# Patient Record
Sex: FEMALE | Race: BLACK OR AFRICAN AMERICAN | NOT HISPANIC OR LATINO | Employment: OTHER | URBAN - METROPOLITAN AREA
[De-identification: names, ages, dates, MRNs, and addresses within clinical notes are randomized per-mention and may not be internally consistent; named-entity substitution may affect disease eponyms.]

---

## 2017-01-03 ENCOUNTER — TELEPHONE (OUTPATIENT)
Dept: GASTROENTEROLOGY | Facility: CLINIC | Age: 47
End: 2017-01-03

## 2017-01-05 ENCOUNTER — PATIENT MESSAGE (OUTPATIENT)
Dept: TRANSPLANT | Facility: CLINIC | Age: 47
End: 2017-01-05

## 2017-01-06 ENCOUNTER — TELEPHONE (OUTPATIENT)
Dept: GASTROENTEROLOGY | Facility: CLINIC | Age: 47
End: 2017-01-06

## 2017-01-18 ENCOUNTER — PATIENT MESSAGE (OUTPATIENT)
Dept: GASTROENTEROLOGY | Facility: CLINIC | Age: 47
End: 2017-01-18

## 2017-01-18 ENCOUNTER — PATIENT MESSAGE (OUTPATIENT)
Dept: TRANSPLANT | Facility: CLINIC | Age: 47
End: 2017-01-18

## 2017-01-18 ENCOUNTER — OFFICE VISIT (OUTPATIENT)
Dept: GASTROENTEROLOGY | Facility: CLINIC | Age: 47
End: 2017-01-18
Payer: MEDICARE

## 2017-01-18 ENCOUNTER — HOSPITAL ENCOUNTER (OUTPATIENT)
Dept: PREADMISSION TESTING | Facility: HOSPITAL | Age: 47
Discharge: HOME | End: 2017-01-18
Attending: OTOLARYNGOLOGY
Payer: MEDICARE

## 2017-01-18 VITALS
DIASTOLIC BLOOD PRESSURE: 85 MMHG | HEIGHT: 69 IN | HEART RATE: 76 BPM | WEIGHT: 146.38 LBS | SYSTOLIC BLOOD PRESSURE: 151 MMHG | BODY MASS INDEX: 21.68 KG/M2

## 2017-01-18 DIAGNOSIS — K62.5 RECTAL BLEEDING: ICD-10-CM

## 2017-01-18 DIAGNOSIS — K60.2 ANAL FISSURE: ICD-10-CM

## 2017-01-18 DIAGNOSIS — R93.3 ABNORMAL CT SCAN, COLON: ICD-10-CM

## 2017-01-18 DIAGNOSIS — R11.0 NAUSEA: Primary | ICD-10-CM

## 2017-01-18 PROCEDURE — 99214 OFFICE O/P EST MOD 30 MIN: CPT | Mod: S$GLB,,, | Performed by: PHYSICIAN ASSISTANT

## 2017-01-18 PROCEDURE — 3079F DIAST BP 80-89 MM HG: CPT | Mod: S$GLB,,, | Performed by: PHYSICIAN ASSISTANT

## 2017-01-18 PROCEDURE — 1159F MED LIST DOCD IN RCRD: CPT | Mod: S$GLB,,, | Performed by: PHYSICIAN ASSISTANT

## 2017-01-18 PROCEDURE — 3077F SYST BP >= 140 MM HG: CPT | Mod: S$GLB,,, | Performed by: PHYSICIAN ASSISTANT

## 2017-01-18 PROCEDURE — 99999 PR PBB SHADOW E&M-EST. PATIENT-LVL V: CPT | Mod: PBBFAC,,, | Performed by: PHYSICIAN ASSISTANT

## 2017-01-18 NOTE — MR AVS SNAPSHOT
[facility]  [address]  [place] [address]  Phone: [phone]  Fax: [phone]                  Valencia Dumont   2017 9:30 AM   Office Visit    Description:  Female : 1970   Provider:  Amrit Power PA-C   Department:  [facility] - Gastroenterology           Reason for Visit     Abdominal Pain     Constipation     Diarrhea     Nausea           Diagnoses this Visit        Comments    Nausea    -  Primary     Abnormal CT scan, colon         Rectal bleeding         Anal fissure                To Do List           Future Appointments        Provider Department Dept Phone    2017  1:30 PM PRE-ADMIT 1, [facility] [phone]    2/3/2017 8:00 AM NOM NM2 INFINIA 400LB LIMIT [facility] [phone]    2017 8:30 AM Amrit Power PA-C [facility] [phone]    3/22/2017 9:00 AM WB DEXA1 [facility] [phone]      Your Future Surgeries/Procedures     2017   Surgery with Dino Candelaria MD   [facility] ([facility])    [address] [place] [address] [phone]              Goals (5 Years of Data)     None       These Medications        Disp Refills Start End    DILTIAZEM HCL (DILTIAZEM 2% CREAM) 30 g 0 2017     Apply topically 3 (three) times daily. Apply topically to anal area. - Topical (Top)    Pharmacy: [facility] [address] Ph #: [phone]         [facility]     [facility] Care Line -  Assistance  Registered nurses in the [facility] provide clinical advisement, health education, appointment booking, and other advisory services.  Call for this free service at [phone].             Medications           Message regarding Medications     Verify the changes and/or additions to your medication regime  "listed below are the same as discussed with your clinician today.  If any of these changes or additions are incorrect, please notify your healthcare provider.        START taking these NEW medications        Refills    DILTIAZEM HCL (DILTIAZEM 2% CREAM) 0    Sig: Apply topically 3 (three) times daily. Apply topically to anal area.    Class: Print    Route: Topical (Top)           Verify that the below list of medications is an accurate representation of the medications you are currently taking.  If none reported, the list may be blank. If incorrect, please contact your healthcare provider. Carry this list with you in case of emergency.           Current Medications     ACCU-CHEK SMARTVIEW Strp 1 strip by Misc.(Non-Drug; Combo Route) route 5 (five) times daily. Trueresult    BD ULTRA-FINE JANESSA PEN NEEDLES 32 gauge x 5/32" Ndle TEST BLOOD SUGAR FOUR TIMES DAILY    blood sugar diagnostic (BLOOD GLUCOSE TEST) Strp Pt uses freestyle lite meter to check BG 4 times daily.    cyproheptadine (PERIACTIN) 4 mg tablet Take 4 mg by mouth every evening.    diazepam (VALIUM) 5 MG tablet 5 mg 3 (three) times daily as needed.     estradiol (ESTRACE) 0.01 % (0.1 mg/gram) vaginal cream Place 0.5 g vaginally twice a week. Insert 0.5grams intravaginally twice weekly    gabapentin (NEURONTIN) 300 MG capsule Take 300 mg by mouth 3 (three) times daily.    hydrochlorothiazide (HYDRODIURIL) 25 MG tablet Take 25 mg by mouth once daily.    lancets 28 gauge Misc 4 lancets by Misc.(Non-Drug; Combo Route) route once daily. Pt uses Freestyle lite meter to check BG 4 times daily.    LEVEMIR FLEXTOUCH 100 unit/mL (3 mL) InPn pen INJECT TEN UNITS UNDER THE SKIN ONCE DAILY    losartan (COZAAR) 100 MG tablet Take 1 tablet by mouth once daily.    magnesium oxide (MAG-OX) 400 mg tablet Take 1 tablet (400 mg total) by mouth 2 (two) times daily.    MULTIVIT,THER IRON,CA,FA & MIN (MULTIVITAMIN) Tab Take 1 tablet by mouth once daily.    mycophenolate " "(MYFORTIC) 360 MG TbEC Take 1 tablet (360 mg total) by mouth 2 (two) times daily.    NOVOLOG FLEXPEN 100 unit/mL InPn pen INJECT FIVE UNITS BEFORE BREAKFAST AND LUNCH, AND INJECT SIX UNITS BEFORE DINNER. USE WITH SLIDING SCALE.    ondansetron (ZOFRAN) 8 MG tablet Take 1 tablet by mouth every 8 (eight) hours as needed.    oxycodone-acetaminophen (PERCOCET) 7.5-325 mg per tablet Take 1 tablet by mouth every 4 (four) hours as needed for Pain.    pantoprazole (PROTONIX) 40 MG tablet Take 40 mg by mouth once daily.    PROAIR HFA 90 mcg/actuation inhaler Inhale 2 puffs into the lungs 3 (three) times daily as needed.     promethazine-dextromethorphan (PROMETHAZINE-DM) 6.25-15 mg/5 mL Syrp     tacrolimus (PROGRAF) 1 MG Cap Take 8 capsules (8 mg total) by mouth every 12 (twelve) hours.    valacyclovir (VALTREX) 500 MG tablet TAKE TWO TABLETS BY MOUTH EVERY 8 HOURS FOR 7 DAYS    verapamil (VERELAN) 120 MG C24P Take 1 capsule by mouth once daily.    VITAMIN D2 50,000 unit capsule TAKE 1 CAPSULE BY MOUTH ONCE EVERY WEEK    zolpidem (AMBIEN) 10 mg Tab Take 10 mg by mouth nightly as needed.    DILTIAZEM HCL (DILTIAZEM 2% CREAM) Apply topically 3 (three) times daily. Apply topically to anal area.           Clinical Reference Information           Vital Signs - Last Recorded  Most recent update: 1/18/2017 10:02 AM by Mary Alice Flores MA    BP Pulse Ht Wt BMI    (!) 151/85 76 5' 9" (1.753 m) 66.4 kg (146 lb 6.2 oz) 21.62 kg/m2      Blood Pressure          Most Recent Value    BP  (!)  151/85      Allergies as of 1/18/2017     Doxycycline    Pcn [Penicillins]      Immunizations Administered on Date of Encounter - 1/18/2017     None      Orders Placed During Today's Visit      Normal Orders This Visit    Case request GI: COLONOSCOPY     Future Labs/Procedures Expected by Expires    NM Gastric Emptying  1/18/2017 1/18/2018      Maintenance Dialysis History     Patient has no recorded history of maintenance dialysis.      Transplant " Information        Txp Date Organ Coordinator Care Team    12/31/2013 Liver Linda Barakat, SEGUNDO Surgeon:  Constantino Peña MD   Referring Physician:  Nolan Badillo MD   Assisting Surgeon:  Robin Alex Jr., MD   Current Hepatologist:  Miriam Abernathy MD   Corresponding Physician:  Timur Lion MD   Current PCP:  Timur Lion MD         Instructions    Restart the miralax

## 2017-01-18 NOTE — PROGRESS NOTES
[facility] Consultation Note    Reason for Consult:  The primary encounter diagnosis was Nausea. Diagnoses of Abnormal CT scan, colon, Rectal bleeding, and Anal fissure were also pertinent to this visit.    PCP:   Timur Lion       Referring MD:  No referring provider defined for this encounter.    HPI:  This is a 46 y.o. female  Here to follow up on her stomach pain. Her 12/2016 EGD revealed erosive gastritis; Bx negative for H. Pylori or CMV.     Seen in the ED 12/30/16 for nausea diarrhea, and abdominal pain. A CT revealed questionable thickening of the distal descending colon with slight pericolonic fat stranding. She was discharged on avelox x 10 days.  Today she admits to seeing blood in the stool from Jan 11th-16th, none in the past 2 days. She showed me a picture on her phone in office today. No further diarrhea, stools are now hard.   Drinking plenty of water.    she continues to have nausea and borborygmus. No further vomiting. Taking  zofran 1-2 times a day.    GERD may be better. takin protonix 40mg twice daily     -180, BS >200 once a week - still  S/p liver transplant    11/2016 abdominal ultrasound - she does not have a gallbladder, no ductal dilation    Hx of taking multiple antibiotics and steroids for sinus infections    She admits to a recent abnormal stress test, and thus her upcoming nasal surgery has been cancelled    ROS:  Constitutional: No fevers, chills, No weight loss  ENT: No allergies  CV: No chest pain  Pulm: No cough, No shortness of breath  Ophtho: No vision changes  GI: see HPI  Derm: No rash  Heme: No lymphadenopathy, No bruising  MSK: No arthritis  : No dysuria, No hematuria  Endo: No hot or cold intolerance  Neuro: No syncope, No seizure  Psych: No anxiety, No depression    Medical History:  has a past medical history of Anemia; Arthritis; Ascites; 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    Surgical History:  has a past surgical history that includes Esophagogastroduodenoscopy; Liver biopsy; Liver transplant (12/31/2013); and Tubal ligation (2003).    Family History: family history includes Alzheimer's disease in her father; Breast cancer (age of onset: 55) in her maternal aunt; Diabetes in her brother, father, paternal grandfather, and paternal grandmother; Hypertension in her brother and mother. There is no history of Stroke, Cancer, Colon cancer, Esophageal cancer, Stomach cancer, or Rectal cancer..     Social History:  reports that she has never smoked. She has never used smokeless tobacco. She reports that she does not drink alcohol or use illicit drugs.    Review of patient's allergies indicates:   Allergen Reactions    Doxycycline Rash    Pcn [penicillins] Rash       Current Outpatient Prescriptions on File Prior to Visit   Medication Sig Dispense Refill    ACCU-CHEK SMARTVIEW Strp 1 strip by Misc.(Non-Drug; Combo Route) route 5 (five) times daily. Trueresult      BD ULTRA-FINE JANESSA PEN NEEDLES 32 gauge x 5/32" Ndle TEST BLOOD SUGAR FOUR TIMES DAILY 400 each 3    blood sugar diagnostic (BLOOD GLUCOSE TEST) Strp Pt uses freestyle lite meter to check BG 4 times daily. 200 strip 11    cyproheptadine (PERIACTIN) 4 mg tablet Take 4 mg by mouth every evening.  3    diazepam (VALIUM) 5 MG tablet 5 mg 3 (three) times daily as needed.   0    estradiol (ESTRACE) 0.01 % (0.1 mg/gram) vaginal cream Place 0.5 g vaginally twice a week. Insert 0.5grams intravaginally twice weekly 42 g 4    gabapentin (NEURONTIN) 300 MG capsule Take 300 mg by mouth 3 (three) times daily.      hydrochlorothiazide (HYDRODIURIL) 25 MG tablet Take 25 mg by mouth once daily.  2    lancets 28 gauge Misc 4 lancets by Misc.(Non-Drug; Combo Route) route once daily. Pt uses Freestyle lite meter to " "check BG 4 times daily. 200 each 11    LEVEMIR FLEXTOUCH 100 unit/mL (3 mL) InPn pen INJECT TEN UNITS UNDER THE SKIN ONCE DAILY 15 mL 3    losartan (COZAAR) 100 MG tablet Take 1 tablet by mouth once daily.  2    magnesium oxide (MAG-OX) 400 mg tablet Take 1 tablet (400 mg total) by mouth 2 (two) times daily. (Patient taking differently: Take 800 mg by mouth 2 (two) times daily. ) 60 tablet 5    MULTIVIT,THER IRON,CA,FA & MIN (MULTIVITAMIN) Tab Take 1 tablet by mouth once daily. 30 tablet 0    mycophenolate (MYFORTIC) 360 MG TbEC Take 1 tablet (360 mg total) by mouth 2 (two) times daily. 180 tablet 3    NOVOLOG FLEXPEN 100 unit/mL InPn pen INJECT FIVE UNITS BEFORE BREAKFAST AND LUNCH, AND INJECT SIX UNITS BEFORE DINNER. USE WITH SLIDING SCALE. 15 mL 3    ondansetron (ZOFRAN) 8 MG tablet Take 1 tablet by mouth every 8 (eight) hours as needed.  0    oxycodone-acetaminophen (PERCOCET) 7.5-325 mg per tablet Take 1 tablet by mouth every 4 (four) hours as needed for Pain.      pantoprazole (PROTONIX) 40 MG tablet Take 40 mg by mouth once daily.      PROAIR HFA 90 mcg/actuation inhaler Inhale 2 puffs into the lungs 3 (three) times daily as needed.   3    promethazine-dextromethorphan (PROMETHAZINE-DM) 6.25-15 mg/5 mL Syrp   0    tacrolimus (PROGRAF) 1 MG Cap Take 8 capsules (8 mg total) by mouth every 12 (twelve) hours. 480 capsule 11    valacyclovir (VALTREX) 500 MG tablet TAKE TWO TABLETS BY MOUTH EVERY 8 HOURS FOR 7 DAYS  0    verapamil (VERELAN) 120 MG C24P Take 1 capsule by mouth once daily.  3    VITAMIN D2 50,000 unit capsule TAKE 1 CAPSULE BY MOUTH ONCE EVERY WEEK 12 capsule 1    zolpidem (AMBIEN) 10 mg Tab Take 10 mg by mouth nightly as needed.  3     No current facility-administered medications on file prior to visit.          Objective Findings:    Vital Signs:  Visit Vitals    BP (!) 151/85    Pulse 76    Ht 5' 9" (1.753 m)    Wt 66.4 kg (146 lb 6.2 oz)    BMI 21.62 kg/m2     Body mass index " is 21.62 kg/(m^2).    Physical Exam:  General Appearance: Well appearing in no acute distress  Head:   Normocephalic, without obvious abnormality  Eyes:    No scleral icterus  ENT: Neck supple, Lips, mucosa, and tongue normal  Lungs: CTA bilaterally in anterior and posterior fields, no wheezes, no crackles.  Heart:  Regular rate and rhythm, S1, S2 normal, no murmurs heard  Abdomen: large abdominal incisional scar. abdomen soft, mild diffuse tenderness, no guarding or rebound tenderness, non distended with hyperactive bowel sounds in all four quadrants.   Extremities: 2+ pulses, no  edema  Skin: No rash  Neurologic: AAO x 3    Rectal exam: no external hemorrhoids visualized. Anal fissure at 6 'oclock. Upon DANY, tender at 6 o'clock         Labs:  Lab Results   Component Value Date    WBC 5.40 12/29/2016    HGB 13.2 12/29/2016    HCT 37.5 12/29/2016     12/29/2016    CHOL 202 (H) 07/18/2016    TRIG 64 07/18/2016    HDL 78 (H) 07/18/2016    ALT 14 12/29/2016    AST 12 12/29/2016     12/29/2016    K 3.5 12/29/2016     12/29/2016    CREATININE 1.3 12/29/2016    BUN 17 12/29/2016    CO2 27 12/29/2016    TSH 3.247 10/15/2013    INR 1.0 01/03/2014    HGBA1C 8.2 (H) 07/28/2016       Imaging:    Endoscopy:    12/2016 EGD revealed erosive gastritis; Bx negative for H. Pylori or CMV.     Assessment:  1. Nausea    2. Abnormal CT scan, colon    3. Rectal bleeding    4. Anal fissure    s/p liver transplant on immunosuppressants    Chronic Nausea may be relation to erosive gastritis or she may have gastroparesis.    Recently diagnosed with left sided colitis via CT. S/p antibiotics. Rectal bleeding have had been related to the colitis or hemorrhoids.     Recommendations:  1. Schedule a colonoscopy to rule out ulcers and malignancies, Pending a normal cardiac stress test, (will need to get records faxed)    2. GES to rule out gastroparesis    3. diltiazem cream for anal fissure    No Follow-up on file.      Order  summary:  Orders Placed This Encounter    NM Gastric Emptying    DILTIAZEM HCL (DILTIAZEM 2% CREAM)    Case request GI: COLONOSCOPY         Thank you so much for allowing me to participate in the care of Valencia Power PA-C

## 2017-01-19 ENCOUNTER — PATIENT MESSAGE (OUTPATIENT)
Dept: TRANSPLANT | Facility: CLINIC | Age: 47
End: 2017-01-19

## 2017-01-22 ENCOUNTER — PATIENT MESSAGE (OUTPATIENT)
Dept: GASTROENTEROLOGY | Facility: CLINIC | Age: 47
End: 2017-01-22

## 2017-01-22 DIAGNOSIS — K60.2 ANAL FISSURE: Primary | ICD-10-CM

## 2017-01-23 ENCOUNTER — LAB VISIT (OUTPATIENT)
Dept: LAB | Facility: HOSPITAL | Age: 47
End: 2017-01-23
Attending: INTERNAL MEDICINE
Payer: MEDICARE

## 2017-01-23 DIAGNOSIS — Z94.4 LIVER REPLACED BY TRANSPLANT: ICD-10-CM

## 2017-01-23 LAB
ALBUMIN SERPL BCP-MCNC: 3.9 G/DL
ALP SERPL-CCNC: 73 U/L
ALT SERPL W/O P-5'-P-CCNC: 14 U/L
ANION GAP SERPL CALC-SCNC: 5 MMOL/L
AST SERPL-CCNC: 14 U/L
BASOPHILS # BLD AUTO: 0.01 K/UL
BASOPHILS NFR BLD: 0.2 %
BILIRUB SERPL-MCNC: 0.5 MG/DL
BUN SERPL-MCNC: 24 MG/DL
CALCIUM SERPL-MCNC: 9.1 MG/DL
CHLORIDE SERPL-SCNC: 103 MMOL/L
CO2 SERPL-SCNC: 29 MMOL/L
CREAT SERPL-MCNC: 1.3 MG/DL
DIFFERENTIAL METHOD: ABNORMAL
EOSINOPHIL # BLD AUTO: 0.4 K/UL
EOSINOPHIL NFR BLD: 8.8 %
ERYTHROCYTE [DISTWIDTH] IN BLOOD BY AUTOMATED COUNT: 13.7 %
EST. GFR  (AFRICAN AMERICAN): 57 ML/MIN/1.73 M^2
EST. GFR  (NON AFRICAN AMERICAN): 49 ML/MIN/1.73 M^2
GGT SERPL-CCNC: 23 U/L
GLUCOSE SERPL-MCNC: 249 MG/DL
HCT VFR BLD AUTO: 33 %
HGB BLD-MCNC: 11.4 G/DL
LYMPHOCYTES # BLD AUTO: 1.9 K/UL
LYMPHOCYTES NFR BLD: 39.9 %
MAGNESIUM SERPL-MCNC: 1.5 MG/DL
MCH RBC QN AUTO: 27.5 PG
MCHC RBC AUTO-ENTMCNC: 34.5 %
MCV RBC AUTO: 80 FL
MONOCYTES # BLD AUTO: 0.2 K/UL
MONOCYTES NFR BLD: 4.2 %
NEUTROPHILS # BLD AUTO: 2.3 K/UL
NEUTROPHILS NFR BLD: 46.9 %
PLATELET # BLD AUTO: 177 K/UL
PMV BLD AUTO: 10.6 FL
POTASSIUM SERPL-SCNC: 3.7 MMOL/L
PROT SERPL-MCNC: 7.5 G/DL
RBC # BLD AUTO: 4.14 M/UL
SODIUM SERPL-SCNC: 137 MMOL/L
WBC # BLD AUTO: 4.79 K/UL

## 2017-01-23 PROCEDURE — 36415 COLL VENOUS BLD VENIPUNCTURE: CPT

## 2017-01-23 PROCEDURE — 83735 ASSAY OF MAGNESIUM: CPT

## 2017-01-23 PROCEDURE — 82977 ASSAY OF GGT: CPT

## 2017-01-23 PROCEDURE — 80053 COMPREHEN METABOLIC PANEL: CPT

## 2017-01-23 PROCEDURE — 80197 ASSAY OF TACROLIMUS: CPT

## 2017-01-23 PROCEDURE — 85025 COMPLETE CBC W/AUTO DIFF WBC: CPT

## 2017-01-23 NOTE — TELEPHONE ENCOUNTER
Ma called patient to scheduled crs appt , pt states she will call the office when shes ready to scheduled it

## 2017-01-24 LAB — TACROLIMUS BLD-MCNC: 3.5 NG/ML

## 2017-01-26 ENCOUNTER — PATIENT MESSAGE (OUTPATIENT)
Dept: GASTROENTEROLOGY | Facility: CLINIC | Age: 47
End: 2017-01-26

## 2017-01-26 ENCOUNTER — TELEPHONE (OUTPATIENT)
Dept: TRANSPLANT | Facility: CLINIC | Age: 47
End: 2017-01-26

## 2017-01-26 NOTE — TELEPHONE ENCOUNTER
----- Message from Miriam Abernathy MD sent at 1/26/2017 12:27 AM CST -----  Reviewed, nothing to do

## 2017-02-03 ENCOUNTER — HOSPITAL ENCOUNTER (OUTPATIENT)
Dept: RADIOLOGY | Facility: HOSPITAL | Age: 47
Discharge: HOME | End: 2017-02-03
Attending: INTERNAL MEDICINE
Payer: MEDICARE

## 2017-02-03 DIAGNOSIS — R11.0 NAUSEA: ICD-10-CM

## 2017-02-03 PROCEDURE — 78264 GASTRIC EMPTYING IMG STUDY: CPT | Mod: 26,,, | Performed by: RADIOLOGY

## 2017-02-03 PROCEDURE — 78264 GASTRIC EMPTYING IMG STUDY: CPT | Mod: TC

## 2017-02-06 ENCOUNTER — PATIENT MESSAGE (OUTPATIENT)
Dept: GASTROENTEROLOGY | Facility: CLINIC | Age: 47
End: 2017-02-06

## 2017-02-07 ENCOUNTER — LAB VISIT (OUTPATIENT)
Dept: LAB | Facility: HOSPITAL | Age: 47
End: 2017-02-07
Attending: FAMILY MEDICINE
Payer: MEDICARE

## 2017-02-07 DIAGNOSIS — E11.9 TYPE II OR UNSPECIFIED TYPE DIABETES MELLITUS WITHOUT MENTION OF COMPLICATION, NOT STATED AS UNCONTROLLED: Primary | ICD-10-CM

## 2017-02-07 PROCEDURE — 83036 HEMOGLOBIN GLYCOSYLATED A1C: CPT

## 2017-02-07 PROCEDURE — 36415 COLL VENOUS BLD VENIPUNCTURE: CPT

## 2017-02-08 ENCOUNTER — TELEPHONE (OUTPATIENT)
Dept: OBSTETRICS AND GYNECOLOGY | Facility: CLINIC | Age: 47
End: 2017-02-08

## 2017-02-08 LAB
ESTIMATED AVG GLUCOSE: 197 MG/DL
HBA1C MFR BLD HPLC: 8.5 %

## 2017-02-08 NOTE — TELEPHONE ENCOUNTER
Pt wanted to know difference between her annual exam and her pap visit. Explained difference. Also noted that pt's annual exam was schedule a bit early and may not be covered by her insurance. Rescheduled pt's annual exam for 3/17/2017 at 2:30 PM. Pt communicated understanding.

## 2017-02-08 NOTE — TELEPHONE ENCOUNTER
----- Message from Latoya Vaughan sent at 2/8/2017 10:25 AM CST -----  Contact: Patient  x_ 1st Request  _ 2nd Request  _ 3rd Request    Who: SUSY STOVER [[id number]]    Why: Patient is calling in regards to having questions concerning her appointment. Patient is needing a call back from staff.    What Number to Call Back: Patient can be reached at [phone].    When to Expect a call back: (Before the end of the day)  -- if call after 3:00 call back will be tomorrow.

## 2017-02-13 ENCOUNTER — PATIENT MESSAGE (OUTPATIENT)
Dept: GASTROENTEROLOGY | Facility: CLINIC | Age: 47
End: 2017-02-13

## 2017-02-13 ENCOUNTER — TELEPHONE (OUTPATIENT)
Dept: GASTROENTEROLOGY | Facility: CLINIC | Age: 47
End: 2017-02-13

## 2017-02-27 ENCOUNTER — TELEPHONE (OUTPATIENT)
Dept: TRANSPLANT | Facility: CLINIC | Age: 47
End: 2017-02-27

## 2017-02-27 NOTE — TELEPHONE ENCOUNTER
----- Message from Trupti Abad MA sent at 2/24/2017  1:49 PM CST -----  Contact: [facility]      ----- Message -----     From: [place]     Sent: 2/24/2017   1:32 PM       To: Jose[facility] Staff    Caller request for a  Prograff and needs additional information, please contact caller at [phone]

## 2017-03-02 ENCOUNTER — PATIENT MESSAGE (OUTPATIENT)
Dept: TRANSPLANT | Facility: CLINIC | Age: 47
End: 2017-03-02

## 2017-03-09 ENCOUNTER — OFFICE VISIT (OUTPATIENT)
Dept: TRANSPLANT | Facility: CLINIC | Age: 47
End: 2017-03-09
Payer: MEDICARE

## 2017-03-09 VITALS
WEIGHT: 149.94 LBS | DIASTOLIC BLOOD PRESSURE: 92 MMHG | BODY MASS INDEX: 22.21 KG/M2 | RESPIRATION RATE: 16 BRPM | SYSTOLIC BLOOD PRESSURE: 155 MMHG | TEMPERATURE: 99 F | HEART RATE: 72 BPM | HEIGHT: 69 IN

## 2017-03-09 DIAGNOSIS — D50.9 MICROCYTIC ANEMIA: ICD-10-CM

## 2017-03-09 DIAGNOSIS — Z29.89 PROPHYLACTIC IMMUNOTHERAPY: Chronic | ICD-10-CM

## 2017-03-09 DIAGNOSIS — R53.82 CHRONIC FATIGUE: Primary | ICD-10-CM

## 2017-03-09 DIAGNOSIS — Z94.4 LIVER REPLACED BY TRANSPLANT: Chronic | ICD-10-CM

## 2017-03-09 PROCEDURE — 99999 PR PBB SHADOW E&M-EST. PATIENT-LVL III: CPT | Mod: PBBFAC,,, | Performed by: INTERNAL MEDICINE

## 2017-03-09 PROCEDURE — 1160F RVW MEDS BY RX/DR IN RCRD: CPT | Mod: S$GLB,,, | Performed by: INTERNAL MEDICINE

## 2017-03-09 PROCEDURE — 99213 OFFICE O/P EST LOW 20 MIN: CPT | Mod: S$GLB,,, | Performed by: INTERNAL MEDICINE

## 2017-03-09 PROCEDURE — 3080F DIAST BP >= 90 MM HG: CPT | Mod: S$GLB,,, | Performed by: INTERNAL MEDICINE

## 2017-03-09 PROCEDURE — 3077F SYST BP >= 140 MM HG: CPT | Mod: S$GLB,,, | Performed by: INTERNAL MEDICINE

## 2017-03-09 RX ORDER — ISOSORBIDE MONONITRATE 30 MG/1
30 TABLET, EXTENDED RELEASE ORAL DAILY
COMMUNITY
End: 2023-10-05 | Stop reason: SDUPTHER

## 2017-03-09 NOTE — PROGRESS NOTES
Transplant Hepatology  Liver Transplant Recipient Follow-up    Transplant Date: 2013  [facility]OS Native Liver Dx: Cirrhosis: Autoimmune    Valencia is here for follow up of her liver transplant.    ORGAN: LIVER  Whole or Partial: whole liver  Donor Type:  - brain death  [facility] [place] 2[place] High Risk: no  Donor CMV Status: positive  Donor HCV Status: negative  Donor HBcAb: negative  Biliary Anastomosis: end to end  Arterial Anatomy: standard  IVC reconstruction: end to end ivc  Portal vein status: patent  .    Subjective:     Interval History:  Currently, she is doing with difficulty. Current complaints include chronic issues with fatigue.  She reports she can have moderate to severe fatigue on a regular basis.  She denies any precipitating or alleviating factors.  She is wondering if this is related to her lupus or there are certain supplements she can take.  Of note she reports having seen a rheumatologist since her last visit who thought that her lupus was otherwise in remission.  She also has chronic issues with back pain and more recently knee pain for which she is being followed by orthopedics and pain management.    She also had evaluation for GI complaints by gastroenterologist for which she had an upper endoscopy and is awaiting colonoscopy.    Regarding her liver she had no issues with this in the past year.    Review of Systems   Constitutional: Positive for fatigue.   HENT: Negative.    Eyes: Negative.    Respiratory: Negative.    Cardiovascular: Negative.    Gastrointestinal: Negative.    Genitourinary: Negative.    Musculoskeletal: Positive for arthralgias and myalgias.   Skin: Negative.    Neurological: Negative.    Psychiatric/Behavioral: Negative.        Objective:     Physical Exam   Constitutional: She is oriented to person, place, and time. She appears well-developed and well-nourished. No distress.   HENT:   Head: Normocephalic and atraumatic.   Mouth/Throat: Oropharynx is clear and moist. No oropharyngeal  exudate.   Eyes: Conjunctivae are normal. Pupils are equal, round, and reactive to light. Right eye exhibits no discharge. Left eye exhibits no discharge. No scleral icterus.   Pulmonary/Chest: Effort normal and breath sounds normal. No respiratory distress. She has no wheezes.   Abdominal: Soft. She exhibits no distension. There is no tenderness.   Musculoskeletal: She exhibits no edema.   Neurological: She is alert and oriented to person, place, and time.   Psychiatric: She has a normal mood and affect. Her behavior is normal.   Vitals reviewed.      WBC   Date Value Ref Range Status   01/23/2017 4.79 3.90 - 12.70 K/uL Final     Hemoglobin   Date Value Ref Range Status   01/23/2017 11.4 (L) 12.0 - 16.0 g/dL Final     POC Hematocrit   Date Value Ref Range Status   12/31/2013 28 (L) 36 - 54 %PCV Final     Hematocrit   Date Value Ref Range Status   01/23/2017 33.0 (L) 37.0 - 48.5 % Final     Platelets   Date Value Ref Range Status   01/23/2017 177 150 - 350 K/uL Final     BUN, Bld   Date Value Ref Range Status   01/23/2017 24 (H) 6 - 20 mg/dL Final     Creatinine   Date Value Ref Range Status   01/23/2017 1.3 0.5 - 1.4 mg/dL Final     Glucose   Date Value Ref Range Status   01/23/2017 249 (H) 70 - 110 mg/dL Final     Calcium   Date Value Ref Range Status   01/23/2017 9.1 8.7 - 10.5 mg/dL Final     Sodium   Date Value Ref Range Status   01/23/2017 137 136 - 145 mmol/L Final     Potassium   Date Value Ref Range Status   01/23/2017 3.7 3.5 - 5.1 mmol/L Final     Chloride   Date Value Ref Range Status   01/23/2017 103 95 - 110 mmol/L Final     Magnesium   Date Value Ref Range Status   01/23/2017 1.5 (L) 1.6 - 2.6 mg/dL Final     AST   Date Value Ref Range Status   01/23/2017 14 10 - 40 U/L Final     ALT   Date Value Ref Range Status   01/23/2017 14 10 - 44 U/L Final     Alkaline Phosphatase   Date Value Ref Range Status   01/23/2017 73 55 - 135 U/L Final     Total Bilirubin   Date Value Ref Range Status   01/23/2017 0.5  0.1 - 1.0 mg/dL Final     Comment:     For infants and newborns, interpretation of results should be based  on gestational age, weight and in agreement with clinical  observations.  Premature Infant recommended reference ranges:  Up to 24 hours.............<8.0 mg/dL  Up to 48 hours............<12.0 mg/dL  3-5 days..................<15.0 mg/dL  6-29 days.................<15.0 mg/dL       Albumin   Date Value Ref Range Status   01/23/2017 3.9 3.5 - 5.2 g/dL Final     INR   Date Value Ref Range Status   01/03/2014 1.0 0.8 - 1.2 Final     Comment:     Coumadin Therapy:  2.0 - 3.0 for INR for all indicators except mechanical heart valves  and antiphospholipid syndromes which should use 2.5 - 3.5.     Lab Results   Component Value Date    TACROLIMUS 3.5 (L) 01/23/2017           Assessment/Plan:     1. Chronic fatigue    2. Microcytic anemia    3. Prophylactic immunotherapy (transplant immunosuppression)    4. 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        Chronic fatigue-unclear etiology do not suspect this is liver related  -Advise she discuss with primary care doctor and consider having B12, iron and thyroid studies checked    Microcytic anemia-she is being followed by gastroenterology and had an EGD which was overall unremarkable with plans for colonoscopy  -Advised that she follow with GI dementia the colonoscopy is completed for evaluation of microcytic anemia    Prophylactic immunotherapy (transplant immunosuppression)  -Continue current immunosuppression    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-good allograft function  -Continue with routine labs    Return to clinic in 1 year    Miriam Abernathy MD           [facility] [email or web address]] Patient Status  Functional Status: 70% - Cares for self: unable to carry on normal activity or active work  Physical Capacity: Limited Mobility

## 2017-03-09 NOTE — LETTER
March 9, 2017        Timur Lion  [address] [address]  Phone: [phone]  Fax: [phone]             [facility]  [address]  [facility] [address]  Phone: [phone]   Patient: Valencia Dumont   MR Number: [id number]   YOB: 1970   Date of Visit: 3/9/2017       Dear Dr. Timur Lion    Thank you for referring Valencia Dumont to me for evaluation. Attached you will find relevant portions of my assessment and plan of care.    If you have questions, please do not hesitate to call me. I look forward to following Valencia Dumont along with you.    Sincerely,    Miriam Abernathy MD    Enclosure    If you would like to receive this communication electronically, please contact [email or web address] or [phone] to request [employer] access.    [employer] is a tool which provides read-only access to select patient information with whom you have a relationship. Its easy to use and provides real time access to review your patients record including encounter summaries, notes, results, and demographic information.    If you feel you have received this communication in error or would no longer like to receive these types of communications, please e-mail [email or web address]

## 2017-03-09 NOTE — MR AVS SNAPSHOT
"    [facility]  [address]  [place] [address]  Phone: [phone]                  Valencia Dumont   3/9/2017 2:00 PM   Office Visit    Description:  Female : 1970   Provider:  Miriam Abernathy MD   Department:  [facility]           Reason for Visit     Liver Transplant Follow-up                To Do List           Future Appointments        Provider Department Dept Phone    3/20/2017 8:45 AM Lu Schreiber MD [facility] [phone]    3/22/2017 9:00 AM [facility] DEXA1 [facility] [phone]    2017 8:00 AM [facility] [phone]      Goals (5 Years of Data)     None      Follow-Up and Disposition     Return in about 1 year (around 3/9/2018).      [facility]     [facility] -  Assistance  Registered nurses in the [facility] provide clinical advisement, health education, appointment booking, and other advisory services.  Call for this free service at [phone].             Medications           Message regarding Medications     Verify the changes and/or additions to your medication regime listed below are the same as discussed with your clinician today.  If any of these changes or additions are incorrect, please notify your healthcare provider.             Verify that the below list of medications is an accurate representation of the medications you are currently taking.  If none reported, the list may be blank. If incorrect, please contact your healthcare provider. Carry this list with you in case of emergency.           Current Medications     ACCU-CHEK SMARTVIEW Strp 1 strip by Misc.(Non-Drug; Combo Route) route 5 (five) times daily. Trueresult    BD ULTRA-FINE JANESSA PEN NEEDLES 32 gauge x 5/32" Ndle TEST BLOOD SUGAR FOUR TIMES DAILY    blood sugar diagnostic (BLOOD GLUCOSE TEST) Strp Pt uses freestyle lite meter to check BG 4 times " daily.    diazepam (VALIUM) 5 MG tablet 5 mg 3 (three) times daily as needed.     estradiol (ESTRACE) 0.01 % (0.1 mg/gram) vaginal cream Place 0.5 g vaginally twice a week. Insert 0.5grams intravaginally twice weekly    gabapentin (NEURONTIN) 300 MG capsule Take 300 mg by mouth 3 (three) times daily.    hydrochlorothiazide (HYDRODIURIL) 25 MG tablet Take 25 mg by mouth once daily.    isosorbide mononitrate (IMDUR) 30 MG 24 hr tablet Take 30 mg by mouth once daily.    lancets 28 gauge Misc 4 lancets by Misc.(Non-Drug; Combo Route) route once daily. Pt uses Freestyle lite meter to check BG 4 times daily.    LEVEMIR FLEXTOUCH 100 unit/mL (3 mL) InPn pen INJECT TEN UNITS UNDER THE SKIN ONCE DAILY    losartan (COZAAR) 100 MG tablet Take 1 tablet by mouth once daily.    magnesium oxide (MAG-OX) 400 mg tablet Take 1 tablet (400 mg total) by mouth 2 (two) times daily.    MULTIVIT,THER IRON,CA,FA & MIN (MULTIVITAMIN) Tab Take 1 tablet by mouth once daily.    mycophenolate (MYFORTIC) 360 MG TbEC Take 1 tablet (360 mg total) by mouth 2 (two) times daily.    NOVOLOG FLEXPEN 100 unit/mL InPn pen INJECT FIVE UNITS BEFORE BREAKFAST AND LUNCH, AND INJECT SIX UNITS BEFORE DINNER. USE WITH SLIDING SCALE.    ondansetron (ZOFRAN) 8 MG tablet Take 1 tablet by mouth every 8 (eight) hours as needed.    oxycodone-acetaminophen (PERCOCET) 7.5-325 mg per tablet Take 1 tablet by mouth every 4 (four) hours as needed for Pain.    pantoprazole (PROTONIX) 40 MG tablet Take 40 mg by mouth once daily.    PROAIR HFA 90 mcg/actuation inhaler Inhale 2 puffs into the lungs 3 (three) times daily as needed.     promethazine-dextromethorphan (PROMETHAZINE-DM) 6.25-15 mg/5 mL Syrp     tacrolimus (PROGRAF) 1 MG Cap Take 8 capsules (8 mg total) by mouth every 12 (twelve) hours.    verapamil (VERELAN) 120 MG C24P Take 1 capsule by mouth once daily.    VITAMIN D2 50,000 unit capsule TAKE 1 CAPSULE BY MOUTH ONCE EVERY WEEK    zolpidem (AMBIEN) 10 mg Tab Take 10  "mg by mouth nightly as needed.    cyproheptadine (PERIACTIN) 4 mg tablet Take 4 mg by mouth every evening.    DILTIAZEM HCL (DILTIAZEM 2% CREAM) Apply topically 3 (three) times daily. Apply topically to anal area.    valacyclovir (VALTREX) 500 MG tablet TAKE TWO TABLETS BY MOUTH EVERY 8 HOURS FOR 7 DAYS           Clinical Reference Information           Your Vitals Were     BP Pulse Temp Resp Height Weight    155/92 (BP Location: Right arm, Patient Position: Sitting, BP Method: Automatic) 72 98.7 °F (37.1 °C) (Oral) 16 5' 9" (1.753 m) 68 kg (149 lb 14.6 oz)    BMI                22.14 kg/m2          Blood Pressure          Most Recent Value    BP  (!)  155/92      Allergies as of 3/9/2017     Norvasc [Amlodipine]    Doxycycline    Pcn [Penicillins]      Immunizations Administered on Date of Encounter - 3/9/2017     None      Maintenance Dialysis History     Patient has no recorded history of maintenance dialysis.      Transplant Information        Txp Date Organ Coordinator Care Team    12/31/2013 Liver Linda Barakat RN Surgeon:  Constantino Peña MD   Referring Physician:  Nolan Badillo MD   Assisting Surgeon:  Robin Alex Jr., MD   Current Hepatologist:  Miriam Abernathy MD   Corresponding Physician:  Timur Lion MD   Current PCP:  Timur Lion MD         Instructions    Talk with primary doctor about your fatigue and testing iron studies, b12 and TSH       Language Assistance Services     ATTENTION: Language assistance services are available, free of charge. Please call [phone].      ATENCIÓN: Si habla español, tiene a bhatia disposición servicios gratuitos de asistencia lingüística. Llame al [phone].     CHÚ Ý: N?u b?n nói Ti?ng Vi?t, có các d?ch v? h? tr? ngôn ng? mi?n phí dành cho b?n. G?i s? [phone].         [facility] complies with applicable Federal civil rights laws and does not discriminate on the basis of race, color, national origin, " age, disability, or sex.

## 2017-03-10 ENCOUNTER — PATIENT MESSAGE (OUTPATIENT)
Dept: GASTROENTEROLOGY | Facility: CLINIC | Age: 47
End: 2017-03-10

## 2017-03-14 ENCOUNTER — LAB VISIT (OUTPATIENT)
Dept: LAB | Facility: HOSPITAL | Age: 47
End: 2017-03-14
Attending: FAMILY MEDICINE
Payer: MEDICARE

## 2017-03-14 DIAGNOSIS — M32.9 SYSTEMIC LUPUS ERYTHEMATOSUS: ICD-10-CM

## 2017-03-14 DIAGNOSIS — D89.9 UNSPECIFIED DISORDER OF IMMUNE MECHANISM: Primary | ICD-10-CM

## 2017-03-14 DIAGNOSIS — G95.9 UNSPECIFIED DISEASE OF SPINAL CORD: ICD-10-CM

## 2017-03-14 LAB
ALBUMIN SERPL BCP-MCNC: 3.9 G/DL
ALP SERPL-CCNC: 88 U/L
ALT SERPL W/O P-5'-P-CCNC: 13 U/L
ANION GAP SERPL CALC-SCNC: 7 MMOL/L
AST SERPL-CCNC: 10 U/L
BASOPHILS # BLD AUTO: 0.01 K/UL
BASOPHILS NFR BLD: 0.2 %
BILIRUB SERPL-MCNC: 0.4 MG/DL
BUN SERPL-MCNC: 33 MG/DL
CALCIUM SERPL-MCNC: 8.9 MG/DL
CHLORIDE SERPL-SCNC: 101 MMOL/L
CO2 SERPL-SCNC: 29 MMOL/L
CREAT SERPL-MCNC: 1.4 MG/DL
DIFFERENTIAL METHOD: ABNORMAL
EOSINOPHIL # BLD AUTO: 0.5 K/UL
EOSINOPHIL NFR BLD: 8.2 %
ERYTHROCYTE [DISTWIDTH] IN BLOOD BY AUTOMATED COUNT: 14.3 %
EST. GFR  (AFRICAN AMERICAN): 52 ML/MIN/1.73 M^2
EST. GFR  (NON AFRICAN AMERICAN): 45 ML/MIN/1.73 M^2
GLUCOSE SERPL-MCNC: 283 MG/DL
HCT VFR BLD AUTO: 32.9 %
HGB BLD-MCNC: 11.3 G/DL
IRON SERPL-MCNC: 79 UG/DL
LYMPHOCYTES # BLD AUTO: 1.6 K/UL
LYMPHOCYTES NFR BLD: 26.4 %
MCH RBC QN AUTO: 27.2 PG
MCHC RBC AUTO-ENTMCNC: 34.3 %
MCV RBC AUTO: 79 FL
MONOCYTES # BLD AUTO: 0.3 K/UL
MONOCYTES NFR BLD: 4.8 %
NEUTROPHILS # BLD AUTO: 3.7 K/UL
NEUTROPHILS NFR BLD: 60.4 %
PLATELET # BLD AUTO: 222 K/UL
PMV BLD AUTO: 9.4 FL
POTASSIUM SERPL-SCNC: 3.5 MMOL/L
PROT SERPL-MCNC: 7.7 G/DL
RBC # BLD AUTO: 4.15 M/UL
SODIUM SERPL-SCNC: 137 MMOL/L
TSH SERPL DL<=0.005 MIU/L-ACNC: 1.53 UIU/ML
WBC # BLD AUTO: 6.1 K/UL

## 2017-03-14 PROCEDURE — 80053 COMPREHEN METABOLIC PANEL: CPT

## 2017-03-14 PROCEDURE — 82607 VITAMIN B-12: CPT

## 2017-03-14 PROCEDURE — 85025 COMPLETE CBC W/AUTO DIFF WBC: CPT

## 2017-03-14 PROCEDURE — 83540 ASSAY OF IRON: CPT

## 2017-03-14 PROCEDURE — 82570 ASSAY OF URINE CREATININE: CPT

## 2017-03-14 PROCEDURE — 84443 ASSAY THYROID STIM HORMONE: CPT

## 2017-03-14 PROCEDURE — 36415 COLL VENOUS BLD VENIPUNCTURE: CPT

## 2017-03-15 LAB
CREAT UR-MCNC: 128 MG/DL
MICROALBUMIN UR DL<=1MG/L-MCNC: 8 UG/ML
MICROALBUMIN/CREATININE RATIO: 6.3 UG/MG
VIT B12 SERPL-MCNC: 505 PG/ML

## 2017-03-17 ENCOUNTER — PATIENT MESSAGE (OUTPATIENT)
Dept: TRANSPLANT | Facility: CLINIC | Age: 47
End: 2017-03-17

## 2017-03-20 ENCOUNTER — LAB VISIT (OUTPATIENT)
Dept: LAB | Facility: OTHER | Age: 47
End: 2017-03-20
Attending: OBSTETRICS & GYNECOLOGY
Payer: MEDICARE

## 2017-03-20 ENCOUNTER — OFFICE VISIT (OUTPATIENT)
Dept: OBSTETRICS AND GYNECOLOGY | Facility: CLINIC | Age: 47
End: 2017-03-20
Attending: OBSTETRICS & GYNECOLOGY
Payer: MEDICARE

## 2017-03-20 VITALS
SYSTOLIC BLOOD PRESSURE: 140 MMHG | WEIGHT: 146.63 LBS | BODY MASS INDEX: 21.72 KG/M2 | DIASTOLIC BLOOD PRESSURE: 86 MMHG | HEIGHT: 69 IN

## 2017-03-20 DIAGNOSIS — N95.1 MENOPAUSAL VAGINAL DRYNESS: ICD-10-CM

## 2017-03-20 DIAGNOSIS — Z01.419 ENCOUNTER FOR GYNECOLOGICAL EXAMINATION WITHOUT ABNORMAL FINDING: Primary | ICD-10-CM

## 2017-03-20 DIAGNOSIS — Z12.4 PAP SMEAR FOR CERVICAL CANCER SCREENING: ICD-10-CM

## 2017-03-20 DIAGNOSIS — N95.1 MENOPAUSAL SYMPTOMS: ICD-10-CM

## 2017-03-20 DIAGNOSIS — N92.4 ABNORMAL PERIMENOPAUSAL BLEEDING: ICD-10-CM

## 2017-03-20 DIAGNOSIS — Z12.31 SCREENING MAMMOGRAM FOR HIGH-RISK PATIENT: ICD-10-CM

## 2017-03-20 LAB
ESTRADIOL SERPL-MCNC: <10 PG/ML
FSH SERPL-ACNC: 82 MIU/ML

## 2017-03-20 PROCEDURE — 36415 COLL VENOUS BLD VENIPUNCTURE: CPT

## 2017-03-20 PROCEDURE — 82670 ASSAY OF TOTAL ESTRADIOL: CPT

## 2017-03-20 PROCEDURE — 3079F DIAST BP 80-89 MM HG: CPT | Mod: S$GLB,,, | Performed by: OBSTETRICS & GYNECOLOGY

## 2017-03-20 PROCEDURE — 99396 PREV VISIT EST AGE 40-64: CPT | Mod: S$GLB,,, | Performed by: OBSTETRICS & GYNECOLOGY

## 2017-03-20 PROCEDURE — 83001 ASSAY OF GONADOTROPIN (FSH): CPT

## 2017-03-20 PROCEDURE — 88175 CYTOPATH C/V AUTO FLUID REDO: CPT

## 2017-03-20 PROCEDURE — 99999 PR PBB SHADOW E&M-EST. PATIENT-LVL III: CPT | Mod: PBBFAC,,, | Performed by: OBSTETRICS & GYNECOLOGY

## 2017-03-20 PROCEDURE — 3077F SYST BP >= 140 MM HG: CPT | Mod: S$GLB,,, | Performed by: OBSTETRICS & GYNECOLOGY

## 2017-03-20 RX ORDER — ESTRADIOL 0.1 MG/G
0.5 CREAM VAGINAL
Qty: 42 G | Refills: 4 | Status: SHIPPED | OUTPATIENT
Start: 2017-03-20 | End: 2018-03-20 | Stop reason: SDUPTHER

## 2017-03-20 RX ORDER — FLUCONAZOLE 150 MG/1
TABLET ORAL
Refills: 1 | COMMUNITY
Start: 2017-03-17 | End: 2017-06-16 | Stop reason: DRUGHIGH

## 2017-03-20 RX ORDER — METHYLPREDNISOLONE 4 MG/1
TABLET ORAL
Refills: 0 | COMMUNITY
Start: 2017-03-17 | End: 2017-05-26 | Stop reason: ALTCHOICE

## 2017-03-20 RX ORDER — HYDROXYZINE HYDROCHLORIDE 10 MG/1
TABLET, FILM COATED ORAL
Refills: 0 | COMMUNITY
Start: 2017-03-17

## 2017-03-20 RX ORDER — TRIAMCINOLONE ACETONIDE 5 MG/G
1 OINTMENT TOPICAL 2 TIMES DAILY
Refills: 3 | COMMUNITY
Start: 2017-03-06 | End: 2019-05-30

## 2017-03-20 NOTE — PROGRESS NOTES
Subjective:       Patient ID: Valencia Dumont is a 46 y.o. female.    Chief Complaint:  Annual Exam      History of Present Illness  HPI  Valencia Dumont is a 46 y.o. female  here for her annual GYN exam.    She describes her periods as irregular, has been occurring once yearly for the past 2 years, previously every few months;light flow, lasting 5 days.   Denies break through bleeding.   Denies vaginal itching or irritation.  Denies vaginal discharge.  She is sexually active. She has had 1 partner since  .  She uses bilateral tubal ligation for contraception.   History of abnormal pap: Yes - 2014(ASCUS, HR HPV) (Mild dysplasia on Colpo bx 2015  Last Pap: approximate date  and was normal  Last MMG: normal--routine follow-up in 12 months  Last Colonoscopy:  Being scheduled due to GI symptoms (diverticulitis)  Denies domestic violence. She does feel safe at home.     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     Social History Main Topics    Smoking status: Never Smoker    Smokeless tobacco: Never Used    Alcohol use No    Drug use: No    Sexual activity: Yes     Partners: Male     Birth control/ protection: None      Comment: s/p tubal ligation,  since      Other Topics Concern    Not on file     Social History Narrative     Family History   Problem Relation  "Age of Onset    Hypertension Mother     Diabetes Father     Alzheimer's disease Father     Diabetes Paternal Grandfather     Diabetes Paternal Grandmother     Breast cancer Maternal Aunt 55    Hypertension Brother     Diabetes Brother     Stroke Neg Hx     Cancer Neg Hx     Colon cancer Neg Hx     Esophageal cancer Neg Hx     Stomach cancer Neg Hx     Rectal cancer Neg Hx      OB History      Para Term  AB TAB SAB Ectopic Multiple Living    3 3 2 1      3          BP (!) 140/86  Ht 5' 9" (1.753 m)  Wt 66.5 kg (146 lb 9.7 oz)  LMP 2016  BMI 21.65 kg/m2        GYN & OB History  Patient's last menstrual period was 2016.   Date of Last Pap: 3/22/2016    OB History    Para Term  AB SAB TAB Ectopic Multiple Living   3 3 2 1      3      # Outcome Date GA Lbr Sachin/2nd Weight Sex Delivery Anes PTL Lv   3   36w0d  2.126 kg (4 lb 11 oz) F Vag-Spont   Y   2 Term     F Vag-Spont   Y   1 Term     M Vag-Spont   Y          Review of Systems  Review of Systems   Constitutional: Negative for activity change, appetite change, fatigue and unexpected weight change.   HENT: Negative.    Eyes: Negative for visual disturbance.   Respiratory: Positive for shortness of breath. Negative for wheezing.    Cardiovascular: Negative for chest pain, palpitations and leg swelling.   Gastrointestinal: Positive for abdominal pain, bloating and diarrhea. Negative for blood in stool.   Endocrine: Positive for diabetes and hot flashes. Negative for hair loss.   Genitourinary: Positive for menstrual problem. Negative for decreased libido, dyspareunia and postmenopausal bleeding.   Musculoskeletal: Negative for back pain and joint swelling.   Skin:  Negative for no acne and hair changes.   Neurological: Negative for headaches.   Hematological: Does not bruise/bleed easily.   Psychiatric/Behavioral: Positive for sleep disturbance. Negative for depression. The patient is not nervous/anxious.  "   Breast: Negative for breast pain and nipple discharge          Objective:    Physical Exam:   Constitutional: She is oriented to person, place, and time. She appears well-developed and well-nourished.    HENT:   Head: Normocephalic and atraumatic.    Eyes: EOM are normal. Pupils are equal, round, and reactive to light.    Neck: Normal range of motion. Neck supple.    Cardiovascular: Normal rate and regular rhythm.     Pulmonary/Chest: Effort normal and breath sounds normal.   BREASTS: Symmetrical, no skin changes or visible lesions.  No palpable masses, nipple discharge bilaterally.          Abdominal: Soft. Bowel sounds are normal.     Genitourinary: Vagina normal. Pelvic exam was performed with patient supine.   Genitourinary Comments: PELVIC: Normal external genitalia without lesions.  Normal hair distribution.  Adequate perineal body, normal urethral meatus.  Vagina  Dry and poorly rugated, atrophic, without lesions or discharge.  Cervix pink, without lesions, discharge or tenderness.  No significant cystocele or rectocele.  Bimanual exam shows uterus to be 12 week size,NODULAR, SLIGHTLY IRREGULAR, mobile and nontender.  Adnexa without masses or tenderness.    RECTAL:Deferred             Musculoskeletal: Normal range of motion and moves all extremeties.       Neurological: She is alert and oriented to person, place, and time.    Skin: Skin is warm and dry.    Psychiatric: She has a normal mood and affect.          Assessment:        1. Encounter for gynecological examination without abnormal finding    2. Pap smear for cervical cancer screening    3. Screening mammogram for high-risk patient    4. Abnormal perimenopausal bleeding    5. Menopausal symptoms    6. Menopausal vaginal dryness                Plan:      1. Encounter for gynecological examination without abnormal finding  COUNSELING:  The patient was counseled today on osteoporosis prevention, calcium supplementation, and regular weight bearing  exercise. The patient was also counseled today on ACS PAP guidelines, with recommendations for yearly pelvic exams unless their uterus, cervix, and ovaries were removed for benign reasons; in that case, examinations every 3-5 years are recommended. The patient was also counseled regarding monthly breast self-examination, routine STD screening for at-risk populations, prophylactic immunizations for transmitted infections such as HPV, Pertussis, or Influenza as appropriate, and yearly mammograms when indicated by ACS guidelines. She was advised to see her primary care physician for all other health maintenance.   FOLLOW-UP with me for next routine visit.         2. Pap smear for cervical cancer screening    - Liquid-based pap smear, screening    3. Screening mammogram for high-risk patient    - Mammo Digital Screening Bilat with Tomosynthesis CAD; Future    4. Abnormal perimenopausal bleeding    - US Pelvis Comp with Transvag NON-OB (xpd; Future  - Follicle stimulating hormone; Future  - Estradiol; Future  (May need endometrial sampling if endometrial stripe > 4 mm    5. Menopausal symptoms      6. Menopausal vaginal dryness    - estradiol (ESTRACE) 0.01 % (0.1 mg/gram) vaginal cream; Place 0.5 g vaginally twice a week. Insert 0.5grams intravaginally twice weekly  Dispense: 42 g; Refill: 4       Return in about 1 year (around 3/20/2018).

## 2017-03-21 ENCOUNTER — PATIENT MESSAGE (OUTPATIENT)
Dept: GASTROENTEROLOGY | Facility: CLINIC | Age: 47
End: 2017-03-21

## 2017-03-22 ENCOUNTER — PATIENT MESSAGE (OUTPATIENT)
Dept: GASTROENTEROLOGY | Facility: CLINIC | Age: 47
End: 2017-03-22

## 2017-03-27 ENCOUNTER — HOSPITAL ENCOUNTER (OUTPATIENT)
Dept: RADIOLOGY | Facility: HOSPITAL | Age: 47
Discharge: HOME | End: 2017-03-27
Attending: OBSTETRICS & GYNECOLOGY
Payer: MEDICARE

## 2017-03-27 ENCOUNTER — HOSPITAL ENCOUNTER (OUTPATIENT)
Dept: RADIOLOGY | Facility: HOSPITAL | Age: 47
Discharge: HOME | End: 2017-03-27
Attending: INTERNAL MEDICINE
Payer: MEDICARE

## 2017-03-27 ENCOUNTER — TELEPHONE (OUTPATIENT)
Dept: GASTROENTEROLOGY | Facility: CLINIC | Age: 47
End: 2017-03-27

## 2017-03-27 ENCOUNTER — PATIENT MESSAGE (OUTPATIENT)
Dept: GASTROENTEROLOGY | Facility: CLINIC | Age: 47
End: 2017-03-27

## 2017-03-27 DIAGNOSIS — T38.0X5A ADVERSE EFFECT OF GLUCOCORTICOID OR SYNTHETIC ANALOGUE, INITIAL ENCOUNTER: ICD-10-CM

## 2017-03-27 DIAGNOSIS — M81.0 OSTEOPOROSIS: ICD-10-CM

## 2017-03-27 DIAGNOSIS — Z12.31 SCREENING MAMMOGRAM FOR HIGH-RISK PATIENT: ICD-10-CM

## 2017-03-27 DIAGNOSIS — M85.80 LOW BONE MASS: ICD-10-CM

## 2017-03-27 DIAGNOSIS — N92.4 ABNORMAL PERIMENOPAUSAL BLEEDING: ICD-10-CM

## 2017-03-27 PROCEDURE — 77080 DXA BONE DENSITY AXIAL: CPT | Mod: TC

## 2017-03-27 PROCEDURE — 77080 DXA BONE DENSITY AXIAL: CPT | Mod: 26,,, | Performed by: RADIOLOGY

## 2017-03-27 PROCEDURE — 77067 SCR MAMMO BI INCL CAD: CPT | Mod: TC

## 2017-03-27 PROCEDURE — 76856 US EXAM PELVIC COMPLETE: CPT | Mod: TC

## 2017-03-27 PROCEDURE — 76856 US EXAM PELVIC COMPLETE: CPT | Mod: 26,,, | Performed by: RADIOLOGY

## 2017-03-27 PROCEDURE — 77063 BREAST TOMOSYNTHESIS BI: CPT | Mod: 26,,, | Performed by: RADIOLOGY

## 2017-03-27 PROCEDURE — 76830 TRANSVAGINAL US NON-OB: CPT | Mod: 26,,, | Performed by: RADIOLOGY

## 2017-03-27 PROCEDURE — 77067 SCR MAMMO BI INCL CAD: CPT | Mod: 26,,, | Performed by: RADIOLOGY

## 2017-03-28 ENCOUNTER — PATIENT MESSAGE (OUTPATIENT)
Dept: OBSTETRICS AND GYNECOLOGY | Facility: CLINIC | Age: 47
End: 2017-03-28

## 2017-03-28 ENCOUNTER — TELEPHONE (OUTPATIENT)
Dept: OBSTETRICS AND GYNECOLOGY | Facility: CLINIC | Age: 47
End: 2017-03-28

## 2017-03-28 NOTE — TELEPHONE ENCOUNTER
----- Message from Debi Guadarrama sent at 3/28/2017  8:03 AM CDT -----  Contact: SUSY STOVER [[id number]]  _  1st Request  _  2nd Request  _  3rd Request        Who: SUSY STOVER [[id number]]    Why: pt is returning a call    What Number to Call Back: [phone]     When to Expect a call back: (Before the end of the day)   -- if call after 3:00 call back will be tomorrow.

## 2017-03-28 NOTE — TELEPHONE ENCOUNTER
Returned pt's call. Call was disconnected. Called pt back. Pt says that she is returning the doctor's call possibly regarding her imaging results. Pt says she also wants to discuss her hot flashes. Told pt that I am sending a message to Dr. Schreiber regarding the conversation the pt and I had. Pt communicated understanding. Ms sent to Dr. Schreiber.

## 2017-03-29 ENCOUNTER — TELEPHONE (OUTPATIENT)
Dept: OBSTETRICS AND GYNECOLOGY | Facility: CLINIC | Age: 47
End: 2017-03-29

## 2017-03-29 NOTE — TELEPHONE ENCOUNTER
----- Message from Garrick Badillo LPN sent at 3/28/2017  9:38 AM CDT -----  Contact: patient  Pt returned your call back. Pt wants to discuss the results from her recent pelvic ultrasound. Pt also says that she has been struggling with hot flashes.

## 2017-03-30 ENCOUNTER — PATIENT MESSAGE (OUTPATIENT)
Dept: OBSTETRICS AND GYNECOLOGY | Facility: CLINIC | Age: 47
End: 2017-03-30

## 2017-03-30 ENCOUNTER — PATIENT MESSAGE (OUTPATIENT)
Dept: ENDOCRINOLOGY | Facility: CLINIC | Age: 47
End: 2017-03-30

## 2017-03-30 DIAGNOSIS — M81.0 OSTEOPOROSIS: Primary | ICD-10-CM

## 2017-03-30 DIAGNOSIS — E55.9 VITAMIN D DEFICIENCY: ICD-10-CM

## 2017-03-31 ENCOUNTER — TELEPHONE (OUTPATIENT)
Dept: OBSTETRICS AND GYNECOLOGY | Facility: CLINIC | Age: 47
End: 2017-03-31

## 2017-03-31 NOTE — TELEPHONE ENCOUNTER
----- Message from Garrick Badillo LPN sent at 3/29/2017  1:48 PM CDT -----  Contact: pt  Pt returned call placed by you. Pt wants to discuss hot flashes and results from recent pelvic ultrasound.  ----- Message -----     From: Josue Gallagher     Sent: 3/29/2017   1:14 PM       To: Candie HARRIS Staff    x_ 1st Request   _ 2nd Request   _ 3rd Request     Who: SUSY STOVER [[id number]]    Why: Pt missed your call is requesting a call back.  She stated she is having phone problems but is waiting by the phone for your call.    What Number to Call Back: [phone]    When to Expect a call back: (Before the end of the day)   -- if call after 3:00 call back will be tomorrow.

## 2017-04-05 ENCOUNTER — PATIENT MESSAGE (OUTPATIENT)
Dept: ENDOCRINOLOGY | Facility: CLINIC | Age: 47
End: 2017-04-05

## 2017-04-06 ENCOUNTER — LAB VISIT (OUTPATIENT)
Dept: LAB | Facility: HOSPITAL | Age: 47
End: 2017-04-06
Attending: INTERNAL MEDICINE
Payer: MEDICARE

## 2017-04-06 DIAGNOSIS — M81.0 OSTEOPOROSIS: ICD-10-CM

## 2017-04-06 DIAGNOSIS — E55.9 VITAMIN D DEFICIENCY: ICD-10-CM

## 2017-04-06 LAB
25(OH)D3+25(OH)D2 SERPL-MCNC: 21 NG/ML
ALBUMIN SERPL BCP-MCNC: 3.8 G/DL
ALP SERPL-CCNC: 88 U/L
ALT SERPL W/O P-5'-P-CCNC: 21 U/L
ANION GAP SERPL CALC-SCNC: 6 MMOL/L
AST SERPL-CCNC: 16 U/L
BILIRUB SERPL-MCNC: 0.6 MG/DL
BUN SERPL-MCNC: 27 MG/DL
CALCIUM SERPL-MCNC: 9.3 MG/DL
CHLORIDE SERPL-SCNC: 103 MMOL/L
CO2 SERPL-SCNC: 29 MMOL/L
CREAT SERPL-MCNC: 1.3 MG/DL
EST. GFR  (AFRICAN AMERICAN): 57 ML/MIN/1.73 M^2
EST. GFR  (NON AFRICAN AMERICAN): 49 ML/MIN/1.73 M^2
FSH SERPL-ACNC: 75.9 MIU/ML
GLUCOSE SERPL-MCNC: 268 MG/DL
POTASSIUM SERPL-SCNC: 3.8 MMOL/L
PROT SERPL-MCNC: 7.3 G/DL
SODIUM SERPL-SCNC: 138 MMOL/L

## 2017-04-06 PROCEDURE — 80053 COMPREHEN METABOLIC PANEL: CPT

## 2017-04-06 PROCEDURE — 36415 COLL VENOUS BLD VENIPUNCTURE: CPT

## 2017-04-06 PROCEDURE — 82306 VITAMIN D 25 HYDROXY: CPT

## 2017-04-06 PROCEDURE — 83036 HEMOGLOBIN GLYCOSYLATED A1C: CPT

## 2017-04-06 PROCEDURE — 83001 ASSAY OF GONADOTROPIN (FSH): CPT

## 2017-04-07 LAB
ESTIMATED AVG GLUCOSE: 189 MG/DL
HBA1C MFR BLD HPLC: 8.2 %

## 2017-04-10 ENCOUNTER — PATIENT MESSAGE (OUTPATIENT)
Dept: GASTROENTEROLOGY | Facility: CLINIC | Age: 47
End: 2017-04-10

## 2017-04-13 ENCOUNTER — TELEPHONE (OUTPATIENT)
Dept: PHARMACY | Facility: CLINIC | Age: 47
End: 2017-04-13

## 2017-04-21 ENCOUNTER — TELEPHONE (OUTPATIENT)
Dept: GASTROENTEROLOGY | Facility: CLINIC | Age: 47
End: 2017-04-21

## 2017-04-21 NOTE — TELEPHONE ENCOUNTER
"Pt is cleared from a cardiac standpoint for her colonoscopy. I received the fax from [facility] today.    "pt is cleared for local/MAC anesthesia with a mild increased risk."    2/2017 echo - PA Pressure 29.  2/2017 lexiscan cardiolite study - no ischemia, EF 68%     Will scan clearance to media tab  "

## 2017-04-24 ENCOUNTER — LAB VISIT (OUTPATIENT)
Dept: LAB | Facility: HOSPITAL | Age: 47
End: 2017-04-24
Attending: INTERNAL MEDICINE
Payer: MEDICARE

## 2017-04-24 ENCOUNTER — PATIENT MESSAGE (OUTPATIENT)
Dept: GASTROENTEROLOGY | Facility: CLINIC | Age: 47
End: 2017-04-24

## 2017-04-24 DIAGNOSIS — Z94.4 LIVER REPLACED BY TRANSPLANT: ICD-10-CM

## 2017-04-24 LAB
ALBUMIN SERPL BCP-MCNC: 3.8 G/DL
ALP SERPL-CCNC: 94 U/L
ALT SERPL W/O P-5'-P-CCNC: 33 U/L
ANION GAP SERPL CALC-SCNC: 7 MMOL/L
AST SERPL-CCNC: 29 U/L
BASOPHILS # BLD AUTO: 0.01 K/UL
BASOPHILS NFR BLD: 0.2 %
BILIRUB SERPL-MCNC: 0.5 MG/DL
BUN SERPL-MCNC: 29 MG/DL
CALCIUM SERPL-MCNC: 9.5 MG/DL
CHLORIDE SERPL-SCNC: 101 MMOL/L
CO2 SERPL-SCNC: 30 MMOL/L
CREAT SERPL-MCNC: 1.2 MG/DL
DIFFERENTIAL METHOD: ABNORMAL
EOSINOPHIL # BLD AUTO: 0.3 K/UL
EOSINOPHIL NFR BLD: 7.2 %
ERYTHROCYTE [DISTWIDTH] IN BLOOD BY AUTOMATED COUNT: 14.1 %
EST. GFR  (AFRICAN AMERICAN): >60 ML/MIN/1.73 M^2
EST. GFR  (NON AFRICAN AMERICAN): 54 ML/MIN/1.73 M^2
GGT SERPL-CCNC: 61 U/L
GLUCOSE SERPL-MCNC: 241 MG/DL
HCT VFR BLD AUTO: 33.3 %
HGB BLD-MCNC: 11.9 G/DL
LYMPHOCYTES # BLD AUTO: 1.6 K/UL
LYMPHOCYTES NFR BLD: 34.8 %
MAGNESIUM SERPL-MCNC: 1.6 MG/DL
MCH RBC QN AUTO: 27.2 PG
MCHC RBC AUTO-ENTMCNC: 35.7 %
MCV RBC AUTO: 76 FL
MONOCYTES # BLD AUTO: 0.4 K/UL
MONOCYTES NFR BLD: 8.1 %
NEUTROPHILS # BLD AUTO: 2.3 K/UL
NEUTROPHILS NFR BLD: 49.5 %
PLATELET # BLD AUTO: 219 K/UL
PMV BLD AUTO: 10.7 FL
POTASSIUM SERPL-SCNC: 3.7 MMOL/L
PROT SERPL-MCNC: 7.6 G/DL
RBC # BLD AUTO: 4.37 M/UL
SODIUM SERPL-SCNC: 138 MMOL/L
WBC # BLD AUTO: 4.71 K/UL

## 2017-04-24 PROCEDURE — 80053 COMPREHEN METABOLIC PANEL: CPT

## 2017-04-24 PROCEDURE — 85025 COMPLETE CBC W/AUTO DIFF WBC: CPT

## 2017-04-24 PROCEDURE — 80197 ASSAY OF TACROLIMUS: CPT

## 2017-04-24 PROCEDURE — 36415 COLL VENOUS BLD VENIPUNCTURE: CPT

## 2017-04-24 PROCEDURE — 82977 ASSAY OF GGT: CPT

## 2017-04-24 PROCEDURE — 83735 ASSAY OF MAGNESIUM: CPT

## 2017-04-25 LAB — TACROLIMUS BLD-MCNC: <1.5 NG/ML

## 2017-04-26 ENCOUNTER — PATIENT MESSAGE (OUTPATIENT)
Dept: TRANSPLANT | Facility: CLINIC | Age: 47
End: 2017-04-26

## 2017-04-27 ENCOUNTER — TELEPHONE (OUTPATIENT)
Dept: TRANSPLANT | Facility: CLINIC | Age: 47
End: 2017-04-27

## 2017-04-27 NOTE — TELEPHONE ENCOUNTER
----- Message from Miriam Abernathy MD sent at 4/27/2017  3:00 PM CDT -----  Patient missed dose repeat labs next week

## 2017-05-01 ENCOUNTER — LAB VISIT (OUTPATIENT)
Dept: LAB | Facility: HOSPITAL | Age: 47
End: 2017-05-01
Attending: INTERNAL MEDICINE
Payer: MEDICARE

## 2017-05-01 DIAGNOSIS — Z94.4 LIVER REPLACED BY TRANSPLANT: ICD-10-CM

## 2017-05-01 LAB
ALBUMIN SERPL BCP-MCNC: 3.7 G/DL
ALP SERPL-CCNC: 120 U/L
ALT SERPL W/O P-5'-P-CCNC: 72 U/L
ANION GAP SERPL CALC-SCNC: 7 MMOL/L
AST SERPL-CCNC: 58 U/L
BASOPHILS # BLD AUTO: 0.01 K/UL
BASOPHILS NFR BLD: 0.2 %
BILIRUB SERPL-MCNC: 0.6 MG/DL
BUN SERPL-MCNC: 31 MG/DL
CALCIUM SERPL-MCNC: 9.5 MG/DL
CHLORIDE SERPL-SCNC: 106 MMOL/L
CO2 SERPL-SCNC: 27 MMOL/L
CREAT SERPL-MCNC: 1.2 MG/DL
DIFFERENTIAL METHOD: ABNORMAL
EOSINOPHIL # BLD AUTO: 0.4 K/UL
EOSINOPHIL NFR BLD: 9.5 %
ERYTHROCYTE [DISTWIDTH] IN BLOOD BY AUTOMATED COUNT: 14.1 %
EST. GFR  (AFRICAN AMERICAN): >60 ML/MIN/1.73 M^2
EST. GFR  (NON AFRICAN AMERICAN): 54 ML/MIN/1.73 M^2
GGT SERPL-CCNC: 98 U/L
GLUCOSE SERPL-MCNC: 246 MG/DL
HCT VFR BLD AUTO: 33.8 %
HGB BLD-MCNC: 12 G/DL
LYMPHOCYTES # BLD AUTO: 1.8 K/UL
LYMPHOCYTES NFR BLD: 37.9 %
MAGNESIUM SERPL-MCNC: 1.7 MG/DL
MCH RBC QN AUTO: 27.3 PG
MCHC RBC AUTO-ENTMCNC: 35.5 %
MCV RBC AUTO: 77 FL
MONOCYTES # BLD AUTO: 0.3 K/UL
MONOCYTES NFR BLD: 7.4 %
NEUTROPHILS # BLD AUTO: 2.1 K/UL
NEUTROPHILS NFR BLD: 45 %
PLATELET # BLD AUTO: 212 K/UL
PMV BLD AUTO: 10.5 FL
POTASSIUM SERPL-SCNC: 4.2 MMOL/L
PROT SERPL-MCNC: 7.3 G/DL
RBC # BLD AUTO: 4.39 M/UL
SODIUM SERPL-SCNC: 140 MMOL/L
WBC # BLD AUTO: 4.62 K/UL

## 2017-05-01 PROCEDURE — 82977 ASSAY OF GGT: CPT

## 2017-05-01 PROCEDURE — 36415 COLL VENOUS BLD VENIPUNCTURE: CPT

## 2017-05-01 PROCEDURE — 83735 ASSAY OF MAGNESIUM: CPT

## 2017-05-01 PROCEDURE — 80197 ASSAY OF TACROLIMUS: CPT

## 2017-05-01 PROCEDURE — 80053 COMPREHEN METABOLIC PANEL: CPT

## 2017-05-01 PROCEDURE — 85025 COMPLETE CBC W/AUTO DIFF WBC: CPT

## 2017-05-02 ENCOUNTER — TELEPHONE (OUTPATIENT)
Dept: ENDOSCOPY | Facility: HOSPITAL | Age: 47
End: 2017-05-02

## 2017-05-02 DIAGNOSIS — Z12.11 SPECIAL SCREENING FOR MALIGNANT NEOPLASMS, COLON: Primary | ICD-10-CM

## 2017-05-02 LAB — TACROLIMUS BLD-MCNC: 4.6 NG/ML

## 2017-05-02 RX ORDER — POLYETHYLENE GLYCOL 3350, SODIUM SULFATE ANHYDROUS, SODIUM BICARBONATE, SODIUM CHLORIDE, POTASSIUM CHLORIDE 236; 22.74; 6.74; 5.86; 2.97 G/4L; G/4L; G/4L; G/4L; G/4L
4 POWDER, FOR SOLUTION ORAL ONCE
Qty: 4000 ML | Refills: 0 | Status: SHIPPED | OUTPATIENT
Start: 2017-05-02 | End: 2017-05-02

## 2017-05-02 NOTE — TELEPHONE ENCOUNTER
Patient is scheduled for Colonoscopy 5/31/2017 with Dr. Sun.  Instructions sent via e-mail.  Prep used: PEG. PM prep.

## 2017-05-03 ENCOUNTER — TELEPHONE (OUTPATIENT)
Dept: TRANSPLANT | Facility: CLINIC | Age: 47
End: 2017-05-03

## 2017-05-03 ENCOUNTER — PATIENT MESSAGE (OUTPATIENT)
Dept: TRANSPLANT | Facility: CLINIC | Age: 47
End: 2017-05-03

## 2017-05-03 NOTE — TELEPHONE ENCOUNTER
----- Message from Miriam Abernathy MD sent at 5/2/2017 10:35 PM CDT -----  Liver tests elevated, repeat next week

## 2017-05-08 ENCOUNTER — LAB VISIT (OUTPATIENT)
Dept: LAB | Facility: HOSPITAL | Age: 47
End: 2017-05-08
Attending: INTERNAL MEDICINE
Payer: MEDICARE

## 2017-05-08 DIAGNOSIS — Z94.4 LIVER REPLACED BY TRANSPLANT: ICD-10-CM

## 2017-05-08 LAB
ALBUMIN SERPL BCP-MCNC: 3.7 G/DL
ALP SERPL-CCNC: 168 U/L
ALT SERPL W/O P-5'-P-CCNC: 141 U/L
ANION GAP SERPL CALC-SCNC: 8 MMOL/L
AST SERPL-CCNC: 96 U/L
BASOPHILS # BLD AUTO: 0.01 K/UL
BASOPHILS NFR BLD: 0.2 %
BILIRUB SERPL-MCNC: 0.3 MG/DL
BUN SERPL-MCNC: 33 MG/DL
CALCIUM SERPL-MCNC: 9.4 MG/DL
CHLORIDE SERPL-SCNC: 103 MMOL/L
CO2 SERPL-SCNC: 28 MMOL/L
CREAT SERPL-MCNC: 1.4 MG/DL
DIFFERENTIAL METHOD: ABNORMAL
EOSINOPHIL # BLD AUTO: 0.4 K/UL
EOSINOPHIL NFR BLD: 6.7 %
ERYTHROCYTE [DISTWIDTH] IN BLOOD BY AUTOMATED COUNT: 14.7 %
EST. GFR  (AFRICAN AMERICAN): 52 ML/MIN/1.73 M^2
EST. GFR  (NON AFRICAN AMERICAN): 45 ML/MIN/1.73 M^2
GGT SERPL-CCNC: 174 U/L
GLUCOSE SERPL-MCNC: 244 MG/DL
HCT VFR BLD AUTO: 34.6 %
HGB BLD-MCNC: 12 G/DL
LYMPHOCYTES # BLD AUTO: 2.3 K/UL
LYMPHOCYTES NFR BLD: 34.4 %
MAGNESIUM SERPL-MCNC: 1.6 MG/DL
MCH RBC QN AUTO: 27.5 PG
MCHC RBC AUTO-ENTMCNC: 34.7 %
MCV RBC AUTO: 79 FL
MONOCYTES # BLD AUTO: 0.4 K/UL
MONOCYTES NFR BLD: 5.8 %
NEUTROPHILS # BLD AUTO: 3.5 K/UL
NEUTROPHILS NFR BLD: 52.9 %
PLATELET # BLD AUTO: 164 K/UL
PMV BLD AUTO: 10.2 FL
POTASSIUM SERPL-SCNC: 4.2 MMOL/L
PROT SERPL-MCNC: 7.4 G/DL
RBC # BLD AUTO: 4.37 M/UL
SODIUM SERPL-SCNC: 139 MMOL/L
WBC # BLD AUTO: 6.54 K/UL

## 2017-05-08 PROCEDURE — 36415 COLL VENOUS BLD VENIPUNCTURE: CPT

## 2017-05-08 PROCEDURE — 85025 COMPLETE CBC W/AUTO DIFF WBC: CPT

## 2017-05-08 PROCEDURE — 82977 ASSAY OF GGT: CPT

## 2017-05-08 PROCEDURE — 80053 COMPREHEN METABOLIC PANEL: CPT

## 2017-05-08 PROCEDURE — 80197 ASSAY OF TACROLIMUS: CPT

## 2017-05-08 PROCEDURE — 83735 ASSAY OF MAGNESIUM: CPT

## 2017-05-09 LAB — TACROLIMUS BLD-MCNC: 4.8 NG/ML

## 2017-05-10 ENCOUNTER — PATIENT MESSAGE (OUTPATIENT)
Dept: TRANSPLANT | Facility: CLINIC | Age: 47
End: 2017-05-10

## 2017-05-12 DIAGNOSIS — Z94.4 LIVER REPLACED BY TRANSPLANT: Primary | ICD-10-CM

## 2017-05-12 RX ORDER — TACROLIMUS 1 MG/1
9 CAPSULE ORAL EVERY 12 HOURS
Qty: 540 CAPSULE | Refills: 11 | Status: SHIPPED | OUTPATIENT
Start: 2017-05-12 | End: 2017-06-01 | Stop reason: SDUPTHER

## 2017-05-12 NOTE — TELEPHONE ENCOUNTER
----- Message from Miriam Abernathy MD sent at 5/11/2017  5:36 PM CDT -----  Tacro level is low increase to 9mg twice a day, need to see if she missed any doses

## 2017-05-12 NOTE — TELEPHONE ENCOUNTER
Instructed pt labs reviewed and LFTs are elevated, instructed to increase prograf to 9 mg BID. Will repeat labs Wednesday 5/17 and will schedule pt for ultrasound next week. She verbalizes understanding.

## 2017-05-16 ENCOUNTER — HOSPITAL ENCOUNTER (OUTPATIENT)
Dept: RADIOLOGY | Facility: HOSPITAL | Age: 47
Discharge: HOME | End: 2017-05-16
Attending: INTERNAL MEDICINE
Payer: MEDICARE

## 2017-05-16 DIAGNOSIS — Z94.4 LIVER REPLACED BY TRANSPLANT: ICD-10-CM

## 2017-05-16 PROCEDURE — 93975 VASCULAR STUDY: CPT | Mod: 26,,, | Performed by: RADIOLOGY

## 2017-05-16 PROCEDURE — 93975 VASCULAR STUDY: CPT | Mod: TC

## 2017-05-16 PROCEDURE — 76705 ECHO EXAM OF ABDOMEN: CPT | Mod: 26,59,, | Performed by: RADIOLOGY

## 2017-05-18 ENCOUNTER — PATIENT MESSAGE (OUTPATIENT)
Dept: TRANSPLANT | Facility: CLINIC | Age: 47
End: 2017-05-18

## 2017-05-18 ENCOUNTER — LAB VISIT (OUTPATIENT)
Dept: LAB | Facility: HOSPITAL | Age: 47
End: 2017-05-18
Attending: INTERNAL MEDICINE
Payer: MEDICARE

## 2017-05-18 ENCOUNTER — TELEPHONE (OUTPATIENT)
Dept: TRANSPLANT | Facility: CLINIC | Age: 47
End: 2017-05-18

## 2017-05-18 DIAGNOSIS — Z94.4 LIVER REPLACED BY TRANSPLANT: ICD-10-CM

## 2017-05-18 LAB
ALBUMIN SERPL BCP-MCNC: 4 G/DL
ALP SERPL-CCNC: 232 U/L
ALT SERPL W/O P-5'-P-CCNC: 160 U/L
ANION GAP SERPL CALC-SCNC: 10 MMOL/L
AST SERPL-CCNC: 65 U/L
BASOPHILS # BLD AUTO: 0.01 K/UL
BASOPHILS NFR BLD: 0.2 %
BILIRUB SERPL-MCNC: 0.5 MG/DL
BUN SERPL-MCNC: 44 MG/DL
CALCIUM SERPL-MCNC: 9.8 MG/DL
CHLORIDE SERPL-SCNC: 97 MMOL/L
CO2 SERPL-SCNC: 28 MMOL/L
CREAT SERPL-MCNC: 1.9 MG/DL
DIFFERENTIAL METHOD: ABNORMAL
EOSINOPHIL # BLD AUTO: 0.5 K/UL
EOSINOPHIL NFR BLD: 10.6 %
ERYTHROCYTE [DISTWIDTH] IN BLOOD BY AUTOMATED COUNT: 14.5 %
EST. GFR  (AFRICAN AMERICAN): 36 ML/MIN/1.73 M^2
EST. GFR  (NON AFRICAN AMERICAN): 31 ML/MIN/1.73 M^2
GGT SERPL-CCNC: 305 U/L
GLUCOSE SERPL-MCNC: 416 MG/DL
HCT VFR BLD AUTO: 36.2 %
HGB BLD-MCNC: 13 G/DL
INR PPP: 1
LYMPHOCYTES # BLD AUTO: 1.5 K/UL
LYMPHOCYTES NFR BLD: 31.5 %
MAGNESIUM SERPL-MCNC: 1.8 MG/DL
MCH RBC QN AUTO: 28.1 PG
MCHC RBC AUTO-ENTMCNC: 35.9 %
MCV RBC AUTO: 78 FL
MONOCYTES # BLD AUTO: 0.4 K/UL
MONOCYTES NFR BLD: 9 %
NEUTROPHILS # BLD AUTO: 2.4 K/UL
NEUTROPHILS NFR BLD: 48.7 %
PLATELET # BLD AUTO: 190 K/UL
PMV BLD AUTO: 10.4 FL
POTASSIUM SERPL-SCNC: 4.3 MMOL/L
PROT SERPL-MCNC: 8.1 G/DL
PROTHROMBIN TIME: 10.2 SEC
RBC # BLD AUTO: 4.63 M/UL
SODIUM SERPL-SCNC: 135 MMOL/L
WBC # BLD AUTO: 4.89 K/UL

## 2017-05-18 PROCEDURE — 83735 ASSAY OF MAGNESIUM: CPT

## 2017-05-18 PROCEDURE — 80053 COMPREHEN METABOLIC PANEL: CPT

## 2017-05-18 PROCEDURE — 80197 ASSAY OF TACROLIMUS: CPT

## 2017-05-18 PROCEDURE — 82977 ASSAY OF GGT: CPT

## 2017-05-18 PROCEDURE — 85610 PROTHROMBIN TIME: CPT

## 2017-05-18 PROCEDURE — 85025 COMPLETE CBC W/AUTO DIFF WBC: CPT

## 2017-05-18 NOTE — TELEPHONE ENCOUNTER
----- Message from Miriam Abernathy MD sent at 5/17/2017  6:55 PM CDT -----  Reviewed, nothing to do

## 2017-05-19 ENCOUNTER — TELEPHONE (OUTPATIENT)
Dept: TRANSPLANT | Facility: CLINIC | Age: 47
End: 2017-05-19

## 2017-05-19 DIAGNOSIS — R74.8 ELEVATED LIVER ENZYMES: ICD-10-CM

## 2017-05-19 DIAGNOSIS — Z94.4 LIVER REPLACED BY TRANSPLANT: Primary | ICD-10-CM

## 2017-05-19 LAB — TACROLIMUS BLD-MCNC: 8.4 NG/ML

## 2017-05-19 NOTE — TELEPHONE ENCOUNTER
Informed pt labs reviewed remain elevated. Request sent to radiology for liver biopsy. Pt not currently on any blood thinners. Will await day and time to schedule.

## 2017-05-19 NOTE — TELEPHONE ENCOUNTER
----- Message from Miriam Abernathy MD sent at 5/19/2017  3:41 PM CDT -----  Tacrolimus level is better but liver test continued to trend up proceed with liver biopsy, repeat labs next week including CMV, DSA, STAN, antismooth muscle antibody

## 2017-05-22 ENCOUNTER — TELEPHONE (OUTPATIENT)
Dept: TRANSPLANT | Facility: CLINIC | Age: 47
End: 2017-05-22

## 2017-05-22 ENCOUNTER — PATIENT MESSAGE (OUTPATIENT)
Dept: ENDOCRINOLOGY | Facility: CLINIC | Age: 47
End: 2017-05-22

## 2017-05-22 DIAGNOSIS — Z94.4 LIVER REPLACED BY TRANSPLANT: Primary | ICD-10-CM

## 2017-05-22 DIAGNOSIS — R74.8 ELEVATED LIVER ENZYMES: Primary | ICD-10-CM

## 2017-05-22 DIAGNOSIS — Z94.4 LIVER REPLACED BY TRANSPLANT: ICD-10-CM

## 2017-05-22 NOTE — TELEPHONE ENCOUNTER
Instructed patient liver biopsy is scheduled for 5/24 @ 6:30 am. Patient not currently on any blood thinners. Instructed patient to be NPO past midnight the night before procedure and can take medications with a small sip of water after blood has been drawn the morning of procedure. Patient verbalizes understanding.

## 2017-05-24 ENCOUNTER — HOSPITAL ENCOUNTER (OUTPATIENT)
Facility: HOSPITAL | Age: 47
Discharge: HOME | End: 2017-05-24
Attending: INTERNAL MEDICINE | Admitting: INTERNAL MEDICINE
Payer: MEDICARE

## 2017-05-24 VITALS
TEMPERATURE: 98 F | DIASTOLIC BLOOD PRESSURE: 78 MMHG | OXYGEN SATURATION: 100 % | SYSTOLIC BLOOD PRESSURE: 164 MMHG | HEIGHT: 69 IN | HEART RATE: 74 BPM | BODY MASS INDEX: 22.07 KG/M2 | WEIGHT: 149 LBS | RESPIRATION RATE: 18 BRPM

## 2017-05-24 DIAGNOSIS — R79.89 ELEVATED LFTS: ICD-10-CM

## 2017-05-24 DIAGNOSIS — R74.8 ELEVATED LIVER ENZYMES: ICD-10-CM

## 2017-05-24 DIAGNOSIS — Z94.4 LIVER REPLACED BY TRANSPLANT: ICD-10-CM

## 2017-05-24 LAB
ALBUMIN SERPL BCP-MCNC: 3.8 G/DL
ALP SERPL-CCNC: 221 U/L
ALT SERPL W/O P-5'-P-CCNC: 130 U/L
ANION GAP SERPL CALC-SCNC: 10 MMOL/L
AST SERPL-CCNC: 62 U/L
BASOPHILS # BLD AUTO: 0.02 K/UL
BASOPHILS NFR BLD: 0.3 %
BILIRUB SERPL-MCNC: 0.6 MG/DL
BUN SERPL-MCNC: 34 MG/DL
CALCIUM SERPL-MCNC: 9.5 MG/DL
CHLORIDE SERPL-SCNC: 102 MMOL/L
CO2 SERPL-SCNC: 25 MMOL/L
CREAT SERPL-MCNC: 1.5 MG/DL
DIFFERENTIAL METHOD: ABNORMAL
EOSINOPHIL # BLD AUTO: 0.3 K/UL
EOSINOPHIL NFR BLD: 5.2 %
ERYTHROCYTE [DISTWIDTH] IN BLOOD BY AUTOMATED COUNT: 14.6 %
EST. GFR  (AFRICAN AMERICAN): 47.5 ML/MIN/1.73 M^2
EST. GFR  (NON AFRICAN AMERICAN): 41.2 ML/MIN/1.73 M^2
GLUCOSE SERPL-MCNC: 228 MG/DL
HCT VFR BLD AUTO: 36.1 %
HGB BLD-MCNC: 12.8 G/DL
INR PPP: 1
LYMPHOCYTES # BLD AUTO: 2.5 K/UL
LYMPHOCYTES NFR BLD: 39.1 %
MCH RBC QN AUTO: 27.5 PG
MCHC RBC AUTO-ENTMCNC: 35.5 %
MCV RBC AUTO: 78 FL
MONOCYTES # BLD AUTO: 0.5 K/UL
MONOCYTES NFR BLD: 7.3 %
NEUTROPHILS # BLD AUTO: 3 K/UL
NEUTROPHILS NFR BLD: 48.1 %
PLATELET # BLD AUTO: 194 K/UL
PMV BLD AUTO: 10.2 FL
POCT GLUCOSE: 237 MG/DL (ref 70–110)
POTASSIUM SERPL-SCNC: 3.7 MMOL/L
PROT SERPL-MCNC: 7.8 G/DL
PROTHROMBIN TIME: 10.4 SEC
RBC # BLD AUTO: 4.65 M/UL
SODIUM SERPL-SCNC: 137 MMOL/L
TACROLIMUS BLD-MCNC: 28.7 NG/ML
WBC # BLD AUTO: 6.31 K/UL

## 2017-05-24 PROCEDURE — 25000003 PHARM REV CODE 250: Performed by: STUDENT IN AN ORGANIZED HEALTH CARE EDUCATION/TRAINING PROGRAM

## 2017-05-24 PROCEDURE — 80197 ASSAY OF TACROLIMUS: CPT

## 2017-05-24 PROCEDURE — 86256 FLUORESCENT ANTIBODY TITER: CPT

## 2017-05-24 PROCEDURE — 85025 COMPLETE CBC W/AUTO DIFF WBC: CPT

## 2017-05-24 PROCEDURE — 85610 PROTHROMBIN TIME: CPT

## 2017-05-24 PROCEDURE — 86833 HLA CLASS II HIGH DEFIN QUAL: CPT | Mod: PO,TXP

## 2017-05-24 PROCEDURE — 86038 ANTINUCLEAR ANTIBODIES: CPT

## 2017-05-24 PROCEDURE — 86832 HLA CLASS I HIGH DEFIN QUAL: CPT | Mod: 91,PO,TXP

## 2017-05-24 PROCEDURE — 80053 COMPREHEN METABOLIC PANEL: CPT

## 2017-05-24 PROCEDURE — 63600175 PHARM REV CODE 636 W HCPCS: Performed by: RADIOLOGY

## 2017-05-24 PROCEDURE — 86977 RBC SERUM PRETX INCUBJ/INHIB: CPT | Mod: 91,PO,TXP

## 2017-05-24 RX ORDER — ACETAMINOPHEN 325 MG/1
650 TABLET ORAL ONCE
Status: COMPLETED | OUTPATIENT
Start: 2017-05-24 | End: 2017-05-24

## 2017-05-24 RX ORDER — FENTANYL CITRATE 50 UG/ML
INJECTION, SOLUTION INTRAMUSCULAR; INTRAVENOUS CODE/TRAUMA/SEDATION MEDICATION
Status: COMPLETED | OUTPATIENT
Start: 2017-05-24 | End: 2017-05-24

## 2017-05-24 RX ORDER — MIDAZOLAM HYDROCHLORIDE 1 MG/ML
INJECTION, SOLUTION INTRAMUSCULAR; INTRAVENOUS CODE/TRAUMA/SEDATION MEDICATION
Status: COMPLETED | OUTPATIENT
Start: 2017-05-24 | End: 2017-05-24

## 2017-05-24 RX ORDER — SODIUM CHLORIDE 9 MG/ML
INJECTION, SOLUTION INTRAVENOUS CONTINUOUS
Status: DISCONTINUED | OUTPATIENT
Start: 2017-05-24 | End: 2017-05-24 | Stop reason: HOSPADM

## 2017-05-24 RX ADMIN — SODIUM CHLORIDE 500 ML: 0.9 INJECTION, SOLUTION INTRAVENOUS at 08:05

## 2017-05-24 RX ADMIN — FENTANYL CITRATE 25 MCG: 50 INJECTION, SOLUTION INTRAMUSCULAR; INTRAVENOUS at 09:05

## 2017-05-24 RX ADMIN — MIDAZOLAM HYDROCHLORIDE 0.5 MG: 1 INJECTION, SOLUTION INTRAMUSCULAR; INTRAVENOUS at 09:05

## 2017-05-24 RX ADMIN — ACETAMINOPHEN 650 MG: 325 TABLET ORAL at 12:05

## 2017-05-24 NOTE — DISCHARGE SUMMARY
Radiology Discharge Summary      Hospital Course: No complications    Admit Date: 5/24/2017  Discharge Date: 05/24/2017     Instructions Given to Patient: Yes  Diet: Resume prior diet  Activity: activity as tolerated    Description of Condition on Discharge: Stable  Vital Signs (Most Recent): Temp: 97.8 °F (36.6 °C) (05/24/17 1020)  Pulse: 74 (05/24/17 1400)  Resp: 18 (05/24/17 1400)  BP: (!) 164/78 (05/24/17 1400)  SpO2: 100 % (05/24/17 1400)    Discharge Disposition: Home    Discharge Diagnosis: Elevated LFTs    Claudy Lovelace MD  PGY-II  Dept of Radiology   Pager: [phone]

## 2017-05-24 NOTE — H&P
Radiology History & Physical      SUBJECTIVE:     Chief Complaint: Elevated LFTs    History of Present Illness:  Valencia Dumont is a 47 y.o. female who presents for ultrasound guided biopsy of liver transplant.     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 Authorizing Provider   diazepam (VALIUM) 5 MG tablet 5 mg 3 (three) times daily as needed.  1/29/16  Yes Historical Provider, MD   hydrochlorothiazide (HYDRODIURIL) 25 MG tablet Take 25 mg by mouth once daily. 11/13/14  Yes Historical Provider, MD   isosorbide mononitrate (IMDUR) 30 MG 24 hr tablet Take 30 mg by mouth once daily.   Yes Historical Provider, MD   LEVEMIR FLEXTOUCH 100 unit/mL (3 mL) InPn pen INJECT TEN UNITS UNDER THE SKIN ONCE DAILY 9/1/16  Yes Krystyna Gamble MD   losartan (COZAAR) 100 MG tablet Take 1 tablet by mouth once daily. 12/2/14  Yes Historical Provider, MD   MULTIVIT,THER IRON,CA,FA & MIN (MULTIVITAMIN) Tab Take 1 tablet by mouth once daily. 1/6/14  Yes Jose Daniel Laird, ANA   mycophenolate (MYFORTIC) 360 MG TbEC Take 1 tablet (360 mg total) by mouth 2 (two) times daily. 6/29/16  Yes Miriam Abernathy MD   NOVOLOG FLEXPEN 100 unit/mL InPn pen INJECT FIVE UNITS BEFORE BREAKFAST AND LUNCH, AND INJECT SIX UNITS BEFORE DINNER. USE WITH SLIDING SCALE. 9/1/16  Yes Krystyna Gamble MD   ondansetron (ZOFRAN) 8 MG tablet Take 1 tablet by mouth every 8 (eight) hours as needed. 3/29/16  Yes Historical Provider, MD  "  pantoprazole (PROTONIX) 40 MG tablet Take 40 mg by mouth once daily.   Yes Historical Provider, MD   tacrolimus (PROGRAF) 1 MG Cap Take 9 capsules (9 mg total) by mouth every 12 (twelve) hours. 5/12/17  Yes Miriam Abernathy MD   verapamil (VERELAN) 120 MG C24P Take 1 capsule by mouth once daily. 3/21/16  Yes Historical Provider, MD   zolpidem (AMBIEN) 10 mg Tab Take 10 mg by mouth nightly as needed. 3/5/16  Yes Historical Provider, MD   ACCU-CHEK SMARTVIEW Strp 1 strip by Misc.(Non-Drug; Combo Route) route 5 (five) times daily. Trueresult 3/28/14   Historical Provider, MD   BD ULTRA-FINE JANESSA PEN NEEDLES 32 gauge x 5/32" Ndle TEST BLOOD SUGAR FOUR TIMES DAILY 9/1/16   Krystyna Gamble MD   blood sugar diagnostic (BLOOD GLUCOSE TEST) Strp Pt uses freestyle lite meter to check BG 4 times daily. 2/3/15   Krystyna Gamble MD   cyproheptadine (PERIACTIN) 4 mg tablet Take 4 mg by mouth every evening. 9/26/14   Historical Provider, MD   DILTIAZEM HCL (DILTIAZEM 2% CREAM) Apply topically 3 (three) times daily. Apply topically to anal area. 1/18/17   Amrit Power PA-C   estradiol (ESTRACE) 0.01 % (0.1 mg/gram) vaginal cream Place 0.5 g vaginally twice a week. Insert 0.5grams intravaginally twice weekly 3/20/17 3/20/18  Lu Schreiber MD   fluconazole (DIFLUCAN) 150 MG Tab TAKE ONE TABLET BY MOUTH AS A SINGLE DOSE FOR INFECTION. 3/17/17   Historical Provider, MD   gabapentin (NEURONTIN) 300 MG capsule Take 300 mg by mouth 3 (three) times daily.    Historical Provider, MD   hydrOXYzine HCl (ATARAX) 10 MG Tab TAKE 1 OR 2 TABLETS BY MOUTH THREE TIMES DAILY AS NEEDED FOR ITCHING. 3/17/17   Historical Provider, MD   lancets 28 gauge Misc 4 lancets by Misc.(Non-Drug; Combo Route) route once daily. Pt uses Freestyle lite meter to check BG 4 times daily. 12/11/14   Krystyna Gamble MD   magnesium oxide (MAG-OX) 400 mg tablet Take 1 tablet (400 mg total) by mouth 2 (two) times daily.  Patient taking differently: Take 800 mg " by mouth 2 (two) times daily.  11/6/14   Miriam Abernathy MD   methylPREDNISolone (MEDROL DOSEPACK) 4 mg tablet TAKE AS DIRECTED ON PACKAGE. 3/17/17   Historical Provider, MD   oxycodone-acetaminophen (PERCOCET) 7.5-325 mg per tablet Take 1 tablet by mouth every 4 (four) hours as needed for Pain.    Historical Provider, MD   PROAIR HFA 90 mcg/actuation inhaler Inhale 2 puffs into the lungs 3 (three) times daily as needed.  3/5/16   Historical Provider, MD   promethazine-dextromethorphan (PROMETHAZINE-DM) 6.25-15 mg/5 mL Syrp  1/8/15   Historical Provider, MD   triamcinolone (KENALOG) 0.5 % ointment 1 application 2 (two) times daily. Apply to affected area 3/6/17   Historical Provider, MD   valacyclovir (VALTREX) 500 MG tablet TAKE TWO TABLETS BY MOUTH EVERY 8 HOURS FOR 7 DAYS 4/22/16   Historical Provider, MD   VITAMIN D2 50,000 unit capsule TAKE 1 CAPSULE BY MOUTH ONCE EVERY WEEK 12/14/16   Krystyna Gamble MD     Anticoagulants/Antiplatelets: no anticoagulation    Allergies:   Review of patient's allergies indicates:   Allergen Reactions    Norvasc [amlodipine]      Severe headache    Doxycycline Rash    Pcn [penicillins] Rash     Sedation History:  no adverse reactions    Review of Systems:   Hematological: no known coagulopathies  Respiratory: no shortness of breath  Cardiovascular: no chest pain  Gastrointestinal: no abdominal pain  Genito-Urinary: no dysuria  Musculoskeletal: negative  Neurological: no TIA or stroke symptoms         OBJECTIVE:     Vital Signs (Most Recent)  Temp: 97.7 °F (36.5 °C) (05/24/17 0806)  Pulse: 76 (05/24/17 0937)  Resp: 14 (05/24/17 0937)  BP: (!) 145/86 (05/24/17 0937)  SpO2: 100 % (05/24/17 0937)    Physical Exam:  ASA: III  Mallampati: II    General: no acute distress  Mental Status: alert and oriented to person, place and time  HEENT: normocephalic, atraumatic  Chest: unlabored breathing  Abdomen: nondistended  Extremity: moves all extremities    Laboratory  Lab Results   Component  Value Date    INR 1.0 05/24/2017       Lab Results   Component Value Date    WBC 6.31 05/24/2017    HGB 12.8 05/24/2017    HCT 36.1 (L) 05/24/2017    MCV 78 (L) 05/24/2017     05/24/2017      Lab Results   Component Value Date     (H) 05/24/2017     05/24/2017    K 3.7 05/24/2017     05/24/2017    CO2 25 05/24/2017    BUN 34 (H) 05/24/2017    CREATININE 1.5 (H) 05/24/2017    CALCIUM 9.5 05/24/2017    MG 1.8 05/18/2017     (H) 05/24/2017    AST 62 (H) 05/24/2017    ALBUMIN 3.8 05/24/2017    BILITOT 0.6 05/24/2017    BILIDIR 0.4 (H) 02/24/2014       ASSESSMENT/PLAN:     Sedation Plan: Moderate sedation   Patient will undergo ultrasound guided biopsy of liver tranpslant.    Claudy Lovelace MD  PGY-II  Dept of Radiology   Pager: [phone]

## 2017-05-24 NOTE — DISCHARGE INSTRUCTIONS
For scheduling: Call Lesvia at [phone]    For questions or concerns call: SHAYNA MON-FRI 8 AM- 5PM [phone]. Radiology resident on call [phone].    For immediate concerns that are not emergent, you may call our radiology clinic at: [phone]

## 2017-05-24 NOTE — PROCEDURES
Radiology Post-Procedure Note    Pre Op Diagnosis: Abnormal LFTs    Post Op Diagnosis: Same    Procedure: Ultrasound guided liver biopsy    Procedure performed by: Butch Mojica MD    Written Informed Consent Obtained: Yes    Specimen Removed: Yes: Single 16 gauge core biopsy of transplant liver    Estimated Blood Loss: Minimal    Findings: Moderate sedation and local anesthesia were used.    A 16-gauge Monopty biopsy device was used to remove 1 random right hepatic lobe specimen.  This specimen was sent to pathology for further evaluation.      The patient tolerated the procedure well and there were no complications.  Please see Imaging report for further details.      Claudy Lovelace MD  PGY-II  Dept of Radiology   Pager: [phone]

## 2017-05-24 NOTE — PROGRESS NOTES
Patient stable, tolerated procedure well. Discharge instruction, and follow up care reviewed. Patient verbalized understanding, and denies further questions. Provided with ROCU and after hours number. Patient transported via wheelchair.

## 2017-05-24 NOTE — PROGRESS NOTES
Ultrasound Liver is now complete. Pt tolerated well. Dressing to the right flank is clean dry and intact. Pt right down for 1 one hour until 11:10. Sandbag in place for one. Vss. NAD.

## 2017-05-25 ENCOUNTER — PATIENT MESSAGE (OUTPATIENT)
Dept: ENDOCRINOLOGY | Facility: CLINIC | Age: 47
End: 2017-05-25

## 2017-05-25 ENCOUNTER — CONFERENCE (OUTPATIENT)
Dept: TRANSPLANT | Facility: CLINIC | Age: 47
End: 2017-05-25

## 2017-05-25 DIAGNOSIS — Z94.4 LIVER REPLACED BY TRANSPLANT: Primary | ICD-10-CM

## 2017-05-25 LAB
ANA SER QL IF: NORMAL
CLASS I ANTIBODY COMMENTS - LUMINEX: NORMAL
CLASS II ANTIBODY COMMENTS - LUMINEX: NORMAL
CMV DNA SERPL NAA+PROBE-ACNC: NORMAL IU/ML
DSA1 TESTING DATE: NORMAL
DSA2 TESTING DATE: NORMAL
SERUM COLLECTION DT - LUMINEX CLASS I: NORMAL
SERUM COLLECTION DT - LUMINEX CLASS II: NORMAL

## 2017-05-25 RX ORDER — PREDNISONE 10 MG/1
40 TABLET ORAL DAILY
Qty: 120 TABLET | Refills: 1 | Status: CANCELLED | OUTPATIENT
Start: 2017-05-25

## 2017-05-26 ENCOUNTER — PATIENT MESSAGE (OUTPATIENT)
Dept: TRANSPLANT | Facility: CLINIC | Age: 47
End: 2017-05-26

## 2017-05-26 ENCOUNTER — TELEPHONE (OUTPATIENT)
Dept: ENDOCRINOLOGY | Facility: CLINIC | Age: 47
End: 2017-05-26

## 2017-05-26 LAB — SMOOTH MUSCLE AB TITR SER IF: ABNORMAL {TITER}

## 2017-05-26 RX ORDER — PREDNISONE 10 MG/1
20 TABLET ORAL DAILY
Qty: 120 TABLET | Refills: 1 | Status: SHIPPED | OUTPATIENT
Start: 2017-05-26 | End: 2017-06-14 | Stop reason: SDUPTHER

## 2017-05-26 NOTE — TELEPHONE ENCOUNTER
Transplant Dr. up patient's Prednisone to 20 mg and pt's blood sugars are rising please change Novolog and Levemir doses.

## 2017-05-26 NOTE — TELEPHONE ENCOUNTER
Spoke with patient. Started on Pred 20. Took 10 (5 + SSI) units of Novolog this AM and . WIll increase novolog base dose to 10 units + correction scale. Told to call if BG not improving

## 2017-05-26 NOTE — TELEPHONE ENCOUNTER
----- Message from Miriam Abernathy MD sent at 5/25/2017  4:46 PM CDT -----  Bx dicussed in path conference suggestive of possible recurrence of autoimmune hepatitis plan was to start prednisone 40mg daily, but I called patient to discuss plan and I am concerned about her glucose issues; therefore advised she only take pred 20mg daily and we will continue with weekly labs

## 2017-05-26 NOTE — TELEPHONE ENCOUNTER
----- Message from Samira Kanwal sent at 5/26/2017  2:05 PM CDT -----  Contact: Valencia   tel:   [phone]  Pt.says her transplant dr.and coordinator are trying to reach you urgently.   Dr. Melida Abernathy   At [facility].   Coordinator : Linda Barakat   Tel:  [phone] .   Needs to change the regimen w/ the prednisone, it is causing her sugar to be high.    Asking for you to please contact those and call her asap as per caller.

## 2017-05-29 ENCOUNTER — PATIENT MESSAGE (OUTPATIENT)
Dept: ENDOCRINOLOGY | Facility: CLINIC | Age: 47
End: 2017-05-29

## 2017-05-29 ENCOUNTER — PATIENT MESSAGE (OUTPATIENT)
Dept: TRANSPLANT | Facility: CLINIC | Age: 47
End: 2017-05-29

## 2017-05-29 ENCOUNTER — LAB VISIT (OUTPATIENT)
Dept: LAB | Facility: HOSPITAL | Age: 47
End: 2017-05-29
Attending: INTERNAL MEDICINE
Payer: MEDICARE

## 2017-05-29 DIAGNOSIS — Z94.4 LIVER REPLACED BY TRANSPLANT: ICD-10-CM

## 2017-05-29 LAB
ALBUMIN SERPL BCP-MCNC: 4 G/DL
ALP SERPL-CCNC: 139 U/L
ALT SERPL W/O P-5'-P-CCNC: 55 U/L
ANION GAP SERPL CALC-SCNC: 11 MMOL/L
AST SERPL-CCNC: 20 U/L
BASOPHILS # BLD AUTO: 0.01 K/UL
BASOPHILS NFR BLD: 0.1 %
BILIRUB SERPL-MCNC: 0.5 MG/DL
BUN SERPL-MCNC: 73 MG/DL
CALCIUM SERPL-MCNC: 9.7 MG/DL
CHLORIDE SERPL-SCNC: 104 MMOL/L
CO2 SERPL-SCNC: 23 MMOL/L
CREAT SERPL-MCNC: 2.2 MG/DL
DIFFERENTIAL METHOD: ABNORMAL
EOSINOPHIL # BLD AUTO: 0 K/UL
EOSINOPHIL NFR BLD: 0.3 %
ERYTHROCYTE [DISTWIDTH] IN BLOOD BY AUTOMATED COUNT: 14.8 %
EST. GFR  (AFRICAN AMERICAN): 30 ML/MIN/1.73 M^2
EST. GFR  (NON AFRICAN AMERICAN): 26 ML/MIN/1.73 M^2
GLUCOSE SERPL-MCNC: 243 MG/DL
HCT VFR BLD AUTO: 32.6 %
HGB BLD-MCNC: 11.8 G/DL
LYMPHOCYTES # BLD AUTO: 2.8 K/UL
LYMPHOCYTES NFR BLD: 31.7 %
MAGNESIUM SERPL-MCNC: 1.8 MG/DL
MCH RBC QN AUTO: 28 PG
MCHC RBC AUTO-ENTMCNC: 36.2 %
MCV RBC AUTO: 77 FL
MONOCYTES # BLD AUTO: 0.5 K/UL
MONOCYTES NFR BLD: 5.8 %
NEUTROPHILS # BLD AUTO: 5.4 K/UL
NEUTROPHILS NFR BLD: 62.1 %
PHOSPHATE SERPL-MCNC: 4.7 MG/DL
PLATELET # BLD AUTO: 174 K/UL
PMV BLD AUTO: 9.9 FL
POTASSIUM SERPL-SCNC: 4 MMOL/L
PROT SERPL-MCNC: 7.4 G/DL
RBC # BLD AUTO: 4.21 M/UL
SODIUM SERPL-SCNC: 138 MMOL/L
WBC # BLD AUTO: 8.68 K/UL

## 2017-05-29 PROCEDURE — 80197 ASSAY OF TACROLIMUS: CPT

## 2017-05-29 PROCEDURE — 36415 COLL VENOUS BLD VENIPUNCTURE: CPT

## 2017-05-29 PROCEDURE — 83735 ASSAY OF MAGNESIUM: CPT

## 2017-05-29 PROCEDURE — 84100 ASSAY OF PHOSPHORUS: CPT

## 2017-05-29 PROCEDURE — 80053 COMPREHEN METABOLIC PANEL: CPT

## 2017-05-29 PROCEDURE — 85025 COMPLETE CBC W/AUTO DIFF WBC: CPT

## 2017-05-30 LAB — TACROLIMUS BLD-MCNC: 10.5 NG/ML

## 2017-05-31 ENCOUNTER — ANESTHESIA EVENT (OUTPATIENT)
Dept: ENDOSCOPY | Facility: HOSPITAL | Age: 47
End: 2017-05-31
Payer: MEDICARE

## 2017-05-31 ENCOUNTER — PATIENT MESSAGE (OUTPATIENT)
Dept: TRANSPLANT | Facility: CLINIC | Age: 47
End: 2017-05-31

## 2017-05-31 ENCOUNTER — HOSPITAL ENCOUNTER (OUTPATIENT)
Facility: HOSPITAL | Age: 47
Discharge: HOME | End: 2017-05-31
Attending: INTERNAL MEDICINE | Admitting: INTERNAL MEDICINE
Payer: MEDICARE

## 2017-05-31 ENCOUNTER — ANESTHESIA (OUTPATIENT)
Dept: ENDOSCOPY | Facility: HOSPITAL | Age: 47
End: 2017-05-31
Payer: MEDICARE

## 2017-05-31 ENCOUNTER — PATIENT MESSAGE (OUTPATIENT)
Dept: ENDOCRINOLOGY | Facility: CLINIC | Age: 47
End: 2017-05-31

## 2017-05-31 ENCOUNTER — PATIENT MESSAGE (OUTPATIENT)
Dept: GASTROENTEROLOGY | Facility: CLINIC | Age: 47
End: 2017-05-31

## 2017-05-31 ENCOUNTER — SURGERY (OUTPATIENT)
Age: 47
End: 2017-05-31

## 2017-05-31 VITALS
DIASTOLIC BLOOD PRESSURE: 78 MMHG | OXYGEN SATURATION: 100 % | SYSTOLIC BLOOD PRESSURE: 156 MMHG | RESPIRATION RATE: 20 BRPM | TEMPERATURE: 98 F | WEIGHT: 145 LBS | HEART RATE: 72 BPM | BODY MASS INDEX: 20.76 KG/M2 | HEIGHT: 70 IN

## 2017-05-31 DIAGNOSIS — R93.3 ABNORMAL CT SCAN, COLON: Primary | ICD-10-CM

## 2017-05-31 LAB
B-HCG UR QL: NEGATIVE
CTP QC/QA: YES
POCT GLUCOSE: 191 MG/DL (ref 70–110)

## 2017-05-31 PROCEDURE — D9220A PRA ANESTHESIA: Mod: ANES,,, | Performed by: ANESTHESIOLOGY

## 2017-05-31 PROCEDURE — 27201089 HC SNARE, DISP (ANY): Performed by: INTERNAL MEDICINE

## 2017-05-31 PROCEDURE — 37000009 HC ANESTHESIA EA ADD 15 MINS: Performed by: INTERNAL MEDICINE

## 2017-05-31 PROCEDURE — 81025 URINE PREGNANCY TEST: CPT | Performed by: INTERNAL MEDICINE

## 2017-05-31 PROCEDURE — D9220A PRA ANESTHESIA: Mod: CRNA,,, | Performed by: NURSE ANESTHETIST, CERTIFIED REGISTERED

## 2017-05-31 PROCEDURE — 25000003 PHARM REV CODE 250: Performed by: NURSE ANESTHETIST, CERTIFIED REGISTERED

## 2017-05-31 PROCEDURE — 63600175 PHARM REV CODE 636 W HCPCS: Performed by: NURSE ANESTHETIST, CERTIFIED REGISTERED

## 2017-05-31 PROCEDURE — 45385 COLONOSCOPY W/LESION REMOVAL: CPT | Performed by: INTERNAL MEDICINE

## 2017-05-31 PROCEDURE — 25000003 PHARM REV CODE 250: Performed by: INTERNAL MEDICINE

## 2017-05-31 PROCEDURE — 45385 COLONOSCOPY W/LESION REMOVAL: CPT | Mod: ,,, | Performed by: INTERNAL MEDICINE

## 2017-05-31 PROCEDURE — 82962 GLUCOSE BLOOD TEST: CPT | Performed by: INTERNAL MEDICINE

## 2017-05-31 PROCEDURE — 88305 TISSUE EXAM BY PATHOLOGIST: CPT | Mod: 26,,, | Performed by: PATHOLOGY

## 2017-05-31 PROCEDURE — 37000008 HC ANESTHESIA 1ST 15 MINUTES: Performed by: INTERNAL MEDICINE

## 2017-05-31 PROCEDURE — 88305 TISSUE EXAM BY PATHOLOGIST: CPT | Performed by: PATHOLOGY

## 2017-05-31 RX ORDER — SODIUM CHLORIDE 9 MG/ML
INJECTION, SOLUTION INTRAVENOUS CONTINUOUS
Status: DISCONTINUED | OUTPATIENT
Start: 2017-05-31 | End: 2017-05-31 | Stop reason: HOSPADM

## 2017-05-31 RX ORDER — PROPOFOL 10 MG/ML
VIAL (ML) INTRAVENOUS CONTINUOUS PRN
Status: DISCONTINUED | OUTPATIENT
Start: 2017-05-31 | End: 2017-05-31

## 2017-05-31 RX ORDER — LIDOCAINE HCL/PF 100 MG/5ML
SYRINGE (ML) INTRAVENOUS
Status: DISCONTINUED | OUTPATIENT
Start: 2017-05-31 | End: 2017-05-31

## 2017-05-31 RX ORDER — PROPOFOL 10 MG/ML
VIAL (ML) INTRAVENOUS
Status: DISCONTINUED | OUTPATIENT
Start: 2017-05-31 | End: 2017-05-31

## 2017-05-31 RX ADMIN — PROPOFOL 200 MCG/KG/MIN: 10 INJECTION, EMULSION INTRAVENOUS at 03:05

## 2017-05-31 RX ADMIN — SODIUM CHLORIDE: 0.9 INJECTION, SOLUTION INTRAVENOUS at 02:05

## 2017-05-31 RX ADMIN — PROPOFOL 150 MG: 10 INJECTION, EMULSION INTRAVENOUS at 03:05

## 2017-05-31 RX ADMIN — LIDOCAINE HYDROCHLORIDE 30 MG: 20 INJECTION, SOLUTION INTRAVENOUS at 03:05

## 2017-05-31 NOTE — PATIENT INSTRUCTIONS
Discharge Summary/Instructions after an Endoscopic Procedure  Patient Name: Valencia Dumont  Patient MRN: [id number]  Patient YOB: 1970  Wednesday, May 31, 2017  Marky Sun MD  RESTRICTIONS ON ACTIVITY:  - DO NOT drive a car, operate machinery, make legal/financial decisions, or   drink alcohol until the day after the procedure.    - The following day: return to full activity including work, except no heavy   lifting, straining or running for 3 days if polyps were removed.  - Diet: Eat and drink normally unless instructed otherwise.  TREATMENT FOR COMMON SIDE EFFECTS:  - Mild abdominal pain, bloating or excessive gas: rest, eat lightly and use   a heating pad.  - Sore Throat - treat with throat lozenges. Gargle with warm salt water.  SYMPTOMS TO WATCH FOR AND REPORT TO YOUR PHYSICIAN:  1. Severe abdominal pain or bloating.  2. Pain in chest.  3. Chills or fever occurring within 24 hours after a procedure.  4. A large amount of rectal bleeding, which would show as bright red,   maroon, or black stools. (A small amount of blood from the rectum is not   serious, especially if hemorrhoids are present.)  5. Because air was used during the procedure, expelling large amounts of air   from your rectum or belching is normal.  6. If a bowel prep was taken, you may not have a bowel movement for 1-3   days.  This is normal.  7. Go directly to the emergency room if you notice any of the following:   Chills and/or fever over 101Â°F   Persistent vomiting   Severe abdominal pain, other than gas cramps   Severe chest pain   Black, tarry stools   Any bleeding - exceeding one tablespoon  Your doctor recommends these additional instructions:  If any biopsies were performed, my office will call you in 5 to 6 business   days with any results.  You are being discharged to home.   You have a contact number available for emergencies.  The signs and symptoms   of potential delayed complications were discussed with you.  You  may return   to normal activities tomorrow.  Written discharge instructions were   provided to you.   Resume your previous diet.   Continue your present medications.   We are waiting for your pathology results.   Telephone your GI clinic for pathology results in two weeks.   Your physician has recommended a repeat colonoscopy for surveillance.  The   date of your future colonoscopy will be determined after pathology results   from today's exam become available for review by your physician.  For questions, problems or results please call your physician - Marky Sun MD at Work:  [phone].  [facility], EMERGENCY ROOM PHONE NUMBER: [phone]  IF A COMPLICATION OR EMERGENCY SITUATION ARISES AND YOU ARE UNABLE TO REACH   YOUR PHYSICIAN - GO TO THE EMERGENCY ROOM.  Marky Sun MD  5/31/2017 4:17:31 PM  This report has been verified and signed electronically.

## 2017-05-31 NOTE — ANESTHESIA PREPROCEDURE EVALUATION
05/31/2017  Valencia Dumont is a 47 y.o., female.    Anesthesia Evaluation    I have reviewed the Patient Summary Reports.     I have reviewed the Medications.     Review of Systems  Anesthesia Hx:  No problems with previous Anesthesia  History of prior surgery of interest to airway management or planning: Denies Family Hx of Anesthesia complications.   Denies Personal Hx of Anesthesia complications.   Cardiovascular:   Hypertension Denies MI.   EKG and echo reviewed   Pulmonary:   Asthma    Renal/:   Chronic Renal Disease    Hepatic/GI:   Liver Disease, Autoimmune hepatitis s/p liver transplant   Neurological:  Neurology Normal    Endocrine:   Diabetes, poorly controlled        Physical Exam  General:  Well nourished    Airway/Jaw/Neck:  Airway Findings: Mouth Opening: Normal Tongue: Normal  General Airway Assessment: Adult  Mallampati: II  TM Distance: Normal, at least 6 cm      Dental:  Dental Findings: In tact   Chest/Lungs:  Chest/Lungs Findings: Normal Respiratory Rate     Heart/Vascular:  Heart Findings: Rate: Normal        Mental Status:  Mental Status Findings:  Alert and Oriented         Anesthesia Plan  Type of Anesthesia, risks & benefits discussed:  Anesthesia Type:  general  Patient's Preference:   Intra-op Monitoring Plan:   Intra-op Monitoring Plan Comments:   Post Op Pain Control Plan:   Post Op Pain Control Plan Comments:   Induction:   IV  Beta Blocker:         Informed Consent: Patient understands risks and agrees with Anesthesia plan.  Questions answered. Anesthesia consent signed with patient.  ASA Score: 3     Day of Surgery Review of History & Physical:    H&P update referred to the surgeon.         Ready For Surgery From Anesthesia Perspective.

## 2017-05-31 NOTE — H&P
Short Stay Endoscopy History and Physical    PCP - Timur Lion MD    Procedure - Colonoscopy  ASA - per anesthesia  Mallampati - per anesthesia  History of Anesthesia problems - no  Family history Anesthesia problems - no   Plan of anesthesia - MAC    HPI:  This is a 47 y.o. female here for abnormal CT colon and rectal bleeding.      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    Surgical History:  has a past surgical history that includes Esophagogastroduodenoscopy; Liver biopsy; Liver transplant (12/31/2013); and Tubal ligation (2003).    Family History: family history includes Alzheimer's disease in her father; Breast cancer (age of onset: 55) in her maternal aunt; Diabetes in her brother, father, paternal grandfather, and paternal grandmother; Hypertension in her brother and mother.. Otherwise no colon cancer, inflammatory bowel disease, or GI malignancies.    Social History:  reports that she has never smoked. She has never used smokeless tobacco. She reports that she does not drink alcohol or use drugs.    Review of patient's allergies indicates:   Allergen Reactions    Norvasc [amlodipine]      Severe headache    Doxycycline Rash    Pcn [penicillins] Rash       Medications:   Prescriptions Prior to Admission   Medication Sig Dispense Refill Last Dose    diazepam (VALIUM) 5 MG tablet 5 mg 3 (three) times daily as needed.   0 Past Week at Unknown time    DILTIAZEM HCL (DILTIAZEM 2% CREAM) Apply topically 3 (three) times daily. Apply topically to anal area. 30 g 0 Past Week at Unknown time    gabapentin (NEURONTIN) 300 MG  capsule Take 300 mg by mouth 3 (three) times daily.   Past Week at Unknown time    hydrochlorothiazide (HYDRODIURIL) 25 MG tablet Take 25 mg by mouth once daily.  2 5/31/2017 at Unknown time    hydrOXYzine HCl (ATARAX) 10 MG Tab TAKE 1 OR 2 TABLETS BY MOUTH THREE TIMES DAILY AS NEEDED FOR ITCHING.  0 Past Week at Unknown time    isosorbide mononitrate (IMDUR) 30 MG 24 hr tablet Take 30 mg by mouth once daily.   5/30/2017 at Unknown time    LEVEMIR FLEXTOUCH 100 unit/mL (3 mL) InPn pen INJECT TEN UNITS UNDER THE SKIN ONCE DAILY 15 mL 3 5/31/2017 at Unknown time    losartan (COZAAR) 100 MG tablet Take 1 tablet by mouth once daily.  2 5/31/2017 at Unknown time    magnesium oxide (MAG-OX) 400 mg tablet Take 1 tablet (400 mg total) by mouth 2 (two) times daily. (Patient taking differently: Take 800 mg by mouth 2 (two) times daily. ) 60 tablet 5 5/30/2017 at Unknown time    MULTIVIT,THER IRON,CA,FA & MIN (MULTIVITAMIN) Tab Take 1 tablet by mouth once daily. 30 tablet 0 5/30/2017 at Unknown time    mycophenolate (MYFORTIC) 360 MG TbEC Take 1 tablet (360 mg total) by mouth 2 (two) times daily. 180 tablet 3 5/31/2017 at Unknown time    NOVOLOG FLEXPEN 100 unit/mL InPn pen INJECT FIVE UNITS BEFORE BREAKFAST AND LUNCH, AND INJECT SIX UNITS BEFORE DINNER. USE WITH SLIDING SCALE. 15 mL 3 5/31/2017 at Unknown time    ondansetron (ZOFRAN) 8 MG tablet Take 1 tablet by mouth every 8 (eight) hours as needed.  0 5/30/2017 at Unknown time    oxycodone-acetaminophen (PERCOCET) 7.5-325 mg per tablet Take 1 tablet by mouth every 4 (four) hours as needed for Pain.   Past Week at Unknown time    pantoprazole (PROTONIX) 40 MG tablet Take 40 mg by mouth once daily.   5/30/2017 at Unknown time    predniSONE (DELTASONE) 10 MG tablet Take 2 tablets (20 mg total) by mouth once daily. 120 tablet 1 5/31/2017 at Unknown time    PROAIR HFA 90 mcg/actuation inhaler Inhale 2 puffs into the lungs 3 (three) times daily as needed.   3 Past  "Week at Unknown time    tacrolimus (PROGRAF) 1 MG Cap Take 9 capsules (9 mg total) by mouth every 12 (twelve) hours. 540 capsule 11 5/31/2017 at Unknown time    valacyclovir (VALTREX) 500 MG tablet TAKE TWO TABLETS BY MOUTH EVERY 8 HOURS FOR 7 DAYS  0 Past Week at Unknown time    verapamil (VERELAN) 120 MG C24P Take 1 capsule by mouth once daily.  3 5/31/2017 at Unknown time    VITAMIN D2 50,000 unit capsule TAKE 1 CAPSULE BY MOUTH ONCE EVERY WEEK 12 capsule 1 Past Week at Unknown time    zolpidem (AMBIEN) 10 mg Tab Take 10 mg by mouth nightly as needed.  3 5/30/2017 at Unknown time    ACCU-CHEK SMARTVIEW Strp 1 strip by Misc.(Non-Drug; Combo Route) route 5 (five) times daily. Trueresult   Taking    BD ULTRA-FINE JANESSA PEN NEEDLES 32 gauge x 5/32" Ndle TEST BLOOD SUGAR FOUR TIMES DAILY 400 each 3 Taking    blood sugar diagnostic (BLOOD GLUCOSE TEST) Strp Pt uses freestyle lite meter to check BG 4 times daily. 200 strip 11 Taking    cyproheptadine (PERIACTIN) 4 mg tablet Take 4 mg by mouth every evening.  3 Unknown at Unknown time    estradiol (ESTRACE) 0.01 % (0.1 mg/gram) vaginal cream Place 0.5 g vaginally twice a week. Insert 0.5grams intravaginally twice weekly 42 g 4 More than a month at Unknown time    fluconazole (DIFLUCAN) 150 MG Tab TAKE ONE TABLET BY MOUTH AS A SINGLE DOSE FOR INFECTION.  1 More than a month at Unknown time    lancets 28 gauge Misc 4 lancets by Misc.(Non-Drug; Combo Route) route once daily. Pt uses Freestyle lite meter to check BG 4 times daily. 200 each 11 Taking    promethazine-dextromethorphan (PROMETHAZINE-DM) 6.25-15 mg/5 mL Syrp   0 More than a month at Unknown time    triamcinolone (KENALOG) 0.5 % ointment 1 application 2 (two) times daily. Apply to affected area  3 More than a month at Unknown time         Physical Exam:    Vital Signs:   Vitals:    05/31/17 1428   BP: (!) 158/74   Pulse: 97   Resp: 16   Temp: 98.3 °F (36.8 °C)       General Appearance: Well appearing " in no acute distress  Eyes:    No scleral icterus  ENT: Neck supple, Lips, mucosa, and tongue normal; teeth and gums normal  Lungs: CTA bilaterally  Heart:  S1, S2 normal, no murmurs heard  Abdomen: Soft, non tender, non distended with positive bowel sounds. No hepatosplenomegaly, ascites, or mass.      Labs:  Lab Results   Component Value Date    WBC 8.68 05/29/2017    HGB 11.8 (L) 05/29/2017    HCT 32.6 (L) 05/29/2017     05/29/2017    CHOL 202 (H) 07/18/2016    TRIG 64 07/18/2016    HDL 78 (H) 07/18/2016    ALT 55 (H) 05/29/2017    AST 20 05/29/2017     05/29/2017    K 4.0 05/29/2017     05/29/2017    CREATININE 2.2 (H) 05/29/2017    BUN 73 (H) 05/29/2017    CO2 23 05/29/2017    TSH 1.526 03/14/2017    INR 1.0 05/24/2017    HGBA1C 9.4 (H) 05/18/2017         Assessment:  47 y.o. female with abnormal CT and rectal bleeding.    Plan:  Proceed with colonoscopy today.  I have explained the risks and benefits of endoscopy procedures to the patient including but not limited to bleeding, perforation, infection, and death.  All questions and answered.        Marky Sun MD

## 2017-05-31 NOTE — TRANSFER OF CARE
"Anesthesia Transfer of Care Note    Patient: Valencia Dumont    Procedure(s) Performed: Procedure(s) (LRB):  COLONOSCOPY (N/A)    Patient location: GI    Anesthesia Type: general    Transport from OR: Transported from OR on 2-3 L/min O2 by NC with adequate spontaneous ventilation    Post pain: adequate analgesia    Post assessment: no apparent anesthetic complications and tolerated procedure well    Post vital signs: stable    Level of consciousness: awake, alert and oriented    Nausea/Vomiting: no nausea/vomiting    Complications: none    Transfer of care protocol was followed      Last vitals:   Visit Vitals  BP (!) 155/77 (BP Location: Left arm, Patient Position: Lying, BP Method: Automatic)   Pulse 71   Temp 36.8 °C (98.3 °F) (Oral)   Resp 20   Ht 5' 9.5" (1.765 m)   Wt 65.8 kg (145 lb)   LMP 12/20/2016   SpO2 99%   Breastfeeding? No   BMI 21.11 kg/m²     "

## 2017-06-01 ENCOUNTER — PATIENT MESSAGE (OUTPATIENT)
Dept: RHEUMATOLOGY | Facility: CLINIC | Age: 47
End: 2017-06-01

## 2017-06-01 ENCOUNTER — PATIENT MESSAGE (OUTPATIENT)
Dept: TRANSPLANT | Facility: CLINIC | Age: 47
End: 2017-06-01

## 2017-06-01 RX ORDER — TACROLIMUS 1 MG/1
8 CAPSULE ORAL EVERY 12 HOURS
Qty: 480 CAPSULE | Refills: 11 | Status: SHIPPED | OUTPATIENT
Start: 2017-06-01 | End: 2017-06-28 | Stop reason: SDUPTHER

## 2017-06-01 NOTE — ANESTHESIA POSTPROCEDURE EVALUATION
"Anesthesia Post Evaluation    Patient: Valencia Dumont    Procedure(s) Performed: Procedure(s) (LRB):  COLONOSCOPY (N/A)    Final Anesthesia Type: general  Patient location during evaluation: PACU (per chart review)  Patient participation: Yes- Able to Participate  Level of consciousness: awake and alert  Post-procedure vital signs: reviewed and stable  Pain management: adequate  Airway patency: patent  PONV status at discharge: No PONV  Anesthetic complications: no      Cardiovascular status: blood pressure returned to baseline  Respiratory status: unassisted, room air and spontaneous ventilation  Hydration status: euvolemic  Follow-up not needed.        Visit Vitals  BP (!) 156/78 (BP Location: Left arm, Patient Position: Lying, BP Method: Automatic)   Pulse 72   Temp 36.8 °C (98.3 °F)   Resp 20   Ht 5' 9.5" (1.765 m)   Wt 65.8 kg (145 lb)   LMP 12/20/2016   SpO2 100%   Breastfeeding? No   BMI 21.11 kg/m²       Pain/Parker Score: Pain Assessment Performed: Yes (5/31/2017  4:34 PM)  Presence of Pain: non-verbal indicators absent (5/31/2017  4:34 PM)  Parker Score: 10 (5/31/2017  4:47 PM)      "

## 2017-06-01 NOTE — TELEPHONE ENCOUNTER
Confirmed with pt Dr. Abernathy informed her to decrease prograf to 8 mg BID. Will repeat labs Monday 6/5. She verbalizes understanding.

## 2017-06-01 NOTE — TELEPHONE ENCOUNTER
----- Message from Miriam Abernathy MD sent at 5/31/2017 11:08 AM CDT -----  Tacro is likely too high for patient spoke with her and advised she decrease dose to 8mg twice a day and repeat labs next week.

## 2017-06-05 ENCOUNTER — LAB VISIT (OUTPATIENT)
Dept: LAB | Facility: HOSPITAL | Age: 47
End: 2017-06-05
Attending: INTERNAL MEDICINE
Payer: MEDICARE

## 2017-06-05 DIAGNOSIS — Z94.4 LIVER REPLACED BY TRANSPLANT: ICD-10-CM

## 2017-06-05 LAB
ALBUMIN SERPL BCP-MCNC: 3.6 G/DL
ALP SERPL-CCNC: 85 U/L
ALT SERPL W/O P-5'-P-CCNC: 35 U/L
ANION GAP SERPL CALC-SCNC: 8 MMOL/L
AST SERPL-CCNC: 20 U/L
BASOPHILS # BLD AUTO: 0.01 K/UL
BASOPHILS NFR BLD: 0.1 %
BILIRUB SERPL-MCNC: 0.4 MG/DL
BUN SERPL-MCNC: 51 MG/DL
CALCIUM SERPL-MCNC: 9.5 MG/DL
CHLORIDE SERPL-SCNC: 107 MMOL/L
CO2 SERPL-SCNC: 26 MMOL/L
CREAT SERPL-MCNC: 1.6 MG/DL
DIFFERENTIAL METHOD: ABNORMAL
EOSINOPHIL # BLD AUTO: 0.1 K/UL
EOSINOPHIL NFR BLD: 0.8 %
ERYTHROCYTE [DISTWIDTH] IN BLOOD BY AUTOMATED COUNT: 15 %
EST. GFR  (AFRICAN AMERICAN): 44 ML/MIN/1.73 M^2
EST. GFR  (NON AFRICAN AMERICAN): 38 ML/MIN/1.73 M^2
GLUCOSE SERPL-MCNC: 51 MG/DL
HCT VFR BLD AUTO: 31.2 %
HGB BLD-MCNC: 11.2 G/DL
LYMPHOCYTES # BLD AUTO: 4.2 K/UL
LYMPHOCYTES NFR BLD: 39.4 %
MAGNESIUM SERPL-MCNC: 1.7 MG/DL
MCH RBC QN AUTO: 28.1 PG
MCHC RBC AUTO-ENTMCNC: 35.9 %
MCV RBC AUTO: 78 FL
MONOCYTES # BLD AUTO: 0.7 K/UL
MONOCYTES NFR BLD: 6.7 %
NEUTROPHILS # BLD AUTO: 5.6 K/UL
NEUTROPHILS NFR BLD: 53 %
PHOSPHATE SERPL-MCNC: 3.2 MG/DL
PLATELET # BLD AUTO: 173 K/UL
PMV BLD AUTO: 9.5 FL
POTASSIUM SERPL-SCNC: 3.9 MMOL/L
PROT SERPL-MCNC: 6.4 G/DL
RBC # BLD AUTO: 3.99 M/UL
SODIUM SERPL-SCNC: 141 MMOL/L
WBC # BLD AUTO: 10.63 K/UL

## 2017-06-05 PROCEDURE — 80197 ASSAY OF TACROLIMUS: CPT

## 2017-06-05 PROCEDURE — 36415 COLL VENOUS BLD VENIPUNCTURE: CPT

## 2017-06-05 PROCEDURE — 80053 COMPREHEN METABOLIC PANEL: CPT

## 2017-06-05 PROCEDURE — 84100 ASSAY OF PHOSPHORUS: CPT

## 2017-06-05 PROCEDURE — 83735 ASSAY OF MAGNESIUM: CPT

## 2017-06-05 PROCEDURE — 85025 COMPLETE CBC W/AUTO DIFF WBC: CPT

## 2017-06-06 ENCOUNTER — PATIENT MESSAGE (OUTPATIENT)
Dept: RHEUMATOLOGY | Facility: CLINIC | Age: 47
End: 2017-06-06

## 2017-06-06 LAB — TACROLIMUS BLD-MCNC: 3.5 NG/ML

## 2017-06-07 ENCOUNTER — PATIENT MESSAGE (OUTPATIENT)
Dept: TRANSPLANT | Facility: CLINIC | Age: 47
End: 2017-06-07

## 2017-06-07 ENCOUNTER — TELEPHONE (OUTPATIENT)
Dept: ENDOSCOPY | Facility: HOSPITAL | Age: 47
End: 2017-06-07

## 2017-06-07 ENCOUNTER — TELEPHONE (OUTPATIENT)
Dept: TRANSPLANT | Facility: CLINIC | Age: 47
End: 2017-06-07

## 2017-06-07 NOTE — TELEPHONE ENCOUNTER
Informed pt labs reviewed with Dr. Abernathy, liver function improved. No med changes needed. Informed pt to make sure she is taking her prograf accurately to have a 12 hr trough. Will repeat labs Monday 6/12.

## 2017-06-08 ENCOUNTER — PATIENT MESSAGE (OUTPATIENT)
Dept: TRANSPLANT | Facility: CLINIC | Age: 47
End: 2017-06-08

## 2017-06-11 ENCOUNTER — TELEPHONE (OUTPATIENT)
Dept: GASTROENTEROLOGY | Facility: CLINIC | Age: 47
End: 2017-06-11

## 2017-06-12 ENCOUNTER — LAB VISIT (OUTPATIENT)
Dept: LAB | Facility: HOSPITAL | Age: 47
End: 2017-06-12
Attending: INTERNAL MEDICINE
Payer: MEDICARE

## 2017-06-12 ENCOUNTER — PATIENT MESSAGE (OUTPATIENT)
Dept: TRANSPLANT | Facility: CLINIC | Age: 47
End: 2017-06-12

## 2017-06-12 DIAGNOSIS — Z94.4 LIVER REPLACED BY TRANSPLANT: ICD-10-CM

## 2017-06-12 LAB
ALBUMIN SERPL BCP-MCNC: 3.7 G/DL
ALP SERPL-CCNC: 81 U/L
ALT SERPL W/O P-5'-P-CCNC: 30 U/L
ANION GAP SERPL CALC-SCNC: 8 MMOL/L
AST SERPL-CCNC: 16 U/L
BASOPHILS # BLD AUTO: 0 K/UL
BASOPHILS NFR BLD: 0 %
BILIRUB SERPL-MCNC: 0.4 MG/DL
BUN SERPL-MCNC: 35 MG/DL
CALCIUM SERPL-MCNC: 9.8 MG/DL
CHLORIDE SERPL-SCNC: 106 MMOL/L
CO2 SERPL-SCNC: 26 MMOL/L
CREAT SERPL-MCNC: 1.3 MG/DL
DIFFERENTIAL METHOD: ABNORMAL
EOSINOPHIL # BLD AUTO: 0 K/UL
EOSINOPHIL NFR BLD: 0.4 %
ERYTHROCYTE [DISTWIDTH] IN BLOOD BY AUTOMATED COUNT: 15.1 %
EST. GFR  (AFRICAN AMERICAN): 56 ML/MIN/1.73 M^2
EST. GFR  (NON AFRICAN AMERICAN): 49 ML/MIN/1.73 M^2
GLUCOSE SERPL-MCNC: 124 MG/DL
HCT VFR BLD AUTO: 31.3 %
HGB BLD-MCNC: 11 G/DL
LYMPHOCYTES # BLD AUTO: 3.5 K/UL
LYMPHOCYTES NFR BLD: 34.7 %
MAGNESIUM SERPL-MCNC: 1.7 MG/DL
MCH RBC QN AUTO: 27.8 PG
MCHC RBC AUTO-ENTMCNC: 35.1 %
MCV RBC AUTO: 79 FL
MONOCYTES # BLD AUTO: 0.6 K/UL
MONOCYTES NFR BLD: 6.3 %
NEUTROPHILS # BLD AUTO: 5.9 K/UL
NEUTROPHILS NFR BLD: 58.6 %
PHOSPHATE SERPL-MCNC: 3 MG/DL
PLATELET # BLD AUTO: 152 K/UL
PMV BLD AUTO: 9.1 FL
POTASSIUM SERPL-SCNC: 3.8 MMOL/L
PROT SERPL-MCNC: 6.8 G/DL
RBC # BLD AUTO: 3.96 M/UL
SODIUM SERPL-SCNC: 140 MMOL/L
WBC # BLD AUTO: 10.03 K/UL

## 2017-06-12 PROCEDURE — 85025 COMPLETE CBC W/AUTO DIFF WBC: CPT

## 2017-06-12 PROCEDURE — 80053 COMPREHEN METABOLIC PANEL: CPT

## 2017-06-12 PROCEDURE — 84100 ASSAY OF PHOSPHORUS: CPT

## 2017-06-12 PROCEDURE — 36415 COLL VENOUS BLD VENIPUNCTURE: CPT

## 2017-06-12 PROCEDURE — 80197 ASSAY OF TACROLIMUS: CPT

## 2017-06-12 PROCEDURE — 83735 ASSAY OF MAGNESIUM: CPT

## 2017-06-12 NOTE — TELEPHONE ENCOUNTER
Ma called pt to scheduled appt ,,pt states she will call to schedule her appt later shes in the ed

## 2017-06-13 ENCOUNTER — PATIENT MESSAGE (OUTPATIENT)
Dept: GASTROENTEROLOGY | Facility: CLINIC | Age: 47
End: 2017-06-13

## 2017-06-13 LAB — TACROLIMUS BLD-MCNC: 4.2 NG/ML

## 2017-06-14 RX ORDER — PREDNISONE 10 MG/1
10 TABLET ORAL DAILY
Qty: 30 TABLET | Refills: 3 | Status: SHIPPED | OUTPATIENT
Start: 2017-06-14 | End: 2017-06-28 | Stop reason: SDUPTHER

## 2017-06-14 NOTE — TELEPHONE ENCOUNTER
Reviewed labs with Dr. Abernathy who states for pt to decrease prednisone to 10 mg daily. Will repeat labs Monday 6/19.

## 2017-06-15 ENCOUNTER — OFFICE VISIT (OUTPATIENT)
Dept: GASTROENTEROLOGY | Facility: CLINIC | Age: 47
End: 2017-06-15
Payer: MEDICARE

## 2017-06-15 ENCOUNTER — OFFICE VISIT (OUTPATIENT)
Dept: RHEUMATOLOGY | Facility: CLINIC | Age: 47
End: 2017-06-15
Payer: MEDICARE

## 2017-06-15 ENCOUNTER — OFFICE VISIT (OUTPATIENT)
Dept: ENDOCRINOLOGY | Facility: CLINIC | Age: 47
End: 2017-06-15
Payer: MEDICARE

## 2017-06-15 ENCOUNTER — PATIENT MESSAGE (OUTPATIENT)
Dept: RHEUMATOLOGY | Facility: CLINIC | Age: 47
End: 2017-06-15

## 2017-06-15 ENCOUNTER — PATIENT MESSAGE (OUTPATIENT)
Dept: ENDOCRINOLOGY | Facility: CLINIC | Age: 47
End: 2017-06-15

## 2017-06-15 ENCOUNTER — HOSPITAL ENCOUNTER (OUTPATIENT)
Dept: RADIOLOGY | Facility: HOSPITAL | Age: 47
Discharge: HOME | End: 2017-06-15
Attending: INTERNAL MEDICINE
Payer: MEDICARE

## 2017-06-15 VITALS
BODY MASS INDEX: 23.73 KG/M2 | HEIGHT: 69 IN | HEART RATE: 80 BPM | DIASTOLIC BLOOD PRESSURE: 78 MMHG | WEIGHT: 160.19 LBS | SYSTOLIC BLOOD PRESSURE: 158 MMHG | TEMPERATURE: 99 F

## 2017-06-15 VITALS
SYSTOLIC BLOOD PRESSURE: 147 MMHG | HEIGHT: 69 IN | HEART RATE: 72 BPM | WEIGHT: 159.38 LBS | DIASTOLIC BLOOD PRESSURE: 78 MMHG | BODY MASS INDEX: 23.6 KG/M2

## 2017-06-15 VITALS
HEART RATE: 80 BPM | RESPIRATION RATE: 16 BRPM | BODY MASS INDEX: 23.66 KG/M2 | DIASTOLIC BLOOD PRESSURE: 76 MMHG | SYSTOLIC BLOOD PRESSURE: 134 MMHG | HEIGHT: 69 IN

## 2017-06-15 DIAGNOSIS — M81.0 OSTEOPOROSIS, UNSPECIFIED OSTEOPOROSIS TYPE, UNSPECIFIED PATHOLOGICAL FRACTURE PRESENCE: ICD-10-CM

## 2017-06-15 DIAGNOSIS — E11.42 DIABETIC POLYNEUROPATHY ASSOCIATED WITH TYPE 2 DIABETES MELLITUS: ICD-10-CM

## 2017-06-15 DIAGNOSIS — M32.9 SYSTEMIC LUPUS ERYTHEMATOSUS, UNSPECIFIED SLE TYPE, UNSPECIFIED ORGAN INVOLVEMENT STATUS: Primary | Chronic | ICD-10-CM

## 2017-06-15 DIAGNOSIS — R53.83 FATIGUE, UNSPECIFIED TYPE: ICD-10-CM

## 2017-06-15 DIAGNOSIS — M32.9 SYSTEMIC LUPUS ERYTHEMATOSUS, UNSPECIFIED SLE TYPE, UNSPECIFIED ORGAN INVOLVEMENT STATUS: Chronic | ICD-10-CM

## 2017-06-15 DIAGNOSIS — R07.89 ATYPICAL CHEST PAIN: Primary | ICD-10-CM

## 2017-06-15 DIAGNOSIS — R13.14 PHARYNGOESOPHAGEAL DYSPHAGIA: ICD-10-CM

## 2017-06-15 DIAGNOSIS — Z94.4 LIVER REPLACED BY TRANSPLANT: Chronic | ICD-10-CM

## 2017-06-15 PROCEDURE — 99999 PR PBB SHADOW E&M-EST. PATIENT-LVL V: CPT | Mod: PBBFAC,,, | Performed by: PHYSICIAN ASSISTANT

## 2017-06-15 PROCEDURE — 99214 OFFICE O/P EST MOD 30 MIN: CPT | Mod: S$GLB,,, | Performed by: INTERNAL MEDICINE

## 2017-06-15 PROCEDURE — 4010F ACE/ARB THERAPY RXD/TAKEN: CPT | Mod: S$GLB,,, | Performed by: INTERNAL MEDICINE

## 2017-06-15 PROCEDURE — 99999 PR PBB SHADOW E&M-EST. PATIENT-LVL III: CPT | Mod: PBBFAC,,, | Performed by: INTERNAL MEDICINE

## 2017-06-15 PROCEDURE — 99215 OFFICE O/P EST HI 40 MIN: CPT | Mod: S$GLB,,, | Performed by: INTERNAL MEDICINE

## 2017-06-15 PROCEDURE — 99213 OFFICE O/P EST LOW 20 MIN: CPT | Mod: S$GLB,,, | Performed by: PHYSICIAN ASSISTANT

## 2017-06-15 PROCEDURE — 71020 XR CHEST PA AND LATERAL: CPT | Mod: TC

## 2017-06-15 PROCEDURE — 3046F HEMOGLOBIN A1C LEVEL >9.0%: CPT | Mod: S$GLB,,, | Performed by: INTERNAL MEDICINE

## 2017-06-15 PROCEDURE — 71020 XR CHEST PA AND LATERAL: CPT | Mod: 26,,, | Performed by: RADIOLOGY

## 2017-06-15 RX ORDER — INSULIN ASPART 100 [IU]/ML
14 INJECTION, SOLUTION INTRAVENOUS; SUBCUTANEOUS
Qty: 15 ML | Refills: 3 | Status: SHIPPED | OUTPATIENT
Start: 2017-06-15 | End: 2017-10-05 | Stop reason: SDUPTHER

## 2017-06-15 NOTE — PATIENT INSTRUCTIONS
Correction scale (for use with Novolog)  150-200 - 2 units  201-250 - 4 units  251 - 300 - 6 units  301-350 - 8 units  >350 - 10 units

## 2017-06-15 NOTE — PROGRESS NOTES
Subjective:       Patient ID: Valencia Dumont is a 47 y.o. female.    Chief Complaint: Knee Pain      HPI:  Valencia Dumont is a 47 y.o. female with diabetes and history lupus diagnosed in 2006 due to rash, weight loss, eyes yellow and fatigue.  She different rheumatologists Dr. Vega and Dr. Solomon.  She was diagnosed autoimmune hepatitis s/p liver transplant in 2013.   She has been on immunosuppressive medications after her liver transplant with prograf 8 mg daily and myfortic 360 mg bid which she is taking currently.  When she was diagnosed with SLE she was on plaquenil but stopped since it did not do anything.     She had low prograf level.  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  She was given 20 mg prednisone daily on 5/25 or 5/26.  She decreased to 10 mg prednisone daily today.      Hands and feet cramping similar to before transplant and it improved in past with Tums.   Currently having fatigue, bruising    Gel one injected into right knee by orthopedic Dr. Aguirre.   She see pain management Dr. Alamo who treats her back pain with epidural injections, pain medicine and gabapentin.  Dr. Alamo sent her to a surgeon but surgeon said no.    Lupus Review of Systems  Alopecia: yes  Photosensitivity: sometimes itch   Raynaud's: pale color with cold temperature  Oral or nasal ulcers: no  Rashes: no  No pleurisy or pericarditis.  No seizures, psychosis, or stroke.  No venous or arterial clots.  Pregnancy hx (if applicable): no miscarriages; delivered 1 child at 34 week; 2 full term deliveries    Review of Systems   Constitutional: Positive for unexpected weight change. Negative for fever.   HENT: Negative for trouble swallowing.    Eyes: Negative for redness.   Respiratory: Positive for cough and shortness of breath.    Cardiovascular: Positive for chest pain.        EKG in urgent care normal   Gastrointestinal: Negative for constipation and diarrhea.   Genitourinary: Negative for dysuria and genital sores.  "  Skin: Negative for rash.   Neurological: Negative for headaches.   Hematological: Bruises/bleeds easily.         Objective:   BP (!) 158/78 (BP Location: Right arm, Patient Position: Sitting, BP Method: Automatic)   Pulse 80   Temp 98.5 °F (36.9 °C) (Oral)   Ht 5' 9" (1.753 m)   Wt 72.7 kg (160 lb 3.2 oz)   LMP 12/20/2016   BMI 23.66 kg/m²      Physical Exam   Constitutional: She is oriented to person, place, and time and well-developed, well-nourished, and in no distress.   HENT:   Head: Normocephalic and atraumatic.   Eyes: Conjunctivae and EOM are normal.   Neck: Neck supple.   Cardiovascular: Normal rate, regular rhythm and normal heart sounds.    Pulmonary/Chest: Effort normal and breath sounds normal.   Abdominal: Soft. Bowel sounds are normal.   Neurological: She is alert and oriented to person, place, and time. Gait normal.   Skin: Skin is warm and dry.     Psychiatric: Mood and affect normal.   Musculoskeletal:   Nonpitting edema of bilateral lower extremities.  Crepitus of knees              LABS    Component      Latest Ref Rng & Units 6/12/2017 6/5/2017 5/31/2017 5/29/2017   WBC      3.90 - 12.70 K/uL 10.03 10.63  8.68   RBC      4.00 - 5.40 M/uL 3.96 (L) 3.99 (L)  4.21   Hemoglobin      12.0 - 16.0 g/dL 11.0 (L) 11.2 (L)  11.8 (L)   Hematocrit      37.0 - 48.5 % 31.3 (L) 31.2 (L)  32.6 (L)   MCV      82 - 98 fL 79 (L) 78 (L)  77 (L)   MCH      27.0 - 31.0 pg 27.8 28.1  28.0   MCHC      32.0 - 36.0 % 35.1 35.9  36.2 (H)   RDW      11.5 - 14.5 % 15.1 (H) 15.0 (H)  14.8 (H)   Platelets      150 - 350 K/uL 152 173  174   MPV      9.2 - 12.9 fL 9.1 (L) 9.5  9.9   Gran #      1.8 - 7.7 K/uL 5.9 5.6  5.4   Lymph #      1.0 - 4.8 K/uL 3.5 4.2  2.8   Mono #      0.3 - 1.0 K/uL 0.6 0.7  0.5   Eos #      0.0 - 0.5 K/uL 0.0 0.1  0.0   Baso #      0.00 - 0.20 K/uL 0.00 0.01  0.01   Gran%      38.0 - 73.0 % 58.6 53.0  62.1   Lymph%      18.0 - 48.0 % 34.7 39.4  31.7   Mono%      4.0 - 15.0 % 6.3 6.7  5.8 "   Eosinophil%      0.0 - 8.0 % 0.4 0.8  0.3   Basophil%      0.0 - 1.9 % 0.0 0.1  0.1   Differential Method       Automated Automated  Automated   Sodium      136 - 145 mmol/L 140 141  138   Potassium      3.5 - 5.1 mmol/L 3.8 3.9  4.0   Chloride      95 - 110 mmol/L 106 107  104   CO2      23 - 29 mmol/L 26 26  23   Glucose      70 - 110 mg/dL 124 (H) 51 (L)  243 (H)   BUN, Bld      6 - 20 mg/dL 35 (H) 51 (H)  73 (H)   Creatinine      0.5 - 1.4 mg/dL 1.3 1.6 (H)  2.2 (H)   Calcium      8.7 - 10.5 mg/dL 9.8 9.5  9.7   Total Protein      6.0 - 8.4 g/dL 6.8 6.4  7.4   Albumin      3.5 - 5.2 g/dL 3.7 3.6  4.0   Total Bilirubin      0.1 - 1.0 mg/dL 0.4 0.4  0.5   Alkaline Phosphatase      55 - 135 U/L 81 85  139 (H)   AST      10 - 40 U/L 16 20  20   ALT      10 - 44 U/L 30 35  55 (H)   Anion Gap      8 - 16 mmol/L 8 8  11   eGFR if African American      >60 mL/min/1.73 m:2 56 (A) 44 (A)  30 (A)   eGFR if non African American      >60 mL/min/1.73 m:2 49 (A) 38 (A)  26 (A)   Preg Test, Ur      Negative   Negative     Acceptable         Yes    Tacrolimus Lvl      5.0 - 15.0 ng/mL 4.2 (L) 3.5 (L)  10.5   Phosphorus      2.7 - 4.5 mg/dL 3.0 3.2  4.7 (H)   Magnesium      1.6 - 2.6 mg/dL 1.7 1.7  1.8   POCT Glucose      70 - 110 mg/dL   191 (H)      Component      Latest Ref Rng & Units 5/24/2017 4/27/2016 4/14/2016   Specimen UA        Urine, Unspecified    Color, UA      Yellow, Straw, Meredith  Yellow    Appearance, UA      Clear  Clear    pH, UA      5.0 - 8.0  5.0    Specific Gravity, UA      1.005 - 1.030  1.020    Protein, UA      Negative  Negative    Glucose, UA      Negative  Negative    Ketones, UA      Negative  Negative    Bilirubin (UA)      Negative  Negative    Occult Blood UA      Negative  Negative    Nitrite, UA      Negative  Negative    Urobilinogen, UA      <2.0 EU/dL  Negative    Leukocytes, UA      Negative  Negative    Protein, Urine Random      0 - 15 mg/dL  15    Creatinine, Random Ur       15.0 - 325.0 mg/dL  148.0    Prot/Creat Ratio, Ur      0.00 - 0.20  0.10    Beta-2 Glyco 1 IgG      <=20 SGU  <9    Beta-2 Glyco 1 IgM      <=20 SMU  9    Beta-2 Glyco 1 IgA      <=20 YUMIKO  <9    Anti-SSA Antibody      0.00 - 19.99 EU  0.71    Anti-SSA Interpretation      Negative  Negative    APA Isotype IgG      0.00 - 14.99 GPL  <9.40    APA Isotype IgM      0.00 - 12.49 MPL  <9.40    Vit D, 25-Hydroxy      30 - 96 ng/mL   25 (L)   FSH      See Text mIU/mL   54.40   STAN Screen      Negative <1:160 Negative <1:160 Negative <1:160    Complement (C-3)      50 - 180 mg/dL  85    Complement (C-4)      11 - 44 mg/dL  21    Complement,Total, Serum      54 - 144   124    CPK      20 - 180 U/L  70    CRP      0.0 - 8.2 mg/L  0.3    Sed Rate      0 - 20 mm/Hr  21 (H)    Smooth Muscle Ab      Negative Positive 1:80 (A)       Assessment:       1.  SLE.  Now joint pains, fatigue and alopecia  2.  Autoimmune hepatitis  3. Immunosuppression  4. Bilateral knee pain.  S/p gel one in right knee  5. Fatigue  6. Chest pain.  Evaluated by cardiology found to have murmur and heart muscle strain    Plan:       1. Labs  2. Consider restarting Plaquenil but caused hair loss  3. Chest x-ray  RTO 3-4 months/prn  Answers for HPI/ROS submitted by the patient on 2017   swollen glands: No  heartburn: No  tingling: Yes

## 2017-06-15 NOTE — TELEPHONE ENCOUNTER
----- Message from Miriam Abernathy MD sent at 6/15/2017  1:23 PM CDT -----  Plan per previous notes to decrease prednisone and repeat labs next week liver tests improved

## 2017-06-15 NOTE — PROGRESS NOTES
[facility] Consultation Note    Reason for Consult:  The primary encounter diagnosis was Atypical chest pain. A diagnosis of Pharyngoesophageal dysphagia was also pertinent to this visit.    PCP:   Timur Lion       Referring MD:  No referring provider defined for this encounter.    HPI:  This is a 47 y.o. female  Here to follow up after her colonoscopy  Her 5/2016 colonoscopy revealed a tortuous colon, a involved removal of a hyperplastic poly, f/u in 5yrs  Colonoscopy was peformed for questionable thickening of the distal descending colon with slight pericolonic fat stranding on a CT scan    Today he main complain is constant mild chest pain, radiates to the back  Currently taking predisone for transplant rejection x 3 weeks  Worse with lying down  Feels like the food sticks in her chest  Seen in urgent care an an ECG was normal    Having a daily Bm but fells like incomplete empyting  Having intemittent nausea  Taking zofran daily  Protonix, and zantac 150mg BID   Her 12/2016 EGD revealed erosive gastritis; Bx negative for H. Pylori or CMV.     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    Surgical History:  has a past surgical history that  "includes Esophagogastroduodenoscopy; Liver biopsy; Liver transplant (12/31/2013); Tubal ligation (2003); and Colonoscopy (N/A, 5/31/2017).    Family History: family history includes Alzheimer's disease in her father; Breast cancer (age of onset: 55) in her maternal aunt; Diabetes in her brother, father, paternal grandfather, and paternal grandmother; Hypertension in her brother and mother..     Social History:  reports that she has never smoked. She has never used smokeless tobacco. She reports that she does not drink alcohol or use drugs.    Review of patient's allergies indicates:   Allergen Reactions    Doxycycline Rash    Pcn [penicillins] Rash       Current Outpatient Prescriptions on File Prior to Visit   Medication Sig Dispense Refill    ACCU-CHEK SMARTVIEW Strp 1 strip by Misc.(Non-Drug; Combo Route) route 5 (five) times daily. Trueresult      BD ULTRA-FINE JANESSA PEN NEEDLES 32 gauge x 5/32" Ndle TEST BLOOD SUGAR FOUR TIMES DAILY 400 each 3    blood sugar diagnostic (BLOOD GLUCOSE TEST) Strp Pt uses freestyle lite meter to check BG 4 times daily. 200 strip 11    cyproheptadine (PERIACTIN) 4 mg tablet Take 4 mg by mouth every evening.  3    diazepam (VALIUM) 5 MG tablet 5 mg 3 (three) times daily as needed.   0    DILTIAZEM HCL (DILTIAZEM 2% CREAM) Apply topically 3 (three) times daily. Apply topically to anal area. 30 g 0    estradiol (ESTRACE) 0.01 % (0.1 mg/gram) vaginal cream Place 0.5 g vaginally twice a week. Insert 0.5grams intravaginally twice weekly 42 g 4    fluconazole (DIFLUCAN) 150 MG Tab TAKE ONE TABLET BY MOUTH AS A SINGLE DOSE FOR INFECTION.  1    gabapentin (NEURONTIN) 300 MG capsule Take 300 mg by mouth 3 (three) times daily.      hydrochlorothiazide (HYDRODIURIL) 25 MG tablet Take 25 mg by mouth once daily.  2    hydrOXYzine HCl (ATARAX) 10 MG Tab TAKE 1 OR 2 TABLETS BY MOUTH THREE TIMES DAILY AS NEEDED FOR ITCHING.  0    insulin aspart (NOVOLOG FLEXPEN) 100 unit/mL InPn pen " Inject 14 Units into the skin 3 (three) times daily with meals. Plus correction scale, max TDD 72 units 15 mL 3    insulin detemir (LEVEMIR FLEXTOUCH) 100 unit/mL (3 mL) SubQ InPn pen Inject 18 Units into the skin once daily. 15 mL 3    isosorbide mononitrate (IMDUR) 30 MG 24 hr tablet Take 30 mg by mouth once daily.      lancets 28 gauge Misc 4 lancets by Misc.(Non-Drug; Combo Route) route once daily. Pt uses Freestyle lite meter to check BG 4 times daily. 200 each 11    losartan (COZAAR) 100 MG tablet Take 1 tablet by mouth once daily.  2    magnesium oxide (MAG-OX) 400 mg tablet Take 1 tablet (400 mg total) by mouth 2 (two) times daily. (Patient taking differently: Take 800 mg by mouth 2 (two) times daily. ) 60 tablet 5    MULTIVIT,THER IRON,CA,FA & MIN (MULTIVITAMIN) Tab Take 1 tablet by mouth once daily. 30 tablet 0    mycophenolate (MYFORTIC) 360 MG TbEC Take 1 tablet (360 mg total) by mouth 2 (two) times daily. 180 tablet 3    ondansetron (ZOFRAN) 8 MG tablet Take 1 tablet by mouth every 8 (eight) hours as needed.  0    oxycodone-acetaminophen (PERCOCET) 7.5-325 mg per tablet Take 1 tablet by mouth every 4 (four) hours as needed for Pain.      pantoprazole (PROTONIX) 40 MG tablet Take 40 mg by mouth once daily.      predniSONE (DELTASONE) 10 MG tablet Take 1 tablet (10 mg total) by mouth once daily. 30 tablet 3    PROAIR HFA 90 mcg/actuation inhaler Inhale 2 puffs into the lungs 3 (three) times daily as needed.   3    promethazine-dextromethorphan (PROMETHAZINE-DM) 6.25-15 mg/5 mL Syrp   0    tacrolimus (PROGRAF) 1 MG Cap Take 8 capsules (8 mg total) by mouth every 12 (twelve) hours. 480 capsule 11    triamcinolone (KENALOG) 0.5 % ointment 1 application 2 (two) times daily. Apply to affected area  3    valacyclovir (VALTREX) 500 MG tablet TAKE TWO TABLETS BY MOUTH EVERY 8 HOURS FOR 7 DAYS  0    verapamil (VERELAN) 120 MG C24P Take 1 capsule by mouth once daily.  3    VITAMIN D2 50,000 unit  "capsule TAKE 1 CAPSULE BY MOUTH ONCE EVERY WEEK 12 capsule 1    zolpidem (AMBIEN) 10 mg Tab Take 10 mg by mouth nightly as needed.  3    [DISCONTINUED] LEVEMIR FLEXTOUCH 100 unit/mL (3 mL) InPn pen INJECT TEN UNITS UNDER THE SKIN ONCE DAILY 15 mL 3    [DISCONTINUED] NOVOLOG FLEXPEN 100 unit/mL InPn pen INJECT FIVE UNITS BEFORE BREAKFAST AND LUNCH, AND INJECT SIX UNITS BEFORE DINNER. USE WITH SLIDING SCALE. 15 mL 3     No current facility-administered medications on file prior to visit.          Objective Findings:    Vital Signs:  BP (!) 147/78   Pulse 72   Ht 5' 9" (1.753 m)   Wt 72.3 kg (159 lb 6.3 oz)   LMP 12/20/2016   BMI 23.54 kg/m²   Body mass index is 23.54 kg/m².    Physical Exam:  General Appearance: Well appearing in no acute distress  Head:   Normocephalic, without obvious abnormality  Eyes:    No scleral icterus  ENT: Neck supple, Lips, mucosa, and tongue normal  Lungs: CTA bilaterally in anterior and posterior fields, no wheezes, no crackles.  Heart:  Regular rate and rhythm, S1, S2 normal, no murmurs heard  Abdomen: large abdominal incisional scar. abdomen soft, mild diffuse tenderness, no guarding or rebound tenderness, non distended with hyperactive bowel sounds in all four quadrants.   Extremities: 2+ pulses, no  edema  Skin: No rash  Neurologic: AAO x 3                Labs:  Lab Results   Component Value Date    WBC 10.03 06/12/2017    HGB 11.0 (L) 06/12/2017    HCT 31.3 (L) 06/12/2017     06/12/2017    CHOL 202 (H) 07/18/2016    TRIG 64 07/18/2016    HDL 78 (H) 07/18/2016    ALT 30 06/12/2017    AST 16 06/12/2017     06/12/2017    K 3.8 06/12/2017     06/12/2017    CREATININE 1.3 06/12/2017    BUN 35 (H) 06/12/2017    CO2 26 06/12/2017    TSH 1.526 03/14/2017    INR 1.0 05/24/2017    HGBA1C 9.4 (H) 05/18/2017       Imaging:    Endoscopy:    12/2016 EGD revealed erosive gastritis; Bx negative for H. Pylori or CMV.     Assessment:  1. Atypical chest pain    2. " Pharyngoesophageal dysphagia    s/p liver transplant on immunosuppressants    46yo F with Chest pain and dysphagia since starting prednisone. She is already taking protonix 40mg and zantac 150mg BID. She may have candiasis and or esophagitis from prednisone    Recommendations:  1.Will treat empirically for esophageal candidiasis with diflucan    No Follow-up on file.      Order summary:         Thank you so much for allowing me to participate in the care of Valencia Power PA-C

## 2017-06-15 NOTE — PROGRESS NOTES
Subjective:      Patient ID: Susy Stover is a 47 y.o. female.    Chief Complaint:  Diabetes Mellitus      History of Present Illness  Ms. Stover is a 46 year old woman who is here for steroid induced diabetes and low bone mass. Liver transplant in .    Previously seen by Dr. Gamble with last visit on 2016.      She was started back on prednisone for rejection in late 2017 and since that time her glucoses have been markedly elevated. She spoke with Dr. Perez in late May when he was on call and Novolog increased from 5 to 10 units and moderate dose correction scale started. Despite this her glucoses have still been running high.    Prednisone was tapered from 20 to 10 mg starting today.      Regimen:   Levemir 14 units daily  novolog 10 units AC  Mod dose correction scale, ISF 1:25 starting at 150     Fastin  Before dinner: 307, 457, 509  Bedtime: 278    Has neuropathy - no foot ulcers  Denies retinopaty - last eye exam 2017  No nephropathy - last urine micro 3/2017     (very strong family history of diabetes, both grandparents, father and one brother)    Review of Systems   Constitutional: Negative for chills and fever.   Gastrointestinal: Negative for nausea.   No CP  No SOB    Objective:   Physical Exam   Nursing note and vitals reviewed.  injection sites are ok. No lipo hypertropthy or atrophy  Feet no cuts or  scratches  Shoes appropriate  Decreased vibration sense in bilateral feet    Lab Review:    Results for SUSY STOVER (MRN [id number]) as of 6/15/2017 17:13   Ref. Range 2017 11:16   Sodium Latest Ref Range: 136 - 145 mmol/L 140   Potassium Latest Ref Range: 3.5 - 5.1 mmol/L 3.8   Chloride Latest Ref Range: 95 - 110 mmol/L 106   CO2 Latest Ref Range: 23 - 29 mmol/L 26   Anion Gap Latest Ref Range: 8 - 16 mmol/L 8   BUN, Bld Latest Ref Range: 6 - 20 mg/dL 35 (H)   Creatinine Latest Ref Range: 0.5 - 1.4 mg/dL 1.3   eGFR if non African American Latest Ref Range: >60 mL/min/1.73 m^2 49 (A)    eGFR if African American Latest Ref Range: >60 mL/min/1.73 m^2 56 (A)   Glucose Latest Ref Range: 70 - 110 mg/dL 124 (H)   Calcium Latest Ref Range: 8.7 - 10.5 mg/dL 9.8   Phosphorus Latest Ref Range: 2.7 - 4.5 mg/dL 3.0   Magnesium Latest Ref Range: 1.6 - 2.6 mg/dL 1.7   Alkaline Phosphatase Latest Ref Range: 55 - 135 U/L 81   Total Protein Latest Ref Range: 6.0 - 8.4 g/dL 6.8   Albumin Latest Ref Range: 3.5 - 5.2 g/dL 3.7   Total Bilirubin Latest Ref Range: 0.1 - 1.0 mg/dL 0.4   AST Latest Ref Range: 10 - 40 U/L 16   ALT Latest Ref Range: 10 - 44 U/L 30     Results for SUSY STOVER (MRN [id number]) as of 6/15/2017 17:13   Ref. Range 7/28/2016 12:56 2/7/2017 11:41 3/14/2017 16:25 4/6/2017 12:00 5/18/2017 10:06   Hemoglobin A1C Latest Ref Range: 4.5 - 6.2 % 8.2 (H) 8.5 (H)  8.2 (H) 9.4 (H)   Estimated Avg Glucose Latest Ref Range: 68 - 131 mg/dL 189 (H) 197 (H)  189 (H) 223 (H)   C-Peptide Latest Ref Range: 0.9 - 5.5 ng/mL 1.7       TSH Latest Ref Range: 0.400 - 4.000 uIU/mL   1.526         Assessment:     1. Uncontrolled type 2 diabetes mellitus with complication, with long-term current use of insulin    2. Diabetic polyneuropathy associated with type 2 diabetes mellitus    3. Osteoporosis, unspecified osteoporosis type, unspecified pathological fracture presence      Plan:     1.) Patient with uncontrolled type 2 diabetes that is now worse due to steroids  --Will increase Novolog to 14 units AC  --Continue moderate dose correction scale with meals  --Increase Levemir to 18 units daily  --patient to email logs for review in 1-2 weeks  --Needs diabetes education ASAP to review diet and other diabetes issues    2.) Better long term blood glucose control to prevent progression  --Routine foot care    3.) Patient with low bone mass in the setting of early menopause  --No fractures  --Bone density slightly worsened at hip and improved at spine    RTC in 6 months with myself or Dr. Tye Persaud M.D. Staff  Endocrinology

## 2017-06-16 ENCOUNTER — PATIENT MESSAGE (OUTPATIENT)
Dept: RHEUMATOLOGY | Facility: CLINIC | Age: 47
End: 2017-06-16

## 2017-06-16 ENCOUNTER — TELEPHONE (OUTPATIENT)
Dept: TRANSPLANT | Facility: CLINIC | Age: 47
End: 2017-06-16

## 2017-06-16 ENCOUNTER — TELEPHONE (OUTPATIENT)
Dept: GASTROENTEROLOGY | Facility: CLINIC | Age: 47
End: 2017-06-16

## 2017-06-16 DIAGNOSIS — B37.81 ESOPHAGEAL CANDIDIASIS: Primary | ICD-10-CM

## 2017-06-16 RX ORDER — FLUCONAZOLE 100 MG/1
100 TABLET ORAL DAILY
Qty: 15 TABLET | Refills: 0 | Status: SHIPPED | OUTPATIENT
Start: 2017-06-16 | End: 2017-08-23 | Stop reason: ALTCHOICE

## 2017-06-16 NOTE — TELEPHONE ENCOUNTER
Received a call from PERRY Cerna who saw the patient in the GI clinic.   She would like to give her 2 weeks of Diflucan for Dysphagia from possible esophageal candidiasis.  Dr. Abernathy approved the treatment.

## 2017-06-16 NOTE — TELEPHONE ENCOUNTER
Spoke with transplant Nurse Carleen Louise who spoke with Dr. Miriam Abernathy. Dr. Abernathy has approved the diflucan for 2 weeks.     Spoke with pt about this plan

## 2017-06-19 ENCOUNTER — LAB VISIT (OUTPATIENT)
Dept: LAB | Facility: HOSPITAL | Age: 47
End: 2017-06-19
Attending: INTERNAL MEDICINE
Payer: MEDICARE

## 2017-06-19 DIAGNOSIS — Z94.4 LIVER REPLACED BY TRANSPLANT: ICD-10-CM

## 2017-06-19 LAB
ALBUMIN SERPL BCP-MCNC: 3.3 G/DL
ALP SERPL-CCNC: 63 U/L
ALT SERPL W/O P-5'-P-CCNC: 29 U/L
ANION GAP SERPL CALC-SCNC: 7 MMOL/L
AST SERPL-CCNC: 16 U/L
BASOPHILS # BLD AUTO: 0.01 K/UL
BASOPHILS NFR BLD: 0.1 %
BILIRUB SERPL-MCNC: 0.5 MG/DL
BUN SERPL-MCNC: 39 MG/DL
CALCIUM SERPL-MCNC: 8.7 MG/DL
CHLORIDE SERPL-SCNC: 106 MMOL/L
CO2 SERPL-SCNC: 25 MMOL/L
CREAT SERPL-MCNC: 1.4 MG/DL
DIFFERENTIAL METHOD: ABNORMAL
EOSINOPHIL # BLD AUTO: 0 K/UL
EOSINOPHIL NFR BLD: 0.5 %
ERYTHROCYTE [DISTWIDTH] IN BLOOD BY AUTOMATED COUNT: 15.6 %
EST. GFR  (AFRICAN AMERICAN): 52 ML/MIN/1.73 M^2
EST. GFR  (NON AFRICAN AMERICAN): 45 ML/MIN/1.73 M^2
GLUCOSE SERPL-MCNC: 164 MG/DL
HCT VFR BLD AUTO: 29.9 %
HGB BLD-MCNC: 10.3 G/DL
LYMPHOCYTES # BLD AUTO: 2.5 K/UL
LYMPHOCYTES NFR BLD: 33.2 %
MAGNESIUM SERPL-MCNC: 1.7 MG/DL
MCH RBC QN AUTO: 27.8 PG
MCHC RBC AUTO-ENTMCNC: 34.4 %
MCV RBC AUTO: 81 FL
MONOCYTES # BLD AUTO: 0.6 K/UL
MONOCYTES NFR BLD: 7.5 %
NEUTROPHILS # BLD AUTO: 4.4 K/UL
NEUTROPHILS NFR BLD: 58.7 %
PHOSPHATE SERPL-MCNC: 3.6 MG/DL
PLATELET # BLD AUTO: 141 K/UL
PMV BLD AUTO: 9.6 FL
POTASSIUM SERPL-SCNC: 4.1 MMOL/L
PROT SERPL-MCNC: 6 G/DL
RBC # BLD AUTO: 3.71 M/UL
SODIUM SERPL-SCNC: 138 MMOL/L
WBC # BLD AUTO: 7.44 K/UL

## 2017-06-19 PROCEDURE — 80053 COMPREHEN METABOLIC PANEL: CPT

## 2017-06-19 PROCEDURE — 85025 COMPLETE CBC W/AUTO DIFF WBC: CPT

## 2017-06-19 PROCEDURE — 83735 ASSAY OF MAGNESIUM: CPT

## 2017-06-19 PROCEDURE — 84100 ASSAY OF PHOSPHORUS: CPT

## 2017-06-19 PROCEDURE — 80197 ASSAY OF TACROLIMUS: CPT

## 2017-06-19 PROCEDURE — 36415 COLL VENOUS BLD VENIPUNCTURE: CPT

## 2017-06-20 LAB — TACROLIMUS BLD-MCNC: 8.6 NG/ML

## 2017-06-21 ENCOUNTER — PATIENT MESSAGE (OUTPATIENT)
Dept: TRANSPLANT | Facility: CLINIC | Age: 47
End: 2017-06-21

## 2017-06-21 ENCOUNTER — TELEPHONE (OUTPATIENT)
Dept: TRANSPLANT | Facility: CLINIC | Age: 47
End: 2017-06-21

## 2017-06-21 NOTE — TELEPHONE ENCOUNTER
----- Message from Miriam Abernathy MD sent at 6/21/2017  1:00 PM CDT -----  Liver tests improved and tacro higher now (likely with fluconazole) repeat labs next week and if stable will consider decreasing pred.

## 2017-06-22 ENCOUNTER — PATIENT MESSAGE (OUTPATIENT)
Dept: TRANSPLANT | Facility: CLINIC | Age: 47
End: 2017-06-22

## 2017-06-23 ENCOUNTER — PATIENT MESSAGE (OUTPATIENT)
Dept: TRANSPLANT | Facility: CLINIC | Age: 47
End: 2017-06-23

## 2017-06-26 ENCOUNTER — CLINICAL SUPPORT (OUTPATIENT)
Dept: DIABETES | Facility: CLINIC | Age: 47
End: 2017-06-26
Payer: MEDICARE

## 2017-06-26 ENCOUNTER — LAB VISIT (OUTPATIENT)
Dept: LAB | Facility: HOSPITAL | Age: 47
End: 2017-06-26
Attending: INTERNAL MEDICINE
Payer: MEDICARE

## 2017-06-26 DIAGNOSIS — Z94.4 LIVER REPLACED BY TRANSPLANT: ICD-10-CM

## 2017-06-26 LAB
ALBUMIN SERPL BCP-MCNC: 3.7 G/DL
ALP SERPL-CCNC: 69 U/L
ALT SERPL W/O P-5'-P-CCNC: 22 U/L
ANION GAP SERPL CALC-SCNC: 7 MMOL/L
AST SERPL-CCNC: 16 U/L
BASOPHILS # BLD AUTO: 0.01 K/UL
BASOPHILS NFR BLD: 0.1 %
BILIRUB SERPL-MCNC: 0.4 MG/DL
BUN SERPL-MCNC: 48 MG/DL
CALCIUM SERPL-MCNC: 10.5 MG/DL
CHLORIDE SERPL-SCNC: 102 MMOL/L
CO2 SERPL-SCNC: 30 MMOL/L
CREAT SERPL-MCNC: 1.6 MG/DL
DIFFERENTIAL METHOD: ABNORMAL
EOSINOPHIL # BLD AUTO: 0 K/UL
EOSINOPHIL NFR BLD: 0.4 %
ERYTHROCYTE [DISTWIDTH] IN BLOOD BY AUTOMATED COUNT: 15.5 %
EST. GFR  (AFRICAN AMERICAN): 44 ML/MIN/1.73 M^2
EST. GFR  (NON AFRICAN AMERICAN): 38 ML/MIN/1.73 M^2
GLUCOSE SERPL-MCNC: 57 MG/DL
HCT VFR BLD AUTO: 34.4 %
HGB BLD-MCNC: 12 G/DL
LYMPHOCYTES # BLD AUTO: 3 K/UL
LYMPHOCYTES NFR BLD: 29.7 %
MAGNESIUM SERPL-MCNC: 1.7 MG/DL
MCH RBC QN AUTO: 28.1 PG
MCHC RBC AUTO-ENTMCNC: 34.9 %
MCV RBC AUTO: 81 FL
MONOCYTES # BLD AUTO: 0.6 K/UL
MONOCYTES NFR BLD: 6.2 %
NEUTROPHILS # BLD AUTO: 6.5 K/UL
NEUTROPHILS NFR BLD: 63.6 %
PHOSPHATE SERPL-MCNC: 3.3 MG/DL
PLATELET # BLD AUTO: 188 K/UL
PMV BLD AUTO: 10 FL
POTASSIUM SERPL-SCNC: 4.2 MMOL/L
PROT SERPL-MCNC: 7.2 G/DL
RBC # BLD AUTO: 4.27 M/UL
SODIUM SERPL-SCNC: 139 MMOL/L
WBC # BLD AUTO: 10.15 K/UL

## 2017-06-26 PROCEDURE — G0109 DIAB MANAGE TRN IND/GROUP: HCPCS | Mod: S$GLB,,, | Performed by: INTERNAL MEDICINE

## 2017-06-26 NOTE — PROGRESS NOTES
Diabetes Education  Author: Jammie Hopkins RD, CDE  Date: 6/26/2017    Diabetes Education Visit  Diabetes Education Record Assessment/Progress: Initial (class)    Diabetes Type  Diabetes Type : Type II    Diabetes History  Diabetes Diagnosis: >10 years    Nutrition  Meal Planning:  (regularly eats lunch and dinner; snacks all day; drinks mostly water; eats out seldom)    Monitoring   Self Monitoring :  (SMBG 5-6 times a day while on prednisone s/p OLTx)    Exercise   Exercise Type:  (none; knee and back problems)    Current Diabetes Treatment   Current Treatment: Insulin (novolog 14 units AC + correction; levemir 18 units daily)    Social History  Primary Support: Self, Family  Educational Level: High School  Occupation:  (disabled)  Smoking Status: Never a Smoker  Alcohol Use: Rarely            DDS Score  ( > 3 = SIGNIFICANT DISTRESS): 2.24  Emotional Thayer Score: 3.4  Physician-Related Distress: 1.25  Regimen-Related Distress: 2.6  Interpersonal Distress: 1    Barriers to Change  Barriers to Change: None  Learning Challenges : None    Readiness to Learn   Readiness to Learn : Acceptance (Verbal pre and post test completed)    Cultural Influences  Cultural Influences: No    Diabetes Education Assessment/Progress  Acute Complications (preventing, detecting, and treating acute complications): Discussion, Class, Instructed, Written Materials Provided  Chronic Complications (preventing, detecting, and treating chronic complications): Discussion, Class, Instructed, Written Materials Provided  Diabetes Disease Process (diabetes disease process and treatment options): Discussion, Class, Instructed, Written Materials Provided  Nutrition (Incorporating nutritional management into one's lifestyle): Discussion, Class, Instructed, Written Materials Provided  Physical Activity (incorporating physical activity into one's lifestyle): Discussion, Class, Instructed, Written Materials Provided  Medications (states correct name, dose,  onset, peak, duration, side effects & timing of meds): Discussion, Class, Instructed, Written Materials Provided  Monitoring (monitoring blood glucose/other parameters & using results): Discussion, Class, Instructed, Written Materials Provided  Goal Setting and Problem Solving (verbalizes behavior change strategies & sets realistic goals): Discussion, Class, Instructed, Written Materials Provided  Behavior Change (developing personal strategies to health & behavior change): Discussion, Class, Instructed, Written Materials Provided  Psychosocial Issues (developing personal srategies to address psychosocial concerns): Class, Discussion    Goals  Healthy Eating: Set (See attached goal sheet)  Start Date: 06/26/17  Target Date: 09/26/17  Physical Activity: Set (See attached goal sheet)  Start Date: 06/26/17  Target Date: 09/26/17  Monitoring: Set (See attached goal sheet)  Start Date: 06/26/17  Target Date: 09/26/17  Medications: Set (See attached goal sheet)  Start Date: 06/26/17  Target Date: 09/26/17  Problem Solving: Set (See attached goal sheet)  Start Date: 06/26/17  Target Date: 09/26/17  Healthy Coping: Set (See attached goal sheet)  Start Date: 06/26/17  Target Date: 09/26/17  Reducing Risks: Set (See attached goal sheet)  Start Date: 06/26/17  Target Date: 09/26/17         Diabetes Care Plan/Intervention  Education Plan/Intervention: Individual Follow-Up DSMT    Diabetes Meal Plan  Restrictions: Restricted Carbohydrate    Education Units of Time   Time Spent: 90 min      Health Maintenance Topics with due status: Not Due       Topic Last Completion Date    TETANUS VACCINE 10/17/2013    Influenza Vaccine 10/17/2013    Lipid Panel 07/18/2016    Pap Smear with HPV Cotest 03/20/2017    Mammogram 03/27/2017    Foot Exam 04/03/2017    Eye Exam 04/03/2017    Hemoglobin A1c 05/18/2017     Health Maintenance Due   Topic Date Due    Pneumococcal PPSV23 (High Risk) (1) 12/12/2013

## 2017-06-27 LAB — TACROLIMUS BLD-MCNC: 11.8 NG/ML

## 2017-06-28 RX ORDER — PREDNISONE 10 MG/1
5 TABLET ORAL DAILY
Qty: 15 TABLET | Refills: 3 | Status: SHIPPED | OUTPATIENT
Start: 2017-06-28 | End: 2017-07-13 | Stop reason: SDUPTHER

## 2017-06-28 RX ORDER — TACROLIMUS 1 MG/1
7 CAPSULE ORAL EVERY 12 HOURS
Qty: 420 CAPSULE | Refills: 11 | Status: SHIPPED | OUTPATIENT
Start: 2017-06-28 | End: 2017-07-21 | Stop reason: SDUPTHER

## 2017-06-28 NOTE — TELEPHONE ENCOUNTER
Informed pt labs reviewed are stable, instructed pt to decrease prograf to 7 mg BID and decrease prednisone to 5 mg daily. Will repeat labs Monday 7/3- she verbalizes understanding.

## 2017-06-28 NOTE — TELEPHONE ENCOUNTER
----- Message from Miriam Abernathy MD sent at 6/28/2017 10:30 AM CDT -----  Liver tests have normalized.  Tacrolimus level elevated likely secondary to patient being on flu, as all.  Decrease tacrolimus to 7 mg twice a day and repeat labs next week.  Decrease prednisone to 5 mg daily.

## 2017-07-03 ENCOUNTER — LAB VISIT (OUTPATIENT)
Dept: LAB | Facility: HOSPITAL | Age: 47
End: 2017-07-03
Attending: INTERNAL MEDICINE
Payer: MEDICARE

## 2017-07-03 DIAGNOSIS — Z94.4 LIVER REPLACED BY TRANSPLANT: ICD-10-CM

## 2017-07-03 LAB
ALBUMIN SERPL BCP-MCNC: 3.6 G/DL
ALP SERPL-CCNC: 60 U/L
ALT SERPL W/O P-5'-P-CCNC: 21 U/L
ANION GAP SERPL CALC-SCNC: 7 MMOL/L
AST SERPL-CCNC: 12 U/L
BASOPHILS # BLD AUTO: 0.01 K/UL
BASOPHILS NFR BLD: 0.1 %
BILIRUB SERPL-MCNC: 0.4 MG/DL
BUN SERPL-MCNC: 50 MG/DL
CALCIUM SERPL-MCNC: 9.6 MG/DL
CHLORIDE SERPL-SCNC: 108 MMOL/L
CO2 SERPL-SCNC: 26 MMOL/L
CREAT SERPL-MCNC: 1.7 MG/DL
DIFFERENTIAL METHOD: ABNORMAL
EOSINOPHIL # BLD AUTO: 0 K/UL
EOSINOPHIL NFR BLD: 0.6 %
ERYTHROCYTE [DISTWIDTH] IN BLOOD BY AUTOMATED COUNT: 15.4 %
EST. GFR  (AFRICAN AMERICAN): 41 ML/MIN/1.73 M^2
EST. GFR  (NON AFRICAN AMERICAN): 35 ML/MIN/1.73 M^2
GLUCOSE SERPL-MCNC: 89 MG/DL
HCT VFR BLD AUTO: 33.2 %
HGB BLD-MCNC: 11.4 G/DL
LYMPHOCYTES # BLD AUTO: 3 K/UL
LYMPHOCYTES NFR BLD: 44.2 %
MAGNESIUM SERPL-MCNC: 2.1 MG/DL
MCH RBC QN AUTO: 27.8 PG
MCHC RBC AUTO-ENTMCNC: 34.3 %
MCV RBC AUTO: 81 FL
MONOCYTES # BLD AUTO: 0.3 K/UL
MONOCYTES NFR BLD: 4.8 %
NEUTROPHILS # BLD AUTO: 3.4 K/UL
NEUTROPHILS NFR BLD: 50.3 %
PHOSPHATE SERPL-MCNC: 2.9 MG/DL
PLATELET # BLD AUTO: 183 K/UL
PMV BLD AUTO: 9.7 FL
POTASSIUM SERPL-SCNC: 4.8 MMOL/L
PROT SERPL-MCNC: 6.9 G/DL
RBC # BLD AUTO: 4.1 M/UL
SODIUM SERPL-SCNC: 141 MMOL/L
WBC # BLD AUTO: 6.85 K/UL

## 2017-07-03 PROCEDURE — 36415 COLL VENOUS BLD VENIPUNCTURE: CPT

## 2017-07-03 PROCEDURE — 84100 ASSAY OF PHOSPHORUS: CPT

## 2017-07-03 PROCEDURE — 80053 COMPREHEN METABOLIC PANEL: CPT

## 2017-07-03 PROCEDURE — 80197 ASSAY OF TACROLIMUS: CPT

## 2017-07-03 PROCEDURE — 85025 COMPLETE CBC W/AUTO DIFF WBC: CPT

## 2017-07-03 PROCEDURE — 83735 ASSAY OF MAGNESIUM: CPT

## 2017-07-04 LAB — TACROLIMUS BLD-MCNC: 12.3 NG/ML

## 2017-07-05 ENCOUNTER — PATIENT MESSAGE (OUTPATIENT)
Dept: RHEUMATOLOGY | Facility: CLINIC | Age: 47
End: 2017-07-05

## 2017-07-06 ENCOUNTER — PATIENT MESSAGE (OUTPATIENT)
Dept: DIABETES | Facility: CLINIC | Age: 47
End: 2017-07-06

## 2017-07-06 ENCOUNTER — PATIENT MESSAGE (OUTPATIENT)
Dept: RHEUMATOLOGY | Facility: CLINIC | Age: 47
End: 2017-07-06

## 2017-07-06 ENCOUNTER — PATIENT MESSAGE (OUTPATIENT)
Dept: TRANSPLANT | Facility: CLINIC | Age: 47
End: 2017-07-06

## 2017-07-06 ENCOUNTER — TELEPHONE (OUTPATIENT)
Dept: TRANSPLANT | Facility: CLINIC | Age: 47
End: 2017-07-06

## 2017-07-06 NOTE — TELEPHONE ENCOUNTER
Labs stable and reviewed. Next labs due 7/10/17. Sent a msg to pt to review labs and medication order via [facility].

## 2017-07-06 NOTE — TELEPHONE ENCOUNTER
----- Message from Miriam Abernathy MD sent at 7/5/2017  6:02 PM CDT -----  Tacrolimus level still trending high which is likely affecting renal function.  Suspect this is still related to patient being on fluconazole.  Decrease tacrolimus to 6 mg twice a day and repeat labs next week.

## 2017-07-10 ENCOUNTER — LAB VISIT (OUTPATIENT)
Dept: LAB | Facility: HOSPITAL | Age: 47
End: 2017-07-10
Attending: INTERNAL MEDICINE
Payer: MEDICARE

## 2017-07-10 DIAGNOSIS — Z94.4 LIVER REPLACED BY TRANSPLANT: ICD-10-CM

## 2017-07-10 LAB
ALBUMIN SERPL BCP-MCNC: 3.9 G/DL
ALP SERPL-CCNC: 69 U/L
ALT SERPL W/O P-5'-P-CCNC: 24 U/L
ANION GAP SERPL CALC-SCNC: 8 MMOL/L
AST SERPL-CCNC: 17 U/L
BILIRUB SERPL-MCNC: 0.6 MG/DL
BUN SERPL-MCNC: 46 MG/DL
CALCIUM SERPL-MCNC: 10 MG/DL
CHLORIDE SERPL-SCNC: 105 MMOL/L
CO2 SERPL-SCNC: 26 MMOL/L
CREAT SERPL-MCNC: 1.7 MG/DL
EST. GFR  (AFRICAN AMERICAN): 41 ML/MIN/1.73 M^2
EST. GFR  (NON AFRICAN AMERICAN): 35 ML/MIN/1.73 M^2
GLUCOSE SERPL-MCNC: 256 MG/DL
MAGNESIUM SERPL-MCNC: 1.5 MG/DL
PHOSPHATE SERPL-MCNC: 3.9 MG/DL
POTASSIUM SERPL-SCNC: 4.9 MMOL/L
PROT SERPL-MCNC: 7.4 G/DL
SODIUM SERPL-SCNC: 139 MMOL/L

## 2017-07-10 PROCEDURE — 36415 COLL VENOUS BLD VENIPUNCTURE: CPT

## 2017-07-10 PROCEDURE — 80053 COMPREHEN METABOLIC PANEL: CPT

## 2017-07-10 PROCEDURE — 80197 ASSAY OF TACROLIMUS: CPT

## 2017-07-10 PROCEDURE — 83735 ASSAY OF MAGNESIUM: CPT

## 2017-07-10 PROCEDURE — 84100 ASSAY OF PHOSPHORUS: CPT

## 2017-07-11 ENCOUNTER — PATIENT MESSAGE (OUTPATIENT)
Dept: TRANSPLANT | Facility: CLINIC | Age: 47
End: 2017-07-11

## 2017-07-11 LAB — TACROLIMUS BLD-MCNC: 9.4 NG/ML

## 2017-07-13 RX ORDER — PREDNISONE 2.5 MG/1
2.5 TABLET ORAL DAILY
Start: 2017-07-13 | End: 2017-07-21

## 2017-07-13 NOTE — TELEPHONE ENCOUNTER
----- Message from Miriam Abernathy MD sent at 7/10/2017  4:23 PM CDT -----  Awaiting tacro level, decrease prednisone to 2.5mg daily and repeat labs next week

## 2017-07-13 NOTE — TELEPHONE ENCOUNTER
Informed pt labs reviewed are stable, no med changes needed. Instructed to decrease prednisone to 2.5 mg daily. Will repeat labs Monday 7/17. She verbalizes understanding.

## 2017-07-17 ENCOUNTER — LAB VISIT (OUTPATIENT)
Dept: LAB | Facility: HOSPITAL | Age: 47
End: 2017-07-17
Attending: INTERNAL MEDICINE
Payer: MEDICARE

## 2017-07-17 DIAGNOSIS — Z94.4 LIVER REPLACED BY TRANSPLANT: ICD-10-CM

## 2017-07-17 LAB
ALBUMIN SERPL BCP-MCNC: 4 G/DL
ALP SERPL-CCNC: 73 U/L
ALT SERPL W/O P-5'-P-CCNC: 22 U/L
ANION GAP SERPL CALC-SCNC: 9 MMOL/L
AST SERPL-CCNC: 18 U/L
BASOPHILS # BLD AUTO: 0.02 K/UL
BASOPHILS NFR BLD: 0.3 %
BILIRUB SERPL-MCNC: 0.4 MG/DL
BUN SERPL-MCNC: 46 MG/DL
CALCIUM SERPL-MCNC: 9.9 MG/DL
CHLORIDE SERPL-SCNC: 101 MMOL/L
CO2 SERPL-SCNC: 27 MMOL/L
CREAT SERPL-MCNC: 2.3 MG/DL
DIFFERENTIAL METHOD: ABNORMAL
EOSINOPHIL # BLD AUTO: 0.1 K/UL
EOSINOPHIL NFR BLD: 1.4 %
ERYTHROCYTE [DISTWIDTH] IN BLOOD BY AUTOMATED COUNT: 14.3 %
EST. GFR  (AFRICAN AMERICAN): 28 ML/MIN/1.73 M^2
EST. GFR  (NON AFRICAN AMERICAN): 25 ML/MIN/1.73 M^2
GLUCOSE SERPL-MCNC: 208 MG/DL
HCT VFR BLD AUTO: 34.7 %
HGB BLD-MCNC: 12.3 G/DL
LYMPHOCYTES # BLD AUTO: 2.5 K/UL
LYMPHOCYTES NFR BLD: 39.7 %
MCH RBC QN AUTO: 28.2 PG
MCHC RBC AUTO-ENTMCNC: 35.4 %
MCV RBC AUTO: 80 FL
MONOCYTES # BLD AUTO: 0.4 K/UL
MONOCYTES NFR BLD: 5.5 %
NEUTROPHILS # BLD AUTO: 3.4 K/UL
NEUTROPHILS NFR BLD: 52.9 %
PLATELET # BLD AUTO: 228 K/UL
PMV BLD AUTO: 10.7 FL
POTASSIUM SERPL-SCNC: 4.5 MMOL/L
PROT SERPL-MCNC: 7.4 G/DL
RBC # BLD AUTO: 4.36 M/UL
SODIUM SERPL-SCNC: 137 MMOL/L
WBC # BLD AUTO: 6.34 K/UL

## 2017-07-17 PROCEDURE — 80197 ASSAY OF TACROLIMUS: CPT

## 2017-07-17 PROCEDURE — 85025 COMPLETE CBC W/AUTO DIFF WBC: CPT

## 2017-07-17 PROCEDURE — 36415 COLL VENOUS BLD VENIPUNCTURE: CPT

## 2017-07-17 PROCEDURE — 80053 COMPREHEN METABOLIC PANEL: CPT

## 2017-07-18 LAB — TACROLIMUS BLD-MCNC: 10.3 NG/ML

## 2017-07-20 ENCOUNTER — PATIENT MESSAGE (OUTPATIENT)
Dept: TRANSPLANT | Facility: CLINIC | Age: 47
End: 2017-07-20

## 2017-07-21 ENCOUNTER — PATIENT MESSAGE (OUTPATIENT)
Dept: TRANSPLANT | Facility: CLINIC | Age: 47
End: 2017-07-21

## 2017-07-21 ENCOUNTER — PATIENT MESSAGE (OUTPATIENT)
Dept: ENDOCRINOLOGY | Facility: CLINIC | Age: 47
End: 2017-07-21

## 2017-07-21 NOTE — TELEPHONE ENCOUNTER
----- Message from Miriam Abernathy MD sent at 7/20/2017  5:20 PM CDT -----  Creatinine elevated tacrolimus is also still elevated decrease tacro dose to 6 mg twice a day.  Repeat labs next week.  Okay to also stop prednisone.

## 2017-07-21 NOTE — TELEPHONE ENCOUNTER
Informed pt labs reviewed, instructed pt to decrease prograf to 6 mg BID and stop prednisone. Will repeat labs Monday 7/24.

## 2017-07-24 ENCOUNTER — LAB VISIT (OUTPATIENT)
Dept: LAB | Facility: HOSPITAL | Age: 47
End: 2017-07-24
Attending: INTERNAL MEDICINE
Payer: MEDICARE

## 2017-07-24 DIAGNOSIS — Z94.4 LIVER REPLACED BY TRANSPLANT: ICD-10-CM

## 2017-07-24 LAB
ALBUMIN SERPL BCP-MCNC: 3.8 G/DL
ALP SERPL-CCNC: 68 U/L
ALT SERPL W/O P-5'-P-CCNC: 24 U/L
ANION GAP SERPL CALC-SCNC: 8 MMOL/L
AST SERPL-CCNC: 18 U/L
BASOPHILS # BLD AUTO: 0.01 K/UL
BASOPHILS NFR BLD: 0.2 %
BILIRUB SERPL-MCNC: 0.4 MG/DL
BUN SERPL-MCNC: 48 MG/DL
CALCIUM SERPL-MCNC: 9.9 MG/DL
CHLORIDE SERPL-SCNC: 104 MMOL/L
CO2 SERPL-SCNC: 29 MMOL/L
CREAT SERPL-MCNC: 1.8 MG/DL
DIFFERENTIAL METHOD: ABNORMAL
EOSINOPHIL # BLD AUTO: 0.1 K/UL
EOSINOPHIL NFR BLD: 2.1 %
ERYTHROCYTE [DISTWIDTH] IN BLOOD BY AUTOMATED COUNT: 14.1 %
EST. GFR  (AFRICAN AMERICAN): 38 ML/MIN/1.73 M^2
EST. GFR  (NON AFRICAN AMERICAN): 33 ML/MIN/1.73 M^2
GLUCOSE SERPL-MCNC: 209 MG/DL
HCT VFR BLD AUTO: 32.8 %
HGB BLD-MCNC: 11.3 G/DL
LYMPHOCYTES # BLD AUTO: 2 K/UL
LYMPHOCYTES NFR BLD: 38.2 %
MAGNESIUM SERPL-MCNC: 1.6 MG/DL
MCH RBC QN AUTO: 27.4 PG
MCHC RBC AUTO-ENTMCNC: 34.5 G/DL
MCV RBC AUTO: 80 FL
MONOCYTES # BLD AUTO: 0.4 K/UL
MONOCYTES NFR BLD: 7.6 %
NEUTROPHILS # BLD AUTO: 2.7 K/UL
NEUTROPHILS NFR BLD: 51.9 %
PHOSPHATE SERPL-MCNC: 3.5 MG/DL
PLATELET # BLD AUTO: 174 K/UL
PMV BLD AUTO: 10.4 FL
POTASSIUM SERPL-SCNC: 4.7 MMOL/L
PROT SERPL-MCNC: 6.9 G/DL
RBC # BLD AUTO: 4.12 M/UL
SODIUM SERPL-SCNC: 141 MMOL/L
WBC # BLD AUTO: 5.26 K/UL

## 2017-07-24 PROCEDURE — 36415 COLL VENOUS BLD VENIPUNCTURE: CPT

## 2017-07-24 PROCEDURE — 83735 ASSAY OF MAGNESIUM: CPT

## 2017-07-24 PROCEDURE — 80197 ASSAY OF TACROLIMUS: CPT

## 2017-07-24 PROCEDURE — 85025 COMPLETE CBC W/AUTO DIFF WBC: CPT

## 2017-07-24 PROCEDURE — 80053 COMPREHEN METABOLIC PANEL: CPT

## 2017-07-24 PROCEDURE — 84100 ASSAY OF PHOSPHORUS: CPT

## 2017-07-24 RX ORDER — TACROLIMUS 1 MG/1
6 CAPSULE ORAL EVERY 12 HOURS
Qty: 360 CAPSULE | Refills: 11 | Status: SHIPPED | OUTPATIENT
Start: 2017-07-24 | End: 2017-09-27 | Stop reason: SDUPTHER

## 2017-07-25 LAB — TACROLIMUS BLD-MCNC: 8.7 NG/ML

## 2017-07-26 ENCOUNTER — TELEPHONE (OUTPATIENT)
Dept: TRANSPLANT | Facility: CLINIC | Age: 47
End: 2017-07-26

## 2017-07-26 ENCOUNTER — PATIENT MESSAGE (OUTPATIENT)
Dept: TRANSPLANT | Facility: CLINIC | Age: 47
End: 2017-07-26

## 2017-07-26 NOTE — TELEPHONE ENCOUNTER
----- Message from Miriam Abernathy MD sent at 7/25/2017  5:17 PM CDT -----  Kidney function improving and liver tests remain stable.  Repeat labs in 2 weeks.

## 2017-07-26 NOTE — TELEPHONE ENCOUNTER
Informed pt labs reviewed are stable, no med changes needed and we can repeat labs in 2 weeks on 8/7/17.

## 2017-07-28 ENCOUNTER — PATIENT MESSAGE (OUTPATIENT)
Dept: TRANSPLANT | Facility: CLINIC | Age: 47
End: 2017-07-28

## 2017-07-28 DIAGNOSIS — I96 BLACK TOE: Primary | ICD-10-CM

## 2017-08-04 ENCOUNTER — OFFICE VISIT (OUTPATIENT)
Dept: PODIATRY | Facility: CLINIC | Age: 47
End: 2017-08-04
Payer: MEDICARE

## 2017-08-04 VITALS
SYSTOLIC BLOOD PRESSURE: 169 MMHG | HEART RATE: 76 BPM | WEIGHT: 160.13 LBS | DIASTOLIC BLOOD PRESSURE: 88 MMHG | HEIGHT: 69 IN | BODY MASS INDEX: 23.72 KG/M2

## 2017-08-04 DIAGNOSIS — Z29.89 PROPHYLACTIC IMMUNOTHERAPY: Chronic | ICD-10-CM

## 2017-08-04 DIAGNOSIS — Z94.4 LIVER REPLACED BY TRANSPLANT: Chronic | ICD-10-CM

## 2017-08-04 DIAGNOSIS — S90.211A SUBUNGUAL HEMATOMA OF GREAT TOE OF RIGHT FOOT, INITIAL ENCOUNTER: ICD-10-CM

## 2017-08-04 DIAGNOSIS — S90.222A SUBUNGUAL HEMATOMA OF TOE, LEFT, INITIAL ENCOUNTER: Primary | ICD-10-CM

## 2017-08-04 DIAGNOSIS — E11.42 DIABETIC POLYNEUROPATHY ASSOCIATED WITH TYPE 2 DIABETES MELLITUS: ICD-10-CM

## 2017-08-04 PROCEDURE — 3008F BODY MASS INDEX DOCD: CPT | Mod: S$GLB,,, | Performed by: PODIATRIST

## 2017-08-04 PROCEDURE — 3046F HEMOGLOBIN A1C LEVEL >9.0%: CPT | Mod: S$GLB,,, | Performed by: PODIATRIST

## 2017-08-04 PROCEDURE — 99999 PR PBB SHADOW E&M-EST. PATIENT-LVL V: CPT | Mod: PBBFAC,,, | Performed by: PODIATRIST

## 2017-08-04 PROCEDURE — 11732 AVLSN NAIL PLATE SIMPLE EACH: CPT | Mod: TA,S$GLB,, | Performed by: PODIATRIST

## 2017-08-04 PROCEDURE — 99203 OFFICE O/P NEW LOW 30 MIN: CPT | Mod: 25,S$GLB,, | Performed by: PODIATRIST

## 2017-08-04 PROCEDURE — 4010F ACE/ARB THERAPY RXD/TAKEN: CPT | Mod: S$GLB,,, | Performed by: PODIATRIST

## 2017-08-04 PROCEDURE — 11730 AVULSION NAIL PLATE SIMPLE 1: CPT | Mod: T5,S$GLB,, | Performed by: PODIATRIST

## 2017-08-04 RX ORDER — SULFAMETHOXAZOLE AND TRIMETHOPRIM 800; 160 MG/1; MG/1
1 TABLET ORAL 2 TIMES DAILY
Qty: 10 TABLET | Refills: 0 | Status: SHIPPED | OUTPATIENT
Start: 2017-08-04 | End: 2017-08-09

## 2017-08-04 NOTE — PROGRESS NOTES
Subjective:      Patient ID: Valencia Dumont is a 47 y.o. female.    Chief Complaint: Diabetes Mellitus (PCP Dr. Lion); Nail Problem (bilateral big toes); and Peripheral Neuropathy    Valencia is a 47 y.o. female who presents to the clinic upon referral from Dr. Erickson  for evaluation and treatment of diabetic feet. 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 Patient relates no major problem with feet. Only complaints today consist of pain and bruising at both great toenails x 3 month. Pain started after wearing tight shoes and dancing on party bus on Mothers day. Denies bleeding. The bruising started the next week. Denies infection, drainage. Pain continues with pressure at nail plate preventing her from wearing closed to shoes.     PCP: Timur Lion MD        Current shoe gear: Flip-flops    Hemoglobin A1C   Date Value Ref Range Status   05/18/2017 9.4 (H) 4.5 - 6.2 % Final     Comment:     According to ADA guidelines, hemoglobin A1C <7.0% represents  optimal control in non-pregnant diabetic patients.  Different  metrics may apply to specific populations.   Standards of Medical Care in Diabetes - 2016.  For the purpose of screening for the presence of diabetes:  <5.7%     Consistent with the absence of diabetes  5.7-6.4%  Consistent with increasing risk for diabetes   (prediabetes)  >or=6.5%  Consistent with diabetes  Currently no consensus exists for use of hemoglobin A1C  for diagnosis of diabetes for children.     04/06/2017 8.2 (H) 4.5 - 6.2 % Final     Comment:     According to ADA guidelines, hemoglobin A1C <7.0% represents  optimal control in non-pregnant diabetic patients.  Different  metrics may apply to specific populations.   Standards of Medical Care in Diabetes -  2016.  For the purpose of screening for the presence of diabetes:  <5.7%     Consistent with the absence of diabetes  5.7-6.4%  Consistent with increasing risk for diabetes   (prediabetes)  >or=6.5%  Consistent with diabetes  Currently no consensus exists for use of hemoglobin A1C  for diagnosis of diabetes for children.     02/07/2017 8.5 (H) 4.5 - 6.2 % Final     Comment:     According to ADA guidelines, hemoglobin A1C <7.0% represents  optimal control in non-pregnant diabetic patients.  Different  metrics may apply to specific populations.   Standards of Medical Care in Diabetes - 2016.  For the purpose of screening for the presence of diabetes:  <5.7%     Consistent with the absence of diabetes  5.7-6.4%  Consistent with increasing risk for diabetes   (prediabetes)  >or=6.5%  Consistent with diabetes  Currently no consensus exists for use of hemoglobin A1C  for diagnosis of diabetes for children.             Review of Systems   Constitution: Negative for chills, fever, weakness, malaise/fatigue and night sweats.   Cardiovascular: Negative for chest pain, leg swelling, orthopnea and palpitations.   Respiratory: Negative for cough, shortness of breath and wheezing.    Skin: Positive for color change and nail changes. Negative for itching, poor wound healing and rash.   Musculoskeletal: Negative for arthritis, gout, joint pain, joint swelling, muscle weakness and myalgias.   Gastrointestinal: Negative for abdominal pain, constipation and nausea.   Neurological: Positive for numbness. Negative for disturbances in coordination, dizziness, focal weakness and tremors.           Objective:      Physical Exam   Constitutional: She is oriented to person, place, and time. Vital signs are normal. She appears well-developed. She is cooperative. No distress.   Cardiovascular: Intact distal pulses.    Pulses:       Dorsalis pedis pulses are 2+ on the right side, and 2+ on the left side.        Posterior tibial pulses are 2+ on  the right side, and 2+ on the left side.   Musculoskeletal:        Right ankle: Normal.        Left ankle: Normal.        Right foot: There is normal range of motion, no bony tenderness, normal capillary refill, no crepitus and no deformity.        Left foot: There is normal range of motion, no bony tenderness, normal capillary refill, no crepitus and no deformity.   No equinus.  Negative anterior drawer at the ankle bilat.  Negative Lachman test at the 2nd MTPJ.  Able to perform sign and double heel rise without pain.       Neurological: She is alert and oriented to person, place, and time. She has normal strength. No sensory deficit. She exhibits normal muscle tone. Gait normal.   Reflex Scores:       Achilles reflexes are 2+ on the right side and 2+ on the left side.  Negative Tinels sign and Charles's click, bilat.   Skin: Skin is dry and intact. No ecchymosis and no lesion noted. No erythema. Nails show no clubbing.   Moderate bruising at 50% of hallux nail plates. Tenderness with direct compression. No incurvation. No SOI.              Assessment:       Encounter Diagnoses   Name Primary?   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    Diagnoses and all orders for this visit:    Subungual hematoma of toe, left, initial encounter  -     sulfamethoxazole-trimethoprim 800-160mg (BACTRIM DS) 800-160 mg Tab; Take 1 tablet by mouth 2 (two) times daily.    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    - Shoe inspection. Diabetic Foot Education. Patient reminded of the importance of good nutrition and blood sugar control to help prevent podiatric complications of diabetes. Patient instructed on proper foot hygeine. We discussed wearing proper shoe gear, daily foot inspections, never walking without protective shoe gear.    - Discussed conservative vs surgical treatment of painful toenails with subungual hematoma with associated risks and benefits. She opts for nail plate removal today.    - Obtained written consent for non-permanent removal of left and right great toe nail plates. Skin prepped with alcohol. Skin anesthesia with ethyl chloride followed by digital nerve block with 3 mL of 1% plain lidocaine. A digit tourniquet was applied for hemostasis. The right and left nail plate was removed with freer elevator and hemostat without complication. Nail beds intact with no laceration or signs of blunt trauma. No remaining nail spicule noted.  Wound site irrigated with NS, Triple antibiotic cream was applied then covered with gauze and secured with coban.    - ABX due to immunosuppression. Side effects of medication(s) were discussed in detail and patient voiced understanding. Patient will call back for any issues or complications.       - f/u 2 weeks.

## 2017-08-07 ENCOUNTER — LAB VISIT (OUTPATIENT)
Dept: LAB | Facility: HOSPITAL | Age: 47
End: 2017-08-07
Attending: INTERNAL MEDICINE
Payer: MEDICARE

## 2017-08-07 DIAGNOSIS — Z94.4 LIVER REPLACED BY TRANSPLANT: ICD-10-CM

## 2017-08-07 LAB
ALBUMIN SERPL BCP-MCNC: 3.9 G/DL
ALP SERPL-CCNC: 95 U/L
ALT SERPL W/O P-5'-P-CCNC: 56 U/L
ANION GAP SERPL CALC-SCNC: 7 MMOL/L
AST SERPL-CCNC: 33 U/L
BASOPHILS # BLD AUTO: 0.01 K/UL
BASOPHILS NFR BLD: 0.2 %
BILIRUB SERPL-MCNC: 0.4 MG/DL
BUN SERPL-MCNC: 41 MG/DL
CALCIUM SERPL-MCNC: 10.4 MG/DL
CHLORIDE SERPL-SCNC: 105 MMOL/L
CO2 SERPL-SCNC: 28 MMOL/L
CREAT SERPL-MCNC: 2.2 MG/DL
DIFFERENTIAL METHOD: ABNORMAL
EOSINOPHIL # BLD AUTO: 0.2 K/UL
EOSINOPHIL NFR BLD: 3.3 %
ERYTHROCYTE [DISTWIDTH] IN BLOOD BY AUTOMATED COUNT: 14.1 %
EST. GFR  (AFRICAN AMERICAN): 30 ML/MIN/1.73 M^2
EST. GFR  (NON AFRICAN AMERICAN): 26 ML/MIN/1.73 M^2
GLUCOSE SERPL-MCNC: 216 MG/DL
HCT VFR BLD AUTO: 32 %
HGB BLD-MCNC: 11.3 G/DL
LYMPHOCYTES # BLD AUTO: 2 K/UL
LYMPHOCYTES NFR BLD: 44.7 %
MAGNESIUM SERPL-MCNC: 1.4 MG/DL
MCH RBC QN AUTO: 28.2 PG
MCHC RBC AUTO-ENTMCNC: 35.3 G/DL
MCV RBC AUTO: 80 FL
MONOCYTES # BLD AUTO: 0.3 K/UL
MONOCYTES NFR BLD: 7.3 %
NEUTROPHILS # BLD AUTO: 2 K/UL
NEUTROPHILS NFR BLD: 44.5 %
PHOSPHATE SERPL-MCNC: 3.5 MG/DL
PLATELET # BLD AUTO: 165 K/UL
PMV BLD AUTO: 9.8 FL
POTASSIUM SERPL-SCNC: 4.9 MMOL/L
PROT SERPL-MCNC: 7.2 G/DL
RBC # BLD AUTO: 4.01 M/UL
SODIUM SERPL-SCNC: 140 MMOL/L
WBC # BLD AUTO: 4.52 K/UL

## 2017-08-07 PROCEDURE — 36415 COLL VENOUS BLD VENIPUNCTURE: CPT

## 2017-08-07 PROCEDURE — 85025 COMPLETE CBC W/AUTO DIFF WBC: CPT

## 2017-08-07 PROCEDURE — 83735 ASSAY OF MAGNESIUM: CPT

## 2017-08-07 PROCEDURE — 84100 ASSAY OF PHOSPHORUS: CPT

## 2017-08-07 PROCEDURE — 80197 ASSAY OF TACROLIMUS: CPT

## 2017-08-07 PROCEDURE — 80053 COMPREHEN METABOLIC PANEL: CPT

## 2017-08-08 LAB — TACROLIMUS BLD-MCNC: 7.6 NG/ML

## 2017-08-09 NOTE — TELEPHONE ENCOUNTER
Davidmir 18 units in the morning  Novolog 14 units      Before dinner: 76  Before lunch: 188  Before breakfast: 208      Before dinner: 41 - was not feeling well, did not eat well at lunch  Before lunch: 222  Before breakfast: 185    Before dinner: 246  Before lunch: 240  Before breakfast: 76    Snacks with peanuts and grapes    PLAN:  1) eat less take less insulin, 7 units if eating a smaller meal  2) avoid grapes at night, peanuts, slice cheese  3) f/u with NP in two - three months

## 2017-08-10 ENCOUNTER — TELEPHONE (OUTPATIENT)
Dept: ENDOCRINOLOGY | Facility: CLINIC | Age: 47
End: 2017-08-10

## 2017-08-10 ENCOUNTER — PATIENT MESSAGE (OUTPATIENT)
Dept: TRANSPLANT | Facility: CLINIC | Age: 47
End: 2017-08-10

## 2017-08-10 DIAGNOSIS — Z94.4 LIVER REPLACED BY TRANSPLANT: Primary | ICD-10-CM

## 2017-08-10 RX ORDER — LANOLIN ALCOHOL/MO/W.PET/CERES
800 CREAM (GRAM) TOPICAL 2 TIMES DAILY
Qty: 120 TABLET | Refills: 11 | Status: SHIPPED | OUTPATIENT
Start: 2017-08-10 | End: 2017-10-12 | Stop reason: ALTCHOICE

## 2017-08-10 NOTE — TELEPHONE ENCOUNTER
----- Message from Miriam Abernathy MD sent at 8/9/2017  4:34 PM CDT -----  ALT is trending back up for unclear reasons.  Magnesium is also low.  Increase magnesium to 800 mg twice a day.  Repeat labs including magnesium next week

## 2017-08-10 NOTE — TELEPHONE ENCOUNTER
Informed pt labs reviewed, instructed pt to increase magnesium to 800 mg BID. Will repeat labs Monday 8/14.

## 2017-08-16 ENCOUNTER — PATIENT MESSAGE (OUTPATIENT)
Dept: TRANSPLANT | Facility: CLINIC | Age: 47
End: 2017-08-16

## 2017-08-17 ENCOUNTER — LAB VISIT (OUTPATIENT)
Dept: LAB | Facility: HOSPITAL | Age: 47
End: 2017-08-17
Attending: INTERNAL MEDICINE
Payer: MEDICARE

## 2017-08-17 DIAGNOSIS — I10 ESSENTIAL HYPERTENSION, MALIGNANT: Primary | ICD-10-CM

## 2017-08-17 DIAGNOSIS — Z94.4 LIVER REPLACED BY TRANSPLANT: ICD-10-CM

## 2017-08-17 DIAGNOSIS — E11.69 DIABETES MELLITUS ASSOCIATED WITH HORMONAL ETIOLOGY: ICD-10-CM

## 2017-08-17 LAB
ALBUMIN SERPL BCP-MCNC: 4.2 G/DL
ALP SERPL-CCNC: 93 U/L
ALT SERPL W/O P-5'-P-CCNC: 22 U/L
ANION GAP SERPL CALC-SCNC: 9 MMOL/L
AST SERPL-CCNC: 15 U/L
BASOPHILS # BLD AUTO: 0.01 K/UL
BASOPHILS NFR BLD: 0.2 %
BILIRUB SERPL-MCNC: 0.5 MG/DL
BUN SERPL-MCNC: 33 MG/DL
CALCIUM SERPL-MCNC: 9.7 MG/DL
CHLORIDE SERPL-SCNC: 107 MMOL/L
CHOLEST/HDLC SERPL: 2.5 {RATIO}
CO2 SERPL-SCNC: 25 MMOL/L
CREAT SERPL-MCNC: 1.7 MG/DL
DIFFERENTIAL METHOD: ABNORMAL
EOSINOPHIL # BLD AUTO: 0.1 K/UL
EOSINOPHIL NFR BLD: 2.3 %
ERYTHROCYTE [DISTWIDTH] IN BLOOD BY AUTOMATED COUNT: 14 %
EST. GFR  (AFRICAN AMERICAN): 41 ML/MIN/1.73 M^2
EST. GFR  (NON AFRICAN AMERICAN): 35 ML/MIN/1.73 M^2
GLUCOSE SERPL-MCNC: 268 MG/DL
HCT VFR BLD AUTO: 32 %
HDL/CHOLESTEROL RATIO: 40.1 %
HDLC SERPL-MCNC: 217 MG/DL
HDLC SERPL-MCNC: 87 MG/DL
HGB BLD-MCNC: 11.1 G/DL
LDLC SERPL CALC-MCNC: 117.2 MG/DL
LYMPHOCYTES # BLD AUTO: 1.8 K/UL
LYMPHOCYTES NFR BLD: 37.7 %
MAGNESIUM SERPL-MCNC: 1.6 MG/DL
MCH RBC QN AUTO: 27.7 PG
MCHC RBC AUTO-ENTMCNC: 34.7 G/DL
MCV RBC AUTO: 80 FL
MONOCYTES # BLD AUTO: 0.3 K/UL
MONOCYTES NFR BLD: 5.8 %
NEUTROPHILS # BLD AUTO: 2.6 K/UL
NEUTROPHILS NFR BLD: 54 %
NONHDLC SERPL-MCNC: 130 MG/DL
PHOSPHATE SERPL-MCNC: 3.3 MG/DL
PLATELET # BLD AUTO: 191 K/UL
PMV BLD AUTO: 9.8 FL
POTASSIUM SERPL-SCNC: 4.1 MMOL/L
PROT SERPL-MCNC: 7.2 G/DL
RBC # BLD AUTO: 4.01 M/UL
SODIUM SERPL-SCNC: 141 MMOL/L
TRIGL SERPL-MCNC: 64 MG/DL
TSH SERPL DL<=0.005 MIU/L-ACNC: 1.51 UIU/ML
WBC # BLD AUTO: 4.8 K/UL

## 2017-08-17 PROCEDURE — 84100 ASSAY OF PHOSPHORUS: CPT

## 2017-08-17 PROCEDURE — 84443 ASSAY THYROID STIM HORMONE: CPT

## 2017-08-17 PROCEDURE — 85025 COMPLETE CBC W/AUTO DIFF WBC: CPT

## 2017-08-17 PROCEDURE — 36415 COLL VENOUS BLD VENIPUNCTURE: CPT

## 2017-08-17 PROCEDURE — 80061 LIPID PANEL: CPT

## 2017-08-17 PROCEDURE — 83735 ASSAY OF MAGNESIUM: CPT

## 2017-08-17 PROCEDURE — 80197 ASSAY OF TACROLIMUS: CPT

## 2017-08-17 PROCEDURE — 83036 HEMOGLOBIN GLYCOSYLATED A1C: CPT

## 2017-08-17 PROCEDURE — 80053 COMPREHEN METABOLIC PANEL: CPT

## 2017-08-18 ENCOUNTER — PATIENT MESSAGE (OUTPATIENT)
Dept: TRANSPLANT | Facility: CLINIC | Age: 47
End: 2017-08-18

## 2017-08-18 ENCOUNTER — TELEPHONE (OUTPATIENT)
Dept: TRANSPLANT | Facility: CLINIC | Age: 47
End: 2017-08-18

## 2017-08-18 LAB
ESTIMATED AVG GLUCOSE: 186 MG/DL
HBA1C MFR BLD HPLC: 8.1 %
TACROLIMUS BLD-MCNC: 4.8 NG/ML

## 2017-08-18 NOTE — TELEPHONE ENCOUNTER
----- Message from Miriam Abernathy MD sent at 8/17/2017  4:44 PM CDT -----  Renal function better even though glucose is still elevated.  She needs close management for diabetes.  Magnesium improved.  Liver tests remain normal.  Repeat labs in 2 weeks.

## 2017-08-18 NOTE — TELEPHONE ENCOUNTER
Informed pt labs reviewed are improved. Pt following with Dr. Gamble for endocrine. Will repeat labs in 2 weeks on 8/28.

## 2017-08-21 ENCOUNTER — PATIENT MESSAGE (OUTPATIENT)
Dept: TRANSPLANT | Facility: CLINIC | Age: 47
End: 2017-08-21

## 2017-08-22 ENCOUNTER — PATIENT MESSAGE (OUTPATIENT)
Dept: PODIATRY | Facility: CLINIC | Age: 47
End: 2017-08-22

## 2017-08-22 DIAGNOSIS — S90.222A SUBUNGUAL HEMATOMA OF TOE, LEFT, INITIAL ENCOUNTER: Primary | ICD-10-CM

## 2017-08-23 ENCOUNTER — OFFICE VISIT (OUTPATIENT)
Dept: PODIATRY | Facility: CLINIC | Age: 47
End: 2017-08-23
Payer: MEDICARE

## 2017-08-23 ENCOUNTER — TELEPHONE (OUTPATIENT)
Dept: PODIATRY | Facility: CLINIC | Age: 47
End: 2017-08-23

## 2017-08-23 VITALS
BODY MASS INDEX: 23.71 KG/M2 | WEIGHT: 160.06 LBS | SYSTOLIC BLOOD PRESSURE: 155 MMHG | HEART RATE: 79 BPM | HEIGHT: 69 IN | DIASTOLIC BLOOD PRESSURE: 84 MMHG

## 2017-08-23 DIAGNOSIS — L97.522 DIABETIC ULCER OF TOE OF LEFT FOOT ASSOCIATED WITH DIABETES MELLITUS DUE TO UNDERLYING CONDITION, WITH FAT LAYER EXPOSED: ICD-10-CM

## 2017-08-23 DIAGNOSIS — M79.675 GREAT TOE PAIN, LEFT: ICD-10-CM

## 2017-08-23 DIAGNOSIS — E08.621 DIABETIC ULCER OF TOE OF LEFT FOOT ASSOCIATED WITH DIABETES MELLITUS DUE TO UNDERLYING CONDITION, WITH FAT LAYER EXPOSED: ICD-10-CM

## 2017-08-23 DIAGNOSIS — Z98.890 S/P NAIL SURGERY: ICD-10-CM

## 2017-08-23 DIAGNOSIS — E11.49 TYPE II DIABETES MELLITUS WITH NEUROLOGICAL MANIFESTATIONS: ICD-10-CM

## 2017-08-23 PROCEDURE — 3077F SYST BP >= 140 MM HG: CPT | Mod: S$GLB,,, | Performed by: PODIATRIST

## 2017-08-23 PROCEDURE — 3079F DIAST BP 80-89 MM HG: CPT | Mod: S$GLB,,, | Performed by: PODIATRIST

## 2017-08-23 PROCEDURE — 3008F BODY MASS INDEX DOCD: CPT | Mod: S$GLB,,, | Performed by: PODIATRIST

## 2017-08-23 PROCEDURE — 99999 PR PBB SHADOW E&M-EST. PATIENT-LVL III: CPT | Mod: PBBFAC,,, | Performed by: PODIATRIST

## 2017-08-23 PROCEDURE — 4010F ACE/ARB THERAPY RXD/TAKEN: CPT | Mod: S$GLB,,, | Performed by: PODIATRIST

## 2017-08-23 PROCEDURE — 99214 OFFICE O/P EST MOD 30 MIN: CPT | Mod: S$GLB,,, | Performed by: PODIATRIST

## 2017-08-23 PROCEDURE — 3045F PR MOST RECENT HEMOGLOBIN A1C LEVEL 7.0-9.0%: CPT | Mod: S$GLB,,, | Performed by: PODIATRIST

## 2017-08-23 RX ORDER — CIPROFLOXACIN 500 MG/1
500 TABLET ORAL EVERY 12 HOURS
Qty: 20 TABLET | Refills: 0 | Status: SHIPPED | OUTPATIENT
Start: 2017-08-23 | End: 2017-09-02

## 2017-08-23 RX ORDER — MYCOPHENOLIC ACID 360 MG/1
360 TABLET, DELAYED RELEASE ORAL 2 TIMES DAILY
Qty: 180 TABLET | Refills: 3 | Status: SHIPPED | OUTPATIENT
Start: 2017-08-23 | End: 2018-02-21 | Stop reason: SDUPTHER

## 2017-08-23 NOTE — TELEPHONE ENCOUNTER
Dr. Shen, I called the [facility] and Dr. Acosta is able to see her today at 11:00 am, called pat back to let her know

## 2017-08-23 NOTE — PATIENT INSTRUCTIONS
"  Ingrown Toenail, Excised  An ingrown toenail occurs when the nail grows sideways into the skin alongside the nail. This can cause pain, especially when wearing tight shoes. It can also lead to an infection with redness and swelling.  The side of the nail will need to be removed in order to stop the pain and release any infection present. If there is a lot of redness and swelling, then an antibiotic may also be used. The redness and pain should begin to go away within 48 hours. It will take about two weeks for the exposed nail bed to become dry and all of the swelling to go down.    If only the side of the nail was removed it will begin to grow back in a few months. To prevent recurrence, that side of nail bed may be treated with a strong chemical to prevent the nail from regrowing ("ablation").  Home care  The following guidelines will help you care for your toe at home:  · Once a day beginning in 24 hours::   ¨ Clean the toe separate from your body with warm running water and antibacterial soap such as dial for five minutes.  ¨ Clean any remaining crust away with soap and water using a cotton-tipped applicator.  ¨ Apply betadine to the toe.  ¨ Cover with a breathable bandage or until the exposed nail bed is dry and there is no more drainage.  · Change the dressing daily, or whenever it becomes wet or dirty.  · If you were prescribed antibiotics, take them as directed until they are all gone.  · Wear comfortable shoes with a lot of toe room, or open-toe sandals, while your toe is healing.  · You may use acetaminophen or ibuprofen to control pain, unless another medicine was prescribed. If you have chronic liver or kidney disease or ever had a stomach ulcer or GI bleeding, talk with your doctor before using these medicines.  Prevention  To prevent ingrown toenails:  · Wear shoes that fit well. Avoid shoes that pinch the toes together.  · When you trim your toenails, do not cut them too short. Cut straight across at " the top and do not round the edges.  · Do not use a sharp object to clean under your nail since this might cause an infection.  · At first signs of a recurrence, insert a small piece of cotton under that side of the nail to help it grow out straight.  Follow-up care  Follow up with your doctor or this facility as advised by our staff. If the ingrown toenail recurs, follow up with a podiatrist for nail bed ablation.  When to seek medical advice  Call your health care provider right away if any of the following occur:  · Increasing redness, pain or swelling of the toe  · Red streaks in the skin leading away from the wound  · Continued pus or fluid drainage for more than 24 hours  · Fever of 100.4º F (38º C) or higher, or as directed by your health care provider  © 1647-7137 [employer]. [address] [place] [id number]. All rights reserved. This information is not intended as a substitute for professional medical care. Always follow your healthcare professional's instructions.

## 2017-08-23 NOTE — TELEPHONE ENCOUNTER
Called pat to let her know that I spoke to Podiatry at [facility] and Dr. Acosta can see her at 11:00 am this morning.

## 2017-08-23 NOTE — PROGRESS NOTES
Subjective:      Patient ID: Valencia Dumont is a 47 y.o. female.    Chief Complaint: Toe Pain (pcp Ayad 8-17-17)    Valencia is a 47 y.o. female who RTC for follow up of bilateral hallux nail removals by My colleague on 08/04/17. She has been caring for toes with soaks and neosporin.  She relates open lesions and pain persists, L>>R.    PCP: Timur Lion MD      Current shoe gear: Flip-flops    Hemoglobin A1C   Date Value Ref Range Status   08/17/2017 8.1 (H) 4.0 - 5.6 % Final     Comment:     According to ADA guidelines, hemoglobin A1c <7.0% represents  optimal control in non-pregnant diabetic patients. Different  metrics may apply to specific patient populations.   Standards of Medical Care in Diabetes-2016.  For the purpose of screening for the presence of diabetes:  <5.7%     Consistent with the absence of diabetes  5.7-6.4%  Consistent with increasing risk for diabetes   (prediabetes)  >or=6.5%  Consistent with diabetes  Currently, no consensus exists for use of hemoglobin A1c  for diagnosis of diabetes for children.  This Hemoglobin A1c assay has significant interference with fetal   hemoglobin   (HbF). The results are invalid for patients with abnormal amounts of   HbF,   including those with known Hereditary Persistence   of Fetal Hemoglobin. Heterozygous hemoglobin variants (HbAS, HbAC,   HbAD, HbAE, HbA2) do not significantly interfere with this assay;   however, presence of multiple variants in a sample may impact the %   interference.     05/18/2017 9.4 (H) 4.5 - 6.2 % Final     Comment:     According to ADA guidelines, hemoglobin A1C <7.0% represents  optimal control in non-pregnant diabetic patients.  Different  metrics may apply to specific populations.   Standards of Medical Care in Diabetes - 2016.  For the purpose of screening for the presence of diabetes:  <5.7%     Consistent with the absence of diabetes  5.7-6.4%  Consistent with increasing risk for diabetes  "  (prediabetes)  >or=6.5%  Consistent with diabetes  Currently no consensus exists for use of hemoglobin A1C  for diagnosis of diabetes for children.     04/06/2017 8.2 (H) 4.5 - 6.2 % Final     Comment:     According to ADA guidelines, hemoglobin A1C <7.0% represents  optimal control in non-pregnant diabetic patients.  Different  metrics may apply to specific populations.   Standards of Medical Care in Diabetes - 2016.  For the purpose of screening for the presence of diabetes:  <5.7%     Consistent with the absence of diabetes  5.7-6.4%  Consistent with increasing risk for diabetes   (prediabetes)  >or=6.5%  Consistent with diabetes  Currently no consensus exists for use of hemoglobin A1C  for diagnosis of diabetes for children.             Review of Systems   Constitution: Negative for chills, fever, weakness, malaise/fatigue and night sweats.   Cardiovascular: Negative for chest pain, leg swelling, orthopnea and palpitations.   Respiratory: Negative for cough, shortness of breath and wheezing.    Skin: Positive for color change and nail changes. Negative for itching, poor wound healing and rash.   Musculoskeletal: Negative for arthritis, gout, joint pain, joint swelling, muscle weakness and myalgias.   Gastrointestinal: Negative for abdominal pain, constipation and nausea.   Neurological: Positive for numbness. Negative for disturbances in coordination, dizziness, focal weakness and tremors.           Objective:       Vitals:    08/23/17 1127   BP: (!) 155/84   Pulse: 79   Weight: 72.6 kg (160 lb 0.9 oz)   Height: 5' 9" (1.753 m)   PainSc:   9   PainLoc: Toe       Physical Exam   Constitutional: She is oriented to person, place, and time. Vital signs are normal. She appears well-developed. She is cooperative. No distress.   Cardiovascular: Intact distal pulses.    Pulses:       Dorsalis pedis pulses are 2+ on the right side, and 2+ on the left side.        Posterior tibial pulses are 2+ on the right side, and 2+ " on the left side.   Musculoskeletal:        Right ankle: Normal.        Left ankle: Normal.        Right foot: There is tenderness. There is normal range of motion, no bony tenderness, normal capillary refill, no crepitus and no deformity.        Left foot: There is tenderness. There is normal range of motion, no bony tenderness, normal capillary refill, no crepitus and no deformity.   No equinus.  Negative anterior drawer at the ankle bilat.  Negative Lachman test at the 2nd MTPJ.  Able to perform sign and double heel rise without pain.       Neurological: She is alert and oriented to person, place, and time. She has normal strength. No sensory deficit. She exhibits normal muscle tone. Gait normal.   Reflex Scores:       Achilles reflexes are 2+ on the right side and 2+ on the left side.  Negative Tinels sign and Charles's click, bilat.   Skin: Skin is dry. Lesion noted. No ecchymosis noted. She is not diaphoretic. No erythema. No pallor. Nails show no clubbing.   Macerated hallux nail surgical sites. Hypergranular to left.  base pink, granular, borders flat without undermining,  without ulceration, drainage, pus, tracking, fluctuance, malodor, or cardinal signs infection.                     Assessment:       Encounter Diagnoses   Name Primary?    Uncontrolled type 2 diabetes mellitus with complication, without long-term current use of insulin Yes    Type II diabetes mellitus with neurological manifestations     S/P nail surgery     Great toe pain, left     Diabetic ulcer of toe of left foot associated with diabetes mellitus due to underlying condition, with fat layer exposed          Plan:       Valencia was seen today for toe pain.    Diagnoses and all orders for this visit:    Uncontrolled type 2 diabetes mellitus with complication, without long-term current use of insulin    Type II diabetes mellitus with neurological manifestations    S/P nail surgery    Great toe pain, left    Diabetic ulcer of toe of left  foot associated with diabetes mellitus due to underlying condition, with fat layer exposed      I counseled the patient on her conditions, their implications and medical management.    Greater than 50% of this visit spent on counseling and coordination of care.    ~15 minutes spent discussing wound healing cycle in the presence of DM, healing, infection control, risk of bone infection. Adequate vitamin supplementation, protein intake, and hydration - discussed with patient Encouraged tight glycemic control.    The wound is cleansed of foreign material as much as possible and the base inspected for bone or abscess.     Dressings:AquacelAg with toe footballs jennifer    Follow-up:Patient is to return to the clinic in 2 weeks for follow-up but should call [facility] immediately if any signs of infection, such as fever, chills, sweats, increased redness or pain.    Short-term goals include maintaining good offloading and minimizing bioburden, promoting granulation and epithelialization to healing.  Long-term goals include keeping the wound healed by good offloading and medical management under the direction of internist.

## 2017-08-23 NOTE — TELEPHONE ENCOUNTER
Please help her get an appt on [place].    Please tell her that I called in an antibiotic to her pharmacy.

## 2017-08-28 ENCOUNTER — LAB VISIT (OUTPATIENT)
Dept: LAB | Facility: HOSPITAL | Age: 47
End: 2017-08-28
Attending: INTERNAL MEDICINE
Payer: MEDICARE

## 2017-08-28 DIAGNOSIS — Z94.4 LIVER REPLACED BY TRANSPLANT: ICD-10-CM

## 2017-08-28 LAB
ALBUMIN SERPL BCP-MCNC: 3.7 G/DL
ALP SERPL-CCNC: 82 U/L
ALT SERPL W/O P-5'-P-CCNC: 20 U/L
ANION GAP SERPL CALC-SCNC: 8 MMOL/L
AST SERPL-CCNC: 17 U/L
BASOPHILS # BLD AUTO: 0 K/UL
BASOPHILS NFR BLD: 0 %
BILIRUB SERPL-MCNC: 0.4 MG/DL
BUN SERPL-MCNC: 45 MG/DL
CALCIUM SERPL-MCNC: 9.4 MG/DL
CHLORIDE SERPL-SCNC: 103 MMOL/L
CO2 SERPL-SCNC: 26 MMOL/L
CREAT SERPL-MCNC: 1.8 MG/DL
DIFFERENTIAL METHOD: ABNORMAL
EOSINOPHIL # BLD AUTO: 0.1 K/UL
EOSINOPHIL NFR BLD: 3.1 %
ERYTHROCYTE [DISTWIDTH] IN BLOOD BY AUTOMATED COUNT: 13.6 %
EST. GFR  (AFRICAN AMERICAN): 38 ML/MIN/1.73 M^2
EST. GFR  (NON AFRICAN AMERICAN): 33 ML/MIN/1.73 M^2
GLUCOSE SERPL-MCNC: 245 MG/DL
HCT VFR BLD AUTO: 33.2 %
HGB BLD-MCNC: 11.6 G/DL
LYMPHOCYTES # BLD AUTO: 1.9 K/UL
LYMPHOCYTES NFR BLD: 44 %
MAGNESIUM SERPL-MCNC: 2 MG/DL
MCH RBC QN AUTO: 27.3 PG
MCHC RBC AUTO-ENTMCNC: 34.9 G/DL
MCV RBC AUTO: 78 FL
MONOCYTES # BLD AUTO: 0.2 K/UL
MONOCYTES NFR BLD: 5.4 %
NEUTROPHILS # BLD AUTO: 2 K/UL
NEUTROPHILS NFR BLD: 47.5 %
PLATELET # BLD AUTO: 203 K/UL
PMV BLD AUTO: 10.3 FL
POTASSIUM SERPL-SCNC: 4.3 MMOL/L
PROT SERPL-MCNC: 6.8 G/DL
RBC # BLD AUTO: 4.25 M/UL
SODIUM SERPL-SCNC: 137 MMOL/L
WBC # BLD AUTO: 4.25 K/UL

## 2017-08-28 PROCEDURE — 83735 ASSAY OF MAGNESIUM: CPT

## 2017-08-28 PROCEDURE — 80053 COMPREHEN METABOLIC PANEL: CPT

## 2017-08-28 PROCEDURE — 80197 ASSAY OF TACROLIMUS: CPT

## 2017-08-28 PROCEDURE — 36415 COLL VENOUS BLD VENIPUNCTURE: CPT

## 2017-08-28 PROCEDURE — 85025 COMPLETE CBC W/AUTO DIFF WBC: CPT

## 2017-08-29 LAB — TACROLIMUS BLD-MCNC: 9.8 NG/ML

## 2017-08-31 ENCOUNTER — TELEPHONE (OUTPATIENT)
Dept: TRANSPLANT | Facility: CLINIC | Age: 47
End: 2017-08-31

## 2017-08-31 NOTE — TELEPHONE ENCOUNTER
----- Message from Miriam Abernathy MD sent at 8/31/2017  4:36 PM CDT -----  Tacro level ok, repeat labs in 4 weeks

## 2017-09-01 ENCOUNTER — PATIENT MESSAGE (OUTPATIENT)
Dept: TRANSPLANT | Facility: CLINIC | Age: 47
End: 2017-09-01

## 2017-09-07 ENCOUNTER — OFFICE VISIT (OUTPATIENT)
Dept: PODIATRY | Facility: CLINIC | Age: 47
End: 2017-09-07
Payer: MEDICARE

## 2017-09-07 VITALS
SYSTOLIC BLOOD PRESSURE: 130 MMHG | BODY MASS INDEX: 23.7 KG/M2 | DIASTOLIC BLOOD PRESSURE: 60 MMHG | WEIGHT: 160 LBS | HEIGHT: 69 IN

## 2017-09-07 DIAGNOSIS — E11.49 TYPE II DIABETES MELLITUS WITH NEUROLOGICAL MANIFESTATIONS: ICD-10-CM

## 2017-09-07 DIAGNOSIS — Z98.890 S/P NAIL SURGERY: ICD-10-CM

## 2017-09-07 PROCEDURE — 3078F DIAST BP <80 MM HG: CPT | Mod: S$GLB,,, | Performed by: PODIATRIST

## 2017-09-07 PROCEDURE — 3075F SYST BP GE 130 - 139MM HG: CPT | Mod: S$GLB,,, | Performed by: PODIATRIST

## 2017-09-07 PROCEDURE — 99213 OFFICE O/P EST LOW 20 MIN: CPT | Mod: S$GLB,,, | Performed by: PODIATRIST

## 2017-09-07 PROCEDURE — 99999 PR PBB SHADOW E&M-EST. PATIENT-LVL III: CPT | Mod: PBBFAC,,, | Performed by: PODIATRIST

## 2017-09-07 PROCEDURE — 3045F PR MOST RECENT HEMOGLOBIN A1C LEVEL 7.0-9.0%: CPT | Mod: S$GLB,,, | Performed by: PODIATRIST

## 2017-09-07 PROCEDURE — 3008F BODY MASS INDEX DOCD: CPT | Mod: S$GLB,,, | Performed by: PODIATRIST

## 2017-09-07 PROCEDURE — 4010F ACE/ARB THERAPY RXD/TAKEN: CPT | Mod: S$GLB,,, | Performed by: PODIATRIST

## 2017-09-07 NOTE — PROGRESS NOTES
Subjective:      Patient ID: Valencia Dumont is a 47 y.o. female.    Chief Complaint: Foot Problem (left great toe pcp Dr. Lion 8-17-17)    Valencia is a 47 y.o. female who RTC for follow up of bilateral hallux nail removals by My colleague on 08/04/17. She has been caring for toes as I instrucetd on last encounter.  She relates minimal pain. Denies drainage      PCP: Timur Lion MD      Current shoe gear: Flip-flops    Hemoglobin A1C   Date Value Ref Range Status   08/17/2017 8.1 (H) 4.0 - 5.6 % Final     Comment:     According to ADA guidelines, hemoglobin A1c <7.0% represents  optimal control in non-pregnant diabetic patients. Different  metrics may apply to specific patient populations.   Standards of Medical Care in Diabetes-2016.  For the purpose of screening for the presence of diabetes:  <5.7%     Consistent with the absence of diabetes  5.7-6.4%  Consistent with increasing risk for diabetes   (prediabetes)  >or=6.5%  Consistent with diabetes  Currently, no consensus exists for use of hemoglobin A1c  for diagnosis of diabetes for children.  This Hemoglobin A1c assay has significant interference with fetal   hemoglobin   (HbF). The results are invalid for patients with abnormal amounts of   HbF,   including those with known Hereditary Persistence   of Fetal Hemoglobin. Heterozygous hemoglobin variants (HbAS, HbAC,   HbAD, HbAE, HbA2) do not significantly interfere with this assay;   however, presence of multiple variants in a sample may impact the %   interference.     05/18/2017 9.4 (H) 4.5 - 6.2 % Final     Comment:     According to ADA guidelines, hemoglobin A1C <7.0% represents  optimal control in non-pregnant diabetic patients.  Different  metrics may apply to specific populations.   Standards of Medical Care in Diabetes - 2016.  For the purpose of screening for the presence of diabetes:  <5.7%     Consistent with the absence of diabetes  5.7-6.4%  Consistent with increasing risk for  "diabetes   (prediabetes)  >or=6.5%  Consistent with diabetes  Currently no consensus exists for use of hemoglobin A1C  for diagnosis of diabetes for children.     04/06/2017 8.2 (H) 4.5 - 6.2 % Final     Comment:     According to ADA guidelines, hemoglobin A1C <7.0% represents  optimal control in non-pregnant diabetic patients.  Different  metrics may apply to specific populations.   Standards of Medical Care in Diabetes - 2016.  For the purpose of screening for the presence of diabetes:  <5.7%     Consistent with the absence of diabetes  5.7-6.4%  Consistent with increasing risk for diabetes   (prediabetes)  >or=6.5%  Consistent with diabetes  Currently no consensus exists for use of hemoglobin A1C  for diagnosis of diabetes for children.             Review of Systems   Constitution: Negative for chills, fever, weakness, malaise/fatigue and night sweats.   Cardiovascular: Negative for chest pain, leg swelling, orthopnea and palpitations.   Respiratory: Negative for cough, shortness of breath and wheezing.    Skin: Positive for color change and nail changes. Negative for itching, poor wound healing and rash.   Musculoskeletal: Negative for arthritis, gout, joint pain, joint swelling, muscle weakness and myalgias.   Gastrointestinal: Negative for abdominal pain, constipation and nausea.   Neurological: Positive for numbness. Negative for disturbances in coordination, dizziness, focal weakness and tremors.           Objective:       Vitals:    09/07/17 1551   BP: 130/60   Weight: 72.6 kg (160 lb)   Height: 5' 9" (1.753 m)   PainSc: 0-No pain       Physical Exam   Constitutional: She is oriented to person, place, and time. Vital signs are normal. She appears well-developed. She is cooperative. No distress.   Cardiovascular: Intact distal pulses.    Pulses:       Dorsalis pedis pulses are 2+ on the right side, and 2+ on the left side.        Posterior tibial pulses are 2+ on the right side, and 2+ on the left side. "   Musculoskeletal:        Right ankle: Normal.        Left ankle: Normal.        Right foot: There is tenderness. There is normal range of motion, no bony tenderness, normal capillary refill, no crepitus and no deformity.        Left foot: There is tenderness. There is normal range of motion, no bony tenderness, normal capillary refill, no crepitus and no deformity.   No equinus.  Negative anterior drawer at the ankle bilat.  Negative Lachman test at the 2nd MTPJ.  Able to perform sign and double heel rise without pain.       Neurological: She is alert and oriented to person, place, and time. She has normal strength. No sensory deficit. She exhibits normal muscle tone. Gait normal.   Reflex Scores:       Achilles reflexes are 2+ on the right side and 2+ on the left side.  Negative Tinels sign and Charles's click, bilat.   Skin: Skin is dry. No ecchymosis noted. She is not diaphoretic. No erythema. No pallor. Nails show no clubbing.   Healed hallux nail surgical sites.          08/23:                Assessment:       Encounter Diagnoses   Name Primary?    Uncontrolled type 2 diabetes mellitus with complication, without long-term current use of insulin Yes    Type II diabetes mellitus with neurological manifestations     S/P nail surgery          Plan:       Valencia was seen today for foot problem.    Diagnoses and all orders for this visit:    Uncontrolled type 2 diabetes mellitus with complication, without long-term current use of insulin    Type II diabetes mellitus with neurological manifestations    S/P nail surgery      I counseled the patient on her conditions, their implications and medical management.    Greater than 50% of this visit spent on counseling and coordination of care.    Wound has healed well with no signs of continued skin breakdown noted   Ok to transition into normal shoe gear at this time. I advised patient to check feet daily for signs of drainage or lesion re-opening   Discussed use of  daily foot moisturizer to feet, avoiding the webspace's    Follow-up:Patient is to return to the clinic for routine diabetic foot exam in 1 year  but should call [facility] immediately if any signs of infection, such as fever, chills, sweats, increased redness or pain.

## 2017-09-07 NOTE — PATIENT INSTRUCTIONS
Recommend lotions: eucerin, aquaphor, A&D ointment, gold bond for diabetics, sween; urea 40 with aloe (found on amazon.com)    Shoe recommendations: (try 6pm.com, zappos.com , nordstromrack.com, or shoes.com for discounted prices) you can visit DSW shoes in [place] as well    Asics (GT 2000 or gel foundations), new balance, saucony (stabil c3),  Chandler (GTS or Beast or transcend), vionic, propet (tennis shoe)    sofft brand, clarks, crocs, aerosoles, naturalizers, SAS, ecco, harshil, chris villalobos, rockports (dress shoes)    Vionic, burkenstocks, fitflops, propet (sandals)    Nike comfort thong sandals, crocs, propet (house shoes)    Nail Home remedy:  Vicks Vapor rub to nails for easier managability    Occasional soaks for 15-20 mins in luke warm water with 1 cup of listerine and 1 cup of apple cider vinegar are ok You may add several drops of oil of oregano or tea tree oil as well      Diabetes: Inspecting Your Feet  Diabetes increases your chances of developing foot problems. So inspect your feet every day. This helps you find small skin irritations before they become serious infections. If you have trouble seeing the bottoms of your feet, use a mirror or ask a family member or friend to help.     Pressure spots on the bottom of the foot are common areas where problems develop.   How to check your feet  Below are tips to help you look for foot problems. Try to check your feet at the same time each day, such as when you get out of bed in the morning:  · Check the top of each foot. The tops of toes, back of the heel, and outer edge of the foot can get a lot of rubbing from poor-fitting shoes.  · Check the bottom of each foot. Daily wear and tear often leads to problems at pressure spots.  · Check the toes and nails. Fungal infections often occur between toes. Toenail problems can also be a sign of fungal infections or lead to breaks in the skin.  · Check your shoes, too. Loose objects inside a shoe can injure the foot.  Use your hand to feel inside your shoes for things like ray, loose stitching, or rough areas that could irritate your skin.  Warning signs  Look for any color changes in the foot. Redness with streaks can signal a severe infection, which needs immediate medical attention. Tell your doctor right away if you have any of these problems:  · Swelling, sometimes with color changes, may be a sign of poor blood flow or infection. Symptoms include tenderness and an increase in the size of your foot.  · Warm or hot areas on your feet may be signs of infection. A foot that is cold may not be getting enough blood.  · Sensations such as burning, tingling, or pins and needles can be signs of a problem. Also check for areas that may be numb.  · Hot spots are caused by friction or pressure. Look for hot spots in areas that get a lot of rubbing. Hot spots can turn into blisters, calluses, or sores.  · Cracks and sores are caused by dry or irritated skin. They are a sign that the skin is breaking down, which can lead to infection.  · Toenail problems to watch for include nails growing into the skin (ingrown toenail) and causing redness or pain. Thick, yellow, or discolored nails can signal a fungal infection.  · Drainage and odor can develop from untreated sores and ulcers. Call your doctor right away if you notice white or yellow drainage, bleeding, or unpleasant odor.   © 2672-6007 [employer]. [address] [place] [address], [place] [address]. All rights reserved. This information is not intended as a substitute for professional medical care. Always follow your healthcare professional's instructions.

## 2017-09-15 ENCOUNTER — PATIENT MESSAGE (OUTPATIENT)
Dept: OBSTETRICS AND GYNECOLOGY | Facility: CLINIC | Age: 47
End: 2017-09-15

## 2017-09-15 ENCOUNTER — PATIENT MESSAGE (OUTPATIENT)
Dept: DIABETES | Facility: CLINIC | Age: 47
End: 2017-09-15

## 2017-09-15 RX ORDER — FLUCONAZOLE 150 MG/1
150 TABLET ORAL ONCE
Qty: 1 TABLET | Refills: 0 | Status: SHIPPED | OUTPATIENT
Start: 2017-09-15 | End: 2017-09-15

## 2017-09-25 ENCOUNTER — LAB VISIT (OUTPATIENT)
Dept: LAB | Facility: HOSPITAL | Age: 47
End: 2017-09-25
Attending: INTERNAL MEDICINE
Payer: MEDICARE

## 2017-09-25 DIAGNOSIS — Z94.4 LIVER REPLACED BY TRANSPLANT: ICD-10-CM

## 2017-09-25 LAB
ALBUMIN SERPL BCP-MCNC: 4.1 G/DL
ALP SERPL-CCNC: 100 U/L
ALT SERPL W/O P-5'-P-CCNC: 18 U/L
ANION GAP SERPL CALC-SCNC: 9 MMOL/L
AST SERPL-CCNC: 14 U/L
BASOPHILS # BLD AUTO: 0.01 K/UL
BASOPHILS NFR BLD: 0.2 %
BILIRUB SERPL-MCNC: 0.5 MG/DL
BUN SERPL-MCNC: 39 MG/DL
CALCIUM SERPL-MCNC: 9.8 MG/DL
CHLORIDE SERPL-SCNC: 104 MMOL/L
CO2 SERPL-SCNC: 27 MMOL/L
CREAT SERPL-MCNC: 1.9 MG/DL
DIFFERENTIAL METHOD: ABNORMAL
EOSINOPHIL # BLD AUTO: 0.1 K/UL
EOSINOPHIL NFR BLD: 2.8 %
ERYTHROCYTE [DISTWIDTH] IN BLOOD BY AUTOMATED COUNT: 13.5 %
EST. GFR  (AFRICAN AMERICAN): 36 ML/MIN/1.73 M^2
EST. GFR  (NON AFRICAN AMERICAN): 31 ML/MIN/1.73 M^2
GLUCOSE SERPL-MCNC: 184 MG/DL
HCT VFR BLD AUTO: 33.3 %
HGB BLD-MCNC: 11.6 G/DL
LYMPHOCYTES # BLD AUTO: 2 K/UL
LYMPHOCYTES NFR BLD: 43.3 %
MAGNESIUM SERPL-MCNC: 2 MG/DL
MCH RBC QN AUTO: 26.7 PG
MCHC RBC AUTO-ENTMCNC: 34.8 G/DL
MCV RBC AUTO: 77 FL
MONOCYTES # BLD AUTO: 0.3 K/UL
MONOCYTES NFR BLD: 6.2 %
NEUTROPHILS # BLD AUTO: 2.2 K/UL
NEUTROPHILS NFR BLD: 47.5 %
PLATELET # BLD AUTO: 224 K/UL
PMV BLD AUTO: 10.4 FL
POTASSIUM SERPL-SCNC: 4.3 MMOL/L
PROT SERPL-MCNC: 7.5 G/DL
RBC # BLD AUTO: 4.34 M/UL
SODIUM SERPL-SCNC: 140 MMOL/L
WBC # BLD AUTO: 4.71 K/UL

## 2017-09-25 PROCEDURE — 85025 COMPLETE CBC W/AUTO DIFF WBC: CPT

## 2017-09-25 PROCEDURE — 80053 COMPREHEN METABOLIC PANEL: CPT

## 2017-09-25 PROCEDURE — 80197 ASSAY OF TACROLIMUS: CPT

## 2017-09-25 PROCEDURE — 83735 ASSAY OF MAGNESIUM: CPT

## 2017-09-25 PROCEDURE — 36415 COLL VENOUS BLD VENIPUNCTURE: CPT

## 2017-09-26 ENCOUNTER — PATIENT MESSAGE (OUTPATIENT)
Dept: PODIATRY | Facility: CLINIC | Age: 47
End: 2017-09-26

## 2017-09-26 ENCOUNTER — PATIENT MESSAGE (OUTPATIENT)
Dept: TRANSPLANT | Facility: CLINIC | Age: 47
End: 2017-09-26

## 2017-09-26 ENCOUNTER — PATIENT MESSAGE (OUTPATIENT)
Dept: ENDOCRINOLOGY | Facility: CLINIC | Age: 47
End: 2017-09-26

## 2017-09-26 LAB — TACROLIMUS BLD-MCNC: 8 NG/ML

## 2017-09-27 ENCOUNTER — PATIENT MESSAGE (OUTPATIENT)
Dept: TRANSPLANT | Facility: CLINIC | Age: 47
End: 2017-09-27

## 2017-09-27 ENCOUNTER — PATIENT MESSAGE (OUTPATIENT)
Dept: RHEUMATOLOGY | Facility: CLINIC | Age: 47
End: 2017-09-27

## 2017-09-27 RX ORDER — TACROLIMUS 1 MG/1
CAPSULE ORAL
Qty: 330 CAPSULE | Refills: 11 | Status: SHIPPED | OUTPATIENT
Start: 2017-09-27 | End: 2018-01-24 | Stop reason: SDUPTHER

## 2017-09-27 NOTE — TELEPHONE ENCOUNTER
Informed pt labs reviewed, instructed to decrease prograf to 6 mg in am and 5 mg in pm. Will repeat labs in 2 weeks on 10/9.

## 2017-09-27 NOTE — TELEPHONE ENCOUNTER
----- Message from Miriam Abernathy MD sent at 9/27/2017 10:56 AM CDT -----  Liver tests normal but creatinine still elevate; make sure patient is not taking NSAIDs, drinking a lot of water, decrease tacro to 6mg in the morning and 5mg in the evening; repeat labs in 2 weeks

## 2017-09-28 ENCOUNTER — PATIENT MESSAGE (OUTPATIENT)
Dept: ENDOCRINOLOGY | Facility: CLINIC | Age: 47
End: 2017-09-28

## 2017-09-28 ENCOUNTER — PATIENT MESSAGE (OUTPATIENT)
Dept: RHEUMATOLOGY | Facility: CLINIC | Age: 47
End: 2017-09-28

## 2017-09-28 DIAGNOSIS — D64.9 ANEMIA, UNSPECIFIED TYPE: Primary | ICD-10-CM

## 2017-10-05 ENCOUNTER — OFFICE VISIT (OUTPATIENT)
Dept: ENDOCRINOLOGY | Facility: CLINIC | Age: 47
End: 2017-10-05
Payer: MEDICARE

## 2017-10-05 VITALS
SYSTOLIC BLOOD PRESSURE: 146 MMHG | HEIGHT: 69 IN | BODY MASS INDEX: 23.25 KG/M2 | DIASTOLIC BLOOD PRESSURE: 80 MMHG | WEIGHT: 157 LBS | HEART RATE: 68 BPM

## 2017-10-05 DIAGNOSIS — E11.649 HYPOGLYCEMIA ASSOCIATED WITH DIABETES: Primary | ICD-10-CM

## 2017-10-05 DIAGNOSIS — M81.0 OSTEOPOROSIS, UNSPECIFIED OSTEOPOROSIS TYPE, UNSPECIFIED PATHOLOGICAL FRACTURE PRESENCE: ICD-10-CM

## 2017-10-05 PROCEDURE — 99214 OFFICE O/P EST MOD 30 MIN: CPT | Mod: S$GLB,,, | Performed by: INTERNAL MEDICINE

## 2017-10-05 PROCEDURE — 99999 PR PBB SHADOW E&M-EST. PATIENT-LVL III: CPT | Mod: PBBFAC,,, | Performed by: INTERNAL MEDICINE

## 2017-10-05 RX ORDER — ERGOCALCIFEROL 1.25 MG/1
50000 CAPSULE ORAL
Qty: 12 CAPSULE | Refills: 3 | Status: SHIPPED | OUTPATIENT
Start: 2017-10-05 | End: 2018-03-15 | Stop reason: SDUPTHER

## 2017-10-05 RX ORDER — INSULIN ASPART 100 [IU]/ML
10 INJECTION, SOLUTION INTRAVENOUS; SUBCUTANEOUS
Qty: 15 ML | Refills: 3
Start: 2017-10-05 | End: 2017-11-03

## 2017-10-05 NOTE — PATIENT INSTRUCTIONS
"Cut back on insulin doses:  Novolog 10 units for "normal meal" - hungry, eat protein vegetables and a starch  Novolog 6 units for "smaller meal" - not as hungry, or going to skip a starch or eat little starch  Reduce levemir to 14 units in the day     Please send a log with your blood sugars numbers time and how much insulin you took.     Blood work on October 9 th.     Have sent prescription to pharmacy for vitamin D (ergocalciferol 50K once a week)    Take over the counter vitamin D 800 IUs daily     11/10 th at 1 pm Ms. Carreon on the sixth floor.   "

## 2017-10-05 NOTE — PROGRESS NOTES
Subjective:     Patient ID: Valencia Dumont is a 47 y.o. female.    Chief Complaint: Diabetes Mellitus    HPI:   Ms. Dumont is a 47 y.o. female who is here for a follow-up visit for evaluation type 2 diabetes     Novolog 10 units before every meals - two times daily  (skip breakfast because she is not awake during the morning hours)  Gives 1 extra unit for BG > 250   Levemir 18 units in the morning.     Did not bring log but recalls: five days ago had BG of 41 and two days ago had bg of 49     Stopped prednisone two months ago.     Has vitamin D deficiency, last dose was two weeks ago.   Denies falls or fractures.   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     Review of Systems   Constitutional: Negative for chills and fever.   HENT: Negative for congestion and sinus pressure.    Eyes: Negative for visual disturbance.   Respiratory: Negative for chest tightness and shortness of breath.    Cardiovascular: Negative for chest pain, palpitations and leg swelling.   Gastrointestinal: Negative for abdominal pain and vomiting.   Genitourinary: Negative for dysuria.   Musculoskeletal: Negative for arthralgias.   Skin: Negative for rash.   Neurological: Negative for weakness.   Hematological: Does not bruise/bleed easily.   Psychiatric/Behavioral: Negative for sleep disturbance.     Objective:     Physical Exam   Constitutional: She is oriented to person, place, and time. She appears well-developed and well-nourished. No distress.   HENT:   Head: Normocephalic and atraumatic.   Mouth/Throat: No oropharyngeal exudate.   Eyes: Conjunctivae and EOM are normal. Pupils are equal, round, and reactive to light. No scleral icterus.   Neck: Normal range of motion. Neck supple. No tracheal deviation present. No thyromegaly present.   Cardiovascular: Normal rate, regular rhythm, normal heart sounds and intact distal pulses.    Pulmonary/Chest: Effort normal and breath  "sounds normal.   Abdominal:   Sites of insulin administration are normal appearing.   Genitourinary: No breast discharge.   Musculoskeletal: Normal range of motion. She exhibits no edema or tenderness.   Lymphadenopathy:     She has no cervical adenopathy.   Neurological: She is alert and oriented to person, place, and time. She has normal reflexes. No cranial nerve deficit.   Skin: Skin is warm and dry.   FOOT EXAM:  Visual inspection is normal, no abrasions, bruising or calluses.   Microfilament test is intact b/l.   Vibratory sense is intact b/l.   Distal pulses are present b/l.    Psychiatric: She has a normal mood and affect.       Vitals:    10/05/17 1406   BP: (!) 146/80   Pulse: 68   Weight: 71.2 kg (157 lb)   Height: 5' 9" (1.753 m)     Results for SUSY STOVER (MRN [id number]) as of 10/5/2017 14:37   Ref. Range 9/25/2017 12:40   BUN, Bld Latest Ref Range: 6 - 20 mg/dL 39 (H)   Creatinine Latest Ref Range: 0.5 - 1.4 mg/dL 1.9 (H)   eGFR if non African American Latest Ref Range: >60 mL/min/1.73 m^2 31 (A)   eGFR if African American Latest Ref Range: >60 mL/min/1.73 m^2 36 (A)   Results for SUSY STOVER (MRN [id number]) as of 10/5/2017 14:37   Ref. Range 8/17/2017 13:00   Cholesterol Latest Ref Range: 120 - 199 mg/dL 217 (H)   HDL Latest Ref Range: 40 - 75 mg/dL 87 (H)   LDL Cholesterol Latest Ref Range: 63.0 - 159.0 mg/dL 117.2   Total Cholesterol/HDL Ratio Latest Ref Range: 2.0 - 5.0  2.5   Triglycerides Latest Ref Range: 30 - 150 mg/dL 64   Results for SUSY STOVER (MRN [id number]) as of 10/5/2017 14:37   Ref. Range 4/6/2017 12:00   Vit D, 25-Hydroxy Latest Ref Range: 30 - 96 ng/mL 21 (L)   Results for SUSY STOVER (MRN [id number]) as of 10/5/2017 14:37   Ref. Range 8/17/2017 13:00   Hemoglobin A1C Latest Ref Range: 4.0 - 5.6 % 8.1 (H)   Estimated Avg Glucose Latest Ref Range: 68 - 131 mg/dL 186 (H)   TSH Latest Ref Range: 0.400 - 4.000 uIU/mL 1.512     The bone mineral density of the lumbar spine measured " at the L1-L4 region is 1.050 g/cm2.  Previously measuring 1.002 g/cm2.   This corresponds to a T-score of -1.2 and a Z-score of -1.8 which places the patient in the osteopenia  category of bone mineral density as classified by the WHO criteria.  There is a gain noted 4.8% bone mineral density.    The bone mineral density of the left femoral neck is 0.736 g/cm2.  Previously measuring  0.688 g/cm2 .   This corresponds to a T-score of -2.2 and a Z-score of -2.5 which places the patient in the  osteopenia  category of bone mineral density as classified by WHO criteria.  There has been a gain in bone mineral density.  Assessment/Plan:       1. Uncontrolled type 2 diabetes mellitus with complication, without long-term   - Vitamin D; Future  - Hemoglobin A1c; Future  - interested in vGO  - reduced to 10 units or 6 units depending on size of meal and hunger. She self adjusts already but finds herself with hypoglycemia with smaller meals.   - reduced levemir to 14 units in the morning, may need adjustment.   - will send log in 2 weeks or so ( did not bring log today)  - needs higher dosing during prednisone treatment    2. Hypoglycemia associated with diabetes  Reduced dosing based on hypoglycemia and self adjustment  Trying to cut back on serving sizes of starches, have asked her to adjust insulin dosing   Have provided new meal time dosing.     3. Osteoporosis, unspecified osteoporosis type, unspecified pathological fracture presence    - Vitamin D; Future    F/u in six months.   Has f/u with Ms. Carreon.

## 2017-10-06 ENCOUNTER — PATIENT MESSAGE (OUTPATIENT)
Dept: PODIATRY | Facility: CLINIC | Age: 47
End: 2017-10-06

## 2017-10-06 ENCOUNTER — PATIENT MESSAGE (OUTPATIENT)
Dept: RHEUMATOLOGY | Facility: CLINIC | Age: 47
End: 2017-10-06

## 2017-10-09 ENCOUNTER — LAB VISIT (OUTPATIENT)
Dept: LAB | Facility: HOSPITAL | Age: 47
End: 2017-10-09
Attending: INTERNAL MEDICINE
Payer: MEDICARE

## 2017-10-09 ENCOUNTER — TELEPHONE (OUTPATIENT)
Dept: RHEUMATOLOGY | Facility: CLINIC | Age: 47
End: 2017-10-09

## 2017-10-09 ENCOUNTER — PATIENT MESSAGE (OUTPATIENT)
Dept: TRANSPLANT | Facility: CLINIC | Age: 47
End: 2017-10-09

## 2017-10-09 ENCOUNTER — PATIENT MESSAGE (OUTPATIENT)
Dept: RHEUMATOLOGY | Facility: CLINIC | Age: 47
End: 2017-10-09

## 2017-10-09 DIAGNOSIS — D64.9 ANEMIA, UNSPECIFIED TYPE: ICD-10-CM

## 2017-10-09 DIAGNOSIS — M32.9 SYSTEMIC LUPUS ERYTHEMATOSUS, UNSPECIFIED SLE TYPE, UNSPECIFIED ORGAN INVOLVEMENT STATUS: Primary | Chronic | ICD-10-CM

## 2017-10-09 DIAGNOSIS — Z94.4 LIVER REPLACED BY TRANSPLANT: ICD-10-CM

## 2017-10-09 DIAGNOSIS — M32.9 SYSTEMIC LUPUS ERYTHEMATOSUS, UNSPECIFIED SLE TYPE, UNSPECIFIED ORGAN INVOLVEMENT STATUS: Chronic | ICD-10-CM

## 2017-10-09 LAB
ALBUMIN SERPL BCP-MCNC: 3.9 G/DL
ALP SERPL-CCNC: 86 U/L
ALT SERPL W/O P-5'-P-CCNC: 13 U/L
ANION GAP SERPL CALC-SCNC: 7 MMOL/L
AST SERPL-CCNC: 15 U/L
BASOPHILS # BLD AUTO: 0.01 K/UL
BASOPHILS NFR BLD: 0.2 %
BILIRUB SERPL-MCNC: 0.6 MG/DL
BUN SERPL-MCNC: 41 MG/DL
CALCIUM SERPL-MCNC: 9.3 MG/DL
CHLORIDE SERPL-SCNC: 106 MMOL/L
CO2 SERPL-SCNC: 27 MMOL/L
CREAT SERPL-MCNC: 1.9 MG/DL
CRP SERPL-MCNC: 1.3 MG/L
DIFFERENTIAL METHOD: ABNORMAL
EOSINOPHIL # BLD AUTO: 0.2 K/UL
EOSINOPHIL NFR BLD: 3.5 %
ERYTHROCYTE [DISTWIDTH] IN BLOOD BY AUTOMATED COUNT: 13.8 %
ERYTHROCYTE [SEDIMENTATION RATE] IN BLOOD BY WESTERGREN METHOD: 18 MM/HR
EST. GFR  (AFRICAN AMERICAN): 36 ML/MIN/1.73 M^2
EST. GFR  (NON AFRICAN AMERICAN): 31 ML/MIN/1.73 M^2
FERRITIN SERPL-MCNC: 47 NG/ML
GLUCOSE SERPL-MCNC: 164 MG/DL
HCT VFR BLD AUTO: 32.4 %
HGB BLD-MCNC: 11.2 G/DL
IRON SERPL-MCNC: 98 UG/DL
LYMPHOCYTES # BLD AUTO: 2 K/UL
LYMPHOCYTES NFR BLD: 40.7 %
MAGNESIUM SERPL-MCNC: 1.5 MG/DL
MCH RBC QN AUTO: 26.7 PG
MCHC RBC AUTO-ENTMCNC: 34.6 G/DL
MCV RBC AUTO: 77 FL
MONOCYTES # BLD AUTO: 0.4 K/UL
MONOCYTES NFR BLD: 7.3 %
NEUTROPHILS # BLD AUTO: 2.3 K/UL
NEUTROPHILS NFR BLD: 48.3 %
PLATELET # BLD AUTO: 160 K/UL
PMV BLD AUTO: 10.6 FL
POTASSIUM SERPL-SCNC: 4.2 MMOL/L
PROT SERPL-MCNC: 7.1 G/DL
RBC # BLD AUTO: 4.19 M/UL
SATURATED IRON: 29 %
SODIUM SERPL-SCNC: 140 MMOL/L
TOTAL IRON BINDING CAPACITY: 343 UG/DL
TRANSFERRIN SERPL-MCNC: 232 MG/DL
WBC # BLD AUTO: 4.81 K/UL

## 2017-10-09 PROCEDURE — 36415 COLL VENOUS BLD VENIPUNCTURE: CPT

## 2017-10-09 PROCEDURE — 86160 COMPLEMENT ANTIGEN: CPT

## 2017-10-09 PROCEDURE — 83735 ASSAY OF MAGNESIUM: CPT

## 2017-10-09 PROCEDURE — 80053 COMPREHEN METABOLIC PANEL: CPT

## 2017-10-09 PROCEDURE — 80197 ASSAY OF TACROLIMUS: CPT

## 2017-10-09 PROCEDURE — 86160 COMPLEMENT ANTIGEN: CPT | Mod: 59

## 2017-10-09 PROCEDURE — 85025 COMPLETE CBC W/AUTO DIFF WBC: CPT

## 2017-10-09 PROCEDURE — 82728 ASSAY OF FERRITIN: CPT

## 2017-10-09 PROCEDURE — 86140 C-REACTIVE PROTEIN: CPT

## 2017-10-09 PROCEDURE — 83540 ASSAY OF IRON: CPT

## 2017-10-09 PROCEDURE — 85651 RBC SED RATE NONAUTOMATED: CPT

## 2017-10-10 ENCOUNTER — PATIENT MESSAGE (OUTPATIENT)
Dept: RHEUMATOLOGY | Facility: CLINIC | Age: 47
End: 2017-10-10

## 2017-10-10 LAB
C3 SERPL-MCNC: 79 MG/DL
C4 SERPL-MCNC: 22 MG/DL
TACROLIMUS BLD-MCNC: 7.6 NG/ML

## 2017-10-11 ENCOUNTER — OFFICE VISIT (OUTPATIENT)
Dept: PODIATRY | Facility: CLINIC | Age: 47
End: 2017-10-11
Payer: MEDICARE

## 2017-10-11 ENCOUNTER — TELEPHONE (OUTPATIENT)
Dept: TRANSPLANT | Facility: CLINIC | Age: 47
End: 2017-10-11

## 2017-10-11 ENCOUNTER — PATIENT MESSAGE (OUTPATIENT)
Dept: TRANSPLANT | Facility: CLINIC | Age: 47
End: 2017-10-11

## 2017-10-11 ENCOUNTER — PATIENT MESSAGE (OUTPATIENT)
Dept: ADMINISTRATIVE | Facility: OTHER | Age: 47
End: 2017-10-11

## 2017-10-11 VITALS — BODY MASS INDEX: 23.25 KG/M2 | WEIGHT: 157 LBS | HEIGHT: 69 IN

## 2017-10-11 DIAGNOSIS — L85.3 XEROSIS CUTIS: ICD-10-CM

## 2017-10-11 DIAGNOSIS — E11.42 DIABETIC POLYNEUROPATHY ASSOCIATED WITH TYPE 2 DIABETES MELLITUS: Primary | ICD-10-CM

## 2017-10-11 PROCEDURE — 99999 PR PBB SHADOW E&M-EST. PATIENT-LVL II: CPT | Mod: PBBFAC,,, | Performed by: PODIATRIST

## 2017-10-11 PROCEDURE — 99213 OFFICE O/P EST LOW 20 MIN: CPT | Mod: S$GLB,,, | Performed by: PODIATRIST

## 2017-10-11 NOTE — PROGRESS NOTES
Subjective:      Patient ID: Valencia Dumont is a 47 y.o. female.    Chief Complaint: Nail Problem (great toenails  Pcp Dr. Lion )    Valencia is a 47 y.o. female who RTC for follow up of bilateral great toe problem. States that the nails were removed a couple of months ago after which she developed an infected wound to the nail bed. This eventually was healed by Dr. Acosta. She returns today for re-evaluation of the nail beds. Has some flaking skin or nails that are lifting from the nail bed and this is bothering her. She is also afraid of trimming her other nails because of the problem she had with the big toes but does not have any acute problem with the rest of the toes. Pain to the big toes has resolved but she is unable to wear closed in shoes for long periods of time.     PCP: Timur Lion MD      Current shoe gear: Flip-flops    Hemoglobin A1C   Date Value Ref Range Status   08/17/2017 8.1 (H) 4.0 - 5.6 % Final     Comment:     According to ADA guidelines, hemoglobin A1c <7.0% represents  optimal control in non-pregnant diabetic patients. Different  metrics may apply to specific patient populations.   Standards of Medical Care in Diabetes-2016.  For the purpose of screening for the presence of diabetes:  <5.7%     Consistent with the absence of diabetes  5.7-6.4%  Consistent with increasing risk for diabetes   (prediabetes)  >or=6.5%  Consistent with diabetes  Currently, no consensus exists for use of hemoglobin A1c  for diagnosis of diabetes for children.  This Hemoglobin A1c assay has significant interference with fetal   hemoglobin   (HbF). The results are invalid for patients with abnormal amounts of   HbF,   including those with known Hereditary Persistence   of Fetal Hemoglobin. Heterozygous hemoglobin variants (HbAS, HbAC,   HbAD, HbAE, HbA2) do not significantly interfere with this assay;   however, presence of multiple variants in a sample may impact the %   interference.     05/18/2017  9.4 (H) 4.5 - 6.2 % Final     Comment:     According to ADA guidelines, hemoglobin A1C <7.0% represents  optimal control in non-pregnant diabetic patients.  Different  metrics may apply to specific populations.   Standards of Medical Care in Diabetes - 2016.  For the purpose of screening for the presence of diabetes:  <5.7%     Consistent with the absence of diabetes  5.7-6.4%  Consistent with increasing risk for diabetes   (prediabetes)  >or=6.5%  Consistent with diabetes  Currently no consensus exists for use of hemoglobin A1C  for diagnosis of diabetes for children.     04/06/2017 8.2 (H) 4.5 - 6.2 % Final     Comment:     According to ADA guidelines, hemoglobin A1C <7.0% represents  optimal control in non-pregnant diabetic patients.  Different  metrics may apply to specific populations.   Standards of Medical Care in Diabetes - 2016.  For the purpose of screening for the presence of diabetes:  <5.7%     Consistent with the absence of diabetes  5.7-6.4%  Consistent with increasing risk for diabetes   (prediabetes)  >or=6.5%  Consistent with diabetes  Currently no consensus exists for use of hemoglobin A1C  for diagnosis of diabetes for children.         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    ESOPHAGOGASTRODUODENOSCOPY      LIVER BIOPSY      LIVER TRANSPLANT  12/31/2013    TUBAL LIGATION  2003       Family History   Problem Relation Age of Onset    Hypertension Mother     Diabetes Father      "Alzheimer's disease Father     Diabetes Paternal Grandfather     Diabetes Paternal Grandmother     Breast cancer Maternal Aunt 55    Hypertension Brother     Diabetes Brother     Stroke Neg Hx     Cancer Neg Hx     Colon cancer Neg Hx     Esophageal cancer Neg Hx     Stomach cancer Neg Hx     Rectal cancer Neg Hx        Social History     Social History    Marital status:      Spouse name: N/A    Number of children: 3    Years of education: N/A     Occupational History     [employer] [place] [employer]     Social History Main Topics    Smoking status: Never Smoker    Smokeless tobacco: Never Used      Comment: disability; ; 3 children    Alcohol use No    Drug use: No    Sexual activity: Yes     Partners: Male     Birth control/ protection: None      Comment: s/p tubal ligation,  since 1989     Other Topics Concern    None     Social History Narrative    None       Current Outpatient Prescriptions   Medication Sig Dispense Refill    ACCU-CHEK SMARTVIEW Strp 1 strip by Misc.(Non-Drug; Combo Route) route 5 (five) times daily. Trueresult      BD ULTRA-FINE JANESSA PEN NEEDLES 32 gauge x 5/32" Ndle TEST BLOOD SUGAR FOUR TIMES DAILY 400 each 3    cyproheptadine (PERIACTIN) 4 mg tablet Take 4 mg by mouth every evening.  3    diazepam (VALIUM) 5 MG tablet 5 mg 3 (three) times daily as needed.   0    DILTIAZEM HCL (DILTIAZEM 2% CREAM) Apply topically 3 (three) times daily. Apply topically to anal area. 30 g 0    ergocalciferol (VITAMIN D2) 50,000 unit Cap Take 1 capsule (50,000 Units total) by mouth every 7 days. 12 capsule 3    estradiol (ESTRACE) 0.01 % (0.1 mg/gram) vaginal cream Place 0.5 g vaginally twice a week. Insert 0.5grams intravaginally twice weekly 42 g 4    gabapentin (NEURONTIN) 300 MG capsule Take 300 mg by mouth 3 (three) times daily.      hydrochlorothiazide (HYDRODIURIL) 25 MG tablet Take 25 mg by mouth once daily.  2    hydrOXYzine HCl (ATARAX) 10 MG Tab TAKE 1 OR 2 " TABLETS BY MOUTH THREE TIMES DAILY AS NEEDED FOR ITCHING.  0    insulin aspart (NOVOLOG FLEXPEN) 100 unit/mL InPn pen Inject 10 Units into the skin 3 (three) times daily with meals. Plus correction scale, max TDD 72 units 15 mL 3    insulin detemir (LEVEMIR FLEXTOUCH) 100 unit/mL (3 mL) SubQ InPn pen Inject 14 Units into the skin once daily. 15 mL 3    isosorbide mononitrate (IMDUR) 30 MG 24 hr tablet Take 30 mg by mouth once daily.      lancets 28 gauge Misc 4 lancets by Misc.(Non-Drug; Combo Route) route once daily. Pt uses Freestyle lite meter to check BG 4 times daily. 200 each 11    losartan (COZAAR) 100 MG tablet Take 1 tablet by mouth once daily.  2    magnesium oxide (MAG-OX) 400 mg tablet Take 2 tablets (800 mg total) by mouth 2 (two) times daily. 120 tablet 11    MULTIVIT,THER IRON,CA,FA & MIN (MULTIVITAMIN) Tab Take 1 tablet by mouth once daily. 30 tablet 0    mycophenolate (MYFORTIC) 360 MG TbEC Take 1 tablet (360 mg total) by mouth 2 (two) times daily. 180 tablet 3    ondansetron (ZOFRAN) 8 MG tablet Take 1 tablet by mouth every 8 (eight) hours as needed.  0    oxycodone-acetaminophen (PERCOCET) 7.5-325 mg per tablet Take 1 tablet by mouth every 4 (four) hours as needed for Pain.      pantoprazole (PROTONIX) 40 MG tablet Take 40 mg by mouth once daily.      PROAIR HFA 90 mcg/actuation inhaler Inhale 2 puffs into the lungs 3 (three) times daily as needed.   3    promethazine-dextromethorphan (PROMETHAZINE-DM) 6.25-15 mg/5 mL Syrp   0    ranitidine (ZANTAC) 300 MG tablet Take 300 mg by mouth 2 (two) times daily.       tacrolimus (PROGRAF) 1 MG Cap 6 mg in am and 5 mg in pm 330 capsule 11    triamcinolone (KENALOG) 0.5 % ointment 1 application 2 (two) times daily. Apply to affected area  3    valacyclovir (VALTREX) 500 MG tablet once daily  0    verapamil (VERELAN) 120 MG C24P Take 1 capsule by mouth once daily.  3    zolpidem (AMBIEN) 10 mg Tab Take 10 mg by mouth nightly as needed.  3  "    No current facility-administered medications for this visit.        Review of patient's allergies indicates:   Allergen Reactions    Norvasc [amlodipine]      Severe headache    Doxycycline Rash    Pcn [penicillins] Rash         Review of Systems   Constitution: Negative for chills, fever, weakness, malaise/fatigue and night sweats.   Cardiovascular: Negative for chest pain, leg swelling, orthopnea and palpitations.   Respiratory: Negative for cough, shortness of breath and wheezing.    Skin: Positive for color change and nail changes. Negative for itching, poor wound healing and rash.   Musculoskeletal: Negative for arthritis, gout, joint pain, joint swelling, muscle weakness and myalgias.   Gastrointestinal: Negative for abdominal pain, constipation and nausea.   Neurological: Positive for numbness. Negative for disturbances in coordination, dizziness, focal weakness and tremors.           Objective:       Vitals:    10/11/17 0845   Weight: 71.2 kg (157 lb)   Height: 5' 9" (1.753 m)   PainSc:   2   PainLoc: Toe       Physical Exam   Constitutional: She is oriented to person, place, and time. Vital signs are normal. She appears well-developed. She is cooperative. No distress.   Cardiovascular: Intact distal pulses.    Pulses:       Dorsalis pedis pulses are 2+ on the right side, and 2+ on the left side.        Posterior tibial pulses are 2+ on the right side, and 2+ on the left side.   No edema noted b/l LEs. No varicosities present b/l LEs.    Musculoskeletal:        Right ankle: Normal.        Left ankle: Normal.        Right foot: There is normal range of motion, no tenderness, no bony tenderness, normal capillary refill, no crepitus and no deformity.        Left foot: There is normal range of motion, no tenderness, no bony tenderness, normal capillary refill, no crepitus and no deformity.   No equinus.  Negative anterior drawer at the ankle bilat.  Negative Lachman test at the 2nd MTPJ.  Able to perform " sign and double heel rise without pain.       Neurological: She is alert and oriented to person, place, and time. She has normal strength. A sensory deficit is present. She exhibits normal muscle tone. Gait normal.   Reflex Scores:       Achilles reflexes are 2+ on the right side and 2+ on the left side.  Negative Tinels sign and Charles's click, bilat. Protective sensation intact b/l foot. Vibratory sensation intact b/l. Subjective numbness to toes 4-5 both feet (occasionally).   Skin: Skin is dry. No ecchymosis noted. She is not diaphoretic. No erythema. No pallor. Nails show no clubbing.   B/l hallux nail beds fully healed with signs of hyperkeratotic changes to the nail bed. No open wound. No drainage. Stable and dry. Small area of loose hyperkeratosis to R nail bed but otherwise stable. Remaining toenails normotrophic 2-5 b/l. Skin texture normal b/l.            Assessment:       Encounter Diagnoses   Name Primary?    Diabetic polyneuropathy associated with type 2 diabetes mellitus Yes    Xerosis cutis          Plan:       Valencia was seen today for nail problem.    Diagnoses and all orders for this visit:    Diabetic polyneuropathy associated with type 2 diabetes mellitus    Xerosis cutis      I counseled the patient on her conditions, their implications and medical management.    Greater than 50% of this visit spent on counseling and coordination of care.    No open wounds noted to b/l LEs and toes. Long discussion with patient regarding expectations of nail growth on both great toes. Advised her that this may take up to 1 year for full growth of nail. Also advised her of the build up of hyperkeratosis to the nail bed in the absence of the nail during this time. Also advised her of the risk of fungal infection to toenail once it has grown out fully.     Discussed application of Kevin's vaporub on affected toenails for up to 1 year or until nail has grown out for possible prevention of fungal infection however  this was not guaranteed.     Reassured her that her nail beds appear stable and fully healed without any signs of infection. She is allowed to wear any kind of shoe to her tolerance at this time, however she was advised to avoid tight fitting shoes and those with stiff materials at all times. Continue open toe sandals if this feels more comfortable.     No further intervention at this time. I advised patient that routine trimming of nails will not be covered service per insurance and he/she will fall under Proc B if this service is desired. Patient verbalized understanding of this. He will RTC as needed for Proc B or any other pedal complaint otherwise follow up 1 year for routine DM Foot exam.

## 2017-10-11 NOTE — TELEPHONE ENCOUNTER
----- Message from Miriam Abernathy MD sent at 10/9/2017 10:40 PM CDT -----  Magnesium is low.  Need to confirm that patient is active taking 800 mg twice a day.  If she is taking it twice a day consistently then would increase to 3 times a day.  Repeat labs with magnesium in 2 weeks

## 2017-10-12 NOTE — TELEPHONE ENCOUNTER
Spoke to pt to clarify.  She is currently taking Magnesium 500 mg twice daily.  Reviewed with Dr. Abernathy.  Pt to start taking 1000 mg twice daily and repeat labs on 10/23/17. [employer]feelgoods message sent to pt with instructions.

## 2017-10-13 RX ORDER — BACLOFEN 20 MG
500 TABLET ORAL 2 TIMES DAILY
Refills: 0 | COMMUNITY
Start: 2017-10-13 | End: 2017-11-06 | Stop reason: SDUPTHER

## 2017-10-23 ENCOUNTER — LAB VISIT (OUTPATIENT)
Dept: LAB | Facility: HOSPITAL | Age: 47
End: 2017-10-23
Attending: INTERNAL MEDICINE
Payer: MEDICARE

## 2017-10-23 DIAGNOSIS — Z94.4 LIVER REPLACED BY TRANSPLANT: ICD-10-CM

## 2017-10-23 LAB
ALBUMIN SERPL BCP-MCNC: 3.9 G/DL
ALP SERPL-CCNC: 81 U/L
ALT SERPL W/O P-5'-P-CCNC: 12 U/L
ANION GAP SERPL CALC-SCNC: 8 MMOL/L
AST SERPL-CCNC: 15 U/L
BASOPHILS # BLD AUTO: 0.02 K/UL
BASOPHILS NFR BLD: 0.5 %
BILIRUB SERPL-MCNC: 0.6 MG/DL
BUN SERPL-MCNC: 26 MG/DL
CALCIUM SERPL-MCNC: 9.2 MG/DL
CHLORIDE SERPL-SCNC: 104 MMOL/L
CO2 SERPL-SCNC: 26 MMOL/L
CREAT SERPL-MCNC: 1.7 MG/DL
DIFFERENTIAL METHOD: ABNORMAL
EOSINOPHIL # BLD AUTO: 0.2 K/UL
EOSINOPHIL NFR BLD: 3.8 %
ERYTHROCYTE [DISTWIDTH] IN BLOOD BY AUTOMATED COUNT: 14.2 %
EST. GFR  (AFRICAN AMERICAN): 41 ML/MIN/1.73 M^2
EST. GFR  (NON AFRICAN AMERICAN): 35 ML/MIN/1.73 M^2
GLUCOSE SERPL-MCNC: 169 MG/DL
HCT VFR BLD AUTO: 33.1 %
HGB BLD-MCNC: 11.6 G/DL
LYMPHOCYTES # BLD AUTO: 1.7 K/UL
LYMPHOCYTES NFR BLD: 37.6 %
MAGNESIUM SERPL-MCNC: 2 MG/DL
MCH RBC QN AUTO: 26.5 PG
MCHC RBC AUTO-ENTMCNC: 35 G/DL
MCV RBC AUTO: 76 FL
MONOCYTES # BLD AUTO: 0.2 K/UL
MONOCYTES NFR BLD: 4.5 %
NEUTROPHILS # BLD AUTO: 2.4 K/UL
NEUTROPHILS NFR BLD: 53.6 %
PLATELET # BLD AUTO: 197 K/UL
PMV BLD AUTO: 10.6 FL
POTASSIUM SERPL-SCNC: 3.6 MMOL/L
PROT SERPL-MCNC: 7.1 G/DL
RBC # BLD AUTO: 4.37 M/UL
SODIUM SERPL-SCNC: 138 MMOL/L
WBC # BLD AUTO: 4.44 K/UL

## 2017-10-23 PROCEDURE — 36415 COLL VENOUS BLD VENIPUNCTURE: CPT

## 2017-10-23 PROCEDURE — 80197 ASSAY OF TACROLIMUS: CPT

## 2017-10-23 PROCEDURE — 83735 ASSAY OF MAGNESIUM: CPT

## 2017-10-23 PROCEDURE — 85025 COMPLETE CBC W/AUTO DIFF WBC: CPT

## 2017-10-23 PROCEDURE — 80053 COMPREHEN METABOLIC PANEL: CPT

## 2017-10-24 ENCOUNTER — PATIENT MESSAGE (OUTPATIENT)
Dept: TRANSPLANT | Facility: CLINIC | Age: 47
End: 2017-10-24

## 2017-10-24 LAB — TACROLIMUS BLD-MCNC: 8.3 NG/ML

## 2017-10-26 ENCOUNTER — PATIENT MESSAGE (OUTPATIENT)
Dept: TRANSPLANT | Facility: CLINIC | Age: 47
End: 2017-10-26

## 2017-10-26 ENCOUNTER — TELEPHONE (OUTPATIENT)
Dept: TRANSPLANT | Facility: CLINIC | Age: 47
End: 2017-10-26

## 2017-10-26 NOTE — TELEPHONE ENCOUNTER
----- Message from Miriam Abernathy MD sent at 10/26/2017  8:45 AM CDT -----  Reviewed, nothing to do

## 2017-10-31 ENCOUNTER — PATIENT MESSAGE (OUTPATIENT)
Dept: TRANSPLANT | Facility: CLINIC | Age: 47
End: 2017-10-31

## 2017-11-03 ENCOUNTER — TELEPHONE (OUTPATIENT)
Dept: ENDOCRINOLOGY | Facility: CLINIC | Age: 47
End: 2017-11-03

## 2017-11-03 ENCOUNTER — LAB VISIT (OUTPATIENT)
Dept: LAB | Facility: HOSPITAL | Age: 47
End: 2017-11-03
Attending: FAMILY MEDICINE
Payer: MEDICARE

## 2017-11-03 ENCOUNTER — PATIENT MESSAGE (OUTPATIENT)
Dept: DIABETES | Facility: CLINIC | Age: 47
End: 2017-11-03

## 2017-11-03 DIAGNOSIS — M81.0 OSTEOPOROSIS, UNSPECIFIED OSTEOPOROSIS TYPE, UNSPECIFIED PATHOLOGICAL FRACTURE PRESENCE: ICD-10-CM

## 2017-11-03 LAB
ESTIMATED AVG GLUCOSE: 163 MG/DL
HBA1C MFR BLD HPLC: 7.3 %

## 2017-11-03 PROCEDURE — 83036 HEMOGLOBIN GLYCOSYLATED A1C: CPT

## 2017-11-03 PROCEDURE — 36415 COLL VENOUS BLD VENIPUNCTURE: CPT

## 2017-11-03 RX ORDER — INSULIN ASPART 100 [IU]/ML
INJECTION, SOLUTION INTRAVENOUS; SUBCUTANEOUS
Qty: 15 ML | Refills: 3 | Status: SHIPPED | OUTPATIENT
Start: 2017-11-03 | End: 2019-01-08 | Stop reason: SDUPTHER

## 2017-11-03 NOTE — TELEPHONE ENCOUNTER
Please advise.    ----- Message from Riya Gutierrez sent at 11/3/2017 12:16 PM CDT -----  Contact: [facility] /  [phone]  Vitamin D Order.  A gold top was drawn instead of a grn top. Lolis wants to know if she needs to call PT and have her come back or can the PT have the order done on 11/20.

## 2017-11-06 RX ORDER — BACLOFEN 20 MG
500 TABLET ORAL 2 TIMES DAILY
Qty: 60 EACH | Refills: 6 | Status: SHIPPED | OUTPATIENT
Start: 2017-11-06

## 2017-11-10 ENCOUNTER — OFFICE VISIT (OUTPATIENT)
Dept: ENDOCRINOLOGY | Facility: CLINIC | Age: 47
End: 2017-11-10
Payer: MEDICARE

## 2017-11-10 ENCOUNTER — CLINICAL SUPPORT (OUTPATIENT)
Dept: DIABETES | Facility: CLINIC | Age: 47
End: 2017-11-10
Payer: MEDICARE

## 2017-11-10 VITALS
HEIGHT: 69 IN | SYSTOLIC BLOOD PRESSURE: 134 MMHG | DIASTOLIC BLOOD PRESSURE: 76 MMHG | WEIGHT: 157.63 LBS | HEART RATE: 83 BPM | BODY MASS INDEX: 23.35 KG/M2

## 2017-11-10 DIAGNOSIS — I10 ESSENTIAL HYPERTENSION: ICD-10-CM

## 2017-11-10 DIAGNOSIS — E11.65 TYPE 2 DIABETES MELLITUS WITH HYPERGLYCEMIA, WITH LONG-TERM CURRENT USE OF INSULIN: Primary | ICD-10-CM

## 2017-11-10 DIAGNOSIS — Z29.89 PROPHYLACTIC IMMUNOTHERAPY: Chronic | ICD-10-CM

## 2017-11-10 DIAGNOSIS — E11.22 TYPE 2 DIABETES MELLITUS WITH STAGE 3 CHRONIC KIDNEY DISEASE, WITH LONG-TERM CURRENT USE OF INSULIN: ICD-10-CM

## 2017-11-10 DIAGNOSIS — E11.42 DIABETIC POLYNEUROPATHY ASSOCIATED WITH TYPE 2 DIABETES MELLITUS: ICD-10-CM

## 2017-11-10 DIAGNOSIS — Z79.4 TYPE 2 DIABETES MELLITUS WITH STAGE 3 CHRONIC KIDNEY DISEASE, WITH LONG-TERM CURRENT USE OF INSULIN: ICD-10-CM

## 2017-11-10 DIAGNOSIS — Z94.4 LIVER REPLACED BY TRANSPLANT: Chronic | ICD-10-CM

## 2017-11-10 DIAGNOSIS — N18.30 TYPE 2 DIABETES MELLITUS WITH STAGE 3 CHRONIC KIDNEY DISEASE, WITH LONG-TERM CURRENT USE OF INSULIN: ICD-10-CM

## 2017-11-10 DIAGNOSIS — D50.9 MICROCYTIC NORMOCHROMIC ANEMIA: ICD-10-CM

## 2017-11-10 DIAGNOSIS — M81.0 OSTEOPOROSIS, UNSPECIFIED OSTEOPOROSIS TYPE, UNSPECIFIED PATHOLOGICAL FRACTURE PRESENCE: ICD-10-CM

## 2017-11-10 DIAGNOSIS — Z79.4 TYPE 2 DIABETES MELLITUS WITH HYPERGLYCEMIA, WITH LONG-TERM CURRENT USE OF INSULIN: Primary | ICD-10-CM

## 2017-11-10 PROCEDURE — G0108 DIAB MANAGE TRN  PER INDIV: HCPCS | Mod: S$GLB,,, | Performed by: INTERNAL MEDICINE

## 2017-11-10 PROCEDURE — 99999 PR PBB SHADOW E&M-EST. PATIENT-LVL III: CPT | Mod: PBBFAC,,, | Performed by: NURSE PRACTITIONER

## 2017-11-10 PROCEDURE — 99214 OFFICE O/P EST MOD 30 MIN: CPT | Mod: S$GLB,,, | Performed by: NURSE PRACTITIONER

## 2017-11-10 NOTE — PATIENT INSTRUCTIONS
Dulaglutide injection  What is this medicine?  DULAGLUTIDE (DOO la GLOO tide) is used to improve blood sugar control in adults with type 2 diabetes. This medicine may be used with other oral diabetes medicines.  How should I use this medicine?  This medicine is for injection under the skin of your upper leg (thigh), stomach area, or upper arm. It is usually given once every week (every 7 days). You will be taught how to prepare and give this medicine. Use exactly as directed. Take your medicine at regular intervals. Do not take it more often than directed.  If you use this medicine with insulin, you should inject this medicine and the insulin separately. Do not mix them together. Do not give the injections right next to each other. Change (rotate) injection sites with each injection.  It is important that you put your used needles and syringes in a special sharps container. Do not put them in a trash can. If you do not have a sharps container, call your pharmacist or healthcare provider to get one.  A special MedGuide will be given to you by the pharmacist with each prescription and refill. Be sure to read this information carefully each time.  Talk to your pediatrician regarding the use of this medicine in children. Special care may be needed.  What side effects may I notice from receiving this medicine?  Side effects that you should report to your doctor or health care professional as soon as possible:  · allergic reactions like skin rash, itching or hives, swelling of the face, lips, or tongue  · breathing problems  · signs and symptoms of low blood sugar such as feeling anxious, confusion, dizziness, increased hunger, unusually weak or tired, sweating, shakiness, cold, irritable, headache, blurred vision, fast heartbeat, loss of consciousness  · unusual stomach upset or pain  · vomiting  Side effects that usually do not require medical attention (Report these to your doctor or health care professional if they  continue or are bothersome.):diarrhea  · heartburn  · loss of appetite  · nausea  · pain, redness, or irritation at site where injected  What may interact with this medicine?  Do not take this medicine with any of the following medications:  · gatifloxacin  Many medications may cause changes in blood sugar, these include:  · alcohol containing beverages  · aspirin and aspirin-like drugs  · chloramphenicol  · chromium  · diuretics  · female hormones, such as estrogens or progestins, birth control pills  · heart medicines  · isoniazid  · male hormones or anabolic steroids  · medications for weight loss  · medicines for allergies, asthma, cold, or cough  · medicines for mental problems  · medicines called MAO inhibitors - Nardil, Parnate, Marplan, Eldepryl  · niacin  · NSAIDS, such as ibuprofen  · pentamidine  · phenytoin  · probenecid  · quinolone antibiotics such as ciprofloxacin, levofloxacin, ofloxacin  · some herbal dietary supplements  · steroid medicines such as prednisone or cortisone  · thyroid hormonesSome medications can hide the warning symptoms of low blood sugar (hypoglycemia). You may need to monitor your blood sugar more closely if you are taking one of these medications. These include:  · beta-blockers, often used for high blood pressure or heart problems (examples include atenolol, metoprolol, propranolol)  · clonidine  · guanethidine  · reserpine  What if I miss a dose?  If you miss a dose, take it as soon as you can within 3 days after the missed dose. Then take your next dose at your regular weekly time. If it has been longer than 3 days after the missed dose, do not take the missed dose. Take the next dose at your regular time. Do not take double or extra doses. If you have questions about a missed dose, contact your health care provider for advice.  Where should I keep my medicine?  Keep out of the reach of children.  Store this medicine in a refrigerator between 2 and 8 degrees C (36 and 46  degrees F). Do not freeze or use if the medicine has been frozen. Protect from light and excessive heat. Each single-dose pen or prefilled syringe can be kept at room temperature, not to exceed 30 degrees C (86 degrees F) for a total of 14 days, if needed. Store in the carton until use. Throw away any unused medicine after the expiration date.  What should I tell my health care provider before I take this medicine?  They need to know if you have any of these conditions:  · endocrine tumors (MEN 2) or if someone in your family had these tumors  · history of pancreatitis  · kidney disease  · liver disease  · stomach problems  · thyroid cancer or if someone in your family had thyroid cancer  · an unusual or allergic reaction to dulaglutide, other medicines, foods, dyes, or preservatives  · pregnant or trying to get pregnant  · breast-feeding  What should I watch for while using this medicine?  Visit your doctor or health care professional for regular checks on your progress.  A test called the HbA1C (A1C) will be monitored. This is a simple blood test. It measures your blood sugar control over the last 2 to 3 months. You will receive this test every 3 to 6 months.  Learn how to check your blood sugar. Learn the symptoms of low and high blood sugar and how to manage them.  Always carry a quick-source of sugar with you in case you have symptoms of low blood sugar. Examples include hard sugar candy or glucose tablets. Make sure others know that you can choke if you eat or drink when you develop serious symptoms of low blood sugar, such as seizures or unconsciousness. They must get medical help at once.  Tell your doctor or health care professional if you have high blood sugar. You might need to change the dose of your medicine. If you are sick or exercising more than usual, you might need to change the dose of your medicine.  Do not skip meals. Ask your doctor or health care professional if you should avoid alcohol. Many  nonprescription cough and cold products contain sugar or alcohol. These can affect blood sugar.  Wear a medical ID bracelet or chain, and carry a card that describes your disease and details of your medicine and dosage times.  NOTE:This sheet is a summary. It may not cover all possible information. If you have questions about this medicine, talk to your doctor, pharmacist, or health care provider. Copyright© 2017 [employer]

## 2017-11-10 NOTE — PROGRESS NOTES
Diabetes Education  Author: Flower Cuenca RD, CDE  Date: 11/10/2017    Diabetes Education Visit  Diabetes Education Record Assessment/Progress: Post Program/Follow-up    Here today for follow up appt.    Lab Results   Component Value Date    HGBA1C 7.3 (H) 11/03/2017          Novolog 6 units ac meals, levemir 14 units once daily. Interested in V Go. Discussed a little. Has appt later today with Kait Kimball NP and will discuss if this is option.     Diabetes Type  Diabetes Type : Type II    Diabetes History  Diabetes Diagnosis: >10 years    Nutrition  Meal Planning: 3 meals per day    Monitoring   Self Monitoring : says left BG log/meter in car    Exercise   Exercise Type: none    Current Diabetes Treatment   Current Treatment: Insulin (novolog 6 units ac meals and levemir 14 units)    Social History  Primary Support: Self, Family  Educational Level: High School  Occupation: disabled  Smoking Status: Never a Smoker  Alcohol Use: Rarely    PHQ-2 Total Score: 0       DDS-2 Score  ( > 3 = SIGNIFICANT DISTRESS): 1.5                   Barriers to Change  Barriers to Change: None  Learning Challenges : None    Readiness to Learn   Readiness to Learn : Eager    Cultural Influences  Cultural Influences: No    Diabetes Education Assessment/Progress  Diabetes Disease Process (diabetes disease process and treatment options): Discussion  Nutrition (Incorporating nutritional management into one's lifestyle): Discussion, Individual Session  Monitoring (monitoring blood glucose/other parameters & using results): Discussion  Acute Complications (preventing, detecting, and treating acute complications): Discussion  Chronic Complications (preventing, detecting, and treating chronic complications): Discussion    Goals  Monitoring: In Progress  Reducing Risks: In Progress    Diabetes Meal Plan  Carbohydrate Per Meal: 45-60g    Education Units of Time   Time Spent: 60 min      Health Maintenance Topics with due status: Not Due        Topic Last Completion Date    TETANUS VACCINE 10/17/2013    Pap Smear with HPV Cotest 03/20/2017    Mammogram 03/27/2017    Foot Exam 04/03/2017    Eye Exam 04/03/2017    Lipid Panel 08/17/2017    Hemoglobin A1c 11/03/2017     Health Maintenance Due   Topic Date Due    Pneumococcal PPSV23 (High Risk) (1) 12/12/2013    Influenza Vaccine  08/01/2017

## 2017-11-10 NOTE — PROGRESS NOTES
CC: Valencia Dumont arrives today for management of Type 2 DM and review of chronic medical conditions.      HPI: Mrs. Valencia Dumont is a 47 y.o. Black or  female who was diagnosed with Type 2 DM in ~ 2007 shortly after being diagnosed with Lupus and on steroids in 2006. + s/s of fatigue and BG readings in the 300's.   Started on oral agent-Januvia initially then switched insulin therapy in 2007.     S/p liver transplant in 2013. On Prograft and Myfortic. No oral steroids at this time. Last dose of steroids were over this summer 2017 for tx of rejection.     She follows with Dr. Gamble for her diabetes management and osteoporosis. This is her first visit with me today. Her A1c has decreased from 8.1% to 7.3%.   She attended DM education this AM.     C-peptide 1.7 with BG of 194 in July 2016.        DIABETES COMPLICATIONS: nephropathy and peripheral neuropathy     HOSPITALIZATIONS FOR DIABETES OR RELATED COMPLICATIONS:  No    CURRENT A1C:    Hemoglobin A1C   Date Value Ref Range Status   11/03/2017 7.3 (H) 4.0 - 5.6 % Final     Comment:     According to ADA guidelines, hemoglobin A1c <7.0% represents  optimal control in non-pregnant diabetic patients. Different  metrics may apply to specific patient populations.   Standards of Medical Care in Diabetes-2016.  For the purpose of screening for the presence of diabetes:  <5.7%     Consistent with the absence of diabetes  5.7-6.4%  Consistent with increasing risk for diabetes   (prediabetes)  >or=6.5%  Consistent with diabetes  Currently, no consensus exists for use of hemoglobin A1c  for diagnosis of diabetes for children.  This Hemoglobin A1c assay has significant interference with fetal   hemoglobin   (HbF). The results are invalid for patients with abnormal amounts of   HbF,   including those with known Hereditary Persistence   of Fetal Hemoglobin. Heterozygous hemoglobin variants (HbAS, HbAC,   HbAD, HbAE, HbA2) do not significantly interfere with this  assay;   however, presence of multiple variants in a sample may impact the %   interference.     08/17/2017 8.1 (H) 4.0 - 5.6 % Final     Comment:     According to ADA guidelines, hemoglobin A1c <7.0% represents  optimal control in non-pregnant diabetic patients. Different  metrics may apply to specific patient populations.   Standards of Medical Care in Diabetes-2016.  For the purpose of screening for the presence of diabetes:  <5.7%     Consistent with the absence of diabetes  5.7-6.4%  Consistent with increasing risk for diabetes   (prediabetes)  >or=6.5%  Consistent with diabetes  Currently, no consensus exists for use of hemoglobin A1c  for diagnosis of diabetes for children.  This Hemoglobin A1c assay has significant interference with fetal   hemoglobin   (HbF). The results are invalid for patients with abnormal amounts of   HbF,   including those with known Hereditary Persistence   of Fetal Hemoglobin. Heterozygous hemoglobin variants (HbAS, HbAC,   HbAD, HbAE, HbA2) do not significantly interfere with this assay;   however, presence of multiple variants in a sample may impact the %   interference.     05/18/2017 9.4 (H) 4.5 - 6.2 % Final     Comment:     According to ADA guidelines, hemoglobin A1C <7.0% represents  optimal control in non-pregnant diabetic patients.  Different  metrics may apply to specific populations.   Standards of Medical Care in Diabetes - 2016.  For the purpose of screening for the presence of diabetes:  <5.7%     Consistent with the absence of diabetes  5.7-6.4%  Consistent with increasing risk for diabetes   (prediabetes)  >or=6.5%  Consistent with diabetes  Currently no consensus exists for use of hemoglobin A1C  for diagnosis of diabetes for children.         GOAL A1C: 7-7.5% (CKD- GFR in the 30-40's)     DM MEDICATIONS USED IN THE PAST: Levemir and Novolog, Humalog, Lantus, Januvia- switched to insulin therapy.     CURRENT DIABETIC MEDS: Levemir 14 units daily and Novolog 6 units  with meals. No correction scale at this time.   Uses Vials or Pens: Pens   + rotating sites- mostly to ABD.       STANDARDS of CARE:  Statin:  No  ACEI or ARB:  Yes - Losartan   ASA:  No  Last eye exam : April 2017- out side facility.   Last Podiatry Exam: 10/2017  Microalbumin/Creatinine ratio:  Lab Results   Component Value Date    JAYLENLBCKHUSHIAT 6.3 03/14/2017        BG readings are checked  2-3x/day. She left her BG logs at home today.   On average she reports BG readings are 110-230's. Denies BG readings > 250.        Hypoglycemia: Yes- occ a night when she misses a meal. + s/s with BG readings < 100- + diaphoresis, nervious, shakiness.    Treats with juice and food.      Skipped/missed glycemic medications: Yes- sometimes.     Prandial injections taken with meals: Yes     Physical Activity: No- limitations due to pain.     Skipping meals and/or snacking: Eating 2-3 meals per day. Sometimes skipping breakfast. Lunch is largest meal of the day. + snacking between meals on fruit- apples, sometimes chip or cookies. But mostly fruit.   Drinks water with crystal light.   Sometimes diet cokes.       MEDICATIONS, ALLERGIES, LABS, VS's, MEDICAL, SURGICAL, SOCIAL, AND FAMILY HISTORY reviewed and updated in the appropriate sections during this encounter    ROS  Constitutional: Negative for weight change  Endocrine:  Denies polyuria, polydipsia, nocturia.  HENT: Denies neck swelling, lumps, hoarseness or trouble swallowing. Denies any personal or family history of thyroid cancer.    Eyes: Negative for visual disturbance. Wears glasses.   Respiratory: Negative for cough. + SPENCE- seen pulmonary in the past.   Cardiovascular: Negative for chest pain or claudication.   Gastrointestinal: Negative for nausea, diarrhea, vomiting, bloating.  No history of pancreatitis, pancreatic cancer, or gastroparesis. + GERD.   Genitourinary: Negative for urgency, frequency, frequent urinary tract infections.  Skin: Denies prolonged wound  "healing. + sometimes bruising to insulin injection sites. None today.   Neurological: Negative for syncope, + intermittent numbness/burning of extremities- worse to left foot.  Psychiatric/Behavioral: Negative for depression or anxiety  MS: lower back pain- follows with pain management . Previously received steroid injections- last injection was at the beginning of 2017. + braces to bilateral lower leg. + bilateral knee pain.       Vital Signs  /76 (BP Location: Left arm, Patient Position: Sitting)   Pulse 83   Ht 5' 9" (1.753 m)   Wt 71.5 kg (157 lb 9.6 oz)   BMI 23.27 kg/m²         Chemistry        Component Value Date/Time     10/23/2017 1207    K 3.6 10/23/2017 1207     10/23/2017 1207    CO2 26 10/23/2017 1207    BUN 26 (H) 10/23/2017 1207    CREATININE 1.7 (H) 10/23/2017 1207     (H) 10/23/2017 1207        Component Value Date/Time    CALCIUM 9.2 10/23/2017 1207    ALKPHOS 81 10/23/2017 1207    AST 15 10/23/2017 1207    ALT 12 10/23/2017 1207    BILITOT 0.6 10/23/2017 1207    ESTGFRAFRICA 41 (A) 10/23/2017 1207    EGFRNONAA 35 (A) 10/23/2017 1207          Lab Results   Component Value Date    CHOL 217 (H) 08/17/2017    CHOL 202 (H) 07/18/2016    CHOL 120 10/03/2013     Lab Results   Component Value Date    HDL 87 (H) 08/17/2017    HDL 78 (H) 07/18/2016    HDL 13 (L) 10/03/2013     Lab Results   Component Value Date    LDLCALC 117.2 08/17/2017    LDLCALC 111.2 07/18/2016    LDLCALC 92.0 10/03/2013     Lab Results   Component Value Date    TRIG 64 08/17/2017    TRIG 64 07/18/2016    TRIG 75 10/03/2013     Lab Results   Component Value Date    CHOLHDL 40.1 08/17/2017    CHOLHDL 38.6 07/18/2016    CHOLHDL 10.8 (L) 10/03/2013       Lab Results   Component Value Date    TSH 1.512 08/17/2017       Lab Results   Component Value Date    MICALBCREAT 6.3 03/14/2017       Vit D, 25-Hydroxy   Date Value Ref Range Status   04/06/2017 21 (L) 30 - 96 ng/mL Final     Comment:     Vitamin D " deficiency.........<10 ng/mL                              Vitamin D insufficiency......10-29 ng/mL       Vitamin D sufficiency........> or equal to 30 ng/mL  Vitamin D toxicity............>100 ng/mL           PHYSICAL EXAMINATION  Constitutional: Well developed, well nourished, NAD.   Psych: AAOx3, appropriate mood and affect, pleasant expression, conversant, appears relaxed, well groomed.   Eyes: Conjunctiva and corneas clear.  Neck: Supple, trachea midline, FROM, no thyromegaly or nodules, carotid pulses strong, no bruits.  Respiratory: Resp even and unlabored, CTA bilateral.  Cardiac: RRR, S1, S2 heard, no murmurs; radial pulses +2 bilaterally.   Abdomen: Soft, non-tender, non-distended; +BSs x  4.  Vascular: Same temperature peripherally to centrally, DP pulses +2 bilaterally, no edema.   Neuro: Gait steady.   Skin: Normal skin turgor. Skin warm and dry. No acanthosis nigracans observed. Injection sites with no complications.   Feet:  Footwear appropriate.  MS: braces to bilateral legs.       Assessment/Plan    1. Type 2 diabetes mellitus with hyperglycemia, with long-term current use of insulin   A1c has decreased to 7.2% at goal of 7-7.5% due to CKD with GFR in the 30-40's.   Reports some variability r/t diet. Has been working hard to improve diet.   Discussed diagnosis of DM, progression of disease, long term complications and tx options including GLP1  Reviewed A1c and BG goals.       For now, continue insulin doses as documented above.   C-peptide and random glucose added to labs on 11/20.     In December, will start Trulicity 0.75 mg weekly x4 weeks. Then increase the dose to Trulicity 1.5 mg weekly indefinitely. Discussed MOA, side effects, including n/v/pancreatitis/medullary thyroid cancer. Instructed patient to notify me of any n/v/abdominal pain. Discussed proper dosing and administration.   CLEAR will cover starting with the new year. Will use voucher for December.  Printed rx for higher  dose.     With the start of Trulicity. Stop scheduled insulin ( Levemir and Novolog) and use correction scale only. Goal 150, ISF 50.     BG monitoring AC/HS for now. Change to BID at alternating times when she starts Trulicity. She will may logs after starting Trulicity.     Notify clinic if she is started on ANY po steroids or receives ANY steroid injections.         On ARB. No ASA or statin. LDL above goal of < 100. Will check with LTS for statin use as she is post transplant.          2. Diabetic polyneuropathy associated with type 2 diabetes mellitus - optimize BG readings. Continue to follow with podiatry. Reviewed DM foot care.           3. Essential hypertension- BP goal < 140/90. BP at goal, continue medications.           4. Microcytic normochromic anemia - may skew A1c results. Check Fructosamine with RTC.          5. 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- continue to follow with LTS.           6. Prophylactic immunotherapy (transplant immunosuppression) - increases insulin resistance.          7. Osteoporosis, unspecified osteoporosis type, unspecified pathological fracture presence - Vit d level added to labs on 11/20. Continue to follow with Dr. Gamble q6 month. Next in April 2017.        8. Type 2 diabetes mellitus with stage 3 chronic kidney disease, with long-term current use of insulin- avoid insulin stacking.   Urine MAC WNL.   Consider nephrology referral if renal function worsens.              ADDENDUM 11/20: C-peptide WNL with .       FOLLOW UP  Return in about 2 months (around 1/10/2018).     Orders Placed This Encounter   Procedures    C-peptide     Standing Status:   Future     Number of Occurrences:   1     Standing Expiration Date:   1/9/2019    Glucose, random     Standing Status:   Future     Number of Occurrences:   1     Standing Expiration Date:   1/9/2019    Lipid panel     Standing Status:   Future     Number of Occurrences:   1     Standing Expiration Date:   1/9/2019     Hepatic function panel     Standing Status:   Future     Number of Occurrences:   1     Standing Expiration Date:   1/9/2019    Hemoglobin A1c     Standing Status:   Future     Number of Occurrences:   1     Standing Expiration Date:   1/9/2019    Fructosamine     Standing Status:   Future     Number of Occurrences:   1     Standing Expiration Date:   1/9/2019

## 2017-11-10 NOTE — Clinical Note
Tyrell Mckeon,   I saw Ms. Dumont in the diabetes clinic today. I wanted to run by the transplant team about starting her on a statin for her cholesterol. Please let me know what their thoughts are on it. LFTs are WNL. Thank you in advance.   Sincerely,  YOLY Mccoy

## 2017-11-13 ENCOUNTER — PATIENT MESSAGE (OUTPATIENT)
Dept: ENDOCRINOLOGY | Facility: CLINIC | Age: 47
End: 2017-11-13

## 2017-11-15 ENCOUNTER — PATIENT MESSAGE (OUTPATIENT)
Dept: TRANSPLANT | Facility: CLINIC | Age: 47
End: 2017-11-15

## 2017-11-17 ENCOUNTER — TELEPHONE (OUTPATIENT)
Dept: ENDOCRINOLOGY | Facility: CLINIC | Age: 47
End: 2017-11-17

## 2017-11-17 ENCOUNTER — LAB VISIT (OUTPATIENT)
Dept: LAB | Facility: HOSPITAL | Age: 47
End: 2017-11-17
Attending: INTERNAL MEDICINE
Payer: MEDICARE

## 2017-11-17 DIAGNOSIS — Z94.4 LIVER REPLACED BY TRANSPLANT: ICD-10-CM

## 2017-11-17 DIAGNOSIS — Z79.4 TYPE 2 DIABETES MELLITUS WITH HYPERGLYCEMIA, WITH LONG-TERM CURRENT USE OF INSULIN: Primary | ICD-10-CM

## 2017-11-17 DIAGNOSIS — E11.65 TYPE 2 DIABETES MELLITUS WITH HYPERGLYCEMIA, WITH LONG-TERM CURRENT USE OF INSULIN: ICD-10-CM

## 2017-11-17 DIAGNOSIS — M81.0 OSTEOPOROSIS, UNSPECIFIED OSTEOPOROSIS TYPE, UNSPECIFIED PATHOLOGICAL FRACTURE PRESENCE: ICD-10-CM

## 2017-11-17 DIAGNOSIS — E11.65 TYPE 2 DIABETES MELLITUS WITH HYPERGLYCEMIA, WITH LONG-TERM CURRENT USE OF INSULIN: Primary | ICD-10-CM

## 2017-11-17 DIAGNOSIS — Z79.4 TYPE 2 DIABETES MELLITUS WITH HYPERGLYCEMIA, WITH LONG-TERM CURRENT USE OF INSULIN: ICD-10-CM

## 2017-11-17 LAB
ALBUMIN SERPL BCP-MCNC: 4 G/DL
ALBUMIN SERPL BCP-MCNC: 4.1 G/DL
ALP SERPL-CCNC: 92 U/L
ALP SERPL-CCNC: 93 U/L
ALT SERPL W/O P-5'-P-CCNC: 15 U/L
ALT SERPL W/O P-5'-P-CCNC: 15 U/L
ANION GAP SERPL CALC-SCNC: 8 MMOL/L
AST SERPL-CCNC: 19 U/L
AST SERPL-CCNC: 20 U/L
BASOPHILS # BLD AUTO: 0 K/UL
BASOPHILS NFR BLD: 0 %
BILIRUB DIRECT SERPL-MCNC: 0.2 MG/DL
BILIRUB SERPL-MCNC: 0.5 MG/DL
BILIRUB SERPL-MCNC: 0.5 MG/DL
BUN SERPL-MCNC: 27 MG/DL
CALCIUM SERPL-MCNC: 9.5 MG/DL
CHLORIDE SERPL-SCNC: 104 MMOL/L
CHOLEST SERPL-MCNC: 200 MG/DL
CHOLEST/HDLC SERPL: 2.2 {RATIO}
CO2 SERPL-SCNC: 28 MMOL/L
CREAT SERPL-MCNC: 1.6 MG/DL
DIFFERENTIAL METHOD: ABNORMAL
EOSINOPHIL # BLD AUTO: 0.1 K/UL
EOSINOPHIL NFR BLD: 3.3 %
ERYTHROCYTE [DISTWIDTH] IN BLOOD BY AUTOMATED COUNT: 14.9 %
EST. GFR  (AFRICAN AMERICAN): 44 ML/MIN/1.73 M^2
EST. GFR  (NON AFRICAN AMERICAN): 38 ML/MIN/1.73 M^2
GLUCOSE SERPL-MCNC: 166 MG/DL
GLUCOSE SERPL-MCNC: 166 MG/DL
HCT VFR BLD AUTO: 31.7 %
HDLC SERPL-MCNC: 90 MG/DL
HDLC SERPL: 45 %
HGB BLD-MCNC: 11.4 G/DL
LDLC SERPL CALC-MCNC: 100 MG/DL
LYMPHOCYTES # BLD AUTO: 2 K/UL
LYMPHOCYTES NFR BLD: 46.9 %
MAGNESIUM SERPL-MCNC: 1.8 MG/DL
MCH RBC QN AUTO: 27.5 PG
MCHC RBC AUTO-ENTMCNC: 36 G/DL
MCV RBC AUTO: 76 FL
MONOCYTES # BLD AUTO: 0.2 K/UL
MONOCYTES NFR BLD: 3.8 %
NEUTROPHILS # BLD AUTO: 1.9 K/UL
NEUTROPHILS NFR BLD: 46 %
NONHDLC SERPL-MCNC: 110 MG/DL
PLATELET # BLD AUTO: 173 K/UL
PMV BLD AUTO: 10.3 FL
POTASSIUM SERPL-SCNC: 3.9 MMOL/L
PROT SERPL-MCNC: 7.4 G/DL
PROT SERPL-MCNC: 7.5 G/DL
RBC # BLD AUTO: 4.15 M/UL
SODIUM SERPL-SCNC: 140 MMOL/L
TRIGL SERPL-MCNC: 50 MG/DL
WBC # BLD AUTO: 4.18 K/UL

## 2017-11-17 PROCEDURE — 83036 HEMOGLOBIN GLYCOSYLATED A1C: CPT

## 2017-11-17 PROCEDURE — 82306 VITAMIN D 25 HYDROXY: CPT

## 2017-11-17 PROCEDURE — 80061 LIPID PANEL: CPT

## 2017-11-17 PROCEDURE — 80076 HEPATIC FUNCTION PANEL: CPT

## 2017-11-17 PROCEDURE — 85025 COMPLETE CBC W/AUTO DIFF WBC: CPT

## 2017-11-17 PROCEDURE — 84681 ASSAY OF C-PEPTIDE: CPT

## 2017-11-17 PROCEDURE — 36415 COLL VENOUS BLD VENIPUNCTURE: CPT

## 2017-11-17 PROCEDURE — 80053 COMPREHEN METABOLIC PANEL: CPT

## 2017-11-17 PROCEDURE — 82985 ASSAY OF GLYCATED PROTEIN: CPT

## 2017-11-17 PROCEDURE — 83735 ASSAY OF MAGNESIUM: CPT

## 2017-11-17 PROCEDURE — 80197 ASSAY OF TACROLIMUS: CPT

## 2017-11-17 RX ORDER — ATORVASTATIN CALCIUM 10 MG/1
10 TABLET, FILM COATED ORAL DAILY
Qty: 30 TABLET | Refills: 3 | Status: SHIPPED | OUTPATIENT
Start: 2017-11-17 | End: 2018-03-15

## 2017-11-17 NOTE — TELEPHONE ENCOUNTER
Spoke with patient and liver transplant team. Ok to start Statin. Will start low dose Lipitor 10 mg daily to keep LDL < 100.   Hepatic function panel and lipids in 6 weeks.       SEGUNDO Gallardo NP FYI - she is ok to start a statin, sorry for the delay!

## 2017-11-18 LAB
25(OH)D3+25(OH)D2 SERPL-MCNC: 21 NG/ML
C PEPTIDE SERPL-MCNC: 1.47 NG/ML
ESTIMATED AVG GLUCOSE: 160 MG/DL
HBA1C MFR BLD HPLC: 7.2 %

## 2017-11-19 LAB — TACROLIMUS BLD-MCNC: 8 NG/ML

## 2017-11-21 ENCOUNTER — PATIENT MESSAGE (OUTPATIENT)
Dept: ENDOCRINOLOGY | Facility: CLINIC | Age: 47
End: 2017-11-21

## 2017-11-21 ENCOUNTER — TELEPHONE (OUTPATIENT)
Dept: ENDOCRINOLOGY | Facility: CLINIC | Age: 47
End: 2017-11-21

## 2017-11-21 DIAGNOSIS — E11.65 TYPE 2 DIABETES MELLITUS WITH HYPERGLYCEMIA, WITH LONG-TERM CURRENT USE OF INSULIN: Primary | ICD-10-CM

## 2017-11-21 DIAGNOSIS — Z79.4 TYPE 2 DIABETES MELLITUS WITH HYPERGLYCEMIA, WITH LONG-TERM CURRENT USE OF INSULIN: Primary | ICD-10-CM

## 2017-11-21 LAB — FRUCTOSAMINE SERPL-SCNC: 400 UMOL /L (ref 0–285)

## 2017-11-22 ENCOUNTER — TELEPHONE (OUTPATIENT)
Dept: TRANSPLANT | Facility: CLINIC | Age: 47
End: 2017-11-22

## 2017-11-22 NOTE — TELEPHONE ENCOUNTER
----- Message from Miriam Abernathy MD sent at 11/22/2017 12:22 PM CST -----  Reviewed, nothing to do

## 2017-11-27 ENCOUNTER — PATIENT MESSAGE (OUTPATIENT)
Dept: ENDOCRINOLOGY | Facility: CLINIC | Age: 47
End: 2017-11-27

## 2017-12-13 ENCOUNTER — CLINICAL SUPPORT (OUTPATIENT)
Dept: ENDOCRINOLOGY | Facility: CLINIC | Age: 47
End: 2017-12-13
Payer: MEDICARE

## 2017-12-13 DIAGNOSIS — Z79.4 TYPE 2 DIABETES MELLITUS WITH HYPERGLYCEMIA, WITH LONG-TERM CURRENT USE OF INSULIN: ICD-10-CM

## 2017-12-13 DIAGNOSIS — E11.65 TYPE 2 DIABETES MELLITUS WITH HYPERGLYCEMIA, WITH LONG-TERM CURRENT USE OF INSULIN: ICD-10-CM

## 2017-12-13 PROCEDURE — 95251 CONT GLUC MNTR ANALYSIS I&R: CPT | Mod: S$GLB,,, | Performed by: NURSE PRACTITIONER

## 2017-12-13 NOTE — PROGRESS NOTES
"DIABETES EDUCATOR NOTE   PLACEMENT OF FREESTYLE CHUN PRO SENSOR  CONTINOUS GLUCOSE MONITORING SYSTEM (CGMS)    Patient is here in clinic today for placement of continuous glucose monitoring sensor.      Each patient verified that they were here for CGMS procedure ordered by their provider and that they have a working glucose meter and supplies at home.   Patient will be provided with a Freestyle Chun Sensor and a copy of the Continuous Glucose Monitoring Patient Log to fill out during the study.   A detailed  explanation of Continuous Glucose Monitoring was  provided. Patient informed that this is a blind procedure and that they will not actually see the blood sugar tracing in real time.  Reviewed with patient the [employer] patient education handout called "Your Freestyle Chun Pro sensor: What you need to know" to review self-care during the study to avoid sensor loosening or removal ie... Bathing, Swimming, dressing, and exercising.   Instructed patient to check blood sugar using home glucometer and to record the following on provided patient log sheets:Blood sugar taken at home, Meals and snacks, Activity, and Diabetes medications taken and dosage    Patient was brought to a private location.  Arm for insertion was selected and prepared and allowed to dry. Glucose Sensor Serial Number [id number]  was inserted to back of patient's left upper arm.    The following forms  were given and reviewed in detail with patient and all questions answered.   · Continuous Glucose Monitoring Patient Log #[id number]  · Freestyle [employer] Patient Handout "Your Freestyle Chun Pro Sensor: What you need to know"     Instructions held in a group: Time: 15 min   Insertion of sensor done individually in private:  Time: 5 minutes       "

## 2017-12-19 ENCOUNTER — PATIENT MESSAGE (OUTPATIENT)
Dept: ENDOCRINOLOGY | Facility: CLINIC | Age: 47
End: 2017-12-19

## 2017-12-19 ENCOUNTER — CLINICAL SUPPORT (OUTPATIENT)
Dept: ENDOCRINOLOGY | Facility: CLINIC | Age: 47
End: 2017-12-19
Payer: MEDICARE

## 2017-12-19 DIAGNOSIS — E11.65 TYPE 2 DIABETES MELLITUS WITH HYPERGLYCEMIA, WITH LONG-TERM CURRENT USE OF INSULIN: ICD-10-CM

## 2017-12-19 DIAGNOSIS — Z79.4 TYPE 2 DIABETES MELLITUS WITH HYPERGLYCEMIA, WITH LONG-TERM CURRENT USE OF INSULIN: ICD-10-CM

## 2017-12-19 PROCEDURE — 95250 CONT GLUC MNTR PHYS/QHP EQP: CPT | Mod: S$GLB,,, | Performed by: DIETITIAN, REGISTERED

## 2017-12-20 ENCOUNTER — PATIENT MESSAGE (OUTPATIENT)
Dept: ENDOCRINOLOGY | Facility: CLINIC | Age: 47
End: 2017-12-20

## 2017-12-20 ENCOUNTER — PATIENT MESSAGE (OUTPATIENT)
Dept: ENDOCRINOLOGY | Facility: HOSPITAL | Age: 47
End: 2017-12-20

## 2017-12-21 NOTE — PROGRESS NOTES
DIABETES EDUCATOR NOTE   Return of the Freestyle Maninder Pro Sensor and Patient Log.    Patient returned to clinic today to return Glucose Sensor and signed patient log form used in [facility] procedure.    The CGMS Sensor will be scanned and downloaded. All reports will be imported into the patient's electronic medical record.    Endocrine Nurse Practitioner will complete data interpretation and make recommendations; will forward recommendations to the ordering provider for follow up with patient.

## 2017-12-22 ENCOUNTER — TELEPHONE (OUTPATIENT)
Dept: ENDOCRINOLOGY | Facility: CLINIC | Age: 47
End: 2017-12-22

## 2017-12-22 ENCOUNTER — PATIENT MESSAGE (OUTPATIENT)
Dept: ENDOCRINOLOGY | Facility: CLINIC | Age: 47
End: 2017-12-22

## 2017-12-22 RX ORDER — PEN NEEDLE, DIABETIC 30 GX3/16"
NEEDLE, DISPOSABLE MISCELLANEOUS
Qty: 400 EACH | Refills: 3 | Status: SHIPPED | OUTPATIENT
Start: 2017-12-22 | End: 2019-06-10

## 2017-12-22 NOTE — TELEPHONE ENCOUNTER
Called patient to review CGMS results.   Will proceed with plans from clinic visit.   Stop insulin therapy and start GLP1.     rx was sent for Trulicity at last visit.   Start trulicity 0.75 mg weekly for one month. Then increase the dose to Trulicity 1.5 mg weekly indefinitely. Discussed MOA, side effects, including n/v/pancreatitis/medullary thyroid cancer. Instructed patient to notify me of any n/v/abdominal pain. Discussed proper dosing and administration.   With the start of Trulicity. Stop scheduled insulin ( Levemir and Novolog) and use correction scale only. Goal 150, ISF 50.     Notify clinic if BG readings are consistently > 200 when on Trulicity 1.5 mg weekly.     F/u in 2 months

## 2017-12-27 ENCOUNTER — PATIENT MESSAGE (OUTPATIENT)
Dept: PODIATRY | Facility: CLINIC | Age: 47
End: 2017-12-27

## 2017-12-27 ENCOUNTER — PATIENT MESSAGE (OUTPATIENT)
Dept: ENDOCRINOLOGY | Facility: CLINIC | Age: 47
End: 2017-12-27

## 2017-12-28 ENCOUNTER — DOCUMENTATION ONLY (OUTPATIENT)
Dept: ENDOCRINOLOGY | Facility: CLINIC | Age: 47
End: 2017-12-28

## 2017-12-28 NOTE — PROGRESS NOTES
Reports constipation and loss of appetite on lipitor  Will stop for one week  Restart at half a pill daily

## 2017-12-29 ENCOUNTER — LAB VISIT (OUTPATIENT)
Dept: LAB | Facility: HOSPITAL | Age: 47
End: 2017-12-29
Attending: NURSE PRACTITIONER
Payer: MEDICARE

## 2017-12-29 DIAGNOSIS — Z79.4 TYPE 2 DIABETES MELLITUS WITH HYPERGLYCEMIA, WITH LONG-TERM CURRENT USE OF INSULIN: ICD-10-CM

## 2017-12-29 DIAGNOSIS — E11.65 TYPE 2 DIABETES MELLITUS WITH HYPERGLYCEMIA, WITH LONG-TERM CURRENT USE OF INSULIN: ICD-10-CM

## 2017-12-29 LAB
ALBUMIN SERPL BCP-MCNC: 4 G/DL
ALP SERPL-CCNC: 92 U/L
ALT SERPL W/O P-5'-P-CCNC: 12 U/L
AST SERPL-CCNC: 13 U/L
BILIRUB DIRECT SERPL-MCNC: 0.2 MG/DL
BILIRUB SERPL-MCNC: 0.5 MG/DL
CHOLEST SERPL-MCNC: 178 MG/DL
CHOLEST/HDLC SERPL: 2 {RATIO}
HDLC SERPL-MCNC: 91 MG/DL
HDLC SERPL: 51.1 %
LDLC SERPL CALC-MCNC: 77.6 MG/DL
NONHDLC SERPL-MCNC: 87 MG/DL
PROT SERPL-MCNC: 7.6 G/DL
TRIGL SERPL-MCNC: 47 MG/DL

## 2017-12-29 PROCEDURE — 36415 COLL VENOUS BLD VENIPUNCTURE: CPT

## 2017-12-29 PROCEDURE — 80061 LIPID PANEL: CPT

## 2017-12-29 PROCEDURE — 80076 HEPATIC FUNCTION PANEL: CPT

## 2018-01-02 ENCOUNTER — PATIENT MESSAGE (OUTPATIENT)
Dept: ENDOCRINOLOGY | Facility: CLINIC | Age: 48
End: 2018-01-02

## 2018-01-02 RX ORDER — ERGOCALCIFEROL 1.25 MG/1
50000 CAPSULE ORAL
Qty: 4 CAPSULE | Refills: 2 | Status: SHIPPED | OUTPATIENT
Start: 2018-01-02 | End: 2020-08-26

## 2018-01-02 RX ORDER — LANCETS
EACH MISCELLANEOUS
Qty: 200 EACH | Refills: 11 | Status: SHIPPED | OUTPATIENT
Start: 2018-01-02 | End: 2019-06-10

## 2018-01-02 RX ORDER — INSULIN PUMP SYRINGE, 3 ML
EACH MISCELLANEOUS
Qty: 1 EACH | Refills: 0 | Status: SHIPPED | OUTPATIENT
Start: 2018-01-02 | End: 2018-07-27 | Stop reason: SDUPTHER

## 2018-01-02 NOTE — TELEPHONE ENCOUNTER
Vitamin D 21  Hg 11.4    PLAN  Ok to take MVI  Refilled ergo 50K for 6 weeks  Important to take OTC vitamin D3 2000 daily

## 2018-01-04 ENCOUNTER — PATIENT MESSAGE (OUTPATIENT)
Dept: ENDOCRINOLOGY | Facility: CLINIC | Age: 48
End: 2018-01-04

## 2018-01-11 ENCOUNTER — PATIENT MESSAGE (OUTPATIENT)
Dept: PODIATRY | Facility: CLINIC | Age: 48
End: 2018-01-11

## 2018-01-13 ENCOUNTER — PATIENT MESSAGE (OUTPATIENT)
Dept: TRANSPLANT | Facility: CLINIC | Age: 48
End: 2018-01-13

## 2018-01-15 DIAGNOSIS — E11.65 TYPE 2 DIABETES MELLITUS WITH HYPERGLYCEMIA, WITH LONG-TERM CURRENT USE OF INSULIN: ICD-10-CM

## 2018-01-15 DIAGNOSIS — Z79.4 TYPE 2 DIABETES MELLITUS WITH HYPERGLYCEMIA, WITH LONG-TERM CURRENT USE OF INSULIN: ICD-10-CM

## 2018-01-22 ENCOUNTER — PATIENT MESSAGE (OUTPATIENT)
Dept: TRANSPLANT | Facility: CLINIC | Age: 48
End: 2018-01-22

## 2018-01-22 ENCOUNTER — LAB VISIT (OUTPATIENT)
Dept: LAB | Facility: HOSPITAL | Age: 48
End: 2018-01-22
Attending: FAMILY MEDICINE
Payer: MEDICARE

## 2018-01-22 DIAGNOSIS — Z94.4 LIVER REPLACED BY TRANSPLANT: ICD-10-CM

## 2018-01-22 LAB
ALBUMIN SERPL BCP-MCNC: 4.4 G/DL
ALP SERPL-CCNC: 129 U/L
ALT SERPL W/O P-5'-P-CCNC: 27 U/L
ANION GAP SERPL CALC-SCNC: 12 MMOL/L
AST SERPL-CCNC: 16 U/L
BASOPHILS # BLD AUTO: 0.01 K/UL
BASOPHILS NFR BLD: 0.2 %
BILIRUB SERPL-MCNC: 0.7 MG/DL
BUN SERPL-MCNC: 34 MG/DL
CALCIUM SERPL-MCNC: 9.4 MG/DL
CHLORIDE SERPL-SCNC: 103 MMOL/L
CO2 SERPL-SCNC: 27 MMOL/L
CREAT SERPL-MCNC: 2 MG/DL
DIFFERENTIAL METHOD: ABNORMAL
EOSINOPHIL # BLD AUTO: 0.3 K/UL
EOSINOPHIL NFR BLD: 5.9 %
ERYTHROCYTE [DISTWIDTH] IN BLOOD BY AUTOMATED COUNT: 13.9 %
EST. GFR  (AFRICAN AMERICAN): 34 ML/MIN/1.73 M^2
EST. GFR  (NON AFRICAN AMERICAN): 29 ML/MIN/1.73 M^2
GLUCOSE SERPL-MCNC: 143 MG/DL
HCT VFR BLD AUTO: 34.3 %
HGB BLD-MCNC: 12.4 G/DL
LYMPHOCYTES # BLD AUTO: 2.1 K/UL
LYMPHOCYTES NFR BLD: 46.4 %
MAGNESIUM SERPL-MCNC: 2.1 MG/DL
MCH RBC QN AUTO: 26.7 PG
MCHC RBC AUTO-ENTMCNC: 36.2 G/DL
MCV RBC AUTO: 74 FL
MONOCYTES # BLD AUTO: 0.3 K/UL
MONOCYTES NFR BLD: 6.3 %
NEUTROPHILS # BLD AUTO: 1.8 K/UL
NEUTROPHILS NFR BLD: 41.2 %
PLATELET # BLD AUTO: 215 K/UL
PMV BLD AUTO: 9.8 FL
POTASSIUM SERPL-SCNC: 3.7 MMOL/L
PROT SERPL-MCNC: 7.7 G/DL
RBC # BLD AUTO: 4.64 M/UL
SODIUM SERPL-SCNC: 142 MMOL/L
WBC # BLD AUTO: 4.42 K/UL

## 2018-01-22 PROCEDURE — 36415 COLL VENOUS BLD VENIPUNCTURE: CPT

## 2018-01-22 PROCEDURE — 80197 ASSAY OF TACROLIMUS: CPT

## 2018-01-22 PROCEDURE — 85025 COMPLETE CBC W/AUTO DIFF WBC: CPT

## 2018-01-22 PROCEDURE — 83735 ASSAY OF MAGNESIUM: CPT

## 2018-01-22 PROCEDURE — 80053 COMPREHEN METABOLIC PANEL: CPT

## 2018-01-23 LAB — TACROLIMUS BLD-MCNC: 15.5 NG/ML

## 2018-01-25 RX ORDER — TACROLIMUS 1 MG/1
4 CAPSULE ORAL EVERY 12 HOURS
Qty: 240 CAPSULE | Refills: 11 | Status: SHIPPED | OUTPATIENT
Start: 2018-01-25 | End: 2018-02-02 | Stop reason: SDUPTHER

## 2018-01-26 ENCOUNTER — LAB VISIT (OUTPATIENT)
Dept: LAB | Facility: HOSPITAL | Age: 48
End: 2018-01-26
Attending: INTERNAL MEDICINE
Payer: MEDICARE

## 2018-01-26 DIAGNOSIS — Z94.4 LIVER REPLACED BY TRANSPLANT: ICD-10-CM

## 2018-01-26 LAB
ALBUMIN SERPL BCP-MCNC: 4.3 G/DL
ALP SERPL-CCNC: 121 U/L
ALT SERPL W/O P-5'-P-CCNC: 14 U/L
ANION GAP SERPL CALC-SCNC: 7 MMOL/L
AST SERPL-CCNC: 11 U/L
BASOPHILS # BLD AUTO: 0.01 K/UL
BASOPHILS NFR BLD: 0.2 %
BILIRUB SERPL-MCNC: 0.5 MG/DL
BUN SERPL-MCNC: 37 MG/DL
CALCIUM SERPL-MCNC: 9.5 MG/DL
CHLORIDE SERPL-SCNC: 105 MMOL/L
CO2 SERPL-SCNC: 29 MMOL/L
CREAT SERPL-MCNC: 1.8 MG/DL
DIFFERENTIAL METHOD: ABNORMAL
EOSINOPHIL # BLD AUTO: 0.3 K/UL
EOSINOPHIL NFR BLD: 6.1 %
ERYTHROCYTE [DISTWIDTH] IN BLOOD BY AUTOMATED COUNT: 13.9 %
EST. GFR  (AFRICAN AMERICAN): 38 ML/MIN/1.73 M^2
EST. GFR  (NON AFRICAN AMERICAN): 33 ML/MIN/1.73 M^2
GLUCOSE SERPL-MCNC: 137 MG/DL
HCT VFR BLD AUTO: 33.9 %
HGB BLD-MCNC: 11.7 G/DL
LYMPHOCYTES # BLD AUTO: 1.9 K/UL
LYMPHOCYTES NFR BLD: 44.3 %
MAGNESIUM SERPL-MCNC: 1.9 MG/DL
MCH RBC QN AUTO: 26.2 PG
MCHC RBC AUTO-ENTMCNC: 34.5 G/DL
MCV RBC AUTO: 76 FL
MONOCYTES # BLD AUTO: 0.3 K/UL
MONOCYTES NFR BLD: 7.3 %
NEUTROPHILS # BLD AUTO: 1.8 K/UL
NEUTROPHILS NFR BLD: 42.1 %
PLATELET # BLD AUTO: 220 K/UL
PMV BLD AUTO: 9.8 FL
POTASSIUM SERPL-SCNC: 4 MMOL/L
PROT SERPL-MCNC: 7.3 G/DL
RBC # BLD AUTO: 4.46 M/UL
SODIUM SERPL-SCNC: 141 MMOL/L
WBC # BLD AUTO: 4.27 K/UL

## 2018-01-26 PROCEDURE — 80053 COMPREHEN METABOLIC PANEL: CPT

## 2018-01-26 PROCEDURE — 36415 COLL VENOUS BLD VENIPUNCTURE: CPT

## 2018-01-26 PROCEDURE — 85025 COMPLETE CBC W/AUTO DIFF WBC: CPT

## 2018-01-26 PROCEDURE — 80197 ASSAY OF TACROLIMUS: CPT

## 2018-01-26 PROCEDURE — 83735 ASSAY OF MAGNESIUM: CPT

## 2018-01-27 LAB — TACROLIMUS BLD-MCNC: 8.8 NG/ML

## 2018-01-29 ENCOUNTER — TELEPHONE (OUTPATIENT)
Dept: TRANSPLANT | Facility: CLINIC | Age: 48
End: 2018-01-29

## 2018-01-29 ENCOUNTER — PATIENT MESSAGE (OUTPATIENT)
Dept: TRANSPLANT | Facility: CLINIC | Age: 48
End: 2018-01-29

## 2018-01-29 NOTE — TELEPHONE ENCOUNTER
----- Message from Miriam Abernathy MD sent at 1/28/2018  4:21 PM CST -----  Tacrolimus level is better and kidney function is improving.  Repeat labs this week.

## 2018-01-29 NOTE — TELEPHONE ENCOUNTER
Reviewed labs with pt, that are improving. No med changes needed and will recheck labs Thurs 2/1. Pt verbalizes understanding.

## 2018-02-01 ENCOUNTER — PATIENT MESSAGE (OUTPATIENT)
Dept: TRANSPLANT | Facility: CLINIC | Age: 48
End: 2018-02-01

## 2018-02-01 ENCOUNTER — LAB VISIT (OUTPATIENT)
Dept: LAB | Facility: HOSPITAL | Age: 48
End: 2018-02-01
Attending: INTERNAL MEDICINE
Payer: MEDICARE

## 2018-02-01 DIAGNOSIS — Z94.4 LIVER REPLACED BY TRANSPLANT: ICD-10-CM

## 2018-02-01 LAB
ALBUMIN SERPL BCP-MCNC: 4.6 G/DL
ALP SERPL-CCNC: 110 U/L
ALT SERPL W/O P-5'-P-CCNC: 13 U/L
ANION GAP SERPL CALC-SCNC: 8 MMOL/L
ANISOCYTOSIS BLD QL SMEAR: SLIGHT
AST SERPL-CCNC: 16 U/L
BASOPHILS # BLD AUTO: 0.01 K/UL
BASOPHILS NFR BLD: 0.3 %
BILIRUB SERPL-MCNC: 0.6 MG/DL
BUN SERPL-MCNC: 36 MG/DL
CALCIUM SERPL-MCNC: 9.7 MG/DL
CHLORIDE SERPL-SCNC: 102 MMOL/L
CO2 SERPL-SCNC: 30 MMOL/L
CREAT SERPL-MCNC: 2.1 MG/DL
DIFFERENTIAL METHOD: ABNORMAL
EOSINOPHIL # BLD AUTO: 0.2 K/UL
EOSINOPHIL NFR BLD: 5.2 %
ERYTHROCYTE [DISTWIDTH] IN BLOOD BY AUTOMATED COUNT: 14.2 %
EST. GFR  (AFRICAN AMERICAN): 32 ML/MIN/1.73 M^2
EST. GFR  (NON AFRICAN AMERICAN): 27 ML/MIN/1.73 M^2
GLUCOSE SERPL-MCNC: 169 MG/DL
HCT VFR BLD AUTO: 35 %
HGB BLD-MCNC: 12.3 G/DL
LYMPHOCYTES # BLD AUTO: 1.6 K/UL
LYMPHOCYTES NFR BLD: 40.9 %
MAGNESIUM SERPL-MCNC: 2.4 MG/DL
MCH RBC QN AUTO: 26.3 PG
MCHC RBC AUTO-ENTMCNC: 35.1 G/DL
MCV RBC AUTO: 75 FL
MONOCYTES # BLD AUTO: 0.2 K/UL
MONOCYTES NFR BLD: 5.8 %
NEUTROPHILS # BLD AUTO: 1.8 K/UL
NEUTROPHILS NFR BLD: 47.8 %
PLATELET # BLD AUTO: 240 K/UL
PMV BLD AUTO: 10.7 FL
POLYCHROMASIA BLD QL SMEAR: ABNORMAL
POTASSIUM SERPL-SCNC: 3.4 MMOL/L
PROT SERPL-MCNC: 7.6 G/DL
RBC # BLD AUTO: 4.68 M/UL
SODIUM SERPL-SCNC: 140 MMOL/L
WBC # BLD AUTO: 3.81 K/UL

## 2018-02-01 PROCEDURE — 80197 ASSAY OF TACROLIMUS: CPT

## 2018-02-01 PROCEDURE — 85025 COMPLETE CBC W/AUTO DIFF WBC: CPT

## 2018-02-01 PROCEDURE — 83735 ASSAY OF MAGNESIUM: CPT

## 2018-02-01 PROCEDURE — 36415 COLL VENOUS BLD VENIPUNCTURE: CPT

## 2018-02-01 PROCEDURE — 80053 COMPREHEN METABOLIC PANEL: CPT

## 2018-02-02 ENCOUNTER — PATIENT MESSAGE (OUTPATIENT)
Dept: RHEUMATOLOGY | Facility: CLINIC | Age: 48
End: 2018-02-02

## 2018-02-02 ENCOUNTER — PATIENT MESSAGE (OUTPATIENT)
Dept: GASTROENTEROLOGY | Facility: CLINIC | Age: 48
End: 2018-02-02

## 2018-02-02 ENCOUNTER — PATIENT MESSAGE (OUTPATIENT)
Dept: ENDOCRINOLOGY | Facility: CLINIC | Age: 48
End: 2018-02-02

## 2018-02-02 ENCOUNTER — PATIENT MESSAGE (OUTPATIENT)
Dept: TRANSPLANT | Facility: CLINIC | Age: 48
End: 2018-02-02

## 2018-02-02 DIAGNOSIS — D84.9 IMMUNOSUPPRESSION: ICD-10-CM

## 2018-02-02 DIAGNOSIS — Z94.4 LIVER TRANSPLANTED: Primary | ICD-10-CM

## 2018-02-02 LAB — TACROLIMUS BLD-MCNC: 11.4 NG/ML

## 2018-02-02 NOTE — TELEPHONE ENCOUNTER
----- Message from Miriam Abernathy MD sent at 2/2/2018  3:03 PM CST -----  Tacrolimus level is high despite significant decrease in dose. Decreased to 3 mg twice a day repeat labs next week

## 2018-02-05 ENCOUNTER — PATIENT MESSAGE (OUTPATIENT)
Dept: RHEUMATOLOGY | Facility: CLINIC | Age: 48
End: 2018-02-05

## 2018-02-06 ENCOUNTER — LAB VISIT (OUTPATIENT)
Dept: LAB | Facility: HOSPITAL | Age: 48
End: 2018-02-06
Attending: INTERNAL MEDICINE
Payer: MEDICARE

## 2018-02-06 ENCOUNTER — PATIENT MESSAGE (OUTPATIENT)
Dept: RHEUMATOLOGY | Facility: CLINIC | Age: 48
End: 2018-02-06

## 2018-02-06 DIAGNOSIS — Z94.4 LIVER REPLACED BY TRANSPLANT: ICD-10-CM

## 2018-02-06 LAB
ALBUMIN SERPL BCP-MCNC: 4.4 G/DL
ALP SERPL-CCNC: 103 U/L
ALT SERPL W/O P-5'-P-CCNC: 17 U/L
ANION GAP SERPL CALC-SCNC: 9 MMOL/L
AST SERPL-CCNC: 14 U/L
BASOPHILS # BLD AUTO: 0.01 K/UL
BASOPHILS NFR BLD: 0.2 %
BILIRUB SERPL-MCNC: 0.5 MG/DL
BUN SERPL-MCNC: 33 MG/DL
CALCIUM SERPL-MCNC: 9.2 MG/DL
CHLORIDE SERPL-SCNC: 103 MMOL/L
CO2 SERPL-SCNC: 27 MMOL/L
CREAT SERPL-MCNC: 1.8 MG/DL
DIFFERENTIAL METHOD: ABNORMAL
EOSINOPHIL # BLD AUTO: 0.3 K/UL
EOSINOPHIL NFR BLD: 7 %
ERYTHROCYTE [DISTWIDTH] IN BLOOD BY AUTOMATED COUNT: 14 %
EST. GFR  (AFRICAN AMERICAN): 38 ML/MIN/1.73 M^2
EST. GFR  (NON AFRICAN AMERICAN): 33 ML/MIN/1.73 M^2
GLUCOSE SERPL-MCNC: 152 MG/DL
HCT VFR BLD AUTO: 34.8 %
HGB BLD-MCNC: 11.9 G/DL
LYMPHOCYTES # BLD AUTO: 2 K/UL
LYMPHOCYTES NFR BLD: 47.2 %
MAGNESIUM SERPL-MCNC: 1.7 MG/DL
MCH RBC QN AUTO: 26 PG
MCHC RBC AUTO-ENTMCNC: 34.2 G/DL
MCV RBC AUTO: 76 FL
MONOCYTES # BLD AUTO: 0.3 K/UL
MONOCYTES NFR BLD: 6.2 %
NEUTROPHILS # BLD AUTO: 1.6 K/UL
NEUTROPHILS NFR BLD: 39.4 %
PLATELET # BLD AUTO: 205 K/UL
PMV BLD AUTO: 10.6 FL
POTASSIUM SERPL-SCNC: 3.4 MMOL/L
PROT SERPL-MCNC: 7.6 G/DL
RBC # BLD AUTO: 4.57 M/UL
SODIUM SERPL-SCNC: 139 MMOL/L
WBC # BLD AUTO: 4.17 K/UL

## 2018-02-06 PROCEDURE — 83735 ASSAY OF MAGNESIUM: CPT

## 2018-02-06 PROCEDURE — 80197 ASSAY OF TACROLIMUS: CPT

## 2018-02-06 PROCEDURE — 85025 COMPLETE CBC W/AUTO DIFF WBC: CPT

## 2018-02-06 PROCEDURE — 36415 COLL VENOUS BLD VENIPUNCTURE: CPT

## 2018-02-06 PROCEDURE — 80053 COMPREHEN METABOLIC PANEL: CPT

## 2018-02-06 RX ORDER — TACROLIMUS 1 MG/1
3 CAPSULE ORAL EVERY 12 HOURS
Qty: 180 CAPSULE | Refills: 11 | Status: SHIPPED | OUTPATIENT
Start: 2018-02-06 | End: 2019-01-28 | Stop reason: SDUPTHER

## 2018-02-07 ENCOUNTER — PATIENT MESSAGE (OUTPATIENT)
Dept: TRANSPLANT | Facility: CLINIC | Age: 48
End: 2018-02-07

## 2018-02-07 ENCOUNTER — TELEPHONE (OUTPATIENT)
Dept: TRANSPLANT | Facility: CLINIC | Age: 48
End: 2018-02-07

## 2018-02-07 LAB — TACROLIMUS BLD-MCNC: 8.5 NG/ML

## 2018-02-07 NOTE — TELEPHONE ENCOUNTER
Informed pt labs reviewed and are improving, no med changes needed. Will repeat labs Monday 2/12.

## 2018-02-07 NOTE — TELEPHONE ENCOUNTER
----- Message from Miriam Abernathy MD sent at 2/7/2018 12:14 PM CST -----  Tacrolimus level is better.  Kidney function is improving.  Repeat labs next week.

## 2018-02-08 ENCOUNTER — PATIENT MESSAGE (OUTPATIENT)
Dept: GASTROENTEROLOGY | Facility: CLINIC | Age: 48
End: 2018-02-08

## 2018-02-14 ENCOUNTER — LAB VISIT (OUTPATIENT)
Dept: LAB | Facility: HOSPITAL | Age: 48
End: 2018-02-14
Attending: INTERNAL MEDICINE
Payer: MEDICARE

## 2018-02-14 DIAGNOSIS — Z94.4 LIVER REPLACED BY TRANSPLANT: ICD-10-CM

## 2018-02-14 LAB
ALBUMIN SERPL BCP-MCNC: 4.3 G/DL
ALP SERPL-CCNC: 88 U/L
ALT SERPL W/O P-5'-P-CCNC: 15 U/L
ANION GAP SERPL CALC-SCNC: 8 MMOL/L
ANISOCYTOSIS BLD QL SMEAR: SLIGHT
AST SERPL-CCNC: 13 U/L
BASOPHILS # BLD AUTO: 0.01 K/UL
BASOPHILS NFR BLD: 0.2 %
BILIRUB SERPL-MCNC: 0.6 MG/DL
BUN SERPL-MCNC: 27 MG/DL
CALCIUM SERPL-MCNC: 9.3 MG/DL
CHLORIDE SERPL-SCNC: 105 MMOL/L
CO2 SERPL-SCNC: 25 MMOL/L
CREAT SERPL-MCNC: 1.5 MG/DL
DIFFERENTIAL METHOD: ABNORMAL
EOSINOPHIL # BLD AUTO: 0.3 K/UL
EOSINOPHIL NFR BLD: 5.3 %
ERYTHROCYTE [DISTWIDTH] IN BLOOD BY AUTOMATED COUNT: 14 %
EST. GFR  (AFRICAN AMERICAN): 47 ML/MIN/1.73 M^2
EST. GFR  (NON AFRICAN AMERICAN): 41 ML/MIN/1.73 M^2
GLUCOSE SERPL-MCNC: 206 MG/DL
HCT VFR BLD AUTO: 33.5 %
HGB BLD-MCNC: 11.7 G/DL
LYMPHOCYTES # BLD AUTO: 1.6 K/UL
LYMPHOCYTES NFR BLD: 32 %
MCH RBC QN AUTO: 26.1 PG
MCHC RBC AUTO-ENTMCNC: 34.9 G/DL
MCV RBC AUTO: 75 FL
MONOCYTES # BLD AUTO: 0.3 K/UL
MONOCYTES NFR BLD: 5.1 %
NEUTROPHILS # BLD AUTO: 2.8 K/UL
NEUTROPHILS NFR BLD: 57.4 %
PLATELET # BLD AUTO: 182 K/UL
PMV BLD AUTO: 10.3 FL
POLYCHROMASIA BLD QL SMEAR: ABNORMAL
POTASSIUM SERPL-SCNC: 3.5 MMOL/L
PROT SERPL-MCNC: 7.3 G/DL
RBC # BLD AUTO: 4.48 M/UL
SODIUM SERPL-SCNC: 138 MMOL/L
WBC # BLD AUTO: 4.87 K/UL

## 2018-02-14 PROCEDURE — 80197 ASSAY OF TACROLIMUS: CPT

## 2018-02-14 PROCEDURE — 85025 COMPLETE CBC W/AUTO DIFF WBC: CPT

## 2018-02-14 PROCEDURE — 36415 COLL VENOUS BLD VENIPUNCTURE: CPT

## 2018-02-14 PROCEDURE — 80053 COMPREHEN METABOLIC PANEL: CPT

## 2018-02-15 ENCOUNTER — TELEPHONE (OUTPATIENT)
Dept: TRANSPLANT | Facility: CLINIC | Age: 48
End: 2018-02-15

## 2018-02-15 ENCOUNTER — PATIENT MESSAGE (OUTPATIENT)
Dept: ENDOCRINOLOGY | Facility: CLINIC | Age: 48
End: 2018-02-15

## 2018-02-15 LAB — TACROLIMUS BLD-MCNC: 1.8 NG/ML

## 2018-02-15 NOTE — TELEPHONE ENCOUNTER
----- Message from Miriam Abernathy MD sent at 2/15/2018 11:31 AM CST -----  Tacrolimus level is now way too low.  Need to make sure patient is still been taking medication and taking it correctly.  She should still be on 3 mg twice a day.  Repeat labs Monday.

## 2018-02-15 NOTE — TELEPHONE ENCOUNTER
Called pt to verify.  Pt reports taking correct dosage, but level is not a true trough.  Instructions given for fasting labs twelve hours after Prograf is taken. Instructed to repeat labs on 2/19.  Pt repeated instructions and verbalized understanding.

## 2018-02-19 ENCOUNTER — PATIENT MESSAGE (OUTPATIENT)
Dept: TRANSPLANT | Facility: CLINIC | Age: 48
End: 2018-02-19

## 2018-02-19 ENCOUNTER — LAB VISIT (OUTPATIENT)
Dept: LAB | Facility: HOSPITAL | Age: 48
End: 2018-02-19
Attending: INTERNAL MEDICINE
Payer: MEDICARE

## 2018-02-19 DIAGNOSIS — Z94.4 LIVER REPLACED BY TRANSPLANT: ICD-10-CM

## 2018-02-19 LAB
ALBUMIN SERPL BCP-MCNC: 4.3 G/DL
ALP SERPL-CCNC: 106 U/L
ALT SERPL W/O P-5'-P-CCNC: 42 U/L
ANION GAP SERPL CALC-SCNC: 7 MMOL/L
AST SERPL-CCNC: 50 U/L
BASOPHILS # BLD AUTO: 0.01 K/UL
BASOPHILS NFR BLD: 0.2 %
BILIRUB SERPL-MCNC: 0.6 MG/DL
BUN SERPL-MCNC: 36 MG/DL
CALCIUM SERPL-MCNC: 9.5 MG/DL
CHLORIDE SERPL-SCNC: 105 MMOL/L
CO2 SERPL-SCNC: 27 MMOL/L
CREAT SERPL-MCNC: 1.5 MG/DL
DIFFERENTIAL METHOD: ABNORMAL
EOSINOPHIL # BLD AUTO: 0.3 K/UL
EOSINOPHIL NFR BLD: 6.5 %
ERYTHROCYTE [DISTWIDTH] IN BLOOD BY AUTOMATED COUNT: 14 %
EST. GFR  (AFRICAN AMERICAN): 47 ML/MIN/1.73 M^2
EST. GFR  (NON AFRICAN AMERICAN): 41 ML/MIN/1.73 M^2
GLUCOSE SERPL-MCNC: 121 MG/DL
HCT VFR BLD AUTO: 33.9 %
HGB BLD-MCNC: 11.9 G/DL
LYMPHOCYTES # BLD AUTO: 1.6 K/UL
LYMPHOCYTES NFR BLD: 34.7 %
MAGNESIUM SERPL-MCNC: 1 MG/DL
MCH RBC QN AUTO: 26.2 PG
MCHC RBC AUTO-ENTMCNC: 35.1 G/DL
MCV RBC AUTO: 75 FL
MONOCYTES # BLD AUTO: 0.3 K/UL
MONOCYTES NFR BLD: 6.2 %
NEUTROPHILS # BLD AUTO: 2.4 K/UL
NEUTROPHILS NFR BLD: 52.4 %
PLATELET # BLD AUTO: 164 K/UL
PMV BLD AUTO: 10.1 FL
POLYCHROMASIA BLD QL SMEAR: ABNORMAL
POTASSIUM SERPL-SCNC: 3.8 MMOL/L
PROT SERPL-MCNC: 7.3 G/DL
RBC # BLD AUTO: 4.55 M/UL
SODIUM SERPL-SCNC: 139 MMOL/L
WBC # BLD AUTO: 4.49 K/UL

## 2018-02-19 PROCEDURE — 36415 COLL VENOUS BLD VENIPUNCTURE: CPT

## 2018-02-19 PROCEDURE — 80197 ASSAY OF TACROLIMUS: CPT

## 2018-02-19 PROCEDURE — 83735 ASSAY OF MAGNESIUM: CPT

## 2018-02-19 PROCEDURE — 85025 COMPLETE CBC W/AUTO DIFF WBC: CPT

## 2018-02-19 PROCEDURE — 80053 COMPREHEN METABOLIC PANEL: CPT

## 2018-02-20 ENCOUNTER — PATIENT MESSAGE (OUTPATIENT)
Dept: TRANSPLANT | Facility: CLINIC | Age: 48
End: 2018-02-20

## 2018-02-20 LAB — TACROLIMUS BLD-MCNC: 7.4 NG/ML

## 2018-02-21 ENCOUNTER — PATIENT MESSAGE (OUTPATIENT)
Dept: TRANSPLANT | Facility: CLINIC | Age: 48
End: 2018-02-21

## 2018-02-21 NOTE — TELEPHONE ENCOUNTER
----- Message from Miriam Abernathy MD sent at 2/21/2018 11:38 AM CST -----  Liver tests trending up although tacro should be ok, increase myfortic to 540mg twice a day and repeat labs next week

## 2018-02-21 NOTE — TELEPHONE ENCOUNTER
Informed pt LFTs trending up, instructed pt to increase Myfortic to 540 mg BID, will repeat labs on Monday 2/26.

## 2018-02-23 ENCOUNTER — PATIENT MESSAGE (OUTPATIENT)
Dept: TRANSPLANT | Facility: CLINIC | Age: 48
End: 2018-02-23

## 2018-02-23 RX ORDER — MYCOPHENOLIC ACID 180 MG/1
540 TABLET, DELAYED RELEASE ORAL 2 TIMES DAILY
Qty: 240 TABLET | Refills: 5 | Status: SHIPPED | OUTPATIENT
Start: 2018-02-23 | End: 2018-12-06 | Stop reason: SDUPTHER

## 2018-02-26 ENCOUNTER — PATIENT MESSAGE (OUTPATIENT)
Dept: TRANSPLANT | Facility: CLINIC | Age: 48
End: 2018-02-26

## 2018-03-01 ENCOUNTER — LAB VISIT (OUTPATIENT)
Dept: LAB | Facility: HOSPITAL | Age: 48
End: 2018-03-01
Attending: INTERNAL MEDICINE
Payer: MEDICARE

## 2018-03-01 DIAGNOSIS — M51.26 HNP (HERNIATED NUCLEUS PULPOSUS), LUMBAR: Primary | ICD-10-CM

## 2018-03-01 DIAGNOSIS — Z94.4 LIVER REPLACED BY TRANSPLANT: ICD-10-CM

## 2018-03-01 DIAGNOSIS — M48.061 LUMBAR STENOSIS: ICD-10-CM

## 2018-03-01 LAB
ALBUMIN SERPL BCP-MCNC: 4.4 G/DL
ALP SERPL-CCNC: 90 U/L
ALT SERPL W/O P-5'-P-CCNC: 13 U/L
ANION GAP SERPL CALC-SCNC: 5 MMOL/L
AST SERPL-CCNC: 13 U/L
BASOPHILS # BLD AUTO: 0 K/UL
BASOPHILS NFR BLD: 0 %
BILIRUB SERPL-MCNC: 0.6 MG/DL
BUN SERPL-MCNC: 23 MG/DL
CALCIUM SERPL-MCNC: 9.7 MG/DL
CHLORIDE SERPL-SCNC: 103 MMOL/L
CO2 SERPL-SCNC: 30 MMOL/L
CREAT SERPL-MCNC: 1.4 MG/DL
DIFFERENTIAL METHOD: ABNORMAL
EOSINOPHIL # BLD AUTO: 0.3 K/UL
EOSINOPHIL NFR BLD: 5.9 %
ERYTHROCYTE [DISTWIDTH] IN BLOOD BY AUTOMATED COUNT: 14.5 %
EST. GFR  (AFRICAN AMERICAN): 52 ML/MIN/1.73 M^2
EST. GFR  (NON AFRICAN AMERICAN): 45 ML/MIN/1.73 M^2
GLUCOSE SERPL-MCNC: 165 MG/DL
HCT VFR BLD AUTO: 33.3 %
HGB BLD-MCNC: 11.7 G/DL
LYMPHOCYTES # BLD AUTO: 1.5 K/UL
LYMPHOCYTES NFR BLD: 34.9 %
MCH RBC QN AUTO: 26.9 PG
MCHC RBC AUTO-ENTMCNC: 35.1 G/DL
MCV RBC AUTO: 77 FL
MONOCYTES # BLD AUTO: 0.2 K/UL
MONOCYTES NFR BLD: 5 %
NEUTROPHILS # BLD AUTO: 2.4 K/UL
NEUTROPHILS NFR BLD: 54.2 %
PLATELET # BLD AUTO: 194 K/UL
PMV BLD AUTO: 9.5 FL
POTASSIUM SERPL-SCNC: 3.8 MMOL/L
PROT SERPL-MCNC: 7.5 G/DL
RBC # BLD AUTO: 4.35 M/UL
SODIUM SERPL-SCNC: 138 MMOL/L
WBC # BLD AUTO: 4.39 K/UL

## 2018-03-01 PROCEDURE — 80197 ASSAY OF TACROLIMUS: CPT

## 2018-03-01 PROCEDURE — 85025 COMPLETE CBC W/AUTO DIFF WBC: CPT

## 2018-03-01 PROCEDURE — 80053 COMPREHEN METABOLIC PANEL: CPT

## 2018-03-01 PROCEDURE — 36415 COLL VENOUS BLD VENIPUNCTURE: CPT

## 2018-03-02 ENCOUNTER — PATIENT MESSAGE (OUTPATIENT)
Dept: TRANSPLANT | Facility: CLINIC | Age: 48
End: 2018-03-02

## 2018-03-02 ENCOUNTER — TELEPHONE (OUTPATIENT)
Dept: TRANSPLANT | Facility: CLINIC | Age: 48
End: 2018-03-02

## 2018-03-02 LAB — TACROLIMUS BLD-MCNC: 5.1 NG/ML

## 2018-03-02 NOTE — TELEPHONE ENCOUNTER
----- Message from Miriam Abernathy MD sent at 3/2/2018  4:11 PM CST -----  Tacrolimus level is okay.  Repeat labs in 2 weeks

## 2018-03-12 ENCOUNTER — LAB VISIT (OUTPATIENT)
Dept: LAB | Facility: HOSPITAL | Age: 48
End: 2018-03-12
Attending: INTERNAL MEDICINE
Payer: MEDICARE

## 2018-03-12 DIAGNOSIS — Z94.4 LIVER REPLACED BY TRANSPLANT: ICD-10-CM

## 2018-03-12 LAB
ALBUMIN SERPL BCP-MCNC: 4.2 G/DL
ALP SERPL-CCNC: 82 U/L
ALT SERPL W/O P-5'-P-CCNC: 11 U/L
ANION GAP SERPL CALC-SCNC: 8 MMOL/L
AST SERPL-CCNC: 12 U/L
BASOPHILS # BLD AUTO: 0.01 K/UL
BASOPHILS NFR BLD: 0.2 %
BILIRUB SERPL-MCNC: 0.5 MG/DL
BUN SERPL-MCNC: 33 MG/DL
CALCIUM SERPL-MCNC: 9.6 MG/DL
CHLORIDE SERPL-SCNC: 105 MMOL/L
CO2 SERPL-SCNC: 27 MMOL/L
CREAT SERPL-MCNC: 1.4 MG/DL
DIFFERENTIAL METHOD: ABNORMAL
EOSINOPHIL # BLD AUTO: 0.4 K/UL
EOSINOPHIL NFR BLD: 7.8 %
ERYTHROCYTE [DISTWIDTH] IN BLOOD BY AUTOMATED COUNT: 14.6 %
EST. GFR  (AFRICAN AMERICAN): 52 ML/MIN/1.73 M^2
EST. GFR  (NON AFRICAN AMERICAN): 45 ML/MIN/1.73 M^2
GLUCOSE SERPL-MCNC: 151 MG/DL
HCT VFR BLD AUTO: 32.9 %
HGB BLD-MCNC: 11.2 G/DL
LYMPHOCYTES # BLD AUTO: 1.6 K/UL
LYMPHOCYTES NFR BLD: 34.7 %
MAGNESIUM SERPL-MCNC: 2.2 MG/DL
MCH RBC QN AUTO: 26 PG
MCHC RBC AUTO-ENTMCNC: 34 G/DL
MCV RBC AUTO: 77 FL
MONOCYTES # BLD AUTO: 0.3 K/UL
MONOCYTES NFR BLD: 6.3 %
NEUTROPHILS # BLD AUTO: 2.4 K/UL
NEUTROPHILS NFR BLD: 51 %
PLATELET # BLD AUTO: 212 K/UL
PMV BLD AUTO: 10.5 FL
POTASSIUM SERPL-SCNC: 4 MMOL/L
PROT SERPL-MCNC: 7.3 G/DL
RBC # BLD AUTO: 4.3 M/UL
SODIUM SERPL-SCNC: 140 MMOL/L
WBC # BLD AUTO: 4.61 K/UL

## 2018-03-12 PROCEDURE — 36415 COLL VENOUS BLD VENIPUNCTURE: CPT

## 2018-03-12 PROCEDURE — 83735 ASSAY OF MAGNESIUM: CPT

## 2018-03-12 PROCEDURE — 80053 COMPREHEN METABOLIC PANEL: CPT

## 2018-03-12 PROCEDURE — 85025 COMPLETE CBC W/AUTO DIFF WBC: CPT

## 2018-03-12 PROCEDURE — 80197 ASSAY OF TACROLIMUS: CPT

## 2018-03-13 LAB — TACROLIMUS BLD-MCNC: 6 NG/ML

## 2018-03-14 ENCOUNTER — TELEPHONE (OUTPATIENT)
Dept: TRANSPLANT | Facility: CLINIC | Age: 48
End: 2018-03-14

## 2018-03-14 NOTE — TELEPHONE ENCOUNTER
----- Message from Miriam Abernathy MD sent at 3/14/2018 12:10 PM CDT -----  Labs okay repeat in 4 weeks

## 2018-03-15 ENCOUNTER — OFFICE VISIT (OUTPATIENT)
Dept: GASTROENTEROLOGY | Facility: CLINIC | Age: 48
End: 2018-03-15
Payer: MEDICARE

## 2018-03-15 ENCOUNTER — LAB VISIT (OUTPATIENT)
Dept: LAB | Facility: HOSPITAL | Age: 48
End: 2018-03-15
Attending: INTERNAL MEDICINE
Payer: MEDICARE

## 2018-03-15 ENCOUNTER — OFFICE VISIT (OUTPATIENT)
Dept: TRANSPLANT | Facility: CLINIC | Age: 48
End: 2018-03-15
Payer: MEDICARE

## 2018-03-15 ENCOUNTER — CLINICAL SUPPORT (OUTPATIENT)
Dept: REHABILITATION | Facility: OTHER | Age: 48
End: 2018-03-15
Attending: PHYSICAL MEDICINE & REHABILITATION
Payer: MEDICARE

## 2018-03-15 ENCOUNTER — OFFICE VISIT (OUTPATIENT)
Dept: RHEUMATOLOGY | Facility: CLINIC | Age: 48
End: 2018-03-15
Payer: MEDICARE

## 2018-03-15 ENCOUNTER — TELEPHONE (OUTPATIENT)
Dept: GASTROENTEROLOGY | Facility: CLINIC | Age: 48
End: 2018-03-15

## 2018-03-15 VITALS
SYSTOLIC BLOOD PRESSURE: 150 MMHG | BODY MASS INDEX: 21.74 KG/M2 | HEIGHT: 69 IN | HEART RATE: 66 BPM | WEIGHT: 146.81 LBS | DIASTOLIC BLOOD PRESSURE: 81 MMHG

## 2018-03-15 VITALS
DIASTOLIC BLOOD PRESSURE: 89 MMHG | HEART RATE: 82 BPM | TEMPERATURE: 98 F | OXYGEN SATURATION: 98 % | WEIGHT: 145.31 LBS | RESPIRATION RATE: 16 BRPM | SYSTOLIC BLOOD PRESSURE: 129 MMHG | BODY MASS INDEX: 21.52 KG/M2 | HEIGHT: 69 IN

## 2018-03-15 VITALS
SYSTOLIC BLOOD PRESSURE: 154 MMHG | HEART RATE: 70 BPM | BODY MASS INDEX: 21.55 KG/M2 | HEIGHT: 69 IN | WEIGHT: 145.5 LBS | DIASTOLIC BLOOD PRESSURE: 78 MMHG

## 2018-03-15 VITALS
TEMPERATURE: 98 F | SYSTOLIC BLOOD PRESSURE: 151 MMHG | HEIGHT: 69 IN | BODY MASS INDEX: 21.5 KG/M2 | WEIGHT: 145.13 LBS | HEART RATE: 93 BPM | DIASTOLIC BLOOD PRESSURE: 88 MMHG

## 2018-03-15 DIAGNOSIS — D84.9 IMMUNOSUPPRESSION: Primary | ICD-10-CM

## 2018-03-15 DIAGNOSIS — Z29.89 PROPHYLACTIC IMMUNOTHERAPY: Chronic | ICD-10-CM

## 2018-03-15 DIAGNOSIS — G89.29 CHRONIC MIDLINE LOW BACK PAIN WITHOUT SCIATICA: ICD-10-CM

## 2018-03-15 DIAGNOSIS — M54.50 CHRONIC MIDLINE LOW BACK PAIN WITHOUT SCIATICA: ICD-10-CM

## 2018-03-15 DIAGNOSIS — R53.83 FATIGUE, UNSPECIFIED TYPE: ICD-10-CM

## 2018-03-15 DIAGNOSIS — E55.9 VITAMIN D DEFICIENCY: ICD-10-CM

## 2018-03-15 DIAGNOSIS — R10.13 DYSPEPSIA: Primary | ICD-10-CM

## 2018-03-15 DIAGNOSIS — R19.7 ACUTE DIARRHEA: ICD-10-CM

## 2018-03-15 DIAGNOSIS — M32.9 SYSTEMIC LUPUS ERYTHEMATOSUS, UNSPECIFIED SLE TYPE, UNSPECIFIED ORGAN INVOLVEMENT STATUS: Primary | Chronic | ICD-10-CM

## 2018-03-15 DIAGNOSIS — M47.816 SPONDYLOSIS OF LUMBAR REGION WITHOUT MYELOPATHY OR RADICULOPATHY: Primary | ICD-10-CM

## 2018-03-15 DIAGNOSIS — Z94.4 LIVER TRANSPLANTED: ICD-10-CM

## 2018-03-15 DIAGNOSIS — M32.9 SYSTEMIC LUPUS ERYTHEMATOSUS, UNSPECIFIED SLE TYPE, UNSPECIFIED ORGAN INVOLVEMENT STATUS: Chronic | ICD-10-CM

## 2018-03-15 PROBLEM — R79.89 ELEVATED LFTS: Status: RESOLVED | Noted: 2017-05-24 | Resolved: 2018-03-15

## 2018-03-15 LAB
25(OH)D3+25(OH)D2 SERPL-MCNC: 23 NG/ML
C3 SERPL-MCNC: 90 MG/DL
C4 SERPL-MCNC: 26 MG/DL
CK SERPL-CCNC: 54 U/L
CRP SERPL-MCNC: 0.5 MG/L
ERYTHROCYTE [SEDIMENTATION RATE] IN BLOOD BY WESTERGREN METHOD: 12 MM/HR

## 2018-03-15 PROCEDURE — 86140 C-REACTIVE PROTEIN: CPT

## 2018-03-15 PROCEDURE — 82550 ASSAY OF CK (CPK): CPT

## 2018-03-15 PROCEDURE — 99999 PR PBB SHADOW E&M-EST. PATIENT-LVL V: CPT | Mod: PBBFAC,,, | Performed by: INTERNAL MEDICINE

## 2018-03-15 PROCEDURE — 82306 VITAMIN D 25 HYDROXY: CPT

## 2018-03-15 PROCEDURE — 3074F SYST BP LT 130 MM HG: CPT | Mod: CPTII,S$GLB,, | Performed by: INTERNAL MEDICINE

## 2018-03-15 PROCEDURE — 99999 PR PBB SHADOW E&M-EST. PATIENT-LVL III: CPT | Mod: PBBFAC,,, | Performed by: INTERNAL MEDICINE

## 2018-03-15 PROCEDURE — 99999 PR PBB SHADOW E&M-EST. PATIENT-LVL V: CPT | Mod: PBBFAC,,, | Performed by: PHYSICIAN ASSISTANT

## 2018-03-15 PROCEDURE — 3079F DIAST BP 80-89 MM HG: CPT | Mod: CPTII,S$GLB,, | Performed by: INTERNAL MEDICINE

## 2018-03-15 PROCEDURE — 3074F SYST BP LT 130 MM HG: CPT | Mod: CPTII,S$GLB,, | Performed by: PHYSICIAN ASSISTANT

## 2018-03-15 PROCEDURE — 3077F SYST BP >= 140 MM HG: CPT | Mod: CPTII,S$GLB,, | Performed by: INTERNAL MEDICINE

## 2018-03-15 PROCEDURE — 3078F DIAST BP <80 MM HG: CPT | Mod: CPTII,S$GLB,, | Performed by: PHYSICIAN ASSISTANT

## 2018-03-15 PROCEDURE — 85651 RBC SED RATE NONAUTOMATED: CPT

## 2018-03-15 PROCEDURE — 36415 COLL VENOUS BLD VENIPUNCTURE: CPT

## 2018-03-15 PROCEDURE — 99213 OFFICE O/P EST LOW 20 MIN: CPT | Mod: S$GLB,,, | Performed by: INTERNAL MEDICINE

## 2018-03-15 PROCEDURE — 86160 COMPLEMENT ANTIGEN: CPT | Mod: 59

## 2018-03-15 PROCEDURE — 99214 OFFICE O/P EST MOD 30 MIN: CPT | Mod: S$GLB,,, | Performed by: INTERNAL MEDICINE

## 2018-03-15 PROCEDURE — 86160 COMPLEMENT ANTIGEN: CPT

## 2018-03-15 PROCEDURE — 99213 OFFICE O/P EST LOW 20 MIN: CPT | Mod: S$GLB,,, | Performed by: PHYSICIAN ASSISTANT

## 2018-03-15 PROCEDURE — 86225 DNA ANTIBODY NATIVE: CPT

## 2018-03-15 PROCEDURE — 82085 ASSAY OF ALDOLASE: CPT

## 2018-03-15 RX ORDER — ROSUVASTATIN CALCIUM 5 MG/1
5 TABLET, COATED ORAL DAILY
COMMUNITY

## 2018-03-15 ASSESSMENT — ROUTINE ASSESSMENT OF PATIENT INDEX DATA (RAPID3)
AM STIFFNESS SCORE: 1, YES
MDHAQ FUNCTION SCORE: 1.2
PAIN SCORE: 7.5
TOTAL RAPID3 SCORE: 6.33
WHEN YOU AWAKENED IN THE MORNING OVER THE LAST WEEK, PLEASE INDICATE THE AMOUNT OF TIME IT TAKES UNTIL YOU ARE AS LIMBER AS YOU WILL BE FOR THE DAY: 24 HOURS
PSYCHOLOGICAL DISTRESS SCORE: 5.5
PATIENT GLOBAL ASSESSMENT SCORE: 7.5
FATIGUE SCORE: 10

## 2018-03-15 NOTE — PROGRESS NOTES
[facility] Consultation Note    Reason for Consult:  The primary encounter diagnosis was Dyspepsia. A diagnosis of Acute diarrhea was also pertinent to this visit.    PCP:   Timur Lion       Referring MD:  No referring provider defined for this encounter.    HPI:  This is a 47 y.o. female here with digestive issues    Having stomach growling with lying down  Duration - Several months  Some epigastric pain  Some diarrhea in the past 3 weeks after increase dose of myfortic  Occasional nausea  protonix 40 in the morning  zofran daily    Started trulicty 12/2017- which is around the time when symptoms started    11/2016 abdominal ultrasound - she does not have a gallbladder, no ductal dilation  GES - normal    ROS:  Constitutional: No fevers, chills, No weight loss  ENT: No allergies  CV: No chest pain  Pulm: No cough, No shortness of breath  Ophtho: No vision changes  GI: see HPI  Derm: No rash  Heme: No lymphadenopathy, No bruising  MSK: No arthritis  : No dysuria, No hematuria  Endo: No hot or cold intolerance  Neuro: No syncope, No seizure  Psych: No anxiety, No depression    Medical History:  has a past medical history of Anemia; Arthritis; Ascites; Asthma; 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    Surgical History:  has a past surgical history that includes Esophagogastroduodenoscopy; Liver biopsy; Liver transplant (12/31/2013); Tubal ligation (2003); and Colonoscopy (N/A, 5/31/2017).    Family History: family history includes Alzheimer's disease in her father; Breast cancer (age of onset: 55) in her maternal aunt; Diabetes in her brother, father, paternal grandfather, and paternal grandmother; Hypertension in her brother and mother..     Social History:  reports that she has never smoked. She has never used  smokeless tobacco. She reports that she does not drink alcohol or use drugs.    Review of patient's allergies indicates:   Allergen Reactions    Doxycycline Rash    Pcn [penicillins] Rash       Current Outpatient Prescriptions on File Prior to Visit   Medication Sig Dispense Refill    ACCU-CHEK SMARTVIEW Strp 1 strip by Misc.(Non-Drug; Combo Route) route 5 (five) times daily. Trueresult      blood sugar diagnostic Strp To check BG 5 times daily, to use with insurance preferred meter 200 strip 11    blood-glucose meter kit To check BG 5 times daily, to use with insurance preferred meter 1 each 0    cyproheptadine (PERIACTIN) 4 mg tablet Take 4 mg by mouth every evening.  3    diazepam (VALIUM) 5 MG tablet Take 5 mg by mouth 3 (three) times daily as needed.   0    DILTIAZEM HCL (DILTIAZEM 2% CREAM) Apply topically 3 (three) times daily. Apply topically to anal area. 30 g 0    dulaglutide (TRULICITY) 0.75 mg/0.5 mL PnIj Inject 0.5 mLs (0.75 mg total) into the skin once a week. 4 Syringe 6    ergocalciferol (ERGOCALCIFEROL) 50,000 unit Cap Take 1 capsule (50,000 Units total) by mouth every 7 days. 4 capsule 2    estradiol (ESTRACE) 0.01 % (0.1 mg/gram) vaginal cream Place 0.5 g vaginally twice a week. Insert 0.5grams intravaginally twice weekly 42 g 4    hydrochlorothiazide (HYDRODIURIL) 25 MG tablet Take 12.5 mg by mouth once daily.   2    hydrOXYzine HCl (ATARAX) 10 MG Tab TAKE 1 OR 2 TABLETS BY MOUTH THREE TIMES DAILY AS NEEDED FOR ITCHING.  0    insulin detemir (LEVEMIR FLEXTOUCH) 100 unit/mL (3 mL) SubQ InPn pen Inject 14 Units into the skin once daily. 15 mL 3    isosorbide mononitrate (IMDUR) 30 MG 24 hr tablet Take 30 mg by mouth once daily.      lancets 28 gauge Misc 4 lancets by Misc.(Non-Drug; Combo Route) route once daily. Pt uses Freestyle lite meter to check BG 4 times daily. 200 each 11    lancets Misc To check BG 5 times daily, to use with insurance preferred meter 200 each 11     "losartan (COZAAR) 100 MG tablet Take 1 tablet by mouth once daily.  2    magnesium oxide 500 mg Tab Take 500 mg by mouth 2 (two) times daily. 60 each 6    MULTIVIT,THER IRON,CA,FA & MIN (MULTIVITAMIN) Tab Take 1 tablet by mouth once daily. 30 tablet 0    mycophenolate (MYFORTIC) 180 MG TbEC Take 3 tablets (540 mg total) by mouth 2 (two) times daily. 240 tablet 5    NOVOLOG FLEXPEN 100 unit/mL InPn pen Inject 14 Units into the skin 3 times daily with meals. Plus correction scale, max DOSAGE= 72 UNITS 15 mL 3    ondansetron (ZOFRAN) 8 MG tablet Take 1 tablet by mouth every 8 (eight) hours as needed.  0    oxycodone-acetaminophen (PERCOCET) 7.5-325 mg per tablet Take 1 tablet by mouth every 4 (four) hours as needed for Pain.      pantoprazole (PROTONIX) 40 MG tablet Take 40 mg by mouth once daily.      pen needle, diabetic (BD ULTRA-FINE JANESSA PEN NEEDLES) 32 gauge x 5/32" Ndle To use 4x/day with insulin injections. 400 each 3    PROAIR HFA 90 mcg/actuation inhaler Inhale 2 puffs into the lungs 3 (three) times daily as needed.   3    promethazine-dextromethorphan (PROMETHAZINE-DM) 6.25-15 mg/5 mL Syrp Take by mouth 2 (two) times daily as needed.   0    ranitidine (ZANTAC) 300 MG tablet Take 300 mg by mouth 2 (two) times daily.       rosuvastatin (CRESTOR) 5 MG tablet Take 5 mg by mouth once daily.      tacrolimus (PROGRAF) 1 MG Cap Take 3 capsules (3 mg total) by mouth every 12 (twelve) hours. 180 capsule 11    triamcinolone (KENALOG) 0.5 % ointment 1 application 2 (two) times daily. Apply to affected area  3    valacyclovir (VALTREX) 500 MG tablet once daily as needed  0    verapamil (VERELAN) 120 MG C24P Take 1 capsule by mouth once daily.  3    zolpidem (AMBIEN) 10 mg Tab Take 10 mg by mouth nightly as needed.  3     No current facility-administered medications on file prior to visit.          Objective Findings:    Vital Signs:  BP (!) 154/78   Pulse 70   Ht 5' 9" (1.753 m)   Wt 66 kg (145 lb 8.1 " oz)   BMI 21.49 kg/m²   Body mass index is 21.49 kg/m².    Physical Exam:  General Appearance: Well appearing in no acute distress  Head:   Normocephalic, without obvious abnormality  Eyes:    No scleral icterus  ENT: Neck supple, Lips, mucosa, and tongue normal  Lungs: CTA bilaterally in anterior and posterior fields, no wheezes, no crackles.  Heart:  Regular rate and rhythm, S1, S2 normal, no murmurs heard  Abdomen: large abdominal incisional scar. abdomen soft, LUQ and epigastric tenderness. no guarding or rebound tenderness, non distended with hyperactive bowel sounds in all four quadrants.   Extremities: 2+ pulses, no  edema  Skin: No rash  Neurologic: AAO x 3                Labs:  Lab Results   Component Value Date    WBC 4.61 03/12/2018    HGB 11.2 (L) 03/12/2018    HCT 32.9 (L) 03/12/2018     03/12/2018    CHOL 178 12/29/2017    TRIG 47 12/29/2017    HDL 91 (H) 12/29/2017    ALT 11 03/12/2018    AST 12 03/12/2018     03/12/2018    K 4.0 03/12/2018     03/12/2018    CREATININE 1.4 03/12/2018    BUN 33 (H) 03/12/2018    CO2 27 03/12/2018    TSH 1.512 08/17/2017    INR 1.0 05/24/2017    HGBA1C 7.2 (H) 11/17/2017       Imaging:    Endoscopy:    12/2016 EGD revealed erosive gastritis; Bx negative for H. Pylori or CMV.     Assessment:  1. Dyspepsia    2. Acute diarrhea    s/p liver transplant on immunosuppressants    46yo P presents with loud stomach/ intestinal growling x a few months. Some abdominal pain and nausea. Some recent diarrhea. Sx may correlate with starting trulicity which does have GI ADRs including pancreatitis    Frequent diarrhea which pt attributes to increased dose of myfortic. Will rule out C. Diff.     Recommendations:  1. Labs to rule out elevated lipase and H. pylori  2.Start taking a probiotic daily- I will ask transplant about this  3.Take the zantac 150mg twice daily.Try to stop the protonix   4. Stool study to rule out C. diff      No Follow-up on file.      Order  summary:  Orders Placed This Encounter    H. PYLORI ANTIBODY, IGG    Lipase         Thank you so much for allowing me to participate in the care of Valencia Power PA-C

## 2018-03-15 NOTE — PATIENT INSTRUCTIONS
Start taking a probiotic daily- I will ask transplant about this    Take the zantac 150mg twice daily.Try to stop the protonix

## 2018-03-15 NOTE — TELEPHONE ENCOUNTER
Patient saw PERRY Cerna in clinic today.   requested a 6 week follow up.  Patient stated she will call back to set up after she looks at her calendar.

## 2018-03-15 NOTE — LETTER
March 15, 2018        Timur Lion  [address] [address]  Phone: [phone]  Fax: [phone]             [facility]  [address]  [facility] [address]  Phone: [phone]   Patient: Valencia Dumont   MR Number: [id number]   YOB: 1970   Date of Visit: 3/15/2018       Dear Dr. Timur Lion    Thank you for referring Valencia Dumont to me for evaluation. Attached you will find relevant portions of my assessment and plan of care.    If you have questions, please do not hesitate to call me. I look forward to following Valencia Dumont along with you.    Sincerely,    Miriam Abernathy MD    Enclosure    If you would like to receive this communication electronically, please contact [email or web address] or [phone] to request [employer] access.    [employer] is a tool which provides read-only access to select patient information with whom you have a relationship. Its easy to use and provides real time access to review your patients record including encounter summaries, notes, results, and demographic information.    If you feel you have received this communication in error or would no longer like to receive these types of communications, please e-mail [email or web address]

## 2018-03-15 NOTE — PROGRESS NOTES
Transplant Hepatology  Liver Transplant Recipient Follow-up    Transplant Date: 2013  [facility] Native Liver Dx: Cirrhosis: Autoimmune    Valencia is here for follow up of her liver transplant.    ORGAN: LIVER  Whole or Partial: whole liver  Donor Type:  - brain death  CDC High Risk: no  Donor CMV Status: Positive  Donor HCV Status: Negative  Donor HBcAb: Negative  Biliary Anastomosis: end to end  Arterial Anatomy: standard  IVC reconstruction: end to end ivc  Portal vein status: patent  .    Subjective:     Interval History:  Currently, she is doing adequately. Current complaints include chronic issues with fatigue and arthralgias.  She did see the rheumatologist today requesting about starting her back on Plaquenil.    Since last visit she had some issues with low levels of immunosuppression and then abnormal liver tests and then high levels of immunosuppression.  In the setting of the elevated immunosuppression she also developed some acute kidney injury.  She's required a number of med adjustments as well as lab monitoring but now her renal function is back into the acceptable range and her liver test are normal.    Review of Systems    Objective:     Physical Exam   Constitutional: She is oriented to person, place, and time. She appears well-developed and well-nourished. No distress.   HENT:   Head: Normocephalic and atraumatic.   Mouth/Throat: Oropharynx is clear and moist. No oropharyngeal exudate.   Eyes: Conjunctivae are normal. Pupils are equal, round, and reactive to light. Right eye exhibits no discharge. Left eye exhibits no discharge. No scleral icterus.   Pulmonary/Chest: Effort normal and breath sounds normal. No respiratory distress. She has no wheezes.   Abdominal: Soft. She exhibits no distension. There is no tenderness.   Musculoskeletal: She exhibits no edema.   Neurological: She is alert and oriented to person, place, and time.   Psychiatric: She has a normal mood and affect. Her  behavior is normal.   Vitals reviewed.      WBC   Date Value Ref Range Status   03/12/2018 4.61 3.90 - 12.70 K/uL Final     Hemoglobin   Date Value Ref Range Status   03/12/2018 11.2 (L) 12.0 - 16.0 g/dL Final     POC Hematocrit   Date Value Ref Range Status   12/31/2013 28 (L) 36 - 54 %PCV Final     Hematocrit   Date Value Ref Range Status   03/12/2018 32.9 (L) 37.0 - 48.5 % Final     Platelets   Date Value Ref Range Status   03/12/2018 212 150 - 350 K/uL Final     BUN, Bld   Date Value Ref Range Status   03/12/2018 33 (H) 6 - 20 mg/dL Final     Creatinine   Date Value Ref Range Status   03/12/2018 1.4 0.5 - 1.4 mg/dL Final     Glucose   Date Value Ref Range Status   03/12/2018 151 (H) 70 - 110 mg/dL Final     Calcium   Date Value Ref Range Status   03/12/2018 9.6 8.7 - 10.5 mg/dL Final     Sodium   Date Value Ref Range Status   03/12/2018 140 136 - 145 mmol/L Final     Potassium   Date Value Ref Range Status   03/12/2018 4.0 3.5 - 5.1 mmol/L Final     Chloride   Date Value Ref Range Status   03/12/2018 105 95 - 110 mmol/L Final     Magnesium   Date Value Ref Range Status   03/12/2018 2.2 1.6 - 2.6 mg/dL Final     AST   Date Value Ref Range Status   03/12/2018 12 10 - 40 U/L Final     ALT   Date Value Ref Range Status   03/12/2018 11 10 - 44 U/L Final     Alkaline Phosphatase   Date Value Ref Range Status   03/12/2018 82 55 - 135 U/L Final     Total Bilirubin   Date Value Ref Range Status   03/12/2018 0.5 0.1 - 1.0 mg/dL Final     Comment:     For infants and newborns, interpretation of results should be based  on gestational age, weight and in agreement with clinical  observations.  Premature Infant recommended reference ranges:  Up to 24 hours.............<8.0 mg/dL  Up to 48 hours............<12.0 mg/dL  3-5 days..................<15.0 mg/dL  6-29 days.................<15.0 mg/dL       Albumin   Date Value Ref Range Status   03/12/2018 4.2 3.5 - 5.2 g/dL Final     INR   Date Value Ref Range Status   05/24/2017  1.0 0.8 - 1.2 Final     Comment:     Coumadin Therapy:  2.0 - 3.0 for INR for all indicators except mechanical heart valves  and antiphospholipid syndromes which should use 2.5 - 3.5.       Lab Results   Component Value Date    TACROLIMUS 6.0 03/12/2018           Assessment/Plan:     1. Immunosuppression    2. Liver transplanted        Immunosuppression  -Continue current immunosuppression  -Repeat labs are been scheduled for April    Liver transplanted-good allograft function  -Advise she continue to follow up with her other physicians    RTC in 1 year    Miriam Abernathy MD       [facility] Patient Status  Functional Status: 70% - Cares for self: unable to carry on normal activity or active work  Physical Capacity: Limited Mobility

## 2018-03-15 NOTE — PROGRESS NOTES
Subjective:       Patient ID: Valencia Dumont is a 47 y.o. female.    Chief Complaint: Lupus and Disease Management      HPI:  Valencia Dumont is a 47 y.o. female  with diabetes and history lupus diagnosed in 2006 due to rash, weight loss, eyes yellow and fatigue.  She different rheumatologists Dr. Vega and Dr. Solomon.  She was diagnosed autoimmune hepatitis s/p liver transplant in 2013.   She has been on immunosuppressive medications after her liver transplant with prograf 8 mg daily and myfortic 360 mg bid which she is taking currently.  When she was diagnosed with SLE she was on plaquenil but stopped since it did not do anything.      She had low prograf level.  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  She was given 20 mg prednisone daily on 5/25/17 or 5/26/17.  She tapered off after 2 months.           Interval History:    Ortho gave shoes with braces in them to help with her balance but threw her back out of alignment.  Injections of Gel-1 three step done in knees has not helped.  Last year the one injection Gel-1 did not help.   She is bruising a great deal.  Carpal tunnel on right seen on EMG  X-ray by chiropractor showed spur on right elbow.  Ortho gave shot for tendonitis in right elbow.  She still has swelling in the area.  She is bruising.          Review of Systems   Constitutional: Positive for fatigue.   Eyes:        Dry eyes   Respiratory: Positive for shortness of breath.    Cardiovascular: Positive for chest pain.   Gastrointestinal: Positive for constipation and diarrhea.        Difficult swallowing   Endocrine: Negative.    Genitourinary: Negative.    Musculoskeletal: Positive for arthralgias and myalgias.   Skin: Positive for rash.   Allergic/Immunologic: Negative.    Neurological: Positive for headaches.   Hematological: Bruises/bleeds easily.   Psychiatric/Behavioral: Negative.          Objective:   BP (!) 151/88 (BP Location: Left arm, Patient Position: Sitting, BP Method: Small  "(Automatic))   Pulse 93   Temp 98.3 °F (36.8 °C) (Oral)   Ht 5' 9" (1.753 m)   Wt 65.8 kg (145 lb 1.6 oz)   BMI 21.43 kg/m²      Physical Exam   Constitutional: She is oriented to person, place, and time and well-developed, well-nourished, and in no distress.   HENT:   Head: Normocephalic and atraumatic.   Eyes: Conjunctivae and EOM are normal.   Neck: Neck supple.   Cardiovascular: Normal rate, regular rhythm and normal heart sounds.    Pulmonary/Chest: Effort normal and breath sounds normal.   Abdominal: Soft. Bowel sounds are normal.   Neurological: She is alert and oriented to person, place, and time. Gait normal.   Skin: Skin is warm and dry.     Psychiatric: Mood and affect normal.   Musculoskeletal:   28 joint count: 0 swollen and 0 tender              LABS    Component      Latest Ref Rng & Units 3/12/2018   WBC      3.90 - 12.70 K/uL 4.61   RBC      4.00 - 5.40 M/uL 4.30   Hemoglobin      12.0 - 16.0 g/dL 11.2 (L)   Hematocrit      37.0 - 48.5 % 32.9 (L)   MCV      82 - 98 fL 77 (L)   MCH      27.0 - 31.0 pg 26.0 (L)   MCHC      32.0 - 36.0 g/dL 34.0   RDW      11.5 - 14.5 % 14.6 (H)   Platelets      150 - 350 K/uL 212   MPV      9.2 - 12.9 fL 10.5   Gran # (ANC)      1.8 - 7.7 K/uL 2.4   Lymph #      1.0 - 4.8 K/uL 1.6   Mono #      0.3 - 1.0 K/uL 0.3   Eos #      0.0 - 0.5 K/uL 0.4   Baso #      0.00 - 0.20 K/uL 0.01   Gran%      38.0 - 73.0 % 51.0   Lymph%      18.0 - 48.0 % 34.7   Mono%      4.0 - 15.0 % 6.3   Eosinophil%      0.0 - 8.0 % 7.8   Basophil%      0.0 - 1.9 % 0.2   Differential Method       Automated   Sodium      136 - 145 mmol/L 140   Potassium      3.5 - 5.1 mmol/L 4.0   Chloride      95 - 110 mmol/L 105   CO2      23 - 29 mmol/L 27   Glucose      70 - 110 mg/dL 151 (H)   BUN, Bld      6 - 20 mg/dL 33 (H)   Creatinine      0.5 - 1.4 mg/dL 1.4   Calcium      8.7 - 10.5 mg/dL 9.6   Total Protein      6.0 - 8.4 g/dL 7.3   Albumin      3.5 - 5.2 g/dL 4.2   Total Bilirubin      0.1 - 1.0 " mg/dL 0.5   Alkaline Phosphatase      55 - 135 U/L 82   AST      10 - 40 U/L 12   ALT      10 - 44 U/L 11   Anion Gap      8 - 16 mmol/L 8   eGFR if African American      >60 mL/min/1.73 m:2 52 (A)   eGFR if non African American      >60 mL/min/1.73 m:2 45 (A)   Tacrolimus Lvl      5.0 - 15.0 ng/mL 6.0   Magnesium      1.6 - 2.6 mg/dL 2.2     Assessment:       1.  SLE.  Still joint pains and fatigue  2.  Autoimmune hepatitis.  S/p transplant 2013 after failing Cytoxan  3. Immunosuppression  4. Bilateral knee pain.  S/p gel one three step in both knees by ortho  5. Fatigue  6. Chest pain.  Evaluated by cardiology found to have murmur and heart muscle strain.  Heart doctor felt pain was from back pain.  7.  Back pain.  Evaluated by back surgeon but told not a candidate even though she needs it due to <5 years ago.  Sees pain management who sent her to therapy and to get shoes with braces.  Will now start Healthy Back Program  14.  Osteopenia.  FRAX does not suggest treatment.  Sees endocrine  Plan:       1. Labs  2. Consider restarting Plaquenil but caused hair loss; Will monitor  3. Patient will discuss with eye doctor if she can take Plaquenil  RTO 4 months/prn

## 2018-03-15 NOTE — PROGRESS NOTES
Subjective:      Patient ID: Valencia Dumont is a 47 y.o. female.    Chief Complaint: No chief complaint on file.    Referred by: Michael Alamo MD     Ms Dumont is a 46 yo female sent by Dr. Alamo  for evaluation for the healthy back lumbar program.  she has had low back pain for 12 years, worse the past 5 years.  She feels like the pain started when diagnosed with lupus.  The pain is midline low back dominant, and goes to both hips. She does have right knee pain, but not radiating from back.  She feels weakness at knees.  There is no burning, or numbness.  The pain is a sharp, throbbing and grabbing pain.  The pain is constant, ranging from 5-8/10.  It is worse with bending, sitting, standing, lying on her side, walking, stairs and lying on her back.  It is better lying on stomach, massage and pain meds.  She has done PT with no relief.  The chiropractor made it worse.  She had 2 ESIs 1 year ago with no relief. No surgery. Her goals are walking, sitting and standing. Pattern 1    X-ray thoracolumbar 4/27/2016  Findings: Alignment is normal.  There is mild DJD.  No fracture dislocation bone destruction seen.    In pression: Mild DJD.    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  No date: ESOPHAGOGASTRODUODENOSCOPY  No date: LIVER BIOPSY  12/31/2013: LIVER TRANSPLANT  2003: TUBAL LIGATION    Review of patient's family history indicates:    Hypertension                    Mother                    Diabetes                       Father                    Alzheimer's disease            Father                    Diabetes                       Paternal Grandfather      Diabetes                       Paternal Grandmother      Breast cancer                  Maternal Aunt             Hypertension                   Brother                   Diabetes                       Brother                   Stroke                         Neg Hx                    Cancer                         Neg Hx                    Colon cancer                   Neg Hx                    Esophageal cancer              Neg Hx                    Stomach cancer                 Neg Hx                    Rectal cancer                  Neg Hx                      Social History    Marital status:              Spouse name:                       Years of education:                 Number of children: 3             Occupational History  Occupation          Employer            Comment                                   [employer]          Social History Main Topics    Smoking status: Never Smoker                                                                Smokeless tobacco: Never Used                        Comment: disability; ; 3 children    Alcohol use: No              Drug use: No              Sexual activity: Yes               Partners with: Male       Birth control/protection: None       Comment: s/p tubal ligation,  since 1989        Current Outpatient Prescriptions:  ACCU-CHEK SMARTVIEW Strp, 1 strip by Misc.(Non-Drug; Combo Route) route 5 (five) times daily. Trueresult, Disp: , Rfl:   blood sugar diagnostic Strp, To check BG 5 times daily, to use with insurance preferred meter, Disp: 200 strip, Rfl: 11  blood-glucose meter kit, To check BG 5 times daily, to use with insurance preferred meter, Disp: 1 each, Rfl: 0  cyproheptadine (PERIACTIN) 4 mg tablet, Take 4 mg by mouth every  evening., Disp: , Rfl: 3  diazepam (VALIUM) 5 MG tablet, Take 5 mg by mouth 3 (three) times daily as needed. , Disp: , Rfl: 0  DILTIAZEM HCL (DILTIAZEM 2% CREAM), Apply topically 3 (three) times daily. Apply topically to anal area., Disp: 30 g, Rfl: 0  dulaglutide (TRULICITY) 0.75 mg/0.5 mL PnIj, Inject 0.5 mLs (0.75 mg total) into the skin once a week., Disp: 4 Syringe, Rfl: 6  ergocalciferol (ERGOCALCIFEROL) 50,000 unit Cap, Take 1 capsule (50,000 Units total) by mouth every 7 days., Disp: 4 capsule, Rfl: 2  estradiol (ESTRACE) 0.01 % (0.1 mg/gram) vaginal cream, Place 0.5 g vaginally twice a week. Insert 0.5grams intravaginally twice weekly, Disp: 42 g, Rfl: 4  hydrochlorothiazide (HYDRODIURIL) 25 MG tablet, Take 12.5 mg by mouth once daily. , Disp: , Rfl: 2  hydrOXYzine HCl (ATARAX) 10 MG Tab, TAKE 1 OR 2 TABLETS BY MOUTH THREE TIMES DAILY AS NEEDED FOR ITCHING., Disp: , Rfl: 0  insulin detemir (LEVEMIR FLEXTOUCH) 100 unit/mL (3 mL) SubQ InPn pen, Inject 14 Units into the skin once daily., Disp: 15 mL, Rfl: 3  isosorbide mononitrate (IMDUR) 30 MG 24 hr tablet, Take 30 mg by mouth once daily., Disp: , Rfl:   lancets 28 gauge Misc, 4 lancets by Misc.(Non-Drug; Combo Route) route once daily. Pt uses Freestyle lite meter to check BG 4 times daily., Disp: 200 each, Rfl: 11  lancets Misc, To check BG 5 times daily, to use with insurance preferred meter, Disp: 200 each, Rfl: 11  losartan (COZAAR) 100 MG tablet, Take 1 tablet by mouth once daily., Disp: , Rfl: 2  magnesium oxide 500 mg Tab, Take 500 mg by mouth 2 (two) times daily., Disp: 60 each, Rfl: 6  MULTIVIT,THER IRON,CA,FA & MIN (MULTIVITAMIN) Tab, Take 1 tablet by mouth once daily., Disp: 30 tablet, Rfl: 0  mycophenolate (MYFORTIC) 180 MG TbEC, Take 3 tablets (540 mg total) by mouth 2 (two) times daily., Disp: 240 tablet, Rfl: 5  NOVOLOG FLEXPEN 100 unit/mL InPn pen, Inject 14 Units into the skin 3 times daily with meals. Plus correction scale, max DOSAGE= 72  "UNITS, Disp: 15 mL, Rfl: 3  ondansetron (ZOFRAN) 8 MG tablet, Take 1 tablet by mouth every 8 (eight) hours as needed., Disp: , Rfl: 0  oxycodone-acetaminophen (PERCOCET) 7.5-325 mg per tablet, Take 1 tablet by mouth every 4 (four) hours as needed for Pain., Disp: , Rfl:   pantoprazole (PROTONIX) 40 MG tablet, Take 40 mg by mouth once daily., Disp: , Rfl:   pen needle, diabetic (BD ULTRA-FINE JANESSA PEN NEEDLES) 32 gauge x 5/32" Ndle, To use 4x/day with insulin injections., Disp: 400 each, Rfl: 3  PROAIR HFA 90 mcg/actuation inhaler, Inhale 2 puffs into the lungs 3 (three) times daily as needed. , Disp: , Rfl: 3  promethazine-dextromethorphan (PROMETHAZINE-DM) 6.25-15 mg/5 mL Syrp, Take by mouth 2 (two) times daily as needed. , Disp: , Rfl: 0  ranitidine (ZANTAC) 300 MG tablet, Take 300 mg by mouth 2 (two) times daily. , Disp: , Rfl:   rosuvastatin (CRESTOR) 5 MG tablet, Take 5 mg by mouth once daily., Disp: , Rfl:   tacrolimus (PROGRAF) 1 MG Cap, Take 3 capsules (3 mg total) by mouth every 12 (twelve) hours., Disp: 180 capsule, Rfl: 11  triamcinolone (KENALOG) 0.5 % ointment, 1 application 2 (two) times daily. Apply to affected area, Disp: , Rfl: 3  valacyclovir (VALTREX) 500 MG tablet, once daily as needed, Disp: , Rfl: 0  verapamil (VERELAN) 120 MG C24P, Take 1 capsule by mouth once daily., Disp: , Rfl: 3  zolpidem (AMBIEN) 10 mg Tab, Take 10 mg by mouth nightly as needed., Disp: , Rfl: 3    No current facility-administered medications for this visit.       Review of patient's allergies indicates:   -- Norvasc (amlodipine)     --  Severe headache   -- Doxycycline -- Rash   -- Pcn (penicillins) -- Rash                Review of Systems   Constitution: Negative for weight gain and weight loss.   Cardiovascular: Negative for chest pain.   Respiratory: Negative for shortness of breath.    Musculoskeletal: Positive for back pain and joint pain (right knee). Negative for joint swelling.   Gastrointestinal: Negative for " abdominal pain and bowel incontinence.   Genitourinary: Negative for bladder incontinence.   Neurological: Negative for numbness.           Objective:          General    Vitals reviewed.  Constitutional: She is oriented to person, place, and time. She appears well-developed and well-nourished.   HENT:   Head: Normocephalic and atraumatic.   Pulmonary/Chest: Effort normal.   Neurological: She is alert and oriented to person, place, and time.   Psychiatric: She has a normal mood and affect. Her behavior is normal. Judgment and thought content normal.     General Musculoskeletal Exam   Gait: normal and abnormal (slow with short step length)     Right Ankle/Foot Exam     Tests   Heel Walk: able to perform  Tiptoe Walk: able to perform    Left Ankle/Foot Exam     Tests   Heel Walk: able to perform  Tiptoe Walk: able to perform      Right Hip Exam     Tenderness  Also right side trochanteric tenderness.  Left Hip Exam     Tenderness  Also left side trochanteric tenderness.      Back (L-Spine & T-Spine) / Neck (C-Spine) Exam     Tenderness   The patient is tender to palpation of the right side trochanteric and left side trochanteric.     Back (L-Spine & T-Spine) Range of Motion   Extension: 20   Flexion: 80   Lateral Bend Right: 10   Lateral Bend Left: 10   Rotation Right: 30   Rotation Left: 30     Spinal Sensation   Right Side Sensation  C-Spine Level: normal   L-Spine Level: normal  S-Spine Level: normal  Left Side Sensation  C-Spine Level: normal  L-Spine Level: normal  S-Spine Level: normal    Back (L-Spine & T-Spine) Tests   Right Side Tests  Straight leg raise:      Sitting SLR: > 70 degrees      Left Side Tests  Straight leg raise:     Sitting SLR: > 70 degrees          Other She has no scoliosis .  Spinal Kyphosis:  Absent    Comments:  Points midline from lower thoracic to tailbone but no tenderness to touch      Muscle Strength   Right Upper Extremity   Biceps: 5/5/5   Deltoid:  5/5  Triceps:  5/5  Wrist  Extension: 5/5/5   Finger Flexors:  5/5  Left Upper Extremity  Biceps: 5/5/5   Deltoid:  5/5  Triceps:  5/5  Wrist Extension: 5/5/5   Finger Flexors:  5/5  Right Lower Extremity   Hip Flexion: 5/5   Quadriceps:  5/5   Anterior tibial:  5/5/5  EHL:  5/5  Left Lower Extremity   Hip Flexion: 5/5   Quadriceps:  5/5   Anterior tibial:  5/5/5   EHL:  5/5    Reflexes     Left Side  Biceps:  2+  Triceps:  2+  Brachioradialis:  2+  Quadriceps:  2+  Achilles:  2+  Left Barry's Sign:  Absent  Babinski Sign:  absent    Right Side   Biceps:  2+  Triceps:  2+  Brachioradialis:  2+  Quadriceps:  2+  Achilles:  2+  Right Barry's Sign:  absent  Babinski Sign:  absent    Vascular Exam     Right Pulses        Carotid:                  2+    Left Pulses        Carotid:                  2+        Assessment:       Encounter Diagnoses   Name Primary?    Spondylosis of lumbar region without myelopathy or radiculopathy Yes    Chronic midline low back pain without sciatica          Plan:       Diagnoses and all orders for this visit:    Spondylosis of lumbar region without myelopathy or radiculopathy  -     Ambulatory consult to [facility]    Chronic midline low back pain without sciatica  -     Ambulatory consult to [facility]         The patient has had a history of low back pain with limitations in there activities of Daily living    Previous treatment has not provided relief.    The situation was discussed at length with the patient.  More than 50% of the total time of 45 minutes was spent in counseling.  We discussed the importance of moving, she feels like the pain is constant.  She does have some bursitis in hips.  We discussed different causes of back pain and different treatment options.  We discussed the importance of stretching and strengthening.  We discussed posture.  We discussed the pros and cons of further diagnostic testing, alternative treatment and Medications    Based on the history, physical  exam, and functional index, an active physical therapy program is recommended.  The goal is to restore the patients function and reduce pain.  A program of progressive resistance exercises, biomechanical, and mobility maneuvers, instructions in proper body mechanics, aerobic conditioning and HEP will be utilized. The program will continue as long as making improvements.    An assessment of patients progress will be made at each visit to document change in status.    The patient will be actively involved in there own treatment, and responsible for appointments and home program    The patient's lumbar isometric strength will be tested and they will be placed in a program of isolated strength training based on 50% of their total functional torque and advanced as clinically appropriate.      Directional preference of pain will further influence the patients active rehabilitation program    The patient was instructed there might be an initial increase in discomfort    They are enrolled with good prognosis  Pattern 1

## 2018-03-16 ENCOUNTER — PATIENT MESSAGE (OUTPATIENT)
Dept: RHEUMATOLOGY | Facility: CLINIC | Age: 48
End: 2018-03-16

## 2018-03-16 ENCOUNTER — TELEPHONE (OUTPATIENT)
Dept: GASTROENTEROLOGY | Facility: CLINIC | Age: 48
End: 2018-03-16

## 2018-03-16 ENCOUNTER — LAB VISIT (OUTPATIENT)
Dept: LAB | Facility: HOSPITAL | Age: 48
End: 2018-03-16
Attending: PHYSICIAN ASSISTANT
Payer: MEDICARE

## 2018-03-16 DIAGNOSIS — R10.13 DYSPEPSIA: ICD-10-CM

## 2018-03-16 LAB
ALDOLASE SERPL-CCNC: 2.4 U/L
DSDNA AB SER-ACNC: NORMAL [IU]/ML
DSDNA AB SER-ACNC: NORMAL [IU]/ML
LIPASE SERPL-CCNC: 21 U/L

## 2018-03-16 PROCEDURE — 36415 COLL VENOUS BLD VENIPUNCTURE: CPT

## 2018-03-16 PROCEDURE — 83690 ASSAY OF LIPASE: CPT

## 2018-03-16 PROCEDURE — 86677 HELICOBACTER PYLORI ANTIBODY: CPT

## 2018-03-19 LAB — H PYLORI IGG SERPL QL IA: NEGATIVE

## 2018-03-19 NOTE — PROGRESS NOTES
Health  met with patient to complete initial outcomes for the Healthy Back lumbar program.  Questions were reviewed with patient and discussed with Dr. Joyce. The patient will meet with Dr. Joyce to determine program enrollment.     Any changes to patient responses are below in red font.      HealthyBack Questionnaire  3/15/2018   Please select the location of your worst pain:  Low back   Please select the location of any additional pain: (hold down the control key, and click to select multiple responses) Low back, Leg- Left, Leg- Right   Did any event trigger your pain?  No   How long has this pain been going on?  12 years but worse in the last 5 years   Please indicate how the pain is changing.  Worsening   What is the WORST level of pain that you have experienced in the last week?  8   What is the LEAST level of pain that you have experienced in the past week?  5   What can you NOT do not that you used to be able to do?  Dance   Are your limitations mainly due to your pain?  Yes   What are your additional complaints, if any? Weakness   Is there ever a time during the day when your pain stops, even for a brief moment, before returning? Yes   If yes, when your pain stops, does it disappear completely? Is it totally gone? Yes   Does bending forward make your typical pain worse? Yes   Morning: Worse during   Afternoon: Same   Evening:  Same   Nighttime: Same   Standing:  Worse   Lying on stomach: Better   Lying on back: Worse   Sitting:  Worse   Walking: Worse   Climbing stairs: Worse   In the last seven days, have you had any changes in your bowel and/or bladder habits? Yes   Have all of your attempts to treat your back/leg pain resulted in failure?  Yes   Do you believe your doctor(s) do NOT understand how much pain you have?  No   Do you believe that you have one or more conditions that have not been diagnosed by your doctor(s)?  Yes   Do you believe that you deserve more understanding from others  "including your family than you are getting?  Yes   Do you feel relatively calm about how your back/leg pain has impacted your life?  Yes   Are you OK with treatment taking a very long time (even years) before you feel relief from your back/leg pain?  Yes   Do you believe that your pain has caused you to be more forgetful?  No   Do you feel that you have not received enough treatment for your pain?  No   Do you believe that your doctor(s) do not have the right training to treat your back/leg pain effectively?  No   Do you believe you should not be allowed to work or attend school because of your back/leg pain?  Yes   When did this pain begin?  12 years but worse in the last 5 years   Did the pain begin after an injury or an accident? No   Is the pain work related?  No   Please mariela which of the following over-the-counter medicines you take. (hold down the control key, and click to select multiple responses) Other   If you chose "other," please specify:  tylenol   Please mariela which of the following prescription medicines you take. (hold down the control key, and click to select multiple responses) None   Emergency department  No   Health care providers (hold down the control key, and click to select multiple responses) Family doctor, Chiropractor, Physical therapist   Investigations done (hold down the control key, and click to select multiple responses) None   Procedures (hold down the control key, and click to select multiple responses) Epidural steroid injections (JORGE)   Emergency department  No   Health care providers (hold down the control key, and click to select multiple responses) Family doctor   Investigations done (hold down the control key, and click to select multiple responses) X-ray, MRI   Procedures (hold down the control key, and click to select multiple responses) None   Have you had any surgery on your back?  No   Please mariela what you are experiencing. (hold down the control key, and click to select " multiple responses) Problems with bowel functions, Fever or chills, Involuntary weight loss, Shortness of breath, Excessive sweating, Palpitations, Arthritic joint pain, Muscle pain in arms or legs, Joint swelling   First activity you would like to perform better:  Walking   Current ability score to perform first activity: 5   Second activity you would like to perform better: Sitting    Current ability score to perform second activity: 5   Third activity you would like to perform better: Standing   Current ability score to perform third activity: 3   Pain intensity:  The pain is moderate and does not vary much.   Personal care (washing, dressing, etc.):  Washing and dressing increase the pain, and I find it necessary to change my way of doing it.   Lifting: I can only life very light weights, at the most.   Walking: I cannot walk more than a quarter mile without increasing pain.   Sitting: Pain prevents me from sitting more than 1/2 hour.   Standing: Pain prevents me from standing more than 10 minutes.   Sleeping: Because of my pain, my normal night sleep is reduced by less than half.   Social life: Pain has restricted my social life, and I do not go out as often.   Traveling: I get extra pain while traveling, but it does not compel me to seek alternative forms of travel.   Changing degree of pain: My pain is gradually worsening.   Do you need any help looking after yourself? Occasionally I need some help with personal care tasks.   When doing household tasks, e.g., preparing food, gardening, using the video recorder, radio, telephone, or washing the car: I need help with the more difficult household tasks.   Thinking about how easily you can get around your home and community: I get around my home and community by myself without any difficulty.   Because of your health, your relationships, e.g., your friends, partner or parents, generally: Are very close and warm   Thinking about your relationships with other  people: I have plenty of friends and am never lonely.   Thinking about your health and your relationship with your  family:  My role in the family is unaffected by my health.   Thinking about your vision, including when using your glasses or contact lenses if needed: I have some difficulty focusing on things, or I do not see them sharply, e.g., small print, a newspaper or objects in the distance.   Thinking about your hearing, including using your hearing aid if needed: I hear normally.   When you communicate with others, e.g., talking, listening, writing, or signing: I have no trouble speaking to them or understanding what they are saying.   Thinking about how you sleep: I sleep in short bursts only. I am awake most of the night.   Thinking about how you generally feel: I do not feel anxious, worried or depressed.   How much pain or discomfort do you experience? I have moderate pain.   Little interest or pleasure in doing things Not at all   Feeling down, depressed or hopeless Not at all   What is your occupation?  Disabled   How do you spend your leisure time? What are your hobbies? Crocheing and reading   How do you spend your leisure time? What are your hobbies? Crocheing and reading   What is your highest level of education? College   What is your work status? Disabled   How did you hear about the Healthyback program?  Physician   When did this pain begin?  12 years but worse in the last 5 years   Do you need any help looking after yourself? Occasionally I need some help with personal care tasks.   When doing household tasks, e.g., preparing food, gardening, using the video recorder, radio, telephone, or washing the car: I need help with the more difficult household tasks.   Thinking about how easily you can get around your home and community: I get around my home and community by myself without any difficulty.   Because of your health, your relationships, e.g., your friends, partner or parents, generally: Are  very close and warm   Thinking about your relationships with other people: I have plenty of friends and am never lonely.   Thinking about your health and your relationship with your  family:  My role in the family is unaffected by my health.   Thinking about your vision, including when using your glasses or contact lenses if needed: I have some difficulty focusing on things, or I do not see them sharply, e.g., small print, a newspaper or objects in the distance.   Thinking about your hearing, including using your hearing aid if needed: I hear normally.   When you communicate with others, e.g., talking, listening, writing, or signing: I have no trouble speaking to them or understanding what they are saying.   Thinking about how you sleep: I sleep in short bursts only. I am awake most of the night.   Thinking about how you generally feel: I do not feel anxious, worried or depressed.   How much pain or discomfort do you experience? I have moderate pain.   Little interest or pleasure in doing things Not at all   Feeling down, depressed or hopeless Not at all

## 2018-03-20 ENCOUNTER — PATIENT MESSAGE (OUTPATIENT)
Dept: GASTROENTEROLOGY | Facility: CLINIC | Age: 48
End: 2018-03-20

## 2018-03-20 ENCOUNTER — OFFICE VISIT (OUTPATIENT)
Dept: OBSTETRICS AND GYNECOLOGY | Facility: CLINIC | Age: 48
End: 2018-03-20
Attending: OBSTETRICS & GYNECOLOGY
Payer: MEDICARE

## 2018-03-20 VITALS
BODY MASS INDEX: 23.13 KG/M2 | WEIGHT: 143.94 LBS | HEIGHT: 66 IN | SYSTOLIC BLOOD PRESSURE: 127 MMHG | DIASTOLIC BLOOD PRESSURE: 74 MMHG

## 2018-03-20 DIAGNOSIS — N95.1 MENOPAUSAL VAGINAL DRYNESS: ICD-10-CM

## 2018-03-20 DIAGNOSIS — Z12.4 PAP SMEAR FOR CERVICAL CANCER SCREENING: ICD-10-CM

## 2018-03-20 DIAGNOSIS — Z78.0 MENOPAUSE PRESENT: ICD-10-CM

## 2018-03-20 DIAGNOSIS — Z01.419 ENCOUNTER FOR GYNECOLOGICAL EXAMINATION WITHOUT ABNORMAL FINDING: Primary | ICD-10-CM

## 2018-03-20 DIAGNOSIS — Z12.31 SCREENING MAMMOGRAM, ENCOUNTER FOR: ICD-10-CM

## 2018-03-20 DIAGNOSIS — Z13.820 SCREENING FOR OSTEOPOROSIS: ICD-10-CM

## 2018-03-20 PROCEDURE — 87624 HPV HI-RISK TYP POOLED RSLT: CPT

## 2018-03-20 PROCEDURE — 99999 PR PBB SHADOW E&M-EST. PATIENT-LVL V: CPT | Mod: PBBFAC,,, | Performed by: OBSTETRICS & GYNECOLOGY

## 2018-03-20 PROCEDURE — G0101 CA SCREEN;PELVIC/BREAST EXAM: HCPCS | Mod: S$GLB,,, | Performed by: OBSTETRICS & GYNECOLOGY

## 2018-03-20 PROCEDURE — 88175 CYTOPATH C/V AUTO FLUID REDO: CPT

## 2018-03-20 RX ORDER — ESTRADIOL 0.1 MG/G
0.5 CREAM VAGINAL
Qty: 42 G | Refills: 4 | Status: SHIPPED | OUTPATIENT
Start: 2018-03-22 | End: 2019-03-22

## 2018-03-20 RX ORDER — NEOMYCIN SULFATE, POLYMYXIN B SULFATE, HYDROCORTISONE 3.5; 10000; 1 MG/ML; [USP'U]/ML; MG/ML
SOLUTION/ DROPS AURICULAR (OTIC)
Refills: 0 | COMMUNITY
Start: 2018-03-12

## 2018-03-20 NOTE — PROGRESS NOTES
Subjective:       Patient ID: Valencia Dumont is a 47 y.o. female.    Chief Complaint:  Well Woman      History of Present Illness  HPI    Valencia Dumont is a 47 y.o. female  here for her annual GYN exam.    She describes her periods as stopped in .  denies break through/postmenopausal bleeding.   denies vaginal itching or irritation.  Denies vaginal discharge.  She is sexually active. She has had 1 partner for the past 29 years .  She uses bilateral tubal ligation for contraception.   History of abnormal pap: Yes - LGSIL   Last Pap: approximate date  and was normal  Last MMG: normal--routine follow-up in 12 months  Last Colonoscopy:  colonoscopy 1 years ago with abnormalities.  denies domestic violence. She does feel safe at home.     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    ESOPHAGOGASTRODUODENOSCOPY      LIVER BIOPSY      LIVER TRANSPLANT  2013    TUBAL LIGATION       Social History     Social History    Marital status:      Spouse name: N/A    Number of children: 3    Years of education: N/A     Occupational History     [employer]     Social History Main Topics    Smoking status: Never Smoker    Smokeless tobacco: Never Used      Comment: disability; ; 3 children    Alcohol use 0.6 oz/week     1 Glasses of wine per week    Drug use: No    Sexual activity: Yes     Partners: Male     Birth control/ protection: None      Comment: s/p tubal  "ligation,  since      Other Topics Concern    Not on file     Social History Narrative    No narrative on file     Family History   Problem Relation Age of Onset    Hypertension Mother     Diabetes Father     Alzheimer's disease Father     Diabetes Paternal Grandfather     Diabetes Paternal Grandmother     Breast cancer Maternal Aunt 55    Hypertension Brother     Diabetes Brother     Stroke Neg Hx     Cancer Neg Hx     Colon cancer Neg Hx     Esophageal cancer Neg Hx     Stomach cancer Neg Hx     Rectal cancer Neg Hx      OB History      Para Term  AB Living    3 3 2 1   3    SAB TAB Ectopic Multiple Live Births            3          /74   Ht 5' 6" (1.676 m)   Wt 65.3 kg (143 lb 15.4 oz)   LMP 2016 (Approximate)   BMI 23.24 kg/m²         GYN & OB History  Patient's last menstrual period was 2016 (approximate).   Date of Last Pap: 3/24/2017    OB History    Para Term  AB Living   3 3 2 1   3   SAB TAB Ectopic Multiple Live Births           3      # Outcome Date GA Lbr [place]/2nd Weight Sex Delivery Anes PTL Lv   3   36w0d  2.126 kg (4 lb 11 oz) F Vag-Spont   MONIQUE   2 Term     F Vag-Spont   MONIQUE   1 Term     M Vag-Spont   MONIQUE          Review of Systems  Review of Systems   Constitutional: Negative for activity change, appetite change, fatigue and unexpected weight change.   HENT: Negative.    Eyes: Negative for visual disturbance.   Respiratory: Negative for shortness of breath and wheezing.    Cardiovascular: Negative for chest pain, palpitations and leg swelling.   Gastrointestinal: Negative for abdominal pain, bloating and blood in stool.   Endocrine: Negative for diabetes and hair loss.   Genitourinary: Positive for dyspareunia. Negative for decreased libido and postmenopausal bleeding.   Musculoskeletal: Negative for back pain and joint swelling.   Skin:  Negative for no acne and hair changes.   Neurological: Negative for headaches. "   Hematological: Does not bruise/bleed easily.   Psychiatric/Behavioral: Negative for depression and sleep disturbance. The patient is not nervous/anxious.    Breast: Negative for breast pain and nipple discharge          Objective:    Physical Exam:   Constitutional: She is oriented to person, place, and time. She appears well-developed and well-nourished.    HENT:   Head: Normocephalic and atraumatic.    Eyes: EOM are normal. Pupils are equal, round, and reactive to light.    Neck: Normal range of motion. Neck supple.    Cardiovascular: Normal rate and regular rhythm.     Pulmonary/Chest: Effort normal and breath sounds normal.   BREASTS: Symmetrical, no skin changes or visible lesions.  No palpable masses, nipple discharge bilaterally.          Abdominal: Soft. Bowel sounds are normal.     Genitourinary: Pelvic exam was performed with patient supine.   Genitourinary Comments: PELVIC: Normal external genitalia without lesions.  Normal hair distribution.  Adequate perineal body, normal urethral meatus.  Vagina  Dry and poorly rugated, atrophic, without lesions or discharge.  Cervix pink, without lesions, discharge or tenderness.  No significant cystocele or rectocele.  Bimanual exam shows uterus to be UPPER LIMITS normal size, regular, mobile and nontender.  Adnexa without masses or tenderness.    RECTAL:Deferred             Musculoskeletal: Normal range of motion and moves all extremeties.       Neurological: She is alert and oriented to person, place, and time.    Skin: Skin is warm and dry.    Psychiatric: She has a normal mood and affect.          Assessment:        1. Encounter for gynecological examination without abnormal finding    2. Pap smear for cervical cancer screening    3. Screening mammogram, encounter for    4. Screening for osteoporosis    5. Menopause present    6. Menopausal vaginal dryness               Plan:        1. Encounter for gynecological examination without abnormal  finding  COUNSELING:  The patient was counseled today on osteoporosis prevention, calcium supplementation, and regular weight bearing exercise. The patient was also counseled today on ACS PAP guidelines, with recommendations for yearly pelvic exams unless their uterus, cervix, and ovaries were removed for benign reasons; in that case, examinations every 3-5 years are recommended. The patient was also counseled regarding monthly breast self-examination, routine STD screening for at-risk populations, prophylactic immunizations for transmitted infections such as HPV, Pertussis, or Influenza as appropriate, and yearly mammograms when indicated by ACS guidelines. She was advised to see her primary care physician for all other health maintenance.   FOLLOW-UP with me for next routine visit.       2. Pap smear for cervical cancer screening    - Liquid-based pap smear, screening  - HPV High Risk Genotypes, PCR    3. Screening mammogram, encounter for    - Mammo Digital Screening Bilat with Tomosynthesis CAD; Future    4. Screening for osteoporosis    - DXA Bone Density Spine And Hip; Future    5. Menopause present    - DXA Bone Density Spine And Hip; Future    6. Menopausal vaginal dryness    - estradiol (ESTRACE) 0.01 % (0.1 mg/gram) vaginal cream; Place 0.5 g vaginally twice a week. Insert 0.5grams intravaginally twice weekly  Dispense: 42 g; Refill: 4       Follow-up in about 1 year (around 3/20/2019).

## 2018-03-22 ENCOUNTER — PATIENT MESSAGE (OUTPATIENT)
Dept: TRANSPLANT | Facility: CLINIC | Age: 48
End: 2018-03-22

## 2018-03-22 ENCOUNTER — PATIENT MESSAGE (OUTPATIENT)
Dept: GASTROENTEROLOGY | Facility: CLINIC | Age: 48
End: 2018-03-22

## 2018-03-23 LAB
HPV16 AG SPEC QL: NEGATIVE
HPV16+18+H RISK 12 DNA CVX-IMP: NEGATIVE
HPV18 DNA SPEC QL NAA+PROBE: NEGATIVE

## 2018-03-29 ENCOUNTER — PATIENT MESSAGE (OUTPATIENT)
Dept: GASTROENTEROLOGY | Facility: CLINIC | Age: 48
End: 2018-03-29

## 2018-04-02 ENCOUNTER — CLINICAL SUPPORT (OUTPATIENT)
Dept: REHABILITATION | Facility: HOSPITAL | Age: 48
End: 2018-04-02
Attending: PHYSICAL MEDICINE & REHABILITATION
Payer: MEDICARE

## 2018-04-02 DIAGNOSIS — M54.50 CHRONIC BILATERAL LOW BACK PAIN WITHOUT SCIATICA: ICD-10-CM

## 2018-04-02 DIAGNOSIS — G89.29 CHRONIC BILATERAL LOW BACK PAIN WITHOUT SCIATICA: ICD-10-CM

## 2018-04-02 PROCEDURE — G8979 MOBILITY GOAL STATUS: HCPCS | Mod: CK,PN

## 2018-04-02 PROCEDURE — G8978 MOBILITY CURRENT STATUS: HCPCS | Mod: CL,PN

## 2018-04-02 PROCEDURE — 97110 THERAPEUTIC EXERCISES: CPT | Mod: PN

## 2018-04-02 PROCEDURE — 97161 PT EVAL LOW COMPLEX 20 MIN: CPT | Mod: PN

## 2018-04-02 NOTE — PLAN OF CARE
[facility] - PHYSICAL THERAPY EVALUATION     Name: Valencia Dumont  Clinic Number: [id number]    Valencia is a 47 y.o. female evaluated on 04/02/2018.   Time In: 1:10 pm  Time out: 2:00    Diagnosis:   Encounter Diagnosis   Name Primary?   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 Trueresult    blood sugar diagnostic Strp To check BG 5 times daily, to use with insurance preferred meter    blood-glucose meter kit To check BG 5 times daily, to use with insurance preferred meter    cyproheptadine (PERIACTIN) 4 mg tablet Take 4 mg by mouth every evening.    diazepam (VALIUM) 5 MG tablet Take 5 mg by mouth 3 (three) times daily as needed.     DILTIAZEM HCL (DILTIAZEM 2% CREAM) Apply topically 3 (three) times daily. Apply topically to anal area.    dulaglutide (TRULICITY) 0.75 mg/0.5 mL PnIj Inject 0.5 mLs (0.75 mg total) into the skin once a week.    ergocalciferol (ERGOCALCIFEROL) 50,000 unit Cap Take 1 capsule (50,000 Units total) by mouth every 7 days.    estradiol (ESTRACE) 0.01 % (0.1 mg/gram) vaginal cream Place 0.5 g vaginally twice a week. Insert 0.5grams intravaginally twice weekly    hydrochlorothiazide (HYDRODIURIL) 25 MG tablet Take 12.5 mg by mouth once daily.     hydrOXYzine HCl (ATARAX) 10 MG Tab TAKE 1 OR 2 TABLETS BY MOUTH THREE TIMES DAILY AS NEEDED FOR ITCHING.    insulin detemir (LEVEMIR FLEXTOUCH) 100 unit/mL (3 mL) SubQ  "InPn pen Inject 14 Units into the skin once daily.    isosorbide mononitrate (IMDUR) 30 MG 24 hr tablet Take 30 mg by mouth once daily.    lancets 28 gauge Misc 4 lancets by Misc.(Non-Drug; Combo Route) route once daily. Pt uses Freestyle lite meter to check BG 4 times daily.    lancets Misc To check BG 5 times daily, to use with insurance preferred meter    losartan (COZAAR) 100 MG tablet Take 1 tablet by mouth once daily.    magnesium oxide 500 mg Tab Take 500 mg by mouth 2 (two) times daily.    MULTIVIT,THER IRON,CA,FA & MIN (MULTIVITAMIN) Tab Take 1 tablet by mouth once daily.    mycophenolate (MYFORTIC) 180 MG TbEC Take 3 tablets (540 mg total) by mouth 2 (two) times daily.    neomycin-polymyxin-hydrocortisone (CORTISPORIN) otic solution INSTILL TWO DROPS INTO BOTH EARS FOUR TIMES DAILY FOR 10 DAYS    NOVOLOG FLEXPEN 100 unit/mL InPn pen Inject 14 Units into the skin 3 times daily with meals. Plus correction scale, max DOSAGE= 72 UNITS    ondansetron (ZOFRAN) 8 MG tablet Take 1 tablet by mouth every 8 (eight) hours as needed.    oxycodone-acetaminophen (PERCOCET) 7.5-325 mg per tablet Take 1 tablet by mouth every 4 (four) hours as needed for Pain.    pantoprazole (PROTONIX) 40 MG tablet Take 40 mg by mouth once daily.    pen needle, diabetic (BD ULTRA-FINE JANESSA PEN NEEDLES) 32 gauge x 5/32" Ndle To use 4x/day with insulin injections.    PROAIR HFA 90 mcg/actuation inhaler Inhale 2 puffs into the lungs 3 (three) times daily as needed.     promethazine-dextromethorphan (PROMETHAZINE-DM) 6.25-15 mg/5 mL Syrp Take by mouth 2 (two) times daily as needed.     ranitidine (ZANTAC) 300 MG tablet Take 300 mg by mouth 2 (two) times daily.     rosuvastatin (CRESTOR) 5 MG tablet Take 5 mg by mouth once daily.    tacrolimus (PROGRAF) 1 MG Cap Take 3 capsules (3 mg total) by mouth every 12 (twelve) hours.    triamcinolone (KENALOG) 0.5 % ointment 1 application 2 (two) times daily. Apply to affected area    " valacyclovir (VALTREX) 500 MG tablet once daily as needed    verapamil (VERELAN) 120 MG C24P Take 1 capsule by mouth once daily.    zolpidem (AMBIEN) 10 mg Tab Take 10 mg by mouth nightly as needed.     No current facility-administered medications for this visit.      Review of patient's allergies indicates:   Allergen Reactions    Norvasc [amlodipine]      Severe headache    Doxycycline Rash    Pcn [penicillins] Rash     Precautions: Diabetes and fall risk and hx of liver transplant      Pattern of pain determined: Pattern 1 PEN   Visit # authorized:   Authorization period:   Plan of care Expiration: 7/2/18  To Vendor Referred By By Location/POS By Department   none Antoinette Joyce MD [facility] [place] LOCATION ([facility]) [facility]   Priority Start Date Expiration Date Referral Entered By   Routine 03/15/2018 05/15/2018 Antoinette Joyce MD   Visits Requested Visits Authorized Visits Completed Visits Scheduled   1 12 0 1           HISTORY   History of Present Illness: Chronic lower back pain. Pt states she has been having lower back pain for over 12 years. Pt states she lower back pain got worse since she had liver transplant. Pt reports last 5 years pain is worse. Pt states lower back pain is dominant. But pt also have pain in hips and knee. Pt states she WB more on left knee. Pt feels right knee is weaker than right.  Pt reports she had epidural in her lower back and she also had steroid injection on her knees. Pt  Described lower back pain as sharp, throbbing  and aching pain.  Pt states pain aggravates  with bending, sitting, standing, lying on her side, walking, stairs and lying on her back. Pt reports pain get better lying on stomach and sides, massages, and pain meds. Pt states she does not like to lie flat on back to asthma. Pt reports he has PT session but no relief. The chiropractor made it worse. She had 2 ESIs 1 year ago with no relief. No surgery. Pt states last fall was about  6 or 1 year ago.       Diagnostic Tests: From EPIC Radiographs  X-ray thoracolumbar 4/27/2016  Findings: Alignment is normal.  There is mild DJD.  No fracture dislocation bone destruction seen.     In pression: Mild DJD.    Pain Scale: Valencia rates pain on a scale of 0-10 to be 10 at worst; 7 currently; 7 at best using VAS.   Pain location: lower back pain     Aggravating factors: see above  Easing Factors: see above    Disturbed Sleep: yes    Pattern of pain questions:  1.  Where is your pain the worst? back  2.  Is your pain constant or intermittent?   3.  Does bending forward make your typical pain worse? No   4.  Since the start of your back pain, has there been a change in your bowel or bladder? no  5.  What can't you do now that you use to be able to do? House shores, walking in mall, climb stairs, dance.     Prior Treatment: Yes  Prior functional status: Independent  DME owned/used: No  Occupation:  Disable   Leisure:    Dance   Pts goals:  Her goals are walking, sitting and standing    Red Flag Screening:   Cough  Sneeze  Strain: (+)  Bladder/ bowel: (--)  Falls: (--)  Night pain: (--)  Unexplained weight loss: (--)  General health: liver transplant, eye surgery.     OBJECTIVE     Postural examination/scapula alignment: Rounded shoulder and Head forward  Sitting: Rounded shoulder and Head forward  Standing: Rounded shoulder and Head forward  Correction of posture: better with lumbar roll    MOVEMENT LOSS    ROM Loss   Flexion major loss   Extension moderate loss   Side bending Right moderate loss   Side bending Left moderate loss   Rotation Right moderate loss   Rotation Left minimal loss     Lower Extremity Strength  Right LE  Left LE    Hip flexion: 3+/5 Hip flexion: 3+/5   Hip extension:  3+/5 Hip extension: 3+/5   Hip abduction: 3+/5 Hip abduction: 3+/5   Hip adduction:  3+/5 Hip adduction:  3+/5   Hip Internal rotation   3+/5 Hip Internal rotation 3+/5   Knee Flexion 3+/5 Knee Flexion 3+/5   Knee  Extension 4+/5 Knee Extension 4+/5   Ankle dorsiflexion: 4+/5 Ankle dorsiflexion: 4+/5   Ankle plantarflexion: 4+/5 Ankle plantarflexion: 4+/5       GAIT:  Assistive Device used: none  Level of Assistance: independent  Patient displays the following gait deviations:  decreased step length, decreased base of support and decreased weight shift.     Special Tests:   Test Name  Test Result   Prone Instability Test (+)   SI Joint Provocation Test (--)   Straight Leg Raise (--)   Neural Tension Test (+)   Crossed Straight Leg Raise (--)   Walking on toes (--)   Walking on heels  (--)       NEUROLOGICAL SCREENING     Sensory deficit: Intact lower extemity    Reflexes:    Left Right   Patella Tendon 2+ 1+   Achilles Tendon 2+ 1+   Babinski  (--) (--)   Clonus (--) (--)     REPEATED TEST MOVEMENTS:  Repeated Flexion 10x in Standing end range pain   Repeated Extension 10x in Standing end range pain  pain during motion   Repeated Flexion in lying no worse   Repeated Extension in lying  end range pain  pain during motion       STATIC TESTS   Sitting slouched  end range pain   Sitting erect no worse   Standing slouched end range pain  pain during motion   Standing erect  no worse   Lying prone in extension  no worse   Long sitting   no effect       Baseline Isometric Testing on Med X equipment: Testing administered by PT  Date of testing: TO be performed next visit due to time  ROM TO be performed next visit due to time   Max Peak Torque    Min Peak Torque    Flex/Ext Ratio    % below normative data    Counter weight    femur    Seat pad          CMS Impairment/Limitation/Restriction for FOTO Lumbar Spine Survey  Status Limitation G-Code CMS Severity Modifier  Intake 37% 63% Current Status CL - At least 60 percent but less than 80 percent  Predicted 52% 48% Goal Status+ CK - At least 40 percent but less than 60 percent  D/C Status CL **only report if this is a one time visit  +Based on FOTO predicted change score    Score  interpretation is as follows:     TEST SCORE  Modifier  Impairment Limitation Restriction    0/50  CH  0 % impaired, limited or restricted   1-9/50  CI  @ least 1% but less than 20% impaired, limited or restricted   10-19/50  CJ  @ least 20%<40% impaired, limited or restricted   20-29/50  CK  @ least 40%<60% impaired, limited or restricted   30-39/50  CL  @ least 60% <80% impaired, limited or restricted   40-49/50  CM  @ least 80%<100% impaired limited or restricted   50/50  CN  100% impaired, limited or restricted       Treatment       PT Evaluation Completed? Yes  Discussed Plan of Care with patient: Yes      Home Exercise Program as follows:   Handouts were given to the patient. Pt demo good understanding of the education provided. Valencia demonstrated good return demonstration of activities.     - Patient received education regarding proper posture and body mechanics.    - Raghu roll tried, recommended, and purchase information was provided.    - Patient received a handout regarding anticipated muscular soreness following the isometric test and strategies for management were reviewed with patient including stretching, using ice and scheduled rest.   Seated flexion  LTR   Single knee to chest    Pt was instructed in and performed the following:   Valencia received therapeutic exercises to develop/improve posture, lumbar/cervical ROM, strength and muscular endurance for  minutes including the following exercises:     Assessment   This is a 47 y.o. female referred to [facility] and presents with a medical diagnosis of  Chronic lower back pain without sciatica and demonstrates limitations as described below in the problem list. Pt rehab potential is Good. Pt presents with Chronic lower back. Pt presented with hx of medical complication. Pt presented with lower back pain, which increases trunk extension. Pt demonstrated decrease in lumbar range of motion in all planes. Pt presented with core and lower  extremity muscle weakness contributing with lower back pain. Pt demonstrated positive test for prone stability and neuro tension tests. Pt presented with difficult in seated and standing postured due to muscle imbalance. Pt will benefit from skilled PT services to decrease lower back pain, improve lumbar range of motion, improve lower extremity muscle strength, and improve quality of life.     Pain Pattern: Pattern 1 PEN    Patient received education on the [facility] program, purpose of the isometric test, progression of back strengthening as well as wellness approach and systemic strengthening.  Details of the program were discussed.  Reviewed that patient should feel support/pressure from med ex restraints but no pain or discomfort and patient expressed understanding.    Based on the above history and physical examination an active physical therapy program is recommended.  Pt will continue to benefit from skilled outpatient physical therapy to address the deficits listed below in the chart, provide pt/family education and to maximize pt's level of independence in the home and community environment. .     No environmental, cultural, spiritual, developmental or education needs expressed or noted    Medical necessity is demonstrated by the following problem list.    Pt presents with the following impairments:   History  Co-morbidities and personal factors that may impact the plan of care Examination  Body Structures and Functions, activity limitations and participation restrictions that may impact the plan of care Clinical Presentation   Decision Making/ Complexity Score   Co-morbidities:   hx of liver transplant, fall risk, diabets.         Personal Factors:   no deficits Body Regions:   back    Body Systems:   ROM  strength  transfers  motor control    Activity limitations:   Learning and applying knowledge  no deficits    General Tasks and Commands  no deficits    Communication  no deficits    Mobility  lifting  and carrying objects  walking    Self care  no deficits    Domestic Life  doing house work (cleaning house, washing dishes, laundry)    Interactions/Relationships  no deficits    Life Areas  no deficits    Community and Social Life  community life  recreation and leisure    Participation Restrictions:   Community activities      stable and uncomplicated   Complexity low  FOTO outcome assessment          GOALS: Pt is in agreement with the following goals.    Short term goals:  6 weeks or 10 visits   1.  Pt will demonstrate increased lumbar ROM by at least 3 degrees from the initial ROM value with improvements noted in functional ROM and ability to perform ADLs  2.  Pt will demonstrate increased maximum isometric torque value by 5% when compared to the initial value resulting in improved ability to perform bending, lifting, and carrying activities safely, confidently.  3.  Patient report a reduction in worst pain score by 1-2 points for improved tolerance during work and recreational activities  4.  Pt able to perform HEP correctly with minimal cueing or supervision for therapist      Long term goals: 13 weeks or 20 visits   1. Pt will demonstrate increased lumbar ROM by at least 10 degrees from initial ROM value, resulting in improved ability to perform functional fwd bending while standing and sitting.   2. Pt will demonstrate increased maximum isometric torque value by 10% when compared to the initial value resulting in improved ability to perform bending, lifting, and carrying activities safely, confidently.  3. Pt to demonstrate ability to independently control and reduce their pain through posture positioning and mechanical movements throughout a typical day.  4.  Patient will demonstrate improved overall function per FOTO Survey to CK = at least 40% but < 60% impaired, limited or restricted score or less.      Plan   Outpatient physical therapy 2x week for 13 weeks or 20 visits to include the following:   -  "Patient education  - Therapeutic exercise  - Manual therapy  - Performance testing   - Neuromuscular Re-education  - Therapeutic activity   - Modalities  -Functional dry needling     Pt may be seen by PTA as part of the rehabilitation team.     Therapist: Edinson Rasmussen, PT  4/2/2018    "I certify the need for these services furnished under this plan of treatment and while under my care."    ____________________________________  Physician/Referring Practitioner    _______________  Date of Signature              "

## 2018-04-04 ENCOUNTER — TELEPHONE (OUTPATIENT)
Dept: GASTROENTEROLOGY | Facility: CLINIC | Age: 48
End: 2018-04-04

## 2018-04-04 NOTE — TELEPHONE ENCOUNTER
Spoke with pt. Still having Post-prandial diarrhea with urgency and associated abdominal cramping    Blood sugar around 160    Will contact transplant about giving dicyclomine and contact Dr Gamble about temporarily stopping the trulicity

## 2018-04-09 ENCOUNTER — PATIENT MESSAGE (OUTPATIENT)
Dept: RHEUMATOLOGY | Facility: CLINIC | Age: 48
End: 2018-04-09

## 2018-04-09 ENCOUNTER — CLINICAL SUPPORT (OUTPATIENT)
Dept: REHABILITATION | Facility: HOSPITAL | Age: 48
End: 2018-04-09
Attending: PHYSICAL MEDICINE & REHABILITATION
Payer: MEDICARE

## 2018-04-09 DIAGNOSIS — M54.50 CHRONIC BILATERAL LOW BACK PAIN WITHOUT SCIATICA: ICD-10-CM

## 2018-04-09 DIAGNOSIS — G89.29 CHRONIC BILATERAL LOW BACK PAIN WITHOUT SCIATICA: ICD-10-CM

## 2018-04-09 PROCEDURE — 97750 PHYSICAL PERFORMANCE TEST: CPT | Mod: PN

## 2018-04-09 PROCEDURE — 97110 THERAPEUTIC EXERCISES: CPT | Mod: PN

## 2018-04-09 NOTE — PROGRESS NOTES
[facility]      Name: Valencia Dumont  Clinic Number: [id number]  Diagnosis:   Encounter Diagnosis   Name Primary?    Chronic bilateral low back pain without sciatica      Physician: Antoinette Joyce, *  Precautions: Diabetes and fall risk and hx of liver transplant   Visit #: 2  12 SPENCE: 5/15/2018  PTA Visit #: 0  Time In: 9:30 am  Time Out: 10:30 am  Total Treatment Time: 60 mins (1:1 with PT for 30 mins)    Pain Pattern: Pattern 1 PEN   Date POC Signed: 2018     Subjective     Valencia reports increased lower back  pain  Since initial evaluation. Pt states he both knee hurts because she went to dance over the weekend.   Patient reports their pain to be 7 out of 10 on a 0-10 scale with 0 being no pain and 10 being the worst pain imaginable.    Pain Location: Chronic lower back pain w/out sciatica        Objective     Baseline IM Testing Results:             Date of testin18  ROM 0 to 54deg   Max Peak Torque 78   Min Peak Torque 18   Flex/Ext Ratio 4.33   % below normative data - 2 std dev       Treatment      Valencia received individual therapeutic exercises to develop strength, endurance, ROM, flexibility, posture and core stabilization for 55 minutes including:    [facility] 2018   Visit Number 2   Treadmill Time (in min.) 10   Speed 1.2   Incline 0   Lumbar Extension Seat Pad 0   Femur Restraint 4   Top Dead Center 24   Counterweight 185   Lumbar Flexion 54   Lumbar Extension 0   Lumbar Peak Torque 78   Min Torque 18   Percent From Norm -2   Ice - Z Lie (in min.) 10     LTR   Seated trunk flexion   Single knee to chest stretch     Peripheral muscle strengthening which included 1 set of 15-20 repetitions at a slow, controlled 7 second per rep pace focused on strengthening supporting musculature for improved body mechanics and functional mobility. Pt and therapist focused on proper form during treatment to ensure optimal strengthening of each targeted  muscle group. Machines were utilized including torso rotation, leg extension, leg curl, chest press, upright row, tricep extension, bicep curl, leg press, and hip abduction.  Not performed: Lower extremity exercises.     Valencia received the following manual therapy techniques: Soft tissue Mobilization were applied to the: N/a for 00 minutes including:      Written Home Exercises Provided:   Pt demo good understanding of the education provided. Valencia demonstrated good return demonstration of activities.     Education provided re:   Valencia verbalized good understanding of education provided.   No spiritual or educational barriers to learning provided    Lumbar roll use compliance: better with lumbar roll    Assessment     Patient tolerated PT session good today. Medx lumbar extension demonstrated trunk extensor weakness. Pt was -2 std dev below norms. Pt tolerated lumbar range of motion from 0 to 54 degrees. Peripheral muscle strengthening were challenging. Pt demonstrated difficult in all upper extremity exercises today. V/c's required to perform exercises with proper form and slow movements. Pt demonstrated proper upper extermity and trunk extensors musculature fatigue. Cont. Skilled PT services to achieve Pt and patient's goals.   This is a 47 y.o. female referred to outpatient physical therapy and presents with a medical diagnosis of Chronic lower back pain without sciatica  and demonstrates limitations as described in the problem list. Pt prognosis is Fair +. Pt will continue to benefit from skilled outpatient physical therapy to address the deficits listed in the problem list, provide pt/family education and to maximize pt's level of independence in the home and community environment.     Goals as follows:     Short term goals:  6 weeks or 10 visits   1.  Pt will demonstrate increased lumbar ROM by at least 3 degrees from the initial ROM value with improvements noted in functional ROM and ability to perform  ADLs  2.  Pt will demonstrate increased maximum isometric torque value by 5% when compared to the initial value resulting in improved ability to perform bending, lifting, and carrying activities safely, confidently.  3.  Patient report a reduction in worst pain score by 1-2 points for improved tolerance during work and recreational activities  4.  Pt able to perform HEP correctly with minimal cueing or supervision for therapist        Long term goals: 13 weeks or 20 visits   1. Pt will demonstrate increased lumbar ROM by at least 10 degrees from initial ROM value, resulting in improved ability to perform functional fwd bending while standing and sitting.   2. Pt will demonstrate increased maximum isometric torque value by 10% when compared to the initial value resulting in improved ability to perform bending, lifting, and carrying activities safely, confidently.  3. Pt to demonstrate ability to independently control and reduce their pain through posture positioning and mechanical movements throughout a typical day.  4.  Patient will demonstrate improved overall function per FOTO Survey to CK = at least 40% but < 60% impaired, limited or restricted score or less.       Plan     Continue with established Plan of Care towards established PT goals.     Certification Period: 4/2/18 to 7/2/18    Therapist: Edinson Rasmussen, PT  4/9/18

## 2018-04-10 ENCOUNTER — PATIENT MESSAGE (OUTPATIENT)
Dept: OBSTETRICS AND GYNECOLOGY | Facility: CLINIC | Age: 48
End: 2018-04-10

## 2018-04-10 DIAGNOSIS — N76.0 VULVOVAGINITIS: Primary | ICD-10-CM

## 2018-04-10 RX ORDER — FLUCONAZOLE 150 MG/1
150 TABLET ORAL ONCE
Qty: 1 TABLET | Refills: 0 | Status: SHIPPED | OUTPATIENT
Start: 2018-04-10 | End: 2018-06-14 | Stop reason: SDUPTHER

## 2018-04-10 RX ORDER — NYSTATIN 100000 U/G
CREAM TOPICAL 2 TIMES DAILY
Qty: 30 G | Refills: 3 | Status: SHIPPED | OUTPATIENT
Start: 2018-04-10 | End: 2019-07-02 | Stop reason: SDUPTHER

## 2018-04-11 ENCOUNTER — PATIENT MESSAGE (OUTPATIENT)
Dept: RHEUMATOLOGY | Facility: CLINIC | Age: 48
End: 2018-04-11

## 2018-04-11 ENCOUNTER — CLINICAL SUPPORT (OUTPATIENT)
Dept: REHABILITATION | Facility: HOSPITAL | Age: 48
End: 2018-04-11
Attending: PHYSICAL MEDICINE & REHABILITATION
Payer: MEDICARE

## 2018-04-11 ENCOUNTER — TELEPHONE (OUTPATIENT)
Dept: TRANSPLANT | Facility: CLINIC | Age: 48
End: 2018-04-11

## 2018-04-11 ENCOUNTER — PATIENT MESSAGE (OUTPATIENT)
Dept: TRANSPLANT | Facility: CLINIC | Age: 48
End: 2018-04-11

## 2018-04-11 DIAGNOSIS — Z94.4 LIVER REPLACED BY TRANSPLANT: Primary | ICD-10-CM

## 2018-04-11 DIAGNOSIS — G89.29 CHRONIC BILATERAL LOW BACK PAIN WITHOUT SCIATICA: ICD-10-CM

## 2018-04-11 DIAGNOSIS — R74.8 INCREASED CREATINE KINASE LEVEL: ICD-10-CM

## 2018-04-11 DIAGNOSIS — M54.50 CHRONIC BILATERAL LOW BACK PAIN WITHOUT SCIATICA: ICD-10-CM

## 2018-04-11 PROCEDURE — 97110 THERAPEUTIC EXERCISES: CPT | Mod: PN

## 2018-04-11 NOTE — PROGRESS NOTES
[facility]      Name: Valencia Dumont  Clinic Number: [id number]  Diagnosis:   Encounter Diagnosis   Name Primary?    Chronic bilateral low back pain without sciatica      Physician: Antoinette Joyce, *  Precautions: Diabetes and fall risk and hx of liver transplant   Visit #: 3 of 12 SPENCE: 5/15/2018  PTA Visit #: 0  Time In: 10:42 am (pt was 12 min late)  Time Out: 11:42 am  Total Treatment Time: 60  mins (1:1 with PT for 40 mins)    Pain Pattern: Pattern 1 PEN   Date POC Signed: 2018     Subjective     Valencia reports that she was tired and soreness in her lower back after last PT session.   Patient reports their pain to be 5 out of 10 on a 0-10 scale with 0 being no pain and 10 being the worst pain imaginable.    Pain Location: Chronic lower back pain w/out sciatica        Objective     Baseline IM Testing Results:             Date of testin18  ROM 0 to 54deg   Max Peak Torque 78   Min Peak Torque 18   Flex/Ext Ratio 4.33   % below normative data - 2 std dev       Treatment      Valencia received individual therapeutic exercises to develop strength, endurance, ROM, flexibility, posture and core stabilization for 55 minutes including:    [facility] 2018   Visit Number 3   VAS Pain Rating 5   Treadmill Time (in min.) 10   Speed 1.2   Incline 0   Lumbar Extension Seat Pad -   Femur Restraint -   Top Dead Center -   Counterweight -   Lumbar Flexion 54   Lumbar Extension 0   Lumbar Peak Torque -   Min Torque -   Percent From Norm -   Lumbar Weight 45   Repetitions 20   Rating of Perceived Exertion 4   Ice - Z Lie (in min.) 10       LTR   Seated trunk flexion   Single knee to chest stretch     Peripheral muscle strengthening which included 1 set of 15-20 repetitions at a slow, controlled 7 second per rep pace focused on strengthening supporting musculature for improved body mechanics and functional mobility. Pt and therapist focused on proper form during  treatment to ensure optimal strengthening of each targeted muscle group. Machines were utilized including torso rotation, leg extension, leg curl, chest press, upright row, tricep extension, bicep curl, leg press, and hip abduction.    Valencia received the following manual therapy techniques: Soft tissue Mobilization were applied to the: N/a for 00 minutes including:      Written Home Exercises Provided:   Pt demo good understanding of the education provided. Valencia demonstrated good return demonstration of activities.     Education provided re:   Valencia verbalized good understanding of education provided.   No spiritual or educational barriers to learning provided    Lumbar roll use compliance: better with lumbar roll    Assessment     Patient tolerated PT session good today. Pt showed up to PT session 12 min late. Pt was able to complete full therapy session today. Pt demonstrated Good tolerance to initial Medx lumbar extension in dynamic exercises. Pt presented with trunk extensors fatigue and tiredness. Pt cont to tolerated good peripheral muscle strengthening of upper extremity, and pt was able to tolerated initial lower extremity exercises well with proper muscle fatigue. Pt required v/c's to perform exercises with proper form. Cont. Skilled PT services to achieve Pt and patient's goals.   This is a 47 y.o. female referred to outpatient physical therapy and presents with a medical diagnosis of Chronic lower back pain without sciatica  and demonstrates limitations as described in the problem list. Pt prognosis is Fair +. Pt will continue to benefit from skilled outpatient physical therapy to address the deficits listed in the problem list, provide pt/family education and to maximize pt's level of independence in the home and community environment.     Goals as follows:     Short term goals:  6 weeks or 10 visits   1.  Pt will demonstrate increased lumbar ROM by at least 3 degrees from the initial ROM value with  improvements noted in functional ROM and ability to perform ADLs  2.  Pt will demonstrate increased maximum isometric torque value by 5% when compared to the initial value resulting in improved ability to perform bending, lifting, and carrying activities safely, confidently.  3.  Patient report a reduction in worst pain score by 1-2 points for improved tolerance during work and recreational activities  4.  Pt able to perform HEP correctly with minimal cueing or supervision for therapist        Long term goals: 13 weeks or 20 visits   1. Pt will demonstrate increased lumbar ROM by at least 10 degrees from initial ROM value, resulting in improved ability to perform functional fwd bending while standing and sitting.   2. Pt will demonstrate increased maximum isometric torque value by 10% when compared to the initial value resulting in improved ability to perform bending, lifting, and carrying activities safely, confidently.  3. Pt to demonstrate ability to independently control and reduce their pain through posture positioning and mechanical movements throughout a typical day.  4.  Patient will demonstrate improved overall function per FOTO Survey to CK = at least 40% but < 60% impaired, limited or restricted score or less.       Plan     Continue with established Plan of Care towards established PT goals.     Certification Period: 4/2/18 to 7/2/18    Therapist: Edinson Rasmussen, PT  4/9/18

## 2018-04-11 NOTE — TELEPHONE ENCOUNTER
Informed pt labs reviewed and LFTs are stable. No med changes needed. Will repeat in 4 weeks and schedule nephrology consult.

## 2018-04-11 NOTE — TELEPHONE ENCOUNTER
----- Message from Miriam Abernathy MD sent at 4/11/2018 11:23 AM CDT -----  Kidney function slightly up although tacro level is ok, recommend patient start following with a nephrologist. Repeat labs in 4 weeks

## 2018-04-12 ENCOUNTER — PATIENT MESSAGE (OUTPATIENT)
Dept: RHEUMATOLOGY | Facility: CLINIC | Age: 48
End: 2018-04-12

## 2018-04-12 ENCOUNTER — PATIENT MESSAGE (OUTPATIENT)
Dept: PODIATRY | Facility: CLINIC | Age: 48
End: 2018-04-12

## 2018-04-12 ENCOUNTER — PATIENT MESSAGE (OUTPATIENT)
Dept: OBSTETRICS AND GYNECOLOGY | Facility: CLINIC | Age: 48
End: 2018-04-12

## 2018-04-12 ENCOUNTER — PATIENT MESSAGE (OUTPATIENT)
Dept: ENDOCRINOLOGY | Facility: CLINIC | Age: 48
End: 2018-04-12

## 2018-04-17 ENCOUNTER — PATIENT MESSAGE (OUTPATIENT)
Dept: TRANSPLANT | Facility: CLINIC | Age: 48
End: 2018-04-17

## 2018-04-19 ENCOUNTER — HOSPITAL ENCOUNTER (OUTPATIENT)
Dept: RADIOLOGY | Facility: HOSPITAL | Age: 48
Discharge: HOME | End: 2018-04-19
Attending: OBSTETRICS & GYNECOLOGY
Payer: MEDICARE

## 2018-04-19 ENCOUNTER — PATIENT MESSAGE (OUTPATIENT)
Dept: GASTROENTEROLOGY | Facility: CLINIC | Age: 48
End: 2018-04-19

## 2018-04-19 DIAGNOSIS — Z12.31 SCREENING MAMMOGRAM, ENCOUNTER FOR: ICD-10-CM

## 2018-04-19 PROCEDURE — 77067 SCR MAMMO BI INCL CAD: CPT | Mod: 26,,, | Performed by: RADIOLOGY

## 2018-04-19 PROCEDURE — 77067 SCR MAMMO BI INCL CAD: CPT | Mod: TC

## 2018-04-19 PROCEDURE — 77063 BREAST TOMOSYNTHESIS BI: CPT | Mod: 26,,, | Performed by: RADIOLOGY

## 2018-04-20 ENCOUNTER — TELEPHONE (OUTPATIENT)
Dept: GASTROENTEROLOGY | Facility: CLINIC | Age: 48
End: 2018-04-20

## 2018-04-20 ENCOUNTER — PATIENT MESSAGE (OUTPATIENT)
Dept: GASTROENTEROLOGY | Facility: CLINIC | Age: 48
End: 2018-04-20

## 2018-04-20 ENCOUNTER — CLINICAL SUPPORT (OUTPATIENT)
Dept: REHABILITATION | Facility: HOSPITAL | Age: 48
End: 2018-04-20
Attending: PHYSICAL MEDICINE & REHABILITATION
Payer: MEDICARE

## 2018-04-20 DIAGNOSIS — G89.29 CHRONIC BILATERAL LOW BACK PAIN WITHOUT SCIATICA: ICD-10-CM

## 2018-04-20 DIAGNOSIS — M54.50 CHRONIC BILATERAL LOW BACK PAIN WITHOUT SCIATICA: ICD-10-CM

## 2018-04-20 DIAGNOSIS — R19.7 DIARRHEA, UNSPECIFIED TYPE: Primary | ICD-10-CM

## 2018-04-20 PROCEDURE — 97110 THERAPEUTIC EXERCISES: CPT | Mod: PN

## 2018-04-20 RX ORDER — DICYCLOMINE HYDROCHLORIDE 10 MG/1
10 CAPSULE ORAL
Qty: 90 CAPSULE | Refills: 1 | Status: SHIPPED | OUTPATIENT
Start: 2018-04-20 | End: 2018-05-17 | Stop reason: SDUPTHER

## 2018-04-20 NOTE — PROGRESS NOTES
" [facility]      Name: Valencia Dumont  Clinic Number: [id number]  Diagnosis:   Encounter Diagnosis   Name Primary?    Chronic bilateral low back pain without sciatica      Physician: Antoinette Joyce, *  Precautions: Diabetes and fall risk and hx of liver transplant   Visit #:   SPENCE: 5/15/2018  PTA Visit #: 1  Time In: 1015 - pt arrived to session 15 minutes late  Time Out: 1115  Total Treatment Time: 50 minutes (1:1 with PTA for 15 mins)    Pain Pattern: Pattern 1 PEN   Date POC Signed: 2018     Subjective     Valencia reports increased bilateral knee pain following previous treatment session. Patient describes pain as "soreness" stating "I was unable to walk or get out of bed even though I needed to." Patient states that both of her knees are swollen and she is unable to put on her knee brace. Patient states that her pain is currently in her low back and bilateral knees.  Patient reports their pain to be 8-9 out of 10 on a 0-10 scale with 0 being no pain and 10 being the worst pain imaginable.    Pain Location: Chronic lower back pain w/out sciatica      Objective     Baseline IM Testing Results:             Date of testin18  ROM 0 to 54deg   Max Peak Torque 78   Min Peak Torque 18   Flex/Ext Ratio 4.33   % below normative data - 2 std dev       Treatment      Valencia received individual therapeutic exercises to develop strength, endurance, ROM, flexibility, posture and core stabilization for 50 minutes including:    [facility] 2018   Visit Number 4   VAS Pain Rating 9   Treadmill Time (in min.) 5   Speed 1.5   Incline 0   Lumbar Weight 50   Repetitions 20   Rating of Perceived Exertion 3   Ice - Z Lie (in min.) 10     LTR - not today  Seated trunk flexion - not today   Single knee to chest stretch - not today    Peripheral muscle strengthening which included 1 set of 15-20 repetitions at a slow, controlled 7 second per rep pace focused on " strengthening supporting musculature for improved body mechanics and functional mobility. Pt and therapist focused on proper form during treatment to ensure optimal strengthening of each targeted muscle group. Machines were utilized including torso rotation, leg extension, leg curl, chest press, upright row, tricep extension, bicep curl, leg press, and hip abduction.    Valencia received the following manual therapy techniques:   Kinesio tape applied to Bilateral knees for pain reduction. Patient received education regarding appropriate care and removal of Kinesiotape. Patient instructed in proper removal techniques if skin irritation occurs.     Written Home Exercises Provided:   Pt demo good understanding of the education provided. Valencia demonstrated good return demonstration of activities.     Education provided re:   Valencia verbalized good understanding of education provided.   No spiritual or educational barriers to learning provided    Lumbar roll use compliance: better with lumbar roll    Assessment     Patient tolerated treatment session fairly well today despite subjective complaints. Good tolerance to 5% increase in MedX Lumbar Extension weight with no exacerbation of low back pain. Good tolerance to peripheral machines with appropriate muscle fatigue reported at end of session. Kinesiotape applied to bilateral knees this session for pain reduction with education on removal of tape should irritation occur. Continue skilled PT services to achieve PT and patient's goals.   This is a 48 y.o. female referred to outpatient physical therapy and presents with a medical diagnosis of Chronic lower back pain without sciatica  and demonstrates limitations as described in the problem list. Pt prognosis is Fair +. Pt will continue to benefit from skilled outpatient physical therapy to address the deficits listed in the problem list, provide pt/family education and to maximize pt's level of independence in the home and  community environment.     Goals as follows:     Short term goals:  6 weeks or 10 visits   1.  Pt will demonstrate increased lumbar ROM by at least 3 degrees from the initial ROM value with improvements noted in functional ROM and ability to perform ADLs  2.  Pt will demonstrate increased maximum isometric torque value by 5% when compared to the initial value resulting in improved ability to perform bending, lifting, and carrying activities safely, confidently.  3.  Patient report a reduction in worst pain score by 1-2 points for improved tolerance during work and recreational activities  4.  Pt able to perform HEP correctly with minimal cueing or supervision for therapist     Long term goals: 13 weeks or 20 visits   1. Pt will demonstrate increased lumbar ROM by at least 10 degrees from initial ROM value, resulting in improved ability to perform functional fwd bending while standing and sitting.   2. Pt will demonstrate increased maximum isometric torque value by 10% when compared to the initial value resulting in improved ability to perform bending, lifting, and carrying activities safely, confidently.  3. Pt to demonstrate ability to independently control and reduce their pain through posture positioning and mechanical movements throughout a typical day.  4.  Patient will demonstrate improved overall function per FOTO Survey to CK = at least 40% but < 60% impaired, limited or restricted score or less.       Plan     Continue with established Plan of Care towards established PT goals.     Certification Period: 4/2/18 to 7/2/18    Therapist: Farrah Melendez PTA  04/20/2018

## 2018-04-20 NOTE — TELEPHONE ENCOUNTER
spoke with pt still having post-prandial diarrhea. Will Rx bentyl. I contacted Dr Gamble via staff message and she said was ok for the patient to hold the trulicity to see if this was causing the diarrhea. Pt was told to check her BS more often and call in the BS rises. She also report rectal irritation. Poor relif with Boutreau butt paste Advised sitz bath  And aquaphor or hydrocortisone cream

## 2018-04-23 ENCOUNTER — CLINICAL SUPPORT (OUTPATIENT)
Dept: REHABILITATION | Facility: HOSPITAL | Age: 48
End: 2018-04-23
Attending: PHYSICAL MEDICINE & REHABILITATION
Payer: MEDICARE

## 2018-04-23 DIAGNOSIS — G89.29 CHRONIC BILATERAL LOW BACK PAIN WITHOUT SCIATICA: ICD-10-CM

## 2018-04-23 DIAGNOSIS — M54.50 CHRONIC BILATERAL LOW BACK PAIN WITHOUT SCIATICA: ICD-10-CM

## 2018-04-23 PROCEDURE — 97110 THERAPEUTIC EXERCISES: CPT | Mod: PN

## 2018-04-23 NOTE — PROGRESS NOTES
[facility]      Name: Valencia Dumont  Clinic Number: [id number]  Diagnosis:   Encounter Diagnosis   Name Primary?    Chronic bilateral low back pain without sciatica      Physician: Antoinette Joyce, *  Precautions: Diabetes and fall risk and hx of liver transplant   Visit #:   SPENCE: 5/15/2018  PTA Visit #: 1  Time In: 1:00 pm  Time Out: 2:00 pm  Total Treatment Time: 65 minutes (1:1 with PT for 55 mins)    Pain Pattern: Pattern 1 PEN   Date POC Signed: 2018     Subjective     Valencia reports that she still have both knee pain. Pt states taping technique helped decrease knee pain. Pt reports she was not sore after last visit. Pt states she is compliant with HEP. Pt wants to learn taping technique. Pt brought kinesio tape to therapy. Pt states she will return to physician 2018    Patient reports their pain to be 5 out of 10 on a 0-10 scale with 0 being no pain and 10 being the worst pain imaginable.    Pain Location: Chronic lower back pain w/out sciatica      Objective   CMS Impairment/Limitation/Restriction for FOTO Lumbar Spine Survey  Status Limitation G-Code CMS Severity Modifier  Intake 37% 63%  Predicted 52% 48% Goal Status+ CK - At least 40 percent but less than 60 percent  2018 51% 49% Current Status CK - At least 40 percent but less than 60 percent  D/C Status CK **only report if this is discharge survey  +Based on FOTO predicted change score    Baseline IM Testing Results:             Date of testin18  ROM 0 to 54deg   Max Peak Torque 78   Min Peak Torque 18   Flex/Ext Ratio 4.33   % below normative data - 2 std dev       Treatment      Valencia received individual therapeutic exercises to develop strength, endurance, ROM, flexibility, posture and core stabilization for 55 minutes including:    [facility] 2018   Visit Number 5   VAS Pain Rating 5   Treadmill Time (in min.) 10   Speed 1.5   Incline 0   Lumbar Extension Seat Pad -    Femur Restraint -   Top Dead Center -   Counterweight -   Lumbar Flexion 54   Lumbar Extension 3   Lumbar Peak Torque -   Min Torque -   Percent From Norm -   Lumbar Weight 50   Repetitions 20   Rating of Perceived Exertion 3   Ice - Z Lie (in min.) 10     LTR  1x2'  Seated trunk flexion - not today   Single knee to chest stretch  3x30''  Open book 1x10 reps    Peripheral muscle strengthening which included 1 set of 15-20 repetitions at a slow, controlled 7 second per rep pace focused on strengthening supporting musculature for improved body mechanics and functional mobility. Pt and therapist focused on proper form during treatment to ensure optimal strengthening of each targeted muscle group. Machines were utilized including torso rotation, leg extension, leg curl, chest press, upright row, tricep extension, bicep curl, leg press, and hip abduction.    Valencia received the following manual therapy techniques:   Kinesio tape applied to Bilateral knees and paraspinals for pain reduction. Patient received education regarding appropriate care and removal of Kinesiotape. Patient instructed in proper removal techniques if skin irritation occurs.     Written Home Exercises Provided:   Pt demo good understanding of the education provided. Valencia demonstrated good return demonstration of activities.     Education provided re:   Valencia verbalized good understanding of education provided.   No spiritual or educational barriers to learning provided    Lumbar roll use compliance: better with lumbar roll    Assessment     Patient tolerated treatment session fairly well today. Medx lumbar range of motion was increased from 3 to 54 degrees. Peripheral strengthening exercises were maintained the same amount of weight today help decrease upper and lower extremity muscle soreness. Knee and back taping technique was applied today and pt was educated how to applied on both knees. Pt demonstrated proper upper and lower extremity muscle  fatigue today. Kinesiotape applied to bilateral knees this session for pain reduction with education on removal of tape should irritation occur. FOTO lumbar spine survey demonstrated increase in score since initial evaluation. Continue skilled PT services to achieve PT and patient's goals.    This is a 48 y.o. female referred to outpatient physical therapy and presents with a medical diagnosis of Chronic lower back pain without sciatica  and demonstrates limitations as described in the problem list. Pt prognosis is Fair +. Pt will continue to benefit from skilled outpatient physical therapy to address the deficits listed in the problem list, provide pt/family education and to maximize pt's level of independence in the home and community environment.     Goals as follows:     Short term goals:  6 weeks or 10 visits   1.  Pt will demonstrate increased lumbar ROM by at least 3 degrees from the initial ROM value with improvements noted in functional ROM and ability to perform ADLs  2.  Pt will demonstrate increased maximum isometric torque value by 5% when compared to the initial value resulting in improved ability to perform bending, lifting, and carrying activities safely, confidently.  3.  Patient report a reduction in worst pain score by 1-2 points for improved tolerance during work and recreational activities  4.  Pt able to perform HEP correctly with minimal cueing or supervision for therapist     Long term goals: 13 weeks or 20 visits   1. Pt will demonstrate increased lumbar ROM by at least 10 degrees from initial ROM value, resulting in improved ability to perform functional fwd bending while standing and sitting.   2. Pt will demonstrate increased maximum isometric torque value by 10% when compared to the initial value resulting in improved ability to perform bending, lifting, and carrying activities safely, confidently.  3. Pt to demonstrate ability to independently control and reduce their pain through  posture positioning and mechanical movements throughout a typical day.  4.  Patient will demonstrate improved overall function per FOTO Survey to CK = at least 40% but < 60% impaired, limited or restricted score or less.       Plan     Continue with established Plan of Care towards established PT goals.     Certification Period: 4/2/18 to 7/2/18    Therapist: Edinson Rasmussen, PT  04/23/2018

## 2018-04-25 ENCOUNTER — CLINICAL SUPPORT (OUTPATIENT)
Dept: REHABILITATION | Facility: HOSPITAL | Age: 48
End: 2018-04-25
Attending: PHYSICAL MEDICINE & REHABILITATION
Payer: MEDICARE

## 2018-04-25 DIAGNOSIS — G89.29 CHRONIC BILATERAL LOW BACK PAIN WITHOUT SCIATICA: ICD-10-CM

## 2018-04-25 DIAGNOSIS — M54.50 CHRONIC BILATERAL LOW BACK PAIN WITHOUT SCIATICA: ICD-10-CM

## 2018-04-25 PROCEDURE — 97110 THERAPEUTIC EXERCISES: CPT | Mod: PN

## 2018-04-25 NOTE — PROGRESS NOTES
[facility]      Name: Valencia Dumont  Clinic Number: [id number]  Diagnosis:   Encounter Diagnosis   Name Primary?    Chronic bilateral low back pain without sciatica      Physician: Antoinette Joyce, *  Precautions: Diabetes and fall risk and hx of liver transplant   Visit #: 6  12  SPENCE: 5/15/2018  PTA Visit #: 1  Time In: 1105  Time Out: 1200  Total Treatment Time: 55 minutes (1:1 with PTA for 42 mins)    Pain Pattern: Pattern 1 PEN   Date POC Signed: 2018     Subjective     Valencia reports increased in her low back and Right knee this morning    Patient reports their pain to be 7.5 out of 10 on a 0-10 scale with 0 being no pain and 10 being the worst pain imaginable.    Pain Location: Chronic lower back pain w/out sciatica      Objective     CMS Impairment/Limitation/Restriction for FOTO Lumbar Spine Survey  Status Limitation G-Code CMS Severity Modifier  Intake 37% 63%  Predicted 52% 48% Goal Status+ CK - At least 40 percent but less than 60 percent  2018 51% 49% Current Status CK - At least 40 percent but less than 60 percent  D/C Status CK **only report if this is discharge survey  +Based on FOTO predicted change score    Baseline IM Testing Results:             Date of testin18  ROM 0 to 54deg   Max Peak Torque 78   Min Peak Torque 18   Flex/Ext Ratio 4.33   % below normative data - 2 std dev       Treatment      Valencia received individual therapeutic exercises to develop strength, endurance, ROM, flexibility, posture and core stabilization for 55 minutes including:    [facility] 2018   Visit Number 6   VAS Pain Rating 7.5   Treadmill Time (in min.) 10   Speed 1.5   Incline 0   Lumbar Weight 53   Repetitions 20   Rating of Perceived Exertion 3   Ice - Z Lie (in min.) 0     LTR: 2 minutes  Seated trunk flexion - not today   Single knee to chest stretch: 3 x 30''  Open book: 10x     Peripheral muscle strengthening which included 1 set of  15-20 repetitions at a slow, controlled 7 second per rep pace focused on strengthening supporting musculature for improved body mechanics and functional mobility. Pt and therapist focused on proper form during treatment to ensure optimal strengthening of each targeted muscle group. Machines were utilized including torso rotation, leg extension, leg curl, chest press, upright row, tricep extension, bicep curl, leg press, and hip abduction/adduction.  Not performed today: hip abduction/adduction, leg press 2* time constraints    Valencia received the following manual therapy techniques:   Kinesiotape applied to Bilateral knees and paraspinals for pain reduction. Patient received education regarding appropriate care and removal of Kinesiotape. Patient instructed in proper removal techniques if skin irritation occurs.     Written Home Exercises Provided:   Pt demo good understanding of the education provided. Valencia demonstrated good return demonstration of activities.     Education provided re:   Valencia verbalized good understanding of education provided.   No spiritual or educational barriers to learning provided    Lumbar roll use compliance: better with lumbar roll    Assessment     Patient tolerated treatment session well today. Good tolerance to 5% increase in weight on MedX Lumbar Extension machine with appropriate muscle fatigue easily achieved. Some exercises deferred today secondary to time constraints. Verbal cueing required to maintain on task throughout session. Continue skilled PT services to achieve PT and patient's goals.    This is a 48 y.o. female referred to outpatient physical therapy and presents with a medical diagnosis of Chronic lower back pain without sciatica  and demonstrates limitations as described in the problem list. Pt prognosis is Fair +. Pt will continue to benefit from skilled outpatient physical therapy to address the deficits listed in the problem list, provide pt/family education and  to maximize pt's level of independence in the home and community environment.     Goals as follows:     Short term goals:  6 weeks or 10 visits   1.  Pt will demonstrate increased lumbar ROM by at least 3 degrees from the initial ROM value with improvements noted in functional ROM and ability to perform ADLs  2.  Pt will demonstrate increased maximum isometric torque value by 5% when compared to the initial value resulting in improved ability to perform bending, lifting, and carrying activities safely, confidently.  3.  Patient report a reduction in worst pain score by 1-2 points for improved tolerance during work and recreational activities  4.  Pt able to perform HEP correctly with minimal cueing or supervision for therapist     Long term goals: 13 weeks or 20 visits   1. Pt will demonstrate increased lumbar ROM by at least 10 degrees from initial ROM value, resulting in improved ability to perform functional fwd bending while standing and sitting.   2. Pt will demonstrate increased maximum isometric torque value by 10% when compared to the initial value resulting in improved ability to perform bending, lifting, and carrying activities safely, confidently.  3. Pt to demonstrate ability to independently control and reduce their pain through posture positioning and mechanical movements throughout a typical day.  4.  Patient will demonstrate improved overall function per FOTO Survey to CK = at least 40% but < 60% impaired, limited or restricted score or less.    Plan     Continue with established Plan of Care towards established PT goals.     Certification Period: 4/2/18 to 7/2/18    Therapist: Farrah Melendez, PTA  04/25/2018

## 2018-04-26 ENCOUNTER — PATIENT MESSAGE (OUTPATIENT)
Dept: TRANSPLANT | Facility: CLINIC | Age: 48
End: 2018-04-26

## 2018-04-27 ENCOUNTER — PATIENT MESSAGE (OUTPATIENT)
Dept: TRANSPLANT | Facility: CLINIC | Age: 48
End: 2018-04-27

## 2018-04-30 ENCOUNTER — CLINICAL SUPPORT (OUTPATIENT)
Dept: REHABILITATION | Facility: HOSPITAL | Age: 48
End: 2018-04-30
Attending: PHYSICAL MEDICINE & REHABILITATION
Payer: MEDICARE

## 2018-04-30 DIAGNOSIS — M54.50 CHRONIC BILATERAL LOW BACK PAIN WITHOUT SCIATICA: ICD-10-CM

## 2018-04-30 DIAGNOSIS — G89.29 CHRONIC BILATERAL LOW BACK PAIN WITHOUT SCIATICA: ICD-10-CM

## 2018-04-30 PROCEDURE — 97110 THERAPEUTIC EXERCISES: CPT | Mod: PN

## 2018-04-30 NOTE — PROGRESS NOTES
[facility]      Name: Valencia Dumont  Clinic Number: [id number]  Diagnosis:   Encounter Diagnosis   Name Primary?    Chronic bilateral low back pain without sciatica      Physician: Antoinette Joyce, *  Precautions: Diabetes and fall risk and hx of liver transplant   Visit #:   SPENCE: 5/15/2018  PTA Visit #: 1  Time In: 10:30 am  Time Out: 11:30 am  Total Treatment Time: 55 minutes (1:1 with PT for 30 mins)    Pain Pattern: Pattern 1 PEN   Date POC Signed: 2018     Subjective     Valencia reports that she had legs and arms muscle soreness. Pt reports she is not feeling well today. Pt states she could not sleep past 2 nights due to lower back pain. Pt reports she has been having problem with right shoulder and wrist including right writs carpal tunnel and elbow cyst.     Patient reports their pain to be 8 out of 10 on a 0-10 scale with 0 being no pain and 10 being the worst pain imaginable.    Pain Location: Chronic lower back pain w/out sciatica      Objective     CMS Impairment/Limitation/Restriction for FOTO Lumbar Spine Survey  Status Limitation G-Code CMS Severity Modifier  Intake 37% 63%  Predicted 52% 48% Goal Status+ CK - At least 40 percent but less than 60 percent  2018 51% 49% Current Status CK - At least 40 percent but less than 60 percent  D/C Status CK **only report if this is discharge survey  +Based on FOTO predicted change score    Baseline IM Testing Results:             Date of testin18  ROM 0 to 54deg   Max Peak Torque 78   Min Peak Torque 18   Flex/Ext Ratio 4.33   % below normative data - 2 std dev       Treatment      Valencia received individual therapeutic exercises to develop strength, endurance, ROM, flexibility, posture and core stabilization for 55 minutes including:    [facility] 2018   Visit Number 7   VAS Pain Rating 8   Treadmill Time (in min.) 10   Speed 1.5   Incline 0   Manual Therapy 10   Lumbar Extension Seat  Pad -   Femur Restraint -   Top Dead Center -   Counterweight -   Lumbar Flexion 52   Lumbar Extension 0   Lumbar Peak Torque -   Min Torque -   Percent From Norm -   Lumbar Weight 53   Repetitions 20   Rating of Perceived Exertion 3   Ice - Z Lie (in min.) 0     LTR: 2 minutes NP  Seated trunk flexion - not today   Single knee to chest stretch: 3 x 30'' NP  Open book: 10x  NP    Peripheral muscle strengthening which included 1 set of 15-20 repetitions at a slow, controlled 7 second per rep pace focused on strengthening supporting musculature for improved body mechanics and functional mobility. Pt and therapist focused on proper form during treatment to ensure optimal strengthening of each targeted muscle group. Machines were utilized including torso rotation, leg extension, leg curl, chest press, upright row, tricep extension, bicep curl, leg press, and hip abduction/adduction.    Valencia received the following manual therapy techniques:   IASTM green roller lower back  Kinesiotape applied to Bilateral knees and paraspinals for pain reduction. Patient received education regarding appropriate care and removal of Kinesiotape. Patient instructed in proper removal techniques if skin irritation occurs. NP    Written Home Exercises Provided:   Pt demo good understanding of the education provided. Valencia demonstrated good return demonstration of activities.     Education provided re:   Valencia verbalized good understanding of education provided.   No spiritual or educational barriers to learning provided    Lumbar roll use compliance: better with lumbar roll    Assessment     Patient tolerated treatment session well today. Medx lumbar extension was increased by 2 ft. Lbs . Pt presented with good tolerance, but v/c's were required to avoid using lower extremity during lumbar extension. Peripheral muscle strengthening exercises were kept same weight  As last visit due to increase muscle soreness upper and lower extremity. Pt  presented with difficult triceps and biceps curls due to wrist or elbow problem. Continue skilled PT services to achieve PT and patient's goals.    This is a 48 y.o. female referred to outpatient physical therapy and presents with a medical diagnosis of Chronic lower back pain without sciatica  and demonstrates limitations as described in the problem list. Pt prognosis is Fair +. Pt will continue to benefit from skilled outpatient physical therapy to address the deficits listed in the problem list, provide pt/family education and to maximize pt's level of independence in the home and community environment.     Goals as follows:     Short term goals:  6 weeks or 10 visits   1.  Pt will demonstrate increased lumbar ROM by at least 3 degrees from the initial ROM value with improvements noted in functional ROM and ability to perform ADLs  2.  Pt will demonstrate increased maximum isometric torque value by 5% when compared to the initial value resulting in improved ability to perform bending, lifting, and carrying activities safely, confidently.  3.  Patient report a reduction in worst pain score by 1-2 points for improved tolerance during work and recreational activities  4.  Pt able to perform HEP correctly with minimal cueing or supervision for therapist     Long term goals: 13 weeks or 20 visits   1. Pt will demonstrate increased lumbar ROM by at least 10 degrees from initial ROM value, resulting in improved ability to perform functional fwd bending while standing and sitting.   2. Pt will demonstrate increased maximum isometric torque value by 10% when compared to the initial value resulting in improved ability to perform bending, lifting, and carrying activities safely, confidently.  3. Pt to demonstrate ability to independently control and reduce their pain through posture positioning and mechanical movements throughout a typical day.  4.  Patient will demonstrate improved overall function per FOTO Survey to CK =  at least 40% but < 60% impaired, limited or restricted score or less.    Plan     Continue with established Plan of Care towards established PT goals.     Certification Period: 4/2/18 to 7/2/18    Therapist: Edinson Rasmussen, PT  04/30/2018

## 2018-05-03 ENCOUNTER — OFFICE VISIT (OUTPATIENT)
Dept: ENDOCRINOLOGY | Facility: CLINIC | Age: 48
End: 2018-05-03
Payer: MEDICARE

## 2018-05-03 VITALS
HEART RATE: 80 BPM | WEIGHT: 141 LBS | HEIGHT: 69 IN | BODY MASS INDEX: 20.88 KG/M2 | DIASTOLIC BLOOD PRESSURE: 70 MMHG | SYSTOLIC BLOOD PRESSURE: 130 MMHG | RESPIRATION RATE: 18 BRPM

## 2018-05-03 DIAGNOSIS — E55.9 VITAMIN D DEFICIENCY: ICD-10-CM

## 2018-05-03 DIAGNOSIS — T38.0X5S ADVERSE EFFECT OF GLUCOCORTICOID OR SYNTHETIC ANALOGUE, SEQUELA: ICD-10-CM

## 2018-05-03 DIAGNOSIS — E11.22 TYPE 2 DIABETES MELLITUS WITH STAGE 3 CHRONIC KIDNEY DISEASE, WITH LONG-TERM CURRENT USE OF INSULIN: ICD-10-CM

## 2018-05-03 DIAGNOSIS — M81.8 OTHER OSTEOPOROSIS WITHOUT CURRENT PATHOLOGICAL FRACTURE: ICD-10-CM

## 2018-05-03 DIAGNOSIS — Z94.4 LIVER REPLACED BY TRANSPLANT: Chronic | ICD-10-CM

## 2018-05-03 DIAGNOSIS — N18.30 TYPE 2 DIABETES MELLITUS WITH STAGE 3 CHRONIC KIDNEY DISEASE, WITH LONG-TERM CURRENT USE OF INSULIN: ICD-10-CM

## 2018-05-03 DIAGNOSIS — M81.0 OSTEOPOROSIS, UNSPECIFIED OSTEOPOROSIS TYPE, UNSPECIFIED PATHOLOGICAL FRACTURE PRESENCE: ICD-10-CM

## 2018-05-03 DIAGNOSIS — Z79.4 TYPE 2 DIABETES MELLITUS WITH STAGE 3 CHRONIC KIDNEY DISEASE, WITH LONG-TERM CURRENT USE OF INSULIN: ICD-10-CM

## 2018-05-03 DIAGNOSIS — E11.65 TYPE 2 DIABETES MELLITUS WITH HYPERGLYCEMIA, WITHOUT LONG-TERM CURRENT USE OF INSULIN: Primary | ICD-10-CM

## 2018-05-03 PROCEDURE — 99999 PR PBB SHADOW E&M-EST. PATIENT-LVL V: CPT | Mod: PBBFAC,,, | Performed by: INTERNAL MEDICINE

## 2018-05-03 PROCEDURE — 3045F PR MOST RECENT HEMOGLOBIN A1C LEVEL 7.0-9.0%: CPT | Mod: CPTII,S$GLB,, | Performed by: INTERNAL MEDICINE

## 2018-05-03 PROCEDURE — 3078F DIAST BP <80 MM HG: CPT | Mod: CPTII,S$GLB,, | Performed by: INTERNAL MEDICINE

## 2018-05-03 PROCEDURE — 99214 OFFICE O/P EST MOD 30 MIN: CPT | Mod: S$GLB,,, | Performed by: INTERNAL MEDICINE

## 2018-05-03 PROCEDURE — 3075F SYST BP GE 130 - 139MM HG: CPT | Mod: CPTII,S$GLB,, | Performed by: INTERNAL MEDICINE

## 2018-05-03 PROCEDURE — 3008F BODY MASS INDEX DOCD: CPT | Mod: CPTII,S$GLB,, | Performed by: INTERNAL MEDICINE

## 2018-05-03 NOTE — PROGRESS NOTES
"Subjective:     Patient ID: Susy Stover is a 48 y.o. female.    Chief Complaint: Follow-up    HPI:   Ms. Stover is a 48 y.o. female who is here for a follow-up visit for evaluation osteoporosis, type 2 diabetes mellitus, previous use of high dose steroids.   She is postmenopausal.     Denies fractures. Fell the other day on her carpet but no fractures. Trying to strengthen her legs at healthy back weekly.   She is walking more. She is working very hard on her diet. Doing healthy back and is helping her a bit with pain.   Has lost a few pounds.    Currently regimen:  Trulicity 0.75 mg weekly   Stopped basal and bolus insulin.     Range between 160 - 190  Checking blood sugars 3 - 4 times weekly.   Yesterday blood sugar was 159.    Supplements:  Taking vitamin D 50,000 units once a week  Vitamin C     Review of Systems   Constitutional: Negative for chills and fever.   HENT: Negative for congestion and sinus pressure.    Eyes: Negative for visual disturbance.   Respiratory: Negative for chest tightness and shortness of breath.    Cardiovascular: Negative for chest pain, palpitations and leg swelling.   Gastrointestinal: Negative for abdominal pain and vomiting.   Genitourinary: Negative for dysuria.   Musculoskeletal: Negative for arthralgias.   Skin: Negative for rash.   Neurological: Negative for weakness.   Hematological: Does not bruise/bleed easily.   Psychiatric/Behavioral: Negative for sleep disturbance.        Objective:     Physical Exam   Skin:   Sites of trulicity admnistration are normal.        Vitals:    05/03/18 1132   BP: 130/70   Pulse: 80   Resp: 18   Weight: 64 kg (141 lb)   Height: 5' 9" (1.753 m)   weight was 157 lbs  Results for SUSY STOVER (MRN [id number]) as of 5/3/2018 11:46   Ref. Range 11/3/2017 11:45 11/17/2017 13:01   Hemoglobin A1C Latest Ref Range: 4.0 - 5.6 % 7.3 (H) 7.2 (H)   Estimated Avg Glucose Latest Ref Range: 68 - 131 mg/dL 163 (H) 160 (H)   Results for SUSY STOVER (MRN " [id number]) as of 5/3/2018 11:46   Ref. Range 3/15/2018 12:10   Vit D, 25-Hydroxy Latest Ref Range: 30 - 96 ng/mL 23 (L)   Results for SUSY STOVER (MRN [id number]) as of 5/3/2018 11:46   Ref. Range 4/9/2018 11:22   BUN, Bld Latest Ref Range: 6 - 20 mg/dL 35 (H)   Creatinine Latest Ref Range: 0.5 - 1.4 mg/dL 1.8 (H)   eGFR if non African American Latest Ref Range: >60 mL/min/1.73 m^2 33 (A)   eGFR if African American Latest Ref Range: >60 mL/min/1.73 m^2 38 (A)     Assessment/Plan:     Ms. Stover is a 48 year old woman who is here for evaluation of osteoporosis, type 2 DM that is controlled on trulicity only.     Osteoporotic risk factors include post menopausal status (2015), previous use of steroids, no HRT, and low Z scores. No fractures and CKD is worsening a bit (creat 1.4 to 1.8 mg/dl).    Will repeat DXA this year given risk factors - previously done at [facility]  Needs A1C and CTX - next Monday at [facility]    Needs over the counter vitamin D 2000 IUs daily,  Need to keep a close eye on calcium levels with supplementation of vitamin D.   Plan to repeat pth, calcium and vitamin D in three months.     1. Type 2 diabetes mellitus with hyperglycemia and stage 3 chronic kidney disease, without long-term current use of insulin    - DXA Bone Density Spine And Hip; Future  - Hemoglobin A1c; Future  - C Telopeptide (CTX), Serum; Future  2. Adverse effect of glucocorticoid or synthetic analogue, sequela    - DXA Bone Density Spine And Hip; Future    3. Osteoporosis, unspecified osteoporosis type, unspecified pathological fracture presence    - DXA Bone Density Spine And Hip; Future    4. 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    - DXA Bone Density Spine And Hip; Future    5. Other osteoporosis without current pathological fracture     - C Telopeptide (CTX), Serum; Future

## 2018-05-04 ENCOUNTER — DOCUMENTATION ONLY (OUTPATIENT)
Dept: REHABILITATION | Facility: HOSPITAL | Age: 48
End: 2018-05-04

## 2018-05-04 ENCOUNTER — PATIENT MESSAGE (OUTPATIENT)
Dept: TRANSPLANT | Facility: CLINIC | Age: 48
End: 2018-05-04

## 2018-05-04 NOTE — PROGRESS NOTES
"Patient arrived to therapy session complaining of increased pain and swelling in her Right knee that patient describes as a "dull ache." Patient reports no pain relief with Tylenol, Icy Hot, knee brace, or Kinesiotape. Patient stated that she would like to cancel today's appointment secondary to feeling sick and going to the doctor at 11. Patient was advised to contact her physician about taking an anti-inflammatory for pain reduction and was encouraged to ice at home to decrease swelling. Patient verbal understanding and agreement.     Discussed with patient continuing the [facility] Program with modification to LE strengthening exercises until Right knee pain decreases and she follows up with her MD at the end of the month. Plan next visit to modify LE strengthening exercises to HL hip adduction ball squeezes, HL hip abduction, seated LAQ's, seated HS curls, and shuttle.     Appointment was cancelled today and no charges were dropped.    Therapist: Farrah Melendez, PTA.  05/04/2018    "

## 2018-05-05 ENCOUNTER — PATIENT MESSAGE (OUTPATIENT)
Dept: TRANSPLANT | Facility: CLINIC | Age: 48
End: 2018-05-05

## 2018-05-07 ENCOUNTER — CLINICAL SUPPORT (OUTPATIENT)
Dept: REHABILITATION | Facility: HOSPITAL | Age: 48
End: 2018-05-07
Attending: PHYSICAL MEDICINE & REHABILITATION
Payer: MEDICARE

## 2018-05-07 ENCOUNTER — LAB VISIT (OUTPATIENT)
Dept: LAB | Facility: HOSPITAL | Age: 48
End: 2018-05-07
Attending: INTERNAL MEDICINE
Payer: MEDICARE

## 2018-05-07 DIAGNOSIS — M81.8 OTHER OSTEOPOROSIS WITHOUT CURRENT PATHOLOGICAL FRACTURE: ICD-10-CM

## 2018-05-07 DIAGNOSIS — G89.29 CHRONIC BILATERAL LOW BACK PAIN WITHOUT SCIATICA: ICD-10-CM

## 2018-05-07 DIAGNOSIS — E11.65 TYPE 2 DIABETES MELLITUS WITH HYPERGLYCEMIA, WITHOUT LONG-TERM CURRENT USE OF INSULIN: ICD-10-CM

## 2018-05-07 DIAGNOSIS — M54.50 CHRONIC BILATERAL LOW BACK PAIN WITHOUT SCIATICA: ICD-10-CM

## 2018-05-07 DIAGNOSIS — Z94.4 LIVER REPLACED BY TRANSPLANT: ICD-10-CM

## 2018-05-07 LAB
ALBUMIN SERPL BCP-MCNC: 4.3 G/DL
ALP SERPL-CCNC: 85 U/L
ALT SERPL W/O P-5'-P-CCNC: 12 U/L
ANION GAP SERPL CALC-SCNC: 9 MMOL/L
AST SERPL-CCNC: 16 U/L
BASOPHILS # BLD AUTO: 0.01 K/UL
BASOPHILS NFR BLD: 0.2 %
BILIRUB SERPL-MCNC: 0.5 MG/DL
BUN SERPL-MCNC: 35 MG/DL
CALCIUM SERPL-MCNC: 9.6 MG/DL
CHLORIDE SERPL-SCNC: 103 MMOL/L
CO2 SERPL-SCNC: 27 MMOL/L
CREAT SERPL-MCNC: 1.6 MG/DL
DIFFERENTIAL METHOD: ABNORMAL
EOSINOPHIL # BLD AUTO: 0.5 K/UL
EOSINOPHIL NFR BLD: 9 %
ERYTHROCYTE [DISTWIDTH] IN BLOOD BY AUTOMATED COUNT: 14.1 %
EST. GFR  (AFRICAN AMERICAN): 44 ML/MIN/1.73 M^2
EST. GFR  (NON AFRICAN AMERICAN): 38 ML/MIN/1.73 M^2
ESTIMATED AVG GLUCOSE: 171 MG/DL
GLUCOSE SERPL-MCNC: 129 MG/DL
HBA1C MFR BLD HPLC: 7.6 %
HCT VFR BLD AUTO: 31.2 %
HGB BLD-MCNC: 10.8 G/DL
LYMPHOCYTES # BLD AUTO: 2.1 K/UL
LYMPHOCYTES NFR BLD: 40.7 %
MAGNESIUM SERPL-MCNC: 2 MG/DL
MCH RBC QN AUTO: 26.3 PG
MCHC RBC AUTO-ENTMCNC: 34.6 G/DL
MCV RBC AUTO: 76 FL
MONOCYTES # BLD AUTO: 0.3 K/UL
MONOCYTES NFR BLD: 6.3 %
NEUTROPHILS # BLD AUTO: 2.2 K/UL
NEUTROPHILS NFR BLD: 43.6 %
PLATELET # BLD AUTO: 209 K/UL
PMV BLD AUTO: 10.3 FL
POTASSIUM SERPL-SCNC: 3.7 MMOL/L
PROT SERPL-MCNC: 7.3 G/DL
RBC # BLD AUTO: 4.11 M/UL
SODIUM SERPL-SCNC: 139 MMOL/L
WBC # BLD AUTO: 5.09 K/UL

## 2018-05-07 PROCEDURE — 80053 COMPREHEN METABOLIC PANEL: CPT

## 2018-05-07 PROCEDURE — 36415 COLL VENOUS BLD VENIPUNCTURE: CPT

## 2018-05-07 PROCEDURE — 83036 HEMOGLOBIN GLYCOSYLATED A1C: CPT

## 2018-05-07 PROCEDURE — 97110 THERAPEUTIC EXERCISES: CPT | Mod: PN

## 2018-05-07 PROCEDURE — 82523 COLLAGEN CROSSLINKS: CPT

## 2018-05-07 PROCEDURE — 80197 ASSAY OF TACROLIMUS: CPT

## 2018-05-07 PROCEDURE — 83735 ASSAY OF MAGNESIUM: CPT

## 2018-05-07 PROCEDURE — 85025 COMPLETE CBC W/AUTO DIFF WBC: CPT

## 2018-05-07 NOTE — PROGRESS NOTES
[facility]      Name: Valencia Dumont  Clinic Number: [id number]  Diagnosis:   Encounter Diagnosis   Name Primary?    Chronic bilateral low back pain without sciatica      Physician: Antoinette Joyce, *  Precautions: Diabetes and fall risk and hx of liver transplant   Visit #:   SPENCE: 5/15/2018  PTA Visit #: 1  Time In: 10:30 am  Time Out: 11:30 am  Total Treatment Time: 55 minutes (1:1 with PT for 55 mins)    Pain Pattern: Pattern 1 PEN   Date POC Signed: 2018     Subjective     Valencia reports that she is hurting today. Pt states she has most pain right lower back. Pt reports she still have knee swollen and pain. Pt reports that he left knee buckled and she almost fall down. Pt states last week her knee also give out on her. Pt reports knee giving out start to become more frequently. . Pt reports she has been having problem with right shoulder and wrist including right writs carpal tunnel and elbow cyst.     Patient reports their pain to be 9out of 10 on a 0-10 scale with 0 being no pain and 10 being the worst pain imaginable.    Pain Location: Chronic lower back pain w/out sciatica      Objective     CMS Impairment/Limitation/Restriction for FOTO Lumbar Spine Survey  Status Limitation G-Code CMS Severity Modifier  Intake 37% 63%  Predicted 52% 48% Goal Status+ CK - At least 40 percent but less than 60 percent  2018 51% 49% Current Status CK - At least 40 percent but less than 60 percent  D/C Status CK **only report if this is discharge survey  +Based on FOTO predicted change score    Baseline IM Testing Results:             Date of testin18  ROM 0 to 54deg   Max Peak Torque 78   Min Peak Torque 18   Flex/Ext Ratio 4.33   % below normative data - 2 std dev       Treatment      Valencia received individual therapeutic exercises to develop strength, endurance, ROM, flexibility, posture and core stabilization for 55 minutes including:  [facility]  5/7/2018   Visit Number 8   VAS Pain Rating 8   Treadmill Time (in min.) -   Speed -   Incline -   Recumbent Bike Seat Pos. 3   Time 10   Manual Therapy -   Lumbar Extension Seat Pad -   Femur Restraint -   Top Dead Center -   Counterweight -   Lumbar Flexion 54   Lumbar Extension 0   Lumbar Peak Torque -   Min Torque -   Percent From Norm -   Lumbar Weight 58   Repetitions 20   Rating of Perceived Exertion 4   Ice - Z Lie (in min.) 0       LTR: 2 minutes NP  Seated trunk flexion - not today   Single knee to chest stretch: 3 x 30'' NP  Open book: 10x  NP    On Mat:   HL hip adduction ball squeezes  20 reps/ RPE 1  HL hip abduction YTB 20 reps/ RPE 1  seated LAQ's 2# 20 reps/ RPE 1  seated HS curls YTB 20 reps/ RPE 1   shuttle 1 black cord  NP    Peripheral muscle strengthening which included 1 set of 15-20 repetitions at a slow, controlled 7 second per rep pace focused on strengthening supporting musculature for improved body mechanics and functional mobility. Pt and therapist focused on proper form during treatment to ensure optimal strengthening of each targeted muscle group. Machines were utilized including torso rotation, leg extension, leg curl, chest press, upright row, tricep extension, bicep curl, leg press, and hip abduction/adduction.  Lower extremity machine exercises NP    Edema: (circumference)   Left knee 39 cm  Right knee 38 cm     Valencia received the following manual therapy techniques:   IASTM green roller lower back  Kinesiotape applied to Bilateral knees and paraspinals for pain reduction. Patient received education regarding appropriate care and removal of Kinesiotape. Patient instructed in proper removal techniques if skin irritation occurs. NP    Written Home Exercises Provided:   Pt demo good understanding of the education provided. Valencia demonstrated good return demonstration of activities.     Education provided re:   Valencia verbalized good understanding of education provided.   No  spiritual or educational barriers to learning provided    Lumbar roll use compliance: better with lumbar roll    Assessment     Patient tolerated treatment session well today. Medx lumbar extension weight was increase by 5%. Pt tolerated good with some muscle pulling. Pt tolerated peripheral upper extremity muscle well with increase Peripheral lower extremity muscle strength was all performed modified position due to both knee pain and swollen. Modified lower extremity exercises were easy. Pt did not demonstrated to be challenged, but pt presented with no increase in knee pain. Plan to return to lower extremity exercises, but decrease weights to reduce knee pain. Continue skilled PT services to achieve PT and patient's goals.    This is a 48 y.o. female referred to outpatient physical therapy and presents with a medical diagnosis of Chronic lower back pain without sciatica  and demonstrates limitations as described in the problem list. Pt prognosis is Fair +. Pt will continue to benefit from skilled outpatient physical therapy to address the deficits listed in the problem list, provide pt/family education and to maximize pt's level of independence in the home and community environment.     Goals as follows:     Short term goals:  6 weeks or 10 visits   1.  Pt will demonstrate increased lumbar ROM by at least 3 degrees from the initial ROM value with improvements noted in functional ROM and ability to perform ADLs  2.  Pt will demonstrate increased maximum isometric torque value by 5% when compared to the initial value resulting in improved ability to perform bending, lifting, and carrying activities safely, confidently.  3.  Patient report a reduction in worst pain score by 1-2 points for improved tolerance during work and recreational activities  4.  Pt able to perform HEP correctly with minimal cueing or supervision for therapist     Long term goals: 13 weeks or 20 visits   1. Pt will demonstrate increased lumbar  ROM by at least 10 degrees from initial ROM value, resulting in improved ability to perform functional fwd bending while standing and sitting.   2. Pt will demonstrate increased maximum isometric torque value by 10% when compared to the initial value resulting in improved ability to perform bending, lifting, and carrying activities safely, confidently.  3. Pt to demonstrate ability to independently control and reduce their pain through posture positioning and mechanical movements throughout a typical day.  4.  Patient will demonstrate improved overall function per FOTO Survey to CK = at least 40% but < 60% impaired, limited or restricted score or less.    Plan     Continue with established Plan of Care towards established PT goals.     Certification Period: 4/2/18 to 7/2/18    Therapist: Edinson Rasmussen, PT  05/07/2018

## 2018-05-08 DIAGNOSIS — E11.65 UNCONTROLLED TYPE 2 DIABETES MELLITUS WITH HYPERGLYCEMIA, WITHOUT LONG-TERM CURRENT USE OF INSULIN: Primary | ICD-10-CM

## 2018-05-08 LAB — TACROLIMUS BLD-MCNC: 6.7 NG/ML

## 2018-05-09 ENCOUNTER — TELEPHONE (OUTPATIENT)
Dept: REHABILITATION | Facility: HOSPITAL | Age: 48
End: 2018-05-09

## 2018-05-09 ENCOUNTER — PATIENT MESSAGE (OUTPATIENT)
Dept: TRANSPLANT | Facility: CLINIC | Age: 48
End: 2018-05-09

## 2018-05-09 ENCOUNTER — TELEPHONE (OUTPATIENT)
Dept: TRANSPLANT | Facility: CLINIC | Age: 48
End: 2018-05-09

## 2018-05-09 NOTE — TELEPHONE ENCOUNTER
Informed pt labs reviewed are stable, no med changes needed and we can repeat in 4 weeks on 6/4/18.

## 2018-05-09 NOTE — TELEPHONE ENCOUNTER
----- Message from Miriam Abernathy MD sent at 5/9/2018 10:10 AM CDT -----  Reviewed, nothing to do

## 2018-05-10 LAB — COLLAGEN CTX SERPL-MCNC: 977 PG/ML

## 2018-05-11 ENCOUNTER — PATIENT MESSAGE (OUTPATIENT)
Dept: ENDOCRINOLOGY | Facility: CLINIC | Age: 48
End: 2018-05-11

## 2018-05-11 NOTE — PROGRESS NOTES
[facility]      Name: Valencia Dumont  Clinic Number: [id number]  Diagnosis:   Encounter Diagnosis   Name Primary?    Chronic bilateral low back pain without sciatica      Physician: Antoinetet Joyce, *  Precautions: Diabetes and fall risk and hx of liver transplant   Visit #:   SPENCE: 5/15/2018  PTA Visit #: 1  Time In: 10:45 am (pt was 15 min late)  Time Out: 11:45 am  Total Treatment Time: 60 minutes (1:1 with PT for 60 mins)    Pain Pattern: Pattern 1 PEN   Date POC Signed: 2018     Subjective     Valencia reports that she had inject right elbow. Pt states she will have an MRI in the right knee.  Pt states her lower back is feeling better. Pt states she went to lupus walker on Saturday morning. Pt reports had a little increase in lower back pain after walk.       Patient reports their pain to be 6 out of 10 on a 0-10 scale with 0 being no pain and 10 being the worst pain imaginable.    Pain Location: Chronic lower back pain w/out sciatica      Objective     CMS Impairment/Limitation/Restriction for FOTO Lumbar Spine Survey  Status Limitation G-Code CMS Severity Modifier  Intake 37% 63%  Predicted 52% 48% Goal Status+ CK - At least 40 percent but less than 60 percent  2018 51% 49% Current Status CK - At least 40 percent but less than 60 percent  D/C Status CK **only report if this is discharge survey  +Based on FOTO predicted change score    Baseline IM Testing Results:             Date of testin18  ROM 0 to 54deg   Max Peak Torque 78   Min Peak Torque 18   Flex/Ext Ratio 4.33   % below normative data - 2 std dev       Treatment      Valencia received individual therapeutic exercises to develop strength, endurance, ROM, flexibility, posture and core stabilization for 55 minutes including:  [facility] 2018   Visit Number 9   VAS Pain Rating 6   Treadmill Time (in min.) 10   Speed 1.5   Incline 0   Recumbent Bike Seat Pos. -   Time -   Manual  Therapy -   Lumbar Extension Seat Pad -   Femur Restraint -   Top Dead Center -   Counterweight -   Lumbar Flexion 57   Lumbar Extension 0   Lumbar Peak Torque -   Min Torque -   Percent From Norm -   Lumbar Weight 58   Repetitions 20   Rating of Perceived Exertion 3   Ice - Z Lie (in min.) 0     LTR: 2 minutes NP due to time constrain   Seated trunk flexion - not today  Due to time constrain   Single knee to chest stretch: 3 x 30'' NP due to time constrain   Open book: 10x  NP due to time constrain     On Mat: (defer to machines today)   HL hip adduction ball squeezes  20 reps/ RPE 1  HL hip abduction YTB 20 reps/ RPE 1  seated LAQ's 2# 20 reps/ RPE 1  seated HS curls YTB 20 reps/ RPE 1   shuttle 1 black cord  NP    Peripheral muscle strengthening which included 1 set of 15-20 repetitions at a slow, controlled 7 second per rep pace focused on strengthening supporting musculature for improved body mechanics and functional mobility. Pt and therapist focused on proper form during treatment to ensure optimal strengthening of each targeted muscle group. Machines were utilized including torso rotation, leg extension, leg curl, chest press, upright row, tricep extension, bicep curl, leg press, and hip abduction/adduction.    Valencia received the following manual therapy techniques:   IASTM green roller lower back  Kinesiotape applied to Bilateral knees and paraspinals for pain reduction. Patient received education regarding appropriate care and removal of Kinesiotape. Patient instructed in proper removal techniques if skin irritation occurs. NP    Written Home Exercises Provided:   Pt demo good understanding of the education provided. Valencia demonstrated good return demonstration of activities.     Education provided re:   Valencia verbalized good understanding of education provided.   No spiritual or educational barriers to learning provided    Lumbar roll use compliance: better with lumbar roll    Assessment     Patient  tolerated treatment session well today. Medx lumbar extension range of motion increased by 3 degrees with RPE of 3-4 and 20 reps. Pt did not demonstrated pain expression during exercise. Pt tolerated peripheral upper extremity muscle strength good. Pt kept same weights as last visit due to only 15 reps performed. Pt tolerated good lower extremity peripheral muscle strengthening. PT decreased weights due to increase knee pain from 4/30/2108. Pt tolerated good 10 to 20 lbs weights for lower extremity exercises with no exacerbation of knee pain. Pt noted muscle fasciculation and tiredness. Pt will be re-assessed next visit  Continue skilled PT services to achieve PT and patient's goals.    This is a 48 y.o. female referred to outpatient physical therapy and presents with a medical diagnosis of Chronic lower back pain without sciatica  and demonstrates limitations as described in the problem list. Pt prognosis is Fair +. Pt will continue to benefit from skilled outpatient physical therapy to address the deficits listed in the problem list, provide pt/family education and to maximize pt's level of independence in the home and community environment.     Goals as follows:     Short term goals:  6 weeks or 10 visits   1.  Pt will demonstrate increased lumbar ROM by at least 3 degrees from the initial ROM value with improvements noted in functional ROM and ability to perform ADLs  2.  Pt will demonstrate increased maximum isometric torque value by 5% when compared to the initial value resulting in improved ability to perform bending, lifting, and carrying activities safely, confidently.  3.  Patient report a reduction in worst pain score by 1-2 points for improved tolerance during work and recreational activities  4.  Pt able to perform HEP correctly with minimal cueing or supervision for therapist     Long term goals: 13 weeks or 20 visits   1. Pt will demonstrate increased lumbar ROM by at least 10 degrees from initial ROM  value, resulting in improved ability to perform functional fwd bending while standing and sitting.   2. Pt will demonstrate increased maximum isometric torque value by 10% when compared to the initial value resulting in improved ability to perform bending, lifting, and carrying activities safely, confidently.  3. Pt to demonstrate ability to independently control and reduce their pain through posture positioning and mechanical movements throughout a typical day.  4.  Patient will demonstrate improved overall function per FOTO Survey to CK = at least 40% but < 60% impaired, limited or restricted score or less.    Plan   Plan to be re-assessed next visit.     Continue with established Plan of Care towards established PT goals.     Certification Period: 4/2/18 to 7/2/18    Therapist: Edinson Rasmussen, PT  05/14/2018

## 2018-05-11 NOTE — TELEPHONE ENCOUNTER
Spoke to  Patient regarding her concern on CTX lab explain to patient best to my knowledge what the test was normal used for osteoporosis-measure bone absorption. patient wanted to know why the number was so high explain to the patient the lab was not abnormal and it was in rang for patient whom are going though menopausal patient verbalized under standing.patent did ask to speak with  to get more detail on what CTX lab is and what did she have it done

## 2018-05-14 ENCOUNTER — CLINICAL SUPPORT (OUTPATIENT)
Dept: REHABILITATION | Facility: HOSPITAL | Age: 48
End: 2018-05-14
Attending: PHYSICAL MEDICINE & REHABILITATION
Payer: MEDICARE

## 2018-05-14 DIAGNOSIS — G89.29 CHRONIC BILATERAL LOW BACK PAIN WITHOUT SCIATICA: ICD-10-CM

## 2018-05-14 DIAGNOSIS — M54.50 CHRONIC BILATERAL LOW BACK PAIN WITHOUT SCIATICA: ICD-10-CM

## 2018-05-14 PROCEDURE — 97110 THERAPEUTIC EXERCISES: CPT | Mod: PN

## 2018-05-14 NOTE — TELEPHONE ENCOUNTER
Reviewed labs.   No fractures  Osteopenia  Not on steroids  S/p liver transplant  CTX in postmenopausal range -- may be consideration for treatment.   Have discussed.   Has DXA due next year.   SD

## 2018-05-16 ENCOUNTER — TELEPHONE (OUTPATIENT)
Dept: REHABILITATION | Facility: HOSPITAL | Age: 48
End: 2018-05-16

## 2018-05-16 NOTE — TELEPHONE ENCOUNTER
Patient's therapy appointment was cancelled today secondary to pending insurance approval from [facility]. As of today, 5/16/2018, patient has had 4 cancellations and has attended 9 visits since her initial evaluation on 4/2/2018. Patient's next scheduled appointment is Monday, 5/21/2018.    Therapist: Farrah Melendez, PTA  05/16/2018

## 2018-05-17 ENCOUNTER — PATIENT MESSAGE (OUTPATIENT)
Dept: RHEUMATOLOGY | Facility: CLINIC | Age: 48
End: 2018-05-17

## 2018-05-17 DIAGNOSIS — E55.9 VITAMIN D DEFICIENCY: ICD-10-CM

## 2018-05-17 DIAGNOSIS — R19.7 DIARRHEA, UNSPECIFIED TYPE: ICD-10-CM

## 2018-05-17 DIAGNOSIS — R53.83 FATIGUE, UNSPECIFIED TYPE: ICD-10-CM

## 2018-05-17 DIAGNOSIS — R52 BODY ACHES: ICD-10-CM

## 2018-05-17 DIAGNOSIS — M32.9 SYSTEMIC LUPUS ERYTHEMATOSUS, UNSPECIFIED SLE TYPE, UNSPECIFIED ORGAN INVOLVEMENT STATUS: Primary | Chronic | ICD-10-CM

## 2018-05-17 RX ORDER — DICYCLOMINE HYDROCHLORIDE 10 MG/1
10 CAPSULE ORAL
Qty: 90 CAPSULE | Refills: 2 | Status: SHIPPED | OUTPATIENT
Start: 2018-05-17 | End: 2018-08-29 | Stop reason: SDUPTHER

## 2018-05-18 ENCOUNTER — PATIENT MESSAGE (OUTPATIENT)
Dept: RHEUMATOLOGY | Facility: CLINIC | Age: 48
End: 2018-05-18

## 2018-05-18 ENCOUNTER — TELEPHONE (OUTPATIENT)
Dept: RHEUMATOLOGY | Facility: CLINIC | Age: 48
End: 2018-05-18

## 2018-05-18 PROBLEM — R52 BODY ACHES: Status: ACTIVE | Noted: 2018-05-18

## 2018-05-18 NOTE — TELEPHONE ENCOUNTER
Patient concerned about bone density and would like for me to touch base with her endocrinologist.  Will send a message

## 2018-05-23 ENCOUNTER — TELEPHONE (OUTPATIENT)
Dept: REHABILITATION | Facility: HOSPITAL | Age: 48
End: 2018-05-23

## 2018-05-23 NOTE — TELEPHONE ENCOUNTER
Patient cancelled today's appointment stating that she is sick. Patient's next scheduled appointment is 5/28/2018.    6 total CX (2 visits CX 2* pending insurance), 0 NS    Therapist: Farrah Melendez, RADHA  05/23/2018

## 2018-05-28 ENCOUNTER — CLINICAL SUPPORT (OUTPATIENT)
Dept: REHABILITATION | Facility: HOSPITAL | Age: 48
End: 2018-05-28
Attending: PHYSICAL MEDICINE & REHABILITATION
Payer: MEDICARE

## 2018-05-28 DIAGNOSIS — M54.50 CHRONIC BILATERAL LOW BACK PAIN WITHOUT SCIATICA: ICD-10-CM

## 2018-05-28 DIAGNOSIS — G89.29 CHRONIC BILATERAL LOW BACK PAIN WITHOUT SCIATICA: ICD-10-CM

## 2018-05-28 PROCEDURE — G8979 MOBILITY GOAL STATUS: HCPCS | Mod: CK,PN

## 2018-05-28 PROCEDURE — 97750 PHYSICAL PERFORMANCE TEST: CPT | Mod: PN

## 2018-05-28 PROCEDURE — 97110 THERAPEUTIC EXERCISES: CPT | Mod: PN

## 2018-05-28 PROCEDURE — G8978 MOBILITY CURRENT STATUS: HCPCS | Mod: CK,PN

## 2018-05-28 NOTE — PROGRESS NOTES
[facility]      Name: Valencia Dumont  Clinic Number: [id number]  Diagnosis:   Encounter Diagnosis   Name Primary?    Chronic bilateral low back pain without sciatica      Physician: Antoinette Joyce, *  Precautions: Diabetes and fall risk and hx of liver transplant   Visit #: 10 of 12  SPENCE: 5/15/2018  Priority Start Date Expiration Date Referral Entered By   Routine 2018 06/10/2018 Vanessa Diaz   Visits Requested Visits Authorized Visits Completed Visits Scheduled   12 3 1 2     PTA Visit #: 1  Time In: 10: 17 am   Time Out: 11:00 am  Total Treatment Time: 43 minutes (1:1 with PT for 30 mins)    Pain Pattern: Pattern 1 PEN   Date POC Signed: 2018     Subjective     Valencia reports that  She has been having problem with her right knee. Pt states her lower back is feeling better but she feels throbbing pain at times.    Patient reports their pain to be 6 out of 10 on a 0-10 scale with 0 being no pain and 10 being the worst pain imaginable.    Pain Location: Chronic lower back pain w/out sciatica      Objective     CMS Impairment/Limitation/Restriction for FOTO Lumbar Spine Survey  Status Limitation G-Code CMS Severity Modifier  Intake 37% 63%  Predicted 52% 48% Goal Status+ CK - At least 40 percent but less than 60 percent  2018 51% 49%  2018 50% 50% Current Status CK - At least 40 percent but less than 60 percent  D/C Status CK **only report if this is discharge survey  +Based on FOTO predicted change score    Baseline IM Testing Results:      From initial evaluation       Date of testin18  ROM 0 to 54deg   Max Peak Torque 78   Min Peak Torque 18   Flex/Ext Ratio 4.33   % below normative data - 2 std dev     Baseline IM Testing Results:    From:10th visit   Date of testin2018     ROM 0 to 57 deg   Max Peak Torque 90   Min Peak Torque 65   Flex/Ext Ratio 1.38   % below normative data - 1.3 std dev        Treatment      Valencia received  individual therapeutic exercises to develop strength, endurance, ROM, flexibility, posture and core stabilization for 55 minutes including:  [facility] 5/28/2018   Visit Number 10   VAS Pain Rating 6   Treadmill Time (in min.) -   Speed -   Incline -   Recumbent Bike Seat Pos. -   Time -   Manual Therapy -   Lumbar Extension Seat Pad 0   Femur Restraint 4   Top Dead Center 24   Counterweight 185   Lumbar Flexion 57   Lumbar Extension 0   Lumbar Peak Torque 90   Min Torque 65   Percent From Norm -1.3   Lumbar Weight -   Repetitions -   Rating of Perceived Exertion -   Ice - Z Lie (in min.) -       LTR: 2 minutes NP due to time constrain   Seated trunk flexion - not today  Due to time constrain   Single knee to chest stretch: 3 x 30'' NP due to time constrain   Open book: 10x  NP due to time constrain     On Mat: (defer to machines today)   HL hip adduction ball squeezes  20 reps/ RPE 1  HL hip abduction YTB 20 reps/ RPE 1  seated LAQ's 2# 20 reps/ RPE 1  seated HS curls YTB 20 reps/ RPE 1   shuttle 1 black cord  NP    Peripheral muscle strengthening which included 1 set of 15-20 repetitions at a slow, controlled 7 second per rep pace focused on strengthening supporting musculature for improved body mechanics and functional mobility. Pt and therapist focused on proper form during treatment to ensure optimal strengthening of each targeted muscle group. Machines were utilized including torso rotation, leg extension, leg curl, chest press, upright row, tricep extension, bicep curl, leg press, and hip abduction/adduction.    Valencia received the following manual therapy techniques: 00 min  IASTM green roller lower back  Kinesiotape applied to Bilateral knees and paraspinals for pain reduction. Patient received education regarding appropriate care and removal of Kinesiotape. Patient instructed in proper removal techniques if skin irritation occurs. NP    Written Home Exercises Provided:   Pt demo good understanding  of the education provided. Valencia demonstrated good return demonstration of activities.     Education provided re:   Valencia verbalized good understanding of education provided.   No spiritual or educational barriers to learning provided    Lumbar roll use compliance: better with lumbar roll    Assessment     Patient tolerated treatment session well today. Pt has missed some therapy session since 5/14 due to insurance approval. Pt has been having some right knee pain and issues, but pt demonstrated in good spirit and dedicated to finish Healthy back program. PT was re-assessed today. Pt demonstrated increase in lumbar extensor lumbar strength. Pt scored Max peak of 90 torque and Min peak torque  65 (see box above). Pt demonstrated improvement in since initial evaluation. Pt presented about -1.3 std dev below norms with her age and gender. Pt has demonstrated improvement in Lumbar range of motion since initial evaluation. Pt has increase lumbar range of motion by from 0-54 degrees to 0-57 degrees. FOTO lumbar spine survey demonstrate ed improvement since initial evaluation. Pt has 50% limitation as per today. Pt has met 3 out 4 STGs today.  Overall, pt has demonstrated improvement in upper and lower extremity muscle strengthening, but pt cont with lower back pain. Lower back pain has been decreasing. PT will cont to benefit from skilled PT services to decrease functional limitations.. Continue skilled PT services to achieve PT and patient's goals.    PT/PTA face to face conference performed during today's treatment session. Patient's goals and POC updated today.   This is a 48 y.o. female referred to outpatient physical therapy and presents with a medical diagnosis of Chronic lower back pain without sciatica  and demonstrates limitations as described in the problem list. Pt prognosis is Fair +. Pt will continue to benefit from skilled outpatient physical therapy to address the deficits listed in the problem list,  provide pt/family education and to maximize pt's level of independence in the home and community environment.     Goals as follows:     Short term goals:  6 weeks or 10 visits  Updated 5/28/2018  1.  Pt will demonstrate increased lumbar ROM by at least 3 degrees from the initial ROM value with improvements noted in functional ROM and ability to perform ADLs (goal met)  2.  Pt will demonstrate increased maximum isometric torque value by 5% when compared to the initial value resulting in improved ability to perform bending, lifting, and carrying activities safely, confidently. (goal met)  3.  Patient report a reduction in worst pain score by 1-2 points for improved tolerance during work and recreational activities (goal in progress)   4.  Pt able to perform HEP correctly with minimal cueing or supervision for therapist (goal met)     Long term goals: 13 weeks or 20 visits   1. Pt will demonstrate increased lumbar ROM by at least 10 degrees from initial ROM value, resulting in improved ability to perform functional fwd bending while standing and sitting.   2. Pt will demonstrate increased maximum isometric torque value by 10% when compared to the initial value resulting in improved ability to perform bending, lifting, and carrying activities safely, confidently.  3. Pt to demonstrate ability to independently control and reduce their pain through posture positioning and mechanical movements throughout a typical day.  4.  Patient will demonstrate improved overall function per FOTO Survey to CK = at least 40% but < 60% impaired, limited or restricted score or less.    Plan     Continue with established Plan of Care towards established PT goals.     Certification Period: 4/2/18 to 7/2/18    Therapist: Edinson Rasmussen, PT  05/28/2018

## 2018-05-28 NOTE — PLAN OF CARE
[facility]      Name: Valencia Dumont  Clinic Number: [id number]  Diagnosis:   Encounter Diagnosis   Name Primary?    Chronic bilateral low back pain without sciatica      Physician: Antoinette Joyce, *  Precautions: Diabetes and fall risk and hx of liver transplant   Visit #: 10 of 12  SPENCE: 5/15/2018  Priority Start Date Expiration Date Referral Entered By   Routine 2018 06/10/2018 Vanessa Diaz   Visits Requested Visits Authorized Visits Completed Visits Scheduled   12 3 1 2     PTA Visit #: 1  Time In: 10: 17 am   Time Out: 11:00 am  Total Treatment Time: 43 minutes (1:1 with PT for 30 mins)    Pain Pattern: Pattern 1 PEN   Date POC Signed: 2018     Subjective     Valencia reports that  She has been having problem with her right knee. Pt states her lower back is feeling better but she feels throbbing pain at times.    Patient reports their pain to be 6 out of 10 on a 0-10 scale with 0 being no pain and 10 being the worst pain imaginable.    Pain Location: Chronic lower back pain w/out sciatica      Objective     CMS Impairment/Limitation/Restriction for FOTO Lumbar Spine Survey  Status Limitation G-Code CMS Severity Modifier  Intake 37% 63%  Predicted 52% 48% Goal Status+ CK - At least 40 percent but less than 60 percent  2018 51% 49%  2018 50% 50% Current Status CK - At least 40 percent but less than 60 percent  D/C Status CK **only report if this is discharge survey  +Based on FOTO predicted change score    Baseline IM Testing Results:      From initial evaluation       Date of testin18  ROM 0 to 54deg   Max Peak Torque 78   Min Peak Torque 18   Flex/Ext Ratio 4.33   % below normative data - 2 std dev     Baseline IM Testing Results:    From:10th visit   Date of testin2018     ROM 0 to 57 deg   Max Peak Torque 90   Min Peak Torque 65   Flex/Ext Ratio 1.38   % below normative data - 1.3 std dev        Treatment      Valencia received  individual therapeutic exercises to develop strength, endurance, ROM, flexibility, posture and core stabilization for 55 minutes including:  [facility] 5/28/2018   Visit Number 10   VAS Pain Rating 6   Treadmill Time (in min.) -   Speed -   Incline -   Recumbent Bike Seat Pos. -   Time -   Manual Therapy -   Lumbar Extension Seat Pad 0   Femur Restraint 4   Top Dead Center 24   Counterweight 185   Lumbar Flexion 57   Lumbar Extension 0   Lumbar Peak Torque 90   Min Torque 65   Percent From Norm -1.3   Lumbar Weight -   Repetitions -   Rating of Perceived Exertion -   Ice - Z Lie (in min.) -       LTR: 2 minutes NP due to time constrain   Seated trunk flexion - not today  Due to time constrain   Single knee to chest stretch: 3 x 30'' NP due to time constrain   Open book: 10x  NP due to time constrain     On Mat: (defer to machines today)   HL hip adduction ball squeezes  20 reps/ RPE 1  HL hip abduction YTB 20 reps/ RPE 1  seated LAQ's 2# 20 reps/ RPE 1  seated HS curls YTB 20 reps/ RPE 1   shuttle 1 black cord  NP    Peripheral muscle strengthening which included 1 set of 15-20 repetitions at a slow, controlled 7 second per rep pace focused on strengthening supporting musculature for improved body mechanics and functional mobility. Pt and therapist focused on proper form during treatment to ensure optimal strengthening of each targeted muscle group. Machines were utilized including torso rotation, leg extension, leg curl, chest press, upright row, tricep extension, bicep curl, leg press, and hip abduction/adduction.    Valencia received the following manual therapy techniques: 00 min  IASTM green roller lower back  Kinesiotape applied to Bilateral knees and paraspinals for pain reduction. Patient received education regarding appropriate care and removal of Kinesiotape. Patient instructed in proper removal techniques if skin irritation occurs. NP    Written Home Exercises Provided:   Pt demo good understanding  of the education provided. Valencia demonstrated good return demonstration of activities.     Education provided re:   Valencia verbalized good understanding of education provided.   No spiritual or educational barriers to learning provided    Lumbar roll use compliance: better with lumbar roll    Assessment     Patient tolerated treatment session well today. Pt has missed some therapy session since 5/14 due to insurance approval. Pt has been having some right knee pain and issues, but pt demonstrated in good spirit and dedicated to finish Healthy back program. PT was re-assessed today. Pt demonstrated increase in lumbar extensor lumbar strength. Pt scored Max peak of 90 torque and Min peak torque  65 (see box above). Pt demonstrated improvement in since initial evaluation. Pt presented about -1.3 std dev below norms with her age and gender. Pt has demonstrated improvement in Lumbar range of motion since initial evaluation. Pt has increase lumbar range of motion by from 0-54 degrees to 0-57 degrees. FOTO lumbar spine survey demonstrate ed improvement since initial evaluation. Pt has 50% limitation as per today. Pt has met 3 out 4 STGs today.  Overall, pt has demonstrated improvement in upper and lower extremity muscle strengthening, but pt cont with lower back pain. Lower back pain has been decreasing. PT will cont to benefit from skilled PT services to decrease functional limitations.. Continue skilled PT services to achieve PT and patient's goals.    PT/PTA face to face conference performed during today's treatment session. Patient's goals and POC updated today.   This is a 48 y.o. female referred to outpatient physical therapy and presents with a medical diagnosis of Chronic lower back pain without sciatica  and demonstrates limitations as described in the problem list. Pt prognosis is Fair +. Pt will continue to benefit from skilled outpatient physical therapy to address the deficits listed in the problem list,  provide pt/family education and to maximize pt's level of independence in the home and community environment.     Goals as follows:     Short term goals:  6 weeks or 10 visits  Updated 5/28/2018  1.  Pt will demonstrate increased lumbar ROM by at least 3 degrees from the initial ROM value with improvements noted in functional ROM and ability to perform ADLs (goal met)  2.  Pt will demonstrate increased maximum isometric torque value by 5% when compared to the initial value resulting in improved ability to perform bending, lifting, and carrying activities safely, confidently. (goal met)  3.  Patient report a reduction in worst pain score by 1-2 points for improved tolerance during work and recreational activities (goal in progress)   4.  Pt able to perform HEP correctly with minimal cueing or supervision for therapist (goal met)     Long term goals: 13 weeks or 20 visits   1. Pt will demonstrate increased lumbar ROM by at least 10 degrees from initial ROM value, resulting in improved ability to perform functional fwd bending while standing and sitting.   2. Pt will demonstrate increased maximum isometric torque value by 10% when compared to the initial value resulting in improved ability to perform bending, lifting, and carrying activities safely, confidently.  3. Pt to demonstrate ability to independently control and reduce their pain through posture positioning and mechanical movements throughout a typical day.  4.  Patient will demonstrate improved overall function per FOTO Survey to CK = at least 40% but < 60% impaired, limited or restricted score or less.    Plan     Continue with established Plan of Care towards established PT goals.     Certification Period: 4/2/18 to 7/2/18    Therapist: Edinson Rasmussen, PT  05/28/2018

## 2018-05-30 ENCOUNTER — PATIENT MESSAGE (OUTPATIENT)
Dept: ENDOCRINOLOGY | Facility: CLINIC | Age: 48
End: 2018-05-30

## 2018-05-30 NOTE — TELEPHONE ENCOUNTER
Called spoke to patient regarding her blood sugar being high patient stated that this morning BG-[id number] and before she left the hours around 9am BG-[id number] patient stated she didn't have anything different in her diet other that a snowball Sunday and a coke zero yesterday patient stated that she still have insulin left just didn't want to take ,,any with out  approval patient is out of the house as of now will be back home with in a 30min to an hour

## 2018-05-31 ENCOUNTER — TELEPHONE (OUTPATIENT)
Dept: ENDOCRINOLOGY | Facility: CLINIC | Age: 48
End: 2018-05-31

## 2018-05-31 ENCOUNTER — PATIENT MESSAGE (OUTPATIENT)
Dept: ENDOCRINOLOGY | Facility: CLINIC | Age: 48
End: 2018-05-31

## 2018-05-31 ENCOUNTER — PATIENT MESSAGE (OUTPATIENT)
Dept: RHEUMATOLOGY | Facility: CLINIC | Age: 48
End: 2018-05-31

## 2018-05-31 DIAGNOSIS — M32.9 SYSTEMIC LUPUS ERYTHEMATOSUS, UNSPECIFIED SLE TYPE, UNSPECIFIED ORGAN INVOLVEMENT STATUS: Primary | Chronic | ICD-10-CM

## 2018-05-31 NOTE — TELEPHONE ENCOUNTER
Denies steroids  (had injection over two weeks ago)  No new medications.   No URI/infection/UTI    BG elevated up to 300 - 400s.     Still taking trulicity - no change in administration   Stopped basal and bolus several months ago due to good control    Last night took levemir 14 units and novolog 10 units  at bedtime  This morning bg was 119 , now 141      Start:   Novolog 5 units before meals if BG > 180  levemir 8 units at bedtime   Check before meals and at bedtime   Log review of one week

## 2018-06-01 ENCOUNTER — PATIENT MESSAGE (OUTPATIENT)
Dept: ENDOCRINOLOGY | Facility: CLINIC | Age: 48
End: 2018-06-01

## 2018-06-01 ENCOUNTER — PATIENT MESSAGE (OUTPATIENT)
Dept: RHEUMATOLOGY | Facility: CLINIC | Age: 48
End: 2018-06-01

## 2018-06-01 ENCOUNTER — OFFICE VISIT (OUTPATIENT)
Dept: OPTOMETRY | Facility: CLINIC | Age: 48
End: 2018-06-01
Payer: MEDICARE

## 2018-06-01 ENCOUNTER — CLINICAL SUPPORT (OUTPATIENT)
Dept: OPHTHALMOLOGY | Facility: CLINIC | Age: 48
End: 2018-06-01
Payer: MEDICARE

## 2018-06-01 DIAGNOSIS — Z79.899 LONG-TERM USE OF PLAQUENIL: ICD-10-CM

## 2018-06-01 DIAGNOSIS — M32.9 SYSTEMIC LUPUS ERYTHEMATOSUS, UNSPECIFIED SLE TYPE, UNSPECIFIED ORGAN INVOLVEMENT STATUS: Chronic | ICD-10-CM

## 2018-06-01 DIAGNOSIS — E11.9 TYPE 2 DIABETES MELLITUS WITHOUT RETINOPATHY: ICD-10-CM

## 2018-06-01 DIAGNOSIS — H52.7 REFRACTIVE ERROR: ICD-10-CM

## 2018-06-01 DIAGNOSIS — M32.9 SYSTEMIC LUPUS ERYTHEMATOSUS, UNSPECIFIED SLE TYPE, UNSPECIFIED ORGAN INVOLVEMENT STATUS: Primary | Chronic | ICD-10-CM

## 2018-06-01 PROCEDURE — 92004 COMPRE OPH EXAM NEW PT 1/>: CPT | Mod: S$GLB,,, | Performed by: OPTOMETRIST

## 2018-06-01 PROCEDURE — 92083 EXTENDED VISUAL FIELD XM: CPT | Mod: S$GLB,,, | Performed by: OPTOMETRIST

## 2018-06-01 PROCEDURE — 99999 PR PBB SHADOW E&M-EST. PATIENT-LVL I: CPT | Mod: PBBFAC,,, | Performed by: OPTOMETRIST

## 2018-06-01 PROCEDURE — 92134 CPTRZ OPH DX IMG PST SGM RTA: CPT | Mod: S$GLB,,, | Performed by: OPTOMETRIST

## 2018-06-01 NOTE — LETTER
June 1, 2018        Kristin Marcial MD  [address]  [place] [address]             [facility]  [address]  [place] [address]  Phone: [phone]  Fax: [phone]   Patient: Valencia Dumont   MR Number: [id number]   YOB: 1970   Date of Visit: 6/1/2018       Dear Dr. Marcial:    I saw your patient, Valencia Dumont, today for evaluation. Attached you will find relevant portions of my assessment and plan of care.       If you have questions, please do not hesitate to call me. I look forward to following Valencia Dumont along with you.    Sincerely,      Tiarra Murillo, OD            CC  No Recipients    Enclosure

## 2018-06-01 NOTE — PROGRESS NOTES
Subjective:       Patient ID: Valencia Dumont is a 48 y.o. female      Chief Complaint   Patient presents with    Concerns About Ocular Health     Pt was on HCQ in 2006 x 1 year. Rheum wants to start again and wants pt to get checked.     Diabetic Eye Exam     LBS: 246 this am but been running 300-500s this week, pt unsure why     History of Present Illness  [facility]     Pt here for diabetic eye exam and plaquenil ck.   Pt states has not started plaquenil yet but has taken it in the past.   Pt states no changes in vision. Pt wears single vision glasses for   distance. Pt states no tearing itching ou off/on no burning no pain  ha's x 2 days no floaters.     Eye meds:  None    Hemoglobin A1C       Date                     Value               Ref Range           Status                05/07/2018               7.6 (H)             4.0 - 5.6 %         Final                  11/17/2017               7.2 (H)             4.0 - 5.6 %         Final                 11/03/2017               7.3 (H)             4.0 - 5.6 %         Final            ----------        Assessment/Plan:     1. Systemic lupus erythematosus, unspecified SLE type, unspecified organ involvement status  2. Long-term use of Plaquenil  No evidence of plaquenil maculopathy on exam, can continue with plaquenil therapy. Color vision normal. OCT and HVF WNL OU today.  Return in 1 years for HVF 10-2 and OCT macula.     3. Type 2 diabetes mellitus without retinopathy  No diabetic retinopathy. Discussed with pt the effects of diabetes on vision, importance of good blood sugar control, compliance with meds, and follow up care with PCP. Return in 1 year for dilated eye exam, sooner PRN.    4. Refractive error  Pt c/o of blurry VA with current glasses (2 months old) but BS has been out of control, running 500s. Pt states vision was good initially with glasses. Recommend pt get BS under control, once stable if still having blurry VA, can  return for a refraction.    Follow-up in about 1 year (around 6/1/2019) for Diabetic Eye Exam, Plaquenil check, HVF 10-2, OCT macula.

## 2018-06-04 ENCOUNTER — LAB VISIT (OUTPATIENT)
Dept: LAB | Facility: HOSPITAL | Age: 48
End: 2018-06-04
Attending: INTERNAL MEDICINE
Payer: MEDICARE

## 2018-06-04 DIAGNOSIS — E55.9 VITAMIN D DEFICIENCY: ICD-10-CM

## 2018-06-04 DIAGNOSIS — M32.9 SYSTEMIC LUPUS ERYTHEMATOSUS, UNSPECIFIED SLE TYPE, UNSPECIFIED ORGAN INVOLVEMENT STATUS: Chronic | ICD-10-CM

## 2018-06-04 DIAGNOSIS — Z94.4 LIVER REPLACED BY TRANSPLANT: ICD-10-CM

## 2018-06-04 DIAGNOSIS — R52 BODY ACHES: ICD-10-CM

## 2018-06-04 DIAGNOSIS — R53.83 FATIGUE, UNSPECIFIED TYPE: ICD-10-CM

## 2018-06-04 LAB
ALBUMIN SERPL BCP-MCNC: 4 G/DL
ALP SERPL-CCNC: 81 U/L
ALT SERPL W/O P-5'-P-CCNC: 13 U/L
ANION GAP SERPL CALC-SCNC: 9 MMOL/L
AST SERPL-CCNC: 13 U/L
BASOPHILS # BLD AUTO: 0.01 K/UL
BASOPHILS NFR BLD: 0.2 %
BILIRUB SERPL-MCNC: 0.5 MG/DL
BUN SERPL-MCNC: 37 MG/DL
CALCIUM SERPL-MCNC: 9.4 MG/DL
CHLORIDE SERPL-SCNC: 104 MMOL/L
CK SERPL-CCNC: 78 U/L
CO2 SERPL-SCNC: 26 MMOL/L
CREAT SERPL-MCNC: 1.7 MG/DL
CRP SERPL-MCNC: 0.2 MG/L
DIFFERENTIAL METHOD: ABNORMAL
EOSINOPHIL # BLD AUTO: 0 K/UL
EOSINOPHIL NFR BLD: 0.7 %
ERYTHROCYTE [DISTWIDTH] IN BLOOD BY AUTOMATED COUNT: 14.6 %
ERYTHROCYTE [SEDIMENTATION RATE] IN BLOOD BY WESTERGREN METHOD: 12 MM/HR
EST. GFR  (AFRICAN AMERICAN): 41 ML/MIN/1.73 M^2
EST. GFR  (NON AFRICAN AMERICAN): 35 ML/MIN/1.73 M^2
GLUCOSE SERPL-MCNC: 170 MG/DL
HCT VFR BLD AUTO: 35.3 %
HGB BLD-MCNC: 12.6 G/DL
LYMPHOCYTES # BLD AUTO: 2.2 K/UL
LYMPHOCYTES NFR BLD: 38.7 %
MAGNESIUM SERPL-MCNC: 2.5 MG/DL
MCH RBC QN AUTO: 26.8 PG
MCHC RBC AUTO-ENTMCNC: 35.7 G/DL
MCV RBC AUTO: 75 FL
MONOCYTES # BLD AUTO: 0.4 K/UL
MONOCYTES NFR BLD: 6.4 %
NEUTROPHILS # BLD AUTO: 3.1 K/UL
NEUTROPHILS NFR BLD: 54 %
PLATELET # BLD AUTO: 165 K/UL
PMV BLD AUTO: 10 FL
POTASSIUM SERPL-SCNC: 3.8 MMOL/L
PROT SERPL-MCNC: 7 G/DL
RBC # BLD AUTO: 4.7 M/UL
SODIUM SERPL-SCNC: 139 MMOL/L
URATE SERPL-MCNC: 6.6 MG/DL
WBC # BLD AUTO: 5.66 K/UL

## 2018-06-04 PROCEDURE — 86160 COMPLEMENT ANTIGEN: CPT

## 2018-06-04 PROCEDURE — 86160 COMPLEMENT ANTIGEN: CPT | Mod: 59

## 2018-06-04 PROCEDURE — 86140 C-REACTIVE PROTEIN: CPT

## 2018-06-04 PROCEDURE — 84550 ASSAY OF BLOOD/URIC ACID: CPT

## 2018-06-04 PROCEDURE — 80053 COMPREHEN METABOLIC PANEL: CPT

## 2018-06-04 PROCEDURE — 85652 RBC SED RATE AUTOMATED: CPT

## 2018-06-04 PROCEDURE — 82550 ASSAY OF CK (CPK): CPT

## 2018-06-04 PROCEDURE — 85025 COMPLETE CBC W/AUTO DIFF WBC: CPT

## 2018-06-04 PROCEDURE — 80197 ASSAY OF TACROLIMUS: CPT

## 2018-06-04 PROCEDURE — 82306 VITAMIN D 25 HYDROXY: CPT

## 2018-06-04 PROCEDURE — 86225 DNA ANTIBODY NATIVE: CPT

## 2018-06-04 PROCEDURE — 36415 COLL VENOUS BLD VENIPUNCTURE: CPT

## 2018-06-04 PROCEDURE — 82085 ASSAY OF ALDOLASE: CPT

## 2018-06-04 PROCEDURE — 83735 ASSAY OF MAGNESIUM: CPT

## 2018-06-05 ENCOUNTER — PATIENT MESSAGE (OUTPATIENT)
Dept: RHEUMATOLOGY | Facility: CLINIC | Age: 48
End: 2018-06-05

## 2018-06-05 LAB
25(OH)D3+25(OH)D2 SERPL-MCNC: 17 NG/ML
C3 SERPL-MCNC: 74 MG/DL
C4 SERPL-MCNC: 20 MG/DL
DSDNA AB SER-ACNC: NORMAL [IU]/ML
TACROLIMUS BLD-MCNC: 4.7 NG/ML

## 2018-06-06 ENCOUNTER — PATIENT MESSAGE (OUTPATIENT)
Dept: RHEUMATOLOGY | Facility: CLINIC | Age: 48
End: 2018-06-06

## 2018-06-06 ENCOUNTER — LAB VISIT (OUTPATIENT)
Dept: LAB | Facility: HOSPITAL | Age: 48
End: 2018-06-06
Attending: FAMILY MEDICINE
Payer: MEDICARE

## 2018-06-06 DIAGNOSIS — E78.00 PURE HYPERCHOLESTEROLEMIA: ICD-10-CM

## 2018-06-06 DIAGNOSIS — M32.9 SYSTEMIC LUPUS ERYTHEMATOSUS, UNSPECIFIED SLE TYPE, UNSPECIFIED ORGAN INVOLVEMENT STATUS: Primary | Chronic | ICD-10-CM

## 2018-06-06 DIAGNOSIS — I10 ESSENTIAL HYPERTENSION, MALIGNANT: Primary | ICD-10-CM

## 2018-06-06 LAB
ALBUMIN SERPL BCP-MCNC: 3.9 G/DL
ALDOLASE SERPL-CCNC: 3.1 U/L
ALP SERPL-CCNC: 78 U/L
ALT SERPL W/O P-5'-P-CCNC: 14 U/L
ANION GAP SERPL CALC-SCNC: 7 MMOL/L
ANISOCYTOSIS BLD QL SMEAR: SLIGHT
AST SERPL-CCNC: 11 U/L
BASOPHILS # BLD AUTO: 0 K/UL
BASOPHILS NFR BLD: 0 %
BILIRUB SERPL-MCNC: 0.5 MG/DL
BUN SERPL-MCNC: 34 MG/DL
CALCIUM SERPL-MCNC: 9.5 MG/DL
CHLORIDE SERPL-SCNC: 105 MMOL/L
CHOLEST SERPL-MCNC: 184 MG/DL
CHOLEST/HDLC SERPL: 1.9 {RATIO}
CO2 SERPL-SCNC: 28 MMOL/L
CREAT SERPL-MCNC: 1.6 MG/DL
DIFFERENTIAL METHOD: ABNORMAL
EOSINOPHIL # BLD AUTO: 0 K/UL
EOSINOPHIL NFR BLD: 0.7 %
ERYTHROCYTE [DISTWIDTH] IN BLOOD BY AUTOMATED COUNT: 14.3 %
EST. GFR  (AFRICAN AMERICAN): 44 ML/MIN/1.73 M^2
EST. GFR  (NON AFRICAN AMERICAN): 38 ML/MIN/1.73 M^2
GLUCOSE SERPL-MCNC: 135 MG/DL
HCT VFR BLD AUTO: 32.8 %
HDLC SERPL-MCNC: 96 MG/DL
HDLC SERPL: 52.2 %
HGB BLD-MCNC: 11.8 G/DL
LDLC SERPL CALC-MCNC: 75.8 MG/DL
LYMPHOCYTES # BLD AUTO: 1.7 K/UL
LYMPHOCYTES NFR BLD: 30.5 %
MCH RBC QN AUTO: 26.9 PG
MCHC RBC AUTO-ENTMCNC: 36 G/DL
MCV RBC AUTO: 75 FL
MONOCYTES # BLD AUTO: 0.4 K/UL
MONOCYTES NFR BLD: 7.1 %
NEUTROPHILS # BLD AUTO: 3.5 K/UL
NEUTROPHILS NFR BLD: 61.7 %
NONHDLC SERPL-MCNC: 88 MG/DL
PLATELET # BLD AUTO: 169 K/UL
PMV BLD AUTO: 9.8 FL
POLYCHROMASIA BLD QL SMEAR: ABNORMAL
POTASSIUM SERPL-SCNC: 3.9 MMOL/L
PROT SERPL-MCNC: 6.8 G/DL
RBC # BLD AUTO: 4.39 M/UL
SODIUM SERPL-SCNC: 140 MMOL/L
TRIGL SERPL-MCNC: 61 MG/DL
TSH SERPL DL<=0.005 MIU/L-ACNC: 1.18 UIU/ML
WBC # BLD AUTO: 5.6 K/UL

## 2018-06-06 PROCEDURE — 84443 ASSAY THYROID STIM HORMONE: CPT

## 2018-06-06 PROCEDURE — 36415 COLL VENOUS BLD VENIPUNCTURE: CPT

## 2018-06-06 PROCEDURE — 85025 COMPLETE CBC W/AUTO DIFF WBC: CPT

## 2018-06-06 PROCEDURE — 80061 LIPID PANEL: CPT

## 2018-06-06 PROCEDURE — 80053 COMPREHEN METABOLIC PANEL: CPT

## 2018-06-06 RX ORDER — HYDROXYCHLOROQUINE SULFATE 200 MG/1
200 TABLET, FILM COATED ORAL 2 TIMES DAILY
Qty: 180 TABLET | Refills: 0 | Status: SHIPPED | OUTPATIENT
Start: 2018-06-06 | End: 2018-07-06

## 2018-06-07 ENCOUNTER — DOCUMENTATION ONLY (OUTPATIENT)
Dept: REHABILITATION | Facility: HOSPITAL | Age: 48
End: 2018-06-07

## 2018-06-08 ENCOUNTER — TELEPHONE (OUTPATIENT)
Dept: TRANSPLANT | Facility: CLINIC | Age: 48
End: 2018-06-08

## 2018-06-08 ENCOUNTER — PATIENT MESSAGE (OUTPATIENT)
Dept: TRANSPLANT | Facility: CLINIC | Age: 48
End: 2018-06-08

## 2018-06-08 ENCOUNTER — PATIENT MESSAGE (OUTPATIENT)
Dept: RHEUMATOLOGY | Facility: CLINIC | Age: 48
End: 2018-06-08

## 2018-06-08 NOTE — TELEPHONE ENCOUNTER
----- Message from Miriam Abernathy MD sent at 6/7/2018  4:55 PM CDT -----  Tacrolimus level is lower than usual for patient.  Given liver tests are okay.  Repeat labs in 4 weeks

## 2018-06-13 ENCOUNTER — PATIENT MESSAGE (OUTPATIENT)
Dept: TRANSPLANT | Facility: CLINIC | Age: 48
End: 2018-06-13

## 2018-06-13 ENCOUNTER — OFFICE VISIT (OUTPATIENT)
Dept: NEPHROLOGY | Facility: CLINIC | Age: 48
End: 2018-06-13
Payer: MEDICARE

## 2018-06-13 ENCOUNTER — PATIENT MESSAGE (OUTPATIENT)
Dept: NEPHROLOGY | Facility: CLINIC | Age: 48
End: 2018-06-13

## 2018-06-13 VITALS
SYSTOLIC BLOOD PRESSURE: 140 MMHG | BODY MASS INDEX: 20.11 KG/M2 | OXYGEN SATURATION: 98 % | HEIGHT: 69 IN | WEIGHT: 135.81 LBS | DIASTOLIC BLOOD PRESSURE: 80 MMHG | HEART RATE: 75 BPM

## 2018-06-13 DIAGNOSIS — E11.22 TYPE 2 DIABETES MELLITUS WITH STAGE 3 CHRONIC KIDNEY DISEASE, WITH LONG-TERM CURRENT USE OF INSULIN: ICD-10-CM

## 2018-06-13 DIAGNOSIS — N18.30 TYPE 2 DIABETES MELLITUS WITH STAGE 3 CHRONIC KIDNEY DISEASE, WITH LONG-TERM CURRENT USE OF INSULIN: ICD-10-CM

## 2018-06-13 DIAGNOSIS — D50.9 MICROCYTIC NORMOCHROMIC ANEMIA: ICD-10-CM

## 2018-06-13 DIAGNOSIS — I10 ESSENTIAL HYPERTENSION: ICD-10-CM

## 2018-06-13 DIAGNOSIS — M89.9 CHRONIC KIDNEY DISEASE-MINERAL AND BONE DISORDER: ICD-10-CM

## 2018-06-13 DIAGNOSIS — Z79.4 TYPE 2 DIABETES MELLITUS WITH STAGE 3 CHRONIC KIDNEY DISEASE, WITH LONG-TERM CURRENT USE OF INSULIN: ICD-10-CM

## 2018-06-13 DIAGNOSIS — M32.9 SYSTEMIC LUPUS ERYTHEMATOSUS, UNSPECIFIED SLE TYPE, UNSPECIFIED ORGAN INVOLVEMENT STATUS: Chronic | ICD-10-CM

## 2018-06-13 DIAGNOSIS — E83.9 CHRONIC KIDNEY DISEASE-MINERAL AND BONE DISORDER: ICD-10-CM

## 2018-06-13 DIAGNOSIS — N18.9 CHRONIC KIDNEY DISEASE-MINERAL AND BONE DISORDER: ICD-10-CM

## 2018-06-13 DIAGNOSIS — N18.31 CKD STAGE G3A/A1, GFR 45-59 AND ALBUMIN CREATININE RATIO <30 MG/G: Primary | ICD-10-CM

## 2018-06-13 PROCEDURE — 3008F BODY MASS INDEX DOCD: CPT | Mod: CPTII,GC,S$GLB, | Performed by: HOSPITALIST

## 2018-06-13 PROCEDURE — 3079F DIAST BP 80-89 MM HG: CPT | Mod: CPTII,GC,S$GLB, | Performed by: HOSPITALIST

## 2018-06-13 PROCEDURE — 3077F SYST BP >= 140 MM HG: CPT | Mod: CPTII,GC,S$GLB, | Performed by: HOSPITALIST

## 2018-06-13 PROCEDURE — 99205 OFFICE O/P NEW HI 60 MIN: CPT | Mod: GC,S$GLB,, | Performed by: HOSPITALIST

## 2018-06-13 PROCEDURE — 99999 PR PBB SHADOW E&M-EST. PATIENT-LVL III: CPT | Mod: PBBFAC,GC,, | Performed by: HOSPITALIST

## 2018-06-13 PROCEDURE — 3045F PR MOST RECENT HEMOGLOBIN A1C LEVEL 7.0-9.0%: CPT | Mod: CPTII,GC,S$GLB, | Performed by: HOSPITALIST

## 2018-06-13 RX ORDER — POLYETHYLENE GLYCOL 3350 17 G/17G
POWDER, FOR SOLUTION ORAL
Refills: 3 | COMMUNITY
Start: 2018-06-09 | End: 2019-01-08 | Stop reason: SDUPTHER

## 2018-06-13 NOTE — PATIENT INSTRUCTIONS

## 2018-06-13 NOTE — PROGRESS NOTES
Subjective:       Patient ID: Valencia Dumont 48 y.o. Black or  female who presents for new evaluation of Chronic Kidney Disease and Hypertension      HPI  Valencia Dumont is a 48 y.o. 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 This is the first time she see's a Nephrologist. She has done well except for the episode of rejection. He Prograf level was found low which correlated with improvement in sCr. In addition, sCr has fluctuated over the last year with ups and down sometimes correlating with FK levels but her estimated sCr baseline is 1.5-2.0 mg/dL. She has no proteinuria and no evidence of hematuria. SLE is closely followed by Rheumatology and she is in Plaquenil and her immunosuppressive therapy consists of Prograf Myfortic and Prednisone. Her BP is elevated today 140/80 mmHg. She denies any swelling but c/o easy bruising. C3 and C4 are WNL and dsDNA is negative.  The patient denies taking NSAIDs or new antibiotics, recreational drugs, recent episode of dehydration, diarrhea, nausea or vomiting, acute illness, hospitalization or exposure to IV radiocontrast.    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    Diabetes mellitus     Esophageal varices     Hypertension     Kidney stone     Lupus     Osteoporosis 12/2013    SBP (spontaneous bacterial peritonitis)     history of        Family History   Problem Relation Age of Onset    Hypertension Mother     Cataracts Mother     Diabetes Father     Alzheimer's disease Father     Diabetes Paternal Grandfather     Diabetes Paternal Grandmother     No Known Problems Maternal Grandmother     No Known Problems Maternal Grandfather     Breast cancer Maternal Aunt 55    Hypertension Brother      Diabetes Brother     No Known Problems Sister     No Known Problems Maternal Uncle     No Known Problems Paternal Aunt     No Known Problems Paternal Uncle     Stroke Neg Hx     Cancer Neg Hx     Colon cancer Neg Hx     Esophageal cancer Neg Hx     Stomach cancer Neg Hx     Rectal cancer Neg Hx     Amblyopia Neg Hx     Blindness Neg Hx     Glaucoma Neg Hx     Macular degeneration Neg Hx     Retinal detachment Neg Hx     Strabismus Neg Hx     Thyroid disease Neg Hx         Past Surgical History:   Procedure Laterality Date    COLONOSCOPY N/A 5/31/2017    Procedure: COLONOSCOPY;  Surgeon: Marky Sun MD;  Location: [address];  Service: Endoscopy;  Laterality: N/A;  PM prep.    ESOPHAGOGASTRODUODENOSCOPY      LIVER BIOPSY      LIVER TRANSPLANT  12/31/2013    REFRACTIVE SURGERY Bilateral 2010    TUBAL LIGATION  2003     Current Outpatient Prescriptions on File Prior to Visit   Medication Sig Dispense Refill    ACCU-CHEK SMARTVIEW Strp 1 strip by Misc.(Non-Drug; Combo Route) route 5 (five) times daily. Trueresult      blood sugar diagnostic Strp To check BG 5 times daily, to use with insurance preferred meter 200 strip 11    blood-glucose meter kit To check BG 5 times daily, to use with insurance preferred meter 1 each 0    diazepam (VALIUM) 5 MG tablet Take 5 mg by mouth 3 (three) times daily as needed.   0    dicyclomine (BENTYL) 10 MG capsule Take 1 capsule (10 mg total) by mouth before meals as needed (TID PRN). 90 capsule 2    DILTIAZEM HCL (DILTIAZEM 2% CREAM) Apply topically 3 (three) times daily. Apply topically to anal area. 30 g 0    dulaglutide (TRULICITY) 0.75 mg/0.5 mL PnIj Inject 0.5 mLs (0.75 mg total) into the skin once a week. 4 Syringe 6    ergocalciferol (ERGOCALCIFEROL) 50,000 unit Cap Take 1 capsule (50,000 Units total) by mouth every 7 days. 4 capsule 2    estradiol (ESTRACE) 0.01 % (0.1 mg/gram) vaginal cream Place 0.5 g vaginally twice a week. Insert  "0.5grams intravaginally twice weekly 42 g 4    hydrochlorothiazide (HYDRODIURIL) 25 MG tablet Take 12.5 mg by mouth once daily.   2    hydroxychloroquine (PLAQUENIL) 200 mg tablet Take 1 tablet (200 mg total) by mouth 2 (two) times daily. 180 tablet 0    hydrOXYzine HCl (ATARAX) 10 MG Tab TAKE 1 OR 2 TABLETS BY MOUTH THREE TIMES DAILY AS NEEDED FOR ITCHING.  0    insulin detemir (LEVEMIR FLEXTOUCH) 100 unit/mL (3 mL) SubQ InPn pen Inject 14 Units into the skin once daily. 15 mL 3    isosorbide mononitrate (IMDUR) 30 MG 24 hr tablet Take 30 mg by mouth once daily.      lancets 28 gauge Misc 4 lancets by Misc.(Non-Drug; Combo Route) route once daily. Pt uses Freestyle lite meter to check BG 4 times daily. 200 each 11    lancets Misc To check BG 5 times daily, to use with insurance preferred meter 200 each 11    losartan (COZAAR) 100 MG tablet Take 1 tablet by mouth once daily.  2    magnesium oxide 500 mg Tab Take 500 mg by mouth 2 (two) times daily. 60 each 6    MULTIVIT,THER IRON,CA,FA & MIN (MULTIVITAMIN) Tab Take 1 tablet by mouth once daily. 30 tablet 0    mycophenolate (MYFORTIC) 180 MG TbEC Take 3 tablets (540 mg total) by mouth 2 (two) times daily. 240 tablet 5    neomycin-polymyxin-hydrocortisone (CORTISPORIN) otic solution INSTILL TWO DROPS INTO BOTH EARS FOUR TIMES DAILY FOR 10 DAYS  0    NOVOLOG FLEXPEN 100 unit/mL InPn pen Inject 14 Units into the skin 3 times daily with meals. Plus correction scale, max DOSAGE= 72 UNITS 15 mL 3    nystatin (MYCOSTATIN) cream Apply topically 2 (two) times daily. 30 g 3    ondansetron (ZOFRAN) 8 MG tablet Take 1 tablet by mouth every 8 (eight) hours as needed.  0    oxycodone-acetaminophen (PERCOCET) 7.5-325 mg per tablet Take 1 tablet by mouth every 4 (four) hours as needed for Pain.      pen needle, diabetic (BD ULTRA-FINE JANESSA PEN NEEDLES) 32 gauge x 5/32" Ndle To use 4x/day with insulin injections. 400 each 3    PROAIR HFA 90 mcg/actuation inhaler " Inhale 2 puffs into the lungs 3 (three) times daily as needed.   3    ranitidine (ZANTAC) 300 MG tablet Take 300 mg by mouth 2 (two) times daily.       rosuvastatin (CRESTOR) 5 MG tablet Take 5 mg by mouth once daily.      tacrolimus (PROGRAF) 1 MG Cap Take 3 capsules (3 mg total) by mouth every 12 (twelve) hours. 180 capsule 11    triamcinolone (KENALOG) 0.5 % ointment 1 application 2 (two) times daily. Apply to affected area  3    valacyclovir (VALTREX) 500 MG tablet once daily as needed  0    verapamil (VERELAN) 120 MG C24P Take 1 capsule by mouth once daily.  3    zolpidem (AMBIEN) 10 mg Tab Take 10 mg by mouth nightly as needed.  3    cyproheptadine (PERIACTIN) 4 mg tablet Take 4 mg by mouth every evening.  3    pantoprazole (PROTONIX) 40 MG tablet Take 40 mg by mouth once daily.      promethazine-dextromethorphan (PROMETHAZINE-DM) 6.25-15 mg/5 mL Syrp Take by mouth 2 (two) times daily as needed.   0     No current facility-administered medications on file prior to visit.          Review of Systems    Review of Systems   Constitutional: Negative for appetite change, fatigue, fever and unexpected weight change.   HENT: Negative for facial swelling, hearing loss and tinnitus.    Eyes: Negative for visual disturbance.   Respiratory: Negative for chest tightness and shortness of breath.    Cardiovascular: Negative for chest pain and leg swelling.   Gastrointestinal: Negative for abdominal pain and nausea.   Genitourinary: Negative for difficulty urinating, dysuria and flank pain.   Musculoskeletal: Negative for arthralgias and myalgias.   Skin: Negative for color change.   Neurological: Negative for dizziness, syncope, weakness, light-headedness and headaches.   Hematological: Bruises/bleeds easily.   Psychiatric/Behavioral: Negative for behavioral problems and confusion.       Objective:      Physical Exam    Physical Exam   Constitutional: She is oriented to person, place, and time. She appears  well-developed and well-nourished. No distress.   HENT:   Head: Normocephalic and atraumatic.   Eyes: Conjunctivae are normal. Pupils are equal, round, and reactive to light.   Neck: Neck supple. No JVD present.   Cardiovascular: Normal rate, regular rhythm and intact distal pulses.  Exam reveals no gallop and no friction rub.    No murmur heard.  Pulmonary/Chest: Effort normal and breath sounds normal. She has no wheezes. She has no rales.   Abdominal: Soft. Bowel sounds are normal. She exhibits no distension. There is no tenderness.   Musculoskeletal: Normal range of motion. She exhibits no edema or deformity.   Neurological: She is alert and oriented to person, place, and time. She has normal reflexes.   Skin: Skin is warm and dry. Bruising noted. She is not diaphoretic.   Psychiatric: She has a normal mood and affect. Her behavior is normal.       Assessment:        ICD-10-CM ICD-9-CM   1. CKD stage G3a/A1, GFR 45-59 and albumin creatinine ratio <30 mg/g N18.3 585.3   2. Systemic lupus erythematosus, unspecified SLE type, unspecified organ involvement status M32.9 710.0   3. Type 2 diabetes mellitus with stage 3 chronic kidney disease, with long-term current use of insulin E11.22 250.40    N18.3 585.3    Z79.4 V58.67   4. Microcytic normochromic anemia D50.9 280.9   5. Essential hypertension I10 401.9   6. Chronic kidney disease-mineral and bone disorder N18.9 585.9    E83.9 275.9    M89.9 733.90        Plan:     1. CKD stage 3bA1: most likely multifactorial secondary to CNI, DMT2 and HTN. No proteinuria and no hematuria is good predictor for less likely SLE affecting Kidney. Last sCr was 1.6 mg/dL but her FK level was on low side as well which correlates with FK levels and renal fx.     Lab Results   Component Value Date    CREATININE 1.6 (H) 06/06/2018   · Optimize BP control  · Low Salt diet  · Avoid nephrotoxic drugs as much as possible, she states she doesnt take NSAID's as she was told not to luis manuel her  Liver.  · Optimize DM control with goal A1C < 6.2%.  · Please maintain FK level at goal per Hepatology    Protein Creatinine Ratios:  Prot/Creat Ratio, Ur   Date Value Ref Range Status   06/04/2018 0.08 0.00 - 0.20 Final   03/15/2018 0.10 0.00 - 0.20 Final   06/15/2017 0.13 0.00 - 0.20 Final   No evidence of Proteinuria.    Acid-Base: at goal  Lab Results   Component Value Date     06/06/2018    K 3.9 06/06/2018    CO2 28 06/06/2018     2. HTN: Blood pressures   · Not well control BP, several functions on flowsheet, will continue to monitor. Goal for BP is <130 mmHg SBP and BDP <80 mmHg.   · Cont Verapamil 120 mg   · Cont Losartan 100 mg  · Cont Imdur 30 mg  · Cont HCTZ 25 mg  · Low Salt diet   · US retroperitoneal    3. Renal osteodystrophy: last PTH   Lab Results   Component Value Date    .5 (H) 03/14/2016    CALCIUM 9.5 06/06/2018    CAION 1.11 12/31/2013    PHOS 3.3 08/17/2017   · Will check PTH and Vit D  · Cont ergocalciferol    4. Anemia: Microcitic  Lab Results   Component Value Date    HGB 11.8 (L) 06/06/2018   1. Will check FE panel     5. DM:  Last HbA1C   Lab Results   Component Value Date    HGBA1C 7.6 (H) 05/07/2018   · as per Endocrinology    6. Lipid management: Cont Statin  Lab Results   Component Value Date    LDLCALC 75.8 06/06/2018     7. SLE: Cont Plaquenil as per Rheumatology     Follow up in 6 mo with labs and Urine.  Valentín Red MD  Nephrology Fellow   [phone]

## 2018-06-14 ENCOUNTER — CLINICAL SUPPORT (OUTPATIENT)
Dept: REHABILITATION | Facility: HOSPITAL | Age: 48
End: 2018-06-14
Attending: PHYSICAL MEDICINE & REHABILITATION
Payer: MEDICARE

## 2018-06-14 ENCOUNTER — TELEPHONE (OUTPATIENT)
Dept: TRANSPLANT | Facility: CLINIC | Age: 48
End: 2018-06-14

## 2018-06-14 DIAGNOSIS — M54.50 CHRONIC BILATERAL LOW BACK PAIN WITHOUT SCIATICA: ICD-10-CM

## 2018-06-14 DIAGNOSIS — N76.0 VULVOVAGINITIS: ICD-10-CM

## 2018-06-14 DIAGNOSIS — G89.29 CHRONIC BILATERAL LOW BACK PAIN WITHOUT SCIATICA: ICD-10-CM

## 2018-06-14 PROCEDURE — 97110 THERAPEUTIC EXERCISES: CPT | Mod: PN | Performed by: PHYSICAL MEDICINE & REHABILITATION

## 2018-06-14 RX ORDER — FLUCONAZOLE 150 MG/1
TABLET ORAL
Qty: 1 TABLET | Refills: 0 | Status: SHIPPED | OUTPATIENT
Start: 2018-06-14 | End: 2018-08-30 | Stop reason: SDUPTHER

## 2018-06-14 NOTE — TELEPHONE ENCOUNTER
"Pt sent a message with c/o "white stuff " on her tongue  Pt will f/u with her PCP. Will discuss if transplant appt needed  "

## 2018-06-14 NOTE — PROGRESS NOTES
[facility]      Name: Valencia Dumont  Clinic Number: [id number]  Diagnosis:   Low back pain    Physician: Antoinette Joyce, *  Precautions: Diabetes and fall risk and hx of liver transplant lupus  Visit #: 10 of 12  SPENCE: 5/15/2018  Priority Start Date Expiration Date Referral Entered By   Routine 05/23/2018 06/10/2018 Vanessa Diaz   Visits Requested Visits Authorized Visits Completed Visits Scheduled   12 3 1 2     PTA Visit #: 1  Time In: 11:25 am  ( patient 25 min late)  Time Out: 12:15 pm   Total Treatment Time: 43 minutes (1:1 with PT for 30 mins)    Pain Pattern: Pattern 1 PEN   Date POC Signed: 4/2/2018  Due 7/2/18    Subjective     Valencia reports that  She is late due to traffic. She has not attended in 2 weeks due to issues with bruising, she has seen rheumatology and her liver doctors, and is not sure why she is bruising.  She has lupus, and it is managed fairly well.  She has been having problem with her right knee still.  She notices that her back and her knee are not as good when she doesn't come here so she thinks the program is helping her.  She admits she needs help remembering her exercises, and has not been performing.  Reprinted there ex for patient.       Patient reports their pain to be 6 out of 10 on a 0-10 scale with 0 being no pain and 10 being the worst pain imaginable.  4/10 post visit.    Pain Location: Chronic lower back pain w/out sciatica      Objective     CMS Impairment/Limitation/Restriction for FOTO Lumbar Spine Survey  Status Limitation G-Code CMS Severity Modifier  Intake 37% 63%  Predicted 52% 48% Goal Status+ CK - At least 40 percent but less than 60 percent  4/23/2018 51% 49%  5/28/2018 50% 50% Current Status CK - At least 40 percent but less than 60 percent  D/C Status CK **only report if this is discharge survey  +Based on FOTO predicted change score    Baseline IM Testing Results:      From initial evaluation       Date of testing:  18  ROM 0 to 54deg   Max Peak Torque 78   Min Peak Torque 18   Flex/Ext Ratio 4.33   % below normative data - 2 std dev     Baseline IM Testing Results:    From:10th visit   Date of testin2018     ROM 0 to 57 deg   Max Peak Torque 90   Min Peak Torque 65   Flex/Ext Ratio 1.38   % below normative data - 1.3 std dev        Treatment      Valencia received individual therapeutic exercises to develop strength, endurance, ROM, flexibility, posture and core stabilization for 55 minutes including:    Single knee to chest 10 reps  Double knee to chest 10 reps- she reports this gives her back pain relief, stressed doing it at home  Lower trunk rotation 10 reps  Open books 10 reps      [facility] 2018   Visit Number 11   VAS Pain Rating 6   Treadmill Time (in min.) 5   Speed 1.8   Incline -   Recumbent Bike Seat Pos. -   Time -   Flexion in Lying 10   Manual Therapy -   Lumbar Extension Seat Pad -   Femur Restraint -   Top Dead Center -   Counterweight -   Lumbar Flexion -   Lumbar Extension -   Lumbar Peak Torque -   Min Torque -   Percent From Norm -   Lumbar Weight 58   Repetitions 15   Rating of Perceived Exertion 4   Ice - Z Lie (in min.) 0             Peripheral muscle strengthening which included 1 set of 15-20 repetitions at a slow, controlled 7 second per rep pace focused on strengthening supporting musculature for improved body mechanics and functional mobility. Pt and therapist focused on proper form during treatment to ensure optimal strengthening of each targeted muscle group. Machines were utilized including torso rotation, leg extension, leg curl, chest press, upright row, tricep extension, bicep curl, leg press, and hip abduction/adduction.    Valencia received the following manual therapy techniques: 00 min      Written Home Exercises Provided:     Single knee to chest 10 reps  Double knee to chest 10 reps  Lower trunk rotation 10 reps  Open books 10 reps    Pt demo good understanding of  the education provided. Valencia demonstrated good return demonstration of activities.     Education provided re:   Valencia verbalized good understanding of education provided.   No spiritual or educational barriers to learning provided    Lumbar roll use compliance: better with lumbar roll, not using, stressed roll and there ex    Assessment     Patient has not attended in 2 weeks, and note she feels better when attending.  Her back pain reduced with there ex, and flexion, and reminded her to watch posture and do her stretching more regularly.   Pt has been having some right knee pain and issues, but pt demonstrated in good spirit and dedicated to finish Healthy [facility] program. Overall, pt has demonstrated improvement in upper and lower extremity muscle strengthening, but pt cont with lower back pain. Lower back pain has been decreasing. PT will cont to benefit from skilled PT services to decrease functional limitations.. Continue skilled PT services to achieve PT and patient's goals.       This is a 48 y.o. female referred to outpatient physical therapy and presents with a medical diagnosis of Chronic lower back pain without sciatica  and demonstrates limitations as described in the problem list. Pt prognosis is Fair +. Pt will continue to benefit from skilled outpatient physical therapy to address the deficits listed in the problem list, provide pt/family education and to maximize pt's level of independence in the home and community environment.     Goals as follows:     Short term goals:  6 weeks or 10 visits  Updated 5/28/2018  1.  Pt will demonstrate increased lumbar ROM by at least 3 degrees from the initial ROM value with improvements noted in functional ROM and ability to perform ADLs (goal met)  2.  Pt will demonstrate increased maximum isometric torque value by 5% when compared to the initial value resulting in improved ability to perform bending, lifting, and carrying activities safely, confidently. (goal  "met)  3.  Patient report a reduction in worst pain score by 1-2 points for improved tolerance during work and recreational activities (goal in progress)   4.  Pt able to perform HEP correctly with minimal cueing or supervision for therapist (goal met)     Long term goals: 13 weeks or 20 visits   1. Pt will demonstrate increased lumbar ROM by at least 10 degrees from initial ROM value, resulting in improved ability to perform functional fwd bending while standing and sitting.   2. Pt will demonstrate increased maximum isometric torque value by 10% when compared to the initial value resulting in improved ability to perform bending, lifting, and carrying activities safely, confidently.  3. Pt to demonstrate ability to independently control and reduce their pain through posture positioning and mechanical movements throughout a typical day.  4.  Patient will demonstrate improved overall function per FOTO Survey to CK = at least 40% but < 60% impaired, limited or restricted score or less.    Plan     Continue with established Plan of Care towards established PT goals.   Outpatient physical therapy 2x week for 13 weeks or 20 visits to include the following:   - Patient education  - Therapeutic exercise  - Manual therapy  - Performance testing   - Neuromuscular Re-education  - Therapeutic activity   - Modalities  -Functional dry needling      Pt may be seen by PTA as part of the rehabilitation team.      "I certify the need for these services furnished under this plan of treatment and while under my care."     ____________________________________  Physician/Referring Practitioner     _______________  Date of Signature       Certification Period: 4/2/18 to 7/2/18    Therapist: Sophie Henry, PT  06/14/2018  "

## 2018-06-17 PROBLEM — N18.9 CHRONIC KIDNEY DISEASE-MINERAL AND BONE DISORDER: Status: ACTIVE | Noted: 2018-06-17

## 2018-06-17 PROBLEM — M89.9 CHRONIC KIDNEY DISEASE-MINERAL AND BONE DISORDER: Status: ACTIVE | Noted: 2018-06-17

## 2018-06-17 PROBLEM — E83.9 CHRONIC KIDNEY DISEASE-MINERAL AND BONE DISORDER: Status: ACTIVE | Noted: 2018-06-17

## 2018-06-18 ENCOUNTER — PATIENT MESSAGE (OUTPATIENT)
Dept: PODIATRY | Facility: CLINIC | Age: 48
End: 2018-06-18

## 2018-06-19 NOTE — PROGRESS NOTES
[facility]    The note from the fellow/resident was reviewed. I have personally interviewed and examined the patient. There were no additional findings with regards to the history or physical exam.    I agree with the assessment and plan of  Dr. Valentín Red    Patient with steadily worsening renal function since her liver transplant. No evidence of proteinuria or hematuria. Most likely related to Tacrolimus toxicity. Would strongly suggest to change to an alternative immunosupresive regimen if possible.

## 2018-06-22 ENCOUNTER — TELEPHONE (OUTPATIENT)
Dept: ENDOCRINOLOGY | Facility: CLINIC | Age: 48
End: 2018-06-22

## 2018-06-22 ENCOUNTER — PATIENT MESSAGE (OUTPATIENT)
Dept: TRANSPLANT | Facility: CLINIC | Age: 48
End: 2018-06-22

## 2018-06-22 NOTE — TELEPHONE ENCOUNTER
Re reviewed bone density   10 year probability of fracture (FRAX)    Major osteoporotic fracture: 2.0 %  Hip fracture:   0.4 %    PLAN:  Repeat DXA next year

## 2018-06-27 ENCOUNTER — CLINICAL SUPPORT (OUTPATIENT)
Dept: REHABILITATION | Facility: HOSPITAL | Age: 48
End: 2018-06-27
Attending: PHYSICAL MEDICINE & REHABILITATION
Payer: MEDICARE

## 2018-06-27 DIAGNOSIS — M54.50 CHRONIC BILATERAL LOW BACK PAIN WITHOUT SCIATICA: ICD-10-CM

## 2018-06-27 DIAGNOSIS — G89.29 CHRONIC BILATERAL LOW BACK PAIN WITHOUT SCIATICA: ICD-10-CM

## 2018-06-27 PROCEDURE — 97110 THERAPEUTIC EXERCISES: CPT | Mod: PN

## 2018-06-27 NOTE — PROGRESS NOTES
" [facility]      Name: Valencia Dumont  Clinic Number: [id number]  Diagnosis:   1. Chronic bilateral low back pain without sciatica       Physician: Antoinette Joyce, *  Precautions: Diabetes and fall risk and hx of liver transplant lupus  Visit #: 2 of 12 (new referral; total visits 12) SPENCE: 2018    PTA Visit #: 1  Time In: 1055 - pt arrived to appointment 25 minutes late  Time Out: 1155  Total Treatment Time: 60 minutes (1:1 with PTA for duration of treatment session)    Pain Pattern: Pattern 1 PEN   Date POC Signed: 2018  Due 18    Subjective     Valencia reports that she fell on  and landed on her tailbone and her back. Patient reports pain throughout her Left shoulder and thoracic spine, however states that her low back feels good "because my little grandchild gave me a massage yesterday." Patient states that she has a new order for therapy for her shoulder.    Patient reports their pain to be 5 out of 10 on a 0-10 scale with 0 being no pain and 10 being the worst pain imaginable.    Pain Location: Chronic lower back pain w/out sciatica      Objective     CMS Impairment/Limitation/Restriction for FOTO Lumbar Spine Survey  Status Limitation G-Code CMS Severity Modifier  Intake 37% 63%  Predicted 52% 48% Goal Status+ CK - At least 40 percent but less than 60 percent  2018 51% 49%  2018 50% 50% Current Status CK - At least 40 percent but less than 60 percent  D/C Status CK **only report if this is discharge survey  +Based on FOTO predicted change score    Baseline IM Testing Results:      From initial evaluation       Date of [email or web address]  ROM 0 to 54deg   Max Peak Torque 78   Min Peak Torque 18   Flex/Ext Ratio 4.33   % below normative data - 2 std dev     Baseline IM Testing Results:    From:10th visit   Date of [email or web address]     ROM 0 to 57 deg   Max Peak Torque 90   Min Peak Torque 65   Flex/Ext Ratio 1.38   % below normative data - 1.3 " std dev        Treatment      Valencia received individual therapeutic exercises to develop strength, endurance, ROM, flexibility, posture and core stabilization for 60 minutes including:    Single knee to chest: 10x  Double knee to chest: 10x   Lower trunk rotation: 10x  Open books: 10x    [facility] 6/27/2018   Visit Number 12   VAS Pain Rating 5   Treadmill Time (in min.) 10   Speed 1.5   Incline 0   Flexion in Lying 10   Lumbar Weight 58   Repetitions 15   Rating of Perceived Exertion 4   Ice - Z Lie (in min.) 0     Peripheral muscle strengthening which included 1 set of 15-20 repetitions at a slow, controlled 7 second per rep pace focused on strengthening supporting musculature for improved body mechanics and functional mobility. Pt and therapist focused on proper form during treatment to ensure optimal strengthening of each targeted muscle group. Machines were utilized including torso rotation, leg extension, leg curl, chest press, upright row, tricep extension, bicep curl, leg press, and hip abduction/adduction.    Valencia received the following manual therapy techniques: 00 min    Written Home Exercises Provided:   Single knee to chest 10 reps  Double knee to chest 10 reps  Lower trunk rotation 10 reps  Open books 10 reps    Pt demo good understanding of the education provided. Valencia demonstrated good return demonstration of activities.     Education provided re:   Valencia verbalized good understanding of education provided.   No spiritual or educational barriers to learning provided    Lumbar roll use compliance: better with lumbar roll, not using, stressed roll and there ex    Assessment     Patient tolerated treatment session well today. Patient challenged with resisted lumbar extension reporting increased thoracic spine pain. Patient unable to perform chest press without increased difficulty, exercise deferred today. Continue skilled PT services to achieve PT and patient's goals.       This is a 48  y.o. female referred to outpatient physical therapy and presents with a medical diagnosis of Chronic lower back pain without sciatica  and demonstrates limitations as described in the problem list. Pt prognosis is Fair +. Pt will continue to benefit from skilled outpatient physical therapy to address the deficits listed in the problem list, provide pt/family education and to maximize pt's level of independence in the home and community environment.     Goals as follows:     Short term goals:  6 weeks or 10 visits  Updated 5/28/2018  1.  Pt will demonstrate increased lumbar ROM by at least 3 degrees from the initial ROM value with improvements noted in functional ROM and ability to perform ADLs (goal met)  2.  Pt will demonstrate increased maximum isometric torque value by 5% when compared to the initial value resulting in improved ability to perform bending, lifting, and carrying activities safely, confidently. (goal met)  3.  Patient report a reduction in worst pain score by 1-2 points for improved tolerance during work and recreational activities (goal in progress)   4.  Pt able to perform HEP correctly with minimal cueing or supervision for therapist (goal met)     Long term goals: 13 weeks or 20 visits   1. Pt will demonstrate increased lumbar ROM by at least 10 degrees from initial ROM value, resulting in improved ability to perform functional fwd bending while standing and sitting.   2. Pt will demonstrate increased maximum isometric torque value by 10% when compared to the initial value resulting in improved ability to perform bending, lifting, and carrying activities safely, confidently.  3. Pt to demonstrate ability to independently control and reduce their pain through posture positioning and mechanical movements throughout a typical day.  4.  Patient will demonstrate improved overall function per FOTO Survey to CK = at least 40% but < 60% impaired, limited or restricted score or less.    Plan     Continue  with established Plan of Care towards established PT goals.     Certification Period: 4/2/18 to 7/2/18    Therapist: Farrah Melendez, PTA  06/27/2018

## 2018-06-29 ENCOUNTER — CLINICAL SUPPORT (OUTPATIENT)
Dept: REHABILITATION | Facility: HOSPITAL | Age: 48
End: 2018-06-29
Attending: PHYSICAL MEDICINE & REHABILITATION
Payer: MEDICARE

## 2018-06-29 DIAGNOSIS — G89.29 CHRONIC BILATERAL LOW BACK PAIN WITHOUT SCIATICA: ICD-10-CM

## 2018-06-29 DIAGNOSIS — M54.50 CHRONIC BILATERAL LOW BACK PAIN WITHOUT SCIATICA: ICD-10-CM

## 2018-06-29 PROCEDURE — 97110 THERAPEUTIC EXERCISES: CPT | Mod: PN

## 2018-06-29 NOTE — PROGRESS NOTES
[facility]      Name: Valencia Dumont  Clinic Number: [id number]  Diagnosis:   1. Chronic bilateral low back pain without sciatica       Physician: Antoinette Joyce, *  Precautions: Diabetes and fall risk and hx of liver transplant lupus  Visit #: 3 of 12 (new referral; total visits 13) SPENCE: 2018    PTA Visit #: 2  Time In: 1040  Time Out: 1130  Total Treatment Time: 50 minutes (1:1 with PTA for 20 mins of treatment session)    Pain Pattern: Pattern 1 PEN   Date POC Signed: 2018  Due 18    Subjective     Valencia reports that her back is feeling better today.    Patient reports their pain to be 4 out of 10 on a 0-10 scale with 0 being no pain and 10 being the worst pain imaginable.    Pain Location: Chronic lower back pain w/out sciatica      Objective     CMS Impairment/Limitation/Restriction for FOTO Lumbar Spine Survey  Status Limitation G-Code CMS Severity Modifier  Intake 37% 63%  Predicted 52% 48% Goal Status+ CK - At least 40 percent but less than 60 percent  2018 51% 49%  2018 50% 50% Current Status CK - At least 40 percent but less than 60 percent  D/C Status CK **only report if this is discharge survey  +Based on FOTO predicted change score    Baseline IM Testing Results:      From initial evaluation       Date of [email or web address]  ROM 0 to 54deg   Max Peak Torque 78   Min Peak Torque 18   Flex/Ext Ratio 4.33   % below normative data - 2 std dev     Baseline IM Testing Results:    From:10th visit   Date of [email or web address]     ROM 0 to 57 deg   Max Peak Torque 90   Min Peak Torque 65   Flex/Ext Ratio 1.38   % below normative data - 1.3 std dev        Treatment      Valencia received individual therapeutic exercises to develop strength, endurance, ROM, flexibility, posture and core stabilization for 50 minutes including:    Single knee to chest: 10x  Double knee to chest: 10x   Lower trunk rotation: 10x  Open books: 10x    [facility]  6/29/2018   Visit Number 13   VAS Pain Rating 4   Treadmill Time (in min.) 10   Speed 1.5   Incline 0   Flexion in Lying 10   Lumbar Weight 58   Repetitions 20   Rating of Perceived Exertion 3   Ice - Z Lie (in min.) 0     Peripheral muscle strengthening which included 1 set of 15-20 repetitions at a slow, controlled 7 second per rep pace focused on strengthening supporting musculature for improved body mechanics and functional mobility. Pt and therapist focused on proper form during treatment to ensure optimal strengthening of each targeted muscle group. Machines were utilized including torso rotation, leg extension, leg curl, chest press, upright row, tricep extension, bicep curl, leg press, and hip abduction/adduction.    Valencia received the following manual therapy techniques: none     Written Home Exercises Provided:   Single knee to chest 10 reps  Double knee to chest 10 reps  Lower trunk rotation 10 reps  Open books 10 reps    Pt demo good understanding of the education provided. Valencia demonstrated good return demonstration of activities.     Education provided re:   Valencia verbalized good understanding of education provided.   No spiritual or educational barriers to learning provided    Lumbar roll use compliance: better with lumbar roll, not using, stressed roll and there ex    Assessment     Patient tolerated treatment session well today. Good tolerance to resisted lumbar extension reporting no exacerbation of low back pain. Patient progression well towards discharge.    This is a 48 y.o. female referred to outpatient physical therapy and presents with a medical diagnosis of Chronic lower back pain without sciatica  and demonstrates limitations as described in the problem list. Pt prognosis is Fair +. Pt will continue to benefit from skilled outpatient physical therapy to address the deficits listed in the problem list, provide pt/family education and to maximize pt's level of independence in the home and  community environment.     Goals as follows:     Short term goals:  6 weeks or 10 visits  Updated 5/28/2018  1.  Pt will demonstrate increased lumbar ROM by at least 3 degrees from the initial ROM value with improvements noted in functional ROM and ability to perform ADLs (goal met)  2.  Pt will demonstrate increased maximum isometric torque value by 5% when compared to the initial value resulting in improved ability to perform bending, lifting, and carrying activities safely, confidently. (goal met)  3.  Patient report a reduction in worst pain score by 1-2 points for improved tolerance during work and recreational activities (goal in progress)   4.  Pt able to perform HEP correctly with minimal cueing or supervision for therapist (goal met)     Long term goals: 13 weeks or 20 visits   1. Pt will demonstrate increased lumbar ROM by at least 10 degrees from initial ROM value, resulting in improved ability to perform functional fwd bending while standing and sitting.   2. Pt will demonstrate increased maximum isometric torque value by 10% when compared to the initial value resulting in improved ability to perform bending, lifting, and carrying activities safely, confidently.  3. Pt to demonstrate ability to independently control and reduce their pain through posture positioning and mechanical movements throughout a typical day.  4.  Patient will demonstrate improved overall function per FOTO Survey to CK = at least 40% but < 60% impaired, limited or restricted score or less.    Plan     Continue with established Plan of Care towards established PT goals.     Certification Period: 4/2/18 to 7/2/18    Therapist: Farrah Melendez, PTA  06/29/2018

## 2018-07-02 ENCOUNTER — LAB VISIT (OUTPATIENT)
Dept: LAB | Facility: HOSPITAL | Age: 48
End: 2018-07-02
Attending: INTERNAL MEDICINE
Payer: MEDICARE

## 2018-07-02 DIAGNOSIS — Z94.4 LIVER REPLACED BY TRANSPLANT: ICD-10-CM

## 2018-07-02 LAB
ALBUMIN SERPL BCP-MCNC: 4 G/DL
ALP SERPL-CCNC: 68 U/L
ALT SERPL W/O P-5'-P-CCNC: 11 U/L
ANION GAP SERPL CALC-SCNC: 7 MMOL/L
AST SERPL-CCNC: 11 U/L
BASOPHILS # BLD AUTO: 0 K/UL
BASOPHILS NFR BLD: 0 %
BILIRUB SERPL-MCNC: 0.5 MG/DL
BUN SERPL-MCNC: 29 MG/DL
CALCIUM SERPL-MCNC: 9.7 MG/DL
CHLORIDE SERPL-SCNC: 105 MMOL/L
CO2 SERPL-SCNC: 28 MMOL/L
CREAT SERPL-MCNC: 1.6 MG/DL
DIFFERENTIAL METHOD: ABNORMAL
EOSINOPHIL # BLD AUTO: 0.1 K/UL
EOSINOPHIL NFR BLD: 1.5 %
ERYTHROCYTE [DISTWIDTH] IN BLOOD BY AUTOMATED COUNT: 14.7 %
EST. GFR  (AFRICAN AMERICAN): 44 ML/MIN/1.73 M^2
EST. GFR  (NON AFRICAN AMERICAN): 38 ML/MIN/1.73 M^2
GLUCOSE SERPL-MCNC: 192 MG/DL
HCT VFR BLD AUTO: 32.4 %
HGB BLD-MCNC: 11.5 G/DL
LYMPHOCYTES # BLD AUTO: 1.6 K/UL
LYMPHOCYTES NFR BLD: 35.1 %
MAGNESIUM SERPL-MCNC: 1.7 MG/DL
MCH RBC QN AUTO: 26.9 PG
MCHC RBC AUTO-ENTMCNC: 35.5 G/DL
MCV RBC AUTO: 76 FL
MONOCYTES # BLD AUTO: 0.2 K/UL
MONOCYTES NFR BLD: 5 %
NEUTROPHILS # BLD AUTO: 2.7 K/UL
NEUTROPHILS NFR BLD: 58.4 %
PLATELET # BLD AUTO: 169 K/UL
PMV BLD AUTO: 9.4 FL
POTASSIUM SERPL-SCNC: 3.9 MMOL/L
PROT SERPL-MCNC: 7 G/DL
RBC # BLD AUTO: 4.28 M/UL
SODIUM SERPL-SCNC: 140 MMOL/L
WBC # BLD AUTO: 4.61 K/UL

## 2018-07-02 PROCEDURE — 36415 COLL VENOUS BLD VENIPUNCTURE: CPT

## 2018-07-02 PROCEDURE — 85025 COMPLETE CBC W/AUTO DIFF WBC: CPT

## 2018-07-02 PROCEDURE — 80197 ASSAY OF TACROLIMUS: CPT

## 2018-07-02 PROCEDURE — 83735 ASSAY OF MAGNESIUM: CPT

## 2018-07-02 PROCEDURE — 80053 COMPREHEN METABOLIC PANEL: CPT

## 2018-07-03 ENCOUNTER — OFFICE VISIT (OUTPATIENT)
Dept: PODIATRY | Facility: CLINIC | Age: 48
End: 2018-07-03
Payer: MEDICARE

## 2018-07-03 VITALS
SYSTOLIC BLOOD PRESSURE: 158 MMHG | WEIGHT: 135 LBS | HEIGHT: 69 IN | DIASTOLIC BLOOD PRESSURE: 90 MMHG | BODY MASS INDEX: 19.99 KG/M2

## 2018-07-03 DIAGNOSIS — M79.675 GREAT TOE PAIN, LEFT: ICD-10-CM

## 2018-07-03 DIAGNOSIS — L60.0 INGROWN NAIL: ICD-10-CM

## 2018-07-03 DIAGNOSIS — E11.49 TYPE II DIABETES MELLITUS WITH NEUROLOGICAL MANIFESTATIONS: Primary | ICD-10-CM

## 2018-07-03 LAB — TACROLIMUS BLD-MCNC: 6 NG/ML

## 2018-07-03 PROCEDURE — 3008F BODY MASS INDEX DOCD: CPT | Mod: CPTII,S$GLB,, | Performed by: PODIATRIST

## 2018-07-03 PROCEDURE — 3077F SYST BP >= 140 MM HG: CPT | Mod: CPTII,S$GLB,, | Performed by: PODIATRIST

## 2018-07-03 PROCEDURE — 3045F PR MOST RECENT HEMOGLOBIN A1C LEVEL 7.0-9.0%: CPT | Mod: CPTII,S$GLB,, | Performed by: PODIATRIST

## 2018-07-03 PROCEDURE — 99999 PR PBB SHADOW E&M-EST. PATIENT-LVL III: CPT | Mod: PBBFAC,,, | Performed by: PODIATRIST

## 2018-07-03 PROCEDURE — 3080F DIAST BP >= 90 MM HG: CPT | Mod: CPTII,S$GLB,, | Performed by: PODIATRIST

## 2018-07-03 PROCEDURE — 99214 OFFICE O/P EST MOD 30 MIN: CPT | Mod: S$GLB,,, | Performed by: PODIATRIST

## 2018-07-03 NOTE — PROGRESS NOTES
"Subjective:      Patient ID: Valencia Dumont is a 48 y.o. female.    Chief Complaint: Ingrown Toenail (left foot great toe Pcp Ayad)    Valencia is a 48 y.o. female who RTC for follow up of bilateral great toe problem. States that the nails were removed a couple of months ago after which she developed an infected wound to the nail bed. This eventually healed and nails have returned. She has some lifting from the left nail bed and this is bothering her. She is also afraid of trimming her other nails because of the problem she had with the big toes but does not have any acute problem with the rest of the toes. She relates occasional "ingrowns" especially to the medial aspect of the left hallux nail    PCP: Timur Lion MD      Current shoe gear: Flip-flops    Hemoglobin A1C   Date Value Ref Range Status   05/07/2018 7.6 (H) 4.0 - 5.6 % Final     Comment:     According to ADA guidelines, hemoglobin A1c <7.0% represents  optimal control in non-pregnant diabetic patients. Different  metrics may apply to specific patient populations.   Standards of Medical Care in Diabetes-2016.  For the purpose of screening for the presence of diabetes:  <5.7%     Consistent with the absence of diabetes  5.7-6.4%  Consistent with increasing risk for diabetes   (prediabetes)  >or=6.5%  Consistent with diabetes  Currently, no consensus exists for use of hemoglobin A1c  for diagnosis of diabetes for children.  This Hemoglobin A1c assay has significant interference with fetal   hemoglobin   (HbF). The results are invalid for patients with abnormal amounts of   HbF,   including those with known Hereditary Persistence   of Fetal Hemoglobin. Heterozygous hemoglobin variants (HbAS, HbAC,   HbAD, HbAE, HbA2) do not significantly interfere with this assay;   however, presence of multiple variants in a sample may impact the %   interference.     11/17/2017 7.2 (H) 4.0 - 5.6 % Final     Comment:     According to ADA guidelines, " hemoglobin A1c <7.0% represents  optimal control in non-pregnant diabetic patients. Different  metrics may apply to specific patient populations.   Standards of Medical Care in Diabetes-2016.  For the purpose of screening for the presence of diabetes:  <5.7%     Consistent with the absence of diabetes  5.7-6.4%  Consistent with increasing risk for diabetes   (prediabetes)  >or=6.5%  Consistent with diabetes  Currently, no consensus exists for use of hemoglobin A1c  for diagnosis of diabetes for children.  This Hemoglobin A1c assay has significant interference with fetal   hemoglobin   (HbF). The results are invalid for patients with abnormal amounts of   HbF,   including those with known Hereditary Persistence   of Fetal Hemoglobin. Heterozygous hemoglobin variants (HbAS, HbAC,   HbAD, HbAE, HbA2) do not significantly interfere with this assay;   however, presence of multiple variants in a sample may impact the %   interference.     11/03/2017 7.3 (H) 4.0 - 5.6 % Final     Comment:     According to ADA guidelines, hemoglobin A1c <7.0% represents  optimal control in non-pregnant diabetic patients. Different  metrics may apply to specific patient populations.   Standards of Medical Care in Diabetes-2016.  For the purpose of screening for the presence of diabetes:  <5.7%     Consistent with the absence of diabetes  5.7-6.4%  Consistent with increasing risk for diabetes   (prediabetes)  >or=6.5%  Consistent with diabetes  Currently, no consensus exists for use of hemoglobin A1c  for diagnosis of diabetes for children.  This Hemoglobin A1c assay has significant interference with fetal   hemoglobin   (HbF). The results are invalid for patients with abnormal amounts of   HbF,   including those with known Hereditary Persistence   of Fetal Hemoglobin. Heterozygous hemoglobin variants (HbAS, HbAC,   HbAD, HbAE, HbA2) do not significantly interfere with this assay;   however, presence of multiple variants in a sample may  impact the %   interference.         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    ESOPHAGOGASTRODUODENOSCOPY      LIVER BIOPSY      LIVER TRANSPLANT  12/31/2013    REFRACTIVE SURGERY Bilateral 2010    TUBAL LIGATION  2003       Family History   Problem Relation Age of Onset    Hypertension Mother     Cataracts Mother     Diabetes Father     Alzheimer's disease Father     Diabetes Paternal Grandfather     Diabetes Paternal Grandmother     No Known Problems Maternal Grandmother     No Known Problems Maternal Grandfather     Breast cancer Maternal Aunt 55    Hypertension Brother     Diabetes Brother     No Known Problems Sister     No Known Problems Maternal Uncle     No Known Problems Paternal Aunt     No Known Problems Paternal Uncle     Stroke Neg Hx     Cancer Neg Hx     Colon cancer Neg Hx     Esophageal cancer Neg Hx     Stomach cancer Neg Hx     Rectal cancer Neg Hx     Amblyopia Neg Hx     Blindness Neg Hx     Glaucoma Neg Hx     Macular degeneration Neg Hx     Retinal detachment Neg Hx     Strabismus Neg Hx     Thyroid disease Neg Hx        Social History     Social History    Marital status:      Spouse name: N/A    Number of children: 3    Years of education: N/A     Occupational History     [employer]     Social History Main Topics    Smoking status: Never Smoker    Smokeless tobacco: Never Used      Comment: disability; ; 3 children    Alcohol  use 0.6 oz/week     1 Glasses of wine per week    Drug use: No    Sexual activity: Yes     Partners: Male     Birth control/ protection: None      Comment: s/p tubal ligation,  since 1989     Other Topics Concern    None     Social History Narrative    None       Current Outpatient Prescriptions   Medication Sig Dispense Refill    ACCU-CHEK SMARTVIEW Strp 1 strip by Misc.(Non-Drug; Combo Route) route 5 (five) times daily. Trueresult      blood sugar diagnostic Strp To check BG 5 times daily, to use with insurance preferred meter 200 strip 11    blood-glucose meter kit To check BG 5 times daily, to use with insurance preferred meter 1 each 0    cyproheptadine (PERIACTIN) 4 mg tablet Take 4 mg by mouth every evening.  3    diazepam (VALIUM) 5 MG tablet Take 5 mg by mouth 3 (three) times daily as needed.   0    dicyclomine (BENTYL) 10 MG capsule Take 1 capsule (10 mg total) by mouth before meals as needed (TID PRN). 90 capsule 2    DILTIAZEM HCL (DILTIAZEM 2% CREAM) Apply topically 3 (three) times daily. Apply topically to anal area. 30 g 0    dulaglutide (TRULICITY) 0.75 mg/0.5 mL PnIj Inject 0.5 mLs (0.75 mg total) into the skin once a week. 4 Syringe 6    ergocalciferol (ERGOCALCIFEROL) 50,000 unit Cap Take 1 capsule (50,000 Units total) by mouth every 7 days. 4 capsule 2    estradiol (ESTRACE) 0.01 % (0.1 mg/gram) vaginal cream Place 0.5 g vaginally twice a week. Insert 0.5grams intravaginally twice weekly 42 g 4    fluconazole (DIFLUCAN) 150 MG Tab TAKE ONE TABLET BY MOUTH ONCE DAILY AS A SINGLE DOSE FOR INFECTION. 1 tablet 0    hydrochlorothiazide (HYDRODIURIL) 25 MG tablet Take 12.5 mg by mouth once daily.   2    hydroxychloroquine (PLAQUENIL) 200 mg tablet Take 1 tablet (200 mg total) by mouth 2 (two) times daily. 180 tablet 0    hydrOXYzine HCl (ATARAX) 10 MG Tab TAKE 1 OR 2 TABLETS BY MOUTH THREE TIMES DAILY AS NEEDED FOR ITCHING.  0    insulin detemir (LEVEMIR FLEXTOUCH) 100 unit/mL  "(3 mL) SubQ InPn pen Inject 14 Units into the skin once daily. 15 mL 3    isosorbide mononitrate (IMDUR) 30 MG 24 hr tablet Take 30 mg by mouth once daily.      lancets 28 gauge Misc 4 lancets by Misc.(Non-Drug; Combo Route) route once daily. Pt uses Freestyle lite meter to check BG 4 times daily. 200 each 11    lancets Misc To check BG 5 times daily, to use with insurance preferred meter 200 each 11    losartan (COZAAR) 100 MG tablet Take 1 tablet by mouth once daily.  2    magnesium oxide 500 mg Tab Take 500 mg by mouth 2 (two) times daily. 60 each 6    MULTIVIT,THER IRON,CA,FA & MIN (MULTIVITAMIN) Tab Take 1 tablet by mouth once daily. 30 tablet 0    mycophenolate (MYFORTIC) 180 MG TbEC Take 3 tablets (540 mg total) by mouth 2 (two) times daily. 240 tablet 5    neomycin-polymyxin-hydrocortisone (CORTISPORIN) otic solution INSTILL TWO DROPS INTO BOTH EARS FOUR TIMES DAILY FOR 10 DAYS  0    NOVOLOG FLEXPEN 100 unit/mL InPn pen Inject 14 Units into the skin 3 times daily with meals. Plus correction scale, max DOSAGE= 72 UNITS 15 mL 3    nystatin (MYCOSTATIN) cream Apply topically 2 (two) times daily. 30 g 3    ondansetron (ZOFRAN) 8 MG tablet Take 1 tablet by mouth every 8 (eight) hours as needed.  0    oxycodone-acetaminophen (PERCOCET) 7.5-325 mg per tablet Take 1 tablet by mouth every 4 (four) hours as needed for Pain.      pantoprazole (PROTONIX) 40 MG tablet Take 40 mg by mouth once daily.      pen needle, diabetic (BD ULTRA-FINE JANESSA PEN NEEDLES) 32 gauge x 5/32" Ndle To use 4x/day with insulin injections. 400 each 3    polyethylene glycol (GLYCOLAX) 17 gram/dose powder MIX 17 GMS IN WATER and drink ONCE DAILY  3    PROAIR HFA 90 mcg/actuation inhaler Inhale 2 puffs into the lungs 3 (three) times daily as needed.   3    promethazine-dextromethorphan (PROMETHAZINE-DM) 6.25-15 mg/5 mL Syrp Take by mouth 2 (two) times daily as needed.   0    ranitidine (ZANTAC) 300 MG tablet Take 300 mg by mouth " "2 (two) times daily.       rosuvastatin (CRESTOR) 5 MG tablet Take 5 mg by mouth once daily.      tacrolimus (PROGRAF) 1 MG Cap Take 3 capsules (3 mg total) by mouth every 12 (twelve) hours. 180 capsule 11    triamcinolone (KENALOG) 0.5 % ointment 1 application 2 (two) times daily. Apply to affected area  3    valacyclovir (VALTREX) 500 MG tablet once daily as needed  0    verapamil (VERELAN) 120 MG C24P Take 1 capsule by mouth once daily.  3    zolpidem (AMBIEN) 10 mg Tab Take 10 mg by mouth nightly as needed.  3     No current facility-administered medications for this visit.        Review of patient's allergies indicates:   Allergen Reactions    Norvasc [amlodipine]      Severe headache    Doxycycline Rash    Pcn [penicillins] Rash         Review of Systems   Constitution: Negative for chills, fever, weakness, malaise/fatigue and night sweats.   Cardiovascular: Negative for chest pain, leg swelling, orthopnea and palpitations.   Respiratory: Negative for cough, shortness of breath and wheezing.    Skin: Positive for color change and nail changes. Negative for itching, poor wound healing and rash.   Musculoskeletal: Negative for arthritis, gout, joint pain, joint swelling, muscle weakness and myalgias.   Gastrointestinal: Negative for abdominal pain, constipation and nausea.   Neurological: Positive for numbness. Negative for disturbances in coordination, dizziness, focal weakness and tremors.           Objective:       Vitals:    07/03/18 1128   BP: (!) 158/90   Weight: 61.2 kg (135 lb)   Height: 5' 9" (1.753 m)   PainSc:   4   PainLoc: Toe       Physical Exam   Constitutional: She is oriented to person, place, and time. Vital signs are normal. She appears well-developed. She is cooperative. No distress.   Cardiovascular: Intact distal pulses.    Pulses:       Dorsalis pedis pulses are 2+ on the right side, and 2+ on the left side.        Posterior tibial pulses are 2+ on the right side, and 2+ on the " left side.   No edema noted b/l LEs. No varicosities present b/l LEs.    Musculoskeletal:        Right ankle: Normal.        Left ankle: Normal.        Right foot: There is normal range of motion, no tenderness, no bony tenderness, normal capillary refill, no crepitus and no deformity.        Left foot: There is tenderness (medial nail border of hallux). There is normal range of motion, no bony tenderness, normal capillary refill, no crepitus and no deformity.   No equinus.  Negative anterior drawer at the ankle bilat.  Negative Lachman test at the 2nd MTPJ.  Able to perform sign and double heel rise without pain.       Neurological: She is alert and oriented to person, place, and time. She has normal strength. A sensory deficit is present. She exhibits normal muscle tone. Gait normal.   Reflex Scores:       Achilles reflexes are 2+ on the right side and 2+ on the left side.  Negative Tinels sign and Charles's click, bilat. Protective sensation intact b/l foot. Vibratory sensation intact b/l. Subjective numbness to toes 4-5 both feet (occasionally).   Skin: Skin is dry. No ecchymosis noted. She is not diaphoretic. No erythema. No pallor. Nails show no clubbing.   B/l hallux nail beds fully healed with signs of hyperkeratotic changes to the nail bed. No open wound. No drainage. Stable and dry. medial hallux nail margin of left foot with ingrown nail plate. No erythema or edema is noted. No granuloma formation noted. No malodor    Remaining toenails normotrophic 2-5 b/l. Skin texture normal b/l.            Assessment:       Encounter Diagnoses   Name Primary?    Type II diabetes mellitus with neurological manifestations Yes    Great toe pain, left     Ingrown nail          Plan:       Valencia was seen today for ingrown toenail.    Diagnoses and all orders for this visit:    Type II diabetes mellitus with neurological manifestations    Great toe pain, left    Ingrown nail      I counseled the patient on her  conditions, their implications and medical management.    Greater than 50% of this visit spent on counseling and coordination of care.    No open wounds noted to b/l LEs and toes. Long discussion with patient regarding expectations of nail growth on both great toes. Advised her that this may take up to 1 year for full growth of nail. Also advised her of the build up of hyperkeratosis to the nail bed in the absence of the nail during this time. Also advised her of the risk of fungal infection to toenail once it has grown out fully.     Discussed application of Kevin's vaporub on affected toenails for up to 1 year or until nail has grown out for possible prevention of fungal infection however this was not guaranteed.     Reassured her that her nail beds appear stable and fully healed without any signs of infection. She is allowed to wear any kind of shoe to her tolerance at this time, however she was advised to avoid tight fitting shoes and those with stiff materials at all times. Continue open toe sandals if this feels more comfortable.     In depth conversation on the treatment of ingrown nail; partial nail avulsion vs chemical matrixectomy vs conservative treatment of soaking and nail trimming    Informed patient that many nail problems can be prevented by wearing the right shoes and trimming your nails properly.   The right shoes: Feet were measured.  Patient is to wear shoes that are supportive and roomy enough for toes to wiggle. Look for shoes made of natural materials such as leather, which allow  feet to breathe.   Proper trimming: To avoid problems, she was instructed to trim toenails straight across without cutting down into the corners.     No further intervention at this time. I advised patient that routine trimming of nails will not be covered service per insurance and he/she will fall under Proc B if this service is desired. Patient verbalized understanding of this. He will RTC as needed for Proc B or  phenol and alcohol procedure or any other pedal complaint otherwise follow up 1 year for routine DM Foot exam.

## 2018-07-03 NOTE — PATIENT INSTRUCTIONS
Recommend lotions: eucerin, eucerin for diabetics, aquaphor, A&D ointment, gold bond for diabetics, sween, Chaplin's Bees all purpose baby ointment,  urea 40 with aloe (found on amazon.com)    Shoe recommendations: (try 6pm.com, zappos.WeAreHolidays , nordstromrack.WeAreHolidays, or shoes.WeAreHolidays for discounted prices) you can visit DSW shoes in [place]  or [employer] [facility] in the [facility] (there are also several shoe brand outlets in the [facility])    Asics (GT 2000 or gel foundations), new balance stability type shoes (900 series), saucony (stabil c3),  Chandler (GTS or Beast or transcend), vionic, propet (tennis shoe)    sofft brand, clarks, crocs, aerosoles, naturalizers, SAS, ecco, born, chris villalobos, rockports (dress shoes)    Vionic, burkenstocks, fitflops, propet (sandals)  Nike comfort thong sandals, crocs, propet (house shoes)    Nail Home remedy:  Vicks Vapor rub to nails for easier managability    Occasional soaks for 15-20 mins in luke warm water with 1 cup of listerine and 1 cup of apple cider vinegar are ok You may add several drops of oil of oregano or tea tree oil as well        Diabetes: Inspecting Your Feet  Diabetes increases your chances of developing foot problems. So inspect your feet every day. This helps you find small skin irritations before they become serious infections. If you have trouble seeing the bottoms of your feet, use a mirror or ask a family member or friend to help.     Pressure spots on the bottom of the foot are common areas where problems develop.   How to check your feet  Below are tips to help you look for foot problems. Try to check your feet at the same time each day, such as when you get out of bed in the morning:  · Check the top of each foot. The tops of toes, back of the heel, and outer edge of the foot can get a lot of rubbing from poor-fitting shoes.  · Check the bottom of each foot. Daily wear and tear often leads to problems at pressure spots.  · Check the toes and nails. Fungal infections often  occur between toes. Toenail problems can also be a sign of fungal infections or lead to breaks in the skin.  · Check your shoes, too. Loose objects inside a shoe can injure the foot. Use your hand to feel inside your shoes for things like ray, loose stitching, or rough areas that could irritate your skin.  Warning signs  Look for any color changes in the foot. Redness with streaks can signal a severe infection, which needs immediate medical attention. Tell your doctor right away if you have any of these problems:  · Swelling, sometimes with color changes, may be a sign of poor blood flow or infection. Symptoms include tenderness and an increase in the size of your foot.  · Warm or hot areas on your feet may be signs of infection. A foot that is cold may not be getting enough blood.  · Sensations such as burning, tingling, or pins and needles can be signs of a problem. Also check for areas that may be numb.  · Hot spots are caused by friction or pressure. Look for hot spots in areas that get a lot of rubbing. Hot spots can turn into blisters, calluses, or sores.  · Cracks and sores are caused by dry or irritated skin. They are a sign that the skin is breaking down, which can lead to infection.  · Toenail problems to watch for include nails growing into the skin (ingrown toenail) and causing redness or pain. Thick, yellow, or discolored nails can signal a fungal infection.  · Drainage and odor can develop from untreated sores and ulcers. Call your doctor right away if you notice white or yellow drainage, bleeding, or unpleasant odor.   © 1229-0891 [employer]. [address] [place] [address]. All rights reserved. This information is not intended as a substitute for professional medical care. Always follow your healthcare professional's instructions.        Step-by-Step:  Inspecting Your Feet (Diabetes)    Date Last Reviewed: 10/1/2016  © 5567-9526 [employer]. [address] [place] [address]  [place] [address]. All rights reserved. This information is not intended as a substitute for professional medical care. Always follow your healthcare professional's instructions.

## 2018-07-05 ENCOUNTER — TELEPHONE (OUTPATIENT)
Dept: TRANSPLANT | Facility: CLINIC | Age: 48
End: 2018-07-05

## 2018-07-05 NOTE — TELEPHONE ENCOUNTER
----- Message from Miriam Abernathy MD sent at 7/4/2018  2:44 PM CDT -----  Reviewed, nothing to do

## 2018-07-06 ENCOUNTER — PATIENT MESSAGE (OUTPATIENT)
Dept: PODIATRY | Facility: CLINIC | Age: 48
End: 2018-07-06

## 2018-07-09 ENCOUNTER — DOCUMENTATION ONLY (OUTPATIENT)
Dept: REHABILITATION | Facility: HOSPITAL | Age: 48
End: 2018-07-09

## 2018-07-12 ENCOUNTER — CLINICAL SUPPORT (OUTPATIENT)
Dept: REHABILITATION | Facility: HOSPITAL | Age: 48
End: 2018-07-12
Attending: PHYSICAL MEDICINE & REHABILITATION
Payer: MEDICARE

## 2018-07-12 DIAGNOSIS — M54.50 CHRONIC BILATERAL LOW BACK PAIN WITHOUT SCIATICA: ICD-10-CM

## 2018-07-12 DIAGNOSIS — M25.512 LEFT SHOULDER PAIN: ICD-10-CM

## 2018-07-12 DIAGNOSIS — G89.29 CHRONIC BILATERAL LOW BACK PAIN WITHOUT SCIATICA: ICD-10-CM

## 2018-07-12 DIAGNOSIS — M54.50 LOW BACK PAIN: Primary | ICD-10-CM

## 2018-07-12 PROCEDURE — G8979 MOBILITY GOAL STATUS: HCPCS | Mod: CK,PN | Performed by: PHYSICAL MEDICINE & REHABILITATION

## 2018-07-12 PROCEDURE — 97110 THERAPEUTIC EXERCISES: CPT | Mod: PN | Performed by: PHYSICAL MEDICINE & REHABILITATION

## 2018-07-12 PROCEDURE — G8980 MOBILITY D/C STATUS: HCPCS | Mod: CK,PN | Performed by: PHYSICAL MEDICINE & REHABILITATION

## 2018-07-12 NOTE — PROGRESS NOTES
[facility] -discharge from healthy back to wellness, with PT for shoulder starting in 3 weeks     Name: Valencia Dumont  Clinic Number: [id number]  Diagnosis: chronic back pain    Physician: Antoinette Joyce, *  Precautions: Diabetes and fall risk and hx of liver transplant lupus  Visit #: 14        Time In: 1040  Time Out: 1130  Total Treatment Time: 50 minutes (1:1 with PTA for 20 mins of treatment session)    Pain Pattern: Pattern 1 PEN   Date POC Signed: 2018  And 18 for 18    Subjective     Valencia reports that her back is feeling better today. She has seen rheumatology and her liver doctors for her  lupus, and it is managed fairly well.  She has been having problem with her right knee still.   She reports her back is much better from coming here.  Her big complaint now is her shoulder.  She has new Physician orders for her shoulder pain.  She is interested in wellness and will try 2 visits, and may continue.  She wants to start therapy for her shoulder.  She plans to continue her HEP and be compliant with her stretches, we reprinted last visit.    She is trying to be consistent.  We gave her the lumbar roll handout again.  No back pain today. Back pain better with flexion strategies.  She is trying to walk.   Her right knee and shoulder are her biggest complaint.  She has no back pain today.    She is managing fairly well back pain with stretches.        Patient reports their pain to be 0 out of 10 on a 0-10 scale with 0 being no pain and 10 being the worst pain imaginable.    Pain Location: Chronic lower back pain w/out sciatica      Objective     ROM back:  Flexion WFL  Extension min loss but tolerating ext in standing  Side glides full  Rotation min loss    LE strength WFL      Baseline IM Testing Results:      From initial evaluation       Date of testin18  ROM 0 to 54deg   Max Peak Torque 78   Min Peak Torque 18   Flex/Ext Ratio 4.33   % below  normative data - 2 std dev     Midpoint  IM Testing Results:    From:10th visit   Date of testin2018     ROM 0 to 57 deg   Max Peak Torque 90   Min Peak Torque 65   Flex/Ext Ratio 1.38   % below normative data - 1.3 std dev      Average 136% gain in strength    FOTO:  Initial score 63% impaired, CL  Score at discharge 51% impaired, CK  Goal CK, met        Treatment      Valencia received individual therapeutic exercises to develop strength, endurance, ROM, flexibility, posture and core stabilization for 50 minutes including:    Single knee to chest: 10x  Double knee to chest: 10x   Lower trunk rotation: 10x  Open books: 10x    [facility] 2018   Visit Number 14   VAS Pain Rating 0   Treadmill Time (in min.) 10   Speed 2   Incline -   Recumbent Bike Seat Pos. -   Time -   Extension in Standing 10   Flexion in Lying 10   Manual Therapy -   Lumbar Extension Seat Pad -   Femur Restraint -   Top Dead Center -   Counterweight -   Lumbar Flexion -   Lumbar Extension -   Lumbar Peak Torque -   Min Torque -   Percent From Norm -   Lumbar Weight 60   Repetitions 20   Rating of Perceived Exertion 4   Ice - Z Lie (in min.) 10         Peripheral muscle strengthening which included 1 set of 15-20 repetitions at a slow, controlled 7 second per rep pace focused on strengthening supporting musculature for improved body mechanics and functional mobility. Pt and therapist focused on proper form during treatment to ensure optimal strengthening of each targeted muscle group. Machines were utilized including torso rotation, leg extension, leg curl, chest press, upright row, tricep extension, bicep curl, leg press, and hip abduction/adduction.    Valencia received the following manual therapy techniques: none     Written Home Exercises Provided:   Single knee to chest 10 reps  Double knee to chest 10 reps  Lower trunk rotation 10 reps  Open books 10 reps    Pt demo good understanding of the education provided. Valencia  demonstrated good return demonstration of activities.     Education provided re:   Valencia verbalized good understanding of education provided.   No spiritual or educational barriers to learning provided    Lumbar roll use compliance: better with lumbar roll, not using, stressed roll and there ex    Assessment     Patient tolerated treatment session well today. Good tolerance to resisted lumbar extension reporting no exacerbation of low back pain.   Patient wishes to continue wellness and feels confident with back pain management and there ex, posture, ergonomics, walking program.   Handouts for discharge printed.      Patient has attended 14 visits of the [employer] program for aerobic work, med ex isometric testing with dynamic strengthening on med ex equipment for spine, whole body strengthening on med ex equipment, HEP, education.  Patient has chronic health conditions and feels she wants to continue treatment for her shoulder as her back is better and she feels she understands HEP.  She is ready to be transitioned into wellness program.  Importance of wellness program, and attending   to maintain strength stressed.  Importance of continuing there ex and body mech and ergonomics stressed.   Patient demonstrates improvement in ability to reduce symptoms, improved posture, improved ROM and improved strength.   Reviewed HEP, and importance of maintaining a healthy spine through continued stretching and performance of HEP, good posture, good ergonomics, good body mech with ADL, leisure, and work.  Discharge handout with HEP given, and discussed aspects of exercise program, cardio, strengthening, and stretching.    Patient expressed understanding information given.  Patient plans to attend wellness and is ready to transition to wellness.          Patient retested at visit midpoint and reassessed today  and shows improvement in:  Improved posture, not using lumbar roll which was stressed and handouts given  Improved  lumbar/cervical  ROM,  initially on med ex test 0-54  and   Currently 0-57  Improved strength at each test point on lumbar med ex IM test with  136%  average improvement noted with Reduced pain  Noted by patient  Initial outcome tool score 63% and current outcome tool score  51% indicating reduced pain and improved function               Short term goals:  6 weeks or 10 visits  Updated 5/28/2018  1.  Pt will demonstrate increased lumbar ROM by at least 3 degrees from the initial ROM value with improvements noted in functional ROM and ability to perform ADLs (goal met)  2.  Pt will demonstrate increased maximum isometric torque value by 5% when compared to the initial value resulting in improved ability to perform bending, lifting, and carrying activities safely, confidently. (goal met)  3.  Patient report a reduction in worst pain score by 1-2 points for improved tolerance during work and recreational activities (goal met)  4.  Pt able to perform HEP correctly with minimal cueing or supervision for therapist (goal met)     Long term goals: 13 weeks or 20 visits   1. Pt will demonstrate increased lumbar ROM by at least 10 degrees from initial ROM value, resulting in improved ability to perform functional fwd bending while standing and sitting. (goal met)  2. Pt will demonstrate increased maximum isometric torque value by 10% when compared to the initial value resulting in improved ability to perform bending, lifting, and carrying activities safely, confidently.  (goal met)  3. Pt to demonstrate ability to independently control and reduce their pain through posture positioning and mechanical movements throughout a typical day.  (goal met)  4.  Patient will demonstrate improved overall function per FOTO Survey to CK = at least 40% but < 60% impaired, limited or restricted score or less.  (goal met)    Plan   Discharge from healthy back to wellness to maintain back strength, and plan for shoulder treatment.

## 2018-07-12 NOTE — PLAN OF CARE
[facility]      Name: Valencia Dumont  Clinic Number: [id number]  Diagnosis:   Low back pain    Physician: Antoinette Joyce, *  Precautions: Diabetes and fall risk and hx of liver transplant lupus  Visit #: 10 of 12  SPENCE: 5/15/2018  Priority Start Date Expiration Date Referral Entered By   Routine 05/23/2018 06/10/2018 Vanessa Diaz   Visits Requested Visits Authorized Visits Completed Visits Scheduled   12 3 1 2     PTA Visit #: 1  Time In: 11:25 am  ( patient 25 min late)  Time Out: 12:15 pm   Total Treatment Time: 43 minutes (1:1 with PT for 30 mins)    Pain Pattern: Pattern 1 PEN   Date POC Signed: 4/2/2018  Due 7/2/18    Subjective     Valencia reports that  She is late due to traffic. She has not attended in 2 weeks due to issues with bruising, she has seen rheumatology and her liver doctors, and is not sure why she is bruising.  She has lupus, and it is managed fairly well.  She has been having problem with her right knee still.  She notices that her back and her knee are not as good when she doesn't come here so she thinks the program is helping her.  She admits she needs help remembering her exercises, and has not been performing.  Reprinted there ex for patient.       Patient reports their pain to be 6 out of 10 on a 0-10 scale with 0 being no pain and 10 being the worst pain imaginable.  4/10 post visit.    Pain Location: Chronic lower back pain w/out sciatica      Objective     CMS Impairment/Limitation/Restriction for FOTO Lumbar Spine Survey  Status Limitation G-Code CMS Severity Modifier  Intake 37% 63%  Predicted 52% 48% Goal Status+ CK - At least 40 percent but less than 60 percent  4/23/2018 51% 49%  5/28/2018 50% 50% Current Status CK - At least 40 percent but less than 60 percent  D/C Status CK **only report if this is discharge survey  +Based on FOTO predicted change score    Baseline IM Testing Results:      From initial evaluation       Date of testing:  18  ROM 0 to 54deg   Max Peak Torque 78   Min Peak Torque 18   Flex/Ext Ratio 4.33   % below normative data - 2 std dev     Baseline IM Testing Results:    From:10th visit   Date of testin2018     ROM 0 to 57 deg   Max Peak Torque 90   Min Peak Torque 65   Flex/Ext Ratio 1.38   % below normative data - 1.3 std dev        Treatment      Valencia received individual therapeutic exercises to develop strength, endurance, ROM, flexibility, posture and core stabilization for 55 minutes including:    Single knee to chest 10 reps  Double knee to chest 10 reps- she reports this gives her back pain relief, stressed doing it at home  Lower trunk rotation 10 reps  Open books 10 reps      [facility] 2018   Visit Number 11   VAS Pain Rating 6   Treadmill Time (in min.) 5   Speed 1.8   Incline -   Recumbent Bike Seat Pos. -   Time -   Flexion in Lying 10   Manual Therapy -   Lumbar Extension Seat Pad -   Femur Restraint -   Top Dead Center -   Counterweight -   Lumbar Flexion -   Lumbar Extension -   Lumbar Peak Torque -   Min Torque -   Percent From Norm -   Lumbar Weight 58   Repetitions 15   Rating of Perceived Exertion 4   Ice - Z Lie (in min.) 0             Peripheral muscle strengthening which included 1 set of 15-20 repetitions at a slow, controlled 7 second per rep pace focused on strengthening supporting musculature for improved body mechanics and functional mobility. Pt and therapist focused on proper form during treatment to ensure optimal strengthening of each targeted muscle group. Machines were utilized including torso rotation, leg extension, leg curl, chest press, upright row, tricep extension, bicep curl, leg press, and hip abduction/adduction.    Valencia received the following manual therapy techniques: 00 min      Written Home Exercises Provided:     Single knee to chest 10 reps  Double knee to chest 10 reps  Lower trunk rotation 10 reps  Open books 10 reps    Pt demo good understanding of  the education provided. Valencia demonstrated good return demonstration of activities.     Education provided re:   Valencia verbalized good understanding of education provided.   No spiritual or educational barriers to learning provided    Lumbar roll use compliance: better with lumbar roll, not using, stressed roll and there ex    Assessment     Patient has not attended in 2 weeks, and note she feels better when attending.  Her back pain reduced with there ex, and flexion, and reminded her to watch posture and do her stretching more regularly.   Pt has been having some right knee pain and issues, but pt demonstrated in good spirit and dedicated to finish Healthy back program. Overall, pt has demonstrated improvement in upper and lower extremity muscle strengthening, but pt cont with lower back pain. Lower back pain has been decreasing. PT will cont to benefit from skilled PT services to decrease functional limitations.. Continue skilled PT services to achieve PT and patient's goals.       This is a 48 y.o. female referred to outpatient physical therapy and presents with a medical diagnosis of Chronic lower back pain without sciatica  and demonstrates limitations as described in the problem list. Pt prognosis is Fair +. Pt will continue to benefit from skilled outpatient physical therapy to address the deficits listed in the problem list, provide pt/family education and to maximize pt's level of independence in the home and community environment.     Goals as follows:     Short term goals:  6 weeks or 10 visits  Updated 5/28/2018  1.  Pt will demonstrate increased lumbar ROM by at least 3 degrees from the initial ROM value with improvements noted in functional ROM and ability to perform ADLs (goal met)  2.  Pt will demonstrate increased maximum isometric torque value by 5% when compared to the initial value resulting in improved ability to perform bending, lifting, and carrying activities safely, confidently. (goal  "met)  3.  Patient report a reduction in worst pain score by 1-2 points for improved tolerance during work and recreational activities (goal in progress)   4.  Pt able to perform HEP correctly with minimal cueing or supervision for therapist (goal met)     Long term goals: 13 weeks or 20 visits   1. Pt will demonstrate increased lumbar ROM by at least 10 degrees from initial ROM value, resulting in improved ability to perform functional fwd bending while standing and sitting.   2. Pt will demonstrate increased maximum isometric torque value by 10% when compared to the initial value resulting in improved ability to perform bending, lifting, and carrying activities safely, confidently.  3. Pt to demonstrate ability to independently control and reduce their pain through posture positioning and mechanical movements throughout a typical day.  4.  Patient will demonstrate improved overall function per FOTO Survey to CK = at least 40% but < 60% impaired, limited or restricted score or less.    Plan     Continue with established Plan of Care towards established PT goals.   Outpatient physical therapy 2x week for 13 weeks or 20 visits to include the following:   - Patient education  - Therapeutic exercise  - Manual therapy  - Performance testing   - Neuromuscular Re-education  - Therapeutic activity   - Modalities  -Functional dry needling      Pt may be seen by PTA as part of the rehabilitation team.      "I certify the need for these services furnished under this plan of treatment and while under my care."     ____________________________________  Physician/Referring Practitioner     _______________  Date of Signature       Certification Period: 4/2/18 to 7/2/18    Therapist: Sophie Henry, PT  06/14/2018    "

## 2018-07-25 ENCOUNTER — OFFICE VISIT (OUTPATIENT)
Dept: PODIATRY | Facility: CLINIC | Age: 48
End: 2018-07-25
Payer: MEDICARE

## 2018-07-25 ENCOUNTER — TELEPHONE (OUTPATIENT)
Dept: PODIATRY | Facility: CLINIC | Age: 48
End: 2018-07-25

## 2018-07-25 ENCOUNTER — PATIENT MESSAGE (OUTPATIENT)
Dept: ENDOCRINOLOGY | Facility: CLINIC | Age: 48
End: 2018-07-25

## 2018-07-25 VITALS — BODY MASS INDEX: 19.99 KG/M2 | HEIGHT: 69 IN | WEIGHT: 135 LBS

## 2018-07-25 DIAGNOSIS — L60.1 ONYCHOLYSIS: Primary | ICD-10-CM

## 2018-07-25 DIAGNOSIS — L60.0 INGROWN TOENAIL WITHOUT INFECTION: ICD-10-CM

## 2018-07-25 DIAGNOSIS — M79.675 PAIN AROUND TOENAIL, LEFT FOOT: ICD-10-CM

## 2018-07-25 PROCEDURE — 99213 OFFICE O/P EST LOW 20 MIN: CPT | Mod: S$GLB,,, | Performed by: PODIATRIST

## 2018-07-25 PROCEDURE — 99999 PR PBB SHADOW E&M-EST. PATIENT-LVL III: CPT | Mod: PBBFAC,,, | Performed by: PODIATRIST

## 2018-07-25 PROCEDURE — 3008F BODY MASS INDEX DOCD: CPT | Mod: CPTII,S$GLB,, | Performed by: PODIATRIST

## 2018-07-25 RX ORDER — MECLIZINE HYDROCHLORIDE 25 MG/1
TABLET ORAL
Refills: 0 | COMMUNITY
Start: 2018-07-13

## 2018-07-25 RX ORDER — NYSTATIN 100000 [USP'U]/ML
SUSPENSION ORAL
Refills: 1 | COMMUNITY
Start: 2018-07-19

## 2018-07-25 NOTE — PROGRESS NOTES
Subjective:      Patient ID: Valencia Dumont is a 48 y.o. female.    Chief Complaint: Nail Problem (great toes )    Valencia is a 48 y.o. female who RTC for follow up of bilateral great toenail problem. States that the nails were removed in the past and the left great toenail has never grown back the same. She also had a stubbing injury to the nail which caused some bleeding under the nail. This has grown out but she still has some white discoloration and loosening of a small portion of the nail which bothers her. Mild pain due to thickness and curvature of the nail. No other pedal concerns today.     PCP: Timur Lion MD      Current shoe gear: Flip-flops    Hemoglobin A1C   Date Value Ref Range Status   05/07/2018 7.6 (H) 4.0 - 5.6 % Final     Comment:     According to ADA guidelines, hemoglobin A1c <7.0% represents  optimal control in non-pregnant diabetic patients. Different  metrics may apply to specific patient populations.   Standards of Medical Care in Diabetes-2016.  For the purpose of screening for the presence of diabetes:  <5.7%     Consistent with the absence of diabetes  5.7-6.4%  Consistent with increasing risk for diabetes   (prediabetes)  >or=6.5%  Consistent with diabetes  Currently, no consensus exists for use of hemoglobin A1c  for diagnosis of diabetes for children.  This Hemoglobin A1c assay has significant interference with fetal   hemoglobin   (HbF). The results are invalid for patients with abnormal amounts of   HbF,   including those with known Hereditary Persistence   of Fetal Hemoglobin. Heterozygous hemoglobin variants (HbAS, HbAC,   HbAD, HbAE, HbA2) do not significantly interfere with this assay;   however, presence of multiple variants in a sample may impact the %   interference.     11/17/2017 7.2 (H) 4.0 - 5.6 % Final     Comment:     According to ADA guidelines, hemoglobin A1c <7.0% represents  optimal control in non-pregnant diabetic patients. Different  metrics may apply  to specific patient populations.   Standards of Medical Care in Diabetes-2016.  For the purpose of screening for the presence of diabetes:  <5.7%     Consistent with the absence of diabetes  5.7-6.4%  Consistent with increasing risk for diabetes   (prediabetes)  >or=6.5%  Consistent with diabetes  Currently, no consensus exists for use of hemoglobin A1c  for diagnosis of diabetes for children.  This Hemoglobin A1c assay has significant interference with fetal   hemoglobin   (HbF). The results are invalid for patients with abnormal amounts of   HbF,   including those with known Hereditary Persistence   of Fetal Hemoglobin. Heterozygous hemoglobin variants (HbAS, HbAC,   HbAD, HbAE, HbA2) do not significantly interfere with this assay;   however, presence of multiple variants in a sample may impact the %   interference.     11/03/2017 7.3 (H) 4.0 - 5.6 % Final     Comment:     According to ADA guidelines, hemoglobin A1c <7.0% represents  optimal control in non-pregnant diabetic patients. Different  metrics may apply to specific patient populations.   Standards of Medical Care in Diabetes-2016.  For the purpose of screening for the presence of diabetes:  <5.7%     Consistent with the absence of diabetes  5.7-6.4%  Consistent with increasing risk for diabetes   (prediabetes)  >or=6.5%  Consistent with diabetes  Currently, no consensus exists for use of hemoglobin A1c  for diagnosis of diabetes for children.  This Hemoglobin A1c assay has significant interference with fetal   hemoglobin   (HbF). The results are invalid for patients with abnormal amounts of   HbF,   including those with known Hereditary Persistence   of Fetal Hemoglobin. Heterozygous hemoglobin variants (HbAS, HbAC,   HbAD, HbAE, HbA2) do not significantly interfere with this assay;   however, presence of multiple variants in a sample may impact the %   interference.         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    ESOPHAGOGASTRODUODENOSCOPY      LIVER BIOPSY      LIVER TRANSPLANT  12/31/2013    REFRACTIVE SURGERY Bilateral 2010    TUBAL LIGATION  2003       Family History   Problem Relation Age of Onset    Hypertension Mother     Cataracts Mother     Diabetes Father     Alzheimer's disease Father     Diabetes Paternal Grandfather     Diabetes Paternal Grandmother     No Known Problems Maternal Grandmother     No Known Problems Maternal Grandfather     Breast cancer Maternal Aunt 55    Hypertension Brother     Diabetes Brother     No Known Problems Sister     No Known Problems Maternal Uncle     No Known Problems Paternal Aunt     No Known Problems Paternal Uncle     Stroke Neg Hx     Cancer Neg Hx     Colon cancer Neg Hx     Esophageal cancer Neg Hx     Stomach cancer Neg Hx     Rectal cancer Neg Hx     Amblyopia Neg Hx     Blindness Neg Hx     Glaucoma Neg Hx     Macular degeneration Neg Hx     Retinal detachment Neg Hx     Strabismus Neg Hx     Thyroid disease Neg Hx        Social History     Social History    Marital status:      Spouse name: N/A    Number of children: 3    Years of education: N/A     Occupational History     [employer]     Social History Main Topics    Smoking status: Never Smoker    Smokeless tobacco: Never Used      Comment: disability; ; 3 children    Alcohol use 0.6 oz/week     1 Glasses of wine per week    Drug use: No    Sexual activity: Yes     Partners: Male      Birth control/ protection: None      Comment: s/p tubal ligation,  since 1989     Other Topics Concern    None     Social History Narrative    None       Current Outpatient Prescriptions   Medication Sig Dispense Refill    ACCU-CHEK SMARTVIEW Strp 1 strip by Misc.(Non-Drug; Combo Route) route 5 (five) times daily. Trueresult      blood sugar diagnostic Strp To check BG 5 times daily, to use with insurance preferred meter 200 strip 11    blood-glucose meter kit To check BG 5 times daily, to use with insurance preferred meter 1 each 0    cyproheptadine (PERIACTIN) 4 mg tablet Take 4 mg by mouth every evening.  3    diazepam (VALIUM) 5 MG tablet Take 5 mg by mouth 3 (three) times daily as needed.   0    dicyclomine (BENTYL) 10 MG capsule Take 1 capsule (10 mg total) by mouth before meals as needed (TID PRN). 90 capsule 2    DILTIAZEM HCL (DILTIAZEM 2% CREAM) Apply topically 3 (three) times daily. Apply topically to anal area. 30 g 0    dulaglutide (TRULICITY) 0.75 mg/0.5 mL PnIj Inject 0.5 mLs (0.75 mg total) into the skin once a week. 4 Syringe 6    ergocalciferol (ERGOCALCIFEROL) 50,000 unit Cap Take 1 capsule (50,000 Units total) by mouth every 7 days. 4 capsule 2    estradiol (ESTRACE) 0.01 % (0.1 mg/gram) vaginal cream Place 0.5 g vaginally twice a week. Insert 0.5grams intravaginally twice weekly 42 g 4    fluconazole (DIFLUCAN) 150 MG Tab TAKE ONE TABLET BY MOUTH ONCE DAILY AS A SINGLE DOSE FOR INFECTION. 1 tablet 0    hydrochlorothiazide (HYDRODIURIL) 25 MG tablet Take 12.5 mg by mouth once daily.   2    hydrOXYzine HCl (ATARAX) 10 MG Tab TAKE 1 OR 2 TABLETS BY MOUTH THREE TIMES DAILY AS NEEDED FOR ITCHING.  0    insulin detemir (LEVEMIR FLEXTOUCH) 100 unit/mL (3 mL) SubQ InPn pen Inject 14 Units into the skin once daily. 15 mL 3    isosorbide mononitrate (IMDUR) 30 MG 24 hr tablet Take 30 mg by mouth once daily.      lancets 28 gauge Misc 4 lancets by Misc.(Non-Drug; Combo Route) route  "once daily. Pt uses Freestyle lite meter to check BG 4 times daily. 200 each 11    lancets Misc To check BG 5 times daily, to use with insurance preferred meter 200 each 11    losartan (COZAAR) 100 MG tablet Take 1 tablet by mouth once daily.  2    magnesium oxide 500 mg Tab Take 500 mg by mouth 2 (two) times daily. 60 each 6    meclizine (ANTIVERT) 25 mg tablet TAKE ONE TABLET BY MOUTH AT BEDTIME AS NEEDED for dizziness.  0    MULTIVIT,THER IRON,CA,FA & MIN (MULTIVITAMIN) Tab Take 1 tablet by mouth once daily. 30 tablet 0    mycophenolate (MYFORTIC) 180 MG TbEC Take 3 tablets (540 mg total) by mouth 2 (two) times daily. 240 tablet 5    neomycin-polymyxin-hydrocortisone (CORTISPORIN) otic solution INSTILL TWO DROPS INTO BOTH EARS FOUR TIMES DAILY FOR 10 DAYS  0    NOVOLOG FLEXPEN 100 unit/mL InPn pen Inject 14 Units into the skin 3 times daily with meals. Plus correction scale, max DOSAGE= 72 UNITS 15 mL 3    nystatin (MYCOSTATIN) 100,000 unit/mL suspension SWISH AND SPIT FIVE MILLILITERS BY MOUTH FOUR TIMES DAILY FOR 7 DAYS.  1    nystatin (MYCOSTATIN) cream Apply topically 2 (two) times daily. 30 g 3    ondansetron (ZOFRAN) 8 MG tablet Take 1 tablet by mouth every 8 (eight) hours as needed.  0    oxycodone-acetaminophen (PERCOCET) 7.5-325 mg per tablet Take 1 tablet by mouth every 4 (four) hours as needed for Pain.      pantoprazole (PROTONIX) 40 MG tablet Take 40 mg by mouth once daily.      pen needle, diabetic (BD ULTRA-FINE JANESSA PEN NEEDLES) 32 gauge x 5/32" Ndle To use 4x/day with insulin injections. 400 each 3    polyethylene glycol (GLYCOLAX) 17 gram/dose powder MIX 17 GMS IN WATER and drink ONCE DAILY  3    PROAIR HFA 90 mcg/actuation inhaler Inhale 2 puffs into the lungs 3 (three) times daily as needed.   3    promethazine-dextromethorphan (PROMETHAZINE-DM) 6.25-15 mg/5 mL Syrp Take by mouth 2 (two) times daily as needed.   0    ranitidine (ZANTAC) 300 MG tablet Take 300 mg by mouth 2 " "(two) times daily.       rosuvastatin (CRESTOR) 5 MG tablet Take 5 mg by mouth once daily.      tacrolimus (PROGRAF) 1 MG Cap Take 3 capsules (3 mg total) by mouth every 12 (twelve) hours. 180 capsule 11    triamcinolone (KENALOG) 0.5 % ointment 1 application 2 (two) times daily. Apply to affected area  3    valacyclovir (VALTREX) 500 MG tablet once daily as needed  0    verapamil (VERELAN) 120 MG C24P Take 1 capsule by mouth once daily.  3    zolpidem (AMBIEN) 10 mg Tab Take 10 mg by mouth nightly as needed.  3     No current facility-administered medications for this visit.        Review of patient's allergies indicates:   Allergen Reactions    Norvasc [amlodipine]      Severe headache    Doxycycline Rash    Pcn [penicillins] Rash         Review of Systems   Constitution: Negative for chills, fever, weakness, malaise/fatigue and night sweats.   Cardiovascular: Negative for chest pain, leg swelling, orthopnea and palpitations.   Respiratory: Negative for cough, shortness of breath and wheezing.    Skin: Positive for color change and nail changes (incurvated, discolored). Negative for itching, poor wound healing and rash.   Musculoskeletal: Negative for arthritis, gout, joint pain, joint swelling, muscle weakness and myalgias.        Toenail pain left great toe   Gastrointestinal: Negative for abdominal pain, constipation and nausea.   Neurological: Positive for numbness. Negative for disturbances in coordination, dizziness, focal weakness and tremors.           Objective:       Vitals:    07/25/18 1149   Weight: 61.2 kg (135 lb)   Height: 5' 9" (1.753 m)   PainSc: 0-No pain       Physical Exam   Constitutional: She is oriented to person, place, and time. Vital signs are normal. She appears well-developed. She is cooperative. No distress.   Cardiovascular: Intact distal pulses.    Pulses:       Dorsalis pedis pulses are 2+ on the right side, and 2+ on the left side.        Posterior tibial pulses are 2+ on " the right side, and 2+ on the left side.   No edema noted b/l LEs. No varicosities present b/l LEs.    Musculoskeletal:        Right ankle: Normal.        Left ankle: Normal.        Right foot: There is normal range of motion, no tenderness, no bony tenderness, normal capillary refill, no crepitus and no deformity.        Left foot: There is normal range of motion, no tenderness, no bony tenderness, normal capillary refill, no crepitus and no deformity.   Equinus noted b/l ankles with < 10 deg DF noted. MMT 5/5 in DF/PF/Inv/Ev resistance with no reproduction of pain in any direction. Passive range of motion of ankle and pedal joints is painless b/l.    Mil pain with palpation of left great toenail.      Neurological: She is alert and oriented to person, place, and time. She has normal strength. A sensory deficit is present. She exhibits normal muscle tone. Gait normal.   Reflex Scores:       Achilles reflexes are 2+ on the right side and 2+ on the left side.  Negative Tinels sign and Charles's click, bilat. Protective sensation intact b/l foot. Vibratory sensation intact b/l. Subjective numbness to toes 4-5 both feet (occasionally).   Skin: Skin is dry. No bruising, no ecchymosis and no lesion noted. She is not diaphoretic. No erythema. No pallor. Nails show no clubbing.   Left hallux nail bed with no open wounds, there are trophic changes to the nail bed including thickening, discoloration with subungual debris. 20% distal medial lysis with healed underlying nail bed. No open wound. No drainage. Stable and dry. No SOI. Medial and lateral border slightly incurvated. Remaining toenails normotrophic 2-5 b/l. Skin texture normal b/l.            Assessment:       Encounter Diagnoses   Name Primary?    Onycholysis Yes    Ingrown toenail without infection - Left Foot     Pain around toenail, left foot          Plan:       Valencia was seen today for nail problem.    Diagnoses and all orders for this  visit:    Onycholysis    Ingrown toenail without infection - Left Foot    Pain around toenail, left foot      I counseled the patient on her conditions, their implications and medical management.    Greater than 50% of this visit spent on counseling and coordination of care.    No open wounds noted. Discussed treatment options for fungal toenails including topical home remedies, topical OTC and Rx medications as well as oral Rx medications. All pros and cons discussed of each treatment. Patient leaning towards Vicks vaporub daily. She will try this for several months.     Advised patient that the distal loosening of the toenail will likely never reattach given years of loosening and healing of underlying skin. I advised her on keeping nails neatly trimmed, removing all sharp and loose edges. Filing the nail as necessary to prevent damage to nail plate and prevent unnecessary pulling of toenail. Also advised to avoid tight fitting shoes to prevent pressure related issues.     Utilizing sterile toenail clippers I aggressively trimmed (AS COURTESY) the offending nail border/s approximately 3 mm from its/their edge and carried the nail plate down at an angle in order to wedge out the offending cryptotic portion of the nail plate. This was done on the left great toenail only. The offending border was then removed in toto. The area was cleansed with alcohol. Patient tolerated the procedure well and related significant relief. No wound or signs of noted to the area at this time.     Previously discussed phenol matrixectomy of affected borders of affected toenail if routine trimming does not help improve the pain. Discussed alternatives, risks and complications of the procedure along with post procedure protocol and expectations. Patient will consider this if symptoms fail to improve with today's trimming.     Discussed application of Kevin's vaporub on affected toenails for up to 1 year for improvement in appearance and  fungal infection.     Patient is aware that routine trimming of nails or calluses will not be covered service per insurance and  she will fall under Proc B if this service is desired. Patient verbalized understanding of this.  She will RTC as needed for Proc B or any other pedal complaint.     RTC PRN

## 2018-07-25 NOTE — PATIENT INSTRUCTIONS
Recommend lotions: eucerin, eucerin for diabetics, aquaphor, A&D ointment, gold bond for diabetics, sween, [place]'s Bees all purpose baby ointment,  urea 40 with aloe (found on amazon.com)    Shoe recommendations: (try 6pm.com, zappos.com , nordstromrack.[employer]yle [employer], or shoes.[employer] for discounted prices) you can visit [employer]SW shoes in [place]  or [employer] [facility] in the [facility] (there are also several shoe brand outlets in the [facility])    Asics (GT 2000 or gel foundations), new balance stability type shoes, saucony (stabil c3),  Chandler (GTS or Beast or transcend), vionic, propet (tennis shoe)    sofft brand, clarks, crocs, aerosoles, naturalizers, SAS, ecco, born, chris villalobos, rockports (dress shoes)    Vionic, burkenstocks, fitflops, propet (sandals)  Nike comfort thong sandals, crocs, propet (house shoes)    Nail Home remedy:  Vicks Vapor rub to nails for easier managability    Occasional soaks for 15-20 mins in luke warm water with 1 cup of listerine and 1 cup of apple cider vinegar are ok You may add several drops of oil of oregano or tea tree oil as well        Diabetes: Inspecting Your Feet  Diabetes increases your chances of developing foot problems. So inspect your feet every day. This helps you find small skin irritations before they become serious infections. If you have trouble seeing the bottoms of your feet, use a mirror or ask a family member or friend to help.     Pressure spots on the bottom of the foot are common areas where problems develop.   How to check your feet  Below are tips to help you look for foot problems. Try to check your feet at the same time each day, such as when you get out of bed in the morning:  · Check the top of each foot. The tops of toes, back of the heel, and outer edge of the foot can get a lot of rubbing from poor-fitting shoes.  · Check the bottom of each foot. Daily wear and tear often leads to problems at pressure spots.  · Check the toes and nails. Fungal infections often occur  between toes. Toenail problems can also be a sign of fungal infections or lead to breaks in the skin.  · Check your shoes, too. Loose objects inside a shoe can injure the foot. Use your hand to feel inside your shoes for things like ray, loose stitching, or rough areas that could irritate your skin.  Warning signs  Look for any color changes in the foot. Redness with streaks can signal a severe infection, which needs immediate medical attention. Tell your doctor right away if you have any of these problems:  · Swelling, sometimes with color changes, may be a sign of poor blood flow or infection. Symptoms include tenderness and an increase in the size of your foot.  · Warm or hot areas on your feet may be signs of infection. A foot that is cold may not be getting enough blood.  · Sensations such as burning, tingling, or pins and needles can be signs of a problem. Also check for areas that may be numb.  · Hot spots are caused by friction or pressure. Look for hot spots in areas that get a lot of rubbing. Hot spots can turn into blisters, calluses, or sores.  · Cracks and sores are caused by dry or irritated skin. They are a sign that the skin is breaking down, which can lead to infection.  · Toenail problems to watch for include nails growing into the skin (ingrown toenail) and causing redness or pain. Thick, yellow, or discolored nails can signal a fungal infection.  · Drainage and odor can develop from untreated sores and ulcers. Call your doctor right away if you notice white or yellow drainage, bleeding, or unpleasant odor.   © 1379-2343 [employer]. [address] [place] [id number]. All rights reserved. This information is not intended as a substitute for professional medical care. Always follow your healthcare professional's instructions.        Step-by-Step:  Inspecting Your Feet (Diabetes)    Date Last Reviewed: 10/1/2016  © 2890-9770 [employer]. [address] [place] [address]  Road, [place] [address]. All rights reserved. This information is not intended as a substitute for professional medical care. Always follow your healthcare professional's instructions.

## 2018-07-25 NOTE — TELEPHONE ENCOUNTER
Kidney transplant  T2DM on trulicity  Previously insulin  Received steroid injections Friday 7/13 and 7/19    Check bg before meals and at bedtime  continue trulicity  Stop levemir (fasting hypos's 40s)  Continue novolog 8 units before meals  Add sliding scale 180 - 230 + 1 unit  231 - 280 + 2 units   281 - 330 + 3 units  > 331 add 4 units     If symptoms worsen to go to ER

## 2018-07-26 ENCOUNTER — TELEPHONE (OUTPATIENT)
Dept: RHEUMATOLOGY | Facility: CLINIC | Age: 48
End: 2018-07-26

## 2018-07-26 ENCOUNTER — HOSPITAL ENCOUNTER (EMERGENCY)
Facility: HOSPITAL | Age: 48
Discharge: HOME | End: 2018-07-26
Attending: EMERGENCY MEDICINE
Payer: MEDICARE

## 2018-07-26 ENCOUNTER — PATIENT MESSAGE (OUTPATIENT)
Dept: ENDOCRINOLOGY | Facility: CLINIC | Age: 48
End: 2018-07-26

## 2018-07-26 ENCOUNTER — PATIENT MESSAGE (OUTPATIENT)
Dept: NEPHROLOGY | Facility: CLINIC | Age: 48
End: 2018-07-26

## 2018-07-26 VITALS
WEIGHT: 132 LBS | BODY MASS INDEX: 19.55 KG/M2 | HEIGHT: 69 IN | HEART RATE: 85 BPM | TEMPERATURE: 98 F | DIASTOLIC BLOOD PRESSURE: 76 MMHG | OXYGEN SATURATION: 100 % | RESPIRATION RATE: 18 BRPM | SYSTOLIC BLOOD PRESSURE: 151 MMHG

## 2018-07-26 DIAGNOSIS — E86.0 DEHYDRATION: ICD-10-CM

## 2018-07-26 DIAGNOSIS — N17.9 AKI (ACUTE KIDNEY INJURY): ICD-10-CM

## 2018-07-26 DIAGNOSIS — R73.9 HYPERGLYCEMIA: ICD-10-CM

## 2018-07-26 LAB
ALBUMIN SERPL BCP-MCNC: 4.4 G/DL
ALLENS TEST: ABNORMAL
ALP SERPL-CCNC: 94 U/L
ALT SERPL W/O P-5'-P-CCNC: 17 U/L
ANION GAP SERPL CALC-SCNC: 12 MMOL/L
AST SERPL-CCNC: 13 U/L
B-HCG UR QL: NEGATIVE
B-OH-BUTYR BLD STRIP-SCNC: 0.1 MMOL/L
BACTERIA #/AREA URNS HPF: NORMAL /HPF
BASOPHILS # BLD AUTO: 0 K/UL
BASOPHILS NFR BLD: 0 %
BILIRUB SERPL-MCNC: 0.6 MG/DL
BILIRUB UR QL STRIP: NEGATIVE
BUN SERPL-MCNC: 68 MG/DL
CALCIUM SERPL-MCNC: 10.3 MG/DL
CHLORIDE SERPL-SCNC: 99 MMOL/L
CLARITY UR: CLEAR
CO2 SERPL-SCNC: 22 MMOL/L
COLOR UR: YELLOW
CREAT SERPL-MCNC: 2.3 MG/DL
CTP QC/QA: YES
DELSYS: ABNORMAL
DIFFERENTIAL METHOD: ABNORMAL
EOSINOPHIL # BLD AUTO: 0 K/UL
EOSINOPHIL NFR BLD: 0 %
ERYTHROCYTE [DISTWIDTH] IN BLOOD BY AUTOMATED COUNT: 14.7 %
EST. GFR  (AFRICAN AMERICAN): 28 ML/MIN/1.73 M^2
EST. GFR  (NON AFRICAN AMERICAN): 24 ML/MIN/1.73 M^2
GLUCOSE SERPL-MCNC: 437 MG/DL (ref 70–110)
GLUCOSE SERPL-MCNC: 551 MG/DL
GLUCOSE UR QL STRIP: ABNORMAL
HCO3 UR-SCNC: 26.7 MMOL/L (ref 24–28)
HCT VFR BLD AUTO: 35.9 %
HGB BLD-MCNC: 12.9 G/DL
HGB UR QL STRIP: NEGATIVE
KETONES UR QL STRIP: NEGATIVE
LEUKOCYTE ESTERASE UR QL STRIP: NEGATIVE
LYMPHOCYTES # BLD AUTO: 1.3 K/UL
LYMPHOCYTES NFR BLD: 15.2 %
MCH RBC QN AUTO: 26.3 PG
MCHC RBC AUTO-ENTMCNC: 35.9 G/DL
MCV RBC AUTO: 73 FL
MICROSCOPIC COMMENT: NORMAL
MODE: ABNORMAL
MONOCYTES # BLD AUTO: 0.4 K/UL
MONOCYTES NFR BLD: 4.3 %
NEUTROPHILS # BLD AUTO: 7.1 K/UL
NEUTROPHILS NFR BLD: 80.4 %
NITRITE UR QL STRIP: NEGATIVE
PCO2 BLDA: 43.1 MMHG (ref 35–45)
PH SMN: 7.4 [PH] (ref 7.35–7.45)
PH UR STRIP: 5 [PH] (ref 5–8)
PLATELET # BLD AUTO: 237 K/UL
PMV BLD AUTO: 10.7 FL
PO2 BLDA: 50 MMHG (ref 40–60)
POC BE: 2 MMOL/L
POC SATURATED O2: 85 % (ref 95–100)
POC TCO2: 28 MMOL/L (ref 24–29)
POCT GLUCOSE: 313 MG/DL (ref 70–110)
POTASSIUM SERPL-SCNC: 4.5 MMOL/L
PROT SERPL-MCNC: 7.6 G/DL
PROT UR QL STRIP: NEGATIVE
RBC # BLD AUTO: 4.91 M/UL
RBC #/AREA URNS HPF: 1 /HPF (ref 0–4)
SAMPLE: ABNORMAL
SITE: ABNORMAL
SODIUM SERPL-SCNC: 133 MMOL/L
SP GR UR STRIP: 1.01 (ref 1–1.03)
SP02: 96
SQUAMOUS #/AREA URNS HPF: 3 /HPF
URN SPEC COLLECT METH UR: ABNORMAL
UROBILINOGEN UR STRIP-ACNC: NEGATIVE EU/DL
WBC # BLD AUTO: 8.82 K/UL
WBC #/AREA URNS HPF: 1 /HPF (ref 0–5)
YEAST URNS QL MICRO: NORMAL

## 2018-07-26 PROCEDURE — 82803 BLOOD GASES ANY COMBINATION: CPT

## 2018-07-26 PROCEDURE — 85025 COMPLETE CBC W/AUTO DIFF WBC: CPT

## 2018-07-26 PROCEDURE — 96361 HYDRATE IV INFUSION ADD-ON: CPT

## 2018-07-26 PROCEDURE — 80053 COMPREHEN METABOLIC PANEL: CPT

## 2018-07-26 PROCEDURE — 25000003 PHARM REV CODE 250: Performed by: NURSE PRACTITIONER

## 2018-07-26 PROCEDURE — 82962 GLUCOSE BLOOD TEST: CPT | Mod: 91

## 2018-07-26 PROCEDURE — 82010 KETONE BODYS QUAN: CPT

## 2018-07-26 PROCEDURE — 81000 URINALYSIS NONAUTO W/SCOPE: CPT

## 2018-07-26 PROCEDURE — 63600175 PHARM REV CODE 636 W HCPCS: Performed by: EMERGENCY MEDICINE

## 2018-07-26 PROCEDURE — 81025 URINE PREGNANCY TEST: CPT | Performed by: NURSE PRACTITIONER

## 2018-07-26 PROCEDURE — 96374 THER/PROPH/DIAG INJ IV PUSH: CPT

## 2018-07-26 PROCEDURE — 93010 ELECTROCARDIOGRAM REPORT: CPT | Mod: ,,, | Performed by: INTERNAL MEDICINE

## 2018-07-26 PROCEDURE — 99900035 HC TECH TIME PER 15 MIN (STAT)

## 2018-07-26 PROCEDURE — 96376 TX/PRO/DX INJ SAME DRUG ADON: CPT

## 2018-07-26 PROCEDURE — 99284 EMERGENCY DEPT VISIT MOD MDM: CPT | Mod: 25

## 2018-07-26 RX ADMIN — SODIUM CHLORIDE 1000 ML: 0.9 INJECTION, SOLUTION INTRAVENOUS at 02:07

## 2018-07-26 RX ADMIN — INSULIN HUMAN 6 UNITS: 100 INJECTION, SOLUTION PARENTERAL at 03:07

## 2018-07-26 NOTE — TELEPHONE ENCOUNTER
----- Message from Lesvia Schultz MA sent at 7/25/2018  4:01 PM CDT -----  Contact: Self      ----- Message -----  From: Jammie Calixto  Sent: 7/25/2018   3:54 PM  To: Carlita HARRIS Staff    Patient Requesting Sooner Appointment.     Reason for sooner appt.:  Patient needs to be seen for her July f/u appt  When is the first available appointment?  none showing on   Communication Preference: [phone]  Additional Information:

## 2018-07-26 NOTE — TELEPHONE ENCOUNTER
Spoke with patient. Patients states she spoke Dr Gamble on 07/25/18. If symptoms got worse go to Er. Patient is currently at [facility] now

## 2018-07-26 NOTE — ED PROVIDER NOTES
Encounter Date: 7/26/2018    This is a SORT/MSE of a 48 y.o. female presenting to the ED with c/o elevated blood sugars over the last several days. Recently increased her sliding scale insulin.  mg/dL. Patient took 13 units of insulin at 10a. Care will be transferred to an alternate provider when patient is roomed for a full evaluation and final disposition. BEATRIZ Stafford, FNP-C 7/26/2018 12:49 PM       History     Chief Complaint   Patient presents with    Hyperglycemia     pt here for high blood sugar. pt reports BS has been running high (in the 300-400's) since Mon; spoke with her dr and was instructed to come to ED if BS was still elevated today. Had episode of vomiting on Mon. reports no other symptoms.     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 ([address] [place] [address]);  Service: Endoscopy;  Laterality: N/A;  PM prep.    ESOPHAGOGASTRODUODENOSCOPY      LIVER BIOPSY      LIVER TRANSPLANT  12/31/2013    REFRACTIVE SURGERY Bilateral 2010    TUBAL LIGATION  2003     Family History   Problem Relation Age of Onset    Hypertension Mother     Cataracts Mother     Diabetes Father     Alzheimer's disease Father     Diabetes Paternal Grandfather     Diabetes Paternal Grandmother     No Known Problems Maternal Grandmother     No Known Problems Maternal Grandfather     Breast cancer Maternal Aunt 55     Hypertension Brother     Diabetes Brother     No Known Problems Sister     No Known Problems Maternal Uncle     No Known Problems Paternal Aunt     No Known Problems Paternal Uncle     Stroke Neg Hx     Cancer Neg Hx     Colon cancer Neg Hx     Esophageal cancer Neg Hx     Stomach cancer Neg Hx     Rectal cancer Neg Hx     Amblyopia Neg Hx     Blindness Neg Hx     Glaucoma Neg Hx     Macular degeneration Neg Hx     Retinal detachment Neg Hx     Strabismus Neg Hx     Thyroid disease Neg Hx      Social History   Substance Use Topics    Smoking status: Never Smoker    Smokeless tobacco: Never Used      Comment: disability; ; 3 children    Alcohol use 0.6 oz/week     1 Glasses of wine per week     Review of Systems    Physical Exam     Initial Vitals [07/26/18 1249]   BP Pulse Resp Temp SpO2   (!) 144/84 86 18 98.3 °F (36.8 °C) 96 %      MAP       --         Physical Exam    ED Course   Procedures  Labs Reviewed   CBC W/ AUTO DIFFERENTIAL - Abnormal; Notable for the following:        Result Value    Hematocrit 35.9 (*)     MCV 73 (*)     MCH 26.3 (*)     RDW 14.7 (*)     Gran% 80.4 (*)     Lymph% 15.2 (*)     All other components within normal limits   COMPREHENSIVE METABOLIC PANEL - Abnormal; Notable for the following:     Sodium 133 (*)     CO2 22 (*)     Glucose 551 (*)     BUN, Bld 68 (*)     Creatinine 2.3 (*)     eGFR if  28 (*)     eGFR if non  24 (*)     All other components within normal limits    Narrative:      GLUCOSE  critical result(s) called and verbal readback obtained from   PETTY FARLEY RN, 07/26/2018 14:03   URINALYSIS, REFLEX TO URINE CULTURE - Abnormal; Notable for the following:     Glucose, UA 3+ (*)     All other components within normal limits    Narrative:     Preferred Collection Type->Urine, Clean Catch   ISTAT PROCEDURE - Abnormal; Notable for the following:     POC SATURATED O2 85 (*)     All other components within normal limits    BETA - HYDROXYBUTYRATE, SERUM   URINALYSIS MICROSCOPIC    Narrative:     Preferred Collection Type->Urine, Clean Catch   POCT URINE PREGNANCY   POCT GLUCOSE MONITORING CONTINUOUS   POCT GLUCOSE MONITORING CONTINUOUS          Imaging Results    None                            ED Course as of Jul 26 1625   Thu Jul 26, 2018   1413 hyperglycemia Glucose: (!!) 551 [NO]   1414 unremarkable POC PH: 7.400 [NO]   1414 unremarkable Preg Test, Ur: Negative [NO]   1414 unremarkable WBC: 8.82 [NO]   1414 Lower end of normal CO2: (!) 22 [NO]   1414 unremakrakble Beta-Hydroxybutyrate: 0.1 [NO]   1414 UA pending  [NO]   1544 Leukocytes, UA: Negative [NO]   1544 Glucosuria otherwise unremarkable UA Glucose, UA: (!) 3+ [NO]   1609 Elevated.    Old records reviewed 3 weeks ago 1.6. Creatinine: (!) 2.3 [NO]   1624 Error.  RR 18 Resp: (!) 53 [NO]      ED Course User Index  [NO] Manjit Gomez MD     Clinical Impression:   The primary encounter diagnosis was Uncontrolled type 2 diabetes mellitus with complication, with long-term current use of insulin. Diagnoses of Hyperglycemia, Dehydration, and EVERT (acute kidney injury) were also pertinent to this visit.    ___________________________________  ED Provider Note    Chief complaint: elevated bs    HPI: 48F PMHx IDDM, s/p liver transplant on prograf presents with elevated BG in 200-400s.  She states that despite taking her insulin she feels that her insulin has gone up.  Of note she has had 3 steroids injections for arthritis.  She discussed her care with her Endocrinologist at [facility] who told her to be seen by a health care provider.  Yesterday she went to her Pain management doctor to give a urine sample and was too dehydrated to give a sample.  She had one episode of n/v, sob, blurry vision when her blood sugar is very high.  All of these symptoms have since resolved.    Past Medical History:   Diagnosis Date    Anemia     Arthritis     Ascites     Asthma     Cirrhosis  of liver without mention of alcohol 10/18/2013    Diabetes mellitus     Esophageal varices     Hypertension     Kidney stone     Lupus     Osteoporosis 12/2013    SBP (spontaneous bacterial peritonitis)     history of      Past Surgical History:   Procedure Laterality Date    COLONOSCOPY N/A 5/31/2017    Procedure: COLONOSCOPY;  Surgeon: Marky Sun MD;  Location: [address];  Service: Endoscopy;  Laterality: N/A;  PM prep.    ESOPHAGOGASTRODUODENOSCOPY      LIVER BIOPSY      LIVER TRANSPLANT  12/31/2013    REFRACTIVE SURGERY Bilateral 2010    TUBAL LIGATION  2003     Social History     Social History    Marital status:      Spouse name: N/A    Number of children: 3    Years of education: N/A     Occupational History     [employer]     Social History Main Topics    Smoking status: Never Smoker    Smokeless tobacco: Never Used      Comment: disability; ; 3 children    Alcohol use 0.6 oz/week     1 Glasses of wine per week    Drug use: No    Sexual activity: Yes     Partners: Male     Birth control/ protection: None      Comment: s/p tubal ligation,  since 1989     Other Topics Concern    Not on file     Social History Narrative    No narrative on file     Review of patient's allergies indicates:   Allergen Reactions    Norvasc [amlodipine]      Severe headache    Doxycycline Rash    Pcn [penicillins] Rash       ROS  ROS per history of present illness  Constitutional: Negative for activity change, appetite change, diaphoresis and unexpected weight change.   HENT: Negative for dental problem, drooling, ear pain and hearing loss.    Eyes: Negative for pain, discharge, redness and itching.   Musculoskeletal: Negative for arthralgias, gait problem, joint swelling and neck stiffness.   Skin: Negative for color change, pallor, rash and wound.   Allergic/Immunologic: Negative for environmental allergies, food allergies and immunocompromised state.  "  Neurological: Negative for tremors, seizures, facial asymmetry and speech difficulty.   Hematological: Negative for adenopathy. Does not bruise/bleed easily.   Psychiatric/Behavioral: Negative for agitation and dysphoric mood.   All other systems reviewed and are negative.      PHYSICAL EXAM:  Vitals:    07/26/18 1249 07/26/18 1401 07/26/18 1402   BP: (!) 144/84 (!) 166/80 (!) 143/74   Pulse: 86 90 85   Resp: 18  (!) 53   Temp: 98.3 °F (36.8 °C)     TempSrc: Oral     SpO2: 96% 97% 97%   Weight: 59.9 kg (132 lb)     Height: 5' 9" (1.753 m)         Vital signs and nursing assessment noted: elevated blood pressure    GEN:   NAD, A & Ox3, atraumatic, well appearing, nontoxic appearing  HEENT:  PERRLA, EOMI, moist membranes, nl conjunctiva, no scleral icterus, no nystagmus, no nodes/nodules, soft, supple, FROM, no tracheal deviation  CV:   RRR no m/r/g, 2+ radial pulses, <2sec cap refill, no obvious JVD  RESP:  CTA B, no w/r/r, equal and bilateral chest rise, no respiratory distress  ABD:   soft, Nontender, Nondistended, +BS, no guarding/rebound  BACK:  FROM, no midline tenderness, no paraspinal tenderness  :   Deferred  EXT:   FROM, LORENZO x 4, no edema, no calf tenderness, no swelling  LYMPH:  no gross adenopathy  NEURO:  CN II-XII grossly intact, no obvious motor/sensory deficit, no tremor, nl coordination  PSYCH:   no SI/HI, no anxiety, nl mood/affect, no SI/HI, no AH/VH  SKIN:  Warm, dry, intact, no rashes/lesions or masses, nl color, no pallor    Labs Reviewed   CBC W/ AUTO DIFFERENTIAL - Abnormal; Notable for the following:        Result Value    Hematocrit 35.9 (*)     MCV 73 (*)     MCH 26.3 (*)     RDW 14.7 (*)     Gran% 80.4 (*)     Lymph% 15.2 (*)     All other components within normal limits   COMPREHENSIVE METABOLIC PANEL - Abnormal; Notable for the following:     Sodium 133 (*)     CO2 22 (*)     Glucose 551 (*)     BUN, Bld 68 (*)     Creatinine 2.3 (*)     eGFR if  28 (*)     eGFR if non "  24 (*)     All other components within normal limits    Narrative:      GLUCOSE  critical result(s) called and verbal readback obtained from   PETTY FARLEY RN, 2018 14:03   URINALYSIS, REFLEX TO URINE CULTURE - Abnormal; Notable for the following:     Glucose, UA 3+ (*)     All other components within normal limits    Narrative:     Preferred Collection Type->Urine, Clean Catch   ISTAT PROCEDURE - Abnormal; Notable for the following:     POC SATURATED O2 85 (*)     All other components within normal limits   BETA - HYDROXYBUTYRATE, SERUM   URINALYSIS MICROSCOPIC    Narrative:     Preferred Collection Type->Urine, Clean Catch   POCT URINE PREGNANCY   POCT GLUCOSE MONITORING CONTINUOUS   POCT GLUCOSE MONITORING CONTINUOUS     No orders to display       MEDICAL DECISION MAKINF PMHx IDDM presents with elevated blood sugar and n/v.  Exam is benign.  Old records reviewed: Stable labs for liver transplant.  Elevated BG in 400s  Differential diagnosis includes but not exclusive to: dehydration, metabolic disturbances, DKA, UTI    Treatment plan includes physical exam, cardiac monitoring, labs, and supportive care.    EKG reviewed independently by me:  No STEMI  NSR with HR 62  Nl conduction, nl intervals  Evidence of LVH  No ectopy  Nl axis    Labs  reviewed and independently interpreted:   ED Course as of  1625   Thu 2018   1413 hyperglycemia Glucose: (!!) 551 [NO]   1414 unremarkable POC PH: 7.400 [NO]   1414 unremarkable Preg Test, Ur: Negative [NO]   1414 unremarkable WBC: 8.82 [NO]   1414 Lower end of normal CO2: (!) 22 [NO]   1414 unremakrakble Beta-Hydroxybutyrate: 0.1 [NO]   1414 UA pending  [NO]   1544 Leukocytes, UA: Negative [NO]   1544 Glucosuria otherwise unremarkable UA Glucose, UA: (!) 3+ [NO]   1609 Elevated.    Old records reviewed 3 weeks ago 1.6. Creatinine: (!) 2.3 [NO]   1624 Error.  RR 18 Resp: (!) 53 [NO]      ED Course User Index  [NO] Manjit Gomez MD        Patient improved after treatment.  Medications   sodium chloride 0.9% bolus 1,000 mL (0 mLs Intravenous Stopped 7/26/18 1619)   insulin regular injection 6 Units (6 Units Intravenous Given 7/26/18 1513)   insulin regular injection 3 Units (3 Units Intravenous Given 7/26/18 1613)     Patient is tolerating PO and ambulating with steady gait.    Family and patient updated on care.    Patient has made the decision for the patient to leave the emergency department and have repeat blood work to assess for blood sugar and creatinine as an outpatient.  She states that she has a kidney doctor and an Endocrinologist that she can see shortly.  She has been informed and understands the inherent risks, including death, stroke, paralysis.   She has decided to accept the responsibility for this decision. All necessary parties have been advised that he may return for further evaluation or treatment. His condition at time of discharge was stable    Pt agrees with assessment and treatment plan and has no further questions or complaints at this time. Given return precautions and demonstrates understanding.    Patient will  follow up with primary care physician, endo, and nephrology as an outpatient.  Prescription given:    BMP as outpatient    CLINICAL IMPRESSION:  1. Uncontrolled type 2 diabetes mellitus with complication, with long-term current use of insulin    2. Hyperglycemia    3. Dehydration    4. EVERT (acute kidney injury)        DISPOSITION: Discharged to home under stable conditions          Manjit Gomez MD  07/26/18 4279

## 2018-07-26 NOTE — ED NOTES
Recheck of glucose was 331  Dr. Gomez in room talking with the patient about her plan of care and need to go get something to eat when she leaves from the ED.

## 2018-07-27 ENCOUNTER — PATIENT MESSAGE (OUTPATIENT)
Dept: ENDOCRINOLOGY | Facility: CLINIC | Age: 48
End: 2018-07-27

## 2018-07-27 ENCOUNTER — NURSE TRIAGE (OUTPATIENT)
Dept: ADMINISTRATIVE | Facility: CLINIC | Age: 48
End: 2018-07-27

## 2018-07-27 ENCOUNTER — TELEPHONE (OUTPATIENT)
Dept: NEPHROLOGY | Facility: CLINIC | Age: 48
End: 2018-07-27

## 2018-07-27 ENCOUNTER — HOSPITAL ENCOUNTER (OUTPATIENT)
Facility: HOSPITAL | Age: 48
Discharge: HOME | End: 2018-07-28
Attending: EMERGENCY MEDICINE | Admitting: INTERNAL MEDICINE
Payer: MEDICARE

## 2018-07-27 ENCOUNTER — TELEPHONE (OUTPATIENT)
Dept: RHEUMATOLOGY | Facility: CLINIC | Age: 48
End: 2018-07-27

## 2018-07-27 ENCOUNTER — PATIENT MESSAGE (OUTPATIENT)
Dept: RHEUMATOLOGY | Facility: CLINIC | Age: 48
End: 2018-07-27

## 2018-07-27 DIAGNOSIS — D50.9 MICROCYTIC NORMOCHROMIC ANEMIA: ICD-10-CM

## 2018-07-27 DIAGNOSIS — T38.0X5S ADVERSE EFFECT OF GLUCOCORTICOID OR SYNTHETIC ANALOGUE, SEQUELA: ICD-10-CM

## 2018-07-27 DIAGNOSIS — E11.22 TYPE 2 DIABETES MELLITUS WITH STAGE 3 CHRONIC KIDNEY DISEASE, WITH LONG-TERM CURRENT USE OF INSULIN: Primary | ICD-10-CM

## 2018-07-27 DIAGNOSIS — Z79.4 TYPE 2 DIABETES MELLITUS WITH STAGE 3 CHRONIC KIDNEY DISEASE, WITH LONG-TERM CURRENT USE OF INSULIN: Primary | ICD-10-CM

## 2018-07-27 DIAGNOSIS — R07.9 ACUTE CHEST PAIN: ICD-10-CM

## 2018-07-27 DIAGNOSIS — M32.9 SYSTEMIC LUPUS ERYTHEMATOSUS, UNSPECIFIED SLE TYPE, UNSPECIFIED ORGAN INVOLVEMENT STATUS: Chronic | ICD-10-CM

## 2018-07-27 DIAGNOSIS — E11.65 TYPE 2 DIABETES MELLITUS WITH HYPERGLYCEMIA, WITHOUT LONG-TERM CURRENT USE OF INSULIN: Primary | ICD-10-CM

## 2018-07-27 DIAGNOSIS — N18.30 TYPE 2 DIABETES MELLITUS WITH STAGE 3 CHRONIC KIDNEY DISEASE, WITH LONG-TERM CURRENT USE OF INSULIN: Primary | ICD-10-CM

## 2018-07-27 DIAGNOSIS — R79.89 ELEVATED TROPONIN I LEVEL: ICD-10-CM

## 2018-07-27 DIAGNOSIS — N18.31 CKD STAGE G3A/A1, GFR 45-59 AND ALBUMIN CREATININE RATIO <30 MG/G: ICD-10-CM

## 2018-07-27 DIAGNOSIS — N18.30 CKD (CHRONIC KIDNEY DISEASE), STAGE III: ICD-10-CM

## 2018-07-27 LAB
ALBUMIN SERPL BCP-MCNC: 3.8 G/DL
ALLENS TEST: NORMAL
ALP SERPL-CCNC: 77 U/L
ALT SERPL W/O P-5'-P-CCNC: 15 U/L
ANION GAP SERPL CALC-SCNC: 11 MMOL/L
AST SERPL-CCNC: 10 U/L
BACTERIA #/AREA URNS HPF: NORMAL /HPF
BASOPHILS # BLD AUTO: 0 K/UL
BASOPHILS NFR BLD: 0 %
BILIRUB SERPL-MCNC: 0.4 MG/DL
BILIRUB UR QL STRIP: NEGATIVE
BUN SERPL-MCNC: 68 MG/DL
CALCIUM SERPL-MCNC: 9.5 MG/DL
CHLORIDE SERPL-SCNC: 101 MMOL/L
CLARITY UR: CLEAR
CO2 SERPL-SCNC: 22 MMOL/L
COLOR UR: COLORLESS
CREAT SERPL-MCNC: 1.9 MG/DL
DELSYS: NORMAL
DIFFERENTIAL METHOD: ABNORMAL
EOSINOPHIL # BLD AUTO: 0 K/UL
EOSINOPHIL NFR BLD: 0.2 %
ERYTHROCYTE [DISTWIDTH] IN BLOOD BY AUTOMATED COUNT: 14.5 %
EST. GFR  (AFRICAN AMERICAN): 35 ML/MIN/1.73 M^2
EST. GFR  (NON AFRICAN AMERICAN): 31 ML/MIN/1.73 M^2
GLUCOSE SERPL-MCNC: 543 MG/DL
GLUCOSE UR QL STRIP: ABNORMAL
HCO3 UR-SCNC: 25.6 MMOL/L (ref 24–28)
HCT VFR BLD AUTO: 30.9 %
HGB BLD-MCNC: 11 G/DL
HGB UR QL STRIP: NEGATIVE
KETONES UR QL STRIP: NEGATIVE
LEUKOCYTE ESTERASE UR QL STRIP: NEGATIVE
LYMPHOCYTES # BLD AUTO: 1.1 K/UL
LYMPHOCYTES NFR BLD: 18.5 %
MAGNESIUM SERPL-MCNC: 2.4 MG/DL
MCH RBC QN AUTO: 26.7 PG
MCHC RBC AUTO-ENTMCNC: 35.6 G/DL
MCV RBC AUTO: 75 FL
MICROSCOPIC COMMENT: NORMAL
MONOCYTES # BLD AUTO: 0.4 K/UL
MONOCYTES NFR BLD: 7 %
NEUTROPHILS # BLD AUTO: 4.3 K/UL
NEUTROPHILS NFR BLD: 74.3 %
NITRITE UR QL STRIP: NEGATIVE
PCO2 BLDA: 38.6 MMHG (ref 35–45)
PH SMN: 7.43 [PH] (ref 7.35–7.45)
PH UR STRIP: 5 [PH] (ref 5–8)
PLATELET # BLD AUTO: 164 K/UL
PMV BLD AUTO: 10.7 FL
PO2 BLDA: 90 MMHG (ref 80–100)
POC BE: 1 MMOL/L
POC SATURATED O2: 97 % (ref 95–100)
POC TCO2: 27 MMOL/L (ref 23–27)
POCT GLUCOSE: 245 MG/DL (ref 70–110)
POTASSIUM SERPL-SCNC: 4.5 MMOL/L
PROT SERPL-MCNC: 6.5 G/DL
PROT UR QL STRIP: NEGATIVE
RBC # BLD AUTO: 4.12 M/UL
RBC #/AREA URNS HPF: 1 /HPF (ref 0–4)
SAMPLE: NORMAL
SITE: NORMAL
SODIUM SERPL-SCNC: 134 MMOL/L
SP GR UR STRIP: 1.02 (ref 1–1.03)
SQUAMOUS #/AREA URNS HPF: 1 /HPF
TROPONIN I SERPL DL<=0.01 NG/ML-MCNC: 0.05 NG/ML
URN SPEC COLLECT METH UR: ABNORMAL
UROBILINOGEN UR STRIP-ACNC: NEGATIVE EU/DL
WBC # BLD AUTO: 5.73 K/UL
YEAST URNS QL MICRO: NORMAL

## 2018-07-27 PROCEDURE — 99900035 HC TECH TIME PER 15 MIN (STAT)

## 2018-07-27 PROCEDURE — 81000 URINALYSIS NONAUTO W/SCOPE: CPT | Mod: 91

## 2018-07-27 PROCEDURE — 96374 THER/PROPH/DIAG INJ IV PUSH: CPT

## 2018-07-27 PROCEDURE — 82962 GLUCOSE BLOOD TEST: CPT

## 2018-07-27 PROCEDURE — 25000003 PHARM REV CODE 250: Performed by: EMERGENCY MEDICINE

## 2018-07-27 PROCEDURE — 93005 ELECTROCARDIOGRAM TRACING: CPT

## 2018-07-27 PROCEDURE — 63600175 PHARM REV CODE 636 W HCPCS: Performed by: EMERGENCY MEDICINE

## 2018-07-27 PROCEDURE — 36600 WITHDRAWAL OF ARTERIAL BLOOD: CPT

## 2018-07-27 PROCEDURE — 83735 ASSAY OF MAGNESIUM: CPT

## 2018-07-27 PROCEDURE — 82803 BLOOD GASES ANY COMBINATION: CPT

## 2018-07-27 PROCEDURE — 96372 THER/PROPH/DIAG INJ SC/IM: CPT

## 2018-07-27 PROCEDURE — 99285 EMERGENCY DEPT VISIT HI MDM: CPT | Mod: 25

## 2018-07-27 PROCEDURE — 85025 COMPLETE CBC W/AUTO DIFF WBC: CPT

## 2018-07-27 PROCEDURE — 84484 ASSAY OF TROPONIN QUANT: CPT

## 2018-07-27 PROCEDURE — 96361 HYDRATE IV INFUSION ADD-ON: CPT

## 2018-07-27 PROCEDURE — 93010 ELECTROCARDIOGRAM REPORT: CPT | Mod: ,,, | Performed by: INTERNAL MEDICINE

## 2018-07-27 PROCEDURE — G0378 HOSPITAL OBSERVATION PER HR: HCPCS

## 2018-07-27 PROCEDURE — 80053 COMPREHEN METABOLIC PANEL: CPT

## 2018-07-27 RX ORDER — INSULIN PUMP SYRINGE, 3 ML
EACH MISCELLANEOUS
Qty: 1 EACH | Refills: 0 | Status: SHIPPED | OUTPATIENT
Start: 2018-07-27 | End: 2018-07-27 | Stop reason: SDUPTHER

## 2018-07-27 RX ORDER — BUTALBITAL, ACETAMINOPHEN AND CAFFEINE 50; 325; 40 MG/1; MG/1; MG/1
2 TABLET ORAL ONCE
Status: COMPLETED | OUTPATIENT
Start: 2018-07-27 | End: 2018-07-27

## 2018-07-27 RX ORDER — INSULIN PUMP SYRINGE, 3 ML
EACH MISCELLANEOUS
Qty: 1 EACH | Refills: 0 | Status: SHIPPED | OUTPATIENT
Start: 2018-07-27 | End: 2021-01-28 | Stop reason: CLARIF

## 2018-07-27 RX ADMIN — BUTALBITAL, ACETAMINOPHEN AND CAFFEINE 2 TABLET: 50; 325; 40 TABLET ORAL at 07:07

## 2018-07-27 RX ADMIN — SODIUM CHLORIDE 2000 ML: 0.9 INJECTION, SOLUTION INTRAVENOUS at 07:07

## 2018-07-27 RX ADMIN — INSULIN HUMAN 13 UNITS: 100 INJECTION, SOLUTION PARENTERAL at 08:07

## 2018-07-27 NOTE — TELEPHONE ENCOUNTER
Called patient. Still having high glucoses after receiving two steroid injections. Will restart Levemir 8 units Daily. Increase Novolog to 12 units AC plus low dose correction scale. Advised her to call if glucoses not coming down. If worsening of symptoms go to ED.

## 2018-07-27 NOTE — ED TRIAGE NOTES
Pt presents to ED c/o high blood sugar today.  States she is taking insulin as prescribed, but noticed it was running higher than usual today.  Denies any SOB, HA, chest pains

## 2018-07-27 NOTE — TELEPHONE ENCOUNTER
Diabetes Blood Sugar Phone Call Screening  Glucose Level and time taken: 383    Blood sugar range at home over past few weeks:been running high since Monday     Having any low blood sugar readings at home: No only high    Time of last meal, snack or non-diet drink (juice, soft drinks, sweet tea): baked chicken yesterday and salad hours later after insulin      Time of last diabetes medication administration: No      Current diabetes medications and doses: Novolog she been taken different ways since she take to dr ariza yesterday     Have you missed any medication doses within past 48 hours? No     Any new symptoms such as abdominal pain, nausea, weakness, frequent urination, increased thirst, or blurry vision?  Shortness of breath when her blood sugar are high     Any new recent medications this week such as: steroids (injections or pills) or antibiotics?  Bone injected two last week on Monday

## 2018-07-27 NOTE — ED PROVIDER NOTES
Encounter Date: 7/27/2018  SORT: This is a 48 y.o. female with DM who presents for emergent consideration of hyperglycemia. Patient was seen in this ED yesterday for similar complaints. Patient spoke with endocrinologist and told to take Levimir 5 units and Novolog 8 units in addition to regular dose with no improvement; patient reports glucose 600 at home. Initial exam: NAD, no abdominal TTP. Patient will be moved to a room when one is available. Orders placed.  LUCA Barrios, PILO     SCRIBE #1 NOTE: I, Emir Ness, am scribing for, and in the presence of,  Peter Armendariz MD. I have scribed the following portions of the note - Other sections scribed: HPI, ROS.       History     Chief Complaint   Patient presents with    Hyperglycemia     >600 at home. Pt states she takes her insulin as prescribed     CC: Hyperglycemia    HPI: This is a 48 y.o. F who has Lupus, DM, HTN, Anemia, Asthma, and Osteoporosis who presents to the ED for emergent evaluation of hyperglycemia that she noticed 1 hour PTA. Pt's blood glucose level was 383 at breakfast, which she took 8 units of Novolog and 5 units of Levemir at that time. Pt's blood glucose level was 517 during lunch, which she took the same medication regimen during lunch. During dinner, the pt's blood glucose level was 600, which prompted her visit to the ED. She has not taken her evening dosage of Novolog and Levemir. She had a previous episode of hyperglycemia yesterday, which she was evaluated and treated at this ED. Pt has an additional complaint of acute frontal headache, mid-sternal CP, and dizziness with bending that began once she noticed the elevated blood glucose level. She describes the dizziness as feeling like she is going to pass out. Pt also states that she has been intermittently short of breath since Monday. Pt denies fever, chills, ear pain, sore throat, eye pain, cough, abdominal pain, nausea, vomiting, diarrhea, arm or leg problem, back pain,  dysuria, or syncope.      The history is provided by the patient. No  was used.     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    ESOPHAGOGASTRODUODENOSCOPY      LIVER BIOPSY      LIVER TRANSPLANT  12/31/2013    REFRACTIVE SURGERY Bilateral 2010    TUBAL LIGATION  2003     Family History   Problem Relation Age of Onset    Hypertension Mother     Cataracts Mother     Diabetes Father     Alzheimer's disease Father     Diabetes Paternal Grandfather     Diabetes Paternal Grandmother     No Known Problems Maternal Grandmother     No Known Problems Maternal Grandfather     Breast cancer Maternal Aunt 55    Hypertension Brother     Diabetes Brother     No Known Problems Sister     No Known Problems Maternal Uncle     No Known Problems Paternal Aunt     No Known Problems Paternal Uncle     Stroke Neg Hx     Cancer Neg Hx     Colon cancer Neg Hx     Esophageal cancer Neg Hx     Stomach cancer Neg Hx     Rectal cancer Neg Hx     Amblyopia Neg Hx     Blindness Neg Hx     Glaucoma Neg Hx     Macular degeneration Neg Hx     Retinal detachment Neg Hx     Strabismus Neg Hx     Thyroid disease Neg Hx      Social History   Substance Use Topics    Smoking status: Never Smoker    Smokeless tobacco: Never Used       Comment: disability; ; 3 children    Alcohol use 0.6 oz/week     1 Glasses of wine per week     Review of Systems   Constitutional: Negative for chills and fever.   HENT: Negative for ear pain and sore throat.    Eyes: Negative for pain.   Respiratory: Positive for shortness of breath. Negative for cough.    Cardiovascular: Positive for chest pain.   Gastrointestinal: Negative for abdominal pain, diarrhea, nausea and vomiting.   Genitourinary: Negative for dysuria.   Musculoskeletal: Negative for back pain.        (-) arm or leg problems   Skin: Negative for rash.   Neurological: Positive for dizziness and headaches. Negative for syncope.       Physical Exam     Initial Vitals [07/27/18 1811]   BP Pulse Resp Temp SpO2   (!) 173/84 81 18 98.5 °F (36.9 °C) 97 %      MAP       --         Physical Exam  The patient was examined specifically for the following:   General:No significant distress, Good color, Warm and dry. Head and neck:Scalp atraumatic, Neck supple. Neurological:Appropriate conversation, Gross motor deficits. Eyes:Conjugate gaze, Clear corneas. ENT: No epistaxis. Cardiac: Regular rate and rhythm, Grossly normal heart tones. Pulmonary: Wheezing, Rales. Gastrointestinal: Abdominal tenderness, Abdominal distention. Musculoskeletal: Extremity deformity, Apparent pain with range of motion of the joints. Skin: Rash.   The findings on examination were normal.  Lungs are clear.  The heart tones are normal. The abdomen is soft.  Patient's blood pressure is 174/80.  ED Course   Procedures  Labs Reviewed   COMPREHENSIVE METABOLIC PANEL - Abnormal; Notable for the following:        Result Value    Sodium 134 (*)     CO2 22 (*)     Glucose 543 (*)     BUN, Bld 68 (*)     Creatinine 1.9 (*)     eGFR if  35 (*)     eGFR if non  31 (*)     All other components within normal limits    Narrative:     Glucose critical result(s) called and verbal readback obtained   from:Ana Lindsay  07/27/2018 20:42   CBC W/ AUTO DIFFERENTIAL - Abnormal; Notable for the following:     Hemoglobin 11.0 (*)     Hematocrit 30.9 (*)     MCV 75 (*)     MCH 26.7 (*)     Gran% 74.3 (*)     All other components within normal limits   URINALYSIS, REFLEX TO URINE CULTURE - Abnormal; Notable for the following:     Color, UA Colorless (*)     Glucose, UA 3+ (*)     All other components within normal limits    Narrative:     Preferred Collection Type->Urine, Clean Catch   TROPONIN I - Abnormal; Notable for the following:     Troponin I 0.048 (*)     All other components within normal limits   POCT GLUCOSE - Abnormal; Notable for the following:     POCT Glucose 245 (*)     All other components within normal limits   MAGNESIUM   URINALYSIS MICROSCOPIC    Narrative:     Preferred Collection Type->Urine, Clean Catch   ISTAT PROCEDURE   POCT GLUCOSE MONITORING CONTINUOUS     EKG Readings: (Independently Interpreted)   This patient is in a normal sinus rhythm with left ventricular hypertrophy pattern.  There are nonspecific ST segment and T-wave changes.  There is no definite evidence of acute myocardial infarction or malignant arrhythmia.       Imaging Results          X-Ray Chest AP Portable (Final result)  Result time 07/27/18 19:40:15    Final result by Cesar Hay MD (07/27/18 19:40:15)                 Impression:      No acute process.  No interval worsening.      Electronically signed by: Cesar Hay MD  Date:    07/27/2018  Time:    19:40             Narrative:    EXAMINATION:  XR CHEST AP PORTABLE    CLINICAL HISTORY:  chest pain;    TECHNIQUE:  Single frontal view of the chest was performed.    COMPARISON:  06/15/2017    FINDINGS:  Trachea is patent.  Cardiac silhouette is normal in size.  Lung volumes are symmetric and appear clear.  No effusion, pneumothorax or free air below the diaphragm.  No acute osseous abnormality.                                  Medical decision making:  Given the above this patient  presents to the emergency room with hyperglycemia.  She was here yesterday for the same thing.  Her blood sugar today was over 500.  She was recently treated with steroids in the knees.  I believe this is the cause of her hyperglycemia.  The patient also has chest pain and shortness of breath. Her chest x-ray and blood gases are normal. I doubt primary pulmonary disease.  The patient complains of joint dull chest tightness.  She has an elevated troponin she does have a history of renal insufficiency.  She has had normal troponins in the past recently.  I will admit her for repeat cardiac markers and further evaluation by a hospital medicine.  Patient's EKG reveals minimal nonspecific ST segment and T-wave changes.  I doubt myocardial infarction.             Scribe Attestation:   Scribe #1: I performed the above scribed service and the documentation accurately describes the services I performed. I attest to the accuracy of the note.    Attending Attestation:           Physician Attestation for Scribe:  Physician Attestation Statement for Scribe #1: I, Peter Armendariz MD, reviewed documentation, as scribed by Emir Ness in my presence, and it is both accurate and complete.                    Clinical Impression:   The primary encounter diagnosis was Acute chest pain. Diagnoses of Uncontrolled type 2 diabetes mellitus without complication, with long-term current use of insulin and Elevated troponin I level were also pertinent to this visit.                             Peter Armendariz MD  07/27/18 8263

## 2018-07-28 VITALS
SYSTOLIC BLOOD PRESSURE: 159 MMHG | HEART RATE: 63 BPM | WEIGHT: 131.63 LBS | RESPIRATION RATE: 18 BRPM | OXYGEN SATURATION: 99 % | DIASTOLIC BLOOD PRESSURE: 76 MMHG | HEIGHT: 69 IN | TEMPERATURE: 98 F | BODY MASS INDEX: 19.5 KG/M2

## 2018-07-28 PROBLEM — N18.30 CKD (CHRONIC KIDNEY DISEASE), STAGE III: Status: ACTIVE | Noted: 2018-07-28

## 2018-07-28 LAB
ANION GAP SERPL CALC-SCNC: 12 MMOL/L
BASOPHILS # BLD AUTO: 0 K/UL
BASOPHILS NFR BLD: 0 %
BUN SERPL-MCNC: 48 MG/DL
CALCIUM SERPL-MCNC: 8.8 MG/DL
CHLORIDE SERPL-SCNC: 105 MMOL/L
CHOLEST SERPL-MCNC: 184 MG/DL
CHOLEST/HDLC SERPL: 2.1 {RATIO}
CO2 SERPL-SCNC: 20 MMOL/L
CREAT SERPL-MCNC: 1.5 MG/DL
DIFFERENTIAL METHOD: ABNORMAL
EOSINOPHIL # BLD AUTO: 0 K/UL
EOSINOPHIL NFR BLD: 0.2 %
ERYTHROCYTE [DISTWIDTH] IN BLOOD BY AUTOMATED COUNT: 14.6 %
EST. GFR  (AFRICAN AMERICAN): 47 ML/MIN/1.73 M^2
EST. GFR  (NON AFRICAN AMERICAN): 41 ML/MIN/1.73 M^2
ESTIMATED AVG GLUCOSE: 263 MG/DL
GLUCOSE SERPL-MCNC: 330 MG/DL
HBA1C MFR BLD HPLC: 10.8 %
HCT VFR BLD AUTO: 29.6 %
HDLC SERPL-MCNC: 88 MG/DL
HDLC SERPL: 47.8 %
HGB BLD-MCNC: 10.4 G/DL
LDLC SERPL CALC-MCNC: 82 MG/DL
LYMPHOCYTES # BLD AUTO: 1.6 K/UL
LYMPHOCYTES NFR BLD: 24.5 %
MCH RBC QN AUTO: 26.7 PG
MCHC RBC AUTO-ENTMCNC: 35.1 G/DL
MCV RBC AUTO: 76 FL
MONOCYTES # BLD AUTO: 0.4 K/UL
MONOCYTES NFR BLD: 5.8 %
NEUTROPHILS # BLD AUTO: 4.6 K/UL
NEUTROPHILS NFR BLD: 69.5 %
NONHDLC SERPL-MCNC: 96 MG/DL
PLATELET # BLD AUTO: 173 K/UL
PMV BLD AUTO: 10.5 FL
POCT GLUCOSE: 301 MG/DL (ref 70–110)
POCT GLUCOSE: 310 MG/DL (ref 70–110)
POCT GLUCOSE: 342 MG/DL (ref 70–110)
POCT GLUCOSE: 369 MG/DL (ref 70–110)
POCT GLUCOSE: 378 MG/DL (ref 70–110)
POTASSIUM SERPL-SCNC: 4.1 MMOL/L
RBC # BLD AUTO: 3.9 M/UL
SODIUM SERPL-SCNC: 137 MMOL/L
TRIGL SERPL-MCNC: 70 MG/DL
TROPONIN I SERPL DL<=0.01 NG/ML-MCNC: 0.04 NG/ML
TROPONIN I SERPL DL<=0.01 NG/ML-MCNC: 0.05 NG/ML
TSH SERPL DL<=0.005 MIU/L-ACNC: 0.79 UIU/ML
WBC # BLD AUTO: 6.57 K/UL

## 2018-07-28 PROCEDURE — 85025 COMPLETE CBC W/AUTO DIFF WBC: CPT

## 2018-07-28 PROCEDURE — 83036 HEMOGLOBIN GLYCOSYLATED A1C: CPT

## 2018-07-28 PROCEDURE — 80061 LIPID PANEL: CPT

## 2018-07-28 PROCEDURE — 25000003 PHARM REV CODE 250: Performed by: INTERNAL MEDICINE

## 2018-07-28 PROCEDURE — 63600175 PHARM REV CODE 636 W HCPCS: Performed by: NURSE PRACTITIONER

## 2018-07-28 PROCEDURE — 25000003 PHARM REV CODE 250: Performed by: NURSE PRACTITIONER

## 2018-07-28 PROCEDURE — 96372 THER/PROPH/DIAG INJ SC/IM: CPT

## 2018-07-28 PROCEDURE — 25000003 PHARM REV CODE 250: Performed by: EMERGENCY MEDICINE

## 2018-07-28 PROCEDURE — G0378 HOSPITAL OBSERVATION PER HR: HCPCS

## 2018-07-28 PROCEDURE — 36415 COLL VENOUS BLD VENIPUNCTURE: CPT

## 2018-07-28 PROCEDURE — 63600175 PHARM REV CODE 636 W HCPCS: Performed by: EMERGENCY MEDICINE

## 2018-07-28 PROCEDURE — 84443 ASSAY THYROID STIM HORMONE: CPT

## 2018-07-28 PROCEDURE — 84484 ASSAY OF TROPONIN QUANT: CPT | Mod: 91

## 2018-07-28 PROCEDURE — 80048 BASIC METABOLIC PNL TOTAL CA: CPT

## 2018-07-28 RX ORDER — CALCIUM CITRATE/VITAMIN D3 200MG-6.25
TABLET ORAL
Qty: 200 EACH | Refills: 3 | OUTPATIENT
Start: 2018-07-28

## 2018-07-28 RX ORDER — MYCOPHENOLATE MOFETIL 250 MG/1
540 CAPSULE ORAL 2 TIMES DAILY
Status: DISCONTINUED | OUTPATIENT
Start: 2018-07-28 | End: 2018-07-28 | Stop reason: HOSPADM

## 2018-07-28 RX ORDER — HYDRALAZINE HYDROCHLORIDE 50 MG/1
50 TABLET, FILM COATED ORAL EVERY 8 HOURS
Qty: 12 TABLET | Refills: 0 | Status: SHIPPED | OUTPATIENT
Start: 2018-07-28 | End: 2020-07-17

## 2018-07-28 RX ORDER — CLONIDINE HYDROCHLORIDE 0.1 MG/1
0.1 TABLET ORAL 3 TIMES DAILY PRN
Status: DISCONTINUED | OUTPATIENT
Start: 2018-07-28 | End: 2018-07-28 | Stop reason: HOSPADM

## 2018-07-28 RX ORDER — GLUCAGON 1 MG
1 KIT INJECTION
Status: DISCONTINUED | OUTPATIENT
Start: 2018-07-28 | End: 2018-07-28 | Stop reason: HOSPADM

## 2018-07-28 RX ORDER — ENOXAPARIN SODIUM 100 MG/ML
40 INJECTION SUBCUTANEOUS EVERY 24 HOURS
Status: DISCONTINUED | OUTPATIENT
Start: 2018-07-28 | End: 2018-07-28 | Stop reason: HOSPADM

## 2018-07-28 RX ORDER — FAMOTIDINE 20 MG/1
20 TABLET, FILM COATED ORAL 2 TIMES DAILY
Status: DISCONTINUED | OUTPATIENT
Start: 2018-07-28 | End: 2018-07-28

## 2018-07-28 RX ORDER — INSULIN ASPART 100 [IU]/ML
20 INJECTION, SOLUTION INTRAVENOUS; SUBCUTANEOUS
Status: DISCONTINUED | OUTPATIENT
Start: 2018-07-28 | End: 2018-07-28

## 2018-07-28 RX ORDER — POLYETHYLENE GLYCOL 3350 17 G/17G
17 POWDER, FOR SOLUTION ORAL DAILY
Status: DISCONTINUED | OUTPATIENT
Start: 2018-07-28 | End: 2018-07-28 | Stop reason: HOSPADM

## 2018-07-28 RX ORDER — ASPIRIN 325 MG
325 TABLET ORAL DAILY
Status: DISCONTINUED | OUTPATIENT
Start: 2018-07-28 | End: 2018-07-28 | Stop reason: HOSPADM

## 2018-07-28 RX ORDER — VERAPAMIL HYDROCHLORIDE 120 MG/1
120 TABLET, FILM COATED, EXTENDED RELEASE ORAL DAILY
Status: DISCONTINUED | OUTPATIENT
Start: 2018-07-28 | End: 2018-07-28 | Stop reason: HOSPADM

## 2018-07-28 RX ORDER — LOSARTAN POTASSIUM 100 MG/1
100 TABLET ORAL DAILY
Qty: 30 TABLET | Refills: 2 | Status: SHIPPED | OUTPATIENT
Start: 2018-07-31

## 2018-07-28 RX ORDER — TALC
500 POWDER (GRAM) TOPICAL 2 TIMES DAILY
Status: DISCONTINUED | OUTPATIENT
Start: 2018-07-28 | End: 2018-07-28 | Stop reason: HOSPADM

## 2018-07-28 RX ORDER — IBUPROFEN 200 MG
16 TABLET ORAL
Status: DISCONTINUED | OUTPATIENT
Start: 2018-07-28 | End: 2018-07-28 | Stop reason: HOSPADM

## 2018-07-28 RX ORDER — LOSARTAN POTASSIUM 25 MG/1
100 TABLET ORAL DAILY
Status: DISCONTINUED | OUTPATIENT
Start: 2018-07-28 | End: 2018-07-28

## 2018-07-28 RX ORDER — LANCETS
1 EACH MISCELLANEOUS 4 TIMES DAILY
Qty: 100 EACH | Refills: 3 | Status: SHIPPED | OUTPATIENT
Start: 2018-07-28 | End: 2019-06-10

## 2018-07-28 RX ORDER — BIOTIN 1 MG
TABLET ORAL
Qty: 1 EACH | Refills: 1 | OUTPATIENT
Start: 2018-07-28

## 2018-07-28 RX ORDER — TACROLIMUS 1 MG/1
3 CAPSULE ORAL 2 TIMES DAILY
Status: DISCONTINUED | OUTPATIENT
Start: 2018-07-28 | End: 2018-07-28 | Stop reason: HOSPADM

## 2018-07-28 RX ORDER — INSULIN ASPART 100 [IU]/ML
1-10 INJECTION, SOLUTION INTRAVENOUS; SUBCUTANEOUS
Status: DISCONTINUED | OUTPATIENT
Start: 2018-07-28 | End: 2018-07-28 | Stop reason: HOSPADM

## 2018-07-28 RX ORDER — INSULIN ASPART 100 [IU]/ML
INJECTION, SOLUTION INTRAVENOUS; SUBCUTANEOUS
Qty: 3 ML | Refills: 6 | Status: SHIPPED | OUTPATIENT
Start: 2018-07-28 | End: 2019-04-08 | Stop reason: SDUPTHER

## 2018-07-28 RX ORDER — HYDRALAZINE HYDROCHLORIDE 25 MG/1
50 TABLET, FILM COATED ORAL EVERY 8 HOURS
Status: DISCONTINUED | OUTPATIENT
Start: 2018-07-28 | End: 2018-07-28 | Stop reason: HOSPADM

## 2018-07-28 RX ORDER — PEN NEEDLE, DIABETIC 30 GX3/16"
NEEDLE, DISPOSABLE MISCELLANEOUS
Qty: 100 EACH | Refills: 12 | Status: SHIPPED | OUTPATIENT
Start: 2018-07-28 | End: 2019-06-10

## 2018-07-28 RX ORDER — DIAZEPAM 5 MG/1
5 TABLET ORAL EVERY 8 HOURS PRN
Status: DISCONTINUED | OUTPATIENT
Start: 2018-07-28 | End: 2018-07-28 | Stop reason: HOSPADM

## 2018-07-28 RX ORDER — INSULIN ASPART 100 [IU]/ML
20 INJECTION, SOLUTION INTRAVENOUS; SUBCUTANEOUS
Status: DISCONTINUED | OUTPATIENT
Start: 2018-07-28 | End: 2018-07-28 | Stop reason: HOSPADM

## 2018-07-28 RX ORDER — ROSUVASTATIN CALCIUM 5 MG/1
5 TABLET, COATED ORAL DAILY
Status: DISCONTINUED | OUTPATIENT
Start: 2018-07-28 | End: 2018-07-28 | Stop reason: HOSPADM

## 2018-07-28 RX ORDER — ZOLPIDEM TARTRATE 5 MG/1
10 TABLET ORAL NIGHTLY PRN
Status: DISCONTINUED | OUTPATIENT
Start: 2018-07-28 | End: 2018-07-28 | Stop reason: HOSPADM

## 2018-07-28 RX ORDER — HYDROXYZINE HYDROCHLORIDE 25 MG/1
25 TABLET, FILM COATED ORAL 4 TIMES DAILY PRN
Status: DISCONTINUED | OUTPATIENT
Start: 2018-07-28 | End: 2018-07-28 | Stop reason: HOSPADM

## 2018-07-28 RX ORDER — ONDANSETRON 2 MG/ML
4 INJECTION INTRAMUSCULAR; INTRAVENOUS EVERY 8 HOURS PRN
Status: DISCONTINUED | OUTPATIENT
Start: 2018-07-28 | End: 2018-07-28 | Stop reason: HOSPADM

## 2018-07-28 RX ORDER — PANTOPRAZOLE SODIUM 40 MG/1
40 TABLET, DELAYED RELEASE ORAL DAILY
Status: DISCONTINUED | OUTPATIENT
Start: 2018-07-28 | End: 2018-07-28 | Stop reason: HOSPADM

## 2018-07-28 RX ORDER — ALBUTEROL SULFATE 90 UG/1
2 AEROSOL, METERED RESPIRATORY (INHALATION) EVERY 4 HOURS PRN
Status: DISCONTINUED | OUTPATIENT
Start: 2018-07-28 | End: 2018-07-28 | Stop reason: HOSPADM

## 2018-07-28 RX ORDER — HYDROCHLOROTHIAZIDE 12.5 MG/1
12.5 TABLET ORAL DAILY
Status: DISCONTINUED | OUTPATIENT
Start: 2018-07-28 | End: 2018-07-28

## 2018-07-28 RX ORDER — IBUPROFEN 200 MG
24 TABLET ORAL
Status: DISCONTINUED | OUTPATIENT
Start: 2018-07-28 | End: 2018-07-28 | Stop reason: HOSPADM

## 2018-07-28 RX ORDER — HYDROCHLOROTHIAZIDE 25 MG/1
12.5 TABLET ORAL DAILY
Qty: 30 TABLET | Refills: 2 | Status: SHIPPED | OUTPATIENT
Start: 2018-07-31 | End: 2019-06-27

## 2018-07-28 RX ORDER — OXYCODONE AND ACETAMINOPHEN 7.5; 325 MG/1; MG/1
1 TABLET ORAL EVERY 6 HOURS PRN
Status: DISCONTINUED | OUTPATIENT
Start: 2018-07-28 | End: 2018-07-28 | Stop reason: HOSPADM

## 2018-07-28 RX ADMIN — NITROGLYCERIN 0.5 INCH: 20 OINTMENT TOPICAL at 12:07

## 2018-07-28 RX ADMIN — MYCOPHENOLATE MOFETIL 500 MG: 250 CAPSULE ORAL at 08:07

## 2018-07-28 RX ADMIN — CLONIDINE HYDROCHLORIDE 0.1 MG: 0.1 TABLET ORAL at 01:07

## 2018-07-28 RX ADMIN — TACROLIMUS 3 MG: 1 CAPSULE ORAL at 08:07

## 2018-07-28 RX ADMIN — Medication 500 MG: at 09:07

## 2018-07-28 RX ADMIN — ASPIRIN 325 MG ORAL TABLET 325 MG: 325 PILL ORAL at 01:07

## 2018-07-28 RX ADMIN — NITROGLYCERIN 0.5 INCH: 20 OINTMENT TOPICAL at 04:07

## 2018-07-28 RX ADMIN — PANTOPRAZOLE SODIUM 40 MG: 40 TABLET, DELAYED RELEASE ORAL at 08:07

## 2018-07-28 RX ADMIN — HYDROCHLOROTHIAZIDE 12.5 MG: 12.5 TABLET ORAL at 08:07

## 2018-07-28 RX ADMIN — ZOLPIDEM TARTRATE 10 MG: 5 TABLET ORAL at 01:07

## 2018-07-28 RX ADMIN — INSULIN ASPART 8 UNITS: 100 INJECTION, SOLUTION INTRAVENOUS; SUBCUTANEOUS at 08:07

## 2018-07-28 RX ADMIN — HYDRALAZINE HYDROCHLORIDE 50 MG: 25 TABLET ORAL at 08:07

## 2018-07-28 RX ADMIN — HYDRALAZINE HYDROCHLORIDE 50 MG: 25 TABLET ORAL at 03:07

## 2018-07-28 RX ADMIN — ROSUVASTATIN CALCIUM 5 MG: 5 TABLET ORAL at 08:07

## 2018-07-28 RX ADMIN — VERAPAMIL HYDROCHLORIDE 120 MG: 120 TABLET, FILM COATED, EXTENDED RELEASE ORAL at 09:07

## 2018-07-28 RX ADMIN — INSULIN ASPART 8 UNITS: 100 INJECTION, SOLUTION INTRAVENOUS; SUBCUTANEOUS at 12:07

## 2018-07-28 RX ADMIN — INSULIN ASPART 20 UNITS: 100 INJECTION, SOLUTION INTRAVENOUS; SUBCUTANEOUS at 03:07

## 2018-07-28 NOTE — HPI
Valencia Dumont is a 48 y.o. with medical history including Lupus, Cirrhosis, DMII, HTN, Osteoporosis, and SBP who presented for evaluation of acute hyperglycemia. Per patient she recently received her routine steroid injection in BL knees and has had elevated glucose levels since that time. Most recently her glucose level was 517 she tells me that she took her 8 units of shalom log and it continued to rise after lunch to peak of 600. Associated dizziness, palpitations, poor endurance, and polyuria. She denies recent trauma, HA, CP, changes in bowel functions, long trips, new leg or calve pain.    Patient initially found to be dehydrated with electrolye imbalances hyponatremia and elevated BUN/creatine/GFR 68/2.3/24 - Initial glucose level 330

## 2018-07-28 NOTE — PLAN OF CARE
Problem: Diabetes, Type 2 (Adult)  Goal: Signs and Symptoms of Listed Potential Problems Will be Absent, Minimized or Managed (Diabetes, Type 2)  Signs and symptoms of listed potential problems will be absent, minimized or managed by discharge/transition of care (reference Diabetes, Type 2 (Adult) C[facility]).    07/28/18 0205   Diabetes, Type 2   Problems Assessed (Type 2 Diabetes) all   Problems Present (Type 2 Diabetes) hyperglycemia

## 2018-07-28 NOTE — PLAN OF CARE
07/28/18 0942   Discharge Assessment   Assessment Type Discharge Planning Assessment   Confirmed/corrected address and phone number on facesheet? Yes   Assessment information obtained from? Patient   Communicated expected length of stay with patient/caregiver yes   Prior to hospitilization cognitive status: Alert/Oriented   Prior to hospitalization functional status: Independent  (Has walker but does not usually use)   Current cognitive status: Alert/Oriented   Current Functional Status: (Has walker but does not usually use)   Facility Arrived From: Home   Lives With spouse   Able to Return to Prior Arrangements yes   Is patient able to care for self after discharge? Yes   Who are your caregiver(s) and their phone number(s)? Araceli-daughter: [phone]   Patient's perception of discharge disposition home or selfcare   Readmission Within The Last 30 Days no previous admission in last 30 days   Patient currently being followed by outpatient case management? No   Patient currently receives any other outside agency services? No   Equipment Currently Used at Home none  (Has walker)   Do you have any problems affording any of your prescribed medications? No   Is the patient taking medications as prescribed? yes   Does the patient have transportation home? Yes   Transportation Available car;family or friend will provide   Does the patient receive services at the [facility]? No   Discharge Plan A Home with family   Discharge Plan B Other  (TBD)   Patient/Family In Agreement With Plan yes   Readmission Questionnaire   Living Arrangements house     Patient is independent at home home with spouse, daughter who can assist if/as needed; no assist usually needed; no current services; has a walker but does not usually use; does go to out-patient PT for back injury; sees Dr. Zaragoza    PCP: Timur Lion MD    Extended Emergency Contact Information  Primary Emergency Contact: Araceli Dumont   [facility] Phone: [phone]  Mobile Phone: [phone]  Relation: Daughter  Secondary Emergency Contact: Elvis Fernandez   [place]  Mobile Phone: [phone]  Relation: [facility] [place] - [address]  [address]  [place] [address]  Phone: [phone] Fax: [phone]    Payor: [employer] MEDICARE / Plan: [employer] / Product Type: Medicare Advantage /      1)  Warning signs/symptoms information reviewed with and provided to patient in blue folder  2)  Discharge planning begins upon admission   3)  Discussed and reinforced patient responsibility for managing health care at home including:        a) Acquiring prescribed medications; b) following-through with scheduled follow-up appointments or scheduling those that could not be set; and c) monitoring health at home.

## 2018-07-28 NOTE — PLAN OF CARE
07/28/18 1450   Final Note   Assessment Type Final Discharge Note   Discharge Disposition Home   What phone number can be called within the next 1-3 days to see how you are doing after discharge? ([phone] )   Hospital Follow Up  Appt(s) scheduled? No  (Unable to schedule)   Discharge plans and expectations educations in teach back method with documentation complete? Yes   Right Care Referral Info   Post Acute Recommendation No Care

## 2018-07-28 NOTE — DISCHARGE SUMMARY
[facility]  Discharge Summary      Patient Name: Valencia Dumont  MRN: [id number]  Admission Date: 7/27/2018  Hospital Length of Stay: 0 days  Discharge Date and Time:  07/28/2018 1:40 PM  Attending Physician: Sridhar Macias MD   Discharging Provider: NILS Carpenter  Primary Care Provider: Timur Lion MD      HPI:   Valencia Dumont is a 48 y.o. with medical history including Lupus, Cirrhosis, DMII, HTN, Osteoporosis, and SBP who presented for evaluation of acute hyperglycemia. Per patient she recently received her routine steroid injection in BL knees and has had elevated glucose levels since that time. Most recently her glucose level was 517 she tells me that she took her 8 units of shalom log and it continued to rise after lunch to peak of 600. Associated dizziness, palpitations, poor endurance, and polyuria. She denies recent trauma, HA, CP, changes in bowel functions, long trips, new leg or calve pain.    Patient initially found to be dehydrated with electrolye imbalances hyponatremia and elevated BUN/creatine/GFR 68/2.3/24 - Initial glucose level 330    * No surgery found *      Hospital Course:   Patient found with dehydration and responded well to tighter glucose control and IV hydration. Her chemistry improved electrolye imbalances hyponatremia and elevated BUN/creatine/GFR 68/2.3/24 - Initial glucose level 330 - Improved with IV hydration to 48/1.5/47. She reported steroid injections recently to BL knees and states this usually causes her glucose levels to rise. She tells me that she has since spoken with her doctors and has elected to stop the injections as they are no longer effective. She reports comfort at this time and is anxious for discharge. Her lupus appears stable and she has not UR complaints vitals and 02 sats stable. Her BGL although abnormal, have improved. She uses once weekly trulicity and is due for a dose today. She can take her home dose  when she returns home as it is not available in house. She will no longer be receiving steroid injection, therefore, her usual dose should be sufficient. Her KdH0z=0.6 which tells me that her glucose has not been this elevated under usual circumstances. I will add novolog sliding scale to her home regimen until she can report to her primary care. Holding losartan & HCTZ X 3 days to allow continued self-correction of renal functions. Hydralazine 50 X 3 days - resume usual regimen on Wed Aug 1, 2018.    Patient instructions for discharge  Take blood glucose 3 times daily and at bedtime  Keep a blood glucose diary and bring it with you to your next primary care doctors appointment  If blood glucose levels fall below 60 eat a meal or drink orange juice and recheck every hour until above 60  If blood glucose is above 400 repeat the check with a different finger; if remains above 400 contact your doctor  If blood glucose is below 60 or above 400 consistently contact your primary care doctor       Consults:     No new Assessment & Plan notes have been filed under this hospital service since the last note was generated.  Service: Hospital Medicine    Final Active Diagnoses:    Diagnosis Date Noted POA    PRINCIPAL PROBLEM:  Type 2 diabetes mellitus with hyperglycemia, without long-term current use of insulin [E11.65] 01/18/2014 Yes    Acute chest pain [R07.9] 07/27/2018 Yes    CKD (chronic kidney disease), stage III [N18.3] 07/28/2018 Yes    Essential hypertension [I10] 11/10/2017 Yes    SLE (systemic lupus erythematosus) [M32.9] 09/16/2013 Yes     Chronic      Problems Resolved During this Admission:    Diagnosis Date Noted Date Resolved POA       Discharged Condition: stable    Disposition: Home or Self Care    Follow Up:    Patient Instructions:     Diet diabetic     Activity as tolerated         Significant Diagnostic Studies: Labs:   CMP   Recent Labs  Lab 07/27/18  1206 07/27/18  1959 07/28/18  0253   *   134* 134* 137   K 4.8  4.8 4.5 4.1     101 101 105   CO2 24  24 22* 20*   *  512* 543* 330*   BUN 69*  69* 68* 48*   CREATININE 1.9*  1.9* 1.9* 1.5*   CALCIUM 9.2  9.2 9.5 8.8   PROT  --  6.5  --    ALBUMIN 3.8 3.8  --    BILITOT  --  0.4  --    ALKPHOS  --  77  --    AST  --  10  --    ALT  --  15  --    ANIONGAP 9  9 11 12   ESTGFRAFRICA 35*  35* 35* 47*   EGFRNONAA 31*  31* 31* 41*   , CBC   Recent Labs  Lab 07/27/18 1959 07/28/18 0253   WBC 5.73 6.57   HGB 11.0* 10.4*   HCT 30.9* 29.6*    173   , INR   Lab Results   Component Value Date    INR 1.0 05/24/2017    INR 1.0 05/18/2017    INR 1.0 01/03/2014   , Lipid Panel   Lab Results   Component Value Date    CHOL 184 07/28/2018    HDL 88 (H) 07/28/2018    LDLCALC 82.0 07/28/2018    TRIG 70 07/28/2018    CHOLHDL 47.8 07/28/2018   , Troponin   Recent Labs  Lab 07/28/18  0751   TROPONINI 0.046*    and A1C:   Recent Labs  Lab 05/07/18  1233   HGBA1C 7.6*       Pending Diagnostic Studies:     Procedure Component Value Units Date/Time    Hemoglobin A1c if not done in past 6 weeks [[id number]] Collected:  07/28/18 0253    Order Status:  Sent Lab Status:  In process Updated:  07/28/18 0254    Specimen:  Blood from Blood          Medications:  Reconciled Home Medications:      Medication List      START taking these medications    hydrALAZINE 50 MG tablet  Commonly known as:  APRESOLINE  Take 1 tablet (50 mg total) by mouth every 8 (eight) hours. for 3 days        CHANGE how you take these medications    * ACCU-CHEK SMARTVIEW TEST STRIP Strp  Generic drug:  blood sugar diagnostic  1 strip by Misc.(Non-Drug; Combo Route) route 5 (five) times daily. Trueresult  What changed:  Another medication with the same name was added. Make sure you understand how and when to take each.     * blood sugar diagnostic Strp  To check BG 5 times daily, to use with insurance preferred meter  What changed:  Another medication with the same name was added. Make  "sure you understand how and when to take each.     * blood sugar diagnostic Strp  1 each by Misc.(Non-Drug; Combo Route) route 4 (four) times daily.  What changed:  You were already taking a medication with the same name, and this prescription was added. Make sure you understand how and when to take each.     * lancets 28 gauge Misc  4 lancets by Misc.(Non-Drug; Combo Route) route once daily. Pt uses Freestyle lite meter to check BG 4 times daily.  What changed:  Another medication with the same name was added. Make sure you understand how and when to take each.     * lancets Misc  To check BG 5 times daily, to use with insurance preferred meter  What changed:  Another medication with the same name was added. Make sure you understand how and when to take each.     * lancets Misc  1 strip by Misc.(Non-Drug; Combo Route) route 4 (four) times daily.  What changed:  You were already taking a medication with the same name, and this prescription was added. Make sure you understand how and when to take each.     * NovoLOG Flexpen U-100 Insulin 100 unit/mL Inpn pen  Generic drug:  insulin aspart U-100  Inject 14 Units into the skin 3 times daily with meals. Plus correction scale, max DOSAGE= 72 UNITS  What changed:  Another medication with the same name was added. Make sure you understand how and when to take each.     * insulin aspart U-100 100 unit/mL Inpn pen  Commonly known as:  NovoLOG  Blood Glucose mg/dL      <60            - Follow interventions for hypoglycemia below     151-200     -    4 units 201-250     -    6 units  251-300     -    9 units 301-350     -    12 units 351-400     -    15 units >400          -    18 units  What changed:  You were already taking a medication with the same name, and this prescription was added. Make sure you understand how and when to take each.     * pen needle, diabetic 32 gauge x 5/32" Ndle  Commonly known as:  BD ULTRA-FINE JANESSA PEN NEEDLE  To use 4x/day with insulin " "injections.  What changed:  Another medication with the same name was added. Make sure you understand how and when to take each.     * pen needle, diabetic 31 gauge x 5/16" Ndle  Use as directed  What changed:  You were already taking a medication with the same name, and this prescription was added. Make sure you understand how and when to take each.        * This list has 10 medication(s) that are the same as other medications prescribed for you. Read the directions carefully, and ask your doctor or other care provider to review them with you.            CONTINUE taking these medications    blood-glucose meter kit  To check BG 5 times daily, to use with insurance preferred meter     cyproheptadine 4 mg tablet  Commonly known as:  PERIACTIN  Take 4 mg by mouth every evening.     diazePAM 5 MG tablet  Commonly known as:  VALIUM  Take 5 mg by mouth 3 (three) times daily as needed.     dicyclomine 10 MG capsule  Commonly known as:  BENTYL  Take 1 capsule (10 mg total) by mouth before meals as needed (TID PRN).     DILTIAZEM 2% CREAM  Apply topically 3 (three) times daily. Apply topically to anal area.     dulaglutide 0.75 mg/0.5 mL Pnij  Commonly known as:  TRULICITY  Inject 0.5 mLs (0.75 mg total) into the skin once a week.     ergocalciferol 50,000 unit Cap  Commonly known as:  ERGOCALCIFEROL  Take 1 capsule (50,000 Units total) by mouth every 7 days.     estradiol 0.01 % (0.1 mg/gram) vaginal cream  Commonly known as:  ESTRACE  Place 0.5 g vaginally twice a week. Insert 0.5grams intravaginally twice weekly     fluconazole 150 MG Tab  Commonly known as:  DIFLUCAN  TAKE ONE TABLET BY MOUTH ONCE DAILY AS A SINGLE DOSE FOR INFECTION.     hydroCHLOROthiazide 25 MG tablet  Commonly known as:  HYDRODIURIL  Take 0.5 tablets (12.5 mg total) by mouth once daily.  Start taking on:  7/31/2018     hydrOXYzine HCl 10 MG Tab  Commonly known as:  ATARAX  TAKE 1 OR 2 TABLETS BY MOUTH THREE TIMES DAILY AS NEEDED FOR ITCHING.   "   insulin detemir U-100 100 unit/mL (3 mL) Inpn pen  Commonly known as:  LEVEMIR FLEXTOUCH U-100 INSULN  Inject 14 Units into the skin once daily.     isosorbide mononitrate 30 MG 24 hr tablet  Commonly known as:  IMDUR  Take 30 mg by mouth once daily.     losartan 100 MG tablet  Commonly known as:  COZAAR  Take 1 tablet (100 mg total) by mouth once daily.  Start taking on:  7/31/2018     magnesium oxide 500 mg Tab  Take 500 mg by mouth 2 (two) times daily.     meclizine 25 mg tablet  Commonly known as:  ANTIVERT  TAKE ONE TABLET BY MOUTH AT BEDTIME AS NEEDED for dizziness.     multivitamin Tab  Take 1 tablet by mouth once daily.     mycophenolate 180 MG Tbec  Commonly known as:  MYFORTIC  Take 3 tablets (540 mg total) by mouth 2 (two) times daily.     neomycin-polymyxin-hydrocortisone otic solution  Commonly known as:  CORTISPORIN  INSTILL TWO DROPS INTO BOTH EARS FOUR TIMES DAILY FOR 10 DAYS     * nystatin cream  Commonly known as:  MYCOSTATIN  Apply topically 2 (two) times daily.     * nystatin 100,000 unit/mL suspension  Commonly known as:  MYCOSTATIN  SWISH AND SPIT FIVE MILLILITERS BY MOUTH FOUR TIMES DAILY FOR 7 DAYS.     ondansetron 8 MG tablet  Commonly known as:  ZOFRAN  Take 1 tablet by mouth every 8 (eight) hours as needed.     oxyCODONE-acetaminophen 7.5-325 mg per tablet  Commonly known as:  PERCOCET  Take 1 tablet by mouth every 4 (four) hours as needed for Pain.     pantoprazole 40 MG tablet  Commonly known as:  PROTONIX  Take 40 mg by mouth once daily.     polyethylene glycol 17 gram/dose powder  Commonly known as:  GLYCOLAX  MIX 17 GMS IN WATER and drink ONCE DAILY     PROAIR HFA 90 mcg/actuation inhaler  Generic drug:  albuterol  Inhale 2 puffs into the lungs 3 (three) times daily as needed.     promethazine-dextromethorphan 6.25-15 mg/5 mL Syrp  Commonly known as:  PROMETHAZINE-DM  Take by mouth 2 (two) times daily as needed.     ranitidine 300 MG tablet  Commonly known as:  ZANTAC  Take 300 mg  by mouth 2 (two) times daily.     rosuvastatin 5 MG tablet  Commonly known as:  CRESTOR  Take 5 mg by mouth once daily.     tacrolimus 1 MG Cap  Commonly known as:  PROGRAF  Take 3 capsules (3 mg total) by mouth every 12 (twelve) hours.     triamcinolone 0.5 % ointment  Commonly known as:  KENALOG  1 application 2 (two) times daily. Apply to affected area     valACYclovir 500 MG tablet  Commonly known as:  VALTREX  once daily as needed     verapamil 120 MG C24p  Commonly known as:  VERELAN  Take 1 capsule by mouth once daily.     zolpidem 10 mg Tab  Commonly known as:  AMBIEN  Take 10 mg by mouth nightly as needed.        * This list has 2 medication(s) that are the same as other medications prescribed for you. Read the directions carefully, and ask your doctor or other care provider to review them with you.                Indwelling Lines/Drains at time of discharge:   Lines/Drains/Airways          No matching active lines, drains, or airways          Time spent on the discharge of patient: 45 minutes  Patient was seen and examined on the date of discharge and determined to be suitable for discharge.       BEATRIZ Wooten, FNP-C  Hospitalist - Department of Hospital Medicine  [address] [place] [address]  Office [phone]; Pager [phone]

## 2018-07-28 NOTE — SUBJECTIVE & OBJECTIVE
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    ESOPHAGOGASTRODUODENOSCOPY      LIVER BIOPSY      LIVER TRANSPLANT  12/31/2013    REFRACTIVE SURGERY Bilateral 2010    TUBAL LIGATION  2003       Review of patient's allergies indicates:   Allergen Reactions    Norvasc [amlodipine]      Severe headache    Doxycycline Rash    Pcn [penicillins] Rash       No current facility-administered medications on file prior to encounter.      Current Outpatient Prescriptions on File Prior to Encounter   Medication Sig    blood sugar diagnostic Strp To check BG 5 times daily, to use with insurance preferred meter    blood-glucose meter kit To check BG 5 times daily, to use with insurance preferred meter    cyproheptadine (PERIACTIN) 4 mg tablet Take 4 mg by mouth every evening.    diazepam (VALIUM) 5 MG tablet Take 5 mg by mouth 3 (three) times daily as needed.     dicyclomine (BENTYL) 10 MG capsule Take 1 capsule (10 mg total) by mouth before meals as needed (TID PRN).    DILTIAZEM HCL (DILTIAZEM 2% CREAM) Apply topically 3 (three) times daily. Apply topically to anal area.    dulaglutide (TRULICITY) 0.75 mg/0.5 mL PnIj Inject 0.5 mLs (0.75 mg total) into the skin once a week.    ergocalciferol (ERGOCALCIFEROL) 50,000 unit Cap Take 1 capsule (50,000 Units total) by mouth every 7 days.    estradiol (ESTRACE) 0.01 % (0.1 mg/gram) vaginal cream Place 0.5 g vaginally twice a week. Insert 0.5grams intravaginally twice  weekly    hydrochlorothiazide (HYDRODIURIL) 25 MG tablet Take 12.5 mg by mouth once daily.     hydrOXYzine HCl (ATARAX) 10 MG Tab TAKE 1 OR 2 TABLETS BY MOUTH THREE TIMES DAILY AS NEEDED FOR ITCHING.    insulin detemir (LEVEMIR FLEXTOUCH) 100 unit/mL (3 mL) SubQ InPn pen Inject 14 Units into the skin once daily.    isosorbide mononitrate (IMDUR) 30 MG 24 hr tablet Take 30 mg by mouth once daily.    lancets 28 gauge Misc 4 lancets by Misc.(Non-Drug; Combo Route) route once daily. Pt uses Freestyle lite meter to check BG 4 times daily.    lancets Misc To check BG 5 times daily, to use with insurance preferred meter    losartan (COZAAR) 100 MG tablet Take 1 tablet by mouth once daily.    MULTIVIT,THER IRON,CA,FA & MIN (MULTIVITAMIN) Tab Take 1 tablet by mouth once daily.    NOVOLOG FLEXPEN 100 unit/mL InPn pen Inject 14 Units into the skin 3 times daily with meals. Plus correction scale, max DOSAGE= 72 UNITS    oxycodone-acetaminophen (PERCOCET) 7.5-325 mg per tablet Take 1 tablet by mouth every 4 (four) hours as needed for Pain.    PROAIR HFA 90 mcg/actuation inhaler Inhale 2 puffs into the lungs 3 (three) times daily as needed.     ranitidine (ZANTAC) 300 MG tablet Take 300 mg by mouth 2 (two) times daily.     rosuvastatin (CRESTOR) 5 MG tablet Take 5 mg by mouth once daily.    verapamil (VERELAN) 120 MG C24P Take 1 capsule by mouth once daily.    zolpidem (AMBIEN) 10 mg Tab Take 10 mg by mouth nightly as needed.    ACCU-CHEK SMARTVIEW Strp 1 strip by Misc.(Non-Drug; Combo Route) route 5 (five) times daily. Trueresult    fluconazole (DIFLUCAN) 150 MG Tab TAKE ONE TABLET BY MOUTH ONCE DAILY AS A SINGLE DOSE FOR INFECTION.    magnesium oxide 500 mg Tab Take 500 mg by mouth 2 (two) times daily.    meclizine (ANTIVERT) 25 mg tablet TAKE ONE TABLET BY MOUTH AT BEDTIME AS NEEDED for dizziness.    mycophenolate (MYFORTIC) 180 MG TbEC Take 3 tablets (540 mg total) by mouth 2 (two) times daily.     "neomycin-polymyxin-hydrocortisone (CORTISPORIN) otic solution INSTILL TWO DROPS INTO BOTH EARS FOUR TIMES DAILY FOR 10 DAYS    nystatin (MYCOSTATIN) 100,000 unit/mL suspension SWISH AND SPIT FIVE MILLILITERS BY MOUTH FOUR TIMES DAILY FOR 7 DAYS.    nystatin (MYCOSTATIN) cream Apply topically 2 (two) times daily.    ondansetron (ZOFRAN) 8 MG tablet Take 1 tablet by mouth every 8 (eight) hours as needed.    pantoprazole (PROTONIX) 40 MG tablet Take 40 mg by mouth once daily.    pen needle, diabetic (BD ULTRA-FINE JANESSA PEN NEEDLES) 32 gauge x 5/32" Ndle To use 4x/day with insulin injections.    polyethylene glycol (GLYCOLAX) 17 gram/dose powder MIX 17 GMS IN WATER and drink ONCE DAILY    promethazine-dextromethorphan (PROMETHAZINE-DM) 6.25-15 mg/5 mL Syrp Take by mouth 2 (two) times daily as needed.     tacrolimus (PROGRAF) 1 MG Cap Take 3 capsules (3 mg total) by mouth every 12 (twelve) hours.    triamcinolone (KENALOG) 0.5 % ointment 1 application 2 (two) times daily. Apply to affected area    valacyclovir (VALTREX) 500 MG tablet once daily as needed     Family History     Problem Relation (Age of Onset)    Alzheimer's disease Father    Breast cancer Maternal Aunt (55)    Cataracts Mother    Diabetes Father, Paternal Grandfather, Paternal Grandmother, Brother    Hypertension Mother, Brother    No Known Problems Maternal Grandmother, Maternal Grandfather, Sister, Maternal Uncle, Paternal Aunt, Paternal Uncle        Social History Main Topics    Smoking status: Never Smoker    Smokeless tobacco: Never Used      Comment: disability; ; 3 children    Alcohol use 0.6 oz/week     1 Glasses of wine per week    Drug use: No    Sexual activity: Yes     Partners: Male     Birth control/ protection: None      Comment: s/p tubal ligation,  since 1989     Review of Systems   Constitutional: Negative for appetite change, chills, diaphoresis and fever.   HENT: Negative for congestion, hearing loss, sore " throat, tinnitus and trouble swallowing.    Eyes: Negative for photophobia, discharge, itching and visual disturbance.   Respiratory: Negative for apnea, cough, wheezing and stridor.    Cardiovascular: Negative for chest pain, palpitations and leg swelling.   Gastrointestinal: Negative for abdominal distention, abdominal pain, blood in stool, constipation, diarrhea and nausea.   Endocrine: Positive for polyuria. Negative for polydipsia and polyphagia.   Genitourinary: Negative for difficulty urinating, dysuria, flank pain and frequency.   Musculoskeletal: Negative for arthralgias, joint swelling and neck stiffness.   Skin: Negative for color change, rash and wound.   Neurological: Positive for dizziness and light-headedness. Negative for tremors, seizures, numbness and headaches.   Hematological: Negative for adenopathy.   Psychiatric/Behavioral: Negative for hallucinations and self-injury.     Objective:     Vital Signs (Most Recent):  Temp: 98.4 °F (36.9 °C) (07/28/18 1118)  Pulse: 75 (07/28/18 1118)  Resp: 18 (07/28/18 1118)  BP: (!) 151/91 (07/28/18 1118)  SpO2: 98 % (07/28/18 1118) Vital Signs (24h Range):  Temp:  [97.7 °F (36.5 °C)-98.5 °F (36.9 °C)] 98.4 °F (36.9 °C)  Pulse:  [53-81] 75  Resp:  [15-18] 18  SpO2:  [97 %-100 %] 98 %  BP: (151-199)/(79-93) 151/91     Weight: 59.7 kg (131 lb 9.8 oz)  Body mass index is 19.44 kg/m².    Physical Exam   Constitutional: She appears well-developed and well-nourished. She is cooperative.   HENT:   Head: Normocephalic and atraumatic.   Eyes: Conjunctivae and lids are normal.   Neck: Full passive range of motion without pain. Neck supple. No JVD present. No edema present. No thyroid mass present.   Cardiovascular: S1 normal, S2 normal and intact distal pulses.    No murmur heard.  Abdominal: Soft. Bowel sounds are normal. She exhibits no distension and no abdominal bruit. There is no splenomegaly or hepatomegaly. There is no tenderness. There is no CVA tenderness.    Lymphadenopathy:     She has no cervical adenopathy.     She has no axillary adenopathy.   Neurological: She is alert. She has normal reflexes. She displays no tremor. She displays no seizure activity.   Skin: Skin is warm, dry and intact.   Psychiatric: She has a normal mood and affect. Her speech is normal. Thought content normal. Cognition and memory are normal.           Significant Labs:   CBC:   Recent Labs  Lab 07/26/18  1305 07/27/18 1959 07/28/18  0253   WBC 8.82 5.73 6.57   HGB 12.9 11.0* 10.4*   HCT 35.9* 30.9* 29.6*    164 173     CMP:   Recent Labs  Lab 07/26/18 1305 07/27/18  1206 07/27/18 1959 07/28/18  0253   * 134*  134* 134* 137   K 4.5 4.8  4.8 4.5 4.1   CL 99 101  101 101 105   CO2 22* 24  24 22* 20*   * 512*  512* 543* 330*   BUN 68* 69*  69* 68* 48*   CREATININE 2.3* 1.9*  1.9* 1.9* 1.5*   CALCIUM 10.3 9.2  9.2 9.5 8.8   PROT 7.6  --  6.5  --    ALBUMIN 4.4 3.8 3.8  --    BILITOT 0.6  --  0.4  --    ALKPHOS 94  --  77  --    AST 13  --  10  --    ALT 17  --  15  --    ANIONGAP 12 9  9 11 12   EGFRNONAA 24* 31*  31* 31* 41*     Cardiac Markers: No results for input(s): CKMB, MYOGLOBIN, BNP, TROPISTAT in the last 48 hours.  Coagulation: No results for input(s): PT, INR, APTT in the last 48 hours.  Lactic Acid: No results for input(s): LACTATE in the last 48 hours.  Lipase: No results for input(s): LIPASE in the last 48 hours.  Lipid Panel:   Recent Labs  Lab 07/28/18  0751   CHOL 184   HDL 88*   LDLCALC 82.0   TRIG 70   CHOLHDL 47.8     Magnesium:   Recent Labs  Lab 07/27/18 1959   MG 2.4     Troponin:   Recent Labs  Lab 07/27/18 1959 07/28/18  0253 07/28/18  0751   TROPONINI 0.048* 0.042* 0.046*     TSH:   Recent Labs  Lab 07/28/18  0751   TSH 0.793     Urine Culture: No results for input(s): LABURIN in the last 48 hours.  Urine Studies:   Recent Labs  Lab 07/26/18  1254 07/27/18  0138 07/27/18  1131   COLORU Yellow Colorless* Straw   APPEARANCEUA Clear Clear  Clear   PHUR 5.0 5.0 5.0   SPECGRAV 1.015 1.020 1.020   PROTEINUA Negative Negative Negative   GLUCUA 3+* 3+* 3+*   KETONESU Negative Negative Negative   BILIRUBINUA Negative Negative Negative   OCCULTUA Negative Negative Negative   NITRITE Negative Negative Negative   UROBILINOGEN Negative Negative Negative   LEUKOCYTESUR Negative Negative Negative   RBCUA 1 1  --    WBCUA 1  --   --    BACTERIA Rare None None   SQUAMEPITHEL 3 1  --        Significant Imaging:   Imaging Results          X-Ray Chest AP Portable (Final result)  Result time 07/27/18 19:40:15    Final result by Cesar Hay MD (07/27/18 19:40:15)                 Impression:      No acute process.  No interval worsening.      Electronically signed by: Cesar Hay MD  Date:    07/27/2018  Time:    19:40             Narrative:    EXAMINATION:  XR CHEST AP PORTABLE    CLINICAL HISTORY:  chest pain;    TECHNIQUE:  Single frontal view of the chest was performed.    COMPARISON:  06/15/2017    FINDINGS:  Trachea is patent.  Cardiac silhouette is normal in size.  Lung volumes are symmetric and appear clear.  No effusion, pneumothorax or free air below the diaphragm.  No acute osseous abnormality.

## 2018-07-28 NOTE — ASSESSMENT & PLAN NOTE
found to be dehydrated with electrolye imbalances hyponatremia and elevated BUN/creatine/GFR 68/2.3/24 - Initial glucose level 330 - Improved with IV hydration to 48/1.5/47 -  will hold/avoid nephrotoxic mediations for now and for the next few days   -while hospitalized hold losartan and HCTZ  And at discharge X 4 days will readjust her usual BP medications then resume losartan and HCTZ at home in 4 days 2/2 to renal functions

## 2018-07-28 NOTE — NURSING
Pt has discharge orders. Blood sugar is 369 after insulin given. Ms Joanne Rea was notified. Order received to discharge the patient, Patient has trulicity at home.

## 2018-07-28 NOTE — ASSESSMENT & PLAN NOTE
Denies chest pain, complains of palpitations - EKG showed controlled rhythm - likely dehydration contributory - symptoms have resolved - troponin 1 level 0.048 trended flat suspect renal related - recommend tight BP control - hold losartan and HCTZ X 4 days will readjust her usual BP medications then resume losartan and HCTZ at home in 4 days 2/2 to renal functions

## 2018-07-28 NOTE — ASSESSMENT & PLAN NOTE
Could use tighter control - Continue home medications diltiazem, imdur, add hydralazine 50 mg Q8H X 4 days to allow renal functions to self correct then return to her usual / hold losartan and HCTZ X 4 days will readjust her usual BP medications then resume losartan and HCTZ at home in 4 days 2/2 to renal functions

## 2018-07-28 NOTE — HOSPITAL COURSE
Patient found with dehydration and responded well to tighter glucose control and IV hydration. Her chemistry improved electrolye imbalances hyponatremia and elevated BUN/creatine/GFR 68/2.3/24 - Initial glucose level 330 - Improved with IV hydration to 48/1.5/47. She reported steroid injections recently to BL knees and states this usually causes her glucose levels to rise. She tells me that she has since spoken with her doctors and has elected to stop the injections as they are no longer effective. She reports comfort at this time and is anxious for discharge. Her lupus appears stable and she has not UR complaints vitals and 02 sats stable. Her BGL although abnormal, have improved. She uses once weekly trulicity and is due for a dose today. She can take her home dose when she returns home as it is not available in house. She will no longer be receiving steroid injection, therefore, her usual dose should be sufficient. Her NiI6a=3.6 which tells me that her glucose has not been this elevated under usual circumstances. I will add novolog sliding scale to her home regimen until she can report to her primary care. Holding losartan & HCTZ X 3 days to allow continued self-correction of renal functions. Hydralazine 50 X 3 days - resume usual regimen on Wed Aug 1, 2018.    Patient instructions for discharge  Take blood glucose 3 times daily and at bedtime  Keep a blood glucose diary and bring it with you to your next primary care doctors appointment  If blood glucose levels fall below 60 eat a meal or drink orange juice and recheck every hour until above 60  If blood glucose is above 400 repeat the check with a different finger; if remains above 400 contact your doctor  If blood glucose is below 60 or above 400 consistently contact your primary care doctor

## 2018-07-28 NOTE — ASSESSMENT & PLAN NOTE
Poorly controlled due to chronic intermittent steroid therapy - was 551 at the time of presentation - takes trophicity at home weekly injections that she reports usual controls her under 200's until she receives steriod knee injections. She tells me that she has spoken to her doctor and will not be getting them anymore as they provide minimal relief. If this is true - her usual regimen should be surfice at home -  while hospitalized will use combined insulin therapy with basal and prandial insulin coverage, POCT glucose checks, hypoglycemic protocol and correction scale - HgA1c

## 2018-07-28 NOTE — PLAN OF CARE
Problem: Fall Risk (Adult)  Goal: Absence of Falls  Patient will demonstrate the desired outcomes by discharge/transition of care.    07/28/18 0205   Fall Risk (Adult)   Absence of Falls making progress toward outcome

## 2018-07-28 NOTE — H&P
[facility] Hospital Medicine  History & Physical    Patient Name: Valencia Dumont  MRN: [id number]  Admission Date: 7/27/2018  Attending Physician: Sridhar Macias MD   Primary Care Provider: Timur Lion MD         Patient information was obtained from patient and ER records.     Subjective:     Principal Problem:Type 2 diabetes mellitus with hyperglycemia, without long-term current use of insulin    Chief Complaint:   Chief Complaint   Patient presents with    Hyperglycemia     >600 at home. Pt states she takes her insulin as prescribed        HPI: Valencia Dumont is a 48 y.o. with medical history including Lupus, Cirrhosis, DMII, HTN, Osteoporosis, and SBP who presented for evaluation of acute hyperglycemia. Per patient she recently received her routine steroid injection in BL knees and has had elevated glucose levels since that time. Most recently her glucose level was 517 she tells me that she took her 8 units of shalom log and it continued to rise after lunch to peak of 600. Associated dizziness, palpitations, poor endurance, and polyuria. She denies recent trauma, HA, CP, changes in bowel functions, long trips, new leg or calve pain.    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    ESOPHAGOGASTRODUODENOSCOPY      LIVER BIOPSY       LIVER TRANSPLANT  12/31/2013    REFRACTIVE SURGERY Bilateral 2010    TUBAL LIGATION  2003       Review of patient's allergies indicates:   Allergen Reactions    Norvasc [amlodipine]      Severe headache    Doxycycline Rash    Pcn [penicillins] Rash       No current facility-administered medications on file prior to encounter.      Current Outpatient Prescriptions on File Prior to Encounter   Medication Sig    blood sugar diagnostic Strp To check BG 5 times daily, to use with insurance preferred meter    blood-glucose meter kit To check BG 5 times daily, to use with insurance preferred meter    cyproheptadine (PERIACTIN) 4 mg tablet Take 4 mg by mouth every evening.    diazepam (VALIUM) 5 MG tablet Take 5 mg by mouth 3 (three) times daily as needed.     dicyclomine (BENTYL) 10 MG capsule Take 1 capsule (10 mg total) by mouth before meals as needed (TID PRN).    DILTIAZEM HCL (DILTIAZEM 2% CREAM) Apply topically 3 (three) times daily. Apply topically to anal area.    dulaglutide (TRULICITY) 0.75 mg/0.5 mL PnIj Inject 0.5 mLs (0.75 mg total) into the skin once a week.    ergocalciferol (ERGOCALCIFEROL) 50,000 unit Cap Take 1 capsule (50,000 Units total) by mouth every 7 days.    estradiol (ESTRACE) 0.01 % (0.1 mg/gram) vaginal cream Place 0.5 g vaginally twice a week. Insert 0.5grams intravaginally twice weekly    hydrochlorothiazide (HYDRODIURIL) 25 MG tablet Take 12.5 mg by mouth once daily.     hydrOXYzine HCl (ATARAX) 10 MG Tab TAKE 1 OR 2 TABLETS BY MOUTH THREE TIMES DAILY AS NEEDED FOR ITCHING.    insulin detemir (LEVEMIR FLEXTOUCH) 100 unit/mL (3 mL) SubQ InPn pen Inject 14 Units into the skin once daily.    isosorbide mononitrate (IMDUR) 30 MG 24 hr tablet Take 30 mg by mouth once daily.    lancets 28 gauge Misc 4 lancets by Misc.(Non-Drug; Combo Route) route once daily. Pt uses Freestyle lite meter to check BG 4 times daily.    lancets Misc To check BG 5 times daily, to use with  "insurance preferred meter    losartan (COZAAR) 100 MG tablet Take 1 tablet by mouth once daily.    MULTIVIT,THER IRON,CA,FA & MIN (MULTIVITAMIN) Tab Take 1 tablet by mouth once daily.    NOVOLOG FLEXPEN 100 unit/mL InPn pen Inject 14 Units into the skin 3 times daily with meals. Plus correction scale, max DOSAGE= 72 UNITS    oxycodone-acetaminophen (PERCOCET) 7.5-325 mg per tablet Take 1 tablet by mouth every 4 (four) hours as needed for Pain.    PROAIR HFA 90 mcg/actuation inhaler Inhale 2 puffs into the lungs 3 (three) times daily as needed.     ranitidine (ZANTAC) 300 MG tablet Take 300 mg by mouth 2 (two) times daily.     rosuvastatin (CRESTOR) 5 MG tablet Take 5 mg by mouth once daily.    verapamil (VERELAN) 120 MG C24P Take 1 capsule by mouth once daily.    zolpidem (AMBIEN) 10 mg Tab Take 10 mg by mouth nightly as needed.    ACCU-CHEK SMARTVIEW Strp 1 strip by Misc.(Non-Drug; Combo Route) route 5 (five) times daily. Trueresult    fluconazole (DIFLUCAN) 150 MG Tab TAKE ONE TABLET BY MOUTH ONCE DAILY AS A SINGLE DOSE FOR INFECTION.    magnesium oxide 500 mg Tab Take 500 mg by mouth 2 (two) times daily.    meclizine (ANTIVERT) 25 mg tablet TAKE ONE TABLET BY MOUTH AT BEDTIME AS NEEDED for dizziness.    mycophenolate (MYFORTIC) 180 MG TbEC Take 3 tablets (540 mg total) by mouth 2 (two) times daily.    neomycin-polymyxin-hydrocortisone (CORTISPORIN) otic solution INSTILL TWO DROPS INTO BOTH EARS FOUR TIMES DAILY FOR 10 DAYS    nystatin (MYCOSTATIN) 100,000 unit/mL suspension SWISH AND SPIT FIVE MILLILITERS BY MOUTH FOUR TIMES DAILY FOR 7 DAYS.    nystatin (MYCOSTATIN) cream Apply topically 2 (two) times daily.    ondansetron (ZOFRAN) 8 MG tablet Take 1 tablet by mouth every 8 (eight) hours as needed.    pantoprazole (PROTONIX) 40 MG tablet Take 40 mg by mouth once daily.    pen needle, diabetic (BD ULTRA-FINE JANESSA PEN NEEDLES) 32 gauge x 5/32" Ndle To use 4x/day with insulin injections.    " polyethylene glycol (GLYCOLAX) 17 gram/dose powder MIX 17 GMS IN WATER and drink ONCE DAILY    promethazine-dextromethorphan (PROMETHAZINE-DM) 6.25-15 mg/5 mL Syrp Take by mouth 2 (two) times daily as needed.     tacrolimus (PROGRAF) 1 MG Cap Take 3 capsules (3 mg total) by mouth every 12 (twelve) hours.    triamcinolone (KENALOG) 0.5 % ointment 1 application 2 (two) times daily. Apply to affected area    valacyclovir (VALTREX) 500 MG tablet once daily as needed     Family History     Problem Relation (Age of Onset)    Alzheimer's disease Father    Breast cancer Maternal Aunt (55)    Cataracts Mother    Diabetes Father, Paternal Grandfather, Paternal Grandmother, Brother    Hypertension Mother, Brother    No Known Problems Maternal Grandmother, Maternal Grandfather, Sister, Maternal Uncle, Paternal Aunt, Paternal Uncle        Social History Main Topics    Smoking status: Never Smoker    Smokeless tobacco: Never Used      Comment: disability; ; 3 children    Alcohol use 0.6 oz/week     1 Glasses of wine per week    Drug use: No    Sexual activity: Yes     Partners: Male     Birth control/ protection: None      Comment: s/p tubal ligation,  since 1989     Review of Systems   Constitutional: Negative for appetite change, chills, diaphoresis and fever.   HENT: Negative for congestion, hearing loss, sore throat, tinnitus and trouble swallowing.    Eyes: Negative for photophobia, discharge, itching and visual disturbance.   Respiratory: Negative for apnea, cough, wheezing and stridor.    Cardiovascular: Negative for chest pain, palpitations and leg swelling.   Gastrointestinal: Negative for abdominal distention, abdominal pain, blood in stool, constipation, diarrhea and nausea.   Endocrine: Positive for polyuria. Negative for polydipsia and polyphagia.   Genitourinary: Negative for difficulty urinating, dysuria, flank pain and frequency.   Musculoskeletal: Negative for arthralgias, joint swelling  and neck stiffness.   Skin: Negative for color change, rash and wound.   Neurological: Positive for dizziness and light-headedness. Negative for tremors, seizures, numbness and headaches.   Hematological: Negative for adenopathy.   Psychiatric/Behavioral: Negative for hallucinations and self-injury.     Objective:     Vital Signs (Most Recent):  Temp: 98.4 °F (36.9 °C) (07/28/18 1118)  Pulse: 75 (07/28/18 1118)  Resp: 18 (07/28/18 1118)  BP: (!) 151/91 (07/28/18 1118)  SpO2: 98 % (07/28/18 1118) Vital Signs (24h Range):  Temp:  [97.7 °F (36.5 °C)-98.5 °F (36.9 °C)] 98.4 °F (36.9 °C)  Pulse:  [53-81] 75  Resp:  [15-18] 18  SpO2:  [97 %-100 %] 98 %  BP: (151-199)/(79-93) 151/91     Weight: 59.7 kg (131 lb 9.8 oz)  Body mass index is 19.44 kg/m².    Physical Exam   Constitutional: She appears well-developed and well-nourished. She is cooperative.   HENT:   Head: Normocephalic and atraumatic.   Eyes: Conjunctivae and lids are normal.   Neck: Full passive range of motion without pain. Neck supple. No JVD present. No edema present. No thyroid mass present.   Cardiovascular: S1 normal, S2 normal and intact distal pulses.    No murmur heard.  Abdominal: Soft. Bowel sounds are normal. She exhibits no distension and no abdominal bruit. There is no splenomegaly or hepatomegaly. There is no tenderness. There is no CVA tenderness.   Lymphadenopathy:     She has no cervical adenopathy.     She has no axillary adenopathy.   Neurological: She is alert. She has normal reflexes. She displays no tremor. She displays no seizure activity.   Skin: Skin is warm, dry and intact.   Psychiatric: She has a normal mood and affect. Her speech is normal. Thought content normal. Cognition and memory are normal.           Significant Labs:   CBC:   Recent Labs  Lab 07/26/18  1305 07/27/18  1959 07/28/18  0253   WBC 8.82 5.73 6.57   HGB 12.9 11.0* 10.4*   HCT 35.9* 30.9* 29.6*    164 173     CMP:   Recent Labs  Lab 07/26/18  3483  07/27/18  1206 07/27/18 1959 07/28/18  0253   * 134*  134* 134* 137   K 4.5 4.8  4.8 4.5 4.1   CL 99 101  101 101 105   CO2 22* 24  24 22* 20*   * 512*  512* 543* 330*   BUN 68* 69*  69* 68* 48*   CREATININE 2.3* 1.9*  1.9* 1.9* 1.5*   CALCIUM 10.3 9.2  9.2 9.5 8.8   PROT 7.6  --  6.5  --    ALBUMIN 4.4 3.8 3.8  --    BILITOT 0.6  --  0.4  --    ALKPHOS 94  --  77  --    AST 13  --  10  --    ALT 17  --  15  --    ANIONGAP 12 9  9 11 12   EGFRNONAA 24* 31*  31* 31* 41*     Cardiac Markers: No results for input(s): CKMB, MYOGLOBIN, BNP, TROPISTAT in the last 48 hours.  Coagulation: No results for input(s): PT, INR, APTT in the last 48 hours.  Lactic Acid: No results for input(s): LACTATE in the last 48 hours.  Lipase: No results for input(s): LIPASE in the last 48 hours.  Lipid Panel:   Recent Labs  Lab 07/28/18  0751   CHOL 184   HDL 88*   LDLCALC 82.0   TRIG 70   CHOLHDL 47.8     Magnesium:   Recent Labs  Lab 07/27/18 1959   MG 2.4     Troponin:   Recent Labs  Lab 07/27/18 1959 07/28/18  0253 07/28/18  0751   TROPONINI 0.048* 0.042* 0.046*     TSH:   Recent Labs  Lab 07/28/18  0751   TSH 0.793     Urine Culture: No results for input(s): LABURIN in the last 48 hours.  Urine Studies:   Recent Labs  Lab 07/26/18  1254 07/27/18  0138 07/27/18  1131   COLORU Yellow Colorless* Straw   APPEARANCEUA Clear Clear Clear   PHUR 5.0 5.0 5.0   SPECGRAV 1.015 1.020 1.020   PROTEINUA Negative Negative Negative   GLUCUA 3+* 3+* 3+*   KETONESU Negative Negative Negative   BILIRUBINUA Negative Negative Negative   OCCULTUA Negative Negative Negative   NITRITE Negative Negative Negative   UROBILINOGEN Negative Negative Negative   LEUKOCYTESUR Negative Negative Negative   RBCUA 1 1  --    WBCUA 1  --   --    BACTERIA Rare None None   SQUAMEPITHEL 3 1  --        Significant Imaging:   Imaging Results          X-Ray Chest AP Portable (Final result)  Result time 07/27/18 19:40:15    Final result by Cesar CHENEY  MD [place] (07/27/18 19:40:15)                 Impression:      No acute process.  No interval worsening.      Electronically signed by: Cesar Hay MD  Date:    07/27/2018  Time:    19:40             Narrative:    EXAMINATION:  XR CHEST AP PORTABLE    CLINICAL HISTORY:  chest pain;    TECHNIQUE:  Single frontal view of the chest was performed.    COMPARISON:  06/15/2017    FINDINGS:  Trachea is patent.  Cardiac silhouette is normal in size.  Lung volumes are symmetric and appear clear.  No effusion, pneumothorax or free air below the diaphragm.  No acute osseous abnormality.                                  Assessment/Plan:     * Type 2 diabetes mellitus with hyperglycemia, without long-term current use of insulin    Poorly controlled due to chronic intermittent steroid therapy - was 551 at the time of presentation - takes trophicity at home weekly injections that she reports usual controls her under 200's until she receives steriod knee injections. She tells me that she has spoken to her doctor and will not be getting them anymore as they provide minimal relief. If this is true - her usual regimen should be surfice at home -  while hospitalized will use combined insulin therapy with basal and prandial insulin coverage, POCT glucose checks, hypoglycemic protocol and correction scale - HgA1c            Acute chest pain    Denies chest pain, complains of palpitations - EKG showed controlled rhythm - likely dehydration contributory - symptoms have resolved - troponin 1 level 0.048 trended flat suspect renal related - recommend tight BP control - hold losartan and HCTZ X 4 days will readjust her usual BP medications then resume losartan and HCTZ at home in 4 days 2/2 to renal functions          CKD (chronic kidney disease), stage III     found to be dehydrated with electrolye imbalances hyponatremia and elevated BUN/creatine/GFR 68/2.3/24 - Initial glucose level 330 - Improved with IV hydration to 48/1.5/47 -  will  hold/avoid nephrotoxic mediations for now and for the next few days   -while hospitalized hold losartan and HCTZ  And at discharge X 4 days will readjust her usual BP medications then resume losartan and HCTZ at home in 4 days 2/2 to renal functions          Essential hypertension    Could use tighter control - Continue home medications diltiazem, imdur, add hydralazine 50 mg Q8H X 4 days to allow renal functions to self correct then return to her usual / hold losartan and HCTZ X 4 days will readjust her usual BP medications then resume losartan and HCTZ at home in 4 days 2/2 to renal functions          SLE (systemic lupus erythematosus)    Stable disease, denies pain or dyspnea - oxygen saturations WNL            VTE Risk Mitigation         Ordered     enoxaparin injection 40 mg  Daily      07/28/18 0043               BEATRIZ Wooten, FNP-C  Hospitalist - Department of Hospital Medicine  [address], [place] [address]  Office [phone]; Pager [phone]

## 2018-07-28 NOTE — PLAN OF CARE
Problem: Diabetes, Type 2 (Adult)  Intervention: Support/Optimize Psychosocial Response to Condition   07/28/18 1757   Coping/Psychosocial Interventions   Supportive Measures active listening utilized;decision-making supported;positive reinforcement provided;verbalization of feelings encouraged   Environmental Support calm environment promoted;rest periods encouraged     Intervention: Optimize Glycemic Control   07/28/18 1757   Nutrition Interventions   Glycemic Management blood glucose monitoring;supplemental insulin given       Goal: Signs and Symptoms of Listed Potential Problems Will be Absent, Minimized or Managed (Diabetes, Type 2)  Signs and symptoms of listed potential problems will be absent, minimized or managed by discharge/transition of care (reference Diabetes, Type 2 (Adult) CPG).   Outcome: Ongoing (interventions implemented as appropriate)   07/28/18 1757   Diabetes, Type 2   Problems Assessed (Type 2 Diabetes) all;DKA (diabetic ketoacidosis)/HHS (hyperosmolar hyperglycemic state);hyperglycemia;hypoglycemia;situational response;other (see comments)   Problems Present (Type 2 Diabetes) hyperglycemia

## 2018-07-29 NOTE — NURSING
Discharge instructions given to patient, verbalized understanding. Patient left to the family vehicle. No distress noted.

## 2018-07-29 NOTE — NURSING
Patient discharged with instructions given via handout to include antihypertensives to start on Tuesday. Patient verbalizing understanding

## 2018-07-30 ENCOUNTER — PATIENT MESSAGE (OUTPATIENT)
Dept: TRANSPLANT | Facility: CLINIC | Age: 48
End: 2018-07-30

## 2018-07-30 LAB
POCT GLUCOSE: 171 MG/DL (ref 70–110)
POCT GLUCOSE: 446 MG/DL (ref 70–110)
POCT GLUCOSE: >500 MG/DL (ref 70–110)

## 2018-07-30 NOTE — TELEPHONE ENCOUNTER
Called spoke to patient regarding hospital stay patient stated that during the day on 7/27 her Blood sugar was fine running in the 100s the high  200. Patient BG went up to 600 when she was advise to go to the ED patient stated that the Ed change her insulin and the was she call the department  he change it again and now she is confuse. Patient and I went over orders from  since it was the more resent order for her insulin patient is requesting that  give her a call for verification informed patient I would be routing message to  patient verbalized understanding

## 2018-07-30 NOTE — TELEPHONE ENCOUNTER
Steroid shot about 10 days ago, had persistent hyperglycemia    Last 2 days:  Trulicity   levemir - took 5 units yesterday  novolog 5 + SSI     Yesterday and today fasting mid 100s, later in the day in 90s to 100s.    Prior to this A1c was 7.6 on Trulicity only    Recommend: Stop levemir.  Today and tomorrow take only novolog 3 with meals.  May not need novolog anymore after tomorrow as steroid effects continue to wear off.  Continue Trulicity.     fredy Gamble

## 2018-08-05 ENCOUNTER — PATIENT MESSAGE (OUTPATIENT)
Dept: RHEUMATOLOGY | Facility: CLINIC | Age: 48
End: 2018-08-05

## 2018-08-08 ENCOUNTER — PATIENT MESSAGE (OUTPATIENT)
Dept: ENDOCRINOLOGY | Facility: CLINIC | Age: 48
End: 2018-08-08

## 2018-08-08 ENCOUNTER — TELEPHONE (OUTPATIENT)
Dept: ENDOCRINOLOGY | Facility: CLINIC | Age: 48
End: 2018-08-08

## 2018-08-08 ENCOUNTER — LAB VISIT (OUTPATIENT)
Dept: LAB | Facility: HOSPITAL | Age: 48
End: 2018-08-08
Attending: INTERNAL MEDICINE
Payer: MEDICARE

## 2018-08-08 DIAGNOSIS — E11.65 UNCONTROLLED TYPE 2 DIABETES MELLITUS WITH HYPERGLYCEMIA, WITHOUT LONG-TERM CURRENT USE OF INSULIN: ICD-10-CM

## 2018-08-08 DIAGNOSIS — M81.0 OSTEOPOROSIS, UNSPECIFIED OSTEOPOROSIS TYPE, UNSPECIFIED PATHOLOGICAL FRACTURE PRESENCE: Primary | ICD-10-CM

## 2018-08-08 LAB
ESTIMATED AVG GLUCOSE: 263 MG/DL
HBA1C MFR BLD HPLC: 10.8 %

## 2018-08-08 PROCEDURE — 36415 COLL VENOUS BLD VENIPUNCTURE: CPT

## 2018-08-08 PROCEDURE — 83036 HEMOGLOBIN GLYCOSYLATED A1C: CPT

## 2018-08-08 NOTE — TELEPHONE ENCOUNTER
----- Message from Krystyna Gamble MD sent at 8/8/2018  3:24 PM CDT -----  Can you please put in a recall letter fo ran appointment with me 12/2018   With labs one week before visit. Thanks  SD

## 2018-08-09 ENCOUNTER — PATIENT MESSAGE (OUTPATIENT)
Dept: ENDOCRINOLOGY | Facility: CLINIC | Age: 48
End: 2018-08-09

## 2018-08-14 ENCOUNTER — DOCUMENTATION ONLY (OUTPATIENT)
Dept: REHABILITATION | Facility: HOSPITAL | Age: 48
End: 2018-08-14

## 2018-08-14 ENCOUNTER — CLINICAL SUPPORT (OUTPATIENT)
Dept: REHABILITATION | Facility: HOSPITAL | Age: 48
End: 2018-08-14
Attending: PHYSICAL MEDICINE & REHABILITATION
Payer: MEDICARE

## 2018-08-14 DIAGNOSIS — M54.50 CHRONIC MIDLINE LOW BACK PAIN WITHOUT SCIATICA: ICD-10-CM

## 2018-08-14 DIAGNOSIS — G89.29 CHRONIC MIDLINE LOW BACK PAIN WITHOUT SCIATICA: ICD-10-CM

## 2018-08-14 DIAGNOSIS — G89.29 CHRONIC LEFT SHOULDER PAIN: ICD-10-CM

## 2018-08-14 DIAGNOSIS — M25.512 CHRONIC LEFT SHOULDER PAIN: ICD-10-CM

## 2018-08-14 PROCEDURE — 97110 THERAPEUTIC EXERCISES: CPT | Mod: PN

## 2018-08-14 PROCEDURE — G8978 MOBILITY CURRENT STATUS: HCPCS | Mod: CK,PN

## 2018-08-14 PROCEDURE — G8979 MOBILITY GOAL STATUS: HCPCS | Mod: CJ,PN

## 2018-08-14 PROCEDURE — 97161 PT EVAL LOW COMPLEX 20 MIN: CPT | Mod: PN

## 2018-08-14 NOTE — PLAN OF CARE
Physical Therapy Evaluation    Name: Valencia Dumont  [facility] Number: [id number]      Diagnosis:   Encounter Diagnoses   Name Primary?    Chronic midline low back pain without sciatica     Chronic left shoulder pain      Physician: Jeevan Ingram MD  Treatment Orders: PT Eval and Treat    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    Diabetes mellitus     Esophageal varices     Hypertension     Kidney stone     Lupus     Osteoporosis 12/2013    SBP (spontaneous bacterial peritonitis)     history of      Current Outpatient Medications   Medication Sig    ACCU-CHEK SMARTVIEW Strp 1 strip by Misc.(Non-Drug; Combo Route) route 5 (five) times daily. Trueresult    blood sugar diagnostic Strp To check BG 5 times daily, to use with insurance preferred meter    blood sugar diagnostic Strp 1 each by Misc.(Non-Drug; Combo Route) route 4 (four) times daily.    blood-glucose meter kit To check BG 5 times daily, to use with insurance preferred meter    cyproheptadine (PERIACTIN) 4 mg tablet Take 4 mg by mouth every evening.    diazepam (VALIUM) 5 MG tablet Take 5 mg by mouth 3 (three) times daily as needed.     dicyclomine (BENTYL) 10 MG capsule Take 1 capsule (10 mg total) by mouth before meals as needed (TID PRN).    DILTIAZEM HCL (DILTIAZEM 2% CREAM) Apply topically 3 (three) times daily. Apply topically to anal area.    dulaglutide (TRULICITY) 0.75 mg/0.5 mL PnIj Inject 0.5 mLs (0.75 mg total) into the skin once a week.    ergocalciferol (ERGOCALCIFEROL) 50,000 unit Cap Take 1 capsule (50,000 Units total) by mouth every 7 days.    estradiol (ESTRACE) 0.01 % (0.1 mg/gram) vaginal cream Place 0.5 g vaginally twice a week. Insert 0.5grams intravaginally twice weekly    fluconazole (DIFLUCAN) 150 MG Tab TAKE ONE TABLET BY MOUTH ONCE DAILY AS A SINGLE DOSE FOR INFECTION.    hydrALAZINE (APRESOLINE) 50  MG tablet Take 1 tablet (50 mg total) by mouth every 8 (eight) hours. for 3 days    hydroCHLOROthiazide (HYDRODIURIL) 25 MG tablet Take 0.5 tablets (12.5 mg total) by mouth once daily.    hydrOXYzine HCl (ATARAX) 10 MG Tab TAKE 1 OR 2 TABLETS BY MOUTH THREE TIMES DAILY AS NEEDED FOR ITCHING.    insulin aspart U-100 (NOVOLOG) 100 unit/mL InPn pen Blood Glucose mg/dL       <60            - Follow interventions for hypoglycemia below      151-200     -    4 units  201-250     -    6 units   251-300     -    9 units  301-350     -    12 units  351-400     -    15 units  >400          -    18 units    insulin detemir (LEVEMIR FLEXTOUCH) 100 unit/mL (3 mL) SubQ InPn pen Inject 14 Units into the skin once daily.    isosorbide mononitrate (IMDUR) 30 MG 24 hr tablet Take 30 mg by mouth once daily.    lancets 28 gauge Misc 4 lancets by Misc.(Non-Drug; Combo Route) route once daily. Pt uses Freestyle lite meter to check BG 4 times daily.    lancets Misc To check BG 5 times daily, to use with insurance preferred meter    lancets Misc 1 strip by Misc.(Non-Drug; Combo Route) route 4 (four) times daily.    losartan (COZAAR) 100 MG tablet Take 1 tablet (100 mg total) by mouth once daily.    magnesium oxide 500 mg Tab Take 500 mg by mouth 2 (two) times daily.    meclizine (ANTIVERT) 25 mg tablet TAKE ONE TABLET BY MOUTH AT BEDTIME AS NEEDED for dizziness.    MULTIVIT,THER IRON,CA,FA & MIN (MULTIVITAMIN) Tab Take 1 tablet by mouth once daily.    mycophenolate (MYFORTIC) 180 MG TbEC Take 3 tablets (540 mg total) by mouth 2 (two) times daily.    neomycin-polymyxin-hydrocortisone (CORTISPORIN) otic solution INSTILL TWO DROPS INTO BOTH EARS FOUR TIMES DAILY FOR 10 DAYS    NOVOLOG FLEXPEN 100 unit/mL InPn pen Inject 14 Units into the skin 3 times daily with meals. Plus correction scale, max DOSAGE= 72 UNITS    nystatin (MYCOSTATIN) 100,000 unit/mL suspension SWISH AND SPIT FIVE MILLILITERS BY MOUTH FOUR TIMES DAILY FOR 7  "DAYS.    nystatin (MYCOSTATIN) cream Apply topically 2 (two) times daily.    ondansetron (ZOFRAN) 8 MG tablet Take 1 tablet by mouth every 8 (eight) hours as needed.    oxycodone-acetaminophen (PERCOCET) 7.5-325 mg per tablet Take 1 tablet by mouth every 4 (four) hours as needed for Pain.    pantoprazole (PROTONIX) 40 MG tablet Take 40 mg by mouth once daily.    pen needle, diabetic (BD ULTRA-FINE JANESSA PEN NEEDLES) 32 gauge x 5/32" Ndle To use 4x/day with insulin injections.    pen needle, diabetic 31 gauge x 5/16" Ndle Use as directed    polyethylene glycol (GLYCOLAX) 17 gram/dose powder MIX 17 GMS IN WATER and drink ONCE DAILY    PROAIR HFA 90 mcg/actuation inhaler Inhale 2 puffs into the lungs 3 (three) times daily as needed.     promethazine-dextromethorphan (PROMETHAZINE-DM) 6.25-15 mg/5 mL Syrp Take by mouth 2 (two) times daily as needed.     ranitidine (ZANTAC) 300 MG tablet Take 300 mg by mouth 2 (two) times daily.     rosuvastatin (CRESTOR) 5 MG tablet Take 5 mg by mouth once daily.    tacrolimus (PROGRAF) 1 MG Cap Take 3 capsules (3 mg total) by mouth every 12 (twelve) hours.    triamcinolone (KENALOG) 0.5 % ointment 1 application 2 (two) times daily. Apply to affected area    valacyclovir (VALTREX) 500 MG tablet once daily as needed    verapamil (VERELAN) 120 MG C24P Take 1 capsule by mouth once daily.    zolpidem (AMBIEN) 10 mg Tab Take 10 mg by mouth nightly as needed.     No current facility-administered medications for this visit.      Review of patient's allergies indicates:   Allergen Reactions    Norvasc [amlodipine]      Severe headache    Doxycycline Rash    Pcn [penicillins] Rash         Evaluation Date: 8/14/2018  Visit # authorized:   Authorization period:   To Vendor Referred By By Location/Place of Service By Department   none Jeevan Ingram MD [facility]   Priority Start Date Expiration Date Referral Entered By   Routine " 07/12/2018 09/30/2018 Vanessa Diaz   Visits Requested Visits Authorized Visits Completed Visits Scheduled   1 12 1 0     Time Start: 1:00 pm  Time End:2:00 pm  Total min:60 min      Amaya Birmingham is a 48 y.o. female that presents to [facility] outpatient clinic secondary to Chronic lower back pain and left shoulder pain. Pt states she has been having lower back pain for over 12 years. Pt states she lower back pain got worse since she had liver transplant. Pt reports last 5 years pain is worse. Pt states lower back pain is dominant. But pt also have pain in hips and knee. Pt states she WB more on left knee. Pt feels right knee is weaker than right.  Pt reports she had epidural in her lower back and she also had steroid injection on her knees. Pt  Described lower back pain as sharp, throbbing  and aching pain.  Pt states pain aggravates  with bending, sitting, standing, lying on her side, walking, stairs and lying on her back. Pt reports pain get better lying on stomach and sides, massages, and pain meds. Pt states she does not like to lie flat on back to asthma. Pt was in Healthy back program, but she had to stop due to be hospitalized because her sugar was very high. Pt states her blood sugar was high due to injection on B knee. Pt c/o of left shoulder pain. Pt states she has nod in the top of her Left shoulder. Aggravating factors lifting objects or carrying groceries, and elevate the arm above head. Easing factors rubbing down with CBD and heating pack.       Precautions: DM, liver transplant,   Patient c/o: continuous/intermittent symptoms  Radicular symptoms: lumbar radiates down towards legs, left shoulder radiated towards to scapular  Onset:: insidious/sudden/gradual   Pain Scale: Valencia rates pain on a scale of 0-10 to be 8 at worst; 5 currently; 4 at best . Left shoulder  Pain Scale: Valencia rates pain on a scale of 0-10 to be 9 at worst; 7 currently; 5 at best . Lower back  Aggravating factors: See  above   Pain with coughing/sneezing, B&B, sleep disturbance:Yes   Relieving factors:See above   Previous treatment:yes Healthy back program  Past surgical history: liver transplant, eye surgery.   Functional deficits: ADL's and IADL's   Prior level of function: independent   Occupation: disability , work duties include:   Environment: No AD  No cultural or spiritual barriers identified to treatment or learning.  Patient's goals: Pt's goals are Her goals are walking, sitting and standing      Objective     Observation:     Posture Alignment: slouched posture;forward head;increased kyphosis    Sensation: intact to light touch    DTR:: intact to LE    GAIT DEVIATIONS: Valencia displays decreased step length;decreased base of support;decreased weight shift;decreased arm swing    Lumbar Range of Motion:    %   Flexion 42*     Extension 10*   Left Side Bending 16*   Right Side Bending 16*   Left rotation   Diminished    Right Rotation   Diminished     *= pain    Passive hip ROM in degrees:     Left Right   IR 35 40   ER 30 39     Active Range of Motion in  (degrees):   Shoulder Right Left   Flexion 155 125   Abduction 145 124   ER WFL 65   IR  WFL WFL     Upper Extremity Strength  (R) UE  (L) UE    Elbow flexion: 4+/5 Elbow flexion: 3+/5   Elbow extension: 4+/5 Elbow extension: 3+/5   Shoulder elevation: 5/5 Shoulder elevation: 5/5   Shoulder flexion: 4+/5 Shoulder flexion: 3+/5   Shoulder Abduction: 4+/5 Shoulder abduction: 3+/5   Shoulder ER 4+/5 Shoulder ER 3+/5   Shoulder IR 4+/5 Shoulder IR 3+/5       Special Tests:    Lower Extremity Strength    Right LE  Left LE    Hip flexion: 4-/5 Hip flexion: 4-/5   Knee extension: 4-/5 Knee extension: 4-/5   Knee flexion: 4-/5 Knee flexion: 4-/5   Hip IR: 4-/5 Hip IR: 4-/5   Hip ER: 4-/5 Hip ER: 4-/5   Hip extension:  4-/5 Hip extension: 4-/5   Hip abduction: 4-/5 Hip abduction: 4-/5   Hip adduction: 4-/5 Hip adduction 4-/5   Ankle dorsiflexion: 4-/5 Ankle dorsiflexion: 4-/5    Ankle plantarflexion: 4-/5 Ankle plantarflexion: 4-/5     Special Tests:  Special Tests:    Impingement   Right Left   Neer's  negative  positive   Isaacs-Otto  negative  positive     -Repeated Flexion: negative  -Repeated Ext:negative  -Bridge Test: positive  -Heel walking: negative  -Toe walking: negative    Neuro Dynamic Testing:    Sciatic nerve:      SLR: negative   Neural Tension:     Slump test: negative    Palpation: Increase pain at erector spinae and gluteus regions.     Flexibility:    Popliteal Angle: R = 125 degrees ; L = 132 degrees      CMS Impairment/Limitation/Restriction for FOTO Shoulder Survey  Status Limitation G-Code CMS Severity Modifier  Intake 52% 48% Current Status CK - At least 40 percent but less than 60 percent  Predicted 64% 36% Goal Status+ CJ - At least 20 percent but less than 40 percent  D/C Status CK **only report if this is a one time visit  +Based on FOTO predicted change score    PT Evaluation Completed? Yes  Discussed Plan of Care with patient: Yes    TREATMENT:  Valencia received therapeutic exercises to develop strength and endurance, flexibility for 10 minutes including:    Double knee to chest  Lower trunk rotation  Standing shoulder retraction yellow T-band   Standing shoulder extension yellow T-band    Valencia  received the following manual therapy techniques x 0 min: Joint mobilizations and soft tissue mobilization were applied to the N/A to improve/decrease N/A     Pt received cold pack x 0 min to N/A following treatment.    HEP provided:   Instructed pt. Regarding: Proper technique with all exercises. Pt demo good understanding of the education provided. Valencia demonstrated good return demonstration of activities.      Assessment     This is a 48 y.o. female referred to outpatient physical therapy and presents with a medical diagnosis of Chronic lower back and chronic left shoulder pain  and demonstrates limitations as described in the problem list. Pt will benefit  from physcial therapy services in order to maximize pain free functional independence. Pt presented to therapy with severe lower back and left shoulder pain. Pt demonstrated decrease in lower back and shoulder ROM. Pt presented with decrease B lower extremity muscle strength. Pt demonstrated both hamstring tightness contributing to lower back pain. During palpation, pt has increase in QL, gluteus, and paraspinal muscles pain. During palpation of left shoulder PT noted small nodule anterior left shoulder. Pt will benefit from skilled PT services to decrease functional limitations. The following goals were discussed with the patient and patient is in agreement with them as to be addressed in the treatment plan. Pt was given a HEP consisting of strengthening. Pt verbally understood the instructions as they were given and demonstrated proper form and technique during therapy. Pt was advised to perform these exercises free of pain, and to stop performing them if pain occurs.   Pt presents with the following impairments:          History  Co-morbidities and personal factors that may impact the plan of care Examination  Body Structures and Functions, activity limitations and participation restrictions that may impact the plan of care Clinical Presentation    Decision Making/ Complexity Score   Co-morbidities:   hx of liver transplant, fall risk, diabets.            Personal Factors:   no deficits Body Regions:   Back and left shoulder     Body Systems:   ROM  strength  transfers  motor control     Activity limitations:   Learning and applying knowledge  no deficits     General Tasks and Commands  no deficits     Communication  no deficits     Mobility  lifting and carrying objects  walking     Self care  no deficits     Domestic Life  doing house work (cleaning house, washing dishes, laundry)     Interactions/Relationships  no deficits     Life Areas  no deficits     Community and Social Life  community life  recreation  and leisure     Participation Restrictions:   Community activities        stable and uncomplicated    Complexity low  FOTO outcome assessment          Prognosis:Good    Anticipated barriers to physical therapy: None    Medical necessity is demonstrated by the following IMPAIRMENTS/PROBLEM LIST:   1) Increase in pain level limiting function   2) Decrease muscle strength   3) Decrease ROM   4) Decrease flexibility   5) Lack of HEP                  GOALS: Short Term Goals:  2-3 weeks  1. Report decreased in pain at worse less than  <   / =  5 /10  to increase tolerance for functional activities  2. Pt to increase B popliteal angle by greater than > or = 5 degrees in order to improve flexibility and posture.   3. Increased lower extremity and left shoulder  MMT 1/2  to increase tolerance for ADL and work activities.  4. Pt to tolerate HEP to improve ROM and independence with ADL's  5. Pt to improve left shoulder and lumbar range of motion by 25% to allow for improved functional mobility to allow for improvement in IADLs.     Long Term Goals: 6-8 weeks  1. Report decreased in pain at worse less than  <   / = 0 or 3 /10  to increase tolerance for functional mobility.   2 .Pt to increase B popliteal angle by greater than > or = 10 degrees in order to improve flexibility and posture.   3. Increased left shoulder and lower extremity  MMT 1 grade to increase tolerance for ADL and work activities.  4. Pt will report at 36% or less limitation on FOTO lumbar spine survey  to demonstrate decrease in disability and improvement in back pain.  5. Pt to be Independent with HEP to improve ROM and independence with ADL's  7. Pt to demonstrate negative Bridge Test in order to show improved core strength for lumbar stabilization.   8. Pt to improve left shoulder and  Lumbar range of motion by 75% to allow for improved functional mobility to allow for improvement in IADLs.       Plan     Pt will be treated by physical therapy 1-2 times  a week for 6-8 weeks for Pt Education, HEP, therapeutic exercises, neuromuscular re-education, manual therapy, joint mobilizations, taping techniques, functional dry needling, modalities prn to achieve established goals. Valencia may at times be seen by a PTA as part of the Rehab Team. Cont PT for 6-8  weeks.     Certification date:8/14/2018 to 11/14/2018     I certify the need for these services furnished under this plan of treatment and while under my care.______________________________ Physician/Referring Practitioner  Date of Signature    Edinson Rosenthal PT, DPT  Date:8/14/2018

## 2018-08-20 ENCOUNTER — TELEPHONE (OUTPATIENT)
Dept: ENDOCRINOLOGY | Facility: CLINIC | Age: 48
End: 2018-08-20

## 2018-08-20 NOTE — TELEPHONE ENCOUNTER
----- Message from Ernestine Doss sent at 8/17/2018  4:00 PM CDT -----  Contact: Self  .Rx Refill/Request     Is this a Refill or New Rx:  New  Rx Name and Strength:  Freestyle Maninder  Preferred Pharmacy with phone number: [facility], [phone] Fax: [phone]  Communication Preference: [phone]  Additional Information:  Pt was on the freestyle Maninder trial & would like to get one order for her. Tests 5 x day will help with less sticking.    .   - [place], [place] - [address]  [address] [facility] [address] [address]  [place] [address]  Phone:

## 2018-08-23 NOTE — PROGRESS NOTES
Physical Therapy Daily Note     Name: Valencia Dumont  Clinic Number: [id number]  Diagnosis:   Encounter Diagnoses   Name Primary?    Chronic midline low back pain without sciatica     Chronic left shoulder pain      Physician: Jeevan Ingram MD  Precautions: DM, liver transplant,   Visit #: 2 of 12    SPENCE:9/30/2018  PTA Visit #: 0    Time In: 2:30 pm  Time Out: 3:30 pm  Total time:  60 min  (1:1 with PT for 30 mins)    Subjective     Pt reports: that she her lower back pain is about 6/10   Pain Scale: Valencia rates pain on a scale of 0-10 to be 7 and 5 currently. Lower back & left shoulder     Objective     Valencia received individual therapeutic exercises to develop strength, endurance, ROM, flexibility, posture and core stabilization for 55 minutes including:    Upright bike 6 min    Lower back:    LTR with yoga ball 1x2;   DKTC with yoga ball 1x2'   Open book 3x10   Pelvic tilt 1x2'  Dead bug 2x10  Bridges 3x10    Left shoulder:  Supine shoulder flexion (white dowel) 3x10   SL gator 3x10  SL shoulder abd 3x10    Standing shoulder extension YTB 3x10  Standing scapular retraction YTB  3x10  Standing Shoulder ER 3x10  Standing shoulder IR 3x10     Valencia received the following manual therapy techniques: Soft tissue Mobilization were applied to the: N/a for 00 minutes including:      Written Home Exercises Provided:   Pt demo good understanding of the education provided. Valencia demonstrated good return demonstration of activities.     Education provided re:  Valencia verbalized good understanding of education provided.   No spiritual or educational barriers to learning provided    Assessment     Patient tolerated therapy well today. Pt demonstrated poor scapular thoracic muscle strengthening. Pt demonstrated left periscapular muscle weakness affecting scapulothoracic rhythm. Pt tolerated well lumbar exercises demonstrating poor TrA activation. Heavy V/c's required  to perform pelvic tilt. Cont skilled PT services to decrease lower back pain and left shoulder pain to return to functional mobility.     This is a 48 y.o. female referred to outpatient physical therapy and presents with a medical diagnosis of Chronic lower back and chronic left shoulder pain  and demonstrates limitations as described in the problem list. Pt will benefit  and demonstrates limitations as described in the problem list. Pt prognosis is Good. Pt will continue to benefit from skilled outpatient physical therapy to address the deficits listed in the problem list, provide pt/family education and to maximize pt's level of independence in the home and community environment.     Goals as follows:  GOALS: Short Term Goals:  2-3 weeks  1. Report decreased in pain at worse less than  <   / =  5 /10  to increase tolerance for functional activities  2. Pt to increase B popliteal angle by greater than > or = 5 degrees in order to improve flexibility and posture.   3. Increased lower extremity and left shoulder  MMT 1/2  to increase tolerance for ADL and work activities.  4. Pt to tolerate HEP to improve ROM and independence with ADL's  5. Pt to improve left shoulder and lumbar range of motion by 25% to allow for improved functional mobility to allow for improvement in IADLs.      Long Term Goals: 6-8 weeks  1. Report decreased in pain at worse less than  <   / = 0 or 3 /10  to increase tolerance for functional mobility.   2 .Pt to increase B popliteal angle by greater than > or = 10 degrees in order to improve flexibility and posture.   3. Increased left shoulder and lower extremity  MMT 1 grade to increase tolerance for ADL and work activities.  4. Pt will report at 36% or less limitation on FOTO lumbar spine survey  to demonstrate decrease in disability and improvement in back pain.  5. Pt to be Independent with HEP to improve ROM and independence with ADL's  7. Pt to demonstrate negative Bridge Test in order to  show improved core strength for lumbar stabilization.   8. Pt to improve left shoulder and  Lumbar range of motion by 75% to allow for improved functional mobility to allow for improvement in IADLs.      Plan     Continue with established Plan of Care towards PT goals.  Certification date:8/14/2018 to 11/14/2018   PN due 9/14/2018    Therapist: Edinson Rasmussen, PT  8/24/2018

## 2018-08-24 ENCOUNTER — CLINICAL SUPPORT (OUTPATIENT)
Dept: REHABILITATION | Facility: HOSPITAL | Age: 48
End: 2018-08-24
Attending: PHYSICAL MEDICINE & REHABILITATION
Payer: MEDICARE

## 2018-08-24 DIAGNOSIS — G89.29 CHRONIC MIDLINE LOW BACK PAIN WITHOUT SCIATICA: ICD-10-CM

## 2018-08-24 DIAGNOSIS — M54.50 CHRONIC MIDLINE LOW BACK PAIN WITHOUT SCIATICA: ICD-10-CM

## 2018-08-24 DIAGNOSIS — M25.512 CHRONIC LEFT SHOULDER PAIN: ICD-10-CM

## 2018-08-24 DIAGNOSIS — G89.29 CHRONIC LEFT SHOULDER PAIN: ICD-10-CM

## 2018-08-24 PROCEDURE — 97110 THERAPEUTIC EXERCISES: CPT | Mod: PN

## 2018-08-24 NOTE — TELEPHONE ENCOUNTER
----- Message from Ankita Livingston sent at 8/24/2018  4:17 PM CDT -----  NPharmacy Calling    Reason for call:     Anther prescription is needed   Pharmacy Name:    [facility]    Prescription Name:   Free  Style test strips   Phone Number:   [phone]  Additional Information:   Call back to verify

## 2018-08-27 ENCOUNTER — CLINICAL SUPPORT (OUTPATIENT)
Dept: REHABILITATION | Facility: HOSPITAL | Age: 48
End: 2018-08-27
Attending: PHYSICAL MEDICINE & REHABILITATION
Payer: MEDICARE

## 2018-08-27 DIAGNOSIS — G89.29 CHRONIC MIDLINE LOW BACK PAIN WITHOUT SCIATICA: ICD-10-CM

## 2018-08-27 DIAGNOSIS — G89.29 CHRONIC LEFT SHOULDER PAIN: ICD-10-CM

## 2018-08-27 DIAGNOSIS — M54.50 CHRONIC MIDLINE LOW BACK PAIN WITHOUT SCIATICA: ICD-10-CM

## 2018-08-27 DIAGNOSIS — M25.512 CHRONIC LEFT SHOULDER PAIN: ICD-10-CM

## 2018-08-27 PROCEDURE — 97110 THERAPEUTIC EXERCISES: CPT | Mod: PN

## 2018-08-27 NOTE — PROGRESS NOTES
Physical Therapy Daily Note     Name: Valencia Dumont  Clinic Number: [id number]  Diagnosis:   Encounter Diagnoses   Name Primary?    Chronic midline low back pain without sciatica     Chronic left shoulder pain      Physician: Jeevan Ingram MD  Precautions: DM, liver transplant,   Visit #: 3 of 12     SPENCE: 9/30/2018  PN due: 9/14/2018  PTA Visit #: 1    Time In: 1300  Time Out: 1400  Total Treatment Time: 60 min (1:1 with PTA for 30 mins)    Subjective     Pt reports: no pain right now. Patient reports some stiffness in her Right knee this afternoon.  Pain Scale: Valencia rates pain on a scale of 0-10 to be 0 currently.    Objective     Valencia received individual therapeutic exercises to develop strength, endurance, ROM, flexibility, posture and core stabilization for 60 minutes including:    Warm-up: Upright Bike x 7 minutes    LOWER BACK:  LTR with SB x 2 minutes  DKTC with SB x 2 minutes  Open book: 3 x 10 ea.  Pelvic tilt: 2 minutes  Dead bug: 2 x 10  Bridges: 3 x 10    LEFT SHOULDER:  Supine shoulder flexion (white dowel) 3 x 10   SL gator 3 x 10  SL shoulder abd 3 x 10    Standing shoulder extension Red TB x 3 x 10  Standing scapular retraction Red TB x 3 x 10  Standing Shoulder ER Red TB x 3 x 10  Standing shoulder IR Red TB x 3 x 10     Valencia received the following manual therapy techniques: none    Written Home Exercises Provided: Reviewed from initial evaluation. See EMR for printout provided.  Pt demo good understanding of the education provided. Valencia demonstrated good return demonstration of activities.     Education provided re:   Valencia verbalized good understanding of education provided.   No spiritual or educational barriers to learning provided    Assessment     Patient tolerated treatment session well today. Good tolerance to exercises performed. Patient reported discomfort in Left shoulder with SL gators and abduction likely due to poor  scapular control and strength. Patient with easily achieved fatigue during shoulder ER with theraband requiring verbal cueing to maintain proper technique and muscle activation. Patient challenged with core activation with dual activity requiring tactile cueing to maintain proper muscle activation.     This is a 48 y.o. female referred to outpatient physical therapy and presents with a medical diagnosis of Chronic lower back and chronic left shoulder pain  and demonstrates limitations as described in the problem list. Pt will benefit  and demonstrates limitations as described in the problem list. Pt prognosis is Good. Pt will continue to benefit from skilled outpatient physical therapy to address the deficits listed in the problem list, provide pt/family education and to maximize pt's level of independence in the home and community environment.     Goals as follows:  GOALS: Short Term Goals:  2-3 weeks  1. Report decreased in pain at worse less than  <   / =  5 /10  to increase tolerance for functional activities  2. Pt to increase B popliteal angle by greater than > or = 5 degrees in order to improve flexibility and posture.   3. Increased lower extremity and left shoulder  MMT 1/2  to increase tolerance for ADL and work activities.  4. Pt to tolerate HEP to improve ROM and independence with ADL's  5. Pt to improve left shoulder and lumbar range of motion by 25% to allow for improved functional mobility to allow for improvement in IADLs.      Long Term Goals: 6-8 weeks  1. Report decreased in pain at worse less than  <   / = 0 or 3 /10  to increase tolerance for functional mobility.   2 .Pt to increase B popliteal angle by greater than > or = 10 degrees in order to improve flexibility and posture.   3. Increased left shoulder and lower extremity  MMT 1 grade to increase tolerance for ADL and work activities.  4. Pt will report at 36% or less limitation on FOTO lumbar spine survey  to demonstrate decrease in  disability and improvement in back pain.  5. Pt to be Independent with HEP to improve ROM and independence with ADL's  7. Pt to demonstrate negative Bridge Test in order to show improved core strength for lumbar stabilization.   8. Pt to improve left shoulder and  Lumbar range of motion by 75% to allow for improved functional mobility to allow for improvement in IADLs.      Plan     Continue with established Plan of Care towards PT goals.    Certification date: 8/14/2018 to 11/14/2018 (PN due 9/14/2018)    Therapist: Farrah Melendez, PTA  8/27/2018

## 2018-08-28 ENCOUNTER — PATIENT MESSAGE (OUTPATIENT)
Dept: PODIATRY | Facility: CLINIC | Age: 48
End: 2018-08-28

## 2018-08-29 ENCOUNTER — LAB VISIT (OUTPATIENT)
Dept: LAB | Facility: HOSPITAL | Age: 48
End: 2018-08-29
Attending: HOSPITALIST
Payer: MEDICARE

## 2018-08-29 ENCOUNTER — PATIENT MESSAGE (OUTPATIENT)
Dept: ENDOCRINOLOGY | Facility: CLINIC | Age: 48
End: 2018-08-29

## 2018-08-29 DIAGNOSIS — R19.7 DIARRHEA, UNSPECIFIED TYPE: ICD-10-CM

## 2018-08-29 DIAGNOSIS — N18.31 CKD STAGE G3A/A1, GFR 45-59 AND ALBUMIN CREATININE RATIO <30 MG/G: ICD-10-CM

## 2018-08-29 DIAGNOSIS — D50.9 MICROCYTIC NORMOCHROMIC ANEMIA: ICD-10-CM

## 2018-08-29 LAB
ALBUMIN SERPL BCP-MCNC: 4.1 G/DL
ANION GAP SERPL CALC-SCNC: 7 MMOL/L
BASOPHILS # BLD AUTO: 0 K/UL
BASOPHILS NFR BLD: 0 %
BUN SERPL-MCNC: 21 MG/DL
CALCIUM SERPL-MCNC: 9.3 MG/DL
CHLORIDE SERPL-SCNC: 106 MMOL/L
CO2 SERPL-SCNC: 25 MMOL/L
CREAT SERPL-MCNC: 1.2 MG/DL
DIFFERENTIAL METHOD: ABNORMAL
EOSINOPHIL # BLD AUTO: 0.1 K/UL
EOSINOPHIL NFR BLD: 1.1 %
ERYTHROCYTE [DISTWIDTH] IN BLOOD BY AUTOMATED COUNT: 15.7 %
EST. GFR  (AFRICAN AMERICAN): >60 ML/MIN/1.73 M^2
EST. GFR  (NON AFRICAN AMERICAN): 54 ML/MIN/1.73 M^2
FERRITIN SERPL-MCNC: 67 NG/ML
GLUCOSE SERPL-MCNC: 188 MG/DL
HCT VFR BLD AUTO: 32.9 %
HGB BLD-MCNC: 11.3 G/DL
IRON SERPL-MCNC: 70 UG/DL
LYMPHOCYTES # BLD AUTO: 1.8 K/UL
LYMPHOCYTES NFR BLD: 41.1 %
MCH RBC QN AUTO: 26.7 PG
MCHC RBC AUTO-ENTMCNC: 34.3 G/DL
MCV RBC AUTO: 78 FL
MONOCYTES # BLD AUTO: 0.3 K/UL
MONOCYTES NFR BLD: 6.4 %
NEUTROPHILS # BLD AUTO: 2.2 K/UL
NEUTROPHILS NFR BLD: 51.4 %
PHOSPHATE SERPL-MCNC: 2.8 MG/DL
PLATELET # BLD AUTO: 199 K/UL
PMV BLD AUTO: 9.4 FL
POTASSIUM SERPL-SCNC: 4.1 MMOL/L
RBC # BLD AUTO: 4.23 M/UL
SATURATED IRON: 20 %
SODIUM SERPL-SCNC: 138 MMOL/L
TOTAL IRON BINDING CAPACITY: 358 UG/DL
TRANSFERRIN SERPL-MCNC: 242 MG/DL
WBC # BLD AUTO: 4.35 K/UL

## 2018-08-29 PROCEDURE — 83540 ASSAY OF IRON: CPT

## 2018-08-29 PROCEDURE — 80069 RENAL FUNCTION PANEL: CPT

## 2018-08-29 PROCEDURE — 82728 ASSAY OF FERRITIN: CPT

## 2018-08-29 PROCEDURE — 36415 COLL VENOUS BLD VENIPUNCTURE: CPT

## 2018-08-29 PROCEDURE — 85025 COMPLETE CBC W/AUTO DIFF WBC: CPT

## 2018-08-29 RX ORDER — DICYCLOMINE HYDROCHLORIDE 10 MG/1
10 CAPSULE ORAL
Qty: 90 CAPSULE | Refills: 2 | Status: SHIPPED | OUTPATIENT
Start: 2018-08-29 | End: 2020-07-17

## 2018-08-30 ENCOUNTER — OFFICE VISIT (OUTPATIENT)
Dept: NEPHROLOGY | Facility: CLINIC | Age: 48
End: 2018-08-30
Payer: MEDICARE

## 2018-08-30 VITALS
OXYGEN SATURATION: 99 % | HEART RATE: 98 BPM | HEIGHT: 69 IN | DIASTOLIC BLOOD PRESSURE: 80 MMHG | SYSTOLIC BLOOD PRESSURE: 120 MMHG | BODY MASS INDEX: 19.55 KG/M2 | WEIGHT: 132 LBS

## 2018-08-30 DIAGNOSIS — N76.0 VULVOVAGINITIS: ICD-10-CM

## 2018-08-30 DIAGNOSIS — B37.9 YEAST INFECTION: ICD-10-CM

## 2018-08-30 PROCEDURE — 3074F SYST BP LT 130 MM HG: CPT | Mod: CPTII,GC,, | Performed by: HOSPITALIST

## 2018-08-30 PROCEDURE — 99999 PR PBB SHADOW E&M-EST. PATIENT-LVL III: CPT | Mod: PBBFAC,GC,, | Performed by: HOSPITALIST

## 2018-08-30 PROCEDURE — 3008F BODY MASS INDEX DOCD: CPT | Mod: CPTII,GC,, | Performed by: HOSPITALIST

## 2018-08-30 PROCEDURE — 3079F DIAST BP 80-89 MM HG: CPT | Mod: CPTII,GC,, | Performed by: HOSPITALIST

## 2018-08-30 PROCEDURE — 99214 OFFICE O/P EST MOD 30 MIN: CPT | Mod: S$PBB,GC,, | Performed by: HOSPITALIST

## 2018-08-30 RX ORDER — VERAPAMIL HYDROCHLORIDE 120 MG/1
TABLET, FILM COATED, EXTENDED RELEASE ORAL
Refills: 3 | COMMUNITY
Start: 2018-08-01

## 2018-08-30 RX ORDER — FLUCONAZOLE 150 MG/1
TABLET ORAL
Qty: 1 TABLET | Refills: 0 | Status: SHIPPED | OUTPATIENT
Start: 2018-08-30 | End: 2019-01-08

## 2018-08-30 NOTE — PROGRESS NOTES
Subjective:       Patient ID: Valencia Dumont 48 y.o. Black or  female who presents for new evaluation of Chronic Kidney Disease and Hypertension      HPI  Valencia Dumont is a 48 y.o. 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 This is the first time she see's a Nephrologist. She has done well except for the episode of rejection. He Prograf level was found low which correlated with improvement in sCr. In addition, sCr has fluctuated over the last year with ups and down sometimes correlating with FK levels but her estimated sCr baseline is 1.5-2.0 mg/dL. She has no proteinuria and no evidence of hematuria. SLE is closely followed by Rheumatology and she is in Plaquenil and her immunosuppressive therapy consists of Prograf Myfortic and Prednisone. Her BP is elevated today 140/80 mmHg. She denies any swelling but c/o easy bruising. C3 and C4 are WNL and dsDNA is negative.  The patient denies taking NSAIDs or new antibiotics, recreational drugs, recent episode of dehydration, diarrhea, nausea or vomiting, acute illness, hospitalization or exposure to IV radiocontrast.    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    Diabetes mellitus     Esophageal varices     Hypertension     Kidney stone     Lupus     Osteoporosis 12/2013    SBP (spontaneous bacterial peritonitis)     history of        Family History   Problem Relation Age of Onset    Hypertension Mother     Cataracts Mother     Diabetes Father     Alzheimer's disease Father     Diabetes Paternal Grandfather     Diabetes Paternal Grandmother     No Known Problems Maternal Grandmother     No Known Problems Maternal Grandfather     Breast cancer Maternal Aunt 55    Hypertension Brother      Diabetes Brother     No Known Problems Sister     No Known Problems Maternal Uncle     No Known Problems Paternal Aunt     No Known Problems Paternal Uncle     Stroke Neg Hx     Cancer Neg Hx     Colon cancer Neg Hx     Esophageal cancer Neg Hx     Stomach cancer Neg Hx     Rectal cancer Neg Hx     Amblyopia Neg Hx     Blindness Neg Hx     Glaucoma Neg Hx     Macular degeneration Neg Hx     Retinal detachment Neg Hx     Strabismus Neg Hx     Thyroid disease Neg Hx         Past Surgical History:   Procedure Laterality Date    ESOPHAGOGASTRODUODENOSCOPY      LIVER BIOPSY      LIVER TRANSPLANT  12/31/2013    REFRACTIVE SURGERY Bilateral 2010    TUBAL LIGATION  2003     Current Outpatient Medications on File Prior to Visit   Medication Sig Dispense Refill    ACCU-CHEK SMARTVIEW Strp 1 strip by Misc.(Non-Drug; Combo Route) route 5 (five) times daily. Trueresult      blood sugar diagnostic Strp 1 each by Misc.(Non-Drug; Combo Route) route 4 (four) times daily. 100 each 6    blood sugar diagnostic Strp To check BG 5 times daily, to use with insurance preferred meter 200 strip 11    blood-glucose meter kit To check BG 5 times daily, to use with insurance preferred meter 1 each 0    cyproheptadine (PERIACTIN) 4 mg tablet Take 4 mg by mouth every evening.  3    diazepam (VALIUM) 5 MG tablet Take 5 mg by mouth 3 (three) times daily as needed.   0    dicyclomine (BENTYL) 10 MG capsule Take 1 capsule (10 mg total) by mouth before meals as needed (TID PRN). 90 capsule 2    DILTIAZEM HCL (DILTIAZEM 2% CREAM) Apply topically 3 (three) times daily. Apply topically to anal area. 30 g 0    dulaglutide (TRULICITY) 0.75 mg/0.5 mL PnIj Inject 0.5 mLs (0.75 mg total) into the skin once a week. 4 Syringe 6    ergocalciferol (ERGOCALCIFEROL) 50,000 unit Cap Take 1 capsule (50,000 Units total) by mouth every 7 days. 4 capsule 2    estradiol (ESTRACE) 0.01 % (0.1 mg/gram) vaginal cream Place 0.5 g vaginally  twice a week. Insert 0.5grams intravaginally twice weekly 42 g 4    flash glucose scanning reader (FREESTYLE CHUN READER) Misc 1 application by Misc.(Non-Drug; Combo Route) route continuous. 1 each 0    hydrOXYzine HCl (ATARAX) 10 MG Tab TAKE 1 OR 2 TABLETS BY MOUTH THREE TIMES DAILY AS NEEDED FOR ITCHING.  0    insulin aspart U-100 (NOVOLOG) 100 unit/mL InPn pen Blood Glucose mg/dL       <60            - Follow interventions for hypoglycemia below      151-200     -    4 units  201-250     -    6 units   251-300     -    9 units  301-350     -    12 units  351-400     -    15 units  >400          -    18 units 3 mL 6    insulin detemir (LEVEMIR FLEXTOUCH) 100 unit/mL (3 mL) SubQ InPn pen Inject 14 Units into the skin once daily. 15 mL 3    isosorbide mononitrate (IMDUR) 30 MG 24 hr tablet Take 30 mg by mouth once daily.      lancets 28 gauge Misc 4 lancets by Misc.(Non-Drug; Combo Route) route once daily. Pt uses Freestyle lite meter to check BG 4 times daily. 200 each 11    lancets Misc To check BG 5 times daily, to use with insurance preferred meter 200 each 11    lancets Misc 1 strip by Misc.(Non-Drug; Combo Route) route 4 (four) times daily. 100 each 3    losartan (COZAAR) 100 MG tablet Take 1 tablet (100 mg total) by mouth once daily. 30 tablet 2    magnesium oxide 500 mg Tab Take 500 mg by mouth 2 (two) times daily. 60 each 6    meclizine (ANTIVERT) 25 mg tablet TAKE ONE TABLET BY MOUTH AT BEDTIME AS NEEDED for dizziness.  0    MULTIVIT,THER IRON,CA,FA & MIN (MULTIVITAMIN) Tab Take 1 tablet by mouth once daily. 30 tablet 0    mycophenolate (MYFORTIC) 180 MG TbEC Take 3 tablets (540 mg total) by mouth 2 (two) times daily. 240 tablet 5    neomycin-polymyxin-hydrocortisone (CORTISPORIN) otic solution INSTILL TWO DROPS INTO BOTH EARS FOUR TIMES DAILY FOR 10 DAYS  0    NOVOLOG FLEXPEN 100 unit/mL InPn pen Inject 14 Units into the skin 3 times daily with meals. Plus correction scale, max DOSAGE= 72  "UNITS 15 mL 3    nystatin (MYCOSTATIN) 100,000 unit/mL suspension SWISH AND SPIT FIVE MILLILITERS BY MOUTH FOUR TIMES DAILY FOR 7 DAYS.  1    nystatin (MYCOSTATIN) cream Apply topically 2 (two) times daily. 30 g 3    ondansetron (ZOFRAN) 8 MG tablet Take 1 tablet by mouth every 8 (eight) hours as needed.  0    oxycodone-acetaminophen (PERCOCET) 7.5-325 mg per tablet Take 1 tablet by mouth every 4 (four) hours as needed for Pain.      pantoprazole (PROTONIX) 40 MG tablet Take 40 mg by mouth once daily.      pen needle, diabetic (BD ULTRA-FINE JANESSA PEN NEEDLES) 32 gauge x 5/32" Ndle To use 4x/day with insulin injections. 400 each 3    pen needle, diabetic 31 gauge x 5/16" Ndle Use as directed 100 each 12    polyethylene glycol (GLYCOLAX) 17 gram/dose powder MIX 17 GMS IN WATER and drink ONCE DAILY  3    PROAIR HFA 90 mcg/actuation inhaler Inhale 2 puffs into the lungs 3 (three) times daily as needed.   3    promethazine-dextromethorphan (PROMETHAZINE-DM) 6.25-15 mg/5 mL Syrp Take by mouth 2 (two) times daily as needed.   0    ranitidine (ZANTAC) 300 MG tablet Take 300 mg by mouth 2 (two) times daily.       rosuvastatin (CRESTOR) 5 MG tablet Take 5 mg by mouth once daily.      tacrolimus (PROGRAF) 1 MG Cap Take 3 capsules (3 mg total) by mouth every 12 (twelve) hours. 180 capsule 11    triamcinolone (KENALOG) 0.5 % ointment 1 application 2 (two) times daily. Apply to affected area  3    valacyclovir (VALTREX) 500 MG tablet once daily as needed  0    verapamil (CALAN-SR) 120 MG CR tablet TAKE ONE TABLET ONCE DAILY FOR BLOOD PRESSURE  3    verapamil (VERELAN) 120 MG C24P Take 1 capsule by mouth once daily.  3    zolpidem (AMBIEN) 10 mg Tab Take 10 mg by mouth nightly as needed.  3    hydrALAZINE (APRESOLINE) 50 MG tablet Take 1 tablet (50 mg total) by mouth every 8 (eight) hours. for 3 days 12 tablet 0    hydroCHLOROthiazide (HYDRODIURIL) 25 MG tablet Take 0.5 tablets (12.5 mg total) by mouth once " daily. 30 tablet 2     No current facility-administered medications on file prior to visit.          Review of Systems    Review of Systems   Constitutional: Negative for appetite change, fatigue, fever and unexpected weight change.   HENT: Negative for facial swelling, hearing loss and tinnitus.    Eyes: Negative for visual disturbance.   Respiratory: Negative for chest tightness and shortness of breath.    Cardiovascular: Negative for chest pain and leg swelling.   Gastrointestinal: Negative for abdominal pain and nausea.   Genitourinary: Negative for difficulty urinating, dysuria and flank pain.   Musculoskeletal: Negative for arthralgias and myalgias.   Skin: Negative for color change.   Neurological: Negative for dizziness, syncope, weakness, light-headedness and headaches.   Hematological: Bruises/bleeds easily.   Psychiatric/Behavioral: Negative for behavioral problems and confusion.       Objective:      Physical Exam    Physical Exam   Constitutional: She is oriented to person, place, and time. She appears well-developed and well-nourished. No distress.   HENT:   Head: Normocephalic and atraumatic.   Eyes: Conjunctivae are normal. Pupils are equal, round, and reactive to light.   Neck: Neck supple. No JVD present.   Cardiovascular: Normal rate, regular rhythm and intact distal pulses. Exam reveals no gallop and no friction rub.   No murmur heard.  Pulmonary/Chest: Effort normal and breath sounds normal. She has no wheezes. She has no rales.   Abdominal: Soft. Bowel sounds are normal. She exhibits no distension. There is no tenderness.   Musculoskeletal: Normal range of motion. She exhibits no edema or deformity.   Neurological: She is alert and oriented to person, place, and time. She has normal reflexes.   Skin: Skin is warm and dry. Bruising noted. She is not diaphoretic.   Psychiatric: She has a normal mood and affect. Her behavior is normal.       Assessment:        ICD-10-CM ICD-9-CM   1. Yeast  infection B37.9 112.9   2. Vulvovaginitis N76.0 616.10        Plan:     1. CKD stage 3bA1: most likely multifactorial secondary to CNI, DMT2 and HTN. No proteinuria and no hematuria is good predictor for less likely SLE affecting Kidney. Last sCr was 1.6 mg/dL but her FK level was on low side as well which correlates with FK levels and renal fx.     Lab Results   Component Value Date    CREATININE 1.2 08/29/2018   · Optimize BP control  · Low Salt diet  · Avoid nephrotoxic drugs as much as possible, she states she doesnt take NSAID's as she was told not to luis manuel her Liver.  · Optimize DM control with goal A1C < 6.2%.  · Please maintain FK level at goal per Hepatology    Protein Creatinine Ratios:  Prot/Creat Ratio, Ur   Date Value Ref Range Status   08/29/2018 Unable to calculate 0.00 - 0.20 Final   07/27/2018 0.16 0.00 - 0.20 Final   06/04/2018 0.08 0.00 - 0.20 Final   No evidence of Proteinuria.    Acid-Base: at goal  Lab Results   Component Value Date     08/29/2018    K 4.1 08/29/2018    CO2 25 08/29/2018     2. HTN: Blood pressures   · Not well control BP, several functions on flowsheet, will continue to monitor. Goal for BP is <130 mmHg SBP and BDP <80 mmHg.   · Cont Verapamil 120 mg   · Cont Losartan 100 mg  · Cont Imdur 30 mg  · Cont HCTZ 25 mg  · Low Salt diet   · US retroperitoneal    3. Renal osteodystrophy: last PTH   Lab Results   Component Value Date    .5 (H) 07/27/2018    CALCIUM 9.3 08/29/2018    CAION 1.11 12/31/2013    PHOS 2.8 08/29/2018   · Will check PTH and Vit D  · Cont ergocalciferol    4. Anemia: Microcitic  Lab Results   Component Value Date    HGB 11.3 (L) 08/29/2018   1. Will check FE panel     5. DM:  Last HbA1C   Lab Results   Component Value Date    HGBA1C 10.8 (H) 08/08/2018   · as per Endocrinology    6. Lipid management: Cont Statin  Lab Results   Component Value Date    LDLCALC 82.0 07/28/2018     7. SLE: Cont Plaquenil as per Rheumatology     Follow up in 6 mo with  labs and Urine.  Valentín Red MD  Nephrology Fellow   [phone]

## 2018-09-04 ENCOUNTER — PATIENT MESSAGE (OUTPATIENT)
Dept: ENDOCRINOLOGY | Facility: CLINIC | Age: 48
End: 2018-09-04

## 2018-09-05 ENCOUNTER — OFFICE VISIT (OUTPATIENT)
Dept: RHEUMATOLOGY | Facility: CLINIC | Age: 48
End: 2018-09-05
Payer: MEDICARE

## 2018-09-05 VITALS
SYSTOLIC BLOOD PRESSURE: 151 MMHG | WEIGHT: 136.13 LBS | HEIGHT: 69 IN | BODY MASS INDEX: 20.16 KG/M2 | DIASTOLIC BLOOD PRESSURE: 82 MMHG | HEART RATE: 90 BPM

## 2018-09-05 DIAGNOSIS — N76.0 VULVOVAGINITIS: ICD-10-CM

## 2018-09-05 DIAGNOSIS — R53.83 FATIGUE, UNSPECIFIED TYPE: ICD-10-CM

## 2018-09-05 DIAGNOSIS — M32.9 SYSTEMIC LUPUS ERYTHEMATOSUS, UNSPECIFIED SLE TYPE, UNSPECIFIED ORGAN INVOLVEMENT STATUS: Primary | Chronic | ICD-10-CM

## 2018-09-05 PROCEDURE — 3008F BODY MASS INDEX DOCD: CPT | Mod: CPTII,,, | Performed by: INTERNAL MEDICINE

## 2018-09-05 PROCEDURE — 3079F DIAST BP 80-89 MM HG: CPT | Mod: CPTII,,, | Performed by: INTERNAL MEDICINE

## 2018-09-05 PROCEDURE — 99214 OFFICE O/P EST MOD 30 MIN: CPT | Mod: S$PBB,,, | Performed by: INTERNAL MEDICINE

## 2018-09-05 PROCEDURE — 99214 OFFICE O/P EST MOD 30 MIN: CPT | Mod: PBBFAC | Performed by: INTERNAL MEDICINE

## 2018-09-05 PROCEDURE — 99999 PR PBB SHADOW E&M-EST. PATIENT-LVL IV: CPT | Mod: PBBFAC,,, | Performed by: INTERNAL MEDICINE

## 2018-09-05 PROCEDURE — 3077F SYST BP >= 140 MM HG: CPT | Mod: CPTII,,, | Performed by: INTERNAL MEDICINE

## 2018-09-05 RX ORDER — FLUCONAZOLE 150 MG/1
TABLET ORAL
Qty: 1 TABLET | Refills: 0 | Status: CANCELLED | OUTPATIENT
Start: 2018-09-05

## 2018-09-05 RX ORDER — HYDROXYCHLOROQUINE SULFATE 200 MG/1
200 TABLET, FILM COATED ORAL 2 TIMES DAILY
COMMUNITY
End: 2019-11-05 | Stop reason: SDUPTHER

## 2018-09-05 NOTE — PATIENT INSTRUCTIONS
Low-Salt Diet  This diet removes foods that are high in salt. It also limits the amount of salt you use when cooking. It is most often used for people with high blood pressure, edema (fluid retention), and kidney, liver, or heart disease.  Table salt contains the mineral sodium. Your body needs sodium to work normally. But too much sodium can make your health problems worse. Your healthcare provider is recommending a low-salt (also called low-sodium) diet for you. Your total daily allowance of salt is 1,500 to 2,300 milligrams (mg). It is less than 1 teaspoon of table salt. This means you can have only about 500 to 700 mg of sodium at each meal. People with certain health problems should limit salt intake to the lower end of the recommended range.    When you cook, dont add much salt. If you can cook without using salt, even better. Dont add salt to your food at the table.  When shopping, read food labels. Salt is often called sodium on the label. Choose foods that are salt-free, low salt, or very low salt. Note that foods with reduced salt may not lower your salt intake enough.    Beans, potatoes, and pasta  Ok: Dry beans, split peas, lentils, potatoes, rice, macaroni, pasta, spaghetti without added salt  Avoid: Potato chips, tortilla chips, and similar products  Breads and cereals  Ok: Low-sodium breads, rolls, cereals, and cakes; low-salt crackers, matzo crackers  Avoid: Salted crackers, pretzels, popcorn, Romanian toast, pancakes, muffins  Dairy  Ok: Milk, chocolate milk, hot chocolate mix, low-salt cheeses, and yogurt  Avoid: Processed cheese and cheese spreads; Roquefort, Camembert, and cottage cheese; buttermilk, instant breakfast drink  Desserts  Ok: Ice cream, frozen yogurt, juice bars, gelatin, cookies and pies, sugar, honey, jelly, hard candy  Avoid: Most pies, cakes and cookies prepared or processed with salt; instant pudding  Drinks  Ok: Tea, coffee, fizzy (carbonated) drinks, juices  Avoid: Flavored  From: Jamia Lawrence  To: Rolo Santana MD  Sent: 9/12/2017 9:11 AM CDT  Subject: Right side    Still always wondering what causes the neuropathy in my right foot since it is accompanied with odd and knumb feelings with much of my right side. Could the slowed tibial motor conduction on the right side be a sign of demyelination of nerves that could cause these symptoms? Thank you Jamia Lawrence   coffees, electrolyte replacement drinks, sports drinks  Meats  Ok: All fresh meat, fish, poultry, low-salt tuna, eggs, egg substitute  Avoid: Smoked, pickled, brine-cured, or salted meats and fish. This includes argueta, chipped beef, corned beef, hot dogs, deli meats, ham, kosher meats, salt pork, sausage, canned tuna, salted codfish, smoked salmon, herring, sardines, or anchovies.  Seasonings and spices  Ok: Most seasonings are okay. Good substitutes for salt include: fresh herb blends, hot sauce, lemon, garlic, bergeron, vinegar, dry mustard, parsley, cilantro, horseradish, tomato paste, regular margarine, mayonnaise, unsalted butter, cream cheese, vegetable oil, cream, low-salt salad dressing and gravy.  Avoid: Regular ketchup, relishes, pickles, soy sauce, teriyaki sauce, Worcestershire sauce, BBQ sauce, tartar sauce, meat tenderizer, chili sauce, regular gravy, regular salad dressing, salted butter  Soups  Ok: Low-salt soups and broths made with allowed foods  Avoid: Bouillon cubes, soups with smoked or salted meats, regular soup and broth  Vegetables  Ok: Most vegetables are okay; also low-salt tomato and vegetable juices  Avoid: Sauerkraut and other brine-soaked vegetables; pickles and other pickled vegetables; tomato juice, olives  Date Last Reviewed: 8/1/2016 © 2000-2017 [employer]. [address] [place] [address]. All rights reserved. This information is not intended as a substitute for professional medical care. Always follow your healthcare professional's instructions.        Chronic Kidney Disease (CKD)     The role of the kidneys is to remove waste products and extra water from the blood.  When the kidneys do not work as they should, waste products begin to build up in the blood. This is called chronic kidney disease (CKD). CKD means that you have kidney damage or a decrease in kidney function lasting at least 3 months. CKD allows extra water, waste, and toxins to build up in the  body. This can eventually become life-threatening. You might need dialysis or a kidney transplant to stay alive. This most severe form is called end stage renal disease.  Diabetes is the leading causes of chronic renal failure. Other causes include high blood pressure, hardening of the arteries (atherosclerosis), lupus, inflammation of the blood vessels (vasculitis), and past viral or bacterial infections. Certain over-the-counter pain medicines can cause renal failure when taken often over a long period of time. These include aspirin, ibuprofen, and related anti-inflammatory medicines called NSAIDs (nonsteroidal anti-inflammatory drugs).  Home care  The following guidelines will help you care for yourself at home:  · If you have diabetes, talk with your healthcare provider about keeping your blood sugar under control. Ask if you need to make and changes to your diet, lifestyle, or medicines.  · If you have high blood pressure:  ¨ Take prescribed medicine to lower your blood pressure to the recommended goal of less than 130/80.  ¨ Start a regular exercise program that you enjoy. Check with your healthcare provider to be sure your planned exercise program is right for you.  ¨ Eat less salt (sodium). Your healthcare provider can tell you how much salt per day is safe for you.  · If you are overweight, talk with your healthcare provider about a weight loss plan.  · If you smoke, you must quit. Smoking makes kidney disease worse. Talk with your healthcare provider about ways to help you quit.  For more information, visit the following links:  ¨ www.smokefree.gov/sites/default/files/pdf/clearing-the-air-accessible.pdf  ¨ www.smokefree.gov  ¨ www.cancer.org/healthy/stayawayfromtobacco/guidetoquittingsmoking/  · Most people with CKD need to follow a special diet.  Be sure you understand yours. In general, you will need to limit protein, salt, potassium, and phosphorus. You also need to limit how much fluid you  drink.   · CKD is a risk factor for heart disease. Talk with your healthcare provider about any other risk factors you might have and what you can do to lessen them.  · Talk with your healthcare provider about any medicines you are taking to find out if they need to be reduced or stopped.  · Don't use the following over-the-counter medicines, or consult your healthcare provider before using:  ¨ Aspirin and NSAIDs such as ibuprofen or naproxen. Using acetaminophen for fever or pain is OK.  ¨ Laxatives and antacids containing magnesium or aluminum  ¨ Fleet or phospho soda enemas containing phosphorus  ¨ Certain stomach acid-blocking medicine such as cimetidine or ranitidine   ¨ Decongestants containing pseudoephedrine   ¨ Herbal supplements  Follow-up care  Follow up with your healthcare provider, or as advised. Contact one of the following for more information:  · American Association of Kidney Patients 931-008-4544 www.aakp.org  · National Kidney Foundation 820-113-5837 www.kidney.org  · American Kidney Fund 192-768-7579 www.kidneyfund.org  · National Kidney Disease Education Program 866-4KIDNEY www.nkdep.nih.gov  If an X-ray, ECG (cardiogram), or other diagnostic test was taken, you will be told of any new findings that may affect your care.  Call 911  Call 911 if you have any of the following:  · Severe weakness, dizziness, fainting, drowsiness, or confusion  · Chest pain or shortness of breath  · Heart beating fast, slow, or irregularly  When to seek medical advice  Call your healthcare provider right away if any of these occur:  · Nausea or vomiting  · Fever of 100.4°F (38°C) or higher, or as directed by your healthcare provider  · Unexpected weight gain or swelling in the legs, ankles, or around the eyes  · Decrease or absent urine output  Date Last Reviewed: 9/1/2016 © 2000-2017 [employer]. [address] [place] [address], [place] [address]. All rights reserved. This information is not intended as a  substitute for professional medical care. Always follow your healthcare professional's instructions.    Avoid Aleve, Motrin, Ibuprofen and other products containing NSAIDs (non-steroidal anti-inflammatories).

## 2018-09-05 NOTE — PROGRESS NOTES
"Subjective:       Patient ID: Valencia Dumont is a 48 y.o. female.    Chief Complaint: Lupus    HPI:  Valencia Dumont is a 48 y.o. female with diabetes and history lupus diagnosed in 2006 due to rash, weight loss, eyes yellow and fatigue.  She different rheumatologists Dr. Vega and Dr. Solomon.  She was diagnosed autoimmune hepatitis s/p liver transplant in 2013.   She has been on immunosuppressive medications after her liver transplant with prograf 8 mg daily and myfortic 360 mg bid which she is taking currently.  When she was diagnosed with SLE she was on plaquenil but stopped since it did not do anything.      She had low prograf level.  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  She was given 20 mg prednisone daily on 5/25/17 or 5/26/17.  She tapered off after 2 months.     Interval History:  After having bilateral knee injections several days later became dehydrated and blood glucose increased to 600.  Eventually went to ER and was given fluids and insulin.   Troponin was up in ER.     Sees pain management who did injections but did not help.  Sent her to a back surgeon who did not want to do surgery due to her health issues.        Review of Systems   Constitutional: Positive for unexpected weight change. Negative for fever.   HENT: Negative for trouble swallowing.    Eyes: Positive for redness.   Respiratory: Positive for cough and shortness of breath.    Cardiovascular: Negative for chest pain.   Gastrointestinal: Positive for diarrhea. Negative for constipation.   Genitourinary: Negative for dysuria and genital sores.   Skin: Negative for rash.   Neurological: Positive for headaches.   Hematological: Bruises/bleeds easily.         Objective:   BP (!) 151/82   Pulse 90   Ht 5' 9" (1.753 m)   Wt 61.7 kg (136 lb 1.6 oz)   BMI 20.10 kg/m²      Physical Exam   Constitutional: She is oriented to person, place, and time and well-developed, well-nourished, and in no distress.   HENT:   Head: Normocephalic " and atraumatic.   Eyes: Conjunctivae and EOM are normal.   Neck: Neck supple.   Cardiovascular: Normal rate and regular rhythm.    Pulmonary/Chest: Effort normal and breath sounds normal.   Abdominal: Soft. Bowel sounds are normal.   Neurological: She is alert and oriented to person, place, and time. Gait normal.   Skin: Skin is warm and dry.     Psychiatric: Mood and affect normal.   Musculoskeletal: Normal range of motion. She exhibits no edema, tenderness or deformity.              LABS    Component      Latest Ref Rng & Units 8/29/2018   WBC      3.90 - 12.70 K/uL 4.35   RBC      4.00 - 5.40 M/uL 4.23   Hemoglobin      12.0 - 16.0 g/dL 11.3 (L)   Hematocrit      37.0 - 48.5 % 32.9 (L)   MCV      82 - 98 fL 78 (L)   MCH      27.0 - 31.0 pg 26.7 (L)   MCHC      32.0 - 36.0 g/dL 34.3   RDW      11.5 - 14.5 % 15.7 (H)   Platelets      150 - 350 K/uL 199   MPV      9.2 - 12.9 fL 9.4   Gran # (ANC)      1.8 - 7.7 K/uL 2.2   Lymph #      1.0 - 4.8 K/uL 1.8   Mono #      0.3 - 1.0 K/uL 0.3   Eos #      0.0 - 0.5 K/uL 0.1   Baso #      0.00 - 0.20 K/uL 0.00   Gran%      38.0 - 73.0 % 51.4   Lymph%      18.0 - 48.0 % 41.1   Mono%      4.0 - 15.0 % 6.4   Eosinophil%      0.0 - 8.0 % 1.1   Basophil%      0.0 - 1.9 % 0.0   Differential Method       Automated   Glucose      70 - 110 mg/dL 188 (H)   Sodium      136 - 145 mmol/L 138   Potassium      3.5 - 5.1 mmol/L 4.1   Chloride      95 - 110 mmol/L 106   CO2      23 - 29 mmol/L 25   BUN, Bld      6 - 20 mg/dL 21 (H)   Calcium      8.7 - 10.5 mg/dL 9.3   Creatinine      0.5 - 1.4 mg/dL 1.2   Albumin      3.5 - 5.2 g/dL 4.1   Phosphorus      2.7 - 4.5 mg/dL 2.8   eGFR if African American      >60 mL/min/1.73 m:2 >60   eGFR if non African American      >60 mL/min/1.73 m:2 54 (A)   Anion Gap      8 - 16 mmol/L 7 (L)   Specimen UA       Urine, Clean Catch   Color, UA      Yellow, Straw, Meredith Meredith   Appearance, UA      Clear Clear   pH, UA      5.0 - 8.0 5.0   Specific Gravity,  UA      1.005 - 1.030 1.030   Protein, UA      Negative 1+ (A)   Glucose, UA      Negative Negative   Ketones, UA      Negative Negative   Bilirubin (UA)      Negative 1+ (A)   Occult Blood UA      Negative Negative   Nitrite, UA      Negative Negative   Urobilinogen, UA      <2.0 EU/dL 2.0-3.0 (A)   Leukocytes, UA      Negative Trace (A)   RBC, UA      0 - 4 /hpf 0   WBC, UA      0 - 5 /hpf 5   Bacteria, UA      None-Occ /hpf None   Yeast, UA      None Few (A)   Squam Epithel, UA      /hpf Many   Hyaline Casts, UA      0-1/lpf /lpf 0   Microscopic Comment       SEE COMMENT   Iron      30 - 160 ug/dL 70   Transferrin      200 - 375 mg/dL 242   TIBC      250 - 450 ug/dL 358   Saturated Iron      20 - 50 % 20   Protein, Urine Random      mg/dL 69   Creatinine, Random Ur      15.0 - 325.0 mg/dL >450.0 (H)   Prot/Creat Ratio, Ur      0.00 - 0.20 Unable to calculate   Ferritin      20.0 - 300.0 ng/mL 67     Assessment:       1.  SLE.  Still joint pains and fatigue.  She is interested in Benlysta.  2.  Autoimmune hepatitis.  S/p transplant 2013 after failing Cytoxan  3. Immunosuppression  4. Bilateral knee pain.  S/p gel one three step in both knees by ortho  5. Fatigue  6. Chest pain.  Evaluated by cardiology found to have murmur and heart muscle strain.  Heart doctor felt pain was from back pain.  7.  Back pain.  Evaluated by back surgeon but told not a candidate even though she needs it due to <5 years ago.  Sees pain management who sent her to therapy and to get shoes with braces.  In [facility]  14.  Osteopenia.  FRAX does not suggest treatment.  Sees endocrine  15.  Diabetes  Plan:       1. Labs  2. Continue Plaquenil.  Interested in Benlysta. Handout given and risks discussed.  3. Plaquenil eye exam 5/18  RTO 4 months/prn

## 2018-09-06 NOTE — TELEPHONE ENCOUNTER
Spoke to patient informed her that her insurance is requesting special  paper work to be done done the sensor and it could take up until next week before she hear anything patient verbalized understanding

## 2018-09-06 NOTE — PROGRESS NOTES
[facility] STA[facility] NOTE    The note from the fellow/resident was reviewed. I have personally interviewed and examined the patient. There were no additional findings with regards to the history or physical exam.    I agree with the assessment and plan of  Dr. Valentín Red

## 2018-09-07 ENCOUNTER — LAB VISIT (OUTPATIENT)
Dept: LAB | Facility: HOSPITAL | Age: 48
End: 2018-09-07
Attending: INTERNAL MEDICINE
Payer: MEDICARE

## 2018-09-07 DIAGNOSIS — Z94.4 LIVER REPLACED BY TRANSPLANT: ICD-10-CM

## 2018-09-07 LAB
ALBUMIN SERPL BCP-MCNC: 4.1 G/DL
ALP SERPL-CCNC: 75 U/L
ALT SERPL W/O P-5'-P-CCNC: 34 U/L
ANION GAP SERPL CALC-SCNC: 8 MMOL/L
AST SERPL-CCNC: 21 U/L
BASOPHILS # BLD AUTO: 0 K/UL
BASOPHILS NFR BLD: 0 %
BILIRUB SERPL-MCNC: 0.4 MG/DL
BUN SERPL-MCNC: 25 MG/DL
CALCIUM SERPL-MCNC: 9.6 MG/DL
CHLORIDE SERPL-SCNC: 104 MMOL/L
CO2 SERPL-SCNC: 25 MMOL/L
CREAT SERPL-MCNC: 1.2 MG/DL
DIFFERENTIAL METHOD: ABNORMAL
EOSINOPHIL # BLD AUTO: 0.1 K/UL
EOSINOPHIL NFR BLD: 1.8 %
ERYTHROCYTE [DISTWIDTH] IN BLOOD BY AUTOMATED COUNT: 15.7 %
EST. GFR  (AFRICAN AMERICAN): >60 ML/MIN/1.73 M^2
EST. GFR  (NON AFRICAN AMERICAN): 54 ML/MIN/1.73 M^2
GLUCOSE SERPL-MCNC: 175 MG/DL
HCT VFR BLD AUTO: 32.5 %
HGB BLD-MCNC: 11.3 G/DL
LYMPHOCYTES # BLD AUTO: 1.6 K/UL
LYMPHOCYTES NFR BLD: 40.6 %
MAGNESIUM SERPL-MCNC: 1.7 MG/DL
MCH RBC QN AUTO: 27.2 PG
MCHC RBC AUTO-ENTMCNC: 34.8 G/DL
MCV RBC AUTO: 78 FL
MONOCYTES # BLD AUTO: 0.3 K/UL
MONOCYTES NFR BLD: 7.3 %
NEUTROPHILS # BLD AUTO: 2 K/UL
NEUTROPHILS NFR BLD: 50.3 %
PLATELET # BLD AUTO: 174 K/UL
PMV BLD AUTO: 9.5 FL
POTASSIUM SERPL-SCNC: 4.3 MMOL/L
PROT SERPL-MCNC: 7.1 G/DL
RBC # BLD AUTO: 4.16 M/UL
SODIUM SERPL-SCNC: 137 MMOL/L
WBC # BLD AUTO: 3.99 K/UL

## 2018-09-07 PROCEDURE — 83735 ASSAY OF MAGNESIUM: CPT

## 2018-09-07 PROCEDURE — 36415 COLL VENOUS BLD VENIPUNCTURE: CPT

## 2018-09-07 PROCEDURE — 80053 COMPREHEN METABOLIC PANEL: CPT

## 2018-09-07 PROCEDURE — 85025 COMPLETE CBC W/AUTO DIFF WBC: CPT

## 2018-09-07 PROCEDURE — 80197 ASSAY OF TACROLIMUS: CPT

## 2018-09-08 LAB — TACROLIMUS BLD-MCNC: 4.4 NG/ML

## 2018-09-10 ENCOUNTER — TELEPHONE (OUTPATIENT)
Dept: RHEUMATOLOGY | Facility: CLINIC | Age: 48
End: 2018-09-10

## 2018-09-10 ENCOUNTER — PATIENT MESSAGE (OUTPATIENT)
Dept: RHEUMATOLOGY | Facility: CLINIC | Age: 48
End: 2018-09-10

## 2018-09-10 DIAGNOSIS — Z29.89 PROPHYLACTIC IMMUNOTHERAPY: Chronic | ICD-10-CM

## 2018-09-10 DIAGNOSIS — Z23 ENCOUNTER FOR VACCINATION: Primary | ICD-10-CM

## 2018-09-10 DIAGNOSIS — Z94.4 LIVER REPLACED BY TRANSPLANT: Chronic | ICD-10-CM

## 2018-09-10 DIAGNOSIS — M32.9 SYSTEMIC LUPUS ERYTHEMATOSUS, UNSPECIFIED SLE TYPE, UNSPECIFIED ORGAN INVOLVEMENT STATUS: Chronic | ICD-10-CM

## 2018-09-10 NOTE — TELEPHONE ENCOUNTER
----- Message from Liudmila Kessler, PharmD sent at 9/6/2018 12:31 PM CDT -----  There was actually a study looking at this drug as an adjunct therapy in kidney transplant (as an anti-B cell therapy).  It was a very small study (12 patients in each group), however they did not see an increase in infections.  They did see some pancytopenia.  Once again, however, small numbers.    Lancet. 2018 Jun 30;391(43803):6316-2528. doi: 10.1016/-3167(63)96991-X. Epub 2018 Jun 14.  Belimumab in kidney transplantation: an experimental medicine, randomised, placebo-controlled phase 2 trial.    However, with adequate monitoring for pancytopenia, I think it is probably ok to use for Lupus in the setting of liver transplant.    Hope that helps  ----- Message -----  From: Miriam Abernathy MD  Sent: 9/6/2018  11:47 AM  To: Kristin Marcial MD, #    Will need to check with our transplant PharmD who is cc'ed.  ----- Message -----  From: Kristin Marcial MD  Sent: 9/5/2018  12:24 PM  To: Miriam Abernathy MD    Ms. Dumont is interested in Benlysta for lupus.  Do you have any objections.

## 2018-09-11 ENCOUNTER — TELEPHONE (OUTPATIENT)
Dept: TRANSPLANT | Facility: CLINIC | Age: 48
End: 2018-09-11

## 2018-09-11 ENCOUNTER — PATIENT MESSAGE (OUTPATIENT)
Dept: ENDOCRINOLOGY | Facility: CLINIC | Age: 48
End: 2018-09-11

## 2018-09-11 NOTE — TELEPHONE ENCOUNTER
Will refer to ID for vaccination update prior to Benlysta for this complex lupus patient with prior transplant.

## 2018-09-12 ENCOUNTER — PATIENT MESSAGE (OUTPATIENT)
Dept: RHEUMATOLOGY | Facility: CLINIC | Age: 48
End: 2018-09-12

## 2018-09-12 ENCOUNTER — TELEPHONE (OUTPATIENT)
Dept: RHEUMATOLOGY | Facility: CLINIC | Age: 48
End: 2018-09-12

## 2018-09-18 ENCOUNTER — PATIENT MESSAGE (OUTPATIENT)
Dept: ENDOCRINOLOGY | Facility: CLINIC | Age: 48
End: 2018-09-18

## 2018-09-18 NOTE — TELEPHONE ENCOUNTER
----- Message from Ernestine Doss sent at 9/18/2018  9:06 AM CDT -----  Contact: [employer]  Calling to get info to get the Pt her Free Style Maninder.    [facility]  [phone]

## 2018-09-18 NOTE — TELEPHONE ENCOUNTER
Called spoke with patient insurance [facility] whom stated that they only cover the dexcom g5 and need new Rx..    Spoke with patient whom stated that she would give it a try informed her that Im going to send it to provider

## 2018-09-19 ENCOUNTER — PATIENT MESSAGE (OUTPATIENT)
Dept: NEPHROLOGY | Facility: CLINIC | Age: 48
End: 2018-09-19

## 2018-09-19 ENCOUNTER — HOSPITAL ENCOUNTER (OUTPATIENT)
Dept: RADIOLOGY | Facility: HOSPITAL | Age: 48
Discharge: HOME | End: 2018-09-19
Attending: HOSPITALIST
Payer: MEDICARE

## 2018-09-19 DIAGNOSIS — N18.31 CKD STAGE G3A/A1, GFR 45-59 AND ALBUMIN CREATININE RATIO <30 MG/G: ICD-10-CM

## 2018-09-19 PROCEDURE — 76770 US EXAM ABDO BACK WALL COMP: CPT | Mod: 26,,, | Performed by: RADIOLOGY

## 2018-09-19 PROCEDURE — 76770 US EXAM ABDO BACK WALL COMP: CPT | Mod: TC

## 2018-09-20 ENCOUNTER — PATIENT MESSAGE (OUTPATIENT)
Dept: NEPHROLOGY | Facility: CLINIC | Age: 48
End: 2018-09-20

## 2018-09-21 ENCOUNTER — OFFICE VISIT (OUTPATIENT)
Dept: INFECTIOUS DISEASES | Facility: CLINIC | Age: 48
End: 2018-09-21
Payer: MEDICARE

## 2018-09-21 ENCOUNTER — CLINICAL SUPPORT (OUTPATIENT)
Dept: INFECTIOUS DISEASES | Facility: CLINIC | Age: 48
End: 2018-09-21
Payer: MEDICARE

## 2018-09-21 VITALS
DIASTOLIC BLOOD PRESSURE: 81 MMHG | TEMPERATURE: 98 F | SYSTOLIC BLOOD PRESSURE: 157 MMHG | HEART RATE: 85 BPM | BODY MASS INDEX: 20.27 KG/M2 | WEIGHT: 136.88 LBS | HEIGHT: 69 IN

## 2018-09-21 DIAGNOSIS — Z79.4 TYPE 2 DIABETES MELLITUS WITH STAGE 3 CHRONIC KIDNEY DISEASE, WITH LONG-TERM CURRENT USE OF INSULIN: ICD-10-CM

## 2018-09-21 DIAGNOSIS — Z29.89 PROPHYLACTIC IMMUNOTHERAPY: Primary | Chronic | ICD-10-CM

## 2018-09-21 DIAGNOSIS — Z94.4 LIVER REPLACED BY TRANSPLANT: Chronic | ICD-10-CM

## 2018-09-21 DIAGNOSIS — E11.22 TYPE 2 DIABETES MELLITUS WITH STAGE 3 CHRONIC KIDNEY DISEASE, WITH LONG-TERM CURRENT USE OF INSULIN: ICD-10-CM

## 2018-09-21 DIAGNOSIS — M32.9 SYSTEMIC LUPUS ERYTHEMATOSUS, UNSPECIFIED SLE TYPE, UNSPECIFIED ORGAN INVOLVEMENT STATUS: Chronic | ICD-10-CM

## 2018-09-21 DIAGNOSIS — N18.30 TYPE 2 DIABETES MELLITUS WITH STAGE 3 CHRONIC KIDNEY DISEASE, WITH LONG-TERM CURRENT USE OF INSULIN: ICD-10-CM

## 2018-09-21 PROCEDURE — 99203 OFFICE O/P NEW LOW 30 MIN: CPT | Mod: S$PBB,,, | Performed by: INTERNAL MEDICINE

## 2018-09-21 PROCEDURE — 90732 PPSV23 VACC 2 YRS+ SUBQ/IM: CPT | Mod: PBBFAC

## 2018-09-21 PROCEDURE — 3008F BODY MASS INDEX DOCD: CPT | Mod: CPTII,,, | Performed by: INTERNAL MEDICINE

## 2018-09-21 PROCEDURE — 99999 PR PBB SHADOW E&M-EST. PATIENT-LVL III: CPT | Mod: PBBFAC,,,

## 2018-09-21 PROCEDURE — 99213 OFFICE O/P EST LOW 20 MIN: CPT | Mod: PBBFAC,25

## 2018-09-21 PROCEDURE — 99215 OFFICE O/P EST HI 40 MIN: CPT | Mod: PBBFAC,27,25 | Performed by: INTERNAL MEDICINE

## 2018-09-21 PROCEDURE — 3046F HEMOGLOBIN A1C LEVEL >9.0%: CPT | Mod: CPTII,,, | Performed by: INTERNAL MEDICINE

## 2018-09-21 PROCEDURE — 99999 PR PBB SHADOW E&M-EST. PATIENT-LVL V: CPT | Mod: PBBFAC,,, | Performed by: INTERNAL MEDICINE

## 2018-09-21 NOTE — LETTER
September 21, 2018      Kristin Marcial MD  [address]  [facility] [address]           [facility]  [address]  [facility] [address]  Phone: [phone]  Fax: [phone]          Patient: Valencia Dumont   MR Number: [id number]   YOB: 1970   Date of Visit: 9/21/2018       Dear Dr. Kristin Marcial:    Thank you for referring Valencia Dumont to me for evaluation. Attached you will find relevant portions of my assessment and plan of care.    If you have questions, please do not hesitate to call me. I look forward to following Valencia Dumont along with you.    Sincerely,    Dennis Marroquin MD    Enclosure  CC:  No Recipients    If you would like to receive this communication electronically, please contact [email or web address] or [phone] to request more information on [facility] access.    For providers and/or their staff who would like to refer a patient to [facility], please contact us through our one-stop-shop provider referral line, [facility], at [phone].    If you feel you have received this communication in error or would no longer like to receive these types of communications, please e-mail [email or web address]

## 2018-09-21 NOTE — PROGRESS NOTES
Subjective:      Patient ID: Valencia Dumont is a 48 y.o. female.    Chief Complaint:Immunizations      History of Present Illness    48-year-old woman with lupus and prior kidney transplant who presents for vaccination update from Rheumatology prior to Benlysta.  Hepatitis-A and hepatitis-B surface antibody were positive in October 2013.  Last HIV test was December 2013.  Last HIV testing was December 2013.  RPR was negative in October 2013.  QuantiFERON gold was indeterminate but negative the following week on October 30th.  Varicella zoster antibody was positive in October 2013.    Documentation shows Prevnar was given on October 17, 2013, as well as her last tetanus vaccination and influenza vaccination.    Review of Systems   Constitution: Positive for chills, malaise/fatigue, night sweats and weight loss. Negative for decreased appetite, fever, weakness and weight gain.   HENT: Positive for congestion, ear pain and sore throat. Negative for hearing loss, hoarse voice and tinnitus.    Eyes: Positive for blurred vision. Negative for redness and visual disturbance.   Cardiovascular: Negative for chest pain, leg swelling and palpitations.   Respiratory: Positive for cough and shortness of breath. Negative for hemoptysis and sputum production.    Hematologic/Lymphatic: Negative for adenopathy. Bruises/bleeds easily.   Skin: Positive for dry skin, itching and rash. Negative for suspicious lesions.   Musculoskeletal: Positive for back pain, joint pain, myalgias and neck pain.   Gastrointestinal: Positive for abdominal pain, constipation, diarrhea and heartburn. Negative for nausea and vomiting.   Genitourinary: Positive for frequency. Negative for dysuria, flank pain, hematuria, hesitancy and urgency.   Neurological: Positive for dizziness, headaches, numbness and paresthesias.   Psychiatric/Behavioral: Positive for memory loss. Negative for depression. The patient has insomnia and is nervous/anxious.      Objective:    Physical Exam   Constitutional: She is oriented to person, place, and time. She appears well-developed and well-nourished.   Eyes: Conjunctivae are normal. Pupils are equal, round, and reactive to light. No scleral icterus.   Cardiovascular: Normal rate, regular rhythm and normal heart sounds.   Pulmonary/Chest: Effort normal and breath sounds normal.   Abdominal: Soft. Bowel sounds are normal.   Musculoskeletal: Normal range of motion.   Neurological: She is alert and oriented to person, place, and time.   Skin: Skin is warm and dry.   Nursing note and vitals reviewed.    Assessment:       1. Prophylactic immunotherapy (transplant immunosuppression)    2. 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    3. Type 2 diabetes mellitus with stage 3 chronic kidney disease, with long-term current use of insulin    4. Systemic lupus erythematosus, unspecified SLE type, unspecified organ involvement status          Plan:     discussed immunosuppression with patient.  Explained that she would require repeat testing for tuberculosis prior to starting the medication. T-spot testing is required as she had a indeterminate QuantiFERON test in the past.  We will vaccinate against pneumonia with Pneumovax.  She will obtain Shingrix vaccination when available.

## 2018-09-24 ENCOUNTER — DOCUMENTATION ONLY (OUTPATIENT)
Dept: REHABILITATION | Facility: HOSPITAL | Age: 48
End: 2018-09-24

## 2018-09-24 DIAGNOSIS — Z20.1 EXPOSURE TO TB: Primary | ICD-10-CM

## 2018-09-24 NOTE — PROGRESS NOTES
Physical Therapy: No show/Cancellation of Visit  Date: 09/24/2018    Patient was a no show to today's PT appointment. Patient's next scheduled appointment is 9/26/2018.     Cancel: 2  No show: 1    Therapist: Farrah Melendez PTA

## 2018-09-25 ENCOUNTER — TELEPHONE (OUTPATIENT)
Dept: OTOLARYNGOLOGY | Facility: CLINIC | Age: 48
End: 2018-09-25

## 2018-09-25 NOTE — TELEPHONE ENCOUNTER
Patient had appt for 10/23 rescheduled to 11/14 at 10am with dr anderson and 10:30 with audiogram, pt notified.

## 2018-09-26 ENCOUNTER — DOCUMENTATION ONLY (OUTPATIENT)
Dept: REHABILITATION | Facility: HOSPITAL | Age: 48
End: 2018-09-26

## 2018-09-26 ENCOUNTER — PATIENT MESSAGE (OUTPATIENT)
Dept: NEPHROLOGY | Facility: CLINIC | Age: 48
End: 2018-09-26

## 2018-09-26 ENCOUNTER — PATIENT MESSAGE (OUTPATIENT)
Dept: INFECTIOUS DISEASES | Facility: CLINIC | Age: 48
End: 2018-09-26

## 2018-09-26 ENCOUNTER — PATIENT MESSAGE (OUTPATIENT)
Dept: ENDOCRINOLOGY | Facility: CLINIC | Age: 48
End: 2018-09-26

## 2018-09-26 NOTE — PROGRESS NOTES
Physical Therapy    Patient did not show up for today's therapy session.   Patient appointments removed going forward.     Cancel: 2  No Show: 2

## 2018-09-26 NOTE — PROGRESS NOTES
Patient was contacted by therapist regarding concern over recent removal from schedule and cancellation of all future appointments. Patient states that she has been going back and forth to doctor's offices secondary to having no energy, joint swelling that does not allow her to get her knee injections, and some personal issues. Patient reports that she is getting an infusion within the next month and hopes that it will improve her energy and she can return to therapy.     Patient was educated on the no show/cancellation policy and was offered to be rescheduled with the PT for a monthly reassessment and PN. Patient declined stating that she would like to get the infusion and return to therapy after seeing her MD in November and December.     Patient attended a total of 3 visits including her initial evaluation on 8/14/2018. As of 9/26/2018, patient with a total of 3 cancels and 1 no show.    Pt will be discharged today due to N/s and Cx in PT session.     Therapist: Farrah Melendez PTA & Edinson Rosenthal, PT, DPT  09/26/2018

## 2018-09-27 ENCOUNTER — PATIENT MESSAGE (OUTPATIENT)
Dept: RHEUMATOLOGY | Facility: CLINIC | Age: 48
End: 2018-09-27

## 2018-09-27 RX ORDER — ZOSTER VACCINE RECOMBINANT, ADJUVANTED 50 MCG/0.5
KIT INTRAMUSCULAR
COMMUNITY
Start: 2018-09-26 | End: 2019-05-30

## 2018-09-27 NOTE — TELEPHONE ENCOUNTER
Patient requesting Benlysta however her STAN in our records has been negative.  Sent a message to the patient to forward to us any results with a positive STAN.

## 2018-10-03 ENCOUNTER — PATIENT MESSAGE (OUTPATIENT)
Dept: RHEUMATOLOGY | Facility: CLINIC | Age: 48
End: 2018-10-03

## 2018-10-03 ENCOUNTER — PATIENT MESSAGE (OUTPATIENT)
Dept: ENDOCRINOLOGY | Facility: CLINIC | Age: 48
End: 2018-10-03

## 2018-10-03 NOTE — TELEPHONE ENCOUNTER
Reviewed blood sugar log:  Checking four times daily    Hyperglycemia   Variable blood sugars 98 - 325  Taking between 4 - 6 units with meals

## 2018-10-04 ENCOUNTER — PATIENT MESSAGE (OUTPATIENT)
Dept: ENDOCRINOLOGY | Facility: CLINIC | Age: 48
End: 2018-10-04

## 2018-10-07 ENCOUNTER — PATIENT MESSAGE (OUTPATIENT)
Dept: RHEUMATOLOGY | Facility: CLINIC | Age: 48
End: 2018-10-07

## 2018-10-08 ENCOUNTER — LAB VISIT (OUTPATIENT)
Dept: LAB | Facility: HOSPITAL | Age: 48
End: 2018-10-08
Attending: INTERNAL MEDICINE
Payer: MEDICARE

## 2018-10-08 ENCOUNTER — PATIENT MESSAGE (OUTPATIENT)
Dept: RHEUMATOLOGY | Facility: CLINIC | Age: 48
End: 2018-10-08

## 2018-10-08 DIAGNOSIS — Z94.4 LIVER REPLACED BY TRANSPLANT: ICD-10-CM

## 2018-10-08 LAB
ALBUMIN SERPL BCP-MCNC: 4.2 G/DL
ALP SERPL-CCNC: 83 U/L
ALT SERPL W/O P-5'-P-CCNC: 19 U/L
ANION GAP SERPL CALC-SCNC: 7 MMOL/L
AST SERPL-CCNC: 17 U/L
BASOPHILS # BLD AUTO: 0.01 K/UL
BASOPHILS NFR BLD: 0.2 %
BILIRUB SERPL-MCNC: 0.6 MG/DL
BUN SERPL-MCNC: 21 MG/DL
CALCIUM SERPL-MCNC: 10.1 MG/DL
CHLORIDE SERPL-SCNC: 104 MMOL/L
CO2 SERPL-SCNC: 28 MMOL/L
CREAT SERPL-MCNC: 1.2 MG/DL
DIFFERENTIAL METHOD: ABNORMAL
EOSINOPHIL # BLD AUTO: 0.1 K/UL
EOSINOPHIL NFR BLD: 2.2 %
ERYTHROCYTE [DISTWIDTH] IN BLOOD BY AUTOMATED COUNT: 14.7 %
EST. GFR  (AFRICAN AMERICAN): >60 ML/MIN/1.73 M^2
EST. GFR  (NON AFRICAN AMERICAN): 54 ML/MIN/1.73 M^2
GLUCOSE SERPL-MCNC: 166 MG/DL
HCT VFR BLD AUTO: 34 %
HGB BLD-MCNC: 11.8 G/DL
LYMPHOCYTES # BLD AUTO: 1.4 K/UL
LYMPHOCYTES NFR BLD: 31.8 %
MAGNESIUM SERPL-MCNC: 1.9 MG/DL
MCH RBC QN AUTO: 27.1 PG
MCHC RBC AUTO-ENTMCNC: 34.7 G/DL
MCV RBC AUTO: 78 FL
MONOCYTES # BLD AUTO: 0.3 K/UL
MONOCYTES NFR BLD: 6.7 %
NEUTROPHILS # BLD AUTO: 2.6 K/UL
NEUTROPHILS NFR BLD: 59.1 %
PLATELET # BLD AUTO: 204 K/UL
PMV BLD AUTO: 9.4 FL
POTASSIUM SERPL-SCNC: 4.2 MMOL/L
PROT SERPL-MCNC: 7.5 G/DL
RBC # BLD AUTO: 4.35 M/UL
SODIUM SERPL-SCNC: 139 MMOL/L
WBC # BLD AUTO: 4.46 K/UL

## 2018-10-08 PROCEDURE — 80053 COMPREHEN METABOLIC PANEL: CPT

## 2018-10-08 PROCEDURE — 80197 ASSAY OF TACROLIMUS: CPT

## 2018-10-08 PROCEDURE — 36415 COLL VENOUS BLD VENIPUNCTURE: CPT

## 2018-10-08 PROCEDURE — 83735 ASSAY OF MAGNESIUM: CPT

## 2018-10-08 PROCEDURE — 85025 COMPLETE CBC W/AUTO DIFF WBC: CPT

## 2018-10-09 LAB — TACROLIMUS BLD-MCNC: 3.5 NG/ML

## 2018-10-10 ENCOUNTER — TELEPHONE (OUTPATIENT)
Dept: TRANSPLANT | Facility: CLINIC | Age: 48
End: 2018-10-10

## 2018-10-10 ENCOUNTER — PATIENT MESSAGE (OUTPATIENT)
Dept: RHEUMATOLOGY | Facility: CLINIC | Age: 48
End: 2018-10-10

## 2018-10-10 NOTE — TELEPHONE ENCOUNTER
----- Message from Miriam Abernathy MD sent at 10/10/2018 10:27 AM CDT -----  Reviewed, nothing to do; repeat per routine

## 2018-10-11 ENCOUNTER — PATIENT MESSAGE (OUTPATIENT)
Dept: RHEUMATOLOGY | Facility: CLINIC | Age: 48
End: 2018-10-11

## 2018-10-19 ENCOUNTER — PATIENT MESSAGE (OUTPATIENT)
Dept: TRANSPLANT | Facility: CLINIC | Age: 48
End: 2018-10-19

## 2018-10-25 ENCOUNTER — PATIENT MESSAGE (OUTPATIENT)
Dept: PODIATRY | Facility: CLINIC | Age: 48
End: 2018-10-25

## 2018-10-25 ENCOUNTER — PATIENT MESSAGE (OUTPATIENT)
Dept: RHEUMATOLOGY | Facility: CLINIC | Age: 48
End: 2018-10-25

## 2018-10-25 ENCOUNTER — PATIENT MESSAGE (OUTPATIENT)
Dept: NEPHROLOGY | Facility: CLINIC | Age: 48
End: 2018-10-25

## 2018-10-25 ENCOUNTER — PATIENT MESSAGE (OUTPATIENT)
Dept: ENDOCRINOLOGY | Facility: CLINIC | Age: 48
End: 2018-10-25

## 2018-10-25 NOTE — TELEPHONE ENCOUNTER
Spoke with patient. States that she was supposed to come in for follow up     Advised that last appointment was for trimming of calluses. Appointment would be Proc B and cost is $42.     She states that she will just come in around annual visit in July 2019    No need for Proc B at this time

## 2018-11-01 ENCOUNTER — TELEPHONE (OUTPATIENT)
Dept: RHEUMATOLOGY | Facility: CLINIC | Age: 48
End: 2018-11-01

## 2018-11-01 NOTE — TELEPHONE ENCOUNTER
Patient instructed to bring STAN results from previous study that was positive.  Patient still has to get second shingle vaccination this month around 11/27/18 then we can start Benlysta.  She will let me know when it is done and then we will move forward with Benlysta approval after that and if ok with ID.

## 2018-11-02 RX ORDER — MYCOPHENOLIC ACID 180 MG/1
540 TABLET, DELAYED RELEASE ORAL 2 TIMES DAILY
Qty: 240 TABLET | Refills: 5 | OUTPATIENT
Start: 2018-11-02

## 2018-11-05 ENCOUNTER — PATIENT MESSAGE (OUTPATIENT)
Dept: TRANSPLANT | Facility: CLINIC | Age: 48
End: 2018-11-05

## 2018-11-09 ENCOUNTER — PATIENT MESSAGE (OUTPATIENT)
Dept: RHEUMATOLOGY | Facility: CLINIC | Age: 48
End: 2018-11-09

## 2018-11-09 ENCOUNTER — HOSPITAL ENCOUNTER (OUTPATIENT)
Dept: RADIOLOGY | Facility: HOSPITAL | Age: 48
Discharge: HOME | End: 2018-11-09
Attending: PHYSICAL MEDICINE & REHABILITATION
Payer: MEDICARE

## 2018-11-09 DIAGNOSIS — M25.521 RIGHT ELBOW PAIN: ICD-10-CM

## 2018-11-09 DIAGNOSIS — M25.521 RIGHT ELBOW PAIN: Primary | ICD-10-CM

## 2018-11-09 PROCEDURE — 73080 X-RAY EXAM OF ELBOW: CPT | Mod: TC,FY,RT

## 2018-11-09 PROCEDURE — 73080 X-RAY EXAM OF ELBOW: CPT | Mod: 26,RT,, | Performed by: RADIOLOGY

## 2018-11-14 ENCOUNTER — OFFICE VISIT (OUTPATIENT)
Dept: OTOLARYNGOLOGY | Facility: CLINIC | Age: 48
End: 2018-11-14
Payer: MEDICARE

## 2018-11-14 ENCOUNTER — CLINICAL SUPPORT (OUTPATIENT)
Dept: AUDIOLOGY | Facility: CLINIC | Age: 48
End: 2018-11-14
Payer: MEDICARE

## 2018-11-14 ENCOUNTER — PATIENT MESSAGE (OUTPATIENT)
Dept: RHEUMATOLOGY | Facility: CLINIC | Age: 48
End: 2018-11-14

## 2018-11-14 ENCOUNTER — LAB VISIT (OUTPATIENT)
Dept: LAB | Facility: HOSPITAL | Age: 48
End: 2018-11-14
Attending: HOSPITALIST
Payer: MEDICARE

## 2018-11-14 VITALS
BODY MASS INDEX: 20.88 KG/M2 | WEIGHT: 141 LBS | HEIGHT: 69 IN | SYSTOLIC BLOOD PRESSURE: 170 MMHG | DIASTOLIC BLOOD PRESSURE: 100 MMHG

## 2018-11-14 DIAGNOSIS — H90.3 SENSORINEURAL HEARING LOSS, BILATERAL: Primary | ICD-10-CM

## 2018-11-14 DIAGNOSIS — H61.23 BILATERAL IMPACTED CERUMEN: Primary | ICD-10-CM

## 2018-11-14 DIAGNOSIS — N18.31 CKD STAGE G3A/A1, GFR 45-59 AND ALBUMIN CREATININE RATIO <30 MG/G: ICD-10-CM

## 2018-11-14 DIAGNOSIS — H91.90 HEARING LOSS, UNSPECIFIED HEARING LOSS TYPE, UNSPECIFIED LATERALITY: ICD-10-CM

## 2018-11-14 LAB
BACTERIA #/AREA URNS HPF: ABNORMAL /HPF
BILIRUB UR QL STRIP: NEGATIVE
CLARITY UR: CLEAR
COLOR UR: YELLOW
CREAT UR-MCNC: 265.4 MG/DL
GLUCOSE UR QL STRIP: ABNORMAL
HGB UR QL STRIP: NEGATIVE
HYALINE CASTS #/AREA URNS LPF: 40 /LPF
KETONES UR QL STRIP: NEGATIVE
LEUKOCYTE ESTERASE UR QL STRIP: NEGATIVE
MICROSCOPIC COMMENT: ABNORMAL
NITRITE UR QL STRIP: NEGATIVE
PH UR STRIP: 5 [PH] (ref 5–8)
PROT UR QL STRIP: ABNORMAL
PROT UR-MCNC: 74 MG/DL
PROT/CREAT UR: 0.28 MG/G{CREAT}
RBC #/AREA URNS HPF: 2 /HPF (ref 0–4)
SP GR UR STRIP: 1.02 (ref 1–1.03)
SQUAMOUS #/AREA URNS HPF: 1 /HPF
URN SPEC COLLECT METH UR: ABNORMAL
UROBILINOGEN UR STRIP-ACNC: NEGATIVE EU/DL
WBC #/AREA URNS HPF: 3 /HPF (ref 0–5)

## 2018-11-14 PROCEDURE — 82570 ASSAY OF URINE CREATININE: CPT

## 2018-11-14 PROCEDURE — 3077F SYST BP >= 140 MM HG: CPT | Mod: CPTII,S$GLB,, | Performed by: OTOLARYNGOLOGY

## 2018-11-14 PROCEDURE — G0268 REMOVAL OF IMPACTED WAX MD: HCPCS | Mod: S$GLB,,, | Performed by: OTOLARYNGOLOGY

## 2018-11-14 PROCEDURE — 81000 URINALYSIS NONAUTO W/SCOPE: CPT

## 2018-11-14 PROCEDURE — 92557 COMPREHENSIVE HEARING TEST: CPT | Mod: S$GLB,,, | Performed by: AUDIOLOGIST

## 2018-11-14 PROCEDURE — 3080F DIAST BP >= 90 MM HG: CPT | Mod: CPTII,S$GLB,, | Performed by: OTOLARYNGOLOGY

## 2018-11-14 PROCEDURE — 99212 OFFICE O/P EST SF 10 MIN: CPT | Mod: 25,S$GLB,, | Performed by: OTOLARYNGOLOGY

## 2018-11-14 PROCEDURE — 3008F BODY MASS INDEX DOCD: CPT | Mod: CPTII,S$GLB,, | Performed by: OTOLARYNGOLOGY

## 2018-11-14 PROCEDURE — 92550 TYMPANOMETRY & REFLEX THRESH: CPT | Mod: S$GLB,,, | Performed by: AUDIOLOGIST

## 2018-11-14 NOTE — PROGRESS NOTES
Click on link to view complete Audiogram  Document on 11/14/2018 11:23 AM by Laura Newsome MA CCC-A: Audiogram    Reason for visit:  Patient seen today with complaint of stopped up ears and hearing loss.  Cerumen was removed by Dr. Candelaria  Tymps:  Type A tymp AS and Type Ad tymp AD  Acoustic Reflexes:  Present reflexes AU  Audio Summary        AD: Mild SNHL at 2kHz and hearing WNL at all other frequencies             AS:  Mild SNHL at 2kHz and hearing WNL at all other frequencies   Recommendations:  An annual hearing test is recommended to monitor hearing loss.

## 2018-11-14 NOTE — PROGRESS NOTES
History of Present Illness:  Valencia Dumont presents to the clinic today for Cerumen Impaction and Hearing Loss  .  She is a 48-year-old female patient who has had a liver transplant.  She has been a patient here since December 2, 2016.  She is here today with a complaint of congestion in both ears and had her ears examined by her child's pediatrician who told her that she had cerumen impactions.  She is felt that she has some hearing loss and is here for ear evaluation and cleaning and audiometric evaluation.  She has not had infections or pain.    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    Diabetes mellitus     Esophageal varices     Hypertension     Kidney stone     Lupus     Osteoporosis 12/2013    Prophylactic immunotherapy (transplant immunosuppression) 1/2/2014    SBP (spontaneous bacterial peritonitis)     history of      Past Surgical History:   Procedure Laterality Date    BIOPSY LIVER AND ULTRASOUND N/A 5/24/2017    Performed by [facility] at [facility] OR 2ND FLR    COLONOSCOPY N/A 5/31/2017    Procedure: COLONOSCOPY;  Surgeon: Marky Sun MD;  Location: [facility] (4TH FLR);  Service: Endoscopy;  Laterality: N/A;  PM prep.    COLONOSCOPY N/A 5/31/2017    Performed by Marky Sun MD at [facility] (4TH FLR)    ESOPHAGOGASTRODUODENOSCOPY      ESOPHAGOGASTRODUODENOSCOPY (EGD) N/A 12/27/2016    Performed by Deniz Guerra MD at [facility] (4TH FLR)    LIVER BIOPSY      LIVER TRANSPLANT  12/31/2013    REFRACTIVE SURGERY Bilateral 2010    TRANSPLANT, LIVER N/A 12/31/2013    Performed by Constantino Peña MD at [facility] OR 2ND FLR    TUBAL LIGATION  2003     Family History   Problem Relation Age of Onset    Hypertension Mother     Cataracts Mother     Diabetes Father     Alzheimer's disease Father     Diabetes Paternal Grandfather     Diabetes Paternal Grandmother     No  Known Problems Maternal Grandmother     No Known Problems Maternal Grandfather     Breast cancer Maternal Aunt 55    Hypertension Brother     Diabetes Brother     No Known Problems Sister     No Known Problems Maternal Uncle     No Known Problems Paternal Aunt     No Known Problems Paternal Uncle     Stroke Neg Hx     Cancer Neg Hx     Colon cancer Neg Hx     Esophageal cancer Neg Hx     Stomach cancer Neg Hx     Rectal cancer Neg Hx     Amblyopia Neg Hx     Blindness Neg Hx     Glaucoma Neg Hx     Macular degeneration Neg Hx     Retinal detachment Neg Hx     Strabismus Neg Hx     Thyroid disease Neg Hx        Social History     Tobacco Use    Smoking status: Never Smoker    Smokeless tobacco: Never Used    Tobacco comment: disability; ; 3 children   Substance Use Topics    Alcohol use: Yes     Alcohol/week: 0.6 oz     Types: 1 Glasses of wine per week    Drug use: No         Review of Systems   Ears: Positive for ear pressure and ear discharge.  Negative for ear pain and ear infections.        Physical Exam:    Constitutional:   Vital signs are normal. She appears well-developed and well-nourished. She is active.     Head:  Normocephalic. Salivary glands normal.  Facial strength is normal.      Ears:    Has bilateral cerumen impactions.  The ear canals are not inflamed.  I removed the cerumen with alligator forceps and irrigation.  Her tympanic membranes were clear without inflammation or retraction or middle ear effusion.    Nose:  Nose normal including turbinates, nasal mucosa, sinuses and nasal septum.     Mouth/Throat  Lips, teeth, and gums normal and oropharynx normal.     Neck:  Neck normal without thyromegaly masses, asymmetry, normal tracheal structure, crepitus, and tenderness, thyroid normal, trachea normal and phonation normal.                      Assessment:   Valencia was seen today for cerumen impaction and hearing loss.    Diagnoses and all orders for this  visit:    Bilateral impacted cerumen    Hearing loss, unspecified hearing loss type, unspecified laterality  -     COMPREHENSIVE AUDIOGRAM; Future          Plan:   I removed the cerumen from her ears and referred her for audiometric evaluation.  She and I discussed the need for cleaning her ears once a year with follow-up audiogram once a year.    No Follow-up on file.

## 2018-11-15 ENCOUNTER — PATIENT MESSAGE (OUTPATIENT)
Dept: GASTROENTEROLOGY | Facility: CLINIC | Age: 48
End: 2018-11-15

## 2018-11-16 ENCOUNTER — PATIENT MESSAGE (OUTPATIENT)
Dept: GASTROENTEROLOGY | Facility: CLINIC | Age: 48
End: 2018-11-16

## 2018-11-26 ENCOUNTER — PATIENT MESSAGE (OUTPATIENT)
Dept: INFECTIOUS DISEASES | Facility: CLINIC | Age: 48
End: 2018-11-26

## 2018-11-27 ENCOUNTER — PATIENT MESSAGE (OUTPATIENT)
Dept: RHEUMATOLOGY | Facility: CLINIC | Age: 48
End: 2018-11-27

## 2018-11-27 DIAGNOSIS — M32.9 SYSTEMIC LUPUS ERYTHEMATOSUS, UNSPECIFIED SLE TYPE, UNSPECIFIED ORGAN INVOLVEMENT STATUS: Primary | Chronic | ICD-10-CM

## 2018-11-27 DIAGNOSIS — R53.83 FATIGUE, UNSPECIFIED TYPE: ICD-10-CM

## 2018-11-27 DIAGNOSIS — E55.9 VITAMIN D DEFICIENCY: ICD-10-CM

## 2018-11-28 ENCOUNTER — PATIENT MESSAGE (OUTPATIENT)
Dept: RHEUMATOLOGY | Facility: CLINIC | Age: 48
End: 2018-11-28

## 2018-11-28 ENCOUNTER — PATIENT MESSAGE (OUTPATIENT)
Dept: ENDOCRINOLOGY | Facility: CLINIC | Age: 48
End: 2018-11-28

## 2018-11-29 ENCOUNTER — LAB VISIT (OUTPATIENT)
Dept: LAB | Facility: HOSPITAL | Age: 48
End: 2018-11-29
Attending: INTERNAL MEDICINE
Payer: MEDICARE

## 2018-11-29 ENCOUNTER — PATIENT MESSAGE (OUTPATIENT)
Dept: GASTROENTEROLOGY | Facility: CLINIC | Age: 48
End: 2018-11-29

## 2018-11-29 ENCOUNTER — PATIENT MESSAGE (OUTPATIENT)
Dept: RHEUMATOLOGY | Facility: CLINIC | Age: 48
End: 2018-11-29

## 2018-11-29 ENCOUNTER — OFFICE VISIT (OUTPATIENT)
Dept: NEPHROLOGY | Facility: CLINIC | Age: 48
End: 2018-11-29
Payer: MEDICARE

## 2018-11-29 VITALS
BODY MASS INDEX: 21.36 KG/M2 | DIASTOLIC BLOOD PRESSURE: 78 MMHG | SYSTOLIC BLOOD PRESSURE: 122 MMHG | HEART RATE: 93 BPM | HEIGHT: 69 IN | WEIGHT: 144.19 LBS | OXYGEN SATURATION: 99 %

## 2018-11-29 DIAGNOSIS — M32.9 SYSTEMIC LUPUS ERYTHEMATOSUS, UNSPECIFIED SLE TYPE, UNSPECIFIED ORGAN INVOLVEMENT STATUS: Chronic | ICD-10-CM

## 2018-11-29 DIAGNOSIS — R53.83 FATIGUE, UNSPECIFIED TYPE: ICD-10-CM

## 2018-11-29 DIAGNOSIS — N18.31 CKD STAGE G3A/A1, GFR 45-59 AND ALBUMIN CREATININE RATIO <30 MG/G: Primary | ICD-10-CM

## 2018-11-29 DIAGNOSIS — Z79.4 TYPE 2 DIABETES MELLITUS WITH STAGE 3 CHRONIC KIDNEY DISEASE, WITH LONG-TERM CURRENT USE OF INSULIN: ICD-10-CM

## 2018-11-29 DIAGNOSIS — E11.22 TYPE 2 DIABETES MELLITUS WITH STAGE 3 CHRONIC KIDNEY DISEASE, WITH LONG-TERM CURRENT USE OF INSULIN: ICD-10-CM

## 2018-11-29 DIAGNOSIS — M89.9 CHRONIC KIDNEY DISEASE-MINERAL AND BONE DISORDER: ICD-10-CM

## 2018-11-29 DIAGNOSIS — N18.9 CHRONIC KIDNEY DISEASE-MINERAL AND BONE DISORDER: ICD-10-CM

## 2018-11-29 DIAGNOSIS — E83.9 CHRONIC KIDNEY DISEASE-MINERAL AND BONE DISORDER: ICD-10-CM

## 2018-11-29 DIAGNOSIS — N18.30 TYPE 2 DIABETES MELLITUS WITH STAGE 3 CHRONIC KIDNEY DISEASE, WITH LONG-TERM CURRENT USE OF INSULIN: ICD-10-CM

## 2018-11-29 DIAGNOSIS — I10 ESSENTIAL HYPERTENSION: ICD-10-CM

## 2018-11-29 LAB
ALBUMIN SERPL BCP-MCNC: 4.1 G/DL
ALP SERPL-CCNC: 94 U/L
ALT SERPL W/O P-5'-P-CCNC: 12 U/L
ANION GAP SERPL CALC-SCNC: 9 MMOL/L
AST SERPL-CCNC: 13 U/L
BASOPHILS # BLD AUTO: 0.01 K/UL
BASOPHILS NFR BLD: 0.3 %
BILIRUB SERPL-MCNC: 0.3 MG/DL
BUN SERPL-MCNC: 29 MG/DL
C3 SERPL-MCNC: 94 MG/DL
C4 SERPL-MCNC: 24 MG/DL
CALCIUM SERPL-MCNC: 9.6 MG/DL
CHLORIDE SERPL-SCNC: 101 MMOL/L
CK SERPL-CCNC: 60 U/L
CO2 SERPL-SCNC: 25 MMOL/L
CREAT SERPL-MCNC: 1.4 MG/DL
CRP SERPL-MCNC: 2.6 MG/L
DIFFERENTIAL METHOD: ABNORMAL
EOSINOPHIL # BLD AUTO: 0.2 K/UL
EOSINOPHIL NFR BLD: 4.5 %
ERYTHROCYTE [DISTWIDTH] IN BLOOD BY AUTOMATED COUNT: 13.9 %
ERYTHROCYTE [SEDIMENTATION RATE] IN BLOOD BY WESTERGREN METHOD: 15 MM/HR
EST. GFR  (AFRICAN AMERICAN): 51 ML/MIN/1.73 M^2
EST. GFR  (NON AFRICAN AMERICAN): 44 ML/MIN/1.73 M^2
GLUCOSE SERPL-MCNC: 245 MG/DL
HCT VFR BLD AUTO: 34.9 %
HGB BLD-MCNC: 12.1 G/DL
LYMPHOCYTES # BLD AUTO: 1.7 K/UL
LYMPHOCYTES NFR BLD: 43 %
MCH RBC QN AUTO: 26.6 PG
MCHC RBC AUTO-ENTMCNC: 34.7 G/DL
MCV RBC AUTO: 77 FL
MONOCYTES # BLD AUTO: 0.2 K/UL
MONOCYTES NFR BLD: 4.5 %
NEUTROPHILS # BLD AUTO: 1.9 K/UL
NEUTROPHILS NFR BLD: 47.7 %
PLATELET # BLD AUTO: 188 K/UL
PMV BLD AUTO: 10.2 FL
POTASSIUM SERPL-SCNC: 4 MMOL/L
PROT SERPL-MCNC: 7.1 G/DL
RBC # BLD AUTO: 4.55 M/UL
SODIUM SERPL-SCNC: 135 MMOL/L
WBC # BLD AUTO: 3.98 K/UL

## 2018-11-29 PROCEDURE — 85652 RBC SED RATE AUTOMATED: CPT

## 2018-11-29 PROCEDURE — 86160 COMPLEMENT ANTIGEN: CPT | Mod: 59

## 2018-11-29 PROCEDURE — 3008F BODY MASS INDEX DOCD: CPT | Mod: CPTII,GC,S$GLB, | Performed by: HOSPITALIST

## 2018-11-29 PROCEDURE — 3078F DIAST BP <80 MM HG: CPT | Mod: CPTII,GC,S$GLB, | Performed by: HOSPITALIST

## 2018-11-29 PROCEDURE — 86160 COMPLEMENT ANTIGEN: CPT

## 2018-11-29 PROCEDURE — 99214 OFFICE O/P EST MOD 30 MIN: CPT | Mod: GC,S$GLB,, | Performed by: HOSPITALIST

## 2018-11-29 PROCEDURE — 36415 COLL VENOUS BLD VENIPUNCTURE: CPT

## 2018-11-29 PROCEDURE — 86140 C-REACTIVE PROTEIN: CPT

## 2018-11-29 PROCEDURE — 80053 COMPREHEN METABOLIC PANEL: CPT

## 2018-11-29 PROCEDURE — 99999 PR PBB SHADOW E&M-EST. PATIENT-LVL V: CPT | Mod: PBBFAC,GC,, | Performed by: HOSPITALIST

## 2018-11-29 PROCEDURE — 3046F HEMOGLOBIN A1C LEVEL >9.0%: CPT | Mod: CPTII,GC,S$GLB, | Performed by: HOSPITALIST

## 2018-11-29 PROCEDURE — 3074F SYST BP LT 130 MM HG: CPT | Mod: CPTII,GC,S$GLB, | Performed by: HOSPITALIST

## 2018-11-29 PROCEDURE — 85025 COMPLETE CBC W/AUTO DIFF WBC: CPT

## 2018-11-29 PROCEDURE — 82550 ASSAY OF CK (CPK): CPT

## 2018-11-29 RX ORDER — METHYLPREDNISOLONE 4 MG/1
TABLET ORAL
Refills: 0 | COMMUNITY
Start: 2018-11-15 | End: 2019-06-27 | Stop reason: ALTCHOICE

## 2018-11-29 RX ORDER — TRAZODONE HYDROCHLORIDE 50 MG/1
50 TABLET ORAL NIGHTLY
Refills: 0 | COMMUNITY
Start: 2018-11-15 | End: 2020-10-21

## 2018-11-29 NOTE — PATIENT INSTRUCTIONS
Low-Salt Diet  This diet removes foods that are high in salt. It also limits the amount of salt you use when cooking. It is most often used for people with high blood pressure, edema (fluid retention), and kidney, liver, or heart disease.  Table salt contains the mineral sodium. Your body needs sodium to work normally. But too much sodium can make your health problems worse. Your healthcare provider is recommending a low-salt (also called low-sodium) diet for you. Your total daily allowance of salt is 1,500 to 2,300 milligrams (mg). It is less than 1 teaspoon of table salt. This means you can have only about 500 to 700 mg of sodium at each meal. People with certain health problems should limit salt intake to the lower end of the recommended range.    When you cook, dont add much salt. If you can cook without using salt, even better. Dont add salt to your food at the table.  When shopping, read food labels. Salt is often called sodium on the label. Choose foods that are salt-free, low salt, or very low salt. Note that foods with reduced salt may not lower your salt intake enough.    Beans, potatoes, and pasta  Ok: Dry beans, split peas, lentils, potatoes, rice, macaroni, pasta, spaghetti without added salt  Avoid: Potato chips, tortilla chips, and similar products  Breads and cereals  Ok: Low-sodium breads, rolls, cereals, and cakes; low-salt crackers, matzo crackers  Avoid: Salted crackers, pretzels, popcorn, Wolof toast, pancakes, muffins  Dairy  Ok: Milk, chocolate milk, hot chocolate mix, low-salt cheeses, and yogurt  Avoid: Processed cheese and cheese spreads; Roquefort, Camembert, and cottage cheese; buttermilk, instant breakfast drink  Desserts  Ok: Ice cream, frozen yogurt, juice bars, gelatin, cookies and pies, sugar, honey, jelly, hard candy  Avoid: Most pies, cakes and cookies prepared or processed with salt; instant pudding  Drinks  Ok: Tea, coffee, fizzy (carbonated) drinks, juices  Avoid: Flavored  coffees, electrolyte replacement drinks, sports drinks  Meats  Ok: All fresh meat, fish, poultry, low-salt tuna, eggs, egg substitute  Avoid: Smoked, pickled, brine-cured, or salted meats and fish. This includes argueta, chipped beef, corned beef, hot dogs, deli meats, ham, kosher meats, salt pork, sausage, canned tuna, salted codfish, smoked salmon, herring, sardines, or anchovies.  Seasonings and spices  Ok: Most seasonings are okay. Good substitutes for salt include: fresh herb blends, hot sauce, lemon, garlic, bergeron, vinegar, dry mustard, parsley, cilantro, horseradish, tomato paste, regular margarine, mayonnaise, unsalted butter, cream cheese, vegetable oil, cream, low-salt salad dressing and gravy.  Avoid: Regular ketchup, relishes, pickles, soy sauce, teriyaki sauce, Worcestershire sauce, BBQ sauce, tartar sauce, meat tenderizer, chili sauce, regular gravy, regular salad dressing, salted butter  Soups  Ok: Low-salt soups and broths made with allowed foods  Avoid: Bouillon cubes, soups with smoked or salted meats, regular soup and broth  Vegetables  Ok: Most vegetables are okay; also low-salt tomato and vegetable juices  Avoid: Sauerkraut and other brine-soaked vegetables; pickles and other pickled vegetables; tomato juice, olives  Date Last Reviewed: 8/1/2016 © 2000-2017 [employer]. [address] [place] [address]. All rights reserved. This information is not intended as a substitute for professional medical care. Always follow your healthcare professional's instructions.        Chronic Kidney Disease (CKD)     The role of the kidneys is to remove waste products and extra water from the blood.  When the kidneys do not work as they should, waste products begin to build up in the blood. This is called chronic kidney disease (CKD). CKD means that you have kidney damage or a decrease in kidney function lasting at least 3 months. CKD allows extra water, waste, and toxins to build up in the  body. This can eventually become life-threatening. You might need dialysis or a kidney transplant to stay alive. This most severe form is called end stage renal disease.  Diabetes is the leading causes of chronic renal failure. Other causes include high blood pressure, hardening of the arteries (atherosclerosis), lupus, inflammation of the blood vessels (vasculitis), and past viral or bacterial infections. Certain over-the-counter pain medicines can cause renal failure when taken often over a long period of time. These include aspirin, ibuprofen, and related anti-inflammatory medicines called NSAIDs (nonsteroidal anti-inflammatory drugs).  Home care  The following guidelines will help you care for yourself at home:  · If you have diabetes, talk with your healthcare provider about keeping your blood sugar under control. Ask if you need to make and changes to your diet, lifestyle, or medicines.  · If you have high blood pressure:  ¨ Take prescribed medicine to lower your blood pressure to the recommended goal of less than 130/80.  ¨ Start a regular exercise program that you enjoy. Check with your healthcare provider to be sure your planned exercise program is right for you.  ¨ Eat less salt (sodium). Your healthcare provider can tell you how much salt per day is safe for you.  · If you are overweight, talk with your healthcare provider about a weight loss plan.  · If you smoke, you must quit. Smoking makes kidney disease worse. Talk with your healthcare provider about ways to help you quit.  For more information, visit the following links:  ¨ www.smokefree.gov/sites/default/files/pdf/clearing-the-air-accessible.pdf  ¨ www.smokefree.gov  ¨ www.cancer.org/healthy/stayawayfromtobacco/guidetoquittingsmoking/  · Most people with CKD need to follow a special diet.  Be sure you understand yours. In general, you will need to limit protein, salt, potassium, and phosphorus. You also need to limit how much fluid you  drink.   · CKD is a risk factor for heart disease. Talk with your healthcare provider about any other risk factors you might have and what you can do to lessen them.  · Talk with your healthcare provider about any medicines you are taking to find out if they need to be reduced or stopped.  · Don't use the following over-the-counter medicines, or consult your healthcare provider before using:  ¨ Aspirin and NSAIDs such as ibuprofen or naproxen. Using acetaminophen for fever or pain is OK.  ¨ Laxatives and antacids containing magnesium or aluminum  ¨ Fleet or phospho soda enemas containing phosphorus  ¨ Certain stomach acid-blocking medicine such as cimetidine or ranitidine   ¨ Decongestants containing pseudoephedrine   ¨ Herbal supplements  Follow-up care  Follow up with your healthcare provider, or as advised. Contact one of the following for more information:  · American Association of Kidney Patients 169-199-7709 www.aakp.org  · National Kidney Foundation 284-843-1433 www.kidney.org  · American Kidney Fund 978-612-3095 www.kidneyfund.org  · National Kidney Disease Education Program 866-4KIDNEY www.nkdep.nih.gov  If an X-ray, ECG (cardiogram), or other diagnostic test was taken, you will be told of any new findings that may affect your care.  Call 911  Call 911 if you have any of the following:  · Severe weakness, dizziness, fainting, drowsiness, or confusion  · Chest pain or shortness of breath  · Heart beating fast, slow, or irregularly  When to seek medical advice  Call your healthcare provider right away if any of these occur:  · Nausea or vomiting  · Fever of 100.4°F (38°C) or higher, or as directed by your healthcare provider  · Unexpected weight gain or swelling in the legs, ankles, or around the eyes  · Decrease or absent urine output  Date Last Reviewed: 9/1/2016 © 2000-2017 [employer]. [address] [place] [address], [place] [address]. All rights reserved. This information is not intended as a  substitute for professional medical care. Always follow your healthcare professional's instructions.    Avoid Aleve, Motrin, Ibuprofen and other products containing NSAIDs (non-steroidal anti-inflammatories).

## 2018-11-29 NOTE — PROGRESS NOTES
Subjective:       Patient ID: Valencia Dumont 48 y.o. Black or  female who presents for follow up evaluation of Chronic Kidney Disease and Hypertension    Interval Hx:   Ms Valencia Dumont is here as follow up today for her CKD 3. She is not feeling well and c/o chills with sore throat and positive thrush she has also been doing switch and swallows with nystatin. She has documented a fever but she feels cold. Her BP is at goal today in clinic. She is also c/o fatigue and being tired all the time. She usually walks 10K steps but has been struggling to make the 10K steps. She is seeing her rheumatology  And had C3C4 done today. UA has 1+ occult blood and 1+ protein with a UPCr of 0.13 g/g. She also c/o dyspepsia and odynophagia. sCr has increased to 1.4 mg/dL from 1.2 mg/dL but over all picture she is still at her baseline sCr 1.2-1.5 mg/dL. She c/o right knee pain today and sometimes she gets warm and red she suspects gout in addition to her right elbow c/o pain as well. Both joints are tender but not warm nor red. C3 and C4 are in process as of now.    HPI  Valencia Dumont is a 48 y.o. 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 This is the first time she see's a Nephrologist. She has done well except for the episode of rejection. He Prograf level was found low which correlated with improvement in sCr. In addition, sCr has fluctuated over the last year with ups and down sometimes correlating with FK levels but her estimated sCr baseline is 1.5-2.0 mg/dL. She has no proteinuria and no evidence of hematuria. SLE is closely followed by Rheumatology and she is in Plaquenil and her immunosuppressive therapy consists of Prograf Myfortic and Prednisone. Her BP is elevated today 140/80 mmHg. She denies any swelling but c/o easy bruising. C3 and C4 are WNL and dsDNA is  negative.  The patient denies taking NSAIDs or new antibiotics, recreational drugs, recent episode of dehydration, diarrhea, nausea or vomiting, acute illness, hospitalization or exposure to IV radiocontrast.    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    Diabetes mellitus     Esophageal varices     Hypertension     Kidney stone     Lupus     Osteoporosis 12/2013    Prophylactic immunotherapy (transplant immunosuppression) 1/2/2014    SBP (spontaneous bacterial peritonitis)     history of        Family History   Problem Relation Age of Onset    Hypertension Mother     Cataracts Mother     Diabetes Father     Alzheimer's disease Father     Diabetes Paternal Grandfather     Diabetes Paternal Grandmother     No Known Problems Maternal Grandmother     No Known Problems Maternal Grandfather     Breast cancer Maternal Aunt 55    Hypertension Brother     Diabetes Brother     No Known Problems Sister     No Known Problems Maternal Uncle     No Known Problems Paternal Aunt     No Known Problems Paternal Uncle     Stroke Neg Hx     Cancer Neg Hx     Colon cancer Neg Hx     Esophageal cancer Neg Hx     Stomach cancer Neg Hx     Rectal cancer Neg Hx     Amblyopia Neg Hx     Blindness Neg Hx     Glaucoma Neg Hx     Macular degeneration Neg Hx     Retinal detachment Neg Hx     Strabismus Neg Hx     Thyroid disease Neg Hx         Past Surgical History:   Procedure Laterality Date    BIOPSY LIVER AND ULTRASOUND N/A 5/24/2017    Performed by [facility] Provider at [facility] OR [facility]    COLONOSCOPY N/A 5/31/2017    Procedure: COLONOSCOPY;  Surgeon: Marky Sun MD;  Location: [facility] ([address] [place] [address]);  Service: Endoscopy;  Laterality: N/A;  PM prep.    COLONOSCOPY N/A 5/31/2017    Performed by Marky Sun MD at [facility] ([address] [place] [address])    ESOPHAGOGASTRODUODENOSCOPY       ESOPHAGOGASTRODUODENOSCOPY (EGD) N/A 12/27/2016    Performed by Deniz Guerra MD at [facility] ENDO (4TH FLR)    LIVER BIOPSY      LIVER TRANSPLANT  12/31/2013    REFRACTIVE SURGERY Bilateral 2010    TRANSPLANT, LIVER N/A 12/31/2013    Performed by Constantino Peña MD at [facility] OR 2ND FLR    TUBAL LIGATION  2003     Current Outpatient Medications on File Prior to Visit   Medication Sig Dispense Refill    ACCU-CHEK SMARTVIEW Strp 1 strip by Misc.(Non-Drug; Combo Route) route 5 (five) times daily. Trueresult      blood sugar diagnostic Strp 1 each by Misc.(Non-Drug; Combo Route) route 4 (four) times daily. 100 each 6    blood sugar diagnostic Strp To check BG 5 times daily, to use with insurance preferred meter 200 strip 11    blood-glucose meter kit To check BG 5 times daily, to use with insurance preferred meter 1 each 0    blood-glucose sensor (DEXCOM G5-G4 SENSOR) Karen 1 each by Misc.(Non-Drug; Combo Route) route once a week. 4 Device 11    blood-glucose transmitter (DEXCOM G5 TRANSMITTER) Karen 1 each by Misc.(Non-Drug; Combo Route) route every 3 (three) months. 1 Device 3    cyproheptadine (PERIACTIN) 4 mg tablet Take 4 mg by mouth every evening.  3    diazepam (VALIUM) 5 MG tablet Take 5 mg by mouth 3 (three) times daily as needed.   0    dicyclomine (BENTYL) 10 MG capsule Take 1 capsule (10 mg total) by mouth before meals as needed (TID PRN). 90 capsule 2    DILTIAZEM HCL (DILTIAZEM 2% CREAM) Apply topically 3 (three) times daily. Apply topically to anal area. 30 g 0    dulaglutide (TRULICITY) 0.75 mg/0.5 mL PnIj Inject 0.5 mLs (0.75 mg total) into the skin once a week. 4 Syringe 6    ergocalciferol (ERGOCALCIFEROL) 50,000 unit Cap Take 1 capsule (50,000 Units total) by mouth every 7 days. 4 capsule 2    estradiol (ESTRACE) 0.01 % (0.1 mg/gram) vaginal cream Place 0.5 g vaginally twice a week. Insert 0.5grams intravaginally twice weekly 42 g 4    flash glucose scanning reader (FREESTYLE CHUN READER)  Misc 1 application by Misc.(Non-Drug; Combo Route) route continuous. 1 each 0    flash glucose sensor (FREESTYLE CHUN SENSOR) Kit 1 application by Misc.(Non-Drug; Combo Route) route every 7 days. 3 kit 11    fluconazole (DIFLUCAN) 150 MG Tab TAKE ONE TABLET BY MOUTH ONCE DAILY AS A SINGLE DOSE FOR INFECTION. 1 tablet 0    hydroCHLOROthiazide (HYDRODIURIL) 25 MG tablet Take 0.5 tablets (12.5 mg total) by mouth once daily. 30 tablet 2    hydroxychloroquine (PLAQUENIL) 200 mg tablet Take 200 mg by mouth 2 (two) times daily.      hydrOXYzine HCl (ATARAX) 10 MG Tab TAKE 1 OR 2 TABLETS BY MOUTH THREE TIMES DAILY AS NEEDED FOR ITCHING.  0    insulin aspart U-100 (NOVOLOG) 100 unit/mL InPn pen Blood Glucose mg/dL       <60            - Follow interventions for hypoglycemia below      151-200     -    4 units  201-250     -    6 units   251-300     -    9 units  301-350     -    12 units  351-400     -    15 units  >400          -    18 units 3 mL 6    insulin detemir (LEVEMIR FLEXTOUCH) 100 unit/mL (3 mL) SubQ InPn pen Inject 14 Units into the skin once daily. 15 mL 3    isosorbide mononitrate (IMDUR) 30 MG 24 hr tablet Take 30 mg by mouth once daily.      lancets 28 gauge Misc 4 lancets by Misc.(Non-Drug; Combo Route) route once daily. Pt uses Freestyle lite meter to check BG 4 times daily. 200 each 11    lancets Misc To check BG 5 times daily, to use with insurance preferred meter 200 each 11    lancets Misc 1 strip by Misc.(Non-Drug; Combo Route) route 4 (four) times daily. 100 each 3    losartan (COZAAR) 100 MG tablet Take 1 tablet (100 mg total) by mouth once daily. 30 tablet 2    magnesium oxide 500 mg Tab Take 500 mg by mouth 2 (two) times daily. 60 each 6    meclizine (ANTIVERT) 25 mg tablet TAKE ONE TABLET BY MOUTH AT BEDTIME AS NEEDED for dizziness.  0    methylPREDNISolone (MEDROL DOSEPACK) 4 mg tablet TAKE AS DIRECTED ON STEROID PACK FOR SIX DAYS  0    MULTIVIT,THER IRON,CA,FA & MIN  "(MULTIVITAMIN) Tab Take 1 tablet by mouth once daily. 30 tablet 0    mycophenolate (MYFORTIC) 180 MG TbEC Take 3 tablets (540 mg total) by mouth 2 (two) times daily. 240 tablet 5    neomycin-polymyxin-hydrocortisone (CORTISPORIN) otic solution INSTILL TWO DROPS INTO BOTH EARS FOUR TIMES DAILY FOR 10 DAYS  0    NOVOLOG FLEXPEN 100 unit/mL InPn pen Inject 14 Units into the skin 3 times daily with meals. Plus correction scale, max DOSAGE= 72 UNITS 15 mL 3    nystatin (MYCOSTATIN) 100,000 unit/mL suspension SWISH AND SPIT FIVE MILLILITERS BY MOUTH FOUR TIMES DAILY FOR 7 DAYS.  1    nystatin (MYCOSTATIN) cream Apply topically 2 (two) times daily. 30 g 3    ondansetron (ZOFRAN) 8 MG tablet Take 1 tablet by mouth every 8 (eight) hours as needed.  0    oxycodone-acetaminophen (PERCOCET) 7.5-325 mg per tablet Take 1 tablet by mouth every 4 (four) hours as needed for Pain.      pantoprazole (PROTONIX) 40 MG tablet Take 40 mg by mouth once daily.      pen needle, diabetic (BD ULTRA-FINE JANESSA PEN NEEDLES) 32 gauge x 5/32" Ndle To use 4x/day with insulin injections. 400 each 3    pen needle, diabetic 31 gauge x 5/16" Ndle Use as directed 100 each 12    polyethylene glycol (GLYCOLAX) 17 gram/dose powder MIX 17 GMS IN WATER and drink ONCE DAILY  3    PROAIR HFA 90 mcg/actuation inhaler Inhale 2 puffs into the lungs 3 (three) times daily as needed.   3    promethazine-dextromethorphan (PROMETHAZINE-DM) 6.25-15 mg/5 mL Syrp Take by mouth 2 (two) times daily as needed.   0    ranitidine (ZANTAC) 300 MG tablet Take 300 mg by mouth 2 (two) times daily.       rosuvastatin (CRESTOR) 5 MG tablet Take 5 mg by mouth once daily.      SHINGRIX, PF, 50 mcg/0.5 mL injection       tacrolimus (PROGRAF) 1 MG Cap Take 3 capsules (3 mg total) by mouth every 12 (twelve) hours. 180 capsule 11    traZODone (DESYREL) 50 MG tablet Take 50 mg by mouth nightly.  0    triamcinolone (KENALOG) 0.5 % ointment 1 application 2 (two) times " daily. Apply to affected area  3    valacyclovir (VALTREX) 500 MG tablet once daily as needed  0    verapamil (CALAN-SR) 120 MG CR tablet TAKE ONE TABLET ONCE DAILY FOR BLOOD PRESSURE  3    zolpidem (AMBIEN) 10 mg Tab Take 10 mg by mouth nightly as needed.  3    blood-glucose meter,continuous (DEXCOM G5 ) Misc 1 each by Misc.(Non-Drug; Combo Route) route once. for 1 dose 1 each 0    hydrALAZINE (APRESOLINE) 50 MG tablet Take 1 tablet (50 mg total) by mouth every 8 (eight) hours. for 3 days 12 tablet 0     No current facility-administered medications on file prior to visit.          Review of Systems    Review of Systems   Constitutional: Negative for appetite change, fatigue, fever and unexpected weight change.   HENT: Negative for facial swelling, hearing loss and tinnitus.    Eyes: Negative for visual disturbance.   Respiratory: Negative for chest tightness and shortness of breath.    Cardiovascular: Negative for chest pain and leg swelling.   Gastrointestinal: Negative for abdominal pain and nausea.   Genitourinary: Negative for difficulty urinating, dysuria and flank pain.   Musculoskeletal: Positive for arthralgias. Negative for myalgias.   Skin: Negative for color change.   Neurological: Negative for dizziness, syncope, weakness, light-headedness and headaches.   Hematological: Bruises/bleeds easily.   Psychiatric/Behavioral: Negative for behavioral problems and confusion.       Objective:      Physical Exam    Physical Exam   Constitutional: She is oriented to person, place, and time. She appears well-developed and well-nourished. No distress.   HENT:   Head: Normocephalic and atraumatic.   Eyes: Conjunctivae are normal. Pupils are equal, round, and reactive to light.   Neck: Neck supple. No JVD present.   Cardiovascular: Normal rate, regular rhythm and intact distal pulses. Exam reveals no gallop and no friction rub.   No murmur heard.  Pulmonary/Chest: Effort normal and breath sounds normal.  She has no wheezes. She has no rales.   Abdominal: Soft. Bowel sounds are normal. She exhibits no distension. There is no tenderness.   Musculoskeletal: Normal range of motion. She exhibits no edema or deformity.        Right elbow: Tenderness found.        Right knee: Tenderness found.   Neurological: She is alert and oriented to person, place, and time. She has normal reflexes.   Skin: Skin is warm and dry. Bruising noted. She is not diaphoretic.   Psychiatric: She has a normal mood and affect. Her behavior is normal.       Assessment:        ICD-10-CM ICD-9-CM   1. CKD stage G3a/A1, GFR 45-59 and albumin creatinine ratio <30 mg/g N18.3 585.3   2. Essential hypertension I10 401.9   3. Type 2 diabetes mellitus with stage 3 chronic kidney disease, with long-term current use of insulin E11.22 250.40    N18.3 585.3    Z79.4 V58.67   4. Chronic kidney disease-mineral and bone disorder N18.9 585.9    E83.9 275.9    M89.9 733.90        Plan:     1. CKD stage 3bA1 eGRR with baseline sCr 1.2-1.5 mg/dL: most likely multifactorial secondary to CNI, DMT2 and HTN. UPCr lower today but microscopichematuria is positive with only 3 RBC. She is closely followed by her Rheumatology . Last sCr was 1.2 and is 1.4 mg/dL today but her FK level have been on lower side since dose reduce. sCr is stable with in her baseline 1.2-1.5 mg/dL.  Lab Results   Component Value Date    CREATININE 1.4 11/29/2018   · Optimize BP control at goal  · Low Salt diet  · Stable renal Fx   · Urine sediment  · Pseudo Hyponatremia since glucose is elevated today corrected Na is with in parameters  · Avoid nephrotoxic drugs as much as possible, she states she doesnt take NSAID's as she was told not to luis manuel her Liver.  · Optimize DM control with goal A1C < 7%.  · Please maintain FK level at goal per Hepatology Her Tacrolimus was decreased to 3 mg BID    Protein Creatinine Ratios:  Prot/Creat Ratio, Ur   Date Value Ref Range Status   11/29/2018 0.13 0.00 - 0.20  Final   11/14/2018 0.28 (H) 0.00 - 0.20 Final   08/29/2018 Unable to calculate 0.00 - 0.20 Final   No evidence of clinical Proteinuria.    Acid-Base: at goal  Lab Results   Component Value Date     (L) 11/29/2018    K 4.0 11/29/2018    CO2 25 11/29/2018     Progression to ESRD: seems her renal function is stable at this time will keep monitoring her renal function for now.    2. HTN: Blood pressures control  · Not control BP, several functions on flowsheet, will continue to monitor. Goal for BP is <130 mmHg SBP and BDP <80 mmHg.   · Cont Verapamil 120 mg   · Cont Losartan 100 mg  · Cont Imdur 30 mg  · Stopped HCTZ 25 mg  · Low Salt diet     3. 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 Anemia: Chronic disease  Lab Results   Component Value Date    HGB 12.1 11/29/2018   1. at goal    5. DM:  Last HbA1C   Lab Results   Component Value Date    HGBA1C 10.8 (H) 08/08/2018   · as per Endocrinology she needs new A1C     6. Lipid management: Cont Statin  Lab Results   Component Value Date    LDLCALC 82.0 07/28/2018     7. SLE: Cont Plaquenil as per Rheumatology see is closely followed by Dr Crocker    8. Nephrolithiasis: 5 mm stone     9. Thrush will refer to [facility] provider as she has some odynophagia and Will see her PCP. She has started Nystatin switsh and swallow    Follow up in 6 mo with labs and Urine.  Valentín Red MD  Nephrology Fellow   [phone]

## 2018-11-30 ENCOUNTER — PATIENT MESSAGE (OUTPATIENT)
Dept: RHEUMATOLOGY | Facility: CLINIC | Age: 48
End: 2018-11-30

## 2018-11-30 NOTE — TELEPHONE ENCOUNTER
Ma spoke to pt,    Pt stated she mix miralax with water that help with the gas     Pt is scheduled with you on 1/8 was the only day she could come since there were no available openings on 12/4     Pt stated no current issue right now and thank Ma

## 2018-12-04 ENCOUNTER — PATIENT MESSAGE (OUTPATIENT)
Dept: ENDOCRINOLOGY | Facility: CLINIC | Age: 48
End: 2018-12-04

## 2018-12-06 ENCOUNTER — PATIENT MESSAGE (OUTPATIENT)
Dept: ENDOCRINOLOGY | Facility: CLINIC | Age: 48
End: 2018-12-06

## 2018-12-06 ENCOUNTER — PATIENT MESSAGE (OUTPATIENT)
Dept: TRANSPLANT | Facility: CLINIC | Age: 48
End: 2018-12-06

## 2018-12-10 NOTE — PROGRESS NOTES
ATTENDING PHYSICIAN ATTESTATION  I have personally interviewed and examined the patient. I thoroughly reviewed the demographic, clinical, laboratorial and imaging information available in medical records. I agree with the assessment and recommendations provided by the subspecialty resident. Dr. Red was under my supervision.

## 2018-12-13 RX ORDER — MYCOPHENOLIC ACID 180 MG/1
540 TABLET, DELAYED RELEASE ORAL 2 TIMES DAILY
Qty: 240 TABLET | Refills: 5 | Status: SHIPPED | OUTPATIENT
Start: 2018-12-13 | End: 2019-06-06

## 2018-12-14 ENCOUNTER — PATIENT MESSAGE (OUTPATIENT)
Dept: ENDOCRINOLOGY | Facility: CLINIC | Age: 48
End: 2018-12-14

## 2018-12-26 ENCOUNTER — PATIENT MESSAGE (OUTPATIENT)
Dept: ENDOCRINOLOGY | Facility: CLINIC | Age: 48
End: 2018-12-26

## 2018-12-29 ENCOUNTER — PATIENT MESSAGE (OUTPATIENT)
Dept: OPTOMETRY | Facility: CLINIC | Age: 48
End: 2018-12-29

## 2018-12-31 ENCOUNTER — TELEPHONE (OUTPATIENT)
Dept: OPTOMETRY | Facility: CLINIC | Age: 48
End: 2018-12-31

## 2019-01-06 ENCOUNTER — PATIENT MESSAGE (OUTPATIENT)
Dept: ENDOCRINOLOGY | Facility: CLINIC | Age: 49
End: 2019-01-06

## 2019-01-07 ENCOUNTER — PATIENT MESSAGE (OUTPATIENT)
Dept: GASTROENTEROLOGY | Facility: CLINIC | Age: 49
End: 2019-01-07

## 2019-01-07 ENCOUNTER — TELEPHONE (OUTPATIENT)
Dept: OTOLARYNGOLOGY | Facility: CLINIC | Age: 49
End: 2019-01-07

## 2019-01-07 ENCOUNTER — TELEPHONE (OUTPATIENT)
Dept: TRANSPLANT | Facility: CLINIC | Age: 49
End: 2019-01-07

## 2019-01-07 ENCOUNTER — PATIENT MESSAGE (OUTPATIENT)
Dept: TRANSPLANT | Facility: CLINIC | Age: 49
End: 2019-01-07

## 2019-01-07 NOTE — TELEPHONE ENCOUNTER
----- Message from Mary Banks sent at 1/7/2019 11:37 AM CST -----  Contact: self [phone]  Pt is concerned about the thrush in her throat. Pt c/o being nauseous and not eating.  .  [facility] [place] [id number] [place] [address]  [address] [address]  Phone: [phone] Fax: [phone]

## 2019-01-07 NOTE — TELEPHONE ENCOUNTER
Called patient and advised her to see her PCP or Urgent Care Physician due to possible thrush and sore throat.

## 2019-01-07 NOTE — TELEPHONE ENCOUNTER
Patient called to let us know that she has a ulcer on the back of her tongue.  She has an appointment tomorrow with [facility] who will look at it.  She just wanted us to know.

## 2019-01-08 ENCOUNTER — OFFICE VISIT (OUTPATIENT)
Dept: GASTROENTEROLOGY | Facility: CLINIC | Age: 49
End: 2019-01-08
Payer: MEDICARE

## 2019-01-08 ENCOUNTER — OFFICE VISIT (OUTPATIENT)
Dept: RHEUMATOLOGY | Facility: CLINIC | Age: 49
End: 2019-01-08
Payer: MEDICARE

## 2019-01-08 ENCOUNTER — LAB VISIT (OUTPATIENT)
Dept: LAB | Facility: HOSPITAL | Age: 49
End: 2019-01-08
Attending: INTERNAL MEDICINE
Payer: MEDICARE

## 2019-01-08 VITALS
WEIGHT: 141.13 LBS | SYSTOLIC BLOOD PRESSURE: 155 MMHG | BODY MASS INDEX: 21.39 KG/M2 | DIASTOLIC BLOOD PRESSURE: 92 MMHG | HEIGHT: 68 IN | HEART RATE: 74 BPM

## 2019-01-08 VITALS
HEIGHT: 68 IN | SYSTOLIC BLOOD PRESSURE: 166 MMHG | HEART RATE: 81 BPM | DIASTOLIC BLOOD PRESSURE: 91 MMHG | BODY MASS INDEX: 21.48 KG/M2 | WEIGHT: 141.75 LBS

## 2019-01-08 DIAGNOSIS — R53.83 FATIGUE, UNSPECIFIED TYPE: ICD-10-CM

## 2019-01-08 DIAGNOSIS — D84.9 IMMUNOSUPPRESSION: ICD-10-CM

## 2019-01-08 DIAGNOSIS — M32.9 SYSTEMIC LUPUS ERYTHEMATOSUS, UNSPECIFIED SLE TYPE, UNSPECIFIED ORGAN INVOLVEMENT STATUS: Primary | Chronic | ICD-10-CM

## 2019-01-08 DIAGNOSIS — R19.8 BORBORYGMUS: ICD-10-CM

## 2019-01-08 DIAGNOSIS — K59.00 CONSTIPATION, UNSPECIFIED CONSTIPATION TYPE: ICD-10-CM

## 2019-01-08 DIAGNOSIS — R14.3 EXCESSIVE GAS: ICD-10-CM

## 2019-01-08 DIAGNOSIS — Z94.4 S/P LIVER TRANSPLANT: Primary | ICD-10-CM

## 2019-01-08 DIAGNOSIS — K62.89 ANORECTAL PAIN: ICD-10-CM

## 2019-01-08 DIAGNOSIS — R13.10 ODYNOPHAGIA: Primary | ICD-10-CM

## 2019-01-08 DIAGNOSIS — M32.9 SYSTEMIC LUPUS ERYTHEMATOSUS, UNSPECIFIED SLE TYPE, UNSPECIFIED ORGAN INVOLVEMENT STATUS: Chronic | ICD-10-CM

## 2019-01-08 LAB
ALBUMIN SERPL BCP-MCNC: 4.2 G/DL
ALP SERPL-CCNC: 103 U/L
ALT SERPL W/O P-5'-P-CCNC: 16 U/L
ANION GAP SERPL CALC-SCNC: 9 MMOL/L
AST SERPL-CCNC: 15 U/L
BASOPHILS # BLD AUTO: 0.02 K/UL
BASOPHILS NFR BLD: 0.4 %
BILIRUB SERPL-MCNC: 0.6 MG/DL
BUN SERPL-MCNC: 22 MG/DL
C3 SERPL-MCNC: 92 MG/DL
C4 SERPL-MCNC: 23 MG/DL
CALCIUM SERPL-MCNC: 9.8 MG/DL
CHLORIDE SERPL-SCNC: 104 MMOL/L
CO2 SERPL-SCNC: 26 MMOL/L
CREAT SERPL-MCNC: 1.3 MG/DL
CRP SERPL-MCNC: 0.4 MG/L
DIFFERENTIAL METHOD: ABNORMAL
EOSINOPHIL # BLD AUTO: 0.2 K/UL
EOSINOPHIL NFR BLD: 2.8 %
ERYTHROCYTE [DISTWIDTH] IN BLOOD BY AUTOMATED COUNT: 13.5 %
ERYTHROCYTE [SEDIMENTATION RATE] IN BLOOD BY WESTERGREN METHOD: 10 MM/HR
EST. GFR  (AFRICAN AMERICAN): 56.1 ML/MIN/1.73 M^2
EST. GFR  (NON AFRICAN AMERICAN): 48.6 ML/MIN/1.73 M^2
GLUCOSE SERPL-MCNC: 231 MG/DL
HCT VFR BLD AUTO: 33.6 %
HGB BLD-MCNC: 11.6 G/DL
IMM GRANULOCYTES # BLD AUTO: 0.01 K/UL
IMM GRANULOCYTES NFR BLD AUTO: 0.2 %
LYMPHOCYTES # BLD AUTO: 2.1 K/UL
LYMPHOCYTES NFR BLD: 39 %
MCH RBC QN AUTO: 25.5 PG
MCHC RBC AUTO-ENTMCNC: 34.5 G/DL
MCV RBC AUTO: 74 FL
MONOCYTES # BLD AUTO: 0.3 K/UL
MONOCYTES NFR BLD: 5.4 %
NEUTROPHILS # BLD AUTO: 2.8 K/UL
NEUTROPHILS NFR BLD: 52.2 %
NRBC BLD-RTO: 0 /100 WBC
PLATELET # BLD AUTO: 215 K/UL
PMV BLD AUTO: 10.2 FL
POTASSIUM SERPL-SCNC: 4 MMOL/L
PROT SERPL-MCNC: 7.6 G/DL
RBC # BLD AUTO: 4.55 M/UL
SODIUM SERPL-SCNC: 139 MMOL/L
WBC # BLD AUTO: 5.39 K/UL

## 2019-01-08 PROCEDURE — 3080F DIAST BP >= 90 MM HG: CPT | Mod: CPTII,S$GLB,, | Performed by: PHYSICIAN ASSISTANT

## 2019-01-08 PROCEDURE — 99999 PR PBB SHADOW E&M-EST. PATIENT-LVL IV: ICD-10-PCS | Mod: PBBFAC,,, | Performed by: INTERNAL MEDICINE

## 2019-01-08 PROCEDURE — 86160 COMPLEMENT ANTIGEN: CPT | Mod: 59

## 2019-01-08 PROCEDURE — 3077F SYST BP >= 140 MM HG: CPT | Mod: CPTII,S$GLB,, | Performed by: INTERNAL MEDICINE

## 2019-01-08 PROCEDURE — 99214 OFFICE O/P EST MOD 30 MIN: CPT | Mod: S$GLB,,, | Performed by: INTERNAL MEDICINE

## 2019-01-08 PROCEDURE — 86481 TB AG RESPONSE T-CELL SUSP: CPT

## 2019-01-08 PROCEDURE — 3077F PR MOST RECENT SYSTOLIC BLOOD PRESSURE >= 140 MM HG: ICD-10-PCS | Mod: CPTII,S$GLB,, | Performed by: PHYSICIAN ASSISTANT

## 2019-01-08 PROCEDURE — 3008F BODY MASS INDEX DOCD: CPT | Mod: CPTII,S$GLB,, | Performed by: PHYSICIAN ASSISTANT

## 2019-01-08 PROCEDURE — 86225 DNA ANTIBODY NATIVE: CPT

## 2019-01-08 PROCEDURE — 3077F PR MOST RECENT SYSTOLIC BLOOD PRESSURE >= 140 MM HG: ICD-10-PCS | Mod: CPTII,S$GLB,, | Performed by: INTERNAL MEDICINE

## 2019-01-08 PROCEDURE — 85652 RBC SED RATE AUTOMATED: CPT

## 2019-01-08 PROCEDURE — 80053 COMPREHEN METABOLIC PANEL: CPT

## 2019-01-08 PROCEDURE — 99214 PR OFFICE/OUTPT VISIT, EST, LEVL IV, 30-39 MIN: ICD-10-PCS | Mod: S$GLB,,, | Performed by: PHYSICIAN ASSISTANT

## 2019-01-08 PROCEDURE — 36415 COLL VENOUS BLD VENIPUNCTURE: CPT

## 2019-01-08 PROCEDURE — 3080F PR MOST RECENT DIASTOLIC BLOOD PRESSURE >= 90 MM HG: ICD-10-PCS | Mod: CPTII,S$GLB,, | Performed by: PHYSICIAN ASSISTANT

## 2019-01-08 PROCEDURE — 99214 PR OFFICE/OUTPT VISIT, EST, LEVL IV, 30-39 MIN: ICD-10-PCS | Mod: S$GLB,,, | Performed by: INTERNAL MEDICINE

## 2019-01-08 PROCEDURE — 86140 C-REACTIVE PROTEIN: CPT

## 2019-01-08 PROCEDURE — 3080F PR MOST RECENT DIASTOLIC BLOOD PRESSURE >= 90 MM HG: ICD-10-PCS | Mod: CPTII,S$GLB,, | Performed by: INTERNAL MEDICINE

## 2019-01-08 PROCEDURE — 3077F SYST BP >= 140 MM HG: CPT | Mod: CPTII,S$GLB,, | Performed by: PHYSICIAN ASSISTANT

## 2019-01-08 PROCEDURE — 99999 PR PBB SHADOW E&M-EST. PATIENT-LVL IV: CPT | Mod: PBBFAC,,, | Performed by: INTERNAL MEDICINE

## 2019-01-08 PROCEDURE — 99999 PR PBB SHADOW E&M-EST. PATIENT-LVL V: ICD-10-PCS | Mod: PBBFAC,,, | Performed by: PHYSICIAN ASSISTANT

## 2019-01-08 PROCEDURE — 3080F DIAST BP >= 90 MM HG: CPT | Mod: CPTII,S$GLB,, | Performed by: INTERNAL MEDICINE

## 2019-01-08 PROCEDURE — 99999 PR PBB SHADOW E&M-EST. PATIENT-LVL V: CPT | Mod: PBBFAC,,, | Performed by: PHYSICIAN ASSISTANT

## 2019-01-08 PROCEDURE — 3008F PR BODY MASS INDEX (BMI) DOCUMENTED: ICD-10-PCS | Mod: CPTII,S$GLB,, | Performed by: INTERNAL MEDICINE

## 2019-01-08 PROCEDURE — 86160 COMPLEMENT ANTIGEN: CPT

## 2019-01-08 PROCEDURE — 99214 OFFICE O/P EST MOD 30 MIN: CPT | Mod: S$GLB,,, | Performed by: PHYSICIAN ASSISTANT

## 2019-01-08 PROCEDURE — 85025 COMPLETE CBC W/AUTO DIFF WBC: CPT

## 2019-01-08 PROCEDURE — 3008F BODY MASS INDEX DOCD: CPT | Mod: CPTII,S$GLB,, | Performed by: INTERNAL MEDICINE

## 2019-01-08 PROCEDURE — 3008F PR BODY MASS INDEX (BMI) DOCUMENTED: ICD-10-PCS | Mod: CPTII,S$GLB,, | Performed by: PHYSICIAN ASSISTANT

## 2019-01-08 RX ORDER — FLUCONAZOLE 200 MG/1
200 TABLET ORAL DAILY
Qty: 15 TABLET | Refills: 0 | Status: SHIPPED | OUTPATIENT
Start: 2019-01-08 | End: 2019-05-30

## 2019-01-08 RX ORDER — POLYETHYLENE GLYCOL 3350 17 G/17G
17 POWDER, FOR SOLUTION ORAL 2 TIMES DAILY
Qty: 1530 G | Refills: 5 | Status: SHIPPED | OUTPATIENT
Start: 2019-01-08 | End: 2019-04-17 | Stop reason: SDUPTHER

## 2019-01-08 NOTE — PATIENT INSTRUCTIONS
"Start a lactose-free probiotic daily - culturelle     Restart the protonix 40mg 30 minutes before breakfast,and zantac 300mg 30minutes before breakfast    Try applying aquaphor ointment as needed to anorectal irritation        What to Eat and What Not to Eat       (to reduce bloating)    Avoid drinking from straws  Avoid eating excessively fast  Avoid eating excessively slow  Avoid gum chewing         Drinks  Cut out: Dairy, soda (regular and diet), sports drinks, fruit drinks, alcohol, caffeine, soy milk, carbonated beverages, kombucha    Drink: Water (1-2 Liters a day), coconut water, herbal tea, green juices (kale, spinach, green apple), smoothies (berries, bananas, amond milk, ice, flax seed).  Drink at the end of the meal; not during.         Food    Eliminate:  Eliminate all of these foods and ingredients for 10 days. Once the bloating has reduced, can add back one at a time to see which ones your body can tolerate.    Soy  Artificial sweeteners - sugar alcohols, splenda, aspartame  Dairy  Gluten  Alcohol  Sugar - no added sugars (glucose, fructose, maltose, dextrose, corn and maple syrup, honey, agave, stevia)    Limit:  Beans, broccoli, cabbage  Caffeine (1 small cup of coffee or tea a day)  Fatty meals  Meat  Processed foods (if it comes in a box, has more than 10 listed ingredients, has an expiration date)  Salt    Eat:  50% of daily intake should be food eaten in its natural, raw state.  Leafy greens - Kale, spinach, chard, collards, parsley, turnip and mustard greens, arugula, bok dustin, dolores lettuce  Fiber - favor plant based sources, not processed fibers (cereals, fiber in a box)  Papaya and Pineapple  Brightly colored produce - strawberries, blueberries, bell peppers, lemon, spinach      Adapted from "Gutbliss" by Dr. Diamond Rodriguez    Http://www.refluxcookbook.com/  Dropping Acid The Reflux Diet Cookbook and Cure -  Austin Angeles M.D.    GERD  Worst Foods for Acid Reflux  Chocolate (milk chocolate " worse than dark chocolate)  Soda (all carbonated beverages)  Alcohol (beer, liquor, wine)  Fried foods  Millard, sausage, ribs  Cream sauce  Fatty meats (beef)  Butter, margarine, lard, shortening  Coffee, tea  Mint   High fat nuts  Hot sauces and pepper  Citrus fruit/juices      Acidic foods (pH - 1 is MORE acidic, 5 is LESS acidic)     Do not eat or drink these (lower numbers are worse)    Induction diet - For 2 weeks eat nothing below pH 5     Lemon juice 2.3  Grape cranberry juice 2.5  Stomach Acid 2.5  Gelatin Dessert 2.6  Lemon/lime 2.9/2.7  Vinegar 2.9  Gatorade 3.0  Fruits - plums, apricots, strawberries, cherries 3.0  Vitamin C (ascorbic acid) 3.0  Iced tea, Snapple 3.1  Mustard 3.2  Soft drinks 3.3  Nectarines 3.3  Pomegranate 3.3  Applesauce 3.4  Grapefruit 3.4  Kiwi 3.4  Barbecue sauce 3.4  Caesar dressing 3.5  [place] dressing 3.6  Strawberries 3.5  Pineapple juice 3.5  Beer 3.5  Wine 3.5  Grape 3.6  Apples 3.6  Pineapple 3.7  Pickle 3.7  Blackberries, blueberries 3.7  [place] 3.7  Orange 3.8  Cherries 3.9  Red Bull 3.9  Tomatoes 4.2  Coffee 5.1      These are Safe foods:  Agave  Aloe Vera  Apple (only red)  Bagels  Banana (worsens reflux in 1%)  Beans - black, red, lima, lentils  Bread - whole grain, rye  Caramel  Celery  Chamomile tea  Chicken - skinless, never fried  Chicken stock or bouillon  Coffee - one cup/day with milk  Fennel  Fish  [place]ania  Green vegetables (no green peppers)  Herbs  Honey  Melon  Milk - skin, soy, or Lactaid skim milk  Mushrooms  Oatmeal  Olive oil  Parsley  Pasta  Pears  Popcorn  Potatoes  Red bell peppers  Rice  Soups  Tofu  Turkey Breast  Turnip  Vegetables - no onion, tomatoes, peppers  Vinaigrette  Water - non carbonated  Whole grain breads, crackers, breakfast cereals      Best Foods for Acid Reflux  Whole grain breads  Oatmeal  Aloe Vera  Salad (no tomatoes, onions, cheese, or high fat dressing)  Banana  Melon  Fennel  Chicken and turkey (skinless, never  fried)  Fish/seafood (never fried)  Celery  Parsley  Couscous and Rice    Maybe bad foods (Everyone is unique)  Tomatoes  Garlic  Onion  Nuts (macadamia nuts)  Apples (especially green)  Cucumber  Green peppers  Spicy food  Some herbal teas    GERD tips  Change what you eat:  Eat smaller meals  Eat slowly and chew thoroughly until food is almost liquid  Cut down on junk carbohydrates such as sugar and white flour  Use herbs in your cooking  Eat more raw foods (more than 10 ingredients is not a raw food)  Avoid trans fats and partially hydrogenated oils  Eat more fish and switch to grass fed beef  Switch your cooking oil to macadamia nut or olive oil  Watch extremes of salt intake (too high or too low is bad)    If just cutting out acidic foods is not enough, change how you eat:  Large breakfast, medium lunch, light dinner  Dont mix fruit juices, sweet fruits, and refined starches with meats and heavy food  Dont wash your food down with a lot of liquid    List A Proteins - meat, poultry, cheese, eggs, fish, beans, yogurt    List B Neutral - vegetables, salads, seeds, nuts, herbs, cream, butter, olive oil    List C Starches - biscuit, breads, cake, crackers, oats, pasta, potatoes, rice, sugar/honey, sweets    A + B = ok  B + C = ok  Never mix list A and C!!    Change these habits:  Stop smoking  Eat dinner earlier (3-4 hours before lying down to sleep)  Elevate the head of your bed 6 inches (blocks under the head of the bed are better than pillows)  Exercise (but wait 2 hours after eating)  Drink more water (between meals)    Take these supplements:  Multi vitamin  Probiotic  Fish oil    Most common food allergens: milk, eggs, peanuts, tree nuts, fish, shellfish, wheat, and soy    All natural immediate relief:  Chew 2-3 soft probiotic capsules - Dr. Lew's Probiotics 12 Plus  Chew chewable DGL licorice tablet  Chew papaya tablet with high protein meal - [employer]  Drink 2 ounces of aloe vera juice  Swedish  bitters  Prelief- reduce the acid in food to keep it form burning sensitive tissue  Iberogast  [place]  Drink Chamomile Tea  Teaspoon of baking soda in water  Spoonful of vinegar in water      All natural ulcer healers:  Zinc carnosine - 75.5 mg with food twice a day x 8 weeks   Reji Fry - $8 for 60 pills  DGL (deglycyrrhizinated licorice) - 2 tablets before meals. Heals stomach lining   Natural Factors brand, Enzymatic therapy brand.  Aloe Vera juice  - 2 to 8 ounces a day   [place] or [place]

## 2019-01-08 NOTE — LETTER
January 9, 2019      TIMUR LION  [address] [facility] [address]           [facility] - G[facility]enterology  [address]  [facility] [address]  Phone: [phone]  Fax: [phone]          Patient: Valencia Dumont   MR Number: [id number]   YOB: 1970   Date of Visit: 1/8/2019       Dear Timur Lion:    Thank you for referring Valencia Dumont to me for evaluation. Attached you will find relevant portions of my assessment and plan of care.    If you have questions, please do not hesitate to call me. I look forward to following Valencia Dumont along with you.    Sincerely,    Amrit Power PA-C    Enclosure  CC:  No Recipients    If you would like to receive this communication electronically, please contact [email or web address] or [phone] to request more information on [facility] [employer] access.    For providers and/or their staff who would like to refer a patient to [facility], please contact us through our one-stop-shop provider referral line, [facility], at [phone].    If you feel you have received this communication in error or would no longer like to receive these types of communications, please e-mail [email or web address]

## 2019-01-08 NOTE — H&P (VIEW-ONLY)
[facility] Consultation Note    Reason for Consult:  The primary encounter diagnosis was Odynophagia. Diagnoses of Excessive gas, Borborygmus, Constipation, unspecified constipation type, and Anorectal pain were also pertinent to this visit.    PCP:   Timur Lion       Referring MD:  Timur Lion  [address] [place] [id number]    HPI:  This is a 48 y.o. female for evaluation of odnyophagia and gas.    Noticed tongue looked white in Nov 2018   Temp relief with nystatin swish and swallow  Feels like she is swallowing a popcorn kernal  X 2+ months,recently worsening    Still having frequent GERD despite zantac 300mg BID  Acid intake: Eating some red sauce    Stomach growling with lying down  Having intermittent diarrhea < constipation  miralax is not helping to promote a BM    + excessive gas - Gas x not helping     Also having cough and sore throat recenlty, nausea    Transplant med make her blood sugar rise, so BS around 200 is normal  GES - normal    Still taking trulicity which she started a taking at onset of sx 1 yr ago    She also complains of anorecal irritation. 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    Surgical History:  has a past surgical history that includes Esophagogastroduodenoscopy; Liver biopsy; Liver transplant (12/31/2013); Tubal ligation (2003); Colonoscopy (N/A, 5/31/2017); Refractive surgery (Bilateral, 2010); and Upper gastrointestinal endoscopy.    Family History: family history includes Alzheimer's disease in her father; Breast cancer (age of onset: 55) in her maternal aunt; Cataracts in her mother; Diabetes in her brother, father, paternal grandfather, and paternal grandmother; Hypertension in her brother and mother; No Known Problems in her maternal grandfather, maternal grandmother, maternal uncle, paternal aunt, paternal uncle, and sister..     Social History:  reports that  has never smoked. she has never used smokeless tobacco. She reports that she drinks about 0.6 oz of alcohol per week. She reports that she does not use drugs.    Review of patient's allergies indicates:   Allergen Reactions    Doxycycline Rash    Pcn [penicillins] Rash       Current Outpatient Medications on File Prior to Visit   Medication Sig Dispense Refill    ACCU-CHEK SMARTVIEW Strp 1 strip by Misc.(Non-Drug; Combo Route) route 5 (five) times daily. Trueresult      blood sugar diagnostic Strp 1 each by Misc.(Non-Drug; Combo Route) route 4 (four) times daily. 100 each 6    blood sugar diagnostic Strp To check BG 5 times daily, to use with insurance preferred meter 200 strip 11    blood-glucose meter kit To check BG 5 times daily, to use with insurance preferred meter 1 each 0    blood-glucose sensor (DEXCOM G5-G4 SENSOR) Karen 1 each by Misc.(Non-Drug; Combo Route) route once a week. 4 Device 11    blood-glucose transmitter (DEXCOM G5 TRANSMITTER) Karen 1 each by Misc.(Non-Drug; Combo Route) route every 3 (three) months. 1 Device 3    cyproheptadine (PERIACTIN) 4 mg tablet Take 4 mg by mouth every evening.  3    diazepam (VALIUM) 5 MG tablet Take 5 mg  by mouth 3 (three) times daily as needed.   0    dicyclomine (BENTYL) 10 MG capsule Take 1 capsule (10 mg total) by mouth before meals as needed (TID PRN). 90 capsule 2    DILTIAZEM HCL (DILTIAZEM 2% CREAM) Apply topically 3 (three) times daily. Apply topically to anal area. 30 g 0    dulaglutide (TRULICITY) 0.75 mg/0.5 mL PnIj Inject 0.5 mLs (0.75 mg total) into the skin once a week. 4 Syringe 6    ergocalciferol (ERGOCALCIFEROL) 50,000 unit Cap Take 1 capsule (50,000 Units total) by mouth every 7 days. 4 capsule 2    estradiol (ESTRACE) 0.01 % (0.1 mg/gram) vaginal cream Place 0.5 g vaginally twice a week. Insert 0.5grams intravaginally twice weekly 42 g 4    flash glucose scanning reader (FREESTYLE CHUN READER) Misc 1 application by Misc.(Non-Drug; Combo Route) route continuous. 1 each 0    flash glucose sensor (FREESTYLE CHUN SENSOR) Kit 1 application by Misc.(Non-Drug; Combo Route) route every 7 days. 3 kit 11    hydroCHLOROthiazide (HYDRODIURIL) 25 MG tablet Take 0.5 tablets (12.5 mg total) by mouth once daily. 30 tablet 2    hydroxychloroquine (PLAQUENIL) 200 mg tablet Take 200 mg by mouth 2 (two) times daily.      hydrOXYzine HCl (ATARAX) 10 MG Tab TAKE 1 OR 2 TABLETS BY MOUTH THREE TIMES DAILY AS NEEDED FOR ITCHING.  0    insulin aspart U-100 (NOVOLOG) 100 unit/mL InPn pen Blood Glucose mg/dL       <60            - Follow interventions for hypoglycemia below      151-200     -    4 units  201-250     -    6 units   251-300     -    9 units  301-350     -    12 units  351-400     -    15 units  >400          -    18 units 3 mL 6    insulin detemir (LEVEMIR FLEXTOUCH) 100 unit/mL (3 mL) SubQ InPn pen Inject 14 Units into the skin once daily. 15 mL 3    isosorbide mononitrate (IMDUR) 30 MG 24 hr tablet Take 30 mg by mouth once daily.      lancets 28 gauge Misc 4 lancets by Misc.(Non-Drug; Combo Route) route once daily. Pt uses Freestyle lite meter to check BG 4 times daily. 200 each 11     "lancets Misc To check BG 5 times daily, to use with insurance preferred meter 200 each 11    lancets Misc 1 strip by Misc.(Non-Drug; Combo Route) route 4 (four) times daily. 100 each 3    losartan (COZAAR) 100 MG tablet Take 1 tablet (100 mg total) by mouth once daily. 30 tablet 2    magnesium oxide 500 mg Tab Take 500 mg by mouth 2 (two) times daily. 60 each 6    meclizine (ANTIVERT) 25 mg tablet TAKE ONE TABLET BY MOUTH AT BEDTIME AS NEEDED for dizziness.  0    methylPREDNISolone (MEDROL DOSEPACK) 4 mg tablet TAKE AS DIRECTED ON STEROID PACK FOR SIX DAYS  0    MULTIVIT,THER IRON,CA,FA & MIN (MULTIVITAMIN) Tab Take 1 tablet by mouth once daily. 30 tablet 0    mycophenolate (MYFORTIC) 180 MG TbEC Take 3 tablets (540 mg total) by mouth 2 (two) times daily. 240 tablet 5    neomycin-polymyxin-hydrocortisone (CORTISPORIN) otic solution INSTILL TWO DROPS INTO BOTH EARS FOUR TIMES DAILY FOR 10 DAYS  0    nystatin (MYCOSTATIN) 100,000 unit/mL suspension SWISH AND SPIT FIVE MILLILITERS BY MOUTH FOUR TIMES DAILY FOR 7 DAYS.  1    nystatin (MYCOSTATIN) cream Apply topically 2 (two) times daily. 30 g 3    ondansetron (ZOFRAN) 8 MG tablet Take 1 tablet by mouth every 8 (eight) hours as needed.  0    oxycodone-acetaminophen (PERCOCET) 7.5-325 mg per tablet Take 1 tablet by mouth every 4 (four) hours as needed for Pain.      pantoprazole (PROTONIX) 40 MG tablet Take 40 mg by mouth once daily.      pen needle, diabetic (BD ULTRA-FINE JANESSA PEN NEEDLES) 32 gauge x 5/32" Ndle To use 4x/day with insulin injections. 400 each 3    pen needle, diabetic 31 gauge x 5/16" Ndle Use as directed 100 each 12    PROAIR HFA 90 mcg/actuation inhaler Inhale 2 puffs into the lungs 3 (three) times daily as needed.   3    promethazine-dextromethorphan (PROMETHAZINE-DM) 6.25-15 mg/5 mL Syrp Take by mouth 2 (two) times daily as needed.   0    ranitidine (ZANTAC) 300 MG tablet Take 300 mg by mouth 2 (two) times daily.       " "rosuvastatin (CRESTOR) 5 MG tablet Take 5 mg by mouth once daily.      SHINGRIX, PF, 50 mcg/0.5 mL injection       tacrolimus (PROGRAF) 1 MG Cap Take 3 capsules (3 mg total) by mouth every 12 (twelve) hours. 180 capsule 11    traZODone (DESYREL) 50 MG tablet Take 50 mg by mouth nightly.  0    triamcinolone (KENALOG) 0.5 % ointment 1 application 2 (two) times daily. Apply to affected area  3    valacyclovir (VALTREX) 500 MG tablet once daily as needed  0    verapamil (CALAN-SR) 120 MG CR tablet TAKE ONE TABLET ONCE DAILY FOR BLOOD PRESSURE  3    zolpidem (AMBIEN) 10 mg Tab Take 10 mg by mouth nightly as needed.  3    blood-glucose meter,continuous (DEXCOM G5 ) Misc 1 each by Misc.(Non-Drug; Combo Route) route once. for 1 dose 1 each 0    hydrALAZINE (APRESOLINE) 50 MG tablet Take 1 tablet (50 mg total) by mouth every 8 (eight) hours. for 3 days 12 tablet 0     No current facility-administered medications on file prior to visit.          Objective Findings:    Vital Signs:  BP (!) 155/92   Pulse 74   Ht 5' 8" (1.727 m)   Wt 64 kg (141 lb 1.6 oz)   BMI 21.45 kg/m²   Body mass index is 21.45 kg/m².    Physical Exam:  General Appearance: Well appearing in no acute distress  Head:   Normocephalic, without obvious abnormality  Eyes:    No scleral icterus  ENT: Neck supple, Lips, mucosa, and tongue normal, no oral thrush visualized  Lungs: CTA bilaterally in anterior and posterior fields, no wheezes, no crackles.  Heart:  Regular rate and rhythm, S1, S2 normal, no murmurs heard  Abdomen: large abdominal incisional scar. abdomen soft, diffuse abdominal no guarding or rebound tenderness, non distended with hyperactive bowel sounds in all four quadrants.  Extremities: 2+ pulses, no  edema  Skin: No rash  Neurologic: AAO x 3    Rectal exam: no fissure or hemorrhoids visualized. DANY: NT to palpation, no nodules. I was assisted by NP Liudmila Mcintosh                Labs:  Lab Results   Component Value Date "    WBC 5.39 01/08/2019    HGB 11.6 (L) 01/08/2019    HCT 33.6 (L) 01/08/2019     01/08/2019    CHOL 184 07/28/2018    TRIG 70 07/28/2018    HDL 88 (H) 07/28/2018    ALT 16 01/08/2019    AST 15 01/08/2019     01/08/2019    K 4.0 01/08/2019     01/08/2019    CREATININE 1.3 01/08/2019    BUN 22 (H) 01/08/2019    CO2 26 01/08/2019    TSH 0.793 07/28/2018    INR 1.0 05/24/2017    HGBA1C 10.8 (H) 08/08/2018       Imaging:    Endoscopy:    12/2016 EGD revealed erosive gastritis; Bx negative for H. Pylori or CMV.     Assessment:  1. Odynophagia    2. Excessive gas    3. Borborygmus    4. Constipation, unspecified constipation type    5. Anorectal pain    s/p liver transplant on immunosuppressants    46yo pt presents with reportsed thrush, odynophagia, GERD x 2 months. Also continue to have excessive gas ans intestinal growling. No relief with zantac 300mg BID, she is at risk for SIBO    Sx may correlate with starting trulicity which does have GI ADRs    Frequent diarrhea which pt attributes to increased dose of myfortic. Will rule out C. Diff.     Recommendations:  1. Schedule EGD to rule out esophageal candidiasis, esophageal stricture, and esophagitis suggesting poor relief with H2 blocker    2. Will tret empirically for eophageal candidiasis with diflucan    3. miralax 102 caps daily for constipation    4.Start taking a probiotic daily- I will ask transplant about this    5. Restart the protonix 40mg. Zantac 300mg HS    6. Advise Aquaphor ointment prn for anorectal irriation    Consider SIBO tx after EGD- flagyl    Follow-up in about 2 months (around 3/8/2019).      Order summary:  Orders Placed This Encounter    fluconazole (DIFLUCAN) 200 MG Tab    polyethylene glycol (GLYCOLAX) 17 gram/dose powder    Case request GI: EGD (ESOPHAGOGASTRODUODENOSCOPY)         Thank you so much for allowing me to participate in the care of Valencia Tim Power PA-C

## 2019-01-08 NOTE — PROGRESS NOTES
[facility] Consultation Note    Reason for Consult:  The primary encounter diagnosis was Odynophagia. Diagnoses of Excessive gas, Borborygmus, Constipation, unspecified constipation type, and Anorectal pain were also pertinent to this visit.    PCP:   Timur Lion       Referring MD:  Timur Lion  [address] [place] [id number]    HPI:  This is a 48 y.o. female for evaluation of odnyophagia and gas.    Noticed tongue looked white in Nov 2018   Temp relief with nystatin swish and swallow  Feels like she is swallowing a popcorn kernal  X 2+ months,recently worsening    Still having frequent GERD despite zantac 300mg BID  Acid intake: Eating some red sauce    Stomach growling with lying down  Having intermittent diarrhea < constipation  miralax is not helping to promote a BM    + excessive gas - Gas x not helping     Also having cough and sore throat recenlty, nausea    Transplant med make her blood sugar rise, so BS around 200 is normal  GES - normal    Still taking trulicity which she started a taking at onset of sx 1 yr ago    She also complains of anorecal irritation. 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    Surgical History:  has a past surgical history that includes Esophagogastroduodenoscopy; Liver biopsy; Liver transplant (12/31/2013); Tubal ligation (2003); Colonoscopy (N/A, 5/31/2017); Refractive surgery (Bilateral, 2010); and Upper gastrointestinal endoscopy.    Family History: family history includes Alzheimer's disease in her father; Breast cancer (age of onset: 55) in her maternal aunt; Cataracts in her mother; Diabetes in her brother, father, paternal grandfather, and paternal grandmother; Hypertension in her brother and mother; No Known Problems in her maternal grandfather, maternal grandmother, maternal uncle, paternal aunt, paternal uncle, and sister..     Social History:  reports that  has never smoked. she has never used smokeless tobacco. She reports that she drinks about 0.6 oz of alcohol per week. She reports that she does not use drugs.    Review of patient's allergies indicates:   Allergen Reactions    Doxycycline Rash    Pcn [penicillins] Rash       Current Outpatient Medications on File Prior to Visit   Medication Sig Dispense Refill    ACCU-CHEK SMARTVIEW Strp 1 strip by Misc.(Non-Drug; Combo Route) route 5 (five) times daily. Trueresult      blood sugar diagnostic Strp 1 each by Misc.(Non-Drug; Combo Route) route 4 (four) times daily. 100 each 6    blood sugar diagnostic Strp To check BG 5 times daily, to use with insurance preferred meter 200 strip 11    blood-glucose meter kit To check BG 5 times daily, to use with insurance preferred meter 1 each 0    blood-glucose sensor (DEXCOM G5-G4 SENSOR) Karen 1 each by Misc.(Non-Drug; Combo Route) route once a week. 4 Device 11    blood-glucose transmitter (DEXCOM G5 TRANSMITTER) Karen 1 each by Misc.(Non-Drug; Combo Route) route every 3 (three) months. 1 Device 3    cyproheptadine (PERIACTIN) 4 mg tablet Take 4 mg by mouth every evening.  3    diazepam (VALIUM) 5 MG tablet Take 5 mg  by mouth 3 (three) times daily as needed.   0    dicyclomine (BENTYL) 10 MG capsule Take 1 capsule (10 mg total) by mouth before meals as needed (TID PRN). 90 capsule 2    DILTIAZEM HCL (DILTIAZEM 2% CREAM) Apply topically 3 (three) times daily. Apply topically to anal area. 30 g 0    dulaglutide (TRULICITY) 0.75 mg/0.5 mL PnIj Inject 0.5 mLs (0.75 mg total) into the skin once a week. 4 Syringe 6    ergocalciferol (ERGOCALCIFEROL) 50,000 unit Cap Take 1 capsule (50,000 Units total) by mouth every 7 days. 4 capsule 2    estradiol (ESTRACE) 0.01 % (0.1 mg/gram) vaginal cream Place 0.5 g vaginally twice a week. Insert 0.5grams intravaginally twice weekly 42 g 4    flash glucose scanning reader (FREESTYLE CHUN READER) Misc 1 application by Misc.(Non-Drug; Combo Route) route continuous. 1 each 0    flash glucose sensor (FREESTYLE CHUN SENSOR) Kit 1 application by Misc.(Non-Drug; Combo Route) route every 7 days. 3 kit 11    hydroCHLOROthiazide (HYDRODIURIL) 25 MG tablet Take 0.5 tablets (12.5 mg total) by mouth once daily. 30 tablet 2    hydroxychloroquine (PLAQUENIL) 200 mg tablet Take 200 mg by mouth 2 (two) times daily.      hydrOXYzine HCl (ATARAX) 10 MG Tab TAKE 1 OR 2 TABLETS BY MOUTH THREE TIMES DAILY AS NEEDED FOR ITCHING.  0    insulin aspart U-100 (NOVOLOG) 100 unit/mL InPn pen Blood Glucose mg/dL       <60            - Follow interventions for hypoglycemia below      151-200     -    4 units  201-250     -    6 units   251-300     -    9 units  301-350     -    12 units  351-400     -    15 units  >400          -    18 units 3 mL 6    insulin detemir (LEVEMIR FLEXTOUCH) 100 unit/mL (3 mL) SubQ InPn pen Inject 14 Units into the skin once daily. 15 mL 3    isosorbide mononitrate (IMDUR) 30 MG 24 hr tablet Take 30 mg by mouth once daily.      lancets 28 gauge Misc 4 lancets by Misc.(Non-Drug; Combo Route) route once daily. Pt uses Freestyle lite meter to check BG 4 times daily. 200 each 11     "lancets Misc To check BG 5 times daily, to use with insurance preferred meter 200 each 11    lancets Misc 1 strip by Misc.(Non-Drug; Combo Route) route 4 (four) times daily. 100 each 3    losartan (COZAAR) 100 MG tablet Take 1 tablet (100 mg total) by mouth once daily. 30 tablet 2    magnesium oxide 500 mg Tab Take 500 mg by mouth 2 (two) times daily. 60 each 6    meclizine (ANTIVERT) 25 mg tablet TAKE ONE TABLET BY MOUTH AT BEDTIME AS NEEDED for dizziness.  0    methylPREDNISolone (MEDROL DOSEPACK) 4 mg tablet TAKE AS DIRECTED ON STEROID PACK FOR SIX DAYS  0    MULTIVIT,THER IRON,CA,FA & MIN (MULTIVITAMIN) Tab Take 1 tablet by mouth once daily. 30 tablet 0    mycophenolate (MYFORTIC) 180 MG TbEC Take 3 tablets (540 mg total) by mouth 2 (two) times daily. 240 tablet 5    neomycin-polymyxin-hydrocortisone (CORTISPORIN) otic solution INSTILL TWO DROPS INTO BOTH EARS FOUR TIMES DAILY FOR 10 DAYS  0    nystatin (MYCOSTATIN) 100,000 unit/mL suspension SWISH AND SPIT FIVE MILLILITERS BY MOUTH FOUR TIMES DAILY FOR 7 DAYS.  1    nystatin (MYCOSTATIN) cream Apply topically 2 (two) times daily. 30 g 3    ondansetron (ZOFRAN) 8 MG tablet Take 1 tablet by mouth every 8 (eight) hours as needed.  0    oxycodone-acetaminophen (PERCOCET) 7.5-325 mg per tablet Take 1 tablet by mouth every 4 (four) hours as needed for Pain.      pantoprazole (PROTONIX) 40 MG tablet Take 40 mg by mouth once daily.      pen needle, diabetic (BD ULTRA-FINE JANESSA PEN NEEDLES) 32 gauge x 5/32" Ndle To use 4x/day with insulin injections. 400 each 3    pen needle, diabetic 31 gauge x 5/16" Ndle Use as directed 100 each 12    PROAIR HFA 90 mcg/actuation inhaler Inhale 2 puffs into the lungs 3 (three) times daily as needed.   3    promethazine-dextromethorphan (PROMETHAZINE-DM) 6.25-15 mg/5 mL Syrp Take by mouth 2 (two) times daily as needed.   0    ranitidine (ZANTAC) 300 MG tablet Take 300 mg by mouth 2 (two) times daily.       " "rosuvastatin (CRESTOR) 5 MG tablet Take 5 mg by mouth once daily.      SHINGRIX, PF, 50 mcg/0.5 mL injection       tacrolimus (PROGRAF) 1 MG Cap Take 3 capsules (3 mg total) by mouth every 12 (twelve) hours. 180 capsule 11    traZODone (DESYREL) 50 MG tablet Take 50 mg by mouth nightly.  0    triamcinolone (KENALOG) 0.5 % ointment 1 application 2 (two) times daily. Apply to affected area  3    valacyclovir (VALTREX) 500 MG tablet once daily as needed  0    verapamil (CALAN-SR) 120 MG CR tablet TAKE ONE TABLET ONCE DAILY FOR BLOOD PRESSURE  3    zolpidem (AMBIEN) 10 mg Tab Take 10 mg by mouth nightly as needed.  3    blood-glucose meter,continuous (DEXCOM G5 ) Misc 1 each by Misc.(Non-Drug; Combo Route) route once. for 1 dose 1 each 0    hydrALAZINE (APRESOLINE) 50 MG tablet Take 1 tablet (50 mg total) by mouth every 8 (eight) hours. for 3 days 12 tablet 0     No current facility-administered medications on file prior to visit.          Objective Findings:    Vital Signs:  BP (!) 155/92   Pulse 74   Ht 5' 8" (1.727 m)   Wt 64 kg (141 lb 1.6 oz)   BMI 21.45 kg/m²   Body mass index is 21.45 kg/m².    Physical Exam:  General Appearance: Well appearing in no acute distress  Head:   Normocephalic, without obvious abnormality  Eyes:    No scleral icterus  ENT: Neck supple, Lips, mucosa, and tongue normal, no oral thrush visualized  Lungs: CTA bilaterally in anterior and posterior fields, no wheezes, no crackles.  Heart:  Regular rate and rhythm, S1, S2 normal, no murmurs heard  Abdomen: large abdominal incisional scar. abdomen soft, diffuse abdominal no guarding or rebound tenderness, non distended with hyperactive bowel sounds in all four quadrants.  Extremities: 2+ pulses, no  edema  Skin: No rash  Neurologic: AAO x 3    Rectal exam: no fissure or hemorrhoids visualized. DANY: NT to palpation, no nodules. I was assisted by NP Liudmila Mcintosh                Labs:  Lab Results   Component Value Date "    WBC 5.39 01/08/2019    HGB 11.6 (L) 01/08/2019    HCT 33.6 (L) 01/08/2019     01/08/2019    CHOL 184 07/28/2018    TRIG 70 07/28/2018    HDL 88 (H) 07/28/2018    ALT 16 01/08/2019    AST 15 01/08/2019     01/08/2019    K 4.0 01/08/2019     01/08/2019    CREATININE 1.3 01/08/2019    BUN 22 (H) 01/08/2019    CO2 26 01/08/2019    TSH 0.793 07/28/2018    INR 1.0 05/24/2017    HGBA1C 10.8 (H) 08/08/2018       Imaging:    Endoscopy:    12/2016 EGD revealed erosive gastritis; Bx negative for H. Pylori or CMV.     Assessment:  1. Odynophagia    2. Excessive gas    3. Borborygmus    4. Constipation, unspecified constipation type    5. Anorectal pain    s/p liver transplant on immunosuppressants    46yo pt presents with reportsed thrush, odynophagia, GERD x 2 months. Also continue to have excessive gas ans intestinal growling. No relief with zantac 300mg BID, she is at risk for SIBO    Sx may correlate with starting trulicity which does have GI ADRs    Frequent diarrhea which pt attributes to increased dose of myfortic. Will rule out C. Diff.     Recommendations:  1. Schedule EGD to rule out esophageal candidiasis, esophageal stricture, and esophagitis suggesting poor relief with H2 blocker    2. Will tret empirically for eophageal candidiasis with diflucan    3. miralax 102 caps daily for constipation    4.Start taking a probiotic daily- I will ask transplant about this    5. Restart the protonix 40mg. Zantac 300mg HS    6. Advise Aquaphor ointment prn for anorectal irriation    Consider SIBO tx after EGD- flagyl    Follow-up in about 2 months (around 3/8/2019).      Order summary:  Orders Placed This Encounter    fluconazole (DIFLUCAN) 200 MG Tab    polyethylene glycol (GLYCOLAX) 17 gram/dose powder    Case request GI: EGD (ESOPHAGOGASTRODUODENOSCOPY)         Thank you so much for allowing me to participate in the care of Valencia Tim Power PA-C

## 2019-01-08 NOTE — PROGRESS NOTES
Subjective:       Patient ID: Valencia Dumont is a 48 y.o. female.    Chief Complaint: Lupus    HPI:  Valencia Dumont is a 48 y.o. female with diabetes and history lupus diagnosed in 2006 due to rash, weight loss, eyes yellow and fatigue.  She different rheumatologists Dr. Vega and Dr. Solomon.  She was diagnosed autoimmune hepatitis s/p liver transplant in 2013.   She has been on immunosuppressive medications after her liver transplant with prograf 8 mg daily and myfortic 360 mg bid which she is taking currently.  When she was diagnosed with SLE she was on plaquenil but stopped since it did not do anything.      She had low prograf level.  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  She was given 20 mg prednisone daily on 5/25/17 or 5/26/17.  She tapered off after 2 months.     Interval History:  Saw ulcer on tongue Sunday that she did not see today.  She has a headache and feels clogged up.  She feels off balance.   MRI of right arm showed tendonitis by orthopedic. She will follow with ortho tomorrow.  Knees ache and she can not dance.  Steroid injection helped but blood glucose went up and she was hospitalized.  Percocet helped the pain.  8/10 Bilateral knee ache that worsens with walking and dancing.  It improves with rest.      She had both Shingrix done.     Sees pain management who sent her to a back surgeon who did not want to do surgery due to her health issues.        Review of Systems   Constitutional: Positive for unexpected weight change. Negative for fever.   HENT: Negative for trouble swallowing.    Eyes: Positive for redness.   Respiratory: Positive for cough and shortness of breath.    Cardiovascular: Negative for chest pain.   Gastrointestinal: Positive for diarrhea. Negative for constipation.   Genitourinary: Negative for dysuria and genital sores.   Skin: Negative for rash.   Neurological: Positive for headaches.   Hematological: Bruises/bleeds easily.         Objective:   BP (!) 166/91    "Pulse 81   Ht 5' 8" (1.727 m)   Wt 64.3 kg (141 lb 12.1 oz)   BMI 21.55 kg/m²      Physical Exam   Constitutional: She is oriented to person, place, and time and well-developed, well-nourished, and in no distress.   HENT:   Head: Normocephalic and atraumatic.   Eyes: Conjunctivae and EOM are normal.   Neck: Neck supple.   Cardiovascular: Normal rate and regular rhythm.    Pulmonary/Chest: Effort normal and breath sounds normal.   Abdominal: Soft. Bowel sounds are normal.   Neurological: She is alert and oriented to person, place, and time. Gait normal.   Skin: Skin is warm and dry.     Psychiatric: Mood and affect normal.   Musculoskeletal: Normal range of motion. She exhibits no edema, tenderness or deformity.              LABS    Component      Latest Ref Rng & Units 11/29/2018   WBC      3.90 - 12.70 K/uL 3.98   RBC      4.00 - 5.40 M/uL 4.55   Hemoglobin      12.0 - 16.0 g/dL 12.1   Hematocrit      37.0 - 48.5 % 34.9 (L)   MCV      82 - 98 fL 77 (L)   MCH      27.0 - 31.0 pg 26.6 (L)   MCHC      32.0 - 36.0 g/dL 34.7   RDW      11.5 - 14.5 % 13.9   Platelets      150 - 350 K/uL 188   MPV      9.2 - 12.9 fL 10.2   Gran # (ANC)      1.8 - 7.7 K/uL 1.9   Lymph #      1.0 - 4.8 K/uL 1.7   Mono #      0.3 - 1.0 K/uL 0.2 (L)   Eos #      0.0 - 0.5 K/uL 0.2   Baso #      0.00 - 0.20 K/uL 0.01   Gran%      38.0 - 73.0 % 47.7   Lymph%      18.0 - 48.0 % 43.0   Mono%      4.0 - 15.0 % 4.5   Eosinophil%      0.0 - 8.0 % 4.5   Basophil%      0.0 - 1.9 % 0.3   Differential Method       Automated   Sodium      136 - 145 mmol/L 135 (L)   Potassium      3.5 - 5.1 mmol/L 4.0   Chloride      95 - 110 mmol/L 101   CO2      23 - 29 mmol/L 25   Glucose      70 - 110 mg/dL 245 (H)   BUN, Bld      6 - 20 mg/dL 29 (H)   Creatinine      0.5 - 1.4 mg/dL 1.4   Calcium      8.7 - 10.5 mg/dL 9.6   Total Protein      6.0 - 8.4 g/dL 7.1   Albumin      3.5 - 5.2 g/dL 4.1   Total Bilirubin      0.1 - 1.0 mg/dL 0.3   Alkaline Phosphatase      " 55 - 135 U/L 94   AST      10 - 40 U/L 13   ALT      10 - 44 U/L 12   Anion Gap      8 - 16 mmol/L 9   eGFR if African American      >60 mL/min/1.73 m:2 51 (A)   eGFR if non African American      >60 mL/min/1.73 m:2 44 (A)   Specimen UA       Urine, Clean Catch   Color, UA      Yellow, Straw, Meredith Yellow   Appearance, UA      Clear Clear   pH, UA      5.0 - 8.0 5.0   Specific Gravity, UA      1.005 - 1.030 1.025   Protein, UA      Negative 1+ (A)   Glucose, UA      Negative Negative   Ketones, UA      Negative Negative   Bilirubin (UA)      Negative Negative   Occult Blood UA      Negative 1+ (A)   Nitrite, UA      Negative Negative   Urobilinogen, UA      <2.0 EU/dL Negative   Leukocytes, UA      Negative Trace (A)   RBC, UA      0 - 4 /hpf 3   WBC, UA      0 - 5 /hpf 3   Bacteria, UA      None-Occ /hpf None   Squam Epithel, UA      /hpf Few   Hyaline Casts, UA      0-1/lpf /lpf 0   Microscopic Comment       SEE COMMENT   Protein, Urine Random      mg/dL 41   Creatinine, Random Ur      15.0 - 325.0 mg/dL 313.4   Prot/Creat Ratio, Ur      0.00 - 0.20 0.13   Complement (C-4)      11 - 44 mg/dL 24   Complement (C-3)      50 - 180 mg/dL 94   CPK      20 - 180 U/L 60   CRP      0.0 - 8.2 mg/L 2.6   Sed Rate      0 - 20 mm/Hr 15       Assessment:       1.  SLE.  Still joint pains and fatigue.  She is interested in Benlysta and needs T spot.   She notes ulcer that resolved recently.  2.  Autoimmune hepatitis.  S/p transplant 2013 after failing Cytoxan  3. Immunosuppression  4. Bilateral knee pain.  S/p gel one three step in both knees by ortho  5. Fatigue  6. Chest pain.  Evaluated by cardiology found to have murmur and heart muscle strain.  Heart doctor felt pain was from back pain.  7.  Back pain.  Evaluated by back surgeon but told not a candidate even though she needs it due to <5 years ago.  Sees pain management who sent her to therapy and to get shoes with braces.  In [facility]  14.  Osteopenia.  FRAX  does not suggest treatment.  Sees endocrine  15.  Diabetes  16.  Bilateral knee pains.     Plan:       1. Labs  2. Continue Plaquenil.  Patient interested in Benlysta.  ID evaluation done and T spot needed.  3. Plaquenil eye exam 5/18  RTO 4 months/prn

## 2019-01-09 ENCOUNTER — PATIENT MESSAGE (OUTPATIENT)
Dept: GASTROENTEROLOGY | Facility: CLINIC | Age: 49
End: 2019-01-09

## 2019-01-09 LAB — DSDNA AB SER-ACNC: NORMAL [IU]/ML

## 2019-01-09 RX ORDER — PANTOPRAZOLE SODIUM 40 MG/1
40 TABLET, DELAYED RELEASE ORAL DAILY
Qty: 30 TABLET | Refills: 5 | Status: SHIPPED | OUTPATIENT
Start: 2019-01-09 | End: 2019-08-26 | Stop reason: SDUPTHER

## 2019-01-09 NOTE — TELEPHONE ENCOUNTER
Pt aware and undertaking that crystal light can worsen bloating and gas, instead she should drink water. Also informed pt Protonix has been sent to her pharmacy.

## 2019-01-10 ENCOUNTER — PATIENT MESSAGE (OUTPATIENT)
Dept: RHEUMATOLOGY | Facility: CLINIC | Age: 49
End: 2019-01-10

## 2019-01-11 LAB — T-SPOT TB SCREENING TEST: NORMAL

## 2019-01-14 ENCOUNTER — LAB VISIT (OUTPATIENT)
Dept: LAB | Facility: HOSPITAL | Age: 49
End: 2019-01-14
Attending: INTERNAL MEDICINE
Payer: MEDICARE

## 2019-01-14 ENCOUNTER — PATIENT MESSAGE (OUTPATIENT)
Dept: ENDOCRINOLOGY | Facility: CLINIC | Age: 49
End: 2019-01-14

## 2019-01-14 DIAGNOSIS — Z94.4 LIVER REPLACED BY TRANSPLANT: ICD-10-CM

## 2019-01-14 LAB
ALBUMIN SERPL BCP-MCNC: 4.5 G/DL
ALP SERPL-CCNC: 103 U/L
ALT SERPL W/O P-5'-P-CCNC: 12 U/L
ANION GAP SERPL CALC-SCNC: 5 MMOL/L
AST SERPL-CCNC: 13 U/L
BASOPHILS # BLD AUTO: 0 K/UL
BASOPHILS NFR BLD: 0 %
BILIRUB SERPL-MCNC: 0.5 MG/DL
BUN SERPL-MCNC: 28 MG/DL
CALCIUM SERPL-MCNC: 9.8 MG/DL
CHLORIDE SERPL-SCNC: 103 MMOL/L
CO2 SERPL-SCNC: 29 MMOL/L
CREAT SERPL-MCNC: 1.4 MG/DL
DIFFERENTIAL METHOD: ABNORMAL
EOSINOPHIL # BLD AUTO: 0.1 K/UL
EOSINOPHIL NFR BLD: 2.7 %
ERYTHROCYTE [DISTWIDTH] IN BLOOD BY AUTOMATED COUNT: 14.2 %
EST. GFR  (AFRICAN AMERICAN): 51 ML/MIN/1.73 M^2
EST. GFR  (NON AFRICAN AMERICAN): 44 ML/MIN/1.73 M^2
GLUCOSE SERPL-MCNC: 188 MG/DL
HCT VFR BLD AUTO: 34.7 %
HGB BLD-MCNC: 12.2 G/DL
LYMPHOCYTES # BLD AUTO: 2.4 K/UL
LYMPHOCYTES NFR BLD: 50.7 %
MAGNESIUM SERPL-MCNC: 1.6 MG/DL
MCH RBC QN AUTO: 26.5 PG
MCHC RBC AUTO-ENTMCNC: 35.2 G/DL
MCV RBC AUTO: 75 FL
MONOCYTES # BLD AUTO: 0.2 K/UL
MONOCYTES NFR BLD: 4.8 %
NEUTROPHILS # BLD AUTO: 2 K/UL
NEUTROPHILS NFR BLD: 41.8 %
PLATELET # BLD AUTO: 207 K/UL
PMV BLD AUTO: 10 FL
POTASSIUM SERPL-SCNC: 4.3 MMOL/L
PROT SERPL-MCNC: 7.5 G/DL
RBC # BLD AUTO: 4.6 M/UL
SODIUM SERPL-SCNC: 137 MMOL/L
WBC # BLD AUTO: 4.77 K/UL

## 2019-01-14 PROCEDURE — 83735 ASSAY OF MAGNESIUM: CPT

## 2019-01-14 PROCEDURE — 36415 COLL VENOUS BLD VENIPUNCTURE: CPT

## 2019-01-14 PROCEDURE — 85025 COMPLETE CBC W/AUTO DIFF WBC: CPT

## 2019-01-14 PROCEDURE — 80053 COMPREHEN METABOLIC PANEL: CPT

## 2019-01-14 PROCEDURE — 80197 ASSAY OF TACROLIMUS: CPT

## 2019-01-15 LAB — TACROLIMUS BLD-MCNC: 9.1 NG/ML

## 2019-01-16 ENCOUNTER — HOSPITAL ENCOUNTER (OUTPATIENT)
Facility: HOSPITAL | Age: 49
Discharge: HOME | End: 2019-01-16
Attending: INTERNAL MEDICINE | Admitting: INTERNAL MEDICINE
Payer: MEDICARE

## 2019-01-16 ENCOUNTER — ANESTHESIA (OUTPATIENT)
Dept: ENDOSCOPY | Facility: HOSPITAL | Age: 49
End: 2019-01-16
Payer: MEDICARE

## 2019-01-16 ENCOUNTER — ANESTHESIA EVENT (OUTPATIENT)
Dept: ENDOSCOPY | Facility: HOSPITAL | Age: 49
End: 2019-01-16
Payer: MEDICARE

## 2019-01-16 VITALS
BODY MASS INDEX: 21.37 KG/M2 | HEART RATE: 75 BPM | RESPIRATION RATE: 16 BRPM | DIASTOLIC BLOOD PRESSURE: 75 MMHG | SYSTOLIC BLOOD PRESSURE: 124 MMHG | HEIGHT: 68 IN | WEIGHT: 141 LBS | TEMPERATURE: 98 F | OXYGEN SATURATION: 99 %

## 2019-01-16 DIAGNOSIS — K74.60 ESOPHAGEAL VARICES IN CIRRHOSIS: Primary | ICD-10-CM

## 2019-01-16 DIAGNOSIS — I85.10 ESOPHAGEAL VARICES IN CIRRHOSIS: Primary | ICD-10-CM

## 2019-01-16 DIAGNOSIS — I85.00 ESOPHAGEAL VARICES: ICD-10-CM

## 2019-01-16 LAB — POCT GLUCOSE: 248 MG/DL (ref 70–110)

## 2019-01-16 PROCEDURE — E9220 PRA ENDO ANESTHESIA: HCPCS | Mod: ,,, | Performed by: NURSE ANESTHETIST, CERTIFIED REGISTERED

## 2019-01-16 PROCEDURE — 27201012 HC FORCEPS, HOT/COLD, DISP: Performed by: INTERNAL MEDICINE

## 2019-01-16 PROCEDURE — 88305 TISSUE EXAM BY PATHOLOGIST: CPT | Performed by: PATHOLOGY

## 2019-01-16 PROCEDURE — 43239 EGD BIOPSY SINGLE/MULTIPLE: CPT | Mod: GC,,, | Performed by: INTERNAL MEDICINE

## 2019-01-16 PROCEDURE — 43239 EGD BIOPSY SINGLE/MULTIPLE: CPT | Performed by: INTERNAL MEDICINE

## 2019-01-16 PROCEDURE — E9220 PRA ENDO ANESTHESIA: ICD-10-PCS | Mod: ,,, | Performed by: NURSE ANESTHETIST, CERTIFIED REGISTERED

## 2019-01-16 PROCEDURE — 37000008 HC ANESTHESIA 1ST 15 MINUTES: Performed by: INTERNAL MEDICINE

## 2019-01-16 PROCEDURE — 63600175 PHARM REV CODE 636 W HCPCS: Performed by: NURSE ANESTHETIST, CERTIFIED REGISTERED

## 2019-01-16 PROCEDURE — 25000003 PHARM REV CODE 250: Performed by: INTERNAL MEDICINE

## 2019-01-16 PROCEDURE — 43239 PR EGD, FLEX, W/BIOPSY, SGL/MULTI: ICD-10-PCS | Mod: GC,,, | Performed by: INTERNAL MEDICINE

## 2019-01-16 PROCEDURE — 88305 TISSUE SPECIMEN TO PATHOLOGY - SURGERY: ICD-10-PCS | Mod: 26,,, | Performed by: PATHOLOGY

## 2019-01-16 PROCEDURE — 25000003 PHARM REV CODE 250: Performed by: NURSE ANESTHETIST, CERTIFIED REGISTERED

## 2019-01-16 PROCEDURE — 37000009 HC ANESTHESIA EA ADD 15 MINS: Performed by: INTERNAL MEDICINE

## 2019-01-16 RX ORDER — GLYCOPYRROLATE 0.2 MG/ML
INJECTION INTRAMUSCULAR; INTRAVENOUS
Status: DISCONTINUED | OUTPATIENT
Start: 2019-01-16 | End: 2019-01-16

## 2019-01-16 RX ORDER — PROPOFOL 10 MG/ML
VIAL (ML) INTRAVENOUS
Status: DISCONTINUED | OUTPATIENT
Start: 2019-01-16 | End: 2019-01-16

## 2019-01-16 RX ORDER — SODIUM CHLORIDE 9 MG/ML
INJECTION, SOLUTION INTRAVENOUS CONTINUOUS
Status: DISCONTINUED | OUTPATIENT
Start: 2019-01-16 | End: 2019-01-16 | Stop reason: HOSPADM

## 2019-01-16 RX ORDER — SODIUM CHLORIDE 0.9 % (FLUSH) 0.9 %
3 SYRINGE (ML) INJECTION
Status: DISCONTINUED | OUTPATIENT
Start: 2019-01-16 | End: 2019-01-16 | Stop reason: HOSPADM

## 2019-01-16 RX ORDER — LIDOCAINE HCL/PF 100 MG/5ML
SYRINGE (ML) INTRAVENOUS
Status: DISCONTINUED | OUTPATIENT
Start: 2019-01-16 | End: 2019-01-16

## 2019-01-16 RX ADMIN — PROPOFOL 30 MG: 10 INJECTION, EMULSION INTRAVENOUS at 11:01

## 2019-01-16 RX ADMIN — SODIUM CHLORIDE: 0.9 INJECTION, SOLUTION INTRAVENOUS at 10:01

## 2019-01-16 RX ADMIN — GLYCOPYRROLATE 0.1 MG: 0.2 INJECTION, SOLUTION INTRAMUSCULAR; INTRAVENOUS at 11:01

## 2019-01-16 RX ADMIN — LIDOCAINE HYDROCHLORIDE 80 MG: 20 INJECTION, SOLUTION INTRAVENOUS at 11:01

## 2019-01-16 RX ADMIN — PROPOFOL 70 MG: 10 INJECTION, EMULSION INTRAVENOUS at 11:01

## 2019-01-16 NOTE — ANESTHESIA PREPROCEDURE EVALUATION
01/16/2019  Valencia Dumont is a 48 y.o., female.  Patient Active Problem List   Diagnosis    Microcytic normochromic anemia    SLE (systemic lupus erythematosus)    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    Prophylactic immunotherapy (transplant immunosuppression)    Adverse effect of glucocorticoid or synthetic analogue    Hypoglycemia associated with diabetes    Swelling of left knee joint    Osteoporosis    Abnormal CT scan, colon    Fatigue    Diabetic polyneuropathy associated with type 2 diabetes mellitus    Essential hypertension    Type 2 diabetes mellitus with stage 3 chronic kidney disease, with long-term current use of insulin    Vitamin D deficiency    Chronic bilateral low back pain without sciatica    Body aches    CKD stage G3a/A1, GFR 45-59 and albumin creatinine ratio <30 mg/g    Chronic kidney disease-mineral and bone disorder    Acute chest pain    CKD (chronic kidney disease), stage III    Chronic midline low back pain without sciatica    Chronic left shoulder pain    Yeast infection    Immunosuppression    Esophageal varices         Anesthesia Evaluation         Review of Systems      Physical Exam  General:  Well nourished    Airway/Jaw/Neck:  Airway Findings: Mouth Opening: Normal Tongue: Normal  General Airway Assessment: Adult  Mallampati: II  Improves to II with phonation.  TM Distance: Normal, at least 6 cm      Dental:  Dental Findings: In tact   Chest/Lungs:  Chest/Lungs Findings: Clear to auscultation     Heart/Vascular:  Heart Findings: Rate: Normal  Rhythm: Regular Rhythm  Sounds: Normal        Mental Status:  Mental Status Findings:  Cooperative, Alert and Oriented         Anesthesia Plan  Type of Anesthesia, risks & benefits discussed:  Anesthesia Type:  general  Patient's Preference: General  Intra-op Monitoring Plan: standard ASA monitors  Intra-op  Monitoring Plan Comments: Standard ASA monitors.   Post Op Pain Control Plan: per primary service following discharge from PACU  Post Op Pain Control Plan Comments: Per primary service.     Induction:   IV  Beta Blocker:  Patient is not currently on a Beta-Blocker (No further documentation required).       Informed Consent: Patient understands risks and agrees with Anesthesia plan.  Questions answered. Anesthesia consent signed with patient.  ASA Score: 2     Day of Surgery Review of History & Physical:    H&P update referred to the surgeon.     Anesthesia Plan Notes: Chart reviewed, patient interviewed and examined.  The plan for general anesthesia was explained.  Questions were answered and the consent was signed.  [place] CABA         Ready For Surgery From Anesthesia Perspective.

## 2019-01-16 NOTE — [employer] PATIENT INSTRUCTIONS
Discharge Summary/Instructions after an Endoscopic Procedure  Patient Name: Valencia Dumont  Patient MRN: [id number]  Patient YOB: 1970 Wednesday, January 16, 2019  Deniz Guerra MD  RESTRICTIONS:  During your procedure today, you received medications for sedation.  These   medications may affect your judgment, balance and coordination.  Therefore,   for 24 hours, you have the following restrictions:   - DO NOT drive a car, operate machinery, make legal/financial decisions,   sign important papers or drink alcohol.    ACTIVITY:  Today: no heavy lifting, straining or running due to procedural   sedation/anesthesia.  The following day: return to full activity including work.  DIET:  Eat and drink normally unless instructed otherwise.     TREATMENT FOR COMMON SIDE EFFECTS:  - Mild abdominal pain, nausea, belching, bloating or excessive gas:  rest,   eat lightly and use a heating pad.  - Sore Throat: treat with throat lozenges and/or gargle with warm salt   water.  - Because air was used during the procedure, expelling large amounts of air   from your rectum or belching is normal.  - If a bowel prep was taken, you may not have a bowel movement for 1-3 days.    This is normal.  SYMPTOMS TO WATCH FOR AND REPORT TO YOUR PHYSICIAN:  1. Abdominal pain or bloating, other than gas cramps.  2. Chest pain.  3. Back pain.  4. Signs of infection such as: chills or fever occurring within 24 hours   after the procedure.  5. Rectal bleeding, which would show as bright red, maroon, or black stools.   (A tablespoon of blood from the rectum is not serious, especially if   hemorrhoids are present.)  6. Vomiting.  7. Weakness or dizziness.  GO DIRECTLY TO THE NEAREST EMERGENCY ROOM IF YOU HAVE ANY OF THE FOLLOWING:      Difficulty breathing              Chills and/or fever over 101 F   Persistent vomiting and/or vomiting blood   Severe abdominal pain   Severe chest pain   Black, tarry stools   Bleeding- more than one  tablespoon   Any other symptom or condition that you feel may need urgent attention  Your doctor recommends these additional instructions:  If any biopsies were taken, your doctors clinic will contact you in 1 to 2   weeks with any results.  - Patient has a contact number available for emergencies.  The signs and   symptoms of potential delayed complications were discussed with the   patient.  Return to normal activities tomorrow.  Written discharge   instructions were provided to the patient.   - Discharge patient to home.   - Await pathology results.   - Return to physician assistant as previously scheduled. Agree with trialing   antibiotic if symptoms persist.  - The findings and recommendations were discussed with the designated   responsible adult.   For questions, problems or results please call your physician - Deniz Guerra MD at Work:  [phone].  [facility], EMERGENCY ROOM PHONE NUMBER: [phone]  IF A COMPLICATION OR EMERGENCY SITUATION ARISES AND YOU ARE UNABLE TO REACH   YOUR PHYSICIAN - GO DIRECTLY TO THE EMERGENCY ROOM.  Deniz Guerra MD  1/16/2019 12:02:11 PM  This report has been verified and signed electronically.  [employer]

## 2019-01-16 NOTE — DISCHARGE INSTRUCTIONS

## 2019-01-16 NOTE — TRANSFER OF CARE
"Anesthesia Transfer of Care Note    Patient: Valencia Dumont    Procedure(s) Performed: Procedure(s) (LRB):  EGD (ESOPHAGOGASTRODUODENOSCOPY) (N/A)    Patient location: GI    Anesthesia Type: general    Transport from OR: Transported from OR on room air with adequate spontaneous ventilation    Post pain: adequate analgesia    Post assessment: no apparent anesthetic complications    Post vital signs: stable    Level of consciousness: responds to stimulation and awake    Nausea/Vomiting: no nausea/vomiting    Complications: none    Transfer of care protocol was followed      Last vitals:   Visit Vitals  /69   Pulse 81   Temp 36.6 °C (97.9 °F)   Resp 15   Ht 5' 8" (1.727 m)   Wt 64 kg (141 lb)   SpO2 96%   BMI 21.44 kg/m²     "

## 2019-01-17 ENCOUNTER — PATIENT MESSAGE (OUTPATIENT)
Dept: TRANSPLANT | Facility: CLINIC | Age: 49
End: 2019-01-17

## 2019-01-17 ENCOUNTER — TELEPHONE (OUTPATIENT)
Dept: TRANSPLANT | Facility: CLINIC | Age: 49
End: 2019-01-17

## 2019-01-17 DIAGNOSIS — E11.65 TYPE 2 DIABETES MELLITUS WITH HYPERGLYCEMIA, WITH LONG-TERM CURRENT USE OF INSULIN: ICD-10-CM

## 2019-01-17 DIAGNOSIS — Z79.4 TYPE 2 DIABETES MELLITUS WITH HYPERGLYCEMIA, WITH LONG-TERM CURRENT USE OF INSULIN: ICD-10-CM

## 2019-01-17 NOTE — TELEPHONE ENCOUNTER
----- Message from Miriam Abernathy MD sent at 1/17/2019 11:37 AM CST -----  Reviewed, nothing to do; repeat per routine

## 2019-01-18 ENCOUNTER — PATIENT MESSAGE (OUTPATIENT)
Dept: TRANSPLANT | Facility: CLINIC | Age: 49
End: 2019-01-18

## 2019-01-20 ENCOUNTER — PATIENT MESSAGE (OUTPATIENT)
Dept: GASTROENTEROLOGY | Facility: CLINIC | Age: 49
End: 2019-01-20

## 2019-01-20 DIAGNOSIS — N89.8 VAGINAL DISCHARGE: Primary | ICD-10-CM

## 2019-01-23 ENCOUNTER — TELEPHONE (OUTPATIENT)
Dept: ENDOSCOPY | Facility: HOSPITAL | Age: 49
End: 2019-01-23

## 2019-01-25 ENCOUNTER — PATIENT MESSAGE (OUTPATIENT)
Dept: GASTROENTEROLOGY | Facility: CLINIC | Age: 49
End: 2019-01-25

## 2019-01-25 NOTE — TELEPHONE ENCOUNTER
Bi[facility] to patient [facility]

## 2019-01-28 DIAGNOSIS — D84.9 IMMUNOSUPPRESSION: ICD-10-CM

## 2019-01-28 DIAGNOSIS — Z94.4 LIVER TRANSPLANTED: ICD-10-CM

## 2019-01-28 NOTE — TELEPHONE ENCOUNTER
----- Message from Temi Streeter sent at 1/28/2019 12:02 PM CST -----  Contact: Pt  Rx Refill/Request     Is this a Refill or New Rx:  Refill    Rx Name and Strength:  Tacrolimus (PROGRAF) 1 MG    Preferred Pharmacy with phone number: [facility] # [phone]    Communication Preference: # [phone]    Additional Information: Pt states she is completely out of medication

## 2019-01-30 PROBLEM — B37.9 YEAST INFECTION: Status: RESOLVED | Noted: 2018-08-30 | Resolved: 2019-01-30

## 2019-01-30 RX ORDER — TACROLIMUS 1 MG/1
3 CAPSULE ORAL EVERY 12 HOURS
Qty: 180 CAPSULE | Refills: 11 | Status: SHIPPED | OUTPATIENT
Start: 2019-01-30 | End: 2020-11-17

## 2019-01-31 ENCOUNTER — PATIENT MESSAGE (OUTPATIENT)
Dept: RHEUMATOLOGY | Facility: CLINIC | Age: 49
End: 2019-01-31

## 2019-01-31 RX ORDER — ACETAMINOPHEN 325 MG/1
650 TABLET ORAL
Status: CANCELLED | OUTPATIENT
Start: 2019-01-31

## 2019-01-31 RX ORDER — HEPARIN 100 UNIT/ML
500 SYRINGE INTRAVENOUS
Status: CANCELLED | OUTPATIENT
Start: 2019-01-31

## 2019-01-31 RX ORDER — SODIUM CHLORIDE 0.9 % (FLUSH) 0.9 %
10 SYRINGE (ML) INJECTION
Status: CANCELLED | OUTPATIENT
Start: 2019-01-31

## 2019-02-04 ENCOUNTER — PATIENT MESSAGE (OUTPATIENT)
Dept: RHEUMATOLOGY | Facility: CLINIC | Age: 49
End: 2019-02-04

## 2019-02-04 ENCOUNTER — PATIENT MESSAGE (OUTPATIENT)
Dept: GASTROENTEROLOGY | Facility: CLINIC | Age: 49
End: 2019-02-04

## 2019-02-04 ENCOUNTER — PATIENT MESSAGE (OUTPATIENT)
Dept: OBSTETRICS AND GYNECOLOGY | Facility: CLINIC | Age: 49
End: 2019-02-04

## 2019-02-14 ENCOUNTER — PATIENT MESSAGE (OUTPATIENT)
Dept: GASTROENTEROLOGY | Facility: CLINIC | Age: 49
End: 2019-02-14

## 2019-02-26 ENCOUNTER — PATIENT MESSAGE (OUTPATIENT)
Dept: RHEUMATOLOGY | Facility: CLINIC | Age: 49
End: 2019-02-26

## 2019-02-28 ENCOUNTER — PATIENT MESSAGE (OUTPATIENT)
Dept: RHEUMATOLOGY | Facility: CLINIC | Age: 49
End: 2019-02-28

## 2019-03-14 ENCOUNTER — PATIENT MESSAGE (OUTPATIENT)
Dept: RHEUMATOLOGY | Facility: CLINIC | Age: 49
End: 2019-03-14

## 2019-03-14 ENCOUNTER — PATIENT MESSAGE (OUTPATIENT)
Dept: TRANSPLANT | Facility: CLINIC | Age: 49
End: 2019-03-14

## 2019-03-15 ENCOUNTER — TELEPHONE (OUTPATIENT)
Dept: RHEUMATOLOGY | Facility: CLINIC | Age: 49
End: 2019-03-15

## 2019-03-26 ENCOUNTER — INFUSION (OUTPATIENT)
Dept: INFUSION THERAPY | Facility: HOSPITAL | Age: 49
End: 2019-03-26
Attending: INTERNAL MEDICINE
Payer: MEDICARE

## 2019-03-26 VITALS
HEART RATE: 100 BPM | OXYGEN SATURATION: 98 % | WEIGHT: 140.88 LBS | RESPIRATION RATE: 18 BRPM | SYSTOLIC BLOOD PRESSURE: 145 MMHG | TEMPERATURE: 99 F | DIASTOLIC BLOOD PRESSURE: 76 MMHG | BODY MASS INDEX: 21.42 KG/M2

## 2019-03-26 DIAGNOSIS — M32.9 SYSTEMIC LUPUS ERYTHEMATOSUS, UNSPECIFIED SLE TYPE, UNSPECIFIED ORGAN INVOLVEMENT STATUS: Primary | ICD-10-CM

## 2019-03-26 PROCEDURE — 96367 TX/PROPH/DG ADDL SEQ IV INF: CPT

## 2019-03-26 PROCEDURE — 25000003 PHARM REV CODE 250: Performed by: INTERNAL MEDICINE

## 2019-03-26 PROCEDURE — 63600175 PHARM REV CODE 636 W HCPCS: Mod: JG | Performed by: INTERNAL MEDICINE

## 2019-03-26 PROCEDURE — 96413 CHEMO IV INFUSION 1 HR: CPT

## 2019-03-26 RX ORDER — ACETAMINOPHEN 325 MG/1
650 TABLET ORAL
Status: CANCELLED | OUTPATIENT
Start: 2019-03-26

## 2019-03-26 RX ORDER — HEPARIN 100 UNIT/ML
500 SYRINGE INTRAVENOUS
Status: CANCELLED | OUTPATIENT
Start: 2019-03-26

## 2019-03-26 RX ORDER — SODIUM CHLORIDE 0.9 % (FLUSH) 0.9 %
10 SYRINGE (ML) INJECTION
Status: CANCELLED | OUTPATIENT
Start: 2019-03-26

## 2019-03-26 RX ORDER — ACETAMINOPHEN 325 MG/1
650 TABLET ORAL
Status: COMPLETED | OUTPATIENT
Start: 2019-03-26 | End: 2019-03-26

## 2019-03-26 RX ADMIN — BELIMUMAB 639 MG: 400 INJECTION, POWDER, LYOPHILIZED, FOR SOLUTION INTRAVENOUS at 11:03

## 2019-03-26 RX ADMIN — ACETAMINOPHEN 650 MG: 325 TABLET ORAL at 10:03

## 2019-03-26 RX ADMIN — DIPHENHYDRAMINE HYDROCHLORIDE 25 MG: 50 INJECTION INTRAMUSCULAR; INTRAVENOUS at 10:03

## 2019-03-26 NOTE — PLAN OF CARE
Problem: Adult Inpatient Plan of Care  Goal: Plan of Care Review  Outcome: Ongoing (interventions implemented as appropriate)  Arrived to unit. No complaints voiced. Has nausea a lot, but manageable. Reinforced possible side effects of Benlysta. Pt verbalized understanding. Tolerated benadryl and Benlysta. Pt monitored post infusion for 1 hour. No reactions noted. VSS. Pt has next appt. Pt ambulatory, discharged from unit.

## 2019-04-01 ENCOUNTER — PATIENT MESSAGE (OUTPATIENT)
Dept: ENDOCRINOLOGY | Facility: CLINIC | Age: 49
End: 2019-04-01

## 2019-04-02 ENCOUNTER — LAB VISIT (OUTPATIENT)
Dept: LAB | Facility: HOSPITAL | Age: 49
End: 2019-04-02
Attending: INTERNAL MEDICINE
Payer: MEDICARE

## 2019-04-02 DIAGNOSIS — M81.0 OSTEOPOROSIS, UNSPECIFIED OSTEOPOROSIS TYPE, UNSPECIFIED PATHOLOGICAL FRACTURE PRESENCE: ICD-10-CM

## 2019-04-02 DIAGNOSIS — E11.65 UNCONTROLLED TYPE 2 DIABETES MELLITUS WITH HYPERGLYCEMIA, WITHOUT LONG-TERM CURRENT USE OF INSULIN: ICD-10-CM

## 2019-04-02 LAB
ALBUMIN SERPL BCP-MCNC: 4 G/DL (ref 3.5–5.2)
ANION GAP SERPL CALC-SCNC: 7 MMOL/L (ref 8–16)
BUN SERPL-MCNC: 20 MG/DL (ref 6–20)
CALCIUM SERPL-MCNC: 9.7 MG/DL (ref 8.7–10.5)
CHLORIDE SERPL-SCNC: 103 MMOL/L (ref 95–110)
CO2 SERPL-SCNC: 24 MMOL/L (ref 23–29)
CREAT SERPL-MCNC: 1.4 MG/DL (ref 0.5–1.4)
EST. GFR  (AFRICAN AMERICAN): 51 ML/MIN/1.73 M^2
EST. GFR  (NON AFRICAN AMERICAN): 44 ML/MIN/1.73 M^2
ESTIMATED AVG GLUCOSE: 232 MG/DL (ref 68–131)
GLUCOSE SERPL-MCNC: 341 MG/DL (ref 70–110)
HBA1C MFR BLD HPLC: 9.7 % (ref 4–5.6)
PHOSPHATE SERPL-MCNC: 3.1 MG/DL (ref 2.7–4.5)
POTASSIUM SERPL-SCNC: 4.5 MMOL/L (ref 3.5–5.1)
SODIUM SERPL-SCNC: 134 MMOL/L (ref 136–145)

## 2019-04-02 PROCEDURE — 83036 HEMOGLOBIN GLYCOSYLATED A1C: CPT

## 2019-04-02 PROCEDURE — 80069 RENAL FUNCTION PANEL: CPT

## 2019-04-02 PROCEDURE — 82523 COLLAGEN CROSSLINKS: CPT

## 2019-04-02 PROCEDURE — 36415 COLL VENOUS BLD VENIPUNCTURE: CPT

## 2019-04-03 ENCOUNTER — PATIENT MESSAGE (OUTPATIENT)
Dept: OPTOMETRY | Facility: CLINIC | Age: 49
End: 2019-04-03

## 2019-04-03 ENCOUNTER — OFFICE VISIT (OUTPATIENT)
Dept: ENDOCRINOLOGY | Facility: CLINIC | Age: 49
End: 2019-04-03
Payer: MEDICARE

## 2019-04-03 VITALS
BODY MASS INDEX: 21.1 KG/M2 | WEIGHT: 139.25 LBS | SYSTOLIC BLOOD PRESSURE: 160 MMHG | HEART RATE: 75 BPM | DIASTOLIC BLOOD PRESSURE: 90 MMHG | HEIGHT: 68 IN

## 2019-04-03 DIAGNOSIS — E11.22 TYPE 2 DIABETES MELLITUS WITH STAGE 3 CHRONIC KIDNEY DISEASE, WITH LONG-TERM CURRENT USE OF INSULIN: ICD-10-CM

## 2019-04-03 DIAGNOSIS — Z79.4 TYPE 2 DIABETES MELLITUS WITH STAGE 3 CHRONIC KIDNEY DISEASE, WITH LONG-TERM CURRENT USE OF INSULIN: ICD-10-CM

## 2019-04-03 DIAGNOSIS — N18.30 TYPE 2 DIABETES MELLITUS WITH STAGE 3 CHRONIC KIDNEY DISEASE, WITH LONG-TERM CURRENT USE OF INSULIN: ICD-10-CM

## 2019-04-03 DIAGNOSIS — T38.0X5S ADVERSE EFFECT OF CORTICOSTEROIDS, SEQUELA: ICD-10-CM

## 2019-04-03 DIAGNOSIS — M85.9 LOW BONE DENSITY: Primary | ICD-10-CM

## 2019-04-03 DIAGNOSIS — Z78.0 ASYMPTOMATIC POSTMENOPAUSAL ESTROGEN DEFICIENCY: ICD-10-CM

## 2019-04-03 LAB — COLLAGEN CTX SERPL-MCNC: 855 PG/ML

## 2019-04-03 PROCEDURE — 3008F BODY MASS INDEX DOCD: CPT | Mod: CPTII,S$GLB,, | Performed by: INTERNAL MEDICINE

## 2019-04-03 PROCEDURE — 3080F DIAST BP >= 90 MM HG: CPT | Mod: CPTII,S$GLB,, | Performed by: INTERNAL MEDICINE

## 2019-04-03 PROCEDURE — 3077F PR MOST RECENT SYSTOLIC BLOOD PRESSURE >= 140 MM HG: ICD-10-PCS | Mod: CPTII,S$GLB,, | Performed by: INTERNAL MEDICINE

## 2019-04-03 PROCEDURE — 3077F SYST BP >= 140 MM HG: CPT | Mod: CPTII,S$GLB,, | Performed by: INTERNAL MEDICINE

## 2019-04-03 PROCEDURE — 99999 PR PBB SHADOW E&M-EST. PATIENT-LVL III: CPT | Mod: PBBFAC,,, | Performed by: INTERNAL MEDICINE

## 2019-04-03 PROCEDURE — 99999 PR PBB SHADOW E&M-EST. PATIENT-LVL III: ICD-10-PCS | Mod: PBBFAC,,, | Performed by: INTERNAL MEDICINE

## 2019-04-03 PROCEDURE — 99214 PR OFFICE/OUTPT VISIT, EST, LEVL IV, 30-39 MIN: ICD-10-PCS | Mod: S$GLB,,, | Performed by: INTERNAL MEDICINE

## 2019-04-03 PROCEDURE — 3046F HEMOGLOBIN A1C LEVEL >9.0%: CPT | Mod: CPTII,S$GLB,, | Performed by: INTERNAL MEDICINE

## 2019-04-03 PROCEDURE — 3008F PR BODY MASS INDEX (BMI) DOCUMENTED: ICD-10-PCS | Mod: CPTII,S$GLB,, | Performed by: INTERNAL MEDICINE

## 2019-04-03 PROCEDURE — 3080F PR MOST RECENT DIASTOLIC BLOOD PRESSURE >= 90 MM HG: ICD-10-PCS | Mod: CPTII,S$GLB,, | Performed by: INTERNAL MEDICINE

## 2019-04-03 PROCEDURE — 99214 OFFICE O/P EST MOD 30 MIN: CPT | Mod: S$GLB,,, | Performed by: INTERNAL MEDICINE

## 2019-04-03 PROCEDURE — 3046F PR MOST RECENT HEMOGLOBIN A1C LEVEL > 9.0%: ICD-10-PCS | Mod: CPTII,S$GLB,, | Performed by: INTERNAL MEDICINE

## 2019-04-03 NOTE — PATIENT INSTRUCTIONS
Continue trulicity     Start levemir 4 units twice a day  novolog 4 units before meals.     Be careful with sliding scale.     If you are going to use sliding scale, before meals only.     NOVOLOG ONLY     150 - 200 + 1 unit  201 - 250 + 2 units   > 251 + 3 units    Send a log in two weeks.   Must check four times daily     Repeat A1C, and bone density in three months.

## 2019-04-03 NOTE — PROGRESS NOTES
Subjective:     Patient ID: Valencia Dumont is a 48 y.o. female.    Chief Complaint: No chief complaint on file.    HPI:   Ms. Dumont is a 48 y.o. female who is here for a follow-up visit for evaluation   osteoporosis, type 2 diabetes mellitus that is uncontrolled with complications including CKD, previous use of high dose steroids (used recently for sinus infections, seven days).      Has lost weight but has changed her diet.     Currently regimen:  Trulicity 0.75 mg weekly   Levemir units -- stopped   Novolog 4 - 10 units before meals.       Range between   7 dy 202  14 dy 221  30 dy 221    255, 202, 182, 263, 243,   3/31 71, 235  3/30: 263, 189,   3/28: 113,   3/27: 263, 1743,   3/26: 195, 123, 90, 359, 183  3/25: 366, 292, 293, 296      Checking blood sugars 3 - 4 times weekly.   Yesterday blood sugar was 159.     Supplements:  Taking vitamin D 50,000 units once a week  Vitamin C     She is postmenopausal.      Denies fractures. Denies falls or fractures. (fell after surgery while still in the hospital, may have been related to anesthesia).    She is walking more. She is working very hard on her diet.     Review of Systems   Constitutional: Negative for chills and fever.   HENT: Positive for postnasal drip and sinus pain. Negative for congestion and sinus pressure.    Eyes: Negative for visual disturbance.   Respiratory: Negative for chest tightness and shortness of breath.    Cardiovascular: Negative for chest pain, palpitations and leg swelling.   Gastrointestinal: Positive for nausea (  appetites is low). Negative for abdominal pain and vomiting.   Genitourinary: Negative for dysuria.   Musculoskeletal: Negative for arthralgias.   Skin: Negative for rash.   Neurological: Positive for headaches (  associated with sinus). Negative for weakness.   Hematological: Does not bruise/bleed easily.   Psychiatric/Behavioral: Negative for sleep disturbance.        Objective:     Physical Exam   Constitutional: She  "appears well-developed and well-nourished.   Eyes: Pupils are equal, round, and reactive to light. Conjunctivae and EOM are normal.   Neck: Normal range of motion. Neck supple. No thyromegaly present.   Cardiovascular: Normal rate.   Pulmonary/Chest: Effort normal and breath sounds normal.   Abdominal: Soft. Bowel sounds are normal.   Sites of insulin injection are normal appearing.    Skin: Skin is warm and dry.   intact     Vitals:    04/03/19 1107   BP: (!) 160/90   Pulse: 75   Weight: 63.2 kg (139 lb 3.5 oz)   Height: 5' 8" (1.727 m)     Results for SUSY STOVER (MRN [id number]) as of 4/3/2019 11:18   Ref. Range 4/2/2019 10:09   Sodium Latest Ref Range: 136 - 145 mmol/L 134 (L)   Potassium Latest Ref Range: 3.5 - 5.1 mmol/L 4.5   Chloride Latest Ref Range: 95 - 110 mmol/L 103   CO2 Latest Ref Range: 23 - 29 mmol/L 24   Anion Gap Latest Ref Range: 8 - 16 mmol/L 7 (L)   BUN, Bld Latest Ref Range: 6 - 20 mg/dL 20   Creatinine Latest Ref Range: 0.5 - 1.4 mg/dL 1.4   eGFR if non African American Latest Ref Range: >60 mL/min/1.73 m^2 44 (A)   eGFR if African American Latest Ref Range: >60 mL/min/1.73 m^2 51 (A)   Glucose Latest Ref Range: 70 - 110 mg/dL 341 (H)   Calcium Latest Ref Range: 8.7 - 10.5 mg/dL 9.7   Phosphorus Latest Ref Range: 2.7 - 4.5 mg/dL 3.1       ResultsResults for SUSY STOVER (MRN [id number]) as of 4/3/2019 11:18   Ref. Range 11/17/2017 13:01 5/7/2018 12:33   Hemoglobin A1C External Latest Ref Range: 4.0 - 5.6 % 7.2 (H) 7.6 (H)   Estimated Avg Glucose Latest Ref Range: 68 - 131 mg/dL 160 (H) 171 (H)    for SUSY STOVER (MRN [id number]) as of 4/3/2019 11:18   Ref. Range 7/28/2018 02:53 8/8/2018 10:57 4/2/2019 10:09   Hemoglobin A1C External Latest Ref Range: 4.0 - 5.6 % 10.8 (H) 10.8 (H) 9.7 (H)   Estimated Avg Glucose Latest Ref Range: 68 - 131 mg/dL 263 (H) 263 (H) 232 (H)       Assessment/Plan:       1. Low bone density    - DXA Bone Density Spine And Hip; Future    2. Asymptomatic " postmenopausal estrogen deficiency    - DXA Bone Density Spine And Hip; Future    3. Type 2 diabetes mellitus with stage 3 chronic kidney disease, with long-term current use of insulin    - insulin detemir U-100 (LEVEMIR FLEXTOUCH U-100 INSULN) 100 unit/mL (3 mL) SubQ InPn pen; Inject 4 Units into the skin 2 (two) times daily.  Dispense: 45 mL; Refill: 3    4. Adverse effect of corticosteroids, sequela    - insulin detemir U-100 (LEVEMIR FLEXTOUCH U-100 INSULN) 100 unit/mL (3 mL) SubQ InPn pen; Inject 4 Units into the skin 2 (two) times daily.  Dispense: 45 mL; Refill: 3      Continue trulicity  Restart levemir 4 units twice a day   novolog 4 units before meals.     Send log in two weeks   Needs to check FOUR times daily, before meals and bedtime.

## 2019-04-04 ENCOUNTER — PATIENT MESSAGE (OUTPATIENT)
Dept: TRANSPLANT | Facility: CLINIC | Age: 49
End: 2019-04-04

## 2019-04-04 ENCOUNTER — PATIENT MESSAGE (OUTPATIENT)
Dept: ENDOCRINOLOGY | Facility: CLINIC | Age: 49
End: 2019-04-04

## 2019-04-04 ENCOUNTER — TELEPHONE (OUTPATIENT)
Dept: ENDOCRINOLOGY | Facility: CLINIC | Age: 49
End: 2019-04-04

## 2019-04-07 ENCOUNTER — PATIENT MESSAGE (OUTPATIENT)
Dept: RHEUMATOLOGY | Facility: CLINIC | Age: 49
End: 2019-04-07

## 2019-04-08 ENCOUNTER — PATIENT MESSAGE (OUTPATIENT)
Dept: ENDOCRINOLOGY | Facility: CLINIC | Age: 49
End: 2019-04-08

## 2019-04-08 ENCOUNTER — TELEPHONE (OUTPATIENT)
Dept: ENDOCRINOLOGY | Facility: CLINIC | Age: 49
End: 2019-04-08

## 2019-04-08 RX ORDER — INSULIN ASPART 100 [IU]/ML
INJECTION, SOLUTION INTRAVENOUS; SUBCUTANEOUS
Qty: 3 ML | Refills: 6 | Status: SHIPPED | OUTPATIENT
Start: 2019-04-08 | End: 2019-09-10

## 2019-04-08 NOTE — TELEPHONE ENCOUNTER
Talk to pharmacy went through patient direction for insulin novolog.                    ----- Message from Riya Gutierrez sent at 4/8/2019  1:56 PM CDT -----  Contact: Self [phone]  .Medication question / problems.  Pharmacy is having trouble with fax, pls call in insulin aspart U-100 (NOVOLOG) 100 unit/mL InPn pen prescription.     ..  [facility] [place] [id number] [place] [address]  [address] [address]  Phone: [phone] Fax: [phone]

## 2019-04-09 ENCOUNTER — LAB VISIT (OUTPATIENT)
Dept: LAB | Facility: HOSPITAL | Age: 49
End: 2019-04-09
Attending: INTERNAL MEDICINE
Payer: MEDICARE

## 2019-04-09 ENCOUNTER — PATIENT MESSAGE (OUTPATIENT)
Dept: RHEUMATOLOGY | Facility: CLINIC | Age: 49
End: 2019-04-09

## 2019-04-09 ENCOUNTER — PATIENT MESSAGE (OUTPATIENT)
Dept: ENDOCRINOLOGY | Facility: CLINIC | Age: 49
End: 2019-04-09

## 2019-04-09 DIAGNOSIS — Z94.4 LIVER REPLACED BY TRANSPLANT: ICD-10-CM

## 2019-04-09 LAB
ALBUMIN SERPL BCP-MCNC: 3.8 G/DL (ref 3.5–5.2)
ALP SERPL-CCNC: 90 U/L (ref 55–135)
ALT SERPL W/O P-5'-P-CCNC: 15 U/L (ref 10–44)
ANION GAP SERPL CALC-SCNC: 6 MMOL/L (ref 8–16)
AST SERPL-CCNC: 15 U/L (ref 10–40)
BASOPHILS # BLD AUTO: 0 K/UL (ref 0–0.2)
BASOPHILS NFR BLD: 0 % (ref 0–1.9)
BILIRUB SERPL-MCNC: 0.4 MG/DL (ref 0.1–1)
BUN SERPL-MCNC: 24 MG/DL (ref 6–20)
CALCIUM SERPL-MCNC: 9 MG/DL (ref 8.7–10.5)
CHLORIDE SERPL-SCNC: 106 MMOL/L (ref 95–110)
CO2 SERPL-SCNC: 26 MMOL/L (ref 23–29)
CREAT SERPL-MCNC: 1.2 MG/DL (ref 0.5–1.4)
DIFFERENTIAL METHOD: ABNORMAL
EOSINOPHIL # BLD AUTO: 0.1 K/UL (ref 0–0.5)
EOSINOPHIL NFR BLD: 3.3 % (ref 0–8)
ERYTHROCYTE [DISTWIDTH] IN BLOOD BY AUTOMATED COUNT: 15.2 % (ref 11.5–14.5)
EST. GFR  (AFRICAN AMERICAN): >60 ML/MIN/1.73 M^2
EST. GFR  (NON AFRICAN AMERICAN): 54 ML/MIN/1.73 M^2
GLUCOSE SERPL-MCNC: 170 MG/DL (ref 70–110)
HCT VFR BLD AUTO: 32.4 % (ref 37–48.5)
HGB BLD-MCNC: 10.9 G/DL (ref 12–16)
LYMPHOCYTES # BLD AUTO: 1.6 K/UL (ref 1–4.8)
LYMPHOCYTES NFR BLD: 57.1 % (ref 18–48)
MAGNESIUM SERPL-MCNC: 1.6 MG/DL (ref 1.6–2.6)
MCH RBC QN AUTO: 26.3 PG (ref 27–31)
MCHC RBC AUTO-ENTMCNC: 33.6 G/DL (ref 32–36)
MCV RBC AUTO: 78 FL (ref 82–98)
MONOCYTES # BLD AUTO: 0.2 K/UL (ref 0.3–1)
MONOCYTES NFR BLD: 7.3 % (ref 4–15)
NEUTROPHILS # BLD AUTO: 0.9 K/UL (ref 1.8–7.7)
NEUTROPHILS NFR BLD: 32.3 % (ref 38–73)
PLATELET # BLD AUTO: 179 K/UL (ref 150–350)
PLATELET BLD QL SMEAR: ABNORMAL
PMV BLD AUTO: 8.7 FL (ref 9.2–12.9)
POTASSIUM SERPL-SCNC: 3.9 MMOL/L (ref 3.5–5.1)
PROT SERPL-MCNC: 6.8 G/DL (ref 6–8.4)
RBC # BLD AUTO: 4.14 M/UL (ref 4–5.4)
SODIUM SERPL-SCNC: 138 MMOL/L (ref 136–145)
WBC # BLD AUTO: 2.73 K/UL (ref 3.9–12.7)

## 2019-04-09 PROCEDURE — 80053 COMPREHEN METABOLIC PANEL: CPT

## 2019-04-09 PROCEDURE — 80197 ASSAY OF TACROLIMUS: CPT

## 2019-04-09 PROCEDURE — 85025 COMPLETE CBC W/AUTO DIFF WBC: CPT

## 2019-04-09 PROCEDURE — 36415 COLL VENOUS BLD VENIPUNCTURE: CPT

## 2019-04-09 PROCEDURE — 83735 ASSAY OF MAGNESIUM: CPT

## 2019-04-10 ENCOUNTER — PATIENT MESSAGE (OUTPATIENT)
Dept: NEPHROLOGY | Facility: CLINIC | Age: 49
End: 2019-04-10

## 2019-04-10 ENCOUNTER — PATIENT MESSAGE (OUTPATIENT)
Dept: OBSTETRICS AND GYNECOLOGY | Facility: CLINIC | Age: 49
End: 2019-04-10

## 2019-04-10 ENCOUNTER — PATIENT MESSAGE (OUTPATIENT)
Dept: TRANSPLANT | Facility: CLINIC | Age: 49
End: 2019-04-10

## 2019-04-10 ENCOUNTER — PATIENT MESSAGE (OUTPATIENT)
Dept: OPTOMETRY | Facility: CLINIC | Age: 49
End: 2019-04-10

## 2019-04-10 ENCOUNTER — TELEPHONE (OUTPATIENT)
Dept: OBSTETRICS AND GYNECOLOGY | Facility: CLINIC | Age: 49
End: 2019-04-10

## 2019-04-10 DIAGNOSIS — Z12.31 SCREENING MAMMOGRAM, ENCOUNTER FOR: Primary | ICD-10-CM

## 2019-04-10 LAB — TACROLIMUS BLD-MCNC: 3.7 NG/ML (ref 5–15)

## 2019-04-11 ENCOUNTER — PATIENT MESSAGE (OUTPATIENT)
Dept: ENDOCRINOLOGY | Facility: CLINIC | Age: 49
End: 2019-04-11

## 2019-04-11 ENCOUNTER — PATIENT MESSAGE (OUTPATIENT)
Dept: NEPHROLOGY | Facility: CLINIC | Age: 49
End: 2019-04-11

## 2019-04-11 ENCOUNTER — PATIENT MESSAGE (OUTPATIENT)
Dept: TRANSPLANT | Facility: CLINIC | Age: 49
End: 2019-04-11

## 2019-04-12 ENCOUNTER — TELEPHONE (OUTPATIENT)
Dept: TRANSPLANT | Facility: CLINIC | Age: 49
End: 2019-04-12

## 2019-04-12 NOTE — TELEPHONE ENCOUNTER
Dr. Abernathy reviewed your labs.  Sent patient message to let her know labs are stable no changes.  Next labs are due on 07/08/19

## 2019-04-12 NOTE — TELEPHONE ENCOUNTER
----- Message from Miriam Abernathy MD sent at 4/12/2019  7:23 AM CDT -----  Reviewed, nothing to do; repeat per routine

## 2019-04-15 ENCOUNTER — TELEPHONE (OUTPATIENT)
Dept: ENDOCRINOLOGY | Facility: CLINIC | Age: 49
End: 2019-04-15

## 2019-04-15 ENCOUNTER — PATIENT MESSAGE (OUTPATIENT)
Dept: ENDOCRINOLOGY | Facility: CLINIC | Age: 49
End: 2019-04-15

## 2019-04-15 ENCOUNTER — TELEPHONE (OUTPATIENT)
Dept: GASTROENTEROLOGY | Facility: CLINIC | Age: 49
End: 2019-04-15

## 2019-04-15 ENCOUNTER — PATIENT MESSAGE (OUTPATIENT)
Dept: TRANSPLANT | Facility: CLINIC | Age: 49
End: 2019-04-15

## 2019-04-15 DIAGNOSIS — Z94.4 LIVER TRANSPLANTED: Primary | ICD-10-CM

## 2019-04-15 NOTE — TELEPHONE ENCOUNTER
Talk to pt I explained to her she must check four times daily insurance purpose. We must prove to her insurance that she is checking her blood sugars. Her logs she have spots she checking two times. Dr ariza need her to check everyday. Pt understand

## 2019-04-15 NOTE — TELEPHONE ENCOUNTER
Reviewed blood sugar log fro 4/3 to 4/9   Checking four times daily  Lowest blood sugars, 53 -- seems post prandial although meal times not noted.       levemir 4 units twice a day  novolog 3 units before meals plus sliding scale  Continue trulicity

## 2019-04-15 NOTE — TELEPHONE ENCOUNTER
Patient called has question about magnesium results, she is taking 500 bid and noticed it was in range but at low level of range, wants to know if she needs to adjust dose? She does not like what happens when it is low.

## 2019-04-15 NOTE — TELEPHONE ENCOUNTER
I know what people's health needs dr will not send charts notes until patient proves that she checking sugars 4 times daily. Dr ariza and I told pt constantly that she needs that proof for dr ariza to send any chart notes.

## 2019-04-15 NOTE — TELEPHONE ENCOUNTER
----- Message from Tasneem Anthony sent at 4/15/2019  4:00 PM CDT -----  Contact: [employer] [phone]  Diabetic or Medical Supplies.  What supplies are needed: blood-glucose meter,continuous (DEXCOM G5 ) Misc   What is the brand name of the supplies:   Is this a refill or new prescription: refill  Who prescribed the supplies:    Pharmacy or company name, phone # and location:    Comments: [facility] is requesting an Medical Necessity form, clinical note and a sign Physician order Fax: [phone]

## 2019-04-17 ENCOUNTER — LAB VISIT (OUTPATIENT)
Dept: LAB | Facility: HOSPITAL | Age: 49
End: 2019-04-17
Attending: HOSPITALIST
Payer: MEDICARE

## 2019-04-17 DIAGNOSIS — N18.31 CKD STAGE G3A/A1, GFR 45-59 AND ALBUMIN CREATININE RATIO <30 MG/G: ICD-10-CM

## 2019-04-17 LAB
ALBUMIN SERPL BCP-MCNC: 3.9 G/DL (ref 3.5–5.2)
ANION GAP SERPL CALC-SCNC: 9 MMOL/L (ref 8–16)
BASOPHILS # BLD AUTO: 0 K/UL (ref 0–0.2)
BASOPHILS NFR BLD: 0 % (ref 0–1.9)
BUN SERPL-MCNC: 27 MG/DL (ref 6–20)
CALCIUM SERPL-MCNC: 9.9 MG/DL (ref 8.7–10.5)
CHLORIDE SERPL-SCNC: 105 MMOL/L (ref 95–110)
CO2 SERPL-SCNC: 26 MMOL/L (ref 23–29)
CREAT SERPL-MCNC: 1.5 MG/DL (ref 0.5–1.4)
DIFFERENTIAL METHOD: ABNORMAL
EOSINOPHIL # BLD AUTO: 0.1 K/UL (ref 0–0.5)
EOSINOPHIL NFR BLD: 1.2 % (ref 0–8)
ERYTHROCYTE [DISTWIDTH] IN BLOOD BY AUTOMATED COUNT: 15.6 % (ref 11.5–14.5)
EST. GFR  (AFRICAN AMERICAN): 47 ML/MIN/1.73 M^2
EST. GFR  (NON AFRICAN AMERICAN): 41 ML/MIN/1.73 M^2
GLUCOSE SERPL-MCNC: 170 MG/DL (ref 70–110)
HCT VFR BLD AUTO: 32.8 % (ref 37–48.5)
HGB BLD-MCNC: 11.1 G/DL (ref 12–16)
LYMPHOCYTES # BLD AUTO: 2 K/UL (ref 1–4.8)
LYMPHOCYTES NFR BLD: 47.7 % (ref 18–48)
MCH RBC QN AUTO: 26.5 PG (ref 27–31)
MCHC RBC AUTO-ENTMCNC: 33.8 G/DL (ref 32–36)
MCV RBC AUTO: 78 FL (ref 82–98)
MONOCYTES # BLD AUTO: 0.2 K/UL (ref 0.3–1)
MONOCYTES NFR BLD: 4.8 % (ref 4–15)
NEUTROPHILS # BLD AUTO: 1.9 K/UL (ref 1.8–7.7)
NEUTROPHILS NFR BLD: 46.3 % (ref 38–73)
PHOSPHATE SERPL-MCNC: 3.4 MG/DL (ref 2.7–4.5)
PLATELET # BLD AUTO: 181 K/UL (ref 150–350)
PMV BLD AUTO: 9.6 FL (ref 9.2–12.9)
POTASSIUM SERPL-SCNC: 4.2 MMOL/L (ref 3.5–5.1)
RBC # BLD AUTO: 4.19 M/UL (ref 4–5.4)
SODIUM SERPL-SCNC: 140 MMOL/L (ref 136–145)
WBC # BLD AUTO: 4.13 K/UL (ref 3.9–12.7)

## 2019-04-17 PROCEDURE — 80069 RENAL FUNCTION PANEL: CPT

## 2019-04-17 PROCEDURE — 36415 COLL VENOUS BLD VENIPUNCTURE: CPT

## 2019-04-17 PROCEDURE — 85025 COMPLETE CBC W/AUTO DIFF WBC: CPT

## 2019-04-17 RX ORDER — POLYETHYLENE GLYCOL 3350 17 G/17G
17 POWDER, FOR SOLUTION ORAL 2 TIMES DAILY
Qty: 1530 G | Refills: 5 | Status: SHIPPED | OUTPATIENT
Start: 2019-04-17 | End: 2019-04-22 | Stop reason: SDUPTHER

## 2019-04-17 NOTE — TELEPHONE ENCOUNTER
Reviewed blood sugars from 4/10 - 4/14  Checking four times daily     Current regimen:  levemir 4 units twice a day  novolog 3 units sliding scale.     Fasting hyperglycemia   Occasional low blood sugars 66, 77 -- pre meal.     PLAN:  Continue current regimen  If eating a higher starch meal take 4 units.

## 2019-04-18 ENCOUNTER — PATIENT MESSAGE (OUTPATIENT)
Dept: RHEUMATOLOGY | Facility: CLINIC | Age: 49
End: 2019-04-18

## 2019-04-18 ENCOUNTER — PATIENT MESSAGE (OUTPATIENT)
Dept: ENDOCRINOLOGY | Facility: CLINIC | Age: 49
End: 2019-04-18

## 2019-04-22 ENCOUNTER — TELEPHONE (OUTPATIENT)
Dept: GASTROENTEROLOGY | Facility: CLINIC | Age: 49
End: 2019-04-22

## 2019-04-22 ENCOUNTER — PATIENT MESSAGE (OUTPATIENT)
Dept: TRANSPLANT | Facility: CLINIC | Age: 49
End: 2019-04-22

## 2019-04-22 DIAGNOSIS — K59.04 CHRONIC IDIOPATHIC CONSTIPATION: Primary | ICD-10-CM

## 2019-04-22 RX ORDER — POLYETHYLENE GLYCOL 3350 17 G/17G
17 POWDER, FOR SOLUTION ORAL 2 TIMES DAILY
Qty: 1530 G | Refills: 11 | Status: SHIPPED | OUTPATIENT
Start: 2019-04-22

## 2019-04-23 ENCOUNTER — INFUSION (OUTPATIENT)
Dept: INFUSION THERAPY | Facility: HOSPITAL | Age: 49
End: 2019-04-23
Attending: INTERNAL MEDICINE
Payer: MEDICARE

## 2019-04-23 VITALS
DIASTOLIC BLOOD PRESSURE: 73 MMHG | SYSTOLIC BLOOD PRESSURE: 124 MMHG | OXYGEN SATURATION: 98 % | BODY MASS INDEX: 21.12 KG/M2 | RESPIRATION RATE: 18 BRPM | WEIGHT: 138.88 LBS | HEART RATE: 102 BPM | TEMPERATURE: 98 F

## 2019-04-23 DIAGNOSIS — M32.9 SYSTEMIC LUPUS ERYTHEMATOSUS, UNSPECIFIED SLE TYPE, UNSPECIFIED ORGAN INVOLVEMENT STATUS: Primary | ICD-10-CM

## 2019-04-23 PROCEDURE — 96365 THER/PROPH/DIAG IV INF INIT: CPT

## 2019-04-23 PROCEDURE — 63600175 PHARM REV CODE 636 W HCPCS: Performed by: INTERNAL MEDICINE

## 2019-04-23 PROCEDURE — 25000003 PHARM REV CODE 250: Performed by: INTERNAL MEDICINE

## 2019-04-23 PROCEDURE — 96367 TX/PROPH/DG ADDL SEQ IV INF: CPT

## 2019-04-23 RX ORDER — ACETAMINOPHEN 325 MG/1
650 TABLET ORAL
Status: CANCELLED | OUTPATIENT
Start: 2019-05-07

## 2019-04-23 RX ORDER — ACETAMINOPHEN 325 MG/1
650 TABLET ORAL
Status: COMPLETED | OUTPATIENT
Start: 2019-04-23 | End: 2019-04-23

## 2019-04-23 RX ORDER — HEPARIN 100 UNIT/ML
500 SYRINGE INTRAVENOUS
Status: CANCELLED | OUTPATIENT
Start: 2019-05-07

## 2019-04-23 RX ORDER — SODIUM CHLORIDE 0.9 % (FLUSH) 0.9 %
10 SYRINGE (ML) INJECTION
Status: CANCELLED | OUTPATIENT
Start: 2019-05-07

## 2019-04-23 RX ADMIN — ACETAMINOPHEN 650 MG: 325 TABLET ORAL at 10:04

## 2019-04-23 RX ADMIN — DIPHENHYDRAMINE HYDROCHLORIDE 25 MG: 50 INJECTION INTRAMUSCULAR; INTRAVENOUS at 10:04

## 2019-04-23 RX ADMIN — BELIMUMAB 630 MG: 400 INJECTION, POWDER, LYOPHILIZED, FOR SOLUTION INTRAVENOUS at 11:04

## 2019-04-23 NOTE — PLAN OF CARE
Problem: Adult Inpatient Plan of Care  Goal: Plan of Care Review  Outcome: Ongoing (interventions implemented as appropriate)  Arrived to unit. No complaints voiced. Tolerated Benlysta. No reactions noted. Pt has next appt. Pt ambulatory, discharged from unit.

## 2019-04-25 ENCOUNTER — PATIENT MESSAGE (OUTPATIENT)
Dept: ENDOCRINOLOGY | Facility: CLINIC | Age: 49
End: 2019-04-25

## 2019-04-25 ENCOUNTER — PATIENT MESSAGE (OUTPATIENT)
Dept: NEPHROLOGY | Facility: CLINIC | Age: 49
End: 2019-04-25

## 2019-04-30 ENCOUNTER — PATIENT MESSAGE (OUTPATIENT)
Dept: ENDOCRINOLOGY | Facility: CLINIC | Age: 49
End: 2019-04-30

## 2019-05-01 ENCOUNTER — PATIENT MESSAGE (OUTPATIENT)
Dept: DIABETES | Facility: CLINIC | Age: 49
End: 2019-05-01

## 2019-05-01 ENCOUNTER — PATIENT MESSAGE (OUTPATIENT)
Dept: ENDOCRINOLOGY | Facility: CLINIC | Age: 49
End: 2019-05-01

## 2019-05-02 ENCOUNTER — OFFICE VISIT (OUTPATIENT)
Dept: OBSTETRICS AND GYNECOLOGY | Facility: CLINIC | Age: 49
End: 2019-05-02
Attending: OBSTETRICS & GYNECOLOGY
Payer: MEDICARE

## 2019-05-02 VITALS
SYSTOLIC BLOOD PRESSURE: 140 MMHG | WEIGHT: 145.75 LBS | HEIGHT: 68 IN | DIASTOLIC BLOOD PRESSURE: 70 MMHG | BODY MASS INDEX: 22.09 KG/M2

## 2019-05-02 DIAGNOSIS — Z12.31 SCREENING MAMMOGRAM, ENCOUNTER FOR: ICD-10-CM

## 2019-05-02 DIAGNOSIS — N94.10 FEMALE DYSPAREUNIA: ICD-10-CM

## 2019-05-02 DIAGNOSIS — Z12.4 PAP SMEAR FOR CERVICAL CANCER SCREENING: ICD-10-CM

## 2019-05-02 DIAGNOSIS — Z01.419 ENCOUNTER FOR GYNECOLOGICAL EXAMINATION WITHOUT ABNORMAL FINDING: Primary | ICD-10-CM

## 2019-05-02 PROCEDURE — 88175 CYTOPATH C/V AUTO FLUID REDO: CPT

## 2019-05-02 PROCEDURE — 3077F PR MOST RECENT SYSTOLIC BLOOD PRESSURE >= 140 MM HG: ICD-10-PCS | Mod: CPTII,S$GLB,, | Performed by: OBSTETRICS & GYNECOLOGY

## 2019-05-02 PROCEDURE — 3078F DIAST BP <80 MM HG: CPT | Mod: CPTII,S$GLB,, | Performed by: OBSTETRICS & GYNECOLOGY

## 2019-05-02 PROCEDURE — 87624 HPV HI-RISK TYP POOLED RSLT: CPT

## 2019-05-02 PROCEDURE — 99999 PR PBB SHADOW E&M-EST. PATIENT-LVL III: ICD-10-PCS | Mod: PBBFAC,,, | Performed by: OBSTETRICS & GYNECOLOGY

## 2019-05-02 PROCEDURE — 3077F SYST BP >= 140 MM HG: CPT | Mod: CPTII,S$GLB,, | Performed by: OBSTETRICS & GYNECOLOGY

## 2019-05-02 PROCEDURE — 99999 PR PBB SHADOW E&M-EST. PATIENT-LVL III: CPT | Mod: PBBFAC,,, | Performed by: OBSTETRICS & GYNECOLOGY

## 2019-05-02 PROCEDURE — G0101 CA SCREEN;PELVIC/BREAST EXAM: HCPCS | Mod: S$GLB,,, | Performed by: OBSTETRICS & GYNECOLOGY

## 2019-05-02 PROCEDURE — G0101 PR CA SCREEN;PELVIC/BREAST EXAM: ICD-10-PCS | Mod: S$GLB,,, | Performed by: OBSTETRICS & GYNECOLOGY

## 2019-05-02 PROCEDURE — 3078F PR MOST RECENT DIASTOLIC BLOOD PRESSURE < 80 MM HG: ICD-10-PCS | Mod: CPTII,S$GLB,, | Performed by: OBSTETRICS & GYNECOLOGY

## 2019-05-02 RX ORDER — ESTRADIOL 10 UG/1
1 INSERT VAGINAL
Qty: 8 TABLET | Refills: 11 | Status: SHIPPED | OUTPATIENT
Start: 2019-05-02 | End: 2022-08-29

## 2019-05-02 NOTE — PROGRESS NOTES
Subjective:       Patient ID: Valencia Dumont is a 49 y.o. female.    Chief Complaint:  Well Woman      History of Present Illness  HPI    Valencia Dumont is a 49 y.o. female  here for her annual GYN exam.    She describes her periods as stopped in  with menopause,    denies break through bleeding.   denies vaginal itching or irritation.  Denies vaginal discharge.  She is sexually active. She has had 1 partner for 30 years .     History of abnormal pap: No  Last Pap: approximate date 2018 and was normal  Last MMG: normal--routine follow-up in 12 months  Last Colonoscopy:  None  denies domestic violence. She does feel safe at home.     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    Diabetes mellitus     Esophageal varices     GERD (gastroesophageal reflux disease)     Hypertension     Kidney stone     Lupus     Osteoporosis 2013    Prophylactic immunotherapy (transplant immunosuppression) 2014    SBP (spontaneous bacterial peritonitis)     history of      Past Surgical History:   Procedure Laterality Date    BIOPSY LIVER AND ULTRASOUND N/A 2017    [facility] OR 2ND FLR    COLONOSCOPY N/A 2017    Performed by [facility] Sun [facility] at [facility] ENDO (4TH FLR)    EGD (ESOPHAGOGASTRODUODENOSCOPY) N/A 2019    Performed by [facility] at [facility] ENDO (4TH FLR)    ESOPHAGOGASTRODUODENOSCOPY      ESOPHAGOGASTRODUODENOSCOPY (EGD) N/A 2016    Performed by [facility] ENDO (4TH FLR)    LIVER BIOPSY      LIVER TRANSPLANT  2013    REFRACTIVE SURGERY Bilateral 2010    TRANSPLANT, LIVER N/A 2013    Performed by [facility] Pe[facility] at [facility] OR 2ND FLR    TUBAL LIGATION      UPPER GASTROINTESTINAL ENDOSCOPY       Social History     Socioeconomic History    Marital status:      Spouse name: Not on file    Number  of children: 3    Years of education: Not on file    Highest education level: Not on file   Occupational History     Employer: [employer]   Social Needs    Financial resource strain: Not on file    Food insecurity:     Worry: Not on file     Inability: Not on file    Transportation needs:     Medical: Not on file     Non-medical: Not on file   Tobacco Use    Smoking status: Never Smoker    Smokeless tobacco: Never Used    Tobacco comment: disability; ; 3 children   Substance and Sexual Activity    Alcohol use: Yes     Alcohol/week: 0.6 oz     Types: 1 Glasses of wine per week     Comment: occasionally     Drug use: No    Sexual activity: Yes     Partners: Male     Birth control/protection: None     Comment: s/p tubal ligation,  since 1989   Lifestyle    Physical activity:     Days per week: Not on file     Minutes per session: Not on file    Stress: Not on file   Relationships    Social connections:     Talks on phone: Not on file     Gets together: Not on file     Attends Quaker service: Not on file     Active member of club or organization: Not on file     Attends meetings of clubs or organizations: Not on file     Relationship status: Not on file   Other Topics Concern    Not on file   Social History Narrative    Not on file     Family History   Problem Relation Age of Onset    Hypertension Mother     Cataracts Mother     Diabetes Father     Alzheimer's disease Father     Diabetes Paternal Grandfather     Diabetes Paternal Grandmother     No Known Problems Maternal Grandmother     No Known Problems Maternal Grandfather     Breast cancer Maternal Aunt 55    Hypertension Brother     Diabetes Brother     No Known Problems Sister     No Known Problems Maternal Uncle     No Known Problems Paternal Aunt     No Known Problems Paternal Uncle     Stroke Neg Hx     Cancer Neg Hx     Colon cancer Neg Hx     Esophageal cancer Neg Hx     Stomach cancer Neg Hx      "Rectal cancer Neg Hx     Amblyopia Neg Hx     Blindness Neg Hx     Glaucoma Neg Hx     Macular degeneration Neg Hx     Retinal detachment Neg Hx     Strabismus Neg Hx     Thyroid disease Neg Hx      OB History        4    Para   4    Term   3       1    AB        Living   3       SAB        TAB        Ectopic        Multiple        Live Births   3                 BP (!) 140/70   Ht 5' 8" (1.727 m)   Wt 66.1 kg (145 lb 11.6 oz)   LMP 2016   BMI 22.16 kg/m²         GYN & OB History  Patient's last menstrual period was 2016.   Date of Last Pap: 5/3/2019    OB History    Para Term  AB Living   4 4 3 1   3   SAB TAB Ectopic Multiple Live Births           3      # Outcome Date GA Lbr Sachin/2nd Weight Sex Delivery Anes PTL Lv   4 Term            3   36w0d  2.126 kg (4 lb 11 oz) F Vag-Spont   MONIQUE   2 Term     F Vag-Spont   MONIQUE   1 Term     M Vag-Spont   MONIQUE       Review of Systems  Review of Systems   Constitutional: Negative for activity change, appetite change, fatigue and unexpected weight change.   HENT: Negative.    Eyes: Negative for visual disturbance.   Respiratory: Negative for shortness of breath and wheezing.    Cardiovascular: Negative for chest pain, palpitations and leg swelling.   Gastrointestinal: Negative for abdominal pain, bloating and blood in stool.   Endocrine: Positive for hot flashes. Negative for diabetes and hair loss.   Genitourinary: Positive for dyspareunia, hot flashes and vaginal dryness. Negative for decreased libido, pelvic pain and postmenopausal bleeding.   Musculoskeletal: Negative for back pain and joint swelling.   Integumentary:  Negative for acne, hair changes and nipple discharge.   Neurological: Negative for headaches.   Hematological: Does not bruise/bleed easily.   Psychiatric/Behavioral: Negative for depression and sleep disturbance. The patient is not nervous/anxious.    Breast: Negative for mastodynia and nipple " discharge          Objective:      Physical Exam:   Constitutional: She is oriented to person, place, and time. She appears well-developed and well-nourished.    HENT:   Head: Normocephalic and atraumatic.    Eyes: Pupils are equal, round, and reactive to light. EOM are normal.    Neck: Normal range of motion. Neck supple.    Cardiovascular: Normal rate and regular rhythm.     Pulmonary/Chest: Effort normal and breath sounds normal.   BREASTS:  no mass, no tenderness, no deformity and no retraction. Right breast exhibits no inverted nipple, no mass, no nipple discharge, no skin change, no tenderness, no bleeding and no swelling. Left breast exhibits no inverted nipple, no mass, no nipple discharge, no skin change, no tenderness, no bleeding and no swelling. Breasts are symmetrical.              Abdominal: Soft. Bowel sounds are normal.     Genitourinary: Pelvic exam was performed with patient supine.   Genitourinary Comments: PELVIC: Normal external genitalia without lesions.  Normal hair distribution.  Adequate perineal body, normal urethral meatus.  Vagina moist and well rugated without lesions or discharge.  Cervix pink, without lesions, discharge or tenderness.  No significant cystocele or rectocele.  Bimanual exam shows uterus to be normal size, regular, mobile and nontender.  Adnexa without masses or tenderness.               Musculoskeletal: Normal range of motion and moves all extremeties.       Neurological: She is alert and oriented to person, place, and time.    Skin: Skin is warm and dry.    Psychiatric: She has a normal mood and affect.              Assessment:        1. Encounter for gynecological examination without abnormal finding    2. Pap smear for cervical cancer screening    3. Screening mammogram, encounter for    4. Female dyspareunia                Plan:      1. Encounter for gynecological examination without abnormal finding    COUNSELING:  The patient was counseled today on regular weight  bearing exercise. Patient was counseled today on the new ACS guidelines for cervical cytology screening as well as the current recommendations for breast cancer screening. Counseling session lasted approximately 10 minutes, and all her questions were answered. She was advised to see her primary care physician for all other health maintenance.   FOLLOW-UP with me for next routine visit.       2. Pap smear for cervical cancer screening  - Liquid-based pap smear, screening  - HPV High Risk Genotypes, PCR    3. Screening mammogram, encounter for    - Mammo Digital Screening Bilat w/ Andres; Future    4. Female dyspareunia    - estradiol (YUVAFEM) 10 mcg Tab; Place 1 tablet (10 mcg total) vaginally twice a week.  Dispense: 8 tablet; Refill: 11       Follow up in about 1 year (around 5/2/2020).

## 2019-05-06 ENCOUNTER — PATIENT MESSAGE (OUTPATIENT)
Dept: OBSTETRICS AND GYNECOLOGY | Facility: CLINIC | Age: 49
End: 2019-05-06

## 2019-05-06 DIAGNOSIS — B37.9 YEAST INFECTION: Primary | ICD-10-CM

## 2019-05-06 RX ORDER — FLUCONAZOLE 150 MG/1
150 TABLET ORAL ONCE
Qty: 2 TABLET | Refills: 0 | Status: SHIPPED | OUTPATIENT
Start: 2019-05-06 | End: 2019-05-06

## 2019-05-06 NOTE — TELEPHONE ENCOUNTER
Patient started itching and feeling funny yesterday and would like a medication for a yeast infection if possible

## 2019-05-08 LAB
HPV HR 12 DNA CVX QL NAA+PROBE: NEGATIVE
HPV16 AG SPEC QL: NEGATIVE
HPV18 DNA SPEC QL NAA+PROBE: NEGATIVE

## 2019-05-15 ENCOUNTER — HOSPITAL ENCOUNTER (OUTPATIENT)
Dept: RADIOLOGY | Facility: HOSPITAL | Age: 49
Discharge: HOME | End: 2019-05-15
Attending: OBSTETRICS & GYNECOLOGY
Payer: MEDICARE

## 2019-05-15 DIAGNOSIS — Z12.31 SCREENING MAMMOGRAM, ENCOUNTER FOR: ICD-10-CM

## 2019-05-15 PROCEDURE — 77067 SCR MAMMO BI INCL CAD: CPT | Mod: 26,,, | Performed by: RADIOLOGY

## 2019-05-15 PROCEDURE — 77063 MAMMO DIGITAL SCREENING BILAT WITH TOMOSYNTHESIS_CAD: ICD-10-PCS | Mod: 26,,, | Performed by: RADIOLOGY

## 2019-05-15 PROCEDURE — 77067 SCR MAMMO BI INCL CAD: CPT | Mod: TC

## 2019-05-15 PROCEDURE — 77067 MAMMO DIGITAL SCREENING BILAT WITH TOMOSYNTHESIS_CAD: ICD-10-PCS | Mod: 26,,, | Performed by: RADIOLOGY

## 2019-05-15 PROCEDURE — 77063 BREAST TOMOSYNTHESIS BI: CPT | Mod: 26,,, | Performed by: RADIOLOGY

## 2019-05-17 ENCOUNTER — PATIENT MESSAGE (OUTPATIENT)
Dept: PODIATRY | Facility: CLINIC | Age: 49
End: 2019-05-17

## 2019-05-21 ENCOUNTER — INFUSION (OUTPATIENT)
Dept: INFUSION THERAPY | Facility: HOSPITAL | Age: 49
End: 2019-05-21
Attending: INTERNAL MEDICINE
Payer: MEDICARE

## 2019-05-21 ENCOUNTER — PATIENT MESSAGE (OUTPATIENT)
Dept: TRANSPLANT | Facility: CLINIC | Age: 49
End: 2019-05-21

## 2019-05-21 VITALS
DIASTOLIC BLOOD PRESSURE: 73 MMHG | RESPIRATION RATE: 16 BRPM | HEART RATE: 85 BPM | BODY MASS INDEX: 22.12 KG/M2 | SYSTOLIC BLOOD PRESSURE: 136 MMHG | OXYGEN SATURATION: 98 % | WEIGHT: 145.5 LBS | TEMPERATURE: 99 F

## 2019-05-21 DIAGNOSIS — M32.9 SYSTEMIC LUPUS ERYTHEMATOSUS, UNSPECIFIED SLE TYPE, UNSPECIFIED ORGAN INVOLVEMENT STATUS: Primary | ICD-10-CM

## 2019-05-21 PROCEDURE — 96367 TX/PROPH/DG ADDL SEQ IV INF: CPT

## 2019-05-21 PROCEDURE — 25000003 PHARM REV CODE 250: Performed by: INTERNAL MEDICINE

## 2019-05-21 PROCEDURE — 63600175 PHARM REV CODE 636 W HCPCS: Mod: JG | Performed by: INTERNAL MEDICINE

## 2019-05-21 PROCEDURE — 96365 THER/PROPH/DIAG IV INF INIT: CPT

## 2019-05-21 RX ORDER — ACETAMINOPHEN 325 MG/1
650 TABLET ORAL
Status: COMPLETED | OUTPATIENT
Start: 2019-05-21 | End: 2019-05-21

## 2019-05-21 RX ORDER — ACETAMINOPHEN 325 MG/1
650 TABLET ORAL
Status: CANCELLED | OUTPATIENT
Start: 2019-06-04

## 2019-05-21 RX ORDER — HEPARIN 100 UNIT/ML
500 SYRINGE INTRAVENOUS
Status: CANCELLED | OUTPATIENT
Start: 2019-06-04

## 2019-05-21 RX ORDER — SODIUM CHLORIDE 0.9 % (FLUSH) 0.9 %
10 SYRINGE (ML) INJECTION
Status: CANCELLED | OUTPATIENT
Start: 2019-06-04

## 2019-05-21 RX ADMIN — DIPHENHYDRAMINE HYDROCHLORIDE 25 MG: 50 INJECTION INTRAMUSCULAR; INTRAVENOUS at 10:05

## 2019-05-21 RX ADMIN — ACETAMINOPHEN 650 MG: 325 TABLET ORAL at 10:05

## 2019-05-21 RX ADMIN — BELIMUMAB 660 MG: 400 INJECTION, POWDER, LYOPHILIZED, FOR SOLUTION INTRAVENOUS at 11:05

## 2019-05-21 NOTE — PLAN OF CARE
Problem: Adult Inpatient Plan of Care  Goal: Plan of Care Review  Outcome: Ongoing (interventions implemented as appropriate)  Arrived to unit. No new symptoms reports. Pt has generalized fatigue sometimes. Tolerated Benlysta. VSS. No reactions noted. Pt has next appts. Verbalized understanding. Pt ambulatory, discharged from unit.

## 2019-05-22 ENCOUNTER — PATIENT MESSAGE (OUTPATIENT)
Dept: ENDOCRINOLOGY | Facility: CLINIC | Age: 49
End: 2019-05-22

## 2019-05-23 ENCOUNTER — OFFICE VISIT (OUTPATIENT)
Dept: TRANSPLANT | Facility: CLINIC | Age: 49
End: 2019-05-23
Payer: MEDICARE

## 2019-05-23 VITALS
WEIGHT: 147.69 LBS | SYSTOLIC BLOOD PRESSURE: 150 MMHG | HEART RATE: 98 BPM | OXYGEN SATURATION: 96 % | RESPIRATION RATE: 16 BRPM | TEMPERATURE: 99 F | BODY MASS INDEX: 21.87 KG/M2 | HEIGHT: 69 IN | DIASTOLIC BLOOD PRESSURE: 74 MMHG

## 2019-05-23 DIAGNOSIS — Z94.4 LIVER TRANSPLANTED: ICD-10-CM

## 2019-05-23 DIAGNOSIS — D84.9 IMMUNOSUPPRESSION: Primary | ICD-10-CM

## 2019-05-23 PROCEDURE — 3008F PR BODY MASS INDEX (BMI) DOCUMENTED: ICD-10-PCS | Mod: CPTII,S$GLB,, | Performed by: INTERNAL MEDICINE

## 2019-05-23 PROCEDURE — 3078F DIAST BP <80 MM HG: CPT | Mod: CPTII,S$GLB,, | Performed by: INTERNAL MEDICINE

## 2019-05-23 PROCEDURE — 99213 PR OFFICE/OUTPT VISIT, EST, LEVL III, 20-29 MIN: ICD-10-PCS | Mod: S$GLB,,, | Performed by: INTERNAL MEDICINE

## 2019-05-23 PROCEDURE — 99999 PR PBB SHADOW E&M-EST. PATIENT-LVL III: ICD-10-PCS | Mod: PBBFAC,,, | Performed by: INTERNAL MEDICINE

## 2019-05-23 PROCEDURE — 99999 PR PBB SHADOW E&M-EST. PATIENT-LVL III: CPT | Mod: PBBFAC,,, | Performed by: INTERNAL MEDICINE

## 2019-05-23 PROCEDURE — 99213 OFFICE O/P EST LOW 20 MIN: CPT | Mod: S$GLB,,, | Performed by: INTERNAL MEDICINE

## 2019-05-23 PROCEDURE — 3078F PR MOST RECENT DIASTOLIC BLOOD PRESSURE < 80 MM HG: ICD-10-PCS | Mod: CPTII,S$GLB,, | Performed by: INTERNAL MEDICINE

## 2019-05-23 PROCEDURE — 3077F SYST BP >= 140 MM HG: CPT | Mod: CPTII,S$GLB,, | Performed by: INTERNAL MEDICINE

## 2019-05-23 PROCEDURE — 3008F BODY MASS INDEX DOCD: CPT | Mod: CPTII,S$GLB,, | Performed by: INTERNAL MEDICINE

## 2019-05-23 PROCEDURE — 3077F PR MOST RECENT SYSTOLIC BLOOD PRESSURE >= 140 MM HG: ICD-10-PCS | Mod: CPTII,S$GLB,, | Performed by: INTERNAL MEDICINE

## 2019-05-23 NOTE — Clinical Note
Any thoughts on the benlysta? Wondering if this protects for liver rejection (although I know if affects B cells) and I could consider decreasing myfortic?

## 2019-05-23 NOTE — LETTER
May 23, 2019        Timur Lion  [address] [address]  Phone: [phone]  Fax: [phone]             [facility]  [address]  [facility] [address]  Phone: [phone]   Patient: Valencia Dumont   MR Number: [id number]   YOB: 1970   Date of Visit: 5/23/2019       Dear Dr. Timur Lion    Thank you for referring Valencia Dumont to me for evaluation. Attached you will find relevant portions of my assessment and plan of care.    If you have questions, please do not hesitate to call me. I look forward to following Valencia Dumont along with you.    Sincerely,    Miriam Abernathy MD    Enclosure    If you would like to receive this communication electronically, please contact [email or web address] or [phone] to request [employer] access.    [employer] is a tool which provides read-only access to select patient information with whom you have a relationship. Its easy to use and provides real time access to review your patients record including encounter summaries, notes, results, and demographic information.    If you feel you have received this communication in error or would no longer like to receive these types of communications, please e-mail [email or web address]

## 2019-05-23 NOTE — PROGRESS NOTES
Transplant Hepatology  Liver Transplant Recipient Follow-up    Transplant Date: 2013  [facility] Native Liver Dx: Cirrhosis: Autoimmune    Valencia is here for follow up of her liver transplant.    ORGAN: LIVER  Whole or Partial: whole liver  Donor Type:  - brain death  CDC High Risk: no  Donor CMV Status: Positive  Donor HCV Status: Negative  Donor HBcAb: Negative  Biliary Anastomosis: end to end  Arterial Anatomy: standard  IVC reconstruction: end to end ivc  Portal vein status: patent  .    Subjective:     Interval History:  Currently, she is doing adequately. Current complaints include chronic issues with myalgias and arthralgias.  Overall she reports that she has been doing much better in this past year.  She did walk for lupus without significant discomfort for the 1st time.  She is on infusion medication for her lupus.    Review of Systems    Objective:     Physical Exam   Constitutional: She is oriented to person, place, and time. She appears well-developed and well-nourished. No distress.   HENT:   Head: Normocephalic and atraumatic.   Mouth/Throat: Oropharynx is clear and moist. No oropharyngeal exudate.   Eyes: Pupils are equal, round, and reactive to light. Conjunctivae are normal. Right eye exhibits no discharge. Left eye exhibits no discharge. No scleral icterus.   Pulmonary/Chest: Effort normal and breath sounds normal. No respiratory distress. She has no wheezes.   Abdominal: Soft. She exhibits no distension. There is no tenderness.   Musculoskeletal: She exhibits no edema.   Neurological: She is alert and oriented to person, place, and time.   Psychiatric: She has a normal mood and affect. Her behavior is normal.   Vitals reviewed.      WBC   Date Value Ref Range Status   2019 4.13 3.90 - 12.70 K/uL Final     Hemoglobin   Date Value Ref Range Status   2019 11.1 (L) 12.0 - 16.0 g/dL Final     POC Hematocrit   Date Value Ref Range Status   2013 28 (L) 36 - 54 %PCV Final      Hematocrit   Date Value Ref Range Status   04/17/2019 32.8 (L) 37.0 - 48.5 % Final     Platelets   Date Value Ref Range Status   04/17/2019 181 150 - 350 K/uL Final     BUN, Bld   Date Value Ref Range Status   04/17/2019 27 (H) 6 - 20 mg/dL Final     Creatinine   Date Value Ref Range Status   04/17/2019 1.5 (H) 0.5 - 1.4 mg/dL Final     Glucose   Date Value Ref Range Status   04/17/2019 170 (H) 70 - 110 mg/dL Final     Calcium   Date Value Ref Range Status   04/17/2019 9.9 8.7 - 10.5 mg/dL Final     Sodium   Date Value Ref Range Status   04/17/2019 140 136 - 145 mmol/L Final     Potassium   Date Value Ref Range Status   04/17/2019 4.2 3.5 - 5.1 mmol/L Final     Chloride   Date Value Ref Range Status   04/17/2019 105 95 - 110 mmol/L Final     Magnesium   Date Value Ref Range Status   04/09/2019 1.6 1.6 - 2.6 mg/dL Final     AST   Date Value Ref Range Status   04/09/2019 15 10 - 40 U/L Final     ALT   Date Value Ref Range Status   04/09/2019 15 10 - 44 U/L Final     Alkaline Phosphatase   Date Value Ref Range Status   04/09/2019 90 55 - 135 U/L Final     Total Bilirubin   Date Value Ref Range Status   04/09/2019 0.4 0.1 - 1.0 mg/dL Final     Comment:     For infants and newborns, interpretation of results should be based  on gestational age, weight and in agreement with clinical  observations.  Premature Infant recommended reference ranges:  Up to 24 hours.............<8.0 mg/dL  Up to 48 hours............<12.0 mg/dL  3-5 days..................<15.0 mg/dL  6-29 days.................<15.0 mg/dL       Albumin   Date Value Ref Range Status   04/17/2019 3.9 3.5 - 5.2 g/dL Final     INR   Date Value Ref Range Status   01/16/2019 1.0 0.8 - 1.2 Final     Comment:     Coumadin Therapy:  2.0 - 3.0 for INR for all indicators except mechanical heart valves  and antiphospholipid syndromes which should use 2.5 - 3.5.       Lab Results   Component Value Date    TACROLIMUS 3.7 (L) 04/09/2019           Assessment/Plan:     1.  Immunosuppression    2. Liver transplanted        Immunosuppression  -Continue current IS- at some point may consider decreasing myfortic to 360mg bid, but not now given tacro running low and would like to continue with renal protection as patient tends to need for immunosuppression; patient is on Benlysta  which affects B cells; therefore, difficult to tell how much is with protect against liver rejection.  Will see if transplant pharmacist has any ideas.    Liver transplant-good allograft function  -Continue with routine lab monitoring  -Continue with PCP f/u; continue with BP monitor given elevation today  -Cancer screening- patient advised about increased risks of cancer and need for skin protection, skin exam and regular age appropriate cancer screening    RTC in 1 year    Miriam Abernathy MD           [facility] Patient Status  Functional Status: 60% - Requires occasional assistance but is able to care for needs  Physical Capacity: No Limitations

## 2019-05-28 ENCOUNTER — PATIENT MESSAGE (OUTPATIENT)
Dept: PODIATRY | Facility: CLINIC | Age: 49
End: 2019-05-28

## 2019-05-29 ENCOUNTER — PATIENT MESSAGE (OUTPATIENT)
Dept: GASTROENTEROLOGY | Facility: CLINIC | Age: 49
End: 2019-05-29

## 2019-05-30 ENCOUNTER — PATIENT MESSAGE (OUTPATIENT)
Dept: OBSTETRICS AND GYNECOLOGY | Facility: CLINIC | Age: 49
End: 2019-05-30

## 2019-05-30 ENCOUNTER — OFFICE VISIT (OUTPATIENT)
Dept: OBSTETRICS AND GYNECOLOGY | Facility: CLINIC | Age: 49
End: 2019-05-30
Payer: MEDICARE

## 2019-05-30 VITALS
SYSTOLIC BLOOD PRESSURE: 120 MMHG | WEIGHT: 144.19 LBS | DIASTOLIC BLOOD PRESSURE: 76 MMHG | HEIGHT: 69 IN | BODY MASS INDEX: 21.36 KG/M2

## 2019-05-30 DIAGNOSIS — N90.89 VULVAR IRRITATION: Primary | ICD-10-CM

## 2019-05-30 PROCEDURE — 3074F SYST BP LT 130 MM HG: CPT | Mod: CPTII,S$GLB,, | Performed by: OBSTETRICS & GYNECOLOGY

## 2019-05-30 PROCEDURE — 87529 HSV DNA AMP PROBE: CPT

## 2019-05-30 PROCEDURE — 3008F BODY MASS INDEX DOCD: CPT | Mod: CPTII,S$GLB,, | Performed by: OBSTETRICS & GYNECOLOGY

## 2019-05-30 PROCEDURE — 99213 PR OFFICE/OUTPT VISIT, EST, LEVL III, 20-29 MIN: ICD-10-PCS | Mod: S$GLB,,, | Performed by: OBSTETRICS & GYNECOLOGY

## 2019-05-30 PROCEDURE — 99999 PR PBB SHADOW E&M-EST. PATIENT-LVL III: ICD-10-PCS | Mod: PBBFAC,,, | Performed by: OBSTETRICS & GYNECOLOGY

## 2019-05-30 PROCEDURE — 99999 PR PBB SHADOW E&M-EST. PATIENT-LVL III: CPT | Mod: PBBFAC,,, | Performed by: OBSTETRICS & GYNECOLOGY

## 2019-05-30 PROCEDURE — 87510 GARDNER VAG DNA DIR PROBE: CPT

## 2019-05-30 PROCEDURE — 3078F DIAST BP <80 MM HG: CPT | Mod: CPTII,S$GLB,, | Performed by: OBSTETRICS & GYNECOLOGY

## 2019-05-30 PROCEDURE — 3008F PR BODY MASS INDEX (BMI) DOCUMENTED: ICD-10-PCS | Mod: CPTII,S$GLB,, | Performed by: OBSTETRICS & GYNECOLOGY

## 2019-05-30 PROCEDURE — 87480 CANDIDA DNA DIR PROBE: CPT

## 2019-05-30 PROCEDURE — 3078F PR MOST RECENT DIASTOLIC BLOOD PRESSURE < 80 MM HG: ICD-10-PCS | Mod: CPTII,S$GLB,, | Performed by: OBSTETRICS & GYNECOLOGY

## 2019-05-30 PROCEDURE — 3074F PR MOST RECENT SYSTOLIC BLOOD PRESSURE < 130 MM HG: ICD-10-PCS | Mod: CPTII,S$GLB,, | Performed by: OBSTETRICS & GYNECOLOGY

## 2019-05-30 PROCEDURE — 99213 OFFICE O/P EST LOW 20 MIN: CPT | Mod: S$GLB,,, | Performed by: OBSTETRICS & GYNECOLOGY

## 2019-05-30 RX ORDER — LEVOFLOXACIN 750 MG/1
750 TABLET ORAL
COMMUNITY
Start: 2019-05-29 | End: 2019-06-05

## 2019-05-30 RX ORDER — GABAPENTIN 300 MG/1
CAPSULE ORAL
Refills: 3 | COMMUNITY
Start: 2019-05-23 | End: 2019-07-02 | Stop reason: SDUPTHER

## 2019-05-30 RX ORDER — BUPROPION HYDROCHLORIDE 150 MG/1
300 TABLET ORAL
COMMUNITY
Start: 2019-01-17 | End: 2023-10-05

## 2019-05-30 RX ORDER — BUPROPION HYDROCHLORIDE 150 MG/1
TABLET ORAL
COMMUNITY
Start: 2019-05-28 | End: 2019-07-02 | Stop reason: SDUPTHER

## 2019-05-30 RX ORDER — TRIAMCINOLONE ACETONIDE 5 MG/G
CREAM TOPICAL
Refills: 1 | COMMUNITY
Start: 2019-05-24 | End: 2019-07-02 | Stop reason: SDUPTHER

## 2019-05-30 RX ORDER — MELOXICAM 15 MG/1
TABLET ORAL
COMMUNITY
Start: 2019-05-28 | End: 2023-01-17 | Stop reason: CLARIF

## 2019-05-30 RX ORDER — HYDROCORTISONE 25 MG/G
CREAM TOPICAL
COMMUNITY
Start: 2019-05-29 | End: 2019-06-08

## 2019-05-30 RX ORDER — NYSTATIN AND TRIAMCINOLONE ACETONIDE 100000; 1 [USP'U]/G; MG/G
CREAM TOPICAL
Qty: 30 G | Refills: 0 | Status: SHIPPED | OUTPATIENT
Start: 2019-05-30 | End: 2019-05-30

## 2019-05-30 RX ORDER — NYSTATIN AND TRIAMCINOLONE ACETONIDE 100000; 1 [USP'U]/G; MG/G
CREAM TOPICAL
Qty: 30 G | Refills: 1 | Status: SHIPPED | OUTPATIENT
Start: 2019-05-30 | End: 2021-07-12

## 2019-05-30 RX ORDER — GABAPENTIN 300 MG/1
CAPSULE ORAL
COMMUNITY
Start: 2019-04-01

## 2019-05-30 NOTE — PROGRESS NOTES
"CC: Vulvar irritation    Valencia Dumont is a 49 y.o. female  presents with complaint of vulvar irritation since her annual exam. She states she has also noticed some vulvar "bumps" that she is concerned about, one of which "burst" with clear fluid and then went away. She states she recently had two episodes of bowel incontinence at night after getting her lupus treatments and she thinks this may have also irritated her vulvar area. She tried diflucan which did not help. She is unsure if she has hsv - she states she has had positive and negative tests in the past for this.     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    Hypertension     Kidney stone     Lupus     Osteoporosis 2013    Prophylactic immunotherapy (transplant immunosuppression) 2014    SBP (spontaneous bacterial peritonitis)     history of      Past Surgical History:   Procedure Laterality Date    BIOPSY LIVER AND ULTRASOUND N/A 2017    [facility] OR 2ND FLR    COLONOSCOPY N/A 2017    Performed by [facility] Sun [facility] at [facility] ENDO (4TH FLR)    EGD (ESOPHAGOGASTRODUODENOSCOPY) N/A 2019    Performed by [facility] at [facility] ENDO (4TH FLR)    ESOPHAGOGASTRODUODENOSCOPY      ESOPHAGOGASTRODUODENOSCOPY (EGD) N/A 2016    Performed by [facility] at [facility] ENDO (4TH FLR)    LIVER BIOPSY      LIVER TRANSPLANT  2013    REFRACTIVE SURGERY Bilateral 2010    TRANSPLANT, LIVER N/A 2013    Performed by [facility] at [facility] OR 2ND FLR    TUBAL LIGATION      UPPER GASTROINTESTINAL ENDOSCOPY       Social History     Tobacco Use    Smoking status: Never Smoker    Smokeless tobacco: " "Never Used    Tobacco comment: disability; ; 3 children   Substance Use Topics    Alcohol use: Yes     Alcohol/week: 0.6 oz     Types: 1 Glasses of wine per week     Comment: occasionally     Drug use: No     Family History   Problem Relation Age of Onset    Hypertension Mother     Cataracts Mother     Diabetes Father     Alzheimer's disease Father     Diabetes Paternal Grandfather     Diabetes Paternal Grandmother     No Known Problems Maternal Grandmother     No Known Problems Maternal Grandfather     Breast cancer Maternal Aunt 55    Hypertension Brother     Diabetes Brother     No Known Problems Sister     No Known Problems Maternal Uncle     No Known Problems Paternal Aunt     No Known Problems Paternal Uncle     Stroke Neg Hx     Cancer Neg Hx     Colon cancer Neg Hx     Esophageal cancer Neg Hx     Stomach cancer Neg Hx     Rectal cancer Neg Hx     Amblyopia Neg Hx     Blindness Neg Hx     Glaucoma Neg Hx     Macular degeneration Neg Hx     Retinal detachment Neg Hx     Strabismus Neg Hx     Thyroid disease Neg Hx      OB History    Para Term  AB Living   4 4 3 1   3   SAB TAB Ectopic Multiple Live Births           3      # Outcome Date GA Lbr Sachin/2nd Weight Sex Delivery Anes PTL Lv   4 Term            3   36w0d  2.126 kg (4 lb 11 oz) F Vag-Spont   MONIQUE   2 Term     F Vag-Spont   MONIQUE   1 Term     M Vag-Spont   MONIQUE       /76 (BP Location: Left arm, Patient Position: Sitting)   Ht 5' 9" (1.753 m)   Wt 65.4 kg (144 lb 2.9 oz)   LMP 2016 (Approximate)   BMI 21.29 kg/m²     ROS:  GENERAL: No fever, chills, fatigability or weight loss.  VULVAR: No pain, no lesions and no itching.  VAGINAL: No relaxation, no itching, no discharge, no abnormal bleeding and no lesions.  ABDOMEN: No abdominal pain. Denies nausea. Denies vomiting. No diarrhea. No constipation  BREAST: Denies pain. No lumps. No discharge.  URINARY: No incontinence, no nocturia, no " frequency and no dysuria.  CARDIOVASCULAR: No chest pain. No shortness of breath. No leg cramps.  NEUROLOGICAL: No headaches. No vision changes.    PHYSICAL EXAM:  GEN: NAD  Resp: nl effort  Abd: soft,NT  VULVA: erythema on perineum with 2 small open vesicles, HSV swab obtained, VAGINA: normal appearing vagina with normal color and discharge, no lesions, CERVIX: normal appearing cervix without discharge or lesions  Psych: nl affect  Neuro: no focal deficits  Skin: warm and dry    ASSESSMENT and PLAN:    ICD-10-CM ICD-9-CM    1. Vaginal irritation N89.8 623.9 Vaginosis Screen by DNA Probe      Herpes simplex virus culture [facility]; Vaginal/Rectal     Mycolog sent to pharmacy. Discussed with patient this may be a contact dermatitis from bowel contamination and to keep area clean. Will call with results. Discussed HSV swab obtained as some areas concerning for this. Discussed that area that burst may have been folliculitis or HSV.     Patient was counseled today on vaginitis prevention including :  a. avoiding feminine products such as deoderant soaps, body wash, bubble bath, douches, scented toilet paper, deoderant tampons or pads, feminine wipes, chronic pad use, etc.  b. avoiding other vulvovaginal irritants such as long hot baths, humidity, tight, synthetic clothing, chlorine and sitting around in wet bathing suits  c. wearing cotton underwear, avoiding thong underwear and no underwear to bed  d. taking showers instead of baths and use a hair dryer on cool setting afterwards to dry  e. wearing cotton to exercise and shower immediately after exercise and change clothes  f. using polyurethane condoms without spermicide if sexually active and symptoms are triggered by intercourse    FOLLOW UP: PRN lack of improvement.

## 2019-05-31 ENCOUNTER — TELEPHONE (OUTPATIENT)
Dept: OBSTETRICS AND GYNECOLOGY | Facility: CLINIC | Age: 49
End: 2019-05-31

## 2019-05-31 ENCOUNTER — PATIENT MESSAGE (OUTPATIENT)
Dept: GASTROENTEROLOGY | Facility: CLINIC | Age: 49
End: 2019-05-31

## 2019-05-31 DIAGNOSIS — N18.30 STAGE 3 CHRONIC KIDNEY DISEASE: Primary | ICD-10-CM

## 2019-05-31 LAB
BACTERIAL VAGINOSIS DNA: POSITIVE
CANDIDA GLABRATA DNA: NEGATIVE
CANDIDA KRUSEI DNA: NEGATIVE
CANDIDA RRNA VAG QL PROBE: NEGATIVE
T VAGINALIS RRNA GENITAL QL PROBE: NEGATIVE

## 2019-05-31 NOTE — TELEPHONE ENCOUNTER
Contacted [facility] pharmacy. Spoke with Jorge. Informed him, per Dr. Jeansonne, that they can dispense the prescriptions separately and give the patient 30g of each medication. Jorge understood.

## 2019-05-31 NOTE — TELEPHONE ENCOUNTER
----- Message from Jayshree Muhammad sent at 5/30/2019  3:44 PM CDT -----  Contact: [facility] ([facility])   Nuris called on behalf of the patient regarding prescription that is not covered by insurance so the pharmacy is requesting a change in prescription to something that maybe covered by the patient's insurance. [facility] can be reached at [phone].

## 2019-05-31 NOTE — TELEPHONE ENCOUNTER
----- Message from Ada Anthony sent at 5/31/2019  8:13 AM CDT -----  Contact: Katja from [facility] pharmacy   Name of Who is Calling: Katja from [facility] pharmacy     What is the request in detail: Katja from [facility] pharmacy is requesting a call back in regards to RX nystatin-triamcinolone (MYCOLOG II) cream [facility] insurance will not cover medication together but will cover it separate Please contact to further discuss and advise      Can the clinic reply by [facility]:     What Number to Call Back if not in [facility]: [phone]

## 2019-06-03 ENCOUNTER — PATIENT MESSAGE (OUTPATIENT)
Dept: RHEUMATOLOGY | Facility: CLINIC | Age: 49
End: 2019-06-03

## 2019-06-03 RX ORDER — METRONIDAZOLE 500 MG/1
500 TABLET ORAL EVERY 12 HOURS
Qty: 14 TABLET | Refills: 0 | Status: SHIPPED | OUTPATIENT
Start: 2019-06-03 | End: 2019-06-10

## 2019-06-04 ENCOUNTER — PATIENT MESSAGE (OUTPATIENT)
Dept: RHEUMATOLOGY | Facility: CLINIC | Age: 49
End: 2019-06-04

## 2019-06-04 LAB
HSV1 DNA SPEC QL NAA+PROBE: NEGATIVE
HSV2 DNA SPEC QL NAA+PROBE: POSITIVE
SPECIMEN SOURCE: ABNORMAL

## 2019-06-05 ENCOUNTER — HOSPITAL ENCOUNTER (EMERGENCY)
Facility: HOSPITAL | Age: 49
Discharge: HOME | End: 2019-06-05
Attending: EMERGENCY MEDICINE
Payer: MEDICARE

## 2019-06-05 ENCOUNTER — PATIENT MESSAGE (OUTPATIENT)
Dept: OBSTETRICS AND GYNECOLOGY | Facility: CLINIC | Age: 49
End: 2019-06-05

## 2019-06-05 VITALS
WEIGHT: 147 LBS | HEART RATE: 101 BPM | DIASTOLIC BLOOD PRESSURE: 93 MMHG | RESPIRATION RATE: 17 BRPM | TEMPERATURE: 98 F | BODY MASS INDEX: 21.77 KG/M2 | SYSTOLIC BLOOD PRESSURE: 141 MMHG | OXYGEN SATURATION: 96 % | HEIGHT: 69 IN
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
## 2019-06-05 DIAGNOSIS — R51.9 NONINTRACTABLE HEADACHE, UNSPECIFIED CHRONICITY PATTERN, UNSPECIFIED HEADACHE TYPE: Primary | ICD-10-CM

## 2019-06-05 PROCEDURE — 25000003 PHARM REV CODE 250: Performed by: PHYSICIAN ASSISTANT

## 2019-06-05 PROCEDURE — 99282 EMERGENCY DEPT VISIT SF MDM: CPT

## 2019-06-05 RX ORDER — VALACYCLOVIR HYDROCHLORIDE 500 MG/1
500 TABLET, FILM COATED ORAL 2 TIMES DAILY
Qty: 6 TABLET | Refills: 2 | Status: SHIPPED | OUTPATIENT
Start: 2019-06-05 | End: 2019-06-08 | Stop reason: SDUPTHER

## 2019-06-05 RX ORDER — ACETAMINOPHEN 500 MG
500 TABLET ORAL
Status: COMPLETED | OUTPATIENT
Start: 2019-06-05 | End: 2019-06-05

## 2019-06-05 RX ADMIN — ACETAMINOPHEN 500 MG: 500 TABLET, FILM COATED ORAL at 06:06
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
## 2019-06-06 ENCOUNTER — PATIENT MESSAGE (OUTPATIENT)
Dept: TRANSPLANT | Facility: CLINIC | Age: 49
End: 2019-06-06

## 2019-06-06 DIAGNOSIS — D84.9 IMMUNOSUPPRESSION: Primary | ICD-10-CM

## 2019-06-06 NOTE — TELEPHONE ENCOUNTER
Informed pt to decrease myfortic to 360 mg BID due to starting infusions for Lupus. Will repeat labs 6/18. She verbalizes understanding.
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
## 2019-06-06 NOTE — TELEPHONE ENCOUNTER
----- Message from Miriam Abernathy MD sent at 5/29/2019  2:23 PM CDT -----  Thanks for responding. Hope you are doing ok. I will have coordinator decrease myfortic to 360mg bid.      ----- Message -----  From: Liudmila Kessler, PharmD  Sent: 5/28/2019   9:15 AM  To: Miriam Abernathy MD    Sorry for the delayed response...  I was out for an unexpected surgery (also typing with my left hand only... Bear with me).    This drug was studied as an adjunct immunosuppressant in sensitized kidney transplant recipients immunosuppressed with FK, MMF, & steroids (plus Benlysta OR placebo).  In that study, they used  mg BID.  Infections and leukopenia were common, but not different between the groups.       In the original studies for SLE, many patients were fairly immunosuppressed, on MMF+steroids, as well.      I dont think it's unreasonable to decrease her MPA to 360 mg BID, similar to that in the kidney transplant study.  I do have concern for reactivation of CMV.  In the renal txp study, it would have been standard of care to have been on ppx, and in the SLE studies, there were no renal txp recipients.  Is it possible to perform some CMV surveillance for a bit ?  ----- Message -----  From: Miriam Abernathy MD  Sent: 5/23/2019   2:03 PM  To: Liudmila Kessler, PharmD    Any thoughts on the benlysta? Wondering if this protects for liver rejection (although I know if affects B cells) and I could consider decreasing myfortic?

## 2019-06-07 ENCOUNTER — PATIENT MESSAGE (OUTPATIENT)
Dept: ENDOCRINOLOGY | Facility: CLINIC | Age: 49
End: 2019-06-07

## 2019-06-07 ENCOUNTER — HOSPITAL ENCOUNTER (EMERGENCY)
Facility: HOSPITAL | Age: 49
Discharge: HOME | End: 2019-06-07
Attending: EMERGENCY MEDICINE
Payer: MEDICARE

## 2019-06-07 VITALS
HEIGHT: 69 IN | RESPIRATION RATE: 20 BRPM | DIASTOLIC BLOOD PRESSURE: 82 MMHG | OXYGEN SATURATION: 99 % | WEIGHT: 147 LBS | SYSTOLIC BLOOD PRESSURE: 146 MMHG | BODY MASS INDEX: 21.77 KG/M2 | TEMPERATURE: 99 F | HEART RATE: 80 BPM

## 2019-06-07 DIAGNOSIS — R19.7 DIARRHEA, UNSPECIFIED TYPE: Primary | ICD-10-CM

## 2019-06-07 LAB
ALBUMIN SERPL BCP-MCNC: 3.7 G/DL (ref 3.5–5.2)
ALP SERPL-CCNC: 97 U/L (ref 55–135)
ALT SERPL W/O P-5'-P-CCNC: 6 U/L (ref 10–44)
ANION GAP SERPL CALC-SCNC: 9 MMOL/L (ref 8–16)
AST SERPL-CCNC: 11 U/L (ref 10–40)
BACTERIA #/AREA URNS HPF: NORMAL /HPF
BASOPHILS # BLD AUTO: 0 K/UL (ref 0–0.2)
BASOPHILS NFR BLD: 0 % (ref 0–1.9)
BILIRUB SERPL-MCNC: 0.4 MG/DL (ref 0.1–1)
BILIRUB UR QL STRIP: NEGATIVE
BUN SERPL-MCNC: 32 MG/DL (ref 6–20)
CALCIUM SERPL-MCNC: 8.6 MG/DL (ref 8.7–10.5)
CHLORIDE SERPL-SCNC: 110 MMOL/L (ref 95–110)
CLARITY UR: CLEAR
CO2 SERPL-SCNC: 18 MMOL/L (ref 23–29)
COLOR UR: ABNORMAL
CREAT SERPL-MCNC: 1.4 MG/DL (ref 0.5–1.4)
DIFFERENTIAL METHOD: ABNORMAL
EOSINOPHIL # BLD AUTO: 0.1 K/UL (ref 0–0.5)
EOSINOPHIL NFR BLD: 2.2 % (ref 0–8)
ERYTHROCYTE [DISTWIDTH] IN BLOOD BY AUTOMATED COUNT: 14.7 % (ref 11.5–14.5)
EST. GFR  (AFRICAN AMERICAN): 51 ML/MIN/1.73 M^2
EST. GFR  (NON AFRICAN AMERICAN): 44 ML/MIN/1.73 M^2
GLUCOSE SERPL-MCNC: 115 MG/DL (ref 70–110)
GLUCOSE UR QL STRIP: NEGATIVE
HCT VFR BLD AUTO: 37.3 % (ref 37–48.5)
HGB BLD-MCNC: 12.7 G/DL (ref 12–16)
HGB UR QL STRIP: NEGATIVE
HYALINE CASTS #/AREA URNS LPF: 0 /LPF
INR PPP: 1 (ref 0.8–1.2)
KETONES UR QL STRIP: ABNORMAL
LEUKOCYTE ESTERASE UR QL STRIP: ABNORMAL
LIPASE SERPL-CCNC: 17 U/L (ref 4–60)
LYMPHOCYTES # BLD AUTO: 1.9 K/UL (ref 1–4.8)
LYMPHOCYTES NFR BLD: 38.3 % (ref 18–48)
MAGNESIUM SERPL-MCNC: 1.4 MG/DL (ref 1.6–2.6)
MCH RBC QN AUTO: 26.4 PG (ref 27–31)
MCHC RBC AUTO-ENTMCNC: 34 G/DL (ref 32–36)
MCV RBC AUTO: 78 FL (ref 82–98)
MICROSCOPIC COMMENT: NORMAL
MONOCYTES # BLD AUTO: 0.3 K/UL (ref 0.3–1)
MONOCYTES NFR BLD: 5.8 % (ref 4–15)
NEUTROPHILS # BLD AUTO: 2.7 K/UL (ref 1.8–7.7)
NEUTROPHILS NFR BLD: 53.7 % (ref 38–73)
NITRITE UR QL STRIP: NEGATIVE
PH UR STRIP: 5 [PH] (ref 5–8)
PLATELET # BLD AUTO: 231 K/UL (ref 150–350)
PMV BLD AUTO: 9.8 FL (ref 9.2–12.9)
POTASSIUM SERPL-SCNC: 4.3 MMOL/L (ref 3.5–5.1)
PROT SERPL-MCNC: 6.7 G/DL (ref 6–8.4)
PROT UR QL STRIP: ABNORMAL
PROTHROMBIN TIME: 10.6 SEC (ref 9–12.5)
RBC # BLD AUTO: 4.81 M/UL (ref 4–5.4)
RBC #/AREA URNS HPF: 0 /HPF (ref 0–4)
SODIUM SERPL-SCNC: 137 MMOL/L (ref 136–145)
SP GR UR STRIP: 1.02 (ref 1–1.03)
URN SPEC COLLECT METH UR: ABNORMAL
UROBILINOGEN UR STRIP-ACNC: NEGATIVE EU/DL
WBC # BLD AUTO: 5.04 K/UL (ref 3.9–12.7)
WBC #/AREA URNS HPF: 4 /HPF (ref 0–5)

## 2019-06-07 PROCEDURE — 83735 ASSAY OF MAGNESIUM: CPT

## 2019-06-07 PROCEDURE — 85025 COMPLETE CBC W/AUTO DIFF WBC: CPT

## 2019-06-07 PROCEDURE — 80053 COMPREHEN METABOLIC PANEL: CPT

## 2019-06-07 PROCEDURE — 83690 ASSAY OF LIPASE: CPT

## 2019-06-07 PROCEDURE — 96361 HYDRATE IV INFUSION ADD-ON: CPT

## 2019-06-07 PROCEDURE — 96360 HYDRATION IV INFUSION INIT: CPT

## 2019-06-07 PROCEDURE — 25000003 PHARM REV CODE 250: Performed by: PHYSICIAN ASSISTANT

## 2019-06-07 PROCEDURE — 99284 EMERGENCY DEPT VISIT MOD MDM: CPT | Mod: 25

## 2019-06-07 PROCEDURE — 85610 PROTHROMBIN TIME: CPT

## 2019-06-07 PROCEDURE — 81000 URINALYSIS NONAUTO W/SCOPE: CPT

## 2019-06-07 RX ORDER — MYCOPHENOLIC ACID 180 MG/1
360 TABLET, DELAYED RELEASE ORAL 2 TIMES DAILY
Qty: 120 TABLET | Refills: 5 | Status: SHIPPED | OUTPATIENT
Start: 2019-06-07 | End: 2019-10-29 | Stop reason: SDUPTHER

## 2019-06-07 RX ADMIN — SODIUM CHLORIDE 1000 ML: 0.9 INJECTION, SOLUTION INTRAVENOUS at 02:06

## 2019-06-07 NOTE — DISCHARGE INSTRUCTIONS
Start and/ or continue your Cipro and Flagyl as previously prescribed. Collect a stool sample and bring to the lab. Store specimen cup in refrigerator until you can bring it in to the lab.

## 2019-06-07 NOTE — ED PROVIDER NOTES
Encounter Date: 6/7/2019    SCRIBE #1 NOTE: I, Warner Coreas, am scribing for, and in the presence of,  Dr. Soriano. I have scribed the following portions of the note - Other sections scribed: HPI, ROS, PE, and ED Management.       History     Chief Complaint   Patient presents with    Abdominal Pain     reports having abdominal pain and diarrhea x2 weeks reports taking antibiotics. Deneis fever     CC: abdominal pain    HPI:  This is a 49 y.o. female with a PMHx of anemia, diabetes mellitus, hypoglycemia, osteoporosis, hypertension, chronic kidney disease, lupus, and arthritis who presents to the Emergency Department with a cc of cramping abdominal pain starting 12 days ago. Patient reports associated servere watery diarrhea 10x daily, dehydration, bloating, chills, and nausea. She denies fever, hematochezia, no urinary problems, and emesis. Benzyl was taken for diarrhea relief. Symptoms worsened by eating and drinking. Patient reports a prior history of diarrhea that is usually accompanied with nausea and emesis. There were no recent travels according to the patient. The patient is currently taking the immunosuppressive drugs Myfortic and Prograf, post a liver transplant in 2013. She is allergic to Norvasc, Doxycycline, and Penicillin. She was started on antibiotics for a bacterial infection on her buttocks yesterday.      The history is provided by the patient.     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    Hypertension     Kidney stone     Lupus     Osteoporosis  12/2013    Prophylactic immunotherapy (transplant immunosuppression) 1/2/2014    SBP (spontaneous bacterial peritonitis)     history of      Past Surgical History:   Procedure Laterality Date    BIOPSY LIVER AND ULTRASOUND N/A 5/24/2017    Performed by [facility] Provider at [facility] OR 2ND FLR    COLONOSCOPY N/A 5/31/2017    Performed by Marky Sun MD at [facility] ENDO (4TH FLR)    EGD (ESOPHAGOGASTRODUODENOSCOPY) N/A 1/16/2019    Performed by Deniz Guerra MD at [facility] ENDO (4TH FLR)    ESOPHAGOGASTRODUODENOSCOPY      ESOPHAGOGASTRODUODENOSCOPY (EGD) N/A 12/27/2016    Performed by Deniz Guerra MD at [facility] ENDO (4TH FLR)    LIVER BIOPSY      LIVER TRANSPLANT  12/31/2013    REFRACTIVE SURGERY Bilateral 2010    TRANSPLANT, LIVER N/A 12/31/2013    Performed by Constantino Peña MD at [facility] OR 2ND FLR    TUBAL LIGATION  2003    UPPER GASTROINTESTINAL ENDOSCOPY       Family History   Problem Relation Age of Onset    Hypertension Mother     Cataracts Mother     Diabetes Father     Alzheimer's disease Father     Diabetes Paternal Grandfather     Diabetes Paternal Grandmother     No Known Problems Maternal Grandmother     No Known Problems Maternal Grandfather     Breast cancer Maternal Aunt 55    Hypertension Brother     Diabetes Brother     No Known Problems Sister     No Known Problems Maternal Uncle     No Known Problems Paternal Aunt     No Known Problems Paternal Uncle     Stroke Neg Hx     Cancer Neg Hx     Colon cancer Neg Hx     Esophageal cancer Neg Hx     Stomach cancer Neg Hx     Rectal cancer Neg Hx     Amblyopia Neg Hx     Blindness Neg Hx     Glaucoma Neg Hx     Macular degeneration Neg Hx     Retinal detachment Neg Hx     Strabismus Neg Hx     Thyroid disease Neg Hx      Social History     Tobacco Use    Smoking status: Never Smoker    Smokeless tobacco: Never Used    Tobacco comment: disability; ; 3 children   Substance Use Topics    Alcohol use: Yes      Alcohol/week: 0.6 oz     Types: 1 Glasses of wine per week     Comment: occasionally     Drug use: No     Review of Systems   Constitutional: Positive for chills. Negative for fever.        Positive for dehydration   HENT: Negative for sore throat.    Eyes: Negative for visual disturbance.   Respiratory: Negative for shortness of breath.    Cardiovascular: Negative for chest pain.   Gastrointestinal: Positive for abdominal distention, abdominal pain, diarrhea ( watery diarrhea) and nausea. Negative for blood in stool and vomiting.   Genitourinary: Negative for decreased urine volume, difficulty urinating, dysuria, frequency and urgency.   Musculoskeletal: Negative for back pain.   Skin: Negative for rash.   Neurological: Negative for headaches.       Physical Exam     Initial Vitals [06/07/19 1243]   BP Pulse Resp Temp SpO2   (!) 136/93 103 18 98.8 °F (37.1 °C) 96 %      MAP       --         Physical Exam    Nursing note and vitals reviewed.  Constitutional: She appears well-developed and well-nourished.   HENT:   Head: Normocephalic and atraumatic.   Eyes: EOM are normal. Pupils are equal, round, and reactive to light.   Neck: Normal range of motion. Neck supple. No thyromegaly present. No JVD present.   Cardiovascular: Normal rate and regular rhythm. Exam reveals no gallop and no friction rub.    No murmur heard.  Pulmonary/Chest: Breath sounds normal. No respiratory distress.   Abdominal: Soft. There is no tenderness.   Musculoskeletal: Normal range of motion. She exhibits no edema or tenderness.   Neurological: She is alert and oriented to person, place, and time. She has normal strength. GCS score is 15. GCS eye subscore is 4. GCS verbal subscore is 5. GCS motor subscore is 6.   Skin: Skin is warm and dry. Capillary refill takes less than 2 seconds.         ED Course   Procedures  Labs Reviewed   CBC W/ AUTO DIFFERENTIAL - Abnormal; Notable for the following components:       Result Value    Mean  Corpuscular Volume 78 (*)     Mean Corpuscular Hemoglobin 26.4 (*)     RDW 14.7 (*)     All other components within normal limits   COMPREHENSIVE METABOLIC PANEL - Abnormal; Notable for the following components:    CO2 18 (*)     Glucose 115 (*)     BUN, Bld 32 (*)     Calcium 8.6 (*)     ALT 6 (*)     eGFR if  51 (*)     eGFR if non  44 (*)     All other components within normal limits   URINALYSIS, REFLEX TO URINE CULTURE - Abnormal; Notable for the following components:    Protein, UA 1+ (*)     Ketones, UA Trace (*)     Leukocytes, UA 1+ (*)     All other components within normal limits    Narrative:     Preferred Collection Type->Urine, Clean Catch   MAGNESIUM - Abnormal; Notable for the following components:    Magnesium 1.4 (*)     All other components within normal limits   CULTURE, STOOL   CULTURE, STOOL   CLOSTRIDIUM DIFFICILE   LIPASE   URINALYSIS MICROSCOPIC    Narrative:     Preferred Collection Type->Urine, Clean Catch   PROTIME-INR   STOOL EXAM-OVA,CYSTS,PARASITES   OCCULT BLOOD X 1, STOOL          Imaging Results    None          Medical Decision Making:   Initial Assessment:   Valencia Dumont is a 49 y.o. female presenting for an emergent evaluation of abdominal pain, diarrhea, dehydration, nausea, chills, and bloating. Exam benign. Will obtain labs given the extent of pain. I will treat the pt's symptoms appropriately and reassess.    Differential Diagnosis:   Differential Diagnosis includes, but is not limited to:  AAA, aortic dissection, mesenteric ischemia, perforated viscous, MI/ACS, SBO/volvulus, incarcerated/strangulated hernia, intussusception, ileus, appendicitis, cholecystitis, cholangitis, diverticulitis, esophagitis, hepatitis, nephrolithiasis, pancreatitis, gastroenteritis, colitis, IBD/IBS, biliary colic, GERD, PUD, constipation, UTI/pyelonephritis,  disorder.    Clinical Tests:   Lab Tests: Ordered and Reviewed      patient presents complaining of  diarrhea for 2 weeks.  No blood in the diarrhea.  Some nausea but no vomiting. Able to tolerate p.o. with some decreased appetite.  No abdominal pain at this time.  Initial and repeat abdominal exam showed no abdominal tenderness. No fevers.  No elevated white cell count.  Patient's CO2 was low but no other acute abnormalities to lab work.  Patient does have a history of lupus and prior liver transplant almost 6 years ago.  Due to immunocompromised state further workup was warranted.  Without abdominal pain, abdominal tenderness or elevated white cell count will not perform CT abdomen pelvis at this time.  Patient started Flagyl couple days ago and has a prescription for Cipro that she is going to  the pharmacy today.  Attempted to send off stool studies however patient unable to give a stool sample here in the emergency room.  Will give her prescription for the stool studies as well as specimen cup for patient to bring in to the lab.  I recommend she start the Cipro and Flagyl.  Return for new or worsening symptoms.  Patient feels comfortable going home.  Vital signs are stable.                 Clinical Impression:     1. Diarrhea, unspecified type                    Scribe attestation: I, Masoud Soriano, personally performed the services described in this documentation. All medical record entries made by the scribe were at my direction and in my presence.  I have reviewed the chart and agree that the record reflects my personal performance and is accurate and complete.                 Masoud Soriano MD  06/07/19 [id number]

## 2019-06-07 NOTE — ED TRIAGE NOTES
Pt reports cramping abdominal pain x2 weeks.  Reports having lupus treatment on 5/21 and reports feeling constipated and bloating and took dulcolax on 5/26.  Reports going to obgyn on 5/30 and treated for bacterial infection.  Reports taking flagyl and bentyl this morning. Reports having chills since yesterday, denies f/n/v.  Reports decreased appetite since Sunday.  Pt also reports having diarrhea and having fecal incontinence x3 in past 2 weeks.

## 2019-06-08 ENCOUNTER — LAB VISIT (OUTPATIENT)
Dept: LAB | Facility: HOSPITAL | Age: 49
End: 2019-06-08
Attending: EMERGENCY MEDICINE
Payer: MEDICARE

## 2019-06-08 DIAGNOSIS — R19.7 DIARRHEA OF PRESUMED INFECTIOUS ORIGIN: Primary | ICD-10-CM

## 2019-06-08 LAB
C DIFF GDH STL QL: NEGATIVE
C DIFF TOX A+B STL QL IA: NEGATIVE
OB PNL STL: NEGATIVE

## 2019-06-08 PROCEDURE — 87045 FECES CULTURE AEROBIC BACT: CPT

## 2019-06-08 PROCEDURE — 87427 SHIGA-LIKE TOXIN AG IA: CPT

## 2019-06-08 PROCEDURE — 87209 SMEAR COMPLEX STAIN: CPT

## 2019-06-08 PROCEDURE — 87046 STOOL CULTR AEROBIC BACT EA: CPT | Mod: 59

## 2019-06-08 PROCEDURE — 82272 OCCULT BLD FECES 1-3 TESTS: CPT

## 2019-06-08 PROCEDURE — 87449 NOS EACH ORGANISM AG IA: CPT

## 2019-06-09 ENCOUNTER — PATIENT MESSAGE (OUTPATIENT)
Dept: GASTROENTEROLOGY | Facility: CLINIC | Age: 49
End: 2019-06-09

## 2019-06-09 LAB
E COLI SXT1 STL QL IA: NEGATIVE
E COLI SXT2 STL QL IA: NEGATIVE

## 2019-06-10 LAB — O+P STL TRI STN: NORMAL

## 2019-06-10 RX ORDER — VALACYCLOVIR HYDROCHLORIDE 500 MG/1
TABLET, FILM COATED ORAL
Qty: 6 TABLET | Refills: 2 | Status: SHIPPED | OUTPATIENT
Start: 2019-06-10 | End: 2022-08-29

## 2019-06-11 ENCOUNTER — TELEPHONE (OUTPATIENT)
Dept: GASTROENTEROLOGY | Facility: CLINIC | Age: 49
End: 2019-06-11

## 2019-06-11 ENCOUNTER — PATIENT MESSAGE (OUTPATIENT)
Dept: GASTROENTEROLOGY | Facility: CLINIC | Age: 49
End: 2019-06-11

## 2019-06-11 LAB — BACTERIA STL CULT: NORMAL

## 2019-06-12 ENCOUNTER — OFFICE VISIT (OUTPATIENT)
Dept: OPTOMETRY | Facility: CLINIC | Age: 49
End: 2019-06-12
Payer: MEDICARE

## 2019-06-12 DIAGNOSIS — H52.7 REFRACTIVE ERROR: ICD-10-CM

## 2019-06-12 DIAGNOSIS — E11.9 TYPE 2 DIABETES MELLITUS WITHOUT RETINOPATHY: Primary | ICD-10-CM

## 2019-06-12 DIAGNOSIS — H04.123 DRY EYE SYNDROME, BILATERAL: ICD-10-CM

## 2019-06-12 DIAGNOSIS — H43.393 VITREOUS FLOATERS OF BOTH EYES: ICD-10-CM

## 2019-06-12 PROCEDURE — 99999 PR PBB SHADOW E&M-EST. PATIENT-LVL II: CPT | Mod: PBBFAC,,, | Performed by: OPTOMETRIST

## 2019-06-12 PROCEDURE — 99999 PR PBB SHADOW E&M-EST. PATIENT-LVL II: ICD-10-PCS | Mod: PBBFAC,,, | Performed by: OPTOMETRIST

## 2019-06-12 PROCEDURE — 92014 COMPRE OPH EXAM EST PT 1/>: CPT | Mod: S$GLB,,, | Performed by: OPTOMETRIST

## 2019-06-12 PROCEDURE — 92015 PR REFRACTION: ICD-10-PCS | Mod: S$GLB,,, | Performed by: OPTOMETRIST

## 2019-06-12 PROCEDURE — 92014 PR EYE EXAM, EST PATIENT,COMPREHESV: ICD-10-PCS | Mod: S$GLB,,, | Performed by: OPTOMETRIST

## 2019-06-12 PROCEDURE — 92015 DETERMINE REFRACTIVE STATE: CPT | Mod: S$GLB,,, | Performed by: OPTOMETRIST
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
## 2019-06-13 ENCOUNTER — OFFICE VISIT (OUTPATIENT)
Dept: GASTROENTEROLOGY | Facility: CLINIC | Age: 49
End: 2019-06-13
Payer: MEDICARE

## 2019-06-13 VITALS
SYSTOLIC BLOOD PRESSURE: 151 MMHG | DIASTOLIC BLOOD PRESSURE: 83 MMHG | HEIGHT: 69 IN | BODY MASS INDEX: 21.48 KG/M2 | WEIGHT: 145 LBS | HEART RATE: 88 BPM

## 2019-06-13 DIAGNOSIS — R19.7 ACUTE DIARRHEA: Primary | ICD-10-CM

## 2019-06-13 PROCEDURE — 99214 PR OFFICE/OUTPT VISIT, EST, LEVL IV, 30-39 MIN: ICD-10-PCS | Mod: S$GLB,,, | Performed by: PHYSICIAN ASSISTANT

## 2019-06-13 PROCEDURE — 3008F PR BODY MASS INDEX (BMI) DOCUMENTED: ICD-10-PCS | Mod: CPTII,S$GLB,, | Performed by: PHYSICIAN ASSISTANT

## 2019-06-13 PROCEDURE — 99999 PR PBB SHADOW E&M-EST. PATIENT-LVL V: ICD-10-PCS | Mod: PBBFAC,,, | Performed by: PHYSICIAN ASSISTANT

## 2019-06-13 PROCEDURE — 99214 OFFICE O/P EST MOD 30 MIN: CPT | Mod: S$GLB,,, | Performed by: PHYSICIAN ASSISTANT

## 2019-06-13 PROCEDURE — 3079F PR MOST RECENT DIASTOLIC BLOOD PRESSURE 80-89 MM HG: ICD-10-PCS | Mod: CPTII,S$GLB,, | Performed by: PHYSICIAN ASSISTANT

## 2019-06-13 PROCEDURE — 3077F SYST BP >= 140 MM HG: CPT | Mod: CPTII,S$GLB,, | Performed by: PHYSICIAN ASSISTANT

## 2019-06-13 PROCEDURE — 3079F DIAST BP 80-89 MM HG: CPT | Mod: CPTII,S$GLB,, | Performed by: PHYSICIAN ASSISTANT

## 2019-06-13 PROCEDURE — 3077F PR MOST RECENT SYSTOLIC BLOOD PRESSURE >= 140 MM HG: ICD-10-PCS | Mod: CPTII,S$GLB,, | Performed by: PHYSICIAN ASSISTANT

## 2019-06-13 PROCEDURE — 3008F BODY MASS INDEX DOCD: CPT | Mod: CPTII,S$GLB,, | Performed by: PHYSICIAN ASSISTANT

## 2019-06-13 PROCEDURE — 99999 PR PBB SHADOW E&M-EST. PATIENT-LVL V: CPT | Mod: PBBFAC,,, | Performed by: PHYSICIAN ASSISTANT

## 2019-06-13 RX ORDER — DIPHENOXYLATE HYDROCHLORIDE AND ATROPINE SULFATE 2.5; .025 MG/1; MG/1
1 TABLET ORAL EVERY 6 HOURS PRN
Qty: 120 TABLET | Refills: 1 | Status: SHIPPED | OUTPATIENT
Start: 2019-06-13 | End: 2020-07-17

## 2019-06-13 NOTE — PROGRESS NOTES
[facility] Consultation Note    Reason for Consult:  The encounter diagnosis was Acute diarrhea.    PCP:   Timur Lion       Referring MD:  No referring provider defined for this encounter.    HPI:  This is a 49 y.o. female for evaluation of diarrhea     started benlysta infusion - 3-4 months ago, which typically makes her constipated  Took  2 dulcolax for constipation prior to sx onset, then developed diarrhea since  Associated abdominal cramping and excessive gas  minimal relief with bentyl  She will think she is passing gas and will pass stool  Anorectal irritation   was having 2 watery BMs daily  Her PCP ordered stool tests that ruled out C diff    + chills no fevers  Seen by her PCP and prescribe cipro and flagyl x 7 days.  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    Surgical History:  has a past surgical history that includes Esophagogastroduodenoscopy; Liver biopsy; Liver transplant (12/31/2013); Tubal ligation (2003); Colonoscopy (N/A, 5/31/2017); Refractive surgery (Bilateral, 2010); Upper gastrointestinal endoscopy; and Esophagogastroduodenoscopy  (N/A, 1/16/2019).    Family History: family history includes Alzheimer's disease in her father; Breast cancer (age of onset: 55) in her maternal aunt; Cataracts in her mother; Diabetes in her brother, father, paternal grandfather, and paternal grandmother; Hypertension in her brother and mother; No Known Problems in her maternal grandfather, maternal grandmother, maternal uncle, paternal aunt, paternal uncle, and sister..     Social History:  reports that she has never smoked. She has never used smokeless tobacco. She reports that she drinks about 0.6 oz of alcohol per week. She reports that she does not use drugs.    Review of patient's allergies indicates:   Allergen Reactions    Doxycycline Rash    Pcn [penicillins] Rash       Current Outpatient Medications on File Prior to Visit   Medication Sig Dispense Refill    blood sugar diagnostic Strp 1 each by Misc.(Non-Drug; Combo Route) route 4 (four) times daily. 100 each 6    blood-glucose meter kit To check BG 5 times daily, to use with insurance preferred meter 1 each 0    blood-glucose meter,continuous (DEXCOM G6 ) Misc 1 each by Misc.(Non-Drug; Combo Route) route once. for 1 dose 1 each 0    blood-glucose sensor (DEXCOM G5-G4 SENSOR) Karen 1 each by Misc.(Non-Drug; Combo Route) route once a week. 4 Device 11    blood-glucose transmitter (DEXCOM G5 TRANSMITTER) Karen 1 each by Misc.(Non-Drug; Combo Route) route every 3 (three) months. 1 Device 3    blood-glucose transmitter (DEXCOM G6 TRANSMITTER) Karen 1 each by Misc.(Non-Drug; Combo Route) route once a week 1 Device 3    buPROPion (WELLBUTRIN XL) 150 MG TB24 tablet Take 300 mg by mouth.      buPROPion (WELLBUTRIN XL) 150 MG TB24 tablet       cyproheptadine (PERIACTIN) 4 mg tablet Take 4 mg by mouth every evening.  3    diazepam (VALIUM) 5 MG tablet Take 5 mg by mouth 3 (three) times daily as needed.   0    dicyclomine (BENTYL) 10 MG capsule Take 1 capsule (10 mg total) by mouth before meals as  needed (TID PRN). 90 capsule 2    DILTIAZEM HCL (DILTIAZEM 2% CREAM) Apply topically 3 (three) times daily. Apply topically to anal area. 30 g 0    dulaglutide (TRULICITY) 0.75 mg/0.5 mL PnIj Inject 0.5 mLs (0.75 mg total) into the skin once a week. 4 Syringe 6    ergocalciferol (ERGOCALCIFEROL) 50,000 unit Cap Take 1 capsule (50,000 Units total) by mouth every 7 days. 4 capsule 2    estradiol (YUVAFEM) 10 mcg Tab Place 1 tablet (10 mcg total) vaginally twice a week. 8 tablet 11    gabapentin (NEURONTIN) 300 MG capsule TAKE 1 CAPSULE BY MOUTH THREE TIMES DAILY      gabapentin (NEURONTIN) 300 MG capsule TAKE 1 CAPSULE BY MOUTH THREE TIMES DAILY FOR NERVE PAIN.  3    hydroCHLOROthiazide (HYDRODIURIL) 25 MG tablet Take 0.5 tablets (12.5 mg total) by mouth once daily. 30 tablet 2    hydroxychloroquine (PLAQUENIL) 200 mg tablet Take 200 mg by mouth 2 (two) times daily.      hydrOXYzine HCl (ATARAX) 10 MG Tab TAKE 1 OR 2 TABLETS BY MOUTH THREE TIMES DAILY AS NEEDED FOR ITCHING.  0    insulin aspart U-100 (NOVOLOG) 100 unit/mL InPn pen Blood Glucose mg/dL       <60            - Follow interventions for hypoglycemia below      151-200     -    4 units  201-250     -    6 units   251-300     -    9 units  301-350     -    12 units  351-400     -    15 units  >400          -    18 units 3 mL 6    insulin detemir U-100 (LEVEMIR FLEXTOUCH U-100 INSULN) 100 unit/mL (3 mL) SubQ InPn pen Inject 4 Units into the skin 2 (two) times daily. 45 mL 3    isosorbide mononitrate (IMDUR) 30 MG 24 hr tablet Take 30 mg by mouth once daily.      losartan (COZAAR) 100 MG tablet Take 1 tablet (100 mg total) by mouth once daily. 30 tablet 2    magnesium oxide 500 mg Tab Take 500 mg by mouth 2 (two) times daily. 60 each 6    meclizine (ANTIVERT) 25 mg tablet TAKE ONE TABLET BY MOUTH AT BEDTIME AS NEEDED for dizziness.  0    meloxicam (MOBIC) 15 MG tablet       methylPREDNISolone (MEDROL DOSEPACK) 4 mg tablet TAKE AS DIRECTED ON  STEROID PACK FOR SIX DAYS  0    MULTIVIT,THER IRON,CA,FA & MIN (MULTIVITAMIN) Tab Take 1 tablet by mouth once daily. 30 tablet 0    mycophenolate (MYFORTIC) 180 MG TbEC Take 2 tablets (360 mg total) by mouth 2 (two) times daily. 120 tablet 5    neomycin-polymyxin-hydrocortisone (CORTISPORIN) otic solution INSTILL TWO DROPS INTO BOTH EARS FOUR TIMES DAILY FOR 10 DAYS  0    nystatin (MYCOSTATIN) 100,000 unit/mL suspension SWISH AND SPIT FIVE MILLILITERS BY MOUTH FOUR TIMES DAILY FOR 7 DAYS.  1    nystatin (MYCOSTATIN) cream Apply topically 2 (two) times daily. 30 g 3    nystatin (MYCOSTATIN) cream Apply topically 2 times daily 30 g 0    nystatin-triamcinolone (MYCOLOG II) cream Apply to affected area 2 times daily 30 g 1    oxycodone-acetaminophen (PERCOCET) 7.5-325 mg per tablet Take 1 tablet by mouth every 4 (four) hours as needed for Pain.      pantoprazole (PROTONIX) 40 MG tablet Take 1 tablet (40 mg total) by mouth once daily. 30 tablet 5    polyethylene glycol (GLYCOLAX) 17 gram/dose powder Mix 1 capful (17 g) with liquid and by mouth 2 (two) times daily. 1530 g 11    PROAIR HFA 90 mcg/actuation inhaler Inhale 2 puffs into the lungs 3 (three) times daily as needed.   3    promethazine-dextromethorphan (PROMETHAZINE-DM) 6.25-15 mg/5 mL Syrp Take by mouth 2 (two) times daily as needed.   0    ranitidine (ZANTAC) 300 MG tablet Take 300 mg by mouth 2 (two) times daily.       rosuvastatin (CRESTOR) 5 MG tablet Take 5 mg by mouth once daily.      tacrolimus (PROGRAF) 1 MG Cap Take 3 capsules (3 mg total) by mouth every 12 (twelve) hours. 180 capsule 11    traZODone (DESYREL) 50 MG tablet Take 50 mg by mouth nightly.  0    triamcinolone acetonide 0.1% (KENALOG) 0.1 % cream Apply topically 2 times daily 30 g 0    triamcinolone acetonide 0.5% (KENALOG) 0.5 % Crea APPLY TO AFFECTED AREA TOPICALLY THREE TIMES DAILY.  1    valacyclovir (VALTREX) 500 MG tablet once daily as needed  0    valACYclovir  "(VALTREX) 500 MG tablet TAKE ONE TABLET BY MOUTH TWICE DAILY FOR 3 DAYS. 6 tablet 2    verapamil (CALAN-SR) 120 MG CR tablet TAKE ONE TABLET ONCE DAILY FOR BLOOD PRESSURE  3    zolpidem (AMBIEN) 10 mg Tab Take 10 mg by mouth nightly as needed.  3    hydrALAZINE (APRESOLINE) 50 MG tablet Take 1 tablet (50 mg total) by mouth every 8 (eight) hours. for 3 days 12 tablet 0     No current facility-administered medications on file prior to visit.          Objective Findings:    Vital Signs:  BP (!) 151/83   Pulse 88   Ht 5' 9" (1.753 m)   Wt 65.8 kg (145 lb)   LMP 12/20/2016 (Approximate)   BMI 21.41 kg/m²   Body mass index is 21.41 kg/m².    Physical Exam:  General Appearance: Well appearing in no acute distress  Head:   Normocephalic, without obvious abnormality  Eyes:    No scleral icterus  ENT: Neck supple, Lips, mucosa, and tongue normal, no oral thrush visualized  Lungs: CTA bilaterally in anterior and posterior fields, no wheezes, no crackles.  Heart:  Regular rate and rhythm, S1, S2 normal, no murmurs heard  Abdomen: large abdominal incisional scar. abdomen soft, nontender, non distended with hyperactive bowel sounds in all four quadrants.  Extremities: 2+ pulses, no  edema  Skin: No rash  Neurologic: AAO x 3                Labs:  Lab Results   Component Value Date    WBC 5.04 06/07/2019    HGB 12.7 06/07/2019    HCT 37.3 06/07/2019     06/07/2019    CHOL 184 07/28/2018    TRIG 70 07/28/2018    HDL 88 (H) 07/28/2018    ALT 6 (L) 06/07/2019    AST 11 06/07/2019     06/07/2019    K 4.3 06/07/2019     06/07/2019    CREATININE 1.4 06/07/2019    BUN 32 (H) 06/07/2019    CO2 18 (L) 06/07/2019    TSH 0.793 07/28/2018    INR 1.0 06/07/2019    HGBA1C 9.7 (H) 04/02/2019       Imaging:    Endoscopy:    12/2016 EGD revealed erosive gastritis; Bx negative for H. Pylori or CMV.     Assessment:  1. Acute diarrhea    s/p liver transplant on immunosuppressants    49-year-old female presents with acute " onset of diarrhea after taking Dulcolax for constipation. Diarrhea has persisted for 2 weeks.  She has been given Cipro and Flagyl but the diarrhea persists  Recommendations:  1.  Stool studies   2. Lomotil p.r.n.  3.  Probiotics  No follow-ups on file.      Order summary:  Orders Placed This Encounter    Giardia / Cryptosporidum, EIA    Giardia / Cryptosporidum, EIA    Giardia / Cryptosporidum, EIA    WBC, Stool    diphenoxylate-atropine 2.5-0.025 mg (LOMOTIL) 2.5-0.025 mg per tablet    Lactobac. rhamnosus GG-inulin 10 billion cell -200 mg Cap         Thank you so much for allowing me to participate in the care of Valencia Dumont    Amrit Power PA-C

## 2019-06-18 ENCOUNTER — PATIENT MESSAGE (OUTPATIENT)
Dept: TRANSPLANT | Facility: CLINIC | Age: 49
End: 2019-06-18

## 2019-06-19 ENCOUNTER — LAB VISIT (OUTPATIENT)
Dept: LAB | Facility: HOSPITAL | Age: 49
End: 2019-06-19
Attending: GENERAL PRACTICE
Payer: MEDICARE

## 2019-06-19 ENCOUNTER — PATIENT MESSAGE (OUTPATIENT)
Dept: TRANSPLANT | Facility: CLINIC | Age: 49
End: 2019-06-19

## 2019-06-19 DIAGNOSIS — N18.30 STAGE 3 CHRONIC KIDNEY DISEASE: ICD-10-CM

## 2019-06-19 DIAGNOSIS — Z94.4 LIVER TRANSPLANTED: ICD-10-CM

## 2019-06-19 LAB
ALBUMIN SERPL BCP-MCNC: 4.2 G/DL (ref 3.5–5.2)
ALBUMIN SERPL BCP-MCNC: 4.2 G/DL (ref 3.5–5.2)
ALP SERPL-CCNC: 182 U/L (ref 55–135)
ALT SERPL W/O P-5'-P-CCNC: 102 U/L (ref 10–44)
ANION GAP SERPL CALC-SCNC: 7 MMOL/L (ref 8–16)
ANION GAP SERPL CALC-SCNC: 7 MMOL/L (ref 8–16)
AST SERPL-CCNC: 13 U/L (ref 10–40)
BASOPHILS # BLD AUTO: 0.01 K/UL (ref 0–0.2)
BASOPHILS # BLD AUTO: 0.01 K/UL (ref 0–0.2)
BASOPHILS NFR BLD: 0.2 % (ref 0–1.9)
BASOPHILS NFR BLD: 0.2 % (ref 0–1.9)
BILIRUB SERPL-MCNC: 0.4 MG/DL (ref 0.1–1)
BUN SERPL-MCNC: 26 MG/DL (ref 6–20)
BUN SERPL-MCNC: 26 MG/DL (ref 6–20)
CALCIUM SERPL-MCNC: 9.8 MG/DL (ref 8.7–10.5)
CALCIUM SERPL-MCNC: 9.8 MG/DL (ref 8.7–10.5)
CHLORIDE SERPL-SCNC: 104 MMOL/L (ref 95–110)
CHLORIDE SERPL-SCNC: 104 MMOL/L (ref 95–110)
CO2 SERPL-SCNC: 28 MMOL/L (ref 23–29)
CO2 SERPL-SCNC: 28 MMOL/L (ref 23–29)
CREAT SERPL-MCNC: 1.5 MG/DL (ref 0.5–1.4)
CREAT SERPL-MCNC: 1.5 MG/DL (ref 0.5–1.4)
DIFFERENTIAL METHOD: ABNORMAL
DIFFERENTIAL METHOD: ABNORMAL
EOSINOPHIL # BLD AUTO: 0.1 K/UL (ref 0–0.5)
EOSINOPHIL # BLD AUTO: 0.1 K/UL (ref 0–0.5)
EOSINOPHIL NFR BLD: 2.7 % (ref 0–8)
EOSINOPHIL NFR BLD: 2.7 % (ref 0–8)
ERYTHROCYTE [DISTWIDTH] IN BLOOD BY AUTOMATED COUNT: 15.2 % (ref 11.5–14.5)
ERYTHROCYTE [DISTWIDTH] IN BLOOD BY AUTOMATED COUNT: 15.2 % (ref 11.5–14.5)
EST. GFR  (AFRICAN AMERICAN): 47 ML/MIN/1.73 M^2
EST. GFR  (AFRICAN AMERICAN): 47 ML/MIN/1.73 M^2
EST. GFR  (NON AFRICAN AMERICAN): 41 ML/MIN/1.73 M^2
EST. GFR  (NON AFRICAN AMERICAN): 41 ML/MIN/1.73 M^2
GLUCOSE SERPL-MCNC: 163 MG/DL (ref 70–110)
GLUCOSE SERPL-MCNC: 163 MG/DL (ref 70–110)
HCT VFR BLD AUTO: 36.1 % (ref 37–48.5)
HCT VFR BLD AUTO: 36.1 % (ref 37–48.5)
HGB BLD-MCNC: 12.3 G/DL (ref 12–16)
HGB BLD-MCNC: 12.3 G/DL (ref 12–16)
LYMPHOCYTES # BLD AUTO: 1.9 K/UL (ref 1–4.8)
LYMPHOCYTES # BLD AUTO: 1.9 K/UL (ref 1–4.8)
LYMPHOCYTES NFR BLD: 43.2 % (ref 18–48)
LYMPHOCYTES NFR BLD: 43.2 % (ref 18–48)
MAGNESIUM SERPL-MCNC: 2 MG/DL (ref 1.6–2.6)
MCH RBC QN AUTO: 26.2 PG (ref 27–31)
MCH RBC QN AUTO: 26.2 PG (ref 27–31)
MCHC RBC AUTO-ENTMCNC: 34.1 G/DL (ref 32–36)
MCHC RBC AUTO-ENTMCNC: 34.1 G/DL (ref 32–36)
MCV RBC AUTO: 77 FL (ref 82–98)
MCV RBC AUTO: 77 FL (ref 82–98)
MONOCYTES # BLD AUTO: 0.3 K/UL (ref 0.3–1)
MONOCYTES # BLD AUTO: 0.3 K/UL (ref 0.3–1)
MONOCYTES NFR BLD: 7 % (ref 4–15)
MONOCYTES NFR BLD: 7 % (ref 4–15)
NEUTROPHILS # BLD AUTO: 2.1 K/UL (ref 1.8–7.7)
NEUTROPHILS # BLD AUTO: 2.1 K/UL (ref 1.8–7.7)
NEUTROPHILS NFR BLD: 46.9 % (ref 38–73)
NEUTROPHILS NFR BLD: 46.9 % (ref 38–73)
PHOSPHATE SERPL-MCNC: 2.9 MG/DL (ref 2.7–4.5)
PLATELET # BLD AUTO: 221 K/UL (ref 150–350)
PLATELET # BLD AUTO: 221 K/UL (ref 150–350)
PMV BLD AUTO: 9.4 FL (ref 9.2–12.9)
PMV BLD AUTO: 9.4 FL (ref 9.2–12.9)
POTASSIUM SERPL-SCNC: 4.3 MMOL/L (ref 3.5–5.1)
POTASSIUM SERPL-SCNC: 4.3 MMOL/L (ref 3.5–5.1)
PROT SERPL-MCNC: 7.3 G/DL (ref 6–8.4)
PTH-INTACT SERPL-MCNC: 157.8 PG/ML (ref 9–77)
RBC # BLD AUTO: 4.69 M/UL (ref 4–5.4)
RBC # BLD AUTO: 4.69 M/UL (ref 4–5.4)
SODIUM SERPL-SCNC: 139 MMOL/L (ref 136–145)
SODIUM SERPL-SCNC: 139 MMOL/L (ref 136–145)
WBC # BLD AUTO: 4.4 K/UL (ref 3.9–12.7)
WBC # BLD AUTO: 4.4 K/UL (ref 3.9–12.7)

## 2019-06-19 PROCEDURE — 80069 RENAL FUNCTION PANEL: CPT

## 2019-06-19 PROCEDURE — 80053 COMPREHEN METABOLIC PANEL: CPT

## 2019-06-19 PROCEDURE — 83970 ASSAY OF PARATHORMONE: CPT

## 2019-06-19 PROCEDURE — 80197 ASSAY OF TACROLIMUS: CPT

## 2019-06-19 PROCEDURE — 85025 COMPLETE CBC W/AUTO DIFF WBC: CPT

## 2019-06-19 PROCEDURE — 36415 COLL VENOUS BLD VENIPUNCTURE: CPT

## 2019-06-19 PROCEDURE — 83735 ASSAY OF MAGNESIUM: CPT

## 2019-06-20 LAB — TACROLIMUS BLD-MCNC: 6.1 NG/ML (ref 5–15)

## 2019-06-21 ENCOUNTER — PATIENT MESSAGE (OUTPATIENT)
Dept: TRANSPLANT | Facility: CLINIC | Age: 49
End: 2019-06-21

## 2019-06-21 ENCOUNTER — TELEPHONE (OUTPATIENT)
Dept: TRANSPLANT | Facility: CLINIC | Age: 49
End: 2019-06-21

## 2019-06-21 NOTE — TELEPHONE ENCOUNTER
----- Message from Shashank Duong MD sent at 6/21/2019 12:03 PM CDT -----  tacro 6.1, ok, unclear reason for ALT- >?SEN, repeat labs next week, if still high may need liver biopsy.

## 2019-06-21 NOTE — TELEPHONE ENCOUNTER
Informed pt labs reviewed and ALT elevated, prograf level ok. No med changes but will repeat labs next week to trend. Pt verbalizes understanding.

## 2019-06-22 ENCOUNTER — PATIENT MESSAGE (OUTPATIENT)
Dept: ENDOCRINOLOGY | Facility: CLINIC | Age: 49
End: 2019-06-22

## 2019-06-22 DIAGNOSIS — N18.30 TYPE 2 DIABETES MELLITUS WITH STAGE 3 CHRONIC KIDNEY DISEASE, WITH LONG-TERM CURRENT USE OF INSULIN: ICD-10-CM

## 2019-06-22 DIAGNOSIS — N18.31 CKD STAGE G3A/A1, GFR 45-59 AND ALBUMIN CREATININE RATIO <30 MG/G: Primary | ICD-10-CM

## 2019-06-22 DIAGNOSIS — Z79.4 TYPE 2 DIABETES MELLITUS WITH STAGE 3 CHRONIC KIDNEY DISEASE, WITH LONG-TERM CURRENT USE OF INSULIN: ICD-10-CM

## 2019-06-22 DIAGNOSIS — E11.22 TYPE 2 DIABETES MELLITUS WITH STAGE 3 CHRONIC KIDNEY DISEASE, WITH LONG-TERM CURRENT USE OF INSULIN: ICD-10-CM

## 2019-06-25 ENCOUNTER — PATIENT MESSAGE (OUTPATIENT)
Dept: ENDOCRINOLOGY | Facility: CLINIC | Age: 49
End: 2019-06-25

## 2019-06-25 ENCOUNTER — INFUSION (OUTPATIENT)
Dept: INFUSION THERAPY | Facility: HOSPITAL | Age: 49
End: 2019-06-25
Attending: INTERNAL MEDICINE
Payer: MEDICARE

## 2019-06-25 VITALS
SYSTOLIC BLOOD PRESSURE: 174 MMHG | TEMPERATURE: 98 F | OXYGEN SATURATION: 98 % | HEART RATE: 84 BPM | DIASTOLIC BLOOD PRESSURE: 83 MMHG | RESPIRATION RATE: 18 BRPM | WEIGHT: 143.31 LBS | BODY MASS INDEX: 21.16 KG/M2

## 2019-06-25 DIAGNOSIS — M32.9 SYSTEMIC LUPUS ERYTHEMATOSUS, UNSPECIFIED SLE TYPE, UNSPECIFIED ORGAN INVOLVEMENT STATUS: Primary | ICD-10-CM

## 2019-06-25 PROCEDURE — 25000003 PHARM REV CODE 250: Performed by: INTERNAL MEDICINE

## 2019-06-25 PROCEDURE — 63600175 PHARM REV CODE 636 W HCPCS: Mod: JG | Performed by: INTERNAL MEDICINE

## 2019-06-25 PROCEDURE — 96367 TX/PROPH/DG ADDL SEQ IV INF: CPT

## 2019-06-25 PROCEDURE — 96413 CHEMO IV INFUSION 1 HR: CPT

## 2019-06-25 RX ORDER — ACETAMINOPHEN 325 MG/1
650 TABLET ORAL
Status: COMPLETED | OUTPATIENT
Start: 2019-06-25 | End: 2019-06-25

## 2019-06-25 RX ORDER — SODIUM CHLORIDE 0.9 % (FLUSH) 0.9 %
10 SYRINGE (ML) INJECTION
Status: CANCELLED | OUTPATIENT
Start: 2019-07-02

## 2019-06-25 RX ORDER — HEPARIN 100 UNIT/ML
500 SYRINGE INTRAVENOUS
Status: CANCELLED | OUTPATIENT
Start: 2019-07-02

## 2019-06-25 RX ORDER — ACETAMINOPHEN 325 MG/1
650 TABLET ORAL
Status: CANCELLED | OUTPATIENT
Start: 2019-07-09

## 2019-06-25 RX ADMIN — ACETAMINOPHEN 650 MG: 325 TABLET ORAL at 09:06

## 2019-06-25 RX ADMIN — BELIMUMAB 650 MG: 400 INJECTION, POWDER, LYOPHILIZED, FOR SOLUTION INTRAVENOUS at 09:06

## 2019-06-25 RX ADMIN — DIPHENHYDRAMINE HYDROCHLORIDE 25 MG: 50 INJECTION INTRAMUSCULAR; INTRAVENOUS at 09:06

## 2019-06-25 NOTE — PLAN OF CARE
Problem: Adult Inpatient Plan of Care  Goal: Plan of Care Review  Outcome: Ongoing (interventions implemented as appropriate)  Arrived to unit. No complaints of pain. VSS. Tolerated Benlysta. No reactions noted. Pt has next appt. Pt ambulated off unit.

## 2019-06-26 ENCOUNTER — PATIENT MESSAGE (OUTPATIENT)
Dept: TRANSPLANT | Facility: CLINIC | Age: 49
End: 2019-06-26

## 2019-06-27 ENCOUNTER — OFFICE VISIT (OUTPATIENT)
Dept: NEPHROLOGY | Facility: CLINIC | Age: 49
End: 2019-06-27
Payer: MEDICARE

## 2019-06-27 ENCOUNTER — TELEPHONE (OUTPATIENT)
Dept: TRANSPLANT | Facility: CLINIC | Age: 49
End: 2019-06-27

## 2019-06-27 VITALS
HEART RATE: 93 BPM | OXYGEN SATURATION: 97 % | BODY MASS INDEX: 21.29 KG/M2 | SYSTOLIC BLOOD PRESSURE: 114 MMHG | DIASTOLIC BLOOD PRESSURE: 82 MMHG | WEIGHT: 143.75 LBS | HEIGHT: 69 IN

## 2019-06-27 DIAGNOSIS — Z94.4 LIVER REPLACED BY TRANSPLANT: Chronic | ICD-10-CM

## 2019-06-27 DIAGNOSIS — M32.9 SYSTEMIC LUPUS ERYTHEMATOSUS, UNSPECIFIED SLE TYPE, UNSPECIFIED ORGAN INVOLVEMENT STATUS: Chronic | ICD-10-CM

## 2019-06-27 DIAGNOSIS — N18.31 CKD STAGE G3A/A1, GFR 45-59 AND ALBUMIN CREATININE RATIO <30 MG/G: Primary | ICD-10-CM

## 2019-06-27 DIAGNOSIS — Z79.4 TYPE 2 DIABETES MELLITUS WITH STAGE 3 CHRONIC KIDNEY DISEASE, WITH LONG-TERM CURRENT USE OF INSULIN: ICD-10-CM

## 2019-06-27 DIAGNOSIS — E11.22 TYPE 2 DIABETES MELLITUS WITH STAGE 3 CHRONIC KIDNEY DISEASE, WITH LONG-TERM CURRENT USE OF INSULIN: ICD-10-CM

## 2019-06-27 DIAGNOSIS — N18.30 TYPE 2 DIABETES MELLITUS WITH STAGE 3 CHRONIC KIDNEY DISEASE, WITH LONG-TERM CURRENT USE OF INSULIN: ICD-10-CM

## 2019-06-27 DIAGNOSIS — I10 ESSENTIAL HYPERTENSION: ICD-10-CM

## 2019-06-27 PROCEDURE — 99213 OFFICE O/P EST LOW 20 MIN: CPT | Mod: GC,S$GLB,, | Performed by: GENERAL PRACTICE

## 2019-06-27 PROCEDURE — 3008F BODY MASS INDEX DOCD: CPT | Mod: CPTII,GC,S$GLB, | Performed by: GENERAL PRACTICE

## 2019-06-27 PROCEDURE — 3045F PR MOST RECENT HEMOGLOBIN A1C LEVEL 7.0-9.0%: ICD-10-PCS | Mod: CPTII,GC,S$GLB, | Performed by: GENERAL PRACTICE

## 2019-06-27 PROCEDURE — 3008F PR BODY MASS INDEX (BMI) DOCUMENTED: ICD-10-PCS | Mod: CPTII,GC,S$GLB, | Performed by: GENERAL PRACTICE

## 2019-06-27 PROCEDURE — 3074F PR MOST RECENT SYSTOLIC BLOOD PRESSURE < 130 MM HG: ICD-10-PCS | Mod: CPTII,GC,S$GLB, | Performed by: GENERAL PRACTICE

## 2019-06-27 PROCEDURE — 99999 PR PBB SHADOW E&M-EST. PATIENT-LVL V: CPT | Mod: PBBFAC,GC,, | Performed by: GENERAL PRACTICE

## 2019-06-27 PROCEDURE — 3074F SYST BP LT 130 MM HG: CPT | Mod: CPTII,GC,S$GLB, | Performed by: GENERAL PRACTICE

## 2019-06-27 PROCEDURE — 3079F PR MOST RECENT DIASTOLIC BLOOD PRESSURE 80-89 MM HG: ICD-10-PCS | Mod: CPTII,GC,S$GLB, | Performed by: GENERAL PRACTICE

## 2019-06-27 PROCEDURE — 99999 PR PBB SHADOW E&M-EST. PATIENT-LVL V: ICD-10-PCS | Mod: PBBFAC,GC,, | Performed by: GENERAL PRACTICE

## 2019-06-27 PROCEDURE — 3045F PR MOST RECENT HEMOGLOBIN A1C LEVEL 7.0-9.0%: CPT | Mod: CPTII,GC,S$GLB, | Performed by: GENERAL PRACTICE

## 2019-06-27 PROCEDURE — 99213 PR OFFICE/OUTPT VISIT, EST, LEVL III, 20-29 MIN: ICD-10-PCS | Mod: GC,S$GLB,, | Performed by: GENERAL PRACTICE

## 2019-06-27 PROCEDURE — 3079F DIAST BP 80-89 MM HG: CPT | Mod: CPTII,GC,S$GLB, | Performed by: GENERAL PRACTICE

## 2019-06-27 NOTE — TELEPHONE ENCOUNTER
----- Message from Shashank Duong MD sent at 6/26/2019  4:34 PM CDT -----  ALT is normalized, FK: 6.1, ok, no change in IS. Repeat labs in 1 month.

## 2019-06-27 NOTE — PROGRESS NOTES
Subjective:       Patient ID: Valencia Dumont 49 y.o. Black or  female who presents for follow up evaluation of Chronic Kidney Disease and Follow-up    Interval Hx 6/26/2019:  48 y/o Black or  woman who presents for follow-up visit for her CKD3.  Patient last seen by Dr. Pace on November 2018, this is my first time seeing patient.  Her latest sCr 1.7 from her baseline 1.5-2.0.  Patient refers has been seen by Rheum recently started on new medication Benlysta for her lupus.  Patient continues to adhere with diet, exercises sometimes.  Continues to avoid NSAIDs.  Only uses acetaminophen for PRN pain.  No recent hospitalizations, but refers felt sick early June with weakness, general malaise, and diarrhea x 2 weeks, for which she visited the ER, given IVFs, discharged with f/u with GI doctor.  Symptoms have resolved for past 2 weeks.     Interval Hx 11/29/2018:   Ms Valencia Dumont is here as follow up today for her CKD 3. She is not feeling well and c/o chills with sore throat and positive thrush she has also been doing switch and swallows with nystatin. She has documented a fever but she feels cold. Her BP is at goal today in clinic. She is also c/o fatigue and being tired all the time. She usually walks 10K steps but has been struggling to make the 10K steps. She is seeing her rheumatology  And had C3C4 done today. UA has 1+ occult blood and 1+ protein with a UPCr of 0.13 g/g. She also c/o dyspepsia and odynophagia. sCr has increased to 1.4 mg/dL from 1.2 mg/dL but over all picture she is still at her baseline sCr 1.2-1.5 mg/dL. She c/o right knee pain today and sometimes she gets warm and red she suspects gout in addition to her right elbow c/o pain as well. Both joints are tender but not warm nor red. C3 and C4 are in process as of now.    ROMÁN Dumont is a 49 y.o. Black or  female with a PMHx relevant for Autoimmune Hepatitis s/p Liver Tx 2013 complicated  by rejection s/p treatment, post Tx DMT2, SLE Dx 2006 here secondary to CKD. This is the first time she see's a Nephrologist. She has done well except for the episode of rejection. He Prograf level was found low which correlated with improvement in sCr. In addition, sCr has fluctuated over the last year with ups and down sometimes correlating with FK levels but her estimated sCr baseline is 1.5-2.0 mg/dL. She has no proteinuria and no evidence of hematuria. SLE is closely followed by Rheumatology [facility] therapy consists of Prograf Myfortic and Prednisone. Her BP is elevated today 140/80 mmHg. She denies any swelling but c/o easy bruising. C3 and C4 are WNL and dsDNA is negative.  The patient denies taking NSAIDs or new antibiotics, recreational drugs, recent episode of dehydration, diarrhea, nausea or vomiting, acute illness, hospitalization or exposure to IV radiocontrast.    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    Hypertension     Kidney stone     Lupus     Osteoporosis 12/2013    Prophylactic immunotherapy (transplant immunosuppression) 1/2/2014    SBP (spontaneous bacterial peritonitis)     history of        Family History   Problem Relation Age of Onset    Hypertension Mother     Cataracts Mother     Diabetes Father     Alzheimer's disease Father     Diabetes Paternal Grandfather     Diabetes Paternal Grandmother     No Known Problems Maternal Grandmother     No Known Problems Maternal Grandfather     Breast cancer Maternal Aunt 55    Hypertension Brother     Diabetes Brother     No Known Problems Sister     No Known Problems Maternal Uncle     No Known Problems  Paternal Aunt     No Known Problems Paternal Uncle     Stroke Neg Hx     Cancer Neg Hx     Colon cancer Neg Hx     Esophageal cancer Neg Hx     Stomach cancer Neg Hx     Rectal cancer Neg Hx     Amblyopia Neg Hx     Blindness Neg Hx     Glaucoma Neg Hx     Macular degeneration Neg Hx     Retinal detachment Neg Hx     Strabismus Neg Hx     Thyroid disease Neg Hx         Past Surgical History:   Procedure Laterality Date    BIOPSY LIVER AND ULTRASOUND N/A 5/24/2017    Performed by [facility] Provider at [facility] OR 2ND FLR    COLONOSCOPY N/A 5/31/2017    Performed by Marky Sun MD at [facility] ENDO (4TH FLR)    EGD (ESOPHAGOGASTRODUODENOSCOPY) N/A 1/16/2019    Performed by Deniz Guerra MD at [facility] ENDO (4TH FLR)    ESOPHAGOGASTRODUODENOSCOPY      ESOPHAGOGASTRODUODENOSCOPY (EGD) N/A 12/27/2016    Performed by Deniz Guerra [facility] at [facility] ENDO (4TH FLR)    LIVER BIOPSY      LIVER TRANSPLANT  12/31/2013    REFRACTIVE SURGERY Bilateral 2010    TRANSPLANT, LIVER N/A 12/31/2013    Performed by Constantino Peña MD at [facility] OR 2ND FLR    TUBAL LIGATION  2003    UPPER GASTROINTESTINAL ENDOSCOPY       Current Outpatient Medications on File Prior to Visit   Medication Sig Dispense Refill    blood sugar diagnostic Strp 1 each by Misc.(Non-Drug; Combo Route) route 4 (four) times daily. 100 each 6    blood-glucose meter kit To check BG 5 times daily, to use with insurance preferred meter 1 each 0    blood-glucose meter,continuous (DEXCOM G6 ) Misc 1 each by Misc.(Non-Drug; Combo Route) route once. for 1 dose 1 each 0    blood-glucose sensor (DEXCOM G5-G4 SENSOR) Karen 1 each by Misc.(Non-Drug; Combo Route) route once a week. 4 Device 11    blood-glucose transmitter (DEXCOM G5 TRANSMITTER) Karen 1 each by Misc.(Non-Drug; Combo Route) route every 3 (three) months. 1 Device 3    blood-glucose transmitter (DEXCOM G6 TRANSMITTER) Karen 1 each by Misc.(Non-Drug; Combo Route) route once a week 1 Device 3     buPROPion (WELLBUTRIN XL) 150 MG TB24 tablet Take 300 mg by mouth.      buPROPion (WELLBUTRIN XL) 150 MG TB24 tablet       cyproheptadine (PERIACTIN) 4 mg tablet Take 4 mg by mouth every evening.  3    diazepam (VALIUM) 5 MG tablet Take 5 mg by mouth 3 (three) times daily as needed.   0    dicyclomine (BENTYL) 10 MG capsule Take 1 capsule (10 mg total) by mouth before meals as needed (TID PRN). 90 capsule 2    DILTIAZEM HCL (DILTIAZEM 2% CREAM) Apply topically 3 (three) times daily. Apply topically to anal area. 30 g 0    diphenoxylate-atropine 2.5-0.025 mg (LOMOTIL) 2.5-0.025 mg per tablet Take 1 tablet by mouth every 6 (six) hours as needed for Diarrhea. 120 tablet 1    dulaglutide (TRULICITY) 0.75 mg/0.5 mL PnIj Inject 0.5 mLs (0.75 mg total) into the skin once a week. 4 Syringe 6    ergocalciferol (ERGOCALCIFEROL) 50,000 unit Cap Take 1 capsule (50,000 Units total) by mouth every 7 days. 4 capsule 2    estradiol (YUVAFEM) 10 mcg Tab Place 1 tablet (10 mcg total) vaginally twice a week. 8 tablet 11    gabapentin (NEURONTIN) 300 MG capsule TAKE 1 CAPSULE BY MOUTH THREE TIMES DAILY      gabapentin (NEURONTIN) 300 MG capsule TAKE 1 CAPSULE BY MOUTH THREE TIMES DAILY FOR NERVE PAIN.  3    hydroxychloroquine (PLAQUENIL) 200 mg tablet Take 200 mg by mouth 2 (two) times daily.      hydrOXYzine HCl (ATARAX) 10 MG Tab TAKE 1 OR 2 TABLETS BY MOUTH THREE TIMES DAILY AS NEEDED FOR ITCHING.  0    insulin aspart U-100 (NOVOLOG) 100 unit/mL InPn pen Blood Glucose mg/dL       <60            - Follow interventions for hypoglycemia below      151-200     -    4 units  201-250     -    6 units   251-300     -    9 units  301-350     -    12 units  351-400     -    15 units  >400          -    18 units 3 mL 6    insulin detemir U-100 (LEVEMIR FLEXTOUCH U-100 INSULN) 100 unit/mL (3 mL) SubQ InPn pen Inject 4 Units into the skin 2 (two) times daily. 45 mL 3    isosorbide mononitrate (IMDUR) 30 MG 24 hr tablet Take 30  mg by mouth once daily.      Lactobac. rhamnosus GG-inulin 10 billion cell -200 mg Cap Take 1 capsule by mouth 2 (two) times daily. 30 capsule 0    losartan (COZAAR) 100 MG tablet Take 1 tablet (100 mg total) by mouth once daily. 30 tablet 2    magnesium oxide 500 mg Tab Take 500 mg by mouth 2 (two) times daily. 60 each 6    meclizine (ANTIVERT) 25 mg tablet TAKE ONE TABLET BY MOUTH AT BEDTIME AS NEEDED for dizziness.  0    meloxicam (MOBIC) 15 MG tablet       MULTIVIT,THER IRON,CA,FA & MIN (MULTIVITAMIN) Tab Take 1 tablet by mouth once daily. 30 tablet 0    mycophenolate (MYFORTIC) 180 MG TbEC Take 2 tablets (360 mg total) by mouth 2 (two) times daily. 120 tablet 5    neomycin-polymyxin-hydrocortisone (CORTISPORIN) otic solution INSTILL TWO DROPS INTO BOTH EARS FOUR TIMES DAILY FOR 10 DAYS  0    nystatin (MYCOSTATIN) 100,000 unit/mL suspension SWISH AND SPIT FIVE MILLILITERS BY MOUTH FOUR TIMES DAILY FOR 7 DAYS.  1    nystatin (MYCOSTATIN) cream Apply topically 2 (two) times daily. 30 g 3    nystatin (MYCOSTATIN) cream Apply topically 2 times daily 30 g 0    nystatin-triamcinolone (MYCOLOG II) cream Apply to affected area 2 times daily 30 g 1    oxycodone-acetaminophen (PERCOCET) 7.5-325 mg per tablet Take 1 tablet by mouth every 4 (four) hours as needed for Pain.      pantoprazole (PROTONIX) 40 MG tablet Take 1 tablet (40 mg total) by mouth once daily. 30 tablet 5    polyethylene glycol (GLYCOLAX) 17 gram/dose powder Mix 1 capful (17 g) with liquid and by mouth 2 (two) times daily. 1530 g 11    PROAIR HFA 90 mcg/actuation inhaler Inhale 2 puffs into the lungs 3 (three) times daily as needed.   3    promethazine-dextromethorphan (PROMETHAZINE-DM) 6.25-15 mg/5 mL Syrp Take by mouth 2 (two) times daily as needed.   0    ranitidine (ZANTAC) 300 MG tablet Take 300 mg by mouth 2 (two) times daily.       rosuvastatin (CRESTOR) 5 MG tablet Take 5 mg by mouth once daily.      tacrolimus (PROGRAF) 1 MG  Cap Take 3 capsules (3 mg total) by mouth every 12 (twelve) hours. 180 capsule 11    traZODone (DESYREL) 50 MG tablet Take 50 mg by mouth nightly.  0    triamcinolone acetonide 0.1% (KENALOG) 0.1 % cream Apply topically 2 times daily 30 g 0    triamcinolone acetonide 0.5% (KENALOG) 0.5 % Crea APPLY TO AFFECTED AREA TOPICALLY THREE TIMES DAILY.  1    valacyclovir (VALTREX) 500 MG tablet once daily as needed  0    valACYclovir (VALTREX) 500 MG tablet TAKE ONE TABLET BY MOUTH TWICE DAILY FOR 3 DAYS. 6 tablet 2    verapamil (CALAN-SR) 120 MG CR tablet TAKE ONE TABLET ONCE DAILY FOR BLOOD PRESSURE  3    zolpidem (AMBIEN) 10 mg Tab Take 10 mg by mouth nightly as needed.  3    [DISCONTINUED] hydroCHLOROthiazide (HYDRODIURIL) 25 MG tablet Take 0.5 tablets (12.5 mg total) by mouth once daily. 30 tablet 2    [DISCONTINUED] methylPREDNISolone (MEDROL DOSEPACK) 4 mg tablet TAKE AS DIRECTED ON STEROID PACK FOR SIX DAYS  0    hydrALAZINE (APRESOLINE) 50 MG tablet Take 1 tablet (50 mg total) by mouth every 8 (eight) hours. for 3 days 12 tablet 0     No current facility-administered medications on file prior to visit.          Review of Systems   Constitutional: Negative for appetite change, fatigue, fever and unexpected weight change.   HENT: Negative for facial swelling, hearing loss and tinnitus.    Eyes: Negative for visual disturbance.   Respiratory: Negative for chest tightness and shortness of breath.    Cardiovascular: Negative for chest pain and leg swelling.   Gastrointestinal: Negative for abdominal pain and nausea.   Genitourinary: Negative for difficulty urinating, dysuria and flank pain.   Musculoskeletal: Positive for arthralgias. Negative for myalgias.   Skin: Negative for color change.   Neurological: Negative for dizziness, syncope, weakness, light-headedness and headaches.   Hematological: Bruises/bleeds easily.   Psychiatric/Behavioral: Negative for behavioral problems and confusion.       Objective:      Wt Readings from Last 3 Encounters:   06/27/19 65.2 kg (143 lb 11.8 oz)   06/25/19 65 kg (143 lb 4.8 oz)   06/13/19 65.8 kg (145 lb)     Temp Readings from Last 3 Encounters:   06/25/19 98 °F (36.7 °C)   06/07/19 98.8 °F (37.1 °C) (Oral)   06/05/19 98.3 °F (36.8 °C) (Oral)     BP Readings from Last 3 Encounters:   06/27/19 114/82   06/25/19 (!) 174/83   06/13/19 (!) 151/83     Pulse Readings from Last 3 Encounters:   06/27/19 93   06/25/19 84   06/13/19 88       Physical Exam   Constitutional: She is oriented to person, place, and time. She appears well-developed and well-nourished. No distress.   HENT:   Head: Normocephalic and atraumatic.   Eyes: Pupils are equal, round, and reactive to light. Conjunctivae are normal.   Neck: Neck supple. No JVD present.   Cardiovascular: Normal rate, regular rhythm and intact distal pulses. Exam reveals no gallop and no friction rub.   No murmur heard.  Pulmonary/Chest: Effort normal and breath sounds normal. She has no wheezes. She has no rales.   Abdominal: Soft. Bowel sounds are normal. She exhibits no distension. There is no tenderness.   Musculoskeletal: Normal range of motion. She exhibits no edema or deformity.        Right elbow: Tenderness found.        Right knee: Tenderness found.   Neurological: She is alert and oriented to person, place, and time. She has normal reflexes.   Skin: Skin is warm and dry. Bruising noted. She is not diaphoretic.   Psychiatric: She has a normal mood and affect. Her behavior is normal.       Assessment:        ICD-10-CM ICD-9-CM   1. CKD stage G3a/A1, GFR 45-59 and albumin creatinine ratio <30 mg/g N18.3 585.3   2. Type 2 diabetes mellitus with stage 3 chronic kidney disease, with long-term current use of insulin E11.22 250.40    N18.3 585.3    Z79.4 V58.67   3. Essential hypertension I10 401.9   4. Systemic lupus erythematosus, unspecified SLE type, unspecified organ involvement status M32.9 710.0   5. [facility] Z94.4 V42.7        Plan:     1. CKD stage 3bA1 eGRR with baseline sCr 1.5-2.0 mg/dL: most likely multifactorial secondary to CNI, DM2 and HTN.   Last sCr was 1.7 stable with in her baseline 1.5-2.0 mg/dL.  Lab Results   Component Value Date    CREATININE 1.7 (H) 06/25/2019     BP controlled   Low Salt diet  Stable renal Fx   Avoid nephrotoxic drugs as much as possible, she states she doesnt take NSAID's as she was told not to luis manuel her Liver.  Optimize DM control with goal A1C < 7% (has shown significant improvement)  Please maintain FK level at goal per Hepatology (most recent level 6.1)    Protein Creatinine Ratios:  Prot/Creat Ratio, Ur   Date Value Ref Range Status   06/19/2019 0.37 (H) 0.00 - 0.20 Final   01/08/2019 0.28 (H) 0.00 - 0.20 Final   11/29/2018 0.13 0.00 - 0.20 Final       Acid-Base: at goal  Lab Results   Component Value Date     06/25/2019    K 4.9 06/25/2019    CO2 28 06/25/2019     Progression to ESRD: seems her renal function is stable at this time will keep monitoring her renal function for now.    2. HTN: Blood pressures control  Not control BP, several functions on flowsheet, will continue to monitor. Goal for BP is <130 mmHg SBP and BDP <80 mmHg.   Cont Verapamil 120 mg, Losartan 100 mg, Imdur 30 mg  Low Salt diet     3. 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 Anemia: Chronic disease  Lab Results   Component Value Date    HGB 11.6 (L) 06/25/2019   At goal for chronic kidney disease >10 gm/dL    5. DM:  Last HbA1C   Lab Results   Component Value Date    HGBA1C 8.4 (H) 06/19/2019   Uncontrolled DM2 but in decreasing trend and significantly improving  Follow up with endocrinology for optimization of diabetic medication regimen   Oriented about diet and exercise  Target HbA1c  <7% to prevent/slow down progression of CKD from diabetic nephropathy    6. Lipid management: Cont Statin  Lab Results   Component Value Date    LDLCALC 82.0 07/28/2018     7. SLE:   Patient on Benlysta (belimumab) IV treatments  Cont Plaquenil as per Rheumatology see is closely followed by Dr Crocker    Follow up in 6 mo with labs and Urine.    Sancho Camara MD  Nephrology  [facility]

## 2019-06-28 NOTE — PROGRESS NOTES
[facility]    The note from the fellow/resident was reviewed. I have personally interviewed and examined the patient. There were no additional findings with regards to the history or physical exam.    I agree with the assessment and plan of  Dr. Aramis Bey

## 2019-07-01 ENCOUNTER — PATIENT MESSAGE (OUTPATIENT)
Dept: OBSTETRICS AND GYNECOLOGY | Facility: CLINIC | Age: 49
End: 2019-07-01

## 2019-07-01 ENCOUNTER — OFFICE VISIT (OUTPATIENT)
Dept: PODIATRY | Facility: CLINIC | Age: 49
End: 2019-07-01
Payer: MEDICARE

## 2019-07-01 VITALS
WEIGHT: 143 LBS | BODY MASS INDEX: 21.18 KG/M2 | HEIGHT: 69 IN | SYSTOLIC BLOOD PRESSURE: 132 MMHG | DIASTOLIC BLOOD PRESSURE: 84 MMHG

## 2019-07-01 DIAGNOSIS — M79.675 PAIN AROUND TOENAIL, LEFT FOOT: ICD-10-CM

## 2019-07-01 DIAGNOSIS — M20.41 HAMMER TOES OF BOTH FEET: ICD-10-CM

## 2019-07-01 DIAGNOSIS — M20.5X1 HALLUX LIMITUS, ACQUIRED, RIGHT: ICD-10-CM

## 2019-07-01 DIAGNOSIS — B35.1 ONYCHOMYCOSIS DUE TO DERMATOPHYTE: ICD-10-CM

## 2019-07-01 DIAGNOSIS — M79.675 GREAT TOE PAIN, LEFT: ICD-10-CM

## 2019-07-01 DIAGNOSIS — L60.0 INGROWN TOENAIL WITHOUT INFECTION: ICD-10-CM

## 2019-07-01 DIAGNOSIS — M20.42 HAMMER TOES OF BOTH FEET: ICD-10-CM

## 2019-07-01 DIAGNOSIS — E11.9 COMPREHENSIVE DIABETIC FOOT EXAMINATION, TYPE 2 DM, ENCOUNTER FOR: ICD-10-CM

## 2019-07-01 DIAGNOSIS — M20.5X2 HALLUX LIMITUS, ACQUIRED, LEFT: ICD-10-CM

## 2019-07-01 DIAGNOSIS — E11.49 TYPE II DIABETES MELLITUS WITH NEUROLOGICAL MANIFESTATIONS: Primary | ICD-10-CM

## 2019-07-01 PROCEDURE — 99214 OFFICE O/P EST MOD 30 MIN: CPT | Mod: S$GLB,,, | Performed by: PODIATRIST

## 2019-07-01 PROCEDURE — 99999 PR PBB SHADOW E&M-EST. PATIENT-LVL III: ICD-10-PCS | Mod: PBBFAC,,, | Performed by: PODIATRIST

## 2019-07-01 PROCEDURE — 3079F PR MOST RECENT DIASTOLIC BLOOD PRESSURE 80-89 MM HG: ICD-10-PCS | Mod: CPTII,S$GLB,, | Performed by: PODIATRIST

## 2019-07-01 PROCEDURE — 3008F PR BODY MASS INDEX (BMI) DOCUMENTED: ICD-10-PCS | Mod: CPTII,S$GLB,, | Performed by: PODIATRIST

## 2019-07-01 PROCEDURE — 3079F DIAST BP 80-89 MM HG: CPT | Mod: CPTII,S$GLB,, | Performed by: PODIATRIST

## 2019-07-01 PROCEDURE — 3075F SYST BP GE 130 - 139MM HG: CPT | Mod: CPTII,S$GLB,, | Performed by: PODIATRIST

## 2019-07-01 PROCEDURE — 3008F BODY MASS INDEX DOCD: CPT | Mod: CPTII,S$GLB,, | Performed by: PODIATRIST

## 2019-07-01 PROCEDURE — 3045F PR MOST RECENT HEMOGLOBIN A1C LEVEL 7.0-9.0%: ICD-10-PCS | Mod: CPTII,S$GLB,, | Performed by: PODIATRIST

## 2019-07-01 PROCEDURE — 3075F PR MOST RECENT SYSTOLIC BLOOD PRESS GE 130-139MM HG: ICD-10-PCS | Mod: CPTII,S$GLB,, | Performed by: PODIATRIST

## 2019-07-01 PROCEDURE — 99214 PR OFFICE/OUTPT VISIT, EST, LEVL IV, 30-39 MIN: ICD-10-PCS | Mod: S$GLB,,, | Performed by: PODIATRIST

## 2019-07-01 PROCEDURE — 3045F PR MOST RECENT HEMOGLOBIN A1C LEVEL 7.0-9.0%: CPT | Mod: CPTII,S$GLB,, | Performed by: PODIATRIST

## 2019-07-01 PROCEDURE — 99999 PR PBB SHADOW E&M-EST. PATIENT-LVL III: CPT | Mod: PBBFAC,,, | Performed by: PODIATRIST

## 2019-07-01 NOTE — PATIENT INSTRUCTIONS
Recommend lotions: eucerin, eucerin for diabetics, aquaphor, A&D ointment, gold bond for diabetics, sween, Banks's Bees all purpose baby ointment,  urea 40 with aloe (found on amazon.com)    Shoe recommendations: (try 6pm.com, zappos.com , nordstromrack.Blaze [employer], or shoes[employer] for discounted prices) you can visit DSW shoes in [place]  or [facility] in the [facility] (there are also several shoe brand outlets in the [facility])    Asics (GT 2000 or gel foundations), new balance stability type shoes, saucony (stabil c3),  Chandler (GTS or Beast or transcend), propet (tennis shoe)    Sofnarendra Desouza (women) Michelle&Omar (men), clarks, crocs, aerosoles, naturalizers, SAS, ecco, born, chris villalobos, rockports (dress shoes)    Vionic, burkenstocks, fitflops, propet (sandals)  Nike comfort thong sandals, crocs, propet (house shoes)    Nail Home remedy:  Vicks Vapor rub to nails for easier manageability      Diabetes: Inspecting Your Feet  Diabetes increases your chances of developing foot problems. So inspect your feet every day. This helps you find small skin irritations before they become serious infections. If you have trouble seeing the bottoms of your feet, use a mirror or ask a family member or friend to help.     Pressure spots on the bottom of the foot are common areas where problems develop.   How to check your feet  Below are tips to help you look for foot problems. Try to check your feet at the same time each day, such as when you get out of bed in the morning:  · Check the top of each foot. The tops of toes, back of the heel, and outer edge of the foot can get a lot of rubbing from poor-fitting shoes.  · Check the bottom of each foot. Daily wear and tear often leads to problems at pressure spots.  · Check the toes and nails. Fungal infections often occur between toes. Toenail problems can also be a sign of fungal infections or lead to breaks in the skin.  · Check your shoes, too. Loose objects inside a shoe can injure the  foot. Use your hand to feel inside your shoes for things like ray, loose stitching, or rough areas that could irritate your skin.  Warning signs  Look for any color changes in the foot. Redness with streaks can signal a severe infection, which needs immediate medical attention. Tell your doctor right away if you have any of these problems:  · Swelling, sometimes with color changes, may be a sign of poor blood flow or infection. Symptoms include tenderness and an increase in the size of your foot.  · Warm or hot areas on your feet may be signs of infection. A foot that is cold may not be getting enough blood.  · Sensations such as burning, tingling, or pins and needles can be signs of a problem. Also check for areas that may be numb.  · Hot spots are caused by friction or pressure. Look for hot spots in areas that get a lot of rubbing. Hot spots can turn into blisters, calluses, or sores.  · Cracks and sores are caused by dry or irritated skin. They are a sign that the skin is breaking down, which can lead to infection.  · Toenail problems to watch for include nails growing into the skin (ingrown toenail) and causing redness or pain. Thick, yellow, or discolored nails can signal a fungal infection.  · Drainage and odor can develop from untreated sores and ulcers. Call your doctor right away if you notice white or yellow drainage, bleeding, or unpleasant odor.   © 8862-5014 [employer]. [address] [place] [address]. All rights reserved. This information is not intended as a substitute for professional medical care. Always follow your healthcare professional's instructions.        Step-by-Step:  Inspecting Your Feet (Diabetes)    Date Last Reviewed: 10/1/2016  © 9894-6957 [employer]. [address] [place] [address]. All rights reserved. This information is not intended as a substitute for professional medical care. Always follow your healthcare professional's  instructions.

## 2019-07-01 NOTE — PROGRESS NOTES
"Subjective:      Patient ID: Valencia Dumont is a 49 y.o. female.    Chief Complaint: Diabetic Foot Exam        Valencia Dumont is a 49 y.o. femalewho presents to the clinic for evaluation and treatment of high risk feet. 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  The patient's chief complaint is great toe problem. States that the nails were removed in the past. This eventually healed and nails have returned. She has some lifting from the left nail bed and this is bothering her. She is also afraid of trimming her other nails because of the problem she had with the big toes but does not have any acute problem with the rest of the toes. She relates occasional "ingrowns" especially to the medial aspect of the left hallux nail   This patient has documented high risk feet requiring routine maintenance secondary to diabetes mellitis and those secondary complications of diabetes, as mentioned..    PCP: Timur Lion MD    Date Last Seen by PCP:   Chief Complaint   Patient presents with    Diabetic Foot Exam         Current shoe gear: sandals    Hemoglobin A1C   Date Value Ref Range Status   06/19/2019 8.4 (H) 4.0 - 5.6 % Final     Comment:     ADA Screening Guidelines:  5.7-6.4%  Consistent with prediabetes  >or=6.5%  Consistent with diabetes  High levels of fetal hemoglobin interfere with the HbA1C  assay. Heterozygous hemoglobin variants (HbS, HgC, etc)do  not significantly interfere with this assay.   However, presence of multiple variants may affect accuracy.     04/02/2019 9.7 (H) 4.0 - 5.6 % " Final     Comment:     ADA Screening Guidelines:  5.7-6.4%  Consistent with prediabetes  >or=6.5%  Consistent with diabetes  High levels of fetal hemoglobin interfere with the HbA1C  assay. Heterozygous hemoglobin variants (HbS, HgC, etc)do  not significantly interfere with this assay.   However, presence of multiple variants may affect accuracy.     08/08/2018 10.8 (H) 4.0 - 5.6 % Final     Comment:     ADA Screening Guidelines:  5.7-6.4%  Consistent with prediabetes  >or=6.5%  Consistent with diabetes  High levels of fetal hemoglobin interfere with the HbA1C  assay. Heterozygous hemoglobin variants (HbS, HgC, etc)do  not significantly interfere with this assay.   However, presence of multiple variants may affect accuracy.         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    Hypertension     Kidney stone     Lupus     Osteoporosis 12/2013    Prophylactic immunotherapy (transplant immunosuppression) 1/2/2014    SBP (spontaneous bacterial peritonitis)     history of        Past Surgical History:   Procedure Laterality Date    BIOPSY LIVER AND ULTRASOUND N/A 5/24/2017    Performed by [facility] OR 2ND FLR    COLONOSCOPY N/A 5/31/2017    Performed by Marky Sun MD at [facility] ENDO (4TH FLR)    EGD (ESOPHAGOGASTRODUODENOSCOPY) N/A 1/16/2019    Performed by Deniz Guerra MD at [facility] ENDO (4TH FLR)    ESOPHAGOGASTRODUODENOSCOPY      ESOPHAGOGASTRODUODENOSCOPY (EGD) N/A 12/27/2016    Performed by [facility]iz Gu[facility] at [facility] ENDO (4TH FLR)    LIVER BIOPSY      LIVER TRANSPLANT  12/31/2013    REFRACTIVE SURGERY Bilateral 2010    TRANSPLANT, LIVER N/A 12/31/2013    Performed by Constantino Peña MD at [facility] OR 2ND FLR     TUBAL LIGATION  2003    UPPER GASTROINTESTINAL ENDOSCOPY         Family History   Problem Relation Age of Onset    Hypertension Mother     Cataracts Mother     Diabetes Father     Alzheimer's disease Father     Diabetes Paternal Grandfather     Diabetes Paternal Grandmother     No Known Problems Maternal Grandmother     No Known Problems Maternal Grandfather     Breast cancer Maternal Aunt 55    Hypertension Brother     Diabetes Brother     No Known Problems Sister     No Known Problems Maternal Uncle     No Known Problems Paternal Aunt     No Known Problems Paternal Uncle     Stroke Neg Hx     Cancer Neg Hx     Colon cancer Neg Hx     Esophageal cancer Neg Hx     Stomach cancer Neg Hx     Rectal cancer Neg Hx     Amblyopia Neg Hx     Blindness Neg Hx     Glaucoma Neg Hx     Macular degeneration Neg Hx     Retinal detachment Neg Hx     Strabismus Neg Hx     Thyroid disease Neg Hx        Social History     Socioeconomic History    Marital status:      Spouse name: Not on file    Number of children: 3    Years of education: Not on file    Highest education level: Not on file   Occupational History     Employer: [employer]   Social Needs    Financial resource strain: Not on file    Food insecurity:     Worry: Not on file     Inability: Not on file    Transportation needs:     Medical: Not on file     Non-medical: Not on file   Tobacco Use    Smoking status: Never Smoker    Smokeless tobacco: Never Used    Tobacco comment: disability; ; 3 children   Substance and Sexual Activity    Alcohol use: Yes     Alcohol/week: 0.6 oz     Types: 1 Glasses of wine per week     Comment: occasionally     Drug use: No    Sexual activity: Yes     Partners: Male     Birth control/protection: See Surgical Hx, Post-menopausal     Comment: s/p tubal ligation,  since 1989   Lifestyle    Physical activity:     Days per week: Not on file     Minutes per session: Not on  file    Stress: Not on file   Relationships    Social connections:     Talks on phone: Not on file     Gets together: Not on file     Attends Orthodoxy service: Not on file     Active member of club or organization: Not on file     Attends meetings of clubs or organizations: Not on file     Relationship status: Not on file   Other Topics Concern    Not on file   Social History Narrative    Not on file       Current Outpatient Medications   Medication Sig Dispense Refill    blood sugar diagnostic Strp 1 each by Misc.(Non-Drug; Combo Route) route 4 (four) times daily. 100 each 6    blood-glucose meter kit To check BG 5 times daily, to use with insurance preferred meter 1 each 0    blood-glucose meter,continuous (DEXCOM G6 ) Misc 1 each by Misc.(Non-Drug; Combo Route) route once. for 1 dose 1 each 0    blood-glucose sensor (DEXCOM G5-G4 SENSOR) Karen 1 each by Misc.(Non-Drug; Combo Route) route once a week. 4 Device 11    blood-glucose transmitter (DEXCOM G5 TRANSMITTER) Karen 1 each by Misc.(Non-Drug; Combo Route) route every 3 (three) months. 1 Device 3    blood-glucose transmitter (DEXCOM G6 TRANSMITTER) Karen 1 each by Misc.(Non-Drug; Combo Route) route once a week 1 Device 3    buPROPion (WELLBUTRIN XL) 150 MG TB24 tablet Take 300 mg by mouth.      buPROPion (WELLBUTRIN XL) 150 MG TB24 tablet       cyproheptadine (PERIACTIN) 4 mg tablet Take 4 mg by mouth every evening.  3    diazepam (VALIUM) 5 MG tablet Take 5 mg by mouth 3 (three) times daily as needed.   0    dicyclomine (BENTYL) 10 MG capsule Take 1 capsule (10 mg total) by mouth before meals as needed (TID PRN). 90 capsule 2    DILTIAZEM HCL (DILTIAZEM 2% CREAM) Apply topically 3 (three) times daily. Apply topically to anal area. 30 g 0    diphenoxylate-atropine 2.5-0.025 mg (LOMOTIL) 2.5-0.025 mg per tablet Take 1 tablet by mouth every 6 (six) hours as needed for Diarrhea. 120 tablet 1    dulaglutide (TRULICITY) 0.75 mg/0.5 mL PnIj  Inject 0.5 mLs (0.75 mg total) into the skin once a week. 4 Syringe 6    ergocalciferol (ERGOCALCIFEROL) 50,000 unit Cap Take 1 capsule (50,000 Units total) by mouth every 7 days. 4 capsule 2    estradiol (YUVAFEM) 10 mcg Tab Place 1 tablet (10 mcg total) vaginally twice a week. 8 tablet 11    gabapentin (NEURONTIN) 300 MG capsule TAKE 1 CAPSULE BY MOUTH THREE TIMES DAILY      gabapentin (NEURONTIN) 300 MG capsule TAKE 1 CAPSULE BY MOUTH THREE TIMES DAILY FOR NERVE PAIN.  3    hydroxychloroquine (PLAQUENIL) 200 mg tablet Take 200 mg by mouth 2 (two) times daily.      hydrOXYzine HCl (ATARAX) 10 MG Tab TAKE 1 OR 2 TABLETS BY MOUTH THREE TIMES DAILY AS NEEDED FOR ITCHING.  0    insulin aspart U-100 (NOVOLOG) 100 unit/mL InPn pen Blood Glucose mg/dL       <60            - Follow interventions for hypoglycemia below      151-200     -    4 units  201-250     -    6 units   251-300     -    9 units  301-350     -    12 units  351-400     -    15 units  >400          -    18 units 3 mL 6    insulin detemir U-100 (LEVEMIR FLEXTOUCH U-100 INSULN) 100 unit/mL (3 mL) SubQ InPn pen Inject 4 Units into the skin 2 (two) times daily. 45 mL 3    isosorbide mononitrate (IMDUR) 30 MG 24 hr tablet Take 30 mg by mouth once daily.      Lactobac. rhamnosus GG-inulin 10 billion cell -200 mg Cap Take 1 capsule by mouth 2 (two) times daily. 30 capsule 0    losartan (COZAAR) 100 MG tablet Take 1 tablet (100 mg total) by mouth once daily. 30 tablet 2    magnesium oxide 500 mg Tab Take 500 mg by mouth 2 (two) times daily. 60 each 6    meclizine (ANTIVERT) 25 mg tablet TAKE ONE TABLET BY MOUTH AT BEDTIME AS NEEDED for dizziness.  0    meloxicam (MOBIC) 15 MG tablet       MULTIVIT,THER IRON,CA,FA & MIN (MULTIVITAMIN) Tab Take 1 tablet by mouth once daily. 30 tablet 0    mycophenolate (MYFORTIC) 180 MG TbEC Take 2 tablets (360 mg total) by mouth 2 (two) times daily. 120 tablet 5    neomycin-polymyxin-hydrocortisone (CORTISPORIN)  otic solution INSTILL TWO DROPS INTO BOTH EARS FOUR TIMES DAILY FOR 10 DAYS  0    nystatin (MYCOSTATIN) 100,000 unit/mL suspension SWISH AND SPIT FIVE MILLILITERS BY MOUTH FOUR TIMES DAILY FOR 7 DAYS.  1    nystatin (MYCOSTATIN) cream Apply topically 2 (two) times daily. 30 g 3    nystatin (MYCOSTATIN) cream Apply topically 2 times daily 30 g 0    nystatin-triamcinolone (MYCOLOG II) cream Apply to affected area 2 times daily 30 g 1    oxycodone-acetaminophen (PERCOCET) 7.5-325 mg per tablet Take 1 tablet by mouth every 4 (four) hours as needed for Pain.      pantoprazole (PROTONIX) 40 MG tablet Take 1 tablet (40 mg total) by mouth once daily. 30 tablet 5    polyethylene glycol (GLYCOLAX) 17 gram/dose powder Mix 1 capful (17 g) with liquid and by mouth 2 (two) times daily. 1530 g 11    PROAIR HFA 90 mcg/actuation inhaler Inhale 2 puffs into the lungs 3 (three) times daily as needed.   3    promethazine-dextromethorphan (PROMETHAZINE-DM) 6.25-15 mg/5 mL Syrp Take by mouth 2 (two) times daily as needed.   0    ranitidine (ZANTAC) 300 MG tablet Take 300 mg by mouth 2 (two) times daily.       rosuvastatin (CRESTOR) 5 MG tablet Take 5 mg by mouth once daily.      tacrolimus (PROGRAF) 1 MG Cap Take 3 capsules (3 mg total) by mouth every 12 (twelve) hours. 180 capsule 11    traZODone (DESYREL) 50 MG tablet Take 50 mg by mouth nightly.  0    triamcinolone acetonide 0.1% (KENALOG) 0.1 % cream Apply topically 2 times daily 30 g 0    triamcinolone acetonide 0.5% (KENALOG) 0.5 % Crea APPLY TO AFFECTED AREA TOPICALLY THREE TIMES DAILY.  1    valacyclovir (VALTREX) 500 MG tablet once daily as needed  0    valACYclovir (VALTREX) 500 MG tablet TAKE ONE TABLET BY MOUTH TWICE DAILY FOR 3 DAYS. 6 tablet 2    verapamil (CALAN-SR) 120 MG CR tablet TAKE ONE TABLET ONCE DAILY FOR BLOOD PRESSURE  3    zolpidem (AMBIEN) 10 mg Tab Take 10 mg by mouth nightly as needed.  3    hydrALAZINE (APRESOLINE) 50 MG tablet Take 1  "tablet (50 mg total) by mouth every 8 (eight) hours. for 3 days 12 tablet 0     No current facility-administered medications for this visit.        Review of patient's allergies indicates:   Allergen Reactions    Norvasc [amlodipine]      Severe headache    Doxycycline Rash    Pcn [penicillins] Rash         Review of Systems   Constitution: Negative for chills, fever, malaise/fatigue and night sweats.   Cardiovascular: Negative for chest pain, leg swelling, orthopnea and palpitations.   Respiratory: Negative for cough, shortness of breath and wheezing.    Skin: Positive for color change and nail changes. Negative for itching, poor wound healing and rash.   Musculoskeletal: Negative for arthritis, gout, joint pain, joint swelling, muscle weakness and myalgias.   Gastrointestinal: Negative for abdominal pain, constipation and nausea.   Neurological: Positive for numbness. Negative for disturbances in coordination, dizziness, focal weakness, tremors and weakness.           Objective:       Vitals:    07/01/19 1555   BP: 132/84   Weight: 64.9 kg (143 lb)   Height: 5' 9" (1.753 m)   PainSc: 0-No pain       Physical Exam   Constitutional: She is oriented to person, place, and time. Vital signs are normal. She appears well-developed and well-nourished. She is cooperative.  Non-toxic appearance. She does not have a sickly appearance. No distress.   alert and oriented x 3.    Cardiovascular: Intact distal pulses.   Pulses:       Dorsalis pedis pulses are 2+ on the right side, and 2+ on the left side.        Posterior tibial pulses are 2+ on the right side, and 2+ on the left side.   No edema noted b/l LEs. No varicosities present b/l LEs.    Pulmonary/Chest: No respiratory distress.   Musculoskeletal: She exhibits no deformity.        Right ankle: Normal. No tenderness. No lateral malleolus, no medial malleolus, no AITFL, no CF ligament and no posterior TFL tenderness found. Achilles tendon exhibits no pain, no defect and " normal Castle's test results.        Left ankle: Normal. No tenderness. No lateral malleolus, no medial malleolus, no AITFL, no CF ligament and no posterior TFL tenderness found. Achilles tendon exhibits no pain, no defect and normal Castle's test results.        Right foot: There is normal range of motion, no tenderness, no bony tenderness, normal capillary refill, no crepitus and no deformity.        Left foot: There is tenderness (medial nail border of hallux). There is normal range of motion, no bony tenderness, normal capillary refill, no crepitus and no deformity.   Decreased first MPJ range of motion both weightbearing and nonweightbearing, no crepitus observed the first MP joint, + dorsal flag sign. Mild  bunion deformity is observed .    Patient has hammertoes of digits 2-5 bilateral partially reducible     Negative anterior drawer at the ankle bilat.  Negative Lachman test at the 2nd MTPJ.  Able to perform sign and double heel rise without pain.       Feet:   Right Foot:   Protective Sensation: 5 sites tested. 5 sites sensed.   Left Foot:   Protective Sensation: 5 sites tested. 5 sites sensed.   Lymphadenopathy:   No lymphatic streaking     Neurological: She is alert and oriented to person, place, and time. She has normal strength. She displays no atrophy. A sensory deficit is present. She exhibits normal muscle tone. Gait normal.   Reflex Scores:       Achilles reflexes are 2+ on the right side and 2+ on the left side.  Negative Tinels sign and Charles's click, bilat. Protective sensation intact b/l foot. Vibratory sensation intact b/l. Subjective numbness to toes 4-5 both feet (occasionally).   Skin: Skin is warm, dry and intact. No ecchymosis and no rash noted. She is not diaphoretic. No cyanosis or erythema. No pallor. Nails show no clubbing.   B/l hallux nail beds with signs of hyperkeratotic changes to the nail bed. No open wound. No drainage. Stable and dry. medial hallux nail margin of left foot  with ingrown nail plate. No erythema or edema is noted. No granuloma formation noted. No malodor     Toenails are thickened by 2-3 mm, discolored/yellowed, dystrophic, brittle with subungual debris.    Psychiatric: Her mood appears not anxious. Her affect is not inappropriate. Her speech is not slurred. She is not combative. She is communicative. She is attentive.   Nursing note and vitals reviewed.          Assessment:       Encounter Diagnoses   Name Primary?    Type II diabetes mellitus with neurological manifestations Yes    Comprehensive diabetic foot examination, type 2 DM, encounter for     Great toe pain, left     Ingrown toenail without infection - Left Foot     Pain around toenail, left foot     Hammer toes of both feet     Hallux limitus, acquired, right     Hallux limitus, acquired, left     Onychomycosis due to dermatophyte          Plan:       Valencia was seen today for diabetic foot exam.    Diagnoses and all orders for this visit:    Type II diabetes mellitus with neurological manifestations  -     DIABETIC SHOES FOR HOME USE    Comprehensive diabetic foot examination, type 2 DM, encounter for    Great toe pain, left    Ingrown toenail without infection - Left Foot    Pain around toenail, left foot  -     DIABETIC SHOES FOR HOME USE    Hammer toes of both feet  -     DIABETIC SHOES FOR HOME USE    Hallux limitus, acquired, right  -     DIABETIC SHOES FOR HOME USE    Hallux limitus, acquired, left  -     DIABETIC SHOES FOR HOME USE    Onychomycosis due to dermatophyte      I counseled the patient on her conditions, their implications and medical management.    Greater than 50% of this visit spent on counseling and coordination of care.    No open wounds noted to b/l LEs and toes. Long discussion with patient regarding expectations of nail growth on both great toes. Advised her that this may take up to 1 year for full growth of nail. Also advised her of the build up of hyperkeratosis to the  nail bed in the absence of the nail during this time. Also advised her of the risk of fungal infection to toenail once it has grown out fully.     Discussed application of Kevin's vaporub on affected toenails for up to 1 year or until nail has grown out for possible prevention of fungal infection however this was not guaranteed.     Rx medicated foot soaks/[facility]ungal topicals [facility] to be delivered to patient home.  Soaks not to exceed 10 minutes, patient is to dry thoroughly between toes      Reassured her that her nail beds appear stable and fully healed without any signs of infection. She was advised to avoid tight fitting shoes and those with stiff materials at all times. Continue open toe sandals if this feels more comfortable. Rx diabetic shoes for protection and support    In depth conversation on the treatment of ingrown nail; partial nail avulsion vs chemical matrixectomy vs conservative treatment of soaking and nail trimming    Informed patient that many nail problems can be prevented by wearing the right shoes and trimming your nails properly.   The right shoes: Feet were measured.  Patient is to wear shoes that are supportive and roomy enough for toes to wiggle. Look for shoes made of natural materials such as leather, which allow  feet to breathe.   Proper trimming: To avoid problems, she was instructed to trim toenails straight across without cutting down into the corners.     No further intervention at this time. I advised patient that routine trimming of nails will not be covered service per insurance and he/she will fall under Proc B if this service is desired. Patient verbalized understanding of this. She will RTC as needed for Proc B or phenol and alcohol procedure or any other pedal complaint otherwise follow up 6-9 months for routine DM Foot exam.

## 2019-07-02 ENCOUNTER — OFFICE VISIT (OUTPATIENT)
Dept: RHEUMATOLOGY | Facility: CLINIC | Age: 49
End: 2019-07-02
Payer: MEDICARE

## 2019-07-02 ENCOUNTER — PATIENT MESSAGE (OUTPATIENT)
Dept: NEPHROLOGY | Facility: CLINIC | Age: 49
End: 2019-07-02

## 2019-07-02 VITALS
SYSTOLIC BLOOD PRESSURE: 142 MMHG | WEIGHT: 148.38 LBS | DIASTOLIC BLOOD PRESSURE: 83 MMHG | BODY MASS INDEX: 21.98 KG/M2 | HEIGHT: 69 IN | HEART RATE: 103 BPM

## 2019-07-02 DIAGNOSIS — Z79.4 TYPE 2 DIABETES MELLITUS WITH STAGE 3 CHRONIC KIDNEY DISEASE, WITH LONG-TERM CURRENT USE OF INSULIN: ICD-10-CM

## 2019-07-02 DIAGNOSIS — N18.30 TYPE 2 DIABETES MELLITUS WITH STAGE 3 CHRONIC KIDNEY DISEASE, WITH LONG-TERM CURRENT USE OF INSULIN: ICD-10-CM

## 2019-07-02 DIAGNOSIS — M81.0 OSTEOPOROSIS, UNSPECIFIED OSTEOPOROSIS TYPE, UNSPECIFIED PATHOLOGICAL FRACTURE PRESENCE: ICD-10-CM

## 2019-07-02 DIAGNOSIS — D84.9 IMMUNOSUPPRESSION: ICD-10-CM

## 2019-07-02 DIAGNOSIS — E11.22 TYPE 2 DIABETES MELLITUS WITH STAGE 3 CHRONIC KIDNEY DISEASE, WITH LONG-TERM CURRENT USE OF INSULIN: ICD-10-CM

## 2019-07-02 DIAGNOSIS — E55.9 VITAMIN D DEFICIENCY: ICD-10-CM

## 2019-07-02 DIAGNOSIS — R53.83 FATIGUE, UNSPECIFIED TYPE: ICD-10-CM

## 2019-07-02 DIAGNOSIS — M32.9 SYSTEMIC LUPUS ERYTHEMATOSUS, UNSPECIFIED SLE TYPE, UNSPECIFIED ORGAN INVOLVEMENT STATUS: Primary | Chronic | ICD-10-CM

## 2019-07-02 PROCEDURE — 3077F SYST BP >= 140 MM HG: CPT | Mod: CPTII,S$GLB,, | Performed by: INTERNAL MEDICINE

## 2019-07-02 PROCEDURE — 3045F PR MOST RECENT HEMOGLOBIN A1C LEVEL 7.0-9.0%: CPT | Mod: CPTII,S$GLB,, | Performed by: INTERNAL MEDICINE

## 2019-07-02 PROCEDURE — 3008F PR BODY MASS INDEX (BMI) DOCUMENTED: ICD-10-PCS | Mod: CPTII,S$GLB,, | Performed by: INTERNAL MEDICINE

## 2019-07-02 PROCEDURE — 3077F PR MOST RECENT SYSTOLIC BLOOD PRESSURE >= 140 MM HG: ICD-10-PCS | Mod: CPTII,S$GLB,, | Performed by: INTERNAL MEDICINE

## 2019-07-02 PROCEDURE — 3079F DIAST BP 80-89 MM HG: CPT | Mod: CPTII,S$GLB,, | Performed by: INTERNAL MEDICINE

## 2019-07-02 PROCEDURE — 99999 PR PBB SHADOW E&M-EST. PATIENT-LVL III: CPT | Mod: PBBFAC,,, | Performed by: INTERNAL MEDICINE

## 2019-07-02 PROCEDURE — 3008F BODY MASS INDEX DOCD: CPT | Mod: CPTII,S$GLB,, | Performed by: INTERNAL MEDICINE

## 2019-07-02 PROCEDURE — 99999 PR PBB SHADOW E&M-EST. PATIENT-LVL III: ICD-10-PCS | Mod: PBBFAC,,, | Performed by: INTERNAL MEDICINE

## 2019-07-02 PROCEDURE — 3045F PR MOST RECENT HEMOGLOBIN A1C LEVEL 7.0-9.0%: ICD-10-PCS | Mod: CPTII,S$GLB,, | Performed by: INTERNAL MEDICINE

## 2019-07-02 PROCEDURE — 99215 OFFICE O/P EST HI 40 MIN: CPT | Mod: S$GLB,,, | Performed by: INTERNAL MEDICINE

## 2019-07-02 PROCEDURE — 99215 PR OFFICE/OUTPT VISIT, EST, LEVL V, 40-54 MIN: ICD-10-PCS | Mod: S$GLB,,, | Performed by: INTERNAL MEDICINE

## 2019-07-02 PROCEDURE — 3079F PR MOST RECENT DIASTOLIC BLOOD PRESSURE 80-89 MM HG: ICD-10-PCS | Mod: CPTII,S$GLB,, | Performed by: INTERNAL MEDICINE

## 2019-07-02 ASSESSMENT — ROUTINE ASSESSMENT OF PATIENT INDEX DATA (RAPID3)
FATIGUE SCORE: 3
MDHAQ FUNCTION SCORE: .9
PAIN SCORE: 6
PSYCHOLOGICAL DISTRESS SCORE: 3.3
WHEN YOU AWAKENED IN THE MORNING OVER THE LAST WEEK, PLEASE INDICATE THE AMOUNT OF TIME IT TAKES UNTIL YOU ARE AS LIMBER AS YOU WILL BE FOR THE DAY: 4 HOURS
TOTAL RAPID3 SCORE: 4.67
AM STIFFNESS SCORE: 1, YES
PATIENT GLOBAL ASSESSMENT SCORE: 5

## 2019-07-02 NOTE — PROGRESS NOTES
Rapid3 Question Responses and Scores 6/28/2019   MDHAQ Score 0.9   Psychologic Score 3.3   Pain Score 6   When you awakened in the morning OVER THE LAST WEEK, did you feel stiff? Yes   If Yes, please indicate the number of hours until you are as limber as you will be for the day 4   Fatigue Score 3   Global Health Score 5   RAPID3 Score 4.66       Answers for HPI/ROS submitted by the patient on 6/28/2019   fever: No  eye redness: Yes  headaches: Yes  shortness of breath: Yes  chest pain: No  trouble swallowing: Yes  diarrhea: No  constipation: No  unexpected weight change: No  genital sore: No  dysuria: No  During the last 3 days, have you had a skin rash?: No  Bruises or bleeds easily: Yes  cough: Yes
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
## 2019-07-03 ENCOUNTER — HOSPITAL ENCOUNTER (OUTPATIENT)
Dept: RADIOLOGY | Facility: HOSPITAL | Age: 49
Discharge: HOME | End: 2019-07-03
Attending: INTERNAL MEDICINE
Payer: MEDICARE

## 2019-07-03 DIAGNOSIS — Z78.0 ASYMPTOMATIC POSTMENOPAUSAL ESTROGEN DEFICIENCY: ICD-10-CM

## 2019-07-03 DIAGNOSIS — M85.9 LOW BONE DENSITY: ICD-10-CM

## 2019-07-03 PROCEDURE — 77080 DXA BONE DENSITY AXIAL: CPT | Mod: TC

## 2019-07-03 PROCEDURE — 77080 DXA BONE DENSITY AXIAL: CPT | Mod: 26,,, | Performed by: RADIOLOGY

## 2019-07-03 PROCEDURE — 77080 DEXA BONE DENSITY SPINE HIP: ICD-10-PCS | Mod: 26,,, | Performed by: RADIOLOGY

## 2019-07-04 ENCOUNTER — PATIENT MESSAGE (OUTPATIENT)
Dept: ENDOCRINOLOGY | Facility: CLINIC | Age: 49
End: 2019-07-04

## 2019-07-23 ENCOUNTER — PATIENT MESSAGE (OUTPATIENT)
Dept: TRANSPLANT | Facility: CLINIC | Age: 49
End: 2019-07-23

## 2019-07-23 ENCOUNTER — INFUSION (OUTPATIENT)
Dept: INFUSION THERAPY | Facility: HOSPITAL | Age: 49
End: 2019-07-23
Attending: INTERNAL MEDICINE
Payer: MEDICARE

## 2019-07-23 VITALS
OXYGEN SATURATION: 99 % | WEIGHT: 148.38 LBS | DIASTOLIC BLOOD PRESSURE: 78 MMHG | BODY MASS INDEX: 21.91 KG/M2 | TEMPERATURE: 98 F | RESPIRATION RATE: 18 BRPM | HEART RATE: 82 BPM | SYSTOLIC BLOOD PRESSURE: 155 MMHG

## 2019-07-23 DIAGNOSIS — M32.9 SYSTEMIC LUPUS ERYTHEMATOSUS, UNSPECIFIED SLE TYPE, UNSPECIFIED ORGAN INVOLVEMENT STATUS: Primary | ICD-10-CM

## 2019-07-23 PROCEDURE — 63600175 PHARM REV CODE 636 W HCPCS: Performed by: INTERNAL MEDICINE

## 2019-07-23 PROCEDURE — 96367 TX/PROPH/DG ADDL SEQ IV INF: CPT

## 2019-07-23 PROCEDURE — 96413 CHEMO IV INFUSION 1 HR: CPT

## 2019-07-23 PROCEDURE — 25000003 PHARM REV CODE 250: Performed by: INTERNAL MEDICINE

## 2019-07-23 RX ORDER — SODIUM CHLORIDE 0.9 % (FLUSH) 0.9 %
10 SYRINGE (ML) INJECTION
Status: CANCELLED | OUTPATIENT
Start: 2019-07-30

## 2019-07-23 RX ORDER — ACETAMINOPHEN 325 MG/1
650 TABLET ORAL
Status: CANCELLED | OUTPATIENT
Start: 2019-07-30

## 2019-07-23 RX ORDER — ACETAMINOPHEN 325 MG/1
650 TABLET ORAL
Status: COMPLETED | OUTPATIENT
Start: 2019-07-23 | End: 2019-07-23

## 2019-07-23 RX ORDER — HEPARIN 100 UNIT/ML
500 SYRINGE INTRAVENOUS
Status: CANCELLED | OUTPATIENT
Start: 2019-07-30

## 2019-07-23 RX ADMIN — BELIMUMAB 673 MG: 400 INJECTION, POWDER, LYOPHILIZED, FOR SOLUTION INTRAVENOUS at 11:07

## 2019-07-23 RX ADMIN — ACETAMINOPHEN 650 MG: 325 TABLET ORAL at 11:07

## 2019-07-23 RX ADMIN — DIPHENHYDRAMINE HYDROCHLORIDE 25 MG: 50 INJECTION INTRAMUSCULAR; INTRAVENOUS at 11:07

## 2019-07-23 NOTE — PLAN OF CARE
Problem: Adult Inpatient Plan of Care  Goal: Plan of Care Review  Outcome: Ongoing (interventions implemented as appropriate)  Arrived to unit. Pt states she had a stomach virus and has diarrhea for 2 weeks. Resolved now. No complaints voiced. VSS. Tolerated premeds and Benlysta. No reactions noted. Pt has next appt. Pt ambulated off unit, no distress noted.

## 2019-07-24 ENCOUNTER — PATIENT MESSAGE (OUTPATIENT)
Dept: RHEUMATOLOGY | Facility: CLINIC | Age: 49
End: 2019-07-24

## 2019-07-25 ENCOUNTER — TELEPHONE (OUTPATIENT)
Dept: TRANSPLANT | Facility: CLINIC | Age: 49
End: 2019-07-25

## 2019-07-25 ENCOUNTER — TELEPHONE (OUTPATIENT)
Dept: RHEUMATOLOGY | Facility: CLINIC | Age: 49
End: 2019-07-25

## 2019-07-25 DIAGNOSIS — M32.9 SYSTEMIC LUPUS ERYTHEMATOSUS, UNSPECIFIED SLE TYPE, UNSPECIFIED ORGAN INVOLVEMENT STATUS: Primary | ICD-10-CM

## 2019-07-25 NOTE — TELEPHONE ENCOUNTER
----- Message from Miriam Abernathy MD sent at 7/24/2019  3:45 PM CDT -----  Reviewed, nothing to do; repeat per routine

## 2019-07-30 ENCOUNTER — LAB VISIT (OUTPATIENT)
Dept: LAB | Facility: HOSPITAL | Age: 49
End: 2019-07-30
Attending: INTERNAL MEDICINE
Payer: MEDICARE

## 2019-07-30 DIAGNOSIS — M32.9 SYSTEMIC LUPUS ERYTHEMATOSUS, UNSPECIFIED SLE TYPE, UNSPECIFIED ORGAN INVOLVEMENT STATUS: ICD-10-CM

## 2019-07-30 PROCEDURE — 36415 COLL VENOUS BLD VENIPUNCTURE: CPT

## 2019-07-30 PROCEDURE — 86225 DNA ANTIBODY NATIVE: CPT

## 2019-08-01 LAB — DSDNA AB SER-ACNC: NORMAL [IU]/ML

## 2019-08-06 ENCOUNTER — PATIENT MESSAGE (OUTPATIENT)
Dept: ENDOCRINOLOGY | Facility: CLINIC | Age: 49
End: 2019-08-06

## 2019-08-18 ENCOUNTER — PATIENT MESSAGE (OUTPATIENT)
Dept: ENDOCRINOLOGY | Facility: CLINIC | Age: 49
End: 2019-08-18

## 2019-08-19 ENCOUNTER — LAB VISIT (OUTPATIENT)
Dept: LAB | Facility: HOSPITAL | Age: 49
End: 2019-08-19
Attending: INTERNAL MEDICINE
Payer: MEDICARE

## 2019-08-19 DIAGNOSIS — E78.5 HYPERLIPEMIA: Primary | ICD-10-CM

## 2019-08-19 DIAGNOSIS — R00.2 PALPITATIONS: ICD-10-CM

## 2019-08-19 LAB
CHOLEST SERPL-MCNC: 180 MG/DL (ref 120–199)
CHOLEST/HDLC SERPL: 1.8 {RATIO} (ref 2–5)
HDLC SERPL-MCNC: 101 MG/DL (ref 40–75)
HDLC SERPL: 56.1 % (ref 20–50)
LDLC SERPL CALC-MCNC: 70.4 MG/DL (ref 63–159)
NONHDLC SERPL-MCNC: 79 MG/DL
T4 FREE SERPL-MCNC: 1 NG/DL (ref 0.71–1.51)
TRIGL SERPL-MCNC: 43 MG/DL (ref 30–150)
TSH SERPL DL<=0.005 MIU/L-ACNC: 0.94 UIU/ML (ref 0.4–4)

## 2019-08-19 PROCEDURE — 80061 LIPID PANEL: CPT

## 2019-08-19 PROCEDURE — 36415 COLL VENOUS BLD VENIPUNCTURE: CPT

## 2019-08-19 PROCEDURE — 84439 ASSAY OF FREE THYROXINE: CPT

## 2019-08-19 PROCEDURE — 84443 ASSAY THYROID STIM HORMONE: CPT

## 2019-08-20 ENCOUNTER — INFUSION (OUTPATIENT)
Dept: INFUSION THERAPY | Facility: HOSPITAL | Age: 49
End: 2019-08-20
Attending: INTERNAL MEDICINE
Payer: MEDICARE

## 2019-08-20 VITALS
RESPIRATION RATE: 18 BRPM | WEIGHT: 148.38 LBS | SYSTOLIC BLOOD PRESSURE: 144 MMHG | TEMPERATURE: 98 F | BODY MASS INDEX: 21.91 KG/M2 | OXYGEN SATURATION: 98 % | DIASTOLIC BLOOD PRESSURE: 73 MMHG | HEART RATE: 79 BPM

## 2019-08-20 DIAGNOSIS — M32.9 SYSTEMIC LUPUS ERYTHEMATOSUS, UNSPECIFIED SLE TYPE, UNSPECIFIED ORGAN INVOLVEMENT STATUS: Primary | ICD-10-CM

## 2019-08-20 LAB
ERYTHROCYTE [DISTWIDTH] IN BLOOD BY AUTOMATED COUNT: 15.6 % (ref 11.5–14.5)
HCT VFR BLD AUTO: 33.2 % (ref 37–48.5)
HGB BLD-MCNC: 11.3 G/DL (ref 12–16)
MCH RBC QN AUTO: 26.3 PG (ref 27–31)
MCHC RBC AUTO-ENTMCNC: 34 G/DL (ref 32–36)
MCV RBC AUTO: 77 FL (ref 82–98)
NEUTROPHILS # BLD AUTO: 3.6 K/UL (ref 1.8–7.7)
PLATELET # BLD AUTO: 323 K/UL (ref 150–350)
PMV BLD AUTO: ABNORMAL FL (ref 9.2–12.9)
RBC # BLD AUTO: 4.29 M/UL (ref 4–5.4)
WBC # BLD AUTO: 5.22 K/UL (ref 3.9–12.7)

## 2019-08-20 PROCEDURE — 36415 COLL VENOUS BLD VENIPUNCTURE: CPT

## 2019-08-20 PROCEDURE — 63600175 PHARM REV CODE 636 W HCPCS: Performed by: INTERNAL MEDICINE

## 2019-08-20 PROCEDURE — 85027 COMPLETE CBC AUTOMATED: CPT

## 2019-08-20 PROCEDURE — 96367 TX/PROPH/DG ADDL SEQ IV INF: CPT

## 2019-08-20 PROCEDURE — 25000003 PHARM REV CODE 250: Performed by: INTERNAL MEDICINE

## 2019-08-20 PROCEDURE — 96413 CHEMO IV INFUSION 1 HR: CPT

## 2019-08-20 RX ORDER — ACETAMINOPHEN 325 MG/1
650 TABLET ORAL
Status: COMPLETED | OUTPATIENT
Start: 2019-08-20 | End: 2019-08-20

## 2019-08-20 RX ORDER — SODIUM CHLORIDE 0.9 % (FLUSH) 0.9 %
10 SYRINGE (ML) INJECTION
Status: CANCELLED | OUTPATIENT
Start: 2019-08-27

## 2019-08-20 RX ORDER — HEPARIN 100 UNIT/ML
500 SYRINGE INTRAVENOUS
Status: CANCELLED | OUTPATIENT
Start: 2019-08-27

## 2019-08-20 RX ORDER — ACETAMINOPHEN 325 MG/1
650 TABLET ORAL
Status: CANCELLED | OUTPATIENT
Start: 2019-08-27

## 2019-08-20 RX ADMIN — DIPHENHYDRAMINE HYDROCHLORIDE 25 MG: 50 INJECTION, SOLUTION INTRAMUSCULAR; INTRAVENOUS at 12:08

## 2019-08-20 RX ADMIN — ACETAMINOPHEN 650 MG: 325 TABLET ORAL at 12:08

## 2019-08-20 RX ADMIN — BELIMUMAB 673 MG: 400 INJECTION, POWDER, LYOPHILIZED, FOR SOLUTION INTRAVENOUS at 12:08

## 2019-08-20 NOTE — PLAN OF CARE
Problem: Adult Inpatient Plan of Care  Goal: Patient-Specific Goal (Individualization)  Outcome: Ongoing (interventions implemented as appropriate)  Pt tolerated benlysta infusion without issue. VSS. Pt d.c home in stable condition with instructions to return 9/18/19. In interim, pt knows to call clinic with any issues.

## 2019-08-29 RX ORDER — PANTOPRAZOLE SODIUM 40 MG/1
40 TABLET, DELAYED RELEASE ORAL DAILY
Qty: 30 TABLET | Refills: 5 | Status: SHIPPED | OUTPATIENT
Start: 2019-08-29 | End: 2020-03-09

## 2019-09-06 ENCOUNTER — PATIENT MESSAGE (OUTPATIENT)
Dept: TRANSPLANT | Facility: CLINIC | Age: 49
End: 2019-09-06

## 2019-09-09 ENCOUNTER — LAB VISIT (OUTPATIENT)
Dept: LAB | Facility: HOSPITAL | Age: 49
End: 2019-09-09
Attending: INTERNAL MEDICINE
Payer: MEDICARE

## 2019-09-09 DIAGNOSIS — N18.30 TYPE 2 DIABETES MELLITUS WITH STAGE 3 CHRONIC KIDNEY DISEASE, WITH LONG-TERM CURRENT USE OF INSULIN: ICD-10-CM

## 2019-09-09 DIAGNOSIS — E11.22 TYPE 2 DIABETES MELLITUS WITH STAGE 3 CHRONIC KIDNEY DISEASE, WITH LONG-TERM CURRENT USE OF INSULIN: ICD-10-CM

## 2019-09-09 DIAGNOSIS — N18.31 CKD STAGE G3A/A1, GFR 45-59 AND ALBUMIN CREATININE RATIO <30 MG/G: ICD-10-CM

## 2019-09-09 DIAGNOSIS — Z79.4 TYPE 2 DIABETES MELLITUS WITH STAGE 3 CHRONIC KIDNEY DISEASE, WITH LONG-TERM CURRENT USE OF INSULIN: ICD-10-CM

## 2019-09-09 PROCEDURE — 83036 HEMOGLOBIN GLYCOSYLATED A1C: CPT

## 2019-09-09 PROCEDURE — 36415 COLL VENOUS BLD VENIPUNCTURE: CPT
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
## 2019-09-10 ENCOUNTER — PATIENT MESSAGE (OUTPATIENT)
Dept: ENDOCRINOLOGY | Facility: CLINIC | Age: 49
End: 2019-09-10

## 2019-09-10 ENCOUNTER — OFFICE VISIT (OUTPATIENT)
Dept: ENDOCRINOLOGY | Facility: CLINIC | Age: 49
End: 2019-09-10
Payer: MEDICARE

## 2019-09-10 VITALS
BODY MASS INDEX: 21.6 KG/M2 | WEIGHT: 145.81 LBS | HEART RATE: 74 BPM | SYSTOLIC BLOOD PRESSURE: 150 MMHG | DIASTOLIC BLOOD PRESSURE: 64 MMHG | HEIGHT: 69 IN

## 2019-09-10 DIAGNOSIS — Z79.4 TYPE 2 DIABETES MELLITUS WITH HYPERGLYCEMIA, WITH LONG-TERM CURRENT USE OF INSULIN: ICD-10-CM

## 2019-09-10 DIAGNOSIS — T38.0X5S ADVERSE EFFECT OF CORTICOSTEROIDS, SEQUELA: ICD-10-CM

## 2019-09-10 DIAGNOSIS — N18.30 CKD (CHRONIC KIDNEY DISEASE), STAGE III: ICD-10-CM

## 2019-09-10 DIAGNOSIS — E55.9 VITAMIN D DEFICIENCY: ICD-10-CM

## 2019-09-10 DIAGNOSIS — E11.22 TYPE 2 DIABETES MELLITUS WITH STAGE 3 CHRONIC KIDNEY DISEASE, WITH LONG-TERM CURRENT USE OF INSULIN: ICD-10-CM

## 2019-09-10 DIAGNOSIS — E11.65 TYPE 2 DIABETES MELLITUS WITH HYPERGLYCEMIA, WITH LONG-TERM CURRENT USE OF INSULIN: ICD-10-CM

## 2019-09-10 DIAGNOSIS — M81.0 OSTEOPOROSIS, UNSPECIFIED OSTEOPOROSIS TYPE, UNSPECIFIED PATHOLOGICAL FRACTURE PRESENCE: ICD-10-CM

## 2019-09-10 DIAGNOSIS — N18.30 TYPE 2 DIABETES MELLITUS WITH STAGE 3 CHRONIC KIDNEY DISEASE, WITH LONG-TERM CURRENT USE OF INSULIN: ICD-10-CM

## 2019-09-10 DIAGNOSIS — Z79.4 TYPE 2 DIABETES MELLITUS WITH STAGE 3 CHRONIC KIDNEY DISEASE, WITH LONG-TERM CURRENT USE OF INSULIN: ICD-10-CM

## 2019-09-10 DIAGNOSIS — Z94.4 S/P LIVER TRANSPLANT: Primary | ICD-10-CM

## 2019-09-10 LAB
ESTIMATED AVG GLUCOSE: 183 MG/DL (ref 68–131)
HBA1C MFR BLD HPLC: 8 % (ref 4–5.6)

## 2019-09-10 PROCEDURE — 3077F PR MOST RECENT SYSTOLIC BLOOD PRESSURE >= 140 MM HG: ICD-10-PCS | Mod: CPTII,S$GLB,, | Performed by: INTERNAL MEDICINE

## 2019-09-10 PROCEDURE — 3077F SYST BP >= 140 MM HG: CPT | Mod: CPTII,S$GLB,, | Performed by: INTERNAL MEDICINE

## 2019-09-10 PROCEDURE — 99214 PR OFFICE/OUTPT VISIT, EST, LEVL IV, 30-39 MIN: ICD-10-PCS | Mod: S$GLB,,, | Performed by: INTERNAL MEDICINE

## 2019-09-10 PROCEDURE — 3045F PR MOST RECENT HEMOGLOBIN A1C LEVEL 7.0-9.0%: CPT | Mod: CPTII,S$GLB,, | Performed by: INTERNAL MEDICINE

## 2019-09-10 PROCEDURE — 3008F BODY MASS INDEX DOCD: CPT | Mod: CPTII,S$GLB,, | Performed by: INTERNAL MEDICINE

## 2019-09-10 PROCEDURE — 99214 OFFICE O/P EST MOD 30 MIN: CPT | Mod: S$GLB,,, | Performed by: INTERNAL MEDICINE

## 2019-09-10 PROCEDURE — 99999 PR PBB SHADOW E&M-EST. PATIENT-LVL IV: CPT | Mod: PBBFAC,,, | Performed by: INTERNAL MEDICINE

## 2019-09-10 PROCEDURE — 3045F PR MOST RECENT HEMOGLOBIN A1C LEVEL 7.0-9.0%: ICD-10-PCS | Mod: CPTII,S$GLB,, | Performed by: INTERNAL MEDICINE

## 2019-09-10 PROCEDURE — 3078F PR MOST RECENT DIASTOLIC BLOOD PRESSURE < 80 MM HG: ICD-10-PCS | Mod: CPTII,S$GLB,, | Performed by: INTERNAL MEDICINE

## 2019-09-10 PROCEDURE — 3008F PR BODY MASS INDEX (BMI) DOCUMENTED: ICD-10-PCS | Mod: CPTII,S$GLB,, | Performed by: INTERNAL MEDICINE

## 2019-09-10 PROCEDURE — 3078F DIAST BP <80 MM HG: CPT | Mod: CPTII,S$GLB,, | Performed by: INTERNAL MEDICINE

## 2019-09-10 PROCEDURE — 99999 PR PBB SHADOW E&M-EST. PATIENT-LVL IV: ICD-10-PCS | Mod: PBBFAC,,, | Performed by: INTERNAL MEDICINE

## 2019-09-10 RX ORDER — INSULIN ASPART 100 [IU]/ML
INJECTION, SOLUTION INTRAVENOUS; SUBCUTANEOUS
Qty: 45 ML | Refills: 3 | Status: SHIPPED | OUTPATIENT
Start: 2019-09-10 | End: 2020-11-20
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
## 2019-09-10 NOTE — PATIENT INSTRUCTIONS
Levemir 5 units at night and 4 units in the morning  Novolog 3 units with breakfast and lunch   Novolog 5 units before dinner.     Check before meals and bedtime OR use your dexcom regularly    Inject 15 minutes before meals.     Pair your carbohydrates (rice, bread, pasta) with a protein (chicken, peanut butter, fish and egg)

## 2019-09-10 NOTE — ASSESSMENT & PLAN NOTE
Recommend to patient the following changes:  Levemir 5 units at night and 4 units in the morning  Novolog 3 units with breakfast and lunch   Novolog 5 units before dinner.     Check before meals and bedtime OR use your dexcom regularly    Inject 15 minutes before meals.     Pair your carbohydrates (rice, bread, pasta) with a protein (chicken, peanut butter, fish and egg)    No hypoglycemia on records    The CGM reports was printed and placed for scanning.

## 2019-09-18 ENCOUNTER — INFUSION (OUTPATIENT)
Dept: INFUSION THERAPY | Facility: HOSPITAL | Age: 49
End: 2019-09-18
Attending: INTERNAL MEDICINE
Payer: MEDICARE

## 2019-09-18 VITALS
OXYGEN SATURATION: 97 % | HEART RATE: 91 BPM | SYSTOLIC BLOOD PRESSURE: 136 MMHG | WEIGHT: 145.81 LBS | RESPIRATION RATE: 17 BRPM | BODY MASS INDEX: 21.54 KG/M2 | DIASTOLIC BLOOD PRESSURE: 70 MMHG | TEMPERATURE: 98 F

## 2019-09-18 DIAGNOSIS — M32.9 SYSTEMIC LUPUS ERYTHEMATOSUS, UNSPECIFIED SLE TYPE, UNSPECIFIED ORGAN INVOLVEMENT STATUS: Primary | ICD-10-CM

## 2019-09-18 PROCEDURE — 96413 CHEMO IV INFUSION 1 HR: CPT

## 2019-09-18 PROCEDURE — 25000003 PHARM REV CODE 250: Performed by: INTERNAL MEDICINE

## 2019-09-18 PROCEDURE — 96367 TX/PROPH/DG ADDL SEQ IV INF: CPT

## 2019-09-18 PROCEDURE — 63600175 PHARM REV CODE 636 W HCPCS: Performed by: INTERNAL MEDICINE

## 2019-09-18 RX ORDER — ACETAMINOPHEN 325 MG/1
650 TABLET ORAL
Status: CANCELLED | OUTPATIENT
Start: 2019-09-25

## 2019-09-18 RX ORDER — HEPARIN 100 UNIT/ML
500 SYRINGE INTRAVENOUS
Status: CANCELLED | OUTPATIENT
Start: 2019-09-25

## 2019-09-18 RX ORDER — ACETAMINOPHEN 325 MG/1
650 TABLET ORAL
Status: COMPLETED | OUTPATIENT
Start: 2019-09-18 | End: 2019-09-18

## 2019-09-18 RX ORDER — SODIUM CHLORIDE 0.9 % (FLUSH) 0.9 %
10 SYRINGE (ML) INJECTION
Status: CANCELLED | OUTPATIENT
Start: 2019-09-25

## 2019-09-18 RX ADMIN — DIPHENHYDRAMINE HYDROCHLORIDE 25 MG: 50 INJECTION INTRAMUSCULAR; INTRAVENOUS at 11:09

## 2019-09-18 RX ADMIN — BELIMUMAB 662 MG: 400 INJECTION, POWDER, LYOPHILIZED, FOR SOLUTION INTRAVENOUS at 11:09

## 2019-09-18 RX ADMIN — ACETAMINOPHEN 650 MG: 325 TABLET ORAL at 11:09

## 2019-09-18 NOTE — PLAN OF CARE
Problem: Adult Inpatient Plan of Care  Goal: Patient-Specific Goal (Individualization)  Outcome: Ongoing (interventions implemented as appropriate)  Pt tolerated benlysta infusion without issue. VSS. AVS given. Pt d.c home in stable condition with instructions to return 10/16/19. In interim, pt knows to call clinic with any issues.

## 2019-10-13 ENCOUNTER — PATIENT MESSAGE (OUTPATIENT)
Dept: RHEUMATOLOGY | Facility: CLINIC | Age: 49
End: 2019-10-13

## 2019-10-13 DIAGNOSIS — D84.9 IMMUNOSUPPRESSION: ICD-10-CM

## 2019-10-13 DIAGNOSIS — M32.9 SYSTEMIC LUPUS ERYTHEMATOSUS, UNSPECIFIED SLE TYPE, UNSPECIFIED ORGAN INVOLVEMENT STATUS: Primary | ICD-10-CM

## 2019-10-13 DIAGNOSIS — Z79.4 TYPE 2 DIABETES MELLITUS WITH STAGE 3 CHRONIC KIDNEY DISEASE, WITH LONG-TERM CURRENT USE OF INSULIN: ICD-10-CM

## 2019-10-13 DIAGNOSIS — E55.9 VITAMIN D DEFICIENCY: ICD-10-CM

## 2019-10-13 DIAGNOSIS — N18.30 TYPE 2 DIABETES MELLITUS WITH STAGE 3 CHRONIC KIDNEY DISEASE, WITH LONG-TERM CURRENT USE OF INSULIN: ICD-10-CM

## 2019-10-13 DIAGNOSIS — E11.22 TYPE 2 DIABETES MELLITUS WITH STAGE 3 CHRONIC KIDNEY DISEASE, WITH LONG-TERM CURRENT USE OF INSULIN: ICD-10-CM

## 2019-10-13 DIAGNOSIS — R53.83 FATIGUE, UNSPECIFIED TYPE: ICD-10-CM

## 2019-10-17 ENCOUNTER — INFUSION (OUTPATIENT)
Dept: INFUSION THERAPY | Facility: HOSPITAL | Age: 49
End: 2019-10-17
Attending: INTERNAL MEDICINE
Payer: MEDICARE

## 2019-10-17 VITALS
TEMPERATURE: 98 F | SYSTOLIC BLOOD PRESSURE: 155 MMHG | WEIGHT: 145.81 LBS | RESPIRATION RATE: 17 BRPM | HEART RATE: 71 BPM | OXYGEN SATURATION: 98 % | BODY MASS INDEX: 21.54 KG/M2 | DIASTOLIC BLOOD PRESSURE: 74 MMHG

## 2019-10-17 DIAGNOSIS — M32.9 SYSTEMIC LUPUS ERYTHEMATOSUS, UNSPECIFIED SLE TYPE, UNSPECIFIED ORGAN INVOLVEMENT STATUS: Primary | ICD-10-CM

## 2019-10-17 PROCEDURE — 96367 TX/PROPH/DG ADDL SEQ IV INF: CPT

## 2019-10-17 PROCEDURE — 96413 CHEMO IV INFUSION 1 HR: CPT

## 2019-10-17 PROCEDURE — 25000003 PHARM REV CODE 250: Performed by: INTERNAL MEDICINE

## 2019-10-17 PROCEDURE — 63600175 PHARM REV CODE 636 W HCPCS: Performed by: INTERNAL MEDICINE

## 2019-10-17 RX ORDER — ACETAMINOPHEN 325 MG/1
650 TABLET ORAL
Status: COMPLETED | OUTPATIENT
Start: 2019-10-17 | End: 2019-10-17

## 2019-10-17 RX ORDER — HEPARIN 100 UNIT/ML
500 SYRINGE INTRAVENOUS
Status: CANCELLED | OUTPATIENT
Start: 2019-10-24

## 2019-10-17 RX ORDER — SODIUM CHLORIDE 0.9 % (FLUSH) 0.9 %
10 SYRINGE (ML) INJECTION
Status: CANCELLED | OUTPATIENT
Start: 2019-10-24

## 2019-10-17 RX ADMIN — BELIMUMAB 662 MG: 400 INJECTION, POWDER, LYOPHILIZED, FOR SOLUTION INTRAVENOUS at 01:10

## 2019-10-17 RX ADMIN — DIPHENHYDRAMINE HYDROCHLORIDE 25 MG: 50 INJECTION INTRAMUSCULAR; INTRAVENOUS at 12:10

## 2019-10-17 RX ADMIN — ACETAMINOPHEN 650 MG: 325 TABLET ORAL at 01:10

## 2019-10-17 NOTE — PLAN OF CARE
Pt tolerated Benlysta infusion without issue. VSS. AVS given. Pt d.c home in stable condition with instructions to return 11/13/19. In interim, pt knows to call clinic with any issues.

## 2019-10-21 ENCOUNTER — PATIENT MESSAGE (OUTPATIENT)
Dept: RHEUMATOLOGY | Facility: CLINIC | Age: 49
End: 2019-10-21

## 2019-10-22 ENCOUNTER — LAB VISIT (OUTPATIENT)
Dept: LAB | Facility: HOSPITAL | Age: 49
End: 2019-10-22
Attending: INTERNAL MEDICINE
Payer: MEDICARE

## 2019-10-22 DIAGNOSIS — E55.9 VITAMIN D DEFICIENCY: ICD-10-CM

## 2019-10-22 DIAGNOSIS — Z94.4 LIVER TRANSPLANTED: ICD-10-CM

## 2019-10-22 DIAGNOSIS — D84.9 IMMUNOSUPPRESSION: ICD-10-CM

## 2019-10-22 DIAGNOSIS — R53.83 FATIGUE, UNSPECIFIED TYPE: ICD-10-CM

## 2019-10-22 DIAGNOSIS — M32.9 SYSTEMIC LUPUS ERYTHEMATOSUS, UNSPECIFIED SLE TYPE, UNSPECIFIED ORGAN INVOLVEMENT STATUS: ICD-10-CM

## 2019-10-22 LAB
25(OH)D3+25(OH)D2 SERPL-MCNC: 20 NG/ML (ref 30–96)
ALBUMIN SERPL BCP-MCNC: 4.1 G/DL (ref 3.5–5.2)
ALP SERPL-CCNC: 96 U/L (ref 55–135)
ALT SERPL W/O P-5'-P-CCNC: 17 U/L (ref 10–44)
ANION GAP SERPL CALC-SCNC: 8 MMOL/L (ref 8–16)
AST SERPL-CCNC: 12 U/L (ref 10–40)
BACTERIA #/AREA URNS HPF: NORMAL /HPF
BASOPHILS # BLD AUTO: 0 K/UL (ref 0–0.2)
BASOPHILS NFR BLD: 0 % (ref 0–1.9)
BILIRUB SERPL-MCNC: 0.3 MG/DL (ref 0.1–1)
BILIRUB UR QL STRIP: NEGATIVE
BUN SERPL-MCNC: 29 MG/DL (ref 6–20)
C3 SERPL-MCNC: 102 MG/DL (ref 50–180)
C4 SERPL-MCNC: 26 MG/DL (ref 11–44)
CALCIUM SERPL-MCNC: 9.2 MG/DL (ref 8.7–10.5)
CHLORIDE SERPL-SCNC: 106 MMOL/L (ref 95–110)
CK SERPL-CCNC: 75 U/L (ref 20–180)
CLARITY UR: CLEAR
CO2 SERPL-SCNC: 25 MMOL/L (ref 23–29)
COLOR UR: YELLOW
CREAT SERPL-MCNC: 1.5 MG/DL (ref 0.5–1.4)
CREAT UR-MCNC: 140.9 MG/DL (ref 15–325)
CRP SERPL-MCNC: 0.3 MG/L (ref 0–8.2)
DIFFERENTIAL METHOD: ABNORMAL
EOSINOPHIL # BLD AUTO: 0.1 K/UL (ref 0–0.5)
EOSINOPHIL NFR BLD: 3.1 % (ref 0–8)
ERYTHROCYTE [DISTWIDTH] IN BLOOD BY AUTOMATED COUNT: 13.5 % (ref 11.5–14.5)
ERYTHROCYTE [SEDIMENTATION RATE] IN BLOOD BY WESTERGREN METHOD: 7 MM/HR (ref 0–20)
EST. GFR  (AFRICAN AMERICAN): 47 ML/MIN/1.73 M^2
EST. GFR  (NON AFRICAN AMERICAN): 41 ML/MIN/1.73 M^2
GLUCOSE SERPL-MCNC: 186 MG/DL (ref 70–110)
GLUCOSE UR QL STRIP: ABNORMAL
HCT VFR BLD AUTO: 34.1 % (ref 37–48.5)
HGB BLD-MCNC: 11.6 G/DL (ref 12–16)
HGB UR QL STRIP: NEGATIVE
HYALINE CASTS #/AREA URNS LPF: 0 /LPF
IMM GRANULOCYTES # BLD AUTO: 0 K/UL (ref 0–0.04)
IMM GRANULOCYTES NFR BLD AUTO: 0 % (ref 0–0.5)
KETONES UR QL STRIP: NEGATIVE
LEUKOCYTE ESTERASE UR QL STRIP: NEGATIVE
LYMPHOCYTES # BLD AUTO: 1.6 K/UL (ref 1–4.8)
LYMPHOCYTES NFR BLD: 41.3 % (ref 18–48)
MAGNESIUM SERPL-MCNC: 1.7 MG/DL (ref 1.6–2.6)
MCH RBC QN AUTO: 26.6 PG (ref 27–31)
MCHC RBC AUTO-ENTMCNC: 34 G/DL (ref 32–36)
MCV RBC AUTO: 78 FL (ref 82–98)
MICROSCOPIC COMMENT: NORMAL
MONOCYTES # BLD AUTO: 0.3 K/UL (ref 0.3–1)
MONOCYTES NFR BLD: 7.7 % (ref 4–15)
NEUTROPHILS # BLD AUTO: 1.9 K/UL (ref 1.8–7.7)
NEUTROPHILS NFR BLD: 47.9 % (ref 38–73)
NITRITE UR QL STRIP: NEGATIVE
NRBC BLD-RTO: 0 /100 WBC
PH UR STRIP: 7 [PH] (ref 5–8)
PLATELET # BLD AUTO: 173 K/UL (ref 150–350)
PMV BLD AUTO: 9.7 FL (ref 9.2–12.9)
POTASSIUM SERPL-SCNC: 4.1 MMOL/L (ref 3.5–5.1)
PROT SERPL-MCNC: 6.9 G/DL (ref 6–8.4)
PROT UR QL STRIP: ABNORMAL
PROT UR-MCNC: 35 MG/DL
PROT/CREAT UR: 0.25 MG/G{CREAT} (ref 0–0.2)
RBC # BLD AUTO: 4.36 M/UL (ref 4–5.4)
RBC #/AREA URNS HPF: 0 /HPF (ref 0–4)
SODIUM SERPL-SCNC: 139 MMOL/L (ref 136–145)
SP GR UR STRIP: 1.02 (ref 1–1.03)
SQUAMOUS #/AREA URNS HPF: 2 /HPF
URN SPEC COLLECT METH UR: ABNORMAL
UROBILINOGEN UR STRIP-ACNC: NEGATIVE EU/DL
WBC # BLD AUTO: 3.92 K/UL (ref 3.9–12.7)
WBC #/AREA URNS HPF: 1 /HPF (ref 0–5)

## 2019-10-22 PROCEDURE — 80053 COMPREHEN METABOLIC PANEL: CPT

## 2019-10-22 PROCEDURE — 86160 COMPLEMENT ANTIGEN: CPT

## 2019-10-22 PROCEDURE — 86140 C-REACTIVE PROTEIN: CPT

## 2019-10-22 PROCEDURE — 82306 VITAMIN D 25 HYDROXY: CPT

## 2019-10-22 PROCEDURE — 83735 ASSAY OF MAGNESIUM: CPT

## 2019-10-22 PROCEDURE — 80197 ASSAY OF TACROLIMUS: CPT

## 2019-10-22 PROCEDURE — 82550 ASSAY OF CK (CPK): CPT

## 2019-10-22 PROCEDURE — 82570 ASSAY OF URINE CREATININE: CPT

## 2019-10-22 PROCEDURE — 85652 RBC SED RATE AUTOMATED: CPT

## 2019-10-22 PROCEDURE — 86160 COMPLEMENT ANTIGEN: CPT | Mod: 59

## 2019-10-22 PROCEDURE — 86225 DNA ANTIBODY NATIVE: CPT

## 2019-10-22 PROCEDURE — 81000 URINALYSIS NONAUTO W/SCOPE: CPT

## 2019-10-22 PROCEDURE — 36415 COLL VENOUS BLD VENIPUNCTURE: CPT

## 2019-10-22 PROCEDURE — 85025 COMPLETE CBC W/AUTO DIFF WBC: CPT

## 2019-10-23 LAB
DSDNA AB SER-ACNC: NORMAL [IU]/ML
TACROLIMUS BLD-MCNC: 5.1 NG/ML (ref 5–15)

## 2019-10-28 ENCOUNTER — TELEPHONE (OUTPATIENT)
Dept: TRANSPLANT | Facility: CLINIC | Age: 49
End: 2019-10-28

## 2019-10-28 NOTE — TELEPHONE ENCOUNTER
----- Message from Miriam Abernathy MD sent at 10/25/2019  3:14 PM CDT -----  Reviewed, nothing to do; repeat per routine

## 2019-10-29 DIAGNOSIS — D84.9 IMMUNOSUPPRESSION: ICD-10-CM

## 2019-11-01 RX ORDER — MYCOPHENOLIC ACID 180 MG/1
TABLET, DELAYED RELEASE ORAL
Qty: 120 TABLET | Refills: 5 | Status: SHIPPED | OUTPATIENT
Start: 2019-11-01 | End: 2020-04-20

## 2019-11-04 ENCOUNTER — PATIENT MESSAGE (OUTPATIENT)
Dept: RHEUMATOLOGY | Facility: CLINIC | Age: 49
End: 2019-11-04

## 2019-11-05 ENCOUNTER — TELEPHONE (OUTPATIENT)
Dept: RHEUMATOLOGY | Facility: CLINIC | Age: 49
End: 2019-11-05

## 2019-11-05 ENCOUNTER — PATIENT MESSAGE (OUTPATIENT)
Dept: RHEUMATOLOGY | Facility: CLINIC | Age: 49
End: 2019-11-05

## 2019-11-05 ENCOUNTER — OFFICE VISIT (OUTPATIENT)
Dept: RHEUMATOLOGY | Facility: CLINIC | Age: 49
End: 2019-11-05
Payer: MEDICARE

## 2019-11-05 VITALS
DIASTOLIC BLOOD PRESSURE: 69 MMHG | HEIGHT: 69 IN | BODY MASS INDEX: 22.63 KG/M2 | SYSTOLIC BLOOD PRESSURE: 129 MMHG | WEIGHT: 152.75 LBS | HEART RATE: 84 BPM

## 2019-11-05 DIAGNOSIS — D84.9 IMMUNOSUPPRESSION: ICD-10-CM

## 2019-11-05 DIAGNOSIS — M32.9 SYSTEMIC LUPUS ERYTHEMATOSUS, UNSPECIFIED SLE TYPE, UNSPECIFIED ORGAN INVOLVEMENT STATUS: Primary | ICD-10-CM

## 2019-11-05 DIAGNOSIS — R53.83 FATIGUE, UNSPECIFIED TYPE: ICD-10-CM

## 2019-11-05 DIAGNOSIS — M32.9 SYSTEMIC LUPUS ERYTHEMATOSUS, UNSPECIFIED SLE TYPE, UNSPECIFIED ORGAN INVOLVEMENT STATUS: Primary | Chronic | ICD-10-CM

## 2019-11-05 DIAGNOSIS — E11.42 DIABETIC POLYNEUROPATHY ASSOCIATED WITH TYPE 2 DIABETES MELLITUS: ICD-10-CM

## 2019-11-05 PROCEDURE — 3078F DIAST BP <80 MM HG: CPT | Mod: CPTII,S$GLB,, | Performed by: INTERNAL MEDICINE

## 2019-11-05 PROCEDURE — 3074F SYST BP LT 130 MM HG: CPT | Mod: CPTII,S$GLB,, | Performed by: INTERNAL MEDICINE

## 2019-11-05 PROCEDURE — 99999 PR PBB SHADOW E&M-EST. PATIENT-LVL V: CPT | Mod: PBBFAC,,, | Performed by: INTERNAL MEDICINE

## 2019-11-05 PROCEDURE — 99214 OFFICE O/P EST MOD 30 MIN: CPT | Mod: S$GLB,,, | Performed by: INTERNAL MEDICINE

## 2019-11-05 PROCEDURE — 3078F PR MOST RECENT DIASTOLIC BLOOD PRESSURE < 80 MM HG: ICD-10-PCS | Mod: CPTII,S$GLB,, | Performed by: INTERNAL MEDICINE

## 2019-11-05 PROCEDURE — 99999 PR PBB SHADOW E&M-EST. PATIENT-LVL V: ICD-10-PCS | Mod: PBBFAC,,, | Performed by: INTERNAL MEDICINE

## 2019-11-05 PROCEDURE — 3074F PR MOST RECENT SYSTOLIC BLOOD PRESSURE < 130 MM HG: ICD-10-PCS | Mod: CPTII,S$GLB,, | Performed by: INTERNAL MEDICINE

## 2019-11-05 PROCEDURE — 99214 PR OFFICE/OUTPT VISIT, EST, LEVL IV, 30-39 MIN: ICD-10-PCS | Mod: S$GLB,,, | Performed by: INTERNAL MEDICINE

## 2019-11-05 PROCEDURE — 3008F BODY MASS INDEX DOCD: CPT | Mod: CPTII,S$GLB,, | Performed by: INTERNAL MEDICINE

## 2019-11-05 PROCEDURE — 3008F PR BODY MASS INDEX (BMI) DOCUMENTED: ICD-10-PCS | Mod: CPTII,S$GLB,, | Performed by: INTERNAL MEDICINE

## 2019-11-05 RX ORDER — FLUCONAZOLE 150 MG/1
TABLET ORAL
Refills: 0 | COMMUNITY
Start: 2019-10-10 | End: 2022-04-13 | Stop reason: SDUPTHER

## 2019-11-05 RX ORDER — PAROXETINE 10 MG/1
TABLET, FILM COATED ORAL
COMMUNITY
End: 2020-10-21

## 2019-11-05 RX ORDER — SILVER SULFADIAZINE 10 G/1000G
1 CREAM TOPICAL 2 TIMES DAILY
Refills: 0 | COMMUNITY
Start: 2019-08-23

## 2019-11-05 RX ORDER — KETOCONAZOLE 20 MG/G
CREAM TOPICAL
Refills: 1 | COMMUNITY
Start: 2019-08-22

## 2019-11-05 RX ORDER — LEVOFLOXACIN 750 MG/1
TABLET ORAL
COMMUNITY
End: 2023-01-17 | Stop reason: CLARIF

## 2019-11-05 RX ORDER — FAMOTIDINE 40 MG/1
40 TABLET, FILM COATED ORAL DAILY
Refills: 11 | COMMUNITY
Start: 2019-10-18 | End: 2020-07-13

## 2019-11-05 RX ORDER — SERTRALINE HYDROCHLORIDE 50 MG/1
50 TABLET, FILM COATED ORAL DAILY
Refills: 11 | COMMUNITY
Start: 2019-10-19

## 2019-11-05 RX ORDER — ERYTHROMYCIN 20 MG/G
GEL TOPICAL
Refills: 1 | COMMUNITY
Start: 2019-08-22

## 2019-11-05 RX ORDER — IPRATROPIUM BROMIDE AND ALBUTEROL 20; 100 UG/1; UG/1
SPRAY, METERED RESPIRATORY (INHALATION)
Refills: 1 | COMMUNITY
Start: 2019-09-25 | End: 2020-07-17

## 2019-11-05 RX ORDER — HYDROXYCHLOROQUINE SULFATE 200 MG/1
200 TABLET, FILM COATED ORAL 2 TIMES DAILY
Qty: 60 TABLET | Refills: 2 | Status: SHIPPED | OUTPATIENT
Start: 2019-11-05 | End: 2019-12-31

## 2019-11-05 RX ORDER — FUROSEMIDE 40 MG/1
TABLET ORAL
COMMUNITY
End: 2020-09-21

## 2019-11-05 RX ORDER — GENTAMICIN SULFATE 1 MG/G
CREAM TOPICAL
Refills: 1 | COMMUNITY
Start: 2019-08-22

## 2019-11-05 RX ORDER — LEVOCETIRIZINE DIHYDROCHLORIDE 5 MG/1
5 TABLET, FILM COATED ORAL NIGHTLY
Refills: 3 | COMMUNITY
Start: 2019-10-19

## 2019-11-05 ASSESSMENT — SYSTEMIC LUPUS ERYTHEMATOSUS DISEASE ACTIVITY INDEX (SLEDAI): TOTAL_SCORE: 0
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
## 2019-11-11 ENCOUNTER — PATIENT MESSAGE (OUTPATIENT)
Dept: OPTOMETRY | Facility: CLINIC | Age: 49
End: 2019-11-11

## 2019-11-13 ENCOUNTER — INFUSION (OUTPATIENT)
Dept: INFUSION THERAPY | Facility: HOSPITAL | Age: 49
End: 2019-11-13
Attending: INTERNAL MEDICINE
Payer: MEDICARE

## 2019-11-13 VITALS
BODY MASS INDEX: 22.56 KG/M2 | RESPIRATION RATE: 17 BRPM | OXYGEN SATURATION: 97 % | SYSTOLIC BLOOD PRESSURE: 116 MMHG | HEART RATE: 96 BPM | WEIGHT: 152.75 LBS | TEMPERATURE: 98 F | DIASTOLIC BLOOD PRESSURE: 68 MMHG

## 2019-11-13 DIAGNOSIS — M32.9 SYSTEMIC LUPUS ERYTHEMATOSUS, UNSPECIFIED SLE TYPE, UNSPECIFIED ORGAN INVOLVEMENT STATUS: Primary | ICD-10-CM

## 2019-11-13 PROCEDURE — 63600175 PHARM REV CODE 636 W HCPCS: Performed by: INTERNAL MEDICINE

## 2019-11-13 PROCEDURE — 96413 CHEMO IV INFUSION 1 HR: CPT

## 2019-11-13 PROCEDURE — 96367 TX/PROPH/DG ADDL SEQ IV INF: CPT

## 2019-11-13 PROCEDURE — 25000003 PHARM REV CODE 250: Performed by: INTERNAL MEDICINE

## 2019-11-13 RX ORDER — HEPARIN 100 UNIT/ML
500 SYRINGE INTRAVENOUS
Status: CANCELLED | OUTPATIENT
Start: 2019-11-20

## 2019-11-13 RX ORDER — ACETAMINOPHEN 325 MG/1
650 TABLET ORAL
Status: COMPLETED | OUTPATIENT
Start: 2019-11-13 | End: 2019-11-13

## 2019-11-13 RX ORDER — SODIUM CHLORIDE 0.9 % (FLUSH) 0.9 %
10 SYRINGE (ML) INJECTION
Status: CANCELLED | OUTPATIENT
Start: 2019-11-20

## 2019-11-13 RX ADMIN — DIPHENHYDRAMINE HYDROCHLORIDE 25 MG: 50 INJECTION INTRAMUSCULAR; INTRAVENOUS at 12:11

## 2019-11-13 RX ADMIN — BELIMUMAB 693 MG: 400 INJECTION, POWDER, LYOPHILIZED, FOR SOLUTION INTRAVENOUS at 12:11

## 2019-11-13 RX ADMIN — ACETAMINOPHEN 650 MG: 325 TABLET ORAL at 12:11

## 2019-11-13 NOTE — PLAN OF CARE
Pt arrived to unit. Pt reports rash to neck.  Has had the rash before and pt followed by derm. No fever, does report yellow mucus. VSS. Tolerated Benlysta. No reactions noted. Pt has next appt. Pt ambulated off unit, no distress noted.

## 2019-11-17 ENCOUNTER — PATIENT MESSAGE (OUTPATIENT)
Dept: PODIATRY | Facility: CLINIC | Age: 49
End: 2019-11-17

## 2019-11-20 ENCOUNTER — PATIENT MESSAGE (OUTPATIENT)
Dept: PODIATRY | Facility: CLINIC | Age: 49
End: 2019-11-20

## 2019-11-20 ENCOUNTER — OFFICE VISIT (OUTPATIENT)
Dept: PODIATRY | Facility: CLINIC | Age: 49
End: 2019-11-20
Payer: MEDICARE

## 2019-11-20 VITALS
WEIGHT: 152.75 LBS | BODY MASS INDEX: 22.63 KG/M2 | SYSTOLIC BLOOD PRESSURE: 133 MMHG | HEIGHT: 69 IN | HEART RATE: 85 BPM | DIASTOLIC BLOOD PRESSURE: 83 MMHG

## 2019-11-20 DIAGNOSIS — E11.49 TYPE II DIABETES MELLITUS WITH NEUROLOGICAL MANIFESTATIONS: Primary | ICD-10-CM

## 2019-11-20 DIAGNOSIS — M72.2 PLANTAR FASCIITIS: ICD-10-CM

## 2019-11-20 PROCEDURE — 99214 OFFICE O/P EST MOD 30 MIN: CPT | Mod: S$GLB,,, | Performed by: PODIATRIST

## 2019-11-20 PROCEDURE — 3008F PR BODY MASS INDEX (BMI) DOCUMENTED: ICD-10-PCS | Mod: CPTII,S$GLB,, | Performed by: PODIATRIST

## 2019-11-20 PROCEDURE — 99999 PR PBB SHADOW E&M-EST. PATIENT-LVL V: CPT | Mod: PBBFAC,,, | Performed by: PODIATRIST

## 2019-11-20 PROCEDURE — 3075F SYST BP GE 130 - 139MM HG: CPT | Mod: CPTII,S$GLB,, | Performed by: PODIATRIST

## 2019-11-20 PROCEDURE — 3075F PR MOST RECENT SYSTOLIC BLOOD PRESS GE 130-139MM HG: ICD-10-PCS | Mod: CPTII,S$GLB,, | Performed by: PODIATRIST

## 2019-11-20 PROCEDURE — 99214 PR OFFICE/OUTPT VISIT, EST, LEVL IV, 30-39 MIN: ICD-10-PCS | Mod: S$GLB,,, | Performed by: PODIATRIST

## 2019-11-20 PROCEDURE — 3008F BODY MASS INDEX DOCD: CPT | Mod: CPTII,S$GLB,, | Performed by: PODIATRIST

## 2019-11-20 PROCEDURE — 99999 PR PBB SHADOW E&M-EST. PATIENT-LVL V: ICD-10-PCS | Mod: PBBFAC,,, | Performed by: PODIATRIST

## 2019-11-20 PROCEDURE — 3079F PR MOST RECENT DIASTOLIC BLOOD PRESSURE 80-89 MM HG: ICD-10-PCS | Mod: CPTII,S$GLB,, | Performed by: PODIATRIST

## 2019-11-20 PROCEDURE — 3079F DIAST BP 80-89 MM HG: CPT | Mod: CPTII,S$GLB,, | Performed by: PODIATRIST

## 2019-11-20 RX ORDER — CICLOPIROX 80 MG/ML
SOLUTION TOPICAL NIGHTLY
Qty: 1 BOTTLE | Refills: 3 | Status: SHIPPED | OUTPATIENT
Start: 2019-11-20 | End: 2023-01-17 | Stop reason: SDUPTHER

## 2019-11-26 NOTE — PROGRESS NOTES
"Subjective:      Patient ID: Valencia Dumont is a 49 y.o. female.    Chief Complaint: Foot Pain (ov 9/24/19 [facility] pcp- right foot pain posterior) and Diabetes Mellitus        Valencia Dumont is a 49 y.o. femalewho presents to the clinic for evaluation and treatment of high risk feet. 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  Presents for diabetic foot risk assessment.    She relates occasional "ingrowns" especially to the medial aspect of the left hallux nail  Also reports right posterior heel pain worse after long periods of WB.    This patient has documented high risk feet requiring routine maintenance secondary to diabetes mellitis and those secondary complications of diabetes, as mentioned..    PCP: Timur Lion MD    Date Last Seen by PCP:   Chief Complaint   Patient presents with    Foot Pain     ov 9/24/19 [facility]andy pcp- right foot pain posterior    Diabetes Mellitus         Current shoe gear: sandals    Hemoglobin A1C   Date Value Ref Range Status   09/09/2019 8.0 (H) 4.0 - 5.6 % Final     Comment:     ADA Screening Guidelines:  5.7-6.4%  Consistent with prediabetes  >or=6.5%  Consistent with diabetes  High levels of fetal hemoglobin interfere with the HbA1C  assay. Heterozygous hemoglobin variants (HbS, HgC, etc)do  not significantly interfere with this assay.   However, presence of multiple variants may affect accuracy.     06/19/2019 8.4 (H) 4.0 - 5.6 % Final     Comment:     ADA Screening Guidelines:  5.7-6.4%  Consistent with prediabetes  >or=6.5%  Consistent with " diabetes  High levels of fetal hemoglobin interfere with the HbA1C  assay. Heterozygous hemoglobin variants (HbS, HgC, etc)do  not significantly interfere with this assay.   However, presence of multiple variants may affect accuracy.     04/02/2019 9.7 (H) 4.0 - 5.6 % Final     Comment:     ADA Screening Guidelines:  5.7-6.4%  Consistent with prediabetes  >or=6.5%  Consistent with diabetes  High levels of fetal hemoglobin interfere with the HbA1C  assay. Heterozygous hemoglobin variants (HbS, HgC, etc)do  not significantly interfere with this assay.   However, presence of multiple variants may affect accuracy.         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    Hypertension     Kidney stone     Lupus     Osteoporosis 12/2013    Prophylactic immunotherapy (transplant immunosuppression) 1/2/2014    SBP (spontaneous bacterial peritonitis)     history of        Past Surgical History:   Procedure Laterality Date    COLONOSCOPY N/A 5/31/2017    Procedure: COLONOSCOPY;  Surgeon: Marky Sun MD;  Location: [facility] ([address] [place] [address]);  Service: Endoscopy;  Laterality: N/A;  PM prep.    ESOPHAGOGASTRODUODENOSCOPY      ESOPHAGOGASTRODUODENOSCOPY N/A 1/16/2019    Procedure: EGD (ESOPHAGOGASTRODUODENOSCOPY);  Surgeon: Deniz Guerra MD;  Location: [facility] ([address] [place] [address]);  Service: Endoscopy;  Laterality: N/A;  labs prior, s/p liver transplant-MS    LIVER BIOPSY      LIVER TRANSPLANT  12/31/2013    REFRACTIVE SURGERY Bilateral 2010    TUBAL LIGATION  2003    UPPER GASTROINTESTINAL ENDOSCOPY         Family History   Problem Relation Age of Onset    Hypertension Mother     Cataracts Mother     Diabetes Father     Alzheimer's disease Father      Diabetes Paternal Grandfather     Diabetes Paternal Grandmother     No Known Problems Maternal Grandmother     No Known Problems Maternal Grandfather     Breast cancer Maternal Aunt 55    Hypertension Brother     Diabetes Brother     No Known Problems Sister     No Known Problems Maternal Uncle     No Known Problems Paternal Aunt     No Known Problems Paternal Uncle     Stroke Neg Hx     Cancer Neg Hx     Colon cancer Neg Hx     Esophageal cancer Neg Hx     Stomach cancer Neg Hx     Rectal cancer Neg Hx     Amblyopia Neg Hx     Blindness Neg Hx     Glaucoma Neg Hx     Macular degeneration Neg Hx     Retinal detachment Neg Hx     Strabismus Neg Hx     Thyroid disease Neg Hx        Social History     Socioeconomic History    Marital status:      Spouse name: Not on file    Number of children: 3    Years of education: Not on file    Highest education level: Not on file   Occupational History     Employer: [employer]   Social Needs    Financial resource strain: Not on file    Food insecurity:     Worry: Not on file     Inability: Not on file    Transportation needs:     Medical: Not on file     Non-medical: Not on file   Tobacco Use    Smoking status: Never Smoker    Smokeless tobacco: Never Used    Tobacco comment: disability; ; 3 children   Substance and Sexual Activity    Alcohol use: Yes     Alcohol/week: 1.0 standard drinks     Types: 1 Glasses of wine per week     Comment: occasionally     Drug use: No    Sexual activity: Yes     Partners: Male     Birth control/protection: See Surgical Hx, Post-menopausal     Comment: s/p tubal ligation,  since 1989   Lifestyle    Physical activity:     Days per week: Not on file     Minutes per session: Not on file    Stress: Not on file   Relationships    Social connections:     Talks on phone: Not on file     Gets together: Not on file     Attends Adventism service: Not on file     Active member of club or  organization: Not on file     Attends meetings of clubs or organizations: Not on file     Relationship status: Not on file   Other Topics Concern    Not on file   Social History Narrative    Not on file       Current Outpatient Medications   Medication Sig Dispense Refill    blood-glucose meter kit To check BG 5 times daily, to use with insurance preferred meter 1 each 0    blood-glucose meter,continuous (DEXCOM G6 ) Misc 1 each by Misc.(Non-Drug; Combo Route) route once. for 1 dose 1 each 0    blood-glucose sensor (DEXCOM G5-G4 SENSOR) Karen 1 each by Misc.(Non-Drug; Combo Route) route once a week. 4 Device 11    blood-glucose transmitter (DEXCOM G6 TRANSMITTER) Karen 1 each by Misc.(Non-Drug; Combo Route) route once a week 1 Device 3    buPROPion (WELLBUTRIN XL) 150 MG TB24 tablet Take 300 mg by mouth.      ciclopirox (PENLAC) 8 % Soln Apply topically nightly. 1 Bottle 3    COMBIVENT RESPIMAT  mcg/actuation inhaler INHALE ONE PUFF INTO THE LUNGS TWICE DAILY  1    cyproheptadine (PERIACTIN) 4 mg tablet Take 4 mg by mouth every evening.  3    diazepam (VALIUM) 5 MG tablet Take 5 mg by mouth 3 (three) times daily as needed.   0    dicyclomine (BENTYL) 10 MG capsule Take 1 capsule (10 mg total) by mouth before meals as needed (TID PRN). 90 capsule 2    DILTIAZEM HCL (DILTIAZEM 2% CREAM) Apply topically 3 (three) times daily. Apply topically to anal area. 30 g 0    diphenoxylate-atropine 2.5-0.025 mg (LOMOTIL) 2.5-0.025 mg per tablet Take 1 tablet by mouth every 6 (six) hours as needed for Diarrhea. 120 tablet 1    dulaglutide (TRULICITY) 0.75 mg/0.5 mL PnIj Inject 0.5 mLs (0.75 mg total) into the skin once a week. 6 mL 4    ergocalciferol (ERGOCALCIFEROL) 50,000 unit Cap Take 1 capsule (50,000 Units total) by mouth every 7 days. 4 capsule 2    erythromycin with ethanol (EMGEL) 2 % gel ADD 30GM (1 TUBE) TO THE SOAKING DEVICE AND ALLOW WATER TO AGITATE. PLACE AFFECTED AREAS INTO WATER AND  SOAK FOR 10 MINUTES 1-2 TIMES DAILY  1    estradiol (YUVAFEM) 10 mcg Tab Place 1 tablet (10 mcg total) vaginally twice a week. 8 tablet 11    famotidine (PEPCID) 40 MG tablet Take 40 mg by mouth once daily.  11    fluconazole (DIFLUCAN) 150 MG Tab TAKE ONE TABLET BY MOUTH once for ONE dose  0    furosemide (LASIX) 40 MG tablet furosemide 40 mg tablet      gabapentin (NEURONTIN) 300 MG capsule TAKE 1 CAPSULE BY MOUTH THREE TIMES DAILY      gentamicin (GARAMYCIN) 0.1 % cream ADD 30GM (1 TUBE) TO THE SOAKING DEVICE AND ALLOW WATER TO AGITATE. PLACE AFFECTED AREAS INTO WATER AND SOAK FOR 10 MINUTES 1-2 TIMES DAILY  1    hydrALAZINE (APRESOLINE) 50 MG tablet Take 1 tablet (50 mg total) by mouth every 8 (eight) hours. for 3 days 12 tablet 0    hydroxychloroquine (PLAQUENIL) 200 mg tablet Take 1 tablet (200 mg total) by mouth 2 (two) times daily. 60 tablet 2    hydrOXYzine HCl (ATARAX) 10 MG Tab TAKE 1 OR 2 TABLETS BY MOUTH THREE TIMES DAILY AS NEEDED FOR ITCHING.  0    insulin aspart U-100 (NOVOLOG FLEXPEN U-100 INSULIN) 100 unit/mL (3 mL) InPn pen Takes three units before breakfast and lunch  and 5 units before dinner with sliding scale, up to 25 units daily 45 mL 3    insulin detemir U-100 (LEVEMIR FLEXTOUCH U-100 INSULN) 100 unit/mL (3 mL) SubQ InPn pen Inject 4 Units into the skin 2 (two) times daily. 45 mL 3    isosorbide mononitrate (IMDUR) 30 MG 24 hr tablet Take 30 mg by mouth once daily.      ketoconazole (NIZORAL) 2 % cream ADD 30GM (1/2 TUBE) TO THE SOAKING DEVICE AND ALLOW WATER TO AGITATE. PLACE AFFECTED AREAS INTO WATER AND SOAK FOR 10 MINUTES 1-2 TIMES BRENDON  1    Lactobac. rhamnosus GG-inulin 10 billion cell -200 mg Cap Take 1 capsule by mouth 2 (two) times daily. 30 capsule 0    levocetirizine (XYZAL) 5 MG tablet Take 5 mg by mouth every evening.  3    levoFLOXacin (LEVAQUIN) 750 MG tablet levofloxacin 750 mg tablet      losartan (COZAAR) 100 MG tablet Take 1 tablet (100 mg total) by mouth  once daily. 30 tablet 2    magnesium oxide 500 mg Tab Take 500 mg by mouth 2 (two) times daily. 60 each 6    meclizine (ANTIVERT) 25 mg tablet TAKE ONE TABLET BY MOUTH AT BEDTIME AS NEEDED for dizziness.  0    meloxicam (MOBIC) 15 MG tablet       MULTIVIT,THER IRON,CA,FA & MIN (MULTIVITAMIN) Tab Take 1 tablet by mouth once daily. 30 tablet 0    mycophenolate (MYFORTIC) 180 MG TbEC TAKE TWO TABLETS BY MOUTH TWICE DAILY. 120 tablet 5    neomycin-polymyxin-hydrocortisone (CORTISPORIN) otic solution INSTILL TWO DROPS INTO BOTH EARS FOUR TIMES DAILY FOR 10 DAYS  0    nystatin (MYCOSTATIN) 100,000 unit/mL suspension SWISH AND SPIT FIVE MILLILITERS BY MOUTH FOUR TIMES DAILY FOR 7 DAYS.  1    nystatin (MYCOSTATIN) cream Apply topically 2 times daily 30 g 0    nystatin-triamcinolone (MYCOLOG II) cream Apply to affected area 2 times daily 30 g 1    oxycodone-acetaminophen (PERCOCET) 7.5-325 mg per tablet Take 1 tablet by mouth every 4 (four) hours as needed for Pain.      pantoprazole (PROTONIX) 40 MG tablet Take 1 tablet (40 mg total) by mouth once daily. 30 tablet 5    paroxetine (PAXIL) 10 MG tablet paroxetine 10 mg tablet      polyethylene glycol (GLYCOLAX) 17 gram/dose powder Mix 1 capful (17 g) with liquid and by mouth 2 (two) times daily. 1530 g 11    PROAIR HFA 90 mcg/actuation inhaler Inhale 2 puffs into the lungs 3 (three) times daily as needed.   3    promethazine-dextromethorphan (PROMETHAZINE-DM) 6.25-15 mg/5 mL Syrp Take by mouth 2 (two) times daily as needed.   0    ranitidine (ZANTAC) 300 MG tablet Take 300 mg by mouth 2 (two) times daily.       rosuvastatin (CRESTOR) 5 MG tablet Take 5 mg by mouth once daily.      sertraline (ZOLOFT) 50 MG tablet Take 50 mg by mouth once daily.  11    SSD 1 % cream 1 application 2 (two) times daily. Apply to affected area  0    tacrolimus (PROGRAF) 1 MG Cap Take 3 capsules (3 mg total) by mouth every 12 (twelve) hours. 180 capsule 11    traZODone  "(DESYREL) 50 MG tablet Take 50 mg by mouth nightly.  0    triamcinolone acetonide 0.1% (KENALOG) 0.1 % cream Apply topically 2 times daily 30 g 0    valACYclovir (VALTREX) 500 MG tablet TAKE ONE TABLET BY MOUTH TWICE DAILY FOR 3 DAYS. 6 tablet 2    verapamil (CALAN-SR) 120 MG CR tablet TAKE ONE TABLET ONCE DAILY FOR BLOOD PRESSURE  3    zolpidem (AMBIEN) 10 mg Tab Take 10 mg by mouth nightly as needed.  3     No current facility-administered medications for this visit.        Review of patient's allergies indicates:   Allergen Reactions    Norvasc [amlodipine]      Severe headache    Doxycycline Rash    Pcn [penicillins] Rash         Review of Systems   Constitution: Negative for chills, fever, malaise/fatigue and night sweats.   Cardiovascular: Negative for chest pain, leg swelling, orthopnea and palpitations.   Respiratory: Negative for cough, shortness of breath and wheezing.    Skin: Positive for color change and nail changes. Negative for itching, poor wound healing and rash.   Musculoskeletal: Negative for arthritis, gout, joint pain, joint swelling, muscle weakness and myalgias.   Gastrointestinal: Negative for abdominal pain, constipation and nausea.   Neurological: Positive for numbness. Negative for disturbances in coordination, dizziness, focal weakness, tremors and weakness.           Objective:       Vitals:    11/20/19 0919   BP: 133/83   Pulse: 85   Weight: 69.3 kg (152 lb 12.5 oz)   Height: 5' 9" (1.753 m)   PainSc:   5       Physical Exam   Constitutional: She is oriented to person, place, and time. Vital signs are normal. She appears well-developed and well-nourished. She is cooperative.  Non-toxic appearance. She does not have a sickly appearance. No distress.   alert and oriented x 3.    Cardiovascular: Intact distal pulses.   Pulses:       Dorsalis pedis pulses are 2+ on the right side, and 2+ on the left side.        Posterior tibial pulses are 2+ on the right side, and 2+ on the left " side.   No edema noted b/l LEs. No varicosities present b/l LEs.    Pulmonary/Chest: No respiratory distress.   Musculoskeletal: She exhibits no deformity.        Right ankle: Normal. No tenderness. No lateral malleolus, no medial malleolus, no AITFL, no CF ligament and no posterior TFL tenderness found. Achilles tendon exhibits no pain, no defect and normal Castle's test results.        Left ankle: Normal. No tenderness. No lateral malleolus, no medial malleolus, no AITFL, no CF ligament and no posterior TFL tenderness found. Achilles tendon exhibits no pain, no defect and normal Castle's test results.        Right foot: There is normal range of motion, no tenderness, no bony tenderness, normal capillary refill, no crepitus and no deformity.        Left foot: There is tenderness (medial nail border of hallux). There is normal range of motion, no bony tenderness, normal capillary refill, no crepitus and no deformity.   Decreased first MPJ range of motion both weightbearing and nonweightbearing, no crepitus observed the first MP joint, + dorsal flag sign. Mild  bunion deformity is observed .    Patient has hammertoes of digits 2-5 bilateral partially reducible     Negative anterior drawer at the ankle bilat.  Negative Lachman test at the 2nd MTPJ.  Able to perform sign and double heel rise without pain.    Decreased right ankle joint ROM, pain with palpation of posterior 1/3 calcaneus at region of Achilles tendon insertion. No  pain with ankle joint ROM      Feet:   Right Foot:   Protective Sensation: 5 sites tested. 5 sites sensed.   Left Foot:   Protective Sensation: 5 sites tested. 5 sites sensed.   Lymphadenopathy:   No lymphatic streaking     Neurological: She is alert and oriented to person, place, and time. She has normal strength. She displays no atrophy. A sensory deficit is present. She exhibits normal muscle tone. Gait normal.   Reflex Scores:       Achilles reflexes are 2+ on the right side and 2+ on  the left side.  Negative Tinels sign and Charles's click, bilat. Protective sensation intact b/l foot. Vibratory sensation intact b/l. Subjective numbness to toes 4-5 both feet (occasionally).   Skin: Skin is warm, dry and intact. No ecchymosis and no rash noted. She is not diaphoretic. No cyanosis or erythema. No pallor. Nails show no clubbing.   B/l hallux nail beds with signs of hyperkeratotic changes to the nail bed. No open wound. No drainage. Stable and dry. medial hallux nail margin of left foot with ingrown nail plate. No erythema or edema is noted. No granuloma formation noted. No malodor     Toenails are thickened by 2-3 mm, discolored/yellowed, dystrophic, brittle with subungual debris.    Psychiatric: Her mood appears not anxious. Her affect is not inappropriate. Her speech is not slurred. She is not combative. She is communicative. She is attentive.   Nursing note and vitals reviewed.          Assessment:       Encounter Diagnoses   Name Primary?    Type II diabetes mellitus with neurological manifestations Yes    Plantar fasciitis          Plan:       Valencia was seen today for foot pain and diabetes mellitus.    Diagnoses and all orders for this visit:    Type II diabetes mellitus with neurological manifestations  -     Ambulatory Referral to Physical/Occupational Therapy    Plantar fasciitis  -     Ambulatory Referral to Physical/Occupational Therapy    Other orders  -     ciclopirox (PENLAC) 8 % Soln; Apply topically nightly.      I counseled the patient on her conditions, their implications and medical management.    Greater than 50% of this visit spent on counseling and coordination of care.    - Patient will stretch the tendo achilles complex three times daily as demonstrated in the office to lengthen heel cord and increase motion at ankle offloading the load on the forefoot and MTPJ..  Literature was dispensed illustrating proper stretching technique.  - Patient will obtain over the counter arch  supports and wear them in shoes whenever possible to support the arches and decrease motion at the MTPJ.    Referral to PT placed.     No open wounds noted to b/l LEs and toes. Long discussion with patient regarding expectations of nail growth on both great toes. Advised her that this may take up to 1 year for full growth of nail. Also advised her of the build up of hyperkeratosis to the nail bed in the absence of the nail during this time. Also advised her of the risk of fungal infection to toenail once it has grown out fully.     Discussed application of Kevin's vaporub on affected toenails for up to 1 year or until nail has grown out for possible prevention of fungal infection however this was not guaranteed.     At patient's request, I discussed different treatments for toenail fungus. We discussed oral antifungals but I did not recommend them as a first line treatment since the medication is taken internally and can have side effects such as rash, taste disturbances, and liver enzyme elevation. We discussed topical Penlac to be applied daily and removed weekly. Pt. Expresses understanding and would like to try the Penlac. Rx sent to the pharmacy.         Reassured her that her nail beds appear stable and fully healed without any signs of infection. She was advised to avoid tight fitting shoes and those with stiff materials at all times. Continue open toe sandals if this feels more comfortable.     In depth conversation on the treatment of ingrown nail; partial nail avulsion vs chemical matrixectomy vs conservative treatment of soaking and nail trimming    Informed patient that many nail problems can be prevented by wearing the right shoes and trimming your nails properly.   The right shoes: Feet were measured.  Patient is to wear shoes that are supportive and roomy enough for toes to wiggle. Look for shoes made of natural materials such as leather, which allow  feet to breathe.   Proper trimming: To avoid  problems, she was instructed to trim toenails straight across without cutting down into the corners.     No further intervention at this time. I advised patient that routine trimming of nails will not be covered service per insurance and he/she will fall under Proc B if this service is desired. Patient verbalized understanding of this. She will RTC as needed for Proc B or phenol and alcohol procedure or any other pedal complaint otherwise follow up 6-9 months for routine DM Foot exam.

## 2019-12-06 ENCOUNTER — CLINICAL SUPPORT (OUTPATIENT)
Dept: REHABILITATION | Facility: HOSPITAL | Age: 49
End: 2019-12-06
Attending: PODIATRIST
Payer: MEDICARE

## 2019-12-06 DIAGNOSIS — M25.671 DECREASED RANGE OF MOTION OF BOTH ANKLES: ICD-10-CM

## 2019-12-06 DIAGNOSIS — R68.89 DECREASED STRENGTH, ENDURANCE, AND MOBILITY: ICD-10-CM

## 2019-12-06 DIAGNOSIS — M79.671 PAIN IN BOTH FEET: ICD-10-CM

## 2019-12-06 DIAGNOSIS — M32.9 SYSTEMIC LUPUS ERYTHEMATOSUS, UNSPECIFIED SLE TYPE, UNSPECIFIED ORGAN INVOLVEMENT STATUS: Primary | Chronic | ICD-10-CM

## 2019-12-06 DIAGNOSIS — R26.89 BALANCE PROBLEMS: ICD-10-CM

## 2019-12-06 DIAGNOSIS — M25.672 DECREASED RANGE OF MOTION OF BOTH ANKLES: ICD-10-CM

## 2019-12-06 DIAGNOSIS — R53.1 DECREASED STRENGTH, ENDURANCE, AND MOBILITY: ICD-10-CM

## 2019-12-06 DIAGNOSIS — Z79.899 LONG-TERM USE OF PLAQUENIL: ICD-10-CM

## 2019-12-06 DIAGNOSIS — M79.672 PAIN IN BOTH FEET: ICD-10-CM

## 2019-12-06 DIAGNOSIS — Z74.09 DECREASED STRENGTH, ENDURANCE, AND MOBILITY: ICD-10-CM

## 2019-12-06 PROCEDURE — 97110 THERAPEUTIC EXERCISES: CPT | Mod: PN

## 2019-12-06 PROCEDURE — 97161 PT EVAL LOW COMPLEX 20 MIN: CPT | Mod: PN

## 2019-12-06 NOTE — PLAN OF CARE
Physical Therapy Initial Evaluation     Name: Valencia Dumont  Clinic Number: [id number]    Therapy Diagnosis:   Encounter Diagnoses   Name Primary?    Pain in both feet     Decreased strength, endurance, and mobility     Decreased range of motion of both ankles     Balance problems      Physician: Viola Luna DPM    Physician Orders: PT Eval and Treat   Medical Diagnosis from Referral:   E11.49 (ICD-10-CM) - Type II diabetes mellitus with neurological manifestations   M72.2 (ICD-10-CM) - Plantar fasciitis   Evaluation Date: 12/6/2019  Authorization Period Expiration: 11/19/2020  Plan of Care Expiration: 3/6/2020  Visit # / Visits authorized: 1/ 6    Time In: 11:10a  Time Out: 11:55a  Total Billable Time: 45 minutes    Precautions: Standard, Diabetes and Fall    Subjective     Medical History:   Past Medical History:   Diagnosis Date    Abnormal Pap smear of cervix     Anemia     Arthritis     Ascites     Asthma     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    Hypertension     Kidney stone     Lupus     Osteoporosis 12/2013    Prophylactic immunotherapy (transplant immunosuppression) 1/2/2014    SBP (spontaneous bacterial peritonitis)     history of        Surgical History:   Valencia Dumont  has a past surgical history that includes Esophagogastroduodenoscopy; Liver biopsy; Liver transplant (12/31/2013); Tubal ligation (2003); Colonoscopy (N/A, 5/31/2017); Refractive surgery (Bilateral, 2010); Upper gastrointestinal endoscopy; and Esophagogastroduodenoscopy (N/A, 1/16/2019).    Medications:   Valencia has a current medication list which includes the following prescription(s): blood-glucose meter, blood-glucose meter,continuous, blood-glucose sensor, blood-glucose transmitter, bupropion,  ciclopirox, combivent respimat, cyproheptadine, diazepam, dicyclomine, diltiazem hcl, diphenoxylate-atropine 2.5-0.025 mg, dulaglutide, ergocalciferol, erythromycin with ethanol, estradiol, famotidine, fluconazole, furosemide, gabapentin, gentamicin, hydralazine, hydroxychloroquine, hydroxyzine hcl, insulin aspart u-100, insulin detemir u-100, isosorbide mononitrate, ketoconazole, lactobac. rhamnosus gg-inulin, levocetirizine, levofloxacin, losartan, magnesium oxide, meclizine, meloxicam, multivitamin, mycophenolate, neomycin-polymyxin-hydrocortisone, nystatin, nystatin, nystatin-triamcinolone, oxycodone-acetaminophen, pantoprazole, paroxetine, polyethylene glycol, proair hfa, promethazine-dextromethorphan, ranitidine, rosuvastatin, sertraline, ssd, tacrolimus, trazodone, triamcinolone acetonide 0.1%, valacyclovir, verapamil, and zolpidem.    Allergies:   Review of patient's allergies indicates:   Allergen Reactions    Norvasc [amlodipine]      Severe headache    Doxycycline Rash    Pcn [penicillins] Rash        Date of onset: 1 month ago   History of current condition - Valencia reports: her arch started hurting on her R foot. She thought it was from her new diabetic shoes that she has had since October. She states that day it started hurting she doesn't really remember what she was doing but it hurt at night. She went to the doctor and the doctor said her arch was irritated. The pain is on the inside of her R foot and it also sometimes hurts her L foot. Standing and walking make it worse. It hurts the same in the morning and night. She rolls her foot on a tennis ball which is what the doctor told her to do. She states her balance is off. She occasionally will have tingling in her feet.     Imaging, none    Prior Therapy: no   Social History: 5 steps to get in and it's okay; lives with their family  Occupation: not currently working   Prior Level of Function: independent   DME owned/used: she has a walker but  doesn't use it  Current Level of Function: independent     Pain:  Current 5/10, worst 8/10, best 3/10   Location: right feet   Description: Throbbing  Aggravating Factors: Standing, Touching, Walking, Night Time and Morning; laying down   Easing Factors: massage, relaxation, ice and heating pad    Pts goals: has difficulty walking a long period of time    Objective     Observation: pt has diabetic shoes and presents with mildly unsteady gait    Range of Motion: Ankle    Left Right   Dorsiflexion: 0 (5) 0 (5)   Plantarflexion 30 25   Inversion 20 18   Eversion 10 12     Strength: Ankle    Left Right   Gastrocnemius 3+/5 3/5   Soleus 4-/5 3+/5   Dorsiflexion 5/5 4+/5   Inversion 4/5 4-/5   Eversion 4-/5 3+/5       Strength: Knee   Left Right   Knee ext 4/5 4/5   Knee flex 3+/5 4-/5       Strength: Hip    Left Right   Hip Flexion 4-/5 4/5   Hip Abduction 4/5 4/5   IR 4/5 4/5   ER 4/5 4/5   Hip Extension 4/5 4/5       Special Tests   Left Right   Anterior Drawer - -   Posterior Drawer - -   Talar Tilt - -   Windlass Mechanism + +   Navicular Drop - -   Bump Test - -   Castle Test - -     Joint Mobility: hypermobility of metatarsals and talocrural joint on R versus L   Palpation: inferior to R MTP head was most tender to touch; had mild tenderness over B arch, heel    Sensation: intact to light touch    Gait: Tim ambulated 100 feet with none.  Level of Assistance: independent  Patient displays mildly unsteady gait, increased toe extension.   Balance: Maintains SLS 2 seconds with fair balance strategies.    Functional Limitations Reports - G Codes  Category: mobility  Tool: FOTO Ankle Survey  Score: 40% Limitation   Goal: = 30% Limitation    TREATMENT     Treatment Time In: 11:45a  Treatment Time Out: 11:55a  Total Treatment time separate from Evaluation: 10 minutes    Valencia received therapeutic exercises to develop strength, endurance, ROM, flexibility, posture and core stabilization for 10 minutes  including:  Towel scrunches x20   Ankle circles in both directions x8   Plantar fascia stretch B, x10, 10 sec hold   Toe abduction with assistance as needed x10    Home Exercises and Patient Education Provided    Education provided:   - role of PT   - HEP compliance     Written Home Exercises Provided: yes.  Exercises were reviewed and Valencia was able to demonstrate them prior to the end of the session.  Valencia demonstrated good  understanding of the education provided.     See EMR under Patient Instructions for exercises provided 12/6/2019.    ASSESSMENT     Valencia is a 49 y.o. female referred to outpatient Physical Therapy with a medical diagnosis of type II DM with neurological manifestations and plantar fasciitis. Pt presents with signs and symptoms including: bilateral foot pain (R >L), decreased B ankle ROM, decreased LE Strength, soft tissue dysfunction, postural imbalance, impaired joint mobility, balance and gait difficulties, and decreased tolerance to functional activities. Pt had positive windlass mechanism bilaterally with more pain presenting on R versus L foot. She had hypermobility L foot metatarsals and talocrural. She demonstrated increased toe adduction and decreased motor control and strength/endurance of foot intrinsics. She had decreased great toe extension during roll over during gait. Due to limitations pt is unable to participate in her normal activities and she has difficulty walking and standing for long periods of time. She is appropriate for physical therapy and would benefit from improving noted limitations in order to improve quality of life and return to PLOF.     Pt with good motivation to perform physical activity and responds well to cueing.    Pt prognosis is Good.   Pt will benefit from skilled outpatient Physical Therapy to address the deficits stated above and in the chart below, provide pt/family education, and to maximize pt's level of independence.     Plan of care discussed  with patient: Yes  Pt's spiritual, cultural and educational needs considered and patient is agreeable to the plan of care and goals as stated below:     Anticipated Barriers for therapy: multiple comorbidities     Medical Necessity is demonstrated by the following  History  Co-morbidities and personal factors that may impact the plan of care Co-morbidities:   COPD/asthma, diabetes, HTN and Lupus, osteoporosis, esophageal varices, cirrhosis of liver, anemia, arthritis    Personal Factors:   no deficits     moderate   Examination  Body Structures and Functions, activity limitations and participation restrictions that may impact the plan of care Body Regions:   lower extremities    Body Systems:    ROM  strength  gross coordinated movement  balance  gait  transfers  transitions  motor control  motor learning    Participation Restrictions:   Min to mod    Activity limitations:   Learning and applying knowledge  no deficits    General Tasks and Commands  no deficits    Communication  no deficits    Mobility  lifting and carrying objects  walking    Self care  no deficits    Domestic Life  shopping  cooking  doing house work (cleaning house, washing dishes, laundry)  assisting others    Interactions/Relationships  No deficits    Life Areas  No deficits    Community and Social Life  No deficits         low   Clinical Presentation stable and uncomplicated low   Decision Making/ Complexity Score: low     Pt's spiritual, cultural and educational needs considered and pt agreeable to plan of care and goals as stated below:       Short Term GOALS: 4 weeks. Pt agrees with goals set.  1. Patient demonstrates independence with HEP.   3. Patient demonstrates independence with body mechanics.   4. Patient demonstrates increased ankle ROM by 5 degrees to improve tolerance to functional activities pain free.    5. Patient demonstrates ability to walk 2 blocks with no difficulty in order to improve community mobility.     Long Term  GOALS: 8 weeks. Pt agrees with goals set.  1. Patient demonstrates increased ability to perform single leg stance for 10 seconds without UE support to demonstrate improved stability.    2. Patient demonstrates increased strength BLE's to 4+/5 or greater to improve tolerance to functional activities pain free.   3. Patient demonstrates improved overall function per FOTO Ankle Survey to 30% Limitation or less.   4. Patient demonstrates ability to walk a mile with no difficulty in order to improve community mobility.     PLAN     Plan of care Certification: 12/6/2019 to 3/6/2020.    Outpatient Physical Therapy 1 times weekly for 10 weeks to include the following interventions: Manual Therapy, Moist Heat/ Ice, Neuromuscular Re-ed, Patient Education, Self Care, Therapeutic Activites and Therapeutic Exercise.  Pt may be seen by PTA as part of the rehabilitation team.     Jacinda Nava, PT  12/6/2019    I have seen the patient, reviewed the therapist's plan of care, and I agree with the plan of care.      I certify the need for these services furnished under this plan of treatment and while under my care.     ___________________ ________ Physician/Referring Practitioner            ___________________________ Date of Signature

## 2019-12-11 ENCOUNTER — INFUSION (OUTPATIENT)
Dept: INFUSION THERAPY | Facility: HOSPITAL | Age: 49
End: 2019-12-11
Attending: INTERNAL MEDICINE
Payer: MEDICARE

## 2019-12-11 VITALS
OXYGEN SATURATION: 99 % | BODY MASS INDEX: 22.56 KG/M2 | HEART RATE: 90 BPM | TEMPERATURE: 98 F | WEIGHT: 152.75 LBS | RESPIRATION RATE: 18 BRPM | DIASTOLIC BLOOD PRESSURE: 83 MMHG | SYSTOLIC BLOOD PRESSURE: 139 MMHG

## 2019-12-11 DIAGNOSIS — M32.9 SYSTEMIC LUPUS ERYTHEMATOSUS, UNSPECIFIED SLE TYPE, UNSPECIFIED ORGAN INVOLVEMENT STATUS: Primary | ICD-10-CM

## 2019-12-11 PROCEDURE — 25000003 PHARM REV CODE 250: Performed by: INTERNAL MEDICINE

## 2019-12-11 PROCEDURE — 63600175 PHARM REV CODE 636 W HCPCS: Mod: JG | Performed by: INTERNAL MEDICINE

## 2019-12-11 PROCEDURE — 96413 CHEMO IV INFUSION 1 HR: CPT

## 2019-12-11 PROCEDURE — 96367 TX/PROPH/DG ADDL SEQ IV INF: CPT

## 2019-12-11 RX ORDER — ACETAMINOPHEN 325 MG/1
650 TABLET ORAL
Status: CANCELLED | OUTPATIENT
Start: 2019-12-25

## 2019-12-11 RX ORDER — ACETAMINOPHEN 325 MG/1
650 TABLET ORAL
Status: COMPLETED | OUTPATIENT
Start: 2019-12-11 | End: 2019-12-11

## 2019-12-11 RX ORDER — SODIUM CHLORIDE 0.9 % (FLUSH) 0.9 %
10 SYRINGE (ML) INJECTION
Status: CANCELLED | OUTPATIENT
Start: 2019-12-18

## 2019-12-11 RX ORDER — HEPARIN 100 UNIT/ML
500 SYRINGE INTRAVENOUS
Status: CANCELLED | OUTPATIENT
Start: 2020-01-08

## 2019-12-11 RX ADMIN — BELIMUMAB 693 MG: 400 INJECTION, POWDER, LYOPHILIZED, FOR SOLUTION INTRAVENOUS at 12:12

## 2019-12-11 RX ADMIN — DIPHENHYDRAMINE HYDROCHLORIDE 25 MG: 50 INJECTION INTRAMUSCULAR; INTRAVENOUS at 11:12

## 2019-12-11 RX ADMIN — ACETAMINOPHEN 650 MG: 325 TABLET ORAL at 11:12

## 2019-12-11 NOTE — PLAN OF CARE
Pt arrived to unit. Only complaint is oral thrush. Taking Nystatin but states it is not helping. Call placed per RN to Dr. Lion at [facility] and left message that nystatin is not helping and pt requesting something stronger. VSS. Tolerated Benlysta. No reactions noted. AVS given to pt. Pt ambulated off unit. No distress noted.

## 2019-12-13 ENCOUNTER — CLINICAL SUPPORT (OUTPATIENT)
Dept: OPHTHALMOLOGY | Facility: CLINIC | Age: 49
End: 2019-12-13
Payer: MEDICARE

## 2019-12-13 ENCOUNTER — OFFICE VISIT (OUTPATIENT)
Dept: OPTOMETRY | Facility: CLINIC | Age: 49
End: 2019-12-13
Payer: MEDICARE

## 2019-12-13 DIAGNOSIS — H52.7 REFRACTIVE ERROR: ICD-10-CM

## 2019-12-13 DIAGNOSIS — Z79.899 LONG-TERM USE OF PLAQUENIL: ICD-10-CM

## 2019-12-13 DIAGNOSIS — M32.9 SYSTEMIC LUPUS ERYTHEMATOSUS, UNSPECIFIED SLE TYPE, UNSPECIFIED ORGAN INVOLVEMENT STATUS: ICD-10-CM

## 2019-12-13 DIAGNOSIS — E11.9 TYPE 2 DIABETES MELLITUS WITHOUT RETINOPATHY: ICD-10-CM

## 2019-12-13 DIAGNOSIS — M32.9 SYSTEMIC LUPUS ERYTHEMATOSUS, UNSPECIFIED SLE TYPE, UNSPECIFIED ORGAN INVOLVEMENT STATUS: Primary | ICD-10-CM

## 2019-12-13 PROCEDURE — 92134 POSTERIOR SEGMENT OCT RETINA (OCULAR COHERENCE TOMOGRAPHY)-BOTH EYES: ICD-10-PCS | Mod: S$GLB,,, | Performed by: OPTOMETRIST

## 2019-12-13 PROCEDURE — 99999 PR PBB SHADOW E&M-EST. PATIENT-LVL II: CPT | Mod: PBBFAC,,, | Performed by: OPTOMETRIST

## 2019-12-13 PROCEDURE — 92134 CPTRZ OPH DX IMG PST SGM RTA: CPT | Mod: S$GLB,,, | Performed by: OPTOMETRIST

## 2019-12-13 PROCEDURE — 92014 PR EYE EXAM, EST PATIENT,COMPREHESV: ICD-10-PCS | Mod: S$GLB,,, | Performed by: OPTOMETRIST

## 2019-12-13 PROCEDURE — 92083 HUMPHREY VISUAL FIELD - OU - BOTH EYES: ICD-10-PCS | Mod: S$GLB,,, | Performed by: OPTOMETRIST

## 2019-12-13 PROCEDURE — 92083 EXTENDED VISUAL FIELD XM: CPT | Mod: S$GLB,,, | Performed by: OPTOMETRIST

## 2019-12-13 PROCEDURE — 99999 PR PBB SHADOW E&M-EST. PATIENT-LVL II: ICD-10-PCS | Mod: PBBFAC,,, | Performed by: OPTOMETRIST

## 2019-12-13 PROCEDURE — 92014 COMPRE OPH EXAM EST PT 1/>: CPT | Mod: S$GLB,,, | Performed by: OPTOMETRIST

## 2019-12-13 NOTE — PROGRESS NOTES
Subjective:       Patient ID: Valencia Dumont is a 49 y.o. female      Chief Complaint   Patient presents with    Concerns About Ocular Health    Plaquenil Eye Exam     400mg BID PO, pt put on meds in 2018, d/c early 2019, restarted again nov 2019     History of Present Illness  Dls: 6/12/19 Dr. Murillo    50 y/o female presents today for plaquenil ck.   Pt c/o blurry vision at distance ou . Pt wears single vision glasses for distance.       this am     No tearing  + itching  No burning  No pain  + ha's  No floaters  No flashes    Eye meds  None    Hemoglobin A1C       Date                     Value               Ref Range           Status               12/11/2019               8.1 (H)             4.0 - 5.6 %         Final            09/09/2019               8.0 (H)             4.0 - 5.6 %         Final         06/19/2019               8.4 (H)             4.0 - 5.6 %         Final              Assessment/Plan:     1. Systemic lupus erythematosus, unspecified SLE type, unspecified organ involvement status  2. Long-term use of Plaquenil  No evidence of plaquenil maculopathy on exam, can continue with plaquenil therapy. OCT RNFL and HVF WNL OU today. Color vision normal. Return in 1 years for DFE, HVF 10-2, and OCT macula.     3. Type 2 diabetes mellitus without retinopathy  No diabetic retinopathy. Discussed with pt the effects of diabetes on vision, importance of good blood sugar control, compliance with meds, and follow up care with PCP. Return in 1 year for dilated eye exam, sooner PRN.    4. Refractive error  One episode of blurry vision but pt reports BS was elevated that day. Discussed with pt, likely refractive shift from BS changes. VA stable in office today. Can continue with current specs.     Follow up in about 1 year (around 12/13/2020) for Diabetic Eye Exam, Plaquenil check, HVF 10-2, OCT macula.

## 2019-12-14 DIAGNOSIS — D84.9 IMMUNOSUPPRESSION: ICD-10-CM

## 2019-12-14 DIAGNOSIS — Z94.4 LIVER TRANSPLANTED: ICD-10-CM

## 2019-12-15 ENCOUNTER — PATIENT MESSAGE (OUTPATIENT)
Dept: GASTROENTEROLOGY | Facility: CLINIC | Age: 49
End: 2019-12-15

## 2019-12-16 RX ORDER — TACROLIMUS 1 MG/1
CAPSULE ORAL
Qty: 180 CAPSULE | Refills: 11 | Status: SHIPPED | OUTPATIENT
Start: 2019-12-16 | End: 2020-07-17

## 2019-12-16 NOTE — TELEPHONE ENCOUNTER
Pt aware and understdng of Dr. Guerra recommendation belwo:       Let her know she can try mylanta maximum strength which has gas-x in it to reduce the gas and schedule followup in fellows clinic       Pt also aware of appt in Dr. Guerra's fellow clinic on 01/29/20 at 2:30PM. Informed pt that Dr. Cobb will see her first then Dr. Guerra. Pt expressed understanding.

## 2019-12-22 ENCOUNTER — PATIENT MESSAGE (OUTPATIENT)
Dept: RHEUMATOLOGY | Facility: CLINIC | Age: 49
End: 2019-12-22

## 2019-12-30 DIAGNOSIS — M32.9 SYSTEMIC LUPUS ERYTHEMATOSUS, UNSPECIFIED SLE TYPE, UNSPECIFIED ORGAN INVOLVEMENT STATUS: ICD-10-CM

## 2019-12-31 RX ORDER — HYDROXYCHLOROQUINE SULFATE 200 MG/1
TABLET, FILM COATED ORAL
Qty: 60 TABLET | Refills: 2 | Status: SHIPPED | OUTPATIENT
Start: 2019-12-31 | End: 2020-03-02 | Stop reason: SDUPTHER

## 2020-01-03 ENCOUNTER — PATIENT MESSAGE (OUTPATIENT)
Dept: OPTOMETRY | Facility: CLINIC | Age: 50
End: 2020-01-03

## 2020-01-08 ENCOUNTER — INFUSION (OUTPATIENT)
Dept: INFUSION THERAPY | Facility: HOSPITAL | Age: 50
End: 2020-01-08
Attending: INTERNAL MEDICINE
Payer: MEDICARE

## 2020-01-08 VITALS
OXYGEN SATURATION: 98 % | WEIGHT: 145.5 LBS | BODY MASS INDEX: 21.49 KG/M2 | SYSTOLIC BLOOD PRESSURE: 149 MMHG | DIASTOLIC BLOOD PRESSURE: 66 MMHG | HEART RATE: 72 BPM | TEMPERATURE: 98 F | RESPIRATION RATE: 18 BRPM

## 2020-01-08 DIAGNOSIS — M32.9 SYSTEMIC LUPUS ERYTHEMATOSUS, UNSPECIFIED SLE TYPE, UNSPECIFIED ORGAN INVOLVEMENT STATUS: Primary | ICD-10-CM

## 2020-01-08 PROCEDURE — 63600175 PHARM REV CODE 636 W HCPCS: Performed by: INTERNAL MEDICINE

## 2020-01-08 PROCEDURE — 96413 CHEMO IV INFUSION 1 HR: CPT

## 2020-01-08 PROCEDURE — 96365 THER/PROPH/DIAG IV INF INIT: CPT

## 2020-01-08 PROCEDURE — 25000003 PHARM REV CODE 250: Performed by: INTERNAL MEDICINE

## 2020-01-08 PROCEDURE — 96367 TX/PROPH/DG ADDL SEQ IV INF: CPT

## 2020-01-08 RX ORDER — SODIUM CHLORIDE 0.9 % (FLUSH) 0.9 %
10 SYRINGE (ML) INJECTION
Status: CANCELLED | OUTPATIENT
Start: 2020-01-15

## 2020-01-08 RX ORDER — HEPARIN 100 UNIT/ML
500 SYRINGE INTRAVENOUS
Status: CANCELLED | OUTPATIENT
Start: 2020-01-15

## 2020-01-08 RX ORDER — ACETAMINOPHEN 325 MG/1
650 TABLET ORAL
Status: COMPLETED | OUTPATIENT
Start: 2020-01-08 | End: 2020-01-08

## 2020-01-08 RX ADMIN — ACETAMINOPHEN 650 MG: 325 TABLET ORAL at 11:01

## 2020-01-08 RX ADMIN — BELIMUMAB 660 MG: 400 INJECTION, POWDER, LYOPHILIZED, FOR SOLUTION INTRAVENOUS at 12:01

## 2020-01-08 RX ADMIN — DIPHENHYDRAMINE HYDROCHLORIDE 25 MG: 50 INJECTION INTRAMUSCULAR; INTRAVENOUS at 11:01

## 2020-01-08 NOTE — PLAN OF CARE
"Pt presented for Benlysta infusion. VSS. Pt reported fatigue, joint aches/pains, and occasional dry cough. Pt said, "I can always tell when it's time for me to come in for my infusion, because I start getting more tired and achy." Premedicated with PO Tylenol and IVPB Benadryl. Tolerated Benlysta.  Distress screening tool completed. Lunch provided. Declined AVS; pt uses [facility]. Pt ambulated unassisted off unit. Pt in NAD at time of discharge.    "

## 2020-01-20 ENCOUNTER — LAB VISIT (OUTPATIENT)
Dept: LAB | Facility: HOSPITAL | Age: 50
End: 2020-01-20
Attending: INTERNAL MEDICINE
Payer: MEDICARE

## 2020-01-20 DIAGNOSIS — Z94.4 LIVER TRANSPLANTED: ICD-10-CM

## 2020-01-20 LAB
ALBUMIN SERPL BCP-MCNC: 4.1 G/DL (ref 3.5–5.2)
ALP SERPL-CCNC: 87 U/L (ref 55–135)
ALT SERPL W/O P-5'-P-CCNC: 14 U/L (ref 10–44)
ANION GAP SERPL CALC-SCNC: 8 MMOL/L (ref 8–16)
AST SERPL-CCNC: 14 U/L (ref 10–40)
BASOPHILS # BLD AUTO: 0.02 K/UL (ref 0–0.2)
BASOPHILS NFR BLD: 0.5 % (ref 0–1.9)
BILIRUB SERPL-MCNC: 0.4 MG/DL (ref 0.1–1)
BUN SERPL-MCNC: 16 MG/DL (ref 6–20)
CALCIUM SERPL-MCNC: 8.7 MG/DL (ref 8.7–10.5)
CHLORIDE SERPL-SCNC: 106 MMOL/L (ref 95–110)
CO2 SERPL-SCNC: 25 MMOL/L (ref 23–29)
CREAT SERPL-MCNC: 1.4 MG/DL (ref 0.5–1.4)
DIFFERENTIAL METHOD: ABNORMAL
EOSINOPHIL # BLD AUTO: 0.2 K/UL (ref 0–0.5)
EOSINOPHIL NFR BLD: 4.3 % (ref 0–8)
ERYTHROCYTE [DISTWIDTH] IN BLOOD BY AUTOMATED COUNT: 14.7 % (ref 11.5–14.5)
EST. GFR  (AFRICAN AMERICAN): 51 ML/MIN/1.73 M^2
EST. GFR  (NON AFRICAN AMERICAN): 44 ML/MIN/1.73 M^2
GLUCOSE SERPL-MCNC: 161 MG/DL (ref 70–110)
HCT VFR BLD AUTO: 33.3 % (ref 37–48.5)
HGB BLD-MCNC: 11.2 G/DL (ref 12–16)
IMM GRANULOCYTES # BLD AUTO: 0.01 K/UL (ref 0–0.04)
IMM GRANULOCYTES NFR BLD AUTO: 0.3 % (ref 0–0.5)
LYMPHOCYTES # BLD AUTO: 1.6 K/UL (ref 1–4.8)
LYMPHOCYTES NFR BLD: 41.2 % (ref 18–48)
MAGNESIUM SERPL-MCNC: 1.4 MG/DL (ref 1.6–2.6)
MCH RBC QN AUTO: 26.1 PG (ref 27–31)
MCHC RBC AUTO-ENTMCNC: 33.6 G/DL (ref 32–36)
MCV RBC AUTO: 78 FL (ref 82–98)
MONOCYTES # BLD AUTO: 0.4 K/UL (ref 0.3–1)
MONOCYTES NFR BLD: 9 % (ref 4–15)
NEUTROPHILS # BLD AUTO: 1.8 K/UL (ref 1.8–7.7)
NEUTROPHILS NFR BLD: 44.7 % (ref 38–73)
NRBC BLD-RTO: 0 /100 WBC
PLATELET # BLD AUTO: 182 K/UL (ref 150–350)
PMV BLD AUTO: 9.4 FL (ref 9.2–12.9)
POTASSIUM SERPL-SCNC: 3.8 MMOL/L (ref 3.5–5.1)
PROT SERPL-MCNC: 7 G/DL (ref 6–8.4)
RBC # BLD AUTO: 4.29 M/UL (ref 4–5.4)
SODIUM SERPL-SCNC: 139 MMOL/L (ref 136–145)
WBC # BLD AUTO: 3.91 K/UL (ref 3.9–12.7)

## 2020-01-20 PROCEDURE — 80197 ASSAY OF TACROLIMUS: CPT

## 2020-01-20 PROCEDURE — 85025 COMPLETE CBC W/AUTO DIFF WBC: CPT

## 2020-01-20 PROCEDURE — 80053 COMPREHEN METABOLIC PANEL: CPT

## 2020-01-20 PROCEDURE — 83735 ASSAY OF MAGNESIUM: CPT

## 2020-01-20 PROCEDURE — 36415 COLL VENOUS BLD VENIPUNCTURE: CPT

## 2020-01-21 LAB — TACROLIMUS BLD-MCNC: 4.3 NG/ML (ref 5–15)

## 2020-01-24 ENCOUNTER — TELEPHONE (OUTPATIENT)
Dept: TRANSPLANT | Facility: CLINIC | Age: 50
End: 2020-01-24

## 2020-01-24 NOTE — LETTER
January 24, 2020    Valencia Dumont  [address] [address]          Dear Valencia Dumont:  MRN: [id number]    This is a follow up to your recent labs, your lab results were stable.  There are no medicine changes.  Please have your labs drawn again on 4/20/20.      If you cannot have your labs drawn on the scheduled date, it is your responsibility to call the transplant department to reschedule.  To reschedule or make an appointment, please as to speak to or leave a message for my assistant, Crystal Ferguson or Rosangela, at [phone].  When leaving a message for Crystal Ferguson Angela or myself, we ask that you leave a brief message regarding your request.    Sincerely,        Your Liver Transplant Coordinator    [facility]  [address] [place] [id number]  [phone]

## 2020-01-24 NOTE — TELEPHONE ENCOUNTER
Notified pt about low mag level and pt confirmed she was out for a few days but restarted them on Sunday. Letter sent: Labs are stable with no medication changes. Repeat labs on 4/20/20.

## 2020-02-05 ENCOUNTER — INFUSION (OUTPATIENT)
Dept: INFUSION THERAPY | Facility: HOSPITAL | Age: 50
End: 2020-02-05
Attending: INTERNAL MEDICINE
Payer: MEDICARE

## 2020-02-05 ENCOUNTER — LAB VISIT (OUTPATIENT)
Dept: LAB | Facility: HOSPITAL | Age: 50
End: 2020-02-05
Attending: INTERNAL MEDICINE
Payer: MEDICARE

## 2020-02-05 VITALS
HEART RATE: 70 BPM | RESPIRATION RATE: 18 BRPM | WEIGHT: 145.5 LBS | SYSTOLIC BLOOD PRESSURE: 171 MMHG | BODY MASS INDEX: 21.49 KG/M2 | TEMPERATURE: 98 F | DIASTOLIC BLOOD PRESSURE: 71 MMHG | OXYGEN SATURATION: 99 %

## 2020-02-05 DIAGNOSIS — M32.9 SYSTEMIC LUPUS ERYTHEMATOSUS, UNSPECIFIED SLE TYPE, UNSPECIFIED ORGAN INVOLVEMENT STATUS: Primary | ICD-10-CM

## 2020-02-05 DIAGNOSIS — D84.9 IMMUNOSUPPRESSION: ICD-10-CM

## 2020-02-05 DIAGNOSIS — R53.83 FATIGUE, UNSPECIFIED TYPE: ICD-10-CM

## 2020-02-05 DIAGNOSIS — M32.9 SYSTEMIC LUPUS ERYTHEMATOSUS, UNSPECIFIED SLE TYPE, UNSPECIFIED ORGAN INVOLVEMENT STATUS: Chronic | ICD-10-CM

## 2020-02-05 LAB
ALBUMIN SERPL BCP-MCNC: 3.9 G/DL (ref 3.5–5.2)
ALP SERPL-CCNC: 95 U/L (ref 55–135)
ALT SERPL W/O P-5'-P-CCNC: 17 U/L (ref 10–44)
ANION GAP SERPL CALC-SCNC: 5 MMOL/L (ref 8–16)
AST SERPL-CCNC: 12 U/L (ref 10–40)
BASOPHILS # BLD AUTO: 0.01 K/UL (ref 0–0.2)
BASOPHILS NFR BLD: 0.2 % (ref 0–1.9)
BILIRUB SERPL-MCNC: 0.3 MG/DL (ref 0.1–1)
BUN SERPL-MCNC: 20 MG/DL (ref 6–20)
C3 SERPL-MCNC: 86 MG/DL (ref 50–180)
C4 SERPL-MCNC: 25 MG/DL (ref 11–44)
CALCIUM SERPL-MCNC: 9.1 MG/DL (ref 8.7–10.5)
CHLORIDE SERPL-SCNC: 105 MMOL/L (ref 95–110)
CO2 SERPL-SCNC: 31 MMOL/L (ref 23–29)
CREAT SERPL-MCNC: 1.5 MG/DL (ref 0.5–1.4)
CRP SERPL-MCNC: 0.2 MG/L (ref 0–8.2)
DIFFERENTIAL METHOD: ABNORMAL
EOSINOPHIL # BLD AUTO: 0.1 K/UL (ref 0–0.5)
EOSINOPHIL NFR BLD: 2.4 % (ref 0–8)
ERYTHROCYTE [DISTWIDTH] IN BLOOD BY AUTOMATED COUNT: 15.9 % (ref 11.5–14.5)
ERYTHROCYTE [SEDIMENTATION RATE] IN BLOOD BY WESTERGREN METHOD: 4 MM/HR (ref 0–20)
EST. GFR  (AFRICAN AMERICAN): 47 ML/MIN/1.73 M^2
EST. GFR  (NON AFRICAN AMERICAN): 41 ML/MIN/1.73 M^2
GLUCOSE SERPL-MCNC: 177 MG/DL (ref 70–110)
HCT VFR BLD AUTO: 32.2 % (ref 37–48.5)
HGB BLD-MCNC: 10.6 G/DL (ref 12–16)
IMM GRANULOCYTES # BLD AUTO: 0.01 K/UL (ref 0–0.04)
IMM GRANULOCYTES NFR BLD AUTO: 0.2 % (ref 0–0.5)
LYMPHOCYTES # BLD AUTO: 1.3 K/UL (ref 1–4.8)
LYMPHOCYTES NFR BLD: 26.7 % (ref 18–48)
MCH RBC QN AUTO: 25.9 PG (ref 27–31)
MCHC RBC AUTO-ENTMCNC: 32.9 G/DL (ref 32–36)
MCV RBC AUTO: 79 FL (ref 82–98)
MONOCYTES # BLD AUTO: 0.4 K/UL (ref 0.3–1)
MONOCYTES NFR BLD: 8.3 % (ref 4–15)
NEUTROPHILS # BLD AUTO: 2.9 K/UL (ref 1.8–7.7)
NEUTROPHILS NFR BLD: 62.2 % (ref 38–73)
NRBC BLD-RTO: 0 /100 WBC
PLATELET # BLD AUTO: 189 K/UL (ref 150–350)
PMV BLD AUTO: 10 FL (ref 9.2–12.9)
POTASSIUM SERPL-SCNC: 4.3 MMOL/L (ref 3.5–5.1)
PROT SERPL-MCNC: 6.5 G/DL (ref 6–8.4)
RBC # BLD AUTO: 4.1 M/UL (ref 4–5.4)
SODIUM SERPL-SCNC: 141 MMOL/L (ref 136–145)
WBC # BLD AUTO: 4.68 K/UL (ref 3.9–12.7)

## 2020-02-05 PROCEDURE — 86225 DNA ANTIBODY NATIVE: CPT

## 2020-02-05 PROCEDURE — 86160 COMPLEMENT ANTIGEN: CPT

## 2020-02-05 PROCEDURE — 80053 COMPREHEN METABOLIC PANEL: CPT

## 2020-02-05 PROCEDURE — 63600175 PHARM REV CODE 636 W HCPCS: Mod: JW,JG | Performed by: INTERNAL MEDICINE

## 2020-02-05 PROCEDURE — 25000003 PHARM REV CODE 250: Performed by: INTERNAL MEDICINE

## 2020-02-05 PROCEDURE — 85652 RBC SED RATE AUTOMATED: CPT

## 2020-02-05 PROCEDURE — 86140 C-REACTIVE PROTEIN: CPT

## 2020-02-05 PROCEDURE — 36415 COLL VENOUS BLD VENIPUNCTURE: CPT

## 2020-02-05 PROCEDURE — 96367 TX/PROPH/DG ADDL SEQ IV INF: CPT

## 2020-02-05 PROCEDURE — 86160 COMPLEMENT ANTIGEN: CPT | Mod: 59

## 2020-02-05 PROCEDURE — 96413 CHEMO IV INFUSION 1 HR: CPT

## 2020-02-05 PROCEDURE — 85025 COMPLETE CBC W/AUTO DIFF WBC: CPT

## 2020-02-05 RX ORDER — SODIUM CHLORIDE 0.9 % (FLUSH) 0.9 %
10 SYRINGE (ML) INJECTION
Status: CANCELLED | OUTPATIENT
Start: 2020-02-12

## 2020-02-05 RX ORDER — HEPARIN 100 UNIT/ML
500 SYRINGE INTRAVENOUS
Status: CANCELLED | OUTPATIENT
Start: 2020-02-12

## 2020-02-05 RX ORDER — ACETAMINOPHEN 325 MG/1
650 TABLET ORAL
Status: COMPLETED | OUTPATIENT
Start: 2020-02-05 | End: 2020-02-05

## 2020-02-05 RX ADMIN — BELIMUMAB 660 MG: 400 INJECTION, POWDER, LYOPHILIZED, FOR SOLUTION INTRAVENOUS at 12:02

## 2020-02-05 RX ADMIN — ACETAMINOPHEN 650 MG: 325 TABLET ORAL at 11:02

## 2020-02-05 RX ADMIN — DIPHENHYDRAMINE HYDROCHLORIDE 25 MG: 50 INJECTION INTRAMUSCULAR; INTRAVENOUS at 12:02

## 2020-02-05 NOTE — PLAN OF CARE
Pt arrived to unit. No complaints voiced. Tolerated Benlysta. No reactions noted. AVS given to pt. Pt ambulated off unit. No distress noted.

## 2020-02-06 ENCOUNTER — PATIENT MESSAGE (OUTPATIENT)
Dept: RHEUMATOLOGY | Facility: CLINIC | Age: 50
End: 2020-02-06

## 2020-02-06 LAB — DSDNA AB SER-ACNC: NORMAL [IU]/ML

## 2020-02-07 ENCOUNTER — PATIENT MESSAGE (OUTPATIENT)
Dept: RHEUMATOLOGY | Facility: CLINIC | Age: 50
End: 2020-02-07

## 2020-02-07 RX ORDER — PANTOPRAZOLE SODIUM 40 MG/1
TABLET, DELAYED RELEASE ORAL
Qty: 30 TABLET | Refills: 5 | OUTPATIENT
Start: 2020-02-07

## 2020-02-19 ENCOUNTER — PATIENT MESSAGE (OUTPATIENT)
Dept: TRANSPLANT | Facility: CLINIC | Age: 50
End: 2020-02-19

## 2020-03-02 ENCOUNTER — PATIENT MESSAGE (OUTPATIENT)
Dept: RHEUMATOLOGY | Facility: CLINIC | Age: 50
End: 2020-03-02

## 2020-03-02 ENCOUNTER — OFFICE VISIT (OUTPATIENT)
Dept: RHEUMATOLOGY | Facility: CLINIC | Age: 50
End: 2020-03-02
Payer: MEDICARE

## 2020-03-02 VITALS
HEART RATE: 90 BPM | HEIGHT: 69 IN | DIASTOLIC BLOOD PRESSURE: 74 MMHG | BODY MASS INDEX: 22.08 KG/M2 | WEIGHT: 149.06 LBS | SYSTOLIC BLOOD PRESSURE: 138 MMHG

## 2020-03-02 DIAGNOSIS — R53.83 FATIGUE, UNSPECIFIED TYPE: ICD-10-CM

## 2020-03-02 DIAGNOSIS — D84.9 IMMUNOSUPPRESSION: ICD-10-CM

## 2020-03-02 DIAGNOSIS — M32.9 SYSTEMIC LUPUS ERYTHEMATOSUS, UNSPECIFIED SLE TYPE, UNSPECIFIED ORGAN INVOLVEMENT STATUS: Primary | Chronic | ICD-10-CM

## 2020-03-02 PROCEDURE — 99999 PR PBB SHADOW E&M-EST. PATIENT-LVL III: CPT | Mod: PBBFAC,,, | Performed by: INTERNAL MEDICINE

## 2020-03-02 PROCEDURE — 3008F BODY MASS INDEX DOCD: CPT | Mod: CPTII,S$GLB,, | Performed by: INTERNAL MEDICINE

## 2020-03-02 PROCEDURE — 99999 PR PBB SHADOW E&M-EST. PATIENT-LVL III: ICD-10-PCS | Mod: PBBFAC,,, | Performed by: INTERNAL MEDICINE

## 2020-03-02 PROCEDURE — 3078F DIAST BP <80 MM HG: CPT | Mod: CPTII,S$GLB,, | Performed by: INTERNAL MEDICINE

## 2020-03-02 PROCEDURE — 3075F PR MOST RECENT SYSTOLIC BLOOD PRESS GE 130-139MM HG: ICD-10-PCS | Mod: CPTII,S$GLB,, | Performed by: INTERNAL MEDICINE

## 2020-03-02 PROCEDURE — 3078F PR MOST RECENT DIASTOLIC BLOOD PRESSURE < 80 MM HG: ICD-10-PCS | Mod: CPTII,S$GLB,, | Performed by: INTERNAL MEDICINE

## 2020-03-02 PROCEDURE — 3008F PR BODY MASS INDEX (BMI) DOCUMENTED: ICD-10-PCS | Mod: CPTII,S$GLB,, | Performed by: INTERNAL MEDICINE

## 2020-03-02 PROCEDURE — 99214 OFFICE O/P EST MOD 30 MIN: CPT | Mod: S$GLB,,, | Performed by: INTERNAL MEDICINE

## 2020-03-02 PROCEDURE — 3075F SYST BP GE 130 - 139MM HG: CPT | Mod: CPTII,S$GLB,, | Performed by: INTERNAL MEDICINE

## 2020-03-02 PROCEDURE — 99214 PR OFFICE/OUTPT VISIT, EST, LEVL IV, 30-39 MIN: ICD-10-PCS | Mod: S$GLB,,, | Performed by: INTERNAL MEDICINE

## 2020-03-02 RX ORDER — HYDROXYCHLOROQUINE SULFATE 200 MG/1
200 TABLET, FILM COATED ORAL 2 TIMES DAILY
Qty: 60 TABLET | Refills: 2 | Status: SHIPPED | OUTPATIENT
Start: 2020-03-02 | End: 2020-06-12

## 2020-03-02 ASSESSMENT — ROUTINE ASSESSMENT OF PATIENT INDEX DATA (RAPID3)
MDHAQ FUNCTION SCORE: .7
AM STIFFNESS SCORE: 1, YES
PATIENT GLOBAL ASSESSMENT SCORE: 2
TOTAL RAPID3 SCORE: 1.94
PAIN SCORE: 1.5
PSYCHOLOGICAL DISTRESS SCORE: 4.4
WHEN YOU AWAKENED IN THE MORNING OVER THE LAST WEEK, PLEASE INDICATE THE AMOUNT OF TIME IT TAKES UNTIL YOU ARE AS LIMBER AS YOU WILL BE FOR THE DAY: 5 HOURS
FATIGUE SCORE: 2

## 2020-03-02 ASSESSMENT — SYSTEMIC LUPUS ERYTHEMATOSUS DISEASE ACTIVITY INDEX (SLEDAI): TOTAL_SCORE: 0
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
## 2020-03-02 NOTE — PROGRESS NOTES
Rapid3 Question Responses and Scores 3/2/2020   MDHAQ Score 0.7   Psychologic Score 4.4   Pain Score 1.5   When you awakened in the morning OVER THE LAST WEEK, did you feel stiff? Yes   If Yes, please indicate the number of hours until you are as limber as you will be for the day 5   Fatigue Score 2   Global Health Score 2   RAPID3 Score 1.94       Answers for HPI/ROS submitted by the patient on 3/2/2020   fever: No  eye redness: No  headaches: No  shortness of breath: No  chest pain: No  diarrhea: No  constipation: Yes  unexpected weight change: Yes  genital sore: No  dysuria: No  During the last 3 days, have you had a skin rash?: No  Bruises or bleeds easily: Yes  cough: Yes

## 2020-03-03 ENCOUNTER — PATIENT MESSAGE (OUTPATIENT)
Dept: ENDOCRINOLOGY | Facility: CLINIC | Age: 50
End: 2020-03-03

## 2020-03-03 ENCOUNTER — PATIENT MESSAGE (OUTPATIENT)
Dept: TRANSPLANT | Facility: CLINIC | Age: 50
End: 2020-03-03

## 2020-03-03 ENCOUNTER — PATIENT MESSAGE (OUTPATIENT)
Dept: OBSTETRICS AND GYNECOLOGY | Facility: CLINIC | Age: 50
End: 2020-03-03

## 2020-03-04 ENCOUNTER — INFUSION (OUTPATIENT)
Dept: INFUSION THERAPY | Facility: HOSPITAL | Age: 50
End: 2020-03-04
Attending: INTERNAL MEDICINE
Payer: MEDICARE

## 2020-03-04 VITALS
OXYGEN SATURATION: 99 % | SYSTOLIC BLOOD PRESSURE: 142 MMHG | WEIGHT: 149.06 LBS | BODY MASS INDEX: 22.01 KG/M2 | HEART RATE: 87 BPM | RESPIRATION RATE: 18 BRPM | TEMPERATURE: 98 F | DIASTOLIC BLOOD PRESSURE: 76 MMHG

## 2020-03-04 DIAGNOSIS — M32.9 SYSTEMIC LUPUS ERYTHEMATOSUS, UNSPECIFIED SLE TYPE, UNSPECIFIED ORGAN INVOLVEMENT STATUS: Primary | ICD-10-CM

## 2020-03-04 DIAGNOSIS — M81.0 OSTEOPOROSIS: ICD-10-CM

## 2020-03-04 PROCEDURE — 96367 TX/PROPH/DG ADDL SEQ IV INF: CPT

## 2020-03-04 PROCEDURE — 63600175 PHARM REV CODE 636 W HCPCS: Performed by: INTERNAL MEDICINE

## 2020-03-04 PROCEDURE — 96413 CHEMO IV INFUSION 1 HR: CPT

## 2020-03-04 PROCEDURE — 25000003 PHARM REV CODE 250: Performed by: INTERNAL MEDICINE

## 2020-03-04 RX ORDER — SODIUM CHLORIDE 0.9 % (FLUSH) 0.9 %
10 SYRINGE (ML) INJECTION
Status: DISCONTINUED | OUTPATIENT
Start: 2020-03-04 | End: 2020-03-04 | Stop reason: HOSPADM

## 2020-03-04 RX ORDER — ACETAMINOPHEN 325 MG/1
650 TABLET ORAL
Status: CANCELLED | OUTPATIENT
Start: 2020-03-18

## 2020-03-04 RX ORDER — SODIUM CHLORIDE 0.9 % (FLUSH) 0.9 %
10 SYRINGE (ML) INJECTION
Status: CANCELLED | OUTPATIENT
Start: 2020-03-11

## 2020-03-04 RX ORDER — HEPARIN 100 UNIT/ML
500 SYRINGE INTRAVENOUS
Status: CANCELLED | OUTPATIENT
Start: 2020-03-11

## 2020-03-04 RX ORDER — ACETAMINOPHEN 325 MG/1
650 TABLET ORAL
Status: COMPLETED | OUTPATIENT
Start: 2020-03-04 | End: 2020-03-04

## 2020-03-04 RX ADMIN — DIPHENHYDRAMINE HYDROCHLORIDE 25 MG: 50 INJECTION INTRAMUSCULAR; INTRAVENOUS at 11:03

## 2020-03-04 RX ADMIN — BELIMUMAB 676 MG: 400 INJECTION, POWDER, LYOPHILIZED, FOR SOLUTION INTRAVENOUS at 11:03

## 2020-03-04 RX ADMIN — ACETAMINOPHEN 650 MG: 325 TABLET ORAL at 11:03

## 2020-03-04 NOTE — PLAN OF CARE
Pt arrived to unit. Cleared for Benlysta per Dr. Crocker. Pt has resolving sunburn to nose. No open skin. No side effects of Benlysta or symptoms of lupus reported. Tolerated tylenol, benadryl and benlysta. AVS given to pt. Pt ambulated off unit. No distress noted.

## 2020-03-05 ENCOUNTER — PATIENT MESSAGE (OUTPATIENT)
Dept: TRANSPLANT | Facility: CLINIC | Age: 50
End: 2020-03-05

## 2020-03-09 ENCOUNTER — PATIENT MESSAGE (OUTPATIENT)
Dept: TRANSPLANT | Facility: CLINIC | Age: 50
End: 2020-03-09

## 2020-03-09 ENCOUNTER — PATIENT MESSAGE (OUTPATIENT)
Dept: OPTOMETRY | Facility: CLINIC | Age: 50
End: 2020-03-09

## 2020-03-09 ENCOUNTER — PATIENT MESSAGE (OUTPATIENT)
Dept: RHEUMATOLOGY | Facility: CLINIC | Age: 50
End: 2020-03-09

## 2020-03-09 RX ORDER — PANTOPRAZOLE SODIUM 40 MG/1
TABLET, DELAYED RELEASE ORAL
Qty: 30 TABLET | Refills: 5 | Status: SHIPPED | OUTPATIENT
Start: 2020-03-09 | End: 2020-07-13

## 2020-03-12 ENCOUNTER — PATIENT MESSAGE (OUTPATIENT)
Dept: RHEUMATOLOGY | Facility: CLINIC | Age: 50
End: 2020-03-12

## 2020-03-19 ENCOUNTER — PATIENT MESSAGE (OUTPATIENT)
Dept: TRANSPLANT | Facility: CLINIC | Age: 50
End: 2020-03-19

## 2020-03-19 ENCOUNTER — PATIENT MESSAGE (OUTPATIENT)
Dept: RHEUMATOLOGY | Facility: CLINIC | Age: 50
End: 2020-03-19

## 2020-03-23 PROBLEM — M54.50 CHRONIC BILATERAL LOW BACK PAIN WITHOUT SCIATICA: Status: RESOLVED | Noted: 2018-04-02 | Resolved: 2020-03-23

## 2020-03-23 PROBLEM — M54.50 CHRONIC MIDLINE LOW BACK PAIN WITHOUT SCIATICA: Status: RESOLVED | Noted: 2018-08-14 | Resolved: 2020-03-23

## 2020-03-23 PROBLEM — M25.512 CHRONIC LEFT SHOULDER PAIN: Status: RESOLVED | Noted: 2018-08-14 | Resolved: 2020-03-23

## 2020-03-23 PROBLEM — R26.89 BALANCE PROBLEMS: Status: RESOLVED | Noted: 2019-12-06 | Resolved: 2020-03-23

## 2020-03-23 PROBLEM — M79.671 PAIN IN BOTH FEET: Status: RESOLVED | Noted: 2019-12-06 | Resolved: 2020-03-23

## 2020-03-23 PROBLEM — R53.1 DECREASED STRENGTH, ENDURANCE, AND MOBILITY: Status: RESOLVED | Noted: 2019-12-06 | Resolved: 2020-03-23

## 2020-03-23 PROBLEM — R68.89 DECREASED STRENGTH, ENDURANCE, AND MOBILITY: Status: RESOLVED | Noted: 2019-12-06 | Resolved: 2020-03-23

## 2020-03-23 PROBLEM — G89.29 CHRONIC BILATERAL LOW BACK PAIN WITHOUT SCIATICA: Status: RESOLVED | Noted: 2018-04-02 | Resolved: 2020-03-23

## 2020-03-23 PROBLEM — M79.672 PAIN IN BOTH FEET: Status: RESOLVED | Noted: 2019-12-06 | Resolved: 2020-03-23

## 2020-03-23 PROBLEM — G89.29 CHRONIC MIDLINE LOW BACK PAIN WITHOUT SCIATICA: Status: RESOLVED | Noted: 2018-08-14 | Resolved: 2020-03-23

## 2020-03-23 PROBLEM — Z74.09 DECREASED STRENGTH, ENDURANCE, AND MOBILITY: Status: RESOLVED | Noted: 2019-12-06 | Resolved: 2020-03-23

## 2020-03-23 PROBLEM — M25.671 DECREASED RANGE OF MOTION OF BOTH ANKLES: Status: RESOLVED | Noted: 2019-12-06 | Resolved: 2020-03-23

## 2020-03-23 PROBLEM — M25.672 DECREASED RANGE OF MOTION OF BOTH ANKLES: Status: RESOLVED | Noted: 2019-12-06 | Resolved: 2020-03-23

## 2020-03-23 PROBLEM — G89.29 CHRONIC LEFT SHOULDER PAIN: Status: RESOLVED | Noted: 2018-08-14 | Resolved: 2020-03-23

## 2020-03-25 ENCOUNTER — DOCUMENTATION ONLY (OUTPATIENT)
Dept: REHABILITATION | Facility: HOSPITAL | Age: 50
End: 2020-03-25

## 2020-03-25 NOTE — PROGRESS NOTES
Outpatient Therapy Discharge Summary     Name: Valencia Dumont  [facility] Number: [id number]    Therapy Diagnosis:  Encounter Diagnoses   Name Primary?   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 Pt agrees with goals set.  1. Patient demonstrates independence with HEP.   3. Patient demonstrates independence with body mechanics.   4. Patient demonstrates increased ankle ROM by 5 degrees to improve tolerance to functional activities pain free.    5. Patient demonstrates ability to walk 2 blocks with no difficulty in order to improve community mobility.      Long Term GOALS: 8 weeks. Pt agrees with goals set.  1. Patient demonstrates increased ability to perform single leg stance for 10 seconds without UE support to demonstrate improved stability.    2. Patient demonstrates increased strength BLE's to 4+/5 or greater to improve tolerance to functional activities pain free.   3. Patient demonstrates improved overall function per FOTO Ankle Survey to 30% Limitation or less.   4. Patient demonstrates ability to walk a mile with no difficulty in order to improve community mobility.     Discharge reason: Patient has not attended therapy since 12/6/2019    Plan   This patient is discharged from Physical Therapy    Jacinda Nava, PT, DPT   3/25/2020

## 2020-04-01 ENCOUNTER — INFUSION (OUTPATIENT)
Dept: INFUSION THERAPY | Facility: HOSPITAL | Age: 50
End: 2020-04-01
Attending: INTERNAL MEDICINE
Payer: MEDICARE

## 2020-04-01 VITALS
HEIGHT: 69 IN | TEMPERATURE: 98 F | OXYGEN SATURATION: 99 % | SYSTOLIC BLOOD PRESSURE: 146 MMHG | RESPIRATION RATE: 18 BRPM | HEART RATE: 91 BPM | BODY MASS INDEX: 21.62 KG/M2 | DIASTOLIC BLOOD PRESSURE: 74 MMHG | WEIGHT: 146 LBS

## 2020-04-01 DIAGNOSIS — M32.9 SYSTEMIC LUPUS ERYTHEMATOSUS, UNSPECIFIED SLE TYPE, UNSPECIFIED ORGAN INVOLVEMENT STATUS: Primary | ICD-10-CM

## 2020-04-01 LAB
ALBUMIN SERPL BCP-MCNC: 4.2 G/DL (ref 3.5–5.2)
ALP SERPL-CCNC: 97 U/L (ref 55–135)
ALT SERPL W/O P-5'-P-CCNC: 19 U/L (ref 10–44)
ANION GAP SERPL CALC-SCNC: 8 MMOL/L (ref 8–16)
AST SERPL-CCNC: 26 U/L (ref 10–40)
BASOPHILS # BLD AUTO: 0.01 K/UL (ref 0–0.2)
BASOPHILS NFR BLD: 0.2 % (ref 0–1.9)
BILIRUB SERPL-MCNC: 0.4 MG/DL (ref 0.1–1)
BUN SERPL-MCNC: 21 MG/DL (ref 6–20)
CALCIUM SERPL-MCNC: 9.1 MG/DL (ref 8.7–10.5)
CHLORIDE SERPL-SCNC: 104 MMOL/L (ref 95–110)
CO2 SERPL-SCNC: 28 MMOL/L (ref 23–29)
CREAT SERPL-MCNC: 1.5 MG/DL (ref 0.5–1.4)
DIFFERENTIAL METHOD: ABNORMAL
EOSINOPHIL # BLD AUTO: 0.1 K/UL (ref 0–0.5)
EOSINOPHIL NFR BLD: 1.7 % (ref 0–8)
ERYTHROCYTE [DISTWIDTH] IN BLOOD BY AUTOMATED COUNT: 14.1 % (ref 11.5–14.5)
EST. GFR  (AFRICAN AMERICAN): 47 ML/MIN/1.73 M^2
EST. GFR  (NON AFRICAN AMERICAN): 41 ML/MIN/1.73 M^2
GLUCOSE SERPL-MCNC: 61 MG/DL (ref 70–110)
HCT VFR BLD AUTO: 36.4 % (ref 37–48.5)
HGB BLD-MCNC: 12.3 G/DL (ref 12–16)
IMM GRANULOCYTES # BLD AUTO: 0.01 K/UL (ref 0–0.04)
IMM GRANULOCYTES NFR BLD AUTO: 0.2 % (ref 0–0.5)
LYMPHOCYTES # BLD AUTO: 1.8 K/UL (ref 1–4.8)
LYMPHOCYTES NFR BLD: 43.3 % (ref 18–48)
MCH RBC QN AUTO: 26.8 PG (ref 27–31)
MCHC RBC AUTO-ENTMCNC: 33.8 G/DL (ref 32–36)
MCV RBC AUTO: 79 FL (ref 82–98)
MONOCYTES # BLD AUTO: 0.3 K/UL (ref 0.3–1)
MONOCYTES NFR BLD: 7 % (ref 4–15)
NEUTROPHILS # BLD AUTO: 2 K/UL (ref 1.8–7.7)
NEUTROPHILS NFR BLD: 47.6 % (ref 38–73)
NRBC BLD-RTO: 0 /100 WBC
PLATELET # BLD AUTO: 190 K/UL (ref 150–350)
PMV BLD AUTO: 10.1 FL (ref 9.2–12.9)
POTASSIUM SERPL-SCNC: 4.6 MMOL/L (ref 3.5–5.1)
PROT SERPL-MCNC: 7.7 G/DL (ref 6–8.4)
RBC # BLD AUTO: 4.59 M/UL (ref 4–5.4)
SODIUM SERPL-SCNC: 140 MMOL/L (ref 136–145)
WBC # BLD AUTO: 4.13 K/UL (ref 3.9–12.7)

## 2020-04-01 PROCEDURE — 96413 CHEMO IV INFUSION 1 HR: CPT

## 2020-04-01 PROCEDURE — 63600175 PHARM REV CODE 636 W HCPCS: Performed by: INTERNAL MEDICINE

## 2020-04-01 PROCEDURE — 85025 COMPLETE CBC W/AUTO DIFF WBC: CPT

## 2020-04-01 PROCEDURE — 36415 COLL VENOUS BLD VENIPUNCTURE: CPT

## 2020-04-01 PROCEDURE — 25000003 PHARM REV CODE 250: Performed by: INTERNAL MEDICINE

## 2020-04-01 PROCEDURE — 96367 TX/PROPH/DG ADDL SEQ IV INF: CPT

## 2020-04-01 PROCEDURE — 80053 COMPREHEN METABOLIC PANEL: CPT

## 2020-04-01 RX ORDER — ACETAMINOPHEN 325 MG/1
650 TABLET ORAL
Status: COMPLETED | OUTPATIENT
Start: 2020-04-01 | End: 2020-04-01

## 2020-04-01 RX ADMIN — BELIMUMAB 662 MG: 400 INJECTION, POWDER, LYOPHILIZED, FOR SOLUTION INTRAVENOUS at 12:04

## 2020-04-01 RX ADMIN — ACETAMINOPHEN 650 MG: 325 TABLET ORAL at 12:04

## 2020-04-01 RX ADMIN — DIPHENHYDRAMINE HYDROCHLORIDE 25 MG: 50 INJECTION INTRAMUSCULAR; INTRAVENOUS at 12:04

## 2020-04-01 NOTE — PLAN OF CARE
Patient arrived to unit for monthly Benlysta. Patient denies any new or worsening symptoms at this time. Plan of care reviewed, patient agreeable to plan. Patient tolerated treatment well. No sign of reaction noted. VSS. Patient received discharge instructions and follow up appointments. Patient instructed to return 4/29/20. Verbalized understanding and ambulated unassisted off unit. Patient in NAD at time of discharge.

## 2020-04-02 ENCOUNTER — PATIENT MESSAGE (OUTPATIENT)
Dept: RHEUMATOLOGY | Facility: CLINIC | Age: 50
End: 2020-04-02

## 2020-04-20 DIAGNOSIS — D84.9 IMMUNOSUPPRESSION: ICD-10-CM

## 2020-04-20 RX ORDER — MYCOPHENOLIC ACID 180 MG/1
TABLET, DELAYED RELEASE ORAL
Qty: 120 TABLET | Refills: 5 | Status: SHIPPED | OUTPATIENT
Start: 2020-04-20 | End: 2020-10-02

## 2020-04-21 ENCOUNTER — LAB VISIT (OUTPATIENT)
Dept: LAB | Facility: HOSPITAL | Age: 50
End: 2020-04-21
Attending: INTERNAL MEDICINE
Payer: MEDICARE

## 2020-04-21 DIAGNOSIS — Z94.4 LIVER TRANSPLANTED: ICD-10-CM

## 2020-04-21 LAB
ALBUMIN SERPL BCP-MCNC: 4.2 G/DL (ref 3.5–5.2)
ALP SERPL-CCNC: 93 U/L (ref 55–135)
ALT SERPL W/O P-5'-P-CCNC: 12 U/L (ref 10–44)
ANION GAP SERPL CALC-SCNC: 8 MMOL/L (ref 8–16)
AST SERPL-CCNC: 16 U/L (ref 10–40)
BASOPHILS # BLD AUTO: 0.01 K/UL (ref 0–0.2)
BASOPHILS NFR BLD: 0.2 % (ref 0–1.9)
BILIRUB SERPL-MCNC: 0.4 MG/DL (ref 0.1–1)
BUN SERPL-MCNC: 25 MG/DL (ref 6–20)
CALCIUM SERPL-MCNC: 9.1 MG/DL (ref 8.7–10.5)
CHLORIDE SERPL-SCNC: 105 MMOL/L (ref 95–110)
CO2 SERPL-SCNC: 26 MMOL/L (ref 23–29)
CREAT SERPL-MCNC: 1.5 MG/DL (ref 0.5–1.4)
DIFFERENTIAL METHOD: ABNORMAL
EOSINOPHIL # BLD AUTO: 0.1 K/UL (ref 0–0.5)
EOSINOPHIL NFR BLD: 2.3 % (ref 0–8)
ERYTHROCYTE [DISTWIDTH] IN BLOOD BY AUTOMATED COUNT: 13.4 % (ref 11.5–14.5)
EST. GFR  (AFRICAN AMERICAN): 46 ML/MIN/1.73 M^2
EST. GFR  (NON AFRICAN AMERICAN): 40 ML/MIN/1.73 M^2
GLUCOSE SERPL-MCNC: 81 MG/DL (ref 70–110)
HCT VFR BLD AUTO: 36.6 % (ref 37–48.5)
HGB BLD-MCNC: 12 G/DL (ref 12–16)
IMM GRANULOCYTES # BLD AUTO: 0 K/UL (ref 0–0.04)
IMM GRANULOCYTES NFR BLD AUTO: 0 % (ref 0–0.5)
LYMPHOCYTES # BLD AUTO: 2.2 K/UL (ref 1–4.8)
LYMPHOCYTES NFR BLD: 49.8 % (ref 18–48)
MAGNESIUM SERPL-MCNC: 1.8 MG/DL (ref 1.6–2.6)
MCH RBC QN AUTO: 26.4 PG (ref 27–31)
MCHC RBC AUTO-ENTMCNC: 32.8 G/DL (ref 32–36)
MCV RBC AUTO: 81 FL (ref 82–98)
MONOCYTES # BLD AUTO: 0.4 K/UL (ref 0.3–1)
MONOCYTES NFR BLD: 9.3 % (ref 4–15)
NEUTROPHILS # BLD AUTO: 1.7 K/UL (ref 1.8–7.7)
NEUTROPHILS NFR BLD: 38.4 % (ref 38–73)
NRBC BLD-RTO: 0 /100 WBC
PLATELET # BLD AUTO: 204 K/UL (ref 150–350)
PMV BLD AUTO: 9.8 FL (ref 9.2–12.9)
POTASSIUM SERPL-SCNC: 3.9 MMOL/L (ref 3.5–5.1)
PROT SERPL-MCNC: 6.9 G/DL (ref 6–8.4)
RBC # BLD AUTO: 4.54 M/UL (ref 4–5.4)
SODIUM SERPL-SCNC: 139 MMOL/L (ref 136–145)
WBC # BLD AUTO: 4.42 K/UL (ref 3.9–12.7)

## 2020-04-21 PROCEDURE — 80053 COMPREHEN METABOLIC PANEL: CPT

## 2020-04-21 PROCEDURE — 80197 ASSAY OF TACROLIMUS: CPT

## 2020-04-21 PROCEDURE — 83735 ASSAY OF MAGNESIUM: CPT

## 2020-04-21 PROCEDURE — 36415 COLL VENOUS BLD VENIPUNCTURE: CPT

## 2020-04-21 PROCEDURE — 85025 COMPLETE CBC W/AUTO DIFF WBC: CPT

## 2020-04-22 LAB — TACROLIMUS BLD-MCNC: 7.6 NG/ML (ref 5–15)

## 2020-04-23 ENCOUNTER — TELEPHONE (OUTPATIENT)
Dept: TRANSPLANT | Facility: CLINIC | Age: 50
End: 2020-04-23

## 2020-04-23 NOTE — LETTER
April 23, 2020    Valencia Dumont  [address] [address]          Dear Valencia Dumont:  MRN: [id number]    This is a follow up to your recent labs, your lab results were stable.  There are no medicine changes.  Please have your labs drawn again on 7/20/20.      If you cannot have your labs drawn on the scheduled date, it is your responsibility to call the transplant department to reschedule.  To reschedule or make an appointment, please as to speak to or leave a message for my assistant, Crystal Ferguson or Rosangela, at [phone].  When leaving a message for Crystal Ferguson Angela or myself, we ask that you leave a brief message regarding your request.    Sincerely,    Linda Barakat, RN,BSN,CCTC    Your [facility] Transplant Coordinator    [facility]  [address] [place] [id number]  [phone]

## 2020-04-23 NOTE — TELEPHONE ENCOUNTER
----- Message from Miriam Abernathy MD sent at 4/22/2020  6:12 PM CDT -----  Reviewed, nothing to do; repeat per routine

## 2020-04-24 ENCOUNTER — PATIENT MESSAGE (OUTPATIENT)
Dept: RHEUMATOLOGY | Facility: CLINIC | Age: 50
End: 2020-04-24

## 2020-04-24 DIAGNOSIS — Z13.9 SCREENING FOR CONDITION: Primary | ICD-10-CM

## 2020-04-24 NOTE — PROGRESS NOTES
04/24/2020      In an effort to protect our immunocompromised patients from potential exposure to COVID-19, [facility] will now require all patients receiving an infusion, an injection, and/or radiation therapy to be tested for COVID-19 prior to their appointment.  All patients currently under treatment will be tested immediately, and patients initiating new treatment cycles or with one-time appointments (injections, transfusions, etc.) must be tested within 72 hours of their appointment.     Placed COVID-19 test order for patient.  A member of our team is to contact the patient in the near future to explain this process and the rationale behind it, to ask the COVID-19 screening questions, and to get the patient scheduled for their COVID-19 test.     The above was completed in accordance with instructions and guidelines set forth by [facility].     Signed,    Manuel Davies, DAVID     Date:  04/24/2020

## 2020-04-27 ENCOUNTER — TELEPHONE (OUTPATIENT)
Dept: ADMINISTRATIVE | Facility: CLINIC | Age: 50
End: 2020-04-27

## 2020-04-27 ENCOUNTER — PATIENT MESSAGE (OUTPATIENT)
Dept: RHEUMATOLOGY | Facility: CLINIC | Age: 50
End: 2020-04-27

## 2020-04-27 NOTE — TELEPHONE ENCOUNTER
Phoned the patient to schedule COVID testing prior to next appt.  No answer; left voicemail(s) with my contact information, and asked the patient to return my call at her earliest convenience.

## 2020-04-27 NOTE — TELEPHONE ENCOUNTER
04/27/2020      Phoned the patient.      Discussed the following with the patient:  In an effort to protect our immunocompromised patients from potential exposure to COVID-19, [facility] will now require all patients receiving an infusion, an injection, and/or radiation therapy to be tested for COVID-19 prior to their appointment.  All patients currently under treatment will be tested immediately, and patients initiating new treatment cycles or with one-time appointments (injections, transfusions, etc.) must be tested within 72 hours of their appointment.      Symptom Patient's Response   Fever YES/NO: no   Cough YES/NO: no   Shortness of breath  YES/NO: no   Difficulty breathing YES/NO: no   GI symptoms such as diarrhea or nausea YES/NO: no   Loss of taste YES/NO: no   Loss of smell YES/NO: no     Other Screening Patient's Response   Has the patient previously undergone COVID-19 testing? YES/NO: no     If yes to the question directly above, what was the result? not applicable   Has the patient been in close contact with someone who has undergone COVID-19 testing? YES/NO: no     If yes to the question directly above, what was the result? not applicable      The patient's 24-hour COVID-19 test was ordered and scheduled.  Reviewed the appointment date, time, and location with the patient. ( patient states rapid covid  Is  Scheduled already through the infusion center)     Advised the patient that she can expect the results to take approximately 24 hours.  Advised the patient that someone will reach out to her regarding her results if she tests positive, as her treatment appointment will be rescheduled if she tests positive for COVID-19.  Also advised the patient that if she does not hear back from our office or if she is told by our office she has tested negative for COVID-19, she can proceed with treatment as originally planned.     Questions were answered to the patient's satisfaction, and the patient verbalized  understanding of information and agreement with the plan.       The above was completed in accordance with instructions and guidelines set forth by [facility].     SignedFlower     Date:  04/27/2020

## 2020-04-29 ENCOUNTER — LAB VISIT (OUTPATIENT)
Dept: INFUSION THERAPY | Facility: HOSPITAL | Age: 50
End: 2020-04-29
Attending: NURSE PRACTITIONER
Payer: MEDICARE

## 2020-04-29 ENCOUNTER — INFUSION (OUTPATIENT)
Dept: INFUSION THERAPY | Facility: HOSPITAL | Age: 50
End: 2020-04-29
Attending: INTERNAL MEDICINE
Payer: MEDICARE

## 2020-04-29 ENCOUNTER — TELEPHONE (OUTPATIENT)
Dept: OBSTETRICS AND GYNECOLOGY | Facility: CLINIC | Age: 50
End: 2020-04-29

## 2020-04-29 VITALS
HEART RATE: 84 BPM | SYSTOLIC BLOOD PRESSURE: 153 MMHG | HEIGHT: 69 IN | TEMPERATURE: 98 F | WEIGHT: 143 LBS | BODY MASS INDEX: 21.18 KG/M2 | DIASTOLIC BLOOD PRESSURE: 76 MMHG | OXYGEN SATURATION: 98 % | RESPIRATION RATE: 18 BRPM

## 2020-04-29 DIAGNOSIS — M32.9 SYSTEMIC LUPUS ERYTHEMATOSUS, UNSPECIFIED SLE TYPE, UNSPECIFIED ORGAN INVOLVEMENT STATUS: Primary | ICD-10-CM

## 2020-04-29 DIAGNOSIS — M32.9 SLE (SYSTEMIC LUPUS ERYTHEMATOSUS): Primary | Chronic | ICD-10-CM

## 2020-04-29 LAB — SARS-COV-2 RDRP RESP QL NAA+PROBE: NEGATIVE

## 2020-04-29 PROCEDURE — 25000003 PHARM REV CODE 250: Performed by: INTERNAL MEDICINE

## 2020-04-29 PROCEDURE — 63600175 PHARM REV CODE 636 W HCPCS: Mod: JG | Performed by: INTERNAL MEDICINE

## 2020-04-29 PROCEDURE — 96413 CHEMO IV INFUSION 1 HR: CPT

## 2020-04-29 PROCEDURE — U0002 COVID-19 LAB TEST NON-CDC: HCPCS

## 2020-04-29 PROCEDURE — 96365 THER/PROPH/DIAG IV INF INIT: CPT

## 2020-04-29 PROCEDURE — 96367 TX/PROPH/DG ADDL SEQ IV INF: CPT

## 2020-04-29 RX ORDER — ACETAMINOPHEN 325 MG/1
650 TABLET ORAL
Status: COMPLETED | OUTPATIENT
Start: 2020-04-29 | End: 2020-04-29

## 2020-04-29 RX ORDER — ACETAMINOPHEN 325 MG/1
650 TABLET ORAL
Status: CANCELLED | OUTPATIENT
Start: 2020-04-29

## 2020-04-29 RX ADMIN — BELIMUMAB 663 MG: 400 INJECTION, POWDER, LYOPHILIZED, FOR SOLUTION INTRAVENOUS at 12:04

## 2020-04-29 RX ADMIN — ACETAMINOPHEN 650 MG: 325 TABLET ORAL at 11:04

## 2020-04-29 RX ADMIN — DIPHENHYDRAMINE HYDROCHLORIDE 25 MG: 50 INJECTION, SOLUTION INTRAMUSCULAR; INTRAVENOUS at 11:04

## 2020-04-29 NOTE — PROGRESS NOTES
Phoned the patient to let her know that her COVID-19 test came back negative and that, based on that result, she can proceed with treatment as indicated by her treatment provider.  CC: Rheumatology team.        Manuel Davies DNP, APRN, FNP-BC, AOCNP  [facility], 3rd Floor  [facility] [address], [place]  [address]  [phone]

## 2020-04-29 NOTE — PLAN OF CARE
Patient arrived to unit for Benlysta infusion. Plan of care reviewed, patient agreeable to plan. Patient stated her thrush has improved significantly. Patient given Tylenol and Benadryl premeds. Patient tolerated infusion well. VSS. Discharge instructions reviewed, patient instructed to return 5/27/20. Patient ambulated unassisted off unit. Patient in NAD at time of discharge.

## 2020-04-29 NOTE — TELEPHONE ENCOUNTER
Contacted pt to cancel WWE, pt understood and accepted. Stated that if she had any problems or concerns at a later date she would call back.

## 2020-05-12 ENCOUNTER — PATIENT MESSAGE (OUTPATIENT)
Dept: ENDOCRINOLOGY | Facility: CLINIC | Age: 50
End: 2020-05-12

## 2020-05-12 ENCOUNTER — OFFICE VISIT (OUTPATIENT)
Dept: ENDOCRINOLOGY | Facility: CLINIC | Age: 50
End: 2020-05-12
Payer: MEDICARE

## 2020-05-12 DIAGNOSIS — Z79.4 TYPE 2 DIABETES MELLITUS WITH STAGE 3 CHRONIC KIDNEY DISEASE, WITH LONG-TERM CURRENT USE OF INSULIN: ICD-10-CM

## 2020-05-12 DIAGNOSIS — M81.0 OSTEOPOROSIS, UNSPECIFIED OSTEOPOROSIS TYPE, UNSPECIFIED PATHOLOGICAL FRACTURE PRESENCE: ICD-10-CM

## 2020-05-12 DIAGNOSIS — N18.30 CKD (CHRONIC KIDNEY DISEASE), STAGE III: ICD-10-CM

## 2020-05-12 DIAGNOSIS — N18.30 TYPE 2 DIABETES MELLITUS WITH STAGE 3 CHRONIC KIDNEY DISEASE, WITH LONG-TERM CURRENT USE OF INSULIN: ICD-10-CM

## 2020-05-12 DIAGNOSIS — E11.22 TYPE 2 DIABETES MELLITUS WITH STAGE 3 CHRONIC KIDNEY DISEASE, WITH LONG-TERM CURRENT USE OF INSULIN: ICD-10-CM

## 2020-05-12 DIAGNOSIS — Z78.0 ASYMPTOMATIC POSTMENOPAUSAL ESTROGEN DEFICIENCY: Primary | ICD-10-CM

## 2020-05-12 DIAGNOSIS — T38.0X5S ADVERSE EFFECT OF GLUCOCORTICOID OR SYNTHETIC ANALOGUE, SEQUELA: ICD-10-CM

## 2020-05-12 PROCEDURE — 99214 OFFICE O/P EST MOD 30 MIN: CPT | Mod: 95,,, | Performed by: INTERNAL MEDICINE

## 2020-05-12 PROCEDURE — 3052F PR MOST RECENT HEMOGLOBIN A1C LEVEL 8.0 - < 9.0%: ICD-10-PCS | Mod: CPTII,95,, | Performed by: INTERNAL MEDICINE

## 2020-05-12 PROCEDURE — 99214 PR OFFICE/OUTPT VISIT, EST, LEVL IV, 30-39 MIN: ICD-10-PCS | Mod: 95,,, | Performed by: INTERNAL MEDICINE

## 2020-05-12 PROCEDURE — 3052F HG A1C>EQUAL 8.0%<EQUAL 9.0%: CPT | Mod: CPTII,95,, | Performed by: INTERNAL MEDICINE

## 2020-05-12 NOTE — PROGRESS NOTES
Subjective:      Patient ID: Valencia Dumont is a 50 y.o. female.    Chief Complaint:  No chief complaint on file.      History of Present Illness    Ms. Dumont is a 50 year old woman with T2DM that is complicated by low bone mass assoc with low FRAX, postmenopausal status, CKD stage III, lupus on MDI and trulicity. Requires low doses of insulin, and intermittent steroids complicates hyperglycemia. Has occasional hypoglycemia as well.    c peptide in the past: 1.47 with glucose of 166.     Steroid injection a few weeks ago for sciatica. Low to mid spine, that radiates down lopez. Scheduled for epidural later this week. Reports blood sugars elevated following steroid injection.     Stopped using dexcom due to bruising  Trying to eat earlier    Current regimen:  Trulicity 0.75 mg weekly   Levemir 4 units twice a day  Novolog 4 units before meals + sliding scale-- does not take if she does not eat. Takes insulin thirty minutes before meals.     Fasting and before dinner or before bedtime  156, 179,   123, 269  107, 149  118, 115  148    Meals: three and a snack    Reports occasional hypoglycemia: 49 (treated) --> 93, 50. These tend to occur between dinner and bedtime.     Osteoporosis (postmenopausal since 2015) on calcium and vitamin D.   Has CKD stage III. No falls or fractures.   Has low FRAX on recent DXA.     Review of Systems   Constitutional: Negative for fever.   HENT: Negative for congestion.    Eyes: Negative for visual disturbance.   Respiratory: Negative for shortness of breath.    Cardiovascular: Negative for chest pain.   Gastrointestinal: Negative for abdominal pain.   Genitourinary: Negative for dysuria.   Musculoskeletal: Negative for arthralgias.   Skin: Negative for rash.   Neurological: Negative for weakness.   Hematological: Does not bruise/bleed easily.   Psychiatric/Behavioral: Negative for sleep disturbance.       Objective:   Physical Exam  There were no vitals filed for this visit.    BP  Readings from Last 3 Encounters:   04/29/20 (!) 153/76   04/01/20 (!) 146/74   03/04/20 (!) 142/76     Wt Readings from Last 1 Encounters:   04/29/20 1100 64.9 kg (143 lb)   04/29/20 1057 66.3 kg (146 lb 0.9 oz)         There is no height or weight on file to calculate BMI.    Lab Review:   Lab Results   Component Value Date    HGBA1C 8.1 (H) 12/11/2019     Lab Results   Component Value Date    CHOL 180 08/19/2019     (H) 08/19/2019    LDLCALC 70.4 08/19/2019    TRIG 43 08/19/2019    CHOLHDL 56.1 (H) 08/19/2019     Lab Results   Component Value Date     05/13/2020    K 4.3 05/13/2020     05/13/2020    CO2 29 05/13/2020     05/13/2020    BUN 21 (H) 05/13/2020    CREATININE 1.4 05/13/2020    CALCIUM 9.8 05/13/2020    PROT 6.9 04/21/2020    ALBUMIN 4.2 05/13/2020    BILITOT 0.4 04/21/2020    ALKPHOS 93 04/21/2020    AST 16 04/21/2020    ALT 12 04/21/2020    ANIONGAP 7 (L) 05/13/2020    ESTGFRAFRICA 51 (A) 05/13/2020    EGFRNONAA 44 (A) 05/13/2020    TSH 0.941 08/19/2019     Results for SUSY STOVER (MRN [id number]) as of 5/12/2020 11:15   Ref. Range 9/9/2019 12:50 12/11/2019 11:07   Hemoglobin A1C External Latest Ref Range: 4.0 - 5.6 % 8.0 (H) 8.1 (H)   Estimated Avg Glucose Latest Ref Range: 68 - 131 mg/dL 183 (H) 186 (H)     DXA 7/3/2019  The L1 to L4 vertebral bone mineral density is equal to 0.945 g/cm squared with a T score of -2.0.  There has been -10.0% change relative to the prior study.    The left femoral neck bone mineral density is equal to 0.699 g/cm squared with a T score of -2.4.    The right femoral neck bone mineral density is equal to 0.693 g/cm squared with a T score of -2.5.  There has been -4.1% change in right femoral neck bone density relative to the prior study.    There is a 4.9% risk of a major osteoporotic fracture and a 0.7% risk of hip fracture in the next 10 years (FRAX).  Assessment and Plan     Type 2 diabetes mellitus with stage 3 chronic kidney disease, with  long-term current use of insulin  Lower insulin dosing based on hypoglycemic risk between dinner and bedtime   levemir 4 units twice a day   novolog 4/4/3  Continue trulicity     Advised to restart dexcom --> consider professional cgm    Repeat cpeptide and A1C along with renal function panel -- asked to please drink juice and eat breakfast and skip meal time insulin to get bg > 150. Advised to check before lab is done to confirm    Advised to take insulin ten to fifteen minutes before meals   Appetite varies.     CKD (chronic kidney disease), stage III  Increases risk for hypoglycemia    Adverse effect of glucocorticoid or synthetic analogue  Does not use steroid for Lupus  But occasionally exposed due to joint pain/sciatica.     Osteoporosis  Consider dxa scan to monitor BMD in July   Risks per FRAX low for fracture   Has BMD T scores that do support treatment. Given age and low risk for fallen would rather use treatment when risk becomes higher.     Continue adequate calcium and vitamin d      F/u in four months with me  Labs tomorrow morning       The patient location is: home  The chief complaint leading to consultation is: T2DM/low frax/low bone mass  Visit type: audiovisual  Total time spent with patient: 25 min  Each patient to whom he or she provides medical services by telemedicine is:  (1) informed of the relationship between the physician and patient and the respective role of any other health care provider with respect to management of the patient; and (2) notified that he or she may decline to receive medical services by telemedicine and may withdraw from such care at any time.

## 2020-05-12 NOTE — ASSESSMENT & PLAN NOTE
Lower insulin dosing based on hypoglycemic risk between dinner and bedtime   levemir 4 units twice a day   novolog 4/4/3  Continue trulicity     Advised to restart dexcom --> consider professional cgm    Repeat cpeptide and A1C along with renal function panel -- asked to please drink juice and eat breakfast and skip meal time insulin to get bg > 150. Advised to check before lab is done to confirm    Advised to take insulin ten to fifteen minutes before meals   Appetite varies.

## 2020-05-12 NOTE — PATIENT INSTRUCTIONS
Continue Levemir 4 units twice a day  Novolog 4 units before breakfast  Novolog 4 units before lunch  Novolog 3 units before dinner     Continue trulicity 0.75 mg weekly     Labs at [facility]

## 2020-05-12 NOTE — ASSESSMENT & PLAN NOTE
Consider dxa scan to monitor BMD in July   Risks per FRAX low for fracture   Has BMD T scores that do support treatment. Given age and low risk for fallen would rather use treatment when risk becomes higher.     Continue adequate calcium and vitamin d

## 2020-05-13 ENCOUNTER — LAB VISIT (OUTPATIENT)
Dept: LAB | Facility: HOSPITAL | Age: 50
End: 2020-05-13
Attending: INTERNAL MEDICINE
Payer: MEDICARE

## 2020-05-13 ENCOUNTER — PATIENT MESSAGE (OUTPATIENT)
Dept: ENDOCRINOLOGY | Facility: CLINIC | Age: 50
End: 2020-05-13

## 2020-05-13 DIAGNOSIS — Z79.4 TYPE 2 DIABETES MELLITUS WITH STAGE 3 CHRONIC KIDNEY DISEASE, WITH LONG-TERM CURRENT USE OF INSULIN: ICD-10-CM

## 2020-05-13 DIAGNOSIS — E11.22 TYPE 2 DIABETES MELLITUS WITH STAGE 3 CHRONIC KIDNEY DISEASE, WITH LONG-TERM CURRENT USE OF INSULIN: ICD-10-CM

## 2020-05-13 DIAGNOSIS — N18.30 TYPE 2 DIABETES MELLITUS WITH STAGE 3 CHRONIC KIDNEY DISEASE, WITH LONG-TERM CURRENT USE OF INSULIN: ICD-10-CM

## 2020-05-13 LAB
ALBUMIN SERPL BCP-MCNC: 4.2 G/DL (ref 3.5–5.2)
ANION GAP SERPL CALC-SCNC: 7 MMOL/L (ref 8–16)
BUN SERPL-MCNC: 21 MG/DL (ref 6–20)
C PEPTIDE SERPL-MCNC: 1.81 NG/ML (ref 0.78–5.19)
CALCIUM SERPL-MCNC: 9.8 MG/DL (ref 8.7–10.5)
CHLORIDE SERPL-SCNC: 104 MMOL/L (ref 95–110)
CO2 SERPL-SCNC: 29 MMOL/L (ref 23–29)
CREAT SERPL-MCNC: 1.4 MG/DL (ref 0.5–1.4)
EST. GFR  (AFRICAN AMERICAN): 51 ML/MIN/1.73 M^2
EST. GFR  (NON AFRICAN AMERICAN): 44 ML/MIN/1.73 M^2
ESTIMATED AVG GLUCOSE: 160 MG/DL (ref 68–131)
GLUCOSE SERPL-MCNC: 108 MG/DL (ref 70–110)
HBA1C MFR BLD HPLC: 7.2 % (ref 4–5.6)
PHOSPHATE SERPL-MCNC: 2.7 MG/DL (ref 2.7–4.5)
POTASSIUM SERPL-SCNC: 4.3 MMOL/L (ref 3.5–5.1)
SODIUM SERPL-SCNC: 140 MMOL/L (ref 136–145)

## 2020-05-13 PROCEDURE — 84681 ASSAY OF C-PEPTIDE: CPT

## 2020-05-13 PROCEDURE — 80069 RENAL FUNCTION PANEL: CPT

## 2020-05-13 PROCEDURE — 36415 COLL VENOUS BLD VENIPUNCTURE: CPT

## 2020-05-13 PROCEDURE — 83036 HEMOGLOBIN GLYCOSYLATED A1C: CPT

## 2020-05-14 ENCOUNTER — PATIENT MESSAGE (OUTPATIENT)
Dept: ENDOCRINOLOGY | Facility: CLINIC | Age: 50
End: 2020-05-14

## 2020-05-14 NOTE — TELEPHONE ENCOUNTER
----- Message from Cathy Sher sent at 5/14/2020  1:52 PM CDT -----  Contact: [facility] [phone]  Pt needs new order for Dexcom supplies Fax # [phone]

## 2020-05-21 ENCOUNTER — OFFICE VISIT (OUTPATIENT)
Dept: OTOLARYNGOLOGY | Facility: CLINIC | Age: 50
End: 2020-05-21
Payer: MEDICARE

## 2020-05-21 VITALS
BODY MASS INDEX: 21.03 KG/M2 | SYSTOLIC BLOOD PRESSURE: 160 MMHG | TEMPERATURE: 98 F | DIASTOLIC BLOOD PRESSURE: 80 MMHG | WEIGHT: 142 LBS | HEIGHT: 69 IN

## 2020-05-21 DIAGNOSIS — H61.23 BILATERAL IMPACTED CERUMEN: Primary | ICD-10-CM

## 2020-05-21 DIAGNOSIS — J02.9 SORE THROAT: ICD-10-CM

## 2020-05-21 PROCEDURE — 99499 NO LOS: ICD-10-PCS | Mod: S$GLB,,, | Performed by: OTOLARYNGOLOGY

## 2020-05-21 PROCEDURE — 69210 EAR CERUMEN REMOVAL: ICD-10-PCS | Mod: S$GLB,,, | Performed by: OTOLARYNGOLOGY

## 2020-05-21 PROCEDURE — 69210 REMOVE IMPACTED EAR WAX UNI: CPT | Mod: S$GLB,,, | Performed by: OTOLARYNGOLOGY

## 2020-05-21 PROCEDURE — 99499 UNLISTED E&M SERVICE: CPT | Mod: S$GLB,,, | Performed by: OTOLARYNGOLOGY

## 2020-05-21 NOTE — PROCEDURES
Ear Cerumen Removal  Date/Time: 5/21/2020 2:30 PM  Performed by: Andrei Livingston MD  Authorized by: Andrei Livingston MD     Consent Done?:  Yes (Verbal)    Local anesthetic:  None  Location details:  Both ears  Procedure type: curette    Cerumen  Removal Results:  Cerumen completely removed  Patient tolerance:  Patient tolerated the procedure well with no immediate complications     Indication: Bilateral cerumen impactions    Description of procedure:  Cerumen disimpaction was performed bilaterally using the otologic microscope. Otologic instruments including curettes were used to remove the cerumen from the bilateral external auditory canals under visualization with the otomicroscope. An ear speculum was placed in the right ear, cerumen removed, and this side was then examined including the external auditory canal, tympanic membrane and middle ear space to the extent visible. An ear speculum was then placed in the left ear, cerumen removed, and then examined in a similar fashion to the contralateral side including the external auditory canal, tympanic membrane, and middle ear space. All portions of the cerumen removal procedure and examination by otomicroscopy were tolerated well without complication. Findings and recommendations were explained to the patient.     Key findings:   Right ear: Complete cerumen impaction removed entirely revealing normal external auditory canal; tympanic membrane without bulging, retraction, or perforation; no evidence of middle ear fluid or effusion.     Left ear: Complete cerumen impaction removed entirely revealing normal external auditory canal; tympanic membrane without bulging, retraction, or perforation; no evidence of middle ear fluid or effusion.     Assessment: Cerumen impaction removed with normal otologic examination bilaterally    Recommendations/Plan: I have discussed follow-up for ear cleaning as needed, as well as safe methods to reduce cerumen impaction at home  including use of mineral oil drops in the ear canals, avoiding use of q tips or any instrumentation of the ear canals, and keeping the ears dry. Audiologic follow-up as indicated. Patient mentions chronic throat clearing/cough and given Covid-19, unable to evaluate further at today's visit with endoscopy - we discussed this and she would like to return for further evaluation which will be scheduled in the near future.

## 2020-05-22 ENCOUNTER — PATIENT MESSAGE (OUTPATIENT)
Dept: RHEUMATOLOGY | Facility: CLINIC | Age: 50
End: 2020-05-22

## 2020-05-22 ENCOUNTER — PATIENT MESSAGE (OUTPATIENT)
Dept: TRANSPLANT | Facility: CLINIC | Age: 50
End: 2020-05-22

## 2020-05-27 ENCOUNTER — INFUSION (OUTPATIENT)
Dept: INFUSION THERAPY | Facility: HOSPITAL | Age: 50
End: 2020-05-27
Attending: NURSE PRACTITIONER
Payer: MEDICARE

## 2020-05-27 VITALS
WEIGHT: 142 LBS | SYSTOLIC BLOOD PRESSURE: 146 MMHG | RESPIRATION RATE: 17 BRPM | TEMPERATURE: 98 F | DIASTOLIC BLOOD PRESSURE: 70 MMHG | HEART RATE: 77 BPM | BODY MASS INDEX: 21.03 KG/M2 | OXYGEN SATURATION: 98 % | HEIGHT: 69 IN

## 2020-05-27 DIAGNOSIS — M32.9 SYSTEMIC LUPUS ERYTHEMATOSUS, UNSPECIFIED SLE TYPE, UNSPECIFIED ORGAN INVOLVEMENT STATUS: Primary | ICD-10-CM

## 2020-05-27 DIAGNOSIS — M32.9 SLE (SYSTEMIC LUPUS ERYTHEMATOSUS): Primary | Chronic | ICD-10-CM

## 2020-05-27 LAB — SARS-COV-2 RDRP RESP QL NAA+PROBE: NEGATIVE

## 2020-05-27 PROCEDURE — 96366 THER/PROPH/DIAG IV INF ADDON: CPT

## 2020-05-27 PROCEDURE — U0002 COVID-19 LAB TEST NON-CDC: HCPCS

## 2020-05-27 PROCEDURE — 96415 CHEMO IV INFUSION ADDL HR: CPT

## 2020-05-27 PROCEDURE — 96413 CHEMO IV INFUSION 1 HR: CPT

## 2020-05-27 PROCEDURE — 25000003 PHARM REV CODE 250: Performed by: INTERNAL MEDICINE

## 2020-05-27 PROCEDURE — 96365 THER/PROPH/DIAG IV INF INIT: CPT

## 2020-05-27 PROCEDURE — 63600175 PHARM REV CODE 636 W HCPCS: Mod: JW,JG | Performed by: INTERNAL MEDICINE

## 2020-05-27 RX ORDER — ACETAMINOPHEN 325 MG/1
650 TABLET ORAL
Status: CANCELLED | OUTPATIENT
Start: 2020-06-10

## 2020-05-27 RX ORDER — ACETAMINOPHEN 325 MG/1
650 TABLET ORAL
Status: CANCELLED | OUTPATIENT
Start: 2020-05-27

## 2020-05-27 RX ORDER — ACETAMINOPHEN 325 MG/1
650 TABLET ORAL
Status: COMPLETED | OUTPATIENT
Start: 2020-05-27 | End: 2020-05-27

## 2020-05-27 RX ADMIN — DIPHENHYDRAMINE HYDROCHLORIDE 25 MG: 50 INJECTION INTRAMUSCULAR; INTRAVENOUS at 11:05

## 2020-05-27 RX ADMIN — ACETAMINOPHEN 650 MG: 325 TABLET ORAL at 11:05

## 2020-05-27 RX ADMIN — BELIMUMAB 644 MG: 400 INJECTION, POWDER, LYOPHILIZED, FOR SOLUTION INTRAVENOUS at 11:05

## 2020-05-27 NOTE — PLAN OF CARE
Patient arrived to unit for Benlysta infusion. Plan of care reviewed, patient agreeable to plan. Pt c/o difficulty swallowing due to mild pain. No visible signs of thrush noted by observation. Pt was able to eat 75% of lunch today. Pt to follow up with ENT on June 16. Premeds given per orders.Patient tolerated infusion well.VSS. Discharge instructions reviewed, patient instructed to return June 24 for next infusion. Patient ambulated off unit unassisted by self. Patient in NAD at time of discharge.

## 2020-05-27 NOTE — PROGRESS NOTES
Phoned pt with result and that she can proceed with appts as scheduled  Fwd'd result and note to Dr. Marcial and SOLE Reyes RN

## 2020-05-28 ENCOUNTER — TELEPHONE (OUTPATIENT)
Dept: OTOLARYNGOLOGY | Facility: CLINIC | Age: 50
End: 2020-05-28

## 2020-05-28 DIAGNOSIS — J02.9 SORE THROAT: Primary | ICD-10-CM

## 2020-05-28 NOTE — TELEPHONE ENCOUNTER
Patient has an appointment on 6/17/20 and will need a Covid test within 48 Hours prior to appt.  Orders signed.

## 2020-06-11 ENCOUNTER — LAB VISIT (OUTPATIENT)
Dept: LAB | Facility: HOSPITAL | Age: 50
End: 2020-06-11
Attending: INTERNAL MEDICINE
Payer: MEDICARE

## 2020-06-11 DIAGNOSIS — R53.83 FATIGUE, UNSPECIFIED TYPE: ICD-10-CM

## 2020-06-11 DIAGNOSIS — D84.9 IMMUNOSUPPRESSION: ICD-10-CM

## 2020-06-11 DIAGNOSIS — M32.9 SYSTEMIC LUPUS ERYTHEMATOSUS, UNSPECIFIED SLE TYPE, UNSPECIFIED ORGAN INVOLVEMENT STATUS: Chronic | ICD-10-CM

## 2020-06-11 LAB
ALBUMIN SERPL BCP-MCNC: 4 G/DL (ref 3.5–5.2)
ALP SERPL-CCNC: 81 U/L (ref 55–135)
ALT SERPL W/O P-5'-P-CCNC: 17 U/L (ref 10–44)
ANION GAP SERPL CALC-SCNC: 6 MMOL/L (ref 8–16)
AST SERPL-CCNC: 15 U/L (ref 10–40)
BASOPHILS # BLD AUTO: 0.01 K/UL (ref 0–0.2)
BASOPHILS NFR BLD: 0.3 % (ref 0–1.9)
BILIRUB SERPL-MCNC: 0.4 MG/DL (ref 0.1–1)
BUN SERPL-MCNC: 23 MG/DL (ref 6–20)
C3 SERPL-MCNC: 86 MG/DL (ref 50–180)
C4 SERPL-MCNC: 29 MG/DL (ref 11–44)
CALCIUM SERPL-MCNC: 9.2 MG/DL (ref 8.7–10.5)
CHLORIDE SERPL-SCNC: 105 MMOL/L (ref 95–110)
CO2 SERPL-SCNC: 29 MMOL/L (ref 23–29)
CREAT SERPL-MCNC: 1.5 MG/DL (ref 0.5–1.4)
CRP SERPL-MCNC: 0.3 MG/L (ref 0–8.2)
DIFFERENTIAL METHOD: ABNORMAL
EOSINOPHIL # BLD AUTO: 0.1 K/UL (ref 0–0.5)
EOSINOPHIL NFR BLD: 2.4 % (ref 0–8)
ERYTHROCYTE [DISTWIDTH] IN BLOOD BY AUTOMATED COUNT: 14.7 % (ref 11.5–14.5)
ERYTHROCYTE [SEDIMENTATION RATE] IN BLOOD BY WESTERGREN METHOD: 10 MM/HR (ref 0–20)
EST. GFR  (AFRICAN AMERICAN): 46 ML/MIN/1.73 M^2
EST. GFR  (NON AFRICAN AMERICAN): 40 ML/MIN/1.73 M^2
GLUCOSE SERPL-MCNC: 136 MG/DL (ref 70–110)
HCT VFR BLD AUTO: 35 % (ref 37–48.5)
HGB BLD-MCNC: 11.7 G/DL (ref 12–16)
IMM GRANULOCYTES # BLD AUTO: 0 K/UL (ref 0–0.04)
IMM GRANULOCYTES NFR BLD AUTO: 0 % (ref 0–0.5)
LYMPHOCYTES # BLD AUTO: 1.4 K/UL (ref 1–4.8)
LYMPHOCYTES NFR BLD: 36.2 % (ref 18–48)
MCH RBC QN AUTO: 26.7 PG (ref 27–31)
MCHC RBC AUTO-ENTMCNC: 33.4 G/DL (ref 32–36)
MCV RBC AUTO: 80 FL (ref 82–98)
MONOCYTES # BLD AUTO: 0.4 K/UL (ref 0.3–1)
MONOCYTES NFR BLD: 9.8 % (ref 4–15)
NEUTROPHILS # BLD AUTO: 1.9 K/UL (ref 1.8–7.7)
NEUTROPHILS NFR BLD: 51.3 % (ref 38–73)
NRBC BLD-RTO: 0 /100 WBC
PLATELET # BLD AUTO: 190 K/UL (ref 150–350)
PMV BLD AUTO: 9.7 FL (ref 9.2–12.9)
POTASSIUM SERPL-SCNC: 4.1 MMOL/L (ref 3.5–5.1)
PROT SERPL-MCNC: 6.8 G/DL (ref 6–8.4)
RBC # BLD AUTO: 4.39 M/UL (ref 4–5.4)
SODIUM SERPL-SCNC: 140 MMOL/L (ref 136–145)
WBC # BLD AUTO: 3.78 K/UL (ref 3.9–12.7)

## 2020-06-11 PROCEDURE — 86225 DNA ANTIBODY NATIVE: CPT

## 2020-06-11 PROCEDURE — 36415 COLL VENOUS BLD VENIPUNCTURE: CPT

## 2020-06-11 PROCEDURE — 85652 RBC SED RATE AUTOMATED: CPT

## 2020-06-11 PROCEDURE — 86140 C-REACTIVE PROTEIN: CPT

## 2020-06-11 PROCEDURE — 85025 COMPLETE CBC W/AUTO DIFF WBC: CPT

## 2020-06-11 PROCEDURE — 86160 COMPLEMENT ANTIGEN: CPT

## 2020-06-11 PROCEDURE — 80053 COMPREHEN METABOLIC PANEL: CPT

## 2020-06-11 PROCEDURE — 86160 COMPLEMENT ANTIGEN: CPT | Mod: 59

## 2020-06-12 ENCOUNTER — PATIENT MESSAGE (OUTPATIENT)
Dept: RHEUMATOLOGY | Facility: CLINIC | Age: 50
End: 2020-06-12

## 2020-06-12 LAB — DSDNA AB SER-ACNC: NORMAL [IU]/ML

## 2020-06-13 ENCOUNTER — PATIENT MESSAGE (OUTPATIENT)
Dept: RHEUMATOLOGY | Facility: CLINIC | Age: 50
End: 2020-06-13

## 2020-06-15 ENCOUNTER — LAB VISIT (OUTPATIENT)
Dept: FAMILY MEDICINE | Facility: CLINIC | Age: 50
End: 2020-06-15
Payer: MEDICARE

## 2020-06-15 ENCOUNTER — TELEPHONE (OUTPATIENT)
Dept: OTOLARYNGOLOGY | Facility: CLINIC | Age: 50
End: 2020-06-15

## 2020-06-15 DIAGNOSIS — J02.9 SORE THROAT: ICD-10-CM

## 2020-06-15 PROCEDURE — U0003 INFECTIOUS AGENT DETECTION BY NUCLEIC ACID (DNA OR RNA); SEVERE ACUTE RESPIRATORY SYNDROME CORONAVIRUS 2 (SARS-COV-2) (CORONAVIRUS DISEASE [COVID-19]), AMPLIFIED PROBE TECHNIQUE, MAKING USE OF HIGH THROUGHPUT TECHNOLOGIES AS DESCRIBED BY CMS-2020-01-R: HCPCS

## 2020-06-15 NOTE — TELEPHONE ENCOUNTER
----- Message from Binta Aragon sent at 6/15/2020  3:16 PM CDT -----  Type:  Patient Returning Call    Who Called: Patient    Who Left Message for Patient: Eula    Does the patient know what this is regarding?: Appointment on Wednesday    Would the patient rather a call back or a response via [facility]? Call back    Best Call Back Number: [phone]    Additional Information: Pt has a question about appointment. Please call.

## 2020-06-17 ENCOUNTER — OFFICE VISIT (OUTPATIENT)
Dept: OTOLARYNGOLOGY | Facility: CLINIC | Age: 50
End: 2020-06-17
Payer: MEDICARE

## 2020-06-17 VITALS
BODY MASS INDEX: 20.91 KG/M2 | TEMPERATURE: 98 F | WEIGHT: 141.19 LBS | HEIGHT: 69 IN | SYSTOLIC BLOOD PRESSURE: 160 MMHG | DIASTOLIC BLOOD PRESSURE: 80 MMHG

## 2020-06-17 DIAGNOSIS — R06.83 SNORING: ICD-10-CM

## 2020-06-17 DIAGNOSIS — R42 DIZZINESS: Primary | ICD-10-CM

## 2020-06-17 DIAGNOSIS — R49.0 HOARSE: ICD-10-CM

## 2020-06-17 DIAGNOSIS — H83.2X3 LABYRINTHINE DYSFUNCTION, BILATERAL: ICD-10-CM

## 2020-06-17 DIAGNOSIS — G47.33 OBSTRUCTIVE SLEEP APNEA SYNDROME: ICD-10-CM

## 2020-06-17 LAB — SARS-COV-2 RNA RESP QL NAA+PROBE: NOT DETECTED

## 2020-06-17 PROCEDURE — 3079F DIAST BP 80-89 MM HG: CPT | Mod: CPTII,S$GLB,, | Performed by: OTOLARYNGOLOGY

## 2020-06-17 PROCEDURE — 3008F PR BODY MASS INDEX (BMI) DOCUMENTED: ICD-10-PCS | Mod: CPTII,S$GLB,, | Performed by: OTOLARYNGOLOGY

## 2020-06-17 PROCEDURE — 3008F BODY MASS INDEX DOCD: CPT | Mod: CPTII,S$GLB,, | Performed by: OTOLARYNGOLOGY

## 2020-06-17 PROCEDURE — 99214 OFFICE O/P EST MOD 30 MIN: CPT | Mod: 25,S$GLB,, | Performed by: OTOLARYNGOLOGY

## 2020-06-17 PROCEDURE — 99214 PR OFFICE/OUTPT VISIT, EST, LEVL IV, 30-39 MIN: ICD-10-PCS | Mod: 25,S$GLB,, | Performed by: OTOLARYNGOLOGY

## 2020-06-17 PROCEDURE — 3079F PR MOST RECENT DIASTOLIC BLOOD PRESSURE 80-89 MM HG: ICD-10-PCS | Mod: CPTII,S$GLB,, | Performed by: OTOLARYNGOLOGY

## 2020-06-17 PROCEDURE — 31575 PR LARYNGOSCOPY, FLEXIBLE; DIAGNOSTIC: ICD-10-PCS | Mod: S$GLB,,, | Performed by: OTOLARYNGOLOGY

## 2020-06-17 PROCEDURE — 3077F SYST BP >= 140 MM HG: CPT | Mod: CPTII,S$GLB,, | Performed by: OTOLARYNGOLOGY

## 2020-06-17 PROCEDURE — 31575 DIAGNOSTIC LARYNGOSCOPY: CPT | Mod: S$GLB,,, | Performed by: OTOLARYNGOLOGY

## 2020-06-17 PROCEDURE — 3077F PR MOST RECENT SYSTOLIC BLOOD PRESSURE >= 140 MM HG: ICD-10-PCS | Mod: CPTII,S$GLB,, | Performed by: OTOLARYNGOLOGY

## 2020-06-17 RX ORDER — AZELASTINE 1 MG/ML
1 SPRAY, METERED NASAL 2 TIMES DAILY
Qty: 30 ML | Refills: 3 | Status: SHIPPED | OUTPATIENT
Start: 2020-06-17 | End: 2020-09-04

## 2020-06-17 NOTE — PROGRESS NOTES
OTOLARYNGOLOGY CLINIC NOTE  Date:  06/17/2020     Chief complaint:  Chief Complaint   Patient presents with    Sore Throat     Swallowing Problems       History of Present Illness  Valencia Dumont is a 50 y.o. female  presenting today for a new evaluation and treatment of multiple ent complaints.  She saw dr conrad recently for cerumen impaction and had ears cleaned. At that   Used to see Dr. Candelaria and he had wanted to do sinus surgery- last seen in 2016. She did not get sinus surgery however. I do not have any of those records ( have not been uploaded into our system yet)     Has not seen GI recently but used to see GI. Raspy voice in am and late at night. No dysphagia.  Takes abx for sinus infections three times per year. Gets headaches and postnasal drainage , current symptoms feel worse than prior sinus infections. No colored drainage. Uses flonase nightly. No saline spray. No significant nasal congestion+ postnasal drainage. Night time awakening with coughing/choking + snoring. Does have fatigue can't nap because of antidepresant. sometimes issue with ears with yawning. Eyes itching. + sneezing - happens around pollen or doing housework  No allergy testing with skin testing, had serum testing. Drinks theraflu ever other night to soothe throat. No lumps or bumps in neck.  Car sickness history    +popping sensation in ears ; tried putting cotton in her ear maybe helped a little. Has not noticed significant issues     Throat pain- tried nystatin which didn't help. Tongue gets white at times. Has been going on since October-November . Drinks a lot of water. +heartburn, has been taking BID PPI for over threemonths ( states takes protonix in am and prilosec at night)  Frontal headaches, happen on and off . Takes tylenol which helps a little. This week headaches  happen daily. Every other day before that. Has been going on for a couple weeks.  +nausea. No family history of migraine. Has eye pain at times. Gets  dizziness with head turn- room spinning and seems to be in her eyes. Does not improve with eyes closed.   Itching in ears, no otorrhea. No tinnitus.   Right hearing loss, noticed a few days before saw Dr. Livingston . Hearing improved slightly after cleaning. Having difficulty hearing on phone.     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    Hypertension     Kidney stone     Lupus     Osteoporosis 12/2013    Prophylactic immunotherapy (transplant immunosuppression) 1/2/2014    SBP (spontaneous bacterial peritonitis)     history of         Past Surgical History  Past Surgical History:   Procedure Laterality Date    COLONOSCOPY N/A 5/31/2017    Procedure: COLONOSCOPY;  Surgeon: Marky Sun MD;  Location: [facility] ([address] [place] [address]);  Service: Endoscopy;  Laterality: N/A;  PM prep.    ESOPHAGOGASTRODUODENOSCOPY      ESOPHAGOGASTRODUODENOSCOPY N/A 1/16/2019    Procedure: EGD (ESOPHAGOGASTRODUODENOSCOPY);  Surgeon: Deniz Guerra MD;  Location: [facility] ([address] [place] [address]);  Service: Endoscopy;  Laterality: N/A;  labs prior, s/p liver transplant-MS    LIVER BIOPSY      LIVER TRANSPLANT  12/31/2013    REFRACTIVE SURGERY Bilateral 2010    TUBAL LIGATION  2003    UPPER GASTROINTESTINAL ENDOSCOPY          Medications  Current Outpatient Medications on File Prior to Visit   Medication Sig Dispense Refill    blood-glucose meter,continuous (DEXCOM G6 ) Misc 1 each by Misc.(Non-Drug; Combo Route) route once. for 1 dose 1 each 0    blood-glucose transmitter (DEXCOM G6 TRANSMITTER) Karen 1 each by Misc.(Non-Drug; Combo Route) route once a week 1 Device 3    ciclopirox (PENLAC) 8 % Soln Apply topically nightly. 1 Bottle 3     diazepam (VALIUM) 5 MG tablet Take 5 mg by mouth 3 (three) times daily as needed.   0    DILTIAZEM HCL (DILTIAZEM 2% CREAM) Apply topically 3 (three) times daily. Apply topically to anal area. 30 g 0    ergocalciferol (ERGOCALCIFEROL) 50,000 unit Cap Take 1 capsule (50,000 Units total) by mouth every 7 days. 4 capsule 2    erythromycin with ethanol (EMGEL) 2 % gel ADD 30GM (1 TUBE) TO THE SOAKING DEVICE AND ALLOW WATER TO AGITATE. PLACE AFFECTED AREAS INTO WATER AND SOAK FOR 10 MINUTES 1-2 TIMES DAILY  1    famotidine (PEPCID) 40 MG tablet Take 40 mg by mouth once daily.  11    fluconazole (DIFLUCAN) 150 MG Tab TAKE ONE TABLET BY MOUTH once for ONE dose  0    gabapentin (NEURONTIN) 300 MG capsule TAKE 1 CAPSULE BY MOUTH THREE TIMES DAILY      gentamicin (GARAMYCIN) 0.1 % cream ADD 30GM (1 TUBE) TO THE SOAKING DEVICE AND ALLOW WATER TO AGITATE. PLACE AFFECTED AREAS INTO WATER AND SOAK FOR 10 MINUTES 1-2 TIMES DAILY  1    hydroxychloroquine (PLAQUENIL) 200 mg tablet TAKE ONE TABLET BY MOUTH TWICE DAILY 60 tablet 2    hydrOXYzine HCl (ATARAX) 10 MG Tab TAKE 1 OR 2 TABLETS BY MOUTH THREE TIMES DAILY AS NEEDED FOR ITCHING.  0    insulin aspart U-100 (NOVOLOG FLEXPEN U-100 INSULIN) 100 unit/mL (3 mL) InPn pen Takes three units before breakfast and lunch  and 5 units before dinner with sliding scale, up to 25 units daily 45 mL 3    insulin detemir U-100 (LEVEMIR FLEXTOUCH U-100 INSULN) 100 unit/mL (3 mL) SubQ InPn pen Inject 4 Units into the skin 2 (two) times daily. 45 mL 3    isosorbide mononitrate (IMDUR) 30 MG 24 hr tablet Take 30 mg by mouth once daily.      Lactobac. rhamnosus GG-inulin 10 billion cell -200 mg Cap Take 1 capsule by mouth 2 (two) times daily. 30 capsule 0    levocetirizine (XYZAL) 5 MG tablet Take 5 mg by mouth every evening.  3    levoFLOXacin (LEVAQUIN) 750 MG tablet levofloxacin 750 mg tablet      losartan (COZAAR) 100 MG tablet Take 1 tablet (100 mg total) by mouth once daily.  30 tablet 2    magnesium oxide 500 mg Tab Take 500 mg by mouth 2 (two) times daily. 60 each 6    meclizine (ANTIVERT) 25 mg tablet TAKE ONE TABLET BY MOUTH AT BEDTIME AS NEEDED for dizziness.  0    meloxicam (MOBIC) 15 MG tablet       MULTIVIT,THER IRON,CA,FA & MIN (MULTIVITAMIN) Tab Take 1 tablet by mouth once daily. 30 tablet 0    mycophenolate (MYFORTIC) 180 MG TbEC TAKE TWO TABLETS BY MOUTH TWICE DAILY. 120 tablet 5    neomycin-polymyxin-hydrocortisone (CORTISPORIN) otic solution INSTILL TWO DROPS INTO BOTH EARS FOUR TIMES DAILY FOR 10 DAYS  0    nystatin (MYCOSTATIN) 100,000 unit/mL suspension SWISH AND SPIT FIVE MILLILITERS BY MOUTH FOUR TIMES DAILY FOR 7 DAYS.  1    nystatin (MYCOSTATIN) cream Apply topically 2 times daily 30 g 0    oxycodone-acetaminophen (PERCOCET) 7.5-325 mg per tablet Take 1 tablet by mouth every 4 (four) hours as needed for Pain.      pantoprazole (PROTONIX) 40 MG tablet TAKE ONE TABLET BY MOUTH ONCE DAILY FOR REFLUX 30 tablet 5    paroxetine (PAXIL) 10 MG tablet paroxetine 10 mg tablet      polyethylene glycol (GLYCOLAX) 17 gram/dose powder Mix 1 capful (17 g) with liquid and by mouth 2 (two) times daily. 1530 g 11    PROAIR HFA 90 mcg/actuation inhaler Inhale 2 puffs into the lungs 3 (three) times daily as needed.   3    promethazine-dextromethorphan (PROMETHAZINE-DM) 6.25-15 mg/5 mL Syrp Take by mouth 2 (two) times daily as needed.   0    rosuvastatin (CRESTOR) 5 MG tablet Take 5 mg by mouth once daily.      sertraline (ZOLOFT) 50 MG tablet Take 50 mg by mouth once daily.  11    SSD 1 % cream 1 application 2 (two) times daily. Apply to affected area  0    tacrolimus (PROGRAF) 1 MG Cap Take 3 capsules (3 mg total) by mouth every 12 (twelve) hours. 180 capsule 11    tacrolimus (PROGRAF) 1 MG Cap TAKE THREE CAPSULES BY MOUTH EVERY TWELVE HOURS (PROGRAF) 180 capsule 11    traZODone (DESYREL) 50 MG tablet Take 50 mg by mouth nightly.  0    triamcinolone acetonide 0.1%  (KENALOG) 0.1 % cream Apply topically 2 times daily 30 g 0    valACYclovir (VALTREX) 500 MG tablet TAKE ONE TABLET BY MOUTH TWICE DAILY FOR 3 DAYS. 6 tablet 2    verapamil (CALAN-SR) 120 MG CR tablet TAKE ONE TABLET ONCE DAILY FOR BLOOD PRESSURE  3    zolpidem (AMBIEN) 10 mg Tab Take 10 mg by mouth nightly as needed.  3    blood-glucose meter kit To check BG 5 times daily, to use with insurance preferred meter 1 each 0    blood-glucose sensor (DEXCOM G5-G4 SENSOR) Karen 1 each by Misc.(Non-Drug; Combo Route) route once a week. 4 Device 11    buPROPion (WELLBUTRIN XL) 150 MG TB24 tablet Take 300 mg by mouth.      COMBIVENT RESPIMAT  mcg/actuation inhaler INHALE ONE PUFF INTO THE LUNGS TWICE DAILY  1    cyproheptadine (PERIACTIN) 4 mg tablet Take 4 mg by mouth every evening.  3    dicyclomine (BENTYL) 10 MG capsule Take 1 capsule (10 mg total) by mouth before meals as needed (TID PRN). 90 capsule 2    diphenoxylate-atropine 2.5-0.025 mg (LOMOTIL) 2.5-0.025 mg per tablet Take 1 tablet by mouth every 6 (six) hours as needed for Diarrhea. 120 tablet 1    estradiol (YUVAFEM) 10 mcg Tab Place 1 tablet (10 mcg total) vaginally twice a week. (Patient not taking: Reported on 3/2/2020) 8 tablet 11    furosemide (LASIX) 40 MG tablet furosemide 40 mg tablet      hydrALAZINE (APRESOLINE) 50 MG tablet Take 1 tablet (50 mg total) by mouth every 8 (eight) hours. for 3 days 12 tablet 0    ketoconazole (NIZORAL) 2 % cream ADD 30GM (1/2 TUBE) TO THE SOAKING DEVICE AND ALLOW WATER TO AGITATE. PLACE AFFECTED AREAS INTO WATER AND SOAK FOR 10 MINUTES 1-2 TIMES BRENDON  1    nystatin-triamcinolone (MYCOLOG II) cream Apply to affected area 2 times daily 30 g 1    ranitidine (ZANTAC) 300 MG tablet Take 300 mg by mouth 2 (two) times daily.        No current facility-administered medications on file prior to visit.        Review of Systems  Review of Systems   Constitutional: Negative for fever.   HENT: Positive for hearing  "loss and sore throat.    Respiratory: Positive for cough.    Gastrointestinal: Positive for nausea.   Neurological: Positive for dizziness.        Social History   reports that she has never smoked. She has never used smokeless tobacco. She reports current alcohol use of about 1.0 standard drinks of alcohol per week. She reports that she does not use drugs.     Family History  Family History   Problem Relation Age of Onset    Hypertension Mother,Brother     Cataracts Mother     Diabetes Father,Brother, Paternal Grandfather and grandmother     Alzheimer's disease Father     Breast cancer Maternal Aunt 55        Physical Exam   Vitals:    06/17/20 1323   BP: (!) 160/80   Temp: 97.6 °F (36.4 °C)    Body mass index is 20.85 kg/m².  Weight: 64 kg (141 lb 3.3 oz)   Height: 5' 9" (175.3 cm)     GENERAL: alert, no acute distress.  HEAD: normocephalic.   SKIN: no lesions of skin of head and neck area.  EYES: lids and lashes normal. Pupils equal and reactive to light  Extraocular motions intact. No proptosis  EARS: external ear without lesion, normal pinna shape and position. Tympanic membranes without perforation. No middle ear effusion  NOSE: external nose without abnormality, turbinates boggy, septum s shaped with spurs along  Inferior part of septum with mild obstruction of nasal airway  ORAL CAVITY/OROPHARYNX: dentition fair,  Braces, no malocclusion. Mild tmj crepitus no lesion of gingiva/buccal mucosa/floor of mouth/tongue. tongue midline and mobile. No fasciculations. Floor of mouth and base of tongue soft to palpation.  NECK: trachea midline. No discrete palpable thyroid nodule. No submandibular gland mass nor tenderness. No scars.  LYMPH NODES:No cervical lymphadenopathy.  RESPIRATORY: no stridor, no stertor. Voice normal. Respirations nonlabored.  NEURO: alert, responds to questions appropriately.   Cranial nerve exam as indicated in above sections and additionally showed. Facial sensation intact  Facial " movement symmetric with good eye closure and symmetric smile. No dysmetria, no dysdiadochokinesia, romberg negative, normal tandem gait  PSYCH:mood appropriate      PROCEDURE NOTE  NAME OF PROCEDURE: Flexible Laryngoscopy, diagnostic  INDICATIONS:hoarse  FINDINGS: no mass or lesion, normal movement. Mild-moderate evidence of LPR    Consent: After procedure was explained in detail and all questions answered, verbal consent was obtained for performing flexible laryngoscopy.  Anesthesia: topical 4% lidocaine  and neosynephrine  Procedure: With patient in seated position, the scope was inserted into the bilateral nasal passageway and advanced through the right atraumatically into the nasopharynx to examine the following structures  Nasopharynx: no mass or lesion noted in nasopharynx. No cobblestoning   Oropharynx: base of tongue without  mass or ulceration. Lingual tonsils normal in appearance  Hypopharynx: posterior pharyngeal wall without mass or lesion. No pooling of secretions. Pyriform sinuses visible without mass or lesion  Larynx: epiglottis normal without lesion. False vocal folds without edema/erythema/lesion. True vocal folds mobile and without lesion.+pseudosulcus Mild interarytenoid edema and erythema . Postcricoid region with mild edema , no lesion  Subglottis: visualized portion of subglottis normal in appearance    After examination performed, the scope was removed atraumatically . The patient tolerated the procedure well.    Imaging:  The patient has no pertinent and/or recent imaging of the head and neck.    Labs:  No recent labs    Assessment  1. Hoarse    2. Labyrinthine dysfunction, bilateral     3. Obstructive sleep apnea syndrome     4. Allergic rhinitis  5. Deviated nasal septum      Plan:  Discussed plan of care with patient in detail and all questions answered. Patient reported understanding of plan of care.   Hoarse/LPR- refer to speech therapy as still with issues on BID PPI, improve  hydration, behavioral modifications reviewed for LPR. She has had transplant and on immunosuppression, I do not see evidence of candida or PTLD. Lupus can also affect the laryngeal tissues directly, I did not see any evidence of malrotation of joints or other signs related to this although exam can be limited with no stroboscopy. Consider laryngology referral if not improvement with initial treatment plan    Snoring/night time awakening/ medical refractory lpr- need to rule out SHAWNA, sleep study and sleep medicine referrals    Dizziness and hearing loss- audio referral for hearing and balance testing    Allergic rhinitis/deviated nasal septum: add astelin to flonase, counseled pt that combo of steroid and antihistamine nasal spray has been shown in evidence based studies to be better than either alone. Discussed medication administration technique ( point toward outer corner of eye and not towards nasal septum) and use nasal saline prior to medication sprays. saline prior . Postnasal drip from lpr and/or allergies could also be contributing to above sx      Follow up 2-3 months after testing, sooner if any issues

## 2020-06-17 NOTE — PATIENT INSTRUCTIONS
Information and instructions from your visit with me today:    · I would like for you to have hearing and balance testing  · I would like for you to have evaluation for sleep study  · I would like for you to see a speech therapist for your throat irritation and acid reflux  · Try to avoid spicy foods/chocolate/caffeine/mint/red wine as this can worsen reflux. Make sure to allow at least 3 hours between last meal and going to sleep. Sleep with the head of your bed elevated/on pillows.   ·   · Start using the following medication nasal sprays:   · Fluticasone spray:    This medication is a steroid spray. It stays within the nose and does not have absorption into the body that leads to side effects that one has with oral steroid medication. Fluticasone nasal spray is the same as the Flonase brand nasal spray. Discuss with your pharmacist if the price is lower over the counter or with a prescription ( this varies depending on insurance). The medication that is over the counter is the same as the prescription medication. Use this medication as instructed on the prescription, 2 sprays on each side of your nose once daily.     · Azelastine  spray:  This medication is an antihistamine used to treat nasal symptoms of allergy, which works specifically in the nose unlike antihistamine pills which have more of an effect on the whole body. Use this medication as instructed on the prescription, 1 spray on each side of your nose twice daily.     Additional instructions for medication sprays  Place the tip of the medication bottle in your nose and aim slightly up and out on each side to get medication high and deep into your nose and sinuses, and not have it all deposit in the very front of your nose. Aim the tip of the nozzle towards the outer corner of your eye . You can imagine aiming towards the back of your eyeball on each side for this, as opposed to straight back to the center of your nose and head.     You need to use this  "medication every day regardless of symptoms, as it takes time ( a few weeks) to work and get the benefits. It does not work on an "as needed" basis like taking a decongestant. If your symptoms only occur in a particular season, then the medication can be used seasonally instead of year long. For seasonal symptoms, you should start using the spray twice daily a month before when you normally have symptoms ( for example, if symptoms start in August, should start at the end of June).     · Start nasal irrigations with saline solution:    SALINE SINUS RINSE (Jeevan Med brand): You should do a full bottle, half on one side of your nose and half on the other, 1-2 times per day (or more if able to, you cannot do this too much). Follow the instructions on the box: mix the salt packet with clean water (bottle, previously boiled, distilled, etc -- not tap water) to the line on the bottle to make the irrigation.     NASAL SALINE SPRAY ( 0.9%) There are several different brands found in the cold and flu aisle of the pharmacy. You can also use simply saline or other brand of saline spray that delivers saline by gentle mist if you have difficulty or discomfort with neilmed rinse. Always rinse your nose with saline prior to using medication sprays and wait a couple of hours before using again.      · Do not use nasal decongestant sprays such as Afrin or similar products.  This can cause long term physical nasal addiction. Afrin should only be used if having nose bleeds and should not be used for more than 2-3 days in a row . It is a not a medication that should be used for a long period of time.     It was nice meeting you today, and I look forward to helping you feel better soon. Please don't hesitate to call if you have any other questions or concerns, or if I can be of any assistance in the meantime.      Delores Lewis MD    [facility]    Phone  [phone]    Fax      [phone]        Delores " MD Debbie  Otorhinolaryngology

## 2020-06-24 ENCOUNTER — INFUSION (OUTPATIENT)
Dept: INFUSION THERAPY | Facility: HOSPITAL | Age: 50
End: 2020-06-24
Attending: NURSE PRACTITIONER
Payer: MEDICARE

## 2020-06-24 VITALS
BODY MASS INDEX: 20.85 KG/M2 | OXYGEN SATURATION: 97 % | DIASTOLIC BLOOD PRESSURE: 70 MMHG | TEMPERATURE: 98 F | SYSTOLIC BLOOD PRESSURE: 136 MMHG | WEIGHT: 141.19 LBS | RESPIRATION RATE: 17 BRPM | HEART RATE: 91 BPM

## 2020-06-24 DIAGNOSIS — M32.9 SYSTEMIC LUPUS ERYTHEMATOSUS, UNSPECIFIED SLE TYPE, UNSPECIFIED ORGAN INVOLVEMENT STATUS: Primary | ICD-10-CM

## 2020-06-24 PROCEDURE — 96367 TX/PROPH/DG ADDL SEQ IV INF: CPT

## 2020-06-24 PROCEDURE — 96413 CHEMO IV INFUSION 1 HR: CPT

## 2020-06-24 PROCEDURE — 63600175 PHARM REV CODE 636 W HCPCS: Mod: JG | Performed by: INTERNAL MEDICINE

## 2020-06-24 PROCEDURE — 25000003 PHARM REV CODE 250: Performed by: INTERNAL MEDICINE

## 2020-06-24 PROCEDURE — 96365 THER/PROPH/DIAG IV INF INIT: CPT

## 2020-06-24 RX ORDER — ACETAMINOPHEN 325 MG/1
650 TABLET ORAL
Status: COMPLETED | OUTPATIENT
Start: 2020-06-24 | End: 2020-06-24

## 2020-06-24 RX ADMIN — DIPHENHYDRAMINE HYDROCHLORIDE 25 MG: 50 INJECTION, SOLUTION INTRAMUSCULAR; INTRAVENOUS at 10:06

## 2020-06-24 RX ADMIN — ACETAMINOPHEN 650 MG: 325 TABLET ORAL at 10:06

## 2020-06-24 RX ADMIN — BELIMUMAB 641 MG: 400 INJECTION, POWDER, LYOPHILIZED, FOR SOLUTION INTRAVENOUS at 11:06

## 2020-06-24 NOTE — PLAN OF CARE
Patient arrived to unit for Benlysta infusion. Plan of care reviewed, patient agreeable to plan. Patient tolerated infusion well. VSS. Discharge instructions reviewed, patient instructed to return 7/22/20. Patient ambulated unassisted off unit. Patient in NAD at time of discharge.

## 2020-06-26 ENCOUNTER — TELEPHONE (OUTPATIENT)
Dept: OBSTETRICS AND GYNECOLOGY | Facility: CLINIC | Age: 50
End: 2020-06-26

## 2020-06-26 DIAGNOSIS — Z12.31 SCREENING MAMMOGRAM, ENCOUNTER FOR: Primary | ICD-10-CM

## 2020-06-29 ENCOUNTER — CLINICAL SUPPORT (OUTPATIENT)
Dept: REHABILITATION | Facility: HOSPITAL | Age: 50
End: 2020-06-29
Attending: OTOLARYNGOLOGY
Payer: MEDICARE

## 2020-06-29 DIAGNOSIS — R49.0 HOARSE: ICD-10-CM

## 2020-06-29 PROCEDURE — 92524 BEHAVRAL QUALIT ANALYS VOICE: CPT | Mod: PN | Performed by: SPEECH-LANGUAGE PATHOLOGIST

## 2020-06-29 PROCEDURE — 92507 TX SP LANG VOICE COMM INDIV: CPT | Mod: PN | Performed by: SPEECH-LANGUAGE PATHOLOGIST

## 2020-06-29 NOTE — PROGRESS NOTES
Please see full voice evaluation in POC section.    ANTONETTE Jones, CCC-SLP  Speech Language Pathologist   6/29/2020

## 2020-06-29 NOTE — PATIENT INSTRUCTIONS
"Neck Relaxation Exercise:  · Chin to ceiling: inhale through nose, look up to count of 5 while exhaling through mouth  · Elevated head turn: sitting up straight, inhale through nose, turn head towards shoulder and lift chin up and stretch neck while exhaling through the mouth. Repeat on other side.   · Ear to shoulder: inhale through nose, tilt head/ ear toward shoulder while exhaling through mouth. Keep shoulders relaxed.   · Half False Pass neck rolls: drop head to chest, inhale through nose, rotate head to a count of 4 in a half False Pass while exhaling through mouth. Rotate side to side.  · Neck extension: inhale through nose, extend chin and head forward while exhaling through mouth. Relax. Inhale through nose and pull head back as if pushing against an imaginary stiff collar while exhaling through mouth.       Straw Phonation: create light seal around straw with lips. Place straw in front of teeth not between teeth. Maintain a low, open vowel sound. Complete 5 times. Try to maintain this "buzzing resonance" while speaking immediately after using straw.    Diaphragmatic breathing technique  Lie on your back on a flat surface or in bed, with your knees bent and your head supported. You can use a pillow under your knees to support your legs. Place one hand on your upper chest and the other just below your rib cage. This will allow you to feel your diaphragm move as you breathe.        Breathe in slowly through your nose so that your stomach moves out against your hand. The hand on your chest should remain as still as possible.      Tighten your stomach muscles, letting them fall inward as you exhale through pursed lips (see "Pursed Lip Breathing Technique_"). The hand on your upper chest must remain as still as possible.        When you first learn the diaphragmatic breathing technique, it may be easier for you to follow the instructions lying down, as shown on the first page. As you gain more practice, you can try the " "diaphragmatic breathing technique while sitting in a chair, as shown below.    To perform this exercise while sitting in a chair:        · Sit comfortably, with your knees bent and your shoulders, head and neck relaxed.  · Breathe in slowly through your nose so that your stomach moves out against your hand. The hand on your chest should remain as still as possible.  · Place one hand on your upper chest and the other just below your rib cage. This will allow you to feel your diaphragm move as you breathe.  · Tighten your stomach muscles, letting them fall inward as you exhale through pursed lips (see "Pursed Lip Breathing Technique"). The hand on your upper chest must remain as still as possible.  · Note: You may notice an increased effort will be needed to use the diaphragm correctly. At first, you'll probably get tired while doing this exercise. But keep at it, because with continued practice, diaphragmatic breathing will become easy and automatic.    How often should I practice this exercise?  At first, practice this exercise 5-10 minutes about 3-4 times per day. Gradually increase the amount of time you spend doing this exercise, and perhaps even increase the effort of the exercise by placing a book on your abdomen.      [email or web address]          Maintaining a Healthy Voice    Protecting Your Voice:  Consistent misuse and abuse of your voice can lead to changes of your vocal mechanism, such as swelling of the vocal cords and growths (i.e. Polyps, nodes, nodules, and contact ulcers). These physical changes often lead to unpleasant changes in vocal quality, such as persistent hoarseness.     Sources of Vocal Misuse:  · Speaking too loudly  · Speaking at improper pitch, either too high or too low  · Speaking with tension in throat    Sources of Vocal Abuse:  · Shouting, cheering, and screaming  · Talking too much  · Constant coughing / throat " "clearing  · Speaking with excessive force, tension, or "pushing"  · Smoking and alcohol consumption     Tips for Healthy Vocal Hygeine:  · Speak only to people who are within a reasonable speaking distance so you are heard easily and avoid vocal strain. Do not try to carry on a conversation between rooms or over loud noises  · Use a megaphone, bullhorn, or microphone if you have to be heard. A cordless microphone gives you mobility.   · Speak at sufficient loudness, at a comfortable pitch level and with adequate breath supply.  · Talk without tension. Do not use a forced whisper. Keep your throat and neck relaxed.   · Avoid shouting, screaming, cheering, and excessive amounts of loud laughing.   · Avoid excessive throat clearing and coughing. Use a "silent cough" or "sniff then swallow" to clears mucous. Try taking 6 continuous sips of water if other strategies are ineffective.  · Drink several (8-10) glasses of water a day, especially when taking decongestants.   · Avoid talking in noisy situations where you must raise the loudness of your voice to be heard.  · Do not smoke.   · Drink alcohol only in moderation.  · Avoid smoky or m[place]lin places.         "

## 2020-06-29 NOTE — PLAN OF CARE
Outpatient Neurological Rehabilitation   Voice Evaluation  Date: 6/29/2020     Name: Valencia Dumont   MRN: [id number]    Therapy Diagnosis:   Encounter Diagnosis   Name Primary?    Hoarse       Physician: Delores Lewis MD  Physician Orders: [id number] - Ambulatory referral/consult to Speech Therapy  Medical Diagnosis: R49.0 (ICD-10-CM) - Hoarse    Visit #/ Visits Authorized:  1/1   Date of Evaluation:  6/22/2020  Insurance Authorization Period: 06/18/2020-06/18/2021  Plan of Care Certification: 6/29/2020 - 8/29/2020    Time In:  11:00   Time Out:  11:30    Procedure Min.   Qualitative Analysis of Voice and Resonance  30   Total Untimed Units: 1  Charges Billed/# of units: 1    Precautions: Standard  Subjective    Date of Onset: 1 year ago  Current Medical History: Valencia Dumont is a 50 y.o. female referred for voice evaluation (CPT 66807) by Dr. Lewis.  She presents with complaints of throat clearing, ear pain, and hoarseness which began 1 year ago. The patient also reported the following complaints: fatigue with use, changes in modal pitch, difficulty with singing, tightness and reduced volume. She reports frequent throat clearing and feelings of shortness of breath. Reports that her voice is very deep/raspy in the morning. She reports that she sounds as if she has a post nasal drip but she does not feel like she has a post nasal drip. Reports some difficulty swallowing solids, particularly meat and mixed consistencies. Reports she has to cut up food well and chew for a long time before she is ready to swallow. Reports that swallowing thin liquids and pills are okay with no coughing/choking. Reports that she is noticing some more swallowing problems lately. Reports history of liver transplant which is why she drinks a lot of water.   Past Medical History:   Past Medical History:   Diagnosis Date    Abnormal Pap smear of cervix     Anemia     Arthritis     Ascites     Asthma     Cirrhosis of liver  without mention of alcohol 10/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    Hypertension     Kidney stone     Lupus     Osteoporosis 12/2013    Prophylactic immunotherapy (transplant immunosuppression) 1/2/2014    SBP (spontaneous bacterial peritonitis)     history of     Valencia Dumont  has a past surgical history that includes Esophagogastroduodenoscopy; Liver biopsy; Liver transplant (12/31/2013); Tubal ligation (2003); Colonoscopy (N/A, 5/31/2017); Refractive surgery (Bilateral, 2010); Upper gastrointestinal endoscopy; and Esophagogastroduodenoscopy (N/A, 1/16/2019).  Medical Hx and Allergies: Valencia has a current medication list which includes the following prescription(s): azelastine, blood-glucose meter, blood-glucose meter,continuous, blood-glucose sensor, blood-glucose transmitter, bupropion, ciclopirox, combivent respimat, cyproheptadine, diazepam, dicyclomine, diltiazem hcl, diphenoxylate-atropine 2.5-0.025 mg, ergocalciferol, erythromycin with ethanol, estradiol, famotidine, fluconazole, furosemide, gabapentin, gentamicin, hydralazine, hydroxychloroquine, hydroxyzine hcl, insulin aspart u-100, insulin detemir u-100, isosorbide mononitrate, ketoconazole, lactobac. rhamnosus gg-inulin, levocetirizine, levofloxacin, losartan, magnesium oxide, meclizine, meloxicam, multivitamin, mycophenolate, neomycin-polymyxin-hydrocortisone, nystatin, nystatin, nystatin-triamcinolone, oxycodone-acetaminophen, pantoprazole, paroxetine, polyethylene glycol, proair hfa, promethazine-dextromethorphan, ranitidine, rosuvastatin, sertraline, ssd, tacrolimus, tacrolimus, trazodone, triamcinolone acetonide 0.1%, valacyclovir, verapamil, and zolpidem.   Review of patient's allergies indicates:   Allergen Reactions    Norvasc [amlodipine]      Severe headache    Doxycycline Rash    Pcn [penicillins] Rash     Prior  "Level of Function: No troublesome throat clearing/cough; no hoarse vocal quality   Current Level of Function:  Frequent coughing/throat clearing; hoarse vocal quality  Prior Therapy: No prior speech therapy   Pain:  5/10  Pain Location / Description: throat   Nutrition: Regular/thin with solids finely cut   Social History:  Pt is not working, she is disabled. Pt lives with her  and daughter.   Patient Therapy Goals: "I hope I can go back to normal"    Swallowing: to be formally screened at first follow up session  Breathing: Short of breath; coughing; throat clearing   Smokin packs/day; no history of smoking    EtOH: 0 drinks/week lately  Caffeine: 0 cups/day   Reflux: yes  Water: 80-90 oz/day     Laryngeal endoscopy findings per Dr. Lewis on 20:  "PROCEDURE NOTE  NAME OF PROCEDURE: Flexible Laryngoscopy, diagnostic  INDICATIONS:hoarse  FINDINGS: no mass or lesion, normal movement. Mild-moderate evidence of LPR     Consent: After procedure was explained in detail and all questions answered, verbal consent was obtained for performing flexible laryngoscopy.  Anesthesia: topical 4% lidocaine  and neosynephrine  Procedure: With patient in seated position, the scope was inserted into the bilateral nasal passageway and advanced through the right atraumatically into the nasopharynx to examine the following structures  Nasopharynx: no mass or lesion noted in nasopharynx. No cobblestoning   Oropharynx: base of tongue without  mass or ulceration. Lingual tonsils normal in appearance  Hypopharynx: posterior pharyngeal wall without mass or lesion. No pooling of secretions. Pyriform sinuses visible without mass or lesion  Larynx: epiglottis normal without lesion. False vocal folds without edema/erythema/lesion. True vocal folds mobile and without lesion.+pseudosulcus Mild interarytenoid edema and erythema . Postcricoid region with mild edema , no lesion  Subglottis: visualized portion of subglottis normal in " "appearance     After examination performed, the scope was removed atraumatically . The patient tolerated the procedure well."       Objective      Perceptual assessment:    -Quality: mildly rough  -Volume: decreased projection  -Pitch: appropriate for age and gender  -Flexibility: appropriate for age and gender    Habitual respiratory pattern: chest/clavicular.  CAPE-V Overall Score: 15  MPT (ah > 18s WFL): 6.5 seconds  S/Z ratio (ratio of 1.4+ may indicate a degree of vocal fold dysfunction): 1.2    VHI-10 (completed to assess self-perceived handicap associated with dysphonia; >11 considered abnormal): 22; mild   RSI (completed to assess the possible presence and/or severity of LPR symptoms and any relationship between this condition and the pt's dysphagia; score of ~15 may indicate LPR): 32    Education / Stimulability Trials  Discussed importance of vocal hygiene including: hydration, reducing caffeine / drying agents and reducing throat clearing, coughing, or other phonotraumatic behaviors. Patient was stimulable for improved voice using SOVT/cup bubble exercises and resonant focused exercises (/mhm/; /m/ words feeling for "forward focus" by nasal cavity). Pt mildly stimulable for improved vocal quality following circumlaryngeal massage performed by SLP. Encouraged home practice of resonant focused exercises in addition to SOVT exercises in isolation as well as into short phrases and sentences for 2-3 minutes per day 3-5 times a day to solidify muscle memory patterns    OLGA NOMS (National Outcome Measure System):  Voice  LEVEL 5: Voice occasionally sounds normal with self-monitoring, but there is some situational variation. The individual¢s ability to participate in vocational, avocational, and social activities requiring voice is rarely affected in low-vocal demand activities, but is occasionally affected in high-vocal demand activities     Treatment   Treatment Time In: 11:30  Treatment Time Out: 11:45    15 " min     Procedure Min.   Speech-Language-Voice Therapy 15   Total Untimed Units: 1  Charges Billed/# of units: 1  See stimulability section above.     Education: Plan of Care, role of SLP in care, vocal hygeine, course of medical disease affect on therapy diagnosis , scheduling/ cancellation policy and insurance limitations / visit limit  was discussed with pt. Patient expressed understanding.     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 Lewis. Demonstrates impairments including limitations as described in the problem list. Positive prognostic factors include pt motivation. Negative prognostic factors include none. She presents with dysphonia c/b hoarse vocal quality. No barriers to therapy identified.  Prognosis for continued improvement is good.     Rehab Potential: good  Pt's spiritual, cultural and educational needs considered and pt agreeable to plan of care and goals.    Short Term Goals: (4 weeks)    1. Pt will complete neck relaxation exercises 10x each per session/at home ind'ly.     2. Pt will reduce/eliminate/substitute throat clearing ind'ly.     3. Pt will recall and utilize vocal hygiene strategies with min A.      4. Patient will complete SOVT exercises and/or resonant-focused exercises 3-5x daily to strengthen and balance the intrinsic laryngeal musculature and maximize glottic closure without medial hyperfunction.    5. Patient will improve the quality, efficiency, and ease of phonation through resonant and/or airflow exercises from the syllable to conversation level with 80% accuracy.    6. .Pt will demonstrate diagphragmatic breathing for 1 minute independently    7. Pt will use diaphragmatic breathing to increase breath support for speech in single words, short phrases, sentences, and conversation on 8/10 trials given min cues    9. Pt will participate in clinical  swallow examination to determine need for objective swallow assessment       Long Term Goals (8 weeks):   1. She  will demonstrate improved vocal quality and intonation at the syllable, word, phrase, sentence and conversation level to communicate basic medical and social needs in the functional living environment.     Plan     Plan of Care Certification Period: 6/29/2020  to 8/29/2020    Recommended Treatment Plan:  Patient will participate in the [facility] rehabilitation program for speech therapy 1 times per week to address her  voice deficits,  to optimize glottal postures for improved vocal function, vocal efficiency, ease of phonation, educate patient and their family, and to participate in a home exercise program.    Other Recommendations:   -Continue exercises as discussed in session  -Contact clinician with any further questions    ANTONETTE Jones, CCC-SLP  Speech Language Pathologist   6/29/2020

## 2020-07-07 ENCOUNTER — TELEPHONE (OUTPATIENT)
Dept: OTOLARYNGOLOGY | Facility: CLINIC | Age: 50
End: 2020-07-07

## 2020-07-07 ENCOUNTER — TELEPHONE (OUTPATIENT)
Dept: TRANSPLANT | Facility: CLINIC | Age: 50
End: 2020-07-07

## 2020-07-07 DIAGNOSIS — R13.10 DYSPHAGIA, UNSPECIFIED TYPE: Primary | ICD-10-CM

## 2020-07-07 NOTE — PROGRESS NOTES
"Outpatient Neurological Rehabilitation   Speech and Language Therapy Treatment Note  Date:  7/8/2020     Name: Valencia Dumont   MRN: [id number]   Therapy Diagnosis:   Encounter Diagnosis   Name Primary?    Hoarse Yes   Physician: Delores Lewis MD  Physician Orders: [id number] - Ambulatory referral/consult to Speech Therapy  Medical Diagnosis: R49.0 (ICD-10-CM) - Hoarse    Visit #/ Visits Authorized: 2/ 6  Date of Evaluation:  6/22/20  Insurance Authorization Period: 06/29/2020-07/13/2020  Plan of Care Expiration Date: 6/22/20  Extended POC:  n/a   Progress Note: due 8/82020   Visits Cancelled: 0  Visits No Show: 0    Time In:  2:00  Time Out:  2:37  Total Billable Time: 37 min     Precautions: Standard  Subjective:   Pt reports: increased difficulty swallowing. Feels like a lot of mucous in her throat. She is continually checking her blood sugar to ensure that it is high enough since she isn't eating a lot. She is going to ENT Monday. She had spicy food last night and it did not cause any increase in hoarseness. Reports increased difficulty swallowing solids and pills for the past week and is coughing a lot with solid foods per pt report. Going to audiologist Monday to investigate "clicking" sound in ears. She states that her weight usually fluctuates but she hasn't weighed herself lately to see if she has lost any weight. Pt's voice with improved quality today.   She was compliant to home exercise program. - Completing HEP 2-3 times per day.   Response to previous treatment: positive    Pain Scale:  0/10 on VAS currently.   Pain Location: N/A  Objective:     UNTIMED  Procedure Min.   Speech- Language- Voice Therapy 27   Oral and Swallow function evaluation  10   Total Timed Units: 0  Total Untimed Units: 2  Charges Billed/# of units: 2    Short Term Goals: (4 weeks)     1. Pt will complete neck relaxation exercises 10x each per session/at home ind'ly.   Progressing/ Not Met 7/8/2020   Not formally addressed     2. " Pt will reduce/eliminate/substitute throat clearing ind'ly.   Progressing/ Not Met 7/8/2020   Pt utilizing hard swallow/sip of water but continues to feel the urge to throat clear multiple times throughout day    Mild throat clearing noted x3 throughout session  Harsh throat clearing noted x1 throughout session   3. Pt will recall and utilize vocal hygiene strategies with min A.    Progressing/ Not Met 7/8/2020   Pt recalled vocal hygiene strategies with 100% acc independently METx1   4. Patient will complete SOVT exercises and/or resonant-focused exercises 3-5x daily to strengthen and balance the intrinsic laryngeal musculature and maximize glottic closure without medial hyperfunction.  Progressing/ Not Met 7/8/2020    Not formally addressed     5. Patient will improve the quality, efficiency, and ease of phonation through resonant and/or airflow exercises from the syllable to conversation level with 80% accuracy.  Progressing/ Not Met 7/8/2020   - Bubbles without phonation x10  - Bubbles with phonation x10  - Bubbles with /m/ sound- increasing pitch x10  - Bubbles with /m/ sound- decreasing pitch x10  - Bubbles with speaking (days of week, months of year, counting, etc.)  - Bubbles with speaking (days of week, months of year, counting, etc.) with fading out the straw x10    Pt with improved vocal quality      6. .Pt will demonstrate diagphragmatic breathing for 1 minute independently  Progressing/ Not Met 7/8/2020   Pt able to perform diaphragmatic breathing for >1 minute given min cues    7. Pt will use diaphragmatic breathing to increase breath support for speech in single words, short phrases, sentences, and conversation on 8/10 trials given min cues  Progressing/ Not Met 7/8/2020    Not formally addressed     9. Pt will participate in clinical swallow examination to determine need for objective swallow assessment  Goal Met / Discontinue   See below     NEW GOAL 7/8/20:  10. Pt will participate in Modified  Barium Swallow Study to objectively assess her  swallow, rule out silent aspiration, and determine the safest possible diet.   New goal         Swallowing:  Clinical Swallow Exam/ Oral Mechanism Exam:  Mandibular Strength and Mobility - Trigeminal Nerve (CNV) Rest: WFL   Lateralization: WFL  Protrusion: WFL  Retraction:  WFL  Involuntary Movement: absent   Oral Labial Strength and Mobility -  Facial Nerve (CN VII)  Rest: WFL   Protrusion: WFL  Retraction:  WFL  Involuntary Movement: absent    Lingual Strength and Mobility- Hypoglossal Nerve (CN XII)  Rest: WFL   Lateralization: WFL  Protrusion: WFL  Elevation:  WFL  Involuntary Movement: absent    Velar Elevation -   Glossopharyngeal Nerve (CN IX) and Vagus (CN X) Rest: WFL   Symmetry: WFL   Elevation: WFL   Sustained elevation: WFL   Involuntary movement: absent     Buccal Strength and Mobility -   Facial Nerve (CN VII)  WFL    Facial Sensation and Movement -   Facial Nerve (CN VII) Symmetrical at rest: yes  Wrinkle forehead: yes  Able to close eyes tightly: yes  Taste to Anterior 2/3 of Tongue: yes     Structure Abnormalities: no  Dentition: present and adequate for speech and swallowing   Secretion Management: adequate   Mucosal Quality: adequate  Volitional Cough: adequate   Volitional Swallow: adequate   Voice Prior to PO Intake: clear    [place] Swallow Protocol:  PASSED  [place] Swallow Protocol dictates pt remain NPO if fail screener; (Sosar et al. 2014) however, as objective swallow assessment is not available for greater than a week, pt will remain on current diet until objective assessment is completed unless otherwise indicated.     Clinical Swallow Examination:   Pt presented with:   THIN:- self regulated thin liquid via cup X5    PUREE:- tsp bites of pudding x2    MIXED CONSISTENCIES: diced peaches x3    SOLID: -bite of karlene cracker x1        DESCRIPTION: Oral Phase: Adequate labial seal maintained evidenced by no anterior bolus loss. Bolus preparation  and manipulation was slow for solid consistencies. No oral residue noted post po trials. Pharyngeal phase: Laryngeal lift present upon manual palpation. Bolus transit time from presentation of bolus to laryngeal lift appeared timely. No overt s/s aspiration appreciated. No coughing or throat clearing throughout or post PO trials, however, pt reported discomfort swallowing the solid cracker. Pt's vocal quality remained clear following po trials. Please note silent aspiration cannot be ruled out in this setting.     Recommend MBSS: yes as pt is coughing with solids in home environment which is causing her to not eat as much.       Patient Education/Response:   Provided skilled education regarding the purpose of MBSS. Discussed continuing HEP 3x/day and especially when her vocal quality is more hoarse (I.e., right when she wakes up). Pt verbalized understanding of all discussed.     Written Home Exercises Provided: Patient instructed to cont prior HEP.  Exercises were reviewed and Valencia was able to demonstrate them prior to the end of the session.  Valencia demonstrated good  understanding of the education provided.     See EMR under Patient Instructions for exercises provided 7/8/2020 and prior visit.   Assessment:   Valencia is progressing well towards her goals. Improved vocal quality noted today and further improvement noted upon completion of cup bubbles/straw phonation exercises. Increased swallowing difficulty reported by patient with increase in coughing which is making her not eat as much. Recommend Modified Barium Swallow Study to objectively assess her  swallow, rule out silent aspiration, and determine the safest possible diet.  Current goals remain appropriate. Goals to be updated as necessary.     Pt prognosis is Good. Pt will continue to benefit from skilled outpatient speech and language therapy to address the deficits listed in the problem list on initial evaluation, provide pt/family education and to  maximize pt's level of independence in the home and community environment.   Medical necessity is demonstrated by the following IMPAIRMENTS:  Patient with decreased vocal quality and intensity resulting in decreased speech intelligibility, negatively impacting her ability to effectively and efficiently explain an emergency situation to emergency operators via phone or in person.     Barriers to Therapy: none  Pt's spiritual, cultural and educational needs considered and pt agreeable to plan of care and goals.  Plan:   Continue POC with focus on identifying presence/absence of dysphagia and improving vocal quality     ANTONETTE Jones, CCC-SLP  Speech Language Pathologist   7/8/2020

## 2020-07-07 NOTE — TELEPHONE ENCOUNTER
Called Patient and scheduled her to have a Covid Test and to see Dr. Lewis sooner than August as per Dr. Lewis.

## 2020-07-07 NOTE — TELEPHONE ENCOUNTER
----- Message from Delores Lewis MD sent at 7/7/2020  9:28 AM CDT -----  Regarding: apptmt and covid test prior  Hi  Could you please schedule this apptmt for this pt asap for scope exam for severe dysphagia acute onset. She needs a covid test before also . thanks

## 2020-07-08 ENCOUNTER — LAB VISIT (OUTPATIENT)
Dept: FAMILY MEDICINE | Facility: CLINIC | Age: 50
End: 2020-07-08
Payer: MEDICARE

## 2020-07-08 ENCOUNTER — CLINICAL SUPPORT (OUTPATIENT)
Dept: REHABILITATION | Facility: HOSPITAL | Age: 50
End: 2020-07-08
Attending: OTOLARYNGOLOGY
Payer: MEDICARE

## 2020-07-08 DIAGNOSIS — R13.10 DYSPHAGIA, UNSPECIFIED TYPE: ICD-10-CM

## 2020-07-08 DIAGNOSIS — R49.0 HOARSE: Primary | ICD-10-CM

## 2020-07-08 PROCEDURE — 92610 EVALUATE SWALLOWING FUNCTION: CPT | Mod: PN | Performed by: SPEECH-LANGUAGE PATHOLOGIST

## 2020-07-08 PROCEDURE — U0003 INFECTIOUS AGENT DETECTION BY NUCLEIC ACID (DNA OR RNA); SEVERE ACUTE RESPIRATORY SYNDROME CORONAVIRUS 2 (SARS-COV-2) (CORONAVIRUS DISEASE [COVID-19]), AMPLIFIED PROBE TECHNIQUE, MAKING USE OF HIGH THROUGHPUT TECHNOLOGIES AS DESCRIBED BY CMS-2020-01-R: HCPCS

## 2020-07-08 PROCEDURE — 92507 TX SP LANG VOICE COMM INDIV: CPT | Mod: PN | Performed by: SPEECH-LANGUAGE PATHOLOGIST

## 2020-07-08 NOTE — PATIENT INSTRUCTIONS
1. Bubbles without sound x10  2. Bubbles with sound x10  3. Bubbles with sound - increasing and decreasing pitch x10  4. Bubbles with speaking (days of week, months of year, counting, etc.) or reading  5. Bubbles with speaking (days of week, months of year, counting, etc.) or reading with fading out the straw x10

## 2020-07-09 ENCOUNTER — HOSPITAL ENCOUNTER (OUTPATIENT)
Dept: RADIOLOGY | Facility: HOSPITAL | Age: 50
Discharge: HOME | End: 2020-07-09
Attending: OBSTETRICS & GYNECOLOGY
Payer: MEDICARE

## 2020-07-09 ENCOUNTER — TELEPHONE (OUTPATIENT)
Dept: OTOLARYNGOLOGY | Facility: CLINIC | Age: 50
End: 2020-07-09

## 2020-07-09 ENCOUNTER — HOSPITAL ENCOUNTER (OUTPATIENT)
Dept: RADIOLOGY | Facility: HOSPITAL | Age: 50
Discharge: HOME | End: 2020-07-09
Attending: INTERNAL MEDICINE
Payer: MEDICARE

## 2020-07-09 DIAGNOSIS — Z78.0 ASYMPTOMATIC POSTMENOPAUSAL ESTROGEN DEFICIENCY: ICD-10-CM

## 2020-07-09 DIAGNOSIS — R63.30 FEEDING DIFFICULTIES: ICD-10-CM

## 2020-07-09 DIAGNOSIS — R13.10 DYSPHAGIA, UNSPECIFIED TYPE: Primary | ICD-10-CM

## 2020-07-09 DIAGNOSIS — M81.0 OSTEOPOROSIS, UNSPECIFIED OSTEOPOROSIS TYPE, UNSPECIFIED PATHOLOGICAL FRACTURE PRESENCE: ICD-10-CM

## 2020-07-09 DIAGNOSIS — Z12.31 SCREENING MAMMOGRAM, ENCOUNTER FOR: ICD-10-CM

## 2020-07-09 DIAGNOSIS — T38.0X5S ADVERSE EFFECT OF GLUCOCORTICOID OR SYNTHETIC ANALOGUE, SEQUELA: ICD-10-CM

## 2020-07-09 LAB — SARS-COV-2 RNA RESP QL NAA+PROBE: NOT DETECTED

## 2020-07-09 PROCEDURE — 77063 BREAST TOMOSYNTHESIS BI: CPT | Mod: 26,,, | Performed by: RADIOLOGY

## 2020-07-09 PROCEDURE — 77067 SCR MAMMO BI INCL CAD: CPT | Mod: 26,,, | Performed by: RADIOLOGY

## 2020-07-09 PROCEDURE — 77080 DEXA BONE DENSITY SPINE HIP: ICD-10-PCS | Mod: 26,,, | Performed by: RADIOLOGY

## 2020-07-09 PROCEDURE — 77063 MAMMO DIGITAL SCREENING BILAT WITH TOMOSYNTHESIS_CAD: ICD-10-PCS | Mod: 26,,, | Performed by: RADIOLOGY

## 2020-07-09 PROCEDURE — 77080 DXA BONE DENSITY AXIAL: CPT | Mod: TC

## 2020-07-09 PROCEDURE — 77067 SCR MAMMO BI INCL CAD: CPT | Mod: TC

## 2020-07-09 PROCEDURE — 77067 MAMMO DIGITAL SCREENING BILAT WITH TOMOSYNTHESIS_CAD: ICD-10-PCS | Mod: 26,,, | Performed by: RADIOLOGY

## 2020-07-09 PROCEDURE — 77080 DXA BONE DENSITY AXIAL: CPT | Mod: 26,,, | Performed by: RADIOLOGY

## 2020-07-09 NOTE — TELEPHONE ENCOUNTER
Order placed from modified barium swallow. Discussed case with SLP Yudy Schafer who  noted had difficulty with chewing solid food on SL P eval. Will order MBS for evaluation of any type of oral/oropharyngeal/pharyngeal dysphagia and will still have her come in for acute worsening of symptoms to double check she has not developed esophageal thrush given that she is a post transplant pt. She may also need esophagram if mbs and repeat scope unrevealing.

## 2020-07-13 ENCOUNTER — OFFICE VISIT (OUTPATIENT)
Dept: OTOLARYNGOLOGY | Facility: CLINIC | Age: 50
End: 2020-07-13
Payer: MEDICARE

## 2020-07-13 VITALS
HEIGHT: 69 IN | SYSTOLIC BLOOD PRESSURE: 140 MMHG | TEMPERATURE: 98 F | WEIGHT: 137.88 LBS | BODY MASS INDEX: 20.42 KG/M2 | DIASTOLIC BLOOD PRESSURE: 70 MMHG

## 2020-07-13 DIAGNOSIS — R13.19 OTHER DYSPHAGIA: Primary | ICD-10-CM

## 2020-07-13 PROCEDURE — 31575 PR LARYNGOSCOPY, FLEXIBLE; DIAGNOSTIC: ICD-10-PCS | Mod: S$GLB,,, | Performed by: OTOLARYNGOLOGY

## 2020-07-13 PROCEDURE — 3078F PR MOST RECENT DIASTOLIC BLOOD PRESSURE < 80 MM HG: ICD-10-PCS | Mod: CPTII,S$GLB,, | Performed by: OTOLARYNGOLOGY

## 2020-07-13 PROCEDURE — 31575 DIAGNOSTIC LARYNGOSCOPY: CPT | Mod: S$GLB,,, | Performed by: OTOLARYNGOLOGY

## 2020-07-13 PROCEDURE — 3078F DIAST BP <80 MM HG: CPT | Mod: CPTII,S$GLB,, | Performed by: OTOLARYNGOLOGY

## 2020-07-13 PROCEDURE — 3077F PR MOST RECENT SYSTOLIC BLOOD PRESSURE >= 140 MM HG: ICD-10-PCS | Mod: CPTII,S$GLB,, | Performed by: OTOLARYNGOLOGY

## 2020-07-13 PROCEDURE — 99213 PR OFFICE/OUTPT VISIT, EST, LEVL III, 20-29 MIN: ICD-10-PCS | Mod: 25,S$GLB,, | Performed by: OTOLARYNGOLOGY

## 2020-07-13 PROCEDURE — 3008F PR BODY MASS INDEX (BMI) DOCUMENTED: ICD-10-PCS | Mod: CPTII,S$GLB,, | Performed by: OTOLARYNGOLOGY

## 2020-07-13 PROCEDURE — 3077F SYST BP >= 140 MM HG: CPT | Mod: CPTII,S$GLB,, | Performed by: OTOLARYNGOLOGY

## 2020-07-13 PROCEDURE — 3008F BODY MASS INDEX DOCD: CPT | Mod: CPTII,S$GLB,, | Performed by: OTOLARYNGOLOGY

## 2020-07-13 PROCEDURE — 99213 OFFICE O/P EST LOW 20 MIN: CPT | Mod: 25,S$GLB,, | Performed by: OTOLARYNGOLOGY

## 2020-07-13 RX ORDER — PANTOPRAZOLE SODIUM 40 MG/1
40 TABLET, DELAYED RELEASE ORAL 2 TIMES DAILY
Qty: 60 TABLET | Refills: 11 | Status: SHIPPED | OUTPATIENT
Start: 2020-07-13 | End: 2023-01-20

## 2020-07-13 NOTE — PROGRESS NOTES
OTOLARYNGOLOGY CLINIC NOTE  Date:  07/13/2020     Chief complaint:  Chief Complaint   Patient presents with    Follow-up     dysphagia       History of Present Illness  Valencia Dumont is a 50 y.o. female  presenting today for a follow up of worsening dysphagia. She has several ent issues that we discussed at her initial appointment with me on 6-17-20. Flex scope at that time was negative for malignancy. Pt was not having significant swalowing issues at that appointment but messaged me as she noticed an acute change in swallowing. I also spoke with SLP and it was noted that she was having difficulty with oral bolus therefore MBS was ordered. She has a PMH of lupus and is on immune suppression medication for a liver transplant. I had her come in today for an appointment to ensure that she was not developing a candidial infection in the throat/esophagus that could be causing acute dysphagia.     Woke up this am feeling like need to clear throat.  Difficulty with swallowing pills. Meat hardest to swallow. No issue with liquids.no issue with yogurt. Rice feels scracthy going down.   Had some slight issues with this before but is much worse now over past couple of weeks.   Ear itching persists doesn't bother her a lot, I offered to give cream, does not want      Throat pain was the initial reason for her coming to see me in June.  tried nystatin in the past which didn't help. Tongue gets white at times. Has been going on since October-November . Drinks a lot of water. Drinks theraflu ever other night to soothe throat. No lumps or bumps in neck.    Has not seen GI recently but used to see GI. Raspy voice in am and late at night. No dysphagia. +heartburn, at initial visit with me she stated she was been taking BID PPI for over three months ( states takes protonix in am and prilosec at night) . Further discussion today pt states that she has been taking PPI in am and h2 blocker at night.     Regarding history of  allergies/sinus issues, historically she has had to Takes abx for sinus infections three times per year. Sinus symptoms include headaches and postnasal drainage  No colored drainage. No significant nasal congestion.Eyes itching. + sneezing - happens around pollen or doing housework Uses flonase nightly. No saline spray. No allergy testing with skin testing, had serum testing. Frontal headaches, happen on and off . Takes tylenol which helps a little. At initial visit with me  she noted  getting frequent headaches. No family history of migraine. Has eye pain at times. At initial visit here with me I added astelin spray to her nasal spray regimen.     Wakes up at night some to urinate but sleeps pretty well ( does take ambien). Night time awakening with coughing/choking + snoring. Does have fatigue can't nap because of antidepresant.        regarding history of ear symptoms: +popping sensation in ears ; tried putting cotton in her ear maybe helped a little.sometimes issue with ears with yawning.  Has not noticed significant issues with this   no otorrhea. No tinnitus. Right hearing loss, noticed a few days before saw Dr. Livingston .  She saw dr livingston previously  for cerumen impaction and had ears cleaned. Hearing improved slightly after cleaning.      Gets dizziness with head turn- room spinning and seems to be in her eyes. Does not improve with eyes closed. Car sickness history. Gets nausea at times with dizzy episodes.     I have ordered audiologic evaluation for hearing and balance workup which is pending. I also ordered a sleep study to rule out SHAWNA which could be causing uncontrolled acid reflux and given the above symptoms of nighttime choking and snoring.     Used to see Dr. Candelaria and he had wanted to do sinus surgery- last seen in 2016. She did not get sinus surgery however. 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 10/18/2013    Diabetes mellitus     Esophageal varices     GERD (gastroesophageal reflux disease)     Herpes simplex virus (HSV) infection     Hypertension     Kidney stone     Lupus     Osteoporosis 12/2013    Prophylactic immunotherapy (transplant immunosuppression) 1/2/2014    SBP (spontaneous bacterial peritonitis)     history of         Past Surgical History  Past Surgical History:   Procedure Laterality Date    COLONOSCOPY N/A 5/31/2017    Procedure: COLONOSCOPY;  Surgeon: Marky Sun MD;  [facility]  Service: Endoscopy;  Laterality: N/A;  PM prep.    ESOPHAGOGASTRODUODENOSCOPY      ESOPHAGOGASTRODUODENOSCOPY N/A 1/16/2019    Procedure: EGD (ESOPHAGOGASTRODUODENOSCOPY);  Surgeon: Deniz Guerra MD;  [facility]  Service: Endoscopy;  Laterality: N/A;  labs prior, s/p liver transplant-MS    LIVER BIOPSY      LIVER TRANSPLANT  12/31/2013    REFRACTIVE SURGERY Bilateral 2010    TUBAL LIGATION  2003    UPPER GASTROINTESTINAL ENDOSCOPY          Medications  Current Outpatient Medications on File Prior to Visit   Medication Sig Dispense Refill    azelastine (ASTELIN) 137 mcg (0.1 %) nasal spray 1 spray (137 mcg total) by Nasal route 2 (two) times daily. 30 mL 3    blood-glucose meter kit To check BG 5 times daily, to use with insurance preferred meter 1 each 0    blood-glucose meter,continuous (DEXCOM G6 ) Misc 1 each by Misc.(Non-Drug; Combo Route) route once. for 1 dose 1 each 0    blood-glucose sensor (DEXCOM G5-G4 SENSOR) Karen 1 each by Misc.(Non-Drug; Combo Route) route once a week. 4 Device 11    blood-glucose transmitter (DEXCOM G6 TRANSMITTER) Karen 1 each by Misc.(Non-Drug; Combo Route) route once a week 1 Device 3    buPROPion (WELLBUTRIN XL) 150 MG TB24 tablet Take 300 mg by mouth.      ciclopirox  (PENLAC) 8 % Soln Apply topically nightly. 1 Bottle 3    COMBIVENT RESPIMAT  mcg/actuation inhaler INHALE ONE PUFF INTO THE LUNGS TWICE DAILY  1    cyproheptadine (PERIACTIN) 4 mg tablet Take 4 mg by mouth every evening.  3    diazepam (VALIUM) 5 MG tablet Take 5 mg by mouth 3 (three) times daily as needed.   0    dicyclomine (BENTYL) 10 MG capsule Take 1 capsule (10 mg total) by mouth before meals as needed (TID PRN). 90 capsule 2    DILTIAZEM HCL (DILTIAZEM 2% CREAM) Apply topically 3 (three) times daily. Apply topically to anal area. 30 g 0    diphenoxylate-atropine 2.5-0.025 mg (LOMOTIL) 2.5-0.025 mg per tablet Take 1 tablet by mouth every 6 (six) hours as needed for Diarrhea. 120 tablet 1    ergocalciferol (ERGOCALCIFEROL) 50,000 unit Cap Take 1 capsule (50,000 Units total) by mouth every 7 days. 4 capsule 2    erythromycin with ethanol (EMGEL) 2 % gel ADD 30GM (1 TUBE) TO THE SOAKING DEVICE AND ALLOW WATER TO AGITATE. PLACE AFFECTED AREAS INTO WATER AND SOAK FOR 10 MINUTES 1-2 TIMES DAILY  1    estradiol (YUVAFEM) 10 mcg Tab Place 1 tablet (10 mcg total) vaginally twice a week. (Patient not taking: Reported on 3/2/2020) 8 tablet 11    famotidine (PEPCID) 40 MG tablet Take 40 mg by mouth once daily.  11    fluconazole (DIFLUCAN) 150 MG Tab TAKE ONE TABLET BY MOUTH once for ONE dose  0    furosemide (LASIX) 40 MG tablet furosemide 40 mg tablet      gabapentin (NEURONTIN) 300 MG capsule TAKE 1 CAPSULE BY MOUTH THREE TIMES DAILY      gentamicin (GARAMYCIN) 0.1 % cream ADD 30GM (1 TUBE) TO THE SOAKING DEVICE AND ALLOW WATER TO AGITATE. PLACE AFFECTED AREAS INTO WATER AND SOAK FOR 10 MINUTES 1-2 TIMES DAILY  1    hydrALAZINE (APRESOLINE) 50 MG tablet Take 1 tablet (50 mg total) by mouth every 8 (eight) hours. for 3 days 12 tablet 0    hydroxychloroquine (PLAQUENIL) 200 mg tablet TAKE ONE TABLET BY MOUTH TWICE DAILY 60 tablet 2    hydrOXYzine HCl (ATARAX) 10 MG Tab TAKE 1 OR 2 TABLETS BY MOUTH  THREE TIMES DAILY AS NEEDED FOR ITCHING.  0    insulin aspart U-100 (NOVOLOG FLEXPEN U-100 INSULIN) 100 unit/mL (3 mL) InPn pen Takes three units before breakfast and lunch  and 5 units before dinner with sliding scale, up to 25 units daily 45 mL 3    insulin detemir U-100 (LEVEMIR FLEXTOUCH U-100 INSULN) 100 unit/mL (3 mL) SubQ InPn pen Inject 4 Units into the skin 2 (two) times daily. 45 mL 3    isosorbide mononitrate (IMDUR) 30 MG 24 hr tablet Take 30 mg by mouth once daily.      ketoconazole (NIZORAL) 2 % cream ADD 30GM (1/2 TUBE) TO THE SOAKING DEVICE AND ALLOW WATER TO AGITATE. PLACE AFFECTED AREAS INTO WATER AND SOAK FOR 10 MINUTES 1-2 TIMES BRENDON  1    Lactobac. rhamnosus GG-inulin 10 billion cell -200 mg Cap Take 1 capsule by mouth 2 (two) times daily. 30 capsule 0    levocetirizine (XYZAL) 5 MG tablet Take 5 mg by mouth every evening.  3    levoFLOXacin (LEVAQUIN) 750 MG tablet levofloxacin 750 mg tablet      losartan (COZAAR) 100 MG tablet Take 1 tablet (100 mg total) by mouth once daily. 30 tablet 2    magnesium oxide 500 mg Tab Take 500 mg by mouth 2 (two) times daily. 60 each 6    meclizine (ANTIVERT) 25 mg tablet TAKE ONE TABLET BY MOUTH AT BEDTIME AS NEEDED for dizziness.  0    meloxicam (MOBIC) 15 MG tablet       MULTIVIT,THER IRON,CA,FA & MIN (MULTIVITAMIN) Tab Take 1 tablet by mouth once daily. 30 tablet 0    mycophenolate (MYFORTIC) 180 MG TbEC TAKE TWO TABLETS BY MOUTH TWICE DAILY. 120 tablet 5    neomycin-polymyxin-hydrocortisone (CORTISPORIN) otic solution INSTILL TWO DROPS INTO BOTH EARS FOUR TIMES DAILY FOR 10 DAYS  0    nystatin (MYCOSTATIN) 100,000 unit/mL suspension SWISH AND SPIT FIVE MILLILITERS BY MOUTH FOUR TIMES DAILY FOR 7 DAYS.  1    nystatin (MYCOSTATIN) cream Apply topically 2 times daily 30 g 0    nystatin-triamcinolone (MYCOLOG II) cream Apply to affected area 2 times daily 30 g 1    oxycodone-acetaminophen (PERCOCET) 7.5-325 mg per tablet Take 1 tablet by  mouth every 4 (four) hours as needed for Pain.      pantoprazole (PROTONIX) 40 MG tablet TAKE ONE TABLET BY MOUTH ONCE DAILY FOR REFLUX 30 tablet 5    paroxetine (PAXIL) 10 MG tablet paroxetine 10 mg tablet      polyethylene glycol (GLYCOLAX) 17 gram/dose powder Mix 1 capful (17 g) with liquid and by mouth 2 (two) times daily. 1530 g 11    PROAIR HFA 90 mcg/actuation inhaler Inhale 2 puffs into the lungs 3 (three) times daily as needed.   3    promethazine-dextromethorphan (PROMETHAZINE-DM) 6.25-15 mg/5 mL Syrp Take by mouth 2 (two) times daily as needed.   0    ranitidine (ZANTAC) 300 MG tablet Take 300 mg by mouth 2 (two) times daily.       rosuvastatin (CRESTOR) 5 MG tablet Take 5 mg by mouth once daily.      sertraline (ZOLOFT) 50 MG tablet Take 50 mg by mouth once daily.  11    SSD 1 % cream 1 application 2 (two) times daily. Apply to affected area  0    tacrolimus (PROGRAF) 1 MG Cap Take 3 capsules (3 mg total) by mouth every 12 (twelve) hours. 180 capsule 11    tacrolimus (PROGRAF) 1 MG Cap TAKE THREE CAPSULES BY MOUTH EVERY TWELVE HOURS (PROGRAF) 180 capsule 11    traZODone (DESYREL) 50 MG tablet Take 50 mg by mouth nightly.  0    triamcinolone acetonide 0.1% (KENALOG) 0.1 % cream Apply topically 2 times daily 30 g 0    valACYclovir (VALTREX) 500 MG tablet TAKE ONE TABLET BY MOUTH TWICE DAILY FOR 3 DAYS. 6 tablet 2    verapamil (CALAN-SR) 120 MG CR tablet TAKE ONE TABLET ONCE DAILY FOR BLOOD PRESSURE  3    zolpidem (AMBIEN) 10 mg Tab Take 10 mg by mouth nightly as needed.  3     No current facility-administered medications on file prior to visit.        Review of Systems  Review of Systems   HENT:        Dysphagia     Respiratory: Negative for hemoptysis, shortness of breath and stridor.    Gastrointestinal: Negative for vomiting.   Musculoskeletal: Negative for neck pain.   Neurological: Negative for speech change.        Social History   reports that she has never smoked. She has never used  smokeless tobacco. She reports current alcohol use of about 1.0 standard drinks of alcohol per week. She reports that she does not use drugs.     Family History  Family History   Problem Relation Age of Onset    Hypertension Mother,Brother     Cataracts Mother     Diabetes Father,Brother, Paternal Grandfather and grandmother     Alzheimer's disease Father     Breast cancer Maternal Aunt 55        Physical Exam   Vitals:    07/13/20 1501   BP: (!) 140/70   Temp: 97.6 °F (36.4 °C)    Body mass index is 20.36 kg/m².         GENERAL: alert, no acute distress.  HEAD: normocephalic.   SKIN: no lesions of skin of head and neck area.  EYES: lids and lashes normal. Pupils equal  No proptosis  EARS: external ear without lesion, normal pinna shape and position. Tympanic membranes without perforation. No middle ear effusion  NOSE: external nose without abnormality, turbinates boggy, septum s shaped with spurs along  Inferior part of septum with mild obstruction of nasal airway  ORAL CAVITY/OROPHARYNX: dentition fair,  Braces, no malocclusion. Mild tmj crepitus no lesion of gingiva/buccal mucosa/floor of mouth/tongue. tongue midline and mobile.   NECK: trachea midline. No discrete palpable thyroid nodule. No submandibular gland mass nor tenderness. No scars.  LYMPH NODES:No cervical lymphadenopathy.  RESPIRATORY: no stridor, no stertor. Voice normal. Respirations nonlabored.  NEURO: alert, responds to questions appropriately.   Cranial nerve exam as indicated in above sections   PSYCH:mood appropriate      PROCEDURE NOTE  NAME OF PROCEDURE: Flexible Laryngoscopy, diagnostic  INDICATIONS:acute worsening of dysphagia in immunocomprimised pt  FINDINGS: no thrush; exam unchanged    Consent: After procedure was explained in detail and all questions answered, verbal consent was obtained for performing flexible laryngoscopy.  Anesthesia: topical 4% lidocaine  and neosynephrine  Procedure: With patient in seated position, the scope  was inserted into the bilateral nasal passageway and advanced through the right atraumatically into the nasopharynx to examine the following structures  Nasopharynx: no mass or lesion noted in nasopharynx. No cobblestoning   Oropharynx: base of tongue without  mass or ulceration. Lingual tonsils normal in appearance  Hypopharynx: posterior pharyngeal wall without mass or lesion. No pooling of secretions. Pyriform sinuses visible without mass or lesion  Larynx: epiglottis normal without lesion. False vocal folds without edema/erythema/lesion. True vocal folds mobile and without lesion.+pseudosulcus Mild interarytenoid edema and erythema . Postcricoid region with mild edema , no lesion  Subglottis: visualized portion of subglottis normal in appearance    After examination performed, the scope was removed atraumatically . The patient tolerated the procedure well.    Imaging:  The patient has no pertinent and/or recent imaging of the head and neck.    Labs:  No recent labs    Assessment  1. Hoarse    2. Labyrinthine dysfunction, bilateral     3. Obstructive sleep apnea syndrome     4. Allergic rhinitis  5. Deviated nasal septum      Plan:  Discussed plan of care with patient in detail and all questions answered. Patient reported understanding of plan of care.       Dysphagia- not actually on bid ppi - would like to do a 2 month trial with this to see if LPR contributing to ETD/postnasal drainage/phlegm.will follow up MBS results . Continue speech therapy.  Refer to GI      Snoring/night time awakening- need to rule out SHAWNA, sleep study and sleep medicine referrals. Sleep study pending.     Dizziness and hearing loss- audio referral for hearing and balance testing-pending     Allergic rhinitis/deviated nasal septum: continue  astelin and  flonase, counseled pt that combo of steroid and antihistamine nasal spray has been shown in evidence based studies to be better than either alone. Discussed medication administration  technique ( point toward outer corner of eye and not towards nasal septum) and use nasal saline prior to medication sprays. saline prior . Postnasal drip from lpr and/or allergies could also be contributing to above sx      Follow up 2-3 months after remainder of testing, sooner if any issues

## 2020-07-14 ENCOUNTER — PATIENT MESSAGE (OUTPATIENT)
Dept: GASTROENTEROLOGY | Facility: CLINIC | Age: 50
End: 2020-07-14

## 2020-07-14 NOTE — TELEPHONE ENCOUNTER
MA spoke to pt,     Pt requesting to r/s appt.    MA r/s appt to 7/30 at 1 pm with Dr. Darnell     Pt confirmed appt and thank MA

## 2020-07-15 ENCOUNTER — DOCUMENTATION ONLY (OUTPATIENT)
Dept: REHABILITATION | Facility: HOSPITAL | Age: 50
End: 2020-07-15

## 2020-07-15 ENCOUNTER — HOSPITAL ENCOUNTER (OUTPATIENT)
Dept: RADIOLOGY | Facility: HOSPITAL | Age: 50
Discharge: HOME | End: 2020-07-15
Attending: OBSTETRICS & GYNECOLOGY
Payer: MEDICARE

## 2020-07-15 DIAGNOSIS — R49.0 HOARSE: Primary | ICD-10-CM

## 2020-07-15 DIAGNOSIS — R92.8 ABNORMAL MAMMOGRAM OF LEFT BREAST: ICD-10-CM

## 2020-07-15 PROCEDURE — 77061 MAMMO DIGITAL DIAGNOSTIC LEFT WITH TOMOSYNTHESIS_CAD: ICD-10-PCS | Mod: 26,LT,, | Performed by: RADIOLOGY

## 2020-07-15 PROCEDURE — 77065 DX MAMMO INCL CAD UNI: CPT | Mod: TC,LT

## 2020-07-15 PROCEDURE — 77065 DX MAMMO INCL CAD UNI: CPT | Mod: 26,LT,, | Performed by: RADIOLOGY

## 2020-07-15 PROCEDURE — 77065 MAMMO DIGITAL DIAGNOSTIC LEFT WITH TOMOSYNTHESIS_CAD: ICD-10-PCS | Mod: 26,LT,, | Performed by: RADIOLOGY

## 2020-07-15 PROCEDURE — 77061 BREAST TOMOSYNTHESIS UNI: CPT | Mod: 26,LT,, | Performed by: RADIOLOGY

## 2020-07-15 PROCEDURE — 77061 BREAST TOMOSYNTHESIS UNI: CPT | Mod: TC,LT

## 2020-07-15 NOTE — PROGRESS NOTES
Cancellation/Documentation    Patient: Valencia Dumont  Date of Session: 7/15/2020  Diagnosis:   1. Hoarse       MRN: [id number]    Valencia Dumont did not attend her scheduled therapy appointment today. She cancelled the appointment via system SMS. This is the first appointment that she has not attended. No future appointments are scheduled and will f/u via phone to reschedule pt appointment following MBSS procedure. No charges have been posted today.     ANTONETTE Jones, CCC-SLP  Speech Language Pathologist   7/15/2020

## 2020-07-16 ENCOUNTER — HOSPITAL ENCOUNTER (OUTPATIENT)
Dept: RADIOLOGY | Facility: HOSPITAL | Age: 50
Discharge: HOME | End: 2020-07-16
Attending: OTOLARYNGOLOGY
Payer: MEDICARE

## 2020-07-16 DIAGNOSIS — R63.30 FEEDING DIFFICULTIES: ICD-10-CM

## 2020-07-16 DIAGNOSIS — R13.10 DYSPHAGIA, UNSPECIFIED TYPE: ICD-10-CM

## 2020-07-16 PROCEDURE — 25500020 PHARM REV CODE 255: Performed by: OTOLARYNGOLOGY

## 2020-07-16 PROCEDURE — 97535 SELF CARE MNGMENT TRAINING: CPT

## 2020-07-16 PROCEDURE — 74230 X-RAY XM SWLNG FUNCJ C+: CPT | Mod: 26,,, | Performed by: RADIOLOGY

## 2020-07-16 PROCEDURE — 92611 MOTION FLUOROSCOPY/SWALLOW: CPT

## 2020-07-16 PROCEDURE — A9698 NON-RAD CONTRAST MATERIALNOC: HCPCS | Performed by: OTOLARYNGOLOGY

## 2020-07-16 PROCEDURE — 74230 FL MODIFIED BARIUM SWALLOW SPEECH STUDY: ICD-10-PCS | Mod: 26,,, | Performed by: RADIOLOGY

## 2020-07-16 PROCEDURE — 74230 X-RAY XM SWLNG FUNCJ C+: CPT | Mod: TC

## 2020-07-16 RX ADMIN — BARIUM SULFATE 30 ML: 0.81 POWDER, FOR SUSPENSION ORAL at 10:07

## 2020-07-16 NOTE — PROCEDURES
Modified Barium Swallow    Patient Name:  Valencia Dumont   MRN:  [id number]      Recommendations:      Recommendations:  Continue ST outpatient therapy for compensatory swallow strategies training                General Recommendations:  Dysphagia therapy and GI consult   Diet recommendations:   Regular diet, Thin liquid   Aspiration Precautions: Alternating bites/sips, HOB to 90 degrees, Meds whole buried in puree, Remain upright 30 minutes post meal and Standard aspiration precautions   General Precautions: Standard    Communication strategies:  none    Referral     Reason for Referral  Patient was referred for a Modified Barium Swallow Study to assess the efficiency of his/her swallow function, rule out aspiration and make recommendations regarding safe dietary consistencies, effective compensatory strategies, and safe eating environment.     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    Hypertension     Kidney stone     Lupus     Osteoporosis 12/2013    Prophylactic immunotherapy (transplant immunosuppression) 1/2/2014    SBP (spontaneous bacterial peritonitis)     history of        Objective:     Current Respiratory Status:  Room Air    Alert: yes    Cooperative: yes    Follows Directions: yes    Visualization  · Patient was seen in the lateral view    Oral Peripheral Examination  ·  WFL    Consistencies Assessed  · Thin 25mL   · Puree 5mL   · Soft solids 15mL     Oral Preparation/Oral Phase  · WFL- Pt with adequate bolus acceptance, containment, control and timely A-P transfer across consistencies     Pharyngeal Phase   Trace vallecular stasis caused by decreased  BOT retraction, across TL trials via spoon x1 and straw x3. Trace pyriform stasis caused by decreased UES opening, across TL trials via spoon x1 and straw x3. Pt cleared stasis by performing consecutive swallows. No persisting stasis post swallow of puree consistency x2 or soft solid x2 consistency trials.     Across trials of TL, puree, and soft solid items, pt's Rosenbeck Score 1 - No penetration of airway    Cervical Esophageal Phase  · Decreased UES opening, persisting stasis at the level of UES. Pt instructed to perform consecutive swallows which was effective to clear during study.     Assessment:     Impressions  ·   Patient demonstrates mild pharyngeal dysphagia characterized by decreased BOT retraction and decreased UES opening.  .     Prognosis: Good    Barriers:  · None    Plan  ST RECS pt continue outpatient ST to address moderate pharyngeal dysphagia targeting improved BOT retraction and UES opening.       Education  Results were discussed with patient.Pt instructed to utilizie safe swallow strategies to prevent and minimize globus sensation, i.e., alternating small bites/sips, adding moisture to dry foods and to perform consecutive swallows.        Time Tracking:   SLP Treatment Date:    7/16/20  Speech Start Time:   10:30  Speech Stop Time:      11:10  Speech Total Time (min):   40min    Reyna Muniz CCC-SLP  07/16/2020

## 2020-07-16 NOTE — PROGRESS NOTES
Transplant Hepatology  Liver Transplant Recipient Follow-up    Transplant Date: 2013  UNOS Native Liver Dx: Cirrhosis: Autoimmune    Valencia is here for follow up of her liver transplant.    ORGAN: LIVER  Whole or Partial: whole liver  Donor Type:  - brain death  CDC High Risk: no  Donor CMV Status: Positive  Donor HCV Status: Negative  Donor HBcAb: Negative  Biliary Anastomosis: end to end  Arterial Anatomy: standard  IVC reconstruction: end to end ivc  Portal vein status: patent  .    Subjective:     Interval History:  Currently, she is doing well. Current complaints include swallowing issue - seen by ENT, working with Speech Swallow therapist.  Review of Systems   Constitutional: Negative for chills and fatigue.   HENT: Negative for congestion.    Gastrointestinal: Negative for abdominal distention, abdominal pain, anal bleeding and diarrhea.        Swallowing difficulty   Allergic/Immunologic: Positive for immunocompromised state.       Objective:     Physical Exam  Vitals signs reviewed.   Constitutional:       General: She is not in acute distress.     Appearance: She is well-developed.   HENT:      Head: Normocephalic and atraumatic.   Eyes:      General: No scleral icterus.  Pulmonary:      Effort: No respiratory distress.   Abdominal:      General: There is no distension.      Palpations: Abdomen is soft.      Tenderness: There is no abdominal tenderness.   Musculoskeletal:         General: No swelling.   Neurological:      Mental Status: She is alert and oriented to person, place, and time.   Psychiatric:         Behavior: Behavior normal.         WBC   Date Value Ref Range Status   2020 3.78 (L) 3.90 - 12.70 K/uL Final     Hemoglobin   Date Value Ref Range Status   2020 11.7 (L) 12.0 - 16.0 g/dL Final     POC Hematocrit   Date Value Ref Range Status   2013 28 (L) 36 - 54 %PCV Final     Hematocrit   Date Value Ref Range Status   2020 35.0 (L) 37.0 - 48.5 % Final      Platelets   Date Value Ref Range Status   06/11/2020 190 150 - 350 K/uL Final     BUN, Bld   Date Value Ref Range Status   06/11/2020 23 (H) 6 - 20 mg/dL Final     Creatinine   Date Value Ref Range Status   06/11/2020 1.5 (H) 0.5 - 1.4 mg/dL Final     Glucose   Date Value Ref Range Status   06/11/2020 136 (H) 70 - 110 mg/dL Final     Calcium   Date Value Ref Range Status   06/11/2020 9.2 8.7 - 10.5 mg/dL Final     Sodium   Date Value Ref Range Status   06/11/2020 140 136 - 145 mmol/L Final     Potassium   Date Value Ref Range Status   06/11/2020 4.1 3.5 - 5.1 mmol/L Final     Chloride   Date Value Ref Range Status   06/11/2020 105 95 - 110 mmol/L Final     Magnesium   Date Value Ref Range Status   04/21/2020 1.8 1.6 - 2.6 mg/dL Final     AST   Date Value Ref Range Status   06/11/2020 15 10 - 40 U/L Final     ALT   Date Value Ref Range Status   06/11/2020 17 10 - 44 U/L Final     Alkaline Phosphatase   Date Value Ref Range Status   06/11/2020 81 55 - 135 U/L Final     Total Bilirubin   Date Value Ref Range Status   06/11/2020 0.4 0.1 - 1.0 mg/dL Final     Comment:     For infants and newborns, interpretation of results should be based  on gestational age, weight and in agreement with clinical  observations.  Premature Infant recommended reference ranges:  Up to 24 hours.............<8.0 mg/dL  Up to 48 hours............<12.0 mg/dL  3-5 days..................<15.0 mg/dL  6-29 days.................<15.0 mg/dL       Albumin   Date Value Ref Range Status   06/11/2020 4.0 3.5 - 5.2 g/dL Final     INR   Date Value Ref Range Status   06/07/2019 1.0 0.8 - 1.2 Final     Comment:     Coumadin Therapy:  2.0 - 3.0 for INR for all indicators except mechanical heart valves  and antiphospholipid syndromes which should use 2.5 - 3.5.       Lab Results   Component Value Date    TACROLIMUS 7.6 04/21/2020           Assessment/Plan:     1. Encounter for aftercare following liver transplant    2. 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         Immunosuppression  -Continue current IS  Liver transplant-good allograft function  -Continue with routine lab monitoring  -Continue with PCP f/u; continue with BP monitor given elevation today  -Cancer screening- patient advised about increased risks of cancer and need for skin protection, skin exam and regular age appropriate cancer screening    RTC in 1 year      UNOS Patient Status  Functional Status: 80% - Normal activity with effort: some symptoms of disease  Physical Capacity: No Limitations      A total of 25 minutes were spent face-to-face with the patient during this encounter and over half of that time was spent on counseling and coordination of care.  We discussed in depth the nature of the patient's immunocompromised status, liver graft function, immunosuppression, preventative measures and the management plan in details. I also educated the patient about lifestyle modifications which may improve hepatic steatosis, overweight/obesity, insulin resistance and high blood pressure issues. I have provided the patient with an opportunity to ask questions and have all questions answered to his satisfaction.

## 2020-07-17 ENCOUNTER — OFFICE VISIT (OUTPATIENT)
Dept: TRANSPLANT | Facility: CLINIC | Age: 50
End: 2020-07-17
Payer: MEDICARE

## 2020-07-17 ENCOUNTER — TELEPHONE (OUTPATIENT)
Dept: REHABILITATION | Facility: HOSPITAL | Age: 50
End: 2020-07-17

## 2020-07-17 VITALS
SYSTOLIC BLOOD PRESSURE: 158 MMHG | HEART RATE: 79 BPM | HEIGHT: 69 IN | RESPIRATION RATE: 18 BRPM | WEIGHT: 137.38 LBS | DIASTOLIC BLOOD PRESSURE: 88 MMHG | TEMPERATURE: 98 F | BODY MASS INDEX: 20.35 KG/M2 | OXYGEN SATURATION: 100 %

## 2020-07-17 DIAGNOSIS — Z94.4 LIVER REPLACED BY TRANSPLANT: Chronic | ICD-10-CM

## 2020-07-17 DIAGNOSIS — Z94.4 LIVER REPLACED BY TRANSPLANT: Primary | ICD-10-CM

## 2020-07-17 DIAGNOSIS — Z48.23 ENCOUNTER FOR AFTERCARE FOLLOWING LIVER TRANSPLANT: Primary | ICD-10-CM

## 2020-07-17 PROBLEM — R52 BODY ACHES: Status: RESOLVED | Noted: 2018-05-18 | Resolved: 2020-07-17

## 2020-07-17 PROBLEM — R07.9 ACUTE CHEST PAIN: Status: RESOLVED | Noted: 2018-07-27 | Resolved: 2020-07-17

## 2020-07-17 PROBLEM — I85.00 ESOPHAGEAL VARICES: Status: RESOLVED | Noted: 2019-01-16 | Resolved: 2020-07-17

## 2020-07-17 PROCEDURE — 99999 PR PBB SHADOW E&M-EST. PATIENT-LVL V: CPT | Mod: PBBFAC,,, | Performed by: INTERNAL MEDICINE

## 2020-07-17 PROCEDURE — 3077F PR MOST RECENT SYSTOLIC BLOOD PRESSURE >= 140 MM HG: ICD-10-PCS | Mod: CPTII,S$GLB,, | Performed by: INTERNAL MEDICINE

## 2020-07-17 PROCEDURE — 3008F BODY MASS INDEX DOCD: CPT | Mod: CPTII,S$GLB,, | Performed by: INTERNAL MEDICINE

## 2020-07-17 PROCEDURE — 3077F SYST BP >= 140 MM HG: CPT | Mod: CPTII,S$GLB,, | Performed by: INTERNAL MEDICINE

## 2020-07-17 PROCEDURE — 99214 PR OFFICE/OUTPT VISIT, EST, LEVL IV, 30-39 MIN: ICD-10-PCS | Mod: S$GLB,,, | Performed by: INTERNAL MEDICINE

## 2020-07-17 PROCEDURE — 3008F PR BODY MASS INDEX (BMI) DOCUMENTED: ICD-10-PCS | Mod: CPTII,S$GLB,, | Performed by: INTERNAL MEDICINE

## 2020-07-17 PROCEDURE — 3079F DIAST BP 80-89 MM HG: CPT | Mod: CPTII,S$GLB,, | Performed by: INTERNAL MEDICINE

## 2020-07-17 PROCEDURE — 99999 PR PBB SHADOW E&M-EST. PATIENT-LVL V: ICD-10-PCS | Mod: PBBFAC,,, | Performed by: INTERNAL MEDICINE

## 2020-07-17 PROCEDURE — 3079F PR MOST RECENT DIASTOLIC BLOOD PRESSURE 80-89 MM HG: ICD-10-PCS | Mod: CPTII,S$GLB,, | Performed by: INTERNAL MEDICINE

## 2020-07-17 PROCEDURE — 99214 OFFICE O/P EST MOD 30 MIN: CPT | Mod: S$GLB,,, | Performed by: INTERNAL MEDICINE

## 2020-07-17 NOTE — LETTER
July 17, 2020        Timur Lion  [address] [place] [address]  Phone: [phone]  Fax: [phone]             [facility]  [address]  [facility] [address]  Phone: [phone]   Patient: Valencia Dumont   MR Number: [id number]   YOB: 1970   Date of Visit: 7/17/2020       Dear Dr. Timur Lion    Thank you for referring Valencia Dumont to me for evaluation. Attached you will find relevant portions of my assessment and plan of care.    If you have questions, please do not hesitate to call me. I look forward to following Valencia Dumont along with you.    Sincerely,    Shashank Duong MD    Enclosure    If you would like to receive this communication electronically, please contact [email or web address] or [phone] to request [employer] Link access.    [employer] Link is a tool which provides read-only access to select patient information with whom you have a relationship. Its easy to use and provides real time access to review your patients record including encounter summaries, notes, results, and demographic information.    If you feel you have received this communication in error or would no longer like to receive these types of communications, please e-mail [email or web address]

## 2020-07-17 NOTE — LETTER
July 17, 2020        Kristin Marcial MD  [address]  [facility] [address]             [facility]  [address]  [facility] [address]  Phone: [phone]   Patient: Valencia Dumont   MR Number: [id number]   YOB: 1970   Date of Visit: 7/17/2020       Dear Dr. Marcial:    Thank you for referring Valencia Dumont to me for evaluation. Attached you will find relevant portions of my assessment and plan of care.    If you have questions, please do not hesitate to call me. I look forward to following Valencia Dumont along with you.    Sincerely,      Shashank Duong MD            CC  No Recipients    Enclosure

## 2020-07-17 NOTE — TELEPHONE ENCOUNTER
Called pt to discuss results/rec of dysphagia therapy per MBSS report. Pt agreeable to scheduling ST appointment for Monday 12/20 at 10:15. Addressed all questions/concerns.       ANTONETTE Jones, CCC-SLP  Speech Language Pathologist   7/17/2020

## 2020-07-17 NOTE — LETTER
July 17, 2020        Nolan Badillo MD  [address] [place] [address] [address]  [address]  [facility]  [place] [address]             [facility]  [address]  [facility] [address]  Phone: [phone]   Patient: Valencia Dumont   MR Number: [id number]   YOB: 1970   Date of Visit: 7/17/2020       Dear Dr. Badillo:    Thank you for referring Valencia Dumont to me for evaluation. Attached you will find relevant portions of my assessment and plan of care.    If you have questions, please do not hesitate to call me. I look forward to following Valencia Dumont along with you.    Sincerely,      Shashank Duong MD            CC  No Recipients    Enclosure

## 2020-07-17 NOTE — Clinical Note
Doing well except swallowing issue - seen by ENT and working with Speech Swallow therapist. No change in meds.She is going to labs on Monday, please add CMP, Tac and INR (c/o bruising). RTC 1 year per routine.

## 2020-07-20 ENCOUNTER — CLINICAL SUPPORT (OUTPATIENT)
Dept: REHABILITATION | Facility: HOSPITAL | Age: 50
End: 2020-07-20
Attending: OTOLARYNGOLOGY
Payer: MEDICARE

## 2020-07-20 DIAGNOSIS — R13.10 DYSPHAGIA, UNSPECIFIED TYPE: ICD-10-CM

## 2020-07-20 DIAGNOSIS — R49.0 HOARSE: Primary | ICD-10-CM

## 2020-07-20 PROCEDURE — 92507 TX SP LANG VOICE COMM INDIV: CPT | Mod: PN | Performed by: SPEECH-LANGUAGE PATHOLOGIST

## 2020-07-20 PROCEDURE — 92526 ORAL FUNCTION THERAPY: CPT | Mod: PN | Performed by: SPEECH-LANGUAGE PATHOLOGIST

## 2020-07-20 NOTE — PATIENT INSTRUCTIONS
Effortful Swallow:  Goal: The goal of this activity is to keep food or fluid from getting stuck in your pharynx, or throat, by improving the force and timing of your swallow.    Directions:  · Swallow normally, but tightly squeeze your tongue and throat muscles throughout the swallow.  · Try to swallow with as much effort as you can.  · Repeat as instructed by your therapist.    Explanation: The muscles of your tongue and pharynx work together to help you swallow properly. By swallowing with as much effort as possible, you can keep food from getting stuck in your throat.    Directions:  · Take a moderate breath in and hold it.  · Consciously hold your breath and swallow.  · Before taking another breath, immediately cough and swallow again.  · Repeat as instructed by your therapist.    Chin Tuck Against Resistance (CTAR):  Goal: to strengthen the suprahyoid muscles  Directions:   · Place small resistance ball between chin and chest  · Press chin into ball on chest for 60 repetitions  · Press chin into ball on chest and hold for 3 minutes        Complete each exercise set unless otherwise stated 2x per day.     Exercise descriptions from:   Home Exercise Program. (n.d.). Retrieved October 10, 2017, from [email or web address]

## 2020-07-20 NOTE — PLAN OF CARE
"Outpatient Rehabilitation Therapy  Updated POC     Date: 7/20/2020   Name: Valencia Dumont  Clinic Number: [id number]    Therapy Diagnosis:   Encounter Diagnosis   Name Primary?    Hoarse Yes     Physician: Delores Lewis MD    Physician Orders: [id number] - Ambulatory referral/consult to Speech Therapy  Medical Diagnosis: R49.0 (ICD-10-CM) - Hoarse    Visit #/ Visits Authorized:  3 /6   Evaluation Date: 6/22/20  Insurance Authorization Period: 06/29/2020-07/13/2020  Plan of Care Expiration Date: 8/29/20  New POC Certification Period:  9/20/20    Total Visits Received: 3    Precautions:Standard     Subjective     Update: Pt with slight improvement to vocal quality. Per recent MBSS, dysphagia therapy warranted. Updating POC to reflect dysphagia long term goals and short term goals.     Objective     Update: see follow up note dated 7/20/2020    Assessment     Update: Valencia Dumont presents to [facility] s/p medical diagnosis of  dysphonia. Demonstrates impairments including limitations as described in the problem list. Positive prognostic factors include pt motivation. Negative prognostic factors include complicated medical history. Per MBSS 7/16/20, she presents with "mild pharyngeal dysphagia characterized by decreased BOT retraction and decreased UES opening". She also presents with dysphonia c/b hoarse vocal quality.  No barriers to therapy identified.. Patient will benefit from skilled, outpatient neurological rehabilitation speech therapy.    OLGA NOMS (National Outcome Measure System):  Voice  LEVEL 5: Voice occasionally sounds normal with self-monitoring, but there is some situational variation. The individual¢s ability to participate in vocational, avocational, and social activities requiring voice is rarely affected in low-vocal demand activities, but is occasionally affected in high-vocal demand activities     NOMS Swallowing  LEVEL 5: Swallowing is safe with minimal diet " restriction and/or occasionally requires minimal cueing to use compensatory strategies. The individual may occasionally self-cue. All nutrition and hydration needs are met by mouth at mealtime    Rehab Potential: good     Education: Plan of Care, role of SLP in care, aspiration precautions , vocal hygeine, course of medical disease affect on therapy diagnosis , scheduling/ cancellation policy and insurance limitations / visit limit  was discussed with the patient. She verbalized understanding of all discussed.     Previous Short Term Goals: (4 weeks)     1. Pt will complete neck relaxation exercises 10x each per session/at home ind'ly.    Goal Not Met / Continue     2. Pt will reduce/eliminate/substitute throat clearing ind'ly.    Goal Not Met / Continue     3. Pt will recall and utilize vocal hygiene strategies with min A.      Goal Met / Discontinue       4. Patient will complete SOVT exercises and/or resonant-focused exercises 3-5x daily to strengthen and balance the intrinsic laryngeal musculature and maximize glottic closure without medial hyperfunction.    Goal Not Met / Continue     5. Patient will improve the quality, efficiency, and ease of phonation through resonant and/or airflow exercises from the syllable to conversation level with 80% accuracy.   Goal Not Met / Continue     6. .Pt will demonstrate diagphragmatic breathing for 1 minute independently    Goal Met / Discontinue       7. Pt will use diaphragmatic breathing to increase breath support for speech in single words, short phrases, sentences, and conversation on 8/10 trials given min cues Goal Not Met / Continue     9. Pt will participate in clinical swallow examination to determine need for objective swallow assessment  Goal Met / Discontinue          10. Pt will participate in Modified Barium Swallow Study to objectively assess her  swallow, rule out silent aspiration, and determine the safest possible diet.    Goal Met / Discontinue         New  Short Term Goals: (4 weeks)     1. Pt will complete neck relaxation exercises 10x each per session/at home ind'ly.    Goal Not Met / Continue     2. Pt will reduce/eliminate/substitute throat clearing ind'ly.  Goal Not Met / Continue     3. Patient will complete SOVT exercises and/or resonant-focused exercises 3-5x daily to strengthen and balance the intrinsic laryngeal musculature and maximize glottic closure without medial hyperfunction.    Goal Not Met / Continue     4. Patient will improve the quality, efficiency, and ease of phonation through resonant and/or airflow exercises from the syllable to conversation level with 80% accuracy.   Goal Not Met / Continue     5. Pt will use diaphragmatic breathing to increase breath support for speech in single words, short phrases, sentences, and conversation on 8/10 trials given min cues Goal Not Met / Continue     6. Pt will participate in tongue base retraction exercises x 50-75 given min A to improve swallow function.   New goal    7. Pt will participate in chin tuck against resistance (CTAR) hold x60 seconds x3 with min A to improve hyolaryngeal elevation / excursion. New goal    8. Pt will participate in chin tuck against resistance (CTAR) repetitions x60 with min A to improve hyolaryngeal elevation / excursion. New goal    9. Pt will recall and utilize safe swallow strategies and aspiration precautions independently  New goal          Long Term Goal Status:  8 weeks  She  will demonstrate improved vocal quality and intonation at the syllable, word, phrase, sentence and conversation level to communicate basic medical and social needs in the functional living environment. Goal Not Met / Continue  2. She  will maintain adequate hydration/nutrition with optimum safety and efficiency of swallowing function on PO intake without overt signs and symptoms of aspiration for regular/thin diet level.   3. She  will utilize compensatory strategies with optimum safety and  efficiency of swallowing function on PO intake without overt signs and symptoms of aspiration for regular/thin diet level.     Goals Previously Met:  -Pt will recall and utilize vocal hygiene strategies with min A.  Goal Met / Discontinue   - Pt will demonstrate diagphragmatic breathing for 1 minute independently Goal Met / Discontinue   - Pt will participate in clinical swallow examination to determine need for objective swallow assessment  Goal Met / Discontinue   - Pt will participate in Modified Barium Swallow Study to objectively assess her  swallow, rule out silent aspiration, and determine the safest possible diet. Goal Met / Discontinue      Reasons for Recertification of Therapy: Recent MBSS revealed mild pharyngeal dysphagia, therefore short term goals and long term goals needed to be updated.      Plan     Updated Certification Period: 7/20/2020 to 9/20/2020  Recommended Treatment Plan: Patient will participate in the [facility] neurological rehabilitation program for speech therapy 1 times per week to address her  Swallow and voice deficits, to educate patient and their family, and to participate in a home exercise program.     Other recommendations: none at this time      Therapist's Name:  ANTONETTE Jones, CCC-SLP  Speech Language Pathologist   7/20/2020       I CERTIFY THE NEED FOR THESE SERVICES FURNISHED UNDER THIS PLAN OF TREATMENT AND WHILE UNDER MY CARE      Physician Name: _______________________________    Physician Signature: ____________________________

## 2020-07-20 NOTE — PROGRESS NOTES
Outpatient Neurological Rehabilitation   Speech and Language Therapy Treatment Note  Date:  7/20/2020     Name: Valencia Dumont   MRN: [id number]   Therapy Diagnosis:   Encounter Diagnosis   Name Primary?    Hoarse Yes      Physician: Delores Lewis MD  Physician Orders: [id number] - Ambulatory referral/consult to Speech Therapy  Medical Diagnosis: R49.0 (ICD-10-CM) - Hoarse    Visit #/ Visits Authorized: 3/6  Date of Evaluation:  6/22/20  Insurance Authorization Period: 06/29/2020-07/13/2020  Plan of Care Expiration Date: 8/29/20  Extended POC:  n/a   Progress Note: due 8/8/2020   Visits Cancelled: 0  Visits No Show: 0    Time In:  10:28 (pt arrived late to therapy as she thought the appointment was for 10:30)  Time Out: 11:01   Total Billable Time: 33 min     Precautions: Standard  Subjective:   Pt reports: felt vocal relief yesterday- no coughing or throat clearing but hoarseness and throat clearing started again around 6 pm. Pt unsure what she did differently to get relief. Continues to complete HEP daily. Discussed results of MBSS.    She was compliant to home exercise program. - Completing HEP 2-3 times per day.   Response to previous treatment: positive    Pain Scale:  0/10 on VAS currently.   Pain Location: N/A  Objective:     UNTIMED  Procedure Min.   Speech- Language- Voice Therapy 18    Dysphagia Therapy  15   Total Timed Units: 0  Total Untimed Units: 2  Charges Billed/# of units: 2    Short Term Goals: (4 weeks)     1. Pt will complete neck relaxation exercises 10x each per session/at home ind'ly.   Progressing/ Not Met 7/20/2020   Not formally addressed     2. Pt will reduce/eliminate/substitute throat clearing ind'ly.   Progressing/ Not Met 7/20/2020   Pt utilizing hard swallow/sip of water but continues to feel the urge to throat clear multiple times throughout day    Mild throat clearing noted x7 throughout session     3. Pt will recall and utilize vocal hygiene strategies with min A.     Pt recalled  "vocal hygiene strategies with 100% acc independently METx2 Goal Met / Discontinue      4. Patient will complete SOVT exercises and/or resonant-focused exercises 3-5x daily to strengthen and balance the intrinsic laryngeal musculature and maximize glottic closure without medial hyperfunction.  Progressing/ Not Met 7/20/2020    Not formally addressed     5. Patient will improve the quality, efficiency, and ease of phonation through resonant and/or airflow exercises from the syllable to conversation level with 80% accuracy.  Progressing/ Not Met 7/20/2020   Not formally addressed as pt's vocal quality good today   6. .Pt will demonstrate diagphragmatic breathing for 1 minute independently   Pt able to perform diaphragmatic breathing for >1 minute given min cues Goal Met / Discontinue       7. Pt will use diaphragmatic breathing to increase breath support for speech in single words, short phrases, sentences, and conversation on 8/10 trials given min cues  Progressing/ Not Met 7/20/2020    Not formally addressed     9. Pt will participate in clinical swallow examination to determine need for objective swallow assessment  Goal Met / Discontinue        10. Pt will participate in Modified Barium Swallow Study to objectively assess her  swallow, rule out silent aspiration, and determine the safest possible diet.   MBSS completed.     Results/Recs:    "Trace vallecular stasis caused by decreased BOT retraction, across TL trials via spoon x1 and straw x3. Trace pyriform stasis caused by decreased UES opening, across TL trials via spoon x1 and straw x3. Pt cleared stasis by performing consecutive swallows. No persisting stasis post swallow of puree consistency x2 or soft solid x2 consistency trials.     Impressions  ·   Patient demonstrates mild pharyngeal dysphagia characterized by decreased BOT retraction and decreased UES opening.       Plan  ST RECS pt continue outpatient ST to address moderate pharyngeal dysphagia " "targeting improved BOT retraction and UES opening. "          Probe:  Hard effortful swallow: 15x given min cues  CTAR: 1 minute hold given min cues, 15 repetitions given min cues  Patient Education/Response:   MBSS results and recommendations. Discussed starting a food journal to see if certain patterns are able to be discerned by analyzing food consumption. Reviewed HEP and new dysphagia exercises and their purpose.  Pt verbalized understanding of all discussed.     Written Home Exercises Provided: Patient instructed to cont prior HEP.  Exercises were reviewed and Valencia was able to demonstrate them prior to the end of the session.  Valencia demonstrated good  understanding of the education provided.     See EMR under Patient Instructions for exercises provided 7/8/2020 and prior visit.   Assessment:   Valencia is progressing well towards her goals. Good vocal quality today, pt states this is because she has been up since 5 am. Good ability to complete dysphagia exercises given initial model.  Discussed seeing GI at end of month. Current goals remain appropriate. Goals to be updated as necessary.     Pt prognosis is Good. Pt will continue to benefit from skilled outpatient speech and language therapy to address the deficits listed in the problem list on initial evaluation, provide pt/family education and to maximize pt's level of independence in the home and community environment.   Medical necessity is demonstrated by the following IMPAIRMENTS:  Patient with decreased vocal quality and intensity resulting in decreased speech intelligibility, negatively impacting her ability to effectively and efficiently explain an emergency situation to emergency operators via phone or in person.     Barriers to Therapy: none  Pt's spiritual, cultural and educational needs considered and pt agreeable to plan of care and goals.  Plan:   Update POC to reflect dysphagia goals. Dietician referral.     ANTONETTE Jones, CCC-SLP  Speech " Language Pathologist   7/20/2020

## 2020-07-21 ENCOUNTER — PATIENT MESSAGE (OUTPATIENT)
Dept: AUDIOLOGY | Facility: CLINIC | Age: 50
End: 2020-07-21

## 2020-07-21 ENCOUNTER — CLINICAL SUPPORT (OUTPATIENT)
Dept: AUDIOLOGY | Facility: CLINIC | Age: 50
End: 2020-07-21
Payer: MEDICARE

## 2020-07-21 ENCOUNTER — TELEPHONE (OUTPATIENT)
Dept: OTOLARYNGOLOGY | Facility: CLINIC | Age: 50
End: 2020-07-21

## 2020-07-21 DIAGNOSIS — E11.69 TYPE 2 DIABETES MELLITUS WITH OTHER SPECIFIED COMPLICATION, UNSPECIFIED WHETHER LONG TERM INSULIN USE: ICD-10-CM

## 2020-07-21 DIAGNOSIS — H90.A11 CONDUCTIVE HEARING LOSS OF RIGHT EAR WITH RESTRICTED HEARING OF LEFT EAR: Primary | ICD-10-CM

## 2020-07-21 DIAGNOSIS — H81.4 CENTRAL VESTIBULAR VERTIGO: Primary | ICD-10-CM

## 2020-07-21 DIAGNOSIS — R63.30 FEEDING DIFFICULTIES: Primary | ICD-10-CM

## 2020-07-21 PROCEDURE — 92557 PR COMPREHENSIVE HEARING TEST: ICD-10-PCS | Mod: S$GLB,,, | Performed by: AUDIOLOGIST

## 2020-07-21 PROCEDURE — 92557 COMPREHENSIVE HEARING TEST: CPT | Mod: S$GLB,,, | Performed by: AUDIOLOGIST

## 2020-07-21 PROCEDURE — 92537 PR CALORIC VSTBLR TEST W/REC BITHERMAL: ICD-10-PCS | Mod: S$GLB,,, | Performed by: AUDIOLOGIST

## 2020-07-21 PROCEDURE — 92567 PR TYMPA2METRY: ICD-10-PCS | Mod: S$GLB,,, | Performed by: AUDIOLOGIST

## 2020-07-21 PROCEDURE — 92537 CALORIC VSTBLR TEST W/REC: CPT | Mod: S$GLB,,, | Performed by: AUDIOLOGIST

## 2020-07-21 PROCEDURE — 92540 BASIC VESTIBULAR EVALUATION: CPT | Mod: S$GLB,,, | Performed by: AUDIOLOGIST

## 2020-07-21 PROCEDURE — 99999 PR PBB SHADOW E&M-EST. PATIENT-LVL I: CPT | Mod: PBBFAC,,, | Performed by: AUDIOLOGIST

## 2020-07-21 PROCEDURE — 92567 TYMPANOMETRY: CPT | Mod: S$GLB,,, | Performed by: AUDIOLOGIST

## 2020-07-21 PROCEDURE — 99999 PR PBB SHADOW E&M-EST. PATIENT-LVL I: ICD-10-PCS | Mod: PBBFAC,,, | Performed by: AUDIOLOGIST

## 2020-07-21 PROCEDURE — 92540 PR VESTIBULAR EVAL NYSTAG FOVL&PERPH STIM OSCIL TRACKING: ICD-10-PCS | Mod: S$GLB,,, | Performed by: AUDIOLOGIST

## 2020-07-21 NOTE — PROGRESS NOTES
VNG Evaluation    Referring physician:  Dr. Lewis    50 y.o. female complains of vertigo, dizziness, lightheadedness, imbalance, nausea and headache.  Symptoms are provoked by quick head turns, arising from bed, and bending over.  Symptoms have been recurring over the past few months and last for seconds at a time.  Ms. Dumont denied taking any medication that would affect the results of today's evaluation.    Gaze nystagmus was absent.    Oculomotor function was abnormal: smooth pursuit gain.    Spontaneous nystagmus revealed 1 d/s down-beating nystagmus without fixation.    The head-hanging left Hallpike revealed <clinically insignificant, non-fatiguing> nystagmus: 1 d/s left-beating in the supine position and 1 d/s down-beating in the return to upright position.    The head-hanging right Hallpike revealed <clinically insignificant, non-fatiguing> nystagmus: 3 d/s down-beating in the supine position and 2 d/s down-beating in the return to upright position.    Static positional testing revealed <clinically insignificant> nystagmus: 3 d/s down-beating in the head right position and 2 d/s down-beating in the return to upright position.    Unilateral weakness: 11% (right)  Directional preponderance: 11% (right-beating)    RW: 9 d/s  LW: 9 d/s  RC: 11 d/s   d/s    Fixation suppression was normal.    Impression: VNG results suggest possible central oculomotor dysfunction.  The presence of clinically insignificant down-beating nystagmus suggests a possible central vestibular abnormality.    Recommend: 1. An at-home trial with Cawthorne exercises to help reduce subjective symptoms.  A printed copy of these exercises was provided to Ms. Dumont at today's appointment.  2. Referral to Neurology to rule out a possible central component.

## 2020-07-21 NOTE — Clinical Note
Hi Dr. Lewis,  Please see the results of today's VNG evaluation.  Please let me know if I can provide any additional information.    Best,  Bashir Loja, CCC-A

## 2020-07-21 NOTE — PROGRESS NOTES
Valencia Dumont was seen today in the clinic for an audiologic evaluation.  Patients main complaint was dizziness.  Ms. Dumont reported that she has noticed decreased hearing of both ears and constant right-sided tinnitus/popping.    Tympanometry revealed Type Ad (hypercompliant) in the right ear and Type A in the left ear.  Audiogram results revealed normal hearing (250-1500 Hz) to a mild conductive hearing loss with an airbone gap closure at 2 kHz rising to normal (8000 Hz) in the right ear and normal hearing (250-8000 Hz) in the left ear.  Speech reception thresholds were noted at 10 dB in the right ear and 5 dB in the left ear.  Speech discrimination scores were 100% in the right ear and 100% in the left ear.    Recommendations:  1. Otologic evaluation  2. Annual audiogram  3. Noise protection when in noise

## 2020-07-22 ENCOUNTER — INFUSION (OUTPATIENT)
Dept: INFUSION THERAPY | Facility: HOSPITAL | Age: 50
End: 2020-07-22
Attending: NURSE PRACTITIONER
Payer: MEDICARE

## 2020-07-22 ENCOUNTER — LAB VISIT (OUTPATIENT)
Dept: LAB | Facility: HOSPITAL | Age: 50
End: 2020-07-22
Attending: INTERNAL MEDICINE
Payer: MEDICARE

## 2020-07-22 VITALS
SYSTOLIC BLOOD PRESSURE: 157 MMHG | BODY MASS INDEX: 20.59 KG/M2 | OXYGEN SATURATION: 98 % | DIASTOLIC BLOOD PRESSURE: 74 MMHG | TEMPERATURE: 97 F | HEART RATE: 82 BPM | WEIGHT: 139 LBS | RESPIRATION RATE: 17 BRPM | HEIGHT: 69 IN

## 2020-07-22 DIAGNOSIS — M32.9 SYSTEMIC LUPUS ERYTHEMATOSUS, UNSPECIFIED SLE TYPE, UNSPECIFIED ORGAN INVOLVEMENT STATUS: Primary | ICD-10-CM

## 2020-07-22 DIAGNOSIS — M81.0 OSTEOPOROSIS: ICD-10-CM

## 2020-07-22 DIAGNOSIS — Z94.4 LIVER TRANSPLANTED: ICD-10-CM

## 2020-07-22 DIAGNOSIS — Z94.4 LIVER REPLACED BY TRANSPLANT: ICD-10-CM

## 2020-07-22 LAB
ALBUMIN SERPL BCP-MCNC: 4.1 G/DL (ref 3.5–5.2)
ALP SERPL-CCNC: 76 U/L (ref 55–135)
ALT SERPL W/O P-5'-P-CCNC: 10 U/L (ref 10–44)
ANION GAP SERPL CALC-SCNC: 7 MMOL/L (ref 8–16)
AST SERPL-CCNC: 17 U/L (ref 10–40)
BASOPHILS # BLD AUTO: 0.01 K/UL (ref 0–0.2)
BASOPHILS NFR BLD: 0.2 % (ref 0–1.9)
BILIRUB SERPL-MCNC: 0.4 MG/DL (ref 0.1–1)
BUN SERPL-MCNC: 29 MG/DL (ref 6–20)
CALCIUM SERPL-MCNC: 9.2 MG/DL (ref 8.7–10.5)
CHLORIDE SERPL-SCNC: 105 MMOL/L (ref 95–110)
CO2 SERPL-SCNC: 29 MMOL/L (ref 23–29)
CREAT SERPL-MCNC: 1.6 MG/DL (ref 0.5–1.4)
DIFFERENTIAL METHOD: ABNORMAL
EOSINOPHIL # BLD AUTO: 0.2 K/UL (ref 0–0.5)
EOSINOPHIL NFR BLD: 3.8 % (ref 0–8)
ERYTHROCYTE [DISTWIDTH] IN BLOOD BY AUTOMATED COUNT: 14.6 % (ref 11.5–14.5)
EST. GFR  (AFRICAN AMERICAN): 43 ML/MIN/1.73 M^2
EST. GFR  (NON AFRICAN AMERICAN): 37 ML/MIN/1.73 M^2
GLUCOSE SERPL-MCNC: 149 MG/DL (ref 70–110)
HCT VFR BLD AUTO: 33.6 % (ref 37–48.5)
HGB BLD-MCNC: 11.2 G/DL (ref 12–16)
IMM GRANULOCYTES # BLD AUTO: 0.01 K/UL (ref 0–0.04)
IMM GRANULOCYTES NFR BLD AUTO: 0.2 % (ref 0–0.5)
INR PPP: 0.9 (ref 0.8–1.2)
LYMPHOCYTES # BLD AUTO: 1.5 K/UL (ref 1–4.8)
LYMPHOCYTES NFR BLD: 33.6 % (ref 18–48)
MAGNESIUM SERPL-MCNC: 1.9 MG/DL (ref 1.6–2.6)
MCH RBC QN AUTO: 26.3 PG (ref 27–31)
MCHC RBC AUTO-ENTMCNC: 33.3 G/DL (ref 32–36)
MCV RBC AUTO: 79 FL (ref 82–98)
MONOCYTES # BLD AUTO: 0.4 K/UL (ref 0.3–1)
MONOCYTES NFR BLD: 8.5 % (ref 4–15)
NEUTROPHILS # BLD AUTO: 2.4 K/UL (ref 1.8–7.7)
NEUTROPHILS NFR BLD: 53.7 % (ref 38–73)
NRBC BLD-RTO: 0 /100 WBC
PLATELET # BLD AUTO: 188 K/UL (ref 150–350)
PMV BLD AUTO: 10.2 FL (ref 9.2–12.9)
POTASSIUM SERPL-SCNC: 4.4 MMOL/L (ref 3.5–5.1)
PROT SERPL-MCNC: 6.7 G/DL (ref 6–8.4)
PROTHROMBIN TIME: 10.3 SEC (ref 9–12.5)
RBC # BLD AUTO: 4.26 M/UL (ref 4–5.4)
SODIUM SERPL-SCNC: 141 MMOL/L (ref 136–145)
WBC # BLD AUTO: 4.47 K/UL (ref 3.9–12.7)

## 2020-07-22 PROCEDURE — 85025 COMPLETE CBC W/AUTO DIFF WBC: CPT

## 2020-07-22 PROCEDURE — 83735 ASSAY OF MAGNESIUM: CPT

## 2020-07-22 PROCEDURE — 80197 ASSAY OF TACROLIMUS: CPT

## 2020-07-22 PROCEDURE — 96365 THER/PROPH/DIAG IV INF INIT: CPT

## 2020-07-22 PROCEDURE — 63600175 PHARM REV CODE 636 W HCPCS: Mod: JG | Performed by: INTERNAL MEDICINE

## 2020-07-22 PROCEDURE — 96413 CHEMO IV INFUSION 1 HR: CPT

## 2020-07-22 PROCEDURE — 36415 COLL VENOUS BLD VENIPUNCTURE: CPT

## 2020-07-22 PROCEDURE — 25000003 PHARM REV CODE 250: Performed by: INTERNAL MEDICINE

## 2020-07-22 PROCEDURE — 80053 COMPREHEN METABOLIC PANEL: CPT

## 2020-07-22 PROCEDURE — 96367 TX/PROPH/DG ADDL SEQ IV INF: CPT

## 2020-07-22 PROCEDURE — 85610 PROTHROMBIN TIME: CPT

## 2020-07-22 RX ORDER — ACETAMINOPHEN 325 MG/1
650 TABLET ORAL
Status: COMPLETED | OUTPATIENT
Start: 2020-07-22 | End: 2020-07-22

## 2020-07-22 RX ORDER — ACETAMINOPHEN 325 MG/1
650 TABLET ORAL
Status: CANCELLED
Start: 2020-07-22

## 2020-07-22 RX ADMIN — DIPHENHYDRAMINE HYDROCHLORIDE 25 MG: 50 INJECTION, SOLUTION INTRAMUSCULAR; INTRAVENOUS at 11:07

## 2020-07-22 RX ADMIN — BELIMUMAB 631 MG: 400 INJECTION, POWDER, LYOPHILIZED, FOR SOLUTION INTRAVENOUS at 11:07

## 2020-07-22 RX ADMIN — ACETAMINOPHEN 650 MG: 325 TABLET ORAL at 11:07

## 2020-07-22 NOTE — PLAN OF CARE
Patient arrived to unit for Benlysta infusion. Plan of care reviewed, patient agreeable to plan. Patient tolerated infusion well. VSS. Discharge instructions reviewed, patient instructed to return 8/19/20. Patient ambulated unassisted off unit. Patient in NAD at time of discharge.

## 2020-07-23 ENCOUNTER — OFFICE VISIT (OUTPATIENT)
Dept: OPTOMETRY | Facility: CLINIC | Age: 50
End: 2020-07-23
Payer: MEDICARE

## 2020-07-23 DIAGNOSIS — H52.7 REFRACTIVE ERROR: Primary | ICD-10-CM

## 2020-07-23 LAB — TACROLIMUS BLD-MCNC: 3.9 NG/ML (ref 5–15)

## 2020-07-23 PROCEDURE — 92015 DETERMINE REFRACTIVE STATE: CPT | Mod: S$GLB,,, | Performed by: OPTOMETRIST

## 2020-07-23 PROCEDURE — 99999 PR PBB SHADOW E&M-EST. PATIENT-LVL III: CPT | Mod: PBBFAC,,, | Performed by: OPTOMETRIST

## 2020-07-23 PROCEDURE — 92015 PR REFRACTION: ICD-10-PCS | Mod: S$GLB,,, | Performed by: OPTOMETRIST

## 2020-07-23 PROCEDURE — 99999 PR PBB SHADOW E&M-EST. PATIENT-LVL III: ICD-10-PCS | Mod: PBBFAC,,, | Performed by: OPTOMETRIST

## 2020-07-23 NOTE — PROGRESS NOTES
Subjective:       Patient ID: Valencia Dumont is a 50 y.o. female      Chief Complaint   Patient presents with    Concerns About Ocular Health     History of Present Illness  Dls: 12/13/19 Dr. Murillo     51 y/o female presents today for refraction.   Pt states no changes in vision.   Pt wears single vision glasses for distance.     Eye meds  None       Assessment/Plan:     1. Refractive error  Educated patient on refractive error and discussed lens options. Dispensed updated spectacle Rx. Educated about adaptation period to new specs.    Eyeglass Final Rx     Eyeglass Final Rx       Sphere Cylinder Axis Add    Right -2.75 +0.50 005 +2.00    Left -1.75 +0.50 015 +2.00    Expiration Date: 7/24/2021

## 2020-07-27 ENCOUNTER — TELEPHONE (OUTPATIENT)
Dept: REHABILITATION | Facility: HOSPITAL | Age: 50
End: 2020-07-27

## 2020-07-27 ENCOUNTER — TELEPHONE (OUTPATIENT)
Dept: TRANSPLANT | Facility: CLINIC | Age: 50
End: 2020-07-27

## 2020-07-27 NOTE — TELEPHONE ENCOUNTER
Informed pt labs reviewed, encouraged pt to take in adequate fluids and avoid NSAIDs. Will repeat labs in 3 months. Letter sent.     ----- Message from Miriam Abernathy MD sent at 7/27/2020  2:37 PM CDT -----  Tacrolimus level is actually low.  Make sure patient is taking an adequate fluids.  Make sure she is not taking any NSAIDs.  Repeat transplant labs for routine.

## 2020-07-27 NOTE — TELEPHONE ENCOUNTER
Spoke with Ms. Birmingham to reschedule missed ST visit today. Pt stated she attempted to cancel her appointments due to sciatic nerve pain that has been bothering her since last week. Rescheduled appointment to 7/31/2020 at 11:30.    ANTONETTE Jones, CCC-SLP  Speech Language Pathologist   7/27/2020

## 2020-07-27 NOTE — LETTER
July 27, 2020    Valencia Dumont  [address] [facility] [address]          Dear Valencia Dumont:  MRN: [id number]    This is a follow up to your recent labs, your lab results were stable.  There are no medicine changes.  Please have your labs drawn again on 10/19/20.      If you cannot have your labs drawn on the scheduled date, it is your responsibility to call the transplant department to reschedule.  Please call [phone] and ask to speak to Rosangela Hanna  for all scheduling requests.     Sincerely,    Linda Barakat, RN,BSN,CCTC    Your Liver Transplant Coordinator    [facility]  [address] [place] [id number] [phone]

## 2020-07-28 ENCOUNTER — TELEPHONE (OUTPATIENT)
Dept: SLEEP MEDICINE | Facility: HOSPITAL | Age: 50
End: 2020-07-28

## 2020-07-28 NOTE — TELEPHONE ENCOUNTER
Spoke to patient and informed her on how the sleep study process will go. All patient questions were answered. Patient was also reminded that the instructions and appt date for appt will be mailed out. Patient verbally stated understanding.

## 2020-07-30 ENCOUNTER — OFFICE VISIT (OUTPATIENT)
Dept: OBSTETRICS AND GYNECOLOGY | Facility: CLINIC | Age: 50
End: 2020-07-30
Attending: OBSTETRICS & GYNECOLOGY
Payer: MEDICARE

## 2020-07-30 VITALS
DIASTOLIC BLOOD PRESSURE: 82 MMHG | BODY MASS INDEX: 21.13 KG/M2 | HEIGHT: 69 IN | SYSTOLIC BLOOD PRESSURE: 150 MMHG | WEIGHT: 142.63 LBS

## 2020-07-30 DIAGNOSIS — Z01.419 ENCOUNTER FOR GYNECOLOGICAL EXAMINATION WITHOUT ABNORMAL FINDING: Primary | ICD-10-CM

## 2020-07-30 PROCEDURE — 99999 PR PBB SHADOW E&M-EST. PATIENT-LVL V: CPT | Mod: PBBFAC,,, | Performed by: OBSTETRICS & GYNECOLOGY

## 2020-07-30 PROCEDURE — 3008F BODY MASS INDEX DOCD: CPT | Mod: CPTII,S$GLB,, | Performed by: OBSTETRICS & GYNECOLOGY

## 2020-07-30 PROCEDURE — 3008F PR BODY MASS INDEX (BMI) DOCUMENTED: ICD-10-PCS | Mod: CPTII,S$GLB,, | Performed by: OBSTETRICS & GYNECOLOGY

## 2020-07-30 PROCEDURE — G0101 CA SCREEN;PELVIC/BREAST EXAM: HCPCS | Mod: S$GLB,,, | Performed by: OBSTETRICS & GYNECOLOGY

## 2020-07-30 PROCEDURE — G0101 PR CA SCREEN;PELVIC/BREAST EXAM: ICD-10-PCS | Mod: S$GLB,,, | Performed by: OBSTETRICS & GYNECOLOGY

## 2020-07-30 PROCEDURE — 99999 PR PBB SHADOW E&M-EST. PATIENT-LVL V: ICD-10-PCS | Mod: PBBFAC,,, | Performed by: OBSTETRICS & GYNECOLOGY

## 2020-07-30 RX ORDER — FAMOTIDINE 40 MG/1
TABLET, FILM COATED ORAL
COMMUNITY
Start: 2020-07-24

## 2020-07-30 RX ORDER — DICLOFENAC SODIUM 10 MG/G
GEL TOPICAL
COMMUNITY
End: 2022-12-05 | Stop reason: SDUPTHER

## 2020-07-30 NOTE — PROGRESS NOTES
Subjective:       Patient ID: Valencia Dumont is a 50 y.o. female.    Chief Complaint:  Well Woman      History of Present Illness  HPI    Valencia Dumont is a 50 y.o. female  here for her annual GYN exam.    She describes her periods as stopped with menopause age 45,  denies break through bleeding.   denies vaginal itching or irritation.  Denies vaginal discharge.  She is sexually active. She has had 1 partner for 31 years .     History of abnormal pap: No  Last Pap: approximate date  and was normal  Last MMG: biopsy pending for abnormal pap    Last Colonoscopy:  colonoscopy 3 years ago with abnormalities.  denies domestic violence. She does feel safe at home.     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    Hypertension     Kidney stone     Lupus     Osteoporosis 2013    Prophylactic immunotherapy (transplant immunosuppression) 2014    SBP (spontaneous bacterial peritonitis)     history of      Past Surgical History:   Procedure Laterality Date    COLONOSCOPY N/A 2017    Procedure: COLONOSCOPY;  Surgeon: Marky Sun MD;  Location: [address]);  Service: Endoscopy;  Laterality: N/A;  PM prep.    ESOPHAGOGASTRODUODENOSCOPY      ESOPHAGOGASTRODUODENOSCOPY N/A 2019    Procedure: EGD (ESOPHAGOGASTRODUODENOSCOPY);  Surgeon: Deniz Guerra MD;  Location: [address]);  Service: Endoscopy;  Laterality: N/A;  labs prior, s/p liver transplant-MS    LIVER BIOPSY      LIVER TRANSPLANT  2013    REFRACTIVE SURGERY Bilateral     TUBAL LIGATION      UPPER GASTROINTESTINAL ENDOSCOPY       Social History     Socioeconomic History    Marital status:      Spouse name: Not on  file    Number of children: 3    Years of education: Not on file    Highest education level: Not on file   Occupational History     Employer: [employer]   Social Needs    Financial resource strain: Not on file    Food insecurity     Worry: Not on file     Inability: Not on file    Transportation needs     Medical: Not on file     Non-medical: Not on file   Tobacco Use    Smoking status: Never Smoker    Smokeless tobacco: Never Used    Tobacco comment: disability; ; 3 children   Substance and Sexual Activity    Alcohol use: Yes     Alcohol/week: 1.0 standard drinks     Types: 1 Glasses of wine per week     Comment: occasionally     Drug use: No    Sexual activity: Yes     Partners: Male     Birth control/protection: See Surgical Hx, Post-menopausal     Comment: s/p tubal ligation,  since 1989   Lifestyle    Physical activity     Days per week: Not on file     Minutes per session: Not on file    Stress: Not on file   Relationships    Social connections     Talks on phone: Not on file     Gets together: Not on file     Attends Mandaen service: Not on file     Active member of club or organization: Not on file     Attends meetings of clubs or organizations: Not on file     Relationship status: Not on file   Other Topics Concern    Not on file   Social History Narrative    Not on file     Family History   Problem Relation Age of Onset    Hypertension Mother     Cataracts Mother     Diabetes Father     Alzheimer's disease Father     Diabetes Paternal Grandfather     Diabetes Paternal Grandmother     No Known Problems Maternal Grandmother     No Known Problems Maternal Grandfather     Breast cancer Maternal Aunt 55    Hypertension Brother     Diabetes Brother     No Known Problems Sister     No Known Problems Maternal Uncle     No Known Problems Paternal Aunt     No Known Problems Paternal Uncle     Stroke Neg Hx     Cancer Neg Hx     Colon cancer Neg Hx      "Esophageal cancer Neg Hx     Stomach cancer Neg Hx     Rectal cancer Neg Hx     Amblyopia Neg Hx     Blindness Neg Hx     Glaucoma Neg Hx     Macular degeneration Neg Hx     Retinal detachment Neg Hx     Strabismus Neg Hx     Thyroid disease Neg Hx      OB History        3    Para   3    Term   2       1    AB        Living   3       SAB        TAB        Ectopic        Multiple        Live Births   3                 BP (!) 150/82   Ht 5' 9" (1.753 m)   Wt 64.7 kg (142 lb 10.2 oz)   LMP 2016 (Approximate)   BMI 21.06 kg/m²         GYN & OB History  Patient's last menstrual period was 2016 (approximate).   Date of Last Pap: 2019    OB History    Para Term  AB Living   3 3 2 1   3   SAB TAB Ectopic Multiple Live Births           3      # Outcome Date GA Lbr [place]/2nd Weight Sex Delivery Anes PTL Lv   3   36w0d  2.126 kg (4 lb 11 oz) F Vag-Spont   MONIQUE   2 Term     F Vag-Spont   MONIQUE   1 Term     M Vag-Spont   MONIQUE       Review of Systems  Review of Systems   Constitutional: Negative for activity change, appetite change, fatigue and unexpected weight change.   HENT: Negative.    Eyes: Negative for visual disturbance.   Respiratory: Negative for shortness of breath and wheezing.    Cardiovascular: Negative for chest pain, palpitations and leg swelling.   Gastrointestinal: Negative for abdominal pain, bloating and blood in stool.   Endocrine: Positive for diabetes. Negative for hair loss.   Genitourinary: Negative for decreased libido, dyspareunia and vaginal dryness.   Musculoskeletal: Positive for arthralgias. Negative for back pain and joint swelling.   Integumentary:  Negative for acne, hair changes and nipple discharge.   Neurological: Negative for headaches.   Hematological: Does not bruise/bleed easily.   Psychiatric/Behavioral: Negative for depression and sleep disturbance. The patient is not nervous/anxious.    Breast: Negative for mastodynia and nipple " discharge          Objective:      Physical Exam:   Constitutional: She is oriented to person, place, and time. She appears well-developed and well-nourished.    HENT:   Head: Normocephalic and atraumatic.    Eyes: Pupils are equal, round, and reactive to light. EOM are normal.    Neck: Normal range of motion. Neck supple.    Cardiovascular: Normal rate and regular rhythm.     Pulmonary/Chest: Effort normal and breath sounds normal.   BREASTS:  no mass, no tenderness, no deformity and no retraction. Right breast exhibits no inverted nipple, no mass, no nipple discharge, no skin change, no tenderness, no bleeding and no swelling. Left breast exhibits no inverted nipple, no mass, no nipple discharge, no skin change, no tenderness, no bleeding and no swelling. Breasts are symmetrical. Nodular bilaterally.             Abdominal: Soft. Bowel sounds are normal.     Genitourinary:    Pelvic exam was performed with patient supine.      Genitourinary Comments: PELVIC: Normal external genitalia without lesions.  Normal hair distribution.  Adequate perineal body, normal urethral meatus.  Vagina  Dry and poorly rugated, atrophic, without lesions or discharge.  Cervix pink, without lesions, discharge or tenderness.  No significant cystocele or rectocele.  Bimanual exam shows uterus to be normal size, regular, mobile and nontender.  Adnexa without masses or tenderness.    RECTAL:Deferred               Musculoskeletal: Normal range of motion and moves all extremeties.       Neurological: She is alert and oriented to person, place, and time.    Skin: Skin is warm and dry.    Psychiatric: She has a normal mood and affect.              Assessment:        1. Encounter for gynecological examination without abnormal finding                Plan:        1. Encounter for gynecological examination without abnormal finding  COUNSELING:  The patient was counseled today on regular weight bearing exercise. Patient was counseled today on the new  ACS guidelines for cervical cytology screening as well as the current recommendations for breast cancer screening. Counseling session lasted approximately 10 minutes, and all her questions were answered. She was advised to see her primary care physician for all other health maintenance.   FOLLOW-UP with me for next routine visit.            Follow up in about 1 year (around 7/30/2021).

## 2020-07-31 ENCOUNTER — DOCUMENTATION ONLY (OUTPATIENT)
Dept: REHABILITATION | Facility: HOSPITAL | Age: 50
End: 2020-07-31

## 2020-07-31 DIAGNOSIS — R49.0 HOARSE: Primary | ICD-10-CM

## 2020-07-31 NOTE — PROGRESS NOTES
No Show Note/Documentation    Patient: Valencia Dumont  Date of Session: 7/31/2020  Diagnosis:   1. Hoarse       MRN: [id number]    Valencia Dumont did not attend her scheduled therapy appointment today. She did not call to cancel nor reschedule. This is the 2nd appointment that she has not attended. Np future appointments are scheduled at this time. No charges have been posted today.       ANTONETTE Jones, CCC-SLP  Speech Language Pathologist   7/31/2020

## 2020-08-05 ENCOUNTER — HOSPITAL ENCOUNTER (OUTPATIENT)
Dept: RADIOLOGY | Facility: HOSPITAL | Age: 50
Discharge: HOME | End: 2020-08-05
Attending: OBSTETRICS & GYNECOLOGY
Payer: MEDICARE

## 2020-08-05 DIAGNOSIS — R92.1 CALCIFICATION OF LEFT BREAST: Primary | ICD-10-CM

## 2020-08-05 DIAGNOSIS — R92.8 ABNORMAL MAMMOGRAM OF LEFT BREAST: ICD-10-CM

## 2020-08-05 PROCEDURE — 88305 TISSUE EXAM BY PATHOLOGIST: CPT | Performed by: PATHOLOGY

## 2020-08-05 PROCEDURE — 19081 BX BREAST 1ST LESION STRTCTC: CPT | Mod: LT,,, | Performed by: RADIOLOGY

## 2020-08-05 PROCEDURE — 25000003 PHARM REV CODE 250: Performed by: OBSTETRICS & GYNECOLOGY

## 2020-08-05 PROCEDURE — 88305 TISSUE EXAM BY PATHOLOGIST: CPT | Mod: 26,,, | Performed by: PATHOLOGY

## 2020-08-05 PROCEDURE — 19081 MAMMO BREAST STEREOTACTIC BREAST BIOPSY LEFT: ICD-10-PCS | Mod: LT,,, | Performed by: RADIOLOGY

## 2020-08-05 PROCEDURE — 19081 BX BREAST 1ST LESION STRTCTC: CPT | Mod: LT

## 2020-08-05 PROCEDURE — 88305 TISSUE EXAM BY PATHOLOGIST: ICD-10-PCS | Mod: 26,,, | Performed by: PATHOLOGY

## 2020-08-05 RX ORDER — LIDOCAINE HYDROCHLORIDE 20 MG/ML
6 INJECTION, SOLUTION INFILTRATION; PERINEURAL ONCE
Status: COMPLETED | OUTPATIENT
Start: 2020-08-05 | End: 2020-08-05

## 2020-08-05 RX ORDER — LIDOCAINE HYDROCHLORIDE AND EPINEPHRINE 10; 10 MG/ML; UG/ML
9 INJECTION, SOLUTION INFILTRATION; PERINEURAL ONCE
Status: COMPLETED | OUTPATIENT
Start: 2020-08-05 | End: 2020-08-05

## 2020-08-05 RX ADMIN — LIDOCAINE HYDROCHLORIDE 6 ML: 20 INJECTION, SOLUTION INFILTRATION; PERINEURAL at 11:08

## 2020-08-05 RX ADMIN — LIDOCAINE HYDROCHLORIDE,EPINEPHRINE BITARTRATE 9 ML: 10; .01 INJECTION, SOLUTION INFILTRATION; PERINEURAL at 11:08

## 2020-08-07 LAB
COMMENT: NORMAL
FINAL PATHOLOGIC DIAGNOSIS: NORMAL
GROSS: NORMAL

## 2020-08-13 ENCOUNTER — OFFICE VISIT (OUTPATIENT)
Dept: GASTROENTEROLOGY | Facility: CLINIC | Age: 50
End: 2020-08-13
Payer: MEDICARE

## 2020-08-13 ENCOUNTER — LAB VISIT (OUTPATIENT)
Dept: LAB | Facility: HOSPITAL | Age: 50
End: 2020-08-13
Attending: INTERNAL MEDICINE
Payer: MEDICARE

## 2020-08-13 VITALS
WEIGHT: 145.75 LBS | BODY MASS INDEX: 21.59 KG/M2 | SYSTOLIC BLOOD PRESSURE: 144 MMHG | DIASTOLIC BLOOD PRESSURE: 79 MMHG | HEART RATE: 85 BPM | HEIGHT: 69 IN

## 2020-08-13 DIAGNOSIS — Z79.899 ENCOUNTER FOR MONITORING LONG-TERM PROTON PUMP INHIBITOR THERAPY: ICD-10-CM

## 2020-08-13 DIAGNOSIS — Z94.4 LIVER TRANSPLANT STATUS: Primary | ICD-10-CM

## 2020-08-13 DIAGNOSIS — D64.9 ANEMIA, UNSPECIFIED TYPE: ICD-10-CM

## 2020-08-13 DIAGNOSIS — Z94.4 LIVER TRANSPLANT STATUS: ICD-10-CM

## 2020-08-13 DIAGNOSIS — N18.9 CHRONIC KIDNEY DISEASE, UNSPECIFIED CKD STAGE: ICD-10-CM

## 2020-08-13 DIAGNOSIS — R13.19 OTHER DYSPHAGIA: ICD-10-CM

## 2020-08-13 DIAGNOSIS — Z51.81 ENCOUNTER FOR MONITORING LONG-TERM PROTON PUMP INHIBITOR THERAPY: ICD-10-CM

## 2020-08-13 LAB
25(OH)D3+25(OH)D2 SERPL-MCNC: 18 NG/ML (ref 30–96)
BASOPHILS # BLD AUTO: 0.02 K/UL (ref 0–0.2)
BASOPHILS NFR BLD: 0.4 % (ref 0–1.9)
DIFFERENTIAL METHOD: ABNORMAL
EOSINOPHIL # BLD AUTO: 0.1 K/UL (ref 0–0.5)
EOSINOPHIL NFR BLD: 1.7 % (ref 0–8)
ERYTHROCYTE [DISTWIDTH] IN BLOOD BY AUTOMATED COUNT: 14.8 % (ref 11.5–14.5)
FERRITIN SERPL-MCNC: 38 NG/ML (ref 20–300)
HCT VFR BLD AUTO: 33.1 % (ref 37–48.5)
HGB BLD-MCNC: 11.1 G/DL (ref 12–16)
IMM GRANULOCYTES # BLD AUTO: 0.01 K/UL (ref 0–0.04)
IMM GRANULOCYTES NFR BLD AUTO: 0.2 % (ref 0–0.5)
IRON SERPL-MCNC: 77 UG/DL (ref 30–160)
LYMPHOCYTES # BLD AUTO: 1.5 K/UL (ref 1–4.8)
LYMPHOCYTES NFR BLD: 28 % (ref 18–48)
MAGNESIUM SERPL-MCNC: 1.5 MG/DL (ref 1.6–2.6)
MCH RBC QN AUTO: 26.6 PG (ref 27–31)
MCHC RBC AUTO-ENTMCNC: 33.5 G/DL (ref 32–36)
MCV RBC AUTO: 79 FL (ref 82–98)
MONOCYTES # BLD AUTO: 0.5 K/UL (ref 0.3–1)
MONOCYTES NFR BLD: 8.8 % (ref 4–15)
NEUTROPHILS # BLD AUTO: 3.2 K/UL (ref 1.8–7.7)
NEUTROPHILS NFR BLD: 60.9 % (ref 38–73)
NRBC BLD-RTO: 0 /100 WBC
PLATELET # BLD AUTO: 216 K/UL (ref 150–350)
PMV BLD AUTO: 10.3 FL (ref 9.2–12.9)
RBC # BLD AUTO: 4.17 M/UL (ref 4–5.4)
SATURATED IRON: 20 % (ref 20–50)
TOTAL IRON BINDING CAPACITY: 392 UG/DL (ref 250–450)
TRANSFERRIN SERPL-MCNC: 265 MG/DL (ref 200–375)
VIT B12 SERPL-MCNC: 686 PG/ML (ref 210–950)
WBC # BLD AUTO: 5.25 K/UL (ref 3.9–12.7)

## 2020-08-13 PROCEDURE — 3008F BODY MASS INDEX DOCD: CPT | Mod: CPTII,GC,S$GLB, | Performed by: STUDENT IN AN ORGANIZED HEALTH CARE EDUCATION/TRAINING PROGRAM

## 2020-08-13 PROCEDURE — 83540 ASSAY OF IRON: CPT

## 2020-08-13 PROCEDURE — 83735 ASSAY OF MAGNESIUM: CPT

## 2020-08-13 PROCEDURE — 82607 VITAMIN B-12: CPT

## 2020-08-13 PROCEDURE — 99214 PR OFFICE/OUTPT VISIT, EST, LEVL IV, 30-39 MIN: ICD-10-PCS | Mod: GC,S$GLB,, | Performed by: STUDENT IN AN ORGANIZED HEALTH CARE EDUCATION/TRAINING PROGRAM

## 2020-08-13 PROCEDURE — 82306 VITAMIN D 25 HYDROXY: CPT

## 2020-08-13 PROCEDURE — 82728 ASSAY OF FERRITIN: CPT

## 2020-08-13 PROCEDURE — 3078F DIAST BP <80 MM HG: CPT | Mod: CPTII,GC,S$GLB, | Performed by: STUDENT IN AN ORGANIZED HEALTH CARE EDUCATION/TRAINING PROGRAM

## 2020-08-13 PROCEDURE — 3008F PR BODY MASS INDEX (BMI) DOCUMENTED: ICD-10-PCS | Mod: CPTII,GC,S$GLB, | Performed by: STUDENT IN AN ORGANIZED HEALTH CARE EDUCATION/TRAINING PROGRAM

## 2020-08-13 PROCEDURE — 83020 HEMOGLOBIN ELECTROPHORESIS: CPT | Mod: 91

## 2020-08-13 PROCEDURE — 3077F PR MOST RECENT SYSTOLIC BLOOD PRESSURE >= 140 MM HG: ICD-10-PCS | Mod: CPTII,GC,S$GLB, | Performed by: STUDENT IN AN ORGANIZED HEALTH CARE EDUCATION/TRAINING PROGRAM

## 2020-08-13 PROCEDURE — 99999 PR PBB SHADOW E&M-EST. PATIENT-LVL V: CPT | Mod: PBBFAC,GC,, | Performed by: STUDENT IN AN ORGANIZED HEALTH CARE EDUCATION/TRAINING PROGRAM

## 2020-08-13 PROCEDURE — 85025 COMPLETE CBC W/AUTO DIFF WBC: CPT

## 2020-08-13 PROCEDURE — 3077F SYST BP >= 140 MM HG: CPT | Mod: CPTII,GC,S$GLB, | Performed by: STUDENT IN AN ORGANIZED HEALTH CARE EDUCATION/TRAINING PROGRAM

## 2020-08-13 PROCEDURE — 36415 COLL VENOUS BLD VENIPUNCTURE: CPT

## 2020-08-13 PROCEDURE — 83020 HEMOGLOBIN ELECTROPHORESIS: CPT

## 2020-08-13 PROCEDURE — 99214 OFFICE O/P EST MOD 30 MIN: CPT | Mod: GC,S$GLB,, | Performed by: STUDENT IN AN ORGANIZED HEALTH CARE EDUCATION/TRAINING PROGRAM

## 2020-08-13 PROCEDURE — 3078F PR MOST RECENT DIASTOLIC BLOOD PRESSURE < 80 MM HG: ICD-10-PCS | Mod: CPTII,GC,S$GLB, | Performed by: STUDENT IN AN ORGANIZED HEALTH CARE EDUCATION/TRAINING PROGRAM

## 2020-08-13 PROCEDURE — 99999 PR PBB SHADOW E&M-EST. PATIENT-LVL V: ICD-10-PCS | Mod: PBBFAC,GC,, | Performed by: STUDENT IN AN ORGANIZED HEALTH CARE EDUCATION/TRAINING PROGRAM

## 2020-08-13 NOTE — PROGRESS NOTES
[facility]    Reason for visit: The primary encounter diagnosis was Liver transplant status. Diagnoses of Other dysphagia, Encounter for monitoring long-term proton pump inhibitor therapy, Anemia, unspecified type, and Chronic kidney disease, unspecified CKD stage were also pertinent to this visit.  Referring Provider/PCP: Timur Lion MD    History of Present Illness:  Valencia Dumont is a 50 y.o. female with a history of liver transplant for AIH, pharyngeal dysphagia, diabetes, CKD who is presenting for initial evaluation of dysphagia        She reports that food gets stuck in her throat area mostly with meat, bread and some pills. It doesn't happen with liquids. She denies coughing or choking. This started about December 2019. She reports heartburn that improved with BID protonix.  As part of her dysphagia workup she underwent a laryngoscopy which was normal and referred to SLP. She underwent modified barium study that showed pharyngeal dysphagia for which she was referred to speech therapy which has not been helping.     She also reports abdominal pain that is crampy, located in her whole abdomen, ~4 times a week, can last sometimes the whole day, temporarily improved with a BM, worse when constipated. Associated with tenesmus.     PEndoHx:  EGD 2019  - LA Grade A reflux esophagitis. Biopsied.   - Normal stomach.   - Normal examined duodenum.     Colonoscopy 2017  - Tortuous colon.   - One 8 mm polyp in the descending colon, removed with a cold snare. Resected and retrieved.    - The examination was otherwise normal.   - The examined portion of the ileum was normal.   Path: hyperplastic polyp    Review of Systems   Constitutional: Positive for weight loss. Negative for fever.   HENT: Positive for sore throat.    Eyes: Negative.    Respiratory: Positive for shortness of breath.    Cardiovascular: Positive for chest pain.   Gastrointestinal: Positive for abdominal pain, heartburn,  nausea and vomiting. Negative for blood in stool, constipation, diarrhea and melena.   Genitourinary: Negative for dysuria.   Musculoskeletal: Negative for falls.   Skin:        Bruising   Neurological: Positive for dizziness.   Psychiatric/Behavioral: Negative for substance abuse.       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    Hypertension     Kidney stone     Lupus     Osteoporosis 12/2013    Prophylactic immunotherapy (transplant immunosuppression) 1/2/2014    SBP (spontaneous bacterial peritonitis)     history of        Past Surgical History:   Procedure Laterality Date    COLONOSCOPY N/A 5/31/2017    Procedure: COLONOSCOPY;  Surgeon: Marky Sun MD;  Location: [facility] ([address] [place] [address]);  Service: Endoscopy;  Laterality: N/A;  PM prep.    ESOPHAGOGASTRODUODENOSCOPY      ESOPHAGOGASTRODUODENOSCOPY N/A 1/16/2019    Procedure: EGD (ESOPHAGOGASTRODUODENOSCOPY);  Surgeon: Deniz Guerra MD;  Location: [facility] ([address] [place] [address]);  Service: Endoscopy;  Laterality: N/A;  labs prior, s/p liver transplant-MS    LIVER BIOPSY      LIVER TRANSPLANT  12/31/2013    REFRACTIVE SURGERY Bilateral 2010    TUBAL LIGATION  2003    UPPER GASTROINTESTINAL ENDOSCOPY         Family History   Problem Relation Age of Onset    Hypertension Mother     Cataracts Mother     Diabetes Father     Alzheimer's disease Father     Diabetes Paternal Grandfather     Diabetes Paternal Grandmother     No Known Problems Maternal Grandmother     Bone cancer Maternal Grandfather     Breast cancer Maternal Aunt 55    Hypertension Brother     Diabetes Brother     No Known Problems Sister     No Known Problems Maternal Uncle     No Known  Problems Paternal Aunt     No Known Problems Paternal Uncle     Stroke Neg Hx     Cancer Neg Hx     Colon cancer Neg Hx     Esophageal cancer Neg Hx     Stomach cancer Neg Hx     Rectal cancer Neg Hx     Amblyopia Neg Hx     Blindness Neg Hx     Glaucoma Neg Hx     Macular degeneration Neg Hx     Retinal detachment Neg Hx     Strabismus Neg Hx     Thyroid disease Neg Hx        Social History     Socioeconomic History    Marital status:      Spouse name: Not on file    Number of children: 3    Years of education: Not on file    Highest education level: Not on file   Occupational History     [employer]er: [employer]   Social Needs    Financial resource strain: Not on file    Food insecurity     Worry: Not on file     Inability: Not on file    Transportation needs     Medical: Not on file     Non-medical: Not on file   Tobacco Use    Smoking status: Never Smoker    Smokeless tobacco: Never Used    Tobacco comment: disability; ; 3 children   Substance and Sexual Activity    Alcohol use: Yes     Alcohol/week: 1.0 standard drinks     Types: 1 Glasses of wine per week     Comment: occasionally     Drug use: No    Sexual activity: Yes     Partners: Male     Birth control/protection: See Surgical Hx, Post-menopausal     Comment: s/p tubal ligation,  since 1989   Lifestyle    Physical activity     Days per week: Not on file     Minutes per session: Not on file    Stress: Not on file   Relationships    Social connections     Talks on phone: Not on file     Gets together: Not on file     Attends Voodoo service: Not on file     Active member of club or organization: Not on file     Attends meetings of clubs or organizations: Not on file     Relationship status: Not on file   Other Topics Concern    Not on file   Social History Narrative    Not on file       Current Outpatient Medications on File Prior to Visit   Medication Sig Dispense Refill    azelastine (ASTELIN) 137  mcg (0.1 %) nasal spray 1 spray (137 mcg total) by Nasal route 2 (two) times daily. 30 mL 3    blood-glucose meter,continuous (DEXCOM G6 ) Misc 1 each by Misc.(Non-Drug; Combo Route) route once. for 1 dose 1 each 0    blood-glucose transmitter (DEXCOM G6 TRANSMITTER) Karen 1 each by Misc.(Non-Drug; Combo Route) route once a week 1 Device 3    ciclopirox (PENLAC) 8 % Soln Apply topically nightly. 1 Bottle 3    diazepam (VALIUM) 5 MG tablet Take 5 mg by mouth 3 (three) times daily as needed.   0    diclofenac sodium (VOLTAREN) 1 % Gel Voltaren 1 % topical gel   APPLY 2 GRAM TO THE AFFECTED AREA(S) BY TOPICAL ROUTE 4 TIMES PER DAY      DILTIAZEM HCL (DILTIAZEM 2% CREAM) Apply topically 3 (three) times daily. Apply topically to anal area. 30 g 0    ergocalciferol (ERGOCALCIFEROL) 50,000 unit Cap Take 1 capsule (50,000 Units total) by mouth every 7 days. 4 capsule 2    erythromycin with ethanol (EMGEL) 2 % gel ADD 30GM (1 TUBE) TO THE SOAKING DEVICE AND ALLOW WATER TO AGITATE. PLACE AFFECTED AREAS INTO WATER AND SOAK FOR 10 MINUTES 1-2 TIMES DAILY  1    famotidine (PEPCID) 40 MG tablet       fluconazole (DIFLUCAN) 150 MG Tab TAKE ONE TABLET BY MOUTH once for ONE dose  0    furosemide (LASIX) 40 MG tablet furosemide 40 mg tablet      gabapentin (NEURONTIN) 300 MG capsule TAKE 1 CAPSULE BY MOUTH THREE TIMES DAILY      gentamicin (GARAMYCIN) 0.1 % cream ADD 30GM (1 TUBE) TO THE SOAKING DEVICE AND ALLOW WATER TO AGITATE. PLACE AFFECTED AREAS INTO WATER AND SOAK FOR 10 MINUTES 1-2 TIMES DAILY  1    hydroxychloroquine (PLAQUENIL) 200 mg tablet TAKE ONE TABLET BY MOUTH TWICE DAILY 60 tablet 2    hydrOXYzine HCl (ATARAX) 10 MG Tab TAKE 1 OR 2 TABLETS BY MOUTH THREE TIMES DAILY AS NEEDED FOR ITCHING.  0    insulin aspart U-100 (NOVOLOG FLEXPEN U-100 INSULIN) 100 unit/mL (3 mL) InPn pen Takes three units before breakfast and lunch  and 5 units before dinner with sliding scale, up to 25 units daily 45 mL 3     insulin detemir U-100 (LEVEMIR FLEXTOUCH U-100 INSULN) 100 unit/mL (3 mL) SubQ InPn pen Inject 4 Units into the skin 2 (two) times daily. 45 mL 3    isosorbide mononitrate (IMDUR) 30 MG 24 hr tablet Take 30 mg by mouth once daily.      ketoconazole (NIZORAL) 2 % cream ADD 30GM (1/2 TUBE) TO THE SOAKING DEVICE AND ALLOW WATER TO AGITATE. PLACE AFFECTED AREAS INTO WATER AND SOAK FOR 10 MINUTES 1-2 TIMES BRENDON  1    Lactobac. rhamnosus GG-inulin 10 billion cell -200 mg Cap Take 1 capsule by mouth 2 (two) times daily. 30 capsule 0    levocetirizine (XYZAL) 5 MG tablet Take 5 mg by mouth every evening.  3    levoFLOXacin (LEVAQUIN) 750 MG tablet levofloxacin 750 mg tablet      losartan (COZAAR) 100 MG tablet Take 1 tablet (100 mg total) by mouth once daily. 30 tablet 2    magnesium oxide 500 mg Tab Take 500 mg by mouth 2 (two) times daily. 60 each 6    meclizine (ANTIVERT) 25 mg tablet TAKE ONE TABLET BY MOUTH AT BEDTIME AS NEEDED for dizziness.  0    meloxicam (MOBIC) 15 MG tablet       MULTIVIT,THER IRON,CA,FA & MIN (MULTIVITAMIN) Tab Take 1 tablet by mouth once daily. 30 tablet 0    mycophenolate (MYFORTIC) 180 MG TbEC TAKE TWO TABLETS BY MOUTH TWICE DAILY. 120 tablet 5    neomycin-polymyxin-hydrocortisone (CORTISPORIN) otic solution INSTILL TWO DROPS INTO BOTH EARS FOUR TIMES DAILY FOR 10 DAYS  0    nystatin (MYCOSTATIN) 100,000 unit/mL suspension SWISH AND SPIT FIVE MILLILITERS BY MOUTH FOUR TIMES DAILY FOR 7 DAYS.  1    nystatin (MYCOSTATIN) cream Apply topically 2 times daily 30 g 0    oxycodone-acetaminophen (PERCOCET) 7.5-325 mg per tablet Take 1 tablet by mouth every 4 (four) hours as needed for Pain.      pantoprazole (PROTONIX) 40 MG tablet Take 1 tablet (40 mg total) by mouth 2 (two) times daily. Take immediately in am when wake up and then 30 minutes prior to dinner 60 tablet 11    paroxetine (PAXIL) 10 MG tablet paroxetine 10 mg tablet      polyethylene glycol (GLYCOLAX) 17 gram/dose  powder Mix 1 capful (17 g) with liquid and by mouth 2 (two) times daily. 1530 g 11    PROAIR HFA 90 mcg/actuation inhaler Inhale 2 puffs into the lungs 3 (three) times daily as needed.   3    promethazine-dextromethorphan (PROMETHAZINE-DM) 6.25-15 mg/5 mL Syrp Take by mouth 2 (two) times daily as needed.   0    rosuvastatin (CRESTOR) 5 MG tablet Take 5 mg by mouth once daily.      sertraline (ZOLOFT) 50 MG tablet Take 50 mg by mouth once daily.  11    SSD 1 % cream 1 application 2 (two) times daily. Apply to affected area  0    tacrolimus (PROGRAF) 1 MG Cap Take 3 capsules (3 mg total) by mouth every 12 (twelve) hours. 180 capsule 11    traZODone (DESYREL) 50 MG tablet Take 50 mg by mouth nightly.  0    triamcinolone acetonide 0.1% (KENALOG) 0.1 % cream Apply topically 2 times daily 30 g 0    valACYclovir (VALTREX) 500 MG tablet TAKE ONE TABLET BY MOUTH TWICE DAILY FOR 3 DAYS. 6 tablet 2    verapamil (CALAN-SR) 120 MG CR tablet TAKE ONE TABLET ONCE DAILY FOR BLOOD PRESSURE  3    zolpidem (AMBIEN) 10 mg Tab Take 10 mg by mouth nightly as needed.  3    blood-glucose meter kit To check BG 5 times daily, to use with insurance preferred meter 1 each 0    buPROPion (WELLBUTRIN XL) 150 MG TB24 tablet Take 300 mg by mouth.      estradiol (YUVAFEM) 10 mcg Tab Place 1 tablet (10 mcg total) vaginally twice a week. 8 tablet 11    nystatin-triamcinolone (MYCOLOG II) cream Apply to affected area 2 times daily 30 g 1     No current facility-administered medications on file prior to visit.        Review of patient's allergies indicates:   Allergen Reactions    Norvasc [amlodipine]      Severe headache    Doxycycline Rash    Pcn [penicillins] Rash       Physical Exam:  Constitutional:  not in acute distress and well developed  HENT: Head: Normal, normocephalic, atraumatic.  Eyes: conjunctiva clear and sclera nonicteric  Cardiovascular: regular rate and rhythm and no murmur  Respiratory: normal chest expansion &  respiratory effort   and no accessory muscle use  GI: soft, non-tender, without masses or organomegaly, liver transplant scar present  Musculoskeletal: no muscular tenderness noted  Skin: negatives: color normal  Neurological: alert, oriented x3  Psychiatric: mood and affect are within normal limits, pt is a good historian; no memory problems were noted    Laboratory:  Lab Results   Component Value Date     07/22/2020    K 4.4 07/22/2020     07/22/2020    CO2 29 07/22/2020    BUN 29 (H) 07/22/2020    CREATININE 1.6 (H) 07/22/2020    CALCIUM 9.2 07/22/2020    ANIONGAP 7 (L) 07/22/2020    ESTGFRAFRICA 43 (A) 07/22/2020    EGFRNONAA 37 (A) 07/22/2020       Lab Results   Component Value Date    ALT 10 07/22/2020    AST 17 07/22/2020     (H) 05/18/2017    ALKPHOS 76 07/22/2020    BILITOT 0.4 07/22/2020       Lab Results   Component Value Date    WBC 4.47 07/22/2020    HGB 11.2 (L) 07/22/2020    HCT 33.6 (L) 07/22/2020    MCV 79 (L) 07/22/2020     07/22/2020       Microbiology:  No Pertinent Microbiology    Imaging:  No Pertinent Imaging    Assessment:  Valencia Dumont is a 50 y.o. female who is presenting for initial evaluation of dysphagia.    Problems:  1. Dysphagia  2. Microcytic anemia    Evaluated prior with SOB and found to have pharyngeal dysphagia however will proceed with evaluation for esophageal dysphagia.  Will start with a barium esophagram followed by EGD.  Will obtain disaccharidase deficiency and celiac testing during EGD.  Also obtain blood work for anemia evaluation.      Plan:  1. Barium esophagram followed by EGD  2. Iron profile and vitamin levels for anemia workup  Follow up in about 3 months (around 11/13/2020).    Toshia Darnell MD  Gastroenterology Fellow    Orders Placed This Encounter   Procedures    FL Esophagram With Barium Tablet    CBC auto differential    Iron and TIBC    Ferritin    HEMOGLOBIN ELECTROPHORESIS, HGB A2 HAKEEM.    Vitamin D    Vitamin B12     Magnesium

## 2020-08-13 NOTE — PROGRESS NOTES
I was present with Toshia Darnell MD GI fellow during the above evaluation, including history and exam.  I discussed the case with the fellow and agree with the findings and plan as documented in the fellow's note.

## 2020-08-15 ENCOUNTER — PATIENT MESSAGE (OUTPATIENT)
Dept: GASTROENTEROLOGY | Facility: CLINIC | Age: 50
End: 2020-08-15

## 2020-08-17 ENCOUNTER — PATIENT MESSAGE (OUTPATIENT)
Dept: GASTROENTEROLOGY | Facility: CLINIC | Age: 50
End: 2020-08-17

## 2020-08-17 ENCOUNTER — HOSPITAL ENCOUNTER (OUTPATIENT)
Dept: SLEEP MEDICINE | Facility: HOSPITAL | Age: 50
Discharge: HOME | End: 2020-08-17
Attending: OTOLARYNGOLOGY
Payer: MEDICARE

## 2020-08-17 ENCOUNTER — TELEPHONE (OUTPATIENT)
Dept: OTOLARYNGOLOGY | Facility: CLINIC | Age: 50
End: 2020-08-17

## 2020-08-17 DIAGNOSIS — R06.83 SNORING: ICD-10-CM

## 2020-08-17 DIAGNOSIS — G47.33 OBSTRUCTIVE SLEEP APNEA SYNDROME: ICD-10-CM

## 2020-08-17 DIAGNOSIS — H81.4 CENTRAL VESTIBULAR VERTIGO: Primary | ICD-10-CM

## 2020-08-17 PROCEDURE — 95810 POLYSOM 6/> YRS 4/> PARAM: CPT | Mod: 26,,, | Performed by: INTERNAL MEDICINE

## 2020-08-17 PROCEDURE — 95810 PR POLYSOMNOGRAPHY, 4 OR MORE: ICD-10-PCS | Mod: 26,,, | Performed by: INTERNAL MEDICINE

## 2020-08-17 PROCEDURE — 95810 POLYSOM 6/> YRS 4/> PARAM: CPT

## 2020-08-17 NOTE — TELEPHONE ENCOUNTER
Patient wanted you to know that she seen the GI specialist last week, was not very happy with the out come. Was told that she would need to see an ENT for bad breath. Patient advised them that she has seen you and she feels like its not her breath. It is a rotten egg smell and taste she gets after she burps. Please advise

## 2020-08-17 NOTE — TELEPHONE ENCOUNTER
----- Message from Jose Daniel Coreas sent at 8/17/2020 11:03 AM CDT -----  Regarding: Pt Advice  Contact: SUSY STOVER [[id number]]  Name of Who is Calling: SUSY STOVER [[id number]]      What is the request in detail: Would like to inform physician that Gastro is suppose to be sending some information and would like to know if it was received. Please advise      Can the clinic reply by [facility]: yes      What Number to Call Back if not in [facility]: [phone]

## 2020-08-17 NOTE — TELEPHONE ENCOUNTER
I saw that she had seen GI and she has an esophragram scheduled. This will help with making sure there is not a problem with motion or an outpouching that could be collecting food so I will be interested to see what that shows.   If it is only happening after she burps, may be related to something in the stomach. There are also several things that can cause abnormal smells like sinus issues or tonsil stones but typically I do not see that happening just after a burp. Also usually with tonsil stones you often will cough out crust type things things that smell.  I would recommend getting the other swallow test ( please also let her know that the test is going to seem very similar to the other swallow test that she had before however the way that the images are processed is different and therefore it shows different things).

## 2020-08-17 NOTE — TELEPHONE ENCOUNTER
Explained everything in previous notes to patient. Now she would like to know if you received her VNG results, only having the dizziness if she bends down to look in her cabinets or something similar. Please advise.

## 2020-08-17 NOTE — TELEPHONE ENCOUNTER
Spoke with patient who will call [facility] office which is closer to her home . Consult for bad breath.

## 2020-08-18 ENCOUNTER — OFFICE VISIT (OUTPATIENT)
Dept: RHEUMATOLOGY | Facility: CLINIC | Age: 50
End: 2020-08-18
Payer: MEDICARE

## 2020-08-18 VITALS
BODY MASS INDEX: 21.87 KG/M2 | DIASTOLIC BLOOD PRESSURE: 84 MMHG | HEART RATE: 80 BPM | TEMPERATURE: 98 F | HEIGHT: 69 IN | SYSTOLIC BLOOD PRESSURE: 150 MMHG | WEIGHT: 147.69 LBS

## 2020-08-18 DIAGNOSIS — M32.9 SYSTEMIC LUPUS ERYTHEMATOSUS, UNSPECIFIED SLE TYPE, UNSPECIFIED ORGAN INVOLVEMENT STATUS: Primary | ICD-10-CM

## 2020-08-18 DIAGNOSIS — R53.83 FATIGUE, UNSPECIFIED TYPE: ICD-10-CM

## 2020-08-18 DIAGNOSIS — E55.9 VITAMIN D DEFICIENCY: ICD-10-CM

## 2020-08-18 DIAGNOSIS — D84.9 IMMUNOSUPPRESSION: ICD-10-CM

## 2020-08-18 LAB
HGB A2 MFR BLD HPLC: 1.9 % (ref 2.2–3.2)
HGB FRACT BLD ELPH PH6.0-IMP: NORMAL
HGB FRACT BLD ELPH-IMP: ABNORMAL
HGB FRACT BLD ELPH-IMP: ABNORMAL

## 2020-08-18 PROCEDURE — 99999 PR PBB SHADOW E&M-EST. PATIENT-LVL V: CPT | Mod: PBBFAC,,, | Performed by: INTERNAL MEDICINE

## 2020-08-18 PROCEDURE — 3077F PR MOST RECENT SYSTOLIC BLOOD PRESSURE >= 140 MM HG: ICD-10-PCS | Mod: CPTII,S$GLB,, | Performed by: INTERNAL MEDICINE

## 2020-08-18 PROCEDURE — 3008F BODY MASS INDEX DOCD: CPT | Mod: CPTII,S$GLB,, | Performed by: INTERNAL MEDICINE

## 2020-08-18 PROCEDURE — 3079F PR MOST RECENT DIASTOLIC BLOOD PRESSURE 80-89 MM HG: ICD-10-PCS | Mod: CPTII,S$GLB,, | Performed by: INTERNAL MEDICINE

## 2020-08-18 PROCEDURE — 99214 PR OFFICE/OUTPT VISIT, EST, LEVL IV, 30-39 MIN: ICD-10-PCS | Mod: S$GLB,,, | Performed by: INTERNAL MEDICINE

## 2020-08-18 PROCEDURE — 3008F PR BODY MASS INDEX (BMI) DOCUMENTED: ICD-10-PCS | Mod: CPTII,S$GLB,, | Performed by: INTERNAL MEDICINE

## 2020-08-18 PROCEDURE — 99214 OFFICE O/P EST MOD 30 MIN: CPT | Mod: S$GLB,,, | Performed by: INTERNAL MEDICINE

## 2020-08-18 PROCEDURE — 3077F SYST BP >= 140 MM HG: CPT | Mod: CPTII,S$GLB,, | Performed by: INTERNAL MEDICINE

## 2020-08-18 PROCEDURE — 3079F DIAST BP 80-89 MM HG: CPT | Mod: CPTII,S$GLB,, | Performed by: INTERNAL MEDICINE

## 2020-08-18 PROCEDURE — 99999 PR PBB SHADOW E&M-EST. PATIENT-LVL V: ICD-10-PCS | Mod: PBBFAC,,, | Performed by: INTERNAL MEDICINE

## 2020-08-18 ASSESSMENT — ROUTINE ASSESSMENT OF PATIENT INDEX DATA (RAPID3)
PSYCHOLOGICAL DISTRESS SCORE: 3.3
AM STIFFNESS SCORE: 1, YES
MDHAQ FUNCTION SCORE: 0.5
WHEN YOU AWAKENED IN THE MORNING OVER THE LAST WEEK, PLEASE INDICATE THE AMOUNT OF TIME IT TAKES UNTIL YOU ARE AS LIMBER AS YOU WILL BE FOR THE DAY: 3 HOURS
PAIN SCORE: 3
FATIGUE SCORE: 6.5
PATIENT GLOBAL ASSESSMENT SCORE: 4
TOTAL RAPID3 SCORE: 2.89
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
## 2020-08-18 NOTE — PROGRESS NOTES
Rapid3 Question Responses and Scores 8/15/2020   MDHAQ Score 0.5   Psychologic Score 3.3   Pain Score 3   When you awakened in the morning OVER THE LAST WEEK, did you feel stiff? Yes   If Yes, please indicate the number of hours until you are as limber as you will be for the day 3   Fatigue Score 6.5   Global Health Score 4   RAPID3 Score 2.88       Answers for HPI/ROS submitted by the patient on 8/15/2020   fever: No  eye redness: No  headaches: No  shortness of breath: No  chest pain: No  trouble swallowing: Yes  diarrhea: Yes  constipation: Yes  unexpected weight change: Yes  genital sore: No  dysuria: No  During the last 3 days, have you had a skin rash?: No  Bruises or bleeds easily: Yes  cough: Yes

## 2020-08-18 NOTE — PROGRESS NOTES
A PSG was performed on Valencia Dumont. The entire procedure was explained, including Bio calibration procedure, patient was informed that there may be a need to enter the room during the night to fix lead or make adjustments to the equipment. Questions were answered prior to start of study.  She was given instructions on how to call out for help including how to use the nurse call unit in the bathroom. Patient was given [employer] phone number to call if patient needed tech for anything, in case tech was out of tech room.  Patient education was performed. This includes the possible use of CPAP machine and different CPAP masks.  She was given the after visit summary.   He did not meet criteria.  EKG: The EKG appeared to be NSR  Technical difficulties during the study: Fix leads and change out thermal sensor.  Late start due to theraml sensor breaking. Soft to moderate snoring was observed.

## 2020-08-18 NOTE — PATIENT INSTRUCTIONS
Your sleep study will be scored and interpreted by one of our physicians who are board certified in sleep medicine.  Within two weeks the results will be sent to the physician who referred you. Your physician should then contact you to go over the results, along with any recommendations. If you do not hear from your physician within two weeks, please call them

## 2020-08-18 NOTE — TELEPHONE ENCOUNTER
Called Patient and notified her that someone from Neurology Dr. Donaldson office will make an appointment for her to see Neurologist and will call Patient.

## 2020-08-19 ENCOUNTER — PATIENT MESSAGE (OUTPATIENT)
Dept: OTOLARYNGOLOGY | Facility: CLINIC | Age: 50
End: 2020-08-19

## 2020-08-19 ENCOUNTER — INFUSION (OUTPATIENT)
Dept: INFUSION THERAPY | Facility: HOSPITAL | Age: 50
End: 2020-08-19
Attending: NURSE PRACTITIONER
Payer: MEDICARE

## 2020-08-19 VITALS
OXYGEN SATURATION: 96 % | DIASTOLIC BLOOD PRESSURE: 80 MMHG | RESPIRATION RATE: 17 BRPM | BODY MASS INDEX: 22.07 KG/M2 | SYSTOLIC BLOOD PRESSURE: 162 MMHG | WEIGHT: 149 LBS | HEIGHT: 69 IN | TEMPERATURE: 98 F | HEART RATE: 80 BPM

## 2020-08-19 DIAGNOSIS — R53.83 FATIGUE, UNSPECIFIED TYPE: ICD-10-CM

## 2020-08-19 DIAGNOSIS — E55.9 VITAMIN D DEFICIENCY: ICD-10-CM

## 2020-08-19 DIAGNOSIS — D84.9 IMMUNOSUPPRESSION: ICD-10-CM

## 2020-08-19 DIAGNOSIS — M81.0 OSTEOPOROSIS: ICD-10-CM

## 2020-08-19 DIAGNOSIS — M32.9 SYSTEMIC LUPUS ERYTHEMATOSUS, UNSPECIFIED SLE TYPE, UNSPECIFIED ORGAN INVOLVEMENT STATUS: Primary | ICD-10-CM

## 2020-08-19 LAB
ALBUMIN SERPL BCP-MCNC: 4 G/DL (ref 3.5–5.2)
ALP SERPL-CCNC: 76 U/L (ref 55–135)
ALT SERPL W/O P-5'-P-CCNC: 12 U/L (ref 10–44)
ANION GAP SERPL CALC-SCNC: 7 MMOL/L (ref 8–16)
AST SERPL-CCNC: 15 U/L (ref 10–40)
BILIRUB SERPL-MCNC: 0.4 MG/DL (ref 0.1–1)
BILIRUB UR QL STRIP: NEGATIVE
BUN SERPL-MCNC: 27 MG/DL (ref 6–20)
CALCIUM SERPL-MCNC: 9.2 MG/DL (ref 8.7–10.5)
CHLORIDE SERPL-SCNC: 105 MMOL/L (ref 95–110)
CLARITY UR: CLEAR
CO2 SERPL-SCNC: 28 MMOL/L (ref 23–29)
COLOR UR: YELLOW
CREAT SERPL-MCNC: 1.4 MG/DL (ref 0.5–1.4)
CREAT UR-MCNC: 119.1 MG/DL (ref 15–325)
CRP SERPL-MCNC: 0.3 MG/L (ref 0–8.2)
ERYTHROCYTE [SEDIMENTATION RATE] IN BLOOD BY WESTERGREN METHOD: 11 MM/HR (ref 0–20)
EST. GFR  (AFRICAN AMERICAN): 51 ML/MIN/1.73 M^2
EST. GFR  (NON AFRICAN AMERICAN): 44 ML/MIN/1.73 M^2
GLUCOSE SERPL-MCNC: 152 MG/DL (ref 70–110)
GLUCOSE UR QL STRIP: NEGATIVE
HGB UR QL STRIP: NEGATIVE
KETONES UR QL STRIP: NEGATIVE
LEUKOCYTE ESTERASE UR QL STRIP: NEGATIVE
NITRITE UR QL STRIP: NEGATIVE
PH UR STRIP: 7 [PH] (ref 5–8)
POTASSIUM SERPL-SCNC: 4 MMOL/L (ref 3.5–5.1)
PROT SERPL-MCNC: 6.6 G/DL (ref 6–8.4)
PROT UR QL STRIP: NEGATIVE
PROT UR-MCNC: 16 MG/DL
PROT/CREAT UR: 0.13 MG/G{CREAT} (ref 0–0.2)
SODIUM SERPL-SCNC: 140 MMOL/L (ref 136–145)
SP GR UR STRIP: 1.02 (ref 1–1.03)
URN SPEC COLLECT METH UR: NORMAL
UROBILINOGEN UR STRIP-ACNC: NEGATIVE EU/DL

## 2020-08-19 PROCEDURE — 96367 TX/PROPH/DG ADDL SEQ IV INF: CPT

## 2020-08-19 PROCEDURE — 81003 URINALYSIS AUTO W/O SCOPE: CPT

## 2020-08-19 PROCEDURE — 96365 THER/PROPH/DIAG IV INF INIT: CPT

## 2020-08-19 PROCEDURE — 36415 COLL VENOUS BLD VENIPUNCTURE: CPT

## 2020-08-19 PROCEDURE — 86140 C-REACTIVE PROTEIN: CPT

## 2020-08-19 PROCEDURE — 86160 COMPLEMENT ANTIGEN: CPT | Mod: 59

## 2020-08-19 PROCEDURE — 80053 COMPREHEN METABOLIC PANEL: CPT

## 2020-08-19 PROCEDURE — 84156 ASSAY OF PROTEIN URINE: CPT

## 2020-08-19 PROCEDURE — 86160 COMPLEMENT ANTIGEN: CPT

## 2020-08-19 PROCEDURE — 63600175 PHARM REV CODE 636 W HCPCS: Performed by: INTERNAL MEDICINE

## 2020-08-19 PROCEDURE — 86225 DNA ANTIBODY NATIVE: CPT | Mod: 59

## 2020-08-19 PROCEDURE — 85652 RBC SED RATE AUTOMATED: CPT

## 2020-08-19 PROCEDURE — 86225 DNA ANTIBODY NATIVE: CPT

## 2020-08-19 PROCEDURE — 25000003 PHARM REV CODE 250: Performed by: INTERNAL MEDICINE

## 2020-08-19 PROCEDURE — 96413 CHEMO IV INFUSION 1 HR: CPT

## 2020-08-19 RX ORDER — HEPARIN 100 UNIT/ML
500 SYRINGE INTRAVENOUS
Status: CANCELLED | OUTPATIENT
Start: 2020-09-02

## 2020-08-19 RX ORDER — ACETAMINOPHEN 325 MG/1
650 TABLET ORAL
Status: CANCELLED | OUTPATIENT
Start: 2020-09-02

## 2020-08-19 RX ORDER — DIPHENHYDRAMINE HYDROCHLORIDE 50 MG/ML
25 INJECTION INTRAMUSCULAR; INTRAVENOUS
Status: CANCELLED | OUTPATIENT
Start: 2020-09-02

## 2020-08-19 RX ORDER — ACETAMINOPHEN 325 MG/1
650 TABLET ORAL
Status: COMPLETED | OUTPATIENT
Start: 2020-08-19 | End: 2020-08-19

## 2020-08-19 RX ORDER — ACETAMINOPHEN 325 MG/1
650 TABLET ORAL
Status: CANCELLED | OUTPATIENT
Start: 2020-08-19

## 2020-08-19 RX ORDER — HEPARIN 100 UNIT/ML
500 SYRINGE INTRAVENOUS
Status: CANCELLED | OUTPATIENT
Start: 2020-08-19

## 2020-08-19 RX ORDER — DIPHENHYDRAMINE HYDROCHLORIDE 50 MG/ML
25 INJECTION INTRAMUSCULAR; INTRAVENOUS
Status: CANCELLED | OUTPATIENT
Start: 2020-08-19

## 2020-08-19 RX ORDER — SODIUM CHLORIDE 0.9 % (FLUSH) 0.9 %
10 SYRINGE (ML) INJECTION
Status: CANCELLED | OUTPATIENT
Start: 2020-09-02

## 2020-08-19 RX ORDER — SODIUM CHLORIDE 0.9 % (FLUSH) 0.9 %
10 SYRINGE (ML) INJECTION
Status: CANCELLED | OUTPATIENT
Start: 2020-08-19

## 2020-08-19 RX ADMIN — ACETAMINOPHEN 650 MG: 325 TABLET ORAL at 12:08

## 2020-08-19 RX ADMIN — DIPHENHYDRAMINE HYDROCHLORIDE 25 MG: 50 INJECTION, SOLUTION INTRAMUSCULAR; INTRAVENOUS at 12:08

## 2020-08-19 RX ADMIN — BELIMUMAB 676 MG: 400 INJECTION, POWDER, LYOPHILIZED, FOR SOLUTION INTRAVENOUS at 01:08

## 2020-08-19 NOTE — PLAN OF CARE
Patient arrived to unit for Benlysta infusion and labs. Pt reports continued swallowing issues/ acid reflux work up this Friday. Pt denies any new or worsening symptoms of Lupus. Plan of care reviewed, patient agreeable to plan.Labs drawn and sent to lab @ 1130.Premeds of Tylenol and Benadryl given per orders. Benlysta infused over 1 hour. Patient tolerated infusion well.VSS. Discharge instructions reviewed, patient instructed to return next month for next Benlysta infusion. Patient ambulated off unit unassisted by self. Patient in NAD at time of discharge.

## 2020-08-20 ENCOUNTER — PATIENT MESSAGE (OUTPATIENT)
Dept: OTOLARYNGOLOGY | Facility: CLINIC | Age: 50
End: 2020-08-20

## 2020-08-20 LAB
C3 SERPL-MCNC: 86 MG/DL (ref 50–180)
C4 SERPL-MCNC: 27 MG/DL (ref 11–44)

## 2020-08-20 NOTE — TELEPHONE ENCOUNTER
Spoke with Patient and she is no so happy with her Gastro Doctor ordering her Swallow Test at the [facility] [place].  She will call scheduling to have them move her test to the [facility].

## 2020-08-21 ENCOUNTER — HOSPITAL ENCOUNTER (OUTPATIENT)
Dept: RADIOLOGY | Facility: HOSPITAL | Age: 50
Discharge: HOME | End: 2020-08-21
Attending: INTERNAL MEDICINE
Payer: MEDICARE

## 2020-08-21 DIAGNOSIS — Z79.899 ENCOUNTER FOR MONITORING LONG-TERM PROTON PUMP INHIBITOR THERAPY: ICD-10-CM

## 2020-08-21 DIAGNOSIS — R13.19 OTHER DYSPHAGIA: ICD-10-CM

## 2020-08-21 DIAGNOSIS — N18.9 CHRONIC KIDNEY DISEASE, UNSPECIFIED CKD STAGE: ICD-10-CM

## 2020-08-21 DIAGNOSIS — Z94.4 LIVER TRANSPLANT STATUS: ICD-10-CM

## 2020-08-21 DIAGNOSIS — D64.9 ANEMIA, UNSPECIFIED TYPE: ICD-10-CM

## 2020-08-21 DIAGNOSIS — Z51.81 ENCOUNTER FOR MONITORING LONG-TERM PROTON PUMP INHIBITOR THERAPY: ICD-10-CM

## 2020-08-21 LAB
DNA TITER: NORMAL
DSDNA AB SER-ACNC: POSITIVE [IU]/ML

## 2020-08-21 PROCEDURE — 74220 X-RAY XM ESOPHAGUS 1CNTRST: CPT | Mod: TC

## 2020-08-21 PROCEDURE — 74220 X-RAY XM ESOPHAGUS 1CNTRST: CPT | Mod: 26,,, | Performed by: RADIOLOGY

## 2020-08-21 PROCEDURE — 25500020 PHARM REV CODE 255: Performed by: INTERNAL MEDICINE

## 2020-08-21 PROCEDURE — A9698 NON-RAD CONTRAST MATERIALNOC: HCPCS | Performed by: INTERNAL MEDICINE

## 2020-08-21 PROCEDURE — 74220 FL ESOPHAGRAM WITH BARIUM TABLET: ICD-10-PCS | Mod: 26,,, | Performed by: RADIOLOGY

## 2020-08-21 RX ADMIN — BARIUM SULFATE 355 ML: 0.6 SUSPENSION ORAL at 09:08

## 2020-08-21 NOTE — PROCEDURES
"Dear Provider,     You have ordered sleep LAB services to perform the sleep study for Valencia Dumont.  The sleep study that you ordered is complete.      Please find Sleep Study result in "Chart Review" under the "Media tab."      As the ordering provider, you are responsible for reviewing the results and implementing a treatment plan with your patient.    If you need a Sleep Medicine provider to explain the sleep study findings and arrange treatment for the patient, please refer patient for consultation to our Sleep Clinic via [facility] with Ambulatory Consult Sleep.    To do that please place an order for an  "Ambulatory Consult Sleep" - it will go to our clinic work queue for our Medical Assistant to contact the patient for an appointment.     For any questions, please contact our clinic staff at [phone] to talk to clinical staff.   "

## 2020-08-25 ENCOUNTER — TELEPHONE (OUTPATIENT)
Dept: OTOLARYNGOLOGY | Facility: CLINIC | Age: 50
End: 2020-08-25

## 2020-08-25 ENCOUNTER — PATIENT MESSAGE (OUTPATIENT)
Dept: RHEUMATOLOGY | Facility: CLINIC | Age: 50
End: 2020-08-25

## 2020-08-25 ENCOUNTER — PATIENT MESSAGE (OUTPATIENT)
Dept: GASTROENTEROLOGY | Facility: CLINIC | Age: 50
End: 2020-08-25

## 2020-08-25 DIAGNOSIS — Z01.818 PRE-OP TESTING: ICD-10-CM

## 2020-08-25 NOTE — TELEPHONE ENCOUNTER
Called Ms. Dumont,   She said that she is not awaiting them to schedule a return appointment , she is Portal messaging to see whats next.

## 2020-08-25 NOTE — TELEPHONE ENCOUNTER
Released the results with a comment to the patient. Esophagram mostly normal with some reflux. We'll proceed with EGD.

## 2020-08-25 NOTE — PROGRESS NOTES
No swallowing problems found in the upper esophagus. There is some reflux in the lower esophagus. We will proceed with upper endoscopy as planned in about a week if that is not scheduled yet.

## 2020-08-25 NOTE — TELEPHONE ENCOUNTER
Called and relayed results to pt,.  Provided pt with number to contact endo schedulers to schedule EGD.  Pt appreciated the call.

## 2020-08-26 ENCOUNTER — TELEPHONE (OUTPATIENT)
Dept: GASTROENTEROLOGY | Facility: HOSPITAL | Age: 50
End: 2020-08-26

## 2020-08-26 DIAGNOSIS — E55.9 VITAMIN D DEFICIENCY: Primary | ICD-10-CM

## 2020-08-26 RX ORDER — ERGOCALCIFEROL 1.25 MG/1
50000 CAPSULE ORAL
Qty: 12 CAPSULE | Refills: 0 | Status: SHIPPED | OUTPATIENT
Start: 2020-08-26 | End: 2020-11-12

## 2020-08-26 NOTE — TELEPHONE ENCOUNTER
Ordered VitD supplementation 50k weekly for 3 months. Communicated to the patient that she is deficient and she will start supplementation.

## 2020-08-28 NOTE — TELEPHONE ENCOUNTER
Spoke to patient. She is wanting to know if we could help her get in touch with Dr. Ríos office. She would like to find out what does the EGD consist of. I told patient I would send the office a message to see if they would call her about the EGD.

## 2020-09-06 ENCOUNTER — LAB VISIT (OUTPATIENT)
Dept: URGENT CARE | Facility: CLINIC | Age: 50
End: 2020-09-06
Payer: MEDICARE

## 2020-09-06 DIAGNOSIS — Z01.818 PRE-OP TESTING: ICD-10-CM

## 2020-09-06 PROCEDURE — U0003 INFECTIOUS AGENT DETECTION BY NUCLEIC ACID (DNA OR RNA); SEVERE ACUTE RESPIRATORY SYNDROME CORONAVIRUS 2 (SARS-COV-2) (CORONAVIRUS DISEASE [COVID-19]), AMPLIFIED PROBE TECHNIQUE, MAKING USE OF HIGH THROUGHPUT TECHNOLOGIES AS DESCRIBED BY CMS-2020-01-R: HCPCS

## 2020-09-07 DIAGNOSIS — R79.0 LOW MAGNESIUM LEVEL: ICD-10-CM

## 2020-09-07 DIAGNOSIS — Z79.899 ENCOUNTER FOR MONITORING LONG-TERM PROTON PUMP INHIBITOR THERAPY: ICD-10-CM

## 2020-09-07 DIAGNOSIS — E55.9 VITAMIN D INSUFFICIENCY: ICD-10-CM

## 2020-09-07 DIAGNOSIS — Z51.81 ENCOUNTER FOR MONITORING LONG-TERM PROTON PUMP INHIBITOR THERAPY: ICD-10-CM

## 2020-09-07 DIAGNOSIS — D58.2 HEMOGLOBIN C TRAIT: Primary | ICD-10-CM

## 2020-09-07 LAB — SARS-COV-2 RNA RESP QL NAA+PROBE: NOT DETECTED

## 2020-09-07 RX ORDER — ACETAMINOPHEN 500 MG
1 TABLET ORAL DAILY
Qty: 90 CAPSULE | Refills: 3 | Status: SHIPPED | OUTPATIENT
Start: 2020-09-07 | End: 2021-04-13

## 2020-09-07 RX ORDER — ERGOCALCIFEROL 1.25 MG/1
50000 CAPSULE ORAL
Qty: 12 CAPSULE | Refills: 0 | Status: SHIPPED | OUTPATIENT
Start: 2020-09-07 | End: 2020-11-02

## 2020-09-09 ENCOUNTER — HOSPITAL ENCOUNTER (OUTPATIENT)
Facility: HOSPITAL | Age: 50
Discharge: HOME | End: 2020-09-09
Attending: INTERNAL MEDICINE | Admitting: INTERNAL MEDICINE
Payer: MEDICARE

## 2020-09-09 ENCOUNTER — ANESTHESIA (OUTPATIENT)
Dept: ENDOSCOPY | Facility: HOSPITAL | Age: 50
End: 2020-09-09
Payer: MEDICARE

## 2020-09-09 ENCOUNTER — ANESTHESIA EVENT (OUTPATIENT)
Dept: ENDOSCOPY | Facility: HOSPITAL | Age: 50
End: 2020-09-09
Payer: MEDICARE

## 2020-09-09 VITALS
RESPIRATION RATE: 20 BRPM | OXYGEN SATURATION: 99 % | HEART RATE: 70 BPM | BODY MASS INDEX: 20.73 KG/M2 | WEIGHT: 140 LBS | TEMPERATURE: 98 F | HEIGHT: 69 IN | DIASTOLIC BLOOD PRESSURE: 73 MMHG | SYSTOLIC BLOOD PRESSURE: 154 MMHG

## 2020-09-09 DIAGNOSIS — D50.9 IRON DEFICIENCY ANEMIA: ICD-10-CM

## 2020-09-09 LAB — POCT GLUCOSE: 72 MG/DL (ref 70–110)

## 2020-09-09 PROCEDURE — 88341 IMHCHEM/IMCYTCHM EA ADD ANTB: CPT | Mod: 26,,, | Performed by: PATHOLOGY

## 2020-09-09 PROCEDURE — 88312 PR  SPECIAL STAINS,GROUP I: ICD-10-PCS | Mod: 26,,, | Performed by: PATHOLOGY

## 2020-09-09 PROCEDURE — E9220 PRA ENDO ANESTHESIA: ICD-10-PCS | Mod: ,,, | Performed by: STUDENT IN AN ORGANIZED HEALTH CARE EDUCATION/TRAINING PROGRAM

## 2020-09-09 PROCEDURE — 88305 TISSUE EXAM BY PATHOLOGIST: CPT | Performed by: PATHOLOGY

## 2020-09-09 PROCEDURE — E9220 PRA ENDO ANESTHESIA: HCPCS | Mod: ,,, | Performed by: STUDENT IN AN ORGANIZED HEALTH CARE EDUCATION/TRAINING PROGRAM

## 2020-09-09 PROCEDURE — 43239 PR EGD, FLEX, W/BIOPSY, SGL/MULTI: ICD-10-PCS | Mod: GC,,, | Performed by: INTERNAL MEDICINE

## 2020-09-09 PROCEDURE — 37000009 HC ANESTHESIA EA ADD 15 MINS: Performed by: INTERNAL MEDICINE

## 2020-09-09 PROCEDURE — 88312 SPECIAL STAINS GROUP 1: CPT | Performed by: PATHOLOGY

## 2020-09-09 PROCEDURE — 63600175 PHARM REV CODE 636 W HCPCS: Performed by: STUDENT IN AN ORGANIZED HEALTH CARE EDUCATION/TRAINING PROGRAM

## 2020-09-09 PROCEDURE — 43239 EGD BIOPSY SINGLE/MULTIPLE: CPT | Performed by: INTERNAL MEDICINE

## 2020-09-09 PROCEDURE — 88305 TISSUE EXAM BY PATHOLOGIST: ICD-10-PCS | Mod: 26,,, | Performed by: PATHOLOGY

## 2020-09-09 PROCEDURE — 25000003 PHARM REV CODE 250: Performed by: INTERNAL MEDICINE

## 2020-09-09 PROCEDURE — 88342 IMHCHEM/IMCYTCHM 1ST ANTB: CPT | Performed by: PATHOLOGY

## 2020-09-09 PROCEDURE — 88312 SPECIAL STAINS GROUP 1: CPT | Mod: 26,,, | Performed by: PATHOLOGY

## 2020-09-09 PROCEDURE — 88305 TISSUE EXAM BY PATHOLOGIST: CPT | Mod: 26,,, | Performed by: PATHOLOGY

## 2020-09-09 PROCEDURE — 88341 PR IHC OR ICC EACH ADD'L SINGLE ANTIBODY  STAINPR: ICD-10-PCS | Mod: 26,,, | Performed by: PATHOLOGY

## 2020-09-09 PROCEDURE — 43239 EGD BIOPSY SINGLE/MULTIPLE: CPT | Mod: GC,,, | Performed by: INTERNAL MEDICINE

## 2020-09-09 PROCEDURE — 27201012 HC FORCEPS, HOT/COLD, DISP: Performed by: INTERNAL MEDICINE

## 2020-09-09 PROCEDURE — 88341 IMHCHEM/IMCYTCHM EA ADD ANTB: CPT | Performed by: PATHOLOGY

## 2020-09-09 PROCEDURE — 88342 CHG IMMUNOCYTOCHEMISTRY: ICD-10-PCS | Mod: 26,,, | Performed by: PATHOLOGY

## 2020-09-09 PROCEDURE — 88342 IMHCHEM/IMCYTCHM 1ST ANTB: CPT | Mod: 26,,, | Performed by: PATHOLOGY

## 2020-09-09 PROCEDURE — 37000008 HC ANESTHESIA 1ST 15 MINUTES: Performed by: INTERNAL MEDICINE

## 2020-09-09 RX ORDER — PROPOFOL 10 MG/ML
VIAL (ML) INTRAVENOUS CONTINUOUS PRN
Status: DISCONTINUED | OUTPATIENT
Start: 2020-09-09 | End: 2020-09-09

## 2020-09-09 RX ORDER — PROPOFOL 10 MG/ML
VIAL (ML) INTRAVENOUS
Status: DISCONTINUED | OUTPATIENT
Start: 2020-09-09 | End: 2020-09-09

## 2020-09-09 RX ORDER — SODIUM CHLORIDE 9 MG/ML
INJECTION, SOLUTION INTRAVENOUS CONTINUOUS
Status: DISCONTINUED | OUTPATIENT
Start: 2020-09-09 | End: 2020-09-09 | Stop reason: HOSPADM

## 2020-09-09 RX ORDER — LIDOCAINE HCL/PF 100 MG/5ML
SYRINGE (ML) INTRAVENOUS
Status: DISCONTINUED | OUTPATIENT
Start: 2020-09-09 | End: 2020-09-09

## 2020-09-09 RX ADMIN — SODIUM CHLORIDE: 0.9 INJECTION, SOLUTION INTRAVENOUS at 02:09

## 2020-09-09 RX ADMIN — PROPOFOL 30 MG: 10 INJECTION, EMULSION INTRAVENOUS at 03:09

## 2020-09-09 RX ADMIN — Medication 80 MG: at 03:09

## 2020-09-09 RX ADMIN — PROPOFOL 30 MG: 10 INJECTION, EMULSION INTRAVENOUS at 04:09

## 2020-09-09 RX ADMIN — PROPOFOL 150 MCG/KG/MIN: 10 INJECTION, EMULSION INTRAVENOUS at 03:09

## 2020-09-09 RX ADMIN — PROPOFOL 70 MG: 10 INJECTION, EMULSION INTRAVENOUS at 03:09

## 2020-09-09 NOTE — [employer] PATIENT INSTRUCTIONS
Discharge Summary/Instructions after an Endoscopic Procedure  Patient Name: Valencia Dumont  Patient MRN: [id number]  Patient YOB: 1970 Wednesday, September 9, 2020  Davion Ríos MD  RESTRICTIONS:  During your procedure today, you received medications for sedation.  These   medications may affect your judgment, balance and coordination.  Therefore,   for 24 hours, you have the following restrictions:   - DO NOT drive a car, operate machinery, make legal/financial decisions,   sign important papers or drink alcohol.    ACTIVITY:  Today: no heavy lifting, straining or running due to procedural   sedation/anesthesia.  The following day: return to full activity including work.  DIET:  Eat and drink normally unless instructed otherwise.     TREATMENT FOR COMMON SIDE EFFECTS:  - Mild abdominal pain, nausea, belching, bloating or excessive gas:  rest,   eat lightly and use a heating pad.  - Sore Throat: treat with throat lozenges and/or gargle with warm salt   water.  - Because air was used during the procedure, expelling large amounts of air   from your rectum or belching is normal.  - If a bowel prep was taken, you may not have a bowel movement for 1-3 days.    This is normal.  SYMPTOMS TO WATCH FOR AND REPORT TO YOUR PHYSICIAN:  1. Abdominal pain or bloating, other than gas cramps.  2. Chest pain.  3. Back pain.  4. Signs of infection such as: chills or fever occurring within 24 hours   after the procedure.  5. Rectal bleeding, which would show as bright red, maroon, or black stools.   (A tablespoon of blood from the rectum is not serious, especially if   hemorrhoids are present.)  6. Vomiting.  7. Weakness or dizziness.  GO DIRECTLY TO THE NEAREST EMERGENCY ROOM IF YOU HAVE ANY OF THE FOLLOWING:      Difficulty breathing              Chills and/or fever over 101 F   Persistent vomiting and/or vomiting blood   Severe abdominal pain   Severe chest pain   Black, tarry stools   Bleeding- more than one  tablespoon   Any other symptom or condition that you feel may need urgent attention  Your doctor recommends these additional instructions:  If any biopsies were taken, your doctors clinic will contact you in 1 to 2   weeks with any results.  - Discharge patient to home.   - Follow an antireflux regimen.   - Telephone referring physician for pathology results in 2 weeks.   - Return to referring physician.   - The findings and recommendations were discussed with the patient.  For questions, problems or results please call your physician - Davion Ríos MD at Work:  [phone].  [facility], EMERGENCY ROOM PHONE NUMBER: [phone]  IF A COMPLICATION OR EMERGENCY SITUATION ARISES AND YOU ARE UNABLE TO REACH   YOUR PHYSICIAN - GO DIRECTLY TO THE EMERGENCY ROOM.  Davion Ríos MD  9/9/2020 4:15:31 PM  This report has been verified and signed electronically.  [employer]

## 2020-09-09 NOTE — TRANSFER OF CARE
"Anesthesia Transfer of Care Note    Patient: Valencia Dumont    Procedure(s) Performed: Procedure(s) (LRB):  EGD (ESOPHAGOGASTRODUODENOSCOPY) (N/A)    Patient location: PACU    Anesthesia Type: general    Transport from OR: Transported from OR on 2-3 L/min O2 by NC with adequate spontaneous ventilation    Post pain: adequate analgesia    Post assessment: no apparent anesthetic complications    Post vital signs: stable    Level of consciousness: sedated    Nausea/Vomiting: no nausea/vomiting    Complications: none    Transfer of care protocol was followed      Last vitals:   Visit Vitals  BP (!) 154/78 (BP Location: Left arm, Patient Position: Lying)   Pulse 71   Temp 36.7 °C (98 °F) (Temporal)   Resp 16   Ht 5' 9" (1.753 m)   Wt 63.5 kg (140 lb)   LMP 12/20/2016 (Approximate)   SpO2 98%   Breastfeeding No   BMI 20.67 kg/m²     "

## 2020-09-09 NOTE — ANESTHESIA POSTPROCEDURE EVALUATION
Anesthesia Post Evaluation    Patient: Valencia Dumont    Procedure(s) Performed: Procedure(s) (LRB):  EGD (ESOPHAGOGASTRODUODENOSCOPY) (N/A)    Final Anesthesia Type: general    Patient location during evaluation: GI PACU  Patient participation: Yes- Able to Participate  Level of consciousness: awake and alert and oriented  Post-procedure vital signs: reviewed and stable  Pain management: adequate  Airway patency: patent    PONV status at discharge: No PONV  Anesthetic complications: no      Cardiovascular status: blood pressure returned to baseline  Respiratory status: unassisted, spontaneous ventilation and room air  Hydration status: euvolemic  Follow-up not needed.          Vitals Value Taken Time   /73 09/09/20 1648   Temp 36.5 °C (97.7 °F) 09/09/20 1618   Pulse 70 09/09/20 1648   Resp 20 09/09/20 1648   SpO2 99 % 09/09/20 1648         Event Time   Out of Recovery 16:54:07         Pain/Parker Score: Parker Score: 10 (9/9/2020  4:48 PM)

## 2020-09-09 NOTE — ANESTHESIA PREPROCEDURE EVALUATION
09/09/2020  Valencia Dumont is a 50 y.o., female.  Patient Active Problem List   Diagnosis    Microcytic normochromic anemia    SLE (systemic lupus erythematosus)    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    Prophylactic immunotherapy (transplant immunosuppression)    Adverse effect of glucocorticoid or synthetic analogue    Hypoglycemia associated with diabetes    Swelling of left knee joint    Osteoporosis    Abnormal CT scan, colon    Fatigue    Diabetic polyneuropathy associated with type 2 diabetes mellitus    Essential hypertension    Type 2 diabetes mellitus with stage 3 chronic kidney disease, with long-term current use of insulin    Vitamin D deficiency    CKD stage G3a/A1, GFR 45-59 and albumin creatinine ratio <30 mg/g    Chronic kidney disease-mineral and bone disorder    CKD (chronic kidney disease), stage III    Immunosuppression    Refractive error    Vitreous floaters of both eyes    Long-term use of Plaquenil    Hoarse    Encounter for aftercare following liver transplant    Obstructive sleep apnea syndrome    Snoring    Iron deficiency anemia     Past Medical History:   Diagnosis Date    Abnormal Pap smear of cervix     Anemia     Arthritis     Ascites     Asthma     Chronic back pain     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    Hypertension     Kidney stone     Lupus     Osteoporosis 12/2013    Prophylactic immunotherapy (transplant immunosuppression) 1/2/2014    SBP (spontaneous bacterial peritonitis)     history of      Past Surgical History:   Procedure Laterality Date    CHOLECYSTECTOMY      COLONOSCOPY N/A 5/31/2017    Procedure: COLONOSCOPY;  Surgeon: Marky Sun MD;  [facility]  ENDO (4TH FLR);  Service: Endoscopy;  Laterality: N/A;  PM prep.    ESOPHAGOGASTRODUODENOSCOPY      ESOPHAGOGASTRODUODENOSCOPY N/A 1/16/2019    Procedure: EGD (ESOPHAGOGASTRODUODENOSCOPY);  Surgeon: Deniz Guerra MD;  Location: [facility] (4TH FLR);  Service: Endoscopy;  Laterality: N/A;  labs prior, s/p liver transplant-MS    LIVER BIOPSY      LIVER TRANSPLANT  12/31/2013    REFRACTIVE SURGERY Bilateral 2010    TUBAL LIGATION  2003    UPPER GASTROINTESTINAL ENDOSCOPY           Anesthesia Evaluation    I have reviewed the Patient Summary Reports.   I have reviewed the NPO Status.   I have reviewed the Medications.     Review of Systems  Anesthesia Hx:  No problems with previous Anesthesia    Cardiovascular:   Hypertension    Pulmonary:   Asthma Sleep Apnea    Renal/:   Chronic Renal Disease    Hepatic/GI:   GERD Liver Disease,    Neurological:   Neuromuscular Disease,    Endocrine:   Diabetes        Physical Exam   Airway/Jaw/Neck:  Airway Findings: Mouth Opening: Normal Tongue: Normal  General Airway Assessment: Adult  Mallampati: II  TM Distance: Normal, at least 6 cm      Dental:  Dental Findings: In tact, Upper braces, Lower braces             Anesthesia Plan  Type of Anesthesia, risks & benefits discussed:  Anesthesia Type:  general  Patient's Preference:   Intra-op Monitoring Plan:   Intra-op Monitoring Plan Comments:   Post Op Pain Control Plan:   Post Op Pain Control Plan Comments:   Induction:   IV  Beta Blocker:  Patient is not currently on a Beta-Blocker (No further documentation required).       Informed Consent: Patient understands risks and agrees with Anesthesia plan.  Questions answered. Anesthesia consent signed with patient.  ASA Score: 3     Day of Surgery Review of History & Physical: I have interviewed and examined the patient. I have reviewed the patient's H&P dated:  There are no significant changes.          Ready For Surgery From Anesthesia Perspective.

## 2020-09-09 NOTE — H&P
Short Stay Endoscopy History and Physical    PCP - Timur Lion MD    Procedure - EGD  ASA - per anesthesia  Mallampati - per anesthesia  History of Anesthesia problems - no  Family history Anesthesia problems -  no   Plan of anesthesia - General    HPI:  This is a 50 y.o. female here for evaluation of : dysphagia    ROS:  Constitutional: No fevers, chills  CV: No chest pain  Pulm: No shortness of breath  GI: see HPI  Derm: No rash    Medical History:  has a past medical history of Abnormal Pap smear of cervix, Anemia, Arthritis, Ascites, Asthma, Chronic back pain, Cirrhosis of liver without mention of alcohol (10/18/2013), Diabetes mellitus, Esophageal varices, GERD (gastroesophageal reflux disease), Herpes simplex virus (HSV) infection, Hypertension, Kidney stone, Lupus, Osteoporosis (12/2013), Prophylactic immunotherapy (transplant immunosuppression) (1/2/2014), and SBP (spontaneous bacterial peritonitis).    Surgical History:  has a past surgical history that includes Esophagogastroduodenoscopy; Liver biopsy; Liver transplant (12/31/2013); Tubal ligation (2003); Colonoscopy (N/A, 5/31/2017); Refractive surgery (Bilateral, 2010); Upper gastrointestinal endoscopy; Esophagogastroduodenoscopy (N/A, 1/16/2019); and Cholecystectomy.    Family History: family history includes Alzheimer's disease in her father; Bone cancer in her maternal grandfather; Breast cancer (age of onset: 55) in her maternal aunt; Cataracts in her mother; Diabetes in her brother, father, paternal grandfather, and paternal grandmother; Hypertension in her brother and mother; No Known Problems in her maternal grandmother, maternal uncle, paternal aunt, paternal uncle, and sister.. Otherwise no colon cancer, inflammatory bowel disease, or GI malignancies.    Social History:  reports that she has never smoked. She has never used smokeless tobacco. She reports current alcohol use of about 1.0 standard drinks of alcohol per week. She  reports that she does not use drugs.    Review of patient's allergies indicates:   Allergen Reactions    Norvasc [amlodipine]      Severe headache    Doxycycline Rash    Pcn [penicillins] Rash       Medications:   Medications Prior to Admission   Medication Sig Dispense Refill Last Dose    blood-glucose meter kit To check BG 5 times daily, to use with insurance preferred meter 1 each 0 Past Week at Unknown time    blood-glucose meter,continuous (DEXCOM G6 ) Misc 1 each by Misc.(Non-Drug; Combo Route) route once. for 1 dose 1 each 0 Past Week at Unknown time    blood-glucose transmitter (DEXCOM G6 TRANSMITTER) Karen 1 each by Misc.(Non-Drug; Combo Route) route once a week 1 Device 3 9/9/2020 at Unknown time    buPROPion (WELLBUTRIN XL) 150 MG TB24 tablet Take 300 mg by mouth.   9/9/2020 at Unknown time    cholecalciferol, vitamin D3, (VITAMIN D3) 50 mcg (2,000 unit) Cap Take 1 capsule (2,000 Units total) by mouth once daily. 90 capsule 3 9/9/2020 at Unknown time    ciclopirox (PENLAC) 8 % Soln Apply topically nightly. 1 Bottle 3 Past Month at Unknown time    diazepam (VALIUM) 5 MG tablet Take 5 mg by mouth 3 (three) times daily as needed.   0 Past Month at Unknown time    diclofenac sodium (VOLTAREN) 1 % Gel Voltaren 1 % topical gel   APPLY 2 GRAM TO THE AFFECTED AREA(S) BY TOPICAL ROUTE 4 TIMES PER DAY   Past Month at Unknown time    DILTIAZEM HCL (DILTIAZEM 2% CREAM) Apply topically 3 (three) times daily. Apply topically to anal area. 30 g 0 Past Month at Unknown time    ergocalciferol (ERGOCALCIFEROL) 50,000 unit Cap Take 1 capsule (50,000 Units total) by mouth every 7 days. for 12 doses 12 capsule 0 9/9/2020 at Unknown time    ergocalciferol (ERGOCALCIFEROL) 50,000 unit Cap Take 1 capsule (50,000 Units total) by mouth every 7 days. for 12 doses 12 capsule 0 Past Month at Unknown time    erythromycin with ethanol (EMGEL) 2 % gel ADD 30GM (1 TUBE) TO THE SOAKING DEVICE AND ALLOW WATER TO  AGITATE. PLACE AFFECTED AREAS INTO WATER AND SOAK FOR 10 MINUTES 1-2 TIMES DAILY  1 Past Month at Unknown time    estradiol (YUVAFEM) 10 mcg Tab Place 1 tablet (10 mcg total) vaginally twice a week. 8 tablet 11 Past Week at Unknown time    famotidine (PEPCID) 40 MG tablet    9/8/2020 at Unknown time    gentamicin (GARAMYCIN) 0.1 % cream ADD 30GM (1 TUBE) TO THE SOAKING DEVICE AND ALLOW WATER TO AGITATE. PLACE AFFECTED AREAS INTO WATER AND SOAK FOR 10 MINUTES 1-2 TIMES DAILY  1 Past Month at Unknown time    hydrOXYchloroQUINE (PLAQUENIL) 200 mg tablet TAKE ONE TABLET BY MOUTH TWICE DAILY 60 tablet 2 9/9/2020 at Unknown time    hydrOXYzine HCl (ATARAX) 10 MG Tab TAKE 1 OR 2 TABLETS BY MOUTH THREE TIMES DAILY AS NEEDED FOR ITCHING.  0 Past Week at Unknown time    insulin aspart U-100 (NOVOLOG FLEXPEN U-100 INSULIN) 100 unit/mL (3 mL) InPn pen Takes three units before breakfast and lunch  and 5 units before dinner with sliding scale, up to 25 units daily 45 mL 3 9/9/2020 at Unknown time    insulin detemir U-100 (LEVEMIR FLEXTOUCH U-100 INSULN) 100 unit/mL (3 mL) SubQ InPn pen Inject 4 Units into the skin 2 (two) times daily. 45 mL 3 9/9/2020 at Unknown time    isosorbide mononitrate (IMDUR) 30 MG 24 hr tablet Take 30 mg by mouth once daily.   9/9/2020 at Unknown time    ketoconazole (NIZORAL) 2 % cream ADD 30GM (1/2 TUBE) TO THE SOAKING DEVICE AND ALLOW WATER TO AGITATE. PLACE AFFECTED AREAS INTO WATER AND SOAK FOR 10 MINUTES 1-2 TIMES BRENDON  1 Past Month at Unknown time    Lactobac. rhamnosus GG-inulin 10 billion cell -200 mg Cap Take 1 capsule by mouth 2 (two) times daily. 30 capsule 0 Past Month at Unknown time    levocetirizine (XYZAL) 5 MG tablet Take 5 mg by mouth every evening.  3 Past Month at Unknown time    losartan (COZAAR) 100 MG tablet Take 1 tablet (100 mg total) by mouth once daily. 30 tablet 2 9/9/2020 at Unknown time    magnesium oxide 500 mg Tab Take 500 mg by mouth 2 (two) times daily. 60  each 6 9/9/2020 at Unknown time    meclizine (ANTIVERT) 25 mg tablet TAKE ONE TABLET BY MOUTH AT BEDTIME AS NEEDED for dizziness.  0 9/9/2020 at Unknown time    meloxicam (MOBIC) 15 MG tablet    9/9/2020 at Unknown time    MULTIVIT,THER IRON,CA,FA & MIN (MULTIVITAMIN) Tab Take 1 tablet by mouth once daily. 30 tablet 0 9/9/2020 at Unknown time    mycophenolate (MYFORTIC) 180 MG TbEC TAKE TWO TABLETS BY MOUTH TWICE DAILY. 120 tablet 5 9/9/2020 at Unknown time    nystatin (MYCOSTATIN) 100,000 unit/mL suspension SWISH AND SPIT FIVE MILLILITERS BY MOUTH FOUR TIMES DAILY FOR 7 DAYS.  1 Past Week at Unknown time    nystatin (MYCOSTATIN) cream Apply topically 2 times daily 30 g 0 Past Week at Unknown time    oxycodone-acetaminophen (PERCOCET) 7.5-325 mg per tablet Take 1 tablet by mouth every 4 (four) hours as needed for Pain.   Past Week at Unknown time    pantoprazole (PROTONIX) 40 MG tablet Take 1 tablet (40 mg total) by mouth 2 (two) times daily. Take immediately in am when wake up and then 30 minutes prior to dinner 60 tablet 11 9/9/2020 at Unknown time    paroxetine (PAXIL) 10 MG tablet paroxetine 10 mg tablet   9/8/2020 at Unknown time    polyethylene glycol (GLYCOLAX) 17 gram/dose powder Mix 1 capful (17 g) with liquid and by mouth 2 (two) times daily. 1530 g 11 Past Month at Unknown time    PROAIR HFA 90 mcg/actuation inhaler Inhale 2 puffs into the lungs 3 (three) times daily as needed.   3 Past Month at Unknown time    rosuvastatin (CRESTOR) 5 MG tablet Take 5 mg by mouth once daily.   9/8/2020 at Unknown time    sertraline (ZOLOFT) 50 MG tablet Take 50 mg by mouth once daily.  11 Past Week at Unknown time    tacrolimus (PROGRAF) 1 MG Cap Take 3 capsules (3 mg total) by mouth every 12 (twelve) hours. 180 capsule 11 9/9/2020 at Unknown time    verapamil (CALAN-SR) 120 MG CR tablet TAKE ONE TABLET ONCE DAILY FOR BLOOD PRESSURE  3 9/9/2020 at Unknown time    zolpidem (AMBIEN) 10 mg Tab Take 10 mg by  mouth nightly as needed.  3 9/8/2020 at Unknown time    azelastine (ASTELIN) 137 mcg (0.1 %) nasal spray use ONE spray in each nostril TWICE DAILY 30 mL 3 More than a month at Unknown time    fluconazole (DIFLUCAN) 150 MG Tab TAKE ONE TABLET BY MOUTH once for ONE dose  0 More than a month at Unknown time    furosemide (LASIX) 40 MG tablet furosemide 40 mg tablet   More than a month at Unknown time    gabapentin (NEURONTIN) 300 MG capsule TAKE 1 CAPSULE BY MOUTH THREE TIMES DAILY       levoFLOXacin (LEVAQUIN) 750 MG tablet levofloxacin 750 mg tablet   More than a month at Unknown time    neomycin-polymyxin-hydrocortisone (CORTISPORIN) otic solution INSTILL TWO DROPS INTO BOTH EARS FOUR TIMES DAILY FOR 10 DAYS  0     nystatin-triamcinolone (MYCOLOG II) cream Apply to affected area 2 times daily 30 g 1     promethazine-dextromethorphan (PROMETHAZINE-DM) 6.25-15 mg/5 mL Syrp Take by mouth 2 (two) times daily as needed.   0 More than a month at Unknown time    SSD 1 % cream 1 application 2 (two) times daily. Apply to affected area  0 More than a month at Unknown time    traZODone (DESYREL) 50 MG tablet Take 50 mg by mouth nightly.  0 More than a month at Unknown time    triamcinolone acetonide 0.1% (KENALOG) 0.1 % cream Apply topically 2 times daily 30 g 0 More than a month at Unknown time    valACYclovir (VALTREX) 500 MG tablet TAKE ONE TABLET BY MOUTH TWICE DAILY FOR 3 DAYS. 6 tablet 2 More than a month at Unknown time         Physical Exam:    Vital Signs:   Vitals:    09/09/20 1353   BP: (!) 176/78   Pulse: 71   Resp: 18   Temp: 98 °F (36.7 °C)       General Appearance: Well appearing in no acute distress  Eyes:    No scleral icterus  ENT: lips and tongue normal  Lungs: no use of accessory muscles  Heart:  normal rate, regular rhythm  Abdomen: Soft, non distended   Extremities: no edema  Skin: No rash      Labs:  Lab Results   Component Value Date    WBC 5.25 08/13/2020    HGB 11.1 (L) 08/13/2020    HCT  33.1 (L) 08/13/2020     08/13/2020    CHOL 180 08/19/2019    TRIG 43 08/19/2019     (H) 08/19/2019    ALT 12 08/19/2020    AST 15 08/19/2020     08/19/2020    K 4.0 08/19/2020     08/19/2020    CREATININE 1.4 08/19/2020    BUN 27 (H) 08/19/2020    CO2 28 08/19/2020    TSH 0.941 08/19/2019    INR 0.9 07/22/2020    HGBA1C 7.2 (H) 05/13/2020       I have explained the risks and benefits of endoscopy procedures to the patient including but not limited to bleeding, perforation, infection, and death.  The patient was asked if they understand and allowed to ask any further questions to their satisfaction.    Toshia Darnell MD

## 2020-09-10 ENCOUNTER — TELEPHONE (OUTPATIENT)
Dept: GASTROENTEROLOGY | Facility: CLINIC | Age: 50
End: 2020-09-10

## 2020-09-10 NOTE — TELEPHONE ENCOUNTER
Yes I know she is on it. I'm not prescribing it though and I'm not prescribing anything that interacts with it. Maybe they were trying to get a hold of her PCP.

## 2020-09-10 NOTE — TELEPHONE ENCOUNTER
Spoke with Brittany from [facility] [employer] and she just wanted to verify Dr. Darnell knows pt is taking CRESTOR medication and if this Is ok. Please advise.

## 2020-09-10 NOTE — TELEPHONE ENCOUNTER
----- Message from Aleida Garcia sent at 9/10/2020 10:19 AM CDT -----  Regarding: FW: [facility]  Contact: Brittany with [facility]    ----- Message -----  From: Angie Franco  Sent: 9/10/2020   9:14 AM CDT  To: [facility]INE[facility] Staff  Subject: [facility]                                  Called to speak to one of the nurses about the PT's rosuvastatin (CRESTOR) 5 MG tablet prescription. Please call back     Callback: [phone]

## 2020-09-16 ENCOUNTER — INFUSION (OUTPATIENT)
Dept: INFUSION THERAPY | Facility: HOSPITAL | Age: 50
End: 2020-09-16
Attending: NURSE PRACTITIONER
Payer: MEDICARE

## 2020-09-16 VITALS
TEMPERATURE: 98 F | BODY MASS INDEX: 21.68 KG/M2 | OXYGEN SATURATION: 97 % | RESPIRATION RATE: 17 BRPM | HEART RATE: 90 BPM | SYSTOLIC BLOOD PRESSURE: 138 MMHG | WEIGHT: 146.81 LBS | DIASTOLIC BLOOD PRESSURE: 70 MMHG

## 2020-09-16 DIAGNOSIS — M81.0 OSTEOPOROSIS: Primary | ICD-10-CM

## 2020-09-16 DIAGNOSIS — M32.9 SYSTEMIC LUPUS ERYTHEMATOSUS, UNSPECIFIED SLE TYPE, UNSPECIFIED ORGAN INVOLVEMENT STATUS: ICD-10-CM

## 2020-09-16 PROCEDURE — 96365 THER/PROPH/DIAG IV INF INIT: CPT

## 2020-09-16 PROCEDURE — 96375 TX/PRO/DX INJ NEW DRUG ADDON: CPT

## 2020-09-16 PROCEDURE — 96413 CHEMO IV INFUSION 1 HR: CPT

## 2020-09-16 PROCEDURE — 25000003 PHARM REV CODE 250: Performed by: INTERNAL MEDICINE

## 2020-09-16 PROCEDURE — 63600175 PHARM REV CODE 636 W HCPCS: Mod: JG | Performed by: INTERNAL MEDICINE

## 2020-09-16 RX ORDER — DIPHENHYDRAMINE HYDROCHLORIDE 50 MG/ML
25 INJECTION INTRAMUSCULAR; INTRAVENOUS
Status: COMPLETED | OUTPATIENT
Start: 2020-09-16 | End: 2020-09-16

## 2020-09-16 RX ORDER — HEPARIN 100 UNIT/ML
500 SYRINGE INTRAVENOUS
Status: CANCELLED | OUTPATIENT
Start: 2020-09-30

## 2020-09-16 RX ORDER — DIPHENHYDRAMINE HYDROCHLORIDE 50 MG/ML
25 INJECTION INTRAMUSCULAR; INTRAVENOUS
Status: CANCELLED | OUTPATIENT
Start: 2020-09-30

## 2020-09-16 RX ORDER — ACETAMINOPHEN 325 MG/1
650 TABLET ORAL
Status: CANCELLED | OUTPATIENT
Start: 2020-09-30

## 2020-09-16 RX ORDER — SODIUM CHLORIDE 0.9 % (FLUSH) 0.9 %
10 SYRINGE (ML) INJECTION
Status: CANCELLED | OUTPATIENT
Start: 2020-09-30

## 2020-09-16 RX ORDER — ACETAMINOPHEN 325 MG/1
650 TABLET ORAL
Status: COMPLETED | OUTPATIENT
Start: 2020-09-16 | End: 2020-09-16

## 2020-09-16 RX ADMIN — ACETAMINOPHEN 650 MG: 325 TABLET ORAL at 11:09

## 2020-09-16 RX ADMIN — BELIMUMAB 666 MG: 400 INJECTION, POWDER, LYOPHILIZED, FOR SOLUTION INTRAVENOUS at 11:09

## 2020-09-16 RX ADMIN — DIPHENHYDRAMINE HYDROCHLORIDE 25 MG: 50 INJECTION INTRAMUSCULAR; INTRAVENOUS at 11:09

## 2020-09-16 NOTE — PLAN OF CARE
Patient arrived to unit for Benlysta infusion. Plan of care reviewed, patient agreeable to plan. Patient tolerated infusion well. VSS. Discharge instructions reviewed, patient instructed to return 10/14/20. Patient ambulated unassisted off unit. Patient in NAD at time of discharge.

## 2020-09-18 ENCOUNTER — DOCUMENTATION ONLY (OUTPATIENT)
Dept: REHABILITATION | Facility: HOSPITAL | Age: 50
End: 2020-09-18

## 2020-09-18 DIAGNOSIS — R49.0 HOARSE: Primary | ICD-10-CM

## 2020-09-18 NOTE — PROGRESS NOTES
Outpatient Therapy Discharge Summary   Discharge Date: 2020   Name: Valencia Dumont  Clinic Number: [id number]  Therapy Diagnosis:   Encounter Diagnosis   Name Primary?    Hoarse Yes     Physician: No ref. provider found  Physician Orders: [id number] - Ambulatory referral/consult to Speech Therapy  Medical Diagnosis: R49.0 (ICD-10-CM) - Hoarse  Evaluation Date: 2020    Date of Last visit: 07  Total Visits Received: 3  Cancelled Visits: 1  No Show Visits: 2    Assessment    Assessment of Current Status: Patient has not attended speech therapy follow up visits since 2020. Her POC has .     Goals:     Short Term Goals: (4 weeks)     1. Pt will complete neck relaxation exercises 10x each per session/at home ind'ly.     Goal Not Met / Discontinue     2. Pt will reduce/eliminate/substitute throat clearing ind'ly.    Goal Not Met / Discontinue       3. Pt will recall and utilize vocal hygiene strategies with min A.      METx2 Goal Met / Discontinue       4. Patient will complete SOVT exercises and/or resonant-focused exercises 3-5x daily to strengthen and balance the intrinsic laryngeal musculature and maximize glottic closure without medial hyperfunction.   Goal Not Met / Discontinue     5. Patient will improve the quality, efficiency, and ease of phonation through resonant and/or airflow exercises from the syllable to conversation level with 80% accuracy.   Goal Not Met / Discontinue     6. .Pt will demonstrate diagphragmatic breathing for 1 minute independently    Goal Met / Discontinue       7. Pt will use diaphragmatic breathing to increase breath support for speech in single words, short phrases, sentences, and conversation on 8/10 trials given min cues   Goal Not Met / Discontinue       9. Pt will participate in clinical swallow examination to determine need for objective swallow assessment  Goal Met / Discontinue          10. Pt will participate in Modified Barium Swallow Study to objectively  assess her  swallow, rule out silent aspiration, and determine the safest possible diet.    Goal Met / Discontinue           Long Term Goals:  1. She will demonstrate improved vocal quality and intonation at the syllable, word, phrase, sentence and conversation level to communicate basic medical and social needs in the functional living environment. Goal Not Met / Disontinue  2. She  will maintain adequate hydration/nutrition with optimum safety and efficiency of swallowing function on PO intake without overt signs and symptoms of aspiration for regular/thin diet level. Goal Not Met / Discontinue   3. She  will utilize compensatory strategies with optimum safety and efficiency of swallowing function on PO intake without overt signs and symptoms of aspiration for regular/thin diet level. Goal Not Met / Discontinue       [facility] NOMS (National Outcome Measure System):  Voice  LEVEL 5: Voice occasionally sounds normal with self-monitoring, but there is some situational variation. The individual¢s ability to participate in vocational, avocational, and social activities requiring voice is rarely affected in low-vocal demand activities, but is occasionally affected in high-vocal demand activities      NOMS Swallowing  LEVEL 5: Swallowing is safe with minimal diet restriction and/or occasionally requires minimal cueing to use compensatory strategies. The individual may occasionally self-cue. All nutrition and hydration needs are met by mouth at mealtime    Discharge reason: Patient has not attended therapy since 7/20/2020    Plan   This patient is discharged from Speech Therapy    ANTONETTE Jones, CCC-SLP  Speech Language Pathologist   9/18/2020

## 2020-09-21 ENCOUNTER — OFFICE VISIT (OUTPATIENT)
Dept: NEUROLOGY | Facility: CLINIC | Age: 50
End: 2020-09-21
Payer: MEDICARE

## 2020-09-21 VITALS
SYSTOLIC BLOOD PRESSURE: 148 MMHG | DIASTOLIC BLOOD PRESSURE: 78 MMHG | HEIGHT: 69 IN | HEART RATE: 84 BPM | WEIGHT: 144.38 LBS | BODY MASS INDEX: 21.38 KG/M2

## 2020-09-21 DIAGNOSIS — R42 DIZZINESS AND GIDDINESS: ICD-10-CM

## 2020-09-21 DIAGNOSIS — H81.4 CENTRAL VESTIBULAR VERTIGO: Primary | ICD-10-CM

## 2020-09-21 PROCEDURE — 3008F BODY MASS INDEX DOCD: CPT | Mod: CPTII,S$GLB,, | Performed by: NEUROLOGICAL SURGERY

## 2020-09-21 PROCEDURE — 99204 PR OFFICE/OUTPT VISIT, NEW, LEVL IV, 45-59 MIN: ICD-10-PCS | Mod: S$GLB,,, | Performed by: NEUROLOGICAL SURGERY

## 2020-09-21 PROCEDURE — 99999 PR PBB SHADOW E&M-EST. PATIENT-LVL V: ICD-10-PCS | Mod: PBBFAC,,, | Performed by: NEUROLOGICAL SURGERY

## 2020-09-21 PROCEDURE — 3077F PR MOST RECENT SYSTOLIC BLOOD PRESSURE >= 140 MM HG: ICD-10-PCS | Mod: CPTII,S$GLB,, | Performed by: NEUROLOGICAL SURGERY

## 2020-09-21 PROCEDURE — 3078F PR MOST RECENT DIASTOLIC BLOOD PRESSURE < 80 MM HG: ICD-10-PCS | Mod: CPTII,S$GLB,, | Performed by: NEUROLOGICAL SURGERY

## 2020-09-21 PROCEDURE — 99204 OFFICE O/P NEW MOD 45 MIN: CPT | Mod: S$GLB,,, | Performed by: NEUROLOGICAL SURGERY

## 2020-09-21 PROCEDURE — 3077F SYST BP >= 140 MM HG: CPT | Mod: CPTII,S$GLB,, | Performed by: NEUROLOGICAL SURGERY

## 2020-09-21 PROCEDURE — 3008F PR BODY MASS INDEX (BMI) DOCUMENTED: ICD-10-PCS | Mod: CPTII,S$GLB,, | Performed by: NEUROLOGICAL SURGERY

## 2020-09-21 PROCEDURE — 3078F DIAST BP <80 MM HG: CPT | Mod: CPTII,S$GLB,, | Performed by: NEUROLOGICAL SURGERY

## 2020-09-21 PROCEDURE — 99999 PR PBB SHADOW E&M-EST. PATIENT-LVL V: CPT | Mod: PBBFAC,,, | Performed by: NEUROLOGICAL SURGERY

## 2020-09-22 ENCOUNTER — TELEPHONE (OUTPATIENT)
Dept: GASTROENTEROLOGY | Facility: CLINIC | Age: 50
End: 2020-09-22

## 2020-09-22 ENCOUNTER — PATIENT MESSAGE (OUTPATIENT)
Dept: OTOLARYNGOLOGY | Facility: CLINIC | Age: 50
End: 2020-09-22

## 2020-09-22 ENCOUNTER — PATIENT MESSAGE (OUTPATIENT)
Dept: GASTROENTEROLOGY | Facility: CLINIC | Age: 50
End: 2020-09-22

## 2020-09-22 LAB
FINAL PATHOLOGIC DIAGNOSIS: NORMAL
GROSS: NORMAL
Lab: NORMAL

## 2020-09-22 NOTE — TELEPHONE ENCOUNTER
----- Message from Dorothy Chavarria sent at 9/21/2020  4:12 PM CDT -----  Contact: pt  Pt state she was told to give Dr. Darnell a call re procedure           Please contact pt [phone]

## 2020-09-22 NOTE — PROGRESS NOTES
Released results to the patient. Mostly normal biopsy with some inflammation and changes consistent with reflux.

## 2020-09-22 NOTE — TELEPHONE ENCOUNTER
Spoke with pt and pt wanted to know about her results from her procedure. Have you reviewed them yet? Please advise.

## 2020-09-23 ENCOUNTER — TELEPHONE (OUTPATIENT)
Dept: GASTROENTEROLOGY | Facility: CLINIC | Age: 50
End: 2020-09-23

## 2020-09-23 NOTE — TELEPHONE ENCOUNTER
----- Message from Davion Ríos MD sent at 9/7/2020  5:06 PM CDT -----  VERY IMPORTANT      Aleida please tell by Valencia to follow-up her low vitamin-D with her primary care doctor    Aleida - Please tell patient that their Vitamin D level is low and recommend that they take Vitamin D 50,000 units once a week for 12 weeks and then start Vitamin D3 (2,000 units) over-the-counter once daily.     Aleida- please tell patient that they are iron deficient and anemic and recommend that they take ferrous sulfate one 325mg pill every 12 hours for next 2  months.    Please order repeat fasting Hemoglobin, Iron/TIBC, and Ferritin, magnesium, in 8 weeks - Orders placed.

## 2020-09-23 NOTE — TELEPHONE ENCOUNTER
Called and spoke to pt.  Relayed results to pt.  Pt says she follows up with endocrine for vitamin d levels.  Pt scheduled for labs.  Pt says she is still having trouble swallowing and asked what test Dr. Darnell will be ordering. Pt says after speaking with Dyana Ríos, a test was suppose to be scheduled by Dr. Darnell.  Informed pt message will be sent, pt appreciated the call.

## 2020-09-24 ENCOUNTER — OFFICE VISIT (OUTPATIENT)
Dept: PODIATRY | Facility: CLINIC | Age: 50
End: 2020-09-24
Payer: MEDICARE

## 2020-09-24 ENCOUNTER — PATIENT MESSAGE (OUTPATIENT)
Dept: PODIATRY | Facility: CLINIC | Age: 50
End: 2020-09-24

## 2020-09-24 ENCOUNTER — PATIENT MESSAGE (OUTPATIENT)
Dept: GASTROENTEROLOGY | Facility: CLINIC | Age: 50
End: 2020-09-24

## 2020-09-24 ENCOUNTER — TELEPHONE (OUTPATIENT)
Dept: OTOLARYNGOLOGY | Facility: CLINIC | Age: 50
End: 2020-09-24

## 2020-09-24 VITALS
SYSTOLIC BLOOD PRESSURE: 160 MMHG | HEIGHT: 69 IN | HEART RATE: 75 BPM | WEIGHT: 144.38 LBS | BODY MASS INDEX: 21.38 KG/M2 | DIASTOLIC BLOOD PRESSURE: 90 MMHG

## 2020-09-24 DIAGNOSIS — M20.42 HAMMER TOES OF BOTH FEET: ICD-10-CM

## 2020-09-24 DIAGNOSIS — M20.5X2 HALLUX LIMITUS, ACQUIRED, LEFT: ICD-10-CM

## 2020-09-24 DIAGNOSIS — M79.672 FOOT PAIN, BILATERAL: ICD-10-CM

## 2020-09-24 DIAGNOSIS — M20.5X1 HALLUX LIMITUS, ACQUIRED, RIGHT: ICD-10-CM

## 2020-09-24 DIAGNOSIS — E11.49 TYPE II DIABETES MELLITUS WITH NEUROLOGICAL MANIFESTATIONS: Primary | ICD-10-CM

## 2020-09-24 DIAGNOSIS — M20.41 HAMMER TOES OF BOTH FEET: ICD-10-CM

## 2020-09-24 DIAGNOSIS — K21.9 GASTROESOPHAGEAL REFLUX DISEASE, ESOPHAGITIS PRESENCE NOT SPECIFIED: Primary | ICD-10-CM

## 2020-09-24 DIAGNOSIS — M79.671 FOOT PAIN, BILATERAL: ICD-10-CM

## 2020-09-24 DIAGNOSIS — R26.9 GAIT ABNORMALITY: ICD-10-CM

## 2020-09-24 DIAGNOSIS — M72.2 PLANTAR FASCIITIS: ICD-10-CM

## 2020-09-24 PROCEDURE — 99214 OFFICE O/P EST MOD 30 MIN: CPT | Mod: S$GLB,,, | Performed by: PODIATRIST

## 2020-09-24 PROCEDURE — 99999 PR PBB SHADOW E&M-EST. PATIENT-LVL V: CPT | Mod: PBBFAC,,, | Performed by: PODIATRIST

## 2020-09-24 PROCEDURE — 3077F PR MOST RECENT SYSTOLIC BLOOD PRESSURE >= 140 MM HG: ICD-10-PCS | Mod: CPTII,S$GLB,, | Performed by: PODIATRIST

## 2020-09-24 PROCEDURE — 3008F BODY MASS INDEX DOCD: CPT | Mod: CPTII,S$GLB,, | Performed by: PODIATRIST

## 2020-09-24 PROCEDURE — 3080F PR MOST RECENT DIASTOLIC BLOOD PRESSURE >= 90 MM HG: ICD-10-PCS | Mod: CPTII,S$GLB,, | Performed by: PODIATRIST

## 2020-09-24 PROCEDURE — 3051F HG A1C>EQUAL 7.0%<8.0%: CPT | Mod: CPTII,S$GLB,, | Performed by: PODIATRIST

## 2020-09-24 PROCEDURE — 3080F DIAST BP >= 90 MM HG: CPT | Mod: CPTII,S$GLB,, | Performed by: PODIATRIST

## 2020-09-24 PROCEDURE — 3077F SYST BP >= 140 MM HG: CPT | Mod: CPTII,S$GLB,, | Performed by: PODIATRIST

## 2020-09-24 PROCEDURE — 99214 PR OFFICE/OUTPT VISIT, EST, LEVL IV, 30-39 MIN: ICD-10-PCS | Mod: S$GLB,,, | Performed by: PODIATRIST

## 2020-09-24 PROCEDURE — 3008F PR BODY MASS INDEX (BMI) DOCUMENTED: ICD-10-PCS | Mod: CPTII,S$GLB,, | Performed by: PODIATRIST

## 2020-09-24 PROCEDURE — 3051F PR MOST RECENT HEMOGLOBIN A1C LEVEL 7.0 - < 8.0%: ICD-10-PCS | Mod: CPTII,S$GLB,, | Performed by: PODIATRIST

## 2020-09-24 PROCEDURE — 99999 PR PBB SHADOW E&M-EST. PATIENT-LVL V: ICD-10-PCS | Mod: PBBFAC,,, | Performed by: PODIATRIST

## 2020-09-24 NOTE — TELEPHONE ENCOUNTER
Patient has stated no one is giving her answers from the Gastro office regarding her EDG that was done. Wants to know if you call call her or message her with the results.

## 2020-09-24 NOTE — TELEPHONE ENCOUNTER
Called and spoke with patient about her persistent throat symptoms and about the results that she had. She is wondering what the plan is regarding her GERD treatment and has not had resolution of symptoms. I discussed with her that her PSG was negative for SHAWNA ( sleep efficiency on study was good at ~84 % so suspect that results were not a false negative). She does have a small hiatal hernia on esophagram     She has seen speech therapy; still with throat clearing problems .

## 2020-09-24 NOTE — PROGRESS NOTES
Subjective:      Patient ID: Valencia Dumont is a 50 y.o. female.    Chief Complaint: Diabetes Mellitus (nesha Lion pcp- 9/2020), Nail Care, and Foot Problem (right foot great toe )        Valencia Dumont is a 50 y.o. femalewho presents to the clinic for evaluation and treatment of high risk feet. Valencia Dumont   has a past medical history of Abnormal Pap smear of cervix, Anemia, Arthritis, Ascites, Asthma, Chronic back pain, Cirrhosis of liver without mention of alcohol (10/18/2013), Diabetes mellitus, Esophageal varices, GERD (gastroesophageal reflux disease), Herpes simplex virus (HSV) infection, Hypertension, Kidney stone, Lupus, Osteoporosis (12/2013), Prophylactic immunotherapy (transplant immunosuppression) (1/2/2014), and SBP (spontaneous bacterial peritonitis).  Presents for diabetic foot risk assessment. She relates continued heel pain worse after long periods of WB. She admits that she has not been stretching as instructed.    This patient has documented high risk feet requiring routine maintenance secondary to diabetes mellitis and those secondary complications of diabetes, as mentioned..    PCP: Timur Lion MD    Date Last Seen by PCP:   Chief Complaint   Patient presents with    Diabetes Mellitus     nesha Lion pcp- 9/2020    Nail Care    Foot Problem     right foot great toe          Current shoe gear: sandals, memory foam version of [place]    Hemoglobin A1C   Date Value Ref Range Status   05/13/2020 7.2 (H) 4.0 - 5.6 % Final     Comment:     ADA Screening Guidelines:  5.7-6.4%  Consistent with prediabetes  >or=6.5%  Consistent with diabetes  High levels of fetal hemoglobin interfere with the HbA1C  assay. Heterozygous hemoglobin variants (HbS, HgC, etc)do  not significantly interfere with this assay.   However, presence of multiple variants may affect accuracy.     12/11/2019 8.1 (H) 4.0 - 5.6 % Final     Comment:     ADA Screening Guidelines:  5.7-6.4%  Consistent with  prediabetes  >or=6.5%  Consistent with diabetes  High levels of fetal hemoglobin interfere with the HbA1C  assay. Heterozygous hemoglobin variants (HbS, HgC, etc)do  not significantly interfere with this assay.   However, presence of multiple variants may affect accuracy.     09/09/2019 8.0 (H) 4.0 - 5.6 % Final     Comment:     ADA Screening Guidelines:  5.7-6.4%  Consistent with prediabetes  >or=6.5%  Consistent with diabetes  High levels of fetal hemoglobin interfere with the HbA1C  assay. Heterozygous hemoglobin variants (HbS, HgC, etc)do  not significantly interfere with this assay.   However, presence of multiple variants may affect accuracy.         Past Medical History:   Diagnosis Date    Abnormal Pap smear of cervix     Anemia     Arthritis     Ascites     Asthma     Chronic back pain     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    Hypertension     Kidney stone     Lupus     Osteoporosis 12/2013    Prophylactic immunotherapy (transplant immunosuppression) 1/2/2014    SBP (spontaneous bacterial peritonitis)     history of        Past Surgical History:   Procedure Laterality Date    CHOLECYSTECTOMY      COLONOSCOPY N/A 5/31/2017    Procedure: COLONOSCOPY;  Surgeon: Marky Sun MD;  Location: [address]);  Service: Endoscopy;  Laterality: N/A;  PM prep.    ESOPHAGOGASTRODUODENOSCOPY      ESOPHAGOGASTRODUODENOSCOPY N/A 1/16/2019    Procedure: EGD (ESOPHAGOGASTRODUODENOSCOPY);  Surgeon: Deniz Guerra MD;  Location: [address]);  Service: Endoscopy;  Laterality: N/A;  labs prior, s/p liver transplant-MS    ESOPHAGOGASTRODUODENOSCOPY N/A 9/9/2020    Procedure: EGD (ESOPHAGOGASTRODUODENOSCOPY);  Surgeon: Davion Ríos MD;  Location: [address]);  Service: Endoscopy;  Laterality: N/A;  Please order the EGD at  least 2 weeks after barium esophagram has been done EGD is for dysphagia  covid test 9/6-[facility]    LIVER BIOPSY      LIVER TRANSPLANT  12/31/2013    REFRACTIVE SURGERY Bilateral 2010    TUBAL LIGATION  2003    UPPER GASTROINTESTINAL ENDOSCOPY         Family History   Problem Relation Age of Onset    Hypertension Mother     Cataracts Mother     Diabetes Father     Alzheimer's disease Father     Diabetes Paternal Grandfather     Diabetes Paternal Grandmother     No Known Problems Maternal Grandmother     Bone cancer Maternal Grandfather     Breast cancer Maternal Aunt 55    Hypertension Brother     Diabetes Brother     No Known Problems Sister     No Known Problems Maternal Uncle     No Known Problems Paternal Aunt     No Known Problems Paternal Uncle     Stroke Neg Hx     Cancer Neg Hx     Colon cancer Neg Hx     Esophageal cancer Neg Hx     Stomach cancer Neg Hx     Rectal cancer Neg Hx     Amblyopia Neg Hx     Blindness Neg Hx     Glaucoma Neg Hx     Macular degeneration Neg Hx     Retinal detachment Neg Hx     Strabismus Neg Hx     Thyroid disease Neg Hx        Social History     Socioeconomic History    Marital status:      Spouse name: Not on file    Number of children: 3    Years of education: Not on file    Highest education level: Not on file   Occupational History     Employer: [employer] [place] [employer]   Social Needs    Financial resource strain: Not on file    Food insecurity     Worry: Not on file     Inability: Not on file    Transportation needs     Medical: Not on file     Non-medical: Not on file   Tobacco Use    Smoking status: Never Smoker    Smokeless tobacco: Never Used    Tobacco comment: disability; ; 3 children   Substance and Sexual Activity    Alcohol use: Yes     Alcohol/week: 1.0 standard drinks     Types: 1 Glasses of wine per week     Comment: occasionally     Drug use: No    Sexual activity: Yes     Partners: Male     Birth  control/protection: See Surgical Hx, Post-menopausal     Comment: s/p tubal ligation,  since 1989   Lifestyle    Physical activity     Days per week: Not on file     Minutes per session: Not on file    Stress: Not on file   Relationships    Social connections     Talks on phone: Not on file     Gets together: Not on file     Attends [employer]oration[employer] service: Not on file     Active member of club or organization: Not on file     Attends meetings of clubs or organizations: Not on file     Relationship status: Not on file   Other Topics Concern    Not on file   Social History Narrative    Not on file       Current Outpatient Medications   Medication Sig Dispense Refill    azelastine (ASTELIN) 137 mcg (0.1 %) nasal spray use ONE spray in each nostril TWICE DAILY 30 mL 3    blood-glucose meter kit To check BG 5 times daily, to use with insurance preferred meter 1 each 0    blood-glucose meter,continuous (DEXCOM G6 ) Misc 1 each by Misc.(Non-Drug; Combo Route) route once. for 1 dose 1 each 0    blood-glucose transmitter (DEXCOM G6 TRANSMITTER) Karen 1 each by Misc.(Non-Drug; Combo Route) route once a week 1 Device 3    buPROPion (WELLBUTRIN XL) 150 MG TB24 tablet Take 300 mg by mouth.      cholecalciferol, vitamin D3, (VITAMIN D3) 50 mcg (2,000 unit) Cap Take 1 capsule (2,000 Units total) by mouth once daily. 90 capsule 3    ciclopirox (PENLAC) 8 % Soln Apply topically nightly. 1 Bottle 3    diazepam (VALIUM) 5 MG tablet Take 5 mg by mouth 3 (three) times daily as needed.   0    diclofenac sodium (VOLTAREN) 1 % Gel Voltaren 1 % topical gel   APPLY 2 GRAM TO THE AFFECTED AREA(S) BY TOPICAL ROUTE 4 TIMES PER DAY      DILTIAZEM HCL (DILTIAZEM 2% CREAM) Apply topically 3 (three) times daily. Apply topically to anal area. 30 g 0    ergocalciferol (ERGOCALCIFEROL) 50,000 unit Cap Take 1 capsule (50,000 Units total) by mouth every 7 days. for 12 doses 12 capsule 0    ergocalciferol (ERGOCALCIFEROL) 50,000  unit Cap Take 1 capsule (50,000 Units total) by mouth every 7 days. for 12 doses 12 capsule 0    erythromycin with ethanol (EMGEL) 2 % gel ADD 30GM (1 TUBE) TO THE SOAKING DEVICE AND ALLOW WATER TO AGITATE. PLACE AFFECTED AREAS INTO WATER AND SOAK FOR 10 MINUTES 1-2 TIMES DAILY  1    estradiol (YUVAFEM) 10 mcg Tab Place 1 tablet (10 mcg total) vaginally twice a week. 8 tablet 11    famotidine (PEPCID) 40 MG tablet       fluconazole (DIFLUCAN) 150 MG Tab TAKE ONE TABLET BY MOUTH once for ONE dose  0    gabapentin (NEURONTIN) 300 MG capsule TAKE 1 CAPSULE BY MOUTH THREE TIMES DAILY      gentamicin (GARAMYCIN) 0.1 % cream ADD 30GM (1 TUBE) TO THE SOAKING DEVICE AND ALLOW WATER TO AGITATE. PLACE AFFECTED AREAS INTO WATER AND SOAK FOR 10 MINUTES 1-2 TIMES DAILY  1    hydrOXYchloroQUINE (PLAQUENIL) 200 mg tablet TAKE ONE TABLET BY MOUTH TWICE DAILY 60 tablet 2    hydrOXYzine HCl (ATARAX) 10 MG Tab TAKE 1 OR 2 TABLETS BY MOUTH THREE TIMES DAILY AS NEEDED FOR ITCHING.  0    insulin aspart U-100 (NOVOLOG FLEXPEN U-100 INSULIN) 100 unit/mL (3 mL) InPn pen Takes three units before breakfast and lunch  and 5 units before dinner with sliding scale, up to 25 units daily 45 mL 3    insulin detemir U-100 (LEVEMIR FLEXTOUCH U-100 INSULN) 100 unit/mL (3 mL) SubQ InPn pen Inject 4 Units into the skin 2 (two) times daily. 45 mL 3    isosorbide mononitrate (IMDUR) 30 MG 24 hr tablet Take 30 mg by mouth once daily.      ketoconazole (NIZORAL) 2 % cream ADD 30GM (1/2 TUBE) TO THE SOAKING DEVICE AND ALLOW WATER TO AGITATE. PLACE AFFECTED AREAS INTO WATER AND SOAK FOR 10 MINUTES 1-2 TIMES BRENDON  1    Lactobac. rhamnosus GG-inulin 10 billion cell -200 mg Cap Take 1 capsule by mouth 2 (two) times daily. 30 capsule 0    levocetirizine (XYZAL) 5 MG tablet Take 5 mg by mouth every evening.  3    levoFLOXacin (LEVAQUIN) 750 MG tablet levofloxacin 750 mg tablet      losartan (COZAAR) 100 MG tablet Take 1 tablet (100 mg total) by  mouth once daily. 30 tablet 2    magnesium oxide 500 mg Tab Take 500 mg by mouth 2 (two) times daily. 60 each 6    meclizine (ANTIVERT) 25 mg tablet TAKE ONE TABLET BY MOUTH AT BEDTIME AS NEEDED for dizziness.  0    meloxicam (MOBIC) 15 MG tablet       MULTIVIT,THER IRON,CA,FA & MIN (MULTIVITAMIN) Tab Take 1 tablet by mouth once daily. 30 tablet 0    mycophenolate (MYFORTIC) 180 MG TbEC TAKE TWO TABLETS BY MOUTH TWICE DAILY. 120 tablet 5    neomycin-polymyxin-hydrocortisone (CORTISPORIN) otic solution INSTILL TWO DROPS INTO BOTH EARS FOUR TIMES DAILY FOR 10 DAYS  0    nystatin (MYCOSTATIN) 100,000 unit/mL suspension SWISH AND SPIT FIVE MILLILITERS BY MOUTH FOUR TIMES DAILY FOR 7 DAYS.  1    nystatin (MYCOSTATIN) cream Apply topically 2 times daily 30 g 0    nystatin-triamcinolone (MYCOLOG II) cream Apply to affected area 2 times daily 30 g 1    oxycodone-acetaminophen (PERCOCET) 7.5-325 mg per tablet Take 1 tablet by mouth every 4 (four) hours as needed for Pain.      pantoprazole (PROTONIX) 40 MG tablet Take 1 tablet (40 mg total) by mouth 2 (two) times daily. Take immediately in am when wake up and then 30 minutes prior to dinner 60 tablet 11    paroxetine (PAXIL) 10 MG tablet paroxetine 10 mg tablet      polyethylene glycol (GLYCOLAX) 17 gram/dose powder Mix 1 capful (17 g) with liquid and by mouth 2 (two) times daily. 1530 g 11    PROAIR HFA 90 mcg/actuation inhaler Inhale 2 puffs into the lungs 3 (three) times daily as needed.   3    promethazine-dextromethorphan (PROMETHAZINE-DM) 6.25-15 mg/5 mL Syrp Take by mouth 2 (two) times daily as needed.   0    rosuvastatin (CRESTOR) 5 MG tablet Take 5 mg by mouth once daily.      sertraline (ZOLOFT) 50 MG tablet Take 50 mg by mouth once daily.  11    SSD 1 % cream 1 application 2 (two) times daily. Apply to affected area  0    tacrolimus (PROGRAF) 1 MG Cap Take 3 capsules (3 mg total) by mouth every 12 (twelve) hours. 180 capsule 11    traZODone  "(DESYREL) 50 MG tablet Take 50 mg by mouth nightly.  0    triamcinolone acetonide 0.1% (KENALOG) 0.1 % cream Apply topically 2 times daily 30 g 0    valACYclovir (VALTREX) 500 MG tablet TAKE ONE TABLET BY MOUTH TWICE DAILY FOR 3 DAYS. 6 tablet 2    verapamil (CALAN-SR) 120 MG CR tablet TAKE ONE TABLET ONCE DAILY FOR BLOOD PRESSURE  3    zolpidem (AMBIEN) 10 mg Tab Take 10 mg by mouth nightly as needed.  3     No current facility-administered medications for this visit.        Review of patient's allergies indicates:   Allergen Reactions    Norvasc [amlodipine]      Severe headache    Doxycycline Rash    Pcn [penicillins] Rash         Review of Systems   Constitution: Negative for chills, fever, malaise/fatigue and night sweats.   Cardiovascular: Negative for chest pain, leg swelling, orthopnea and palpitations.   Respiratory: Negative for cough, shortness of breath and wheezing.    Skin: Positive for color change and nail changes. Negative for itching, poor wound healing and rash.   Musculoskeletal: Negative for arthritis, gout, joint pain, joint swelling, muscle weakness and myalgias.   Gastrointestinal: Negative for abdominal pain, constipation and nausea.   Neurological: Positive for numbness. Negative for disturbances in coordination, dizziness, focal weakness, tremors and weakness.           Objective:       Vitals:    09/24/20 1052   BP: (!) 160/90   Pulse: 75   Weight: 65.5 kg (144 lb 6.4 oz)   Height: 5' 9" (1.753 m)   PainSc: 0-No pain       Physical Exam  Vitals signs and nursing note reviewed.   Constitutional:       General: She is not in acute distress.     Appearance: She is well-developed. She is not toxic-appearing or diaphoretic.      Comments: alert and oriented x 3.    Cardiovascular:      Pulses:           Dorsalis pedis pulses are 2+ on the right side and 2+ on the left side.        Posterior tibial pulses are 2+ on the right side and 2+ on the left side.      Comments: No edema noted b/l " LEs. No varicosities present b/l LEs.   Pulmonary:      Effort: No respiratory distress.   Musculoskeletal:         General: No deformity.      Right ankle: Normal. No tenderness. No lateral malleolus, no medial malleolus, no AITFL, no CF ligament and no posterior TFL tenderness found. Achilles tendon exhibits no pain, no defect and normal Castle's test results.      Left ankle: Normal. No tenderness. No lateral malleolus, no medial malleolus, no AITFL, no CF ligament and no posterior TFL tenderness found. Achilles tendon exhibits no pain, no defect and normal Castle's test results.      Right foot: Normal capillary refill. Tenderness present. No bony tenderness or crepitus.      Left foot: Normal capillary refill. Tenderness present. No bony tenderness or crepitus.      Comments: Biomechanical exam: Pain on palpation bilateral medial calcaneal tubercle at origin of plantar fascia. There is equinus deformity bilateral with decreased dorsiflexion at the ankle joint bilateral. No tenderness with compression of heel. Negative tinels sign. Gait analysis reveals excessive pronation through midstance and propulsion with early heel off. Shoes reveals lateral heel counter wear bilateral     Decreased first MPJ range of motion both weightbearing and nonweightbearing, no crepitus observed the first MP joint, + dorsal flag sign. Mild  bunion deformity is observed .    Patient has hammertoes of digits 2-5 bilateral partially reducible     Negative anterior drawer at the ankle bilat.  Negative Lachman test at the 2nd MTPJ.  Able to perform sign and double heel rise without pain.     Feet:      Right foot:      Protective Sensation: 5 sites tested. 5 sites sensed.      Left foot:      Protective Sensation: 5 sites tested. 5 sites sensed.   Lymphadenopathy:      Comments: No lymphatic streaking     Skin:     General: Skin is warm and dry.      Coloration: Skin is not pale.      Findings: No ecchymosis, erythema or rash.       Nails: There is no clubbing.        Comments: B/l hallux nail beds with signs of hyperkeratotic changes to the nail bed. No open wound. No drainage. Stable and dry. medial hallux nail margin of left foot with ingrown nail plate. No erythema or edema is noted. No granuloma formation noted. No malodor     Toenails are thickened by 2-3 mm, discolored/yellowed, dystrophic, brittle with subungual debris.    Neurological:      Mental Status: She is alert and oriented to person, place, and time.      Sensory: Sensory deficit present.      Motor: No atrophy or abnormal muscle tone.      Gait: Gait normal.      Deep Tendon Reflexes:      Reflex Scores:       Achilles reflexes are 2+ on the right side and 2+ on the left side.     Comments: Negative Tinels sign and Charles's click, bilat. Protective sensation intact b/l foot. Vibratory sensation intact b/l. Subjective numbness to toes 4-5 both feet (occasionally).   Psychiatric:         Attention and Perception: She is attentive.         Mood and Affect: Mood is not anxious. Affect is not inappropriate.         Speech: She is communicative. Speech is not slurred.         Behavior: Behavior is not combative. Behavior is cooperative.             Assessment:       Encounter Diagnoses   Name Primary?    Type II diabetes mellitus with neurological manifestations Yes    Hammer toes of both feet     Hallux limitus, acquired, right     Hallux limitus, acquired, left     Plantar fasciitis     Gait abnormality     Foot pain, bilateral          Plan:       Valencia was seen today for diabetes mellitus, nail care and foot problem.    Diagnoses and all orders for this visit:    Type II diabetes mellitus with neurological manifestations  -     DIABETIC SHOES FOR HOME USE    Hammer toes of both feet  -     DIABETIC SHOES FOR HOME USE    Hallux limitus, acquired, right  -     DIABETIC SHOES FOR HOME USE    Hallux limitus, acquired, left  -     DIABETIC SHOES FOR HOME USE    Plantar  fasciitis  -     DIABETIC SHOES FOR HOME USE    Gait abnormality  -     DIABETIC SHOES FOR HOME USE    Foot pain, bilateral  -     Ambulatory referral/consult to Physical/Occupational Therapy; Future      I counseled the patient on her conditions, their implications and medical management.    Greater than 50% of this visit spent on counseling and coordination of care.    Discussed different treatment options for heel pain. I gave written and verbal instructions on stretching exercises. Patient expressed understanding. Discussed icing the affected area as needed and also wearing appropriate shoe gear and avoiding flats, slippers, sandals, and going barefoot. My recommendation for OTC supports is [employer]. Rx diabetic shoes for protection and support. Patient instructed on adequate icing techniques. Patient should ice the affected area at least once per day x 10 minutes for 10 days . I advised the patient that extra icing would also be beneficial to ensure adequate anti inflammatory effect. We also discussed cortisone injections and NSAID therapy.     Referral placed to PT to aid in increasing ROM and breaking up of scar tissue. Dry needling requested    Informed patient that many nail problems can be prevented by wearing the right shoes and trimming your nails properly.   The right shoes: Feet were measured.  Patient is to wear shoes that are supportive and roomy enough for toes to wiggle. Look for shoes made of natural materials such as leather, which allow  feet to breathe.   Proper trimming: To avoid problems, she was instructed to trim toenails straight across without cutting down into the corners.     No further intervention at this time. I advised patient that routine trimming of nails will not be covered service per insurance and he/she will fall under Proc B if this service is desired. Patient verbalized understanding of this. She will RTC as needed for Proc B or phenol and alcohol procedure or any  other pedal complaint otherwise follow up 6-9 months for routine DM Foot exam.

## 2020-09-24 NOTE — PATIENT INSTRUCTIONS
.Supplements for inflammation: Arnica Tabs, bromelain with tumeric, alpha lipoic acid, garlic     Over the counter pain creams: Biofreeze, Bengay, tiger balm, two old goat, lidocaine gel,  Absorbine Veterinary Liniment Gel Topical Analgesic Sore Muscle and Joint Pain Relief    Recommend lotions: eucerin, eucerin for diabetics, aquaphor, A&D ointment, gold bond for diabetics, sween, Howells's Bees all purpose baby ointment,  urea 40 with aloe (found on amazon.com)    Shoe recommendations: (try 6pm.com, zappos.Hipscan , [email or web address], or shoes.Hipscan for discounted prices) you can visit [employer] in [place]  or [employer] in the [facility] (there are also several shoe brand outlets in the [facility])    Asics (GT 2000 or gel foundations), new balance stability type shoes (such as the 940 series), saucony (stabil c3),  Chandler (GTS or Beast or transcend), propet (tennis shoe)    Sofft Brand, baretraps (women) Michelle&Omar (men), clarks, crocs, aerosoles, naturalizers, SAS, ecco, born, chris villalobos, james (dress shoes)    Vionic, burkenstocks, fitflops, propet, baretraps (sandals)    Nike comfort thong sandals, crocs, propet (house shoes)    Nail Home remedy:  Vicks Vapor rub or Emuaid to nails for easier manageability    Occasional soaks for 15-20 mins in luke warm water with 1/2 cup of listerine and 1 cup of apple cider vinegar are ok You may add several drops of oil of oregano or tea tree oil as well    Understanding Plantar Fasciitis    Plantar fasciitis is a condition that causes foot and heel pain. The plantar fascia is a tough band of tissue that runs across the bottom of the foot from the heel to the toes. This tissue pulls on the heel bone. It supports the arch of the foot as it pushes off the ground. If the tissue becomes irritated or red and swollen (inflamed), it is called plantar fasciitis.  How to say it  PLAN-tuhr fa-see-IY-tis   What causes plantar fasciitis?  Plantar fasciitis most often occurs from  overusing the plantar fascia. The tissue may become damaged from activities that put repeated stress on the heel and foot. Or it may wear down over time with age and ankle stiffness. You are more likely to have plantar fasciitis if you:  · Do activities that require a lot of running, jumping, or dancing  · Have a job that requires being on your feet for long periods  · Are overweight or obese  · Have certain foot problems, such as a tight Achilles tendon, flat feet, or high arches  · Often wear poorly fitting shoes  Symptoms of plantar fasciitis  The condition most often causes pain in the heel and the bottom of the foot. The pain may occur when you take your first steps in the morning. It may get better as you walk throughout the day. But as you continue to put weight on the foot, the pain often returns. Pain may also occur after standing or sitting for long periods.  Treating plantar fasciitis  Treatments for plantar fasciitis include:  · Resting the foot. This involves limiting movements that make your foot hurt. You may also need to avoid certain sports and types of work for a time.  · Using cold packs. Put an ice pack on the heel and foot to help reduce pain and swelling.  · Taking pain medicines. Prescription and over-the-counter pain medicines can help relieve pain and swelling.  · Using heel cups or foot inserts (orthotics). These are placed in the shoes to help support the heel or arch and cushion the heel. You may also be told to buy proper-fitting shoes with good arch support and cushioned soles.  · Taping the foot. This supports the arch and limits the movement of the plantar fascia to help relieve symptoms.  · Wearing a night splint. This stretches the plantar fascia and leg muscles while you sleep. This may help relieve pain.  · Doing exercises and physical therapy. These stretch and strengthen the plantar fascia and the muscles in the leg that support the heel and foot.  · Getting shots of medicine  into the foot. These may help relieve symptoms for a time.  · Having surgery. This may be needed if other treatments fail to relieve symptoms. During surgery, the surgeon may partially cut the plantar fascia to release tension.  Possible complications of plantar fasciitis  Without proper care and treatment, healing may take longer than normal. Also, symptoms may continue or get worse. Over time, the plantar fascia may be damaged. This can make it hard to walk or even stand without pain.  When to call your healthcare provider  Call your healthcare provider right away if you have any of these:  · Fever of 100.4°F (38°C) or higher, or as directed  · Symptoms that dont get better with treatment, or get worse  · New symptoms, such as numbness, tingling, or weakness in the foot   © 6393-8010 [employer]. [address] [place] [address], [place] [address]. All rights reserved. This information is not intended as a substitute for professional medical care. Always follow your healthcare professional's instructions.        Treating Plantar Fasciitis  First, your healthcare provider tries to determine the cause of your problem in order to suggest ways to relieve pain. If your pain is due to poor foot mechanics, custom-made shoe inserts (orthoses) may help.    Reduce symptoms  · To relieve mild symptoms, try aspirin, ibuprofen, or other medicines as directed. Rubbing ice on the affected area may also help.  · To reduce severe pain and swelling, your healthcare provider may prescribe pills or injections or a walking cast in some instances. Physical therapy, such as ultrasound or a daily stretching program, may also be recommended. Surgery is rarely required.  · To reduce symptoms caused by poor foot mechanics, your foot may be taped. This supports the arch and temporarily controls movement. Night splints may also help by stretching the fascia.  Control movement  If taping helps, your healthcare provider may prescribe  orthoses. Built from plaster casts of your feet, these inserts control the way your foot moves. As a result, your symptoms should go away.  Reduce overuse  Every time your foot strikes the ground, the plantar fascia is stretched. You can reduce the strain on the plantar fascia and the possibility of overuse by following these suggestions:  · Lose any excess weight.  · Avoid running on hard or uneven ground.  · Use orthoses at all times in your shoes and house slippers.  If surgery is needed  Your healthcare provider may consider surgery if other types of treatment don't control your pain. During surgery, the plantar fascia is partially cut to release tension. As you heal, fibrous tissue fills the space between the heel bone and the plantar fascia.   © 5112-4296 [employer]. [address] [place] [address], [place] [address]. All rights reserved. This information is not intended as a substitute for professional medical care. Always follow your healthcare professional's instructions.        Wearing Proper Shoes                    You walk on your feet every day, forcing them to support the weight of your body. Repeated stress on your feet can cause damage over time. The right shoes can help protect your feet. The wrong shoes can cause more foot problems. Read the information below to help you find a shoe that fits your foot needs.     A good shoe fit will cover your foot outline.       A shoe that doesnt cover the outline is a bad fit.      Whats Your Foot Shape?  To get a good fit, you need to know the shape of your foot. Do this simple test: While standing, place your foot on a piece of paper and trace around it. Is your foot straight or curved? Do you have a foot problem, such as a bunion, that causes your foot outline to show a bulge on the side of your big toe?  Finding Your Fit  Bring your foot outline to the [employer] store to help you find the right shoe. Place a shoe you like on top of the outline to see  if it matches the shape. The shoe should cover the outline. (If you have a bunion, the shoe may not cover the bulge on the outline. Look for soft leather shoes to stretch over the bunion.) Once youve found a pair of proper shoes, put them on. Walk around. Be sure the shoes dont rub or pinch. If the shoes feel good, youve found your fit!  The Right Shoe for You  A good shoe has features that provide comfort and support. It must also be the right size and shape for your feet. Look for a shoe made of breathable fabric and lining, such as leather or canvas. Be sure that shoes have enough tread to prevent slipping. Go to a good shoe store for help finding the right shoe.  Good Shoe Features  An ideal shoe has the following:  · Laces for support. If tying laces is a problem for you, try shoes with Velcro fasteners or davian.  · A front of the shoe (toe box) with ½ inch space in front of your longest toes.  · An arch shape that supports your foot.  · No more than 1½ inches of heel.  · A stiff, snug back of the shoe to keep your foot from sliding around.  · A smooth lining with no rough seams.  Shoe Shopping Tips  Below are some dos and donts for when you go to the shoe store.  Do:  · Select the shoes that feel right. Wear them around the house. Then bring them to your foot doctor to check for fit. If they dont fit well, return them.  · Shop late in the day, when your feet will be slightly bigger.  · Each time you buy shoes, have both your feet measured while you are standing. Foot size changes with time.  · Pick shoes to suit their purpose. High heels are okay for an occasional night on the town. But for everyday wear, choose a more sensible shoe.  · Try on shoes while wearing any inserts specially made for your feet (orthoses).  · Try on both the right and left shoes. If your feet are different sizes, pick a pair that fits the larger foot.  Dont:  · Dont buy shoes based on shoe size alone. Always try on shoes, as  sizes differ from brand to brand and within brands.  · Dont expect shoes to break in. If they dont fit at the store, dont buy them.  · Dont buy a shoe that doesnt match your foot shape.  What About Socks?  Always wear socks with shoes. Socks help absorb sweat and reduce friction and blistering. When shopping for shoes, choose soft, padded socks with seams that dont irritate your feet.  If You Have Foot Problems  Some foot problems cause deformities. This can make it hard to find a good fit. Look for shoes made of soft leather to stretch over the deformity. If you have bunions, buy shoes with a wider toe box. To fit hammertoes, look for shoes with a tall toe box. If you have arch problems, you may need inserts. In some cases, youll need to have custom footwear or orthoses made for your feet.  Suggested Footwear  Ask your healthcare provider what kind of footwear you need. He or she may recommend a certain brand or shoe store.  © 3438-4805 [employer]. [address] [place] [address]. All rights reserved. This information is not intended as a substitute for professional medical care. Always follow your healthcare professional's instructions.        Toe Extension (Flexibility)    These instructions are for your right foot. Switch sides for your left foot.  1. Sit in a chair. Rest your right ankle on your left knee.  2. Hold your toes with your right hand. Gently bend the toes backward. Feel a stretch in the undersides of the toes and ball of the foot. Hold for 30 to 60 seconds.  3. Then gently bend the toes in the other direction. Gently press on them until your foot is pointed. Hold for 30 to 60 seconds.  4. Repeat 5 times, or as instructed.  © 4660-9406 [employer]. [address] [place] [address]. All rights reserved. This information is not intended as a substitute for professional medical care. Always follow your healthcare professional's  instructions.

## 2020-09-25 ENCOUNTER — PATIENT MESSAGE (OUTPATIENT)
Dept: OTOLARYNGOLOGY | Facility: CLINIC | Age: 50
End: 2020-09-25

## 2020-09-25 ENCOUNTER — TELEPHONE (OUTPATIENT)
Dept: GASTROENTEROLOGY | Facility: HOSPITAL | Age: 50
End: 2020-09-25

## 2020-09-25 ENCOUNTER — PATIENT MESSAGE (OUTPATIENT)
Dept: ENDOCRINOLOGY | Facility: CLINIC | Age: 50
End: 2020-09-25

## 2020-09-25 DIAGNOSIS — R13.19 OTHER DYSPHAGIA: Primary | ICD-10-CM

## 2020-09-25 DIAGNOSIS — R22.1 LUMP IN THROAT: Primary | ICD-10-CM

## 2020-09-25 NOTE — TELEPHONE ENCOUNTER
Spoke with patient and scheduled appt with Dr Lewis for ear clicking when she chews and talks. Also sounds like she is wheezing when she eats.

## 2020-09-25 NOTE — TELEPHONE ENCOUNTER
Called the patient in regards to her persistent dysphagia to solids and liquids.  Investigations so far only showing reflux esophagitis.  Modified barium swallow had showed abnormal swallowing in the past and the patient was supposed to go to speech therapy to work on that but stopped going because she did not feel like it was helping.  Will order esophageal manometry to rule out achalasia or any other esophageal motility problems.  If normal, the patient will need to continue her speech therapy.  Instructed the patient to restart speech therapy at this time since it should help with her swallowing.  She is already on maximal therapy for GERD with Protonix 40 mg b.i.d. but she says it is still not helping.  I suspect that at this point she has non acid reflux that may be causing her reflux symptoms.

## 2020-09-29 ENCOUNTER — HOSPITAL ENCOUNTER (OUTPATIENT)
Dept: RADIOLOGY | Facility: HOSPITAL | Age: 50
Discharge: HOME | End: 2020-09-29
Attending: NEUROLOGICAL SURGERY
Payer: MEDICARE

## 2020-09-29 DIAGNOSIS — R42 DIZZINESS AND GIDDINESS: ICD-10-CM

## 2020-09-29 DIAGNOSIS — H81.4 CENTRAL VESTIBULAR VERTIGO: ICD-10-CM

## 2020-09-29 PROCEDURE — 70544 MRA BRAIN WITHOUT CONTRAST: ICD-10-PCS | Mod: 26,59,, | Performed by: RADIOLOGY

## 2020-09-29 PROCEDURE — 70544 MR ANGIOGRAPHY HEAD W/O DYE: CPT | Mod: TC

## 2020-09-29 PROCEDURE — 70551 MRI BRAIN WITHOUT CONTRAST: ICD-10-PCS | Mod: 26,,, | Performed by: RADIOLOGY

## 2020-09-29 PROCEDURE — 70551 MRI BRAIN STEM W/O DYE: CPT | Mod: TC

## 2020-09-29 PROCEDURE — 70551 MRI BRAIN STEM W/O DYE: CPT | Mod: 26,,, | Performed by: RADIOLOGY

## 2020-09-29 PROCEDURE — 70544 MR ANGIOGRAPHY HEAD W/O DYE: CPT | Mod: 26,59,, | Performed by: RADIOLOGY

## 2020-10-05 ENCOUNTER — CLINICAL SUPPORT (OUTPATIENT)
Dept: REHABILITATION | Facility: HOSPITAL | Age: 50
End: 2020-10-05
Attending: OTOLARYNGOLOGY
Payer: MEDICARE

## 2020-10-05 ENCOUNTER — CLINICAL SUPPORT (OUTPATIENT)
Dept: REHABILITATION | Facility: HOSPITAL | Age: 50
End: 2020-10-05
Attending: PODIATRIST
Payer: MEDICARE

## 2020-10-05 DIAGNOSIS — M79.671 FOOT PAIN, BILATERAL: ICD-10-CM

## 2020-10-05 DIAGNOSIS — M79.672 FOOT PAIN, BILATERAL: ICD-10-CM

## 2020-10-05 DIAGNOSIS — R49.0 HOARSE: Primary | ICD-10-CM

## 2020-10-05 DIAGNOSIS — R29.898 DECREASED STRENGTH OF LOWER EXTREMITY: Primary | ICD-10-CM

## 2020-10-05 DIAGNOSIS — K21.9 GASTROESOPHAGEAL REFLUX DISEASE: ICD-10-CM

## 2020-10-05 DIAGNOSIS — M25.676 DECREASED RANGE OF MOTION OF FOOT, UNSPECIFIED LATERALITY: ICD-10-CM

## 2020-10-05 DIAGNOSIS — R26.9 GAIT ABNORMALITY: ICD-10-CM

## 2020-10-05 PROCEDURE — 97110 THERAPEUTIC EXERCISES: CPT | Mod: PN

## 2020-10-05 PROCEDURE — 92524 BEHAVRAL QUALIT ANALYS VOICE: CPT | Mod: PN | Performed by: SPEECH-LANGUAGE PATHOLOGIST

## 2020-10-05 PROCEDURE — 97161 PT EVAL LOW COMPLEX 20 MIN: CPT | Mod: PN

## 2020-10-05 PROCEDURE — 92507 TX SP LANG VOICE COMM INDIV: CPT | Mod: PN | Performed by: SPEECH-LANGUAGE PATHOLOGIST

## 2020-10-05 NOTE — PROGRESS NOTES
Please see voice evaluation in POC.    ANTONETTE Jones, CCC-SLP  Speech Language Pathologist   10/5/2020

## 2020-10-05 NOTE — PATIENT INSTRUCTIONS
Home Program for Cough & Throat Clearing    Daily practice to eliminate throat hypersensitivity    Set specific practice times, and use a timer:    1. ____________________________________    2. ____________________________________    3. ____________________________________    Practice 3 times a day for 15 minutes each:  - Do 3 hard swallows  - Wait 15 seconds  - Do 3 hard swallows  - Wait 15 seconds  - Repeat cycle for 15 minutes       It is okay to cough IF:   1. You are ill with a cold.  2. You are protecting your airway during or after eating or drinking.  3. It is one of your 2-3 specified cough up gunk times.  a. _______________________________    b. _______________________________    c. _______________________________       If you do cough:  - Do multiple hard swallows, with or without water, until the urge has gone away.  - You may suck on a throat lozenge.

## 2020-10-06 NOTE — PLAN OF CARE
"Outpatient Neurological Rehabilitation   Voice Evaluation  Date: 10/5/2020     Name: Valencia Dumont   MRN: [id number]    Therapy Diagnosis:   Encounter Diagnoses   Name Primary?    Gastroesophageal reflux disease     Hoarse Yes      Physician: Delores Lewis MD  Physician Orders: [id number] - Ambulatory referral/consult to Speech Therapy  Medical Diagnosis: K21.9 (ICD-10-CM) - Gastroesophageal reflux disease, esophagitis presence not specified    Visit #/ Visits Authorized:  1 / 1   Date of Evaluation:  10/5/2020  Insurance Authorization Period: 09/25/2020-09/25/2021  Plan of Care Certification: 10/5/2020 -11/30/2020    Time In:  11:00   Time Out:  11:35    Procedure Min.   Qualitative Analysis of Voice and Resonance 35   Total Untimed Units: 1  Charges Billed/# of units: 1    Precautions: standard   Subjective    Date of Onset: Approximately 10-11 months ago  Current Medical History: Valencia Dumont is a 50 y.o. female referred for voice evaluation (CPT 80679) by Dr. Lewis. This patient is familiar to this SLP.  She presents with complaints of hoarse voice and excessive throat clearing which began approximately 10-11 months ago.The patient also reported the following complaints: fatigue with use, tightness and reduced volume.  She reports that her throat does feel better since she had her "esophagus opened up" per her report. She continues to have throat irritation and burning sensation which she is very concerned about. She reports that she has to keep throat clearing because her sinuses are acting up and causing a post-nasal drip. She reports that she has not identified any exacerbating/triggering factors but that drinking something warm can sometimes alleviate the throat clearing/cough. She is embarrased to cough because she is afraid that people will think she has COVID-19. In regards to swallowing, she reports that food is still getting caught in her throat and that when she eats rice it feels scratchy; she " "usually has to drink half of a bottle of water to get food down so she has been "living off of [facility]" since it is easier to swallow and she is concerned about her weight . She started taking appetite medication again. She endorses ear pain and feelings of a full ear canal.   -Description of the cough: time of day: morning and night and worsening  -Relevant environmental factors: no relevant environmental factors   -Patient endorses classic reflux symptoms including globus, morning voice, burning in throat, and heartburn.  -ACE inhibitor history: yes, currently on 3 ACE inhibitors.   -Swallowing: Difficulty swallowing solids  -Breathing: coughing, throat clearing, short of breath   -Smokin packs/day   -Caffeine: 0 cups/day   -Water: 85+ oz/day     Laryngeal Endoscopy findings (per Dr. Lewis on 2020):  "PROCEDURE NOTE  NAME OF PROCEDURE: Flexible Laryngoscopy, diagnostic  INDICATIONS:acute worsening of dysphagia in immunocomprimised pt  FINDINGS: no thrush; exam unchanged     Consent: After procedure was explained in detail and all questions answered, verbal consent was obtained for performing flexible laryngoscopy.  Anesthesia: topical 4% lidocaine  and neosynephrine  Procedure: With patient in seated position, the scope was inserted into the bilateral nasal passageway and advanced through the right atraumatically into the nasopharynx to examine the following structures  Nasopharynx: no mass or lesion noted in nasopharynx. No cobblestoning   Oropharynx: base of tongue without  mass or ulceration. Lingual tonsils normal in appearance  Hypopharynx: posterior pharyngeal wall without mass or lesion. No pooling of secretions. Pyriform sinuses visible without mass or lesion  Larynx: epiglottis normal without lesion. False vocal folds without edema/erythema/lesion. True vocal folds mobile and without lesion.+pseudosulcus Mild interarytenoid edema and erythema . Postcricoid region with mild edema , no " "lesion  Subglottis: visualized portion of subglottis normal in appearance     After examination performed, the scope was removed atraumatically . The patient tolerated the procedure well."    Past Medical History:   Diagnosis Date    Abnormal Pap smear of cervix     Anemia     Arthritis     Ascites     Asthma     Chronic back pain     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    Hypertension     Kidney stone     Lupus     Osteoporosis 12/2013    Prophylactic immunotherapy (transplant immunosuppression) 1/2/2014    SBP (spontaneous bacterial peritonitis)     history of      Current Outpatient Medications on File Prior to Visit   Medication Sig Dispense Refill    azelastine (ASTELIN) 137 mcg (0.1 %) nasal spray use ONE spray in each nostril TWICE DAILY 30 mL 3    blood-glucose meter kit To check BG 5 times daily, to use with insurance preferred meter 1 each 0    blood-glucose meter,continuous (DEXCOM G6 ) Misc 1 each by Misc.(Non-Drug; Combo Route) route once. for 1 dose 1 each 0    blood-glucose transmitter (DEXCOM G6 TRANSMITTER) Karen 1 each by Misc.(Non-Drug; Combo Route) route once a week 1 Device 3    buPROPion (WELLBUTRIN XL) 150 MG TB24 tablet Take 300 mg by mouth.      cholecalciferol, vitamin D3, (VITAMIN D3) 50 mcg (2,000 unit) Cap Take 1 capsule (2,000 Units total) by mouth once daily. 90 capsule 3    ciclopirox (PENLAC) 8 % Soln Apply topically nightly. 1 Bottle 3    diazepam (VALIUM) 5 MG tablet Take 5 mg by mouth 3 (three) times daily as needed.   0    diclofenac sodium (VOLTAREN) 1 % Gel Voltaren 1 % topical gel   APPLY 2 GRAM TO THE AFFECTED AREA(S) BY TOPICAL ROUTE 4 TIMES PER DAY      DILTIAZEM HCL (DILTIAZEM 2% CREAM) Apply topically 3 (three) times daily. Apply topically to anal area. 30 g 0    ergocalciferol " (ERGOCALCIFEROL) 50,000 unit Cap Take 1 capsule (50,000 Units total) by mouth every 7 days. for 12 doses 12 capsule 0    ergocalciferol (ERGOCALCIFEROL) 50,000 unit Cap Take 1 capsule (50,000 Units total) by mouth every 7 days. for 12 doses 12 capsule 0    erythromycin with ethanol (EMGEL) 2 % gel ADD 30GM (1 TUBE) TO THE SOAKING DEVICE AND ALLOW WATER TO AGITATE. PLACE AFFECTED AREAS INTO WATER AND SOAK FOR 10 MINUTES 1-2 TIMES DAILY  1    estradiol (YUVAFEM) 10 mcg Tab Place 1 tablet (10 mcg total) vaginally twice a week. 8 tablet 11    famotidine (PEPCID) 40 MG tablet       fluconazole (DIFLUCAN) 150 MG Tab TAKE ONE TABLET BY MOUTH once for ONE dose  0    gabapentin (NEURONTIN) 300 MG capsule TAKE 1 CAPSULE BY MOUTH THREE TIMES DAILY      gentamicin (GARAMYCIN) 0.1 % cream ADD 30GM (1 TUBE) TO THE SOAKING DEVICE AND ALLOW WATER TO AGITATE. PLACE AFFECTED AREAS INTO WATER AND SOAK FOR 10 MINUTES 1-2 TIMES DAILY  1    hydrOXYchloroQUINE (PLAQUENIL) 200 mg tablet TAKE ONE TABLET BY MOUTH TWICE DAILY 60 tablet 2    hydrOXYzine HCl (ATARAX) 10 MG Tab TAKE 1 OR 2 TABLETS BY MOUTH THREE TIMES DAILY AS NEEDED FOR ITCHING.  0    insulin aspart U-100 (NOVOLOG FLEXPEN U-100 INSULIN) 100 unit/mL (3 mL) InPn pen Takes three units before breakfast and lunch  and 5 units before dinner with sliding scale, up to 25 units daily 45 mL 3    insulin detemir U-100 (LEVEMIR FLEXTOUCH U-100 INSULN) 100 unit/mL (3 mL) SubQ InPn pen Inject 4 Units into the skin 2 (two) times daily. 45 mL 3    isosorbide mononitrate (IMDUR) 30 MG 24 hr tablet Take 30 mg by mouth once daily.      ketoconazole (NIZORAL) 2 % cream ADD 30GM (1/2 TUBE) TO THE SOAKING DEVICE AND ALLOW WATER TO AGITATE. PLACE AFFECTED AREAS INTO WATER AND SOAK FOR 10 MINUTES 1-2 TIMES BRENDON  1    Lactobac. rhamnosus GG-inulin 10 billion cell -200 mg Cap Take 1 capsule by mouth 2 (two) times daily. 30 capsule 0    levocetirizine (XYZAL) 5 MG tablet Take 5 mg by mouth  every evening.  3    levoFLOXacin (LEVAQUIN) 750 MG tablet levofloxacin 750 mg tablet      losartan (COZAAR) 100 MG tablet Take 1 tablet (100 mg total) by mouth once daily. 30 tablet 2    magnesium oxide 500 mg Tab Take 500 mg by mouth 2 (two) times daily. 60 each 6    meclizine (ANTIVERT) 25 mg tablet TAKE ONE TABLET BY MOUTH AT BEDTIME AS NEEDED for dizziness.  0    meloxicam (MOBIC) 15 MG tablet       MULTIVIT,THER IRON,CA,FA & MIN (MULTIVITAMIN) Tab Take 1 tablet by mouth once daily. 30 tablet 0    mycophenolate (MYFORTIC) 180 MG TbEC TAKE TWO TABLETS BY MOUTH TWICE DAILY. 120 tablet 5    neomycin-polymyxin-hydrocortisone (CORTISPORIN) otic solution INSTILL TWO DROPS INTO BOTH EARS FOUR TIMES DAILY FOR 10 DAYS  0    nystatin (MYCOSTATIN) 100,000 unit/mL suspension SWISH AND SPIT FIVE MILLILITERS BY MOUTH FOUR TIMES DAILY FOR 7 DAYS.  1    nystatin (MYCOSTATIN) cream Apply topically 2 times daily 30 g 0    nystatin-triamcinolone (MYCOLOG II) cream Apply to affected area 2 times daily 30 g 1    oxycodone-acetaminophen (PERCOCET) 7.5-325 mg per tablet Take 1 tablet by mouth every 4 (four) hours as needed for Pain.      pantoprazole (PROTONIX) 40 MG tablet Take 1 tablet (40 mg total) by mouth 2 (two) times daily. Take immediately in am when wake up and then 30 minutes prior to dinner 60 tablet 11    paroxetine (PAXIL) 10 MG tablet paroxetine 10 mg tablet      polyethylene glycol (GLYCOLAX) 17 gram/dose powder Mix 1 capful (17 g) with liquid and by mouth 2 (two) times daily. 1530 g 11    PROAIR HFA 90 mcg/actuation inhaler Inhale 2 puffs into the lungs 3 (three) times daily as needed.   3    promethazine-dextromethorphan (PROMETHAZINE-DM) 6.25-15 mg/5 mL Syrp Take by mouth 2 (two) times daily as needed.   0    rosuvastatin (CRESTOR) 5 MG tablet Take 5 mg by mouth once daily.      sertraline (ZOLOFT) 50 MG tablet Take 50 mg by mouth once daily.  11    SSD 1 % cream 1 application 2 (two) times  daily. Apply to affected area  0    tacrolimus (PROGRAF) 1 MG Cap Take 3 capsules (3 mg total) by mouth every 12 (twelve) hours. 180 capsule 11    traZODone (DESYREL) 50 MG tablet Take 50 mg by mouth nightly.  0    triamcinolone acetonide 0.1% (KENALOG) 0.1 % cream Apply topically 2 times daily 30 g 0    valACYclovir (VALTREX) 500 MG tablet TAKE ONE TABLET BY MOUTH TWICE DAILY FOR 3 DAYS. 6 tablet 2    verapamil (CALAN-SR) 120 MG CR tablet TAKE ONE TABLET ONCE DAILY FOR BLOOD PRESSURE  3    zolpidem (AMBIEN) 10 mg Tab Take 10 mg by mouth nightly as needed.  3     No current facility-administered medications on file prior to visit.         Objective    Perceptual/behavioral assessment  -CAPE-V Overall Score: 5   -Quality: mildly rough and strained   -Volume: appropriate for age and gender identity  -Pitch: high F0  -Flexibility: diminished  -Habitual respiratory pattern: diaphragmatic    VHI-10 (completed to assess self-perceived handicap associated with dysphonia; >11 considered abnormal): 38   RSI (completed to assess the possible presence and/or severity of LPR symptoms and any relationship between this condition and the pt's dysphagia; score of ~15 may indicate LPR): 30  DI (completed to assess self-perceived breathing difficulties and the effects of dyspnea on the pt's life; >10 considered abnormal): 16   CSI (completed to assess self-perceived cough difficulties and the effects of cough on the pt's life; >8 considered abnormal): 10   EAT-10 (utilized to document the initial dysphagia severity and monitor the treatment response in persons with a wide array of swallowing disorders; >3 considered abnormal): 27  MPT (ah > 18s WFL): 5.5 seconds  S/Z ratio (ratio of 1.4+ may indicate a degree of vocal fold dysfunction): 1.2    Education / Stimulability Trials  Discussed importance of vocal hygiene including: hydration, conservation and reducing throat clearing, coughing, other phonotraumatic behaviors. Assisted  "in training patient on antecedent sensations/behaviors which trigger the cough, which may include dry/scratchy feeling, "tickle" feeling, or general throat discomfort.  In order to optimize laryngeal environment, discussed elimination of cough drops and food/drinks that exacerbate GERD/LPR, and encouraged hydration, swallowing and nasal breathing. Patient was stimulable for participation in more efficient breathing and cough avoidance strategies, including eating ice chips or sipping water, performing effortful swallow and nasal inhalation to humidify air and abduct vocal folds. Patient was stimulable for improved voice using SOVT exercises. She completed the following:  - blow bubbles without sound x10 given models  - blow bubbles with sound x10 given models and cues to continue airflow   - blow bubbles with sound pitch glide up x10 given models  - blow bubbles with sound pitch glide down x10 given models  - blow bubbles while saying automatic speech phrases x5 given models   - blow bubbles while saying automatic speech phrases x5 fading out bubbles given models   Discussed practicing this exercise frequently throughout the day (2-3x per day for 3-5 minutes each) to solidify muscle memory patterns.  Patient was also educated on and provided with outline for cough suppression at home.  Program is to be completed for 21 days.  Home practice includes 15 mins, 3x/day of 3 hard swallows followed by 15 sec rest.  Patient should also use hard swallows after any instance of coughing to reduce hypersensitivity. She was amenable to all suggestions.      [facility] NOMS (National Outcome Measure System):  Voice   LEVEL 5: Voice occasionally sounds normal with self-monitoring, but there is some situational variation. The individual¢s ability to participate in vocational, avocational, and social activities requiring voice is rarely affected in low-vocal demand activities, but is occasionally affected in high-vocal demand activities "   Treatment   Treatment Time In: 11:35  Treatment Time Out: 11:50  15 minutes    Procedure Min.   Speech-Language-Voice Therapy 15   Total Untimed Units: 1  Charges Billed/# of units: 1    See Education / Stimulability Trials section above.     Education: Plan of Care, role of SLP in care, aspiration precautions , course of medical disease affect on therapy diagnosis , scheduling/ cancellation policy and insurance limitations / visit limit, vocal hygiene (including phonotraumatic behaviors and their effect on the vocal mechanism), and home exercise program were discussed with patient. Patient expressed understanding.     Home Program: SOVT exercises, laryngeal desensitization protocol    Assessment     Patient presents with mild dysphonia and chronic cough/throat clearing secondary to laryngopharyngeal reflux as diagnosed by Dr. Lewis. Her voice is c/b mildly hoarse/strained vocal quality.  Prognosis for continued improvement is good given consistent adherence to HEP.     Short Term Goals: (4 weeks)    1. Patient will increase awareness of phonotraumatic behaviors including coughing, throat clearing, and strained voicing through self-monitoring to facilitate generalization in functional situations with 80% accuracy.    2. Patient will identify the antecedent sensations associated with coughing/throat clearing.     3. Patient will clear their throat fewer than 5 times during session.     4. Patient will implement and adhere to laryngeal desensitization protocol as outlined to them which includes several hard swallows to reduce irritability.    5. Patient will improve the quality, efficiency, and ease of phonation through resonant and/or airflow exercises from the syllable to conversation level with 80% accuracy.    6. Patient will complete SOVT exercises and/or resonant-focused exercises 3-5x daily to strengthen and balance the intrinsic laryngeal musculature and maximize glottic closure without medial  hyperfunction.    7. Patient will participate in clinical bedside exam to determine if further objective assessment of swallow function is warranted       Long Term Goals: 8 weeks  1. Patient will implement and adhere to vocal hygiene protocols on a daily basis, including the elimination of phonotraumatic behaviors.   2. Patient and clinician will facilitate changes in laryngeal function in order to restore functional use of voice and breathing for daily occupational, social, and emotional demands.    3. Patient will implement and adhere to open larynx breathing protocols and voice hygiene protocols on a daily basis.   4. She  will maintain adequate hydration/nutrition with optimum safety and efficiency of swallowing function on PO intake without overt signs and symptoms of aspiration for regular textures/thin liquids.  5. She  will utilize compensatory strategies with optimum safety and efficiency of swallowing function on PO intake without overt signs and symptoms of aspiration for regular textures/thin liquids.      Recommendations / POC    Plan of Care Certification Period: 10/5/2020 -11/30/2020    Recommended Treatment Plan: Recommend 4-6 sessions of voice/speech therapy over 6-8 weeks with a speech-language pathologist to optimize glottal postures for improved vocal function, vocal efficiency, ease of phonation, as well as a reduction in coughing/throat clearing.     Other Recommendations:   -Continue exercises as discussed in session  -Contact clinician with any further questions

## 2020-10-07 NOTE — PLAN OF CARE
"  [facility]  Physical Therapy Initial Evaluation    Name: Valencia Dumont  Clinic Number: [id number]    Therapy Diagnosis:   Encounter Diagnoses   Name Primary?    Foot pain, bilateral     Decreased range of motion of foot, unspecified laterality     Gait abnormality     Decreased strength of lower extremity Yes     Physician: Deepali Acosta DPM    Physician Orders: PT Eval and Treat   Medical Diagnosis from Referral: M79.671,M79.672 (ICD-10-CM) - Foot pain, bilateral  Evaluation Date: 10/5/2020  Authorization Period Expiration: 9/24/2021  Plan of Care Expiration: 1/5/2020  Visit # / Visits authorized: 1/ 1    Time In: 11:50 pm   Time Out: 12:30 pm   Total Billable Time: 40 minutes    Precautions: Standard and Diabetes    Subjective   Date of onset: ~a couple of months, insidious    History of current condition - Valencia reports: since she last went to the podiatrist, notice that her big toe started going under her other toes. States she has arthritis in her lower foot that may be causing her problems. Pt reports pain around big toe on both feet, sometimes gets pain in the arch of the foot. Pt reports she sometimes has pain in the R medial ankle. Pt reports increased pain with prolonged standing, walking. States she also has history of back pain. Pt reports she has always had a problem with stumbling and loosing her balance.     Current 5/10, worst 9/10, best 5/10   Location: bilateral big toes, ankles   Description: Sharp, Shooting and aching  Aggravating Factors: Standing and Walking  Easing Factors: massage, moving ankles around    Prior Therapy: yes  Social History: lives with their family  Occupation: disabled  Prior Level of Function: increasing limitations due to foot pain, limited in mobility by chronic health conditions  Current Level of Function: has to do housework in bouts, limited in standing and walking     Pts goals: "I would like to have painless feet"     Imaging, none " relevant    Medical History:   Past Medical History:   Diagnosis Date    Abnormal Pap smear of cervix     Anemia     Arthritis     Ascites     Asthma     Chronic back pain     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    Hypertension     Kidney stone     Lupus     Osteoporosis 12/2013    Prophylactic immunotherapy (transplant immunosuppression) 1/2/2014    SBP (spontaneous bacterial peritonitis)     history of        Surgical History:   Valencia Dumont  has a past surgical history that includes Esophagogastroduodenoscopy; Liver biopsy; Liver transplant (12/31/2013); Tubal ligation (2003); Colonoscopy (N/A, 5/31/2017); Refractive surgery (Bilateral, 2010); Upper gastrointestinal endoscopy; Esophagogastroduodenoscopy (N/A, 1/16/2019); Cholecystectomy; and Esophagogastroduodenoscopy (N/A, 9/9/2020).    Medications:   Valencia has a current medication list which includes the following prescription(s): azelastine, blood-glucose meter, blood-glucose meter,continuous, blood-glucose transmitter, bupropion, cholecalciferol (vitamin d3), ciclopirox, diazepam, diclofenac sodium, diltiazem hcl, ergocalciferol, ergocalciferol, erythromycin with ethanol, estradiol, famotidine, fluconazole, gabapentin, gentamicin, hydroxychloroquine, hydroxyzine hcl, insulin aspart u-100, insulin detemir u-100, isosorbide mononitrate, ketoconazole, lactobac. rhamnosus gg-inulin, levocetirizine, levofloxacin, losartan, magnesium oxide, meclizine, meloxicam, multivitamin, mycophenolate, neomycin-polymyxin-hydrocortisone, nystatin, nystatin, nystatin-triamcinolone, oxycodone-acetaminophen, pantoprazole, paroxetine, polyethylene glycol, proair hfa, promethazine-dextromethorphan, rosuvastatin, sertraline, ssd, tacrolimus, trazodone, triamcinolone acetonide 0.1%, valacyclovir, verapamil,  and zolpidem.    Allergies:   Review of patient's allergies indicates:   Allergen Reactions    Norvasc [amlodipine]      Severe headache    Doxycycline Rash    Pcn [penicillins] Rash       Objective     Posture Alignment: forward head posture, internal rotation of B shoulders    GAIT DEVIATIONS: Valencia displays decreased gait speed, decreased step length bilaterally    Ankle Range of Motion:   Ankle Right Left   Dorsiflexion Neutral/4  Neutral/4    Plantarflexion WFL WFL   Inversion 35 40   Eversion 5 8      Strength:   Right Ankle   Left Ankle    Dorsiflexion: 5/5 Dorsiflexion: 5/5   Plantarflexion:  4/5 Plantarflexion: 4/5   Inversion: 5/5 Inversion: 4/5   Eversion: 5/5 Eversion: 5/5       Right LE  Left LE    Hip flexion: 4+/5 Hip flexion: 4/5   Hip extension:  4/5 Hip extension: 3+/5   Hip abduction: 4/5 Hip abduction: 3/5   Knee extension: 5/5 Knee extension: 5/5   Knee flexion: 4-/5 Knee flexion: 4-/5     Special Tests:   Right Left   Navicular Drop positive positive   Windlass test negative negative       Joint Mobility: Decreased AP talocrural mobility, decreased mobility of 1st MTH    Palpation: Increased TTP noted to B plantar fascia, medial arch, 1st MTH, L navicular bone, R medial tarsal tunnel     Flexibility: Decreased flexibility to B gastroc/soleus complex    30 seconds B SLS, moderate sway         TREATMENT   Treatment Time In: 12:22 pm   Treatment Time Out: 12:30 pm   Total Treatment time separate from Evaluation: 8 minutes    Valencia received therapeutic exercises to develop strength, endurance, ROM and flexibility for 8 minutes including:  +Gastroc Stretch 3x30 ea    Home Exercises and Patient Education Provided    Education provided:   - role of PT, plan of care, goals, scheduling limitations, cancellation policies    Written Home Exercises Provided: yes.  Exercises were reviewed and Valencia was able to demonstrate them prior to the end of the session.  Valencia demonstrated good  understanding  of the education provided.     See EMR under Patient Instructions for exercises provided prior visit.    Assessment   Valencia is a 50 y.o. female referred to outpatient Physical Therapy with a medical diagnosis of bilateral foot pain. Pt presents to evaluation with decreased B ankle ROM, decreased ankle strength, decreased hip girdle/posterior chain strength, muscular dysfunction, increased pain, decreased tolerance to activities. Pt with significant limitations in flexibility noted to B gastroc/soleus complex. Pt also with limitations in B 1st MTH mobility. Pt with increased TTP primarily to B plantar fascia. Pt demonstrating impairments in LE biomechanics due to decreased hip girdle/posterior chain strength and B navicular drop in weight bearing. Pt limited in all standing, walking activities at this time secondary to B foot pain. Pt would benefit from skilled PT services in order to address listed deficits, decrease pain, establish HEP, improve walking tolerance, and maximize functional mobility. Pt is motivated to participate in therapy and is in agreement with POC.    Pt prognosis is Good.   Pt will benefit from skilled outpatient Physical Therapy to address the deficits stated above and in the chart below, provide pt/family education, and to maximize pt's level of independence.     Plan of care discussed with patient: Yes  Pt's spiritual, cultural and educational needs considered and patient is agreeable to the plan of care and goals as stated below:     Anticipated Barriers for therapy: chronicity of pain, co-morbitities    Medical Necessity is demonstrated by the following  History  Co-morbidities and personal factors that may impact the plan of care Co-morbidities:   anemia, arthritis, ascites, asthma, chronic back pain, cirrhosis of liver, T2DM, Esophageal varices, GERD, HTN, Osteoporosis, Lupus    Personal Factors:   no deficits     Complexity: low   Examination  Body Structures and Functions, activity  limitations and participation restrictions that may impact the plan of care Body Regions:   back  lower extremities  trunk    Body Systems:    ROM  strength  gross coordinated movement  balance  gait  transfers  transitions  motor control    Participation Restrictions:   Ability to walk community distances without pain    Activity limitations:   Learning and applying knowledge  no deficits    General Tasks and Commands  no deficits    Communication  no deficits    Mobility  lifting and carrying objects  walking    Self care  no deficits    Domestic Life  shopping  cooking  doing house work (cleaning house, washing dishes, laundry)    Interactions/Relationships  no deficits    Life Areas  no deficits    Community and Social Life  no deficits         Complexity: low  See FOTO outcome assessment   Clinical Presentation stable and uncomplicated low   Decision Making/ Complexity Score: low     Goals:  GOALS: Short Term Goals:  4 weeks  1.Report decreased  in  pain at worse less than  <   / =  7  /10  to increase tolerance for improved functional actvities  2. Pt to improve B ankle range of motion by 25% to allow for improved functional mobility to allow for improvement in IADLs.   3. Increased B LE MMT 1/2 grade  to increase tolerance for ADL and work activities.  4. Pt to report ability to stand/walk during household activities for > / = 30 minutes before requiring break in order to improve tolerance to household activities.   5. Pt to tolerate HEP to improve ROM and independence with ADL's    Long Term Goals: 8 weeks  1.Report decreased  in  pain at worse  less than  <   / =  4  /10  to increase tolerance for improved functional actvities  2.Pt to improve ankle range of motion by 75% to allow for improved functional mobility to allow for improvement in IADLs.   3.Increased B LE MMT 1 grade  to increase tolerance for ADL and work activities.  4. Pt will score < / = 46% disability on  FOTO outcome assessment  to  increase functional activities and mobility   5. Pt to be Independent with HEP to improve ROM and independence with ADL's  6. Pt to report ability to complete household chores for > / = 45 minutes in order to improve tolerance to daily activities.       Plan   Plan of care Certification: 10/5/2020 to 12/5/2020.    Outpatient Physical Therapy 2 times weekly for 8 weeks to include the following interventions: Gait Training, Manual Therapy, Moist Heat/ Ice, Neuromuscular Re-ed, Patient Education, Therapeutic Activites and Therapeutic Exercise, Dry needling.       Demi Burgos, PT, DPT  10/5/2020

## 2020-10-08 ENCOUNTER — CLINICAL SUPPORT (OUTPATIENT)
Dept: REHABILITATION | Facility: HOSPITAL | Age: 50
End: 2020-10-08
Attending: PODIATRIST
Payer: MEDICARE

## 2020-10-08 DIAGNOSIS — R26.9 GAIT ABNORMALITY: ICD-10-CM

## 2020-10-08 DIAGNOSIS — M25.676 DECREASED RANGE OF MOTION OF FOOT, UNSPECIFIED LATERALITY: ICD-10-CM

## 2020-10-08 PROCEDURE — 97110 THERAPEUTIC EXERCISES: CPT | Mod: PN,CQ

## 2020-10-08 NOTE — PROGRESS NOTES
"    Physical Therapy Treatment Note     Name: Valencia Dumont  Clinic Number: [id number]    Therapy Diagnosis:   Encounter Diagnoses   Name Primary?    Decreased range of motion of foot, unspecified laterality     Gait abnormality      Physician: Deepali Acosta DPM    Visit Date: 10/8/2020    Physician Orders: PT Eval and Treat   Medical Diagnosis from Referral: M79.671,M79.672 (ICD-10-CM) - Foot pain, bilateral  Evaluation Date: 10/5/2020  Authorization Period Expiration: 9/24/2021  Plan of Care Expiration: 1/5/2020  Visit # / Visits authorized: 2/5     Time In: 1057 (pt 10 minutes late)  Time Out: 1140  Total Time: 43 minutes  Total Billable Time: 33 minutes     Precautions: Standard and Diabetes    Subjective     Pt reports: shooting pain from R arch into calf post evaluation. Reports pain increased to 8/10 making. Pt reports she took a pain pill yesterday and today. She mainly takes them when going to the MD or therapy.  She was compliant with home exercise program.  Response to previous treatment: fair  Functional change: ongoing    Pain: 5/10  Location: right foot      Objective     Valencia received therapeutic exercises to develop strength, endurance, ROM, flexibility, posture and core stabilization for 23 minutes including:    Seated HRs 3x10  Seated TRs 3x10  Towel curls + DF and abduction of toes 3 minutes  [place] pickups x1 trial each foot  Self Extension stretch of toes 3x30"  Tennis balls to plantar surface 2 minutes  Arch doming x20       Valencia received the following manual therapy techniques: Myofacial release were applied to the: B plantar surface of the feet for 10 minutes, including:    MFR via hawkgrips to plantar surface of B feet    Valencia participated in neuromuscular re-education activities to improve: Balance, Coordination, Kinesthetic, Sense, Proprioception and Posture for 00 minutes. The following activities were included:    Next session:   + standing on blue foam WBOS, NBOS  + Tandem " standing      Valencia participated in dynamic functional therapeutic activities to improve functional performance for 00  minutes, including:      Valencia received hot pack for 10 minutes to B feet in sitting.          Home Exercises Provided and Patient Education Provided     Education provided:   - Compliance with HEP  - Possibility of DOMs    Written Home Exercises Provided: Patient instructed to cont prior HEP.  Exercises were reviewed and Valencia was able to demonstrate them prior to the end of the session.  Valencia demonstrated good  understanding of the education provided.     See EMR under Patient Instructions for exercises provided prior visit.    Assessment   Pt tolerated treatment good today. Limited with therex due to pt's tardiness. Myofascial inconsistencies throughout B medial arch with R foot>L foot. Challenged with new therex post initial evaluation with no adverse reactions. No complaints of shooting pain from foot to gastroc region at end of session. Noted 2nd digit of R foot crosses over 3rd digit making it difficult for pt to perform marble pickups. Plan to further challenge intrinsic musculature next session.       Valencia is progressing well towards her goals.   Pt prognosis is Good.     Pt will continue to benefit from skilled outpatient physical therapy to address the deficits listed in the problem list box on initial evaluation, provide pt/family education and to maximize pt's level of independence in the home and community environment.     Pt's spiritual, cultural and educational needs considered and pt agreeable to plan of care and goals.     Anticipated barriers to physical therapy: chronicity of pain, co-morbitities    Goals:   GOALS: Short Term Goals:  4 weeks  1.Report decreased  in  pain at worse less than  <   / =  7  /10  to increase tolerance for improved functional actvities  2. Pt to improve B ankle range of motion by 25% to allow for improved functional mobility to allow for  improvement in IADLs.   3. Increased B LE MMT 1/2 grade  to increase tolerance for ADL and work activities.  4. Pt to report ability to stand/walk during household activities for > / = 30 minutes before requiring break in order to improve tolerance to household activities.   5. Pt to tolerate HEP to improve ROM and independence with ADL's     Long Term Goals: 8 weeks  1.Report decreased  in  pain at worse  less than  <   / =  4  /10  to increase tolerance for improved functional actvities  2.Pt to improve ankle range of motion by 75% to allow for improved functional mobility to allow for improvement in IADLs.   3.Increased B LE MMT 1 grade  to increase tolerance for ADL and work activities.  4. Pt will score < / = 46% disability on  FOTO outcome assessment  to increase functional activities and mobility   5. Pt to be Independent with HEP to improve ROM and independence with ADL's  6. Pt to report ability to complete household chores for > / = 45 minutes in order to improve tolerance to daily activities.        Plan     Continue to progress with current treatment plan.     Plan of care Certification: 10/5/2020 to 12/5/2020.     Outpatient Physical Therapy 2 times weekly for 8 weeks   Janey Choudhury, RADHA

## 2020-10-11 ENCOUNTER — PATIENT MESSAGE (OUTPATIENT)
Dept: OTOLARYNGOLOGY | Facility: CLINIC | Age: 50
End: 2020-10-11

## 2020-10-12 ENCOUNTER — PATIENT MESSAGE (OUTPATIENT)
Dept: OTOLARYNGOLOGY | Facility: CLINIC | Age: 50
End: 2020-10-12

## 2020-10-12 NOTE — TELEPHONE ENCOUNTER
Spoke with patient and explained to her that her appointment for this week was canceled and that we will see her next week on 10/20.

## 2020-10-12 NOTE — PROGRESS NOTES
Chief Complaint   Patient presents with    Dizziness        Valencia Dumont is a 50 y.o. female with a history of multiple medical diagnoses as listed below that presents for evaluation of dizziness. She had vestibular testing that was suspicious for a central source of her dizziness. She has been having episodes of dizziness that have been made worse with head movements. Dizziness is described as room spinning sensations that have provoked nausea on occasion.    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    Hypertension     Kidney stone     Lupus     Osteoporosis 12/2013    Prophylactic immunotherapy (transplant immunosuppression) 1/2/2014    SBP (spontaneous bacterial peritonitis)     history of        PAST SURGICAL HISTORY:  Past Surgical History:   Procedure Laterality Date    CHOLECYSTECTOMY      COLONOSCOPY N/A 5/31/2017    Procedure: COLONOSCOPY;  Surgeon: Marky Sun MD;  Location: [facility] ([address] [place] [address]);  Service: Endoscopy;  Laterality: N/A;  PM prep.    ESOPHAGOGASTRODUODENOSCOPY      ESOPHAGOGASTRODUODENOSCOPY N/A 1/16/2019    Procedure: EGD (ESOPHAGOGASTRODUODENOSCOPY);  Surgeon: Deniz Guerra MD;  Location: [facility] ([facility]);  Service: Endoscopy;  Laterality: N/A;  labs prior, s/p liver transplant-MS    ESOPHAGOGASTRODUODENOSCOPY N/A 9/9/2020    Procedure: EGD (ESOPHAGOGASTRODUODENOSCOPY);  Surgeon: Davion Ríos MD;  Location: [facility] ([facility]  Service: Endoscopy;  Laterality: N/A;  Please order the EGD at least 2 weeks after barium esophagram has been done EGD is for dysphagia  covid test 9/6-[facility]    LIVER BIOPSY      LIVER TRANSPLANT   12/31/2013    REFRACTIVE SURGERY Bilateral 2010    TUBAL LIGATION  2003    UPPER GASTROINTESTINAL ENDOSCOPY         SOCIAL HISTORY:  Social History     Socioeconomic History    Marital status:      Spouse name: Not on file    Number of children: 3    Years of education: Not on file    Highest education level: Not on file   Occupational History     Employer: [employer]   Social Needs    Financial resource strain: Not on file    Food insecurity     Worry: Not on file     Inability: Not on file    Transportation needs     Medical: Not on file     Non-medical: Not on file   Tobacco Use    Smoking status: Never Smoker    Smokeless tobacco: Never Used    Tobacco comment: disability; ; 3 children   Substance and Sexual Activity    Alcohol use: Yes     Alcohol/week: 1.0 standard drinks     Types: 1 Glasses of wine per week     Comment: occasionally     Drug use: No    Sexual activity: Yes     Partners: Male     Birth control/protection: See Surgical Hx, Post-menopausal     Comment: s/p tubal ligation,  since 1989   Lifestyle    Physical activity     Days per week: Not on file     Minutes per session: Not on file    Stress: Not on file   Relationships    Social connections     Talks on phone: Not on file     Gets together: Not on file     Attends Gnosticist service: Not on file     Active member of club or organization: Not on file     Attends meetings of clubs or organizations: Not on file     Relationship status: Not on file   Other Topics Concern    Not on file   Social History Narrative    Not on file       FAMILY HISTORY:  Family History   Problem Relation Age of Onset    Hypertension Mother     Cataracts Mother     Diabetes Father     Alzheimer's disease Father     Diabetes Paternal Grandfather     Diabetes Paternal Grandmother     No Known Problems Maternal Grandmother     Bone cancer Maternal Grandfather     Breast cancer Maternal Aunt 55    Hypertension Brother      Diabetes Brother     No Known Problems Sister     No Known Problems Maternal Uncle     No Known Problems Paternal Aunt     No Known Problems Paternal Uncle     Stroke Neg Hx     Cancer Neg Hx     Colon cancer Neg Hx     Esophageal cancer Neg Hx     Stomach cancer Neg Hx     Rectal cancer Neg Hx     Amblyopia Neg Hx     Blindness Neg Hx     Glaucoma Neg Hx     Macular degeneration Neg Hx     Retinal detachment Neg Hx     Strabismus Neg Hx     Thyroid disease Neg Hx        ALLERGIES AND MEDICATIONS: updated and reviewed.  Review of patient's allergies indicates:   Allergen Reactions    Norvasc [amlodipine]      Severe headache    Doxycycline Rash    Pcn [penicillins] Rash     Current Outpatient Medications   Medication Sig Dispense Refill    azelastine (ASTELIN) 137 mcg (0.1 %) nasal spray use ONE spray in each nostril TWICE DAILY 30 mL 3    blood-glucose meter,continuous (DEXCOM G6 ) Misc 1 each by Misc.(Non-Drug; Combo Route) route once. for 1 dose 1 each 0    blood-glucose transmitter (DEXCOM G6 TRANSMITTER) Karen 1 each by Misc.(Non-Drug; Combo Route) route once a week 1 Device 3    cholecalciferol, vitamin D3, (VITAMIN D3) 50 mcg (2,000 unit) Cap Take 1 capsule (2,000 Units total) by mouth once daily. 90 capsule 3    ciclopirox (PENLAC) 8 % Soln Apply topically nightly. 1 Bottle 3    diazepam (VALIUM) 5 MG tablet Take 5 mg by mouth 3 (three) times daily as needed.   0    diclofenac sodium (VOLTAREN) 1 % Gel Voltaren 1 % topical gel   APPLY 2 GRAM TO THE AFFECTED AREA(S) BY TOPICAL ROUTE 4 TIMES PER DAY      DILTIAZEM HCL (DILTIAZEM 2% CREAM) Apply topically 3 (three) times daily. Apply topically to anal area. 30 g 0    ergocalciferol (ERGOCALCIFEROL) 50,000 unit Cap Take 1 capsule (50,000 Units total) by mouth every 7 days. for 12 doses 12 capsule 0    ergocalciferol (ERGOCALCIFEROL) 50,000 unit Cap Take 1 capsule (50,000 Units total) by mouth every 7 days. for 12 doses 12  capsule 0    erythromycin with ethanol (EMGEL) 2 % gel ADD 30GM (1 TUBE) TO THE SOAKING DEVICE AND ALLOW WATER TO AGITATE. PLACE AFFECTED AREAS INTO WATER AND SOAK FOR 10 MINUTES 1-2 TIMES DAILY  1    famotidine (PEPCID) 40 MG tablet       fluconazole (DIFLUCAN) 150 MG Tab TAKE ONE TABLET BY MOUTH once for ONE dose  0    gabapentin (NEURONTIN) 300 MG capsule TAKE 1 CAPSULE BY MOUTH THREE TIMES DAILY      gentamicin (GARAMYCIN) 0.1 % cream ADD 30GM (1 TUBE) TO THE SOAKING DEVICE AND ALLOW WATER TO AGITATE. PLACE AFFECTED AREAS INTO WATER AND SOAK FOR 10 MINUTES 1-2 TIMES DAILY  1    hydrOXYchloroQUINE (PLAQUENIL) 200 mg tablet TAKE ONE TABLET BY MOUTH TWICE DAILY 60 tablet 2    hydrOXYzine HCl (ATARAX) 10 MG Tab TAKE 1 OR 2 TABLETS BY MOUTH THREE TIMES DAILY AS NEEDED FOR ITCHING.  0    insulin aspart U-100 (NOVOLOG FLEXPEN U-100 INSULIN) 100 unit/mL (3 mL) InPn pen Takes three units before breakfast and lunch  and 5 units before dinner with sliding scale, up to 25 units daily 45 mL 3    insulin detemir U-100 (LEVEMIR FLEXTOUCH U-100 INSULN) 100 unit/mL (3 mL) SubQ InPn pen Inject 4 Units into the skin 2 (two) times daily. 45 mL 3    isosorbide mononitrate (IMDUR) 30 MG 24 hr tablet Take 30 mg by mouth once daily.      ketoconazole (NIZORAL) 2 % cream ADD 30GM (1/2 TUBE) TO THE SOAKING DEVICE AND ALLOW WATER TO AGITATE. PLACE AFFECTED AREAS INTO WATER AND SOAK FOR 10 MINUTES 1-2 TIMES BRENDON  1    Lactobac. rhamnosus GG-inulin 10 billion cell -200 mg Cap Take 1 capsule by mouth 2 (two) times daily. 30 capsule 0    levocetirizine (XYZAL) 5 MG tablet Take 5 mg by mouth every evening.  3    levoFLOXacin (LEVAQUIN) 750 MG tablet levofloxacin 750 mg tablet      losartan (COZAAR) 100 MG tablet Take 1 tablet (100 mg total) by mouth once daily. 30 tablet 2    magnesium oxide 500 mg Tab Take 500 mg by mouth 2 (two) times daily. 60 each 6    meclizine (ANTIVERT) 25 mg tablet TAKE ONE TABLET BY MOUTH AT BEDTIME  AS NEEDED for dizziness.  0    meloxicam (MOBIC) 15 MG tablet       MULTIVIT,THER IRON,CA,FA & MIN (MULTIVITAMIN) Tab Take 1 tablet by mouth once daily. 30 tablet 0    neomycin-polymyxin-hydrocortisone (CORTISPORIN) otic solution INSTILL TWO DROPS INTO BOTH EARS FOUR TIMES DAILY FOR 10 DAYS  0    nystatin (MYCOSTATIN) 100,000 unit/mL suspension SWISH AND SPIT FIVE MILLILITERS BY MOUTH FOUR TIMES DAILY FOR 7 DAYS.  1    nystatin (MYCOSTATIN) cream Apply topically 2 times daily 30 g 0    oxycodone-acetaminophen (PERCOCET) 7.5-325 mg per tablet Take 1 tablet by mouth every 4 (four) hours as needed for Pain.      pantoprazole (PROTONIX) 40 MG tablet Take 1 tablet (40 mg total) by mouth 2 (two) times daily. Take immediately in am when wake up and then 30 minutes prior to dinner 60 tablet 11    paroxetine (PAXIL) 10 MG tablet paroxetine 10 mg tablet      polyethylene glycol (GLYCOLAX) 17 gram/dose powder Mix 1 capful (17 g) with liquid and by mouth 2 (two) times daily. 1530 g 11    PROAIR HFA 90 mcg/actuation inhaler Inhale 2 puffs into the lungs 3 (three) times daily as needed.   3    promethazine-dextromethorphan (PROMETHAZINE-DM) 6.25-15 mg/5 mL Syrp Take by mouth 2 (two) times daily as needed.   0    rosuvastatin (CRESTOR) 5 MG tablet Take 5 mg by mouth once daily.      sertraline (ZOLOFT) 50 MG tablet Take 50 mg by mouth once daily.  11    SSD 1 % cream 1 application 2 (two) times daily. Apply to affected area  0    tacrolimus (PROGRAF) 1 MG Cap Take 3 capsules (3 mg total) by mouth every 12 (twelve) hours. 180 capsule 11    traZODone (DESYREL) 50 MG tablet Take 50 mg by mouth nightly.  0    triamcinolone acetonide 0.1% (KENALOG) 0.1 % cream Apply topically 2 times daily 30 g 0    valACYclovir (VALTREX) 500 MG tablet TAKE ONE TABLET BY MOUTH TWICE DAILY FOR 3 DAYS. 6 tablet 2    verapamil (CALAN-SR) 120 MG CR tablet TAKE ONE TABLET ONCE DAILY FOR BLOOD PRESSURE  3    zolpidem (AMBIEN) 10 mg Tab  Take 10 mg by mouth nightly as needed.  3    blood-glucose meter kit To check BG 5 times daily, to use with insurance preferred meter 1 each 0    buPROPion (WELLBUTRIN XL) 150 MG TB24 tablet Take 300 mg by mouth.      estradiol (YUVAFEM) 10 mcg Tab Place 1 tablet (10 mcg total) vaginally twice a week. 8 tablet 11    mycophenolate (MYFORTIC) 180 MG TbEC TAKE TWO TABLETS BY MOUTH TWICE DAILY. 120 tablet 5    nystatin-triamcinolone (MYCOLOG II) cream Apply to affected area 2 times daily 30 g 1     No current facility-administered medications for this visit.        Review of Systems   Constitutional: Negative for activity change, appetite change, fever and unexpected weight change.   HENT: Negative for trouble swallowing and voice change.    Eyes: Negative for photophobia and visual disturbance.   Respiratory: Negative for apnea and shortness of breath.    Cardiovascular: Negative for chest pain and leg swelling.   Gastrointestinal: Negative for constipation and nausea.   Genitourinary: Negative for difficulty urinating.   Musculoskeletal: Negative for back pain, gait problem and neck pain.   Skin: Negative for color change and pallor.   Neurological: Positive for dizziness. Negative for seizures, syncope, weakness and numbness.   Hematological: Negative for adenopathy.   Psychiatric/Behavioral: Negative for agitation, confusion and decreased concentration.       Neurologic Exam     Mental Status   Oriented to person, place, and time.   Registration: recalls 3 of 3 objects.   Attention: normal. Concentration: normal.   Speech: speech is normal   Level of consciousness: alert  Knowledge: good.     Cranial Nerves     CN II   Visual fields full to confrontation.   Right visual field deficit: none  Left visual field deficit: none     CN III, IV, VI   Pupils are equal, round, and reactive to light.  Extraocular motions are normal.   Right pupil: Size: 3 mm. Shape: regular. Accommodation: intact.   Left pupil: Size: 3  mm. Shape: regular. Accommodation: intact.   CN III: no CN III palsy  CN VI: no CN VI palsy  Nystagmus: none   Diplopia: none  Ophthalmoparesis: none  Upgaze: normal  Downgaze: normal  Conjugate gaze: present    CN V   Facial sensation intact.   Right facial sensation deficit: none  Left facial sensation deficit: none    CN VII   Facial expression full, symmetric.   Right facial weakness: none  Left facial weakness: none    CN VIII   CN VIII normal.     CN IX, X   CN IX normal.   CN X normal.   Palate: symmetric    CN XI   CN XI normal.   Right sternocleidomastoid strength: normal  Left sternocleidomastoid strength: normal  Right trapezius strength: normal  Left trapezius strength: normal    CN XII   CN XII normal.   Tongue deviation: none    Motor Exam   Muscle bulk: normal  Overall muscle tone: normal  Right arm tone: normal  Left arm tone: normal  Right leg tone: normal  Left leg tone: normal    Strength   Strength 5/5 throughout.     Sensory Exam   Right arm light touch: normal  Left arm light touch: normal  Right leg light touch: normal  Left leg light touch: normal  Right arm vibration: normal  Left arm vibration: normal  Right leg vibration: normal  Left leg vibration: normal  Right arm proprioception: normal  Left arm proprioception: normal  Right leg proprioception: normal  Left leg proprioception: normal  Right arm pinprick: normal  Left arm pinprick: normal  Right leg pinprick: normal  Left leg pinprick: normal    Gait, Coordination, and Reflexes     Gait  Gait: normal    Coordination   Romberg: negative  Finger to nose coordination: normal  Heel to shin coordination: normal  Tandem walking coordination: normal    Tremor   Resting tremor: absent    Reflexes   Right brachioradialis: 2+  Left brachioradialis: 2+  Right biceps: 2+  Left biceps: 2+  Right triceps: 2+  Left triceps: 2+  Right patellar: 2+  Left patellar: 2+  Right achilles: 2+  Left achilles: 2+  Right plantar: normal  Left plantar:  "normal      Physical Exam  Vitals signs reviewed.   Constitutional:       Appearance: She is well-developed.   HENT:      Head: Normocephalic and atraumatic.   Eyes:      Extraocular Movements: EOM normal.      Pupils: Pupils are equal, round, and reactive to light.   Neck:      Musculoskeletal: Normal range of motion.   Cardiovascular:      Rate and Rhythm: Normal rate.   Pulmonary:      Effort: Pulmonary effort is normal. No respiratory distress.   Musculoskeletal: Normal range of motion.   Skin:     General: Skin is warm and dry.   Neurological:      Mental Status: She is alert and oriented to person, place, and time.      Coordination: Finger-Nose-Finger Test, Heel to Shin Test and Romberg Test normal.      Gait: Gait is intact. Tandem walk normal.      Deep Tendon Reflexes: Strength normal.      Reflex Scores:       Tricep reflexes are 2+ on the right side and 2+ on the left side.       Bicep reflexes are 2+ on the right side and 2+ on the left side.       Brachioradialis reflexes are 2+ on the right side and 2+ on the left side.       Patellar reflexes are 2+ on the right side and 2+ on the left side.       Achilles reflexes are 2+ on the right side and 2+ on the left side.  Psychiatric:         Speech: Speech normal.         Behavior: Behavior normal.         Thought Content: Thought content normal.         Vitals:    09/21/20 1257   BP: (!) 148/78   BP Location: Right arm   Patient Position: Sitting   BP Method: Large (Automatic)   Pulse: 84   Weight: 65.5 kg (144 lb 6.4 oz)   Height: 5' 9" (1.753 m)       Assessment & Plan:    Problem List Items Addressed This Visit     None      Visit Diagnoses     Central vestibular vertigo    -  Primary    Relevant Orders    MRA Brain without contrast (Completed)    Dizziness and giddiness        Relevant Orders    MRA Brain without contrast (Completed)    MRI Brain Without Contrast (Completed)        More than 50% of this 45 minute encounter was spent in counseling and " coordinating care of vertigo.    Follow-up: No follow-ups on file.    This note was done with the assistance of voice recognition software. Some errors may be present after proofreading.

## 2020-10-13 ENCOUNTER — PATIENT MESSAGE (OUTPATIENT)
Dept: NEUROLOGY | Facility: CLINIC | Age: 50
End: 2020-10-13

## 2020-10-13 ENCOUNTER — OFFICE VISIT (OUTPATIENT)
Dept: NEUROLOGY | Facility: CLINIC | Age: 50
End: 2020-10-13
Payer: MEDICARE

## 2020-10-13 DIAGNOSIS — G43.009 MIGRAINE WITHOUT AURA AND WITHOUT STATUS MIGRAINOSUS, NOT INTRACTABLE: ICD-10-CM

## 2020-10-13 DIAGNOSIS — H81.4 CENTRAL VESTIBULAR VERTIGO: Primary | ICD-10-CM

## 2020-10-13 PROCEDURE — 99213 PR OFFICE/OUTPT VISIT, EST, LEVL III, 20-29 MIN: ICD-10-PCS | Mod: 95,,, | Performed by: NEUROLOGICAL SURGERY

## 2020-10-13 PROCEDURE — 99213 OFFICE O/P EST LOW 20 MIN: CPT | Mod: 95,,, | Performed by: NEUROLOGICAL SURGERY

## 2020-10-13 RX ORDER — RIMEGEPANT SULFATE 75 MG/75MG
75 TABLET, ORALLY DISINTEGRATING ORAL ONCE AS NEEDED
Qty: 8 TABLET | Refills: 2 | Status: SHIPPED | OUTPATIENT
Start: 2020-10-13 | End: 2020-10-13

## 2020-10-13 NOTE — PROGRESS NOTES
No chief complaint on file.       Valencia Dumont is a 50 y.o. female with a history of multiple medical diagnoses as listed below that presents for evaluation of dizziness. She had vestibular testing that was suspicious for a central source of her dizziness. She has been having episodes of dizziness that have been made worse with head movements. Dizziness is described as room spinning sensations that have provoked nausea on occasion.    INterval History  10/13/2020  Episodes of dizziness have not been as bothersome recently due to headache.  She has been having headache and myalgias over the last few days which have been unexplained.  She has had headaches that have been similar in presentation in the past.  She is currently experiencing a bifrontal pressure-like pain that is worse with exposure to light has been causing her to feel nauseated..    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    Hypertension     Kidney stone     Lupus     Osteoporosis 12/2013    Prophylactic immunotherapy (transplant immunosuppression) 1/2/2014    SBP (spontaneous bacterial peritonitis)     history of        PAST SURGICAL HISTORY:  Past Surgical History:   Procedure Laterality Date    CHOLECYSTECTOMY      COLONOSCOPY N/A 5/31/2017    Procedure: COLONOSCOPY;  Surgeon: Marky Sun MD;  Location: [address]);  Service: Endoscopy;  Laterality: N/A;  PM prep.    ESOPHAGOGASTRODUODENOSCOPY      ESOPHAGOGASTRODUODENOSCOPY N/A 1/16/2019    Procedure: EGD (ESOPHAGOGASTRODUODENOSCOPY);  Surgeon: Deniz Guerra MD;  Location: [address]);  Service: Endoscopy;  Laterality: N/A;  labs prior, s/p  liver transplant-MS    ESOPHAGOGASTRODUODENOSCOPY N/A 9/9/2020    Procedure: EGD (ESOPHAGOGASTRODUODENOSCOPY);  Surgeon: Davion Ríos MD;  Location: [address];  Service: Endoscopy;  Laterality: N/A;  Please order the EGD at least 2 weeks after barium esophagram has been done EGD is for dysphagia  covid test 9/6-[facility]    LIVER BIOPSY      LIVER TRANSPLANT  12/31/2013    REFRACTIVE SURGERY Bilateral 2010    TUBAL LIGATION  2003    UPPER GASTROINTESTINAL ENDOSCOPY         SOCIAL HISTORY:  Social History     Socioeconomic History    Marital status:      Spouse name: Not on file    Number of children: 3    Years of education: Not on file    Highest education level: Not on file   Occupational History     Employer: [employer]   Social Needs    Financial resource strain: Not on file    Food insecurity     Worry: Not on file     Inability: Not on file    Transportation needs     Medical: Not on file     Non-medical: Not on file   Tobacco Use    Smoking status: Never Smoker    Smokeless tobacco: Never Used    Tobacco comment: disability; ; 3 children   Substance and Sexual Activity    Alcohol use: Yes     Alcohol/week: 1.0 standard drinks     Types: 1 Glasses of wine per week     Comment: occasionally     Drug use: No    Sexual activity: Yes     Partners: Male     Birth control/protection: See Surgical Hx, Post-menopausal     Comment: s/p tubal ligation,  since 1989   Lifestyle    Physical activity     Days per week: Not on file     Minutes per session: Not on file    Stress: Not on file   Relationships    Social connections     Talks on phone: Not on file     Gets together: Not on file     Attends Yazidi service: Not on file     Active member of club or organization: Not on file     Attends meetings of clubs or organizations: Not on file     Relationship status: Not on file   Other Topics Concern    Not on file   Social History Narrative    Not  on file       FAMILY HISTORY:  Family History   Problem Relation Age of Onset    Hypertension Mother     Cataracts Mother     Diabetes Father     Alzheimer's disease Father     Diabetes Paternal Grandfather     Diabetes Paternal Grandmother     No Known Problems Maternal Grandmother     Bone cancer Maternal Grandfather     Breast cancer Maternal Aunt 55    Hypertension Brother     Diabetes Brother     No Known Problems Sister     No Known Problems Maternal Uncle     No Known Problems Paternal Aunt     No Known Problems Paternal Uncle     Stroke Neg Hx     Cancer Neg Hx     Colon cancer Neg Hx     Esophageal cancer Neg Hx     Stomach cancer Neg Hx     Rectal cancer Neg Hx     Amblyopia Neg Hx     Blindness Neg Hx     Glaucoma Neg Hx     Macular degeneration Neg Hx     Retinal detachment Neg Hx     Strabismus Neg Hx     Thyroid disease Neg Hx        ALLERGIES AND MEDICATIONS: updated and reviewed.  Review of patient's allergies indicates:   Allergen Reactions    Norvasc [amlodipine]      Severe headache    Doxycycline Rash    Pcn [penicillins] Rash     Current Outpatient Medications   Medication Sig Dispense Refill    azelastine (ASTELIN) 137 mcg (0.1 %) nasal spray use ONE spray in each nostril TWICE DAILY 30 mL 3    blood-glucose meter kit To check BG 5 times daily, to use with insurance preferred meter 1 each 0    blood-glucose meter,continuous (DEXCOM G6 ) Misc 1 each by Misc.(Non-Drug; Combo Route) route once. for 1 dose 1 each 0    blood-glucose transmitter (DEXCOM G6 TRANSMITTER) Karen 1 each by Misc.(Non-Drug; Combo Route) route once a week 1 Device 3    buPROPion (WELLBUTRIN XL) 150 MG TB24 tablet Take 300 mg by mouth.      cholecalciferol, vitamin D3, (VITAMIN D3) 50 mcg (2,000 unit) Cap Take 1 capsule (2,000 Units total) by mouth once daily. 90 capsule 3    ciclopirox (PENLAC) 8 % Soln Apply topically nightly. 1 Bottle 3    diazepam (VALIUM) 5 MG tablet Take 5  mg by mouth 3 (three) times daily as needed.   0    diclofenac sodium (VOLTAREN) 1 % Gel Voltaren 1 % topical gel   APPLY 2 GRAM TO THE AFFECTED AREA(S) BY TOPICAL ROUTE 4 TIMES PER DAY      DILTIAZEM HCL (DILTIAZEM 2% CREAM) Apply topically 3 (three) times daily. Apply topically to anal area. 30 g 0    ergocalciferol (ERGOCALCIFEROL) 50,000 unit Cap Take 1 capsule (50,000 Units total) by mouth every 7 days. for 12 doses 12 capsule 0    ergocalciferol (ERGOCALCIFEROL) 50,000 unit Cap Take 1 capsule (50,000 Units total) by mouth every 7 days. for 12 doses 12 capsule 0    erythromycin with ethanol (EMGEL) 2 % gel ADD 30GM (1 TUBE) TO THE SOAKING DEVICE AND ALLOW WATER TO AGITATE. PLACE AFFECTED AREAS INTO WATER AND SOAK FOR 10 MINUTES 1-2 TIMES DAILY  1    estradiol (YUVAFEM) 10 mcg Tab Place 1 tablet (10 mcg total) vaginally twice a week. 8 tablet 11    famotidine (PEPCID) 40 MG tablet       fluconazole (DIFLUCAN) 150 MG Tab TAKE ONE TABLET BY MOUTH once for ONE dose  0    gabapentin (NEURONTIN) 300 MG capsule TAKE 1 CAPSULE BY MOUTH THREE TIMES DAILY      gentamicin (GARAMYCIN) 0.1 % cream ADD 30GM (1 TUBE) TO THE SOAKING DEVICE AND ALLOW WATER TO AGITATE. PLACE AFFECTED AREAS INTO WATER AND SOAK FOR 10 MINUTES 1-2 TIMES DAILY  1    hydrOXYchloroQUINE (PLAQUENIL) 200 mg tablet TAKE ONE TABLET BY MOUTH TWICE DAILY 60 tablet 2    hydrOXYzine HCl (ATARAX) 10 MG Tab TAKE 1 OR 2 TABLETS BY MOUTH THREE TIMES DAILY AS NEEDED FOR ITCHING.  0    insulin aspart U-100 (NOVOLOG FLEXPEN U-100 INSULIN) 100 unit/mL (3 mL) InPn pen Takes three units before breakfast and lunch  and 5 units before dinner with sliding scale, up to 25 units daily 45 mL 3    insulin detemir U-100 (LEVEMIR FLEXTOUCH U-100 INSULN) 100 unit/mL (3 mL) SubQ InPn pen Inject 4 Units into the skin 2 (two) times daily. 45 mL 3    isosorbide mononitrate (IMDUR) 30 MG 24 hr tablet Take 30 mg by mouth once daily.      ketoconazole (NIZORAL) 2 %  cream ADD 30GM (1/2 TUBE) TO THE SOAKING DEVICE AND ALLOW WATER TO AGITATE. PLACE AFFECTED AREAS INTO WATER AND SOAK FOR 10 MINUTES 1-2 TIMES BRENDON  1    Lactobac. rhamnosus GG-inulin 10 billion cell -200 mg Cap Take 1 capsule by mouth 2 (two) times daily. 30 capsule 0    levocetirizine (XYZAL) 5 MG tablet Take 5 mg by mouth every evening.  3    levoFLOXacin (LEVAQUIN) 750 MG tablet levofloxacin 750 mg tablet      losartan (COZAAR) 100 MG tablet Take 1 tablet (100 mg total) by mouth once daily. 30 tablet 2    magnesium oxide 500 mg Tab Take 500 mg by mouth 2 (two) times daily. 60 each 6    meclizine (ANTIVERT) 25 mg tablet TAKE ONE TABLET BY MOUTH AT BEDTIME AS NEEDED for dizziness.  0    meloxicam (MOBIC) 15 MG tablet       MULTIVIT,THER IRON,CA,FA & MIN (MULTIVITAMIN) Tab Take 1 tablet by mouth once daily. 30 tablet 0    mycophenolate (MYFORTIC) 180 MG TbEC TAKE TWO TABLETS BY MOUTH TWICE DAILY. 120 tablet 5    neomycin-polymyxin-hydrocortisone (CORTISPORIN) otic solution INSTILL TWO DROPS INTO BOTH EARS FOUR TIMES DAILY FOR 10 DAYS  0    nystatin (MYCOSTATIN) 100,000 unit/mL suspension SWISH AND SPIT FIVE MILLILITERS BY MOUTH FOUR TIMES DAILY FOR 7 DAYS.  1    nystatin (MYCOSTATIN) cream Apply topically 2 times daily 30 g 0    nystatin-triamcinolone (MYCOLOG II) cream Apply to affected area 2 times daily 30 g 1    oxycodone-acetaminophen (PERCOCET) 7.5-325 mg per tablet Take 1 tablet by mouth every 4 (four) hours as needed for Pain.      pantoprazole (PROTONIX) 40 MG tablet Take 1 tablet (40 mg total) by mouth 2 (two) times daily. Take immediately in am when wake up and then 30 minutes prior to dinner 60 tablet 11    paroxetine (PAXIL) 10 MG tablet paroxetine 10 mg tablet      polyethylene glycol (GLYCOLAX) 17 gram/dose powder Mix 1 capful (17 g) with liquid and by mouth 2 (two) times daily. 1530 g 11    PROAIR HFA 90 mcg/actuation inhaler Inhale 2 puffs into the lungs 3 (three) times daily as  needed.   3    promethazine-dextromethorphan (PROMETHAZINE-DM) 6.25-15 mg/5 mL Syrp Take by mouth 2 (two) times daily as needed.   0    rimegepant (NURTEC) ODT 75 mg Take 1 tablet (75 mg total) by mouth once as needed for Migraine. Place ODT tablet on the tongue; alternatively the ODT tablet may be placed under the tongue 8 tablet 2    rosuvastatin (CRESTOR) 5 MG tablet Take 5 mg by mouth once daily.      sertraline (ZOLOFT) 50 MG tablet Take 50 mg by mouth once daily.  11    SSD 1 % cream 1 application 2 (two) times daily. Apply to affected area  0    tacrolimus (PROGRAF) 1 MG Cap Take 3 capsules (3 mg total) by mouth every 12 (twelve) hours. 180 capsule 11    traZODone (DESYREL) 50 MG tablet Take 50 mg by mouth nightly.  0    triamcinolone acetonide 0.1% (KENALOG) 0.1 % cream Apply topically 2 times daily 30 g 0    valACYclovir (VALTREX) 500 MG tablet TAKE ONE TABLET BY MOUTH TWICE DAILY FOR 3 DAYS. 6 tablet 2    verapamil (CALAN-SR) 120 MG CR tablet TAKE ONE TABLET ONCE DAILY FOR BLOOD PRESSURE  3    zolpidem (AMBIEN) 10 mg Tab Take 10 mg by mouth nightly as needed.  3     No current facility-administered medications for this visit.        Review of Systems   Constitutional: Negative for activity change, appetite change, fever and unexpected weight change.   HENT: Negative for trouble swallowing and voice change.    Eyes: Negative for photophobia and visual disturbance.   Respiratory: Negative for apnea and shortness of breath.    Cardiovascular: Negative for chest pain and leg swelling.   Gastrointestinal: Negative for constipation and nausea.   Genitourinary: Negative for difficulty urinating.   Musculoskeletal: Negative for back pain, gait problem and neck pain.   Skin: Negative for color change and pallor.   Neurological: Positive for dizziness. Negative for seizures, syncope, weakness and numbness.   Hematological: Negative for adenopathy.   Psychiatric/Behavioral: Negative for agitation,  confusion and decreased concentration.       Neurologic Exam     Mental Status   Oriented to person, place, and time.   Registration: recalls 3 of 3 objects.   Attention: normal. Concentration: normal.   Speech: speech is normal   Level of consciousness: alert  Knowledge: good.     Cranial Nerves     CN II   Right visual field deficit: none  Left visual field deficit: none     CN III, IV, VI   Extraocular motions are normal.   Right pupil: Size: 3 mm. Shape: regular.   Left pupil: Size: 3 mm. Shape: regular.   CN III: no CN III palsy  CN VI: no CN VI palsy  Nystagmus: none   Diplopia: none  Ophthalmoparesis: none  Upgaze: normal  Downgaze: normal  Conjugate gaze: present    CN VII   Facial expression full, symmetric.   Right facial weakness: none  Left facial weakness: none    CN VIII   CN VIII normal.     CN XI   CN XI normal.     CN XII   CN XII normal.   Tongue deviation: none    Motor Exam   Muscle bulk: normal  Overall muscle tone: normal  Right arm tone: normal  Left arm tone: normal  Right leg tone: normal  Left leg tone: normal    Gait, Coordination, and Reflexes     Gait  Gait: normal    Coordination   Finger to nose coordination: normal    Tremor   Resting tremor: absent      Physical Exam  Vitals signs reviewed.   Constitutional:       Appearance: She is well-developed.   HENT:      Head: Normocephalic and atraumatic.   Eyes:      Extraocular Movements: EOM normal.   Neck:      Musculoskeletal: Normal range of motion.   Pulmonary:      Effort: Pulmonary effort is normal. No respiratory distress.   Musculoskeletal: Normal range of motion.   Neurological:      Mental Status: She is alert and oriented to person, place, and time.      Coordination: Finger-Nose-Finger Test normal.      Gait: Gait is intact.   Psychiatric:         Speech: Speech normal.         Behavior: Behavior normal.         Thought Content: Thought content normal.         There were no vitals filed for this visit.    Assessment &  Plan:    Problem List Items Addressed This Visit     None      Visit Diagnoses     Central vestibular vertigo    -  Primary    Migraine without aura and without status migrainosus, not intractable        Relevant Medications    rimegepant (NURTEC) ODT 75 mg        The patient location is: home  The chief complaint leading to consultation is: dizziness and headaches    Visit type: audiovisual    Face to Face time with patient: 15  20 minutes of total time spent on the encounter, which includes face to face time and non-face to face time preparing to see the patient (eg, review of tests), Obtaining and/or reviewing separately obtained history, Documenting clinical information in the electronic or other health record, Independently interpreting results (not separately reported) and communicating results to the patient/family/caregiver, or Care coordination (not separately reported).         Each patient to whom he or she provides medical services by telemedicine is:  (1) informed of the relationship between the physician and patient and the respective role of any other health care provider with respect to management of the patient; and (2) notified that he or she may decline to receive medical services by telemedicine and may withdraw from such care at any time.    Notes:       Follow-up: Follow up in about 3 months (around 1/13/2021).    This note was done with the assistance of voice recognition software. Some errors may be present after proofreading.

## 2020-10-14 ENCOUNTER — INFUSION (OUTPATIENT)
Dept: INFUSION THERAPY | Facility: HOSPITAL | Age: 50
End: 2020-10-14
Attending: NURSE PRACTITIONER
Payer: MEDICARE

## 2020-10-14 VITALS
SYSTOLIC BLOOD PRESSURE: 165 MMHG | RESPIRATION RATE: 18 BRPM | WEIGHT: 148 LBS | HEIGHT: 69 IN | BODY MASS INDEX: 21.92 KG/M2 | HEART RATE: 82 BPM | DIASTOLIC BLOOD PRESSURE: 88 MMHG | OXYGEN SATURATION: 100 % | TEMPERATURE: 98 F

## 2020-10-14 DIAGNOSIS — M32.9 SYSTEMIC LUPUS ERYTHEMATOSUS, UNSPECIFIED SLE TYPE, UNSPECIFIED ORGAN INVOLVEMENT STATUS: ICD-10-CM

## 2020-10-14 DIAGNOSIS — M81.0 OSTEOPOROSIS: Primary | ICD-10-CM

## 2020-10-14 PROCEDURE — 25000003 PHARM REV CODE 250: Performed by: INTERNAL MEDICINE

## 2020-10-14 PROCEDURE — 96365 THER/PROPH/DIAG IV INF INIT: CPT

## 2020-10-14 PROCEDURE — 96375 TX/PRO/DX INJ NEW DRUG ADDON: CPT

## 2020-10-14 PROCEDURE — 63600175 PHARM REV CODE 636 W HCPCS: Mod: JW,JG | Performed by: INTERNAL MEDICINE

## 2020-10-14 PROCEDURE — 96413 CHEMO IV INFUSION 1 HR: CPT

## 2020-10-14 RX ORDER — HEPARIN 100 UNIT/ML
500 SYRINGE INTRAVENOUS
Status: CANCELLED | OUTPATIENT
Start: 2020-11-11

## 2020-10-14 RX ORDER — ACETAMINOPHEN 325 MG/1
650 TABLET ORAL
Status: COMPLETED | OUTPATIENT
Start: 2020-10-14 | End: 2020-10-14

## 2020-10-14 RX ORDER — ACETAMINOPHEN 325 MG/1
650 TABLET ORAL
Status: CANCELLED | OUTPATIENT
Start: 2020-11-11

## 2020-10-14 RX ORDER — DIPHENHYDRAMINE HYDROCHLORIDE 50 MG/ML
25 INJECTION INTRAMUSCULAR; INTRAVENOUS
Status: CANCELLED | OUTPATIENT
Start: 2020-11-11

## 2020-10-14 RX ORDER — SODIUM CHLORIDE 0.9 % (FLUSH) 0.9 %
10 SYRINGE (ML) INJECTION
Status: CANCELLED | OUTPATIENT
Start: 2020-11-11

## 2020-10-14 RX ORDER — DIPHENHYDRAMINE HYDROCHLORIDE 50 MG/ML
25 INJECTION INTRAMUSCULAR; INTRAVENOUS
Status: COMPLETED | OUTPATIENT
Start: 2020-10-14 | End: 2020-10-14

## 2020-10-14 RX ADMIN — DIPHENHYDRAMINE HYDROCHLORIDE 25 MG: 50 INJECTION, SOLUTION INTRAMUSCULAR; INTRAVENOUS at 11:10

## 2020-10-14 RX ADMIN — BELIMUMAB 671 MG: 400 INJECTION, POWDER, LYOPHILIZED, FOR SOLUTION INTRAVENOUS at 12:10

## 2020-10-14 RX ADMIN — ACETAMINOPHEN 650 MG: 325 TABLET ORAL at 11:10

## 2020-10-15 NOTE — PLAN OF CARE
Patient arrived to unit for Benlysta infusion. Pt reported dizziness, HA, and light sensitivity. Also continues with difficulty swallowing. Plan of care reviewed, patient agreeable to plan. Pre meds of Tylenol and Benadryl given. Patient tolerated Benlysta infusion well.VSS. Discharge instructions reviewed, patient instructed to return Nov 11 for next infusion. Patient ambulated off unit unassisted by self. Patient in NAD at time of discharge.

## 2020-10-16 ENCOUNTER — DOCUMENTATION ONLY (OUTPATIENT)
Dept: REHABILITATION | Facility: HOSPITAL | Age: 50
End: 2020-10-16

## 2020-10-16 DIAGNOSIS — R49.0 HOARSE: Primary | ICD-10-CM

## 2020-10-16 NOTE — PROGRESS NOTES
No Show Note/Documentation    Patient: Valencia Dumont  Date of Session: 10/16/2020  Diagnosis:   1. Hoarse       MRN: [id number]    Valencia Dumont did not attend her scheduled therapy appointment today. She did not call to cancel nor reschedule. Next appointment is scheduled for 10/23/2020 and will follow up with patient at that time. No charges have been posted today.     Cancel: 0  No show: 1    ANTONETTE Jones, CCC-SLP  Speech Language Pathologist   10/16/2020

## 2020-10-20 ENCOUNTER — PATIENT MESSAGE (OUTPATIENT)
Dept: GASTROENTEROLOGY | Facility: CLINIC | Age: 50
End: 2020-10-20

## 2020-10-20 ENCOUNTER — LAB VISIT (OUTPATIENT)
Dept: LAB | Facility: HOSPITAL | Age: 50
End: 2020-10-20
Attending: INTERNAL MEDICINE
Payer: MEDICARE

## 2020-10-20 ENCOUNTER — OFFICE VISIT (OUTPATIENT)
Dept: OTOLARYNGOLOGY | Facility: CLINIC | Age: 50
End: 2020-10-20
Payer: MEDICARE

## 2020-10-20 VITALS
BODY MASS INDEX: 22.14 KG/M2 | SYSTOLIC BLOOD PRESSURE: 150 MMHG | HEIGHT: 69 IN | WEIGHT: 149.5 LBS | TEMPERATURE: 98 F | DIASTOLIC BLOOD PRESSURE: 80 MMHG

## 2020-10-20 DIAGNOSIS — R49.0 HOARSE: ICD-10-CM

## 2020-10-20 DIAGNOSIS — J33.0 ANTROCHOANAL POLYP: Primary | ICD-10-CM

## 2020-10-20 DIAGNOSIS — Z94.4 LIVER TRANSPLANTED: ICD-10-CM

## 2020-10-20 LAB
ALBUMIN SERPL BCP-MCNC: 3.8 G/DL (ref 3.5–5.2)
ALP SERPL-CCNC: 85 U/L (ref 55–135)
ALT SERPL W/O P-5'-P-CCNC: 14 U/L (ref 10–44)
ANION GAP SERPL CALC-SCNC: 7 MMOL/L (ref 8–16)
AST SERPL-CCNC: 12 U/L (ref 10–40)
BASOPHILS # BLD AUTO: 0.01 K/UL (ref 0–0.2)
BASOPHILS NFR BLD: 0.2 % (ref 0–1.9)
BILIRUB SERPL-MCNC: 0.4 MG/DL (ref 0.1–1)
BUN SERPL-MCNC: 24 MG/DL (ref 6–20)
CALCIUM SERPL-MCNC: 9 MG/DL (ref 8.7–10.5)
CHLORIDE SERPL-SCNC: 107 MMOL/L (ref 95–110)
CO2 SERPL-SCNC: 27 MMOL/L (ref 23–29)
CREAT SERPL-MCNC: 1.4 MG/DL (ref 0.5–1.4)
DIFFERENTIAL METHOD: ABNORMAL
EOSINOPHIL # BLD AUTO: 0.1 K/UL (ref 0–0.5)
EOSINOPHIL NFR BLD: 1.8 % (ref 0–8)
ERYTHROCYTE [DISTWIDTH] IN BLOOD BY AUTOMATED COUNT: 14 % (ref 11.5–14.5)
EST. GFR  (AFRICAN AMERICAN): 51 ML/MIN/1.73 M^2
EST. GFR  (NON AFRICAN AMERICAN): 44 ML/MIN/1.73 M^2
GLUCOSE SERPL-MCNC: 126 MG/DL (ref 70–110)
HCT VFR BLD AUTO: 33.8 % (ref 37–48.5)
HGB BLD-MCNC: 11.7 G/DL (ref 12–16)
IMM GRANULOCYTES # BLD AUTO: 0.01 K/UL (ref 0–0.04)
IMM GRANULOCYTES NFR BLD AUTO: 0.2 % (ref 0–0.5)
LYMPHOCYTES # BLD AUTO: 1.9 K/UL (ref 1–4.8)
LYMPHOCYTES NFR BLD: 41 % (ref 18–48)
MAGNESIUM SERPL-MCNC: 1.9 MG/DL (ref 1.6–2.6)
MCH RBC QN AUTO: 26.6 PG (ref 27–31)
MCHC RBC AUTO-ENTMCNC: 34.6 G/DL (ref 32–36)
MCV RBC AUTO: 77 FL (ref 82–98)
MONOCYTES # BLD AUTO: 0.4 K/UL (ref 0.3–1)
MONOCYTES NFR BLD: 7.8 % (ref 4–15)
NEUTROPHILS # BLD AUTO: 2.2 K/UL (ref 1.8–7.7)
NEUTROPHILS NFR BLD: 49 % (ref 38–73)
NRBC BLD-RTO: 0 /100 WBC
PLATELET # BLD AUTO: 199 K/UL (ref 150–350)
PMV BLD AUTO: 9.6 FL (ref 9.2–12.9)
POTASSIUM SERPL-SCNC: 4.6 MMOL/L (ref 3.5–5.1)
PROT SERPL-MCNC: 6.6 G/DL (ref 6–8.4)
RBC # BLD AUTO: 4.4 M/UL (ref 4–5.4)
SODIUM SERPL-SCNC: 141 MMOL/L (ref 136–145)
WBC # BLD AUTO: 4.51 K/UL (ref 3.9–12.7)

## 2020-10-20 PROCEDURE — 3008F PR BODY MASS INDEX (BMI) DOCUMENTED: ICD-10-PCS | Mod: CPTII,S$GLB,, | Performed by: OTOLARYNGOLOGY

## 2020-10-20 PROCEDURE — 3079F PR MOST RECENT DIASTOLIC BLOOD PRESSURE 80-89 MM HG: ICD-10-PCS | Mod: CPTII,S$GLB,, | Performed by: OTOLARYNGOLOGY

## 2020-10-20 PROCEDURE — 31231 NASAL ENDOSCOPY DX: CPT | Mod: S$GLB,,, | Performed by: OTOLARYNGOLOGY

## 2020-10-20 PROCEDURE — 31231 PR NASAL ENDOSCOPY, DX: ICD-10-PCS | Mod: S$GLB,,, | Performed by: OTOLARYNGOLOGY

## 2020-10-20 PROCEDURE — 3077F SYST BP >= 140 MM HG: CPT | Mod: CPTII,S$GLB,, | Performed by: OTOLARYNGOLOGY

## 2020-10-20 PROCEDURE — 99215 PR OFFICE/OUTPT VISIT, EST, LEVL V, 40-54 MIN: ICD-10-PCS | Mod: 25,S$GLB,, | Performed by: OTOLARYNGOLOGY

## 2020-10-20 PROCEDURE — 36415 COLL VENOUS BLD VENIPUNCTURE: CPT

## 2020-10-20 PROCEDURE — 80197 ASSAY OF TACROLIMUS: CPT

## 2020-10-20 PROCEDURE — 80053 COMPREHEN METABOLIC PANEL: CPT

## 2020-10-20 PROCEDURE — 3079F DIAST BP 80-89 MM HG: CPT | Mod: CPTII,S$GLB,, | Performed by: OTOLARYNGOLOGY

## 2020-10-20 PROCEDURE — 3008F BODY MASS INDEX DOCD: CPT | Mod: CPTII,S$GLB,, | Performed by: OTOLARYNGOLOGY

## 2020-10-20 PROCEDURE — 85025 COMPLETE CBC W/AUTO DIFF WBC: CPT

## 2020-10-20 PROCEDURE — 99215 OFFICE O/P EST HI 40 MIN: CPT | Mod: 25,S$GLB,, | Performed by: OTOLARYNGOLOGY

## 2020-10-20 PROCEDURE — 3077F PR MOST RECENT SYSTOLIC BLOOD PRESSURE >= 140 MM HG: ICD-10-PCS | Mod: CPTII,S$GLB,, | Performed by: OTOLARYNGOLOGY

## 2020-10-20 PROCEDURE — 83735 ASSAY OF MAGNESIUM: CPT

## 2020-10-20 RX ORDER — AZELASTINE 1 MG/ML
1 SPRAY, METERED NASAL 2 TIMES DAILY
Qty: 30 ML | Refills: 3 | Status: ON HOLD | OUTPATIENT
Start: 2020-10-20 | End: 2021-02-04 | Stop reason: HOSPADM

## 2020-10-20 RX ORDER — FLUTICASONE PROPIONATE 50 MCG
1 SPRAY, SUSPENSION (ML) NASAL 2 TIMES DAILY
Qty: 18.2 ML | Refills: 3 | Status: ON HOLD | OUTPATIENT
Start: 2020-10-20 | End: 2021-02-04 | Stop reason: HOSPADM

## 2020-10-20 NOTE — PROGRESS NOTES
"Outpatient Neurological Rehabilitation   Speech and Language Therapy Treatment Note  Date:  10/23/2020     Name: Valencia Dumont   MRN: [id number]   Therapy Diagnosis:   Encounter Diagnosis   Name Primary?    Hoarse Yes   Physician: Delores Lewis MD  Physician Orders: [id number] - Ambulatory referral/consult to Speech Therapy - Continuation of Therapy  Medical Diagnosis:      Specialty Services Required [5] K21.9 (ICD-10-CM) - Gastroesophageal reflux disease, esophagitis presence not specified      Visit #/ Visits Authorized: 2/12  Date of Evaluation:  10/05/2020  Insurance Authorization Period: 10/05/2020-12/06/2020  Plan of Care Expiration Date:    10/05/2020-11/30/2020  Extended POC:  N/a  Progress Note: Due  11/16/2020  Visits Cancelled: 0  Visits No Show: 0    Time In:  1:09 (patient arrived late for tx)  Time Out:  1:44  Total Billable Time: 35     Precautions: Standard  Subjective:   Pt reports: that she has gotten good and bad feedback. She reports that she saw Dr. Lewis on Tuesday and she  did another video and will be sending her to Dr. Gardner but Valencia is not sure why she needs to see another ENT. Discussed that Dr. Gardner is an otolaryngologist. She reports Dr. Lewis prescribed her a new nose spray that seems to be helping. States that the numbing spray that Dr. Cummins used before the procedure helped to eliminate throat clearing for the rest of the day after the appointment. She also saw Dr. Guerra (gastroenterologist) who explained GERD to her and prescribed a medication to help "calm down" her throat and reduce GERD but she cannot recall the name. She has a CT scan scheduled next week to examine her sinuses as she has had increased amount of sinus headaches. Reports voice has been "on and off but better when I do the exercises." Reports straw phonation is the most helpful voice exercise. She has been swallowing hard to alleviate feels that she needs to clear her throat and she has been and " "completing swallowing exercises (tongue base retraction exercises). She reports that she feels as if her voice problems are now due to her post nasal drip. Reports that she missed last week because she did not realize she had an appointment that day until too late.   She was compliant to home exercise program.   Response to previous treatment: positive    Pain Scale:  0/10 on VAS currently.   Pain Location: n/a  Objective:     UNTIMED  Procedure Min.   Speech- Language- Voice Therapy 25   Dysphagia Therapy 10   Total Timed Units: 0  Total Untimed Units: 2  Charges Billed/# of units: 2  Short Term Goals: (4 weeks)  Current Progress:   1. Patient will increase awareness of phonotraumatic behaviors including coughing, throat clearing, and strained voicing through self-monitoring to facilitate generalization in functional situations with 80% accuracy.  Progressing/ Not Met 10/23/2020   Reduced throat clearing reported.     SLP observed patient clearing her throat x7 times this session. She did attempt to utilize hard swallow and/or taking a sip of water most of the time   2. Patient will identify the antecedent sensations associated with coughing/throat clearing.   Progressing/ Not Met 10/23/2020   - Tickle, Dry, closing up, something sitting in a "pocket"    Able to identify antecedent feeling 100% of the time   3. Patient will clear their throat fewer than 5 times during session.   Progressing/ Not Met 10/23/2020   Reduced throat clearing reported.     SLP observed patient clearing her throat x7 times this session. She did attempt to utilize hard swallow and/or taking a sip of water most of the time      4. Patient will implement and adhere to laryngeal desensitization protocol as outlined to them which includes several hard swallows to reduce irritability.  Progressing/ Not Met 10/23/2020   Completes at least 5x per day and feels as if she is completing all day as she is constantly swallowing hard to eliminate/reduce " "throat clearing   5. Patient will improve the quality, efficiency, and ease of phonation through resonant and/or airflow exercises from the syllable to conversation level with 80% accuracy.  Progressing/ Not Met 10/23/2020   Patient completed the following exercises:    - blow bubbles without sound x10  - blow bubbles with sound x10  - blow bubbles with sound pitch glide up x10  - blow bubbles with sound pitch glide down x10  - blow bubbles while saying automatic speech phrases x5  - blow bubbles while saying automatic speech phrases x5 fading out bubbles     Patient required moderate cues for appropriate completion/technique. Cues to maintain good quality, cues for straw placement, cues for posture     6. Patient will complete SOVT exercises and/or resonant-focused exercises 3-5x daily to strengthen and balance the intrinsic laryngeal musculature and maximize glottic closure without medial hyperfunction.   Progressing/ Not Met 10/23/2020   Patient completed the following exercises:    - blow bubbles without sound x10  - blow bubbles with sound x10  - blow bubbles with sound pitch glide up x10  - blow bubbles with sound pitch glide down x10  - blow bubbles while saying automatic speech phrases x5  - blow bubbles while saying automatic speech phrases x5 fading out bubbles     Patient required moderate cues for appropriate completion/technique. Cues to maintain good quality, cues for straw placement, cues for posture    She reports that continuous phonation through the straw "calms my throat down" and that she will occasionally just blow through the straw (blowing bubbles into water) which alleviates feelings of irritation in her throat       7. Patient will participate in clinical bedside exam to determine if further objective assessment of swallow function is warranted   Progressing/ Not Met 10/23/2020   See below  Goal Met / Discontinue         Clinical Swallow Exam/ Chloe Mechanism Exam:  Oral Motor " "Exam:  Mandibular Strength and Mobility - Trigeminal Nerve (CNV) Rest: WFL   Lateralization: WFL  Protrusion: WFL  Retraction:  WFL  Involuntary Movement: absent    Oral Labial Strength and Mobility -Facial Nerve (CN VII)  Rest: WFL   Protrusion: WFL  Retraction:  WFL  Involuntary Movement: absent    Lingual Strength and Mobility- Hypoglossal Nerve (CN XII)  Rest: WFL   Lateralization: WFL  Protrusion: WFL  Retraction:  WFL  Involuntary Movement: absent   Velar Elevation - Glossopharyngeal Nerve (CN IX) and Vagus (CN X) Rest: WFL   Symmetry with Short Production of "ah": WFL  Involuntary Movement: absent    Buccal Strength and Mobility - Facial Nerve (CN VII)  WFL   Facial Sensation and Movement - Facial Nerve (CN VII) Symmetrical at rest: yes  Wrinkle forehead: yes  Able to close eyes tightly: yes  Taste to Anterior 2/3 of Tongue: yes     Structure Abnormalities: no  Dentition: Present and adequate for speech and swallowing  Secretion Management: WFL  Mucosal Quality: WFL  Volitional Cough: WFL  Volitional Swallow: WFL  Voice Prior to PO Intake: clear    [place] Swallow Protocol:  PASS  [place] Swallow Protocol dictates pt remain NPO if fail screener; (Sosar et al. 2014) however, as objective swallow assessment is not available for greater than a week, pt will remain on current diet until objective assessment is completed unless otherwise indicated.     Clinical Swallow Examination:   Pt presented with:   THIN:-self regulated thin liquid via cup x5    DENTAL SOFT:- tsp bites of peaches x2    SOLID: -bite of karlene cracker x1      DESCRIPTION: Oral Phase: Adequate labial seal maintained around cup and spoon as evidenced by no anterior bolus loss. Bolus preparation and manipulation appeared adequate. No oral residue or pocketing of bolus noted post po trials. Pharyngeal phase: Laryngeal lift present upon manual palpation. Bolus transit time from presentation of bolus to laryngeal lift appeared timely. No overt s/s " aspiration appreciated. No coughing or throat clearing throughout or post PO trials. Pt's vocal quality remained clear following po trials. Patient reported that she felt as if the cracker was sticking in her throat and she required 2 effortful swallows in addition to a liquid wash to clear. Please note silent aspiration cannot be ruled out in this setting. Of note, patient with previous diagnosis of reduced tongue base retraction per MBSS completed 7/16/2020 and reports continued sensation of foods occasionally sticking in her throat, requiring effortful swallows and/or a liquid wash to clear.    Patient Education/Response:   Discussed dysphagia exercises and voice exercises. Discussed laryngeal desensitization protocol. Discussed HEP frequency/intensity. Discussed general ENT vs otolaryngologist and various additional services they can provide. Discussed continued feelings of food sticking in her throat, requiring effortful swallows and/or a liquid wash to clear and how this is consistent with previous dx of reduced tongue base retraction function per MBSS completed 7/16/2020. She verbalized understanding of all discussed.     Written Home Exercises Provided: Patient instructed to cont prior HEP.  Exercises were reviewed and Valencia was able to demonstrate them prior to the end of the session.  Valencia demonstrated good  understanding of the education provided.     See EMR under Patient Instructions for exercises provided prior visit.  Assessment:   Valencia is progressing well towards her goals. She is consistently completing HEP per patient report. Clinical swallow examiniation completed, no overt s/s aspiration appreciated with thin liquids, dental soft textures, or solid textures however she endorses continued sensation of food sticking in her throat, requiring effortful swallows and/or a liquid wash to clear and how this is consistent with previous dx of reduced tongue base retraction function per MBSS completed  7/16/2020. Patient with good vocal quality today with intermittent strain, improved vocal quality with reduced strain noted following SOVT exercises and patient able to maintain improved vocal quality following completion of exercises. Current goals remain appropriate. Goals to be updated as necessary.     Pt prognosis is Good. Pt will continue to benefit from skilled outpatient speech and language therapy to address the deficits listed in the problem list on initial evaluation, provide pt/family education and to maximize pt's level of independence in the home and community environment.   Medical necessity is demonstrated by the following IMPAIRMENTS:  Patient with decreased vocal intensity/quality resulting in severely decreased speech intelligibility,  negatively impacting his ability to effectively and efficiently explain an emergency situation to emergency operators via phone or in person  Barriers to Therapy: none  Pt's spiritual, cultural and educational needs considered and pt agreeable to plan of care and goals.  Plan:   Continue POC with focus on improving vocal quality and strengthening base of tongue musculature      ANTONETTE Jones, CCC-SLP  Speech Language Pathologist   10/23/2020

## 2020-10-20 NOTE — PROGRESS NOTES
OTOLARYNGOLOGY CLINIC NOTE  Date:  10/20/2020     Chief complaint:  Chief Complaint   Patient presents with    Other     Clicking in Ears       History of Present Illness  Valencia Dumont is a 50 y.o. female  presenting today for a follow up of several ent issues. Of note, She has a PMH of lupus and is on immune suppression medication for a liver transplant.   her initial appointment with me on 6-17-20 was for evaluation for hoarse voice, ear discomfort and dizziness. Flex scope at that time was negative for malignancy and no evidence of vocal fold paresis. There were signs of scope indicating laryngeal irritation ( pachydermia/edema interarytenoid, postcricoid and pseudosulcus)  At initial visit she was not having dysphagia but a few weeks later developed almost an acute onset of dysphagia. She was seen by speech therapy for voice and swallowing and had MBS done which should mild UES stasis and slightly weak BOT retraction. She has also seen GI and had EGD and esophagram done ( Dr. Ríos) . Esophagram was negative for dysmotility/stricture.     She underwent audio and vng on 7-21-20 for dizziness workup. She reports that the pressure of water wash during VNG caused some pain but did not make her  Dizzy. She was noted to have findings on vng concerning for central vestibular abnormality and has been seen by neurology since this testing was completed. She has seen Dr. Donaldson and noted to have migraine and started on treatment for that. She has been doing better but still has some symptoms.     At initial visit she reported history of sinus infections   And would need abx for sinus infections three times per year. I was not suspicious too much of sinus as her symptoms seemed more suspicious for possible migraine and her only other symptom during these events was clear postnasal drip.   Currently she describes the following issues- she has Postnasal drip still. Still with throat clearing. Has been coughing some with  weather change throat pain, Voice strain and  Lower pitch of voice persists. She has been undergoing speech therapy . She notes that this Has been helping some but she does still have dysphagia. Feels like stuff sticks and then eventual goes down  Frequent belching. Has heartburn and acid symptoms when eating.      She notes a clicking sound when chewing       Regarding her ENT history, She saw my colleague Dr. Livingston  for cerumen impaction and had ears cleaned. Sheused to see Dr. Candelaria at this office and he had wanted to do sinus surgery- last seen in 2016. She did not get sinus surgery however. 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    Hypertension     Kidney stone     Lupus     Osteoporosis 12/2013    Prophylactic immunotherapy (transplant immunosuppression) 1/2/2014    SBP (spontaneous bacterial peritonitis)     history of         Past Surgical History  Past Surgical History:   Procedure Laterality Date    CHOLECYSTECTOMY      COLONOSCOPY N/A 5/31/2017    Procedure: COLONOSCOPY;  Surgeon: Marky Sun MD;  Location: [address]);  Service: Endoscopy;  Laterality: N/A;  PM prep.    ESOPHAGOGASTRODUODENOSCOPY      ESOPHAGOGASTRODUODENOSCOPY N/A 1/16/2019    Procedure: EGD (ESOPHAGOGASTRODUODENOSCOPY);  Surgeon: Deniz Guerra MD;  Location: [address]);  Service: Endoscopy;  Laterality: N/A;  labs prior, s/p liver transplant-MS    ESOPHAGOGASTRODUODENOSCOPY N/A 9/9/2020    Procedure: EGD (ESOPHAGOGASTRODUODENOSCOPY);  Surgeon: Davion Ríos MD;  Location: [address]  [facility]);  Service: Endoscopy;  Laterality: N/A;  Please order the EGD at least 2 weeks after barium esophagram has been done EGD is for dysphagia  covid test 9/6-[facility]    LIVER BIOPSY      LIVER TRANSPLANT  12/31/2013    REFRACTIVE SURGERY Bilateral 2010    TUBAL LIGATION  2003    UPPER GASTROINTESTINAL ENDOSCOPY          Medications  Current Outpatient Medications on File Prior to Visit   Medication Sig Dispense Refill    azelastine (ASTELIN) 137 mcg (0.1 %) nasal spray use ONE spray in each nostril TWICE DAILY 30 mL 3    blood-glucose meter,continuous (DEXCOM G6 ) Misc 1 each by Misc.(Non-Drug; Combo Route) route once. for 1 dose 1 each 0    blood-glucose transmitter (DEXCOM G6 TRANSMITTER) Karen 1 each by Misc.(Non-Drug; Combo Route) route once a week 1 Device 3    cholecalciferol, vitamin D3, (VITAMIN D3) 50 mcg (2,000 unit) Cap Take 1 capsule (2,000 Units total) by mouth once daily. 90 capsule 3    ciclopirox (PENLAC) 8 % Soln Apply topically nightly. 1 Bottle 3    diazepam (VALIUM) 5 MG tablet Take 5 mg by mouth 3 (three) times daily as needed.   0    diclofenac sodium (VOLTAREN) 1 % Gel Voltaren 1 % topical gel   APPLY 2 GRAM TO THE AFFECTED AREA(S) BY TOPICAL ROUTE 4 TIMES PER DAY      DILTIAZEM HCL (DILTIAZEM 2% CREAM) Apply topically 3 (three) times daily. Apply topically to anal area. 30 g 0    ergocalciferol (ERGOCALCIFEROL) 50,000 unit Cap Take 1 capsule (50,000 Units total) by mouth every 7 days. for 12 doses 12 capsule 0    ergocalciferol (ERGOCALCIFEROL) 50,000 unit Cap Take 1 capsule (50,000 Units total) by mouth every 7 days. for 12 doses 12 capsule 0    erythromycin with ethanol (EMGEL) 2 % gel ADD 30GM (1 TUBE) TO THE SOAKING DEVICE AND ALLOW WATER TO AGITATE. PLACE AFFECTED AREAS INTO WATER AND SOAK FOR 10 MINUTES 1-2 TIMES DAILY  1    famotidine (PEPCID) 40 MG tablet       fluconazole (DIFLUCAN) 150 MG Tab TAKE ONE TABLET BY MOUTH once for ONE dose  0     gabapentin (NEURONTIN) 300 MG capsule TAKE 1 CAPSULE BY MOUTH THREE TIMES DAILY      gentamicin (GARAMYCIN) 0.1 % cream ADD 30GM (1 TUBE) TO THE SOAKING DEVICE AND ALLOW WATER TO AGITATE. PLACE AFFECTED AREAS INTO WATER AND SOAK FOR 10 MINUTES 1-2 TIMES DAILY  1    hydrOXYchloroQUINE (PLAQUENIL) 200 mg tablet TAKE ONE TABLET BY MOUTH TWICE DAILY 60 tablet 2    hydrOXYzine HCl (ATARAX) 10 MG Tab TAKE 1 OR 2 TABLETS BY MOUTH THREE TIMES DAILY AS NEEDED FOR ITCHING.  0    insulin aspart U-100 (NOVOLOG FLEXPEN U-100 INSULIN) 100 unit/mL (3 mL) InPn pen Takes three units before breakfast and lunch  and 5 units before dinner with sliding scale, up to 25 units daily 45 mL 3    insulin detemir U-100 (LEVEMIR FLEXTOUCH U-100 INSULN) 100 unit/mL (3 mL) SubQ InPn pen Inject 4 Units into the skin 2 (two) times daily. 45 mL 3    isosorbide mononitrate (IMDUR) 30 MG 24 hr tablet Take 30 mg by mouth once daily.      ketoconazole (NIZORAL) 2 % cream ADD 30GM (1/2 TUBE) TO THE SOAKING DEVICE AND ALLOW WATER TO AGITATE. PLACE AFFECTED AREAS INTO WATER AND SOAK FOR 10 MINUTES 1-2 TIMES BRENDON  1    Lactobac. rhamnosus GG-inulin 10 billion cell -200 mg Cap Take 1 capsule by mouth 2 (two) times daily. 30 capsule 0    levocetirizine (XYZAL) 5 MG tablet Take 5 mg by mouth every evening.  3    levoFLOXacin (LEVAQUIN) 750 MG tablet levofloxacin 750 mg tablet      losartan (COZAAR) 100 MG tablet Take 1 tablet (100 mg total) by mouth once daily. 30 tablet 2    magnesium oxide 500 mg Tab Take 500 mg by mouth 2 (two) times daily. 60 each 6    meclizine (ANTIVERT) 25 mg tablet TAKE ONE TABLET BY MOUTH AT BEDTIME AS NEEDED for dizziness.  0    meloxicam (MOBIC) 15 MG tablet       MULTIVIT,THER IRON,CA,FA & MIN (MULTIVITAMIN) Tab Take 1 tablet by mouth once daily. 30 tablet 0    mycophenolate (MYFORTIC) 180 MG TbEC TAKE TWO TABLETS BY MOUTH TWICE DAILY. 120 tablet 5    neomycin-polymyxin-hydrocortisone (CORTISPORIN) otic solution  INSTILL TWO DROPS INTO BOTH EARS FOUR TIMES DAILY FOR 10 DAYS  0    nystatin (MYCOSTATIN) 100,000 unit/mL suspension SWISH AND SPIT FIVE MILLILITERS BY MOUTH FOUR TIMES DAILY FOR 7 DAYS.  1    nystatin (MYCOSTATIN) cream Apply topically 2 times daily 30 g 0    oxycodone-acetaminophen (PERCOCET) 7.5-325 mg per tablet Take 1 tablet by mouth every 4 (four) hours as needed for Pain.      pantoprazole (PROTONIX) 40 MG tablet Take 1 tablet (40 mg total) by mouth 2 (two) times daily. Take immediately in am when wake up and then 30 minutes prior to dinner 60 tablet 11    paroxetine (PAXIL) 10 MG tablet paroxetine 10 mg tablet      polyethylene glycol (GLYCOLAX) 17 gram/dose powder Mix 1 capful (17 g) with liquid and by mouth 2 (two) times daily. 1530 g 11    PROAIR HFA 90 mcg/actuation inhaler Inhale 2 puffs into the lungs 3 (three) times daily as needed.   3    promethazine-dextromethorphan (PROMETHAZINE-DM) 6.25-15 mg/5 mL Syrp Take by mouth 2 (two) times daily as needed.   0    rosuvastatin (CRESTOR) 5 MG tablet Take 5 mg by mouth once daily.      sertraline (ZOLOFT) 50 MG tablet Take 50 mg by mouth once daily.  11    SSD 1 % cream 1 application 2 (two) times daily. Apply to affected area  0    tacrolimus (PROGRAF) 1 MG Cap Take 3 capsules (3 mg total) by mouth every 12 (twelve) hours. 180 capsule 11    traZODone (DESYREL) 50 MG tablet Take 50 mg by mouth nightly.  0    triamcinolone acetonide 0.1% (KENALOG) 0.1 % cream Apply topically 2 times daily 30 g 0    valACYclovir (VALTREX) 500 MG tablet TAKE ONE TABLET BY MOUTH TWICE DAILY FOR 3 DAYS. 6 tablet 2    verapamil (CALAN-SR) 120 MG CR tablet TAKE ONE TABLET ONCE DAILY FOR BLOOD PRESSURE  3    zolpidem (AMBIEN) 10 mg Tab Take 10 mg by mouth nightly as needed.  3    blood-glucose meter kit To check BG 5 times daily, to use with insurance preferred meter 1 each 0    buPROPion (WELLBUTRIN XL) 150 MG TB24 tablet Take 300 mg by mouth.      estradiol  "(YUVAFEM) 10 mcg Tab Place 1 tablet (10 mcg total) vaginally twice a week. 8 tablet 11    nystatin-triamcinolone (MYCOLOG II) cream Apply to affected area 2 times daily 30 g 1     No current facility-administered medications on file prior to visit.        Review of Systems  Review of Systems   Constitutional: Negative for fever.   HENT: Negative for nosebleeds.         No pain with hearing loud sounds but sometimes has to ask people to repeat what they say when they are talking loudly   Respiratory: Negative for hemoptysis.    Gastrointestinal: Positive for heartburn.   Neurological: Positive for dizziness and headaches.        Social History   reports that she has never smoked. She has never used smokeless tobacco. She reports current alcohol use of about 1.0 standard drinks of alcohol per week. She reports that she does not use drugs.     Family History  Family History   Problem Relation Age of Onset    Hypertension Mother     Cataracts Mother     Diabetes Father     Alzheimer's disease Father     Diabetes Paternal Grandfather     Diabetes Paternal Grandmother     No Known Problems Maternal Grandmother     Bone cancer Maternal Grandfather     Breast cancer Maternal Aunt 55    Hypertension Brother     Diabetes Brother     No Known Problems Sister     No Known Problems Maternal Uncle     No Known Problems Paternal Aunt     No Known Problems Paternal Uncle     Stroke Neg Hx     Cancer Neg Hx     Colon cancer Neg Hx     Esophageal cancer Neg Hx     Stomach cancer Neg Hx     Rectal cancer Neg Hx     Amblyopia Neg Hx     Blindness Neg Hx     Glaucoma Neg Hx     Macular degeneration Neg Hx     Retinal detachment Neg Hx     Strabismus Neg Hx     Thyroid disease Neg Hx         Physical Exam   Vitals:    10/20/20 1103   BP: (!) 150/80   Temp: 98.3 °F (36.8 °C)    Body mass index is 22.07 kg/m².  Weight: 67.8 kg (149 lb 7.6 oz)   Height: 5' 9" (175.3 cm)     GENERAL: no acute distress.  HEAD: " normocephalic.   SKIN: no lesions of skin of head and neck area.  EYES: lids and lashes normal. Pupils equal . No proptosis  EARS: external ear without lesion, normal pinna shape and position.  External auditory canal with normal cerumen, tympanic membrane fully visible, no perforation , no retraction. No middle ear effusion. Ossicles intact.   NOSE: external nose without abnormality, see scope  ORAL CAVITY/OROPHARYNX: Mild tmj crepitus tongue midline and mobile. No fasciculations.Symmetric palate rise. Uvula midline.   NECK: trachea midline.   LYMPH NODES:No cervical lymphadenopathy.  RESPIRATORY: no stridor, no stertor. Voice normal- no phonic breaks. No significant raspy quality to me however appreciate that patient notices a difference in pitch.  Respirations nonlabored.  NEURO: alert, responds to questions appropriately.   Cranial nerve exam as indicated in above sections and additionally showed  Facial movement symmetric with good eye closure and symmetric smile.   PSYCH:mood appropriate    PROCEDURE NOTE  NAME OF PROCEDURE: rigid nasal endoscopy, diagnostic (CPT 07626)  INDICATIONS: obstructive polyp in left sinonasal cavity on mri   FINDINGS: possible polypoid edema vs polyp  in left middle meatus extending out around middle turbinate- could be severe edema of uncinate.  Mild middle meatal edema on right. Subtle fullness in superior nasopharynx with slight area of yellow discoloration which sometimes can indicate rathke pouch cyst. Smooth walled without ulceration     Consent: After procedure was explained in detail and all questions answered, verbal consent was obtained for performing rigid nasal endoscopy.  Anesthesia: topical 4% lidocaine and neosynephrine  Procedure: With patient in seated position, the scope was inserted into the bilateral nasal passageway and advanced atraumatically on the right into the nasopharynx to examine the following structures:  Nasal cavity: no mass in nasal cavity . no septal  spur.  No purulent drainage. Septum relatively midline with some mild s shaped bowing at the bony cartilaginous junction /near middle turbinate level  Inferior Turbinates:   moderate erythema and  Hypertrophy with ok but not robust response to decongestant  Middle turbinates:  without significant hypertrophy. Right middle turb with erythema  Middle meatus:  mild middle meatal edema on right. Significant middle meatal edema on left. Polypoid changes of uncinate vs polyp- difficult to get a  View to definitively confirm if polypoid change vs polyp due to discomfort in this area and amount of edema   Nasopharynx:superior nasopharynx with slight fullness that is symmetric . Small region in the full area with yellowish discoloration. No purulent drainage. Cobblestoning of pharyngeal tissue below this.    After examination performed, the scope was removed atraumatically . The patient tolerated the procedure well. Photodocumentation obtained with representative images below, all images and/or videos uploaded in media section of epic.    Right nasal cavity pre decongestant      Right nasal cavity post decongestant       Right middle turb and middle meatus with edema of middle meatus      Left nasal cavity pre decongestant    Left nasal cavity post decongestant    Left middle turbinate and middle meatus with what appears to be polypoid changes of mucosa vs bro[place]lynn polyp emanating from maxillary os    Polyp below left middle turb inferior extent , not extending into nasal cavity      nasopharynx          AUDIOLOGY RESULTS  Audiometric evaluation including audiogram, tympanometry, acoustic reflexes, and speech discrimination which was performed 7-21-20 was personally reviewed and interpreted.  Notable findings on the audiogram were mild sensorineural hearing loss (SNHL) at 2 and 4 KHz only on the right side otherwise normal.  Tympanometry revealed Type A tympanogram on the left and Type Ad tympanogram on the right. Speech  "discrimination was 100%  on the left (SRT 10) , and 100% on the right (SRT 5).       Report of the audiologist performing this audiometric testing was also reviewed . VNG also performed the same day. Report noted Smooth pursuit gain and spontaneous down beating nystagmus present. Calorics normal with normal fixation suppression.     Imaging:  The patient does have pertinent and/or recent imaging of the head and neck. MRI and MRA of the brain performed 9-29-20, MBS on 7-16-20, and esophagram from 8-21-20 images and report personally reviewed .    MRI/MRA brain: report notes "1. Limited evaluation of the brain due to susceptibility artifacts from dental hardware.  However there is no gross acute intracranial processes.  2. Findings suspicious for a soft tissue density in the left posterior choana, likely representing a choanal polyp." and "No gross abnormalities of the [id number] "     Notation by radiologist       Coronal section below corresponding to axial section above makes it look like it is at the level of her enlarged inferior turb but I think it is max os polyp that would be seen inferiorly but dental artifact obscures view       I think that this is actually representing polypoid material emanating from left maxillary os although trajectory on mri when comparing coronal to axial ( see line notation on screenshot below) note the polyp more posterior but this is an imaging artifact I think and does not correspond to mild subtle fullness in nasopharynx on scope (  will be better assessed on thinner imaging )          Esophagram: small hiatal hernia, GERD elicited into distal thoracic esophagus    MBS: mild pharyngeal dysphagia characterized by decreased BOT retraction and decreased UES opening.     Labs:  CBC  Recent Labs   Lab 06/11/20  1121 07/22/20  0826 08/13/20  1539   WBC 3.78 L 4.47 5.25   Hemoglobin 11.7 L 11.2 L 11.1 L   Hematocrit 35.0 L 33.6 L 33.1 L   Mean Corpuscular Volume 80 L 79 L 79 L "   Platelets 190 188 216     BMP  Recent Labs   Lab 04/17/19  1236  06/19/19  1047  04/21/20  1133 05/13/20  1111 06/11/20  1121 07/22/20  0826 08/13/20  1539 08/19/20  1059   Glucose 170 H   < > 163 H  163 H   < > 81 108 136 H 149 H  --  152 H   Sodium 140   < > 139  139   < > 139 140 140 141  --  140   Potassium 4.2   < > 4.3  4.3   < > 3.9 4.3 4.1 4.4  --  4.0   Chloride 105   < > 104  104   < > 105 104 105 105  --  105   CO2 26   < > 28  28   < > 26 29 29 29  --  28   BUN, Bld 27 H   < > 26 H  26 H   < > 25 H 21 H 23 H 29 H  --  27 H   Creatinine 1.5 H   < > 1.5 H  1.5 H   < > 1.5 H 1.4 1.5 H 1.6 H  --  1.4   Calcium 9.9   < > 9.8  9.8   < > 9.1 9.8 9.2 9.2  --  9.2   Phosphorus 3.4  --  2.9  --   --  2.7  --   --   --   --    Magnesium  --    < > 2.0   < > 1.8  --   --  1.9 1.5 L  --     < > = values in this interval not displayed.     COAGS  Recent Labs   Lab 01/16/19  1000 06/07/19  1423 07/22/20  0826   INR 1.0 1.0 0.9       Assessment  1. Antrochoanal polyp  - CT Medtronic Sinuses without; Future    2. Hoarse  - Ambulatory referral/consult to ENT; Future       Plan:  Discussed plan of care with patient in detail and all questions answered. Patient reported understanding of plan of care.   Dysphagia: I have spoken with GI Dr. Guerra who patient is seeing tomorrow to discuss findings that I have seen on scope and my communications with SLP regarding MBS. She did have some eosinophils on egd biopsy but I do not think high enough to suggest eosinophilic esophagitis- I will defer to GI regarding interpretation of this. Her symptom description sounds to me concerning for a motility issue.i do not think the small hiatal hernia would be causing such significant acid reflux issues but this can lead to difficult to treat gerd. Appreciate GI input regarding thoughts about her symptom constellation.   She has not undergone sleep study yet to rule out SHAWNA as a reason for difficult to treat LPR    Allergic  rhinitis(AR): continue dual nasal spray therapy - combo of steroid and antihistamine nasal spray has been shown in evidence based studies to be better than either alone. Discussed medication administration technique again ( point toward outer corner of eye and not towards nasal septum) and use nasal saline prior to medication sprays. I will have her increase flonase to 2 sprays each side bid ( may need to change to budesonide rinse- will defer for now as I do not want to increase thrush risk and there is higher chance of that going into mouth. Do not want thrush to complicate picture while she sees other providers )  Counseled on risk of bleeding, risk of increased intraocular pressure/cataract with long term use  This could be causing some postnasal drainage and throat clearing that is leading to incomplete resolution of voice symptoms. She has been on treatment for this and lpr and symptoms persist therefore need to evaluate for other etiologies that could be going on in conjunction or that would make AR/LPR hard to treat.     Hoarse: Continue tx with SLP . I will also have her see Dr. Gardner for evaluation with stroboscopy if indicated . If she does better with the sprays , she can cancel the appointment however she does not have a lot of anterior nasal symptoms so I am concerned that possible laryngeal pathology may be present that I can't see on flex scope that has led to irritated larynx appearance . She has had some improvement of symptoms on lpr tx but not complete resolution therefore I would like to ensure no other pathology as well . If she does end up needing sinus surgery, I would also like to be able to  her on expectations   She has had transplant and on immunosuppression, I did not see any evidence of candida or PTLD on prior flex scope.  Irritative signs may be due to autoimmune condition affecting larynx but did not appear that way on my exam from the cases of this that I have seen.      Nasal polyp/middle meatus edema /NP fullness: full area in np which could just be irritation . This does not appear to correlate with area noted on mri which I think is polyp emanating from maxillary sinus os into middle meatus seen on scope exam. Will obtain ct medtronic sinus. Will likely need endoscopic sinus surgery . I am going to have her do treatment with increased flonase dose for 2-3 weeks before getting the ct sinus . Will repeat scope at follow up to see how things look after she has been on treatment.       Follow up 5-6 weeks to review imaging and rescope

## 2020-10-20 NOTE — PATIENT INSTRUCTIONS
"Information and instructions from your visit with me today:    · Start using the following medication nasal sprays:   · Fluticasone spray:    This medication is a steroid spray. It stays within the nose and does not have absorption into the body that leads to side effects that one has with oral steroid medication. Fluticasone nasal spray is the same as the Flonase brand nasal spray. Discuss with your pharmacist if the price is lower over the counter or with a prescription ( this varies depending on insurance). The medication that is over the counter is the same as the prescription medication. Use this medication as instructed on the prescription, 1-2 sprays on each side of your nose twice daily.     · Azelastine  spray:  This medication is an antihistamine used to treat nasal symptoms of allergy, which works specifically in the nose unlike antihistamine pills which have more of an effect on the whole body. Use this medication as instructed on the prescription, 1 spray on each side of your nose twice daily.     Additional instructions for medication sprays  Place the tip of the medication bottle in your nose and aim slightly up and out on each side to get medication high and deep into your nose and sinuses, and not have it all deposit in the very front of your nose. Aim the tip of the nozzle towards the outer corner of your eye . You can imagine aiming towards the back of your eyeball on each side for this, as opposed to straight back to the center of your nose and head.     You need to use this medication every day regardless of symptoms, as it takes time ( a few weeks) to work and get the benefits. It does not work on an "as needed" basis like taking a decongestant. If your symptoms only occur in a particular season, then the medication can be used seasonally instead of year long. For seasonal symptoms, you should start using the spray twice daily a month before when you normally have symptoms ( for example, if " symptoms start in August, should start at the end of June).     · Start nasal irrigations with saline solution- you can either use a rinse or a mist spray:    SALINE SINUS RINSE ([employer] brand): You should do a full bottle, half on one side of your nose and half on the other, 1-2 times per day (or more if able to, you cannot do this too much). Follow the instructions on the box: mix the salt packet with clean water (bottle, previously boiled, distilled, etc -- not tap water) to the line on the bottle to make the irrigation.         NASAL SALINE SPRAY ( simply saline and arm and hammer are examples) There are several different brands found in the cold and flu aisle of the pharmacy. You can use any brand of saline spray - this will deliver the saline by a gentle mist ( if you have difficulty or discomfort with nasal rinse/ a lot of fluid in the nose, this will be more comfortable).     Always rinse your nose with saline prior to using medication sprays and wait a couple of hours before using again. You can use the saline throughout the day to help with stuffy nose or dry nose.    · Do not use nasal decongestant sprays such as Afrin or similar products long term ( over 3 days) .  This can cause long term physical nasal addiction. Afrin should only be used if having nose bleeds, severe nasal congestion , or severe ear pain/fullness and should not be used for more than 2-3 days in a row . It is a not a medication that should be used for a long period of time.     It was nice meeting you today, and I look forward to helping you feel better soon. Please don't hesitate to call if you have any other questions or concerns, or if I can be of any assistance in the meantime.      Delores Lewis MD    [facility]     Phone  [phone]    Fax      [phone]        Delores Lewis MD  Otorhinolaryngology

## 2020-10-21 ENCOUNTER — OFFICE VISIT (OUTPATIENT)
Dept: GASTROENTEROLOGY | Facility: CLINIC | Age: 50
End: 2020-10-21
Payer: MEDICARE

## 2020-10-21 VITALS
BODY MASS INDEX: 22.34 KG/M2 | SYSTOLIC BLOOD PRESSURE: 135 MMHG | WEIGHT: 150.81 LBS | HEART RATE: 84 BPM | HEIGHT: 69 IN | DIASTOLIC BLOOD PRESSURE: 69 MMHG

## 2020-10-21 DIAGNOSIS — K21.9 GASTROESOPHAGEAL REFLUX DISEASE WITHOUT ESOPHAGITIS: Primary | ICD-10-CM

## 2020-10-21 DIAGNOSIS — R13.10 DYSPHAGIA, UNSPECIFIED TYPE: ICD-10-CM

## 2020-10-21 DIAGNOSIS — R09.A2 GLOBUS PHARYNGEUS: ICD-10-CM

## 2020-10-21 LAB — TACROLIMUS BLD-MCNC: 4.7 NG/ML (ref 5–15)

## 2020-10-21 PROCEDURE — 3078F DIAST BP <80 MM HG: CPT | Mod: CPTII,S$GLB,, | Performed by: INTERNAL MEDICINE

## 2020-10-21 PROCEDURE — 99214 PR OFFICE/OUTPT VISIT, EST, LEVL IV, 30-39 MIN: ICD-10-PCS | Mod: S$GLB,,, | Performed by: INTERNAL MEDICINE

## 2020-10-21 PROCEDURE — 3075F PR MOST RECENT SYSTOLIC BLOOD PRESS GE 130-139MM HG: ICD-10-PCS | Mod: CPTII,S$GLB,, | Performed by: INTERNAL MEDICINE

## 2020-10-21 PROCEDURE — 99214 OFFICE O/P EST MOD 30 MIN: CPT | Mod: S$GLB,,, | Performed by: INTERNAL MEDICINE

## 2020-10-21 PROCEDURE — 3008F BODY MASS INDEX DOCD: CPT | Mod: CPTII,S$GLB,, | Performed by: INTERNAL MEDICINE

## 2020-10-21 PROCEDURE — 3075F SYST BP GE 130 - 139MM HG: CPT | Mod: CPTII,S$GLB,, | Performed by: INTERNAL MEDICINE

## 2020-10-21 PROCEDURE — 99999 PR PBB SHADOW E&M-EST. PATIENT-LVL V: CPT | Mod: PBBFAC,,, | Performed by: INTERNAL MEDICINE

## 2020-10-21 PROCEDURE — 99999 PR PBB SHADOW E&M-EST. PATIENT-LVL V: ICD-10-PCS | Mod: PBBFAC,,, | Performed by: INTERNAL MEDICINE

## 2020-10-21 PROCEDURE — 3078F PR MOST RECENT DIASTOLIC BLOOD PRESSURE < 80 MM HG: ICD-10-PCS | Mod: CPTII,S$GLB,, | Performed by: INTERNAL MEDICINE

## 2020-10-21 PROCEDURE — 3008F PR BODY MASS INDEX (BMI) DOCUMENTED: ICD-10-PCS | Mod: CPTII,S$GLB,, | Performed by: INTERNAL MEDICINE

## 2020-10-21 RX ORDER — CYPROHEPTADINE HYDROCHLORIDE 4 MG/1
TABLET ORAL
COMMUNITY
Start: 2020-10-01 | End: 2023-01-17 | Stop reason: CLARIF

## 2020-10-21 RX ORDER — ONDANSETRON 4 MG/1
TABLET, FILM COATED ORAL
COMMUNITY
Start: 2020-09-16

## 2020-10-21 RX ORDER — INFLUENZA A VIRUS A/VICTORIA/2454/2019 IVR-207 (H1N1) ANTIGEN (PROPIOLACTONE INACTIVATED), INFLUENZA A VIRUS A/HONG KONG/2671/2019 IVR-208 (H3N2) ANTIGEN (PROPIOLACTONE INACTIVATED), INFLUENZA B VIRUS B/VICTORIA/705/2018 BVR-11 ANTIGEN (PROPIOLACTONE INACTIVATED), INFLUENZA B VIRUS B/PHUKET/3073/2013 BVR-1B ANTIGEN (PROPIOLACTONE INACTIVATED) 15; 15; 15; 15 UG/.5ML; UG/.5ML; UG/.5ML; UG/.5ML
INJECTION, SUSPENSION INTRAMUSCULAR
COMMUNITY
Start: 2020-10-08 | End: 2021-01-28 | Stop reason: CLARIF

## 2020-10-21 RX ORDER — AMITRIPTYLINE HYDROCHLORIDE 10 MG/1
10 TABLET, FILM COATED ORAL NIGHTLY
Qty: 30 TABLET | Refills: 3 | Status: SHIPPED | OUTPATIENT
Start: 2020-10-21 | End: 2021-07-26 | Stop reason: SDUPTHER

## 2020-10-21 RX ORDER — RIMEGEPANT SULFATE 75 MG/75MG
TABLET, ORALLY DISINTEGRATING ORAL
COMMUNITY
Start: 2020-10-14 | End: 2020-11-20

## 2020-10-21 NOTE — PROGRESS NOTES
[facility]    Reason for visit: The primary encounter diagnosis was Gastroesophageal reflux disease without esophagitis. Diagnoses of Dysphagia, unspecified type and Globus pharyngeus were also pertinent to this visit.  Referring Provider/PCP: Timur Lion MD    History of Present Illness:  Valencia Dumont is a 50 y.o. female with a history of liver transplant for AIH, pharyngeal dysphagia, diabetes, CKD who is presenting for initial evaluation of dysphagia        She reports that food gets stuck in her throat area mostly with meat, bread and some pills. It doesn't happen with liquids. She denies coughing or choking. This started about December 2019. She reports heartburn that improved with BID protonix.  As part of her dysphagia workup she underwent a laryngoscopy which was normal and referred to SLP. She underwent modified barium study that showed pharyngeal dysphagia for which she was referred to speech therapy which has not been helping.     She also reports abdominal pain that is crampy, located in her whole abdomen, ~4 times a week, can last sometimes the whole day, temporarily improved with a BM, worse when constipated. Associated with tenesmus.     Interval history 10/21/2020:  Has had some pill dysphagia for a few months.  She feels like phlegm gets hung up in her upper throat  Tried nystatin swish and swallow - no improvement  Placed on bid ppi trial by ENT in July - not much improvement  Actually felt much better when she was on zantac at night an these symptoms all got worse when it was recalled. She was switched to pepcid but didn't think it was as good      Review of Systems   Constitutional: Positive for weight loss. Negative for fever.   HENT: Positive for sore throat.    Eyes: Negative.    Respiratory: Positive for shortness of breath.    Cardiovascular: Positive for chest pain.   Gastrointestinal: Positive for abdominal pain, heartburn, nausea and vomiting.  Negative for blood in stool, constipation, diarrhea and melena.   Genitourinary: Negative for dysuria.   Musculoskeletal: Negative for falls.   Skin:        Bruising   Neurological: Positive for dizziness.   Psychiatric/Behavioral: Negative for substance abuse.       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    Hypertension     Kidney stone     Lupus     Osteoporosis 12/2013    Prophylactic immunotherapy (transplant immunosuppression) 1/2/2014    SBP (spontaneous bacterial peritonitis)     history of        Past Surgical History:   Procedure Laterality Date    CHOLECYSTECTOMY      COLONOSCOPY N/A 5/31/2017    Procedure: COLONOSCOPY;  Surgeon: Marky Sun MD;  Location: [facility] ([address] [place] [address]);  Service: Endoscopy;  Laterality: N/A;  PM prep.    ESOPHAGOGASTRODUODENOSCOPY      ESOPHAGOGASTRODUODENOSCOPY N/A 1/16/2019    Procedure: EGD (ESOPHAGOGASTRODUODENOSCOPY);  Surgeon: Deniz Guerra MD;  Location: [facility] ([address] [place] [address]);  Service: Endoscopy;  Laterality: N/A;  labs prior, s/p liver transplant-MS    ESOPHAGOGASTRODUODENOSCOPY N/A 9/9/2020    Procedure: EGD (ESOPHAGOGASTRODUODENOSCOPY);  Surgeon: Davion Ríos MD;  Location: [facility] ([address] [place] [address]);  Service: Endoscopy;  Laterality: N/A;  Please order the EGD at least 2 weeks after barium esophagram has been done EGD is for dysphagia  covid test 9/6-[facility]    LIVER BIOPSY      LIVER TRANSPLANT  12/31/2013    REFRACTIVE SURGERY Bilateral 2010    TUBAL LIGATION  2003    UPPER GASTROINTESTINAL ENDOSCOPY         Family History   Problem Relation Age of Onset    Hypertension Mother     Cataracts Mother     Diabetes  Father     Alzheimer's disease Father     Diabetes Paternal Grandfather     Diabetes Paternal Grandmother     No Known Problems Maternal Grandmother     Bone cancer Maternal Grandfather     Breast cancer Maternal Aunt 55    Hypertension Brother     Diabetes Brother     No Known Problems Sister     No Known Problems Maternal Uncle     No Known Problems Paternal Aunt     No Known Problems Paternal Uncle     Stroke Neg Hx     Cancer Neg Hx     Colon cancer Neg Hx     Esophageal cancer Neg Hx     Stomach cancer Neg Hx     Rectal cancer Neg Hx     Amblyopia Neg Hx     Blindness Neg Hx     Glaucoma Neg Hx     Macular degeneration Neg Hx     Retinal detachment Neg Hx     Strabismus Neg Hx     Thyroid disease Neg Hx        Social History     Socioeconomic History    Marital status:      Spouse name: Not on file    Number of children: 3    Years of education: Not on file    Highest education level: Not on file   Occupational History     Employer: [employer]   Social Needs    Financial resource strain: Not on file    Food insecurity     Worry: Not on file     Inability: Not on file    Transportation needs     Medical: Not on file     Non-medical: Not on file   Tobacco Use    Smoking status: Never Smoker    Smokeless tobacco: Never Used    Tobacco comment: disability; ; 3 children   Substance and Sexual Activity    Alcohol use: Yes     Alcohol/week: 1.0 standard drinks     Types: 1 Glasses of wine per week     Comment: occasionally     Drug use: No    Sexual activity: Yes     Partners: Male     Birth control/protection: See Surgical Hx, Post-menopausal     Comment: s/p tubal ligation,  since 1989   Lifestyle    Physical activity     Days per week: Not on file     Minutes per session: Not on file    Stress: Not on file   Relationships    Social connections     Talks on phone: Not on file     Gets together: Not on file     Attends Hindu service: Not on file      Active member of club or organization: Not on file     Attends meetings of clubs or organizations: Not on file     Relationship status: Not on file   Other Topics Concern    Not on file   Social History Narrative    Not on file       Current Outpatient Medications on File Prior to Visit   Medication Sig Dispense Refill    AFLURIA QUAD 3682-1402,6MO UP, 60 mcg (15 mcg x 4)/0.5 mL Susp INJECT now AS DIRECTED      azelastine (ASTELIN) 137 mcg (0.1 %) nasal spray use ONE spray in each nostril TWICE DAILY 30 mL 3    azelastine (ASTELIN) 137 mcg (0.1 %) nasal spray 1 spray (137 mcg total) by Nasal route 2 (two) times daily. 30 mL 3    blood-glucose meter,continuous (DEXCOM G6 ) Misc 1 each by Misc.(Non-Drug; Combo Route) route once. for 1 dose 1 each 0    blood-glucose transmitter (DEXCOM G6 TRANSMITTER) Karen 1 each by Misc.(Non-Drug; Combo Route) route once a week 1 Device 3    cholecalciferol, vitamin D3, (VITAMIN D3) 50 mcg (2,000 unit) Cap Take 1 capsule (2,000 Units total) by mouth once daily. 90 capsule 3    ciclopirox (PENLAC) 8 % Soln Apply topically nightly. 1 Bottle 3    cyproheptadine (PERIACTIN) 4 mg tablet TAKE ONE TABLET BY MOUTH ONCE DAILY FOR APPETITE      diazepam (VALIUM) 5 MG tablet Take 5 mg by mouth 3 (three) times daily as needed.   0    diclofenac sodium (VOLTAREN) 1 % Gel Voltaren 1 % topical gel   APPLY 2 GRAM TO THE AFFECTED AREA(S) BY TOPICAL ROUTE 4 TIMES PER DAY      DILTIAZEM HCL (DILTIAZEM 2% CREAM) Apply topically 3 (three) times daily. Apply topically to anal area. 30 g 0    ergocalciferol (ERGOCALCIFEROL) 50,000 unit Cap Take 1 capsule (50,000 Units total) by mouth every 7 days. for 12 doses 12 capsule 0    ergocalciferol (ERGOCALCIFEROL) 50,000 unit Cap Take 1 capsule (50,000 Units total) by mouth every 7 days. for 12 doses 12 capsule 0    erythromycin with ethanol (EMGEL) 2 % gel ADD 30GM (1 TUBE) TO THE SOAKING DEVICE AND ALLOW WATER TO AGITATE. PLACE AFFECTED  AREAS INTO WATER AND SOAK FOR 10 MINUTES 1-2 TIMES DAILY  1    famotidine (PEPCID) 40 MG tablet       fluconazole (DIFLUCAN) 150 MG Tab TAKE ONE TABLET BY MOUTH once for ONE dose  0    fluticasone propionate (FLONASE) 50 mcg/actuation nasal spray 1 spray (50 mcg total) by Each Nostril route 2 (two) times daily. 18.2 mL 3    gabapentin (NEURONTIN) 300 MG capsule TAKE 1 CAPSULE BY MOUTH THREE TIMES DAILY      gentamicin (GARAMYCIN) 0.1 % cream ADD 30GM (1 TUBE) TO THE SOAKING DEVICE AND ALLOW WATER TO AGITATE. PLACE AFFECTED AREAS INTO WATER AND SOAK FOR 10 MINUTES 1-2 TIMES DAILY  1    hydrOXYchloroQUINE (PLAQUENIL) 200 mg tablet TAKE ONE TABLET BY MOUTH TWICE DAILY 60 tablet 2    hydrOXYzine HCl (ATARAX) 10 MG Tab TAKE 1 OR 2 TABLETS BY MOUTH THREE TIMES DAILY AS NEEDED FOR ITCHING.  0    insulin aspart U-100 (NOVOLOG FLEXPEN U-100 INSULIN) 100 unit/mL (3 mL) InPn pen Takes three units before breakfast and lunch  and 5 units before dinner with sliding scale, up to 25 units daily 45 mL 3    insulin detemir U-100 (LEVEMIR FLEXTOUCH U-100 INSULN) 100 unit/mL (3 mL) SubQ InPn pen Inject 4 Units into the skin 2 (two) times daily. 45 mL 3    isosorbide mononitrate (IMDUR) 30 MG 24 hr tablet Take 30 mg by mouth once daily.      ketoconazole (NIZORAL) 2 % cream ADD 30GM (1/2 TUBE) TO THE SOAKING DEVICE AND ALLOW WATER TO AGITATE. PLACE AFFECTED AREAS INTO WATER AND SOAK FOR 10 MINUTES 1-2 TIMES BRENDON  1    Lactobac. rhamnosus GG-inulin 10 billion cell -200 mg Cap Take 1 capsule by mouth 2 (two) times daily. 30 capsule 0    levocetirizine (XYZAL) 5 MG tablet Take 5 mg by mouth every evening.  3    levoFLOXacin (LEVAQUIN) 750 MG tablet levofloxacin 750 mg tablet      losartan (COZAAR) 100 MG tablet Take 1 tablet (100 mg total) by mouth once daily. 30 tablet 2    magnesium oxide 500 mg Tab Take 500 mg by mouth 2 (two) times daily. 60 each 6    meclizine (ANTIVERT) 25 mg tablet TAKE ONE TABLET BY MOUTH AT  BEDTIME AS NEEDED for dizziness.  0    meloxicam (MOBIC) 15 MG tablet       MULTIVIT,THER IRON,CA,FA & MIN (MULTIVITAMIN) Tab Take 1 tablet by mouth once daily. 30 tablet 0    mycophenolate (MYFORTIC) 180 MG TbEC TAKE TWO TABLETS BY MOUTH TWICE DAILY. 120 tablet 5    neomycin-polymyxin-hydrocortisone (CORTISPORIN) otic solution INSTILL TWO DROPS INTO BOTH EARS FOUR TIMES DAILY FOR 10 DAYS  0    nystatin (MYCOSTATIN) 100,000 unit/mL suspension SWISH AND SPIT FIVE MILLILITERS BY MOUTH FOUR TIMES DAILY FOR 7 DAYS.  1    nystatin (MYCOSTATIN) cream Apply topically 2 times daily 30 g 0    ondansetron (ZOFRAN) 4 MG tablet TAKE TWO TABLETS BY MOUTH EVERY 8 HOURS AS NEEDED FOR NAUSEA.      oxycodone-acetaminophen (PERCOCET) 7.5-325 mg per tablet Take 1 tablet by mouth every 4 (four) hours as needed for Pain.      pantoprazole (PROTONIX) 40 MG tablet Take 1 tablet (40 mg total) by mouth 2 (two) times daily. Take immediately in am when wake up and then 30 minutes prior to dinner 60 tablet 11    polyethylene glycol (GLYCOLAX) 17 gram/dose powder Mix 1 capful (17 g) with liquid and by mouth 2 (two) times daily. 1530 g 11    PROAIR HFA 90 mcg/actuation inhaler Inhale 2 puffs into the lungs 3 (three) times daily as needed.   3    promethazine-dextromethorphan (PROMETHAZINE-DM) 6.25-15 mg/5 mL Syrp Take by mouth 2 (two) times daily as needed.   0    rimegepant (NURTEC) ODT 75 mg       rosuvastatin (CRESTOR) 5 MG tablet Take 5 mg by mouth once daily.      sertraline (ZOLOFT) 50 MG tablet Take 50 mg by mouth once daily.  11    SSD 1 % cream 1 application 2 (two) times daily. Apply to affected area  0    tacrolimus (PROGRAF) 1 MG Cap Take 3 capsules (3 mg total) by mouth every 12 (twelve) hours. 180 capsule 11    triamcinolone acetonide 0.1% (KENALOG) 0.1 % cream Apply topically 2 times daily 30 g 0    valACYclovir (VALTREX) 500 MG tablet TAKE ONE TABLET BY MOUTH TWICE DAILY FOR 3 DAYS. 6 tablet 2    verapamil  (CALAN-SR) 120 MG CR tablet TAKE ONE TABLET ONCE DAILY FOR BLOOD PRESSURE  3    zolpidem (AMBIEN) 10 mg Tab Take 10 mg by mouth nightly as needed.  3    [DISCONTINUED] paroxetine (PAXIL) 10 MG tablet paroxetine 10 mg tablet      [DISCONTINUED] traZODone (DESYREL) 50 MG tablet Take 50 mg by mouth nightly.  0    blood-glucose meter kit To check BG 5 times daily, to use with insurance preferred meter 1 each 0    buPROPion (WELLBUTRIN XL) 150 MG TB24 tablet Take 300 mg by mouth.      estradiol (YUVAFEM) 10 mcg Tab Place 1 tablet (10 mcg total) vaginally twice a week. 8 tablet 11    nystatin-triamcinolone (MYCOLOG II) cream Apply to affected area 2 times daily 30 g 1     No current facility-administered medications on file prior to visit.        Review of patient's allergies indicates:   Allergen Reactions    Norvasc [amlodipine]      Severe headache    Doxycycline Rash    Pcn [penicillins] Rash       Physical Exam:  Constitutional:  not in acute distress and well developed  HENT: Head: Normal, normocephalic, atraumatic.  Eyes: conjunctiva clear and sclera nonicteric  Cardiovascular: regular rate and rhythm and no murmur  Respiratory: normal chest expansion & respiratory effort   and no accessory muscle use  GI: soft, non-tender, without masses or organomegaly, liver transplant scar present  Musculoskeletal: no muscular tenderness noted  Skin: negatives: color normal  Neurological: alert, oriented x3  Psychiatric: mood and affect are within normal limits, pt is a good historian; no memory problems were noted    Laboratory:  Lab Results   Component Value Date     10/20/2020    K 4.6 10/20/2020     10/20/2020    CO2 27 10/20/2020    BUN 24 (H) 10/20/2020    CREATININE 1.4 10/20/2020    CALCIUM 9.0 10/20/2020    ANIONGAP 7 (L) 10/20/2020    ESTGFRAFRICA 51 (A) 10/20/2020    EGFRNONAA 44 (A) 10/20/2020       Lab Results   Component Value Date    ALT 14 10/20/2020    AST 12 10/20/2020     (H)  05/18/2017    ALKPHOS 85 10/20/2020    BILITOT 0.4 10/20/2020       Lab Results   Component Value Date    WBC 4.51 10/20/2020    HGB 11.7 (L) 10/20/2020    HCT 33.8 (L) 10/20/2020    MCV 77 (L) 10/20/2020     10/20/2020     PEndoHx:  EGD 2019  - LA Grade A reflux esophagitis. Biopsied.   - Normal stomach.   - Normal examined duodenum.     Colonoscopy 2017  - Tortuous colon.   - One 8 mm polyp in the descending colon, removed with a cold snare. Resected and retrieved.    - The examination was otherwise normal.   - The examined portion of the ileum was normal.   Path: hyperplastic polyp    EGD 9/20 - small 2 cm hiatal hernia, up to 17 eos/hpf but no stigmata of EOE noted      Imaging:  Esophagram 8/20 -  Wnl, slight reflux  MBSS - No evidence of laryngeal penetration or subglottic tracheal aspiration.    Assessment:  Valencia Dumont is a 50 y.o. female who is presenting for  dysphagia.    Problems:  1. Dysphagia  2. GERD    It sounds like she was best when she was taking zantac at night.  This may be irritable larynx as she does have some reflux    Plan:  1. Will get esophageal motility to complete workup  2. Trial of elavil at night for irritable larynx  3.    If no improvement on #2 may try a different nighttime H2 blocker  4.  Appreciate communication from ENT regarding her workup thus far    Will follow after motility testing done    Deniz Guerra MD    No orders of the defined types were placed in this encounter.

## 2020-10-22 ENCOUNTER — TELEPHONE (OUTPATIENT)
Dept: TRANSPLANT | Facility: CLINIC | Age: 50
End: 2020-10-22

## 2020-10-22 NOTE — TELEPHONE ENCOUNTER
Labs reviewed and are stable, continue q 3 months. Letter sent.       ----- Message from Miriam Abernathy MD sent at 10/21/2020  6:45 PM CDT -----  Reviewed, nothing to do; repeat per routine

## 2020-10-23 ENCOUNTER — CLINICAL SUPPORT (OUTPATIENT)
Dept: REHABILITATION | Facility: HOSPITAL | Age: 50
End: 2020-10-23
Attending: PODIATRIST
Payer: MEDICARE

## 2020-10-23 DIAGNOSIS — R26.9 GAIT ABNORMALITY: ICD-10-CM

## 2020-10-23 DIAGNOSIS — R49.0 HOARSE: Primary | ICD-10-CM

## 2020-10-23 DIAGNOSIS — M25.676 DECREASED RANGE OF MOTION OF FOOT, UNSPECIFIED LATERALITY: ICD-10-CM

## 2020-10-23 PROCEDURE — 92526 ORAL FUNCTION THERAPY: CPT | Mod: PN | Performed by: SPEECH-LANGUAGE PATHOLOGIST

## 2020-10-23 PROCEDURE — 97110 THERAPEUTIC EXERCISES: CPT | Mod: PN

## 2020-10-23 PROCEDURE — 97140 MANUAL THERAPY 1/> REGIONS: CPT | Mod: PN

## 2020-10-23 PROCEDURE — 92507 TX SP LANG VOICE COMM INDIV: CPT | Mod: PN | Performed by: SPEECH-LANGUAGE PATHOLOGIST

## 2020-10-23 NOTE — PROGRESS NOTES
"    Physical Therapy Treatment Note     Name: Valencia Dumont  Clinic Number: [id number]    Therapy Diagnosis:   Encounter Diagnoses   Name Primary?    Decreased range of motion of foot, unspecified laterality     Gait abnormality      Physician: Deepali Acosta DPM    Visit Date: 10/23/2020    Physician Orders: PT Eval and Treat   Medical Diagnosis from Referral: M79.671,M79.672 (ICD-10-CM) - Foot pain, bilateral  Evaluation Date: 10/5/2020  Authorization Period Expiration: 9/24/2021  Plan of Care Expiration: 1/5/2020  Visit # / Visits authorized: 3/5     Time In: 1345  Time Out: 1443  Total Time: 58 minutes  Total Billable Time: 48 minutes (10 minutes of head not billed)     Precautions: Standard and Diabetes    Subjective     Pt reports: She has been trying to alternate between her [employer]'s and [employer]'s tennis shoes. She feels as though she is doing a little better though  She was compliant with home exercise program.  Response to previous treatment: fair  Functional change: ongoing    Pain: 5/10  Location: right foot      Objective     Valencia received therapeutic exercises to develop strength, endurance, ROM, flexibility, posture and core stabilization for 36 minutes including:    +Nustep 5'  +Gatroc str on incline 30"x3  Seated HRs 3x10  Seated TRs 3x10  Towel curls + DF and abduction of toes 3 minutes  [place] pickups x1 trial each foot  Self Extension stretch of toes 3x30"  Tennis balls to plantar surface 2 minutes  Arch doming x20       Valencia received the following manual therapy techniques: Myofacial release were applied to the: B plantar surface of the feet for 12 minutes, including:    MFR via hawkgrips to plantar surface of B feet    Valencia participated in neuromuscular re-education activities to improve: Balance, Coordination, Kinesthetic, Sense, Proprioception and Posture for 00 minutes. The following activities were included:    Next session:   + standing on blue foam WBOS, NBOS  + Tandem " standing      Valencia participated in dynamic functional therapeutic activities to improve functional performance for 00  minutes, including:      Valencia received hot pack for 10 minutes to B feet in sitting.      Home Exercises Provided and Patient Education Provided     Education provided:   - Compliance with HEP  - Possibility of DOMs    Written Home Exercises Provided: Patient instructed to cont prior HEP.  Exercises were reviewed and Valencia was able to demonstrate them prior to the end of the session.  Valencia demonstrated good  understanding of the education provided.     See EMR under Patient Instructions for exercises provided prior visit.    Assessment   Pt tolerated treatment good today.  Myofascial inconsistencies throughout B medial arch with R foot>L foot. Challenged with DF and abduction of toes. Gastroc str added to continue to improve DF and decrease muscle hypertonicity in calves. She continues to respond well to treatment plan. 2/2 time constraints balance exercises were not started today.     Valencia is progressing well towards her goals.   Pt prognosis is Good.     Pt will continue to benefit from skilled outpatient physical therapy to address the deficits listed in the problem list box on initial evaluation, provide pt/family education and to maximize pt's level of independence in the home and community environment.     Pt's spiritual, cultural and educational needs considered and pt agreeable to plan of care and goals.     Anticipated barriers to physical therapy: chronicity of pain, co-morbitities    Goals:   GOALS: Short Term Goals:  4 weeks  1.Report decreased  in  pain at worse less than  <   / =  7  /10  to increase tolerance for improved functional actvities  2. Pt to improve B ankle range of motion by 25% to allow for improved functional mobility to allow for improvement in IADLs.   3. Increased B LE MMT 1/2 grade  to increase tolerance for ADL and work activities.  4. Pt to report ability  to stand/walk during household activities for > / = 30 minutes before requiring break in order to improve tolerance to household activities.   5. Pt to tolerate HEP to improve ROM and independence with ADL's     Long Term Goals: 8 weeks  1.Report decreased  in  pain at worse  less than  <   / =  4  /10  to increase tolerance for improved functional actvities  2.Pt to improve ankle range of motion by 75% to allow for improved functional mobility to allow for improvement in IADLs.   3.Increased B LE MMT 1 grade  to increase tolerance for ADL and work activities.  4. Pt will score < / = 46% disability on  FOTO outcome assessment  to increase functional activities and mobility   5. Pt to be Independent with HEP to improve ROM and independence with ADL's  6. Pt to report ability to complete household chores for > / = 45 minutes in order to improve tolerance to daily activities.        Plan     Continue to progress with current treatment plan.     Plan of care Certification: 10/5/2020 to 12/5/2020.     Outpatient Physical Therapy 2 times weekly for 8 weeks   Reyna Slaughter, PT   10/23/2020

## 2020-11-02 ENCOUNTER — OFFICE VISIT (OUTPATIENT)
Dept: RHEUMATOLOGY | Facility: CLINIC | Age: 50
End: 2020-11-02
Payer: MEDICARE

## 2020-11-02 ENCOUNTER — HOSPITAL ENCOUNTER (OUTPATIENT)
Dept: RADIOLOGY | Facility: HOSPITAL | Age: 50
Discharge: HOME | End: 2020-11-02
Attending: OTOLARYNGOLOGY
Payer: MEDICARE

## 2020-11-02 DIAGNOSIS — E55.9 VITAMIN D DEFICIENCY: ICD-10-CM

## 2020-11-02 DIAGNOSIS — M32.9 SYSTEMIC LUPUS ERYTHEMATOSUS, UNSPECIFIED SLE TYPE, UNSPECIFIED ORGAN INVOLVEMENT STATUS: Primary | ICD-10-CM

## 2020-11-02 DIAGNOSIS — J33.0 ANTROCHOANAL POLYP: ICD-10-CM

## 2020-11-02 DIAGNOSIS — R53.83 FATIGUE, UNSPECIFIED TYPE: ICD-10-CM

## 2020-11-02 DIAGNOSIS — D84.9 IMMUNOSUPPRESSION: ICD-10-CM

## 2020-11-02 DIAGNOSIS — M81.0 OSTEOPOROSIS, UNSPECIFIED OSTEOPOROSIS TYPE, UNSPECIFIED PATHOLOGICAL FRACTURE PRESENCE: ICD-10-CM

## 2020-11-02 PROCEDURE — 70486 CT MEDTRONIC SINUSES WITHOUT: ICD-10-PCS | Mod: 26,,, | Performed by: RADIOLOGY

## 2020-11-02 PROCEDURE — 99214 OFFICE O/P EST MOD 30 MIN: CPT | Mod: 95,,, | Performed by: INTERNAL MEDICINE

## 2020-11-02 PROCEDURE — 70486 CT MAXILLOFACIAL W/O DYE: CPT | Mod: TC

## 2020-11-02 PROCEDURE — 70486 CT MAXILLOFACIAL W/O DYE: CPT | Mod: 26,,, | Performed by: RADIOLOGY

## 2020-11-02 PROCEDURE — 99214 PR OFFICE/OUTPT VISIT, EST, LEVL IV, 30-39 MIN: ICD-10-PCS | Mod: 95,,, | Performed by: INTERNAL MEDICINE

## 2020-11-02 ASSESSMENT — ROUTINE ASSESSMENT OF PATIENT INDEX DATA (RAPID3)
PATIENT GLOBAL ASSESSMENT SCORE: 3
WHEN YOU AWAKENED IN THE MORNING OVER THE LAST WEEK, PLEASE INDICATE THE AMOUNT OF TIME IT TAKES UNTIL YOU ARE AS LIMBER AS YOU WILL BE FOR THE DAY: 2 HOURS
PSYCHOLOGICAL DISTRESS SCORE: 3.3
TOTAL RAPID3 SCORE: 3.28
MDHAQ FUNCTION SCORE: 1
PAIN SCORE: 3.5
AM STIFFNESS SCORE: 1, YES
FATIGUE SCORE: 6

## 2020-11-02 NOTE — PROGRESS NOTES
Rapid3 Question Responses and Scores 11/2/2020   MDHAQ Score 1   Psychologic Score 3.3   Pain Score 3.5   When you awakened in the morning OVER THE LAST WEEK, did you feel stiff? Yes   If Yes, please indicate the number of hours until you are as limber as you will be for the day 2   Fatigue Score 6   Global Health Score 3   RAPID3 Score 3.27       Answers for HPI/ROS submitted by the patient on 11/2/2020   fever: No  eye redness: No  mouth sores: No  headaches: No  shortness of breath: Yes  chest pain: No  trouble swallowing: Yes  diarrhea: No  constipation: No  unexpected weight change: No  genital sore: No  dysuria: No  During the last 3 days, have you had a skin rash?: No  Bruises or bleeds easily: Yes  cough: Yes
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
## 2020-11-03 ENCOUNTER — PATIENT MESSAGE (OUTPATIENT)
Dept: OTOLARYNGOLOGY | Facility: CLINIC | Age: 50
End: 2020-11-03

## 2020-11-03 ENCOUNTER — PATIENT MESSAGE (OUTPATIENT)
Dept: ENDOCRINOLOGY | Facility: CLINIC | Age: 50
End: 2020-11-03

## 2020-11-03 ENCOUNTER — PATIENT MESSAGE (OUTPATIENT)
Dept: PODIATRY | Facility: CLINIC | Age: 50
End: 2020-11-03

## 2020-11-04 ENCOUNTER — TELEPHONE (OUTPATIENT)
Dept: PODIATRY | Facility: CLINIC | Age: 50
End: 2020-11-04

## 2020-11-05 ENCOUNTER — CLINICAL SUPPORT (OUTPATIENT)
Dept: REHABILITATION | Facility: HOSPITAL | Age: 50
End: 2020-11-05
Attending: PODIATRIST
Payer: MEDICARE

## 2020-11-05 DIAGNOSIS — M25.676 DECREASED RANGE OF MOTION OF FOOT, UNSPECIFIED LATERALITY: Primary | ICD-10-CM

## 2020-11-05 DIAGNOSIS — R26.9 GAIT ABNORMALITY: ICD-10-CM

## 2020-11-05 PROCEDURE — 97110 THERAPEUTIC EXERCISES: CPT | Mod: PN,CQ

## 2020-11-05 PROCEDURE — 97140 MANUAL THERAPY 1/> REGIONS: CPT | Mod: PN,CQ
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
## 2020-11-06 ENCOUNTER — LAB VISIT (OUTPATIENT)
Dept: LAB | Facility: HOSPITAL | Age: 50
End: 2020-11-06
Attending: INTERNAL MEDICINE
Payer: MEDICARE

## 2020-11-06 DIAGNOSIS — E55.9 VITAMIN D DEFICIENCY: ICD-10-CM

## 2020-11-06 DIAGNOSIS — D84.9 IMMUNOSUPPRESSION: ICD-10-CM

## 2020-11-06 DIAGNOSIS — E55.9 VITAMIN D INSUFFICIENCY: ICD-10-CM

## 2020-11-06 DIAGNOSIS — R53.83 FATIGUE, UNSPECIFIED TYPE: ICD-10-CM

## 2020-11-06 DIAGNOSIS — D58.2 HEMOGLOBIN C TRAIT: ICD-10-CM

## 2020-11-06 DIAGNOSIS — M81.0 OSTEOPOROSIS, UNSPECIFIED OSTEOPOROSIS TYPE, UNSPECIFIED PATHOLOGICAL FRACTURE PRESENCE: ICD-10-CM

## 2020-11-06 DIAGNOSIS — R79.0 LOW MAGNESIUM LEVEL: ICD-10-CM

## 2020-11-06 DIAGNOSIS — M32.9 SYSTEMIC LUPUS ERYTHEMATOSUS, UNSPECIFIED SLE TYPE, UNSPECIFIED ORGAN INVOLVEMENT STATUS: ICD-10-CM

## 2020-11-06 LAB
25(OH)D3+25(OH)D2 SERPL-MCNC: 24 NG/ML (ref 30–96)
ALBUMIN SERPL BCP-MCNC: 4.1 G/DL (ref 3.5–5.2)
ALP SERPL-CCNC: 102 U/L (ref 55–135)
ALT SERPL W/O P-5'-P-CCNC: 13 U/L (ref 10–44)
ANION GAP SERPL CALC-SCNC: 9 MMOL/L (ref 8–16)
AST SERPL-CCNC: 13 U/L (ref 10–40)
BASOPHILS # BLD AUTO: 0.02 K/UL (ref 0–0.2)
BASOPHILS NFR BLD: 0.5 % (ref 0–1.9)
BILIRUB SERPL-MCNC: 0.4 MG/DL (ref 0.1–1)
BUN SERPL-MCNC: 21 MG/DL (ref 6–20)
C3 SERPL-MCNC: 87 MG/DL (ref 50–180)
C4 SERPL-MCNC: 30 MG/DL (ref 11–44)
CALCIUM SERPL-MCNC: 9.3 MG/DL (ref 8.7–10.5)
CHLORIDE SERPL-SCNC: 104 MMOL/L (ref 95–110)
CO2 SERPL-SCNC: 27 MMOL/L (ref 23–29)
CREAT SERPL-MCNC: 1.5 MG/DL (ref 0.5–1.4)
CRP SERPL-MCNC: 0.8 MG/L (ref 0–8.2)
DIFFERENTIAL METHOD: ABNORMAL
EOSINOPHIL # BLD AUTO: 0.1 K/UL (ref 0–0.5)
EOSINOPHIL NFR BLD: 3.4 % (ref 0–8)
ERYTHROCYTE [DISTWIDTH] IN BLOOD BY AUTOMATED COUNT: 14 % (ref 11.5–14.5)
ERYTHROCYTE [SEDIMENTATION RATE] IN BLOOD BY WESTERGREN METHOD: 9 MM/HR (ref 0–20)
EST. GFR  (AFRICAN AMERICAN): 46 ML/MIN/1.73 M^2
EST. GFR  (NON AFRICAN AMERICAN): 40 ML/MIN/1.73 M^2
FERRITIN SERPL-MCNC: 32 NG/ML (ref 20–300)
GLUCOSE SERPL-MCNC: 164 MG/DL (ref 70–110)
HCT VFR BLD AUTO: 35.9 % (ref 37–48.5)
HGB BLD-MCNC: 12.1 G/DL (ref 12–16)
HGB BLD-MCNC: 12.1 G/DL (ref 12–16)
IMM GRANULOCYTES # BLD AUTO: 0.01 K/UL (ref 0–0.04)
IMM GRANULOCYTES NFR BLD AUTO: 0.3 % (ref 0–0.5)
IRON SERPL-MCNC: 55 UG/DL (ref 30–160)
LYMPHOCYTES # BLD AUTO: 1.5 K/UL (ref 1–4.8)
LYMPHOCYTES NFR BLD: 39.3 % (ref 18–48)
MAGNESIUM SERPL-MCNC: 2.1 MG/DL (ref 1.6–2.6)
MCH RBC QN AUTO: 26.4 PG (ref 27–31)
MCHC RBC AUTO-ENTMCNC: 33.7 G/DL (ref 32–36)
MCV RBC AUTO: 78 FL (ref 82–98)
MONOCYTES # BLD AUTO: 0.4 K/UL (ref 0.3–1)
MONOCYTES NFR BLD: 9.2 % (ref 4–15)
NEUTROPHILS # BLD AUTO: 1.8 K/UL (ref 1.8–7.7)
NEUTROPHILS NFR BLD: 47.3 % (ref 38–73)
NRBC BLD-RTO: 0 /100 WBC
PLATELET # BLD AUTO: 205 K/UL (ref 150–350)
PMV BLD AUTO: 10.5 FL (ref 9.2–12.9)
POTASSIUM SERPL-SCNC: 3.8 MMOL/L (ref 3.5–5.1)
PROT SERPL-MCNC: 6.8 G/DL (ref 6–8.4)
RBC # BLD AUTO: 4.59 M/UL (ref 4–5.4)
SATURATED IRON: 13 % (ref 20–50)
SODIUM SERPL-SCNC: 140 MMOL/L (ref 136–145)
TOTAL IRON BINDING CAPACITY: 410 UG/DL (ref 250–450)
TRANSFERRIN SERPL-MCNC: 277 MG/DL (ref 200–375)
WBC # BLD AUTO: 3.82 K/UL (ref 3.9–12.7)

## 2020-11-06 PROCEDURE — 86160 COMPLEMENT ANTIGEN: CPT | Mod: 59

## 2020-11-06 PROCEDURE — 86225 DNA ANTIBODY NATIVE: CPT

## 2020-11-06 PROCEDURE — 82728 ASSAY OF FERRITIN: CPT

## 2020-11-06 PROCEDURE — 82306 VITAMIN D 25 HYDROXY: CPT

## 2020-11-06 PROCEDURE — 86140 C-REACTIVE PROTEIN: CPT

## 2020-11-06 PROCEDURE — 83735 ASSAY OF MAGNESIUM: CPT

## 2020-11-06 PROCEDURE — 86225 DNA ANTIBODY NATIVE: CPT | Mod: 59

## 2020-11-06 PROCEDURE — 83540 ASSAY OF IRON: CPT

## 2020-11-06 PROCEDURE — 86160 COMPLEMENT ANTIGEN: CPT

## 2020-11-06 PROCEDURE — 85652 RBC SED RATE AUTOMATED: CPT

## 2020-11-06 PROCEDURE — 85025 COMPLETE CBC W/AUTO DIFF WBC: CPT

## 2020-11-06 PROCEDURE — 80053 COMPREHEN METABOLIC PANEL: CPT

## 2020-11-06 PROCEDURE — 36415 COLL VENOUS BLD VENIPUNCTURE: CPT

## 2020-11-08 DIAGNOSIS — E61.1 IRON DEFICIENCY: Primary | ICD-10-CM

## 2020-11-08 RX ORDER — FERROUS SULFATE 325(65) MG
325 TABLET ORAL EVERY 12 HOURS
Qty: 60 TABLET | Refills: 4 | Status: SHIPPED | OUTPATIENT
Start: 2020-11-08 | End: 2021-01-14 | Stop reason: SDUPTHER

## 2020-11-09 ENCOUNTER — TELEPHONE (OUTPATIENT)
Dept: ENDOSCOPY | Facility: HOSPITAL | Age: 50
End: 2020-11-09

## 2020-11-09 ENCOUNTER — PATIENT MESSAGE (OUTPATIENT)
Dept: RHEUMATOLOGY | Facility: CLINIC | Age: 50
End: 2020-11-09

## 2020-11-09 ENCOUNTER — PATIENT MESSAGE (OUTPATIENT)
Dept: GASTROENTEROLOGY | Facility: CLINIC | Age: 50
End: 2020-11-09

## 2020-11-09 ENCOUNTER — DOCUMENTATION ONLY (OUTPATIENT)
Dept: REHABILITATION | Facility: HOSPITAL | Age: 50
End: 2020-11-09

## 2020-11-09 DIAGNOSIS — R49.0 HOARSE: Primary | ICD-10-CM

## 2020-11-09 NOTE — PROGRESS NOTES
No Show Note/Documentation    Patient: Valencia Dumont  Date of Session: 11/9/2020  Diagnosis:   1. Hoarse       MRN: [id number]    Valencia Dumont did not attend her scheduled therapy appointment today. She did not call to cancel nor reschedule. No future appointments have been scheduled at this time and patient will need to contact the clinic in order to make new appointments. No charges have been posted today.     Cancel: 0  No show: 2    ANTONETTE Jones, CCC-SLP  Speech Language Pathologist   11/9/2020

## 2020-11-10 LAB
DNA TITER: NORMAL
DSDNA AB SER-ACNC: POSITIVE [IU]/ML

## 2020-11-10 NOTE — TELEPHONE ENCOUNTER
----- Message from Davion Ríos MD sent at 11/8/2020 10:58 AM CST -----  Lynnette- please tell patient that they are iron deficient but not anemic and recommend that they take ferrous sulfate one 325mg pill every 12 hours for next 4 months.    Please order repeat fasting Hemoglobin, Iron/TIBC, and Ferritin in 8 weeks - Orders placed.      Valencia orders for colonoscopy place for evaluation of your iron deficiency.

## 2020-11-11 ENCOUNTER — INFUSION (OUTPATIENT)
Dept: INFUSION THERAPY | Facility: HOSPITAL | Age: 50
End: 2020-11-11
Attending: NURSE PRACTITIONER
Payer: MEDICARE

## 2020-11-11 VITALS
TEMPERATURE: 99 F | BODY MASS INDEX: 22.22 KG/M2 | SYSTOLIC BLOOD PRESSURE: 173 MMHG | DIASTOLIC BLOOD PRESSURE: 80 MMHG | WEIGHT: 150 LBS | HEIGHT: 69 IN

## 2020-11-11 DIAGNOSIS — M32.9 SYSTEMIC LUPUS ERYTHEMATOSUS, UNSPECIFIED SLE TYPE, UNSPECIFIED ORGAN INVOLVEMENT STATUS: ICD-10-CM

## 2020-11-11 DIAGNOSIS — M81.0 OSTEOPOROSIS: Primary | ICD-10-CM

## 2020-11-11 PROCEDURE — 25000003 PHARM REV CODE 250: Performed by: INTERNAL MEDICINE

## 2020-11-11 PROCEDURE — 96375 TX/PRO/DX INJ NEW DRUG ADDON: CPT

## 2020-11-11 PROCEDURE — 63600175 PHARM REV CODE 636 W HCPCS: Mod: JG | Performed by: INTERNAL MEDICINE

## 2020-11-11 PROCEDURE — 96413 CHEMO IV INFUSION 1 HR: CPT

## 2020-11-11 RX ORDER — DIPHENHYDRAMINE HYDROCHLORIDE 50 MG/ML
25 INJECTION INTRAMUSCULAR; INTRAVENOUS
Status: CANCELLED | OUTPATIENT
Start: 2020-12-09

## 2020-11-11 RX ORDER — DIPHENHYDRAMINE HYDROCHLORIDE 50 MG/ML
25 INJECTION INTRAMUSCULAR; INTRAVENOUS
Status: COMPLETED | OUTPATIENT
Start: 2020-11-11 | End: 2020-11-11

## 2020-11-11 RX ORDER — ACETAMINOPHEN 325 MG/1
650 TABLET ORAL
Status: COMPLETED | OUTPATIENT
Start: 2020-11-11 | End: 2020-11-11

## 2020-11-11 RX ORDER — HEPARIN 100 UNIT/ML
500 SYRINGE INTRAVENOUS
Status: CANCELLED | OUTPATIENT
Start: 2020-12-09

## 2020-11-11 RX ORDER — ACETAMINOPHEN 325 MG/1
650 TABLET ORAL
Status: CANCELLED | OUTPATIENT
Start: 2020-12-09

## 2020-11-11 RX ORDER — SODIUM CHLORIDE 0.9 % (FLUSH) 0.9 %
10 SYRINGE (ML) INJECTION
Status: CANCELLED | OUTPATIENT
Start: 2020-12-09

## 2020-11-11 RX ADMIN — BELIMUMAB 680 MG: 400 INJECTION, POWDER, LYOPHILIZED, FOR SOLUTION INTRAVENOUS at 11:11

## 2020-11-11 RX ADMIN — ACETAMINOPHEN 650 MG: 325 TABLET ORAL at 11:11

## 2020-11-11 RX ADMIN — DIPHENHYDRAMINE HYDROCHLORIDE 25 MG: 50 INJECTION INTRAMUSCULAR; INTRAVENOUS at 11:11

## 2020-11-11 NOTE — PLAN OF CARE
Patient arrived to unit for Benlysta infusion. No new or worsening Lupus symptoms to report today.Pt wearing a boot to leg foot due to fractured toe. Plan of care reviewed, patient agreeable to plan. Premeds of Tylenol and Benadryl given prior to infusion. Patient tolerated infusion well. Lunch offered and 50 % consumed. Warm blankets provided. VSS. Discharge instructions reviewed, patient instructed to return Dec 10 for next infusion. Patient ambulated off unit unassisted by self. Patient in NAD at time of discharge.

## 2020-11-13 ENCOUNTER — PATIENT MESSAGE (OUTPATIENT)
Dept: GASTROENTEROLOGY | Facility: CLINIC | Age: 50
End: 2020-11-13

## 2020-11-13 DIAGNOSIS — Z12.11 SCREENING FOR COLON CANCER: Primary | ICD-10-CM

## 2020-11-24 ENCOUNTER — TELEPHONE (OUTPATIENT)
Dept: ENDOCRINOLOGY | Facility: CLINIC | Age: 50
End: 2020-11-24

## 2020-11-30 ENCOUNTER — PATIENT MESSAGE (OUTPATIENT)
Dept: OTOLARYNGOLOGY | Facility: CLINIC | Age: 50
End: 2020-11-30

## 2020-12-02 ENCOUNTER — TELEPHONE (OUTPATIENT)
Dept: ENDOCRINOLOGY | Facility: CLINIC | Age: 50
End: 2020-12-02

## 2020-12-02 ENCOUNTER — OFFICE VISIT (OUTPATIENT)
Dept: OTOLARYNGOLOGY | Facility: CLINIC | Age: 50
End: 2020-12-02
Payer: MEDICARE

## 2020-12-02 ENCOUNTER — PATIENT MESSAGE (OUTPATIENT)
Dept: OTOLARYNGOLOGY | Facility: CLINIC | Age: 50
End: 2020-12-02

## 2020-12-02 DIAGNOSIS — J30.2 SEASONAL ALLERGIC RHINITIS, UNSPECIFIED TRIGGER: Primary | ICD-10-CM

## 2020-12-02 DIAGNOSIS — R13.10 DYSPHAGIA, UNSPECIFIED TYPE: ICD-10-CM

## 2020-12-02 DIAGNOSIS — J33.8 MAXILLARY SINUS POLYP: ICD-10-CM

## 2020-12-02 DIAGNOSIS — J32.2 CHRONIC ETHMOIDAL SINUSITIS: ICD-10-CM

## 2020-12-02 DIAGNOSIS — R49.0 HOARSE: ICD-10-CM

## 2020-12-02 PROCEDURE — 99213 PR OFFICE/OUTPT VISIT, EST, LEVL III, 20-29 MIN: ICD-10-PCS | Mod: 95,,, | Performed by: OTOLARYNGOLOGY

## 2020-12-02 PROCEDURE — 99213 OFFICE O/P EST LOW 20 MIN: CPT | Mod: 95,,, | Performed by: OTOLARYNGOLOGY

## 2020-12-02 NOTE — TELEPHONE ENCOUNTER
Spoke with the patient. She is having problems connecting to her virtual visit. I told the patient to try turning pone off then on.

## 2020-12-02 NOTE — TELEPHONE ENCOUNTER
----- Message from Robin Salazar sent at 12/2/2020 11:53 AM CST -----  Contact: [facility]  Calling to speak with nurse regarding pt appt     Call back: [phone]

## 2020-12-02 NOTE — PROGRESS NOTES
ENT VIRTUAL VISIT NOTE      Chief complaint:  Chief Complaint   Patient presents with    Sinus Problem       History of Present Illness  Valencia Dumont is a 50 y.o. female  presenting today for a followup of sinus issues and to review recent CT scan .     Because of the COVID-19 pandemic, we offered the patient a virtual visit.The patient's virtual visit today was conducted via telemedicine (video visit) as indicated to reduce risk of transmission of Covid-19 given the current pandemic and safety measures related to this.      The location of the patient for the virtual visit is : home in [facility].    Her most persistent complaint throughout her care with me has been postnasal drip and headaches.    She had an MRI/MRA which noted a possible polyp in her choanae however to me it appeared that it was a polyp extending from the maxillary os. I ordered a ct scan for further investigation and visit today is to review these results.  I increased her flonase to BID (  with astelin and saline which she had also been on I.e. no changes made to astelin/saline ) . Scope showed edematous vs polypoid changes in left middle meatus region but it did not extend into nasal cavity .     She has a PMH of lupus and is on immune suppression medication for a liver transplant.   her initial appointment with me on 6-17-20 was for evaluation for hoarse voice, ear discomfort and dizziness. Flex scope at that time was negative for malignancy and no evidence of vocal fold paresis. There were signs of scope indicating laryngeal irritation ( pachydermia/edema interarytenoid, postcricoid and pseudosulcus)  At initial visit she was not having dysphagia but a few weeks later developed almost an acute onset of dysphagia. She was seen by speech therapy for voice and swallowing and had MBS done which should mild UES stasis and slightly weak BOT retraction. She has also seen GI and had EGD and esophagram done ( Dr. Ríos) . Esophagram was  negative for dysmotility/stricture. She had treatment with speech therapy which helped some with her dysphagia . She also saw SLP  for complaints of Voice strain and Lower pitch of voice.     She underwent audio and vng on 7-21-20 for dizziness workup. She reports that the pressure of water wash during VNG caused some pain but did not make her  Dizzy. She was noted to have findings on vng concerning for central vestibular abnormality and has been seen by neurology since this testing was completed. She has seen Dr. Donaldson and noted to have migraine and started on treatment for that. She has been doing better but still has some symptoms.     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    Hypertension     Kidney stone     Lupus     Osteoporosis 12/2013    Prophylactic immunotherapy (transplant immunosuppression) 1/2/2014    SBP (spontaneous bacterial peritonitis)     history of         Past Surgical History  Past Surgical History:   Procedure Laterality Date    CHOLECYSTECTOMY      COLONOSCOPY N/A 5/31/2017    Procedure: COLONOSCOPY;  Surgeon: Marky Sun MD;  Location: [facility] ([address] [place] [address]);  Service: Endoscopy;  Laterality: N/A;  PM prep.    ESOPHAGOGASTRODUODENOSCOPY      ESOPHAGOGASTRODUODENOSCOPY N/A 1/16/2019    Procedure: EGD (ESOPHAGOGASTRODUODENOSCOPY);  Surgeon: Deniz Guerra MD;  Location: [address]);  Service: Endoscopy;  Laterality: N/A;  labs prior, s/p liver transplant-MS    ESOPHAGOGASTRODUODENOSCOPY N/A 9/9/2020    Procedure: EGD (ESOPHAGOGASTRODUODENOSCOPY);  Surgeon: Davion Ríos MD;  Location: [facility] ([address] [place] [address]);  Service: Endoscopy;  Laterality: N/A;   Please order the EGD at least 2 weeks after barium esophagram has been done EGD is for dysphagia  covid test 9/6-[facility]    LIVER BIOPSY      LIVER TRANSPLANT  12/31/2013    REFRACTIVE SURGERY Bilateral 2010    TUBAL LIGATION  2003    UPPER GASTROINTESTINAL ENDOSCOPY          Medications  Current Outpatient Medications on File Prior to Visit   Medication Sig Dispense Refill    AFLURIA QUAD 5337-9625,6MO UP, 60 mcg (15 mcg x 4)/0.5 mL Susp INJECT now AS DIRECTED      amitriptyline (ELAVIL) 10 MG tablet Take 1 tablet (10 mg total) by mouth every evening. 30 tablet 3    azelastine (ASTELIN) 137 mcg (0.1 %) nasal spray 1 spray (137 mcg total) by Nasal route 2 (two) times daily. 30 mL 3    blood-glucose meter kit To check BG 5 times daily, to use with insurance preferred meter 1 each 0    blood-glucose meter,continuous (DEXCOM G6 ) Misc 1 each by Misc.(Non-Drug; Combo Route) route once. for 1 dose 1 each 0    blood-glucose transmitter (DEXCOM G6 TRANSMITTER) Karen 1 each by Misc.(Non-Drug; Combo Route) route once a week 1 Device 3    buPROPion (WELLBUTRIN XL) 150 MG TB24 tablet Take 300 mg by mouth.      cholecalciferol, vitamin D3, (VITAMIN D3) 50 mcg (2,000 unit) Cap Take 1 capsule (2,000 Units total) by mouth once daily. 90 capsule 3    ciclopirox (PENLAC) 8 % Soln Apply topically nightly. 1 Bottle 3    cyproheptadine (PERIACTIN) 4 mg tablet TAKE ONE TABLET BY MOUTH ONCE DAILY FOR APPETITE      diazepam (VALIUM) 5 MG tablet Take 5 mg by mouth 3 (three) times daily as needed.   0    diclofenac sodium (VOLTAREN) 1 % Gel Voltaren 1 % topical gel   APPLY 2 GRAM TO THE AFFECTED AREA(S) BY TOPICAL ROUTE 4 TIMES PER DAY      DILTIAZEM HCL (DILTIAZEM 2% CREAM) Apply topically 3 (three) times daily. Apply topically to anal area. 30 g 0    erythromycin with ethanol (EMGEL) 2 % gel ADD 30GM (1 TUBE) TO THE SOAKING DEVICE AND ALLOW WATER TO AGITATE. PLACE AFFECTED AREAS INTO WATER AND SOAK  FOR 10 MINUTES 1-2 TIMES DAILY  1    estradiol (YUVAFEM) 10 mcg Tab Place 1 tablet (10 mcg total) vaginally twice a week. 8 tablet 11    famotidine (PEPCID) 40 MG tablet       ferrous sulfate (FEOSOL) 325 mg (65 mg iron) Tab tablet Take 1 tablet (325 mg total) by mouth every 12 (twelve) hours. 60 tablet 4    fluconazole (DIFLUCAN) 150 MG Tab TAKE ONE TABLET BY MOUTH once for ONE dose  0    fluticasone propionate (FLONASE) 50 mcg/actuation nasal spray 1 spray (50 mcg total) by Each Nostril route 2 (two) times daily. 18.2 mL 3    gabapentin (NEURONTIN) 300 MG capsule TAKE 1 CAPSULE BY MOUTH THREE TIMES DAILY      gentamicin (GARAMYCIN) 0.1 % cream ADD 30GM (1 TUBE) TO THE SOAKING DEVICE AND ALLOW WATER TO AGITATE. PLACE AFFECTED AREAS INTO WATER AND SOAK FOR 10 MINUTES 1-2 TIMES DAILY  1    hydrOXYchloroQUINE (PLAQUENIL) 200 mg tablet TAKE ONE TABLET BY MOUTH TWICE DAILY 60 tablet 2    hydrOXYzine HCl (ATARAX) 10 MG Tab TAKE 1 OR 2 TABLETS BY MOUTH THREE TIMES DAILY AS NEEDED FOR ITCHING.  0    isosorbide mononitrate (IMDUR) 30 MG 24 hr tablet Take 30 mg by mouth once daily.      ketoconazole (NIZORAL) 2 % cream ADD 30GM (1/2 TUBE) TO THE SOAKING DEVICE AND ALLOW WATER TO AGITATE. PLACE AFFECTED AREAS INTO WATER AND SOAK FOR 10 MINUTES 1-2 TIMES BRENDON  1    Lactobac. rhamnosus GG-inulin 10 billion cell -200 mg Cap Take 1 capsule by mouth 2 (two) times daily. 30 capsule 0    LEVEMIR FLEXTOUCH U-100 INSULN 100 unit/mL (3 mL) InPn pen INJECT FOUR units UNDER THE SKIN TWICE DAILY 45 mL 3    levocetirizine (XYZAL) 5 MG tablet Take 5 mg by mouth every evening.  3    levoFLOXacin (LEVAQUIN) 750 MG tablet levofloxacin 750 mg tablet      losartan (COZAAR) 100 MG tablet Take 1 tablet (100 mg total) by mouth once daily. 30 tablet 2    magnesium oxide 500 mg Tab Take 500 mg by mouth 2 (two) times daily. 60 each 6    meclizine (ANTIVERT) 25 mg tablet TAKE ONE TABLET BY MOUTH AT BEDTIME AS NEEDED for dizziness.  0     meloxicam (MOBIC) 15 MG tablet       MULTIVIT,THER IRON,CA,FA & MIN (MULTIVITAMIN) Tab Take 1 tablet by mouth once daily. 30 tablet 0    mycophenolate (MYFORTIC) 180 MG TbEC TAKE TWO TABLETS BY MOUTH TWICE DAILY. 120 tablet 5    neomycin-polymyxin-hydrocortisone (CORTISPORIN) otic solution INSTILL TWO DROPS INTO BOTH EARS FOUR TIMES DAILY FOR 10 DAYS  0    NURTEC 75 mg odt PLACE ONE TABLET on tongue, alternatively UNDER THE TONGUE ONCE DAILY AS NEEDED FOR MIGRAINE 8 tablet 2    nystatin (MYCOSTATIN) 100,000 unit/mL suspension SWISH AND SPIT FIVE MILLILITERS BY MOUTH FOUR TIMES DAILY FOR 7 DAYS.  1    nystatin (MYCOSTATIN) cream Apply topically 2 times daily 30 g 0    nystatin-triamcinolone (MYCOLOG II) cream Apply to affected area 2 times daily 30 g 1    ondansetron (ZOFRAN) 4 MG tablet TAKE TWO TABLETS BY MOUTH EVERY 8 HOURS AS NEEDED FOR NAUSEA.      oxycodone-acetaminophen (PERCOCET) 7.5-325 mg per tablet Take 1 tablet by mouth every 4 (four) hours as needed for Pain.      pantoprazole (PROTONIX) 40 MG tablet Take 1 tablet (40 mg total) by mouth 2 (two) times daily. Take immediately in am when wake up and then 30 minutes prior to dinner 60 tablet 11    polyethylene glycol (GLYCOLAX) 17 gram/dose powder Mix 1 capful (17 g) with liquid and by mouth 2 (two) times daily. 1530 g 11    PROAIR HFA 90 mcg/actuation inhaler Inhale 2 puffs into the lungs 3 (three) times daily as needed.   3    promethazine-dextromethorphan (PROMETHAZINE-DM) 6.25-15 mg/5 mL Syrp Take by mouth 2 (two) times daily as needed.   0    rosuvastatin (CRESTOR) 5 MG tablet Take 5 mg by mouth once daily.      sertraline (ZOLOFT) 50 MG tablet Take 50 mg by mouth once daily.  11    SSD 1 % cream 1 application 2 (two) times daily. Apply to affected area  0    tacrolimus (PROGRAF) 1 MG Cap TAKE THREE CAPSULES BY MOUTH EVERY TWELVE HOURS (PROGRAF) 180 capsule 11    triamcinolone acetonide 0.1% (KENALOG) 0.1 % cream Apply topically 2  times daily 30 g 0    TRULICITY 0.75 mg/0.5 mL pen injector Inject 0.5 MILLILITERS (0.75 mg total) UNDER THE SKIN once a week. 6 mL 4    valACYclovir (VALTREX) 500 MG tablet TAKE ONE TABLET BY MOUTH TWICE DAILY FOR 3 DAYS. 6 tablet 2    verapamil (CALAN-SR) 120 MG CR tablet TAKE ONE TABLET ONCE DAILY FOR BLOOD PRESSURE  3    zolpidem (AMBIEN) 10 mg Tab Take 10 mg by mouth nightly as needed.  3     No current facility-administered medications on file prior to visit.        Review of Systems  Review of Systems   Constitutional: Negative for fever.   HENT:        Postnasal drip    Respiratory: Negative for shortness of breath.    Neurological: Positive for headaches.        Social History   reports that she has never smoked. She has never used smokeless tobacco. She reports current alcohol use of about 1.0 standard drinks of alcohol per week. She reports that she does not use drugs.     Family History  Family History   Problem Relation Age of Onset    Hypertension Mother     Cataracts Mother     Diabetes Father     Alzheimer's disease Father     Diabetes Paternal Grandfather     Diabetes Paternal Grandmother     No Known Problems Maternal Grandmother     Bone cancer Maternal Grandfather     Breast cancer Maternal Aunt 55    Hypertension Brother     Diabetes Brother     No Known Problems Sister     No Known Problems Maternal Uncle     No Known Problems Paternal Aunt     No Known Problems Paternal Uncle     Stroke Neg Hx     Cancer Neg Hx     Colon cancer Neg Hx     Esophageal cancer Neg Hx     Stomach cancer Neg Hx     Rectal cancer Neg Hx     Amblyopia Neg Hx     Blindness Neg Hx     Glaucoma Neg Hx     Macular degeneration Neg Hx     Retinal detachment Neg Hx     Strabismus Neg Hx     Thyroid disease Neg Hx         Physical Exam   Limited given that this is a virtual visit.     GENERAL: no acute distress.  HEAD: normocephalic.   EYES:Pupils equal . No proptosis  EARS: normal pinna shape  and position.   NOSE: external nose without abnormality  ORAL CAVITY/OROPHARYNX: tongue  mobile. No lip lesions.  NECK: trachea midline.   RESPIRATORY: no stridor, no stertor. Voice normal. Respirations nonlabored.  NEURO: alert, responds to questions appropriately.  Facial movement symmetric at rest   PSYCH:mood appropriate      Imaging:  The patient does have new imaging of the head and neck since last visit. CT sinus 11-2-20 Images and report reviewed personally    Polypoid mass in mid-posterior maxillary sinus on left        Coronal view showing polyp and inferior mucosal thickening of bilateral maxillary sinuses    Bilateral Bhavik cells    Mild mucosal edema of ethmoids and moderate mucosal edema of maxillary os    Labs:  CBC  Recent Labs   Lab 08/13/20  1539 10/20/20  1335 11/06/20  0918   WBC 5.25 4.51 3.82 L   Hemoglobin 11.1 L 11.7 L 12.1  12.1   Hematocrit 33.1 L 33.8 L 35.9 L   MCV 79 L 77 L 78 L   Platelets 216 199 205     BMP  Recent Labs   Lab 04/17/19  1236  06/19/19  1047  05/13/20  1111  08/13/20  1539 08/19/20  1059 10/20/20  1335 11/06/20  0918   Glucose 170 H   < > 163 H  163 H   < > 108   < >  --  152 H 126 H 164 H   Sodium 140   < > 139  139   < > 140   < >  --  140 141 140   Potassium 4.2   < > 4.3  4.3   < > 4.3   < >  --  4.0 4.6 3.8   Chloride 105   < > 104  104   < > 104   < >  --  105 107 104   CO2 26   < > 28  28   < > 29   < >  --  28 27 27   BUN 27 H   < > 26 H  26 H   < > 21 H   < >  --  27 H 24 H 21 H   Creatinine 1.5 H   < > 1.5 H  1.5 H   < > 1.4   < >  --  1.4 1.4 1.5 H   Calcium 9.9   < > 9.8  9.8   < > 9.8   < >  --  9.2 9.0 9.3   Phosphorus 3.4  --  2.9  --  2.7  --   --   --   --   --    Magnesium  --    < > 2.0   < >  --    < > 1.5 L  --  1.9 2.1    < > = values in this interval not displayed.     COAGS  Recent Labs   Lab 01/16/19  1000 06/07/19  1423 07/22/20  0826   INR 1.0 1.0 0.9       Assessment  1. Seasonal allergic rhinitis, unspecified trigger    2.  Maxillary sinus polyp    3. Chronic ethmoidal sinusitis    4. Hoarse    5. Dysphagia, unspecified type       Plan:  Discussed plan of care with patient in detail and all questions answered. Patient reported understanding of plan of care.     Discussed ct scan findings. I recommended she undergo FESS with biopsy and debridement of left maxillary sinus polyp. We discussed that it is likely inflammatory polyp but due to unilaterality , will ensure sample sent to path to confirm. Can also open up what appears to be possible  rathke pouch cyst on her prior scope if that has persisted. We discussed that I do not think that her headaches are entirely due to sinus disease but could be a contributing factor . We discussed that the purpose of sinus surgery is to allow better delivery to medication into sinus cavities. this may or may not improve some of her headache symptoms but she should not expect significant change in this from surgery given suspected multifactorial nature of her symptoms. Regarding surgery- she is unsure if she wants surgery but thinks she does. She  would like to think about it more and also will talk to her family and call me back with a date and I will have her come in preop     For hoarseness, she has upcoming apptmt with Dr. Gardner

## 2020-12-03 RX ORDER — INSULIN LISPRO 100 [IU]/ML
INJECTION, SOLUTION INTRAVENOUS; SUBCUTANEOUS
Qty: 15 ML | Refills: 6 | Status: SHIPPED | OUTPATIENT
Start: 2020-12-03 | End: 2022-01-05 | Stop reason: SDUPTHER

## 2020-12-08 DIAGNOSIS — Z03.818 ENCOUNTER FOR OBSERVATION FOR SUSPECTED EXPOSURE TO OTHER BIOLOGICAL AGENTS RULED OUT: ICD-10-CM

## 2020-12-09 ENCOUNTER — INFUSION (OUTPATIENT)
Dept: INFUSION THERAPY | Facility: HOSPITAL | Age: 50
End: 2020-12-09
Attending: NURSE PRACTITIONER
Payer: MEDICARE

## 2020-12-09 VITALS
TEMPERATURE: 99 F | BODY MASS INDEX: 21.48 KG/M2 | RESPIRATION RATE: 18 BRPM | HEIGHT: 69 IN | SYSTOLIC BLOOD PRESSURE: 165 MMHG | DIASTOLIC BLOOD PRESSURE: 75 MMHG | OXYGEN SATURATION: 99 % | HEART RATE: 82 BPM | WEIGHT: 145 LBS

## 2020-12-09 DIAGNOSIS — M32.9 SYSTEMIC LUPUS ERYTHEMATOSUS, UNSPECIFIED SLE TYPE, UNSPECIFIED ORGAN INVOLVEMENT STATUS: ICD-10-CM

## 2020-12-09 DIAGNOSIS — M81.0 OSTEOPOROSIS: Primary | ICD-10-CM

## 2020-12-09 PROCEDURE — 96375 TX/PRO/DX INJ NEW DRUG ADDON: CPT

## 2020-12-09 PROCEDURE — 96365 THER/PROPH/DIAG IV INF INIT: CPT

## 2020-12-09 PROCEDURE — 25000003 PHARM REV CODE 250: Performed by: INTERNAL MEDICINE

## 2020-12-09 PROCEDURE — 63600175 PHARM REV CODE 636 W HCPCS: Performed by: INTERNAL MEDICINE

## 2020-12-09 RX ORDER — DIPHENHYDRAMINE HYDROCHLORIDE 50 MG/ML
25 INJECTION INTRAMUSCULAR; INTRAVENOUS
Status: COMPLETED | OUTPATIENT
Start: 2020-12-09 | End: 2020-12-09

## 2020-12-09 RX ORDER — ACETAMINOPHEN 325 MG/1
650 TABLET ORAL
Status: COMPLETED | OUTPATIENT
Start: 2020-12-09 | End: 2020-12-09

## 2020-12-09 RX ORDER — DIPHENHYDRAMINE HYDROCHLORIDE 50 MG/ML
25 INJECTION INTRAMUSCULAR; INTRAVENOUS
Status: CANCELLED | OUTPATIENT
Start: 2021-01-06

## 2020-12-09 RX ORDER — HEPARIN 100 UNIT/ML
500 SYRINGE INTRAVENOUS
Status: CANCELLED | OUTPATIENT
Start: 2021-01-06

## 2020-12-09 RX ORDER — ACETAMINOPHEN 325 MG/1
650 TABLET ORAL
Status: CANCELLED | OUTPATIENT
Start: 2021-01-06

## 2020-12-09 RX ORDER — SODIUM CHLORIDE 0.9 % (FLUSH) 0.9 %
10 SYRINGE (ML) INJECTION
Status: CANCELLED | OUTPATIENT
Start: 2021-01-06

## 2020-12-09 RX ADMIN — ACETAMINOPHEN 650 MG: 325 TABLET ORAL at 11:12

## 2020-12-09 RX ADMIN — DIPHENHYDRAMINE HYDROCHLORIDE 25 MG: 50 INJECTION INTRAMUSCULAR; INTRAVENOUS at 11:12

## 2020-12-09 RX ADMIN — BELIMUMAB 658 MG: 400 INJECTION, POWDER, LYOPHILIZED, FOR SOLUTION INTRAVENOUS at 11:12

## 2020-12-09 NOTE — PLAN OF CARE
Patient arrived to unit for Benlysta infusion. No new or worsening Lupus symptoms to report today.Pt continues to wear a boot to leg foot due to fractured toe. Plan of care reviewed, patient agreeable to plan. Premeds of Tylenol and Benadryl given prior to infusion. Patient tolerated infusion well.Lunch provided and 50% consumed. Pt continues to have swallowing issues, being followed by ENT. Warm blankets provided. VSS. Discharge instructions reviewed, patient instructed to return Jan 6 for next infusion. Patient ambulated off unit unassisted by self. Patient in NAD at time of discharge.

## 2020-12-11 ENCOUNTER — LAB VISIT (OUTPATIENT)
Dept: URGENT CARE | Facility: CLINIC | Age: 50
End: 2020-12-11
Payer: MEDICARE

## 2020-12-11 DIAGNOSIS — Z03.818 ENCOUNTER FOR OBSERVATION FOR SUSPECTED EXPOSURE TO OTHER BIOLOGICAL AGENTS RULED OUT: ICD-10-CM

## 2020-12-11 PROCEDURE — U0003 INFECTIOUS AGENT DETECTION BY NUCLEIC ACID (DNA OR RNA); SEVERE ACUTE RESPIRATORY SYNDROME CORONAVIRUS 2 (SARS-COV-2) (CORONAVIRUS DISEASE [COVID-19]), AMPLIFIED PROBE TECHNIQUE, MAKING USE OF HIGH THROUGHPUT TECHNOLOGIES AS DESCRIBED BY CMS-2020-01-R: HCPCS

## 2020-12-12 LAB — SARS-COV-2 RNA RESP QL NAA+PROBE: NOT DETECTED

## 2020-12-13 ENCOUNTER — PATIENT MESSAGE (OUTPATIENT)
Dept: OTOLARYNGOLOGY | Facility: CLINIC | Age: 50
End: 2020-12-13

## 2020-12-14 ENCOUNTER — OFFICE VISIT (OUTPATIENT)
Dept: OTOLARYNGOLOGY | Facility: CLINIC | Age: 50
End: 2020-12-14
Payer: MEDICARE

## 2020-12-14 VITALS
SYSTOLIC BLOOD PRESSURE: 146 MMHG | WEIGHT: 154.75 LBS | BODY MASS INDEX: 22.85 KG/M2 | DIASTOLIC BLOOD PRESSURE: 86 MMHG | HEART RATE: 90 BPM

## 2020-12-14 DIAGNOSIS — R49.0 DYSPHONIA: Primary | ICD-10-CM

## 2020-12-14 DIAGNOSIS — K21.9 GASTROESOPHAGEAL REFLUX DISEASE, UNSPECIFIED WHETHER ESOPHAGITIS PRESENT: ICD-10-CM

## 2020-12-14 DIAGNOSIS — R49.0 HOARSE: ICD-10-CM

## 2020-12-14 DIAGNOSIS — J31.0 CHRONIC RHINITIS: ICD-10-CM

## 2020-12-14 PROCEDURE — 3077F PR MOST RECENT SYSTOLIC BLOOD PRESSURE >= 140 MM HG: ICD-10-PCS | Mod: CPTII,S$GLB,, | Performed by: OTOLARYNGOLOGY

## 2020-12-14 PROCEDURE — 99999 PR PBB SHADOW E&M-EST. PATIENT-LVL V: CPT | Mod: PBBFAC,,, | Performed by: OTOLARYNGOLOGY

## 2020-12-14 PROCEDURE — 1126F AMNT PAIN NOTED NONE PRSNT: CPT | Mod: S$GLB,,, | Performed by: OTOLARYNGOLOGY

## 2020-12-14 PROCEDURE — 1126F PR PAIN SEVERITY QUANTIFIED, NO PAIN PRESENT: ICD-10-PCS | Mod: S$GLB,,, | Performed by: OTOLARYNGOLOGY

## 2020-12-14 PROCEDURE — 3008F BODY MASS INDEX DOCD: CPT | Mod: CPTII,S$GLB,, | Performed by: OTOLARYNGOLOGY

## 2020-12-14 PROCEDURE — 99213 PR OFFICE/OUTPT VISIT, EST, LEVL III, 20-29 MIN: ICD-10-PCS | Mod: 25,S$GLB,, | Performed by: OTOLARYNGOLOGY

## 2020-12-14 PROCEDURE — 3077F SYST BP >= 140 MM HG: CPT | Mod: CPTII,S$GLB,, | Performed by: OTOLARYNGOLOGY

## 2020-12-14 PROCEDURE — 99999 PR PBB SHADOW E&M-EST. PATIENT-LVL V: ICD-10-PCS | Mod: PBBFAC,,, | Performed by: OTOLARYNGOLOGY

## 2020-12-14 PROCEDURE — 31579 LARYNGOSCOPY TELESCOPIC: CPT | Mod: S$GLB,,, | Performed by: OTOLARYNGOLOGY

## 2020-12-14 PROCEDURE — 31579 PR LARYNGOSCOPY, FLEX/RIGID TELESCOPIC, W/STROBOSCOPY: ICD-10-PCS | Mod: S$GLB,,, | Performed by: OTOLARYNGOLOGY

## 2020-12-14 PROCEDURE — 3079F DIAST BP 80-89 MM HG: CPT | Mod: CPTII,S$GLB,, | Performed by: OTOLARYNGOLOGY

## 2020-12-14 PROCEDURE — 3079F PR MOST RECENT DIASTOLIC BLOOD PRESSURE 80-89 MM HG: ICD-10-PCS | Mod: CPTII,S$GLB,, | Performed by: OTOLARYNGOLOGY

## 2020-12-14 PROCEDURE — 3008F PR BODY MASS INDEX (BMI) DOCUMENTED: ICD-10-PCS | Mod: CPTII,S$GLB,, | Performed by: OTOLARYNGOLOGY

## 2020-12-14 PROCEDURE — 99213 OFFICE O/P EST LOW 20 MIN: CPT | Mod: 25,S$GLB,, | Performed by: OTOLARYNGOLOGY

## 2020-12-14 NOTE — LETTER
December 14, 2020      Delores Lewis MD  [address] [facility] [address]  [place] [address]           [address]  [address], [address] [address]  Phone: [phone]  Fax: [phone]          Patient: Valencia Dumont   MR Number: [id number]   YOB: 1970   Date of Visit: 12/14/2020       Dear Dr. Delores Lewis:    Thank you for referring Valencia Dumont to me for evaluation. Attached you will find relevant portions of my assessment and plan of care.    If you have questions, please do not hesitate to call me. I look forward to following Valencia Dumont along with you.    Sincerely,    Dean Gardner MD    Enclosure  CC:  No Recipients    If you would like to receive this communication electronically, please contact [email or web address] or [phone] to request more information on [employer] Link access.    For providers and/or their staff who would like to refer a patient to [facility], please contact us through our one-stop-shop provider referral line, [facility], at [phone].    If you feel you have received this communication in error or would no longer like to receive these types of communications, please e-mail [email or web address]

## 2020-12-14 NOTE — PROGRESS NOTES
"[facility]  Department of Otorhinolaryngology and Communication Sciences    Valencia Dumont is a 50 y.o. female who presents to the [facility] for consultation at the kind request of Dr. Delores Lewis for further management of dysphonia.     She complains of hoarseness. Her voice gets raspy, especially first thing in the morning. At times, she strains her voice. Onset was gradual. Duration is about a year. Time course is constant. Symptoms are stable. Exacerbating factors include voice use. Alleviating factors include drinking warm liquids. Associated symptoms include globus, dysphagia.  She also feels the need to hack and sniff and clear her throat of perceived postnasal drip. She reoprts mild nuisance dysphagia to meat, rice, pills.  Lying flat at times will make her feel that she cannot breathe or that something is coming up.      She is being followed by Dr. Guerra, who initiated her on Elavil QHS and who is getting esophageal motility testing. They might consider an alternate H2 blocker.    EGD 9/9/2020: 2 cm hiatal hernia  Esophagram 8/21/2020: + reflux; no fixed or dynamic UES obstruction  MBSS 7/16/2020: no fixed or dynamic UES obstruction    She is considering undergoing sinus surgery in the near future with Dr. Lewis.     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    Past Surgical History  She has a past surgical history that includes Esophagogastroduodenoscopy; Liver biopsy; Liver transplant (12/31/2013); Tubal ligation " (2003); Colonoscopy (N/A, 5/31/2017); Refractive surgery (Bilateral, 2010); Upper gastrointestinal endoscopy; Esophagogastroduodenoscopy (N/A, 1/16/2019); Cholecystectomy; and Esophagogastroduodenoscopy (N/A, 9/9/2020).    Family History  Her family history includes Alzheimer's disease in her father; Bone cancer in her maternal grandfather; Breast cancer (age of onset: 55) in her maternal aunt; Cataracts in her mother; Diabetes in her brother, father, paternal grandfather, and paternal grandmother; Hypertension in her brother and mother; No Known Problems in her maternal grandmother, maternal uncle, paternal aunt, paternal uncle, and sister.    Social History  She reports that she has never smoked. She has never used smokeless tobacco. She reports current alcohol use of about 1.0 standard drinks of alcohol per week. She reports that she does not use drugs.    Allergies  She is allergic to norvasc [amlodipine]; doxycycline; and pcn [penicillins].    Medications  She has a current medication list which includes the following prescription(s): afluria quad 0400-7548(6mo up), amitriptyline, azelastine, blood-glucose meter,continuous, blood-glucose transmitter, cholecalciferol (vitamin d3), ciclopirox, cyproheptadine, diazepam, diclofenac sodium, diltiazem hcl, erythromycin with ethanol, famotidine, ferrous sulfate, fluconazole, fluticasone propionate, gabapentin, gentamicin, hydroxychloroquine, hydroxyzine hcl, insulin lispro, isosorbide mononitrate, ketoconazole, lactobac. rhamnosus gg-inulin, levemir flextouch u-100 insuln, levocetirizine, levofloxacin, losartan, magnesium oxide, meclizine, meloxicam, multivitamin, mycophenolate, neomycin-polymyxin-hydrocortisone, nurtec, nystatin, nystatin, ondansetron, oxycodone-acetaminophen, pantoprazole, polyethylene glycol, proair hfa, promethazine-dextromethorphan, rosuvastatin, sertraline, ssd, tacrolimus, triamcinolone acetonide 0.1%, trulicity, valacyclovir, verapamil,  zolpidem, blood-glucose meter, bupropion, estradiol, and nystatin-triamcinolone.    Review of Systems   Constitutional: Negative.  Negative for fever.   HENT: Positive for ear pain, sinus pressure, trouble swallowing and voice change. Negative for sore throat.    Eyes: Negative.  Negative for visual disturbance.   Respiratory: Negative.  Negative for wheezing.    Cardiovascular: Negative.  Negative for chest pain.   Gastrointestinal: Positive for abdominal pain. Negative for nausea.   Endocrine: Positive for cold intolerance and heat intolerance.   Genitourinary: Negative.    Musculoskeletal: Positive for back pain and neck pain. Negative for arthralgias.   Skin: Negative.  Negative for rash.   Allergic/Immunologic: Negative.    Neurological: Positive for dizziness and headaches. Negative for tremors.   Hematological: Bruises/bleeds easily.   Psychiatric/Behavioral: Negative.  The patient is not nervous/anxious.           Objective:     BP (!) 146/86   Pulse 90   Wt 70.2 kg (154 lb 12.2 oz)   LMP 12/20/2016 (Approximate)   BMI 22.85 kg/m²      Physical Exam    Constitutional: comfortable, well dressed  Psychiatric: appropriate affect  Respiratory: comfortably breathing, symmetric chest rise, no stridor  Voice: normal for age/gender  Cardiovascular: upper extremities non-edematous  Lymphatic: no cervical lymphadenopathy  Neurologic: alert and oriented to time, place, person, and situation; cranial nerves 3-12 grossly intact  Head: normocephalic  Eyes: conjunctivae and sclerae clear  Ears: normal pinnae, normal external auditory canals, tympanic membranes intact  Nose: mucosa pink and noncongested, no masses, no mucopurulence, no polyps  Oral cavity / oropharynx: no mucosal lesions  Neck: soft, full range of motion, laryngotracheal complex palpable with appropriate landmarks, larynx elevates on swallowing  Indirect laryngoscopy: limited due to gag    Procedure  Flexible Laryngeal Videostroboscopy (72862):  Laryngeal videostroboscopy is indicated to assess laryngeal vibratory biomechanics and vocal fold oscillation, which cannot be assessed with a plain light examination. This was carried out transnasally with a distal chip videoendoscope. After verbal consent was obtained, the patient was positioned and the nose was topically decongested with 1% phenylephrine and topically anesthetized with 4% lidocaine. The endoscope was passed through the most patent nasal cavity and positioned to image the nasopharynx, larynx, and hypopharynx in detail. The following features were examined: nasopharyngeal, laryngeal, hypopharyngeal masses; velopharyngeal strength, closure, and symmetry of motion; vocal fold range and symmetry of motion; laryngeal mucosal edema, erythema, inflammation, and hydration; salivary pooling; and gross laryngeal sensation. During phonation, the vocal folds were assessed for glottal closure; mucosal wave; vocal fold lesions; vibratory periodicity, amplitude, and phase symmetry; and vertical height match. The equipment was removed. The patient tolerated the procedure well without complication. All findings were normal:  - no masses, no paralysis/paresis, no mucosal abnormalities, pliability intact    Data Reviewed  see HPI      Assessment:     Valencia Dumont is a 50 y.o. 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     Despite symptoms of intermittent dysphonia, clinician perceptual analysis of her voice and laryngeal videostroboscopy today are both normal.       Plan:        I had a discussion with the patient regarding her condition and the further workup and management options.      Reassurance was provided. Once her additional inflammatory aerodigetsive conditions are optimized, should her voice problem persist, she might benefit from focused SLP voice therapy.    I recommended she complete her workup/treatment under the direction of Dr. Guerra and   Debbie.     She will follow up with me in the future on an as-needed basis.    All questions were answered, and the patient is in agreement with the above.     Dean Gardner M.D.  [facility]  Department of Otorhinolaryngology and Communication Sciences

## 2021-01-05 ENCOUNTER — PATIENT MESSAGE (OUTPATIENT)
Dept: TRANSPLANT | Facility: CLINIC | Age: 51
End: 2021-01-05

## 2021-01-06 ENCOUNTER — PATIENT MESSAGE (OUTPATIENT)
Dept: RHEUMATOLOGY | Facility: CLINIC | Age: 51
End: 2021-01-06

## 2021-01-06 ENCOUNTER — INFUSION (OUTPATIENT)
Dept: INFUSION THERAPY | Facility: HOSPITAL | Age: 51
End: 2021-01-06
Attending: NURSE PRACTITIONER
Payer: MEDICARE

## 2021-01-06 VITALS
OXYGEN SATURATION: 100 % | HEART RATE: 80 BPM | BODY MASS INDEX: 22.85 KG/M2 | TEMPERATURE: 98 F | WEIGHT: 154.75 LBS | RESPIRATION RATE: 18 BRPM | SYSTOLIC BLOOD PRESSURE: 170 MMHG | DIASTOLIC BLOOD PRESSURE: 81 MMHG

## 2021-01-06 DIAGNOSIS — M81.0 OSTEOPOROSIS: Primary | ICD-10-CM

## 2021-01-06 DIAGNOSIS — M32.9 SYSTEMIC LUPUS ERYTHEMATOSUS, UNSPECIFIED SLE TYPE, UNSPECIFIED ORGAN INVOLVEMENT STATUS: ICD-10-CM

## 2021-01-06 PROCEDURE — 96375 TX/PRO/DX INJ NEW DRUG ADDON: CPT

## 2021-01-06 PROCEDURE — 25000003 PHARM REV CODE 250: Performed by: INTERNAL MEDICINE

## 2021-01-06 PROCEDURE — 63600175 PHARM REV CODE 636 W HCPCS: Mod: JG | Performed by: INTERNAL MEDICINE

## 2021-01-06 PROCEDURE — 96413 CHEMO IV INFUSION 1 HR: CPT

## 2021-01-06 RX ORDER — DIPHENHYDRAMINE HYDROCHLORIDE 50 MG/ML
25 INJECTION INTRAMUSCULAR; INTRAVENOUS
Status: CANCELLED | OUTPATIENT
Start: 2021-02-03

## 2021-01-06 RX ORDER — DIPHENHYDRAMINE HYDROCHLORIDE 50 MG/ML
25 INJECTION INTRAMUSCULAR; INTRAVENOUS
Status: COMPLETED | OUTPATIENT
Start: 2021-01-06 | End: 2021-01-06

## 2021-01-06 RX ORDER — ACETAMINOPHEN 325 MG/1
650 TABLET ORAL
Status: COMPLETED | OUTPATIENT
Start: 2021-01-06 | End: 2021-01-06

## 2021-01-06 RX ORDER — ACETAMINOPHEN 325 MG/1
650 TABLET ORAL
Status: CANCELLED | OUTPATIENT
Start: 2021-02-03

## 2021-01-06 RX ORDER — HEPARIN 100 UNIT/ML
500 SYRINGE INTRAVENOUS
Status: CANCELLED | OUTPATIENT
Start: 2021-02-03

## 2021-01-06 RX ORDER — SODIUM CHLORIDE 0.9 % (FLUSH) 0.9 %
10 SYRINGE (ML) INJECTION
Status: CANCELLED | OUTPATIENT
Start: 2021-02-03

## 2021-01-06 RX ADMIN — ACETAMINOPHEN 650 MG: 325 TABLET ORAL at 11:01

## 2021-01-06 RX ADMIN — BELIMUMAB 702 MG: 400 INJECTION, POWDER, LYOPHILIZED, FOR SOLUTION INTRAVENOUS at 12:01

## 2021-01-06 RX ADMIN — DIPHENHYDRAMINE HYDROCHLORIDE 25 MG: 50 INJECTION INTRAMUSCULAR; INTRAVENOUS at 11:01

## 2021-01-07 ENCOUNTER — PATIENT MESSAGE (OUTPATIENT)
Dept: RHEUMATOLOGY | Facility: CLINIC | Age: 51
End: 2021-01-07

## 2021-01-08 ENCOUNTER — PATIENT MESSAGE (OUTPATIENT)
Dept: GASTROENTEROLOGY | Facility: CLINIC | Age: 51
End: 2021-01-08

## 2021-01-08 ENCOUNTER — PATIENT MESSAGE (OUTPATIENT)
Dept: TRANSPLANT | Facility: CLINIC | Age: 51
End: 2021-01-08

## 2021-01-08 ENCOUNTER — OFFICE VISIT (OUTPATIENT)
Dept: OTOLARYNGOLOGY | Facility: CLINIC | Age: 51
End: 2021-01-08
Payer: MEDICARE

## 2021-01-08 DIAGNOSIS — J32.0 CHRONIC MAXILLARY SINUSITIS: Primary | ICD-10-CM

## 2021-01-08 DIAGNOSIS — R13.10 DYSPHAGIA, UNSPECIFIED TYPE: Primary | ICD-10-CM

## 2021-01-08 PROCEDURE — 99213 OFFICE O/P EST LOW 20 MIN: CPT | Mod: 95,,, | Performed by: OTOLARYNGOLOGY

## 2021-01-08 PROCEDURE — 99213 PR OFFICE/OUTPT VISIT, EST, LEVL III, 20-29 MIN: ICD-10-PCS | Mod: 95,,, | Performed by: OTOLARYNGOLOGY

## 2021-01-11 ENCOUNTER — DOCUMENTATION ONLY (OUTPATIENT)
Dept: OTOLARYNGOLOGY | Facility: CLINIC | Age: 51
End: 2021-01-11

## 2021-01-14 ENCOUNTER — TELEPHONE (OUTPATIENT)
Dept: GASTROENTEROLOGY | Facility: CLINIC | Age: 51
End: 2021-01-14

## 2021-01-14 DIAGNOSIS — E61.1 IRON DEFICIENCY: ICD-10-CM

## 2021-01-14 RX ORDER — FERROUS SULFATE 325(65) MG
325 TABLET ORAL EVERY 12 HOURS
Qty: 60 TABLET | Refills: 4 | Status: SHIPPED | OUTPATIENT
Start: 2021-01-14 | End: 2021-03-21 | Stop reason: SDUPTHER

## 2021-01-15 ENCOUNTER — PATIENT MESSAGE (OUTPATIENT)
Dept: GASTROENTEROLOGY | Facility: CLINIC | Age: 51
End: 2021-01-15

## 2021-01-19 ENCOUNTER — HOSPITAL ENCOUNTER (OUTPATIENT)
Dept: RADIOLOGY | Facility: HOSPITAL | Age: 51
Discharge: HOME | End: 2021-01-19
Attending: OTOLARYNGOLOGY
Payer: MEDICARE

## 2021-01-19 ENCOUNTER — PATIENT MESSAGE (OUTPATIENT)
Dept: ENDOSCOPY | Facility: HOSPITAL | Age: 51
End: 2021-01-19

## 2021-01-19 DIAGNOSIS — J32.0 CHRONIC MAXILLARY SINUSITIS: ICD-10-CM

## 2021-01-19 PROCEDURE — 71045 X-RAY EXAM CHEST 1 VIEW: CPT | Mod: TC,FY

## 2021-01-19 PROCEDURE — 71045 XR CHEST 1 VIEW: ICD-10-PCS | Mod: 26,,, | Performed by: RADIOLOGY

## 2021-01-19 PROCEDURE — 71045 X-RAY EXAM CHEST 1 VIEW: CPT | Mod: 26,,, | Performed by: RADIOLOGY

## 2021-01-21 ENCOUNTER — DOCUMENTATION ONLY (OUTPATIENT)
Dept: OTOLARYNGOLOGY | Facility: CLINIC | Age: 51
End: 2021-01-21

## 2021-01-21 ENCOUNTER — PATIENT MESSAGE (OUTPATIENT)
Dept: GASTROENTEROLOGY | Facility: CLINIC | Age: 51
End: 2021-01-21

## 2021-01-27 ENCOUNTER — TELEPHONE (OUTPATIENT)
Dept: TRANSPLANT | Facility: CLINIC | Age: 51
End: 2021-01-27

## 2021-01-27 DIAGNOSIS — Z94.4 LIVER TRANSPLANTED: Primary | ICD-10-CM

## 2021-01-28 ENCOUNTER — HOSPITAL ENCOUNTER (OUTPATIENT)
Dept: PREADMISSION TESTING | Facility: HOSPITAL | Age: 51
Discharge: HOME | End: 2021-01-28
Attending: OTOLARYNGOLOGY
Payer: MEDICARE

## 2021-01-28 ENCOUNTER — ANESTHESIA EVENT (OUTPATIENT)
Dept: SURGERY | Facility: HOSPITAL | Age: 51
End: 2021-01-28
Payer: MEDICARE

## 2021-01-28 VITALS
WEIGHT: 154.31 LBS | RESPIRATION RATE: 18 BRPM | BODY MASS INDEX: 22.85 KG/M2 | SYSTOLIC BLOOD PRESSURE: 142 MMHG | DIASTOLIC BLOOD PRESSURE: 87 MMHG | HEART RATE: 86 BPM | OXYGEN SATURATION: 98 % | HEIGHT: 69 IN

## 2021-01-28 DIAGNOSIS — Z01.818 PREOP TESTING: Primary | ICD-10-CM

## 2021-01-28 PROCEDURE — 93005 ELECTROCARDIOGRAM TRACING: CPT

## 2021-01-28 PROCEDURE — 93010 ELECTROCARDIOGRAM REPORT: CPT | Mod: ,,, | Performed by: INTERNAL MEDICINE

## 2021-01-28 PROCEDURE — 93010 EKG 12-LEAD: ICD-10-PCS | Mod: ,,, | Performed by: INTERNAL MEDICINE

## 2021-02-03 ENCOUNTER — PATIENT MESSAGE (OUTPATIENT)
Dept: SURGERY | Facility: HOSPITAL | Age: 51
End: 2021-02-03

## 2021-02-03 ENCOUNTER — INFUSION (OUTPATIENT)
Dept: INFUSION THERAPY | Facility: HOSPITAL | Age: 51
End: 2021-02-03
Attending: INTERNAL MEDICINE
Payer: MEDICARE

## 2021-02-03 ENCOUNTER — HOSPITAL ENCOUNTER (OUTPATIENT)
Dept: PREADMISSION TESTING | Facility: HOSPITAL | Age: 51
Discharge: HOME | End: 2021-02-03
Attending: OTOLARYNGOLOGY
Payer: MEDICARE

## 2021-02-03 VITALS
BODY MASS INDEX: 22.85 KG/M2 | WEIGHT: 154.31 LBS | HEART RATE: 89 BPM | OXYGEN SATURATION: 99 % | RESPIRATION RATE: 18 BRPM | TEMPERATURE: 98 F | DIASTOLIC BLOOD PRESSURE: 77 MMHG | HEIGHT: 69 IN | SYSTOLIC BLOOD PRESSURE: 148 MMHG

## 2021-02-03 DIAGNOSIS — M32.9 SYSTEMIC LUPUS ERYTHEMATOSUS, UNSPECIFIED SLE TYPE, UNSPECIFIED ORGAN INVOLVEMENT STATUS: ICD-10-CM

## 2021-02-03 DIAGNOSIS — M81.0 OSTEOPOROSIS: Primary | ICD-10-CM

## 2021-02-03 DIAGNOSIS — Z01.818 PREOP TESTING: ICD-10-CM

## 2021-02-03 LAB — SARS-COV-2 RDRP RESP QL NAA+PROBE: NEGATIVE

## 2021-02-03 PROCEDURE — 96365 THER/PROPH/DIAG IV INF INIT: CPT

## 2021-02-03 PROCEDURE — U0002 COVID-19 LAB TEST NON-CDC: HCPCS

## 2021-02-03 PROCEDURE — 63600175 PHARM REV CODE 636 W HCPCS: Performed by: INTERNAL MEDICINE

## 2021-02-03 PROCEDURE — 96375 TX/PRO/DX INJ NEW DRUG ADDON: CPT

## 2021-02-03 PROCEDURE — 25000003 PHARM REV CODE 250: Performed by: INTERNAL MEDICINE

## 2021-02-03 RX ORDER — DIPHENHYDRAMINE HYDROCHLORIDE 50 MG/ML
25 INJECTION INTRAMUSCULAR; INTRAVENOUS
Status: CANCELLED | OUTPATIENT
Start: 2021-03-03

## 2021-02-03 RX ORDER — ACETAMINOPHEN 325 MG/1
650 TABLET ORAL
Status: COMPLETED | OUTPATIENT
Start: 2021-02-03 | End: 2021-02-03

## 2021-02-03 RX ORDER — DIPHENHYDRAMINE HYDROCHLORIDE 50 MG/ML
25 INJECTION INTRAMUSCULAR; INTRAVENOUS
Status: COMPLETED | OUTPATIENT
Start: 2021-02-03 | End: 2021-02-03

## 2021-02-03 RX ORDER — HEPARIN 100 UNIT/ML
500 SYRINGE INTRAVENOUS
Status: CANCELLED | OUTPATIENT
Start: 2021-03-03

## 2021-02-03 RX ORDER — ACETAMINOPHEN 325 MG/1
650 TABLET ORAL
Status: CANCELLED | OUTPATIENT
Start: 2021-03-03

## 2021-02-03 RX ORDER — SODIUM CHLORIDE 0.9 % (FLUSH) 0.9 %
10 SYRINGE (ML) INJECTION
Status: CANCELLED | OUTPATIENT
Start: 2021-03-03

## 2021-02-03 RX ADMIN — DIPHENHYDRAMINE HYDROCHLORIDE 25 MG: 50 INJECTION INTRAMUSCULAR; INTRAVENOUS at 11:02

## 2021-02-03 RX ADMIN — ACETAMINOPHEN 650 MG: 325 TABLET ORAL at 11:02

## 2021-02-03 RX ADMIN — BELIMUMAB 700 MG: 400 INJECTION, POWDER, LYOPHILIZED, FOR SOLUTION INTRAVENOUS at 11:02

## 2021-02-04 ENCOUNTER — ANESTHESIA (OUTPATIENT)
Dept: SURGERY | Facility: HOSPITAL | Age: 51
End: 2021-02-04
Payer: MEDICARE

## 2021-02-04 ENCOUNTER — HOSPITAL ENCOUNTER (OUTPATIENT)
Facility: HOSPITAL | Age: 51
Discharge: HOME | End: 2021-02-04
Attending: OTOLARYNGOLOGY | Admitting: OTOLARYNGOLOGY
Payer: MEDICARE

## 2021-02-04 ENCOUNTER — PATIENT MESSAGE (OUTPATIENT)
Dept: SURGERY | Facility: HOSPITAL | Age: 51
End: 2021-02-04

## 2021-02-04 VITALS
SYSTOLIC BLOOD PRESSURE: 170 MMHG | TEMPERATURE: 98 F | BODY MASS INDEX: 22.79 KG/M2 | HEART RATE: 78 BPM | WEIGHT: 154.31 LBS | DIASTOLIC BLOOD PRESSURE: 78 MMHG | OXYGEN SATURATION: 98 % | RESPIRATION RATE: 17 BRPM

## 2021-02-04 DIAGNOSIS — Z01.818 PREOP TESTING: Primary | ICD-10-CM

## 2021-02-04 DIAGNOSIS — J32.9 CHRONIC SINUSITIS: ICD-10-CM

## 2021-02-04 DIAGNOSIS — R93.3 ABNORMAL CT SCAN, COLON: ICD-10-CM

## 2021-02-04 LAB
BASOPHILS # BLD AUTO: 0.01 K/UL (ref 0–0.2)
BASOPHILS NFR BLD: 0.2 % (ref 0–1.9)
DIFFERENTIAL METHOD: ABNORMAL
EOSINOPHIL # BLD AUTO: 0.1 K/UL (ref 0–0.5)
EOSINOPHIL NFR BLD: 2.1 % (ref 0–8)
ERYTHROCYTE [DISTWIDTH] IN BLOOD BY AUTOMATED COUNT: 13.9 % (ref 11.5–14.5)
HCT VFR BLD AUTO: 31.9 % (ref 37–48.5)
HGB BLD-MCNC: 11.3 G/DL (ref 12–16)
IMM GRANULOCYTES # BLD AUTO: 0.01 K/UL (ref 0–0.04)
IMM GRANULOCYTES NFR BLD AUTO: 0.2 % (ref 0–0.5)
LYMPHOCYTES # BLD AUTO: 1.5 K/UL (ref 1–4.8)
LYMPHOCYTES NFR BLD: 33.6 % (ref 18–48)
MCH RBC QN AUTO: 27.8 PG (ref 27–31)
MCHC RBC AUTO-ENTMCNC: 35.4 G/DL (ref 32–36)
MCV RBC AUTO: 78 FL (ref 82–98)
MONOCYTES # BLD AUTO: 0.4 K/UL (ref 0.3–1)
MONOCYTES NFR BLD: 8.7 % (ref 4–15)
NEUTROPHILS # BLD AUTO: 2.4 K/UL (ref 1.8–7.7)
NEUTROPHILS NFR BLD: 55.2 % (ref 38–73)
NRBC BLD-RTO: 0 /100 WBC
PLATELET # BLD AUTO: 183 K/UL (ref 150–350)
PMV BLD AUTO: 9.7 FL (ref 9.2–12.9)
POCT GLUCOSE: 198 MG/DL (ref 70–110)
RBC # BLD AUTO: 4.07 M/UL (ref 4–5.4)
WBC # BLD AUTO: 4.37 K/UL (ref 3.9–12.7)

## 2021-02-04 PROCEDURE — 31267 ENDOSCOPY MAXILLARY SINUS: CPT | Mod: 50,51,, | Performed by: OTOLARYNGOLOGY

## 2021-02-04 PROCEDURE — 36415 COLL VENOUS BLD VENIPUNCTURE: CPT

## 2021-02-04 PROCEDURE — D9220A PRA ANESTHESIA: ICD-10-PCS | Mod: ANES,,, | Performed by: ANESTHESIOLOGY

## 2021-02-04 PROCEDURE — D9220A PRA ANESTHESIA: Mod: ANES,,, | Performed by: ANESTHESIOLOGY

## 2021-02-04 PROCEDURE — 25000003 PHARM REV CODE 250: Performed by: STUDENT IN AN ORGANIZED HEALTH CARE EDUCATION/TRAINING PROGRAM

## 2021-02-04 PROCEDURE — 37000009 HC ANESTHESIA EA ADD 15 MINS: Performed by: OTOLARYNGOLOGY

## 2021-02-04 PROCEDURE — 88305 TISSUE EXAM BY PATHOLOGIST: CPT | Mod: 26,,, | Performed by: PATHOLOGY

## 2021-02-04 PROCEDURE — 63600175 PHARM REV CODE 636 W HCPCS: Performed by: ANESTHESIOLOGY

## 2021-02-04 PROCEDURE — 36000710: Performed by: OTOLARYNGOLOGY

## 2021-02-04 PROCEDURE — 37000008 HC ANESTHESIA 1ST 15 MINUTES: Performed by: OTOLARYNGOLOGY

## 2021-02-04 PROCEDURE — 36000711: Performed by: OTOLARYNGOLOGY

## 2021-02-04 PROCEDURE — 88305 TISSUE EXAM BY PATHOLOGIST: CPT | Performed by: PATHOLOGY

## 2021-02-04 PROCEDURE — C2625 STENT, NON-COR, TEM W/DEL SY: HCPCS | Performed by: OTOLARYNGOLOGY

## 2021-02-04 PROCEDURE — 27201423 OPTIME MED/SURG SUP & DEVICES STERILE SUPPLY: Performed by: OTOLARYNGOLOGY

## 2021-02-04 PROCEDURE — 31267 PR NASAL/SINUS ENDOSCOPY,RMV TISS MAXILL SINUS: ICD-10-PCS | Mod: 50,51,, | Performed by: OTOLARYNGOLOGY

## 2021-02-04 PROCEDURE — 63600175 PHARM REV CODE 636 W HCPCS: Performed by: STUDENT IN AN ORGANIZED HEALTH CARE EDUCATION/TRAINING PROGRAM

## 2021-02-04 PROCEDURE — 85025 COMPLETE CBC W/AUTO DIFF WBC: CPT

## 2021-02-04 PROCEDURE — 31255 PR NASAL/SINUS ENDOSCOPY,REMV TOTL ETHMOID: ICD-10-PCS | Mod: 50,,, | Performed by: OTOLARYNGOLOGY

## 2021-02-04 PROCEDURE — 25000003 PHARM REV CODE 250: Performed by: ANESTHESIOLOGY

## 2021-02-04 PROCEDURE — 71000033 HC RECOVERY, INTIAL HOUR: Performed by: OTOLARYNGOLOGY

## 2021-02-04 PROCEDURE — 71000016 HC POSTOP RECOV ADDL HR: Performed by: OTOLARYNGOLOGY

## 2021-02-04 PROCEDURE — 71000015 HC POSTOP RECOV 1ST HR: Performed by: OTOLARYNGOLOGY

## 2021-02-04 PROCEDURE — 61782 PR STEREOTACTIC COMP ASSIST PROC,CRANIAL,EXTRADURAL: ICD-10-PCS | Mod: ,,, | Performed by: OTOLARYNGOLOGY

## 2021-02-04 PROCEDURE — 31255 NSL/SINS NDSC W/TOT ETHMDCT: CPT | Mod: 50,,, | Performed by: OTOLARYNGOLOGY

## 2021-02-04 PROCEDURE — C1894 INTRO/SHEATH, NON-LASER: HCPCS | Performed by: OTOLARYNGOLOGY

## 2021-02-04 PROCEDURE — 61782 SCAN PROC CRANIAL EXTRA: CPT | Mod: ,,, | Performed by: OTOLARYNGOLOGY

## 2021-02-04 PROCEDURE — 88305 TISSUE EXAM BY PATHOLOGIST: ICD-10-PCS | Mod: 26,,, | Performed by: PATHOLOGY

## 2021-02-04 PROCEDURE — D9220A PRA ANESTHESIA: Mod: CRNA,,, | Performed by: STUDENT IN AN ORGANIZED HEALTH CARE EDUCATION/TRAINING PROGRAM

## 2021-02-04 PROCEDURE — 25000003 PHARM REV CODE 250: Performed by: OTOLARYNGOLOGY

## 2021-02-04 PROCEDURE — D9220A PRA ANESTHESIA: ICD-10-PCS | Mod: CRNA,,, | Performed by: STUDENT IN AN ORGANIZED HEALTH CARE EDUCATION/TRAINING PROGRAM

## 2021-02-04 PROCEDURE — 82962 GLUCOSE BLOOD TEST: CPT | Performed by: OTOLARYNGOLOGY

## 2021-02-04 DEVICE — IMPLANT PROPEL MOMETASON 8MM: Type: IMPLANTABLE DEVICE | Site: NOSE | Status: FUNCTIONAL

## 2021-02-04 RX ORDER — IBUPROFEN 400 MG/1
400 TABLET ORAL ONCE
Status: COMPLETED | OUTPATIENT
Start: 2021-02-04 | End: 2021-02-04

## 2021-02-04 RX ORDER — OXYCODONE AND ACETAMINOPHEN 10; 325 MG/1; MG/1
1 TABLET ORAL EVERY 6 HOURS PRN
Qty: 20 TABLET | Refills: 0 | Status: SHIPPED | OUTPATIENT
Start: 2021-02-04 | End: 2021-07-26

## 2021-02-04 RX ORDER — NEOSTIGMINE METHYLSULFATE 1 MG/ML
INJECTION, SOLUTION INTRAVENOUS
Status: DISCONTINUED | OUTPATIENT
Start: 2021-02-04 | End: 2021-02-04

## 2021-02-04 RX ORDER — LIDOCAINE HYDROCHLORIDE AND EPINEPHRINE 5; 5 MG/ML; UG/ML
INJECTION, SOLUTION INFILTRATION; PERINEURAL
Status: DISCONTINUED | OUTPATIENT
Start: 2021-02-04 | End: 2021-02-04 | Stop reason: HOSPADM

## 2021-02-04 RX ORDER — FENTANYL CITRATE 50 UG/ML
INJECTION, SOLUTION INTRAMUSCULAR; INTRAVENOUS
Status: DISCONTINUED | OUTPATIENT
Start: 2021-02-04 | End: 2021-02-04

## 2021-02-04 RX ORDER — HYDROMORPHONE HYDROCHLORIDE 2 MG/ML
0.2 INJECTION, SOLUTION INTRAMUSCULAR; INTRAVENOUS; SUBCUTANEOUS EVERY 5 MIN PRN
Status: DISCONTINUED | OUTPATIENT
Start: 2021-02-04 | End: 2021-02-04 | Stop reason: HOSPADM

## 2021-02-04 RX ORDER — ONDANSETRON 2 MG/ML
INJECTION INTRAMUSCULAR; INTRAVENOUS
Status: DISCONTINUED | OUTPATIENT
Start: 2021-02-04 | End: 2021-02-04

## 2021-02-04 RX ORDER — PROPOFOL 10 MG/ML
VIAL (ML) INTRAVENOUS
Status: DISCONTINUED | OUTPATIENT
Start: 2021-02-04 | End: 2021-02-04

## 2021-02-04 RX ORDER — MIDAZOLAM HYDROCHLORIDE 1 MG/ML
INJECTION INTRAMUSCULAR; INTRAVENOUS
Status: DISCONTINUED | OUTPATIENT
Start: 2021-02-04 | End: 2021-02-04

## 2021-02-04 RX ORDER — LIDOCAINE HYDROCHLORIDE 20 MG/ML
INJECTION INTRAVENOUS
Status: DISCONTINUED | OUTPATIENT
Start: 2021-02-04 | End: 2021-02-04

## 2021-02-04 RX ORDER — LIDOCAINE HYDROCHLORIDE 10 MG/ML
1 INJECTION, SOLUTION EPIDURAL; INFILTRATION; INTRACAUDAL; PERINEURAL ONCE
Status: DISCONTINUED | OUTPATIENT
Start: 2021-02-04 | End: 2021-02-04 | Stop reason: HOSPADM

## 2021-02-04 RX ORDER — ONDANSETRON 2 MG/ML
4 INJECTION INTRAMUSCULAR; INTRAVENOUS DAILY PRN
Status: DISCONTINUED | OUTPATIENT
Start: 2021-02-04 | End: 2021-02-04 | Stop reason: HOSPADM

## 2021-02-04 RX ORDER — HYDROCODONE BITARTRATE AND ACETAMINOPHEN 5; 325 MG/1; MG/1
1 TABLET ORAL EVERY 4 HOURS PRN
Status: DISCONTINUED | OUTPATIENT
Start: 2021-02-04 | End: 2021-02-04 | Stop reason: HOSPADM

## 2021-02-04 RX ORDER — SODIUM CHLORIDE 0.9 % (FLUSH) 0.9 %
10 SYRINGE (ML) INJECTION
Status: DISCONTINUED | OUTPATIENT
Start: 2021-02-04 | End: 2021-02-04 | Stop reason: HOSPADM

## 2021-02-04 RX ORDER — CLINDAMYCIN PHOSPHATE 900 MG/50ML
900 INJECTION, SOLUTION INTRAVENOUS
Status: COMPLETED | OUTPATIENT
Start: 2021-02-04 | End: 2021-02-04

## 2021-02-04 RX ORDER — DEXAMETHASONE SODIUM PHOSPHATE 4 MG/ML
INJECTION, SOLUTION INTRA-ARTICULAR; INTRALESIONAL; INTRAMUSCULAR; INTRAVENOUS; SOFT TISSUE
Status: DISCONTINUED | OUTPATIENT
Start: 2021-02-04 | End: 2021-02-04

## 2021-02-04 RX ORDER — ROCURONIUM BROMIDE 10 MG/ML
INJECTION, SOLUTION INTRAVENOUS
Status: DISCONTINUED | OUTPATIENT
Start: 2021-02-04 | End: 2021-02-04

## 2021-02-04 RX ORDER — SODIUM CHLORIDE, SODIUM LACTATE, POTASSIUM CHLORIDE, CALCIUM CHLORIDE 600; 310; 30; 20 MG/100ML; MG/100ML; MG/100ML; MG/100ML
INJECTION, SOLUTION INTRAVENOUS CONTINUOUS
Status: DISCONTINUED | OUTPATIENT
Start: 2021-02-04 | End: 2021-02-04 | Stop reason: HOSPADM

## 2021-02-04 RX ORDER — PHENYLEPHRINE HYDROCHLORIDE 10 MG/ML
INJECTION INTRAVENOUS
Status: DISCONTINUED | OUTPATIENT
Start: 2021-02-04 | End: 2021-02-04

## 2021-02-04 RX ORDER — ACETAMINOPHEN 500 MG
1000 TABLET ORAL ONCE
Status: COMPLETED | OUTPATIENT
Start: 2021-02-04 | End: 2021-02-04

## 2021-02-04 RX ORDER — OXYMETAZOLINE HCL 0.05 %
SPRAY, NON-AEROSOL (ML) NASAL
Status: DISCONTINUED | OUTPATIENT
Start: 2021-02-04 | End: 2021-02-04 | Stop reason: HOSPADM

## 2021-02-04 RX ORDER — HYDROCODONE BITARTRATE AND ACETAMINOPHEN 5; 325 MG/1; MG/1
2 TABLET ORAL EVERY 6 HOURS PRN
Qty: 20 TABLET | Refills: 0 | Status: SHIPPED | OUTPATIENT
Start: 2021-02-04 | End: 2021-07-26

## 2021-02-04 RX ADMIN — HYDROMORPHONE HYDROCHLORIDE 0.2 MG: 2 INJECTION INTRAMUSCULAR; INTRAVENOUS; SUBCUTANEOUS at 11:02

## 2021-02-04 RX ADMIN — DEXAMETHASONE SODIUM PHOSPHATE 4 MG: 4 INJECTION, SOLUTION INTRAMUSCULAR; INTRAVENOUS at 09:02

## 2021-02-04 RX ADMIN — NEOSTIGMINE METHYLSULFATE 5 MG: 1 INJECTION INTRAVENOUS at 11:02

## 2021-02-04 RX ADMIN — HYDROCODONE BITARTRATE AND ACETAMINOPHEN 1 TABLET: 5; 325 TABLET ORAL at 12:02

## 2021-02-04 RX ADMIN — ONDANSETRON 4 MG: 2 INJECTION, SOLUTION INTRAMUSCULAR; INTRAVENOUS at 09:02

## 2021-02-04 RX ADMIN — PROPOFOL 30 MG: 10 INJECTION, EMULSION INTRAVENOUS at 09:02

## 2021-02-04 RX ADMIN — PHENYLEPHRINE HYDROCHLORIDE 100 MCG: 10 INJECTION INTRAVENOUS at 10:02

## 2021-02-04 RX ADMIN — GLYCOPYRROLATE 0.1 MG: 0.2 INJECTION, SOLUTION INTRAMUSCULAR; INTRAVITREAL at 11:02

## 2021-02-04 RX ADMIN — ROCURONIUM BROMIDE 30 MG: 10 INJECTION, SOLUTION INTRAVENOUS at 09:02

## 2021-02-04 RX ADMIN — HYDROMORPHONE HYDROCHLORIDE 0.2 MG: 2 INJECTION INTRAMUSCULAR; INTRAVENOUS; SUBCUTANEOUS at 12:02

## 2021-02-04 RX ADMIN — GLYCOPYRROLATE 0.6 MG: 0.2 INJECTION, SOLUTION INTRAMUSCULAR; INTRAVITREAL at 11:02

## 2021-02-04 RX ADMIN — FENTANYL CITRATE 100 MCG: 50 INJECTION, SOLUTION INTRAMUSCULAR; INTRAVENOUS at 09:02

## 2021-02-04 RX ADMIN — SODIUM CHLORIDE, SODIUM LACTATE, POTASSIUM CHLORIDE, AND CALCIUM CHLORIDE: .6; .31; .03; .02 INJECTION, SOLUTION INTRAVENOUS at 08:02

## 2021-02-04 RX ADMIN — ACETAMINOPHEN 1000 MG: 500 TABLET ORAL at 08:02

## 2021-02-04 RX ADMIN — PROPOFOL 120 MG: 10 INJECTION, EMULSION INTRAVENOUS at 09:02

## 2021-02-04 RX ADMIN — CLINDAMYCIN PHOSPHATE 900 MG: 18 INJECTION, SOLUTION INTRAVENOUS at 09:02

## 2021-02-04 RX ADMIN — Medication 60 MG: at 09:02

## 2021-02-04 RX ADMIN — MIDAZOLAM HYDROCHLORIDE 2 MG: 1 INJECTION, SOLUTION INTRAMUSCULAR; INTRAVENOUS at 09:02

## 2021-02-04 RX ADMIN — IBUPROFEN 400 MG: 400 TABLET, FILM COATED ORAL at 01:02

## 2021-02-04 RX ADMIN — ROCURONIUM BROMIDE 10 MG: 10 INJECTION, SOLUTION INTRAVENOUS at 10:02

## 2021-02-04 RX ADMIN — ONDANSETRON 4 MG: 2 INJECTION INTRAMUSCULAR; INTRAVENOUS at 02:02

## 2021-02-08 ENCOUNTER — PATIENT MESSAGE (OUTPATIENT)
Dept: OTOLARYNGOLOGY | Facility: CLINIC | Age: 51
End: 2021-02-08

## 2021-02-08 RX ORDER — NYSTATIN 100000 [USP'U]/ML
4 SUSPENSION ORAL 3 TIMES DAILY
Qty: 168 ML | Refills: 1 | Status: SHIPPED | OUTPATIENT
Start: 2021-02-08 | End: 2021-02-22

## 2021-02-10 ENCOUNTER — TELEPHONE (OUTPATIENT)
Dept: OBSTETRICS AND GYNECOLOGY | Facility: CLINIC | Age: 51
End: 2021-02-10

## 2021-02-10 ENCOUNTER — PATIENT MESSAGE (OUTPATIENT)
Dept: OBSTETRICS AND GYNECOLOGY | Facility: CLINIC | Age: 51
End: 2021-02-10

## 2021-02-10 DIAGNOSIS — Z12.31 SCREENING MAMMOGRAM, ENCOUNTER FOR: Primary | ICD-10-CM

## 2021-02-11 ENCOUNTER — PATIENT MESSAGE (OUTPATIENT)
Dept: TRANSPLANT | Facility: CLINIC | Age: 51
End: 2021-02-11

## 2021-02-12 ENCOUNTER — PATIENT MESSAGE (OUTPATIENT)
Dept: OTOLARYNGOLOGY | Facility: CLINIC | Age: 51
End: 2021-02-12

## 2021-02-12 LAB
FINAL PATHOLOGIC DIAGNOSIS: NORMAL
GROSS: NORMAL
Lab: NORMAL

## 2021-02-13 ENCOUNTER — PATIENT MESSAGE (OUTPATIENT)
Dept: OTOLARYNGOLOGY | Facility: CLINIC | Age: 51
End: 2021-02-13

## 2021-02-15 ENCOUNTER — OFFICE VISIT (OUTPATIENT)
Dept: OTOLARYNGOLOGY | Facility: CLINIC | Age: 51
End: 2021-02-15
Payer: MEDICARE

## 2021-02-15 ENCOUNTER — PATIENT MESSAGE (OUTPATIENT)
Dept: OTOLARYNGOLOGY | Facility: CLINIC | Age: 51
End: 2021-02-15

## 2021-02-15 VITALS — TEMPERATURE: 98 F | BODY MASS INDEX: 22.81 KG/M2 | WEIGHT: 154 LBS | HEIGHT: 69 IN

## 2021-02-15 DIAGNOSIS — Z98.890 STATUS POST FUNCTIONAL ENDOSCOPIC SINUS SURGERY (FESS): Primary | ICD-10-CM

## 2021-02-15 DIAGNOSIS — B37.0 THRUSH: ICD-10-CM

## 2021-02-15 DIAGNOSIS — Z98.890 S/P FESS (FUNCTIONAL ENDOSCOPIC SINUS SURGERY): ICD-10-CM

## 2021-02-15 DIAGNOSIS — J33.9 NASAL POLYP: Primary | ICD-10-CM

## 2021-02-15 PROCEDURE — 1125F AMNT PAIN NOTED PAIN PRSNT: CPT | Mod: S$GLB,,, | Performed by: OTOLARYNGOLOGY

## 2021-02-15 PROCEDURE — 99213 OFFICE O/P EST LOW 20 MIN: CPT | Mod: 25,S$GLB,, | Performed by: OTOLARYNGOLOGY

## 2021-02-15 PROCEDURE — 31237 NSL/SINS NDSC SURG BX POLYPC: CPT | Mod: 50,S$GLB,, | Performed by: OTOLARYNGOLOGY

## 2021-02-15 PROCEDURE — 3008F BODY MASS INDEX DOCD: CPT | Mod: CPTII,S$GLB,, | Performed by: OTOLARYNGOLOGY

## 2021-02-15 PROCEDURE — 31237 PR NASAL/SINUS ENDOSCOPY,BX/RMV POLYP/DEBRID: ICD-10-PCS | Mod: 50,S$GLB,, | Performed by: OTOLARYNGOLOGY

## 2021-02-15 PROCEDURE — 99213 PR OFFICE/OUTPT VISIT, EST, LEVL III, 20-29 MIN: ICD-10-PCS | Mod: 25,S$GLB,, | Performed by: OTOLARYNGOLOGY

## 2021-02-15 PROCEDURE — 3008F PR BODY MASS INDEX (BMI) DOCUMENTED: ICD-10-PCS | Mod: CPTII,S$GLB,, | Performed by: OTOLARYNGOLOGY

## 2021-02-15 PROCEDURE — 1125F PR PAIN SEVERITY QUANTIFIED, PAIN PRESENT: ICD-10-PCS | Mod: S$GLB,,, | Performed by: OTOLARYNGOLOGY

## 2021-02-17 ENCOUNTER — PATIENT MESSAGE (OUTPATIENT)
Dept: OTOLARYNGOLOGY | Facility: CLINIC | Age: 51
End: 2021-02-17

## 2021-02-19 ENCOUNTER — LAB VISIT (OUTPATIENT)
Dept: FAMILY MEDICINE | Facility: CLINIC | Age: 51
End: 2021-02-19
Payer: MEDICARE

## 2021-02-19 DIAGNOSIS — Z98.890 STATUS POST FUNCTIONAL ENDOSCOPIC SINUS SURGERY (FESS): ICD-10-CM

## 2021-02-19 PROCEDURE — U0005 INFEC AGEN DETEC AMPLI PROBE: HCPCS

## 2021-02-19 PROCEDURE — U0003 INFECTIOUS AGENT DETECTION BY NUCLEIC ACID (DNA OR RNA); SEVERE ACUTE RESPIRATORY SYNDROME CORONAVIRUS 2 (SARS-COV-2) (CORONAVIRUS DISEASE [COVID-19]), AMPLIFIED PROBE TECHNIQUE, MAKING USE OF HIGH THROUGHPUT TECHNOLOGIES AS DESCRIBED BY CMS-2020-01-R: HCPCS

## 2021-02-20 LAB — SARS-COV-2 RNA RESP QL NAA+PROBE: NOT DETECTED

## 2021-02-22 ENCOUNTER — PATIENT MESSAGE (OUTPATIENT)
Dept: OTOLARYNGOLOGY | Facility: CLINIC | Age: 51
End: 2021-02-22

## 2021-02-22 ENCOUNTER — OFFICE VISIT (OUTPATIENT)
Dept: OTOLARYNGOLOGY | Facility: CLINIC | Age: 51
End: 2021-02-22
Payer: MEDICARE

## 2021-02-22 VITALS
HEIGHT: 69 IN | BODY MASS INDEX: 22.76 KG/M2 | SYSTOLIC BLOOD PRESSURE: 122 MMHG | WEIGHT: 153.69 LBS | TEMPERATURE: 99 F | DIASTOLIC BLOOD PRESSURE: 78 MMHG

## 2021-02-22 DIAGNOSIS — Z98.890 STATUS POST FUNCTIONAL ENDOSCOPIC SINUS SURGERY (FESS): Primary | ICD-10-CM

## 2021-02-22 DIAGNOSIS — J33.9 NASAL POLYP: ICD-10-CM

## 2021-02-22 DIAGNOSIS — J32.0 CHRONIC MAXILLARY SINUSITIS: ICD-10-CM

## 2021-02-22 PROCEDURE — 3008F BODY MASS INDEX DOCD: CPT | Mod: CPTII,S$GLB,, | Performed by: OTOLARYNGOLOGY

## 2021-02-22 PROCEDURE — 99213 OFFICE O/P EST LOW 20 MIN: CPT | Mod: 25,S$GLB,, | Performed by: OTOLARYNGOLOGY

## 2021-02-22 PROCEDURE — 31237 NSL/SINS NDSC SURG BX POLYPC: CPT | Mod: 50,S$GLB,, | Performed by: OTOLARYNGOLOGY

## 2021-02-22 PROCEDURE — 3074F PR MOST RECENT SYSTOLIC BLOOD PRESSURE < 130 MM HG: ICD-10-PCS | Mod: CPTII,S$GLB,, | Performed by: OTOLARYNGOLOGY

## 2021-02-22 PROCEDURE — 3008F PR BODY MASS INDEX (BMI) DOCUMENTED: ICD-10-PCS | Mod: CPTII,S$GLB,, | Performed by: OTOLARYNGOLOGY

## 2021-02-22 PROCEDURE — 3078F PR MOST RECENT DIASTOLIC BLOOD PRESSURE < 80 MM HG: ICD-10-PCS | Mod: CPTII,S$GLB,, | Performed by: OTOLARYNGOLOGY

## 2021-02-22 PROCEDURE — 1126F PR PAIN SEVERITY QUANTIFIED, NO PAIN PRESENT: ICD-10-PCS | Mod: S$GLB,,, | Performed by: OTOLARYNGOLOGY

## 2021-02-22 PROCEDURE — 1126F AMNT PAIN NOTED NONE PRSNT: CPT | Mod: S$GLB,,, | Performed by: OTOLARYNGOLOGY

## 2021-02-22 PROCEDURE — 31237 PR NASAL/SINUS ENDOSCOPY,BX/RMV POLYP/DEBRID: ICD-10-PCS | Mod: 50,S$GLB,, | Performed by: OTOLARYNGOLOGY

## 2021-02-22 PROCEDURE — 3078F DIAST BP <80 MM HG: CPT | Mod: CPTII,S$GLB,, | Performed by: OTOLARYNGOLOGY

## 2021-02-22 PROCEDURE — 3074F SYST BP LT 130 MM HG: CPT | Mod: CPTII,S$GLB,, | Performed by: OTOLARYNGOLOGY

## 2021-02-22 PROCEDURE — 99213 PR OFFICE/OUTPT VISIT, EST, LEVL III, 20-29 MIN: ICD-10-PCS | Mod: 25,S$GLB,, | Performed by: OTOLARYNGOLOGY

## 2021-02-23 ENCOUNTER — PATIENT MESSAGE (OUTPATIENT)
Dept: RHEUMATOLOGY | Facility: CLINIC | Age: 51
End: 2021-02-23

## 2021-03-01 ENCOUNTER — PATIENT MESSAGE (OUTPATIENT)
Dept: OTOLARYNGOLOGY | Facility: CLINIC | Age: 51
End: 2021-03-01

## 2021-03-01 DIAGNOSIS — Z98.890 STATUS POST FUNCTIONAL ENDOSCOPIC SINUS SURGERY (FESS): Primary | ICD-10-CM

## 2021-03-03 ENCOUNTER — LAB VISIT (OUTPATIENT)
Dept: FAMILY MEDICINE | Facility: CLINIC | Age: 51
End: 2021-03-03
Payer: MEDICARE

## 2021-03-03 ENCOUNTER — INFUSION (OUTPATIENT)
Dept: INFUSION THERAPY | Facility: HOSPITAL | Age: 51
End: 2021-03-03
Attending: INTERNAL MEDICINE
Payer: MEDICARE

## 2021-03-03 VITALS
TEMPERATURE: 98 F | SYSTOLIC BLOOD PRESSURE: 164 MMHG | WEIGHT: 154.31 LBS | OXYGEN SATURATION: 100 % | BODY MASS INDEX: 22.85 KG/M2 | HEIGHT: 69 IN | DIASTOLIC BLOOD PRESSURE: 79 MMHG | HEART RATE: 88 BPM | RESPIRATION RATE: 18 BRPM

## 2021-03-03 DIAGNOSIS — M81.0 OSTEOPOROSIS: Primary | ICD-10-CM

## 2021-03-03 DIAGNOSIS — Z98.890 STATUS POST FUNCTIONAL ENDOSCOPIC SINUS SURGERY (FESS): ICD-10-CM

## 2021-03-03 DIAGNOSIS — M32.9 SYSTEMIC LUPUS ERYTHEMATOSUS, UNSPECIFIED SLE TYPE, UNSPECIFIED ORGAN INVOLVEMENT STATUS: ICD-10-CM

## 2021-03-03 LAB — SARS-COV-2 RNA RESP QL NAA+PROBE: NOT DETECTED

## 2021-03-03 PROCEDURE — 96375 TX/PRO/DX INJ NEW DRUG ADDON: CPT

## 2021-03-03 PROCEDURE — 25000003 PHARM REV CODE 250: Performed by: INTERNAL MEDICINE

## 2021-03-03 PROCEDURE — 63600175 PHARM REV CODE 636 W HCPCS: Performed by: INTERNAL MEDICINE

## 2021-03-03 PROCEDURE — U0003 INFECTIOUS AGENT DETECTION BY NUCLEIC ACID (DNA OR RNA); SEVERE ACUTE RESPIRATORY SYNDROME CORONAVIRUS 2 (SARS-COV-2) (CORONAVIRUS DISEASE [COVID-19]), AMPLIFIED PROBE TECHNIQUE, MAKING USE OF HIGH THROUGHPUT TECHNOLOGIES AS DESCRIBED BY CMS-2020-01-R: HCPCS | Performed by: OTOLARYNGOLOGY

## 2021-03-03 PROCEDURE — 96365 THER/PROPH/DIAG IV INF INIT: CPT

## 2021-03-03 PROCEDURE — U0005 INFEC AGEN DETEC AMPLI PROBE: HCPCS | Performed by: OTOLARYNGOLOGY

## 2021-03-03 RX ORDER — SODIUM CHLORIDE 0.9 % (FLUSH) 0.9 %
10 SYRINGE (ML) INJECTION
Status: CANCELLED | OUTPATIENT
Start: 2021-03-31

## 2021-03-03 RX ORDER — HEPARIN 100 UNIT/ML
500 SYRINGE INTRAVENOUS
Status: CANCELLED | OUTPATIENT
Start: 2021-03-31

## 2021-03-03 RX ORDER — DIPHENHYDRAMINE HYDROCHLORIDE 50 MG/ML
25 INJECTION INTRAMUSCULAR; INTRAVENOUS
Status: CANCELLED | OUTPATIENT
Start: 2021-03-31

## 2021-03-03 RX ORDER — ACETAMINOPHEN 325 MG/1
650 TABLET ORAL
Status: CANCELLED | OUTPATIENT
Start: 2021-03-31

## 2021-03-03 RX ORDER — ACETAMINOPHEN 325 MG/1
650 TABLET ORAL
Status: COMPLETED | OUTPATIENT
Start: 2021-03-03 | End: 2021-03-03

## 2021-03-03 RX ORDER — DIPHENHYDRAMINE HYDROCHLORIDE 50 MG/ML
25 INJECTION INTRAMUSCULAR; INTRAVENOUS
Status: COMPLETED | OUTPATIENT
Start: 2021-03-03 | End: 2021-03-03

## 2021-03-03 RX ADMIN — BELIMUMAB 700 MG: 400 INJECTION, POWDER, LYOPHILIZED, FOR SOLUTION INTRAVENOUS at 11:03

## 2021-03-03 RX ADMIN — DIPHENHYDRAMINE HYDROCHLORIDE 25 MG: 50 INJECTION INTRAMUSCULAR; INTRAVENOUS at 10:03

## 2021-03-03 RX ADMIN — ACETAMINOPHEN 650 MG: 325 TABLET ORAL at 10:03

## 2021-03-05 ENCOUNTER — OFFICE VISIT (OUTPATIENT)
Dept: OTOLARYNGOLOGY | Facility: CLINIC | Age: 51
End: 2021-03-05
Payer: MEDICARE

## 2021-03-05 VITALS — HEIGHT: 69 IN | TEMPERATURE: 98 F | WEIGHT: 150.88 LBS | BODY MASS INDEX: 22.35 KG/M2

## 2021-03-05 DIAGNOSIS — Z98.890 STATUS POST FUNCTIONAL ENDOSCOPIC SINUS SURGERY (FESS): Primary | ICD-10-CM

## 2021-03-05 DIAGNOSIS — J31.0 CHRONIC RHINITIS: Primary | ICD-10-CM

## 2021-03-05 DIAGNOSIS — J32.0 CHRONIC MAXILLARY SINUSITIS: ICD-10-CM

## 2021-03-05 PROCEDURE — 3008F PR BODY MASS INDEX (BMI) DOCUMENTED: ICD-10-PCS | Mod: CPTII,S$GLB,, | Performed by: OTOLARYNGOLOGY

## 2021-03-05 PROCEDURE — 3008F BODY MASS INDEX DOCD: CPT | Mod: CPTII,S$GLB,, | Performed by: OTOLARYNGOLOGY

## 2021-03-05 PROCEDURE — 1126F PR PAIN SEVERITY QUANTIFIED, NO PAIN PRESENT: ICD-10-PCS | Mod: S$GLB,,, | Performed by: OTOLARYNGOLOGY

## 2021-03-05 PROCEDURE — 31237 NSL/SINS NDSC SURG BX POLYPC: CPT | Mod: 50,S$GLB,, | Performed by: OTOLARYNGOLOGY

## 2021-03-05 PROCEDURE — 99213 PR OFFICE/OUTPT VISIT, EST, LEVL III, 20-29 MIN: ICD-10-PCS | Mod: 25,S$GLB,, | Performed by: OTOLARYNGOLOGY

## 2021-03-05 PROCEDURE — 99213 OFFICE O/P EST LOW 20 MIN: CPT | Mod: 25,S$GLB,, | Performed by: OTOLARYNGOLOGY

## 2021-03-05 PROCEDURE — 31237 PR NASAL/SINUS ENDOSCOPY,BX/RMV POLYP/DEBRID: ICD-10-PCS | Mod: 50,S$GLB,, | Performed by: OTOLARYNGOLOGY

## 2021-03-05 PROCEDURE — 1126F AMNT PAIN NOTED NONE PRSNT: CPT | Mod: S$GLB,,, | Performed by: OTOLARYNGOLOGY

## 2021-03-07 ENCOUNTER — PATIENT MESSAGE (OUTPATIENT)
Dept: OTOLARYNGOLOGY | Facility: CLINIC | Age: 51
End: 2021-03-07

## 2021-03-09 ENCOUNTER — PATIENT MESSAGE (OUTPATIENT)
Dept: TRANSPLANT | Facility: CLINIC | Age: 51
End: 2021-03-09

## 2021-03-11 ENCOUNTER — PATIENT MESSAGE (OUTPATIENT)
Dept: RHEUMATOLOGY | Facility: CLINIC | Age: 51
End: 2021-03-11

## 2021-03-12 ENCOUNTER — LAB VISIT (OUTPATIENT)
Dept: LAB | Facility: HOSPITAL | Age: 51
End: 2021-03-12
Attending: INTERNAL MEDICINE
Payer: MEDICARE

## 2021-03-12 ENCOUNTER — OFFICE VISIT (OUTPATIENT)
Dept: OTOLARYNGOLOGY | Facility: CLINIC | Age: 51
End: 2021-03-12
Payer: MEDICARE

## 2021-03-12 ENCOUNTER — TELEPHONE (OUTPATIENT)
Dept: OTOLARYNGOLOGY | Facility: CLINIC | Age: 51
End: 2021-03-12

## 2021-03-12 ENCOUNTER — PATIENT MESSAGE (OUTPATIENT)
Dept: OTOLARYNGOLOGY | Facility: CLINIC | Age: 51
End: 2021-03-12

## 2021-03-12 VITALS
HEIGHT: 69 IN | DIASTOLIC BLOOD PRESSURE: 74 MMHG | WEIGHT: 151 LBS | TEMPERATURE: 98 F | BODY MASS INDEX: 22.36 KG/M2 | SYSTOLIC BLOOD PRESSURE: 144 MMHG

## 2021-03-12 DIAGNOSIS — M26.621 ARTHRALGIA OF RIGHT TEMPOROMANDIBULAR JOINT: ICD-10-CM

## 2021-03-12 DIAGNOSIS — E61.1 IRON DEFICIENCY: ICD-10-CM

## 2021-03-12 DIAGNOSIS — H60.8X3 CHRONIC ECZEMATOUS OTITIS EXTERNA OF BOTH EARS: Primary | ICD-10-CM

## 2021-03-12 LAB
FERRITIN SERPL-MCNC: 35 NG/ML (ref 20–300)
HGB BLD-MCNC: 12.5 G/DL (ref 12–16)
IRON SERPL-MCNC: 75 UG/DL (ref 30–160)
SATURATED IRON: 19 % (ref 20–50)
TOTAL IRON BINDING CAPACITY: 391 UG/DL (ref 250–450)
TRANSFERRIN SERPL-MCNC: 264 MG/DL (ref 200–375)

## 2021-03-12 PROCEDURE — 99213 PR OFFICE/OUTPT VISIT, EST, LEVL III, 20-29 MIN: ICD-10-PCS | Mod: S$GLB,,, | Performed by: OTOLARYNGOLOGY

## 2021-03-12 PROCEDURE — 85018 HEMOGLOBIN: CPT | Performed by: INTERNAL MEDICINE

## 2021-03-12 PROCEDURE — 3008F BODY MASS INDEX DOCD: CPT | Mod: CPTII,S$GLB,, | Performed by: OTOLARYNGOLOGY

## 2021-03-12 PROCEDURE — 3077F SYST BP >= 140 MM HG: CPT | Mod: CPTII,S$GLB,, | Performed by: OTOLARYNGOLOGY

## 2021-03-12 PROCEDURE — 82728 ASSAY OF FERRITIN: CPT | Performed by: INTERNAL MEDICINE

## 2021-03-12 PROCEDURE — 99213 OFFICE O/P EST LOW 20 MIN: CPT | Mod: S$GLB,,, | Performed by: OTOLARYNGOLOGY

## 2021-03-12 PROCEDURE — 3077F PR MOST RECENT SYSTOLIC BLOOD PRESSURE >= 140 MM HG: ICD-10-PCS | Mod: CPTII,S$GLB,, | Performed by: OTOLARYNGOLOGY

## 2021-03-12 PROCEDURE — 3008F PR BODY MASS INDEX (BMI) DOCUMENTED: ICD-10-PCS | Mod: CPTII,S$GLB,, | Performed by: OTOLARYNGOLOGY

## 2021-03-12 PROCEDURE — 3078F PR MOST RECENT DIASTOLIC BLOOD PRESSURE < 80 MM HG: ICD-10-PCS | Mod: CPTII,S$GLB,, | Performed by: OTOLARYNGOLOGY

## 2021-03-12 PROCEDURE — 83540 ASSAY OF IRON: CPT | Performed by: INTERNAL MEDICINE

## 2021-03-12 PROCEDURE — 36415 COLL VENOUS BLD VENIPUNCTURE: CPT | Performed by: INTERNAL MEDICINE

## 2021-03-12 PROCEDURE — 3078F DIAST BP <80 MM HG: CPT | Mod: CPTII,S$GLB,, | Performed by: OTOLARYNGOLOGY

## 2021-03-12 RX ORDER — CLOTRIMAZOLE AND BETAMETHASONE DIPROPIONATE 10; .64 MG/G; MG/G
CREAM TOPICAL 2 TIMES DAILY
Qty: 1 TUBE | Refills: 0 | Status: SHIPPED | OUTPATIENT
Start: 2021-03-12 | End: 2021-03-22

## 2021-03-14 ENCOUNTER — PATIENT MESSAGE (OUTPATIENT)
Dept: OTOLARYNGOLOGY | Facility: CLINIC | Age: 51
End: 2021-03-14

## 2021-03-15 ENCOUNTER — PATIENT MESSAGE (OUTPATIENT)
Dept: OTOLARYNGOLOGY | Facility: CLINIC | Age: 51
End: 2021-03-15

## 2021-03-17 ENCOUNTER — OFFICE VISIT (OUTPATIENT)
Dept: OTOLARYNGOLOGY | Facility: CLINIC | Age: 51
End: 2021-03-17
Payer: MEDICARE

## 2021-03-17 VITALS
DIASTOLIC BLOOD PRESSURE: 90 MMHG | SYSTOLIC BLOOD PRESSURE: 158 MMHG | HEIGHT: 69 IN | WEIGHT: 151.44 LBS | TEMPERATURE: 99 F | BODY MASS INDEX: 22.43 KG/M2

## 2021-03-17 DIAGNOSIS — H92.01 RIGHT EAR PAIN: Primary | ICD-10-CM

## 2021-03-17 PROCEDURE — 1125F PR PAIN SEVERITY QUANTIFIED, PAIN PRESENT: ICD-10-PCS | Mod: S$GLB,,, | Performed by: OTOLARYNGOLOGY

## 2021-03-17 PROCEDURE — 3008F BODY MASS INDEX DOCD: CPT | Mod: CPTII,S$GLB,, | Performed by: OTOLARYNGOLOGY

## 2021-03-17 PROCEDURE — 99213 OFFICE O/P EST LOW 20 MIN: CPT | Mod: S$GLB,,, | Performed by: OTOLARYNGOLOGY

## 2021-03-17 PROCEDURE — 3080F DIAST BP >= 90 MM HG: CPT | Mod: CPTII,S$GLB,, | Performed by: OTOLARYNGOLOGY

## 2021-03-17 PROCEDURE — 99213 PR OFFICE/OUTPT VISIT, EST, LEVL III, 20-29 MIN: ICD-10-PCS | Mod: S$GLB,,, | Performed by: OTOLARYNGOLOGY

## 2021-03-17 PROCEDURE — 3077F SYST BP >= 140 MM HG: CPT | Mod: CPTII,S$GLB,, | Performed by: OTOLARYNGOLOGY

## 2021-03-17 PROCEDURE — 3080F PR MOST RECENT DIASTOLIC BLOOD PRESSURE >= 90 MM HG: ICD-10-PCS | Mod: CPTII,S$GLB,, | Performed by: OTOLARYNGOLOGY

## 2021-03-17 PROCEDURE — 3077F PR MOST RECENT SYSTOLIC BLOOD PRESSURE >= 140 MM HG: ICD-10-PCS | Mod: CPTII,S$GLB,, | Performed by: OTOLARYNGOLOGY

## 2021-03-17 PROCEDURE — 3008F PR BODY MASS INDEX (BMI) DOCUMENTED: ICD-10-PCS | Mod: CPTII,S$GLB,, | Performed by: OTOLARYNGOLOGY

## 2021-03-17 PROCEDURE — 1125F AMNT PAIN NOTED PAIN PRSNT: CPT | Mod: S$GLB,,, | Performed by: OTOLARYNGOLOGY

## 2021-03-17 RX ORDER — AZITHROMYCIN 250 MG/1
TABLET, FILM COATED ORAL
Qty: 6 TABLET | Refills: 0 | Status: SHIPPED | OUTPATIENT
Start: 2021-03-17 | End: 2021-03-22

## 2021-03-17 RX ORDER — LORATADINE 10 MG/1
10 TABLET ORAL DAILY
Qty: 30 TABLET | Refills: 0 | Status: SHIPPED | OUTPATIENT
Start: 2021-03-17 | End: 2023-10-05

## 2021-03-21 ENCOUNTER — PATIENT MESSAGE (OUTPATIENT)
Dept: OTOLARYNGOLOGY | Facility: CLINIC | Age: 51
End: 2021-03-21

## 2021-03-21 DIAGNOSIS — E61.1 IRON DEFICIENCY: ICD-10-CM

## 2021-03-21 DIAGNOSIS — D50.9 IRON DEFICIENCY ANEMIA, UNSPECIFIED IRON DEFICIENCY ANEMIA TYPE: Primary | ICD-10-CM

## 2021-03-21 RX ORDER — FERROUS SULFATE 325(65) MG
325 TABLET ORAL EVERY 12 HOURS
Qty: 60 TABLET | Refills: 4 | Status: SHIPPED | OUTPATIENT
Start: 2021-03-21

## 2021-03-22 ENCOUNTER — TELEPHONE (OUTPATIENT)
Dept: GASTROENTEROLOGY | Facility: CLINIC | Age: 51
End: 2021-03-22

## 2021-03-22 RX ORDER — FLUCONAZOLE 150 MG/1
150 TABLET ORAL DAILY
Qty: 1 TABLET | Refills: 0 | Status: SHIPPED | OUTPATIENT
Start: 2021-03-22 | End: 2021-03-23

## 2021-03-23 ENCOUNTER — CLINICAL SUPPORT (OUTPATIENT)
Dept: AUDIOLOGY | Facility: CLINIC | Age: 51
End: 2021-03-23
Payer: MEDICARE

## 2021-03-23 ENCOUNTER — PATIENT MESSAGE (OUTPATIENT)
Dept: OTOLARYNGOLOGY | Facility: CLINIC | Age: 51
End: 2021-03-23

## 2021-03-23 DIAGNOSIS — H90.41 SENSORINEURAL HEARING LOSS (SNHL) OF RIGHT EAR WITH UNRESTRICTED HEARING OF LEFT EAR: Primary | ICD-10-CM

## 2021-03-23 PROCEDURE — 92557 COMPREHENSIVE HEARING TEST: CPT | Mod: S$GLB,,, | Performed by: AUDIOLOGIST

## 2021-03-23 PROCEDURE — 92550 TYMPANOMETRY & REFLEX THRESH: CPT | Mod: S$GLB,,, | Performed by: AUDIOLOGIST

## 2021-03-23 PROCEDURE — 92550 PR TYMPANOMETRY AND REFLEX THRESHOLD MEASUREMENTS: ICD-10-PCS | Mod: S$GLB,,, | Performed by: AUDIOLOGIST

## 2021-03-23 PROCEDURE — 92557 PR COMPREHENSIVE HEARING TEST: ICD-10-PCS | Mod: S$GLB,,, | Performed by: AUDIOLOGIST

## 2021-03-26 ENCOUNTER — PATIENT MESSAGE (OUTPATIENT)
Dept: OTOLARYNGOLOGY | Facility: CLINIC | Age: 51
End: 2021-03-26

## 2021-03-27 ENCOUNTER — PATIENT MESSAGE (OUTPATIENT)
Dept: NEUROLOGY | Facility: CLINIC | Age: 51
End: 2021-03-27

## 2021-03-29 ENCOUNTER — OFFICE VISIT (OUTPATIENT)
Dept: OTOLARYNGOLOGY | Facility: CLINIC | Age: 51
End: 2021-03-29
Payer: MEDICARE

## 2021-03-29 ENCOUNTER — OFFICE VISIT (OUTPATIENT)
Dept: NEUROLOGY | Facility: CLINIC | Age: 51
End: 2021-03-29
Payer: MEDICARE

## 2021-03-29 VITALS
SYSTOLIC BLOOD PRESSURE: 136 MMHG | HEIGHT: 69 IN | DIASTOLIC BLOOD PRESSURE: 79 MMHG | HEART RATE: 92 BPM | DIASTOLIC BLOOD PRESSURE: 82 MMHG | BODY MASS INDEX: 22.27 KG/M2 | WEIGHT: 150.38 LBS | WEIGHT: 150.38 LBS | BODY MASS INDEX: 22.27 KG/M2 | HEIGHT: 69 IN | SYSTOLIC BLOOD PRESSURE: 142 MMHG | TEMPERATURE: 99 F

## 2021-03-29 DIAGNOSIS — H92.01 RIGHT EAR PAIN: Primary | ICD-10-CM

## 2021-03-29 DIAGNOSIS — H92.01 RIGHT EAR PAIN: ICD-10-CM

## 2021-03-29 DIAGNOSIS — M26.621 ARTHRALGIA OF RIGHT TEMPOROMANDIBULAR JOINT: ICD-10-CM

## 2021-03-29 PROCEDURE — 3075F PR MOST RECENT SYSTOLIC BLOOD PRESS GE 130-139MM HG: ICD-10-PCS | Mod: CPTII,S$GLB,, | Performed by: NEUROLOGICAL SURGERY

## 2021-03-29 PROCEDURE — 3008F BODY MASS INDEX DOCD: CPT | Mod: CPTII,S$GLB,, | Performed by: OTOLARYNGOLOGY

## 2021-03-29 PROCEDURE — 3078F DIAST BP <80 MM HG: CPT | Mod: CPTII,S$GLB,, | Performed by: NEUROLOGICAL SURGERY

## 2021-03-29 PROCEDURE — 3074F SYST BP LT 130 MM HG: CPT | Mod: CPTII,S$GLB,, | Performed by: OTOLARYNGOLOGY

## 2021-03-29 PROCEDURE — 99213 OFFICE O/P EST LOW 20 MIN: CPT | Mod: S$GLB,,, | Performed by: OTOLARYNGOLOGY

## 2021-03-29 PROCEDURE — 99999 PR PBB SHADOW E&M-EST. PATIENT-LVL IV: CPT | Mod: PBBFAC,,, | Performed by: NEUROLOGICAL SURGERY

## 2021-03-29 PROCEDURE — 99214 OFFICE O/P EST MOD 30 MIN: CPT | Mod: S$GLB,,, | Performed by: NEUROLOGICAL SURGERY

## 2021-03-29 PROCEDURE — 3008F PR BODY MASS INDEX (BMI) DOCUMENTED: ICD-10-PCS | Mod: CPTII,S$GLB,, | Performed by: NEUROLOGICAL SURGERY

## 2021-03-29 PROCEDURE — 99213 PR OFFICE/OUTPT VISIT, EST, LEVL III, 20-29 MIN: ICD-10-PCS | Mod: S$GLB,,, | Performed by: OTOLARYNGOLOGY

## 2021-03-29 PROCEDURE — 99999 PR PBB SHADOW E&M-EST. PATIENT-LVL IV: ICD-10-PCS | Mod: PBBFAC,,, | Performed by: NEUROLOGICAL SURGERY

## 2021-03-29 PROCEDURE — 3008F BODY MASS INDEX DOCD: CPT | Mod: CPTII,S$GLB,, | Performed by: NEUROLOGICAL SURGERY

## 2021-03-29 PROCEDURE — 1125F PR PAIN SEVERITY QUANTIFIED, PAIN PRESENT: ICD-10-PCS | Mod: S$GLB,,, | Performed by: OTOLARYNGOLOGY

## 2021-03-29 PROCEDURE — 3078F PR MOST RECENT DIASTOLIC BLOOD PRESSURE < 80 MM HG: ICD-10-PCS | Mod: CPTII,S$GLB,, | Performed by: NEUROLOGICAL SURGERY

## 2021-03-29 PROCEDURE — 3075F SYST BP GE 130 - 139MM HG: CPT | Mod: CPTII,S$GLB,, | Performed by: NEUROLOGICAL SURGERY

## 2021-03-29 PROCEDURE — 3079F DIAST BP 80-89 MM HG: CPT | Mod: CPTII,S$GLB,, | Performed by: OTOLARYNGOLOGY

## 2021-03-29 PROCEDURE — 1125F AMNT PAIN NOTED PAIN PRSNT: CPT | Mod: S$GLB,,, | Performed by: OTOLARYNGOLOGY

## 2021-03-29 PROCEDURE — 1125F PR PAIN SEVERITY QUANTIFIED, PAIN PRESENT: ICD-10-PCS | Mod: S$GLB,,, | Performed by: NEUROLOGICAL SURGERY

## 2021-03-29 PROCEDURE — 1125F AMNT PAIN NOTED PAIN PRSNT: CPT | Mod: S$GLB,,, | Performed by: NEUROLOGICAL SURGERY

## 2021-03-29 PROCEDURE — 99214 PR OFFICE/OUTPT VISIT, EST, LEVL IV, 30-39 MIN: ICD-10-PCS | Mod: S$GLB,,, | Performed by: NEUROLOGICAL SURGERY

## 2021-03-29 PROCEDURE — 3008F PR BODY MASS INDEX (BMI) DOCUMENTED: ICD-10-PCS | Mod: CPTII,S$GLB,, | Performed by: OTOLARYNGOLOGY

## 2021-03-29 PROCEDURE — 3074F PR MOST RECENT SYSTOLIC BLOOD PRESSURE < 130 MM HG: ICD-10-PCS | Mod: CPTII,S$GLB,, | Performed by: OTOLARYNGOLOGY

## 2021-03-29 PROCEDURE — 3079F PR MOST RECENT DIASTOLIC BLOOD PRESSURE 80-89 MM HG: ICD-10-PCS | Mod: CPTII,S$GLB,, | Performed by: OTOLARYNGOLOGY

## 2021-03-30 ENCOUNTER — PATIENT MESSAGE (OUTPATIENT)
Dept: TRANSPLANT | Facility: CLINIC | Age: 51
End: 2021-03-30

## 2021-03-31 ENCOUNTER — INFUSION (OUTPATIENT)
Dept: INFUSION THERAPY | Facility: HOSPITAL | Age: 51
End: 2021-03-31
Attending: INTERNAL MEDICINE
Payer: MEDICARE

## 2021-03-31 VITALS
HEART RATE: 80 BPM | RESPIRATION RATE: 16 BRPM | SYSTOLIC BLOOD PRESSURE: 149 MMHG | DIASTOLIC BLOOD PRESSURE: 76 MMHG | TEMPERATURE: 99 F | WEIGHT: 150.38 LBS | OXYGEN SATURATION: 99 % | BODY MASS INDEX: 22.2 KG/M2

## 2021-03-31 DIAGNOSIS — M81.0 OSTEOPOROSIS: Primary | ICD-10-CM

## 2021-03-31 DIAGNOSIS — M32.9 SYSTEMIC LUPUS ERYTHEMATOSUS, UNSPECIFIED SLE TYPE, UNSPECIFIED ORGAN INVOLVEMENT STATUS: ICD-10-CM

## 2021-03-31 PROCEDURE — 25000003 PHARM REV CODE 250: Performed by: INTERNAL MEDICINE

## 2021-03-31 PROCEDURE — 63600175 PHARM REV CODE 636 W HCPCS: Performed by: INTERNAL MEDICINE

## 2021-03-31 PROCEDURE — 96413 CHEMO IV INFUSION 1 HR: CPT

## 2021-03-31 RX ORDER — DIPHENHYDRAMINE HYDROCHLORIDE 50 MG/ML
25 INJECTION INTRAMUSCULAR; INTRAVENOUS
Status: COMPLETED | OUTPATIENT
Start: 2021-03-31 | End: 2021-03-31

## 2021-03-31 RX ORDER — ACETAMINOPHEN 325 MG/1
650 TABLET ORAL
Status: CANCELLED | OUTPATIENT
Start: 2021-04-28

## 2021-03-31 RX ORDER — HEPARIN 100 UNIT/ML
500 SYRINGE INTRAVENOUS
Status: CANCELLED | OUTPATIENT
Start: 2021-04-28

## 2021-03-31 RX ORDER — SODIUM CHLORIDE 0.9 % (FLUSH) 0.9 %
10 SYRINGE (ML) INJECTION
Status: CANCELLED | OUTPATIENT
Start: 2021-04-28

## 2021-03-31 RX ORDER — ACETAMINOPHEN 325 MG/1
650 TABLET ORAL
Status: COMPLETED | OUTPATIENT
Start: 2021-03-31 | End: 2021-03-31

## 2021-03-31 RX ORDER — DIPHENHYDRAMINE HYDROCHLORIDE 50 MG/ML
25 INJECTION INTRAMUSCULAR; INTRAVENOUS
Status: CANCELLED | OUTPATIENT
Start: 2021-04-28

## 2021-03-31 RX ADMIN — BELIMUMAB 682 MG: 400 INJECTION, POWDER, LYOPHILIZED, FOR SOLUTION INTRAVENOUS at 11:03

## 2021-03-31 RX ADMIN — ACETAMINOPHEN 650 MG: 325 TABLET ORAL at 11:03

## 2021-03-31 RX ADMIN — DIPHENHYDRAMINE HYDROCHLORIDE 25 MG: 50 INJECTION INTRAMUSCULAR; INTRAVENOUS at 11:03

## 2021-04-01 ENCOUNTER — PATIENT MESSAGE (OUTPATIENT)
Dept: OTOLARYNGOLOGY | Facility: CLINIC | Age: 51
End: 2021-04-01

## 2021-04-04 ENCOUNTER — PATIENT MESSAGE (OUTPATIENT)
Dept: OTOLARYNGOLOGY | Facility: CLINIC | Age: 51
End: 2021-04-04

## 2021-04-13 DIAGNOSIS — R79.0 LOW MAGNESIUM LEVEL: ICD-10-CM

## 2021-04-13 DIAGNOSIS — E55.9 VITAMIN D INSUFFICIENCY: ICD-10-CM

## 2021-04-13 DIAGNOSIS — D58.2 HEMOGLOBIN C TRAIT: ICD-10-CM

## 2021-04-13 RX ORDER — ACETAMINOPHEN 500 MG
1 TABLET ORAL DAILY
Qty: 90 CAPSULE | Refills: 3 | Status: SHIPPED | OUTPATIENT
Start: 2021-04-13 | End: 2022-04-13

## 2021-04-14 DIAGNOSIS — M32.9 SYSTEMIC LUPUS ERYTHEMATOSUS, UNSPECIFIED SLE TYPE, UNSPECIFIED ORGAN INVOLVEMENT STATUS: Chronic | ICD-10-CM

## 2021-04-15 ENCOUNTER — PATIENT MESSAGE (OUTPATIENT)
Dept: RHEUMATOLOGY | Facility: CLINIC | Age: 51
End: 2021-04-15

## 2021-04-15 RX ORDER — HYDROXYCHLOROQUINE SULFATE 200 MG/1
TABLET, FILM COATED ORAL
Qty: 60 TABLET | Refills: 0 | Status: SHIPPED | OUTPATIENT
Start: 2021-04-15 | End: 2021-06-24

## 2021-04-18 ENCOUNTER — PATIENT MESSAGE (OUTPATIENT)
Dept: OPTOMETRY | Facility: CLINIC | Age: 51
End: 2021-04-18

## 2021-04-19 ENCOUNTER — LAB VISIT (OUTPATIENT)
Dept: LAB | Facility: HOSPITAL | Age: 51
End: 2021-04-19
Attending: INTERNAL MEDICINE
Payer: MEDICARE

## 2021-04-19 DIAGNOSIS — Z94.4 LIVER TRANSPLANTED: ICD-10-CM

## 2021-04-19 LAB
ALBUMIN SERPL BCP-MCNC: 4.1 G/DL (ref 3.5–5.2)
ALP SERPL-CCNC: 99 U/L (ref 55–135)
ALT SERPL W/O P-5'-P-CCNC: 15 U/L (ref 10–44)
ANION GAP SERPL CALC-SCNC: 7 MMOL/L (ref 8–16)
AST SERPL-CCNC: 17 U/L (ref 10–40)
BASOPHILS # BLD AUTO: 0.01 K/UL (ref 0–0.2)
BASOPHILS NFR BLD: 0.2 % (ref 0–1.9)
BILIRUB SERPL-MCNC: 0.5 MG/DL (ref 0.1–1)
BUN SERPL-MCNC: 25 MG/DL (ref 6–20)
CALCIUM SERPL-MCNC: 9.2 MG/DL (ref 8.7–10.5)
CHLORIDE SERPL-SCNC: 105 MMOL/L (ref 95–110)
CO2 SERPL-SCNC: 28 MMOL/L (ref 23–29)
CREAT SERPL-MCNC: 1.6 MG/DL (ref 0.5–1.4)
DIFFERENTIAL METHOD: ABNORMAL
EOSINOPHIL # BLD AUTO: 0.2 K/UL (ref 0–0.5)
EOSINOPHIL NFR BLD: 4.3 % (ref 0–8)
ERYTHROCYTE [DISTWIDTH] IN BLOOD BY AUTOMATED COUNT: 14.4 % (ref 11.5–14.5)
EST. GFR  (AFRICAN AMERICAN): 43 ML/MIN/1.73 M^2
EST. GFR  (NON AFRICAN AMERICAN): 37 ML/MIN/1.73 M^2
GLUCOSE SERPL-MCNC: 125 MG/DL (ref 70–110)
HCT VFR BLD AUTO: 33.6 % (ref 37–48.5)
HGB BLD-MCNC: 11.7 G/DL (ref 12–16)
IMM GRANULOCYTES # BLD AUTO: 0.01 K/UL (ref 0–0.04)
IMM GRANULOCYTES NFR BLD AUTO: 0.2 % (ref 0–0.5)
LYMPHOCYTES # BLD AUTO: 1.9 K/UL (ref 1–4.8)
LYMPHOCYTES NFR BLD: 40.9 % (ref 18–48)
MAGNESIUM SERPL-MCNC: 2.1 MG/DL (ref 1.6–2.6)
MCH RBC QN AUTO: 27.8 PG (ref 27–31)
MCHC RBC AUTO-ENTMCNC: 34.8 G/DL (ref 32–36)
MCV RBC AUTO: 80 FL (ref 82–98)
MONOCYTES # BLD AUTO: 0.5 K/UL (ref 0.3–1)
MONOCYTES NFR BLD: 9.8 % (ref 4–15)
NEUTROPHILS # BLD AUTO: 2.1 K/UL (ref 1.8–7.7)
NEUTROPHILS NFR BLD: 44.6 % (ref 38–73)
NRBC BLD-RTO: 0 /100 WBC
PLATELET # BLD AUTO: 182 K/UL (ref 150–450)
PMV BLD AUTO: 10.4 FL (ref 9.2–12.9)
POTASSIUM SERPL-SCNC: 4 MMOL/L (ref 3.5–5.1)
PROT SERPL-MCNC: 6.9 G/DL (ref 6–8.4)
RBC # BLD AUTO: 4.21 M/UL (ref 4–5.4)
SODIUM SERPL-SCNC: 140 MMOL/L (ref 136–145)
WBC # BLD AUTO: 4.6 K/UL (ref 3.9–12.7)

## 2021-04-19 PROCEDURE — 36415 COLL VENOUS BLD VENIPUNCTURE: CPT | Performed by: INTERNAL MEDICINE

## 2021-04-19 PROCEDURE — 83735 ASSAY OF MAGNESIUM: CPT | Performed by: INTERNAL MEDICINE

## 2021-04-19 PROCEDURE — 85025 COMPLETE CBC W/AUTO DIFF WBC: CPT | Performed by: INTERNAL MEDICINE

## 2021-04-19 PROCEDURE — 80197 ASSAY OF TACROLIMUS: CPT | Performed by: INTERNAL MEDICINE

## 2021-04-19 PROCEDURE — 80053 COMPREHEN METABOLIC PANEL: CPT | Performed by: INTERNAL MEDICINE

## 2021-04-20 LAB — TACROLIMUS BLD-MCNC: 6.3 NG/ML (ref 5–15)

## 2021-04-22 ENCOUNTER — TELEPHONE (OUTPATIENT)
Dept: TRANSPLANT | Facility: CLINIC | Age: 51
End: 2021-04-22

## 2021-04-28 ENCOUNTER — INFUSION (OUTPATIENT)
Dept: INFUSION THERAPY | Facility: HOSPITAL | Age: 51
End: 2021-04-28
Attending: INTERNAL MEDICINE
Payer: MEDICARE

## 2021-04-28 VITALS
SYSTOLIC BLOOD PRESSURE: 142 MMHG | WEIGHT: 150 LBS | HEART RATE: 79 BPM | TEMPERATURE: 98 F | OXYGEN SATURATION: 99 % | DIASTOLIC BLOOD PRESSURE: 78 MMHG | BODY MASS INDEX: 22.15 KG/M2 | RESPIRATION RATE: 17 BRPM

## 2021-04-28 DIAGNOSIS — M81.0 OSTEOPOROSIS: Primary | ICD-10-CM

## 2021-04-28 DIAGNOSIS — M32.9 SYSTEMIC LUPUS ERYTHEMATOSUS, UNSPECIFIED SLE TYPE, UNSPECIFIED ORGAN INVOLVEMENT STATUS: ICD-10-CM

## 2021-04-28 PROCEDURE — 96375 TX/PRO/DX INJ NEW DRUG ADDON: CPT

## 2021-04-28 PROCEDURE — 96374 THER/PROPH/DIAG INJ IV PUSH: CPT

## 2021-04-28 PROCEDURE — 96365 THER/PROPH/DIAG IV INF INIT: CPT

## 2021-04-28 PROCEDURE — 63600175 PHARM REV CODE 636 W HCPCS: Mod: JW,JG | Performed by: INTERNAL MEDICINE

## 2021-04-28 PROCEDURE — 25000003 PHARM REV CODE 250: Performed by: INTERNAL MEDICINE

## 2021-04-28 RX ORDER — HEPARIN 100 UNIT/ML
500 SYRINGE INTRAVENOUS
Status: CANCELLED | OUTPATIENT
Start: 2021-05-26

## 2021-04-28 RX ORDER — DIPHENHYDRAMINE HYDROCHLORIDE 50 MG/ML
25 INJECTION INTRAMUSCULAR; INTRAVENOUS
Status: CANCELLED | OUTPATIENT
Start: 2021-05-26

## 2021-04-28 RX ORDER — ACETAMINOPHEN 325 MG/1
650 TABLET ORAL
Status: CANCELLED | OUTPATIENT
Start: 2021-05-26

## 2021-04-28 RX ORDER — SODIUM CHLORIDE 0.9 % (FLUSH) 0.9 %
10 SYRINGE (ML) INJECTION
Status: CANCELLED | OUTPATIENT
Start: 2021-05-26

## 2021-04-28 RX ORDER — ACETAMINOPHEN 325 MG/1
650 TABLET ORAL
Status: COMPLETED | OUTPATIENT
Start: 2021-04-28 | End: 2021-04-28

## 2021-04-28 RX ORDER — DIPHENHYDRAMINE HYDROCHLORIDE 50 MG/ML
25 INJECTION INTRAMUSCULAR; INTRAVENOUS
Status: COMPLETED | OUTPATIENT
Start: 2021-04-28 | End: 2021-04-28

## 2021-04-28 RX ADMIN — DIPHENHYDRAMINE HYDROCHLORIDE 25 MG: 50 INJECTION INTRAMUSCULAR; INTRAVENOUS at 11:04

## 2021-04-28 RX ADMIN — ACETAMINOPHEN 650 MG: 325 TABLET ORAL at 11:04

## 2021-04-28 RX ADMIN — BELIMUMAB 680 MG: 400 INJECTION, POWDER, LYOPHILIZED, FOR SOLUTION INTRAVENOUS at 12:04

## 2021-05-03 DIAGNOSIS — M32.9 SYSTEMIC LUPUS ERYTHEMATOSUS, UNSPECIFIED SLE TYPE, UNSPECIFIED ORGAN INVOLVEMENT STATUS: Primary | ICD-10-CM

## 2021-05-03 DIAGNOSIS — Z79.899 LONG-TERM USE OF PLAQUENIL: ICD-10-CM

## 2021-05-04 ENCOUNTER — OFFICE VISIT (OUTPATIENT)
Dept: OPTOMETRY | Facility: CLINIC | Age: 51
End: 2021-05-04
Payer: MEDICARE

## 2021-05-04 ENCOUNTER — CLINICAL SUPPORT (OUTPATIENT)
Dept: OPHTHALMOLOGY | Facility: CLINIC | Age: 51
End: 2021-05-04
Payer: MEDICARE

## 2021-05-04 DIAGNOSIS — H52.7 REFRACTIVE ERROR: ICD-10-CM

## 2021-05-04 DIAGNOSIS — E11.9 TYPE 2 DIABETES MELLITUS WITHOUT RETINOPATHY: ICD-10-CM

## 2021-05-04 DIAGNOSIS — M32.9 SYSTEMIC LUPUS ERYTHEMATOSUS, UNSPECIFIED SLE TYPE, UNSPECIFIED ORGAN INVOLVEMENT STATUS: ICD-10-CM

## 2021-05-04 DIAGNOSIS — M32.9 SYSTEMIC LUPUS ERYTHEMATOSUS, UNSPECIFIED SLE TYPE, UNSPECIFIED ORGAN INVOLVEMENT STATUS: Primary | ICD-10-CM

## 2021-05-04 DIAGNOSIS — Z79.899 LONG-TERM USE OF PLAQUENIL: ICD-10-CM

## 2021-05-04 PROCEDURE — 2023F DILAT RTA XM W/O RTNOPTHY: CPT | Mod: S$GLB,,, | Performed by: OPTOMETRIST

## 2021-05-04 PROCEDURE — 2023F PR DILATED RETINAL EXAM W/O EVID OF RETINOPATHY: ICD-10-PCS | Mod: S$GLB,,, | Performed by: OPTOMETRIST

## 2021-05-04 PROCEDURE — 99999 PR PBB SHADOW E&M-EST. PATIENT-LVL III: ICD-10-PCS | Mod: PBBFAC,,, | Performed by: OPTOMETRIST

## 2021-05-04 PROCEDURE — 99999 PR PBB SHADOW E&M-EST. PATIENT-LVL III: CPT | Mod: PBBFAC,,, | Performed by: OPTOMETRIST

## 2021-05-04 PROCEDURE — 92015 DETERMINE REFRACTIVE STATE: CPT | Mod: S$GLB,,, | Performed by: OPTOMETRIST

## 2021-05-04 PROCEDURE — 1126F PR PAIN SEVERITY QUANTIFIED, NO PAIN PRESENT: ICD-10-PCS | Mod: S$GLB,,, | Performed by: OPTOMETRIST

## 2021-05-04 PROCEDURE — 92014 COMPRE OPH EXAM EST PT 1/>: CPT | Mod: S$GLB,,, | Performed by: OPTOMETRIST

## 2021-05-04 PROCEDURE — 92014 PR EYE EXAM, EST PATIENT,COMPREHESV: ICD-10-PCS | Mod: S$GLB,,, | Performed by: OPTOMETRIST

## 2021-05-04 PROCEDURE — 92015 PR REFRACTION: ICD-10-PCS | Mod: S$GLB,,, | Performed by: OPTOMETRIST

## 2021-05-04 PROCEDURE — 1126F AMNT PAIN NOTED NONE PRSNT: CPT | Mod: S$GLB,,, | Performed by: OPTOMETRIST

## 2021-05-06 ENCOUNTER — PATIENT MESSAGE (OUTPATIENT)
Dept: GASTROENTEROLOGY | Facility: CLINIC | Age: 51
End: 2021-05-06

## 2021-05-08 ENCOUNTER — PATIENT MESSAGE (OUTPATIENT)
Dept: ENDOSCOPY | Facility: HOSPITAL | Age: 51
End: 2021-05-08

## 2021-05-13 ENCOUNTER — PATIENT MESSAGE (OUTPATIENT)
Dept: TRANSPLANT | Facility: CLINIC | Age: 51
End: 2021-05-13

## 2021-05-13 ENCOUNTER — PATIENT MESSAGE (OUTPATIENT)
Dept: RHEUMATOLOGY | Facility: CLINIC | Age: 51
End: 2021-05-13

## 2021-05-13 ENCOUNTER — PATIENT MESSAGE (OUTPATIENT)
Dept: ENDOCRINOLOGY | Facility: CLINIC | Age: 51
End: 2021-05-13

## 2021-05-13 DIAGNOSIS — D84.9 IMMUNOSUPPRESSION: ICD-10-CM

## 2021-05-13 DIAGNOSIS — M32.9 SYSTEMIC LUPUS ERYTHEMATOSUS, UNSPECIFIED SLE TYPE, UNSPECIFIED ORGAN INVOLVEMENT STATUS: Primary | ICD-10-CM

## 2021-05-13 DIAGNOSIS — R53.83 FATIGUE, UNSPECIFIED TYPE: ICD-10-CM

## 2021-05-14 ENCOUNTER — PATIENT MESSAGE (OUTPATIENT)
Dept: RHEUMATOLOGY | Facility: CLINIC | Age: 51
End: 2021-05-14

## 2021-05-19 ENCOUNTER — TELEPHONE (OUTPATIENT)
Dept: RHEUMATOLOGY | Facility: CLINIC | Age: 51
End: 2021-05-19

## 2021-05-20 ENCOUNTER — LAB VISIT (OUTPATIENT)
Dept: LAB | Facility: HOSPITAL | Age: 51
End: 2021-05-20
Attending: INTERNAL MEDICINE
Payer: MEDICARE

## 2021-05-20 DIAGNOSIS — E61.1 IRON DEFICIENCY: ICD-10-CM

## 2021-05-20 DIAGNOSIS — M32.9 SYSTEMIC LUPUS ERYTHEMATOSUS, UNSPECIFIED SLE TYPE, UNSPECIFIED ORGAN INVOLVEMENT STATUS: ICD-10-CM

## 2021-05-20 DIAGNOSIS — R53.83 FATIGUE, UNSPECIFIED TYPE: ICD-10-CM

## 2021-05-20 DIAGNOSIS — D84.9 IMMUNOSUPPRESSION: ICD-10-CM

## 2021-05-20 DIAGNOSIS — D50.9 IRON DEFICIENCY ANEMIA, UNSPECIFIED IRON DEFICIENCY ANEMIA TYPE: ICD-10-CM

## 2021-05-20 LAB
ALBUMIN SERPL BCP-MCNC: 4.3 G/DL (ref 3.5–5.2)
ALP SERPL-CCNC: 99 U/L (ref 55–135)
ALT SERPL W/O P-5'-P-CCNC: 10 U/L (ref 10–44)
ANION GAP SERPL CALC-SCNC: 10 MMOL/L (ref 8–16)
AST SERPL-CCNC: 11 U/L (ref 10–40)
BACTERIA #/AREA URNS HPF: ABNORMAL /HPF
BASOPHILS # BLD AUTO: 0 K/UL (ref 0–0.2)
BASOPHILS NFR BLD: 0 % (ref 0–1.9)
BILIRUB SERPL-MCNC: 0.5 MG/DL (ref 0.1–1)
BILIRUB UR QL STRIP: NEGATIVE
BUN SERPL-MCNC: 32 MG/DL (ref 6–20)
C3 SERPL-MCNC: 95 MG/DL (ref 50–180)
C4 SERPL-MCNC: 29 MG/DL (ref 11–44)
CALCIUM SERPL-MCNC: 10 MG/DL (ref 8.7–10.5)
CHLORIDE SERPL-SCNC: 102 MMOL/L (ref 95–110)
CLARITY UR: CLEAR
CO2 SERPL-SCNC: 26 MMOL/L (ref 23–29)
COLOR UR: YELLOW
CREAT SERPL-MCNC: 1.7 MG/DL (ref 0.5–1.4)
CREAT UR-MCNC: 274.3 MG/DL (ref 15–325)
CRP SERPL-MCNC: 0.6 MG/L (ref 0–8.2)
DIFFERENTIAL METHOD: ABNORMAL
EOSINOPHIL # BLD AUTO: 0 K/UL (ref 0–0.5)
EOSINOPHIL NFR BLD: 0 % (ref 0–8)
ERYTHROCYTE [DISTWIDTH] IN BLOOD BY AUTOMATED COUNT: 13.9 % (ref 11.5–14.5)
ERYTHROCYTE [SEDIMENTATION RATE] IN BLOOD BY WESTERGREN METHOD: 9 MM/HR (ref 0–20)
EST. GFR  (AFRICAN AMERICAN): 40 ML/MIN/1.73 M^2
EST. GFR  (NON AFRICAN AMERICAN): 34 ML/MIN/1.73 M^2
FERRITIN SERPL-MCNC: 56 NG/ML (ref 20–300)
GLUCOSE SERPL-MCNC: 214 MG/DL (ref 70–110)
GLUCOSE UR QL STRIP: ABNORMAL
HCT VFR BLD AUTO: 37.1 % (ref 37–48.5)
HGB BLD-MCNC: 12.5 G/DL (ref 12–16)
HGB BLD-MCNC: 12.5 G/DL (ref 12–16)
HGB UR QL STRIP: ABNORMAL
HYALINE CASTS #/AREA URNS LPF: 4 /LPF
IMM GRANULOCYTES # BLD AUTO: 0.01 K/UL (ref 0–0.04)
IMM GRANULOCYTES NFR BLD AUTO: 0.1 % (ref 0–0.5)
IRON SERPL-MCNC: 84 UG/DL (ref 30–160)
KETONES UR QL STRIP: NEGATIVE
LEUKOCYTE ESTERASE UR QL STRIP: NEGATIVE
LYMPHOCYTES # BLD AUTO: 1.3 K/UL (ref 1–4.8)
LYMPHOCYTES NFR BLD: 18.4 % (ref 18–48)
MCH RBC QN AUTO: 27.2 PG (ref 27–31)
MCHC RBC AUTO-ENTMCNC: 33.7 G/DL (ref 32–36)
MCV RBC AUTO: 81 FL (ref 82–98)
MICROSCOPIC COMMENT: ABNORMAL
MONOCYTES # BLD AUTO: 0.3 K/UL (ref 0.3–1)
MONOCYTES NFR BLD: 4.8 % (ref 4–15)
NEUTROPHILS # BLD AUTO: 5.5 K/UL (ref 1.8–7.7)
NEUTROPHILS NFR BLD: 76.7 % (ref 38–73)
NITRITE UR QL STRIP: NEGATIVE
NRBC BLD-RTO: 0 /100 WBC
PH UR STRIP: 6 [PH] (ref 5–8)
PLATELET # BLD AUTO: 198 K/UL (ref 150–450)
PMV BLD AUTO: 10.2 FL (ref 9.2–12.9)
POTASSIUM SERPL-SCNC: 4.5 MMOL/L (ref 3.5–5.1)
PROT SERPL-MCNC: 7.6 G/DL (ref 6–8.4)
PROT UR QL STRIP: ABNORMAL
PROT UR-MCNC: 50 MG/DL
PROT/CREAT UR: 0.18 MG/G{CREAT} (ref 0–0.2)
RBC # BLD AUTO: 4.59 M/UL (ref 4–5.4)
RBC #/AREA URNS HPF: 1 /HPF (ref 0–4)
SATURATED IRON: 21 % (ref 20–50)
SODIUM SERPL-SCNC: 138 MMOL/L (ref 136–145)
SP GR UR STRIP: 1.02 (ref 1–1.03)
SQUAMOUS #/AREA URNS HPF: 2 /HPF
TOTAL IRON BINDING CAPACITY: 394 UG/DL (ref 250–450)
TRANSFERRIN SERPL-MCNC: 266 MG/DL (ref 200–375)
URN SPEC COLLECT METH UR: ABNORMAL
UROBILINOGEN UR STRIP-ACNC: NEGATIVE EU/DL
WBC # BLD AUTO: 7.11 K/UL (ref 3.9–12.7)
WBC #/AREA URNS HPF: 2 /HPF (ref 0–5)

## 2021-05-20 PROCEDURE — 86225 DNA ANTIBODY NATIVE: CPT | Performed by: INTERNAL MEDICINE

## 2021-05-20 PROCEDURE — 85652 RBC SED RATE AUTOMATED: CPT | Performed by: INTERNAL MEDICINE

## 2021-05-20 PROCEDURE — 82570 ASSAY OF URINE CREATININE: CPT | Performed by: INTERNAL MEDICINE

## 2021-05-20 PROCEDURE — 85025 COMPLETE CBC W/AUTO DIFF WBC: CPT | Performed by: INTERNAL MEDICINE

## 2021-05-20 PROCEDURE — 82728 ASSAY OF FERRITIN: CPT | Performed by: INTERNAL MEDICINE

## 2021-05-20 PROCEDURE — 86160 COMPLEMENT ANTIGEN: CPT | Performed by: INTERNAL MEDICINE

## 2021-05-20 PROCEDURE — 36415 COLL VENOUS BLD VENIPUNCTURE: CPT | Performed by: INTERNAL MEDICINE

## 2021-05-20 PROCEDURE — 86160 COMPLEMENT ANTIGEN: CPT | Mod: 59 | Performed by: INTERNAL MEDICINE

## 2021-05-20 PROCEDURE — 80053 COMPREHEN METABOLIC PANEL: CPT | Performed by: INTERNAL MEDICINE

## 2021-05-20 PROCEDURE — 86140 C-REACTIVE PROTEIN: CPT | Performed by: INTERNAL MEDICINE

## 2021-05-20 PROCEDURE — 83540 ASSAY OF IRON: CPT | Performed by: INTERNAL MEDICINE

## 2021-05-20 PROCEDURE — 81000 URINALYSIS NONAUTO W/SCOPE: CPT | Performed by: INTERNAL MEDICINE

## 2021-05-21 LAB — DSDNA AB SER-ACNC: NORMAL [IU]/ML

## 2021-05-25 ENCOUNTER — PATIENT MESSAGE (OUTPATIENT)
Dept: RHEUMATOLOGY | Facility: CLINIC | Age: 51
End: 2021-05-25

## 2021-05-26 ENCOUNTER — INFUSION (OUTPATIENT)
Dept: INFUSION THERAPY | Facility: HOSPITAL | Age: 51
End: 2021-05-26
Attending: INTERNAL MEDICINE
Payer: MEDICARE

## 2021-05-26 VITALS
OXYGEN SATURATION: 98 % | DIASTOLIC BLOOD PRESSURE: 78 MMHG | WEIGHT: 146 LBS | HEART RATE: 83 BPM | RESPIRATION RATE: 16 BRPM | BODY MASS INDEX: 21.56 KG/M2 | SYSTOLIC BLOOD PRESSURE: 144 MMHG

## 2021-05-26 DIAGNOSIS — M32.9 SYSTEMIC LUPUS ERYTHEMATOSUS, UNSPECIFIED SLE TYPE, UNSPECIFIED ORGAN INVOLVEMENT STATUS: ICD-10-CM

## 2021-05-26 DIAGNOSIS — M81.0 OSTEOPOROSIS: Primary | ICD-10-CM

## 2021-05-26 PROCEDURE — 63600175 PHARM REV CODE 636 W HCPCS: Mod: JW,JG | Performed by: INTERNAL MEDICINE

## 2021-05-26 PROCEDURE — 96375 TX/PRO/DX INJ NEW DRUG ADDON: CPT

## 2021-05-26 PROCEDURE — 96365 THER/PROPH/DIAG IV INF INIT: CPT

## 2021-05-26 PROCEDURE — 25000003 PHARM REV CODE 250: Performed by: INTERNAL MEDICINE

## 2021-05-26 PROCEDURE — 96374 THER/PROPH/DIAG INJ IV PUSH: CPT

## 2021-05-26 RX ORDER — DIPHENHYDRAMINE HYDROCHLORIDE 50 MG/ML
25 INJECTION INTRAMUSCULAR; INTRAVENOUS
Status: COMPLETED | OUTPATIENT
Start: 2021-05-26 | End: 2021-05-26

## 2021-05-26 RX ORDER — SODIUM CHLORIDE 0.9 % (FLUSH) 0.9 %
10 SYRINGE (ML) INJECTION
Status: CANCELLED | OUTPATIENT
Start: 2021-06-23

## 2021-05-26 RX ORDER — DIPHENHYDRAMINE HYDROCHLORIDE 50 MG/ML
25 INJECTION INTRAMUSCULAR; INTRAVENOUS
Status: CANCELLED | OUTPATIENT
Start: 2021-06-23

## 2021-05-26 RX ORDER — ACETAMINOPHEN 325 MG/1
650 TABLET ORAL
Status: CANCELLED | OUTPATIENT
Start: 2021-06-23

## 2021-05-26 RX ORDER — ACETAMINOPHEN 325 MG/1
650 TABLET ORAL
Status: COMPLETED | OUTPATIENT
Start: 2021-05-26 | End: 2021-05-26

## 2021-05-26 RX ORDER — HEPARIN 100 UNIT/ML
500 SYRINGE INTRAVENOUS
Status: CANCELLED | OUTPATIENT
Start: 2021-06-23

## 2021-05-26 RX ADMIN — BELIMUMAB 662 MG: 400 INJECTION, POWDER, LYOPHILIZED, FOR SOLUTION INTRAVENOUS at 11:05

## 2021-05-26 RX ADMIN — ACETAMINOPHEN 650 MG: 325 TABLET ORAL at 11:05

## 2021-05-26 RX ADMIN — DIPHENHYDRAMINE HYDROCHLORIDE 25 MG: 50 INJECTION INTRAMUSCULAR; INTRAVENOUS at 11:05

## 2021-05-28 ENCOUNTER — PATIENT MESSAGE (OUTPATIENT)
Dept: RHEUMATOLOGY | Facility: CLINIC | Age: 51
End: 2021-05-28

## 2021-05-29 ENCOUNTER — TELEPHONE (OUTPATIENT)
Dept: GASTROENTEROLOGY | Facility: CLINIC | Age: 51
End: 2021-05-29

## 2021-06-01 ENCOUNTER — PATIENT MESSAGE (OUTPATIENT)
Dept: ENDOCRINOLOGY | Facility: CLINIC | Age: 51
End: 2021-06-01

## 2021-06-02 ENCOUNTER — PATIENT MESSAGE (OUTPATIENT)
Dept: PODIATRY | Facility: CLINIC | Age: 51
End: 2021-06-02

## 2021-06-02 ENCOUNTER — PATIENT MESSAGE (OUTPATIENT)
Dept: GASTROENTEROLOGY | Facility: CLINIC | Age: 51
End: 2021-06-02

## 2021-06-23 ENCOUNTER — INFUSION (OUTPATIENT)
Dept: INFUSION THERAPY | Facility: HOSPITAL | Age: 51
End: 2021-06-23
Attending: INTERNAL MEDICINE
Payer: MEDICARE

## 2021-06-23 VITALS
RESPIRATION RATE: 16 BRPM | TEMPERATURE: 98 F | DIASTOLIC BLOOD PRESSURE: 78 MMHG | HEART RATE: 90 BPM | SYSTOLIC BLOOD PRESSURE: 183 MMHG | WEIGHT: 151 LBS | BODY MASS INDEX: 22.3 KG/M2 | OXYGEN SATURATION: 97 %

## 2021-06-23 DIAGNOSIS — M81.0 OSTEOPOROSIS: Primary | ICD-10-CM

## 2021-06-23 DIAGNOSIS — M32.9 SYSTEMIC LUPUS ERYTHEMATOSUS, UNSPECIFIED SLE TYPE, UNSPECIFIED ORGAN INVOLVEMENT STATUS: ICD-10-CM

## 2021-06-23 PROCEDURE — 96374 THER/PROPH/DIAG INJ IV PUSH: CPT

## 2021-06-23 PROCEDURE — 63600175 PHARM REV CODE 636 W HCPCS: Performed by: INTERNAL MEDICINE

## 2021-06-23 PROCEDURE — 96365 THER/PROPH/DIAG IV INF INIT: CPT

## 2021-06-23 PROCEDURE — 25000003 PHARM REV CODE 250: Performed by: INTERNAL MEDICINE

## 2021-06-23 PROCEDURE — 96375 TX/PRO/DX INJ NEW DRUG ADDON: CPT

## 2021-06-23 RX ORDER — DIPHENHYDRAMINE HYDROCHLORIDE 50 MG/ML
25 INJECTION INTRAMUSCULAR; INTRAVENOUS
Status: COMPLETED | OUTPATIENT
Start: 2021-06-23 | End: 2021-06-23

## 2021-06-23 RX ORDER — ACETAMINOPHEN 325 MG/1
650 TABLET ORAL
Status: COMPLETED | OUTPATIENT
Start: 2021-06-23 | End: 2021-06-23

## 2021-06-23 RX ORDER — SODIUM CHLORIDE 0.9 % (FLUSH) 0.9 %
10 SYRINGE (ML) INJECTION
Status: CANCELLED | OUTPATIENT
Start: 2021-07-21

## 2021-06-23 RX ORDER — DIPHENHYDRAMINE HYDROCHLORIDE 50 MG/ML
25 INJECTION INTRAMUSCULAR; INTRAVENOUS
Status: CANCELLED | OUTPATIENT
Start: 2021-07-21

## 2021-06-23 RX ORDER — HEPARIN 100 UNIT/ML
500 SYRINGE INTRAVENOUS
Status: CANCELLED | OUTPATIENT
Start: 2021-07-21

## 2021-06-23 RX ORDER — ACETAMINOPHEN 325 MG/1
650 TABLET ORAL
Status: CANCELLED | OUTPATIENT
Start: 2021-07-21

## 2021-06-23 RX ADMIN — ACETAMINOPHEN 650 MG: 325 TABLET ORAL at 11:06

## 2021-06-23 RX ADMIN — DIPHENHYDRAMINE HYDROCHLORIDE 25 MG: 50 INJECTION INTRAMUSCULAR; INTRAVENOUS at 11:06

## 2021-06-23 RX ADMIN — BELIMUMAB 685 MG: 400 INJECTION, POWDER, LYOPHILIZED, FOR SOLUTION INTRAVENOUS at 11:06

## 2021-06-29 ENCOUNTER — PATIENT MESSAGE (OUTPATIENT)
Dept: PODIATRY | Facility: CLINIC | Age: 51
End: 2021-06-29

## 2021-07-06 ENCOUNTER — TELEPHONE (OUTPATIENT)
Dept: TRANSPLANT | Facility: CLINIC | Age: 51
End: 2021-07-06

## 2021-07-12 ENCOUNTER — OFFICE VISIT (OUTPATIENT)
Dept: TRANSPLANT | Facility: CLINIC | Age: 51
End: 2021-07-12
Payer: MEDICARE

## 2021-07-12 ENCOUNTER — PATIENT MESSAGE (OUTPATIENT)
Dept: TRANSPLANT | Facility: CLINIC | Age: 51
End: 2021-07-12

## 2021-07-12 ENCOUNTER — HOSPITAL ENCOUNTER (OUTPATIENT)
Dept: RADIOLOGY | Facility: HOSPITAL | Age: 51
Discharge: HOME | End: 2021-07-12
Attending: OBSTETRICS & GYNECOLOGY
Payer: MEDICARE

## 2021-07-12 VITALS
OXYGEN SATURATION: 98 % | WEIGHT: 155.19 LBS | DIASTOLIC BLOOD PRESSURE: 82 MMHG | HEIGHT: 69 IN | TEMPERATURE: 98 F | RESPIRATION RATE: 18 BRPM | HEART RATE: 89 BPM | SYSTOLIC BLOOD PRESSURE: 148 MMHG | BODY MASS INDEX: 22.98 KG/M2

## 2021-07-12 DIAGNOSIS — Z51.81 THERAPEUTIC DRUG MONITORING: ICD-10-CM

## 2021-07-12 DIAGNOSIS — Z94.4 STATUS POST LIVER TRANSPLANT: ICD-10-CM

## 2021-07-12 DIAGNOSIS — N18.30 STAGE 3 CHRONIC KIDNEY DISEASE, UNSPECIFIED WHETHER STAGE 3A OR 3B CKD: ICD-10-CM

## 2021-07-12 DIAGNOSIS — D84.9 IMMUNOSUPPRESSION: Primary | ICD-10-CM

## 2021-07-12 DIAGNOSIS — Z12.31 SCREENING MAMMOGRAM, ENCOUNTER FOR: ICD-10-CM

## 2021-07-12 PROCEDURE — 77063 MAMMO DIGITAL SCREENING BILAT WITH TOMO: ICD-10-PCS | Mod: 26,,, | Performed by: RADIOLOGY

## 2021-07-12 PROCEDURE — 77067 SCR MAMMO BI INCL CAD: CPT | Mod: TC

## 2021-07-12 PROCEDURE — 99213 OFFICE O/P EST LOW 20 MIN: CPT | Mod: S$GLB,,, | Performed by: INTERNAL MEDICINE

## 2021-07-12 PROCEDURE — 3008F BODY MASS INDEX DOCD: CPT | Mod: CPTII,S$GLB,, | Performed by: INTERNAL MEDICINE

## 2021-07-12 PROCEDURE — 77063 BREAST TOMOSYNTHESIS BI: CPT | Mod: 26,,, | Performed by: RADIOLOGY

## 2021-07-12 PROCEDURE — 77067 MAMMO DIGITAL SCREENING BILAT WITH TOMO: ICD-10-PCS | Mod: 26,,, | Performed by: RADIOLOGY

## 2021-07-12 PROCEDURE — 99213 PR OFFICE/OUTPT VISIT, EST, LEVL III, 20-29 MIN: ICD-10-PCS | Mod: S$GLB,,, | Performed by: INTERNAL MEDICINE

## 2021-07-12 PROCEDURE — 99999 PR PBB SHADOW E&M-EST. PATIENT-LVL V: ICD-10-PCS | Mod: PBBFAC,,, | Performed by: INTERNAL MEDICINE

## 2021-07-12 PROCEDURE — 77067 SCR MAMMO BI INCL CAD: CPT | Mod: 26,,, | Performed by: RADIOLOGY

## 2021-07-12 PROCEDURE — 1126F AMNT PAIN NOTED NONE PRSNT: CPT | Mod: S$GLB,,, | Performed by: INTERNAL MEDICINE

## 2021-07-12 PROCEDURE — 99999 PR PBB SHADOW E&M-EST. PATIENT-LVL V: CPT | Mod: PBBFAC,,, | Performed by: INTERNAL MEDICINE

## 2021-07-12 PROCEDURE — 3008F PR BODY MASS INDEX (BMI) DOCUMENTED: ICD-10-PCS | Mod: CPTII,S$GLB,, | Performed by: INTERNAL MEDICINE

## 2021-07-12 PROCEDURE — 1126F PR PAIN SEVERITY QUANTIFIED, NO PAIN PRESENT: ICD-10-PCS | Mod: S$GLB,,, | Performed by: INTERNAL MEDICINE

## 2021-07-13 ENCOUNTER — PATIENT MESSAGE (OUTPATIENT)
Dept: ENDOSCOPY | Facility: HOSPITAL | Age: 51
End: 2021-07-13

## 2021-07-16 ENCOUNTER — TELEPHONE (OUTPATIENT)
Dept: TRANSPLANT | Facility: CLINIC | Age: 51
End: 2021-07-16

## 2021-07-19 DIAGNOSIS — Z94.4 LIVER TRANSPLANTED: ICD-10-CM

## 2021-07-19 DIAGNOSIS — D84.9 IMMUNOSUPPRESSION: ICD-10-CM

## 2021-07-19 RX ORDER — TACROLIMUS 1 MG/1
2 CAPSULE ORAL EVERY 12 HOURS
Qty: 120 CAPSULE | Refills: 11 | Status: SHIPPED | OUTPATIENT
Start: 2021-07-19 | End: 2022-08-01

## 2021-07-21 ENCOUNTER — INFUSION (OUTPATIENT)
Dept: INFUSION THERAPY | Facility: HOSPITAL | Age: 51
End: 2021-07-21
Attending: INTERNAL MEDICINE
Payer: MEDICARE

## 2021-07-21 ENCOUNTER — PATIENT MESSAGE (OUTPATIENT)
Dept: TRANSPLANT | Facility: CLINIC | Age: 51
End: 2021-07-21

## 2021-07-21 VITALS
DIASTOLIC BLOOD PRESSURE: 71 MMHG | OXYGEN SATURATION: 97 % | HEART RATE: 97 BPM | BODY MASS INDEX: 22.15 KG/M2 | WEIGHT: 150 LBS | SYSTOLIC BLOOD PRESSURE: 150 MMHG | RESPIRATION RATE: 16 BRPM | TEMPERATURE: 99 F

## 2021-07-21 DIAGNOSIS — M32.9 SYSTEMIC LUPUS ERYTHEMATOSUS, UNSPECIFIED SLE TYPE, UNSPECIFIED ORGAN INVOLVEMENT STATUS: ICD-10-CM

## 2021-07-21 DIAGNOSIS — M81.0 OSTEOPOROSIS: Primary | ICD-10-CM

## 2021-07-21 PROCEDURE — 25000003 PHARM REV CODE 250: Performed by: INTERNAL MEDICINE

## 2021-07-21 PROCEDURE — 96365 THER/PROPH/DIAG IV INF INIT: CPT

## 2021-07-21 PROCEDURE — 63600175 PHARM REV CODE 636 W HCPCS: Performed by: INTERNAL MEDICINE

## 2021-07-21 PROCEDURE — 96374 THER/PROPH/DIAG INJ IV PUSH: CPT

## 2021-07-21 PROCEDURE — 96375 TX/PRO/DX INJ NEW DRUG ADDON: CPT

## 2021-07-21 RX ORDER — HEPARIN 100 UNIT/ML
500 SYRINGE INTRAVENOUS
Status: CANCELLED | OUTPATIENT
Start: 2021-08-18

## 2021-07-21 RX ORDER — SODIUM CHLORIDE 0.9 % (FLUSH) 0.9 %
10 SYRINGE (ML) INJECTION
Status: DISCONTINUED | OUTPATIENT
Start: 2021-07-21 | End: 2021-07-21 | Stop reason: HOSPADM

## 2021-07-21 RX ORDER — DIPHENHYDRAMINE HYDROCHLORIDE 50 MG/ML
25 INJECTION INTRAMUSCULAR; INTRAVENOUS
Status: CANCELLED | OUTPATIENT
Start: 2021-08-18

## 2021-07-21 RX ORDER — SODIUM CHLORIDE 0.9 % (FLUSH) 0.9 %
10 SYRINGE (ML) INJECTION
Status: CANCELLED | OUTPATIENT
Start: 2021-08-18

## 2021-07-21 RX ORDER — ACETAMINOPHEN 325 MG/1
650 TABLET ORAL
Status: COMPLETED | OUTPATIENT
Start: 2021-07-21 | End: 2021-07-21

## 2021-07-21 RX ORDER — ACETAMINOPHEN 325 MG/1
650 TABLET ORAL
Status: CANCELLED | OUTPATIENT
Start: 2021-08-18

## 2021-07-21 RX ORDER — DIPHENHYDRAMINE HYDROCHLORIDE 50 MG/ML
25 INJECTION INTRAMUSCULAR; INTRAVENOUS
Status: COMPLETED | OUTPATIENT
Start: 2021-07-21 | End: 2021-07-21

## 2021-07-21 RX ADMIN — ACETAMINOPHEN 650 MG: 325 TABLET ORAL at 11:07

## 2021-07-21 RX ADMIN — BELIMUMAB 680 MG: 400 INJECTION, POWDER, LYOPHILIZED, FOR SOLUTION INTRAVENOUS at 11:07

## 2021-07-21 RX ADMIN — DIPHENHYDRAMINE HYDROCHLORIDE 25 MG: 50 INJECTION INTRAMUSCULAR; INTRAVENOUS at 11:07

## 2021-07-26 ENCOUNTER — OFFICE VISIT (OUTPATIENT)
Dept: GASTROENTEROLOGY | Facility: CLINIC | Age: 51
End: 2021-07-26
Payer: MEDICARE

## 2021-07-26 VITALS
BODY MASS INDEX: 22.43 KG/M2 | WEIGHT: 151.44 LBS | DIASTOLIC BLOOD PRESSURE: 84 MMHG | SYSTOLIC BLOOD PRESSURE: 164 MMHG | HEIGHT: 69 IN

## 2021-07-26 DIAGNOSIS — K21.9 GASTROESOPHAGEAL REFLUX DISEASE WITHOUT ESOPHAGITIS: Primary | ICD-10-CM

## 2021-07-26 DIAGNOSIS — R09.A2 GLOBUS PHARYNGEUS: ICD-10-CM

## 2021-07-26 PROCEDURE — 3079F DIAST BP 80-89 MM HG: CPT | Mod: CPTII,S$GLB,, | Performed by: INTERNAL MEDICINE

## 2021-07-26 PROCEDURE — 3051F PR MOST RECENT HEMOGLOBIN A1C LEVEL 7.0 - < 8.0%: ICD-10-PCS | Mod: CPTII,S$GLB,, | Performed by: INTERNAL MEDICINE

## 2021-07-26 PROCEDURE — 3008F BODY MASS INDEX DOCD: CPT | Mod: CPTII,S$GLB,, | Performed by: INTERNAL MEDICINE

## 2021-07-26 PROCEDURE — 1159F MED LIST DOCD IN RCRD: CPT | Mod: CPTII,S$GLB,, | Performed by: INTERNAL MEDICINE

## 2021-07-26 PROCEDURE — 3077F PR MOST RECENT SYSTOLIC BLOOD PRESSURE >= 140 MM HG: ICD-10-PCS | Mod: CPTII,S$GLB,, | Performed by: INTERNAL MEDICINE

## 2021-07-26 PROCEDURE — 99214 OFFICE O/P EST MOD 30 MIN: CPT | Mod: S$GLB,,, | Performed by: INTERNAL MEDICINE

## 2021-07-26 PROCEDURE — 99999 PR PBB SHADOW E&M-EST. PATIENT-LVL IV: CPT | Mod: PBBFAC,,, | Performed by: INTERNAL MEDICINE

## 2021-07-26 PROCEDURE — 1125F AMNT PAIN NOTED PAIN PRSNT: CPT | Mod: CPTII,S$GLB,, | Performed by: INTERNAL MEDICINE

## 2021-07-26 PROCEDURE — 1125F PR PAIN SEVERITY QUANTIFIED, PAIN PRESENT: ICD-10-PCS | Mod: CPTII,S$GLB,, | Performed by: INTERNAL MEDICINE

## 2021-07-26 PROCEDURE — 1159F PR MEDICATION LIST DOCUMENTED IN MEDICAL RECORD: ICD-10-PCS | Mod: CPTII,S$GLB,, | Performed by: INTERNAL MEDICINE

## 2021-07-26 PROCEDURE — 3079F PR MOST RECENT DIASTOLIC BLOOD PRESSURE 80-89 MM HG: ICD-10-PCS | Mod: CPTII,S$GLB,, | Performed by: INTERNAL MEDICINE

## 2021-07-26 PROCEDURE — 3008F PR BODY MASS INDEX (BMI) DOCUMENTED: ICD-10-PCS | Mod: CPTII,S$GLB,, | Performed by: INTERNAL MEDICINE

## 2021-07-26 PROCEDURE — 3051F HG A1C>EQUAL 7.0%<8.0%: CPT | Mod: CPTII,S$GLB,, | Performed by: INTERNAL MEDICINE

## 2021-07-26 PROCEDURE — 99214 PR OFFICE/OUTPT VISIT, EST, LEVL IV, 30-39 MIN: ICD-10-PCS | Mod: S$GLB,,, | Performed by: INTERNAL MEDICINE

## 2021-07-26 PROCEDURE — 99999 PR PBB SHADOW E&M-EST. PATIENT-LVL IV: ICD-10-PCS | Mod: PBBFAC,,, | Performed by: INTERNAL MEDICINE

## 2021-07-26 PROCEDURE — 3077F SYST BP >= 140 MM HG: CPT | Mod: CPTII,S$GLB,, | Performed by: INTERNAL MEDICINE

## 2021-07-26 RX ORDER — FLUTICASONE PROPIONATE 50 MCG
1 SPRAY, SUSPENSION (ML) NASAL 2 TIMES DAILY
COMMUNITY
Start: 2021-05-17

## 2021-07-26 RX ORDER — CLOTRIMAZOLE AND BETAMETHASONE DIPROPIONATE 10; .64 MG/G; MG/G
CREAM TOPICAL
COMMUNITY
Start: 2021-05-17

## 2021-07-26 RX ORDER — MOMETASONE FUROATE 1 MG/G
CREAM TOPICAL
COMMUNITY
Start: 2021-05-17

## 2021-07-26 RX ORDER — AMITRIPTYLINE HYDROCHLORIDE 10 MG/1
10 TABLET, FILM COATED ORAL NIGHTLY
Qty: 90 TABLET | Refills: 2 | Status: SHIPPED | OUTPATIENT
Start: 2021-07-26

## 2021-07-26 RX ORDER — ERGOCALCIFEROL 1.25 MG/1
50000 CAPSULE ORAL
COMMUNITY
Start: 2021-05-17

## 2021-07-27 ENCOUNTER — LAB VISIT (OUTPATIENT)
Dept: LAB | Facility: HOSPITAL | Age: 51
End: 2021-07-27
Attending: INTERNAL MEDICINE
Payer: MEDICAID

## 2021-07-27 DIAGNOSIS — Z94.4 LIVER TRANSPLANTED: ICD-10-CM

## 2021-07-27 LAB
ALBUMIN SERPL BCP-MCNC: 3.8 G/DL (ref 3.5–5.2)
ALP SERPL-CCNC: 86 U/L (ref 55–135)
ALT SERPL W/O P-5'-P-CCNC: 13 U/L (ref 10–44)
ANION GAP SERPL CALC-SCNC: 7 MMOL/L (ref 8–16)
AST SERPL-CCNC: 14 U/L (ref 10–40)
BASOPHILS # BLD AUTO: 0.02 K/UL (ref 0–0.2)
BASOPHILS NFR BLD: 0.4 % (ref 0–1.9)
BILIRUB SERPL-MCNC: 0.4 MG/DL (ref 0.1–1)
BUN SERPL-MCNC: 26 MG/DL (ref 6–20)
CALCIUM SERPL-MCNC: 9.2 MG/DL (ref 8.7–10.5)
CHLORIDE SERPL-SCNC: 105 MMOL/L (ref 95–110)
CO2 SERPL-SCNC: 28 MMOL/L (ref 23–29)
CREAT SERPL-MCNC: 1.6 MG/DL (ref 0.5–1.4)
DIFFERENTIAL METHOD: ABNORMAL
EOSINOPHIL # BLD AUTO: 0.8 K/UL (ref 0–0.5)
EOSINOPHIL NFR BLD: 13.6 % (ref 0–8)
ERYTHROCYTE [DISTWIDTH] IN BLOOD BY AUTOMATED COUNT: 14.7 % (ref 11.5–14.5)
EST. GFR  (AFRICAN AMERICAN): 43 ML/MIN/1.73 M^2
EST. GFR  (NON AFRICAN AMERICAN): 37 ML/MIN/1.73 M^2
GLUCOSE SERPL-MCNC: 148 MG/DL (ref 70–110)
HCT VFR BLD AUTO: 33.3 % (ref 37–48.5)
HGB BLD-MCNC: 11.4 G/DL (ref 12–16)
IMM GRANULOCYTES # BLD AUTO: 0.01 K/UL (ref 0–0.04)
IMM GRANULOCYTES NFR BLD AUTO: 0.2 % (ref 0–0.5)
LYMPHOCYTES # BLD AUTO: 2.1 K/UL (ref 1–4.8)
LYMPHOCYTES NFR BLD: 38.8 % (ref 18–48)
MAGNESIUM SERPL-MCNC: 2 MG/DL (ref 1.6–2.6)
MCH RBC QN AUTO: 28.1 PG (ref 27–31)
MCHC RBC AUTO-ENTMCNC: 34.2 G/DL (ref 32–36)
MCV RBC AUTO: 82 FL (ref 82–98)
MONOCYTES # BLD AUTO: 0.4 K/UL (ref 0.3–1)
MONOCYTES NFR BLD: 7.8 % (ref 4–15)
NEUTROPHILS # BLD AUTO: 2.2 K/UL (ref 1.8–7.7)
NEUTROPHILS NFR BLD: 39.2 % (ref 38–73)
NRBC BLD-RTO: 0 /100 WBC
PLATELET # BLD AUTO: 218 K/UL (ref 150–450)
PMV BLD AUTO: 10 FL (ref 9.2–12.9)
POTASSIUM SERPL-SCNC: 4.5 MMOL/L (ref 3.5–5.1)
PROT SERPL-MCNC: 6.7 G/DL (ref 6–8.4)
RBC # BLD AUTO: 4.05 M/UL (ref 4–5.4)
SODIUM SERPL-SCNC: 140 MMOL/L (ref 136–145)
WBC # BLD AUTO: 5.52 K/UL (ref 3.9–12.7)

## 2021-07-27 PROCEDURE — 36415 COLL VENOUS BLD VENIPUNCTURE: CPT | Performed by: INTERNAL MEDICINE

## 2021-07-27 PROCEDURE — 85025 COMPLETE CBC W/AUTO DIFF WBC: CPT | Performed by: INTERNAL MEDICINE

## 2021-07-27 PROCEDURE — 80053 COMPREHEN METABOLIC PANEL: CPT | Performed by: INTERNAL MEDICINE

## 2021-07-27 PROCEDURE — 80197 ASSAY OF TACROLIMUS: CPT | Performed by: INTERNAL MEDICINE

## 2021-07-27 PROCEDURE — 83735 ASSAY OF MAGNESIUM: CPT | Performed by: INTERNAL MEDICINE

## 2021-07-28 LAB
TACROLIMUS BLD-MCNC: 5 NG/ML (ref 5–15)
TACROLIMUS, NORMALIZED: 4.6 NG/ML (ref 5–15)

## 2021-07-29 ENCOUNTER — TELEPHONE (OUTPATIENT)
Dept: TRANSPLANT | Facility: CLINIC | Age: 51
End: 2021-07-29

## 2021-07-29 DIAGNOSIS — Z94.4 LIVER TRANSPLANTED: Primary | ICD-10-CM

## 2021-08-09 ENCOUNTER — OFFICE VISIT (OUTPATIENT)
Dept: RHEUMATOLOGY | Facility: CLINIC | Age: 51
End: 2021-08-09
Payer: MEDICARE

## 2021-08-09 DIAGNOSIS — E55.9 VITAMIN D DEFICIENCY: ICD-10-CM

## 2021-08-09 DIAGNOSIS — T14.8XXA BRUISING: ICD-10-CM

## 2021-08-09 DIAGNOSIS — D84.9 IMMUNOSUPPRESSION: ICD-10-CM

## 2021-08-09 DIAGNOSIS — M32.9 SYSTEMIC LUPUS ERYTHEMATOSUS, UNSPECIFIED SLE TYPE, UNSPECIFIED ORGAN INVOLVEMENT STATUS: Primary | ICD-10-CM

## 2021-08-09 DIAGNOSIS — R53.83 FATIGUE, UNSPECIFIED TYPE: ICD-10-CM

## 2021-08-09 DIAGNOSIS — M81.0 OSTEOPOROSIS, UNSPECIFIED OSTEOPOROSIS TYPE, UNSPECIFIED PATHOLOGICAL FRACTURE PRESENCE: ICD-10-CM

## 2021-08-09 PROCEDURE — 99215 OFFICE O/P EST HI 40 MIN: CPT | Mod: 95,,, | Performed by: INTERNAL MEDICINE

## 2021-08-09 PROCEDURE — 1160F RVW MEDS BY RX/DR IN RCRD: CPT | Mod: CPTII,95,, | Performed by: INTERNAL MEDICINE

## 2021-08-09 PROCEDURE — 1159F MED LIST DOCD IN RCRD: CPT | Mod: CPTII,95,, | Performed by: INTERNAL MEDICINE

## 2021-08-09 PROCEDURE — 1125F AMNT PAIN NOTED PAIN PRSNT: CPT | Mod: CPTII,95,, | Performed by: INTERNAL MEDICINE

## 2021-08-09 PROCEDURE — 1160F PR REVIEW ALL MEDS BY PRESCRIBER/CLIN PHARMACIST DOCUMENTED: ICD-10-PCS | Mod: CPTII,95,, | Performed by: INTERNAL MEDICINE

## 2021-08-09 PROCEDURE — 1125F PR PAIN SEVERITY QUANTIFIED, PAIN PRESENT: ICD-10-PCS | Mod: CPTII,95,, | Performed by: INTERNAL MEDICINE

## 2021-08-09 PROCEDURE — 3051F HG A1C>EQUAL 7.0%<8.0%: CPT | Mod: CPTII,95,, | Performed by: INTERNAL MEDICINE

## 2021-08-09 PROCEDURE — 99215 PR OFFICE/OUTPT VISIT, EST, LEVL V, 40-54 MIN: ICD-10-PCS | Mod: 95,,, | Performed by: INTERNAL MEDICINE

## 2021-08-09 PROCEDURE — 3051F PR MOST RECENT HEMOGLOBIN A1C LEVEL 7.0 - < 8.0%: ICD-10-PCS | Mod: CPTII,95,, | Performed by: INTERNAL MEDICINE

## 2021-08-09 PROCEDURE — 1159F PR MEDICATION LIST DOCUMENTED IN MEDICAL RECORD: ICD-10-PCS | Mod: CPTII,95,, | Performed by: INTERNAL MEDICINE

## 2021-08-09 RX ORDER — HYDROXYCHLOROQUINE SULFATE 200 MG/1
200 TABLET, FILM COATED ORAL 2 TIMES DAILY
Qty: 180 TABLET | Refills: 1 | Status: SHIPPED | OUTPATIENT
Start: 2021-08-09 | End: 2021-11-04

## 2021-08-17 ENCOUNTER — PATIENT MESSAGE (OUTPATIENT)
Dept: TRANSPLANT | Facility: CLINIC | Age: 51
End: 2021-08-17

## 2021-08-18 ENCOUNTER — INFUSION (OUTPATIENT)
Dept: INFUSION THERAPY | Facility: HOSPITAL | Age: 51
End: 2021-08-18
Attending: INTERNAL MEDICINE
Payer: MEDICARE

## 2021-08-18 VITALS
RESPIRATION RATE: 16 BRPM | DIASTOLIC BLOOD PRESSURE: 73 MMHG | SYSTOLIC BLOOD PRESSURE: 139 MMHG | BODY MASS INDEX: 22.22 KG/M2 | WEIGHT: 150 LBS | OXYGEN SATURATION: 96 % | HEIGHT: 69 IN | HEART RATE: 79 BPM | TEMPERATURE: 98 F

## 2021-08-18 DIAGNOSIS — R53.83 FATIGUE, UNSPECIFIED TYPE: ICD-10-CM

## 2021-08-18 DIAGNOSIS — D84.9 IMMUNOSUPPRESSION: ICD-10-CM

## 2021-08-18 DIAGNOSIS — M81.0 OSTEOPOROSIS: ICD-10-CM

## 2021-08-18 DIAGNOSIS — M81.0 OSTEOPOROSIS, UNSPECIFIED OSTEOPOROSIS TYPE, UNSPECIFIED PATHOLOGICAL FRACTURE PRESENCE: ICD-10-CM

## 2021-08-18 DIAGNOSIS — M32.9 SYSTEMIC LUPUS ERYTHEMATOSUS, UNSPECIFIED SLE TYPE, UNSPECIFIED ORGAN INVOLVEMENT STATUS: Primary | ICD-10-CM

## 2021-08-18 DIAGNOSIS — T14.8XXA BRUISING: ICD-10-CM

## 2021-08-18 DIAGNOSIS — E55.9 VITAMIN D DEFICIENCY: ICD-10-CM

## 2021-08-18 LAB
ALBUMIN SERPL BCP-MCNC: 3.8 G/DL (ref 3.5–5.2)
ALP SERPL-CCNC: 80 U/L (ref 55–135)
ALT SERPL W/O P-5'-P-CCNC: 14 U/L (ref 10–44)
ANION GAP SERPL CALC-SCNC: 9 MMOL/L (ref 8–16)
APTT BLDCRRT: 23.1 SEC (ref 21–32)
AST SERPL-CCNC: 14 U/L (ref 10–40)
BACTERIA #/AREA URNS HPF: ABNORMAL /HPF
BASOPHILS # BLD AUTO: 0.01 K/UL (ref 0–0.2)
BASOPHILS NFR BLD: 0.2 % (ref 0–1.9)
BILIRUB SERPL-MCNC: 0.4 MG/DL (ref 0.1–1)
BILIRUB UR QL STRIP: NEGATIVE
BUN SERPL-MCNC: 24 MG/DL (ref 6–20)
CALCIUM SERPL-MCNC: 9.2 MG/DL (ref 8.7–10.5)
CHLORIDE SERPL-SCNC: 105 MMOL/L (ref 95–110)
CK SERPL-CCNC: 109 U/L (ref 20–180)
CLARITY UR: CLEAR
CO2 SERPL-SCNC: 24 MMOL/L (ref 23–29)
COLOR UR: YELLOW
CREAT SERPL-MCNC: 1.6 MG/DL (ref 0.5–1.4)
CREAT UR-MCNC: 153.1 MG/DL (ref 15–325)
CRP SERPL-MCNC: 0.4 MG/L (ref 0–8.2)
DIFFERENTIAL METHOD: ABNORMAL
EOSINOPHIL # BLD AUTO: 0.5 K/UL (ref 0–0.5)
EOSINOPHIL NFR BLD: 10.6 % (ref 0–8)
ERYTHROCYTE [DISTWIDTH] IN BLOOD BY AUTOMATED COUNT: 14.3 % (ref 11.5–14.5)
ERYTHROCYTE [SEDIMENTATION RATE] IN BLOOD BY WESTERGREN METHOD: 12 MM/HR (ref 0–20)
EST. GFR  (AFRICAN AMERICAN): 43 ML/MIN/1.73 M^2
EST. GFR  (NON AFRICAN AMERICAN): 37 ML/MIN/1.73 M^2
GLUCOSE SERPL-MCNC: 224 MG/DL (ref 70–110)
GLUCOSE UR QL STRIP: ABNORMAL
HCT VFR BLD AUTO: 33.2 % (ref 37–48.5)
HGB BLD-MCNC: 11.6 G/DL (ref 12–16)
HGB UR QL STRIP: NEGATIVE
HYALINE CASTS #/AREA URNS LPF: 6 /LPF
IMM GRANULOCYTES # BLD AUTO: 0.01 K/UL (ref 0–0.04)
IMM GRANULOCYTES NFR BLD AUTO: 0.2 % (ref 0–0.5)
INR PPP: 1 (ref 0.8–1.2)
KETONES UR QL STRIP: NEGATIVE
LEUKOCYTE ESTERASE UR QL STRIP: NEGATIVE
LYMPHOCYTES # BLD AUTO: 1.7 K/UL (ref 1–4.8)
LYMPHOCYTES NFR BLD: 33.8 % (ref 18–48)
MCH RBC QN AUTO: 28 PG (ref 27–31)
MCHC RBC AUTO-ENTMCNC: 34.9 G/DL (ref 32–36)
MCV RBC AUTO: 80 FL (ref 82–98)
MICROSCOPIC COMMENT: ABNORMAL
MONOCYTES # BLD AUTO: 0.3 K/UL (ref 0.3–1)
MONOCYTES NFR BLD: 6.8 % (ref 4–15)
NEUTROPHILS # BLD AUTO: 2.4 K/UL (ref 1.8–7.7)
NEUTROPHILS NFR BLD: 48.4 % (ref 38–73)
NITRITE UR QL STRIP: NEGATIVE
NRBC BLD-RTO: 0 /100 WBC
PH UR STRIP: 7 [PH] (ref 5–8)
PLATELET # BLD AUTO: 221 K/UL (ref 150–450)
PMV BLD AUTO: 10.3 FL (ref 9.2–12.9)
POTASSIUM SERPL-SCNC: 3.9 MMOL/L (ref 3.5–5.1)
PROT SERPL-MCNC: 6.8 G/DL (ref 6–8.4)
PROT UR QL STRIP: ABNORMAL
PROT UR-MCNC: 18 MG/DL
PROT/CREAT UR: 0.12 MG/G{CREAT} (ref 0–0.2)
PROTHROMBIN TIME: 10.4 SEC (ref 9–12.5)
RBC # BLD AUTO: 4.14 M/UL (ref 4–5.4)
RBC #/AREA URNS HPF: 1 /HPF (ref 0–4)
SODIUM SERPL-SCNC: 138 MMOL/L (ref 136–145)
SP GR UR STRIP: 1.02 (ref 1–1.03)
T4 FREE SERPL-MCNC: 0.86 NG/DL (ref 0.71–1.51)
TSH SERPL DL<=0.005 MIU/L-ACNC: 0.98 UIU/ML (ref 0.4–4)
URN SPEC COLLECT METH UR: ABNORMAL
UROBILINOGEN UR STRIP-ACNC: NEGATIVE EU/DL
WBC # BLD AUTO: 5 K/UL (ref 3.9–12.7)
WBC #/AREA URNS HPF: 1 /HPF (ref 0–5)

## 2021-08-18 PROCEDURE — 82550 ASSAY OF CK (CPK): CPT | Performed by: INTERNAL MEDICINE

## 2021-08-18 PROCEDURE — 85730 THROMBOPLASTIN TIME PARTIAL: CPT | Performed by: INTERNAL MEDICINE

## 2021-08-18 PROCEDURE — 36415 COLL VENOUS BLD VENIPUNCTURE: CPT

## 2021-08-18 PROCEDURE — 84439 ASSAY OF FREE THYROXINE: CPT | Performed by: INTERNAL MEDICINE

## 2021-08-18 PROCEDURE — 86160 COMPLEMENT ANTIGEN: CPT | Performed by: INTERNAL MEDICINE

## 2021-08-18 PROCEDURE — 86140 C-REACTIVE PROTEIN: CPT | Performed by: INTERNAL MEDICINE

## 2021-08-18 PROCEDURE — 36415 COLL VENOUS BLD VENIPUNCTURE: CPT | Performed by: INTERNAL MEDICINE

## 2021-08-18 PROCEDURE — 82085 ASSAY OF ALDOLASE: CPT | Performed by: INTERNAL MEDICINE

## 2021-08-18 PROCEDURE — 81002 URINALYSIS NONAUTO W/O SCOPE: CPT

## 2021-08-18 PROCEDURE — 25000003 PHARM REV CODE 250: Performed by: INTERNAL MEDICINE

## 2021-08-18 PROCEDURE — 84156 ASSAY OF PROTEIN URINE: CPT | Performed by: INTERNAL MEDICINE

## 2021-08-18 PROCEDURE — 96365 THER/PROPH/DIAG IV INF INIT: CPT

## 2021-08-18 PROCEDURE — 81000 URINALYSIS NONAUTO W/SCOPE: CPT | Performed by: INTERNAL MEDICINE

## 2021-08-18 PROCEDURE — 84443 ASSAY THYROID STIM HORMONE: CPT | Performed by: INTERNAL MEDICINE

## 2021-08-18 PROCEDURE — 86160 COMPLEMENT ANTIGEN: CPT | Mod: 59 | Performed by: INTERNAL MEDICINE

## 2021-08-18 PROCEDURE — 85025 COMPLETE CBC W/AUTO DIFF WBC: CPT | Performed by: INTERNAL MEDICINE

## 2021-08-18 PROCEDURE — 80053 COMPREHEN METABOLIC PANEL: CPT | Performed by: INTERNAL MEDICINE

## 2021-08-18 PROCEDURE — 85610 PROTHROMBIN TIME: CPT | Performed by: INTERNAL MEDICINE

## 2021-08-18 PROCEDURE — 63600175 PHARM REV CODE 636 W HCPCS: Performed by: INTERNAL MEDICINE

## 2021-08-18 PROCEDURE — 82306 VITAMIN D 25 HYDROXY: CPT | Performed by: INTERNAL MEDICINE

## 2021-08-18 PROCEDURE — 85652 RBC SED RATE AUTOMATED: CPT | Performed by: INTERNAL MEDICINE

## 2021-08-18 RX ORDER — DIPHENHYDRAMINE HYDROCHLORIDE 50 MG/ML
25 INJECTION INTRAMUSCULAR; INTRAVENOUS
Status: COMPLETED | OUTPATIENT
Start: 2021-08-18 | End: 2021-08-18

## 2021-08-18 RX ORDER — ACETAMINOPHEN 325 MG/1
650 TABLET ORAL
Status: COMPLETED | OUTPATIENT
Start: 2021-08-18 | End: 2021-08-18

## 2021-08-18 RX ORDER — ACETAMINOPHEN 325 MG/1
650 TABLET ORAL
Status: CANCELLED | OUTPATIENT
Start: 2021-09-15

## 2021-08-18 RX ORDER — SODIUM CHLORIDE 0.9 % (FLUSH) 0.9 %
10 SYRINGE (ML) INJECTION
Status: CANCELLED | OUTPATIENT
Start: 2021-09-15

## 2021-08-18 RX ORDER — DIPHENHYDRAMINE HYDROCHLORIDE 50 MG/ML
25 INJECTION INTRAMUSCULAR; INTRAVENOUS
Status: CANCELLED | OUTPATIENT
Start: 2021-09-15

## 2021-08-18 RX ORDER — HEPARIN 100 UNIT/ML
500 SYRINGE INTRAVENOUS
Status: CANCELLED | OUTPATIENT
Start: 2021-09-15

## 2021-08-18 RX ADMIN — BELIMUMAB 680 MG: 400 INJECTION, POWDER, LYOPHILIZED, FOR SOLUTION INTRAVENOUS at 12:08

## 2021-08-18 RX ADMIN — DIPHENHYDRAMINE HYDROCHLORIDE 25 MG: 50 INJECTION INTRAMUSCULAR; INTRAVENOUS at 12:08

## 2021-08-18 RX ADMIN — ACETAMINOPHEN 650 MG: 325 TABLET ORAL at 12:08

## 2021-08-19 ENCOUNTER — PATIENT MESSAGE (OUTPATIENT)
Dept: RHEUMATOLOGY | Facility: CLINIC | Age: 51
End: 2021-08-19

## 2021-08-19 LAB
25(OH)D3+25(OH)D2 SERPL-MCNC: 22 NG/ML (ref 30–96)
C3 SERPL-MCNC: 84 MG/DL (ref 50–180)
C4 SERPL-MCNC: 24 MG/DL (ref 11–44)

## 2021-08-20 LAB — ALDOLASE SERPL-CCNC: 4.6 U/L (ref 1.2–7.6)

## 2021-08-23 ENCOUNTER — PATIENT MESSAGE (OUTPATIENT)
Dept: RHEUMATOLOGY | Facility: CLINIC | Age: 51
End: 2021-08-23

## 2021-08-26 ENCOUNTER — PATIENT MESSAGE (OUTPATIENT)
Dept: TRANSPLANT | Facility: CLINIC | Age: 51
End: 2021-08-26

## 2021-09-06 ENCOUNTER — PATIENT MESSAGE (OUTPATIENT)
Dept: PODIATRY | Facility: CLINIC | Age: 51
End: 2021-09-06

## 2021-09-07 ENCOUNTER — OFFICE VISIT (OUTPATIENT)
Dept: PODIATRY | Facility: CLINIC | Age: 51
End: 2021-09-07
Payer: MEDICARE

## 2021-09-07 VITALS
HEART RATE: 90 BPM | HEIGHT: 69 IN | BODY MASS INDEX: 22.21 KG/M2 | SYSTOLIC BLOOD PRESSURE: 145 MMHG | DIASTOLIC BLOOD PRESSURE: 84 MMHG | WEIGHT: 149.94 LBS

## 2021-09-07 DIAGNOSIS — E11.49 TYPE II DIABETES MELLITUS WITH NEUROLOGICAL MANIFESTATIONS: Primary | ICD-10-CM

## 2021-09-07 DIAGNOSIS — M20.41 HAMMER TOES OF BOTH FEET: ICD-10-CM

## 2021-09-07 DIAGNOSIS — R26.9 GAIT ABNORMALITY: ICD-10-CM

## 2021-09-07 DIAGNOSIS — M20.5X1 HALLUX LIMITUS, ACQUIRED, RIGHT: ICD-10-CM

## 2021-09-07 DIAGNOSIS — M20.5X2 HALLUX LIMITUS, ACQUIRED, LEFT: ICD-10-CM

## 2021-09-07 DIAGNOSIS — M20.42 HAMMER TOES OF BOTH FEET: ICD-10-CM

## 2021-09-07 PROCEDURE — 1160F PR REVIEW ALL MEDS BY PRESCRIBER/CLIN PHARMACIST DOCUMENTED: ICD-10-PCS | Mod: CPTII,S$GLB,, | Performed by: PODIATRIST

## 2021-09-07 PROCEDURE — 3051F HG A1C>EQUAL 7.0%<8.0%: CPT | Mod: CPTII,S$GLB,, | Performed by: PODIATRIST

## 2021-09-07 PROCEDURE — 3079F PR MOST RECENT DIASTOLIC BLOOD PRESSURE 80-89 MM HG: ICD-10-PCS | Mod: CPTII,S$GLB,, | Performed by: PODIATRIST

## 2021-09-07 PROCEDURE — 3051F PR MOST RECENT HEMOGLOBIN A1C LEVEL 7.0 - < 8.0%: ICD-10-PCS | Mod: CPTII,S$GLB,, | Performed by: PODIATRIST

## 2021-09-07 PROCEDURE — 4010F PR ACE/ARB THEARPY RXD/TAKEN: ICD-10-PCS | Mod: CPTII,S$GLB,, | Performed by: PODIATRIST

## 2021-09-07 PROCEDURE — 1159F PR MEDICATION LIST DOCUMENTED IN MEDICAL RECORD: ICD-10-PCS | Mod: CPTII,S$GLB,, | Performed by: PODIATRIST

## 2021-09-07 PROCEDURE — 3077F SYST BP >= 140 MM HG: CPT | Mod: CPTII,S$GLB,, | Performed by: PODIATRIST

## 2021-09-07 PROCEDURE — 99214 PR OFFICE/OUTPT VISIT, EST, LEVL IV, 30-39 MIN: ICD-10-PCS | Mod: S$GLB,,, | Performed by: PODIATRIST

## 2021-09-07 PROCEDURE — 3079F DIAST BP 80-89 MM HG: CPT | Mod: CPTII,S$GLB,, | Performed by: PODIATRIST

## 2021-09-07 PROCEDURE — 1159F MED LIST DOCD IN RCRD: CPT | Mod: CPTII,S$GLB,, | Performed by: PODIATRIST

## 2021-09-07 PROCEDURE — 1160F RVW MEDS BY RX/DR IN RCRD: CPT | Mod: CPTII,S$GLB,, | Performed by: PODIATRIST

## 2021-09-07 PROCEDURE — 3008F PR BODY MASS INDEX (BMI) DOCUMENTED: ICD-10-PCS | Mod: CPTII,S$GLB,, | Performed by: PODIATRIST

## 2021-09-07 PROCEDURE — 99999 PR PBB SHADOW E&M-EST. PATIENT-LVL V: CPT | Mod: PBBFAC,,, | Performed by: PODIATRIST

## 2021-09-07 PROCEDURE — 99999 PR PBB SHADOW E&M-EST. PATIENT-LVL V: ICD-10-PCS | Mod: PBBFAC,,, | Performed by: PODIATRIST

## 2021-09-07 PROCEDURE — 99214 OFFICE O/P EST MOD 30 MIN: CPT | Mod: S$GLB,,, | Performed by: PODIATRIST

## 2021-09-07 PROCEDURE — 4010F ACE/ARB THERAPY RXD/TAKEN: CPT | Mod: CPTII,S$GLB,, | Performed by: PODIATRIST

## 2021-09-07 PROCEDURE — 3008F BODY MASS INDEX DOCD: CPT | Mod: CPTII,S$GLB,, | Performed by: PODIATRIST

## 2021-09-07 PROCEDURE — 3077F PR MOST RECENT SYSTOLIC BLOOD PRESSURE >= 140 MM HG: ICD-10-PCS | Mod: CPTII,S$GLB,, | Performed by: PODIATRIST

## 2021-09-15 ENCOUNTER — INFUSION (OUTPATIENT)
Dept: INFUSION THERAPY | Facility: HOSPITAL | Age: 51
End: 2021-09-15
Attending: INTERNAL MEDICINE
Payer: MEDICARE

## 2021-09-15 VITALS
WEIGHT: 149.19 LBS | TEMPERATURE: 98 F | OXYGEN SATURATION: 97 % | RESPIRATION RATE: 16 BRPM | SYSTOLIC BLOOD PRESSURE: 147 MMHG | BODY MASS INDEX: 22.03 KG/M2 | HEART RATE: 88 BPM | DIASTOLIC BLOOD PRESSURE: 82 MMHG

## 2021-09-15 DIAGNOSIS — M32.9 SYSTEMIC LUPUS ERYTHEMATOSUS, UNSPECIFIED SLE TYPE, UNSPECIFIED ORGAN INVOLVEMENT STATUS: ICD-10-CM

## 2021-09-15 DIAGNOSIS — M81.0 OSTEOPOROSIS: Primary | ICD-10-CM

## 2021-09-15 PROCEDURE — 25000003 PHARM REV CODE 250: Performed by: INTERNAL MEDICINE

## 2021-09-15 PROCEDURE — 96413 CHEMO IV INFUSION 1 HR: CPT

## 2021-09-15 PROCEDURE — 63600175 PHARM REV CODE 636 W HCPCS: Mod: JA,JG | Performed by: INTERNAL MEDICINE

## 2021-09-15 RX ADMIN — BELIMUMAB 677 MG: 400 INJECTION, POWDER, LYOPHILIZED, FOR SOLUTION INTRAVENOUS at 11:09

## 2021-09-16 ENCOUNTER — HOSPITAL ENCOUNTER (EMERGENCY)
Facility: HOSPITAL | Age: 51
Discharge: HOME | End: 2021-09-16
Attending: EMERGENCY MEDICINE
Payer: MEDICARE

## 2021-09-16 VITALS
WEIGHT: 148 LBS | DIASTOLIC BLOOD PRESSURE: 91 MMHG | SYSTOLIC BLOOD PRESSURE: 163 MMHG | OXYGEN SATURATION: 100 % | HEIGHT: 69 IN | RESPIRATION RATE: 18 BRPM | TEMPERATURE: 99 F | BODY MASS INDEX: 21.92 KG/M2 | HEART RATE: 84 BPM

## 2021-09-16 DIAGNOSIS — R52 PAIN: ICD-10-CM

## 2021-09-16 DIAGNOSIS — S52.502A CLOSED FRACTURE OF DISTAL END OF LEFT RADIUS, UNSPECIFIED FRACTURE MORPHOLOGY, INITIAL ENCOUNTER: Primary | ICD-10-CM

## 2021-09-16 PROCEDURE — 99283 EMERGENCY DEPT VISIT LOW MDM: CPT | Mod: 25

## 2021-09-16 PROCEDURE — 25000003 PHARM REV CODE 250: Performed by: PHYSICIAN ASSISTANT

## 2021-09-16 PROCEDURE — 29105 APPLICATION LONG ARM SPLINT: CPT | Mod: LT

## 2021-09-16 RX ORDER — ACETAMINOPHEN 325 MG/1
650 TABLET ORAL
Status: COMPLETED | OUTPATIENT
Start: 2021-09-16 | End: 2021-09-16

## 2021-09-16 RX ORDER — HYDROCODONE BITARTRATE AND ACETAMINOPHEN 5; 325 MG/1; MG/1
1 TABLET ORAL
Status: COMPLETED | OUTPATIENT
Start: 2021-09-16 | End: 2021-09-16

## 2021-09-16 RX ORDER — HYDROCODONE BITARTRATE AND ACETAMINOPHEN 5; 325 MG/1; MG/1
1 TABLET ORAL EVERY 6 HOURS PRN
Qty: 12 TABLET | Refills: 0 | Status: SHIPPED | OUTPATIENT
Start: 2021-09-16 | End: 2021-09-19

## 2021-09-16 RX ADMIN — ACETAMINOPHEN 650 MG: 325 TABLET ORAL at 07:09

## 2021-09-16 RX ADMIN — HYDROCODONE BITARTRATE AND ACETAMINOPHEN 1 TABLET: 5; 325 TABLET ORAL at 08:09

## 2021-09-17 ENCOUNTER — PATIENT MESSAGE (OUTPATIENT)
Dept: TRANSPLANT | Facility: CLINIC | Age: 51
End: 2021-09-17

## 2021-09-20 ENCOUNTER — PATIENT MESSAGE (OUTPATIENT)
Dept: TRANSPLANT | Facility: CLINIC | Age: 51
End: 2021-09-20

## 2021-09-26 ENCOUNTER — PATIENT MESSAGE (OUTPATIENT)
Dept: ENDOCRINOLOGY | Facility: CLINIC | Age: 51
End: 2021-09-26

## 2021-09-29 ENCOUNTER — PATIENT MESSAGE (OUTPATIENT)
Dept: ENDOCRINOLOGY | Facility: CLINIC | Age: 51
End: 2021-09-29

## 2021-09-30 ENCOUNTER — PATIENT MESSAGE (OUTPATIENT)
Dept: PODIATRY | Facility: CLINIC | Age: 51
End: 2021-09-30

## 2021-09-30 DIAGNOSIS — M20.41 HAMMER TOES OF BOTH FEET: ICD-10-CM

## 2021-09-30 DIAGNOSIS — R26.9 GAIT ABNORMALITY: ICD-10-CM

## 2021-09-30 DIAGNOSIS — E11.49 TYPE II DIABETES MELLITUS WITH NEUROLOGICAL MANIFESTATIONS: Primary | ICD-10-CM

## 2021-09-30 DIAGNOSIS — M20.5X1 HALLUX LIMITUS, ACQUIRED, RIGHT: ICD-10-CM

## 2021-09-30 DIAGNOSIS — M20.42 HAMMER TOES OF BOTH FEET: ICD-10-CM

## 2021-09-30 DIAGNOSIS — M20.5X2 HALLUX LIMITUS, ACQUIRED, LEFT: ICD-10-CM

## 2021-10-13 ENCOUNTER — INFUSION (OUTPATIENT)
Dept: INFUSION THERAPY | Facility: HOSPITAL | Age: 51
End: 2021-10-13
Attending: INTERNAL MEDICINE
Payer: MEDICARE

## 2021-10-13 DIAGNOSIS — M81.0 OSTEOPOROSIS: Primary | ICD-10-CM

## 2021-10-13 DIAGNOSIS — M32.9 SYSTEMIC LUPUS ERYTHEMATOSUS, UNSPECIFIED SLE TYPE, UNSPECIFIED ORGAN INVOLVEMENT STATUS: ICD-10-CM

## 2021-10-13 PROCEDURE — 96365 THER/PROPH/DIAG IV INF INIT: CPT

## 2021-10-13 PROCEDURE — 96375 TX/PRO/DX INJ NEW DRUG ADDON: CPT

## 2021-10-13 PROCEDURE — 25000003 PHARM REV CODE 250: Performed by: INTERNAL MEDICINE

## 2021-10-13 PROCEDURE — 63600175 PHARM REV CODE 636 W HCPCS: Mod: JA,JG | Performed by: INTERNAL MEDICINE

## 2021-10-13 RX ORDER — ACETAMINOPHEN 325 MG/1
650 TABLET ORAL
Status: COMPLETED | OUTPATIENT
Start: 2021-10-13 | End: 2021-10-13

## 2021-10-13 RX ORDER — DIPHENHYDRAMINE HYDROCHLORIDE 50 MG/ML
25 INJECTION INTRAMUSCULAR; INTRAVENOUS
Status: COMPLETED | OUTPATIENT
Start: 2021-10-13 | End: 2021-10-13

## 2021-10-13 RX ADMIN — ACETAMINOPHEN 650 MG: 325 TABLET ORAL at 11:10

## 2021-10-13 RX ADMIN — DIPHENHYDRAMINE HYDROCHLORIDE 25 MG: 50 INJECTION INTRAMUSCULAR; INTRAVENOUS at 11:10

## 2021-10-13 RX ADMIN — BELIMUMAB 677 MG: 400 INJECTION, POWDER, LYOPHILIZED, FOR SOLUTION INTRAVENOUS at 11:10

## 2021-10-19 ENCOUNTER — PATIENT MESSAGE (OUTPATIENT)
Dept: TRANSPLANT | Facility: CLINIC | Age: 51
End: 2021-10-19
Payer: MEDICARE

## 2021-10-26 ENCOUNTER — PATIENT MESSAGE (OUTPATIENT)
Dept: GASTROENTEROLOGY | Facility: CLINIC | Age: 51
End: 2021-10-26
Payer: MEDICARE

## 2021-11-09 ENCOUNTER — PATIENT MESSAGE (OUTPATIENT)
Dept: TRANSPLANT | Facility: CLINIC | Age: 51
End: 2021-11-09
Payer: MEDICARE

## 2021-11-10 ENCOUNTER — TELEPHONE (OUTPATIENT)
Dept: NEPHROLOGY | Facility: CLINIC | Age: 51
End: 2021-11-10
Payer: MEDICARE

## 2021-11-10 ENCOUNTER — PATIENT MESSAGE (OUTPATIENT)
Dept: RHEUMATOLOGY | Facility: CLINIC | Age: 51
End: 2021-11-10
Payer: MEDICARE

## 2021-11-10 ENCOUNTER — INFUSION (OUTPATIENT)
Dept: INFUSION THERAPY | Facility: HOSPITAL | Age: 51
End: 2021-11-10
Attending: INTERNAL MEDICINE
Payer: MEDICARE

## 2021-11-10 VITALS
BODY MASS INDEX: 22.1 KG/M2 | HEIGHT: 69 IN | TEMPERATURE: 98 F | HEART RATE: 87 BPM | WEIGHT: 149.19 LBS | DIASTOLIC BLOOD PRESSURE: 73 MMHG | RESPIRATION RATE: 16 BRPM | OXYGEN SATURATION: 98 % | SYSTOLIC BLOOD PRESSURE: 150 MMHG

## 2021-11-10 DIAGNOSIS — M81.0 OSTEOPOROSIS: Primary | ICD-10-CM

## 2021-11-10 DIAGNOSIS — M32.9 SYSTEMIC LUPUS ERYTHEMATOSUS, UNSPECIFIED SLE TYPE, UNSPECIFIED ORGAN INVOLVEMENT STATUS: ICD-10-CM

## 2021-11-10 PROCEDURE — 25000003 PHARM REV CODE 250: Performed by: INTERNAL MEDICINE

## 2021-11-10 PROCEDURE — 96365 THER/PROPH/DIAG IV INF INIT: CPT

## 2021-11-10 PROCEDURE — 96375 TX/PRO/DX INJ NEW DRUG ADDON: CPT

## 2021-11-10 PROCEDURE — 63600175 PHARM REV CODE 636 W HCPCS: Mod: JA,JG | Performed by: INTERNAL MEDICINE

## 2021-11-10 RX ORDER — ACETAMINOPHEN 325 MG/1
650 TABLET ORAL
Status: COMPLETED | OUTPATIENT
Start: 2021-11-10 | End: 2021-11-10

## 2021-11-10 RX ORDER — DIPHENHYDRAMINE HYDROCHLORIDE 50 MG/ML
25 INJECTION INTRAMUSCULAR; INTRAVENOUS
Status: CANCELLED | OUTPATIENT
Start: 2021-11-10

## 2021-11-10 RX ORDER — DIPHENHYDRAMINE HYDROCHLORIDE 50 MG/ML
25 INJECTION INTRAMUSCULAR; INTRAVENOUS
Status: COMPLETED | OUTPATIENT
Start: 2021-11-10 | End: 2021-11-10

## 2021-11-10 RX ORDER — ACETAMINOPHEN 325 MG/1
650 TABLET ORAL
Status: CANCELLED | OUTPATIENT
Start: 2021-11-10

## 2021-11-10 RX ADMIN — DIPHENHYDRAMINE HYDROCHLORIDE 25 MG: 50 INJECTION INTRAMUSCULAR; INTRAVENOUS at 11:11

## 2021-11-10 RX ADMIN — BELIMUMAB 676 MG: 400 INJECTION, POWDER, LYOPHILIZED, FOR SOLUTION INTRAVENOUS at 11:11

## 2021-11-10 RX ADMIN — ACETAMINOPHEN 650 MG: 325 TABLET ORAL at 11:11

## 2021-12-08 ENCOUNTER — INFUSION (OUTPATIENT)
Dept: INFUSION THERAPY | Facility: HOSPITAL | Age: 51
End: 2021-12-08
Attending: INTERNAL MEDICINE
Payer: MEDICARE

## 2021-12-08 VITALS
DIASTOLIC BLOOD PRESSURE: 78 MMHG | HEART RATE: 90 BPM | WEIGHT: 148 LBS | OXYGEN SATURATION: 97 % | SYSTOLIC BLOOD PRESSURE: 170 MMHG | BODY MASS INDEX: 21.86 KG/M2 | RESPIRATION RATE: 16 BRPM

## 2021-12-08 DIAGNOSIS — M32.9 SYSTEMIC LUPUS ERYTHEMATOSUS, UNSPECIFIED SLE TYPE, UNSPECIFIED ORGAN INVOLVEMENT STATUS: ICD-10-CM

## 2021-12-08 DIAGNOSIS — M81.0 OSTEOPOROSIS: Primary | ICD-10-CM

## 2021-12-08 PROCEDURE — 96375 TX/PRO/DX INJ NEW DRUG ADDON: CPT

## 2021-12-08 PROCEDURE — 63600175 PHARM REV CODE 636 W HCPCS: Mod: JA,JG | Performed by: INTERNAL MEDICINE

## 2021-12-08 PROCEDURE — 25000003 PHARM REV CODE 250: Performed by: INTERNAL MEDICINE

## 2021-12-08 PROCEDURE — 96374 THER/PROPH/DIAG INJ IV PUSH: CPT

## 2021-12-08 PROCEDURE — 96413 CHEMO IV INFUSION 1 HR: CPT

## 2021-12-08 RX ORDER — DIPHENHYDRAMINE HYDROCHLORIDE 50 MG/ML
25 INJECTION INTRAMUSCULAR; INTRAVENOUS
Status: COMPLETED | OUTPATIENT
Start: 2021-12-08 | End: 2021-12-08

## 2021-12-08 RX ORDER — ACETAMINOPHEN 325 MG/1
650 TABLET ORAL
Status: COMPLETED | OUTPATIENT
Start: 2021-12-08 | End: 2021-12-08

## 2021-12-08 RX ADMIN — ACETAMINOPHEN 650 MG: 325 TABLET ORAL at 11:12

## 2021-12-08 RX ADMIN — BELIMUMAB 671 MG: 400 INJECTION, POWDER, LYOPHILIZED, FOR SOLUTION INTRAVENOUS at 11:12

## 2021-12-08 RX ADMIN — DIPHENHYDRAMINE HYDROCHLORIDE 25 MG: 50 INJECTION INTRAMUSCULAR; INTRAVENOUS at 11:12

## 2022-01-02 ENCOUNTER — PATIENT MESSAGE (OUTPATIENT)
Dept: GASTROENTEROLOGY | Facility: CLINIC | Age: 52
End: 2022-01-02
Payer: MEDICARE

## 2022-01-03 RX ORDER — HYDROCORTISONE 25 MG/G
CREAM TOPICAL 2 TIMES DAILY
Qty: 30 G | Refills: 3 | Status: SHIPPED | OUTPATIENT
Start: 2022-01-03 | End: 2022-04-04 | Stop reason: SDUPTHER

## 2022-01-05 ENCOUNTER — PATIENT MESSAGE (OUTPATIENT)
Dept: ENDOCRINOLOGY | Facility: CLINIC | Age: 52
End: 2022-01-05

## 2022-01-05 ENCOUNTER — INFUSION (OUTPATIENT)
Dept: INFUSION THERAPY | Facility: HOSPITAL | Age: 52
End: 2022-01-05
Attending: INTERNAL MEDICINE
Payer: MEDICARE

## 2022-01-05 ENCOUNTER — OFFICE VISIT (OUTPATIENT)
Dept: ENDOCRINOLOGY | Facility: CLINIC | Age: 52
End: 2022-01-05
Payer: MEDICARE

## 2022-01-05 VITALS
BODY MASS INDEX: 22.1 KG/M2 | DIASTOLIC BLOOD PRESSURE: 73 MMHG | WEIGHT: 149.19 LBS | HEIGHT: 69 IN | SYSTOLIC BLOOD PRESSURE: 155 MMHG | HEART RATE: 76 BPM | OXYGEN SATURATION: 99 % | RESPIRATION RATE: 16 BRPM | TEMPERATURE: 98 F

## 2022-01-05 DIAGNOSIS — Z79.4 TYPE 2 DIABETES MELLITUS WITH STAGE 3B CHRONIC KIDNEY DISEASE, WITH LONG-TERM CURRENT USE OF INSULIN: ICD-10-CM

## 2022-01-05 DIAGNOSIS — Z78.0 ASYMPTOMATIC POSTMENOPAUSAL ESTROGEN DEFICIENCY: ICD-10-CM

## 2022-01-05 DIAGNOSIS — M80.00XS OSTEOPOROSIS WITH CURRENT PATHOLOGICAL FRACTURE, UNSPECIFIED OSTEOPOROSIS TYPE, SEQUELA: Primary | ICD-10-CM

## 2022-01-05 DIAGNOSIS — M32.9 SYSTEMIC LUPUS ERYTHEMATOSUS, UNSPECIFIED SLE TYPE, UNSPECIFIED ORGAN INVOLVEMENT STATUS: ICD-10-CM

## 2022-01-05 DIAGNOSIS — E11.22 TYPE 2 DIABETES MELLITUS WITH STAGE 3B CHRONIC KIDNEY DISEASE, WITH LONG-TERM CURRENT USE OF INSULIN: ICD-10-CM

## 2022-01-05 DIAGNOSIS — M81.0 OSTEOPOROSIS: Primary | ICD-10-CM

## 2022-01-05 DIAGNOSIS — N18.32 STAGE 3B CHRONIC KIDNEY DISEASE: ICD-10-CM

## 2022-01-05 DIAGNOSIS — T38.0X5S ADVERSE EFFECT OF CORTICOSTEROIDS, SEQUELA: ICD-10-CM

## 2022-01-05 DIAGNOSIS — Z79.4 TYPE 2 DIABETES MELLITUS WITH HYPERGLYCEMIA, WITH LONG-TERM CURRENT USE OF INSULIN: ICD-10-CM

## 2022-01-05 DIAGNOSIS — E11.65 TYPE 2 DIABETES MELLITUS WITH HYPERGLYCEMIA, WITH LONG-TERM CURRENT USE OF INSULIN: ICD-10-CM

## 2022-01-05 DIAGNOSIS — N18.32 TYPE 2 DIABETES MELLITUS WITH STAGE 3B CHRONIC KIDNEY DISEASE, WITH LONG-TERM CURRENT USE OF INSULIN: ICD-10-CM

## 2022-01-05 PROCEDURE — 25000003 PHARM REV CODE 250: Performed by: INTERNAL MEDICINE

## 2022-01-05 PROCEDURE — 96375 TX/PRO/DX INJ NEW DRUG ADDON: CPT

## 2022-01-05 PROCEDURE — 3072F LOW RISK FOR RETINOPATHY: CPT | Mod: CPTII,95,, | Performed by: INTERNAL MEDICINE

## 2022-01-05 PROCEDURE — 3051F PR MOST RECENT HEMOGLOBIN A1C LEVEL 7.0 - < 8.0%: ICD-10-PCS | Mod: CPTII,95,, | Performed by: INTERNAL MEDICINE

## 2022-01-05 PROCEDURE — 1159F MED LIST DOCD IN RCRD: CPT | Mod: CPTII,95,, | Performed by: INTERNAL MEDICINE

## 2022-01-05 PROCEDURE — 1160F PR REVIEW ALL MEDS BY PRESCRIBER/CLIN PHARMACIST DOCUMENTED: ICD-10-PCS | Mod: CPTII,95,, | Performed by: INTERNAL MEDICINE

## 2022-01-05 PROCEDURE — 3072F PR LOW RISK FOR RETINOPATHY: ICD-10-PCS | Mod: CPTII,95,, | Performed by: INTERNAL MEDICINE

## 2022-01-05 PROCEDURE — 99214 OFFICE O/P EST MOD 30 MIN: CPT | Mod: 95,,, | Performed by: INTERNAL MEDICINE

## 2022-01-05 PROCEDURE — 63600175 PHARM REV CODE 636 W HCPCS: Performed by: INTERNAL MEDICINE

## 2022-01-05 PROCEDURE — 1159F PR MEDICATION LIST DOCUMENTED IN MEDICAL RECORD: ICD-10-PCS | Mod: CPTII,95,, | Performed by: INTERNAL MEDICINE

## 2022-01-05 PROCEDURE — 99214 PR OFFICE/OUTPT VISIT, EST, LEVL IV, 30-39 MIN: ICD-10-PCS | Mod: 95,,, | Performed by: INTERNAL MEDICINE

## 2022-01-05 PROCEDURE — 96365 THER/PROPH/DIAG IV INF INIT: CPT

## 2022-01-05 PROCEDURE — 1160F RVW MEDS BY RX/DR IN RCRD: CPT | Mod: CPTII,95,, | Performed by: INTERNAL MEDICINE

## 2022-01-05 PROCEDURE — 3051F HG A1C>EQUAL 7.0%<8.0%: CPT | Mod: CPTII,95,, | Performed by: INTERNAL MEDICINE

## 2022-01-05 RX ORDER — ACETAMINOPHEN 325 MG/1
650 TABLET ORAL
Status: COMPLETED | OUTPATIENT
Start: 2022-01-05 | End: 2022-01-05

## 2022-01-05 RX ORDER — FLASH GLUCOSE SENSOR
2 KIT MISCELLANEOUS
Qty: 2 KIT | Refills: 11 | Status: SHIPPED | OUTPATIENT
Start: 2022-01-05

## 2022-01-05 RX ORDER — DIPHENHYDRAMINE HYDROCHLORIDE 50 MG/ML
25 INJECTION INTRAMUSCULAR; INTRAVENOUS
Status: COMPLETED | OUTPATIENT
Start: 2022-01-05 | End: 2022-01-05

## 2022-01-05 RX ORDER — PEN NEEDLE, DIABETIC 31 GX5/16"
NEEDLE, DISPOSABLE MISCELLANEOUS
Qty: 450 EACH | Refills: 3 | Status: SHIPPED | OUTPATIENT
Start: 2022-01-05

## 2022-01-05 RX ORDER — FLASH GLUCOSE SCANNING READER
1 EACH MISCELLANEOUS DAILY
Qty: 1 EACH | Refills: 0 | Status: SHIPPED | OUTPATIENT
Start: 2022-01-05

## 2022-01-05 RX ORDER — INSULIN LISPRO 100 [IU]/ML
INJECTION, SOLUTION INTRAVENOUS; SUBCUTANEOUS
Qty: 45 ML | Refills: 3 | Status: SHIPPED | OUTPATIENT
Start: 2022-01-05

## 2022-01-05 RX ORDER — DULAGLUTIDE 0.75 MG/.5ML
0.75 INJECTION, SOLUTION SUBCUTANEOUS
Qty: 12 PEN | Refills: 3 | Status: SHIPPED | OUTPATIENT
Start: 2022-01-05

## 2022-01-05 RX ORDER — INSULIN DETEMIR 100 [IU]/ML
INJECTION, SOLUTION SUBCUTANEOUS
Qty: 45 ML | Refills: 3 | Status: SHIPPED | OUTPATIENT
Start: 2022-01-05

## 2022-01-05 RX ADMIN — BELIMUMAB 677 MG: 400 INJECTION, POWDER, LYOPHILIZED, FOR SOLUTION INTRAVENOUS at 11:01

## 2022-01-05 RX ADMIN — ACETAMINOPHEN 650 MG: 325 TABLET ORAL at 10:01

## 2022-01-05 RX ADMIN — DIPHENHYDRAMINE HYDROCHLORIDE 25 MG: 50 INJECTION INTRAMUSCULAR; INTRAVENOUS at 10:01

## 2022-01-05 NOTE — PLAN OF CARE
Patient arrived to unit for Benlysta infusion. Patient tolerated infusion well. VSS. Discharge instructions reviewed. Verbalized understanding. Patient ambulated unassisted off unit. Patient in NAD at time of discharge.

## 2022-01-05 NOTE — ASSESSMENT & PLAN NOTE
Continue current regimen:   levemir 4 units twice a day   novolog 4/4/3  Continue trulicity     Advised to restart personal sensor, wants to do the jacky Advised to take insulin ten to fifteen minutes before meals   Appetite varies.
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
## 2022-01-10 ENCOUNTER — TELEPHONE (OUTPATIENT)
Dept: ENDOCRINOLOGY | Facility: CLINIC | Age: 52
End: 2022-01-10
Payer: MEDICARE

## 2022-01-10 ENCOUNTER — PATIENT MESSAGE (OUTPATIENT)
Dept: ENDOCRINOLOGY | Facility: CLINIC | Age: 52
End: 2022-01-10
Payer: MEDICARE

## 2022-01-18 ENCOUNTER — PATIENT MESSAGE (OUTPATIENT)
Dept: ENDOCRINOLOGY | Facility: CLINIC | Age: 52
End: 2022-01-18
Payer: MEDICARE

## 2022-01-20 ENCOUNTER — PATIENT MESSAGE (OUTPATIENT)
Dept: RHEUMATOLOGY | Facility: CLINIC | Age: 52
End: 2022-01-20
Payer: MEDICARE

## 2022-01-21 ENCOUNTER — TELEPHONE (OUTPATIENT)
Dept: ENDOCRINOLOGY | Facility: CLINIC | Age: 52
End: 2022-01-21
Payer: MEDICARE
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
## 2022-01-24 ENCOUNTER — LAB VISIT (OUTPATIENT)
Dept: LAB | Facility: HOSPITAL | Age: 52
End: 2022-01-24
Attending: INTERNAL MEDICINE
Payer: MEDICARE

## 2022-01-24 DIAGNOSIS — Z94.4 LIVER TRANSPLANTED: ICD-10-CM

## 2022-01-24 LAB
ALBUMIN SERPL BCP-MCNC: 3.7 G/DL (ref 3.5–5.2)
ALP SERPL-CCNC: 97 U/L (ref 55–135)
ALT SERPL W/O P-5'-P-CCNC: 18 U/L (ref 10–44)
ANION GAP SERPL CALC-SCNC: 8 MMOL/L (ref 8–16)
AST SERPL-CCNC: 16 U/L (ref 10–40)
BASOPHILS # BLD AUTO: 0.01 K/UL (ref 0–0.2)
BASOPHILS NFR BLD: 0.2 % (ref 0–1.9)
BILIRUB SERPL-MCNC: 0.4 MG/DL (ref 0.1–1)
BUN SERPL-MCNC: 18 MG/DL (ref 6–20)
CALCIUM SERPL-MCNC: 9 MG/DL (ref 8.7–10.5)
CHLORIDE SERPL-SCNC: 104 MMOL/L (ref 95–110)
CO2 SERPL-SCNC: 27 MMOL/L (ref 23–29)
CREAT SERPL-MCNC: 1.2 MG/DL (ref 0.5–1.4)
DIFFERENTIAL METHOD: ABNORMAL
EOSINOPHIL # BLD AUTO: 0.4 K/UL (ref 0–0.5)
EOSINOPHIL NFR BLD: 7.7 % (ref 0–8)
ERYTHROCYTE [DISTWIDTH] IN BLOOD BY AUTOMATED COUNT: 13.8 % (ref 11.5–14.5)
EST. GFR  (AFRICAN AMERICAN): >60 ML/MIN/1.73 M^2
EST. GFR  (NON AFRICAN AMERICAN): 52 ML/MIN/1.73 M^2
GLUCOSE SERPL-MCNC: 223 MG/DL (ref 70–110)
HCT VFR BLD AUTO: 34.7 % (ref 37–48.5)
HGB BLD-MCNC: 11.7 G/DL (ref 12–16)
IMM GRANULOCYTES # BLD AUTO: 0.01 K/UL (ref 0–0.04)
IMM GRANULOCYTES NFR BLD AUTO: 0.2 % (ref 0–0.5)
LYMPHOCYTES # BLD AUTO: 1.8 K/UL (ref 1–4.8)
LYMPHOCYTES NFR BLD: 35 % (ref 18–48)
MAGNESIUM SERPL-MCNC: 1.5 MG/DL (ref 1.6–2.6)
MCH RBC QN AUTO: 27.1 PG (ref 27–31)
MCHC RBC AUTO-ENTMCNC: 33.7 G/DL (ref 32–36)
MCV RBC AUTO: 80 FL (ref 82–98)
MONOCYTES # BLD AUTO: 0.4 K/UL (ref 0.3–1)
MONOCYTES NFR BLD: 7.4 % (ref 4–15)
NEUTROPHILS # BLD AUTO: 2.6 K/UL (ref 1.8–7.7)
NEUTROPHILS NFR BLD: 49.5 % (ref 38–73)
NRBC BLD-RTO: 0 /100 WBC
PLATELET # BLD AUTO: 200 K/UL (ref 150–450)
PMV BLD AUTO: 9.6 FL (ref 9.2–12.9)
POTASSIUM SERPL-SCNC: 4 MMOL/L (ref 3.5–5.1)
PROT SERPL-MCNC: 7.1 G/DL (ref 6–8.4)
RBC # BLD AUTO: 4.32 M/UL (ref 4–5.4)
SODIUM SERPL-SCNC: 139 MMOL/L (ref 136–145)
WBC # BLD AUTO: 5.17 K/UL (ref 3.9–12.7)

## 2022-01-24 PROCEDURE — 80197 ASSAY OF TACROLIMUS: CPT | Performed by: INTERNAL MEDICINE

## 2022-01-24 PROCEDURE — 83735 ASSAY OF MAGNESIUM: CPT | Performed by: INTERNAL MEDICINE

## 2022-01-24 PROCEDURE — 85025 COMPLETE CBC W/AUTO DIFF WBC: CPT | Performed by: INTERNAL MEDICINE

## 2022-01-24 PROCEDURE — 80053 COMPREHEN METABOLIC PANEL: CPT | Performed by: INTERNAL MEDICINE

## 2022-01-24 PROCEDURE — 36415 COLL VENOUS BLD VENIPUNCTURE: CPT | Performed by: INTERNAL MEDICINE

## 2022-01-25 ENCOUNTER — TELEPHONE (OUTPATIENT)
Dept: TRANSPLANT | Facility: CLINIC | Age: 52
End: 2022-01-25
Payer: MEDICARE

## 2022-01-25 LAB — TACROLIMUS BLD-MCNC: 4.9 NG/ML (ref 5–15)

## 2022-01-25 NOTE — TELEPHONE ENCOUNTER
Labs reviewed and stable, continue q 6 months. Letter sent.       ----- Message from Jacob Horvath MD sent at 1/25/2022 10:28 AM CST -----  Results reviewed. No change in plan

## 2022-01-25 NOTE — LETTER
January 25, 2022    Valencia Dumont  [address] [facility] [address]          Dear Valencia Dumont:  MRN: [id number]    This is a follow up to your recent labs, your lab results were stable.  There are no medicine changes.  Please have your labs drawn again on 7/25/22.      If you cannot have your labs drawn on the scheduled date, it is your responsibility to call the transplant department to reschedule.  Please call [phone] and ask to speak to Rosangela Hanna  for all scheduling requests.     Sincerely,    Linda Barakat, RN,BSN,CCTC    Your Liver Transplant Coordinator    [facility]  [address] [place] [id number] [phone]

## 2022-02-22 ENCOUNTER — PATIENT MESSAGE (OUTPATIENT)
Dept: OPTOMETRY | Facility: CLINIC | Age: 52
End: 2022-02-22
Payer: MEDICARE

## 2022-02-23 DIAGNOSIS — D84.9 IMMUNOSUPPRESSED STATUS: ICD-10-CM

## 2022-03-02 ENCOUNTER — INFUSION (OUTPATIENT)
Dept: INFUSION THERAPY | Facility: HOSPITAL | Age: 52
End: 2022-03-02
Attending: INTERNAL MEDICINE
Payer: MEDICARE

## 2022-03-02 VITALS
SYSTOLIC BLOOD PRESSURE: 147 MMHG | HEART RATE: 92 BPM | RESPIRATION RATE: 16 BRPM | OXYGEN SATURATION: 99 % | BODY MASS INDEX: 22.74 KG/M2 | DIASTOLIC BLOOD PRESSURE: 70 MMHG | WEIGHT: 154 LBS

## 2022-03-02 DIAGNOSIS — M32.9 SYSTEMIC LUPUS ERYTHEMATOSUS, UNSPECIFIED SLE TYPE, UNSPECIFIED ORGAN INVOLVEMENT STATUS: ICD-10-CM

## 2022-03-02 DIAGNOSIS — M81.0 OSTEOPOROSIS: Primary | ICD-10-CM

## 2022-03-02 PROCEDURE — 96413 CHEMO IV INFUSION 1 HR: CPT

## 2022-03-02 PROCEDURE — 96374 THER/PROPH/DIAG INJ IV PUSH: CPT

## 2022-03-02 PROCEDURE — 96375 TX/PRO/DX INJ NEW DRUG ADDON: CPT

## 2022-03-02 PROCEDURE — 25000003 PHARM REV CODE 250: Performed by: INTERNAL MEDICINE

## 2022-03-02 PROCEDURE — 63600175 PHARM REV CODE 636 W HCPCS: Performed by: INTERNAL MEDICINE

## 2022-03-02 RX ORDER — DIPHENHYDRAMINE HYDROCHLORIDE 50 MG/ML
25 INJECTION INTRAMUSCULAR; INTRAVENOUS
Status: COMPLETED | OUTPATIENT
Start: 2022-03-02 | End: 2022-03-02

## 2022-03-02 RX ORDER — ACETAMINOPHEN 325 MG/1
650 TABLET ORAL
Status: COMPLETED | OUTPATIENT
Start: 2022-03-02 | End: 2022-03-02

## 2022-03-02 RX ADMIN — ACETAMINOPHEN 650 MG: 325 TABLET ORAL at 12:03

## 2022-03-02 RX ADMIN — BELIMUMAB 699 MG: 400 INJECTION, POWDER, LYOPHILIZED, FOR SOLUTION INTRAVENOUS at 12:03

## 2022-03-02 RX ADMIN — DIPHENHYDRAMINE HYDROCHLORIDE 25 MG: 50 INJECTION INTRAMUSCULAR; INTRAVENOUS at 12:03

## 2022-03-02 NOTE — PLAN OF CARE
Pt presents to unit for maintenance Benlysta infusion. Endorses generalized fatigue today. No other complaints reported. Pt given pre-meds of PO Tylenol and Benadryl IVP. Benlysta then infused over 1 hour. Pt tolerated well. Has next upcoming appointments through [facility]. Left unit in NAD at time of discharge.

## 2022-03-06 ENCOUNTER — PATIENT MESSAGE (OUTPATIENT)
Dept: TRANSPLANT | Facility: CLINIC | Age: 52
End: 2022-03-06
Payer: MEDICARE

## 2022-03-14 ENCOUNTER — PATIENT MESSAGE (OUTPATIENT)
Dept: TRANSPLANT | Facility: CLINIC | Age: 52
End: 2022-03-14
Payer: MEDICARE

## 2022-03-14 DIAGNOSIS — D84.9 IMMUNOSUPPRESSION: ICD-10-CM

## 2022-03-14 RX ORDER — MYCOPHENOLIC ACID 180 MG/1
360 TABLET, DELAYED RELEASE ORAL 2 TIMES DAILY
Qty: 120 TABLET | Refills: 11 | Status: SHIPPED | OUTPATIENT
Start: 2022-03-14 | End: 2023-04-28 | Stop reason: SDUPTHER

## 2022-04-04 ENCOUNTER — PATIENT MESSAGE (OUTPATIENT)
Dept: ENDOCRINOLOGY | Facility: CLINIC | Age: 52
End: 2022-04-04
Payer: MEDICARE

## 2022-04-04 ENCOUNTER — PATIENT MESSAGE (OUTPATIENT)
Dept: GASTROENTEROLOGY | Facility: CLINIC | Age: 52
End: 2022-04-04
Payer: MEDICARE

## 2022-04-04 RX ORDER — HYDROCORTISONE 25 MG/G
CREAM TOPICAL 2 TIMES DAILY
Qty: 30 G | Refills: 3 | Status: SHIPPED | OUTPATIENT
Start: 2022-04-04

## 2022-04-04 NOTE — TELEPHONE ENCOUNTER
Spoke to pt and scheduled pt an appt to be seen by Dr. Alarcon in CRS for hemorrhoids on 4/6/2022 at 10:40 am.  Informed pt per Dr. Guerra - let her know I refilled her hemorrhoid cream at [facility] in case she ran out she can try this in the meantime.    Pt verbalize understanding, confirmed appt and thank MA

## 2022-04-05 ENCOUNTER — PATIENT MESSAGE (OUTPATIENT)
Dept: SURGERY | Facility: CLINIC | Age: 52
End: 2022-04-05
Payer: MEDICARE

## 2022-04-05 ENCOUNTER — TELEPHONE (OUTPATIENT)
Dept: SURGERY | Facility: CLINIC | Age: 52
End: 2022-04-05
Payer: MEDICARE

## 2022-04-06 ENCOUNTER — PATIENT MESSAGE (OUTPATIENT)
Dept: ENDOCRINOLOGY | Facility: CLINIC | Age: 52
End: 2022-04-06
Payer: MEDICARE

## 2022-04-12 ENCOUNTER — TELEPHONE (OUTPATIENT)
Dept: SURGERY | Facility: CLINIC | Age: 52
End: 2022-04-12

## 2022-04-12 ENCOUNTER — OFFICE VISIT (OUTPATIENT)
Dept: SURGERY | Facility: CLINIC | Age: 52
End: 2022-04-12
Payer: MEDICARE

## 2022-04-12 VITALS
BODY MASS INDEX: 22.79 KG/M2 | WEIGHT: 153.88 LBS | HEIGHT: 69 IN | DIASTOLIC BLOOD PRESSURE: 93 MMHG | HEART RATE: 85 BPM | SYSTOLIC BLOOD PRESSURE: 193 MMHG

## 2022-04-12 DIAGNOSIS — K61.0 PERIANAL ABSCESS: Primary | ICD-10-CM

## 2022-04-12 DIAGNOSIS — K60.3 PERIANAL FISTULA: ICD-10-CM

## 2022-04-12 PROCEDURE — 3077F SYST BP >= 140 MM HG: CPT | Mod: CPTII,S$GLB,, | Performed by: NURSE PRACTITIONER

## 2022-04-12 PROCEDURE — 99203 OFFICE O/P NEW LOW 30 MIN: CPT | Mod: S$GLB,,, | Performed by: NURSE PRACTITIONER

## 2022-04-12 PROCEDURE — 1160F RVW MEDS BY RX/DR IN RCRD: CPT | Mod: CPTII,S$GLB,, | Performed by: NURSE PRACTITIONER

## 2022-04-12 PROCEDURE — 99203 PR OFFICE/OUTPT VISIT, NEW, LEVL III, 30-44 MIN: ICD-10-PCS | Mod: S$GLB,,, | Performed by: NURSE PRACTITIONER

## 2022-04-12 PROCEDURE — 3080F PR MOST RECENT DIASTOLIC BLOOD PRESSURE >= 90 MM HG: ICD-10-PCS | Mod: CPTII,S$GLB,, | Performed by: NURSE PRACTITIONER

## 2022-04-12 PROCEDURE — 4010F ACE/ARB THERAPY RXD/TAKEN: CPT | Mod: CPTII,S$GLB,, | Performed by: NURSE PRACTITIONER

## 2022-04-12 PROCEDURE — 3072F LOW RISK FOR RETINOPATHY: CPT | Mod: CPTII,S$GLB,, | Performed by: NURSE PRACTITIONER

## 2022-04-12 PROCEDURE — 3008F PR BODY MASS INDEX (BMI) DOCUMENTED: ICD-10-PCS | Mod: CPTII,S$GLB,, | Performed by: NURSE PRACTITIONER

## 2022-04-12 PROCEDURE — 99999 PR PBB SHADOW E&M-EST. PATIENT-LVL IV: CPT | Mod: PBBFAC,,, | Performed by: NURSE PRACTITIONER

## 2022-04-12 PROCEDURE — 1159F PR MEDICATION LIST DOCUMENTED IN MEDICAL RECORD: ICD-10-PCS | Mod: CPTII,S$GLB,, | Performed by: NURSE PRACTITIONER

## 2022-04-12 PROCEDURE — 99999 PR PBB SHADOW E&M-EST. PATIENT-LVL IV: ICD-10-PCS | Mod: PBBFAC,,, | Performed by: NURSE PRACTITIONER

## 2022-04-12 PROCEDURE — 3008F BODY MASS INDEX DOCD: CPT | Mod: CPTII,S$GLB,, | Performed by: NURSE PRACTITIONER

## 2022-04-12 PROCEDURE — 3072F PR LOW RISK FOR RETINOPATHY: ICD-10-PCS | Mod: CPTII,S$GLB,, | Performed by: NURSE PRACTITIONER

## 2022-04-12 PROCEDURE — 3080F DIAST BP >= 90 MM HG: CPT | Mod: CPTII,S$GLB,, | Performed by: NURSE PRACTITIONER

## 2022-04-12 PROCEDURE — 1160F PR REVIEW ALL MEDS BY PRESCRIBER/CLIN PHARMACIST DOCUMENTED: ICD-10-PCS | Mod: CPTII,S$GLB,, | Performed by: NURSE PRACTITIONER

## 2022-04-12 PROCEDURE — 3077F PR MOST RECENT SYSTOLIC BLOOD PRESSURE >= 140 MM HG: ICD-10-PCS | Mod: CPTII,S$GLB,, | Performed by: NURSE PRACTITIONER

## 2022-04-12 PROCEDURE — 1159F MED LIST DOCD IN RCRD: CPT | Mod: CPTII,S$GLB,, | Performed by: NURSE PRACTITIONER

## 2022-04-12 PROCEDURE — 4010F PR ACE/ARB THEARPY RXD/TAKEN: ICD-10-PCS | Mod: CPTII,S$GLB,, | Performed by: NURSE PRACTITIONER

## 2022-04-12 RX ORDER — SULFAMETHOXAZOLE AND TRIMETHOPRIM 800; 160 MG/1; MG/1
1 TABLET ORAL 2 TIMES DAILY
Qty: 14 TABLET | Refills: 0 | Status: SHIPPED | OUTPATIENT
Start: 2022-04-12 | End: 2022-04-19

## 2022-04-12 NOTE — PROGRESS NOTES
"CRS Office Visit History and Physical    Referring Md:   Deniz Guerra Md  [address] [place] [address]    SUBJECTIVE:     Chief Complaint: hemorrhoid    History of Present Illness:  The patient is new patient to this practice.   Course is as follows:  Patient is a 51 y.o. female presents with hemorrhoid. 2 weeks ago became constipated. Straining to have bm, started noting pain with wiping after bm. Felt a hard ball. "sweats" throughout the day.  Symptoms have been present for 2 weeks.  Has tried prep h and a and d ointment without improvement.  Associated bleeding: no  Previous anorectal procedures: no  confirms straining/prolonged time on toilet with bowel movements.  is not currently taking fiber supplement or stool softener  Blood thinners: No    Last Colonoscopy: Last Colonoscopy completed on 5/31/2017  - 8 mm hyperplastic polyp  - repeat in 10 years  Family history of colorectal cancer or IBD: none.    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    Hypertension     Kidney stone     Lupus     Osteoporosis 12/2013    Prophylactic immunotherapy (transplant immunosuppression) 1/2/2014    SBP (spontaneous bacterial peritonitis)     history of      Past Surgical History:   Procedure Laterality Date    BREAST BIOPSY Left 08/2020    CHOLECYSTECTOMY      COLONOSCOPY N/A 5/31/2017    Procedure: COLONOSCOPY;  Surgeon: Marky Sun MD;  Location: [facility] ([address] [place] [address]);  Service: Endoscopy;  Laterality: N/A;  PM prep.    " ESOPHAGOGASTRODUODENOSCOPY      ESOPHAGOGASTRODUODENOSCOPY N/A 1/16/2019    Procedure: EGD (ESOPHAGOGASTRODUODENOSCOPY);  Surgeon: Deniz Guerra MD;  [facility]  Service: Endoscopy;  Laterality: N/A;  labs prior, s/p liver transplant-MS    ESOPHAGOGASTRODUODENOSCOPY N/A 9/9/2020    Procedure: EGD (ESOPHAGOGASTRODUODENOSCOPY);  Surgeon: Davion Ríos MD;  [facility]  Service: Endoscopy;  Laterality: N/A;  Please order the EGD at least 2 weeks after barium esophagram has been done EGD is for dysphagia  covid test 9/6-[facility] care    FUNCTIONAL ENDOSCOPIC SINUS SURGERY (FESS) USING COMPUTER-ASSISTED NAVIGATION Bilateral 2/4/2021    Procedure: FESS, USING COMPUTER-ASSISTED NAVIGATION;  Surgeon: Delores Lewis MD;  Location: [facility];  Service: ENT;  Laterality: Bilateral;  [address] CELESTINOMAURIZIO [phone] TEXTED HIM @ 2:45PM ON 1-8-2021  DISC LOADED BT TOMMY 1-  RN PREOP 1/28/2021---COVID NEGATIVE ON 2/3--CONSENT INCOMPLETE  H/P INCOMPLETE  --CBC IN AM    LIVER BIOPSY      LIVER TRANSPLANT  12/31/2013    REFRACTIVE SURGERY Bilateral 2010    TUBAL LIGATION  2003    UPPER GASTROINTESTINAL ENDOSCOPY       Family History   Problem Relation Age of Onset    Hypertension Mother     Cataracts Mother     Diabetes Father     Alzheimer's disease Father     Diabetes Paternal Grandfather     Diabetes Paternal Grandmother     No Known Problems Maternal Grandmother     Bone cancer Maternal Grandfather     Breast cancer Maternal Aunt 55    Hypertension Brother     Diabetes Brother     No Known Problems Sister     No Known Problems Maternal Uncle     No Known Problems Paternal Aunt     No Known Problems Paternal Uncle     Stroke Neg Hx     Cancer Neg Hx     Colon cancer Neg Hx     Esophageal cancer Neg Hx     Stomach cancer Neg Hx     Rectal cancer Neg Hx     Amblyopia Neg Hx     Blindness Neg Hx     Glaucoma Neg Hx     Macular degeneration Neg Hx   "   Retinal detachment Neg Hx     Strabismus Neg Hx     Thyroid disease Neg Hx      Social History     Tobacco Use    Smoking status: Never Smoker    Smokeless tobacco: Never Used    Tobacco comment: disability; ; 3 children   Substance Use Topics    Alcohol use: Yes     Alcohol/week: 1.0 standard drink     Types: 1 Glasses of wine per week     Comment: occasionally     Drug use: No        Review of Systems:  Review of Systems   Gastrointestinal: Positive for constipation. Negative for abdominal pain and diarrhea.        Anal pain   Skin: Positive for itching.       OBJECTIVE:     Vital Signs (Most Recent)  Blood Pressure (Abnormal) 193/93 (BP Location: Left arm, Patient Position: Sitting, BP Method: Large (Automatic))   Pulse 85   Height 5' 9" (1.753 m)   Weight 69.8 kg (153 lb 14.1 oz)   Last Menstrual Period 12/20/2016 (Approximate)   Body Mass Index 22.72 kg/m²     Physical Exam:  General: Black or  female in no distress   Neuro: Alert and oriented to person, place, and time.  Moves all extremities.     HEENT: No icterus.  Trachea midline  Respiratory: Respirations are even and unlabored, no cough or audible wheezing  Skin: Warm dry and intact, No visible rashes, no jaundice    Labs reviewed today:  Lab Results   Component Value Date    WBC 5.17 01/24/2022    HGB 11.7 (L) 01/24/2022    HCT 34.7 (L) 01/24/2022     01/24/2022    CHOL 188 01/19/2021    TRIG 57 01/19/2021     (H) 01/19/2021    ALT 18 01/24/2022    AST 16 01/24/2022     01/24/2022    K 4.0 01/24/2022     01/24/2022    CREATININE 1.2 01/24/2022    BUN 18 01/24/2022    CO2 27 01/24/2022    TSH 0.980 08/18/2021    INR 1.0 08/18/2021    HGBA1C 7.4 (H) 01/19/2021       Imaging reviewed today:  none    Endoscopy reviewed today:  Last Colonoscopy: Last Colonoscopy completed on 5/31/2017  - 8 mm hyperplastic polyp  - repeat in 10 years    Anorectal Exam:    Anal Skin: left fistula tract, right " anterior lateral perineal induration and tenderness    Digital Rectal Exam:  deferred    ASSESSMENT/PLAN:     Diagnoses and all orders for this visit:    Perianal abscess  -     sulfamethoxazole-trimethoprim 800-160mg (BACTRIM DS) 800-160 mg Tab; Take 1 tablet by mouth 2 (two) times daily. for 7 days    Perianal fistula  -     sulfamethoxazole-trimethoprim 800-160mg (BACTRIM DS) 800-160 mg Tab; Take 1 tablet by mouth 2 (two) times daily. for 7 days        The patient was instructed to:  Antibiotics 2x/day for 7 days  Follow up with MD tomorrow.       Laura Pabon, JENNIFFERP-C  Colon and Rectal Surgery

## 2022-04-12 NOTE — PROGRESS NOTES
CRS Office Visit History and Physical      SUBJECTIVE:     Chief Complaint: Possible abscess    History of Present Illness:  The patient is new patient to this practice.   Course is as follows:  Patient is a 51 y.o. female presents with soreness near her anus for 2 weeks.  Feels a lot of pain/pressure. + pain with BMs when it started, she was constipated when it started and had to push.  Tried hemorrhoid [place] and creams and Sitz soaks, no relief  Feels sweaty, no fevers.  No drainage there before.  Liver Txp (2013- MMF and Prograf)  No prior anorectal surgeries  C-scope May 2017 (Hyperplastic polyp)  Saw Laura yesterday, prescribed Bactrim, no difference yet  BMs usually regular, denies straining typically  + prior HSV infection, last Valcyte was Dec 2021      Review of patient's allergies indicates:   Allergen Reactions    Norvasc [amlodipine]      Severe headache    Bactrim [sulfamethoxazole-trimethoprim]      causes UTI    Doxycycline Rash    Pcn [penicillins] Rash       Past Medical History:   Diagnosis Date    Abnormal Pap smear of cervix     Anemia     Arthritis     Ascites     Asthma     Chronic back pain     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    Hypertension     Kidney stone     Lupus     Osteoporosis 12/2013    Prophylactic immunotherapy (transplant immunosuppression) 1/2/2014    SBP (spontaneous bacterial peritonitis)     history of      Past Surgical History:   Procedure Laterality Date    BREAST BIOPSY Left 08/2020    CHOLECYSTECTOMY      COLONOSCOPY N/A 5/31/2017    Procedure: COLONOSCOPY;  Surgeon: Marky Sun MD;  Location: [address]);  Service: Endoscopy;  Laterality: N/A;  PM prep.    ESOPHAGOGASTRODUODENOSCOPY      ESOPHAGOGASTRODUODENOSCOPY N/A 1/16/2019    Procedure: EGD (ESOPHAGOGASTRODUODENOSCOPY);   Surgeon: Deniz Guerra MD;  Location: [facility] (4TH FLR);  Service: Endoscopy;  Laterality: N/A;  labs prior, s/p liver transplant-MS    ESOPHAGOGASTRODUODENOSCOPY N/A 9/9/2020    Procedure: EGD (ESOPHAGOGASTRODUODENOSCOPY);  Surgeon: Davion Ríos MD;  Location: [facility] (4TH FLR);  Service: Endoscopy;  Laterality: N/A;  Please order the EGD at least 2 weeks after barium esophagram has been done EGD is for dysphagia  covid test 9/6-[facility]    FUNCTIONAL ENDOSCOPIC SINUS SURGERY (FESS) USING COMPUTER-ASSISTED NAVIGATION Bilateral 2/4/2021    Procedure: FESS, USING COMPUTER-ASSISTED NAVIGATION;  Surgeon: Delores Lewis MD;  Location: [facility];  Service: ENT;  Laterality: Bilateral;  saperatec WALDEMAR [phone] TEXTED HIM @ 2:45PM ON 1-8-2021  DISC LOADED YONY SANDERS 1-  RN PREOP 1/28/2021---COVID NEGATIVE ON 2/3--CONSENT INCOMPLETE  H/P INCOMPLETE  --CBC IN AM    LIVER BIOPSY      LIVER TRANSPLANT  12/31/2013    REFRACTIVE SURGERY Bilateral 2010    TUBAL LIGATION  2003    UPPER GASTROINTESTINAL ENDOSCOPY       Family History   Problem Relation Age of Onset    Hypertension Mother     Cataracts Mother     Diabetes Father     Alzheimer's disease Father     Diabetes Paternal Grandfather     Diabetes Paternal Grandmother     No Known Problems Maternal Grandmother     Bone cancer Maternal Grandfather     Breast cancer Maternal Aunt 55    Hypertension Brother     Diabetes Brother     No Known Problems Sister     No Known Problems Maternal Uncle     No Known Problems Paternal Aunt     No Known Problems Paternal Uncle     Stroke Neg Hx     Cancer Neg Hx     Colon cancer Neg Hx     Esophageal cancer Neg Hx     Stomach cancer Neg Hx     Rectal cancer Neg Hx     Amblyopia Neg Hx     Blindness Neg Hx     Glaucoma Neg Hx     Macular degeneration Neg Hx     Retinal detachment Neg Hx     Strabismus Neg Hx     Thyroid disease Neg Hx      Social History     Tobacco Use     "Smoking status: Never Smoker    Smokeless tobacco: Never Used    Tobacco comment: disability; ; 3 children   Substance Use Topics    Alcohol use: Yes     Alcohol/week: 1.0 standard drink     Types: 1 Glasses of wine per week     Comment: occasionally     Drug use: No        Review of Systems:  Review of Systems   All other systems reviewed and are negative.      OBJECTIVE:     Vital Signs (Most Recent)  BP (!) 142/82 (BP Location: Left arm, Patient Position: Sitting, BP Method: Large (Automatic))   Pulse 80   Ht 5' 9" (1.753 m)   Wt 70.3 kg (154 lb 15.7 oz)   LMP 12/20/2016 (Approximate)   BMI 22.89 kg/m²     Physical Exam:  General: Black or  female in no distress   Neuro: Alert and oriented x 4.  Moves all extremities.     HEENT: No icterus.  Trachea midline  Respiratory: Respirations are even and unlabored  Cardiac: Regular rate  Extremities: Warm dry and intact  Skin: No rashes    Anorectal:   External exam: Perianal ulceration (1-2cm) in left lateral location, smaller satellite area in left posterior and anterior midline. No ischiorectal or postanal tenderness.  Digital exam revealed normal tone. No palpable abscess or fistula opening, tolerated digital exam well, RV septum normal    Anoscopy:  Verbal consent was obtained.   Clear plastic anoscope inserted.    Distal rectal and anal mucosa normal, no additional ulcers.  Tolerated well.          ASSESSMENT/PLAN:     52yo F w/ h/o liver txp here with perianal ulcerations, clinically c/w possible HSV flare, no evidence of abscess and tolerates exam well   - Will try Valcyte x 1 week, RTC in 2 weeks for biopsy if not improved      Farrah Maxwell MD  Staff Surgeon, Colon and Rectal Surgery  [facility]    "

## 2022-04-13 ENCOUNTER — PATIENT MESSAGE (OUTPATIENT)
Dept: SURGERY | Facility: CLINIC | Age: 52
End: 2022-04-13
Payer: MEDICARE

## 2022-04-13 ENCOUNTER — OFFICE VISIT (OUTPATIENT)
Dept: SURGERY | Facility: OTHER | Age: 52
End: 2022-04-13
Attending: COLON & RECTAL SURGERY
Payer: MEDICARE

## 2022-04-13 VITALS
HEIGHT: 69 IN | WEIGHT: 155 LBS | SYSTOLIC BLOOD PRESSURE: 142 MMHG | BODY MASS INDEX: 22.96 KG/M2 | DIASTOLIC BLOOD PRESSURE: 82 MMHG | HEART RATE: 80 BPM

## 2022-04-13 DIAGNOSIS — K62.6 ANAL ULCERATION: Primary | ICD-10-CM

## 2022-04-13 DIAGNOSIS — B37.9 YEAST INFECTION: Primary | ICD-10-CM

## 2022-04-13 PROCEDURE — 1160F RVW MEDS BY RX/DR IN RCRD: CPT | Mod: CPTII,S$GLB,, | Performed by: COLON & RECTAL SURGERY

## 2022-04-13 PROCEDURE — 99213 OFFICE O/P EST LOW 20 MIN: CPT | Mod: 25,S$GLB,, | Performed by: COLON & RECTAL SURGERY

## 2022-04-13 PROCEDURE — 99999 PR PBB SHADOW E&M-EST. PATIENT-LVL V: ICD-10-PCS | Mod: PBBFAC,,, | Performed by: COLON & RECTAL SURGERY

## 2022-04-13 PROCEDURE — 3077F PR MOST RECENT SYSTOLIC BLOOD PRESSURE >= 140 MM HG: ICD-10-PCS | Mod: CPTII,S$GLB,, | Performed by: COLON & RECTAL SURGERY

## 2022-04-13 PROCEDURE — 46600 DIAGNOSTIC ANOSCOPY SPX: CPT | Mod: S$GLB,,, | Performed by: COLON & RECTAL SURGERY

## 2022-04-13 PROCEDURE — 46600 PR DIAG2STIC A2SCOPY: ICD-10-PCS | Mod: S$GLB,,, | Performed by: COLON & RECTAL SURGERY

## 2022-04-13 PROCEDURE — 4010F PR ACE/ARB THEARPY RXD/TAKEN: ICD-10-PCS | Mod: CPTII,S$GLB,, | Performed by: COLON & RECTAL SURGERY

## 2022-04-13 PROCEDURE — 3072F PR LOW RISK FOR RETINOPATHY: ICD-10-PCS | Mod: CPTII,S$GLB,, | Performed by: COLON & RECTAL SURGERY

## 2022-04-13 PROCEDURE — 3008F PR BODY MASS INDEX (BMI) DOCUMENTED: ICD-10-PCS | Mod: CPTII,S$GLB,, | Performed by: COLON & RECTAL SURGERY

## 2022-04-13 PROCEDURE — 99999 PR PBB SHADOW E&M-EST. PATIENT-LVL V: CPT | Mod: PBBFAC,,, | Performed by: COLON & RECTAL SURGERY

## 2022-04-13 PROCEDURE — 3072F LOW RISK FOR RETINOPATHY: CPT | Mod: CPTII,S$GLB,, | Performed by: COLON & RECTAL SURGERY

## 2022-04-13 PROCEDURE — 1159F MED LIST DOCD IN RCRD: CPT | Mod: CPTII,S$GLB,, | Performed by: COLON & RECTAL SURGERY

## 2022-04-13 PROCEDURE — 1160F PR REVIEW ALL MEDS BY PRESCRIBER/CLIN PHARMACIST DOCUMENTED: ICD-10-PCS | Mod: CPTII,S$GLB,, | Performed by: COLON & RECTAL SURGERY

## 2022-04-13 PROCEDURE — 3079F DIAST BP 80-89 MM HG: CPT | Mod: CPTII,S$GLB,, | Performed by: COLON & RECTAL SURGERY

## 2022-04-13 PROCEDURE — 3008F BODY MASS INDEX DOCD: CPT | Mod: CPTII,S$GLB,, | Performed by: COLON & RECTAL SURGERY

## 2022-04-13 PROCEDURE — 3079F PR MOST RECENT DIASTOLIC BLOOD PRESSURE 80-89 MM HG: ICD-10-PCS | Mod: CPTII,S$GLB,, | Performed by: COLON & RECTAL SURGERY

## 2022-04-13 PROCEDURE — 4010F ACE/ARB THERAPY RXD/TAKEN: CPT | Mod: CPTII,S$GLB,, | Performed by: COLON & RECTAL SURGERY

## 2022-04-13 PROCEDURE — 99213 PR OFFICE/OUTPT VISIT, EST, LEVL III, 20-29 MIN: ICD-10-PCS | Mod: 25,S$GLB,, | Performed by: COLON & RECTAL SURGERY

## 2022-04-13 PROCEDURE — 1159F PR MEDICATION LIST DOCUMENTED IN MEDICAL RECORD: ICD-10-PCS | Mod: CPTII,S$GLB,, | Performed by: COLON & RECTAL SURGERY

## 2022-04-13 PROCEDURE — 3077F SYST BP >= 140 MM HG: CPT | Mod: CPTII,S$GLB,, | Performed by: COLON & RECTAL SURGERY

## 2022-04-13 RX ORDER — FLUCONAZOLE 150 MG/1
TABLET ORAL
Qty: 1 TABLET | Refills: 0 | Status: SHIPPED | OUTPATIENT
Start: 2022-04-13

## 2022-04-27 ENCOUNTER — INFUSION (OUTPATIENT)
Dept: INFUSION THERAPY | Facility: HOSPITAL | Age: 52
End: 2022-04-27
Attending: INTERNAL MEDICINE
Payer: MEDICARE

## 2022-04-27 VITALS
HEIGHT: 69 IN | DIASTOLIC BLOOD PRESSURE: 87 MMHG | SYSTOLIC BLOOD PRESSURE: 159 MMHG | HEART RATE: 95 BPM | RESPIRATION RATE: 16 BRPM | WEIGHT: 156 LBS | BODY MASS INDEX: 23.11 KG/M2 | TEMPERATURE: 98 F | OXYGEN SATURATION: 98 %

## 2022-04-27 DIAGNOSIS — M32.9 SYSTEMIC LUPUS ERYTHEMATOSUS, UNSPECIFIED SLE TYPE, UNSPECIFIED ORGAN INVOLVEMENT STATUS: ICD-10-CM

## 2022-04-27 DIAGNOSIS — M81.0 OSTEOPOROSIS: Primary | ICD-10-CM

## 2022-04-27 PROCEDURE — 25000003 PHARM REV CODE 250: Performed by: INTERNAL MEDICINE

## 2022-04-27 PROCEDURE — 96375 TX/PRO/DX INJ NEW DRUG ADDON: CPT

## 2022-04-27 PROCEDURE — 96413 CHEMO IV INFUSION 1 HR: CPT

## 2022-04-27 PROCEDURE — 63600175 PHARM REV CODE 636 W HCPCS: Mod: JW,JG | Performed by: INTERNAL MEDICINE

## 2022-04-27 RX ORDER — DIPHENHYDRAMINE HYDROCHLORIDE 50 MG/ML
25 INJECTION INTRAMUSCULAR; INTRAVENOUS
Status: COMPLETED | OUTPATIENT
Start: 2022-04-27 | End: 2022-04-27

## 2022-04-27 RX ORDER — ACETAMINOPHEN 325 MG/1
650 TABLET ORAL
Status: COMPLETED | OUTPATIENT
Start: 2022-04-27 | End: 2022-04-27

## 2022-04-27 RX ADMIN — DIPHENHYDRAMINE HYDROCHLORIDE 25 MG: 50 INJECTION, SOLUTION INTRAMUSCULAR; INTRAVENOUS at 11:04

## 2022-04-27 RX ADMIN — BELIMUMAB 708 MG: 400 INJECTION, POWDER, LYOPHILIZED, FOR SOLUTION INTRAVENOUS at 12:04

## 2022-04-27 RX ADMIN — ACETAMINOPHEN 650 MG: 325 TABLET ORAL at 11:04

## 2022-04-27 NOTE — PLAN OF CARE
Pt arrived to unit. VSS. No new issues. Noted. Tolerated infusion. No reactions noted. Pt has next appt. Pt ambulated off unit. No distress noted.

## 2022-04-28 ENCOUNTER — PATIENT MESSAGE (OUTPATIENT)
Dept: ENDOCRINOLOGY | Facility: CLINIC | Age: 52
End: 2022-04-28
Payer: MEDICARE

## 2022-05-03 ENCOUNTER — PATIENT MESSAGE (OUTPATIENT)
Dept: TRANSPLANT | Facility: CLINIC | Age: 52
End: 2022-05-03
Payer: MEDICARE

## 2022-05-11 ENCOUNTER — PATIENT MESSAGE (OUTPATIENT)
Dept: RHEUMATOLOGY | Facility: CLINIC | Age: 52
End: 2022-05-11
Payer: MEDICARE

## 2022-05-11 ENCOUNTER — PATIENT MESSAGE (OUTPATIENT)
Dept: RESEARCH | Facility: CLINIC | Age: 52
End: 2022-05-11
Payer: MEDICARE

## 2022-05-16 ENCOUNTER — PATIENT MESSAGE (OUTPATIENT)
Dept: ENDOCRINOLOGY | Facility: CLINIC | Age: 52
End: 2022-05-16
Payer: MEDICARE

## 2022-05-16 ENCOUNTER — PATIENT MESSAGE (OUTPATIENT)
Dept: OBSTETRICS AND GYNECOLOGY | Facility: CLINIC | Age: 52
End: 2022-05-16
Payer: MEDICARE

## 2022-05-16 DIAGNOSIS — M81.0 OSTEOPOROSIS, UNSPECIFIED OSTEOPOROSIS TYPE, UNSPECIFIED PATHOLOGICAL FRACTURE PRESENCE: Primary | ICD-10-CM

## 2022-05-30 ENCOUNTER — PATIENT MESSAGE (OUTPATIENT)
Dept: OBSTETRICS AND GYNECOLOGY | Facility: CLINIC | Age: 52
End: 2022-05-30
Payer: MEDICARE

## 2022-05-30 ENCOUNTER — PATIENT MESSAGE (OUTPATIENT)
Dept: ENDOCRINOLOGY | Facility: CLINIC | Age: 52
End: 2022-05-30
Payer: MEDICARE

## 2022-06-08 ENCOUNTER — INFUSION (OUTPATIENT)
Dept: INFUSION THERAPY | Facility: HOSPITAL | Age: 52
End: 2022-06-08
Attending: INTERNAL MEDICINE
Payer: MEDICARE

## 2022-06-08 VITALS — HEIGHT: 69 IN | BODY MASS INDEX: 23.16 KG/M2 | WEIGHT: 156.38 LBS

## 2022-06-08 DIAGNOSIS — M32.9 SYSTEMIC LUPUS ERYTHEMATOSUS, UNSPECIFIED SLE TYPE, UNSPECIFIED ORGAN INVOLVEMENT STATUS: ICD-10-CM

## 2022-06-08 DIAGNOSIS — M81.0 OSTEOPOROSIS: Primary | ICD-10-CM

## 2022-06-08 PROCEDURE — 96374 THER/PROPH/DIAG INJ IV PUSH: CPT

## 2022-06-08 PROCEDURE — 96375 TX/PRO/DX INJ NEW DRUG ADDON: CPT

## 2022-06-08 PROCEDURE — 25000003 PHARM REV CODE 250: Performed by: INTERNAL MEDICINE

## 2022-06-08 PROCEDURE — 96413 CHEMO IV INFUSION 1 HR: CPT

## 2022-06-08 PROCEDURE — 63600175 PHARM REV CODE 636 W HCPCS: Performed by: INTERNAL MEDICINE

## 2022-06-08 RX ORDER — DIPHENHYDRAMINE HYDROCHLORIDE 50 MG/ML
25 INJECTION INTRAMUSCULAR; INTRAVENOUS
Status: COMPLETED | OUTPATIENT
Start: 2022-06-08 | End: 2022-06-08

## 2022-06-08 RX ORDER — ACETAMINOPHEN 325 MG/1
650 TABLET ORAL
Status: COMPLETED | OUTPATIENT
Start: 2022-06-08 | End: 2022-06-08

## 2022-06-08 RX ADMIN — BELIMUMAB 709 MG: 400 INJECTION, POWDER, LYOPHILIZED, FOR SOLUTION INTRAVENOUS at 01:06

## 2022-06-08 RX ADMIN — ACETAMINOPHEN 650 MG: 325 TABLET ORAL at 12:06

## 2022-06-08 RX ADMIN — DIPHENHYDRAMINE HYDROCHLORIDE 25 MG: 50 INJECTION INTRAMUSCULAR; INTRAVENOUS at 12:06

## 2022-06-08 NOTE — PLAN OF CARE
Pt presents to unit for maintenance Benlysta infusion. Endorses generalized fatigue today along with increasing pain and swelling to her left wrist where she previously had surgery. Will be starting physical therapy back up. No other complaints reported. Pt given pre-meds of PO Tylenol and Benadryl IVP. Benlysta then infused over 1 hour. Pt tolerated well. Has next upcoming appointments [facility]. Left unit in NAD at time of discharge.

## 2022-06-17 ENCOUNTER — PATIENT MESSAGE (OUTPATIENT)
Dept: RHEUMATOLOGY | Facility: CLINIC | Age: 52
End: 2022-06-17
Payer: MEDICARE

## 2022-06-22 ENCOUNTER — PATIENT MESSAGE (OUTPATIENT)
Dept: RHEUMATOLOGY | Facility: CLINIC | Age: 52
End: 2022-06-22
Payer: MEDICARE

## 2022-06-23 ENCOUNTER — PATIENT MESSAGE (OUTPATIENT)
Dept: OPTOMETRY | Facility: CLINIC | Age: 52
End: 2022-06-23
Payer: MEDICARE

## 2022-06-23 ENCOUNTER — OFFICE VISIT (OUTPATIENT)
Dept: RHEUMATOLOGY | Facility: CLINIC | Age: 52
End: 2022-06-23
Payer: MEDICARE

## 2022-06-23 ENCOUNTER — PATIENT MESSAGE (OUTPATIENT)
Dept: TRANSPLANT | Facility: CLINIC | Age: 52
End: 2022-06-23
Payer: MEDICARE

## 2022-06-23 ENCOUNTER — PATIENT MESSAGE (OUTPATIENT)
Dept: GASTROENTEROLOGY | Facility: CLINIC | Age: 52
End: 2022-06-23
Payer: MEDICARE

## 2022-06-23 ENCOUNTER — PATIENT MESSAGE (OUTPATIENT)
Dept: OTOLARYNGOLOGY | Facility: CLINIC | Age: 52
End: 2022-06-23
Payer: MEDICARE

## 2022-06-23 DIAGNOSIS — D84.9 IMMUNOSUPPRESSION: ICD-10-CM

## 2022-06-23 DIAGNOSIS — R53.83 FATIGUE, UNSPECIFIED TYPE: ICD-10-CM

## 2022-06-23 DIAGNOSIS — M32.9 SYSTEMIC LUPUS ERYTHEMATOSUS, UNSPECIFIED SLE TYPE, UNSPECIFIED ORGAN INVOLVEMENT STATUS: Primary | ICD-10-CM

## 2022-06-23 PROCEDURE — 3072F LOW RISK FOR RETINOPATHY: CPT | Mod: CPTII,95,, | Performed by: INTERNAL MEDICINE

## 2022-06-23 PROCEDURE — 1159F PR MEDICATION LIST DOCUMENTED IN MEDICAL RECORD: ICD-10-PCS | Mod: CPTII,95,, | Performed by: INTERNAL MEDICINE

## 2022-06-23 PROCEDURE — 99214 PR OFFICE/OUTPT VISIT, EST, LEVL IV, 30-39 MIN: ICD-10-PCS | Mod: 95,,, | Performed by: INTERNAL MEDICINE

## 2022-06-23 PROCEDURE — 4010F PR ACE/ARB THEARPY RXD/TAKEN: ICD-10-PCS | Mod: CPTII,95,, | Performed by: INTERNAL MEDICINE

## 2022-06-23 PROCEDURE — 99214 OFFICE O/P EST MOD 30 MIN: CPT | Mod: 95,,, | Performed by: INTERNAL MEDICINE

## 2022-06-23 PROCEDURE — 1160F RVW MEDS BY RX/DR IN RCRD: CPT | Mod: CPTII,95,, | Performed by: INTERNAL MEDICINE

## 2022-06-23 PROCEDURE — 4010F ACE/ARB THERAPY RXD/TAKEN: CPT | Mod: CPTII,95,, | Performed by: INTERNAL MEDICINE

## 2022-06-23 PROCEDURE — 1159F MED LIST DOCD IN RCRD: CPT | Mod: CPTII,95,, | Performed by: INTERNAL MEDICINE

## 2022-06-23 PROCEDURE — 1160F PR REVIEW ALL MEDS BY PRESCRIBER/CLIN PHARMACIST DOCUMENTED: ICD-10-PCS | Mod: CPTII,95,, | Performed by: INTERNAL MEDICINE

## 2022-06-23 PROCEDURE — 3072F PR LOW RISK FOR RETINOPATHY: ICD-10-PCS | Mod: CPTII,95,, | Performed by: INTERNAL MEDICINE
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
## 2022-06-23 NOTE — PROGRESS NOTES
Rapid3 Question Responses and Scores 6/22/2022   MDHAQ Score 1.2   Psychologic Score 3.3   Pain Score 5.5   When you awakened in the morning OVER THE LAST WEEK, did you feel stiff? Yes   If Yes, please indicate the number of hours until you are as limber as you will be for the day 2   Fatigue Score 10   Global Health Score 7.5   RAPID3 Score 5.67     Answers for HPI/ROS submitted by the patient on 6/22/2022  fever: No  eye redness: No  mouth sores: No  headaches: No  shortness of breath: Yes  chest pain: No  trouble swallowing: Yes  diarrhea: No  constipation: No  unexpected weight change: No  genital sore: No  dysuria: No  During the last 3 days, have you had a skin rash?: No  Bruises or bleeds easily: Yes  cough: No

## 2022-06-24 ENCOUNTER — LAB VISIT (OUTPATIENT)
Dept: LAB | Facility: HOSPITAL | Age: 52
End: 2022-06-24
Attending: INTERNAL MEDICINE
Payer: MEDICARE

## 2022-06-24 ENCOUNTER — OFFICE VISIT (OUTPATIENT)
Dept: OTOLARYNGOLOGY | Facility: CLINIC | Age: 52
End: 2022-06-24
Payer: MEDICARE

## 2022-06-24 VITALS — WEIGHT: 154.13 LBS | HEIGHT: 69 IN | BODY MASS INDEX: 22.83 KG/M2

## 2022-06-24 DIAGNOSIS — K21.9 GASTROESOPHAGEAL REFLUX DISEASE, UNSPECIFIED WHETHER ESOPHAGITIS PRESENT: ICD-10-CM

## 2022-06-24 DIAGNOSIS — M32.9 SYSTEMIC LUPUS ERYTHEMATOSUS, UNSPECIFIED SLE TYPE, UNSPECIFIED ORGAN INVOLVEMENT STATUS: ICD-10-CM

## 2022-06-24 DIAGNOSIS — M26.621 ARTHRALGIA OF RIGHT TEMPOROMANDIBULAR JOINT: ICD-10-CM

## 2022-06-24 DIAGNOSIS — Z98.890 STATUS POST FUNCTIONAL ENDOSCOPIC SINUS SURGERY (FESS): Primary | ICD-10-CM

## 2022-06-24 DIAGNOSIS — J30.2 SEASONAL ALLERGIC RHINITIS, UNSPECIFIED TRIGGER: ICD-10-CM

## 2022-06-24 DIAGNOSIS — R53.83 FATIGUE, UNSPECIFIED TYPE: ICD-10-CM

## 2022-06-24 DIAGNOSIS — D84.9 IMMUNOSUPPRESSION: ICD-10-CM

## 2022-06-24 DIAGNOSIS — H61.23 BILATERAL IMPACTED CERUMEN: ICD-10-CM

## 2022-06-24 LAB
BACTERIA #/AREA URNS HPF: NORMAL /HPF
BILIRUB UR QL STRIP: NEGATIVE
CLARITY UR: CLEAR
COLOR UR: YELLOW
CREAT UR-MCNC: 128.5 MG/DL (ref 15–325)
GLUCOSE UR QL STRIP: NEGATIVE
HGB UR QL STRIP: NEGATIVE
HYALINE CASTS #/AREA URNS LPF: 0 /LPF
KETONES UR QL STRIP: NEGATIVE
LEUKOCYTE ESTERASE UR QL STRIP: NEGATIVE
MICROSCOPIC COMMENT: NORMAL
NITRITE UR QL STRIP: NEGATIVE
PH UR STRIP: 7 [PH] (ref 5–8)
PROT UR QL STRIP: ABNORMAL
PROT UR-MCNC: 15 MG/DL
PROT/CREAT UR: 0.12 MG/G{CREAT} (ref 0–0.2)
RBC #/AREA URNS HPF: 0 /HPF (ref 0–4)
SP GR UR STRIP: 1.02 (ref 1–1.03)
URN SPEC COLLECT METH UR: ABNORMAL
UROBILINOGEN UR STRIP-ACNC: NEGATIVE EU/DL
WBC #/AREA URNS HPF: 1 /HPF (ref 0–5)

## 2022-06-24 PROCEDURE — 84156 ASSAY OF PROTEIN URINE: CPT | Performed by: INTERNAL MEDICINE

## 2022-06-24 PROCEDURE — 3008F BODY MASS INDEX DOCD: CPT | Mod: CPTII,S$GLB,, | Performed by: OTOLARYNGOLOGY

## 2022-06-24 PROCEDURE — 99214 PR OFFICE/OUTPT VISIT, EST, LEVL IV, 30-39 MIN: ICD-10-PCS | Mod: 25,S$GLB,, | Performed by: OTOLARYNGOLOGY

## 2022-06-24 PROCEDURE — 69210 PR REMOVAL IMPACTED CERUMEN REQUIRING INSTRUMENTATION, UNILATERAL: ICD-10-PCS | Mod: S$GLB,,, | Performed by: OTOLARYNGOLOGY

## 2022-06-24 PROCEDURE — 81000 URINALYSIS NONAUTO W/SCOPE: CPT | Performed by: INTERNAL MEDICINE

## 2022-06-24 PROCEDURE — 3072F LOW RISK FOR RETINOPATHY: CPT | Mod: CPTII,S$GLB,, | Performed by: OTOLARYNGOLOGY

## 2022-06-24 PROCEDURE — 99214 OFFICE O/P EST MOD 30 MIN: CPT | Mod: 25,S$GLB,, | Performed by: OTOLARYNGOLOGY

## 2022-06-24 PROCEDURE — 69210 REMOVE IMPACTED EAR WAX UNI: CPT | Mod: S$GLB,,, | Performed by: OTOLARYNGOLOGY

## 2022-06-24 PROCEDURE — 4010F PR ACE/ARB THEARPY RXD/TAKEN: ICD-10-PCS | Mod: CPTII,S$GLB,, | Performed by: OTOLARYNGOLOGY

## 2022-06-24 PROCEDURE — 3008F PR BODY MASS INDEX (BMI) DOCUMENTED: ICD-10-PCS | Mod: CPTII,S$GLB,, | Performed by: OTOLARYNGOLOGY

## 2022-06-24 PROCEDURE — 3072F PR LOW RISK FOR RETINOPATHY: ICD-10-PCS | Mod: CPTII,S$GLB,, | Performed by: OTOLARYNGOLOGY

## 2022-06-24 PROCEDURE — 4010F ACE/ARB THERAPY RXD/TAKEN: CPT | Mod: CPTII,S$GLB,, | Performed by: OTOLARYNGOLOGY

## 2022-06-24 RX ORDER — INSULIN ASPART 100 [IU]/ML
4 INJECTION, SOLUTION INTRAVENOUS; SUBCUTANEOUS
COMMUNITY
Start: 2022-01-20 | End: 2023-01-17 | Stop reason: CLARIF

## 2022-06-24 RX ORDER — TOPIRAMATE 50 MG/1
TABLET, FILM COATED ORAL
COMMUNITY
Start: 2022-06-10

## 2022-06-24 RX ORDER — DICYCLOMINE HYDROCHLORIDE 10 MG/1
CAPSULE ORAL
COMMUNITY
Start: 2022-01-20

## 2022-06-24 RX ORDER — HYOSCYAMINE SULFATE 0.38 MG/1
0.38 TABLET, EXTENDED RELEASE ORAL
COMMUNITY
Start: 2021-11-19 | End: 2023-01-17 | Stop reason: CLARIF

## 2022-06-24 RX ORDER — FLUTICASONE PROPIONATE 50 MCG
2 SPRAY, SUSPENSION (ML) NASAL 2 TIMES DAILY
Qty: 18.2 ML | Refills: 3 | Status: SHIPPED | OUTPATIENT
Start: 2022-06-24 | End: 2022-12-05

## 2022-06-24 RX ORDER — BUDESONIDE AND FORMOTEROL FUMARATE DIHYDRATE 160; 4.5 UG/1; UG/1
2 AEROSOL RESPIRATORY (INHALATION)
COMMUNITY
Start: 2022-04-28 | End: 2023-10-05

## 2022-06-24 RX ORDER — DIPHENOXYLATE HYDROCHLORIDE AND ATROPINE SULFATE 2.5; .025 MG/1; MG/1
1 TABLET ORAL 4 TIMES DAILY PRN
COMMUNITY
Start: 2021-11-19

## 2022-06-24 RX ORDER — AZELASTINE 1 MG/ML
1 SPRAY, METERED NASAL 2 TIMES DAILY
Qty: 30 ML | Refills: 3 | Status: SHIPPED | OUTPATIENT
Start: 2022-06-24 | End: 2022-10-27

## 2022-06-24 NOTE — PATIENT INSTRUCTIONS
"Information and instructions from your visit with me today:    Start using the following medication nasal sprays:   Fluticasone spray:    This medication is a steroid spray. It stays within the nose and does not have absorption into the body that leads to side effects that one has with oral steroid medication. Fluticasone nasal spray is the same as the Flonase brand nasal spray. Discuss with your pharmacist if the price is lower over the counter or with a prescription ( this varies depending on insurance). The medication that is over the counter is the same as the prescription medication. Use this medication as instructed on the prescription, 1-2 sprays on each side of your nose twice daily.     Azelastine  spray:  This medication is an antihistamine used to treat nasal symptoms of allergy, which works specifically in the nose unlike antihistamine pills which have more of an effect on the whole body. Use this medication as instructed on the prescription, 1 spray on each side of your nose twice daily.     Additional instructions for medication sprays  Place the tip of the medication bottle in your nose and aim slightly up and out on each side to get medication high and deep into your nose and sinuses, and not have it all deposit in the very front of your nose. Aim the tip of the nozzle towards the outer corner of your eye . You can imagine aiming towards the back of your eyeball on each side for this, as opposed to straight back to the center of your nose and head.     You need to use this medication every day regardless of symptoms, as it takes time ( a few weeks) to work and get the benefits. It does not work on an "as needed" basis like taking a decongestant. If your symptoms only occur in a particular season, then the medication can be used seasonally instead of year long. For seasonal symptoms, you should start using the spray twice daily a month before when you normally have symptoms ( for example, if symptoms " start in August, should start at the end of June).     Start nasal irrigations with saline solution- you can either use a rinse or a mist spray:        NASAL SALINE SPRAY ( simply saline and arm and hammer are examples) There are several different brands found in the cold and flu aisle of the pharmacy. You can use any brand of saline spray - this will deliver the saline by a gentle mist ( if you have difficulty or discomfort with nasal rinse/ a lot of fluid in the nose, this will be more comfortable).       Always rinse your nose with saline prior to using medication sprays and wait a couple of hours before using again. You can use the saline throughout the day to help with stuffy nose or dry nose.    Do not use nasal decongestant sprays such as Afrin or similar products long term ( over 3 days) .  This can cause long term physical nasal addiction. Afrin should only be used if having nose bleeds, severe nasal congestion , or severe ear pain/fullness and should not be used for more than 2-3 days in a row . It is a not a medication that should be used for a long period of time.     It was nice meeting you today, and I look forward to helping you feel better soon. Please don't hesitate to call if you have any other questions or concerns, or if I can be of any assistance in the meantime.      Delores Lewis MD    [facility]     Phone  [phone]    Fax      [phone]        Delores Lewis MD  Otorhinolaryngology

## 2022-06-24 NOTE — PROGRESS NOTES
OTOLARYNGOLOGY CLINIC NOTE  Date:  06/24/2022     Chief complaint:  Chief Complaint   Patient presents with    Other     Ear itching, and throat problems is returning a little       History of Present Illness  Valencia Dumont is a 52 y.o. female  presenting today for a followup.  Not doing any sprays right now  Throat started bothering her about a week ago and having to throat clear , feels like has some phlegm  Usually gets allergy issues around this time. Has heartburn, no congestion in nose  Both ears itchy but right ear has pain   Was given elavil by gi to help relax her throat muscles  Sometimes people have to repeat themselves when they are talking ot her but no drastic changes to hearing.   Lost everything during faby ; had tried lotrisone cream for ear in the past for pain . Also liekljesus has tmj arthritis. No purulent drainage from ear  Has xyzal at home , claritin ran out. Has not tired xyzal during this episode   Has to sniff through nose to get something   Warm things calm her throat down. Has been trying to drink theraflu  Takes protonix in am and pecpid at night also using tums prn     Had a sinus flare up since last visit and went to urgent care where she got a steroid shot.   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    Hypertension     Kidney stone     Lupus     Osteoporosis 12/2013    Prophylactic immunotherapy (transplant immunosuppression) 1/2/2014    SBP (spontaneous bacterial peritonitis)     history of         Past Surgical History  Past Surgical History:   Procedure Laterality Date    BREAST BIOPSY Left 08/2020    CHOLECYSTECTOMY       "COLONOSCOPY N/A 5/31/2017    Procedure: COLONOSCOPY;  Surgeon: Marky Sun MD;  Location: [facility] (4TH FLR);  Service: Endoscopy;  Laterality: N/A;  PM prep.    ESOPHAGOGASTRODUODENOSCOPY      ESOPHAGOGASTRODUODENOSCOPY N/A 1/16/2019    Procedure: EGD (ESOPHAGOGASTRODUODENOSCOPY);  Surgeon: Deniz Guerra MD;  Location: [facility] (4TH FLR);  Service: Endoscopy;  Laterality: N/A;  labs prior, s/p liver transplant-MS    ESOPHAGOGASTRODUODENOSCOPY N/A 9/9/2020    Procedure: EGD (ESOPHAGOGASTRODUODENOSCOPY);  Surgeon: Davion Ríos MD;  [facility]  Service: Endoscopy;  Laterality: N/A;  Please order the EGD at least 2 weeks after barium esophagram has been done EGD is for dysphagia  covid test 9/6-[facility] care    FUNCTIONAL ENDOSCOPIC SINUS SURGERY (FESS) USING COMPUTER-ASSISTED NAVIGATION Bilateral 2/4/2021    Procedure: FESS, USING COMPUTER-ASSISTED NAVIGATION;  Surgeon: Delores Lewis MD;  Location: [facility];  Service: ENT;  Laterality: Bilateral;  Watch[employer] WALDEMAR [phone] TEXTED HIM @ 2:45PM ON 1-8-2021  DISC LOADED BT TOMMY 1-  RN PREOP 1/28/2021---COVID NEGATIVE ON 2/3--CONSENT INCOMPLETE  H/P INCOMPLETE  --CBC IN AM    LIVER BIOPSY      LIVER TRANSPLANT  12/31/2013    REFRACTIVE SURGERY Bilateral 2010    TUBAL LIGATION  2003    UPPER GASTROINTESTINAL ENDOSCOPY          Medications  Current Outpatient Medications on File Prior to Visit   Medication Sig Dispense Refill    amitriptyline (ELAVIL) 10 MG tablet Take 1 tablet (10 mg total) by mouth every evening. 90 tablet 2    azelastine (ASTELIN) 137 mcg (0.1 %) nasal spray USE ONE SPRAY IN EACH NOSTRIL TWICE DAILY 30 mL 3    BD ULTRA-FINE JANESSA PEN NEEDLE 32 gauge x 5/32" Ndle For five times daily insulin injections 450 each 3    budesonide-formoterol 160-4.5 mcg (SYMBICORT) 160-4.5 mcg/actuation HFAA Inhale 2 puffs into the lungs.      ciclopirox (PENLAC) 8 % Soln Apply topically nightly. 1 Bottle 3    " clotrimazole-betamethasone 1-0.05% (LOTRISONE) cream APPLY TOPICALLY TO THE AFFECTED AREA TWICE DAILY FOR 10 DAYS      cyproheptadine (PERIACTIN) 4 mg tablet TAKE ONE TABLET BY MOUTH ONCE DAILY FOR APPETITE      diazepam (VALIUM) 5 MG tablet Take 5 mg by mouth 3 (three) times daily as needed.   0    diclofenac sodium (VOLTAREN) 1 % Gel Voltaren 1 % topical gel   APPLY 2 GRAM TO THE AFFECTED AREA(S) BY TOPICAL ROUTE 4 TIMES PER DAY      dicyclomine (BENTYL) 10 MG capsule TAKE 1 CAPSULE BY MOUTH EVERY 8 HOURS AS NEEDED for spasms      DILTIAZEM HCL (DILTIAZEM 2% CREAM) Apply topically 3 (three) times daily. Apply topically to anal area. 30 g 0    diphenoxylate-atropine 2.5-0.025 mg (LOMOTIL) 2.5-0.025 mg per tablet Take 1 tablet by mouth 4 (four) times daily as needed.      dulaglutide (TRULICITY) 0.75 mg/0.5 mL pen injector Inject 0.75 mg into the skin every 7 days. 12 pen 3    ergocalciferol (ERGOCALCIFEROL) 50,000 unit Cap Take 50,000 Units by mouth every 7 days.      erythromycin with ethanol (EMGEL) 2 % gel ADD 30GM (1 TUBE) TO THE SOAKING DEVICE AND ALLOW WATER TO AGITATE. PLACE AFFECTED AREAS INTO WATER AND SOAK FOR 10 MINUTES 1-2 TIMES DAILY  1    famotidine (PEPCID) 40 MG tablet       ferrous sulfate (FEOSOL) 325 mg (65 mg iron) Tab tablet Take 1 tablet (325 mg total) by mouth every 12 (twelve) hours. 60 tablet 4    flash glucose scanning reader (FREESTYLE CHUN 14 DAY READER) Misc 1 each by Misc.(Non-Drug; Combo Route) route once daily. 1 each 0    flash glucose sensor (FREESTYLE CHUN 14 DAY SENSOR) Kit 2 each by Misc.(Non-Drug; Combo Route) route every 14 (fourteen) days. 2 kit 11    fluconazole (DIFLUCAN) 150 MG Tab TAKE ONE TABLET BY MOUTH once for ONE dose 1 tablet 0    fluticasone propionate (FLONASE) 50 mcg/actuation nasal spray 1 spray by Each Nostril route 2 (two) times daily.      gabapentin (NEURONTIN) 300 MG capsule TAKE 1 CAPSULE BY MOUTH THREE TIMES DAILY      gentamicin  (GARAMYCIN) 0.1 % cream ADD 30GM (1 TUBE) TO THE SOAKING DEVICE AND ALLOW WATER TO AGITATE. PLACE AFFECTED AREAS INTO WATER AND SOAK FOR 10 MINUTES 1-2 TIMES DAILY  1    hydrocortisone (ANUSOL-HC) 2.5 % rectal cream Place rectally 2 (two) times daily. Apply per rectum bid 30 g 3    hydrOXYchloroQUINE (PLAQUENIL) 200 mg tablet TAKE ONE TABLET BY MOUTH TWICE DAILY. 180 tablet 1    hydrOXYzine HCl (ATARAX) 10 MG Tab TAKE 1 OR 2 TABLETS BY MOUTH THREE TIMES DAILY AS NEEDED FOR ITCHING.  0    hyoscyamine (LEVBID) 0.375 mg Tb12 Take 0.375 mg by mouth.      insulin aspart U-100 (NOVOLOG) 100 unit/mL (3 mL) InPn pen Inject 4 Units into the skin.      insulin detemir U-100 (LEVEMIR FLEXTOUCH U-100 INSULN) 100 unit/mL (3 mL) InPn pen INJECT FOUR units UNDER THE SKIN TWICE DAILY 45 mL 3    insulin lispro (HUMALOG KWIKPEN INSULIN) 100 unit/mL pen 3 units before BREAKFAST and lunch and FIVE units before dinner with sliding scale, up to 25 units daily 45 mL 3    isosorbide mononitrate (IMDUR) 30 MG 24 hr tablet Take 30 mg by mouth once daily.      ketoconazole (NIZORAL) 2 % cream ADD 30GM (1/2 TUBE) TO THE SOAKING DEVICE AND ALLOW WATER TO AGITATE. PLACE AFFECTED AREAS INTO WATER AND SOAK FOR 10 MINUTES 1-2 TIMES BRENDON  1    Lactobac. rhamnosus GG-inulin 10 billion cell -200 mg Cap Take 1 capsule by mouth 2 (two) times daily. 30 capsule 0    levocetirizine (XYZAL) 5 MG tablet Take 5 mg by mouth every evening.  3    levoFLOXacin (LEVAQUIN) 750 MG tablet levofloxacin 750 mg tablet      losartan (COZAAR) 100 MG tablet Take 1 tablet (100 mg total) by mouth once daily. 30 tablet 2    magnesium oxide 500 mg Tab Take 500 mg by mouth 2 (two) times daily. 60 each 6    meclizine (ANTIVERT) 25 mg tablet TAKE ONE TABLET BY MOUTH AT BEDTIME AS NEEDED for dizziness.  0    meloxicam (MOBIC) 15 MG tablet       mometasone 0.1% (ELOCON) 0.1 % cream APPLY TOPICALLY TO THE FACE TWICE DAILY FOR ONE WEEK      MULTIVIT,THER IRON,CA,FA  & MIN (MULTIVITAMIN) Tab Take 1 tablet by mouth once daily. 30 tablet 0    mycophenolate (MYFORTIC) 180 MG TbEC Take 2 tablets (360 mg total) by mouth 2 (two) times daily. 120 tablet 11    neomycin-polymyxin-hydrocortisone (CORTISPORIN) otic solution INSTILL TWO DROPS INTO BOTH EARS FOUR TIMES DAILY FOR 10 DAYS  0    NURTEC 75 mg odt PLACE ONE TABLET on tongue, alternatively UNDER THE TONGUE ONCE DAILY AS NEEDED FOR MIGRAINE 8 tablet 2    nystatin (MYCOSTATIN) 100,000 unit/mL suspension SWISH AND SPIT FIVE MILLILITERS BY MOUTH FOUR TIMES DAILY FOR 7 DAYS.  1    ondansetron (ZOFRAN) 4 MG tablet TAKE TWO TABLETS BY MOUTH EVERY 8 HOURS AS NEEDED FOR NAUSEA.      oxycodone-acetaminophen (PERCOCET) 7.5-325 mg per tablet Take 1 tablet by mouth every 4 (four) hours as needed for Pain.      polyethylene glycol (GLYCOLAX) 17 gram/dose powder Mix 1 capful (17 g) with liquid and by mouth 2 (two) times daily. 1530 g 11    PROAIR HFA 90 mcg/actuation inhaler Inhale 2 puffs into the lungs 3 (three) times daily as needed.   3    promethazine-dextromethorphan (PROMETHAZINE-DM) 6.25-15 mg/5 mL Syrp Take by mouth 2 (two) times daily as needed.   0    rosuvastatin (CRESTOR) 5 MG tablet Take 5 mg by mouth once daily.      sertraline (ZOLOFT) 50 MG tablet Take 50 mg by mouth once daily.  11    SSD 1 % cream 1 application 2 (two) times daily. Apply to affected area  0    tacrolimus (PROGRAF) 1 MG Cap Take 2 capsules (2 mg total) by mouth every 12 (twelve) hours. 120 capsule 11    topiramate (TOPAMAX) 50 MG tablet SMARTSI Tablet(s) By Mouth Every Evening      triamcinolone acetonide 0.1% (KENALOG) 0.1 % cream Apply topically 2 times daily 30 g 0    valACYclovir (VALTREX) 500 MG tablet TAKE ONE TABLET BY MOUTH TWICE DAILY FOR 3 DAYS. 6 tablet 2    verapamil (CALAN-SR) 120 MG CR tablet TAKE ONE TABLET ONCE DAILY FOR BLOOD PRESSURE  3    zolpidem (AMBIEN) 10 mg Tab Take 10 mg by mouth nightly as needed.  3     blood-glucose meter,continuous (DEXCOM G6 ) Misc 1 each by Misc.(Non-Drug; Combo Route) route once. for 1 dose 1 each 0    blood-glucose transmitter (DEXCOM G6 TRANSMITTER) Karen 1 each by Misc.(Non-Drug; Combo Route) route once a week 1 Device 3    buPROPion (WELLBUTRIN XL) 150 MG TB24 tablet Take 300 mg by mouth.      estradiol (YUVAFEM) 10 mcg Tab Place 1 tablet (10 mcg total) vaginally twice a week. 8 tablet 11    loratadine (CLARITIN) 10 mg tablet Take 1 tablet (10 mg total) by mouth once daily. 30 tablet 0    nystatin-triamcinolone (MYCOLOG II) cream Apply to affected area 2 times daily 30 g 1    pantoprazole (PROTONIX) 40 MG tablet Take 1 tablet (40 mg total) by mouth 2 (two) times daily. Take immediately in am when wake up and then 30 minutes prior to dinner 60 tablet 11     No current facility-administered medications on file prior to visit.       Review of Systems  Review of Systems   Constitutional: Positive for malaise/fatigue.   HENT: Positive for ear pain and sore throat.    Eyes: Negative.    Respiratory: Positive for shortness of breath.    Cardiovascular: Negative.    Genitourinary: Negative.    Musculoskeletal: Positive for back pain and neck pain.   Skin: Negative.    Neurological: Positive for headaches.   Endo/Heme/Allergies: Bruises/bleeds easily.   Psychiatric/Behavioral: Positive for depression. The patient is nervous/anxious.     Answers for HPI/ROS submitted by the patient on 6/24/2022  appetite change : Yes  trouble swallowing: Yes  Acid Reflux?: Yes  Muscle aches / pain?: Yes  None of these: Yes  Cold all of the time? : Yes  Hot all of the time? : Yes    Social History   reports that she has never smoked. She has never used smokeless tobacco. She reports current alcohol use of about 1.0 standard drink of alcohol per week. She reports that she does not use drugs.     Family History  Family History   Problem Relation Age of Onset    Hypertension Mother     Cataracts Mother   "   Diabetes Father     Alzheimer's disease Father     Diabetes Paternal Grandfather     Diabetes Paternal Grandmother     No Known Problems Maternal Grandmother     Bone cancer Maternal Grandfather     Breast cancer Maternal Aunt 55    Hypertension Brother     Diabetes Brother     No Known Problems Sister     No Known Problems Maternal Uncle     No Known Problems Paternal Aunt     No Known Problems Paternal Uncle     Stroke Neg Hx     Cancer Neg Hx     Colon cancer Neg Hx     Esophageal cancer Neg Hx     Stomach cancer Neg Hx     Rectal cancer Neg Hx     Amblyopia Neg Hx     Blindness Neg Hx     Glaucoma Neg Hx     Macular degeneration Neg Hx     Retinal detachment Neg Hx     Strabismus Neg Hx     Thyroid disease Neg Hx         Physical Exam   There were no vitals filed for this visit. Body mass index is 22.76 kg/m².  Weight: 69.9 kg (154 lb 1.6 oz)   Height: 5' 9" (175.3 cm)     GENERAL: no acute distress.  HEAD: normocephalic.   EYES: No scleral icterus  EARS: external ear without lesion, normal pinna shape and position.  External auditory canal with bilateral impacted cerumen  NOSE: external nose without significant bony abnormality, septum midline, mild-moderate turbinate hypertrophy, no purulent drainage  ORAL CAVITY/OROPHARYNX: tongue mobile. No erythema nor exudate in throat  NECK: trachea midline.   RESPIRATORY: no stridor, no stertor. Voice normal. Respirations nonlabored.  NEURO: alert, responds to questions appropriately.    PSYCH:mood appropriate    Procedure Note   Procedure performed:microscopic examination of ears with cerumen disimpaction    Indication for procedure: bilateral cerumen impaction     Description of procedure:  After verbal consent was obtained, the patient was positioned in semi recumbent position and speculum was placed in the right ear and microscope brought into the field.  The microscope was positioned and magnification adjusted for appropriate " visualization. Otologic instruments including various size otologic suctions and curette were used to remove the cerumen from the right external auditory canals under visualization with the operating microscope. After cleaning, visualization was again performed and at various levels of higher magnification to optimize views of the ear canal, tympanic membrane, ossicles and middle ear space. The same procedure was then repeated for the left ear. Findings as indicated below. All portions of the procedure and examination by otomicroscopy were tolerated well without complication.     Findings:  Right ear: Complete cerumen impaction removed entirely revealing normal external auditory canal; tympanic membrane without bulging, retraction, or perforation; no evidence of middle ear fluid or effusion.   Left ear: Complete cerumen impaction removed entirely revealing normal external auditory canal; tympanic membrane without bulging, retraction, or perforation; no evidence of middle ear fluid or effusion.       Imaging:  The patient does not have any new imaging of the head and neck since last visit.     Labs:  CBC  Recent Labs   Lab 08/18/21  1100 01/24/22  1128 06/24/22  0750   WBC 5.00 5.17 4.79   Hemoglobin 11.6 L 11.7 L 11.8 L   Hematocrit 33.2 L 34.7 L 34.3 L   MCV 80 L 80 L 83   Platelets 221 200 215     BMP  Recent Labs   Lab 05/13/20  1111 06/11/20  1121 04/19/21  1005 05/20/21  0954 07/27/21  0836 08/18/21  1100 01/24/22  1138 06/24/22  0750   Glucose 108   < > 125 H   < > 148 H 224 H 223 H 134 H   Sodium 140   < > 140   < > 140 138 139 140   Potassium 4.3   < > 4.0   < > 4.5 3.9 4.0 3.8   Chloride 104   < > 105   < > 105 105 104 106   CO2 29   < > 28   < > 28 24 27 26   BUN 21 H   < > 25 H   < > 26 H 24 H 18 24 H   Creatinine 1.4   < > 1.6 H   < > 1.6 H 1.6 H 1.2 1.5 H   Calcium 9.8   < > 9.2   < > 9.2 9.2 9.0 9.3   Phosphorus 2.7  --   --   --   --   --   --   --    Magnesium  --    < > 2.1  --  2.0  --  1.5 L  --      < > = values in this interval not displayed.     COAGS  Recent Labs   Lab 07/22/20  0826 08/18/21  1100   INR 0.9 1.0       Assessment  1. Status post functional endoscopic sinus surgery (FESS)    2. Arthralgia of right temporomandibular joint    3. Bilateral impacted cerumen    4. Seasonal allergic rhinitis, unspecified trigger    5. Gastroesophageal reflux disease, unspecified whether esophagitis present       Plan:  Discussed plan of care with patient in detail and all questions answered. Patient reported understanding of plan of care.   Needs updated hearing test- had asymmetric hearing loss, last test march 2021  Does not want scope; since only 1 week with symptoms will try nasal sprays first and if no improvement will scope at follow up  Itching improved after ear cleaning  Offered to send her to pt for dry needling for tmj  Emphasized importance of nasal sprays for symptom control and when she is feeling sinus symptoms. She has had a FESS therefore rinses/sprays are most effective treatment and also do not have systemic side effects of steroid . Instructions for spray administration and saline given in verbal and written format    I spent a total of 32 minutes on the day of the visit.  This includes face to face time (25 minutes) and non-face to face time preparing to see the patient (eg, review of tests), obtaining and/or reviewing separately obtained history, documenting clinical information in the electronic or other health record, independently interpreting results and communicating results to the patient/family/caregiver, or care coordinator.   Please be aware that this note has been generated with the assistance of MModal voice-to-text.  Please excuse any spelling or grammatical errors.

## 2022-06-29 ENCOUNTER — PATIENT MESSAGE (OUTPATIENT)
Dept: RHEUMATOLOGY | Facility: CLINIC | Age: 52
End: 2022-06-29
Payer: MEDICARE

## 2022-06-30 ENCOUNTER — PATIENT MESSAGE (OUTPATIENT)
Dept: RHEUMATOLOGY | Facility: CLINIC | Age: 52
End: 2022-06-30
Payer: MEDICARE

## 2022-07-05 ENCOUNTER — CLINICAL SUPPORT (OUTPATIENT)
Dept: OPHTHALMOLOGY | Facility: CLINIC | Age: 52
End: 2022-07-05
Payer: MEDICARE

## 2022-07-05 DIAGNOSIS — Z79.899 LONG-TERM USE OF PLAQUENIL: ICD-10-CM

## 2022-07-05 DIAGNOSIS — M32.9 SYSTEMIC LUPUS ERYTHEMATOSUS, UNSPECIFIED SLE TYPE, UNSPECIFIED ORGAN INVOLVEMENT STATUS: ICD-10-CM

## 2022-07-05 NOTE — PROGRESS NOTES
Visual field test done.  Patient stated no latex allergies used coverlet      [place] [place] [address]  Phone: [phone]  Fax: [phone]     Patient Name: Valencia Dumont  MRN: [id number] Date: 7/5/2022        Glasses Prescription     Sphere Cylinder Axis Add   Right -2.75 +0.50 180 +2.00   Left -1.25 +0.50 015 +2.00   Expiration Date: 5/5/2022

## 2022-07-06 ENCOUNTER — INFUSION (OUTPATIENT)
Dept: INFUSION THERAPY | Facility: HOSPITAL | Age: 52
End: 2022-07-06
Attending: INTERNAL MEDICINE
Payer: MEDICARE

## 2022-07-06 DIAGNOSIS — M32.9 SYSTEMIC LUPUS ERYTHEMATOSUS, UNSPECIFIED SLE TYPE, UNSPECIFIED ORGAN INVOLVEMENT STATUS: ICD-10-CM

## 2022-07-06 DIAGNOSIS — M81.0 OSTEOPOROSIS: Primary | ICD-10-CM

## 2022-07-06 PROCEDURE — 96413 CHEMO IV INFUSION 1 HR: CPT

## 2022-07-06 PROCEDURE — 96365 THER/PROPH/DIAG IV INF INIT: CPT

## 2022-07-06 PROCEDURE — 96374 THER/PROPH/DIAG INJ IV PUSH: CPT

## 2022-07-06 PROCEDURE — 96375 TX/PRO/DX INJ NEW DRUG ADDON: CPT

## 2022-07-06 PROCEDURE — 25000003 PHARM REV CODE 250: Performed by: INTERNAL MEDICINE

## 2022-07-06 PROCEDURE — 63600175 PHARM REV CODE 636 W HCPCS: Performed by: INTERNAL MEDICINE

## 2022-07-06 RX ORDER — ACETAMINOPHEN 325 MG/1
650 TABLET ORAL
Status: COMPLETED | OUTPATIENT
Start: 2022-07-06 | End: 2022-07-06

## 2022-07-06 RX ORDER — DIPHENHYDRAMINE HYDROCHLORIDE 50 MG/ML
25 INJECTION INTRAMUSCULAR; INTRAVENOUS
Status: COMPLETED | OUTPATIENT
Start: 2022-07-06 | End: 2022-07-06

## 2022-07-06 RX ADMIN — BELIMUMAB 709 MG: 400 INJECTION, POWDER, LYOPHILIZED, FOR SOLUTION INTRAVENOUS at 12:07

## 2022-07-06 RX ADMIN — DIPHENHYDRAMINE HYDROCHLORIDE 25 MG: 50 INJECTION INTRAMUSCULAR; INTRAVENOUS at 11:07

## 2022-07-06 RX ADMIN — ACETAMINOPHEN 650 MG: 325 TABLET ORAL at 11:07

## 2022-07-06 NOTE — PLAN OF CARE
Pt presents to unit for maintenance Benlysta infusion. Endorses generalized fatigue today but otherwise doing well overall. No other complaints reported. Pt given pre-meds of PO Tylenol and Benadryl IVP. Benlysta then infused over 1 hour. Pt tolerated well. Has next upcoming appointments through [facility]. Left unit in NAD at time of discharge.

## 2022-07-07 ENCOUNTER — OFFICE VISIT (OUTPATIENT)
Dept: OPTOMETRY | Facility: CLINIC | Age: 52
End: 2022-07-07
Payer: MEDICARE

## 2022-07-07 DIAGNOSIS — E11.9 TYPE 2 DIABETES MELLITUS WITHOUT RETINOPATHY: ICD-10-CM

## 2022-07-07 DIAGNOSIS — Z79.899 LONG-TERM USE OF PLAQUENIL: ICD-10-CM

## 2022-07-07 DIAGNOSIS — M32.9 SYSTEMIC LUPUS ERYTHEMATOSUS, UNSPECIFIED SLE TYPE, UNSPECIFIED ORGAN INVOLVEMENT STATUS: Primary | ICD-10-CM

## 2022-07-07 DIAGNOSIS — H52.7 REFRACTIVE ERROR: ICD-10-CM

## 2022-07-07 PROCEDURE — 4010F PR ACE/ARB THEARPY RXD/TAKEN: ICD-10-PCS | Mod: CPTII,S$GLB,, | Performed by: OPTOMETRIST

## 2022-07-07 PROCEDURE — 2023F PR DILATED RETINAL EXAM W/O EVID OF RETINOPATHY: ICD-10-PCS | Mod: CPTII,S$GLB,, | Performed by: OPTOMETRIST

## 2022-07-07 PROCEDURE — 1160F RVW MEDS BY RX/DR IN RCRD: CPT | Mod: CPTII,S$GLB,, | Performed by: OPTOMETRIST

## 2022-07-07 PROCEDURE — 99999 PR PBB SHADOW E&M-EST. PATIENT-LVL III: ICD-10-PCS | Mod: PBBFAC,,, | Performed by: OPTOMETRIST

## 2022-07-07 PROCEDURE — 2023F DILAT RTA XM W/O RTNOPTHY: CPT | Mod: CPTII,S$GLB,, | Performed by: OPTOMETRIST

## 2022-07-07 PROCEDURE — 92014 PR EYE EXAM, EST PATIENT,COMPREHESV: ICD-10-PCS | Mod: S$GLB,,, | Performed by: OPTOMETRIST

## 2022-07-07 PROCEDURE — 92015 DETERMINE REFRACTIVE STATE: CPT | Mod: S$GLB,,, | Performed by: OPTOMETRIST

## 2022-07-07 PROCEDURE — 99999 PR PBB SHADOW E&M-EST. PATIENT-LVL III: CPT | Mod: PBBFAC,,, | Performed by: OPTOMETRIST

## 2022-07-07 PROCEDURE — 1159F MED LIST DOCD IN RCRD: CPT | Mod: CPTII,S$GLB,, | Performed by: OPTOMETRIST

## 2022-07-07 PROCEDURE — 1159F PR MEDICATION LIST DOCUMENTED IN MEDICAL RECORD: ICD-10-PCS | Mod: CPTII,S$GLB,, | Performed by: OPTOMETRIST

## 2022-07-07 PROCEDURE — 92015 PR REFRACTION: ICD-10-PCS | Mod: S$GLB,,, | Performed by: OPTOMETRIST

## 2022-07-07 PROCEDURE — 1160F PR REVIEW ALL MEDS BY PRESCRIBER/CLIN PHARMACIST DOCUMENTED: ICD-10-PCS | Mod: CPTII,S$GLB,, | Performed by: OPTOMETRIST

## 2022-07-07 PROCEDURE — 4010F ACE/ARB THERAPY RXD/TAKEN: CPT | Mod: CPTII,S$GLB,, | Performed by: OPTOMETRIST

## 2022-07-07 PROCEDURE — 92014 COMPRE OPH EXAM EST PT 1/>: CPT | Mod: S$GLB,,, | Performed by: OPTOMETRIST

## 2022-07-07 NOTE — LETTER
July 7, 2022        Kristin Marcial MD  [address]  [place] [address]             [facility]  [address]  [place] [address]  Phone: [phone]  Fax: [phone]   Patient: Valencia Dumont   MR Number: [id number]   YOB: 1970   Date of Visit: 7/7/2022       Dear Dr. Marcial:    I saw your patient, Valencia Dumont, today for evaluation. Attached you will find relevant portions of my assessment and plan of care.       If you have questions, please do not hesitate to call me. I look forward to following Valencia Dumont along with you.    Sincerely,      Tiarra Murillo, OD            CC  No Recipients    Enclosure

## 2022-07-07 NOTE — PROGRESS NOTES
Subjective:       Patient ID: Valencia Dumont is a 52 y.o. female      Chief Complaint   Patient presents with    Concerns About Ocular Health    Plaquenil Eye Exam    Diabetic Eye Exam     History of Present Illness  Dls: 7/23/20 Dr. Murillo     53 y/o female presents today for diabetic eye exam and plaquenil ck.  Pt states no changes in vision. Pt wears single vision glasses for distance.     LBS 120x 1 day     No tearing  No itching   No burning  No pain  No ha's  No floaters  No flashes    Eye meds  None    Hemoglobin A1C       Date                     Value               Ref Range             Status                01/19/2021               7.4 (H)             4.0 - 5.6 %           Final                 05/13/2020               7.2 (H)             4.0 - 5.6 %           Final                 12/11/2019               8.1 (H)             4.0 - 5.6 %           Final                 Assessment/Plan:     1. Systemic lupus erythematosus, unspecified SLE type, unspecified organ involvement status  2. Long-term use of Plaquenil  No evidence of plaquenil maculopathy on exam, can continue with plaquenil therapy. OCT mac and HVF WNL OU today. Color vision normal. Return in 1 years for DFE, HVF 10-2, and OCT macula.     3. Type 2 diabetes mellitus without retinopathy  No diabetic retinopathy. Discussed with pt the effects of diabetes on vision, importance of good blood sugar control, compliance with meds, and follow up care with PCP. Return in 1 year for dilated eye exam, sooner PRN.    4. Refractive error  Educated patient on refractive error and discussed lens options. Dispensed updated spectacle Rx. Educated about adaptation period to new specs.    Eyeglass Final Rx     Eyeglass Final Rx       Sphere Cylinder Axis Add    Right -2.50 +0.50 180 +2.00    Left -1.25 +0.75 005 +2.00    Expiration Date: 7/7/2023                  Follow up in about 1 year (around 7/7/2023) for Plaquenil check, HVF 10-2, OCT macula.

## 2022-07-12 ENCOUNTER — HOSPITAL ENCOUNTER (OUTPATIENT)
Dept: RADIOLOGY | Facility: CLINIC | Age: 52
Discharge: HOME | End: 2022-07-12
Attending: INTERNAL MEDICINE
Payer: MEDICARE

## 2022-07-12 DIAGNOSIS — M81.0 OSTEOPOROSIS, UNSPECIFIED OSTEOPOROSIS TYPE, UNSPECIFIED PATHOLOGICAL FRACTURE PRESENCE: ICD-10-CM

## 2022-07-12 PROCEDURE — 77080 DXA BONE DENSITY AXIAL: CPT | Mod: 26,,, | Performed by: INTERNAL MEDICINE

## 2022-07-12 PROCEDURE — 77080 DXA BONE DENSITY AXIAL: CPT | Mod: TC,PO

## 2022-07-12 PROCEDURE — 77080 DEXA BONE DENSITY SPINE HIP: ICD-10-PCS | Mod: 26,,, | Performed by: INTERNAL MEDICINE

## 2022-07-19 ENCOUNTER — PATIENT MESSAGE (OUTPATIENT)
Dept: ENDOCRINOLOGY | Facility: CLINIC | Age: 52
End: 2022-07-19
Payer: MEDICARE

## 2022-07-19 DIAGNOSIS — M81.0 OSTEOPOROSIS, UNSPECIFIED OSTEOPOROSIS TYPE, UNSPECIFIED PATHOLOGICAL FRACTURE PRESENCE: Primary | ICD-10-CM

## 2022-07-20 ENCOUNTER — PATIENT MESSAGE (OUTPATIENT)
Dept: ENDOCRINOLOGY | Facility: CLINIC | Age: 52
End: 2022-07-20
Payer: MEDICARE

## 2022-07-20 ENCOUNTER — PATIENT MESSAGE (OUTPATIENT)
Dept: OBSTETRICS AND GYNECOLOGY | Facility: CLINIC | Age: 52
End: 2022-07-20
Payer: MEDICARE

## 2022-07-26 ENCOUNTER — PATIENT MESSAGE (OUTPATIENT)
Dept: PODIATRY | Facility: CLINIC | Age: 52
End: 2022-07-26
Payer: MEDICARE

## 2022-07-29 ENCOUNTER — PATIENT MESSAGE (OUTPATIENT)
Dept: TRANSPLANT | Facility: CLINIC | Age: 52
End: 2022-07-29
Payer: MEDICARE

## 2022-08-03 ENCOUNTER — INFUSION (OUTPATIENT)
Dept: INFUSION THERAPY | Facility: HOSPITAL | Age: 52
End: 2022-08-03
Attending: INTERNAL MEDICINE
Payer: MEDICARE

## 2022-08-03 VITALS
WEIGHT: 157.19 LBS | HEART RATE: 78 BPM | RESPIRATION RATE: 16 BRPM | DIASTOLIC BLOOD PRESSURE: 79 MMHG | BODY MASS INDEX: 23.21 KG/M2 | SYSTOLIC BLOOD PRESSURE: 164 MMHG | OXYGEN SATURATION: 100 %

## 2022-08-03 DIAGNOSIS — M32.9 SYSTEMIC LUPUS ERYTHEMATOSUS, UNSPECIFIED SLE TYPE, UNSPECIFIED ORGAN INVOLVEMENT STATUS: ICD-10-CM

## 2022-08-03 DIAGNOSIS — M81.0 OSTEOPOROSIS: Primary | ICD-10-CM

## 2022-08-03 PROCEDURE — 63600175 PHARM REV CODE 636 W HCPCS: Mod: JA,JG | Performed by: INTERNAL MEDICINE

## 2022-08-03 PROCEDURE — 96413 CHEMO IV INFUSION 1 HR: CPT

## 2022-08-03 PROCEDURE — 96374 THER/PROPH/DIAG INJ IV PUSH: CPT

## 2022-08-03 PROCEDURE — 25000003 PHARM REV CODE 250: Performed by: INTERNAL MEDICINE

## 2022-08-03 PROCEDURE — 96375 TX/PRO/DX INJ NEW DRUG ADDON: CPT

## 2022-08-03 RX ORDER — ACETAMINOPHEN 325 MG/1
650 TABLET ORAL
Status: COMPLETED | OUTPATIENT
Start: 2022-08-03 | End: 2022-08-03

## 2022-08-03 RX ORDER — DIPHENHYDRAMINE HYDROCHLORIDE 50 MG/ML
25 INJECTION INTRAMUSCULAR; INTRAVENOUS
Status: COMPLETED | OUTPATIENT
Start: 2022-08-03 | End: 2022-08-03

## 2022-08-03 RX ADMIN — BELIMUMAB 713 MG: 400 INJECTION, POWDER, LYOPHILIZED, FOR SOLUTION INTRAVENOUS at 12:08

## 2022-08-03 RX ADMIN — DIPHENHYDRAMINE HYDROCHLORIDE 25 MG: 50 INJECTION, SOLUTION INTRAMUSCULAR; INTRAVENOUS at 11:08

## 2022-08-03 RX ADMIN — ACETAMINOPHEN 650 MG: 325 TABLET ORAL at 11:08

## 2022-08-04 ENCOUNTER — PATIENT MESSAGE (OUTPATIENT)
Dept: TRANSPLANT | Facility: CLINIC | Age: 52
End: 2022-08-04
Payer: MEDICARE

## 2022-08-08 DIAGNOSIS — D84.9 IMMUNOSUPPRESSION: ICD-10-CM

## 2022-08-08 DIAGNOSIS — Z94.4 LIVER TRANSPLANTED: ICD-10-CM

## 2022-08-08 RX ORDER — TACROLIMUS 1 MG/1
CAPSULE ORAL
Qty: 150 CAPSULE | Refills: 11 | Status: SHIPPED | OUTPATIENT
Start: 2022-08-08 | End: 2022-09-20

## 2022-08-08 NOTE — TELEPHONE ENCOUNTER
Informed pt labs reviewed, instructed to increase Tacrolimus to 3 mg in am and 2 mg in pm. Will repeat labs in 2 weeks.       ----- Message from Jacob Horvath MD sent at 8/4/2022 12:33 PM CDT -----  Increase prograf by 1 mg AM

## 2022-08-09 ENCOUNTER — PATIENT MESSAGE (OUTPATIENT)
Dept: ENDOCRINOLOGY | Facility: CLINIC | Age: 52
End: 2022-08-09
Payer: MEDICARE

## 2022-08-10 ENCOUNTER — PATIENT MESSAGE (OUTPATIENT)
Dept: TRANSPLANT | Facility: CLINIC | Age: 52
End: 2022-08-10
Payer: MEDICARE

## 2022-08-12 ENCOUNTER — PATIENT MESSAGE (OUTPATIENT)
Dept: PODIATRY | Facility: CLINIC | Age: 52
End: 2022-08-12
Payer: MEDICARE

## 2022-08-16 ENCOUNTER — HOSPITAL ENCOUNTER (OUTPATIENT)
Dept: RADIOLOGY | Facility: HOSPITAL | Age: 52
Discharge: HOME | End: 2022-08-16
Attending: PODIATRIST
Payer: MEDICARE

## 2022-08-16 ENCOUNTER — OFFICE VISIT (OUTPATIENT)
Dept: PODIATRY | Facility: CLINIC | Age: 52
End: 2022-08-16
Payer: MEDICARE

## 2022-08-16 VITALS — BODY MASS INDEX: 23.28 KG/M2 | WEIGHT: 157.19 LBS | HEIGHT: 69 IN

## 2022-08-16 DIAGNOSIS — M79.672 LEFT FOOT PAIN: ICD-10-CM

## 2022-08-16 DIAGNOSIS — L60.0 INGROWN NAIL: ICD-10-CM

## 2022-08-16 DIAGNOSIS — B35.1 ONYCHOMYCOSIS DUE TO DERMATOPHYTE: ICD-10-CM

## 2022-08-16 DIAGNOSIS — E11.49 TYPE II DIABETES MELLITUS WITH NEUROLOGICAL MANIFESTATIONS: Primary | ICD-10-CM

## 2022-08-16 PROCEDURE — 3008F BODY MASS INDEX DOCD: CPT | Mod: CPTII,S$GLB,, | Performed by: PODIATRIST

## 2022-08-16 PROCEDURE — 3072F LOW RISK FOR RETINOPATHY: CPT | Mod: CPTII,S$GLB,, | Performed by: PODIATRIST

## 2022-08-16 PROCEDURE — 99999 PR PBB SHADOW E&M-EST. PATIENT-LVL V: CPT | Mod: PBBFAC,,, | Performed by: PODIATRIST

## 2022-08-16 PROCEDURE — 3072F PR LOW RISK FOR RETINOPATHY: ICD-10-PCS | Mod: CPTII,S$GLB,, | Performed by: PODIATRIST

## 2022-08-16 PROCEDURE — 99214 OFFICE O/P EST MOD 30 MIN: CPT | Mod: S$GLB,,, | Performed by: PODIATRIST

## 2022-08-16 PROCEDURE — 4010F PR ACE/ARB THEARPY RXD/TAKEN: ICD-10-PCS | Mod: CPTII,S$GLB,, | Performed by: PODIATRIST

## 2022-08-16 PROCEDURE — 99214 PR OFFICE/OUTPT VISIT, EST, LEVL IV, 30-39 MIN: ICD-10-PCS | Mod: S$GLB,,, | Performed by: PODIATRIST

## 2022-08-16 PROCEDURE — 73630 XR FOOT COMPLETE 3 VIEW LEFT: ICD-10-PCS | Mod: 26,LT,, | Performed by: RADIOLOGY

## 2022-08-16 PROCEDURE — 73630 X-RAY EXAM OF FOOT: CPT | Mod: 26,LT,, | Performed by: RADIOLOGY

## 2022-08-16 PROCEDURE — 3008F PR BODY MASS INDEX (BMI) DOCUMENTED: ICD-10-PCS | Mod: CPTII,S$GLB,, | Performed by: PODIATRIST

## 2022-08-16 PROCEDURE — 4010F ACE/ARB THERAPY RXD/TAKEN: CPT | Mod: CPTII,S$GLB,, | Performed by: PODIATRIST

## 2022-08-16 PROCEDURE — 1159F MED LIST DOCD IN RCRD: CPT | Mod: CPTII,S$GLB,, | Performed by: PODIATRIST

## 2022-08-16 PROCEDURE — 99999 PR PBB SHADOW E&M-EST. PATIENT-LVL V: ICD-10-PCS | Mod: PBBFAC,,, | Performed by: PODIATRIST

## 2022-08-16 PROCEDURE — 1159F PR MEDICATION LIST DOCUMENTED IN MEDICAL RECORD: ICD-10-PCS | Mod: CPTII,S$GLB,, | Performed by: PODIATRIST

## 2022-08-16 PROCEDURE — 73630 X-RAY EXAM OF FOOT: CPT | Mod: TC,FY,PO,LT

## 2022-08-16 RX ORDER — CICLOPIROX 80 MG/ML
SOLUTION TOPICAL NIGHTLY
Qty: 6.6 ML | Refills: 3 | Status: SHIPPED | OUTPATIENT
Start: 2022-08-16

## 2022-08-16 NOTE — PATIENT INSTRUCTIONS
Kerasal for fungal nails apply daily to all toenails.  Can be found at [facility]

## 2022-08-18 ENCOUNTER — PATIENT MESSAGE (OUTPATIENT)
Dept: TRANSPLANT | Facility: CLINIC | Age: 52
End: 2022-08-18
Payer: MEDICARE

## 2022-08-18 NOTE — PROGRESS NOTES
Subjective:      Patient ID: Valencia Dumont is a 52 y.o. female.    Chief Complaint: Nail Problem (Great toe) and Diabetes Mellitus    Valencia is a 52 y.o. female who presents to the podiatry clinic  with complaint of  left foot pain. Onset of the symptoms was several days ago. Precipitating event: none known. Current symptoms include: ability to bear weight, but with some pain. Aggravating factors: any weight bearing. Symptoms have progressed to a point and plateaued. Patient has had no prior foot problems. Evaluation to date: none. Treatment to date: PO clindamycin prescribed by PCP which she states she is not taking. . Patients rates pain 3/10 on pain scale. Reports numbness left great toe and pain to left great toenail lateral border.         Review of Systems   Constitutional: Negative for chills.   Cardiovascular: Negative for chest pain and claudication.   Respiratory: Negative for cough.    Skin: Positive for color change, dry skin and nail changes.   Musculoskeletal: Positive for joint pain.   Gastrointestinal: Negative for nausea.   Neurological: Positive for paresthesias. Negative for numbness.   Psychiatric/Behavioral: The patient is not nervous/anxious.            Objective:      Physical Exam  Constitutional:       Appearance: She is well-developed.      Comments: Oriented to time, place, and person.   Cardiovascular:      Comments: DP and PT pulses are palpable bilaterally. 3 sec capillary refill time and toes and feet are warm to touch proximally .  There is  hair growth on the feet and toes b/l. There is no edema b/l. No spider veins or varicosities present b/l.     Musculoskeletal:      Comments: Equinus noted b/l ankles with < 10 deg DF noted. MMT 5/5 in DF/PF/Inv/Ev resistance with no reproduction of pain in any direction. Passive range of motion of ankle and pedal joints is painless b/l.     Feet:      Right foot:      Skin integrity: No callus or dry skin.      Left foot:      Skin integrity: No  callus or dry skin.   Lymphadenopathy:      Comments: Negative lymphadenopathy bilateral popliteal fossa and tarsal tunnel.   Skin:     Comments: Lateral  hallux nail margin of left  foot with ingrown nail plate. Surrounding erythema and minimal edema is noted.        Neurological:      Mental Status: She is alert.      Comments: Light touch, proprioception, and sharp/dull sensation are all intact bilaterally. Protective threshold with the Cumberland-Wienstein monofilament is intact bilaterally.    Psychiatric:         Behavior: Behavior is cooperative.               Assessment:       Encounter Diagnoses   Name Primary?    Left foot pain     Type II diabetes mellitus with neurological manifestations Yes    Ingrown nail     Onychomycosis due to dermatophyte          Plan:       Valencia was seen today for nail problem and diabetes mellitus.    Diagnoses and all orders for this visit:    Type II diabetes mellitus with neurological manifestations  -     DIABETIC SHOES FOR HOME USE    Left foot pain  -     X-Ray Foot Complete Left; Future    Ingrown nail    Onychomycosis due to dermatophyte    Other orders  -     ciclopirox (PENLAC) 8 % Soln; Apply topically nightly.      I counseled the patient on her conditions, their implications and medical management.      - Shoe inspection. Diabetic Foot Education. Patient reminded of the importance of good nutrition and blood sugar control to help prevent podiatric complications of diabetes. Patient instructed on proper foot hygeine. We discussed wearing proper shoe gear, daily foot inspections, never walking without protective shoe gear, caution putting sharp instruments to feet     - Discussed DM foot care:  Wear comfortable, proper fitting shoes. Wash feet daily. Dry well. After drying, apply moisturizer to feet (no lotion to webspaces). Inspect feet daily for skin breaks, blisters, swelling, or redness. Wear cotton socks (preferably white)  Change socks every day. Do NOT walk  barefoot. Do NOT use heating pads or warm/hot water soaks     Left foot xray to assess underlying deformity and for underlying osseous pathology.     At patient's request, I discussed different treatments for toenail fungus. We discussed oral antifungals but I did not recommend them as a first line treatment since the medication is taken internally and can have side effects such as rash, taste disturbances, and liver enzyme elevation. We discussed topical Penlac to be applied daily and removed weekly. Pt. Expresses understanding and would like to try the Penlac. Rx sent to the pharmacy.     Discussed treatment options with patient. Options included soaking, avulsion and matrixectomy. Risks and benefits discussed and all questions were answered. The patient wishes to proceed with  Nail avulsion at a later date      In depth conversation on the treatment of ingrown nail; partial nail avulsion vs chemical matrixectomy vs conservative treatment of soaking and nail trimming.    Utilizing sterile toenail clippers I aggressively trimmed  the offending left hallux lateral nail border approximately 3 mm from its edge and carried the nail plate incision down at an angle in order to wedge out the offending cryptotic portion of the nail plate. The offending border was then removed in toto. The remaining nail was grinded down with an electric  down to nail bed.  No blood was drawn. Patient tolerated the procedure well and related significant relief.     Instructed to complete PO Clindamycin previously prescribed by PCP.      F/u 2 weeks.

## 2022-08-22 ENCOUNTER — PATIENT MESSAGE (OUTPATIENT)
Dept: RHEUMATOLOGY | Facility: CLINIC | Age: 52
End: 2022-08-22
Payer: MEDICARE

## 2022-08-22 ENCOUNTER — TELEPHONE (OUTPATIENT)
Dept: TRANSPLANT | Facility: CLINIC | Age: 52
End: 2022-08-22
Payer: MEDICARE

## 2022-08-22 NOTE — TELEPHONE ENCOUNTER
Labs reviewed and are stable, no med changes needed. Letter sent for repeat labs in 3 months.       ----- Message from Jacob Horvath MD sent at 8/22/2022  7:59 AM CDT -----  Results reviewed. No change in plan

## 2022-08-22 NOTE — TELEPHONE ENCOUNTER
Message sent to transplant team to get input on the nephrologist thought on the patient's very elevated protein in the urinalysis but normal protein creatinine ratio.

## 2022-08-22 NOTE — LETTER
August 22, 2022    Valencia Dumont  [address] [address]          Dear Valencia Dumont:  MRN: [id number]    This is a follow up to your recent labs, your lab results were stable.  There are no medicine changes.  Please have your labs drawn again on 11/28/22.      If you cannot have your labs drawn on the scheduled date, it is your responsibility to call the transplant department to reschedule.  Please call [phone] and ask to speak to Rosangela Hanna  for all scheduling requests.     Sincerely,    Linda Barakat, RN,BSN,CCTC    Your Liver Transplant Coordinator    [facility]  [address] [place] [id number] [phone]

## 2022-08-23 ENCOUNTER — PATIENT MESSAGE (OUTPATIENT)
Dept: TRANSPLANT | Facility: CLINIC | Age: 52
End: 2022-08-23
Payer: MEDICARE

## 2022-08-24 ENCOUNTER — PATIENT MESSAGE (OUTPATIENT)
Dept: RHEUMATOLOGY | Facility: CLINIC | Age: 52
End: 2022-08-24
Payer: MEDICARE

## 2022-08-24 RX ORDER — HEPARIN 100 UNIT/ML
500 SYRINGE INTRAVENOUS
Status: CANCELLED | OUTPATIENT
Start: 2022-08-31

## 2022-08-24 RX ORDER — SODIUM CHLORIDE 0.9 % (FLUSH) 0.9 %
10 SYRINGE (ML) INJECTION
Status: CANCELLED | OUTPATIENT
Start: 2022-08-31

## 2022-08-24 RX ORDER — ACETAMINOPHEN 325 MG/1
650 TABLET ORAL
Status: CANCELLED | OUTPATIENT
Start: 2022-08-31

## 2022-08-24 RX ORDER — DIPHENHYDRAMINE HYDROCHLORIDE 50 MG/ML
25 INJECTION INTRAMUSCULAR; INTRAVENOUS
Status: CANCELLED | OUTPATIENT
Start: 2022-08-31

## 2022-08-25 ENCOUNTER — PATIENT MESSAGE (OUTPATIENT)
Dept: ENDOCRINOLOGY | Facility: CLINIC | Age: 52
End: 2022-08-25

## 2022-08-25 ENCOUNTER — OFFICE VISIT (OUTPATIENT)
Dept: ENDOCRINOLOGY | Facility: CLINIC | Age: 52
End: 2022-08-25
Payer: MEDICARE

## 2022-08-25 ENCOUNTER — CLINICAL SUPPORT (OUTPATIENT)
Dept: DIABETES | Facility: CLINIC | Age: 52
End: 2022-08-25
Payer: MEDICARE

## 2022-08-25 VITALS
WEIGHT: 155.56 LBS | HEART RATE: 86 BPM | SYSTOLIC BLOOD PRESSURE: 162 MMHG | DIASTOLIC BLOOD PRESSURE: 102 MMHG | OXYGEN SATURATION: 98 % | HEIGHT: 69 IN | BODY MASS INDEX: 23.04 KG/M2

## 2022-08-25 DIAGNOSIS — E55.9 VITAMIN D DEFICIENCY: ICD-10-CM

## 2022-08-25 DIAGNOSIS — M81.0 OSTEOPOROSIS, UNSPECIFIED OSTEOPOROSIS TYPE, UNSPECIFIED PATHOLOGICAL FRACTURE PRESENCE: ICD-10-CM

## 2022-08-25 DIAGNOSIS — E11.22 TYPE 2 DIABETES MELLITUS WITH STAGE 3B CHRONIC KIDNEY DISEASE, WITH LONG-TERM CURRENT USE OF INSULIN: Primary | ICD-10-CM

## 2022-08-25 DIAGNOSIS — N18.32 TYPE 2 DIABETES MELLITUS WITH STAGE 3B CHRONIC KIDNEY DISEASE, WITH LONG-TERM CURRENT USE OF INSULIN: Primary | ICD-10-CM

## 2022-08-25 DIAGNOSIS — Z79.4 TYPE 2 DIABETES MELLITUS WITH STAGE 3B CHRONIC KIDNEY DISEASE, WITH LONG-TERM CURRENT USE OF INSULIN: Primary | ICD-10-CM

## 2022-08-25 PROCEDURE — 99214 OFFICE O/P EST MOD 30 MIN: CPT | Mod: S$GLB,,, | Performed by: INTERNAL MEDICINE

## 2022-08-25 PROCEDURE — 3051F PR MOST RECENT HEMOGLOBIN A1C LEVEL 7.0 - < 8.0%: ICD-10-PCS | Mod: CPTII,S$GLB,, | Performed by: INTERNAL MEDICINE

## 2022-08-25 PROCEDURE — 99999 PR PBB SHADOW E&M-EST. PATIENT-LVL V: CPT | Mod: PBBFAC,,, | Performed by: INTERNAL MEDICINE

## 2022-08-25 PROCEDURE — 3080F DIAST BP >= 90 MM HG: CPT | Mod: CPTII,S$GLB,, | Performed by: INTERNAL MEDICINE

## 2022-08-25 PROCEDURE — 3080F PR MOST RECENT DIASTOLIC BLOOD PRESSURE >= 90 MM HG: ICD-10-PCS | Mod: CPTII,S$GLB,, | Performed by: INTERNAL MEDICINE

## 2022-08-25 PROCEDURE — 99999 PR PBB SHADOW E&M-EST. PATIENT-LVL V: ICD-10-PCS | Mod: PBBFAC,,, | Performed by: INTERNAL MEDICINE

## 2022-08-25 PROCEDURE — 3072F LOW RISK FOR RETINOPATHY: CPT | Mod: CPTII,S$GLB,, | Performed by: INTERNAL MEDICINE

## 2022-08-25 PROCEDURE — 4010F PR ACE/ARB THEARPY RXD/TAKEN: ICD-10-PCS | Mod: CPTII,S$GLB,, | Performed by: INTERNAL MEDICINE

## 2022-08-25 PROCEDURE — 99214 PR OFFICE/OUTPT VISIT, EST, LEVL IV, 30-39 MIN: ICD-10-PCS | Mod: S$GLB,,, | Performed by: INTERNAL MEDICINE

## 2022-08-25 PROCEDURE — 3077F PR MOST RECENT SYSTOLIC BLOOD PRESSURE >= 140 MM HG: ICD-10-PCS | Mod: CPTII,S$GLB,, | Performed by: INTERNAL MEDICINE

## 2022-08-25 PROCEDURE — 3072F PR LOW RISK FOR RETINOPATHY: ICD-10-PCS | Mod: CPTII,S$GLB,, | Performed by: INTERNAL MEDICINE

## 2022-08-25 PROCEDURE — 3008F BODY MASS INDEX DOCD: CPT | Mod: CPTII,S$GLB,, | Performed by: INTERNAL MEDICINE

## 2022-08-25 PROCEDURE — 3008F PR BODY MASS INDEX (BMI) DOCUMENTED: ICD-10-PCS | Mod: CPTII,S$GLB,, | Performed by: INTERNAL MEDICINE

## 2022-08-25 PROCEDURE — 3077F SYST BP >= 140 MM HG: CPT | Mod: CPTII,S$GLB,, | Performed by: INTERNAL MEDICINE

## 2022-08-25 PROCEDURE — 4010F ACE/ARB THERAPY RXD/TAKEN: CPT | Mod: CPTII,S$GLB,, | Performed by: INTERNAL MEDICINE

## 2022-08-25 PROCEDURE — 3051F HG A1C>EQUAL 7.0%<8.0%: CPT | Mod: CPTII,S$GLB,, | Performed by: INTERNAL MEDICINE

## 2022-08-25 RX ORDER — LIDOCAINE AND PRILOCAINE 25; 25 MG/G; MG/G
CREAM TOPICAL 2 TIMES DAILY
COMMUNITY
Start: 2022-08-13

## 2022-08-25 NOTE — PROGRESS NOTES
FREESTYLE CHUN 2 CGM TRAINING     Patient referred to Diabetes Management today for Freestyle Chun 2 continuous glucose sensor system training.   Patient arrived with her reader charged and 1 sensor for application or will be using their compatible smart phone.      Provided personal FreeStyle Chun 2 training - reviewed the following with patient:   · No fingersticks required but if feeling s/s that do not match with SG reading or in event of hypoglycemia confirm with fingerstick  · To receive alarms, reader must be within 20 feet  · Low and High alarms discussed  · Vitamin C (ascorbic acid) more than 500 mg can cause false readings  · Can bathe, shower or swim  · Sensor is 14-day wear  · FDA approved for back of the arm wear only  · Sensor should be removed prior to exposing it to X-rays  · Site rotation  · 1 hour warm-up period  · Scan minimum every 5-8 hours for continual graph.   · Encouraged patient to scan more often - before each meal and before bed.      Explained in detail how the CGM works.  She was shown a training video on insertion.  Explained graphs and arrow directions to determine the direction of her blood sugar readings. A demo reader was used to explain how to place the  over the sensor to obtain readings. She was instructed to obtain readings by placing  the reader over the sensor. She will hear a beep indicating a reading has been made.     Pt successfully set up reader and placed sensor on back of upper left arm. Reviewed additional adhesive options if having any difficulty with sensor staying on.       If using smart device, instructed to go to [employer] in phone and connect with [facility] #[phone] to enable sharing directly with clinic.    Customer support number was provided and instructed to call them for any additional help or questions.

## 2022-08-25 NOTE — ASSESSMENT & PLAN NOTE
Risk factors: intermittent prednisone use, postmenopausal status, recent wrist fracture, High FRAX score and low t scores 3.1 and CKD stage III    PLAN:   Vitamin D, PTH and renal function panel   Cant check CTX due to not fasting state today

## 2022-08-25 NOTE — ASSESSMENT & PLAN NOTE
Continue current regimen   50K once weekly   2000 IUs morales   No hypercalcemia     Check labs today

## 2022-08-25 NOTE — PROGRESS NOTES
"Subjective:      Patient ID: Valencia Dumont is a 52 y.o. female.    Chief Complaint:  Diabetes      History of Present Illness    Ms. Dumont is a 52 year old woman with lupus, type 2 diabetes mellitus and osteoporosis with wrist fracture 9/2021, liver transplant in 2013 for autoimmune hepatitis    Osteoporosis risk factors: intermittent prednisone use, postmenopausal status, recent wrist fracture  High FRAX score and low t scores -3.1  Has CKD stage III    Slipped on wet tile and fractured her wrist.   Denies other falls or fractures.   Has back pain, wrist pain.     Supplements:  50K once weekly   2000 IUs daily     Normal calcium levels     Review of Systems  No recent illness     Objective:   Physical Exam  Vitals:    08/25/22 1029   BP: (!) 162/102   BP Location: Left arm   Patient Position: Sitting   BP Method: Medium (Manual)   Pulse: 86   SpO2: 98%   Weight: 70.5 kg (155 lb 8.6 oz)   Height: 5' 9" (1.753 m)       BP Readings from Last 3 Encounters:   08/25/22 (!) 162/102   08/03/22 (!) 164/79   04/27/22 (!) 159/87     Wt Readings from Last 1 Encounters:   08/25/22 1029 70.5 kg (155 lb 8.6 oz)         Body mass index is 22.97 kg/m².    Lab Review:   Lab Results   Component Value Date    HGBA1C 7.4 (H) 01/19/2021     Lab Results   Component Value Date    CHOL 188 01/19/2021     (H) 01/19/2021    LDLCALC 69.6 01/19/2021    TRIG 57 01/19/2021    CHOLHDL 56.9 (H) 01/19/2021     Lab Results   Component Value Date     08/19/2022    K 4.0 08/19/2022     08/19/2022    CO2 26 08/19/2022     (H) 08/19/2022    BUN 22 (H) 08/19/2022    CREATININE 1.6 (H) 08/19/2022    CALCIUM 9.6 08/19/2022    PROT 7.1 08/19/2022    ALBUMIN 4.1 08/19/2022    BILITOT 0.5 08/19/2022    ALKPHOS 90 08/19/2022    AST 19 08/19/2022    ALT 13 08/19/2022    ANIONGAP 9 08/19/2022    ESTGFRAFRICA 46 (A) 06/24/2022    EGFRNONAA 40 (A) 06/24/2022    TSH 0.980 08/18/2021      Latest Reference Range & Units 08/18/21 11:00 "   Vit D, 25-Hydroxy 30 - 96 ng/mL 22 (L)   (L): Data is abnormally low  4/16/2022  FINDINGS:  Lumbar spine (L1-L4):              BMD is 0.742 g/cm2, T-score is -2.8, and Z-score is -2.7.   Total hip:                                BMD is 0.564 g/cm2, T-score is -3.1, and Z-score is -2.6.   Femoral neck:                          BMD is 0.527 g/cm2, T-score is -2.9, and Z-score is -2.4.     Distal 1/3 radius:                      Not applicable   FRAX:   16% risk of a major osteoporotic fracture in the next 10 years.   5.6% risk of hip fracture in the next 10 years.    Assessment and Plan     Osteoporosis  Risk factors: intermittent prednisone use, postmenopausal status, recent wrist fracture, High FRAX score and low t scores 3.1 and CKD stage III    PLAN:   Vitamin D, PTH and renal function panel   Cant check CTX due to not fasting state today       Vitamin D deficiency  Continue current regimen   50K once weekly   2000 IUs morales   No hypercalcemia     Check labs today

## 2022-08-26 ENCOUNTER — LAB VISIT (OUTPATIENT)
Dept: LAB | Facility: HOSPITAL | Age: 52
End: 2022-08-26
Attending: INTERNAL MEDICINE
Payer: MEDICARE

## 2022-08-26 DIAGNOSIS — Z79.4 TYPE 2 DIABETES MELLITUS WITH STAGE 3B CHRONIC KIDNEY DISEASE, WITH LONG-TERM CURRENT USE OF INSULIN: ICD-10-CM

## 2022-08-26 DIAGNOSIS — N18.32 TYPE 2 DIABETES MELLITUS WITH STAGE 3B CHRONIC KIDNEY DISEASE, WITH LONG-TERM CURRENT USE OF INSULIN: ICD-10-CM

## 2022-08-26 DIAGNOSIS — M81.0 OSTEOPOROSIS, UNSPECIFIED OSTEOPOROSIS TYPE, UNSPECIFIED PATHOLOGICAL FRACTURE PRESENCE: ICD-10-CM

## 2022-08-26 DIAGNOSIS — E11.22 TYPE 2 DIABETES MELLITUS WITH STAGE 3B CHRONIC KIDNEY DISEASE, WITH LONG-TERM CURRENT USE OF INSULIN: ICD-10-CM

## 2022-08-26 LAB
25(OH)D3+25(OH)D2 SERPL-MCNC: 35 NG/ML (ref 30–96)
ALBUMIN SERPL BCP-MCNC: 4.1 G/DL (ref 3.5–5.2)
ANION GAP SERPL CALC-SCNC: 9 MMOL/L (ref 8–16)
BUN SERPL-MCNC: 24 MG/DL (ref 6–20)
CALCIUM SERPL-MCNC: 10 MG/DL (ref 8.7–10.5)
CHLORIDE SERPL-SCNC: 102 MMOL/L (ref 95–110)
CO2 SERPL-SCNC: 28 MMOL/L (ref 23–29)
CREAT SERPL-MCNC: 1.6 MG/DL (ref 0.5–1.4)
EST. GFR  (NO RACE VARIABLE): 39 ML/MIN/1.73 M^2
ESTIMATED AVG GLUCOSE: 157 MG/DL (ref 68–131)
GLUCOSE SERPL-MCNC: 107 MG/DL (ref 70–110)
HBA1C MFR BLD: 7.1 % (ref 4–5.6)
PHOSPHATE SERPL-MCNC: 3.9 MG/DL (ref 2.7–4.5)
POTASSIUM SERPL-SCNC: 4.4 MMOL/L (ref 3.5–5.1)
PTH-INTACT SERPL-MCNC: 143 PG/ML (ref 9–77)
SODIUM SERPL-SCNC: 139 MMOL/L (ref 136–145)

## 2022-08-26 PROCEDURE — 36415 COLL VENOUS BLD VENIPUNCTURE: CPT | Performed by: INTERNAL MEDICINE

## 2022-08-26 PROCEDURE — 83970 ASSAY OF PARATHORMONE: CPT | Performed by: INTERNAL MEDICINE

## 2022-08-26 PROCEDURE — 83036 HEMOGLOBIN GLYCOSYLATED A1C: CPT | Performed by: INTERNAL MEDICINE

## 2022-08-26 PROCEDURE — 82523 COLLAGEN CROSSLINKS: CPT | Performed by: INTERNAL MEDICINE

## 2022-08-26 PROCEDURE — 82306 VITAMIN D 25 HYDROXY: CPT | Performed by: INTERNAL MEDICINE

## 2022-08-26 PROCEDURE — 80069 RENAL FUNCTION PANEL: CPT | Performed by: INTERNAL MEDICINE

## 2022-08-29 ENCOUNTER — OFFICE VISIT (OUTPATIENT)
Dept: OBSTETRICS AND GYNECOLOGY | Facility: CLINIC | Age: 52
End: 2022-08-29
Attending: OBSTETRICS & GYNECOLOGY
Payer: MEDICARE

## 2022-08-29 VITALS
BODY MASS INDEX: 22.76 KG/M2 | SYSTOLIC BLOOD PRESSURE: 166 MMHG | DIASTOLIC BLOOD PRESSURE: 89 MMHG | HEIGHT: 69 IN | WEIGHT: 153.63 LBS

## 2022-08-29 DIAGNOSIS — Z12.31 SCREENING MAMMOGRAM, ENCOUNTER FOR: ICD-10-CM

## 2022-08-29 DIAGNOSIS — Z12.4 PAP SMEAR FOR CERVICAL CANCER SCREENING: ICD-10-CM

## 2022-08-29 DIAGNOSIS — N89.8 VAGINAL ODOR: ICD-10-CM

## 2022-08-29 DIAGNOSIS — N95.2 POSTMENOPAUSAL ATROPHIC VAGINITIS: ICD-10-CM

## 2022-08-29 DIAGNOSIS — Z01.419 ENCOUNTER FOR GYNECOLOGICAL EXAMINATION WITHOUT ABNORMAL FINDING: Primary | ICD-10-CM

## 2022-08-29 LAB — COLLAGEN CTX SERPL-MCNC: 732 PG/ML

## 2022-08-29 PROCEDURE — 88141 CYTOPATH C/V INTERPRET: CPT | Mod: ,,, | Performed by: PATHOLOGY

## 2022-08-29 PROCEDURE — 3072F PR LOW RISK FOR RETINOPATHY: ICD-10-PCS | Mod: CPTII,S$GLB,, | Performed by: OBSTETRICS & GYNECOLOGY

## 2022-08-29 PROCEDURE — 99999 PR PBB SHADOW E&M-EST. PATIENT-LVL V: CPT | Mod: PBBFAC,,, | Performed by: OBSTETRICS & GYNECOLOGY

## 2022-08-29 PROCEDURE — G0101 CA SCREEN;PELVIC/BREAST EXAM: HCPCS | Mod: S$GLB,,, | Performed by: OBSTETRICS & GYNECOLOGY

## 2022-08-29 PROCEDURE — 4010F PR ACE/ARB THEARPY RXD/TAKEN: ICD-10-PCS | Mod: CPTII,S$GLB,, | Performed by: OBSTETRICS & GYNECOLOGY

## 2022-08-29 PROCEDURE — 3079F DIAST BP 80-89 MM HG: CPT | Mod: CPTII,S$GLB,, | Performed by: OBSTETRICS & GYNECOLOGY

## 2022-08-29 PROCEDURE — 87624 HPV HI-RISK TYP POOLED RSLT: CPT | Performed by: OBSTETRICS & GYNECOLOGY

## 2022-08-29 PROCEDURE — 1159F MED LIST DOCD IN RCRD: CPT | Mod: CPTII,S$GLB,, | Performed by: OBSTETRICS & GYNECOLOGY

## 2022-08-29 PROCEDURE — 88175 CYTOPATH C/V AUTO FLUID REDO: CPT | Performed by: PATHOLOGY

## 2022-08-29 PROCEDURE — 3077F SYST BP >= 140 MM HG: CPT | Mod: CPTII,S$GLB,, | Performed by: OBSTETRICS & GYNECOLOGY

## 2022-08-29 PROCEDURE — 3072F LOW RISK FOR RETINOPATHY: CPT | Mod: CPTII,S$GLB,, | Performed by: OBSTETRICS & GYNECOLOGY

## 2022-08-29 PROCEDURE — 87481 CANDIDA DNA AMP PROBE: CPT | Mod: 59 | Performed by: OBSTETRICS & GYNECOLOGY

## 2022-08-29 PROCEDURE — 3008F PR BODY MASS INDEX (BMI) DOCUMENTED: ICD-10-PCS | Mod: CPTII,S$GLB,, | Performed by: OBSTETRICS & GYNECOLOGY

## 2022-08-29 PROCEDURE — 3079F PR MOST RECENT DIASTOLIC BLOOD PRESSURE 80-89 MM HG: ICD-10-PCS | Mod: CPTII,S$GLB,, | Performed by: OBSTETRICS & GYNECOLOGY

## 2022-08-29 PROCEDURE — 4010F ACE/ARB THERAPY RXD/TAKEN: CPT | Mod: CPTII,S$GLB,, | Performed by: OBSTETRICS & GYNECOLOGY

## 2022-08-29 PROCEDURE — 3077F PR MOST RECENT SYSTOLIC BLOOD PRESSURE >= 140 MM HG: ICD-10-PCS | Mod: CPTII,S$GLB,, | Performed by: OBSTETRICS & GYNECOLOGY

## 2022-08-29 PROCEDURE — 3051F HG A1C>EQUAL 7.0%<8.0%: CPT | Mod: CPTII,S$GLB,, | Performed by: OBSTETRICS & GYNECOLOGY

## 2022-08-29 PROCEDURE — G0101 PR CA SCREEN;PELVIC/BREAST EXAM: ICD-10-PCS | Mod: S$GLB,,, | Performed by: OBSTETRICS & GYNECOLOGY

## 2022-08-29 PROCEDURE — 3051F PR MOST RECENT HEMOGLOBIN A1C LEVEL 7.0 - < 8.0%: ICD-10-PCS | Mod: CPTII,S$GLB,, | Performed by: OBSTETRICS & GYNECOLOGY

## 2022-08-29 PROCEDURE — 88141 PR  CYTOPATH CERV/VAG INTERPRET: ICD-10-PCS | Mod: ,,, | Performed by: PATHOLOGY

## 2022-08-29 PROCEDURE — 99999 PR PBB SHADOW E&M-EST. PATIENT-LVL V: ICD-10-PCS | Mod: PBBFAC,,, | Performed by: OBSTETRICS & GYNECOLOGY

## 2022-08-29 PROCEDURE — 3008F BODY MASS INDEX DOCD: CPT | Mod: CPTII,S$GLB,, | Performed by: OBSTETRICS & GYNECOLOGY

## 2022-08-29 PROCEDURE — 1159F PR MEDICATION LIST DOCUMENTED IN MEDICAL RECORD: ICD-10-PCS | Mod: CPTII,S$GLB,, | Performed by: OBSTETRICS & GYNECOLOGY

## 2022-08-29 RX ORDER — VALACYCLOVIR HYDROCHLORIDE 1 G/1
1000 TABLET, FILM COATED ORAL DAILY
COMMUNITY
Start: 2022-08-01

## 2022-08-29 RX ORDER — ESTRADIOL 0.1 MG/G
1 CREAM VAGINAL
Qty: 42 G | Refills: 3 | Status: SHIPPED | OUTPATIENT
Start: 2022-08-29 | End: 2024-01-24 | Stop reason: SDUPTHER

## 2022-08-29 NOTE — PROGRESS NOTES
Subjective:       Patient ID: Valencia Dumont is a 52 y.o. female.    Chief Complaint:  Annual Exam      History of Present Illness  HPI    Valencia Dumont is a 52 y.o. female  here for her annual GYN exam.  She reports vaginal odor for several weeks when she urinates. She has occasional discomfort with sex, has run out of her vaginal estrogen cream.   She is menopausal since age 47.    denies break through bleeding.   denies vaginal itching or irritation.  Reports vaginal discharge.  She is sexually active. She has had1 partner for the past 33 years .   History of abnormal pap: Yes - years ag0  Last Pap: approximate date May 2019 and was normal  Last MMG: normal - 2021-routine follow-up in 12 months  Last Colonoscopy:  colonoscopy 5 years ago with abnormalities.      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    Hypertension     Kidney stone     Lupus     Osteoporosis 2013    Prophylactic immunotherapy (transplant immunosuppression) 2014    SBP (spontaneous bacterial peritonitis)     history of      Past Surgical History:   Procedure Laterality Date    BREAST BIOPSY Left 2020    CHOLECYSTECTOMY      COLONOSCOPY N/A 2017    Procedure: COLONOSCOPY;  Surgeon: Marky Sun MD;  Location: [address]);  Service: Endoscopy;  Laterality: N/A;  PM prep.    ESOPHAGOGASTRODUODENOSCOPY      ESOPHAGOGASTRODUODENOSCOPY N/A 2019    Procedure: EGD (ESOPHAGOGASTRODUODENOSCOPY);  Surgeon: Deniz Guerra MD;  Location: [address]);  Service: Endoscopy;  Laterality: N/A;  labs prior, s/p liver transplant-MS    ESOPHAGOGASTRODUODENOSCOPY N/A 2020    Procedure: EGD (ESOPHAGOGASTRODUODENOSCOPY);   Surgeon: Davion Ríos MD;  [facility]  Service: Endoscopy;  Laterality: N/A;  Please order the EGD at least 2 weeks after barium esophagram has been done EGD is for dysphagia  covid test 9/6-[facility] [place] [facility]    FUNCTIONAL ENDOSCOPIC SINUS SURGERY (FESS) USING COMPUTER-ASSISTED NAVIGATION Bilateral 2/4/2021    Procedure: FESS, USING COMPUTER-ASSISTED NAVIGATION;  Surgeon: Delores Lewis MD;  Location: [facility];  Service: ENT;  Laterality: Bilateral;  [employer] WALDEMAR [phone] TEXTED HIM @ 2:45PM ON 1-8-2021  DISC LOADED BT TOMMY 1-  RN PREOP 1/28/2021---COVID NEGATIVE ON 2/3--CONSENT INCOMPLETE  H/P INCOMPLETE  --CBC IN AM    LIVER BIOPSY      LIVER TRANSPLANT  12/31/2013    REFRACTIVE SURGERY Bilateral 2010    TUBAL LIGATION  2003    UPPER GASTROINTESTINAL ENDOSCOPY       Social History     Socioeconomic History    Marital status:     Number of children: 3   Occupational History     Employer: [employer] [facility] [place] [employer]   Tobacco Use    Smoking status: Never    Smokeless tobacco: Never    Tobacco comments:     disability; ; 3 children   Substance and Sexual Activity    Alcohol use: Yes     Alcohol/week: 1.0 standard drink     Types: 1 Glasses of wine per week     Comment: occasionally     Drug use: No    Sexual activity: Yes     Partners: Male     Birth control/protection: See Surgical Hx, Post-menopausal     Comment: s/p tubal ligation,  since 1989     Family History   Problem Relation Age of Onset    Hypertension Mother     Cataracts Mother     Diabetes Father     Alzheimer's disease Father     Diabetes Paternal Grandfather     Diabetes Paternal Grandmother     No Known Problems Maternal Grandmother     Bone cancer Maternal Grandfather     Breast cancer Maternal Aunt 55    Hypertension Brother     Diabetes Brother     No Known Problems Sister     No Known Problems Maternal Uncle     No Known Problems Paternal Aunt     No Known Problems Paternal Uncle     Stroke  "Neg Hx     Cancer Neg Hx     Colon cancer Neg Hx     Esophageal cancer Neg Hx     Stomach cancer Neg Hx     Rectal cancer Neg Hx     Amblyopia Neg Hx     Blindness Neg Hx     Glaucoma Neg Hx     Macular degeneration Neg Hx     Retinal detachment Neg Hx     Strabismus Neg Hx     Thyroid disease Neg Hx      OB History          3    Para   3    Term   2       1    AB        Living   3         SAB        IAB        Ectopic        Multiple        Live Births   3                 BP (!) 166/89   Ht 5' 9" (1.753 m)   Wt 69.7 kg (153 lb 9.6 oz)   LMP 2016 (Approximate)   BMI 22.68 kg/m²         GYN & OB History  Patient's last menstrual period was 2016 (approximate).   Date of Last Pap: No result found    OB History    Para Term  AB Living   3 3 2 1   3   SAB IAB Ectopic Multiple Live Births           3      # Outcome Date GA Lbr Sachin/2nd Weight Sex Delivery Anes PTL Lv   3   36w0d  2.126 kg (4 lb 11 oz) F Vag-Spont   MONIQUE   2 Term     F Vag-Spont   MONIQUE   1 Term     M Vag-Spont   MONIQUE       Review of Systems  Review of Systems   Constitutional:  Negative for activity change, appetite change, fatigue and unexpected weight change.   HENT: Negative.     Eyes:  Negative for visual disturbance.   Respiratory:  Negative for shortness of breath and wheezing.    Cardiovascular:  Negative for chest pain, palpitations and leg swelling.   Gastrointestinal:  Negative for abdominal pain, bloating and blood in stool.   Endocrine: Negative for diabetes and hair loss.   Genitourinary:  Positive for vaginal odor. Negative for decreased libido and dyspareunia.   Musculoskeletal:  Negative for back pain and joint swelling.   Integumentary:  Negative for acne, hair changes and nipple discharge.   Neurological:  Negative for headaches.   Hematological:  Does not bruise/bleed easily.   Psychiatric/Behavioral:  Negative for depression and sleep disturbance. The patient is not nervous/anxious.  "   Breast: Negative for mastodynia and nipple discharge        Objective:      Physical Exam:   Constitutional: She is oriented to person, place, and time. She appears well-developed and well-nourished.    HENT:   Head: Normocephalic and atraumatic.    Eyes: Pupils are equal, round, and reactive to light. EOM are normal.     Cardiovascular:  Normal rate and regular rhythm.             Pulmonary/Chest: Effort normal and breath sounds normal.   BREASTS:  no mass, no tenderness, no deformity and no retraction. Right breast exhibits no inverted nipple, no mass, no nipple discharge, no skin change, no tenderness, no bleeding and no swelling. Left breast exhibits no inverted nipple, no mass, no nipple discharge, no skin change, no tenderness, no bleeding and no swelling. Breasts are symmetrical.              Abdominal: Soft. Bowel sounds are normal.     Genitourinary:    Pelvic exam was performed with patient supine.      Genitourinary Comments: PELVIC: Normal external genitalia without lesions.  Normal hair distribution.  Adequate perineal body, normal urethral meatus.  Vagina  Dry and poorly rugated, atrophic, without lesions , SLIGHT CREAMY discharge NOTED.  Cervix pink, without lesions, discharge or tenderness.  No significant cystocele or rectocele.  Bimanual exam shows uterus to be normal size, regular, mobile and nontender.  Adnexa without masses or tenderness.    RECTAL:Deferred                 Musculoskeletal: Normal range of motion and moves all extremeties.       Neurological: She is alert and oriented to person, place, and time.    Skin: Skin is warm and dry.    Psychiatric: She has a normal mood and affect.            Assessment:        1. Encounter for gynecological examination without abnormal finding    2. Pap smear for cervical cancer screening    3. Postmenopausal atrophic vaginitis    4. Screening mammogram, encounter for    5. Vaginal odor               Plan:    1. Encounter for gynecological  examination without abnormal finding  COUNSELING:  The patient was counseled today on regular weight bearing exercise. Patient was counseled today on the new ACS guidelines for cervical cytology screening as well as the current recommendations for breast cancer screening. Counseling session lasted approximately 10 minutes, and all her questions were answered. She was advised to see her primary care physician for all other health maintenance.   FOLLOW-UP with me for next routine visit.       2. Pap smear for cervical cancer screening      - Liquid-Based Pap Smear, Screening  - HPV High Risk Genotypes, PCR    3. Postmenopausal atrophic vaginitis      - estradioL (ESTRACE) 0.01 % (0.1 mg/gram) vaginal cream; Place 1 g vaginally twice a week.  Dispense: 42 g; Refill: 3    4. Screening mammogram, encounter for      - Mammo Digital Screening Bilat w/ Andres; Future    5. Vaginal odor      - Vaginosis Screen by DNA Probe             Follow up in about 1 year (around 8/29/2023).

## 2022-08-31 ENCOUNTER — PATIENT MESSAGE (OUTPATIENT)
Dept: OBSTETRICS AND GYNECOLOGY | Facility: CLINIC | Age: 52
End: 2022-08-31
Payer: MEDICARE

## 2022-08-31 ENCOUNTER — INFUSION (OUTPATIENT)
Dept: INFUSION THERAPY | Facility: HOSPITAL | Age: 52
End: 2022-08-31
Attending: INTERNAL MEDICINE
Payer: MEDICARE

## 2022-08-31 ENCOUNTER — PATIENT MESSAGE (OUTPATIENT)
Dept: ENDOCRINOLOGY | Facility: CLINIC | Age: 52
End: 2022-08-31
Payer: MEDICARE

## 2022-08-31 VITALS
DIASTOLIC BLOOD PRESSURE: 67 MMHG | HEART RATE: 90 BPM | OXYGEN SATURATION: 97 % | RESPIRATION RATE: 16 BRPM | SYSTOLIC BLOOD PRESSURE: 143 MMHG | TEMPERATURE: 99 F

## 2022-08-31 DIAGNOSIS — M81.0 OSTEOPOROSIS, UNSPECIFIED OSTEOPOROSIS TYPE, UNSPECIFIED PATHOLOGICAL FRACTURE PRESENCE: ICD-10-CM

## 2022-08-31 DIAGNOSIS — T38.0X5S ADVERSE EFFECT OF GLUCOCORTICOID OR SYNTHETIC ANALOGUE, SEQUELA: ICD-10-CM

## 2022-08-31 DIAGNOSIS — M32.9 SYSTEMIC LUPUS ERYTHEMATOSUS, UNSPECIFIED SLE TYPE, UNSPECIFIED ORGAN INVOLVEMENT STATUS: Primary | ICD-10-CM

## 2022-08-31 DIAGNOSIS — N18.32 STAGE 3B CHRONIC KIDNEY DISEASE: ICD-10-CM

## 2022-08-31 PROCEDURE — 96413 CHEMO IV INFUSION 1 HR: CPT

## 2022-08-31 PROCEDURE — 63600175 PHARM REV CODE 636 W HCPCS: Mod: JA,JG | Performed by: INTERNAL MEDICINE

## 2022-08-31 PROCEDURE — 96372 THER/PROPH/DIAG INJ SC/IM: CPT

## 2022-08-31 PROCEDURE — 25000003 PHARM REV CODE 250: Performed by: INTERNAL MEDICINE

## 2022-08-31 PROCEDURE — 63600175 PHARM REV CODE 636 W HCPCS: Mod: TB | Performed by: INTERNAL MEDICINE

## 2022-08-31 RX ORDER — ACETAMINOPHEN 325 MG/1
650 TABLET ORAL
Status: CANCELLED | OUTPATIENT
Start: 2022-09-28

## 2022-08-31 RX ORDER — HEPARIN 100 UNIT/ML
500 SYRINGE INTRAVENOUS
Status: CANCELLED | OUTPATIENT
Start: 2022-09-28

## 2022-08-31 RX ORDER — DIPHENHYDRAMINE HYDROCHLORIDE 50 MG/ML
25 INJECTION INTRAMUSCULAR; INTRAVENOUS
Status: COMPLETED | OUTPATIENT
Start: 2022-08-31 | End: 2022-08-31

## 2022-08-31 RX ORDER — ACETAMINOPHEN 325 MG/1
650 TABLET ORAL
Status: COMPLETED | OUTPATIENT
Start: 2022-08-31 | End: 2022-08-31

## 2022-08-31 RX ORDER — SODIUM CHLORIDE 0.9 % (FLUSH) 0.9 %
10 SYRINGE (ML) INJECTION
Status: CANCELLED | OUTPATIENT
Start: 2022-09-28

## 2022-08-31 RX ORDER — DIPHENHYDRAMINE HYDROCHLORIDE 50 MG/ML
25 INJECTION INTRAMUSCULAR; INTRAVENOUS
Status: CANCELLED | OUTPATIENT
Start: 2022-09-28

## 2022-08-31 RX ADMIN — BELIMUMAB 713 MG: 400 INJECTION, POWDER, LYOPHILIZED, FOR SOLUTION INTRAVENOUS at 01:08

## 2022-08-31 RX ADMIN — ACETAMINOPHEN 650 MG: 325 TABLET ORAL at 01:08

## 2022-08-31 RX ADMIN — ROMOSOZUMAB-AQQG 210 MG: 105 INJECTION, SOLUTION SUBCUTANEOUS at 02:08

## 2022-08-31 RX ADMIN — DIPHENHYDRAMINE HYDROCHLORIDE 25 MG: 50 INJECTION, SOLUTION INTRAMUSCULAR; INTRAVENOUS at 01:08

## 2022-08-31 NOTE — PLAN OF CARE
Pt arrived to unit. Tolerated infusion. Tolerated first dose of Evenity. No reactions noted. Pt has next appts. Pt ambulated off unit.

## 2022-09-01 ENCOUNTER — OFFICE VISIT (OUTPATIENT)
Dept: PODIATRY | Facility: CLINIC | Age: 52
End: 2022-09-01
Payer: MEDICARE

## 2022-09-01 VITALS — HEIGHT: 69 IN | WEIGHT: 153.69 LBS | BODY MASS INDEX: 22.76 KG/M2

## 2022-09-01 DIAGNOSIS — R26.9 GAIT ABNORMALITY: ICD-10-CM

## 2022-09-01 DIAGNOSIS — M20.5X2 HALLUX LIMITUS, ACQUIRED, LEFT: ICD-10-CM

## 2022-09-01 DIAGNOSIS — M20.5X1 HALLUX LIMITUS, ACQUIRED, RIGHT: ICD-10-CM

## 2022-09-01 DIAGNOSIS — M20.41 HAMMER TOES OF BOTH FEET: ICD-10-CM

## 2022-09-01 DIAGNOSIS — M79.672 LEFT FOOT PAIN: Primary | ICD-10-CM

## 2022-09-01 DIAGNOSIS — E11.9 COMPREHENSIVE DIABETIC FOOT EXAMINATION, TYPE 2 DM, ENCOUNTER FOR: ICD-10-CM

## 2022-09-01 DIAGNOSIS — M20.42 HAMMER TOES OF BOTH FEET: ICD-10-CM

## 2022-09-01 DIAGNOSIS — E11.49 TYPE II DIABETES MELLITUS WITH NEUROLOGICAL MANIFESTATIONS: ICD-10-CM

## 2022-09-01 PROCEDURE — 4010F PR ACE/ARB THEARPY RXD/TAKEN: ICD-10-PCS | Mod: CPTII,S$GLB,, | Performed by: PODIATRIST

## 2022-09-01 PROCEDURE — 3072F PR LOW RISK FOR RETINOPATHY: ICD-10-PCS | Mod: CPTII,S$GLB,, | Performed by: PODIATRIST

## 2022-09-01 PROCEDURE — 1159F MED LIST DOCD IN RCRD: CPT | Mod: CPTII,S$GLB,, | Performed by: PODIATRIST

## 2022-09-01 PROCEDURE — 3008F BODY MASS INDEX DOCD: CPT | Mod: CPTII,S$GLB,, | Performed by: PODIATRIST

## 2022-09-01 PROCEDURE — 3051F PR MOST RECENT HEMOGLOBIN A1C LEVEL 7.0 - < 8.0%: ICD-10-PCS | Mod: CPTII,S$GLB,, | Performed by: PODIATRIST

## 2022-09-01 PROCEDURE — 20600 PR DRAIN/INJECT SMALL JOINT/BURSA: ICD-10-PCS | Mod: LT,S$GLB,, | Performed by: PODIATRIST

## 2022-09-01 PROCEDURE — 3072F LOW RISK FOR RETINOPATHY: CPT | Mod: CPTII,S$GLB,, | Performed by: PODIATRIST

## 2022-09-01 PROCEDURE — 99214 PR OFFICE/OUTPT VISIT, EST, LEVL IV, 30-39 MIN: ICD-10-PCS | Mod: 25,S$GLB,, | Performed by: PODIATRIST

## 2022-09-01 PROCEDURE — 20600 DRAIN/INJ JOINT/BURSA W/O US: CPT | Mod: LT,S$GLB,, | Performed by: PODIATRIST

## 2022-09-01 PROCEDURE — 4010F ACE/ARB THERAPY RXD/TAKEN: CPT | Mod: CPTII,S$GLB,, | Performed by: PODIATRIST

## 2022-09-01 PROCEDURE — 1160F RVW MEDS BY RX/DR IN RCRD: CPT | Mod: CPTII,S$GLB,, | Performed by: PODIATRIST

## 2022-09-01 PROCEDURE — 99214 OFFICE O/P EST MOD 30 MIN: CPT | Mod: 25,S$GLB,, | Performed by: PODIATRIST

## 2022-09-01 PROCEDURE — 3051F HG A1C>EQUAL 7.0%<8.0%: CPT | Mod: CPTII,S$GLB,, | Performed by: PODIATRIST

## 2022-09-01 PROCEDURE — 99999 PR PBB SHADOW E&M-EST. PATIENT-LVL V: CPT | Mod: PBBFAC,,, | Performed by: PODIATRIST

## 2022-09-01 PROCEDURE — 3008F PR BODY MASS INDEX (BMI) DOCUMENTED: ICD-10-PCS | Mod: CPTII,S$GLB,, | Performed by: PODIATRIST

## 2022-09-01 PROCEDURE — 1159F PR MEDICATION LIST DOCUMENTED IN MEDICAL RECORD: ICD-10-PCS | Mod: CPTII,S$GLB,, | Performed by: PODIATRIST

## 2022-09-01 PROCEDURE — 99999 PR PBB SHADOW E&M-EST. PATIENT-LVL V: ICD-10-PCS | Mod: PBBFAC,,, | Performed by: PODIATRIST

## 2022-09-01 PROCEDURE — 1160F PR REVIEW ALL MEDS BY PRESCRIBER/CLIN PHARMACIST DOCUMENTED: ICD-10-PCS | Mod: CPTII,S$GLB,, | Performed by: PODIATRIST

## 2022-09-01 RX ORDER — BUPIVACAINE HYDROCHLORIDE 5 MG/ML
1 INJECTION, SOLUTION EPIDURAL; INTRACAUDAL ONCE
Status: COMPLETED | OUTPATIENT
Start: 2022-09-01 | End: 2022-09-01

## 2022-09-01 RX ORDER — DEXAMETHASONE SODIUM PHOSPHATE 4 MG/ML
2 INJECTION, SOLUTION INTRA-ARTICULAR; INTRALESIONAL; INTRAMUSCULAR; INTRAVENOUS; SOFT TISSUE ONCE
Status: COMPLETED | OUTPATIENT
Start: 2022-09-01 | End: 2022-09-01

## 2022-09-01 RX ORDER — LIDOCAINE HYDROCHLORIDE 20 MG/ML
1 INJECTION, SOLUTION EPIDURAL; INFILTRATION; INTRACAUDAL; PERINEURAL ONCE
Status: COMPLETED | OUTPATIENT
Start: 2022-09-01 | End: 2022-09-01

## 2022-09-01 RX ORDER — TRIAMCINOLONE ACETONIDE 40 MG/ML
20 INJECTION, SUSPENSION INTRA-ARTICULAR; INTRAMUSCULAR ONCE
Status: COMPLETED | OUTPATIENT
Start: 2022-09-01 | End: 2022-09-01

## 2022-09-01 RX ADMIN — DEXAMETHASONE SODIUM PHOSPHATE 2 MG: 4 INJECTION, SOLUTION INTRA-ARTICULAR; INTRALESIONAL; INTRAMUSCULAR; INTRAVENOUS; SOFT TISSUE at 11:09

## 2022-09-01 RX ADMIN — BUPIVACAINE HYDROCHLORIDE 5 MG: 5 INJECTION, SOLUTION EPIDURAL; INTRACAUDAL at 11:09

## 2022-09-01 RX ADMIN — TRIAMCINOLONE ACETONIDE 20 MG: 40 INJECTION, SUSPENSION INTRA-ARTICULAR; INTRAMUSCULAR at 11:09

## 2022-09-01 RX ADMIN — LIDOCAINE HYDROCHLORIDE 20 MG: 20 INJECTION, SOLUTION EPIDURAL; INFILTRATION; INTRACAUDAL; PERINEURAL at 11:09

## 2022-09-01 NOTE — PATIENT INSTRUCTIONS
Over the counter pain creams: Voltaren Gel, Biofreeze, Bengay, tiger balm, two old goat, lidocaine gel,  Absorbine Veterinary Liniment Gel Topical Analgesic Sore Muscle and Joint Pain Relief    Recommend lotions: eucerin, eucerin for diabetics, aquaphor, A&D ointment, gold bond for diabetics, sween, [place]'s Bees all purpose baby ointment,  urea 40 with aloe (found on amazon.com)    Shoe recommendations: (try 6pm.com, zappos.com , nordstromrack.Mobile365 (fka InphoMatch), or shoes.com for discounted prices) you can visit DSW shoes in [place]  or [employer] [facility] in the [facility] (there are also several shoe brand outlets in the [facility])    Asics (GT 2000 or gel foundations), new balance stability type shoes (such as the 940 series), saucony (stabil c3),  Chandler (GTS or Beast or transcend), propet, bautista peterson (tennis shoe)    Heidi Desouza (women) Michelle&Omar (men), clarks, crocs, aerosoles, naturalizers, SAS, ecco, born, chris villalobos, rockports (dress shoes)    Vionic, burkenstocks, fitflops, propet (sandals)  kristenone, crocs, propet (house shoes)  What Is Arthritis in the Foot?  Degenerative arthritis is a condition that slowly wears away joints, the area where bones meet and move. In the beginning, you may notice that the affected joint seems stiff. It may even ache. As the joint lining (cartilage) breaks down, the bones rub against each other, causing pain and swelling. Over time, small pieces of rough or splintered bone (bone spurs) develop, and the joints range of motion becomes limited. But movement doesnt have to cause pain. The effects of arthritis can be reduced.    The big-toe joint  When arthritis affects your big toe, your foot hurts when it pushes off the ground. Arthritis often appears in the big-toe joint along with a bunion (a bony bump at the side of the joint) or a bone spur on top of the joint.    Other joints  When arthritis affects the rear or midfoot joints, you feel pain when you put weight on your foot.  Arthritis may affect the joint where the ankle and foot meet. It may also affect other joints nearby.  Date Last Reviewed: 7/1/2016  © 8277-9437 [employer]. [address] [place] [address], [place] [address]. All rights reserved. This information is not intended as a substitute for professional medical care. Always follow your healthcare professional's instructions.        Treating Arthritis in the Foot  If your symptoms are mild, medications may be enough to reduce pain and swelling. For more severe arthritis, surgery may be needed to improve the condition of the joint.    Medicine  Your doctor may prescribe medicine--pills or injections--to limit pain and swelling. Ice, aspirin, acetaminophen, or ibuprofen may help relieve mild symptoms that occur after activity.  Surgery and bone trimming  To ease movement and reduce pain, your doctor may trim damaged bone. If arthritis is severe, the joint may be fused or removed. If the bone is not damaged too badly, your doctor may simply shave away bone spurs. Any excess bone growth related to a bunion may also be trimmed.  Fusing joints  If damage is more severe, your doctor may fuse the joint to prevent the bones from rubbing. Afterward, staples, plates, or screws may hold the bones in place so they heal properly. In some cases, the joint may be removed and replaced with an implant.  After surgery  During the early stages of recovery, your foot is likely to be bandaged and immobilized for a while. For best results, follow up with your doctor as scheduled. These visits help ensure that your foot heals properly.  As you heal  After surgery, youll be told how to care for your incision and how soon to begin walking on the foot. Until the foot can bear weight, you may need to walk with crutches or a cane.  For surgery on the big toe, your foot may be splinted to limit movement for several weeks. Despite this, you should be able to walk soon after surgery.  For surgery on  rear or midfoot joints, you may need to wear a cast or surgical shoe. These joints are fairly large, so full recovery may take a few months. Once the bone has healed, any staples, plates, or screws may be removed.  Date Last Reviewed: 7/1/2016 © 2000-2017 "[employer]. [address] [place] [address]. All rights reserved. This information is not intended as a substitute for professional medical care. Always follow your healthcare professional's instructions.        Foot Surgery: Degenerative Joint Disease    Degenerative joint disease (arthritis) often happens in the joint of a big toe. This bone growth may cause pain and stiffness in the joint. Left untreated, arthritis can break down the cartilage and destroy the joint. Your treatment choices depend on how damaged your joint is. There are many nonsurgical treatments, but if these are not helpful, surgery may be considered.    Cheilectomy  This is done when the arthritic joint and cartilage can be saved. A bone spur caused by arthritis may be symptomatic on the top of the big toe joint. The procedure involves removing this bone spur, usually with a small part of the top of the joint itself.  You will need to wear a surgical shoe for several weeks. Once the foot heals, joint movement is restored.    Fusion  In fusion, the cartilage and some bone on both sides of the joint are removed. Then, the big toe and metatarsal bones are held together with staples, screws, or a plate and screws. Your foot may be placed in a cast. While you heal, you will be asked not to bear weight on this foot. You may also need crutches for several weeks. Because the joint has been removed, your toe will be less flexible.    Arthroplasty  During surgery, bone growth caused by the arthritis is trimmed, and part of the joint is removed. A pin can be used to align the bones and to keep them from touching. The pin is removed after several weeks. In some cases, the entire  joint may be replaced with an implant. You may have to wear a splint or a surgical shoe for several weeks. When healed, the bones become connected with scar tissue.  Date Last Reviewed: 10/15/2015  © 9548-8494 [employer]. [address] [place] [address], [place] [address]. All rights reserved. This information is not intended as a substitute for professional medical care. Always follow your healthcare professional's instructions.          Diabetes: Inspecting Your Feet  Diabetes increases your chances of developing foot problems. So inspect your feet every day. This helps you find small skin irritations before they become serious infections. If you have trouble seeing the bottoms of your feet, use a mirror or ask a family member or friend to help.     Pressure spots on the bottom of the foot are common areas where problems develop.   How to check your feet  Below are tips to help you look for foot problems. Try to check your feet at the same time each day, such as when you get out of bed in the morning:  Check the top of each foot. The tops of toes, back of the heel, and outer edge of the foot can get a lot of rubbing from poor-fitting shoes.  Check the bottom of each foot. Daily wear and tear often leads to problems at pressure spots.  Check the toes and nails. Fungal infections often occur between toes. Toenail problems can also be a sign of fungal infections or lead to breaks in the skin.  Check your shoes, too. Loose objects inside a shoe can injure the foot. Use your hand to feel inside your shoes for things like ray, loose stitching, or rough areas that could irritate your skin.  Warning signs  Look for any color changes in the foot. Redness with streaks can signal a severe infection, which needs immediate medical attention. Tell your doctor right away if you have any of these problems:  Swelling, sometimes with color changes, may be a sign of poor blood flow or infection. Symptoms include tenderness  and an increase in the size of your foot.  Warm or hot areas on your feet may be signs of infection. A foot that is cold may not be getting enough blood.  Sensations such as burning, tingling, or pins and needles can be signs of a problem. Also check for areas that may be numb.  Hot spots are caused by friction or pressure. Look for hot spots in areas that get a lot of rubbing. Hot spots can turn into blisters, calluses, or sores.  Cracks and sores are caused by dry or irritated skin. They are a sign that the skin is breaking down, which can lead to infection.  Toenail problems to watch for include nails growing into the skin (ingrown toenail) and causing redness or pain. Thick, yellow, or discolored nails can signal a fungal infection.  Drainage and odor can develop from untreated sores and ulcers. Call your doctor right away if you notice white or yellow drainage, bleeding, or unpleasant odor.   © 3822-3561 [employer]. [address] [place] [address]. All rights reserved. This information is not intended as a substitute for professional medical care. Always follow your healthcare professional's instructions.        Step-by-Step:  Inspecting Your Feet (Diabetes)    Date Last Reviewed: 10/1/2016  © 0772-8759 [employer]. [address] [place] [address]. All rights reserved. This information is not intended as a substitute for professional medical care. Always follow your healthcare professional's instructions.

## 2022-09-01 NOTE — PROGRESS NOTES
Subjective:      Patient ID: Valencia Dumont is a 52 y.o. female.    Chief Complaint: Diabetes Mellitus (8/30/22 Dr Lion PCP), Diabetic Foot Exam, Nail Care, Nail Problem, and Follow-up    Valencia is a 52 y.o. female who presents to the clinic upon referral from Dr. Manuela gates. provider found  for evaluation and treatment of diabetic feet. 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 Patient has some chronic pain to the left 1st MTPJ which has been causing some issues including pressure to the medial nail border.    PCP: Timur Lion MD    Date Last Seen by PCP:   Chief Complaint   Patient presents with    Diabetes Mellitus     8/30/22 Dr Lion PCP    Diabetic Foot Exam    Nail Care    Nail Problem    Follow-up             Current shoe gear: Casual shoes    Hemoglobin A1C   Date Value Ref Range Status   08/26/2022 7.1 (H) 4.0 - 5.6 % Final     Comment:     ADA Screening Guidelines:  5.7-6.4%  Consistent with prediabetes  >or=6.5%  Consistent with diabetes    High levels of fetal hemoglobin interfere with the HbA1C  assay. Heterozygous hemoglobin variants (HbS, HgC, etc)do  not significantly interfere with this assay.   However, presence of multiple variants may affect accuracy.     01/19/2021 7.4 (H) 4.0 - 5.6 % Final     Comment:     ADA Screening Guidelines:  5.7-6.4%  Consistent with prediabetes  >or=6.5%  Consistent with diabetes  High levels of fetal hemoglobin interfere with the HbA1C  assay. Heterozygous hemoglobin variants (HbS, HgC, etc)do  not significantly interfere with  this assay.   However, presence of multiple variants may affect accuracy.     05/13/2020 7.2 (H) 4.0 - 5.6 % Final     Comment:     ADA Screening Guidelines:  5.7-6.4%  Consistent with prediabetes  >or=6.5%  Consistent with diabetes  High levels of fetal hemoglobin interfere with the HbA1C  assay. Heterozygous hemoglobin variants (HbS, HgC, etc)do  not significantly interfere with this assay.   However, presence of multiple variants may affect accuracy.               Patient Active Problem List   Diagnosis    Microcytic normochromic anemia    SLE (systemic lupus erythematosus)    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    Prophylactic immunotherapy (transplant immunosuppression)    Adverse effect of glucocorticoid or synthetic analogue    Hypoglycemia associated with diabetes    Swelling of left knee joint    Osteoporosis    Abnormal CT scan, colon    Fatigue    Diabetic polyneuropathy associated with type 2 diabetes mellitus    Essential hypertension    Type 2 diabetes mellitus with stage 3 chronic kidney disease, with long-term current use of insulin    Vitamin D deficiency    CKD stage G3a/A1, GFR 45-59 and albumin creatinine ratio <30 mg/g    Chronic kidney disease-mineral and bone disorder    CKD (chronic kidney disease), stage III    Immunosuppression    Refractive error    Vitreous floaters of both eyes    Decreased strength of lower extremity    Long-term use of Plaquenil    Hoarse    Encounter for aftercare following liver transplant    Obstructive sleep apnea syndrome    Snoring    Iron deficiency anemia    Decreased ROM of foot    Gait abnormality    Chronic sinusitis    Right ear pain       Current Outpatient Medications on File Prior to Visit   Medication Sig Dispense Refill    amitriptyline (ELAVIL) 10 MG tablet Take 1 tablet (10 mg total) by mouth every evening. 90 tablet 2    azelastine (ASTELIN) 137 mcg (0.1 %) nasal spray USE ONE SPRAY IN EACH NOSTRIL TWICE DAILY 30 mL 3    azelastine (ASTELIN) 137 mcg  "(0.1 %) nasal spray 1 spray (137 mcg total) by Nasal route 2 (two) times daily. 30 mL 3    BD ULTRA-FINE JANESSA PEN NEEDLE 32 gauge x 5/32" Ndle For five times daily insulin injections 450 each 3    blood-glucose meter,continuous (DEXCOM G6 ) Misc 1 each by Misc.(Non-Drug; Combo Route) route once. for 1 dose 1 each 0    blood-glucose transmitter (DEXCOM G6 TRANSMITTER) Karen 1 each by Misc.(Non-Drug; Combo Route) route once a week 1 Device 3    budesonide-formoterol 160-4.5 mcg (SYMBICORT) 160-4.5 mcg/actuation HFAA Inhale 2 puffs into the lungs.      ciclopirox (PENLAC) 8 % Soln Apply topically nightly. 1 Bottle 3    ciclopirox (PENLAC) 8 % Soln Apply topically nightly. 6.6 mL 3    clotrimazole-betamethasone 1-0.05% (LOTRISONE) cream APPLY TOPICALLY TO THE AFFECTED AREA TWICE DAILY FOR 10 DAYS      cyproheptadine (PERIACTIN) 4 mg tablet TAKE ONE TABLET BY MOUTH ONCE DAILY FOR APPETITE      diazepam (VALIUM) 5 MG tablet Take 5 mg by mouth 3 (three) times daily as needed.   0    diclofenac sodium (VOLTAREN) 1 % Gel Voltaren 1 % topical gel   APPLY 2 GRAM TO THE AFFECTED AREA(S) BY TOPICAL ROUTE 4 TIMES PER DAY      dicyclomine (BENTYL) 10 MG capsule TAKE 1 CAPSULE BY MOUTH EVERY 8 HOURS AS NEEDED for spasms      DILTIAZEM HCL (DILTIAZEM 2% CREAM) Apply topically 3 (three) times daily. Apply topically to anal area. 30 g 0    diphenoxylate-atropine 2.5-0.025 mg (LOMOTIL) 2.5-0.025 mg per tablet Take 1 tablet by mouth 4 (four) times daily as needed.      dulaglutide (TRULICITY) 0.75 mg/0.5 mL pen injector Inject 0.75 mg into the skin every 7 days. 12 pen 3    ergocalciferol (ERGOCALCIFEROL) 50,000 unit Cap Take 50,000 Units by mouth every 7 days.      erythromycin with ethanol (EMGEL) 2 % gel ADD 30GM (1 TUBE) TO THE SOAKING DEVICE AND ALLOW WATER TO AGITATE. PLACE AFFECTED AREAS INTO WATER AND SOAK FOR 10 MINUTES 1-2 TIMES DAILY  1    estradioL (ESTRACE) 0.01 % (0.1 mg/gram) vaginal cream Place 1 g vaginally twice " a week. 42 g 3    famotidine (PEPCID) 40 MG tablet       ferrous sulfate (FEOSOL) 325 mg (65 mg iron) Tab tablet Take 1 tablet (325 mg total) by mouth every 12 (twelve) hours. 60 tablet 4    flash glucose scanning reader (FREESTYLE CHUN 14 DAY READER) Misc 1 each by Misc.(Non-Drug; Combo Route) route once daily. 1 each 0    flash glucose sensor (FREESTYLE CHUN 14 DAY SENSOR) Kit 2 each by Misc.(Non-Drug; Combo Route) route every 14 (fourteen) days. 2 kit 11    fluconazole (DIFLUCAN) 150 MG Tab TAKE ONE TABLET BY MOUTH once for ONE dose 1 tablet 0    fluticasone propionate (FLONASE) 50 mcg/actuation nasal spray 1 spray by Each Nostril route 2 (two) times daily.      fluticasone propionate (FLONASE) 50 mcg/actuation nasal spray 2 sprays (100 mcg total) by Each Nostril route 2 (two) times daily. 18.2 mL 3    gabapentin (NEURONTIN) 300 MG capsule TAKE 1 CAPSULE BY MOUTH THREE TIMES DAILY      gentamicin (GARAMYCIN) 0.1 % cream ADD 30GM (1 TUBE) TO THE SOAKING DEVICE AND ALLOW WATER TO AGITATE. PLACE AFFECTED AREAS INTO WATER AND SOAK FOR 10 MINUTES 1-2 TIMES DAILY  1    hydrocortisone (ANUSOL-HC) 2.5 % rectal cream Place rectally 2 (two) times daily. Apply per rectum bid 30 g 3    hydrOXYchloroQUINE (PLAQUENIL) 200 mg tablet TAKE ONE TABLET BY MOUTH TWICE DAILY. 180 tablet 1    hydrOXYzine HCl (ATARAX) 10 MG Tab TAKE 1 OR 2 TABLETS BY MOUTH THREE TIMES DAILY AS NEEDED FOR ITCHING.  0    hyoscyamine (LEVBID) 0.375 mg Tb12 Take 0.375 mg by mouth.      insulin aspart U-100 (NOVOLOG) 100 unit/mL (3 mL) InPn pen Inject 4 Units into the skin.      insulin detemir U-100 (LEVEMIR FLEXTOUCH U-100 INSULN) 100 unit/mL (3 mL) InPn pen INJECT FOUR units UNDER THE SKIN TWICE DAILY 45 mL 3    insulin lispro (HUMALOG KWIKPEN INSULIN) 100 unit/mL pen 3 units before BREAKFAST and lunch and FIVE units before dinner with sliding scale, up to 25 units daily 45 mL 3    isosorbide mononitrate (IMDUR) 30 MG 24 hr tablet Take 30 mg by mouth  once daily.      ketoconazole (NIZORAL) 2 % cream ADD 30GM (1/2 TUBE) TO THE SOAKING DEVICE AND ALLOW WATER TO AGITATE. PLACE AFFECTED AREAS INTO WATER AND SOAK FOR 10 MINUTES 1-2 TIMES BRENDON  1    Lactobac. rhamnosus GG-inulin 10 billion cell -200 mg Cap Take 1 capsule by mouth 2 (two) times daily. 30 capsule 0    levocetirizine (XYZAL) 5 MG tablet Take 5 mg by mouth every evening.  3    levoFLOXacin (LEVAQUIN) 750 MG tablet levofloxacin 750 mg tablet      LIDOcaine-prilocaine (EMLA) cream 2 (two) times daily. Apply to affected area      losartan (COZAAR) 100 MG tablet Take 1 tablet (100 mg total) by mouth once daily. 30 tablet 2    magnesium oxide 500 mg Tab Take 500 mg by mouth 2 (two) times daily. 60 each 6    meclizine (ANTIVERT) 25 mg tablet TAKE ONE TABLET BY MOUTH AT BEDTIME AS NEEDED for dizziness.  0    meloxicam (MOBIC) 15 MG tablet       mometasone 0.1% (ELOCON) 0.1 % cream APPLY TOPICALLY TO THE FACE TWICE DAILY FOR ONE WEEK      MULTIVIT,THER IRON,CA,FA & MIN (MULTIVITAMIN) Tab Take 1 tablet by mouth once daily. 30 tablet 0    mycophenolate (MYFORTIC) 180 MG TbEC Take 2 tablets (360 mg total) by mouth 2 (two) times daily. 120 tablet 11    neomycin-polymyxin-hydrocortisone (CORTISPORIN) otic solution INSTILL TWO DROPS INTO BOTH EARS FOUR TIMES DAILY FOR 10 DAYS  0    NURTEC 75 mg odt PLACE ONE TABLET on tongue, alternatively UNDER THE TONGUE ONCE DAILY AS NEEDED FOR MIGRAINE 8 tablet 2    nystatin (MYCOSTATIN) 100,000 unit/mL suspension SWISH AND SPIT FIVE MILLILITERS BY MOUTH FOUR TIMES DAILY FOR 7 DAYS.  1    ondansetron (ZOFRAN) 4 MG tablet TAKE TWO TABLETS BY MOUTH EVERY 8 HOURS AS NEEDED FOR NAUSEA.      oxycodone-acetaminophen (PERCOCET) 7.5-325 mg per tablet Take 1 tablet by mouth every 4 (four) hours as needed for Pain.      polyethylene glycol (GLYCOLAX) 17 gram/dose powder Mix 1 capful (17 g) with liquid and by mouth 2 (two) times daily. 1530 g 11    PROAIR HFA 90 mcg/actuation inhaler Inhale 2  puffs into the lungs 3 (three) times daily as needed.   3    promethazine-dextromethorphan (PROMETHAZINE-DM) 6.25-15 mg/5 mL Syrp Take by mouth 2 (two) times daily as needed.   0    rosuvastatin (CRESTOR) 5 MG tablet Take 5 mg by mouth once daily.      sertraline (ZOLOFT) 50 MG tablet Take 50 mg by mouth once daily.  11    SSD 1 % cream 1 application 2 (two) times daily. Apply to affected area  0    tacrolimus (PROGRAF) 1 MG Cap Take 3 capsules (3 mg total) by mouth every morning AND 2 capsules (2 mg total) every evening. 150 capsule 11    topiramate (TOPAMAX) 50 MG tablet SMARTSI Tablet(s) By Mouth Every Evening      triamcinolone acetonide 0.1% (KENALOG) 0.1 % cream Apply topically 2 times daily 30 g 0    valACYclovir (VALTREX) 1000 MG tablet Take 1,000 mg by mouth once daily.      verapamil (CALAN-SR) 120 MG CR tablet TAKE ONE TABLET ONCE DAILY FOR BLOOD PRESSURE  3    zolpidem (AMBIEN) 10 mg Tab Take 10 mg by mouth nightly as needed.  3    buPROPion (WELLBUTRIN XL) 150 MG TB24 tablet Take 300 mg by mouth.      loratadine (CLARITIN) 10 mg tablet Take 1 tablet (10 mg total) by mouth once daily. 30 tablet 0    nystatin-triamcinolone (MYCOLOG II) cream Apply to affected area 2 times daily 30 g 1    pantoprazole (PROTONIX) 40 MG tablet Take 1 tablet (40 mg total) by mouth 2 (two) times daily. Take immediately in am when wake up and then 30 minutes prior to dinner 60 tablet 11     No current facility-administered medications on file prior to visit.       Review of patient's allergies indicates:   Allergen Reactions    Norvasc [amlodipine]      Severe headache    Bactrim [sulfamethoxazole-trimethoprim]      causes UTI    Doxycycline Rash    Pcn [penicillins] Rash       Past Surgical History:   Procedure Laterality Date    BREAST BIOPSY Left 2020    CHOLECYSTECTOMY      COLONOSCOPY N/A 2017    Procedure: COLONOSCOPY;  Surgeon: Marky Sun MD;  Location: [address]);  Service: Endoscopy;   Laterality: N/A;  PM prep.    ESOPHAGOGASTRODUODENOSCOPY      ESOPHAGOGASTRODUODENOSCOPY N/A 1/16/2019    Procedure: EGD (ESOPHAGOGASTRODUODENOSCOPY);  Surgeon: Deniz Guerra MD;  Location: [facility] (4TH FLR);  Service: Endoscopy;  Laterality: N/A;  labs prior, s/p liver transplant-MS    ESOPHAGOGASTRODUODENOSCOPY N/A 9/9/2020    Procedure: EGD (ESOPHAGOGASTRODUODENOSCOPY);  Surgeon: Davion Ríos MD;  Location: [facility] (4TH FLR);  Service: Endoscopy;  Laterality: N/A;  Please order the EGD at least 2 weeks after barium esophagram has been done EGD is for dysphagia  covid test 9/6-[facility] urgent [facility]    FUNCTIONAL ENDOSCOPIC SINUS SURGERY (FESS) USING COMPUTER-ASSISTED NAVIGATION Bilateral 2/4/2021    Procedure: FESS, USING COMPUTER-ASSISTED NAVIGATION;  Surgeon: Delores Lewis MD;  Location: [facility];  Service: ENT;  Laterality: Bilateral;  [employer] [phone] TEXTED HIM @ 2:45PM ON 1-8-2021  DISC LOADED BT TOMMY 1-  RN PREOP 1/28/2021---COVID NEGATIVE ON 2/3--CONSENT INCOMPLETE  H/P INCOMPLETE  --CBC IN AM    LIVER BIOPSY      LIVER TRANSPLANT  12/31/2013    REFRACTIVE SURGERY Bilateral 2010    TUBAL LIGATION  2003    UPPER GASTROINTESTINAL ENDOSCOPY         Family History   Problem Relation Age of Onset    Hypertension Mother     Cataracts Mother     Diabetes Father     Alzheimer's disease Father     Diabetes Paternal Grandfather     Diabetes Paternal Grandmother     No Known Problems Maternal Grandmother     Bone cancer Maternal Grandfather     Breast cancer Maternal Aunt 55    Hypertension Brother     Diabetes Brother     No Known Problems Sister     No Known Problems Maternal Uncle     No Known Problems Paternal Aunt     No Known Problems Paternal Uncle     Stroke Neg Hx     Cancer Neg Hx     Colon cancer Neg Hx     Esophageal cancer Neg Hx     Stomach cancer Neg Hx     Rectal cancer Neg Hx     Amblyopia Neg Hx     Blindness Neg Hx     Glaucoma Neg Hx     Macular degeneration Neg Hx      "Retinal detachment Neg Hx     Strabismus Neg Hx     Thyroid disease Neg Hx        Social History     Socioeconomic History    Marital status:     Number of children: 3   Occupational History     Employer: [employer]   Tobacco Use    Smoking status: Never    Smokeless tobacco: Never    Tobacco comments:     disability; ; 3 children   Substance and Sexual Activity    Alcohol use: Yes     Alcohol/week: 1.0 standard drink     Types: 1 Glasses of wine per week     Comment: occasionally     Drug use: No    Sexual activity: Yes     Partners: Male     Birth control/protection: See Surgical Hx, Post-menopausal     Comment: s/p tubal ligation,  since 1989       Review of Systems   Constitutional: Negative for chills, fever, malaise/fatigue and night sweats.   Cardiovascular:  Negative for chest pain, leg swelling, orthopnea and palpitations.   Respiratory:  Negative for cough, shortness of breath and wheezing.    Skin:  Positive for color change and nail changes. Negative for itching, poor wound healing and rash.   Musculoskeletal:  Positive for arthritis, joint pain, myalgias and stiffness. Negative for gout, joint swelling and muscle weakness.   Gastrointestinal:  Negative for abdominal pain, constipation and nausea.   Neurological:  Positive for numbness. Negative for disturbances in coordination, dizziness, focal weakness, tremors and weakness.         Objective:      Vitals:    09/01/22 0942   Weight: 69.7 kg (153 lb 10.6 oz)   Height: 5' 9" (1.753 m)       Physical Exam  Vitals and nursing note reviewed.   Constitutional:       General: She is not in acute distress.     Appearance: She is well-developed. She is not toxic-appearing or diaphoretic.      Comments: alert and oriented x 3.    Cardiovascular:      Pulses:           Dorsalis pedis pulses are 2+ on the right side and 2+ on the left side.        Posterior tibial pulses are 2+ on the right side and 2+ on the left side.      Comments: No " edema noted b/l LEs. No varicosities present b/l LEs.   Pulmonary:      Effort: No respiratory distress.   Musculoskeletal:         General: No deformity.      Right ankle: Normal. No tenderness. No lateral malleolus, medial malleolus, AITF ligament, CF ligament or posterior TF ligament tenderness.      Right Achilles Tendon: No defects. Castle's test negative.      Left ankle: Normal. No tenderness. No lateral malleolus, medial malleolus, AITF ligament, CF ligament or posterior TF ligament tenderness.      Left Achilles Tendon: No defects. Castle's test negative.      Right foot: Normal capillary refill. No bony tenderness or crepitus.      Left foot: Normal capillary refill. Tenderness and crepitus (1st MTPJ) present. No bony tenderness.      Comments: Biomechanical exam: Pain on palpation left 1st MTPJ and 1st metatarsal.  Pain at end range of motion of 1st metatarsophalangeal joint of the left foot.  There is equinus deformity bilateral with decreased dorsiflexion at the ankle joint bilateral. No tenderness with compression of heel. Negative tinels sign. Gait analysis reveals excessive pronation through midstance and propulsion with early heel off. Shoes reveals lateral heel counter wear bilateral     Decreased first MPJ range of motion both weightbearing and nonweightbearing, + crepitus observed the first MP joint, + dorsal flag sign. Mild  bunion deformity is observed .    Patient has hammertoes of digits 2-5 bilateral partially reducible     Negative anterior drawer at the ankle bilat.  Negative Lachman test at the 2nd MTPJ.  Able to perform sign and double heel rise without pain.     Feet:      Right foot:      Protective Sensation: 5 sites tested.  5 sites sensed.      Left foot:      Protective Sensation: 5 sites tested.  5 sites sensed.   Lymphadenopathy:      Comments: No lymphatic streaking     Skin:     General: Skin is warm and dry.      Coloration: Skin is not pale.      Findings: No ecchymosis,  erythema or rash.      Nails: There is no clubbing.      Comments: B/l hallux nail beds with signs of hyperkeratotic changes to the nail bed. No open wound. No drainage. Stable and dry. medial hallux nail margin of left foot with ingrown nail plate. No erythema or edema is noted. No granuloma formation noted. No malodor     Toenails are thickened by 2-3 mm, discolored/yellowed, dystrophic, brittle with subungual debris.    Neurological:      Mental Status: She is alert and oriented to person, place, and time.      Sensory: Sensory deficit present.      Motor: No atrophy or abnormal muscle tone.      Gait: Gait normal.      Deep Tendon Reflexes:      Reflex Scores:       Achilles reflexes are 2+ on the right side and 2+ on the left side.     Comments: Negative Tinels sign and Charles's click, bilat. Protective sensation intact b/l foot. Vibratory sensation intact b/l. Subjective numbness to toes 4-5 both feet (occasionally).   Psychiatric:         Attention and Perception: She is attentive.         Mood and Affect: Mood is not anxious. Affect is not inappropriate.         Speech: She is communicative. Speech is not slurred.         Behavior: Behavior is not combative. Behavior is cooperative.             Assessment:       Encounter Diagnoses   Name Primary?    Type II diabetes mellitus with neurological manifestations     Comprehensive diabetic foot examination, type 2 DM, encounter for     Hallux limitus, acquired, left     Hallux limitus, acquired, right     Gait abnormality     Hammer toes of both feet     Left foot pain Yes         Plan:     Problem List Items Addressed This Visit       Gait abnormality     Other Visit Diagnoses       Left foot pain    -  Primary    Type II diabetes mellitus with neurological manifestations        Comprehensive diabetic foot examination, type 2 DM, encounter for        Hallux limitus, acquired, left        Hallux limitus, acquired, right        Hammer toes of both feet                I counseled the patient on her conditions, their implications and medical management.    Orders Placed This Encounter    BUPivacaine (PF) 0.5% (5 mg/mL) injection 5 mg    LIDOcaine (PF) 20 mg/ml (2%) injection 20 mg    triamcinolone acetonide injection 20 mg    dexamethasone injection 2 mg       Greater than 50% of this visit spent on counseling and coordination of care.    I did  the patient in detail regarding surgical and conservative treatment measures for hallux limitus. I informed the  patient that the majority of pain is secondary to an arthritic joint with decreased joint spaces. Informed patient that outside of surgical intervention the main goal of therapy is to decreased the  range of motion at the first MPJ joint. This can be done so by utilizing either and extremely hard soled nonflexible shoe or forefoot rocker    I gave written and verbal instructions on stretching exercises. Patient expressed understanding. Discussed icing the affected area as needed and also wearing appropriate shoe gear and avoiding flats, slippers, sandals, and going barefoot. My recommendation for OTC supports is [employer]. Patient instructed on adequate icing techniques. Patient should ice the affected area at least once per day x 10 minutes for 10 days I advised the patient that extra icing would also be beneficial to ensure adequate anti inflammatory effect. We also discussed cortisone injections and NSAID therapy.     Patient would like injection today. Skin was prepped with alcohol and anesthetized with ethyl chloride.  The following mixture was injected into left 1st MTPJ,  dorsal approach: 3ccs of mixture of (1cc 1% plain Lidocaine : 1cc 0.5% Marcaine plain:  0.5cc kenalog-40 : 0.5cc dexamethasone) directly into problematic areas.  Patient  tolerated the injection well. Related nearly 100% relief following injection.     Informed patient that steroid can elevate blood glucose    Education about the  diabetic foot, neuropathy, and prevention of limb loss.    Shoe inspection. Diabetic Foot Education. Patient reminded of the importance of good nutrition/healthy diet/weight management and blood sugar control to help prevent podiatric complications of diabetes. Patient instructed on proper foot hygeine. Wear comfortable, proper fitting shoes. Wash feet daily. Dry well. After drying, apply moisturizer to feet (no lotion to webspaces). Inspect feet daily for skin breaks, blisters, swelling, or redness. Wear cotton socks (preferably white)  Change socks every day. Do NOT walk barefoot. Do NOT use heating pads or hot water soaks. We discussed wearing proper shoe gear, daily foot inspections, never walking without protective shoe gear.     Discussed edema control and the importance of daily moisturizer to the feet such as Gold bonds diabetic foot cream    Recommend applying vicks vaporub to thick abnormal toenails daily x 6 months to treat fungal nail infection.    Based on chart review this patient does not qualify for nail care (Patients who qualify for nail care are usually as follows: diabetic with neurological manifestations, PVD, pernicious anemia, or CKD with appropriate modifiers that indicate high amputation risk).     She will continue to monitor the areas daily, inspect her feet, wear protective shoe gear when ambulatory, moisturizer to maintain skin integrity and follow in this office in approximately 12 months, sooner p.r.n.

## 2022-09-02 ENCOUNTER — HOSPITAL ENCOUNTER (OUTPATIENT)
Dept: RADIOLOGY | Facility: HOSPITAL | Age: 52
Discharge: HOME | End: 2022-09-02
Attending: OBSTETRICS & GYNECOLOGY
Payer: MEDICARE

## 2022-09-02 DIAGNOSIS — Z12.31 SCREENING MAMMOGRAM, ENCOUNTER FOR: ICD-10-CM

## 2022-09-02 LAB
HPV HR 12 DNA SPEC QL NAA+PROBE: POSITIVE
HPV16 AG SPEC QL: NEGATIVE
HPV18 DNA SPEC QL NAA+PROBE: NEGATIVE

## 2022-09-02 PROCEDURE — 77063 MAMMO DIGITAL SCREENING BILAT WITH TOMO: ICD-10-PCS | Mod: 26,,, | Performed by: RADIOLOGY

## 2022-09-02 PROCEDURE — 77063 BREAST TOMOSYNTHESIS BI: CPT | Mod: 26,,, | Performed by: RADIOLOGY

## 2022-09-02 PROCEDURE — 77063 BREAST TOMOSYNTHESIS BI: CPT | Mod: TC

## 2022-09-02 PROCEDURE — 77067 MAMMO DIGITAL SCREENING BILAT WITH TOMO: ICD-10-PCS | Mod: 26,,, | Performed by: RADIOLOGY

## 2022-09-02 PROCEDURE — 77067 SCR MAMMO BI INCL CAD: CPT | Mod: 26,,, | Performed by: RADIOLOGY

## 2022-09-02 PROCEDURE — 77067 SCR MAMMO BI INCL CAD: CPT | Mod: TC

## 2022-09-06 LAB
FINAL PATHOLOGIC DIAGNOSIS: ABNORMAL
Lab: ABNORMAL

## 2022-09-08 ENCOUNTER — PATIENT MESSAGE (OUTPATIENT)
Dept: ENDOCRINOLOGY | Facility: CLINIC | Age: 52
End: 2022-09-08
Payer: MEDICARE

## 2022-09-08 ENCOUNTER — PATIENT MESSAGE (OUTPATIENT)
Dept: OBSTETRICS AND GYNECOLOGY | Facility: CLINIC | Age: 52
End: 2022-09-08
Payer: MEDICARE

## 2022-09-08 LAB
BACTERIAL VAGINOSIS DNA: NEGATIVE
CANDIDA GLABRATA DNA: NEGATIVE
CANDIDA KRUSEI DNA: NEGATIVE
CANDIDA RRNA VAG QL PROBE: NEGATIVE
T VAGINALIS RRNA GENITAL QL PROBE: NEGATIVE

## 2022-09-12 DIAGNOSIS — B96.89 BACTERIAL VAGINOSIS: Primary | ICD-10-CM

## 2022-09-12 DIAGNOSIS — N76.0 BACTERIAL VAGINOSIS: Primary | ICD-10-CM

## 2022-09-12 RX ORDER — TINIDAZOLE 500 MG/1
2 TABLET ORAL
Qty: 8 TABLET | Refills: 0 | Status: SHIPPED | OUTPATIENT
Start: 2022-09-12 | End: 2022-09-14

## 2022-09-13 NOTE — PROGRESS NOTES
Ms. Valencia Dumont was seen in the clinic today for an audiological evaluation.  Ms. Dumont has a documented high frequency hearing loss of the right ear.    Audiological testing revealed normal hearing sloping to a mild-moderate mid to high frequency sensorineural hearing loss for the right ear and normal hearing sensitivity for the left ear.  A speech reception threshold was obtained at 15 dBHL for the right ear and at 15 dBHL for the left ear.  Speech discrimination was 96% for the right ear and 92% for the left ear.      Tympanometry testing revealed a Type Ad tympanogram for the right ear and a Type A tympanogram for the left ear.  Ipsilateral acoustic reflexes were present from 500-2000 Hz for the right ear and were present from 500-4000 Hz for the left ear.    Recommendations:  1. Otologic evaluation  2. Annual audiological evaluation  3. Hearing protection when in noise   4. Hearing aid consultation for the right ear following medical clearance    Please click on link to view Audiogram:  Document on 9/14/2022  9:28 AM by PINKY LojaD: Audiogram

## 2022-09-14 ENCOUNTER — OFFICE VISIT (OUTPATIENT)
Dept: OTOLARYNGOLOGY | Facility: CLINIC | Age: 52
End: 2022-09-14
Payer: MEDICARE

## 2022-09-14 ENCOUNTER — LAB VISIT (OUTPATIENT)
Dept: LAB | Facility: HOSPITAL | Age: 52
End: 2022-09-14
Attending: OTOLARYNGOLOGY
Payer: MEDICARE

## 2022-09-14 ENCOUNTER — CLINICAL SUPPORT (OUTPATIENT)
Dept: AUDIOLOGY | Facility: CLINIC | Age: 52
End: 2022-09-14
Payer: MEDICARE

## 2022-09-14 VITALS — HEIGHT: 69 IN | BODY MASS INDEX: 22.66 KG/M2 | WEIGHT: 153 LBS

## 2022-09-14 DIAGNOSIS — H90.3 ASYMMETRICAL SENSORINEURAL HEARING LOSS: ICD-10-CM

## 2022-09-14 DIAGNOSIS — H90.3 ASYMMETRICAL SENSORINEURAL HEARING LOSS: Primary | ICD-10-CM

## 2022-09-14 DIAGNOSIS — J30.2 SEASONAL ALLERGIC RHINITIS, UNSPECIFIED TRIGGER: ICD-10-CM

## 2022-09-14 DIAGNOSIS — H90.41 SENSORINEURAL HEARING LOSS (SNHL) OF RIGHT EAR WITH UNRESTRICTED HEARING OF LEFT EAR: Primary | ICD-10-CM

## 2022-09-14 DIAGNOSIS — Z98.890 STATUS POST FUNCTIONAL ENDOSCOPIC SINUS SURGERY (FESS): ICD-10-CM

## 2022-09-14 DIAGNOSIS — J33.9 NASAL POLYP: ICD-10-CM

## 2022-09-14 DIAGNOSIS — M26.621 ARTHRALGIA OF RIGHT TEMPOROMANDIBULAR JOINT: ICD-10-CM

## 2022-09-14 LAB
CREAT SERPL-MCNC: 1.4 MG/DL (ref 0.5–1.4)
EST. GFR  (NO RACE VARIABLE): 45 ML/MIN/1.73 M^2

## 2022-09-14 PROCEDURE — 3051F PR MOST RECENT HEMOGLOBIN A1C LEVEL 7.0 - < 8.0%: ICD-10-PCS | Mod: CPTII,S$GLB,, | Performed by: OTOLARYNGOLOGY

## 2022-09-14 PROCEDURE — 92550 PR TYMPANOMETRY AND REFLEX THRESHOLD MEASUREMENTS: ICD-10-PCS | Mod: S$GLB,,, | Performed by: AUDIOLOGIST

## 2022-09-14 PROCEDURE — 99214 PR OFFICE/OUTPT VISIT, EST, LEVL IV, 30-39 MIN: ICD-10-PCS | Mod: S$GLB,,, | Performed by: OTOLARYNGOLOGY

## 2022-09-14 PROCEDURE — 4010F ACE/ARB THERAPY RXD/TAKEN: CPT | Mod: CPTII,S$GLB,, | Performed by: OTOLARYNGOLOGY

## 2022-09-14 PROCEDURE — 1159F MED LIST DOCD IN RCRD: CPT | Mod: CPTII,S$GLB,, | Performed by: OTOLARYNGOLOGY

## 2022-09-14 PROCEDURE — 3008F PR BODY MASS INDEX (BMI) DOCUMENTED: ICD-10-PCS | Mod: CPTII,S$GLB,, | Performed by: OTOLARYNGOLOGY

## 2022-09-14 PROCEDURE — 3008F BODY MASS INDEX DOCD: CPT | Mod: CPTII,S$GLB,, | Performed by: OTOLARYNGOLOGY

## 2022-09-14 PROCEDURE — 92557 PR COMPREHENSIVE HEARING TEST: ICD-10-PCS | Mod: S$GLB,,, | Performed by: AUDIOLOGIST

## 2022-09-14 PROCEDURE — 99214 OFFICE O/P EST MOD 30 MIN: CPT | Mod: S$GLB,,, | Performed by: OTOLARYNGOLOGY

## 2022-09-14 PROCEDURE — 1159F PR MEDICATION LIST DOCUMENTED IN MEDICAL RECORD: ICD-10-PCS | Mod: CPTII,S$GLB,, | Performed by: OTOLARYNGOLOGY

## 2022-09-14 PROCEDURE — 92550 TYMPANOMETRY & REFLEX THRESH: CPT | Mod: S$GLB,,, | Performed by: AUDIOLOGIST

## 2022-09-14 PROCEDURE — 36415 COLL VENOUS BLD VENIPUNCTURE: CPT | Performed by: OTOLARYNGOLOGY

## 2022-09-14 PROCEDURE — 3072F LOW RISK FOR RETINOPATHY: CPT | Mod: CPTII,S$GLB,, | Performed by: OTOLARYNGOLOGY

## 2022-09-14 PROCEDURE — 3072F PR LOW RISK FOR RETINOPATHY: ICD-10-PCS | Mod: CPTII,S$GLB,, | Performed by: OTOLARYNGOLOGY

## 2022-09-14 PROCEDURE — 82565 ASSAY OF CREATININE: CPT | Performed by: OTOLARYNGOLOGY

## 2022-09-14 PROCEDURE — 3051F HG A1C>EQUAL 7.0%<8.0%: CPT | Mod: CPTII,S$GLB,, | Performed by: OTOLARYNGOLOGY

## 2022-09-14 PROCEDURE — 92557 COMPREHENSIVE HEARING TEST: CPT | Mod: S$GLB,,, | Performed by: AUDIOLOGIST

## 2022-09-14 PROCEDURE — 4010F PR ACE/ARB THEARPY RXD/TAKEN: ICD-10-PCS | Mod: CPTII,S$GLB,, | Performed by: OTOLARYNGOLOGY

## 2022-09-14 RX ORDER — FLUOCINOLONE ACETONIDE 0.1 MG/ML
SOLUTION TOPICAL 2 TIMES DAILY
Qty: 60 ML | Refills: 1 | Status: SHIPPED | OUTPATIENT
Start: 2022-09-14 | End: 2022-12-21

## 2022-09-14 RX ORDER — FLUTICASONE PROPIONATE 50 MCG
2 SPRAY, SUSPENSION (ML) NASAL 2 TIMES DAILY
Qty: 18.2 ML | Refills: 11 | Status: SHIPPED | OUTPATIENT
Start: 2022-09-14

## 2022-09-14 RX ORDER — AZELASTINE 1 MG/ML
1 SPRAY, METERED NASAL 2 TIMES DAILY
Qty: 30 ML | Refills: 11 | Status: SHIPPED | OUTPATIENT
Start: 2022-09-14

## 2022-09-14 NOTE — PROGRESS NOTES
OTOLARYNGOLOGY CLINIC NOTE  Date:  09/14/2022     Chief complaint:  Chief Complaint   Patient presents with    Other     Itchy ears       History of Present Illness  Valencia Dumont is a 52 y.o. female  presenting today for a followup.  Has asymm loss got repeat test not much change   Right ear hearing worse   Sometimes gets a clicking sound, last for a couple of minutes  Sometimes gets room spinning - not prolonged periods usually from sit to stand   Sound in ear does not happen at time of dizziness  When puts ear pod in without music hears ringing    Sinuses are doing well using nasal sprays    Has history of left nasal polyposis s/p FESS (b/l ethmoid, maxillary) 2-4-21 for crs with polyp on left       Past Medical History  Past Medical History:   Diagnosis Date    Abnormal Pap smear of cervix     Anemia     Arthritis     Ascites     Asthma     Chronic back pain     Cirrhosis of liver without mention of alcohol 10/18/2013    Diabetes mellitus     Esophageal varices     GERD (gastroesophageal reflux disease)     Herpes simplex virus (HSV) infection     HSV2.    Hypertension     Kidney stone     Lupus     Osteoporosis 12/2013    Prophylactic immunotherapy (transplant immunosuppression) 1/2/2014    SBP (spontaneous bacterial peritonitis)     history of         Past Surgical History  Past Surgical History:   Procedure Laterality Date    BREAST BIOPSY Left 08/2020    CHOLECYSTECTOMY      COLONOSCOPY N/A 5/31/2017    Procedure: COLONOSCOPY;  Surgeon: Marky Sun MD;  Location: [address]);  Service: Endoscopy;  Laterality: N/A;  PM prep.    ESOPHAGOGASTRODUODENOSCOPY      ESOPHAGOGASTRODUODENOSCOPY N/A 1/16/2019    Procedure: EGD (ESOPHAGOGASTRODUODENOSCOPY);  Surgeon: Deniz Guerra MD;  Location: [address]);  Service: Endoscopy;  Laterality: N/A;  labs prior, s/p liver transplant-MS    ESOPHAGOGASTRODUODENOSCOPY N/A 9/9/2020    Procedure: EGD (ESOPHAGOGASTRODUODENOSCOPY);  Surgeon: Davion Ríos,  "MD;  Location: [facility] (4TH FLR);  Service: Endoscopy;  Laterality: N/A;  Please order the EGD at least 2 weeks after barium esophagram has been done EGD is for dysphagia  covid test 9/6-[facility] [place] [facility]    FUNCTIONAL ENDOSCOPIC SINUS SURGERY (FESS) USING COMPUTER-ASSISTED NAVIGATION Bilateral 2/4/2021    Procedure: FESS, USING COMPUTER-ASSISTED NAVIGATION;  Surgeon: Delores Lewis MD;  Location: [facility] OR;  Service: ENT;  Laterality: Bilateral;  [employer] WALDEMAR [phone] TEXTED HIM @ 2:45PM ON 1-8-2021  DISC LOADED BT TOMMY 1-  RN PREOP 1/28/2021---COVID NEGATIVE ON 2/3--CONSENT INCOMPLETE  H/P INCOMPLETE  --CBC IN AM    LIVER BIOPSY      LIVER TRANSPLANT  12/31/2013    REFRACTIVE SURGERY Bilateral 2010    TUBAL LIGATION  2003    UPPER GASTROINTESTINAL ENDOSCOPY          Medications  Current Outpatient Medications on File Prior to Visit   Medication Sig Dispense Refill    amitriptyline (ELAVIL) 10 MG tablet Take 1 tablet (10 mg total) by mouth every evening. 90 tablet 2    azelastine (ASTELIN) 137 mcg (0.1 %) nasal spray USE ONE SPRAY IN EACH NOSTRIL TWICE DAILY 30 mL 3    azelastine (ASTELIN) 137 mcg (0.1 %) nasal spray 1 spray (137 mcg total) by Nasal route 2 (two) times daily. 30 mL 3    BD ULTRA-FINE JANESSA PEN NEEDLE 32 gauge x 5/32" Ndle For five times daily insulin injections 450 each 3    blood-glucose meter,continuous (DEXCOM G6 ) Misc 1 each by Misc.(Non-Drug; Combo Route) route once. for 1 dose 1 each 0    blood-glucose transmitter (DEXCOM G6 TRANSMITTER) Karen 1 each by Misc.(Non-Drug; Combo Route) route once a week 1 Device 3    budesonide-formoterol 160-4.5 mcg (SYMBICORT) 160-4.5 mcg/actuation HFAA Inhale 2 puffs into the lungs.      ciclopirox (PENLAC) 8 % Soln Apply topically nightly. 1 Bottle 3    ciclopirox (PENLAC) 8 % Soln Apply topically nightly. 6.6 mL 3    clotrimazole-betamethasone 1-0.05% (LOTRISONE) cream APPLY TOPICALLY TO THE AFFECTED AREA TWICE DAILY FOR 10 DAYS  "     cyproheptadine (PERIACTIN) 4 mg tablet TAKE ONE TABLET BY MOUTH ONCE DAILY FOR APPETITE      diazepam (VALIUM) 5 MG tablet Take 5 mg by mouth 3 (three) times daily as needed.   0    diclofenac sodium (VOLTAREN) 1 % Gel Voltaren 1 % topical gel   APPLY 2 GRAM TO THE AFFECTED AREA(S) BY TOPICAL ROUTE 4 TIMES PER DAY      dicyclomine (BENTYL) 10 MG capsule TAKE 1 CAPSULE BY MOUTH EVERY 8 HOURS AS NEEDED for spasms      DILTIAZEM HCL (DILTIAZEM 2% CREAM) Apply topically 3 (three) times daily. Apply topically to anal area. 30 g 0    diphenoxylate-atropine 2.5-0.025 mg (LOMOTIL) 2.5-0.025 mg per tablet Take 1 tablet by mouth 4 (four) times daily as needed.      dulaglutide (TRULICITY) 0.75 mg/0.5 mL pen injector Inject 0.75 mg into the skin every 7 days. 12 pen 3    ergocalciferol (ERGOCALCIFEROL) 50,000 unit Cap Take 50,000 Units by mouth every 7 days.      erythromycin with ethanol (EMGEL) 2 % gel ADD 30GM (1 TUBE) TO THE SOAKING DEVICE AND ALLOW WATER TO AGITATE. PLACE AFFECTED AREAS INTO WATER AND SOAK FOR 10 MINUTES 1-2 TIMES DAILY  1    estradioL (ESTRACE) 0.01 % (0.1 mg/gram) vaginal cream Place 1 g vaginally twice a week. 42 g 3    famotidine (PEPCID) 40 MG tablet       ferrous sulfate (FEOSOL) 325 mg (65 mg iron) Tab tablet Take 1 tablet (325 mg total) by mouth every 12 (twelve) hours. 60 tablet 4    flash glucose scanning reader (FREESTYLE CHUN 14 DAY READER) Misc 1 each by Misc.(Non-Drug; Combo Route) route once daily. 1 each 0    flash glucose sensor (FREESTYLE CHUN 14 DAY SENSOR) Kit 2 each by Misc.(Non-Drug; Combo Route) route every 14 (fourteen) days. 2 kit 11    fluconazole (DIFLUCAN) 150 MG Tab TAKE ONE TABLET BY MOUTH once for ONE dose 1 tablet 0    fluticasone propionate (FLONASE) 50 mcg/actuation nasal spray 1 spray by Each Nostril route 2 (two) times daily.      fluticasone propionate (FLONASE) 50 mcg/actuation nasal spray 2 sprays (100 mcg total) by Each Nostril route 2 (two) times daily. 18.2  mL 3    gabapentin (NEURONTIN) 300 MG capsule TAKE 1 CAPSULE BY MOUTH THREE TIMES DAILY      gentamicin (GARAMYCIN) 0.1 % cream ADD 30GM (1 TUBE) TO THE SOAKING DEVICE AND ALLOW WATER TO AGITATE. PLACE AFFECTED AREAS INTO WATER AND SOAK FOR 10 MINUTES 1-2 TIMES DAILY  1    hydrocortisone (ANUSOL-HC) 2.5 % rectal cream Place rectally 2 (two) times daily. Apply per rectum bid 30 g 3    hydrOXYchloroQUINE (PLAQUENIL) 200 mg tablet TAKE ONE TABLET BY MOUTH TWICE DAILY. 180 tablet 1    hydrOXYzine HCl (ATARAX) 10 MG Tab TAKE 1 OR 2 TABLETS BY MOUTH THREE TIMES DAILY AS NEEDED FOR ITCHING.  0    hyoscyamine (LEVBID) 0.375 mg Tb12 Take 0.375 mg by mouth.      insulin aspart U-100 (NOVOLOG) 100 unit/mL (3 mL) InPn pen Inject 4 Units into the skin.      insulin detemir U-100 (LEVEMIR FLEXTOUCH U-100 INSULN) 100 unit/mL (3 mL) InPn pen INJECT FOUR units UNDER THE SKIN TWICE DAILY 45 mL 3    insulin lispro (HUMALOG KWIKPEN INSULIN) 100 unit/mL pen 3 units before BREAKFAST and lunch and FIVE units before dinner with sliding scale, up to 25 units daily 45 mL 3    isosorbide mononitrate (IMDUR) 30 MG 24 hr tablet Take 30 mg by mouth once daily.      ketoconazole (NIZORAL) 2 % cream ADD 30GM (1/2 TUBE) TO THE SOAKING DEVICE AND ALLOW WATER TO AGITATE. PLACE AFFECTED AREAS INTO WATER AND SOAK FOR 10 MINUTES 1-2 TIMES BRENDON  1    Lactobac. rhamnosus GG-inulin 10 billion cell -200 mg Cap Take 1 capsule by mouth 2 (two) times daily. 30 capsule 0    levocetirizine (XYZAL) 5 MG tablet Take 5 mg by mouth every evening.  3    levoFLOXacin (LEVAQUIN) 750 MG tablet levofloxacin 750 mg tablet      LIDOcaine-prilocaine (EMLA) cream 2 (two) times daily. Apply to affected area      losartan (COZAAR) 100 MG tablet Take 1 tablet (100 mg total) by mouth once daily. 30 tablet 2    magnesium oxide 500 mg Tab Take 500 mg by mouth 2 (two) times daily. 60 each 6    meclizine (ANTIVERT) 25 mg tablet TAKE ONE TABLET BY MOUTH AT BEDTIME AS NEEDED for  dizziness.  0    meloxicam (MOBIC) 15 MG tablet       mometasone 0.1% (ELOCON) 0.1 % cream APPLY TOPICALLY TO THE FACE TWICE DAILY FOR ONE WEEK      MULTIVIT,THER IRON,CA,FA & MIN (MULTIVITAMIN) Tab Take 1 tablet by mouth once daily. 30 tablet 0    mycophenolate (MYFORTIC) 180 MG TbEC Take 2 tablets (360 mg total) by mouth 2 (two) times daily. 120 tablet 11    neomycin-polymyxin-hydrocortisone (CORTISPORIN) otic solution INSTILL TWO DROPS INTO BOTH EARS FOUR TIMES DAILY FOR 10 DAYS  0    NURTEC 75 mg odt PLACE ONE TABLET on tongue, alternatively UNDER THE TONGUE ONCE DAILY AS NEEDED FOR MIGRAINE 8 tablet 2    nystatin (MYCOSTATIN) 100,000 unit/mL suspension SWISH AND SPIT FIVE MILLILITERS BY MOUTH FOUR TIMES DAILY FOR 7 DAYS.  1    ondansetron (ZOFRAN) 4 MG tablet TAKE TWO TABLETS BY MOUTH EVERY 8 HOURS AS NEEDED FOR NAUSEA.      oxycodone-acetaminophen (PERCOCET) 7.5-325 mg per tablet Take 1 tablet by mouth every 4 (four) hours as needed for Pain.      polyethylene glycol (GLYCOLAX) 17 gram/dose powder Mix 1 capful (17 g) with liquid and by mouth 2 (two) times daily. 1530 g 11    PROAIR HFA 90 mcg/actuation inhaler Inhale 2 puffs into the lungs 3 (three) times daily as needed.   3    promethazine-dextromethorphan (PROMETHAZINE-DM) 6.25-15 mg/5 mL Syrp Take by mouth 2 (two) times daily as needed.   0    rosuvastatin (CRESTOR) 5 MG tablet Take 5 mg by mouth once daily.      sertraline (ZOLOFT) 50 MG tablet Take 50 mg by mouth once daily.  11    SSD 1 % cream 1 application 2 (two) times daily. Apply to affected area  0    tacrolimus (PROGRAF) 1 MG Cap Take 3 capsules (3 mg total) by mouth every morning AND 2 capsules (2 mg total) every evening. 150 capsule 11    tinidazole (TINDAMAX) 500 MG tablet Take 4 tablets (2 g total) by mouth daily with breakfast. for 2 days 8 tablet 0    topiramate (TOPAMAX) 50 MG tablet SMARTSI Tablet(s) By Mouth Every Evening      triamcinolone acetonide 0.1% (KENALOG) 0.1 % cream Apply  topically 2 times daily 30 g 0    valACYclovir (VALTREX) 1000 MG tablet Take 1,000 mg by mouth once daily.      verapamil (CALAN-SR) 120 MG CR tablet TAKE ONE TABLET ONCE DAILY FOR BLOOD PRESSURE  3    zolpidem (AMBIEN) 10 mg Tab Take 10 mg by mouth nightly as needed.  3    buPROPion (WELLBUTRIN XL) 150 MG TB24 tablet Take 300 mg by mouth.      loratadine (CLARITIN) 10 mg tablet Take 1 tablet (10 mg total) by mouth once daily. 30 tablet 0    nystatin-triamcinolone (MYCOLOG II) cream Apply to affected area 2 times daily 30 g 1    pantoprazole (PROTONIX) 40 MG tablet Take 1 tablet (40 mg total) by mouth 2 (two) times daily. Take immediately in am when wake up and then 30 minutes prior to dinner 60 tablet 11     Current Facility-Administered Medications on File Prior to Visit   Medication Dose Route Frequency Provider Last Rate Last Admin    BUPivacaine (PF) 0.5% (5 mg/mL) injection 5 mg  1 mL Intra-articular Once Deepali O. Karla, DPM        dexamethasone injection 2 mg  2 mg Intra-articular Once Deepali O. Karla, DPM        LIDOcaine (PF) 20 mg/ml (2%) injection 20 mg  1 mL Other Once Deepali O. Karla, DPM        triamcinolone acetonide injection 20 mg  20 mg Intra-articular Once Deepali O. Karla, DPM           Review of Systems  Review of Systems   Constitutional:  Negative for fever.   HENT:  Positive for hearing loss. Negative for congestion and sinus pain.    Respiratory:  Negative for cough and shortness of breath.    Gastrointestinal:  Negative for heartburn.   Skin:  Positive for itching.   Neurological:  Positive for dizziness. Negative for headaches.   Endo/Heme/Allergies:  Positive for environmental allergies.      Social History   reports that she has never smoked. She has never used smokeless tobacco. She reports current alcohol use of about 1.0 standard drink per week. She reports that she does not use drugs.     Family History  Family History   Problem Relation Age of Onset    Hypertension Mother     Cataracts  "Mother     Diabetes Father     Alzheimer's disease Father     Diabetes Paternal Grandfather     Diabetes Paternal Grandmother     No Known Problems Maternal Grandmother     Bone cancer Maternal Grandfather     Breast cancer Maternal Aunt 55    Hypertension Brother     Diabetes Brother     No Known Problems Sister     No Known Problems Maternal Uncle     No Known Problems Paternal Aunt     No Known Problems Paternal Uncle     Stroke Neg Hx     Cancer Neg Hx     Colon cancer Neg Hx     Esophageal cancer Neg Hx     Stomach cancer Neg Hx     Rectal cancer Neg Hx     Amblyopia Neg Hx     Blindness Neg Hx     Glaucoma Neg Hx     Macular degeneration Neg Hx     Retinal detachment Neg Hx     Strabismus Neg Hx     Thyroid disease Neg Hx         Physical Exam   There were no vitals filed for this visit. Body mass index is 22.59 kg/m².  Weight: 69.4 kg (153 lb)   Height: 5' 9" (175.3 cm)     GENERAL: no acute distress.  HEAD: normocephalic.   EYES: No scleral icterus  EARS: external ear without lesion, normal pinna shape and position.  External auditory canal with normal cerumen, tympanic membrane fully visible, no perforation , no retraction. No middle ear effusion. Ossicles intact. No overt chronic oe  NOSE: external nose without significant bony abnormality  ORAL CAVITY/OROPHARYNX: tongue mobile.   NECK: trachea midline.   LYMPH NODES:No cervical lymphadenopathy.  RESPIRATORY: no stridor, no stertor. Voice normal. Respirations nonlabored.  NEURO: alert, responds to questions appropriately.    PSYCH:mood appropriate    AUDIOLOGY RESULTS  Audiometric evaluation including audiogram, tympanometry, acoustic reflexes, and speech discrimination which was performed 9-14-22 was personally reviewed and interpreted.  Notable findings on the audiogram were normal left hearing with right normal sloping to mild sensorineural hearing loss (SNHL).  Tympanometry revealed Type A tympanogram on the left and Type A tympanogram on the right. " Speech discrimination was 92%  on the left, and 96% on the right.     Report of the audiologist performing this audiometric testing was also reviewed . I also reviewed prior test to compare which is copied below from 3-2021      Imaging:  The patient does not have any new imaging of the head and neck since last visit.     Labs:  CBC  Recent Labs   Lab 06/24/22  0750 08/03/22  1033 08/19/22  1126   WBC 4.79 3.89 L 4.72   Hemoglobin 11.8 L 11.5 L 12.2   Hematocrit 34.3 L 32.5 L 35.1 L   MCV 83 84 84   Platelets 215 176 217     BMP  Recent Labs   Lab 05/13/20  1111 06/11/20  1121 01/24/22  1138 06/24/22  0750 08/03/22  1033 08/19/22  1126 08/26/22  1025   Glucose 108   < > 223 H   < > 139 H 138 H 107   Sodium 140   < > 139   < > 139 140 139   Potassium 4.3   < > 4.0   < > 4.1 4.0 4.4   Chloride 104   < > 104   < > 104 105 102   CO2 29   < > 27   < > 29 26 28   BUN 21 H   < > 18   < > 22 H 22 H 24 H   Creatinine 1.4   < > 1.2   < > 1.1 1.6 H 1.6 H   Calcium 9.8   < > 9.0   < > 9.1 9.6 10.0   Phosphorus 2.7  --   --   --   --   --  3.9   Magnesium  --    < > 1.5 L  --  1.9 2.2  --     < > = values in this interval not displayed.     COAGS  Recent Labs   Lab 07/22/20  0826 08/18/21  1100   INR 0.9 1.0       Assessment  1. Asymmetrical sensorineural hearing loss  - MRI IAC/Temporal Bones W W/O Contrast; Future  - CREATININE, SERUM; Future    2. Status post functional endoscopic sinus surgery (FESS)    3. Arthralgia of right temporomandibular joint    4. Seasonal allergic rhinitis, unspecified trigger    5. Nasal polyp     Plan:  Discussed plan of care with patient in detail and all questions answered. Patient reported understanding of plan of care.   Trial of fluocinalone drops( offered to send to [facility] directly, she will call me if regualr pharmacy cost over 30 dollars), normal wax, unclear source-?allergy   Continue nasal spray regimen   Rigid at follow up for history of polyps    Mri iac for asymm snhl - not much  change but persisting and symptomatic and interestedin possible hearing aid would like to clear first. Needs yearly audio as well     I spent a total of 30 minutes on the day of the visit.  This includes face to face time and non-face to face time preparing to see the patient (eg, review of tests), obtaining and/or reviewing separately obtained history, documenting clinical information in the electronic or other health record, independently interpreting results and communicating results to the patient/family/caregiver, or care coordinator.   Please be aware that this note has been generated with the assistance of MModal voice-to-text.  Please excuse any spelling or grammatical errors.

## 2022-09-15 ENCOUNTER — PATIENT MESSAGE (OUTPATIENT)
Dept: GASTROENTEROLOGY | Facility: CLINIC | Age: 52
End: 2022-09-15
Payer: MEDICARE

## 2022-09-16 ENCOUNTER — PATIENT MESSAGE (OUTPATIENT)
Dept: OBSTETRICS AND GYNECOLOGY | Facility: CLINIC | Age: 52
End: 2022-09-16
Payer: MEDICARE

## 2022-09-19 NOTE — PROGRESS NOTES
Valencia Dumont   is a 52 y.o.. I performed this consultation using real-time Telehealth tools, including a live video connection between my location and the patient's location. Prior to initiating the consultation, I obtained informed verbal consent to perform this consultation using Telehealth tools and answered all the questions about the Telehealth interaction.      Face to Face time with patient: 20 min  45 minutes of total time spent on the encounter, which includes face to face time and non-face to face time preparing to see the patient (eg, review of tests), Obtaining and/or reviewing separately obtained history, Documenting clinical information in the electronic or other health record, Independently interpreting results (not separately reported) and communicating results to the patient/family/caregiver, or Care coordination (not separately reported).         Each patient to whom he or she provides medical services by telemedicine is:  (1) informed of the relationship between the physician and patient and the respective role of any other health care provider with respect to management of the patient; and (2) notified that he or she may decline to receive medical services by telemedicine and may withdraw from such care at any time.      Transplant Hepatology  Liver Transplant Recipient Follow Up    PATIENT: Valencia Dumont  MRN: [id number]  DATE: 2022    Provider: Hepatologist - Dr Horvath    Transplant History  Transplant Date: 2013  UNOS Native Liver Dx: Cirrhosis: Autoimmune     Valencia is here for follow up of her liver transplant.     ORGAN: LIVER  Whole or Partial: whole liver  Donor Type:  - brain death  CDC High Risk: no  Donor CMV Status: Positive  Donor HCV Status: Negative  Donor HBcAb: Negative  Biliary Anastomosis: end to end  Arterial Anatomy: standard  IVC reconstruction: end to end ivc  Portal vein status: patent  .  Chief complaint: follow up, history of OLT    Subjective:    Valencia Dumont is a 52 y.o. 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     9/20/22  She has not had recent hospitalizations or ED visits. She reports compliance with Immunosuppression.   She denies recent fever, chills, nausea, vomiting, abdominal pain, diarrhea, headache, tremors.    7/13/21  Swallowing difficulty which has improved somewhat with BID PPI use, but reports not taking on empty stomach first thing in the AM. She states her symptoms are primarily related to post-nasal drip which has not significantly improved with medicine. She also reports having recurrent episodes of vaginal candidiasis, not currently.       DX lupus --She is on infusion medication for her lupus as well as plaquenil 200mg daily BID.    Allograft function:  Allograft function :   ALT   Date Value Ref Range Status   08/19/2022 13 10 - 44 U/L Final     AST   Date Value Ref Range Status   08/19/2022 19 10 - 40 U/L Final     Alkaline Phosphatase   Date Value Ref Range Status   08/19/2022 90 55 - 135 U/L Final     Tacrolimus Lvl   Date Value Ref Range Status   08/19/2022 7.8 5.0 - 15.0 ng/mL Final     Comment:     Testing performed by a chemiluminescent microparticle   immunoassay on the [employer] i System.          Immunosuppression:   Tacrolimus:                                      Yes 3/2 mg BID  Everolimus:                                         no,   MMF:                                                   yes, 360mg BID   Prednisone:                                        No  Cyclosporine:                                       no  Sirolimus:                                             no    Post-LT Complications  Acute cellular rejection:                       no  Antibody-mediated rejection:               no  Biliary anastomotic stricture:               no  HAT:                                                     no  HA stenosis:                                          no  PV stenosis:                                         no  CMV reactivation:                                 no  PTLD:                                                   no  Other malignancies:                             no      Health Maintenance:  Dermatology check up:                           no  Colonoscopy:                                          yes  Bone Mineral Density (BMD):                  yes,   Prolia injections  HbA1C:                                                    Yes      Prior Relevant History:  Biopsy in 2017 suggested recurrence of AIH received Prednisolone bolus    2018 In the setting of the elevated immunosuppression she also developed some acute kidney injury.     Review of systems:  A review of 12+ systems was conducted with pertinent positive and negative findings documented in HPI with all other systems reviewed and negative.    Past Medical History:   Past Medical History:   Diagnosis Date    Abnormal Pap smear of cervix     Anemia     Arthritis     Ascites     Asthma     Chronic back pain     Cirrhosis of liver without mention of alcohol 10/18/2013    Diabetes mellitus     Esophageal varices     GERD (gastroesophageal reflux disease)     Herpes simplex virus (HSV) infection     HSV2.    Hypertension     Kidney stone     Lupus     Osteoporosis 12/2013    Prophylactic immunotherapy (transplant immunosuppression) 1/2/2014    SBP (spontaneous bacterial peritonitis)     history of        Past Surgical HIstory:   Past Surgical History:   Procedure Laterality Date    BREAST BIOPSY Left 08/2020    CHOLECYSTECTOMY      COLONOSCOPY N/A 5/31/2017    Procedure: COLONOSCOPY;  Surgeon: Marky Sun MD;  Location: [facility] ([address] [place] [address]);  Service: Endoscopy;  Laterality: N/A;  PM prep.    ESOPHAGOGASTRODUODENOSCOPY      ESOPHAGOGASTRODUODENOSCOPY N/A 1/16/2019    Procedure: EGD (ESOPHAGOGASTRODUODENOSCOPY);  Surgeon: Deniz Guerra MD;  Location: [facility] ([address] [place] [address]);  Service:  Endoscopy;  Laterality: N/A;  labs prior, s/p liver transplant-MS    ESOPHAGOGASTRODUODENOSCOPY N/A 9/9/2020    Procedure: EGD (ESOPHAGOGASTRODUODENOSCOPY);  Surgeon: Davion Ríos MD;  Location: [address]);  Service: Endoscopy;  Laterality: N/A;  Please order the EGD at least 2 weeks after barium esophagram has been done EGD is for dysphagia  covid test 9/6-[facility] care    FUNCTIONAL ENDOSCOPIC SINUS SURGERY (FESS) USING COMPUTER-ASSISTED NAVIGATION Bilateral 2/4/2021    Procedure: FESS, USING COMPUTER-ASSISTED NAVIGATION;  Surgeon: Delores Lewis MD;  Location: [facility];  Service: ENT;  Laterality: Bilateral;  Patient Home Monitoring WALDEMAR [phone] TEXTED HIM @ 2:45PM ON 1-8-2021  DISC LOADED BT TOMMY 1-  RN PREOP 1/28/2021---COVID NEGATIVE ON 2/3--CONSENT INCOMPLETE  H/P INCOMPLETE  --CBC IN AM    LIVER BIOPSY      LIVER TRANSPLANT  12/31/2013    REFRACTIVE SURGERY Bilateral 2010    TUBAL LIGATION  2003    UPPER GASTROINTESTINAL ENDOSCOPY         Family History:   Family History   Problem Relation Age of Onset    Hypertension Mother     Cataracts Mother     Diabetes Father     Alzheimer's disease Father     Diabetes Paternal Grandfather     Diabetes Paternal Grandmother     No Known Problems Maternal Grandmother     Bone cancer Maternal Grandfather     Breast cancer Maternal Aunt 55    Hypertension Brother     Diabetes Brother     No Known Problems Sister     No Known Problems Maternal Uncle     No Known Problems Paternal Aunt     No Known Problems Paternal Uncle     Stroke Neg Hx     Cancer Neg Hx     Colon cancer Neg Hx     Esophageal cancer Neg Hx     Stomach cancer Neg Hx     Rectal cancer Neg Hx     Amblyopia Neg Hx     Blindness Neg Hx     Glaucoma Neg Hx     Macular degeneration Neg Hx     Retinal detachment Neg Hx     Strabismus Neg Hx     Thyroid disease Neg Hx        Social History:  reports that she has never smoked. She has never used smokeless tobacco. She reports current alcohol  "use of about 1.0 standard drink per week. She reports that she does not use drugs.    PFSH:  Past medical, family, and social history reviewed as documented in chart with pertinent positive medical, family, and social history detailed in HPI.    Allergies:  Review of patient's allergies indicates:   Allergen Reactions    Norvasc [amlodipine]      Severe headache    Bactrim [sulfamethoxazole-trimethoprim]      causes UTI    Doxycycline Rash    Pcn [penicillins] Rash       Medications:  Current Outpatient Medications   Medication Sig Dispense Refill    amitriptyline (ELAVIL) 10 MG tablet Take 1 tablet (10 mg total) by mouth every evening. 90 tablet 2    azelastine (ASTELIN) 137 mcg (0.1 %) nasal spray USE ONE SPRAY IN EACH NOSTRIL TWICE DAILY 30 mL 3    azelastine (ASTELIN) 137 mcg (0.1 %) nasal spray 1 spray (137 mcg total) by Nasal route 2 (two) times daily. 30 mL 3    azelastine (ASTELIN) 137 mcg (0.1 %) nasal spray 1 spray (137 mcg total) by Nasal route 2 (two) times daily. 30 mL 11    BD ULTRA-FINE JANESSA PEN NEEDLE 32 gauge x 5/32" Ndle For five times daily insulin injections 450 each 3    blood-glucose meter,continuous (DEXCOM G6 ) Misc 1 each by Misc.(Non-Drug; Combo Route) route once. for 1 dose 1 each 0    blood-glucose transmitter (DEXCOM G6 TRANSMITTER) Karen 1 each by Misc.(Non-Drug; Combo Route) route once a week 1 Device 3    budesonide-formoterol 160-4.5 mcg (SYMBICORT) 160-4.5 mcg/actuation HF Inhale 2 puffs into the lungs.      buPROPion (WELLBUTRIN XL) 150 MG TB24 tablet Take 300 mg by mouth.      ciclopirox (PENLAC) 8 % Soln Apply topically nightly. 1 Bottle 3    ciclopirox (PENLAC) 8 % Soln Apply topically nightly. 6.6 mL 3    clotrimazole-betamethasone 1-0.05% (LOTRISONE) cream APPLY TOPICALLY TO THE AFFECTED AREA TWICE DAILY FOR 10 DAYS      cyproheptadine (PERIACTIN) 4 mg tablet TAKE ONE TABLET BY MOUTH ONCE DAILY FOR APPETITE      diazepam (VALIUM) 5 MG tablet Take 5 mg by mouth 3 " (three) times daily as needed.   0    diclofenac sodium (VOLTAREN) 1 % Gel Voltaren 1 % topical gel   APPLY 2 GRAM TO THE AFFECTED AREA(S) BY TOPICAL ROUTE 4 TIMES PER DAY      dicyclomine (BENTYL) 10 MG capsule TAKE 1 CAPSULE BY MOUTH EVERY 8 HOURS AS NEEDED for spasms      DILTIAZEM HCL (DILTIAZEM 2% CREAM) Apply topically 3 (three) times daily. Apply topically to anal area. 30 g 0    diphenoxylate-atropine 2.5-0.025 mg (LOMOTIL) 2.5-0.025 mg per tablet Take 1 tablet by mouth 4 (four) times daily as needed.      dulaglutide (TRULICITY) 0.75 mg/0.5 mL pen injector Inject 0.75 mg into the skin every 7 days. 12 pen 3    ergocalciferol (ERGOCALCIFEROL) 50,000 unit Cap Take 50,000 Units by mouth every 7 days.      erythromycin with ethanol (EMGEL) 2 % gel ADD 30GM (1 TUBE) TO THE SOAKING DEVICE AND ALLOW WATER TO AGITATE. PLACE AFFECTED AREAS INTO WATER AND SOAK FOR 10 MINUTES 1-2 TIMES DAILY  1    estradioL (ESTRACE) 0.01 % (0.1 mg/gram) vaginal cream Place 1 g vaginally twice a week. 42 g 3    famotidine (PEPCID) 40 MG tablet       ferrous sulfate (FEOSOL) 325 mg (65 mg iron) Tab tablet Take 1 tablet (325 mg total) by mouth every 12 (twelve) hours. 60 tablet 4    flash glucose scanning reader (FREESTYLE HCUN 14 DAY READER) Misc 1 each by Misc.(Non-Drug; Combo Route) route once daily. 1 each 0    flash glucose sensor (FREESTYLE CHUN 14 DAY SENSOR) Kit 2 each by Misc.(Non-Drug; Combo Route) route every 14 (fourteen) days. 2 kit 11    fluconazole (DIFLUCAN) 150 MG Tab TAKE ONE TABLET BY MOUTH once for ONE dose 1 tablet 0    fluocinolone (SYNALAR) 0.01 % external solution Apply topically 2 (two) times daily. Apply a couple of drops into ears as needed for itching 60 mL 1    fluticasone propionate (FLONASE) 50 mcg/actuation nasal spray 1 spray by Each Nostril route 2 (two) times daily.      fluticasone propionate (FLONASE) 50 mcg/actuation nasal spray 2 sprays (100 mcg total) by Each Nostril route 2 (two) times daily.  18.2 mL 3    fluticasone propionate (FLONASE) 50 mcg/actuation nasal spray 2 sprays (100 mcg total) by Each Nostril route 2 (two) times daily. 18.2 mL 11    gabapentin (NEURONTIN) 300 MG capsule TAKE 1 CAPSULE BY MOUTH THREE TIMES DAILY      gentamicin (GARAMYCIN) 0.1 % cream ADD 30GM (1 TUBE) TO THE SOAKING DEVICE AND ALLOW WATER TO AGITATE. PLACE AFFECTED AREAS INTO WATER AND SOAK FOR 10 MINUTES 1-2 TIMES DAILY  1    hydrocortisone (ANUSOL-HC) 2.5 % rectal cream Place rectally 2 (two) times daily. Apply per rectum bid 30 g 3    hydrOXYchloroQUINE (PLAQUENIL) 200 mg tablet TAKE ONE TABLET BY MOUTH TWICE DAILY. 180 tablet 1    hydrOXYzine HCl (ATARAX) 10 MG Tab TAKE 1 OR 2 TABLETS BY MOUTH THREE TIMES DAILY AS NEEDED FOR ITCHING.  0    hyoscyamine (LEVBID) 0.375 mg Tb12 Take 0.375 mg by mouth.      insulin aspart U-100 (NOVOLOG) 100 unit/mL (3 mL) InPn pen Inject 4 Units into the skin.      insulin detemir U-100 (LEVEMIR FLEXTOUCH U-100 INSULN) 100 unit/mL (3 mL) InPn pen INJECT FOUR units UNDER THE SKIN TWICE DAILY 45 mL 3    insulin lispro (HUMALOG KWIKPEN INSULIN) 100 unit/mL pen 3 units before BREAKFAST and lunch and FIVE units before dinner with sliding scale, up to 25 units daily 45 mL 3    isosorbide mononitrate (IMDUR) 30 MG 24 hr tablet Take 30 mg by mouth once daily.      ketoconazole (NIZORAL) 2 % cream ADD 30GM (1/2 TUBE) TO THE SOAKING DEVICE AND ALLOW WATER TO AGITATE. PLACE AFFECTED AREAS INTO WATER AND SOAK FOR 10 MINUTES 1-2 TIMES BRENDON  1    Lactobac. rhamnosus GG-inulin 10 billion cell -200 mg Cap Take 1 capsule by mouth 2 (two) times daily. 30 capsule 0    levocetirizine (XYZAL) 5 MG tablet Take 5 mg by mouth every evening.  3    levoFLOXacin (LEVAQUIN) 750 MG tablet levofloxacin 750 mg tablet      LIDOcaine-prilocaine (EMLA) cream 2 (two) times daily. Apply to affected area      loratadine (CLARITIN) 10 mg tablet Take 1 tablet (10 mg total) by mouth once daily. 30 tablet 0    losartan (COZAAR)  100 MG tablet Take 1 tablet (100 mg total) by mouth once daily. 30 tablet 2    magnesium oxide 500 mg Tab Take 500 mg by mouth 2 (two) times daily. 60 each 6    meclizine (ANTIVERT) 25 mg tablet TAKE ONE TABLET BY MOUTH AT BEDTIME AS NEEDED for dizziness.  0    meloxicam (MOBIC) 15 MG tablet       mometasone 0.1% (ELOCON) 0.1 % cream APPLY TOPICALLY TO THE FACE TWICE DAILY FOR ONE WEEK      MULTIVIT,THER IRON,CA,FA & MIN (MULTIVITAMIN) Tab Take 1 tablet by mouth once daily. 30 tablet 0    mycophenolate (MYFORTIC) 180 MG TbEC Take 2 tablets (360 mg total) by mouth 2 (two) times daily. 120 tablet 11    neomycin-polymyxin-hydrocortisone (CORTISPORIN) otic solution INSTILL TWO DROPS INTO BOTH EARS FOUR TIMES DAILY FOR 10 DAYS  0    NURTEC 75 mg odt PLACE ONE TABLET on tongue, alternatively UNDER THE TONGUE ONCE DAILY AS NEEDED FOR MIGRAINE 8 tablet 2    nystatin (MYCOSTATIN) 100,000 unit/mL suspension SWISH AND SPIT FIVE MILLILITERS BY MOUTH FOUR TIMES DAILY FOR 7 DAYS.  1    nystatin-triamcinolone (MYCOLOG II) cream Apply to affected area 2 times daily 30 g 1    ondansetron (ZOFRAN) 4 MG tablet TAKE TWO TABLETS BY MOUTH EVERY 8 HOURS AS NEEDED FOR NAUSEA.      oxycodone-acetaminophen (PERCOCET) 7.5-325 mg per tablet Take 1 tablet by mouth every 4 (four) hours as needed for Pain.      pantoprazole (PROTONIX) 40 MG tablet Take 1 tablet (40 mg total) by mouth 2 (two) times daily. Take immediately in am when wake up and then 30 minutes prior to dinner 60 tablet 11    polyethylene glycol (GLYCOLAX) 17 gram/dose powder Mix 1 capful (17 g) with liquid and by mouth 2 (two) times daily. 1530 g 11    PROAIR HFA 90 mcg/actuation inhaler Inhale 2 puffs into the lungs 3 (three) times daily as needed.   3    promethazine-dextromethorphan (PROMETHAZINE-DM) 6.25-15 mg/5 mL Syrp Take by mouth 2 (two) times daily as needed.   0    rosuvastatin (CRESTOR) 5 MG tablet Take 5 mg by mouth once daily.      sertraline (ZOLOFT) 50 MG tablet  Take 50 mg by mouth once daily.  11    SSD 1 % cream 1 application 2 (two) times daily. Apply to affected area  0    tacrolimus (PROGRAF) 1 MG Cap Take 3 capsules (3 mg total) by mouth every morning AND 2 capsules (2 mg total) every evening. 150 capsule 11    topiramate (TOPAMAX) 50 MG tablet SMARTSI Tablet(s) By Mouth Every Evening      triamcinolone acetonide 0.1% (KENALOG) 0.1 % cream Apply topically 2 times daily 30 g 0    valACYclovir (VALTREX) 1000 MG tablet Take 1,000 mg by mouth once daily.      verapamil (CALAN-SR) 120 MG CR tablet TAKE ONE TABLET ONCE DAILY FOR BLOOD PRESSURE  3    zolpidem (AMBIEN) 10 mg Tab Take 10 mg by mouth nightly as needed.  3     No current facility-administered medications for this visit.       Review of Systems   Constitutional:  Negative for fatigue, fever and unexpected weight change.   HENT:  Negative for ear pain, nosebleeds and trouble swallowing.    Eyes:  Negative for discharge and redness.   Respiratory:  Negative for cough and shortness of breath.    Cardiovascular:  Negative for palpitations and leg swelling.   Gastrointestinal:  Negative for abdominal distention, abdominal pain, diarrhea and vomiting.   Endocrine: Negative for cold intolerance and polyuria.   Genitourinary:  Negative for flank pain and hematuria.   Musculoskeletal:  Negative for back pain.   Skin:  Negative for pallor.   Neurological:  Negative for seizures and headaches.   Hematological:  Does not bruise/bleed easily.   Psychiatric/Behavioral:  Negative for confusion and hallucinations.    See HPI otherwise neg 10 pt ROS    UNOS Patient Status  Functional Status: 80% - Normal activity with effort: some symptoms of disease  Physical Capacity: No Limitations        Objective:      Vitals: There were no vitals filed for this visit.    Physical Exam  Constitutional:       Appearance: Normal appearance.   HENT:      Head: Normocephalic and atraumatic.      Right Ear: External ear normal.      Left Ear:  External ear normal.      Mouth/Throat:      Mouth: Mucous membranes are moist.   Eyes:      Extraocular Movements: Extraocular movements intact.      Pupils: Pupils are equal, round, and reactive to light.   Cardiovascular:      Rate and Rhythm: Normal rate.   Pulmonary:      Effort: Pulmonary effort is normal.   Abdominal:      General: There is no distension.      Palpations: There is no mass.      Tenderness: There is no abdominal tenderness.   Musculoskeletal:         General: Normal range of motion.      Cervical back: Normal range of motion.   Skin:     General: Skin is warm.   Neurological:      General: No focal deficit present.      Mental Status: She is alert and oriented to person, place, and time.     Gen: NAD answers questions appropriately  HEENT: NCAT PERRL EOMI MMM no scleral icterus  Neck: supple no LAD  Cardiac: RRR no m/r/g  Lungs: CTA b/l no w/r/r  Abd: soft NTND BS+  Ext: no c/c/e  Skin: warm dry no rash    Laboratory Data:  Lab Visit on 09/14/2022   Component Date Value Ref Range Status    Creatinine 09/14/2022 1.4  0.5 - 1.4 mg/dL Final    eGFR 09/14/2022 45 (A)  >60 mL/min/1.73 m^2 Final       Lab Results   Component Value Date    WBC 4.72 08/19/2022    HGB 12.2 08/19/2022    HCT 35.1 (L) 08/19/2022    MCV 84 08/19/2022     08/19/2022       Lab Results   Component Value Date     08/26/2022    K 4.4 08/26/2022     08/26/2022    CO2 28 08/26/2022    BUN 24 (H) 08/26/2022    CREATININE 1.4 09/14/2022    CALCIUM 10.0 08/26/2022    ANIONGAP 9 08/26/2022    ESTGFRAFRICA 46 (A) 06/24/2022    EGFRNONAA 40 (A) 06/24/2022       Lab Results   Component Value Date    ALT 13 08/19/2022    AST 19 08/19/2022     (H) 05/18/2017    ALKPHOS 90 08/19/2022    BILITOT 0.5 08/19/2022       Lab Results   Component Value Date    TACROLIMUS 7.8 08/19/2022       Imaging:  I personally reviewed imaging studies and outside records..      Assessment:       1. 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     2. Status post liver transplant    3. Immunosuppression    4. Therapeutic drug monitoring             Plan:     Recommendations:  Allograft Function  - Excellent graft function with normal LFTs in 8/2022    Immunosuppression   - will decrease prograf to 2mg qAM, 2mg qPM given renal function with continue myfortic 360 BID  --Additional testing to be completed according to Written Order Guidelines for Post-Liver and Combined Liver/Kidney Transplant Follow-up (LI-09)    CKD stage 3  -crerat 1.4  - Avoid NSAIDs as able and maintain adequate hydration    Health Maintenance/Screening:  - CRC: Colonoscopy as per GI  -EGD with path c/w reflux esophagitis, counseled on appropriate PPI use (continue BID protonix)  - Recommend age appropriate cancer screening  - Skin cancer: Recommend use of sunscreen SPF 30 or higher, hat and sunglasses while outside, dermatologist visit annually or sooner if any concerning lesions, refer to dermatology for skin CA evaluation  - Osteoporosis: Recommend bone density testing every 2-3 years if previously normal or annually if previously abnormal, continuing prolia   -Dental: Cleaning andfollow up every 6 month    5. Recurrent Sinusitis/candidiasis   plan to reduce myfortic once creatinine stabilize       Return to clinic in 12 months.    I have sent communication to the referring physician and/or primary care provider.    Time spent on the day of this encounter preparing for, treating and managing this patient and over half of that time was spent on counseling and coordination of care.  30 minutes were spend reviewing the previous labs and outside records. I also educated the patient about lifestyle modifications. I have provided the patient with an opportunity to ask questions and have all questions answered to his satisfaction.         Patient was seen in the liver transplant department at [facility] Edgar Horvath MD  Transplant Hepatology &  Gastroenterology  Hepatology and Liver Transplant  [facility]  [facility]

## 2022-09-20 ENCOUNTER — PATIENT MESSAGE (OUTPATIENT)
Dept: GASTROENTEROLOGY | Facility: CLINIC | Age: 52
End: 2022-09-20
Payer: MEDICARE

## 2022-09-20 ENCOUNTER — PATIENT MESSAGE (OUTPATIENT)
Dept: TRANSPLANT | Facility: CLINIC | Age: 52
End: 2022-09-20
Payer: MEDICARE

## 2022-09-20 ENCOUNTER — OFFICE VISIT (OUTPATIENT)
Dept: HEPATOLOGY | Facility: CLINIC | Age: 52
End: 2022-09-20
Payer: MEDICARE

## 2022-09-20 DIAGNOSIS — Z94.4 STATUS POST LIVER TRANSPLANT: ICD-10-CM

## 2022-09-20 DIAGNOSIS — D84.9 IMMUNOSUPPRESSION: ICD-10-CM

## 2022-09-20 DIAGNOSIS — Z51.81 THERAPEUTIC DRUG MONITORING: ICD-10-CM

## 2022-09-20 DIAGNOSIS — Z94.4 LIVER TRANSPLANTED: ICD-10-CM

## 2022-09-20 DIAGNOSIS — Z94.4 LIVER REPLACED BY TRANSPLANT: Primary | ICD-10-CM

## 2022-09-20 PROCEDURE — 1159F PR MEDICATION LIST DOCUMENTED IN MEDICAL RECORD: ICD-10-PCS | Mod: CPTII,95,, | Performed by: INTERNAL MEDICINE

## 2022-09-20 PROCEDURE — 1160F RVW MEDS BY RX/DR IN RCRD: CPT | Mod: CPTII,95,, | Performed by: INTERNAL MEDICINE

## 2022-09-20 PROCEDURE — 4010F PR ACE/ARB THEARPY RXD/TAKEN: ICD-10-PCS | Mod: CPTII,95,, | Performed by: INTERNAL MEDICINE

## 2022-09-20 PROCEDURE — 3072F PR LOW RISK FOR RETINOPATHY: ICD-10-PCS | Mod: CPTII,95,, | Performed by: INTERNAL MEDICINE

## 2022-09-20 PROCEDURE — 4010F ACE/ARB THERAPY RXD/TAKEN: CPT | Mod: CPTII,95,, | Performed by: INTERNAL MEDICINE

## 2022-09-20 PROCEDURE — 99214 PR OFFICE/OUTPT VISIT, EST, LEVL IV, 30-39 MIN: ICD-10-PCS | Mod: 95,,, | Performed by: INTERNAL MEDICINE

## 2022-09-20 PROCEDURE — 1160F PR REVIEW ALL MEDS BY PRESCRIBER/CLIN PHARMACIST DOCUMENTED: ICD-10-PCS | Mod: CPTII,95,, | Performed by: INTERNAL MEDICINE

## 2022-09-20 PROCEDURE — 3072F LOW RISK FOR RETINOPATHY: CPT | Mod: CPTII,95,, | Performed by: INTERNAL MEDICINE

## 2022-09-20 PROCEDURE — 99214 OFFICE O/P EST MOD 30 MIN: CPT | Mod: 95,,, | Performed by: INTERNAL MEDICINE

## 2022-09-20 PROCEDURE — 3051F PR MOST RECENT HEMOGLOBIN A1C LEVEL 7.0 - < 8.0%: ICD-10-PCS | Mod: CPTII,95,, | Performed by: INTERNAL MEDICINE

## 2022-09-20 PROCEDURE — 3051F HG A1C>EQUAL 7.0%<8.0%: CPT | Mod: CPTII,95,, | Performed by: INTERNAL MEDICINE

## 2022-09-20 PROCEDURE — 1159F MED LIST DOCD IN RCRD: CPT | Mod: CPTII,95,, | Performed by: INTERNAL MEDICINE

## 2022-09-20 RX ORDER — TACROLIMUS 1 MG/1
CAPSULE ORAL
Qty: 120 CAPSULE | Refills: 11 | Status: SHIPPED | OUTPATIENT
Start: 2022-09-20 | End: 2023-02-11

## 2022-09-22 ENCOUNTER — HOSPITAL ENCOUNTER (OUTPATIENT)
Dept: RADIOLOGY | Facility: HOSPITAL | Age: 52
Discharge: HOME | End: 2022-09-22
Attending: OTOLARYNGOLOGY
Payer: MEDICARE

## 2022-09-22 DIAGNOSIS — H90.3 ASYMMETRICAL SENSORINEURAL HEARING LOSS: ICD-10-CM

## 2022-09-22 PROCEDURE — 70553 MRI IAC/TEMPORAL BONES W W/O CONTRAST: ICD-10-PCS | Mod: 26,,, | Performed by: RADIOLOGY

## 2022-09-22 PROCEDURE — A9585 GADOBUTROL INJECTION: HCPCS | Performed by: OTOLARYNGOLOGY

## 2022-09-22 PROCEDURE — 25500020 PHARM REV CODE 255: Performed by: OTOLARYNGOLOGY

## 2022-09-22 PROCEDURE — 70553 MRI BRAIN STEM W/O & W/DYE: CPT | Mod: 26,,, | Performed by: RADIOLOGY

## 2022-09-22 PROCEDURE — 70553 MRI BRAIN STEM W/O & W/DYE: CPT | Mod: TC

## 2022-09-22 RX ORDER — GADOBUTROL 604.72 MG/ML
7 INJECTION INTRAVENOUS
Status: COMPLETED | OUTPATIENT
Start: 2022-09-22 | End: 2022-09-22

## 2022-09-22 RX ADMIN — GADOBUTROL 7 ML: 604.72 INJECTION INTRAVENOUS at 05:09

## 2022-09-23 ENCOUNTER — LAB VISIT (OUTPATIENT)
Dept: LAB | Facility: HOSPITAL | Age: 52
End: 2022-09-23
Attending: INTERNAL MEDICINE
Payer: MEDICARE

## 2022-09-23 DIAGNOSIS — M32.9 SYSTEMIC LUPUS ERYTHEMATOSUS, UNSPECIFIED SLE TYPE, UNSPECIFIED ORGAN INVOLVEMENT STATUS: ICD-10-CM

## 2022-09-23 DIAGNOSIS — D84.9 IMMUNOSUPPRESSION: ICD-10-CM

## 2022-09-23 DIAGNOSIS — R53.83 FATIGUE, UNSPECIFIED TYPE: ICD-10-CM

## 2022-09-23 LAB
BACTERIA #/AREA URNS HPF: ABNORMAL /HPF
BILIRUB UR QL STRIP: ABNORMAL
CLARITY UR: CLEAR
COLOR UR: YELLOW
CREAT UR-MCNC: 439.8 MG/DL (ref 15–325)
GLUCOSE UR QL STRIP: NEGATIVE
HGB UR QL STRIP: NEGATIVE
HYALINE CASTS #/AREA URNS LPF: 11 /LPF
KETONES UR QL STRIP: NEGATIVE
LEUKOCYTE ESTERASE UR QL STRIP: NEGATIVE
MICROSCOPIC COMMENT: ABNORMAL
NITRITE UR QL STRIP: NEGATIVE
PH UR STRIP: 6 [PH] (ref 5–8)
PROT UR QL STRIP: ABNORMAL
PROT UR-MCNC: 75 MG/DL
PROT/CREAT UR: 0.17 MG/G{CREAT} (ref 0–0.2)
RBC #/AREA URNS HPF: 1 /HPF (ref 0–4)
SP GR UR STRIP: >1.03 (ref 1–1.03)
SQUAMOUS #/AREA URNS HPF: 1 /HPF
URN SPEC COLLECT METH UR: ABNORMAL
UROBILINOGEN UR STRIP-ACNC: NEGATIVE EU/DL
WBC #/AREA URNS HPF: 2 /HPF (ref 0–5)

## 2022-09-23 PROCEDURE — 81000 URINALYSIS NONAUTO W/SCOPE: CPT | Performed by: INTERNAL MEDICINE

## 2022-09-23 PROCEDURE — 84156 ASSAY OF PROTEIN URINE: CPT | Performed by: INTERNAL MEDICINE

## 2022-09-28 ENCOUNTER — INFUSION (OUTPATIENT)
Dept: INFUSION THERAPY | Facility: HOSPITAL | Age: 52
End: 2022-09-28
Attending: INTERNAL MEDICINE
Payer: MEDICARE

## 2022-09-28 ENCOUNTER — PATIENT MESSAGE (OUTPATIENT)
Dept: OBSTETRICS AND GYNECOLOGY | Facility: CLINIC | Age: 52
End: 2022-09-28
Payer: MEDICARE

## 2022-09-28 VITALS
TEMPERATURE: 98 F | RESPIRATION RATE: 16 BRPM | SYSTOLIC BLOOD PRESSURE: 147 MMHG | DIASTOLIC BLOOD PRESSURE: 72 MMHG | HEART RATE: 88 BPM | OXYGEN SATURATION: 99 % | WEIGHT: 154.81 LBS | BODY MASS INDEX: 22.86 KG/M2

## 2022-09-28 DIAGNOSIS — M81.0 OSTEOPOROSIS, UNSPECIFIED OSTEOPOROSIS TYPE, UNSPECIFIED PATHOLOGICAL FRACTURE PRESENCE: ICD-10-CM

## 2022-09-28 DIAGNOSIS — M32.9 SYSTEMIC LUPUS ERYTHEMATOSUS, UNSPECIFIED SLE TYPE, UNSPECIFIED ORGAN INVOLVEMENT STATUS: Primary | ICD-10-CM

## 2022-09-28 DIAGNOSIS — N18.32 STAGE 3B CHRONIC KIDNEY DISEASE: ICD-10-CM

## 2022-09-28 DIAGNOSIS — T38.0X5S ADVERSE EFFECT OF GLUCOCORTICOID OR SYNTHETIC ANALOGUE, SEQUELA: ICD-10-CM

## 2022-09-28 PROCEDURE — 96372 THER/PROPH/DIAG INJ SC/IM: CPT | Mod: 59

## 2022-09-28 PROCEDURE — 63600175 PHARM REV CODE 636 W HCPCS: Mod: TB | Performed by: INTERNAL MEDICINE

## 2022-09-28 PROCEDURE — 96365 THER/PROPH/DIAG IV INF INIT: CPT

## 2022-09-28 PROCEDURE — 63600175 PHARM REV CODE 636 W HCPCS: Performed by: INTERNAL MEDICINE

## 2022-09-28 PROCEDURE — 25000003 PHARM REV CODE 250: Performed by: INTERNAL MEDICINE

## 2022-09-28 RX ORDER — DIPHENHYDRAMINE HYDROCHLORIDE 50 MG/ML
25 INJECTION INTRAMUSCULAR; INTRAVENOUS
Status: CANCELLED | OUTPATIENT
Start: 2022-10-26

## 2022-09-28 RX ORDER — SODIUM CHLORIDE 0.9 % (FLUSH) 0.9 %
10 SYRINGE (ML) INJECTION
Status: CANCELLED | OUTPATIENT
Start: 2022-10-26

## 2022-09-28 RX ORDER — SODIUM CHLORIDE 0.9 % (FLUSH) 0.9 %
10 SYRINGE (ML) INJECTION
Status: DISCONTINUED | OUTPATIENT
Start: 2022-09-28 | End: 2022-09-28 | Stop reason: HOSPADM

## 2022-09-28 RX ORDER — ACETAMINOPHEN 325 MG/1
650 TABLET ORAL
Status: CANCELLED | OUTPATIENT
Start: 2022-10-26

## 2022-09-28 RX ORDER — DIPHENHYDRAMINE HYDROCHLORIDE 50 MG/ML
25 INJECTION INTRAMUSCULAR; INTRAVENOUS
Status: COMPLETED | OUTPATIENT
Start: 2022-09-28 | End: 2022-09-28

## 2022-09-28 RX ORDER — HEPARIN 100 UNIT/ML
500 SYRINGE INTRAVENOUS
Status: CANCELLED | OUTPATIENT
Start: 2022-10-26

## 2022-09-28 RX ORDER — ACETAMINOPHEN 325 MG/1
650 TABLET ORAL
Status: COMPLETED | OUTPATIENT
Start: 2022-09-28 | End: 2022-09-28

## 2022-09-28 RX ADMIN — DIPHENHYDRAMINE HYDROCHLORIDE 25 MG: 50 INJECTION, SOLUTION INTRAMUSCULAR; INTRAVENOUS at 02:09

## 2022-09-28 RX ADMIN — SODIUM CHLORIDE: 9 INJECTION, SOLUTION INTRAVENOUS at 02:09

## 2022-09-28 RX ADMIN — ROMOSOZUMAB-AQQG 210 MG: 105 INJECTION, SOLUTION SUBCUTANEOUS at 02:09

## 2022-09-28 RX ADMIN — BELIMUMAB 702 MG: 400 INJECTION, POWDER, LYOPHILIZED, FOR SOLUTION INTRAVENOUS at 02:09

## 2022-09-28 RX ADMIN — ACETAMINOPHEN 650 MG: 325 TABLET ORAL at 02:09

## 2022-09-28 NOTE — PLAN OF CARE
Patient received monthly benlysta and evenity. VSS. Tolerated well. Discharged from unit in [facility].

## 2022-10-04 ENCOUNTER — PATIENT MESSAGE (OUTPATIENT)
Dept: RHEUMATOLOGY | Facility: CLINIC | Age: 52
End: 2022-10-04
Payer: MEDICARE

## 2022-10-10 ENCOUNTER — PATIENT MESSAGE (OUTPATIENT)
Dept: PODIATRY | Facility: CLINIC | Age: 52
End: 2022-10-10
Payer: MEDICARE

## 2022-10-10 ENCOUNTER — PATIENT MESSAGE (OUTPATIENT)
Dept: RHEUMATOLOGY | Facility: CLINIC | Age: 52
End: 2022-10-10
Payer: MEDICARE

## 2022-10-16 NOTE — TELEPHONE ENCOUNTER
Informed pt labs reviewed, pt missed pm dose of prograf prior to labs being drawn, will repeat labs next week.    yes

## 2022-10-18 ENCOUNTER — PATIENT MESSAGE (OUTPATIENT)
Dept: RHEUMATOLOGY | Facility: CLINIC | Age: 52
End: 2022-10-18
Payer: MEDICARE

## 2022-10-24 ENCOUNTER — PATIENT MESSAGE (OUTPATIENT)
Dept: OBSTETRICS AND GYNECOLOGY | Facility: CLINIC | Age: 52
End: 2022-10-24
Payer: MEDICARE

## 2022-10-26 ENCOUNTER — INFUSION (OUTPATIENT)
Dept: INFUSION THERAPY | Facility: HOSPITAL | Age: 52
End: 2022-10-26
Attending: INTERNAL MEDICINE
Payer: MEDICARE

## 2022-10-26 VITALS
RESPIRATION RATE: 17 BRPM | SYSTOLIC BLOOD PRESSURE: 173 MMHG | HEART RATE: 85 BPM | DIASTOLIC BLOOD PRESSURE: 76 MMHG | OXYGEN SATURATION: 99 % | TEMPERATURE: 97 F

## 2022-10-26 DIAGNOSIS — N18.32 STAGE 3B CHRONIC KIDNEY DISEASE: ICD-10-CM

## 2022-10-26 DIAGNOSIS — M81.0 OSTEOPOROSIS, UNSPECIFIED OSTEOPOROSIS TYPE, UNSPECIFIED PATHOLOGICAL FRACTURE PRESENCE: ICD-10-CM

## 2022-10-26 DIAGNOSIS — T38.0X5S ADVERSE EFFECT OF GLUCOCORTICOID OR SYNTHETIC ANALOGUE, SEQUELA: ICD-10-CM

## 2022-10-26 DIAGNOSIS — M32.9 SYSTEMIC LUPUS ERYTHEMATOSUS, UNSPECIFIED SLE TYPE, UNSPECIFIED ORGAN INVOLVEMENT STATUS: Primary | ICD-10-CM

## 2022-10-26 PROCEDURE — 96413 CHEMO IV INFUSION 1 HR: CPT

## 2022-10-26 PROCEDURE — 63600175 PHARM REV CODE 636 W HCPCS: Mod: TB | Performed by: INTERNAL MEDICINE

## 2022-10-26 PROCEDURE — 96372 THER/PROPH/DIAG INJ SC/IM: CPT | Mod: 59

## 2022-10-26 PROCEDURE — 63600175 PHARM REV CODE 636 W HCPCS: Mod: JA,JG | Performed by: INTERNAL MEDICINE

## 2022-10-26 PROCEDURE — 25000003 PHARM REV CODE 250: Performed by: INTERNAL MEDICINE

## 2022-10-26 RX ORDER — SODIUM CHLORIDE 0.9 % (FLUSH) 0.9 %
10 SYRINGE (ML) INJECTION
Status: CANCELLED | OUTPATIENT
Start: 2022-11-23

## 2022-10-26 RX ORDER — HEPARIN 100 UNIT/ML
500 SYRINGE INTRAVENOUS
Status: CANCELLED | OUTPATIENT
Start: 2022-11-23

## 2022-10-26 RX ORDER — DIPHENHYDRAMINE HYDROCHLORIDE 50 MG/ML
25 INJECTION INTRAMUSCULAR; INTRAVENOUS
Status: COMPLETED | OUTPATIENT
Start: 2022-10-26 | End: 2022-10-26

## 2022-10-26 RX ORDER — ACETAMINOPHEN 325 MG/1
650 TABLET ORAL
Status: CANCELLED | OUTPATIENT
Start: 2022-11-23

## 2022-10-26 RX ORDER — ACETAMINOPHEN 325 MG/1
650 TABLET ORAL
Status: COMPLETED | OUTPATIENT
Start: 2022-10-26 | End: 2022-10-26

## 2022-10-26 RX ORDER — DIPHENHYDRAMINE HYDROCHLORIDE 50 MG/ML
25 INJECTION INTRAMUSCULAR; INTRAVENOUS
Status: CANCELLED | OUTPATIENT
Start: 2022-11-23

## 2022-10-26 RX ADMIN — BELIMUMAB 702 MG: 400 INJECTION, POWDER, LYOPHILIZED, FOR SOLUTION INTRAVENOUS at 10:10

## 2022-10-26 RX ADMIN — DIPHENHYDRAMINE HYDROCHLORIDE 25 MG: 50 INJECTION, SOLUTION INTRAMUSCULAR; INTRAVENOUS at 11:10

## 2022-10-26 RX ADMIN — ACETAMINOPHEN 650 MG: 325 TABLET ORAL at 11:10

## 2022-10-26 RX ADMIN — ROMOSOZUMAB-AQQG 210 MG: 105 INJECTION, SOLUTION SUBCUTANEOUS at 12:10

## 2022-10-26 NOTE — PLAN OF CARE
Patient received monthly benlysta and evenity. VSS. Tolerated well. Discharged from unit in [facility].

## 2022-10-27 ENCOUNTER — PROCEDURE VISIT (OUTPATIENT)
Dept: OBSTETRICS AND GYNECOLOGY | Facility: CLINIC | Age: 52
End: 2022-10-27
Attending: OBSTETRICS & GYNECOLOGY
Payer: MEDICARE

## 2022-10-27 VITALS
BODY MASS INDEX: 22.96 KG/M2 | WEIGHT: 155 LBS | HEIGHT: 69 IN | SYSTOLIC BLOOD PRESSURE: 170 MMHG | DIASTOLIC BLOOD PRESSURE: 83 MMHG

## 2022-10-27 DIAGNOSIS — R87.810 ASCUS WITH POSITIVE HIGH RISK HPV CERVICAL: Primary | ICD-10-CM

## 2022-10-27 DIAGNOSIS — R87.610 ASCUS WITH POSITIVE HIGH RISK HPV CERVICAL: Primary | ICD-10-CM

## 2022-10-27 PROCEDURE — 57454 COLPOSCOPY: ICD-10-PCS | Mod: S$GLB,,, | Performed by: OBSTETRICS & GYNECOLOGY

## 2022-10-27 PROCEDURE — 57454 BX/CURETT OF CERVIX W/SCOPE: CPT | Mod: S$GLB,,, | Performed by: OBSTETRICS & GYNECOLOGY

## 2022-10-27 PROCEDURE — 88305 TISSUE EXAM BY PATHOLOGIST: CPT | Performed by: PATHOLOGY

## 2022-10-27 PROCEDURE — 88305 TISSUE EXAM BY PATHOLOGIST: ICD-10-PCS | Mod: 26,,, | Performed by: PATHOLOGY

## 2022-10-27 PROCEDURE — 88305 TISSUE EXAM BY PATHOLOGIST: CPT | Mod: 26,,, | Performed by: PATHOLOGY

## 2022-10-27 NOTE — PROCEDURES
Colposcopy    Date/Time: 10/27/2022 1:30 PM  Performed by: Lu Schreiber MD  Authorized by: Lu Schreiber MD     Consent Done?:  Yes (Written)  Timeout:Immediately prior to procedure a time out was called to verify the correct patient, procedure, equipment, support staff and site/side marked as required  Assistants?: No      Colposcopy Site:  Cervix  Position:  Supine  Acrowhite Lesion? Yes    Atypical Vessels: No    Transformation Zone Adequate?: No    Biopsy?: Yes         Location:  Cervix ((7 00))  ECC Performed?: Yes    LEEP Performed?: No    Estimated blood loss (cc):  2   Patient tolerated the procedure well with no immediate complications.   Post-operative instructions were provided for the patient.   Patient was discharged and will follow up if any complications occur     COLPOSCOPY:    Valencia Dumont is a 52 y.o. female with Patient's last menstrual period was 12/20/2016 (approximate). who presents for a colposcopy secondary to abnormal pap smear.    UPT is negative.    INDICATIONS: Her most recent papsmear showed: ASCUS with POSITIVE high risk HPV    She does have a history of abnormal pap smears.      PRE-COLPOSCOPY PROCEDURE COUNSELING:  Discussed the abnormal pap test findings, HPV, need for colposcopy and possible biopsies to determine a diagnosis and plan of care, treatments available, the minimal risks of bleeding and infection with a colposcopy, alternatives to colposcopy and she agrees to proceed.  Patient was given the opportunity to ask questions and verbal consent was obtained.        COLPOSCOPY EXAM:   TIME OUT PERFORMED.     acetowhite lesion(s) noted at 7 o'clock    Biopsy was taken at 7 o'clock.  ECC was performed.  Minimal blood loss.        The speculum was removed. The patient tolerated the procedure well.  There were no complications.      IMPRESSION:   Abnormal Pap     POST COLPOSCOPY COUNSELING:   Manage post colposcopy cramping with NSAIDs, Tylenol or Rx per  [employer].  Avoid anything in vagina (intercourse, douching, tampons) one week after the procedure.  Expect a clumpy blackish vaginal discharge (Monsel's solution) for several days.   Report bleeding heavier than a period, worsening pain, fever > 101.0 F, or foul-smelling vaginal discharge.  HPV vaccine recommended according to FDA age guidelines.  Importance of follow-up stressed.    Counseling lasted approximately 15 minutes and all her questions were answered.    FOLLOW-UP:   In 12 months.

## 2022-10-31 NOTE — PROGRESS NOTES
Oct  Done  eh   CHIEF COMPLAINT:   Chief Complaint   Patient presents with   • Post-op     Laparoscopic appendectomy, Laparoscopic drainage left ovarian cyst 10/9/22       HISTORY OF PRESENT ILLNESS:  This is a 31 y.o. female who presents for a post-operative visit after undergoing laparoscopic appendectomy for acute worsening right lower quadrant abdominal pain on 10/9/2022.  The final pathology revealed endometriosis of the appendiceal tip.  There was no other gross evidence of endometriosis anywhere else throughout the abdomen and pelvis on cursory exam.  She has been doing well since surgery other than persistent mild nausea for which she is still taking Zofran daily.  Her bowels have been moving well and she denies any postoperative fever or chills.    Pathology:   Appendix, Appendectomy:                 A. Endometriosis involving distal appendiceal tip and serosa.                 B. Associated distended appendix without significant acute inflammation.    PHYSICAL EXAM:  Lungs: Clear  Heart: RRR  ABD: Incisions are healing well without any erythema or signs of infection.  Ext: no significant edema, calves nontender    A/P:  This is a 31 y.o. female patient who is S/P laparoscopic appendectomy for endometriosis on 10/9/2022    She is healing well and can return to all activities and diet as tolerated.  I will send a message to her gynecologist, Leeann Crawford, to update her on the pathology results.  She can see me back as needed.    Stefany Bradley MD  General, Robotic, and Endoscopic Surgery  [facility]    [address], [address]  [place] [address]  P: [phone]  F: [phone]

## 2022-11-01 ENCOUNTER — PATIENT MESSAGE (OUTPATIENT)
Dept: OBSTETRICS AND GYNECOLOGY | Facility: CLINIC | Age: 52
End: 2022-11-01
Payer: MEDICARE

## 2022-11-01 DIAGNOSIS — N87.1 MODERATE CERVICAL DYSPLASIA, HISTOLOGICALLY CONFIRMED: Primary | ICD-10-CM

## 2022-11-01 LAB
FINAL PATHOLOGIC DIAGNOSIS: NORMAL
GROSS: NORMAL
Lab: NORMAL

## 2022-11-02 NOTE — TELEPHONE ENCOUNTER
Called patient and discussed need for LEEP procedure. Moderate dysplasia on Colpo directed biopsy. Will schedule for LEEP Jan 20.     Will ask ttransplant team if Gardasil could be considered.

## 2022-11-03 ENCOUNTER — TELEPHONE (OUTPATIENT)
Dept: RHEUMATOLOGY | Facility: CLINIC | Age: 52
End: 2022-11-03
Payer: MEDICARE

## 2022-11-03 NOTE — TELEPHONE ENCOUNTER
Received a message from the patient's gynecologist that she will be having a LEEP procedure and potentially Gardasil vaccination.  Gardasil is a vaccine that is inactivated so there no limitations from a lupus standpoint.  Provided the following recommendation:  There are no limitations from a lupus standpoint.  Would recommend doing procedure 2 weeks after Benlysta infusion.  No need to hold Plaquenil.  Questions regarding transplant should be directed to the transplant team.

## 2022-11-23 ENCOUNTER — INFUSION (OUTPATIENT)
Dept: INFUSION THERAPY | Facility: HOSPITAL | Age: 52
End: 2022-11-23
Attending: INTERNAL MEDICINE
Payer: MEDICARE

## 2022-11-23 VITALS
OXYGEN SATURATION: 96 % | HEART RATE: 90 BPM | WEIGHT: 152.81 LBS | TEMPERATURE: 99 F | BODY MASS INDEX: 22.63 KG/M2 | DIASTOLIC BLOOD PRESSURE: 74 MMHG | SYSTOLIC BLOOD PRESSURE: 153 MMHG | RESPIRATION RATE: 18 BRPM | HEIGHT: 69 IN

## 2022-11-23 DIAGNOSIS — T38.0X5S ADVERSE EFFECT OF GLUCOCORTICOID OR SYNTHETIC ANALOGUE, SEQUELA: ICD-10-CM

## 2022-11-23 DIAGNOSIS — M32.9 SYSTEMIC LUPUS ERYTHEMATOSUS, UNSPECIFIED SLE TYPE, UNSPECIFIED ORGAN INVOLVEMENT STATUS: ICD-10-CM

## 2022-11-23 DIAGNOSIS — N18.32 STAGE 3B CHRONIC KIDNEY DISEASE: Primary | ICD-10-CM

## 2022-11-23 DIAGNOSIS — M81.0 OSTEOPOROSIS, UNSPECIFIED OSTEOPOROSIS TYPE, UNSPECIFIED PATHOLOGICAL FRACTURE PRESENCE: ICD-10-CM

## 2022-11-23 PROCEDURE — 25000003 PHARM REV CODE 250: Performed by: INTERNAL MEDICINE

## 2022-11-23 PROCEDURE — 63600175 PHARM REV CODE 636 W HCPCS: Mod: TB | Performed by: INTERNAL MEDICINE

## 2022-11-23 PROCEDURE — 96372 THER/PROPH/DIAG INJ SC/IM: CPT | Mod: 59

## 2022-11-23 PROCEDURE — 96365 THER/PROPH/DIAG IV INF INIT: CPT

## 2022-11-23 PROCEDURE — 63600175 PHARM REV CODE 636 W HCPCS: Performed by: INTERNAL MEDICINE

## 2022-11-23 PROCEDURE — 96375 TX/PRO/DX INJ NEW DRUG ADDON: CPT

## 2022-11-23 PROCEDURE — 96374 THER/PROPH/DIAG INJ IV PUSH: CPT

## 2022-11-23 RX ORDER — ACETAMINOPHEN 325 MG/1
650 TABLET ORAL
Status: CANCELLED | OUTPATIENT
Start: 2022-12-21

## 2022-11-23 RX ORDER — ACETAMINOPHEN 325 MG/1
650 TABLET ORAL
Status: COMPLETED | OUTPATIENT
Start: 2022-11-23 | End: 2022-11-23

## 2022-11-23 RX ORDER — HEPARIN 100 UNIT/ML
500 SYRINGE INTRAVENOUS
Status: CANCELLED | OUTPATIENT
Start: 2022-12-21

## 2022-11-23 RX ORDER — DIPHENHYDRAMINE HYDROCHLORIDE 50 MG/ML
25 INJECTION INTRAMUSCULAR; INTRAVENOUS
Status: CANCELLED | OUTPATIENT
Start: 2022-12-21

## 2022-11-23 RX ORDER — DIPHENHYDRAMINE HYDROCHLORIDE 50 MG/ML
25 INJECTION INTRAMUSCULAR; INTRAVENOUS
Status: COMPLETED | OUTPATIENT
Start: 2022-11-23 | End: 2022-11-23

## 2022-11-23 RX ORDER — SODIUM CHLORIDE 0.9 % (FLUSH) 0.9 %
10 SYRINGE (ML) INJECTION
Status: CANCELLED | OUTPATIENT
Start: 2022-12-21

## 2022-11-23 RX ADMIN — DIPHENHYDRAMINE HYDROCHLORIDE 25 MG: 50 INJECTION, SOLUTION INTRAMUSCULAR; INTRAVENOUS at 11:11

## 2022-11-23 RX ADMIN — ACETAMINOPHEN 650 MG: 325 TABLET ORAL at 11:11

## 2022-11-23 RX ADMIN — ROMOSOZUMAB-AQQG 210 MG: 105 INJECTION, SOLUTION SUBCUTANEOUS at 12:11

## 2022-11-23 RX ADMIN — BELIMUMAB 693 MG: 400 INJECTION, POWDER, LYOPHILIZED, FOR SOLUTION INTRAVENOUS at 11:11

## 2022-11-23 NOTE — PLAN OF CARE
Problem: Fatigue  Goal: Improved Activity Tolerance  Outcome: Ongoing, Progressing     Problem: Fall Injury Risk  Goal: Absence of Fall and Fall-Related Injury  Outcome: Ongoing, Progressing

## 2022-11-23 NOTE — PLAN OF CARE
Patient arrived to unit for Benlysta infusion and Evenity injection. Patient's vital signs stable prior to initiating treatment. Patient denied any new or worsening symptoms. Treatment tolerated well. Injections administered on left arm. Patient tolerated well. AVS and appointment schedule declined by patient. Discharged off unit in no signs of acute distress.

## 2022-11-28 ENCOUNTER — LAB VISIT (OUTPATIENT)
Dept: LAB | Facility: HOSPITAL | Age: 52
End: 2022-11-28
Attending: INTERNAL MEDICINE
Payer: MEDICARE

## 2022-11-28 DIAGNOSIS — Z94.4 LIVER TRANSPLANTED: ICD-10-CM

## 2022-11-28 LAB
ALBUMIN SERPL BCP-MCNC: 4 G/DL (ref 3.5–5.2)
ALP SERPL-CCNC: 126 U/L (ref 55–135)
ALT SERPL W/O P-5'-P-CCNC: 14 U/L (ref 10–44)
ANION GAP SERPL CALC-SCNC: 5 MMOL/L (ref 8–16)
AST SERPL-CCNC: 11 U/L (ref 10–40)
BASOPHILS # BLD AUTO: 0.01 K/UL (ref 0–0.2)
BASOPHILS NFR BLD: 0.3 % (ref 0–1.9)
BILIRUB SERPL-MCNC: 0.4 MG/DL (ref 0.1–1)
BUN SERPL-MCNC: 19 MG/DL (ref 6–20)
CALCIUM SERPL-MCNC: 9.3 MG/DL (ref 8.7–10.5)
CHLORIDE SERPL-SCNC: 105 MMOL/L (ref 95–110)
CO2 SERPL-SCNC: 28 MMOL/L (ref 23–29)
CREAT SERPL-MCNC: 1.3 MG/DL (ref 0.5–1.4)
DIFFERENTIAL METHOD: ABNORMAL
EOSINOPHIL # BLD AUTO: 0.1 K/UL (ref 0–0.5)
EOSINOPHIL NFR BLD: 2.3 % (ref 0–8)
ERYTHROCYTE [DISTWIDTH] IN BLOOD BY AUTOMATED COUNT: 13.8 % (ref 11.5–14.5)
EST. GFR  (NO RACE VARIABLE): 49 ML/MIN/1.73 M^2
GLUCOSE SERPL-MCNC: 141 MG/DL (ref 70–110)
HCT VFR BLD AUTO: 36.6 % (ref 37–48.5)
HGB BLD-MCNC: 13 G/DL (ref 12–16)
IMM GRANULOCYTES # BLD AUTO: 0.01 K/UL (ref 0–0.04)
IMM GRANULOCYTES NFR BLD AUTO: 0.3 % (ref 0–0.5)
LYMPHOCYTES # BLD AUTO: 1.5 K/UL (ref 1–4.8)
LYMPHOCYTES NFR BLD: 37.1 % (ref 18–48)
MAGNESIUM SERPL-MCNC: 2.1 MG/DL (ref 1.6–2.6)
MCH RBC QN AUTO: 29.9 PG (ref 27–31)
MCHC RBC AUTO-ENTMCNC: 35.5 G/DL (ref 32–36)
MCV RBC AUTO: 84 FL (ref 82–98)
MONOCYTES # BLD AUTO: 0.3 K/UL (ref 0.3–1)
MONOCYTES NFR BLD: 7.1 % (ref 4–15)
NEUTROPHILS # BLD AUTO: 2.1 K/UL (ref 1.8–7.7)
NEUTROPHILS NFR BLD: 52.9 % (ref 38–73)
NRBC BLD-RTO: 0 /100 WBC
PLATELET # BLD AUTO: 199 K/UL (ref 150–450)
PMV BLD AUTO: 9.3 FL (ref 9.2–12.9)
POTASSIUM SERPL-SCNC: 4.5 MMOL/L (ref 3.5–5.1)
PROT SERPL-MCNC: 7.2 G/DL (ref 6–8.4)
RBC # BLD AUTO: 4.35 M/UL (ref 4–5.4)
SODIUM SERPL-SCNC: 138 MMOL/L (ref 136–145)
WBC # BLD AUTO: 3.94 K/UL (ref 3.9–12.7)

## 2022-11-28 PROCEDURE — 80053 COMPREHEN METABOLIC PANEL: CPT | Performed by: INTERNAL MEDICINE

## 2022-11-28 PROCEDURE — 36415 COLL VENOUS BLD VENIPUNCTURE: CPT | Performed by: INTERNAL MEDICINE

## 2022-11-28 PROCEDURE — 85025 COMPLETE CBC W/AUTO DIFF WBC: CPT | Performed by: INTERNAL MEDICINE

## 2022-11-28 PROCEDURE — 83735 ASSAY OF MAGNESIUM: CPT | Performed by: INTERNAL MEDICINE

## 2022-11-28 PROCEDURE — 80197 ASSAY OF TACROLIMUS: CPT | Performed by: INTERNAL MEDICINE

## 2022-11-29 ENCOUNTER — PATIENT MESSAGE (OUTPATIENT)
Dept: TRANSPLANT | Facility: CLINIC | Age: 52
End: 2022-11-29
Payer: MEDICARE

## 2022-11-29 ENCOUNTER — TELEPHONE (OUTPATIENT)
Dept: TRANSPLANT | Facility: CLINIC | Age: 52
End: 2022-11-29
Payer: MEDICARE

## 2022-11-29 ENCOUNTER — PATIENT MESSAGE (OUTPATIENT)
Dept: OBSTETRICS AND GYNECOLOGY | Facility: CLINIC | Age: 52
End: 2022-11-29
Payer: MEDICARE

## 2022-11-29 LAB — TACROLIMUS BLD-MCNC: 5.9 NG/ML (ref 5–15)

## 2022-11-29 NOTE — TELEPHONE ENCOUNTER
Labs reviewed and are stable, continue q 6 months. Letter sent.         ----- Message from Jacob Horvath MD sent at 11/29/2022  2:11 PM CST -----  Results reviewed. No change in immunosuppression

## 2022-11-29 NOTE — LETTER
November 29, 2022    Valencia Dumont  [address] [facility] [address] [facility] [address]          Dear Valencia Dumont:  MRN: [id number]    This is a follow up to your recent labs, your lab results were stable.  There are no medicine changes.  Please have your labs drawn again on 5/29/23.      If you cannot have your labs drawn on the scheduled date, it is your responsibility to call the transplant department to reschedule.  Please call [phone] and ask to speak to Rosangela Hanna  for all scheduling requests.     Sincerely,     Linda Barakat, RN,BSN,CCTC    Your Liver Transplant Coordinator    [facility]  [address] [place] [id number] [phone]

## 2022-12-05 ENCOUNTER — OFFICE VISIT (OUTPATIENT)
Dept: RHEUMATOLOGY | Facility: CLINIC | Age: 52
End: 2022-12-05
Payer: MEDICARE

## 2022-12-05 VITALS
HEART RATE: 83 BPM | SYSTOLIC BLOOD PRESSURE: 147 MMHG | HEIGHT: 69 IN | WEIGHT: 156.5 LBS | BODY MASS INDEX: 23.18 KG/M2 | DIASTOLIC BLOOD PRESSURE: 85 MMHG

## 2022-12-05 DIAGNOSIS — E83.9 CHRONIC KIDNEY DISEASE-MINERAL AND BONE DISORDER: ICD-10-CM

## 2022-12-05 DIAGNOSIS — N18.9 CHRONIC KIDNEY DISEASE-MINERAL AND BONE DISORDER: ICD-10-CM

## 2022-12-05 DIAGNOSIS — M19.079 ARTHRITIS OF FOOT: ICD-10-CM

## 2022-12-05 DIAGNOSIS — M65.4 TENOSYNOVITIS, DE QUERVAIN: ICD-10-CM

## 2022-12-05 DIAGNOSIS — M89.9 CHRONIC KIDNEY DISEASE-MINERAL AND BONE DISORDER: ICD-10-CM

## 2022-12-05 DIAGNOSIS — I10 ESSENTIAL HYPERTENSION: Primary | ICD-10-CM

## 2022-12-05 PROCEDURE — 3008F PR BODY MASS INDEX (BMI) DOCUMENTED: ICD-10-PCS | Mod: CPTII,S$GLB,, | Performed by: INTERNAL MEDICINE

## 2022-12-05 PROCEDURE — 3051F HG A1C>EQUAL 7.0%<8.0%: CPT | Mod: CPTII,S$GLB,, | Performed by: INTERNAL MEDICINE

## 2022-12-05 PROCEDURE — 99215 PR OFFICE/OUTPT VISIT, EST, LEVL V, 40-54 MIN: ICD-10-PCS | Mod: S$GLB,,, | Performed by: INTERNAL MEDICINE

## 2022-12-05 PROCEDURE — 4010F ACE/ARB THERAPY RXD/TAKEN: CPT | Mod: CPTII,S$GLB,, | Performed by: INTERNAL MEDICINE

## 2022-12-05 PROCEDURE — 3079F DIAST BP 80-89 MM HG: CPT | Mod: CPTII,S$GLB,, | Performed by: INTERNAL MEDICINE

## 2022-12-05 PROCEDURE — 3077F PR MOST RECENT SYSTOLIC BLOOD PRESSURE >= 140 MM HG: ICD-10-PCS | Mod: CPTII,S$GLB,, | Performed by: INTERNAL MEDICINE

## 2022-12-05 PROCEDURE — 3072F LOW RISK FOR RETINOPATHY: CPT | Mod: CPTII,S$GLB,, | Performed by: INTERNAL MEDICINE

## 2022-12-05 PROCEDURE — 3051F PR MOST RECENT HEMOGLOBIN A1C LEVEL 7.0 - < 8.0%: ICD-10-PCS | Mod: CPTII,S$GLB,, | Performed by: INTERNAL MEDICINE

## 2022-12-05 PROCEDURE — 1159F MED LIST DOCD IN RCRD: CPT | Mod: CPTII,S$GLB,, | Performed by: INTERNAL MEDICINE

## 2022-12-05 PROCEDURE — 3079F PR MOST RECENT DIASTOLIC BLOOD PRESSURE 80-89 MM HG: ICD-10-PCS | Mod: CPTII,S$GLB,, | Performed by: INTERNAL MEDICINE

## 2022-12-05 PROCEDURE — 99999 PR PBB SHADOW E&M-EST. PATIENT-LVL IV: CPT | Mod: PBBFAC,,, | Performed by: INTERNAL MEDICINE

## 2022-12-05 PROCEDURE — 3077F SYST BP >= 140 MM HG: CPT | Mod: CPTII,S$GLB,, | Performed by: INTERNAL MEDICINE

## 2022-12-05 PROCEDURE — 3008F BODY MASS INDEX DOCD: CPT | Mod: CPTII,S$GLB,, | Performed by: INTERNAL MEDICINE

## 2022-12-05 PROCEDURE — 1159F PR MEDICATION LIST DOCUMENTED IN MEDICAL RECORD: ICD-10-PCS | Mod: CPTII,S$GLB,, | Performed by: INTERNAL MEDICINE

## 2022-12-05 PROCEDURE — 99215 OFFICE O/P EST HI 40 MIN: CPT | Mod: S$GLB,,, | Performed by: INTERNAL MEDICINE

## 2022-12-05 PROCEDURE — 3072F PR LOW RISK FOR RETINOPATHY: ICD-10-PCS | Mod: CPTII,S$GLB,, | Performed by: INTERNAL MEDICINE

## 2022-12-05 PROCEDURE — 99999 PR PBB SHADOW E&M-EST. PATIENT-LVL IV: ICD-10-PCS | Mod: PBBFAC,,, | Performed by: INTERNAL MEDICINE

## 2022-12-05 PROCEDURE — 4010F PR ACE/ARB THEARPY RXD/TAKEN: ICD-10-PCS | Mod: CPTII,S$GLB,, | Performed by: INTERNAL MEDICINE

## 2022-12-05 RX ORDER — DICLOFENAC SODIUM 10 MG/G
GEL TOPICAL
Qty: 450 EACH | Refills: 2 | Status: SHIPPED | OUTPATIENT
Start: 2022-12-05

## 2022-12-05 NOTE — PROGRESS NOTES
Rapid3 Question Responses and Scores 12/5/2022   MDHAQ Score 1.2   Psychologic Score 3.3   Pain Score 5.5   When you awakened in the morning OVER THE LAST WEEK, did you feel stiff? Yes   If Yes, please indicate the number of hours until you are as limber as you will be for the day -   Fatigue Score 5.5   Global Health Score 6   RAPID3 Score 5.17     Answers submitted by the patient for this visit:  Rheumatology Questionnaire (Submitted on 12/5/2022)  fever: No  eye redness: No  mouth sores: No  headaches: No  shortness of breath: No  chest pain: No  trouble swallowing: No  diarrhea: No  constipation: No  unexpected weight change: No  genital sore: No  dysuria: No  During the last 3 days, have you had a skin rash?: No  Bruises or bleeds easily: Yes  cough: No
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
## 2022-12-13 ENCOUNTER — PATIENT MESSAGE (OUTPATIENT)
Dept: OTOLARYNGOLOGY | Facility: CLINIC | Age: 52
End: 2022-12-13
Payer: MEDICARE

## 2022-12-15 ENCOUNTER — LAB VISIT (OUTPATIENT)
Dept: LAB | Facility: HOSPITAL | Age: 52
End: 2022-12-15
Attending: INTERNAL MEDICINE
Payer: MEDICARE

## 2022-12-15 ENCOUNTER — PATIENT MESSAGE (OUTPATIENT)
Dept: OTOLARYNGOLOGY | Facility: CLINIC | Age: 52
End: 2022-12-15

## 2022-12-15 DIAGNOSIS — D84.9 IMMUNOSUPPRESSION: ICD-10-CM

## 2022-12-15 DIAGNOSIS — R53.83 FATIGUE, UNSPECIFIED TYPE: ICD-10-CM

## 2022-12-15 DIAGNOSIS — M32.9 SYSTEMIC LUPUS ERYTHEMATOSUS, UNSPECIFIED SLE TYPE, UNSPECIFIED ORGAN INVOLVEMENT STATUS: ICD-10-CM

## 2022-12-15 LAB
ALBUMIN SERPL BCP-MCNC: 4 G/DL (ref 3.5–5.2)
ALP SERPL-CCNC: 122 U/L (ref 55–135)
ALT SERPL W/O P-5'-P-CCNC: 12 U/L (ref 10–44)
ANION GAP SERPL CALC-SCNC: 7 MMOL/L (ref 8–16)
AST SERPL-CCNC: 13 U/L (ref 10–40)
BASOPHILS # BLD AUTO: 0.01 K/UL (ref 0–0.2)
BASOPHILS NFR BLD: 0.3 % (ref 0–1.9)
BILIRUB SERPL-MCNC: 0.5 MG/DL (ref 0.1–1)
BUN SERPL-MCNC: 22 MG/DL (ref 6–20)
C3 SERPL-MCNC: 98 MG/DL (ref 50–180)
C4 SERPL-MCNC: 29 MG/DL (ref 11–44)
CALCIUM SERPL-MCNC: 9.2 MG/DL (ref 8.7–10.5)
CHLORIDE SERPL-SCNC: 108 MMOL/L (ref 95–110)
CK SERPL-CCNC: 93 U/L (ref 20–180)
CO2 SERPL-SCNC: 23 MMOL/L (ref 23–29)
CREAT SERPL-MCNC: 1.5 MG/DL (ref 0.5–1.4)
CRP SERPL-MCNC: 0.7 MG/L (ref 0–8.2)
DIFFERENTIAL METHOD: ABNORMAL
EOSINOPHIL # BLD AUTO: 0.2 K/UL (ref 0–0.5)
EOSINOPHIL NFR BLD: 5.5 % (ref 0–8)
ERYTHROCYTE [DISTWIDTH] IN BLOOD BY AUTOMATED COUNT: 13.5 % (ref 11.5–14.5)
ERYTHROCYTE [SEDIMENTATION RATE] IN BLOOD BY WESTERGREN METHOD: 15 MM/HR (ref 0–20)
EST. GFR  (NO RACE VARIABLE): 42 ML/MIN/1.73 M^2
GLUCOSE SERPL-MCNC: 143 MG/DL (ref 70–110)
HCT VFR BLD AUTO: 36.7 % (ref 37–48.5)
HGB BLD-MCNC: 12.4 G/DL (ref 12–16)
IMM GRANULOCYTES # BLD AUTO: 0 K/UL (ref 0–0.04)
IMM GRANULOCYTES NFR BLD AUTO: 0 % (ref 0–0.5)
LYMPHOCYTES # BLD AUTO: 1.4 K/UL (ref 1–4.8)
LYMPHOCYTES NFR BLD: 37.2 % (ref 18–48)
MCH RBC QN AUTO: 28.6 PG (ref 27–31)
MCHC RBC AUTO-ENTMCNC: 33.8 G/DL (ref 32–36)
MCV RBC AUTO: 85 FL (ref 82–98)
MONOCYTES # BLD AUTO: 0.3 K/UL (ref 0.3–1)
MONOCYTES NFR BLD: 7.6 % (ref 4–15)
NEUTROPHILS # BLD AUTO: 1.9 K/UL (ref 1.8–7.7)
NEUTROPHILS NFR BLD: 49.4 % (ref 38–73)
NRBC BLD-RTO: 0 /100 WBC
PLATELET # BLD AUTO: 188 K/UL (ref 150–450)
PMV BLD AUTO: 9.8 FL (ref 9.2–12.9)
POTASSIUM SERPL-SCNC: 3.9 MMOL/L (ref 3.5–5.1)
PROT SERPL-MCNC: 7.2 G/DL (ref 6–8.4)
RBC # BLD AUTO: 4.33 M/UL (ref 4–5.4)
SODIUM SERPL-SCNC: 138 MMOL/L (ref 136–145)
WBC # BLD AUTO: 3.82 K/UL (ref 3.9–12.7)

## 2022-12-15 PROCEDURE — 82550 ASSAY OF CK (CPK): CPT | Performed by: INTERNAL MEDICINE

## 2022-12-15 PROCEDURE — 86225 DNA ANTIBODY NATIVE: CPT | Performed by: INTERNAL MEDICINE

## 2022-12-15 PROCEDURE — 85025 COMPLETE CBC W/AUTO DIFF WBC: CPT | Performed by: INTERNAL MEDICINE

## 2022-12-15 PROCEDURE — 86140 C-REACTIVE PROTEIN: CPT | Performed by: INTERNAL MEDICINE

## 2022-12-15 PROCEDURE — 36415 COLL VENOUS BLD VENIPUNCTURE: CPT | Performed by: INTERNAL MEDICINE

## 2022-12-15 PROCEDURE — 85652 RBC SED RATE AUTOMATED: CPT | Performed by: INTERNAL MEDICINE

## 2022-12-15 PROCEDURE — 80053 COMPREHEN METABOLIC PANEL: CPT | Performed by: INTERNAL MEDICINE

## 2022-12-15 PROCEDURE — 86160 COMPLEMENT ANTIGEN: CPT | Mod: 59 | Performed by: INTERNAL MEDICINE

## 2022-12-15 PROCEDURE — 86225 DNA ANTIBODY NATIVE: CPT | Mod: 59 | Performed by: INTERNAL MEDICINE

## 2022-12-15 PROCEDURE — 86160 COMPLEMENT ANTIGEN: CPT | Performed by: INTERNAL MEDICINE

## 2022-12-19 ENCOUNTER — PATIENT MESSAGE (OUTPATIENT)
Dept: RHEUMATOLOGY | Facility: CLINIC | Age: 52
End: 2022-12-19
Payer: MEDICARE

## 2022-12-19 ENCOUNTER — PATIENT MESSAGE (OUTPATIENT)
Dept: OBSTETRICS AND GYNECOLOGY | Facility: CLINIC | Age: 52
End: 2022-12-19
Payer: MEDICARE

## 2022-12-19 LAB
DNA TITER: NORMAL
DSDNA AB SER-ACNC: POSITIVE [IU]/ML

## 2022-12-20 ENCOUNTER — PATIENT MESSAGE (OUTPATIENT)
Dept: OBSTETRICS AND GYNECOLOGY | Facility: CLINIC | Age: 52
End: 2022-12-20
Payer: MEDICARE

## 2022-12-21 ENCOUNTER — INFUSION (OUTPATIENT)
Dept: INFUSION THERAPY | Facility: HOSPITAL | Age: 52
End: 2022-12-21
Attending: INTERNAL MEDICINE
Payer: MEDICARE

## 2022-12-21 VITALS
SYSTOLIC BLOOD PRESSURE: 164 MMHG | TEMPERATURE: 98 F | OXYGEN SATURATION: 100 % | DIASTOLIC BLOOD PRESSURE: 79 MMHG | HEART RATE: 82 BPM | RESPIRATION RATE: 18 BRPM

## 2022-12-21 DIAGNOSIS — T38.0X5S ADVERSE EFFECT OF GLUCOCORTICOID OR SYNTHETIC ANALOGUE, SEQUELA: ICD-10-CM

## 2022-12-21 DIAGNOSIS — M81.0 OSTEOPOROSIS, UNSPECIFIED OSTEOPOROSIS TYPE, UNSPECIFIED PATHOLOGICAL FRACTURE PRESENCE: ICD-10-CM

## 2022-12-21 DIAGNOSIS — N18.32 STAGE 3B CHRONIC KIDNEY DISEASE: ICD-10-CM

## 2022-12-21 DIAGNOSIS — M32.9 SYSTEMIC LUPUS ERYTHEMATOSUS, UNSPECIFIED SLE TYPE, UNSPECIFIED ORGAN INVOLVEMENT STATUS: Primary | ICD-10-CM

## 2022-12-21 PROCEDURE — 25000003 PHARM REV CODE 250: Performed by: INTERNAL MEDICINE

## 2022-12-21 PROCEDURE — 96374 THER/PROPH/DIAG INJ IV PUSH: CPT

## 2022-12-21 PROCEDURE — 63600175 PHARM REV CODE 636 W HCPCS: Mod: TB | Performed by: INTERNAL MEDICINE

## 2022-12-21 PROCEDURE — 96375 TX/PRO/DX INJ NEW DRUG ADDON: CPT

## 2022-12-21 PROCEDURE — 96365 THER/PROPH/DIAG IV INF INIT: CPT

## 2022-12-21 PROCEDURE — 96372 THER/PROPH/DIAG INJ SC/IM: CPT | Mod: 59

## 2022-12-21 PROCEDURE — 63600175 PHARM REV CODE 636 W HCPCS: Mod: JW,JG | Performed by: INTERNAL MEDICINE

## 2022-12-21 RX ORDER — SODIUM CHLORIDE 0.9 % (FLUSH) 0.9 %
10 SYRINGE (ML) INJECTION
Status: CANCELLED | OUTPATIENT
Start: 2023-01-18

## 2022-12-21 RX ORDER — ACETAMINOPHEN 325 MG/1
650 TABLET ORAL
Status: COMPLETED | OUTPATIENT
Start: 2022-12-21 | End: 2022-12-21

## 2022-12-21 RX ORDER — ACETAMINOPHEN 325 MG/1
650 TABLET ORAL
Status: CANCELLED | OUTPATIENT
Start: 2023-01-18

## 2022-12-21 RX ORDER — HEPARIN 100 UNIT/ML
500 SYRINGE INTRAVENOUS
Status: CANCELLED | OUTPATIENT
Start: 2023-01-18

## 2022-12-21 RX ORDER — DIPHENHYDRAMINE HYDROCHLORIDE 50 MG/ML
25 INJECTION INTRAMUSCULAR; INTRAVENOUS
Status: COMPLETED | OUTPATIENT
Start: 2022-12-21 | End: 2022-12-21

## 2022-12-21 RX ORDER — DIPHENHYDRAMINE HYDROCHLORIDE 50 MG/ML
25 INJECTION INTRAMUSCULAR; INTRAVENOUS
Status: CANCELLED | OUTPATIENT
Start: 2023-01-18

## 2022-12-21 RX ADMIN — ACETAMINOPHEN 650 MG: 325 TABLET ORAL at 11:12

## 2022-12-21 RX ADMIN — ROMOSOZUMAB-AQQG 210 MG: 105 INJECTION, SOLUTION SUBCUTANEOUS at 01:12

## 2022-12-21 RX ADMIN — DIPHENHYDRAMINE HYDROCHLORIDE 25 MG: 50 INJECTION, SOLUTION INTRAMUSCULAR; INTRAVENOUS at 11:12

## 2022-12-21 RX ADMIN — BELIMUMAB 693 MG: 400 INJECTION, POWDER, LYOPHILIZED, FOR SOLUTION INTRAVENOUS at 11:12

## 2022-12-21 NOTE — PLAN OF CARE
Pt arrived to unit for her monthly maintenance Benlysta and Evenity. Plan of care reviewed. No new or worsening complaints voiced. VSS. Given pre-meds of Tylenol and Benadryl IVP. Benlysta infused over 1 hour. Evenity injection given. Pt tolerated well. Will return on 1/18 for her next infusion and injections. Discharged from unit in NAD.

## 2022-12-26 ENCOUNTER — PATIENT MESSAGE (OUTPATIENT)
Dept: RHEUMATOLOGY | Facility: CLINIC | Age: 52
End: 2022-12-26
Payer: MEDICARE

## 2022-12-30 ENCOUNTER — PATIENT MESSAGE (OUTPATIENT)
Dept: RHEUMATOLOGY | Facility: CLINIC | Age: 52
End: 2022-12-30
Payer: MEDICARE

## 2023-01-17 ENCOUNTER — PATIENT MESSAGE (OUTPATIENT)
Dept: TRANSPLANT | Facility: CLINIC | Age: 53
End: 2023-01-17
Payer: MEDICARE

## 2023-01-17 ENCOUNTER — OFFICE VISIT (OUTPATIENT)
Dept: OBSTETRICS AND GYNECOLOGY | Facility: CLINIC | Age: 53
End: 2023-01-17
Attending: OBSTETRICS & GYNECOLOGY
Payer: MEDICAID

## 2023-01-17 ENCOUNTER — HOSPITAL ENCOUNTER (OUTPATIENT)
Dept: PREADMISSION TESTING | Facility: OTHER | Age: 53
Discharge: HOME | End: 2023-01-17
Attending: OBSTETRICS & GYNECOLOGY
Payer: MEDICAID

## 2023-01-17 ENCOUNTER — ANESTHESIA EVENT (OUTPATIENT)
Dept: SURGERY | Facility: OTHER | Age: 53
End: 2023-01-17
Payer: MEDICARE

## 2023-01-17 VITALS
SYSTOLIC BLOOD PRESSURE: 170 MMHG | WEIGHT: 152.19 LBS | DIASTOLIC BLOOD PRESSURE: 91 MMHG | HEIGHT: 69 IN | BODY MASS INDEX: 22.54 KG/M2

## 2023-01-17 VITALS
HEART RATE: 76 BPM | OXYGEN SATURATION: 99 % | HEIGHT: 69 IN | WEIGHT: 152 LBS | BODY MASS INDEX: 22.51 KG/M2 | RESPIRATION RATE: 16 BRPM | TEMPERATURE: 98 F | SYSTOLIC BLOOD PRESSURE: 170 MMHG | DIASTOLIC BLOOD PRESSURE: 87 MMHG

## 2023-01-17 DIAGNOSIS — Z01.818 PREOPERATIVE EXAM FOR GYNECOLOGIC SURGERY: Primary | ICD-10-CM

## 2023-01-17 DIAGNOSIS — N87.1 MODERATE CERVICAL DYSPLASIA: ICD-10-CM

## 2023-01-17 PROCEDURE — 3008F BODY MASS INDEX DOCD: CPT | Mod: CPTII,S$GLB,, | Performed by: OBSTETRICS & GYNECOLOGY

## 2023-01-17 PROCEDURE — 3008F PR BODY MASS INDEX (BMI) DOCUMENTED: ICD-10-PCS | Mod: CPTII,S$GLB,, | Performed by: OBSTETRICS & GYNECOLOGY

## 2023-01-17 PROCEDURE — 99213 PR OFFICE/OUTPT VISIT, EST, LEVL III, 20-29 MIN: ICD-10-PCS | Mod: S$GLB,,, | Performed by: OBSTETRICS & GYNECOLOGY

## 2023-01-17 PROCEDURE — 3072F LOW RISK FOR RETINOPATHY: CPT | Mod: CPTII,S$GLB,, | Performed by: OBSTETRICS & GYNECOLOGY

## 2023-01-17 PROCEDURE — 99999 PR PBB SHADOW E&M-EST. PATIENT-LVL IV: ICD-10-PCS | Mod: PBBFAC,,, | Performed by: OBSTETRICS & GYNECOLOGY

## 2023-01-17 PROCEDURE — 3077F PR MOST RECENT SYSTOLIC BLOOD PRESSURE >= 140 MM HG: ICD-10-PCS | Mod: CPTII,S$GLB,, | Performed by: OBSTETRICS & GYNECOLOGY

## 2023-01-17 PROCEDURE — 99999 PR PBB SHADOW E&M-EST. PATIENT-LVL IV: CPT | Mod: PBBFAC,,, | Performed by: OBSTETRICS & GYNECOLOGY

## 2023-01-17 PROCEDURE — 3072F PR LOW RISK FOR RETINOPATHY: ICD-10-PCS | Mod: CPTII,S$GLB,, | Performed by: OBSTETRICS & GYNECOLOGY

## 2023-01-17 PROCEDURE — 3077F SYST BP >= 140 MM HG: CPT | Mod: CPTII,S$GLB,, | Performed by: OBSTETRICS & GYNECOLOGY

## 2023-01-17 PROCEDURE — 99213 OFFICE O/P EST LOW 20 MIN: CPT | Mod: S$GLB,,, | Performed by: OBSTETRICS & GYNECOLOGY

## 2023-01-17 PROCEDURE — 1159F MED LIST DOCD IN RCRD: CPT | Mod: CPTII,S$GLB,, | Performed by: OBSTETRICS & GYNECOLOGY

## 2023-01-17 PROCEDURE — 3080F DIAST BP >= 90 MM HG: CPT | Mod: CPTII,S$GLB,, | Performed by: OBSTETRICS & GYNECOLOGY

## 2023-01-17 PROCEDURE — 1159F PR MEDICATION LIST DOCUMENTED IN MEDICAL RECORD: ICD-10-PCS | Mod: CPTII,S$GLB,, | Performed by: OBSTETRICS & GYNECOLOGY

## 2023-01-17 PROCEDURE — 3080F PR MOST RECENT DIASTOLIC BLOOD PRESSURE >= 90 MM HG: ICD-10-PCS | Mod: CPTII,S$GLB,, | Performed by: OBSTETRICS & GYNECOLOGY

## 2023-01-17 RX ORDER — SODIUM CHLORIDE, SODIUM LACTATE, POTASSIUM CHLORIDE, CALCIUM CHLORIDE 600; 310; 30; 20 MG/100ML; MG/100ML; MG/100ML; MG/100ML
INJECTION, SOLUTION INTRAVENOUS CONTINUOUS
Status: CANCELLED | OUTPATIENT
Start: 2023-01-17

## 2023-01-17 RX ORDER — LIDOCAINE HYDROCHLORIDE 10 MG/ML
0.5 INJECTION, SOLUTION EPIDURAL; INFILTRATION; INTRACAUDAL; PERINEURAL ONCE
Status: CANCELLED | OUTPATIENT
Start: 2023-01-17 | End: 2023-01-17

## 2023-01-17 RX ORDER — SODIUM CHLORIDE 9 MG/ML
INJECTION, SOLUTION INTRAVENOUS CONTINUOUS
Status: CANCELLED | OUTPATIENT
Start: 2023-01-17

## 2023-01-17 RX ORDER — MUPIROCIN 20 MG/G
OINTMENT TOPICAL
Status: CANCELLED | OUTPATIENT
Start: 2023-01-20

## 2023-01-17 NOTE — DISCHARGE INSTRUCTIONS
Information to Prepare you for your Surgery    PRE-ADMIT TESTING -  [phone]    [address] [facility]          Your surgery has been scheduled at [facility]. We are pleased to have the opportunity to serve you. For Further Information please call [phone].    On the day of surgery please report to the Information Desk on the 1st floor.    CONTACT YOUR PHYSICIAN'S OFFICE THE DAY PRIOR TO YOUR SURGERY TO OBTAIN YOUR ARRIVAL TIME.     The evening before surgery do not eat anything after 9 p.m. ( this includes hard candy, chewing gum and mints).  You may only have GATORADE, POWERADE AND WATER  from 9 p.m. until you leave your home.   DO NOT DRINK ANY LIQUIDS ON THE WAY TO THE HOSPITAL.      Why does your anesthesiologist allow you to drink Gatorade/Powerade before surgery?  Gatorade/Powerade helps to increase your comfort before surgery and to decrease your nausea after surgery. The carbohydrates in Gatorade/Powerade help reduce your body's stress response to surgery.  If you are a diabetic-drink only water prior to surgery.      Current Visitor policy(12/27/2021) - Patients may have 2 visitors pre and post procedure. Only 2 visitors will be allowed in the Surgical building with the patient. No one under the age of 12 will be allowed into the facility.    SPECIAL MEDICATION INSTRUCTIONS: TAKE medications checked off by the Anesthesiologist on your Medication List.    Angiogram Patients: Take medications as instructed by your physician, including aspirin.     Surgery Patients:    If you take ASPIRIN - Your PHYSICIAN/SURGEON will need to inform you IF/OR when you need to stop taking aspirin prior to your surgery.     The week prior to surgery do not ot take any medications containing IBUPROFEN or NSAIDS ( Advil, Motrin, Goodys, BC, Aleve, Naproxen etc) If you are not sure if you should take a medicine please call your surgeon's office.  Ok to take  Tylenol    Do Not Wear any make-up (especially eye make-up) to surgery. Please remove any false eyelashes or eyelash extensions. If you arrive the day of surgery with makeup/eyelashes on you will be required to remove prior to surgery. (There is a risk of corneal abrasions if eye makeup/eyelash extensions are not removed)      Leave all valuables at home.   Do Not wear any jewelry or watches, including any metal in body piercings. Jewelry must be removed prior to coming to the hospital.  There is a possibility that rings that are unable to be removed may be cut off if they are on the surgical extremity.    Please remove all hair extensions, wigs, clips and any other metal accessories/ ornaments from your hair.  These items may pose a flammable/fire risk in Surgery and must be removed.    Do not shave your surgical area at least 5 days prior to your surgery. The surgical prep will be performed at the hospital according to Infection Control regulations.    Contact Lens must be removed before surgery. Either do not wear the contact lens or bring a case and solution for storage.  Please bring a container for eyeglasses or dentures as required.  Bring any paperwork your physician has provided, such as consent forms,  history and physicals, doctor's orders, etc.   Bring comfortable clothes that are loose fitting to wear upon discharge. Take into consideration the type of surgery being performed.  Maintain your diet as advised per your physician the day prior to surgery.      Adequate rest the night before surgery is advised.   Park in the Parking lot behind the hospital or in the [place] [facility] across the street from the parking lot. Parking is complimentary.  If you will be discharged the same day as your procedure, please arrange for a responsible adult to drive you home or to accompany you if traveling by taxi.   YOU WILL NOT BE PERMITTED TO DRIVE OR TO LEAVE THE HOSPITAL ALONE AFTER SURGERY.   If you are  being discharged the same day, it is strongly recommended that you arrange for someone to remain with you for the first 24 hrs following your surgery.    The Surgeon will speak to your family/visitor after your surgery regarding the outcome of your surgery and post op care.  The Surgeon may speak to you after your surgery, but there is a possibility you may not remember the details.  Please check with your family members regarding the conversation with the Surgeon.    We strongly recommend whoever is bringing you home be present for discharge instructions.  This will ensure a thorough understanding for your post op home care.    ALL CHILDREN MUST ALWAYS BE ACCOMPANIED BY AN ADULT.    Visitors-Refer to current Visitor policy handouts.    Thank you for your cooperation.  The Staff of [facility].            Bathing Instructions with Hibiclens    Shower the evening before and morning of your procedure with Chlorhexidine (Hibiclens)  do not use Chlorhexidine on your face or genitals. Do not get in your eyes.  Wash your face with water and your regular face wash/soap  Use your regular shampoo  Apply Chlorhexidine (Hibiclens) directly on your skin or on a wet washcloth and wash gently. When showering: Move away from the shower stream when applying Chlorhexidine (Hibiclens) to avoid rinsing off too soon.  Rinse thoroughly with warm water  Do not dilute Chlorhexidine (Hibiclens)   Dry off as usual, do not use any deodorant, powder, body lotions, perfume, after shave or cologne.

## 2023-01-17 NOTE — H&P
C.C Pre-op Exam      HPI : Valencia Dumont is a 52 y.o. female  female who presents for preop exam for a LEEP Procedure scheduled on 2023 for Moderate Cervical Dysplasia on Colpo Directed Biopsy .     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    Hypertension     Kidney stone     Lupus     Osteoporosis 2013    Prophylactic immunotherapy (transplant immunosuppression) 2014    SBP (spontaneous bacterial peritonitis)     history of      Past Surgical History:   Procedure Laterality Date    BREAST BIOPSY Left 2020    CHOLECYSTECTOMY      COLONOSCOPY N/A 2017    Procedure: COLONOSCOPY;  Surgeon: Marky Sun MD;  Location: [facility] ([address] [place] [address]);  Service: Endoscopy;  Laterality: N/A;  PM prep.    ESOPHAGOGASTRODUODENOSCOPY      ESOPHAGOGASTRODUODENOSCOPY N/A 2019    Procedure: EGD (ESOPHAGOGASTRODUODENOSCOPY);  Surgeon: Deniz Guerra MD;  Location: [address]);  Service: Endoscopy;  Laterality: N/A;  labs prior, s/p liver transplant-MS    ESOPHAGOGASTRODUODENOSCOPY N/A 2020    Procedure: EGD (ESOPHAGOGASTRODUODENOSCOPY);  Surgeon: Davion Ríos MD;  Location: [address]);  Service: Endoscopy;  Laterality: N/A;  Please order the EGD at least 2 weeks after barium esophagram has been done EGD is for dysphagia  [place] [facility]    FUNCTIONAL ENDOSCOPIC SINUS SURGERY (FESS) USING COMPUTER-ASSISTED NAVIGATION Bilateral 2021    Procedure: FESS, USING COMPUTER-ASSISTED NAVIGATION;  Surgeon: Delores Lewis MD;  Location: [facility];  Service: ENT;  Laterality: Bilateral;  MEDjaeyos WALDEMAR [phone] TEXTED HIM @ 2:45PM ON 2021  DISC LOADED YONY TOMMY  "2021  RN PREOP 2021---COVID NEGATIVE ON 2/3--CONSENT INCOMPLETE  H/P INCOMPLETE  --CBC IN AM    LIVER BIOPSY      LIVER TRANSPLANT  2013    REFRACTIVE SURGERY Bilateral 2010    TUBAL LIGATION      UPPER GASTROINTESTINAL ENDOSCOPY       Family History   Problem Relation Age of Onset    Hypertension Mother     Cataracts Mother     Diabetes Father     Alzheimer's disease Father     Diabetes Paternal Grandfather     Diabetes Paternal Grandmother     No Known Problems Maternal Grandmother     Bone cancer Maternal Grandfather     Breast cancer Maternal Aunt 55    Hypertension Brother     Diabetes Brother     No Known Problems Sister     No Known Problems Maternal Uncle     No Known Problems Paternal Aunt     No Known Problems Paternal Uncle     Stroke Neg Hx     Cancer Neg Hx     Colon cancer Neg Hx     Esophageal cancer Neg Hx     Stomach cancer Neg Hx     Rectal cancer Neg Hx     Amblyopia Neg Hx     Blindness Neg Hx     Glaucoma Neg Hx     Macular degeneration Neg Hx     Retinal detachment Neg Hx     Strabismus Neg Hx     Thyroid disease Neg Hx      OB History          3    Para   3    Term   2       1    AB        Living   3         SAB        IAB        Ectopic        Multiple        Live Births   3               Review of patient's allergies indicates:   Allergen Reactions    Norvasc [amlodipine]      Severe headache    Bactrim [sulfamethoxazole-trimethoprim]      causes UTI    Doxycycline Rash    Pcn [penicillins] Rash       Current Outpatient Medications:     amitriptyline (ELAVIL) 10 MG tablet, Take 1 tablet (10 mg total) by mouth every evening., Disp: 90 tablet, Rfl: 2    azelastine (ASTELIN) 137 mcg (0.1 %) nasal spray, 1 spray (137 mcg total) by Nasal route 2 (two) times daily., Disp: 30 mL, Rfl: 11    BD ULTRA-FINE JANESSA PEN NEEDLE 32 gauge x 5/32" Ndle, For five times daily insulin injections, Disp: 450 each, Rfl: 3    blood-glucose meter,continuous (DEXCOM G6 ) Misc, " 1 each by Misc.(Non-Drug; Combo Route) route once. for 1 dose, Disp: 1 each, Rfl: 0    blood-glucose transmitter (DEXCOM G6 TRANSMITTER) Karen, 1 each by Misc.(Non-Drug; Combo Route) route once a week, Disp: 1 Device, Rfl: 3    budesonide-formoterol 160-4.5 mcg (SYMBICORT) 160-4.5 mcg/actuation HFAA, Inhale 2 puffs into the lungs., Disp: , Rfl:     ciclopirox (PENLAC) 8 % Soln, Apply topically nightly., Disp: 6.6 mL, Rfl: 3    clotrimazole-betamethasone 1-0.05% (LOTRISONE) cream, APPLY TOPICALLY TO THE AFFECTED AREA TWICE DAILY FOR 10 DAYS, Disp: , Rfl:     cyproheptadine (PERIACTIN) 4 mg tablet, TAKE ONE TABLET BY MOUTH ONCE DAILY FOR APPETITE, Disp: , Rfl:     diazepam (VALIUM) 5 MG tablet, Take 5 mg by mouth 3 (three) times daily as needed. , Disp: , Rfl: 0    diclofenac sodium (VOLTAREN) 1 % Gel, APPLY 2 GRAM to wrist and 4 g to left foot TOPICAL ROUTE 4 TIMES PER DAY, Disp: 450 each, Rfl: 2    dicyclomine (BENTYL) 10 MG capsule, TAKE 1 CAPSULE BY MOUTH EVERY 8 HOURS AS NEEDED for spasms, Disp: , Rfl:     DILTIAZEM HCL (DILTIAZEM 2% CREAM), Apply topically 3 (three) times daily. Apply topically to anal area., Disp: 30 g, Rfl: 0    diphenoxylate-atropine 2.5-0.025 mg (LOMOTIL) 2.5-0.025 mg per tablet, Take 1 tablet by mouth 4 (four) times daily as needed., Disp: , Rfl:     dulaglutide (TRULICITY) 0.75 mg/0.5 mL pen injector, Inject 0.75 mg into the skin every 7 days., Disp: 12 pen, Rfl: 3    ergocalciferol (ERGOCALCIFEROL) 50,000 unit Cap, Take 50,000 Units by mouth every 7 days., Disp: , Rfl:     erythromycin with ethanol (EMGEL) 2 % gel, ADD 30GM (1 TUBE) TO THE SOAKING DEVICE AND ALLOW WATER TO AGITATE. PLACE AFFECTED AREAS INTO WATER AND SOAK FOR 10 MINUTES 1-2 TIMES DAILY, Disp: , Rfl: 1    estradioL (ESTRACE) 0.01 % (0.1 mg/gram) vaginal cream, Place 1 g vaginally twice a week., Disp: 42 g, Rfl: 3    famotidine (PEPCID) 40 MG tablet, , Disp: , Rfl:     ferrous sulfate (FEOSOL) 325 mg (65 mg iron) Tab tablet,  Take 1 tablet (325 mg total) by mouth every 12 (twelve) hours., Disp: 60 tablet, Rfl: 4    flash glucose scanning reader (FREESTYLE CHUN 14 DAY READER) Misc, 1 each by Misc.(Non-Drug; Combo Route) route once daily., Disp: 1 each, Rfl: 0    flash glucose sensor (FREESTYLE CHUN 14 DAY SENSOR) Kit, 2 each by Misc.(Non-Drug; Combo Route) route every 14 (fourteen) days., Disp: 2 kit, Rfl: 11    fluconazole (DIFLUCAN) 150 MG Tab, TAKE ONE TABLET BY MOUTH once for ONE dose, Disp: 1 tablet, Rfl: 0    fluocinolone (SYNALAR) 0.01 % external solution, APPLY a couple of DROPS INTO BOTH EARS TWICE DAILY AS NEEDED FOR ITCHING, Disp: 60 mL, Rfl: 1    fluticasone propionate (FLONASE) 50 mcg/actuation nasal spray, 1 spray by Each Nostril route 2 (two) times daily., Disp: , Rfl:     fluticasone propionate (FLONASE) 50 mcg/actuation nasal spray, 2 sprays (100 mcg total) by Each Nostril route 2 (two) times daily., Disp: 18.2 mL, Rfl: 11    gabapentin (NEURONTIN) 300 MG capsule, TAKE 1 CAPSULE BY MOUTH THREE TIMES DAILY, Disp: , Rfl:     gentamicin (GARAMYCIN) 0.1 % cream, ADD 30GM (1 TUBE) TO THE SOAKING DEVICE AND ALLOW WATER TO AGITATE. PLACE AFFECTED AREAS INTO WATER AND SOAK FOR 10 MINUTES 1-2 TIMES DAILY, Disp: , Rfl: 1    hydrocortisone (ANUSOL-HC) 2.5 % rectal cream, Place rectally 2 (two) times daily. Apply per rectum bid, Disp: 30 g, Rfl: 3    hydrOXYchloroQUINE (PLAQUENIL) 200 mg tablet, TAKE ONE TABLET BY MOUTH TWICE DAILY, Disp: 180 tablet, Rfl: 1    hydrOXYzine HCl (ATARAX) 10 MG Tab, TAKE 1 OR 2 TABLETS BY MOUTH THREE TIMES DAILY AS NEEDED FOR ITCHING., Disp: , Rfl: 0    hyoscyamine (LEVBID) 0.375 mg Tb12, Take 0.375 mg by mouth., Disp: , Rfl:     insulin aspart U-100 (NOVOLOG) 100 unit/mL (3 mL) InPn pen, Inject 4 Units into the skin., Disp: , Rfl:     insulin detemir U-100 (LEVEMIR FLEXTOUCH U-100 INSULN) 100 unit/mL (3 mL) InPn pen, INJECT FOUR units UNDER THE SKIN TWICE DAILY, Disp: 45 mL, Rfl: 3    insulin lispro  (HUMALOG KWIKPEN INSULIN) 100 unit/mL pen, 3 units before BREAKFAST and lunch and FIVE units before dinner with sliding scale, up to 25 units daily, Disp: 45 mL, Rfl: 3    isosorbide mononitrate (IMDUR) 30 MG 24 hr tablet, Take 30 mg by mouth once daily., Disp: , Rfl:     ketoconazole (NIZORAL) 2 % cream, ADD 30GM (1/2 TUBE) TO THE SOAKING DEVICE AND ALLOW WATER TO AGITATE. PLACE AFFECTED AREAS INTO WATER AND SOAK FOR 10 MINUTES 1-2 TIMES BRENDON, Disp: , Rfl: 1    Lactobac. rhamnosus GG-inulin 10 billion cell -200 mg Cap, Take 1 capsule by mouth 2 (two) times daily., Disp: 30 capsule, Rfl: 0    levocetirizine (XYZAL) 5 MG tablet, Take 5 mg by mouth every evening., Disp: , Rfl: 3    levoFLOXacin (LEVAQUIN) 750 MG tablet, levofloxacin 750 mg tablet, Disp: , Rfl:     LIDOcaine-prilocaine (EMLA) cream, 2 (two) times daily. Apply to affected area, Disp: , Rfl:     losartan (COZAAR) 100 MG tablet, Take 1 tablet (100 mg total) by mouth once daily., Disp: 30 tablet, Rfl: 2    magnesium oxide 500 mg Tab, Take 500 mg by mouth 2 (two) times daily., Disp: 60 each, Rfl: 6    meclizine (ANTIVERT) 25 mg tablet, TAKE ONE TABLET BY MOUTH AT BEDTIME AS NEEDED for dizziness., Disp: , Rfl: 0    meloxicam (MOBIC) 15 MG tablet, , Disp: , Rfl:     mometasone 0.1% (ELOCON) 0.1 % cream, APPLY TOPICALLY TO THE FACE TWICE DAILY FOR ONE WEEK, Disp: , Rfl:     MULTIVIT,THER IRON,CA,FA & MIN (MULTIVITAMIN) Tab, Take 1 tablet by mouth once daily., Disp: 30 tablet, Rfl: 0    mycophenolate (MYFORTIC) 180 MG TbEC, Take 2 tablets (360 mg total) by mouth 2 (two) times daily., Disp: 120 tablet, Rfl: 11    neomycin-polymyxin-hydrocortisone (CORTISPORIN) otic solution, INSTILL TWO DROPS INTO BOTH EARS FOUR TIMES DAILY FOR 10 DAYS, Disp: , Rfl: 0    NURTEC 75 mg odt, PLACE ONE TABLET on tongue, alternatively UNDER THE TONGUE ONCE DAILY AS NEEDED FOR MIGRAINE, Disp: 8 tablet, Rfl: 2    nystatin (MYCOSTATIN) 100,000 unit/mL suspension, SWISH AND SPIT FIVE  MILLILITERS BY MOUTH FOUR TIMES DAILY FOR 7 DAYS., Disp: , Rfl: 1    ondansetron (ZOFRAN) 4 MG tablet, TAKE TWO TABLETS BY MOUTH EVERY 8 HOURS AS NEEDED FOR NAUSEA., Disp: , Rfl:     oxycodone-acetaminophen (PERCOCET) 7.5-325 mg per tablet, Take 1 tablet by mouth every 4 (four) hours as needed for Pain., Disp: , Rfl:     polyethylene glycol (GLYCOLAX) 17 gram/dose powder, Mix 1 capful (17 g) with liquid and by mouth 2 (two) times daily., Disp: 1530 g, Rfl: 11    PROAIR HFA 90 mcg/actuation inhaler, Inhale 2 puffs into the lungs 3 (three) times daily as needed. , Disp: , Rfl: 3    promethazine-dextromethorphan (PROMETHAZINE-DM) 6.25-15 mg/5 mL Syrp, Take by mouth 2 (two) times daily as needed. , Disp: , Rfl: 0    rosuvastatin (CRESTOR) 5 MG tablet, Take 5 mg by mouth once daily., Disp: , Rfl:     sertraline (ZOLOFT) 50 MG tablet, Take 50 mg by mouth once daily., Disp: , Rfl: 11    SSD 1 % cream, 1 application 2 (two) times daily. Apply to affected area, Disp: , Rfl: 0    tacrolimus (PROGRAF) 1 MG Cap, Take 2 capsules (2 mg total) by mouth every morning AND 2 capsules (2 mg total) every evening., Disp: 120 capsule, Rfl: 11    topiramate (TOPAMAX) 50 MG tablet, SMARTSI Tablet(s) By Mouth Every Evening, Disp: , Rfl:     triamcinolone acetonide 0.1% (KENALOG) 0.1 % cream, Apply topically 2 times daily, Disp: 30 g, Rfl: 0    valACYclovir (VALTREX) 1000 MG tablet, Take 1,000 mg by mouth once daily., Disp: , Rfl:     verapamil (CALAN-SR) 120 MG CR tablet, TAKE ONE TABLET ONCE DAILY FOR BLOOD PRESSURE, Disp: , Rfl: 3    zolpidem (AMBIEN) 10 mg Tab, Take 10 mg by mouth nightly as needed., Disp: , Rfl: 3    buPROPion (WELLBUTRIN XL) 150 MG TB24 tablet, Take 300 mg by mouth., Disp: , Rfl:     ciclopirox (PENLAC) 8 % Soln, Apply topically nightly., Disp: 1 Bottle, Rfl: 3    loratadine (CLARITIN) 10 mg tablet, Take 1 tablet (10 mg total) by mouth once daily., Disp: 30 tablet, Rfl: 0    nystatin-triamcinolone (MYCOLOG II)  "cream, Apply to affected area 2 times daily, Disp: 30 g, Rfl: 1    pantoprazole (PROTONIX) 40 MG tablet, Take 1 tablet (40 mg total) by mouth 2 (two) times daily. Take immediately in am when wake up and then 30 minutes prior to dinner, Disp: 60 tablet, Rfl: 11  Social History     Socioeconomic History    Marital status:     Number of children: 3   Occupational History     Employer: [employer]   Tobacco Use    Smoking status: Never    Smokeless tobacco: Never    Tobacco comments:     disability; ; 3 children   Substance and Sexual Activity    Alcohol use: Yes     Alcohol/week: 1.0 standard drink     Types: 1 Glasses of wine per week     Comment: occasionally     Drug use: No    Sexual activity: Yes     Partners: Male     Birth control/protection: See Surgical Hx, Post-menopausal     Comment: s/p tubal ligation,  since 1989       Last pap 8-: ASCUS, + HR HPV  Last pathology :10-:1. ENDOCERVICAL CURETTAGE SHOWS DETACHED FRAGMENTS OF SQUAMOUS EPITHELIUM   WITH MILD-TO-MODERATE SQUAMOUS DYSPLASIA AND KOILOCYTOTIC ATYPIA ( CIN1-2).   2. CERVICAL BIOPSY AT 7 O'CLOCK SHOWS MILD-TO-MODERATE SQUAMOUS DYSPLASIA AND   KOILOCYTOTIC ATYPIA (CIN1-2).  Last ultrasound NA    BP (!) 170/91   Ht 5' 9" (1.753 m)   Wt 69 kg (152 lb 3.2 oz)   LMP 12/20/2016 (Approximate)   BMI 22.48 kg/m²     ROS:    GENERAL: Denies weight gain or weight loss. Feeling well overall.   SKIN: Denies rash or lesions.   HEAD: Denies head injury or headache.   NODES: Denies enlarged lymph nodes.   CHEST: Denies chest pain or shortness of breath.   CARDIOVASCULAR: Denies palpitations or left sided chest pain.   ABDOMEN: No abdominal pain, constipation, diarrhea, nausea, vomiting or rectal bleeding.   URINARY: No frequency, dysuria, hematuria, or burning on urination.  REPRODUCTIVE: See HPI.   BREASTS: The patient performs breast self-examination and denies pain, lumps, or nipple discharge.   HEMATOLOGIC: No easy " bruisability or excessive bleeding with the exception of menstrual cycles.  MUSCULOSKELETAL: Denies joint pain or swelling.   NEUROLOGIC: Denies syncope or weakness.   PSYCHIATRIC: Denies depression, anxiety or mood swings.    PHYSICAL EXAM:    APPEARANCE: Well nourished, well developed, in no acute distress.  AFFECT: WNL, alert and oriented x 3  SKIN: No acne or hirsutism  NECK: Neck symmetric without masses or thyromegaly  NODES: No inguinal, cervical, axillary, or femoral lymph node enlargement  CHEST: Good respiratory effect  ABDOMEN: Soft.  No tenderness or masses.  No hepatosplenomegaly.  No hernias.  PELVIC: Normal external genitalia without lesions.  Normal hair distribution.  Adequate perineal body, normal urethral meatus.  Vagina moist and well rugated without lesions or discharge.  Cervix pink, without lesions, discharge or tenderness.  No significant cystocele or rectocele.  Bimanual exam shows uterus to be normal size, regular, mobile and nontender.  Adnexa without masses or tenderness.    EXTREMITIES: No edema.    ASSESSMENT & PLAN:    Valencia was seen today for pre-op exam and moderate cervical dysplasia.    Diagnoses and all orders for this visit:    Preoperative exam for gynecologic surgery  -     Full code; Standing  -     Vital signs; Standing  -     Insert peripheral IV; Standing  -     Chlorhexidine (CHG) 2% Wipes; Standing  -     Chlorohexidine Gluconate Bath; Standing  -     Place in Outpatient; Standing  -     Diet clear liquid; Standing  -     Place MELANIE hose; Standing  -     Place sequential compression device; Standing    Moderate cervical dysplasia  -     Full code; Standing  -     Vital signs; Standing  -     Insert peripheral IV; Standing  -     Chlorhexidine (CHG) 2% Wipes; Standing  -     Chlorohexidine Gluconate Bath; Standing  -     Place in Outpatient; Standing  -     Diet clear liquid; Standing  -     Place MELANIE hose; Standing  -     Place sequential compression device;  Standing    Other orders  -     0.9%  NaCl infusion  -     IP VTE LOW RISK PATIENT; Standing  -     mupirocin 2 % ointment  -     Ambulate; Standing         I have discussed the risks, benefits, indications, and alternatives of the procedure in detail.  The patient verbalizes her understanding.  All questions answered.  Consents signed.  The patient agrees to proceed to proceed as planned.

## 2023-01-17 NOTE — Clinical Note
Discussed with patient possible use of Gardasil Vaccine to help reduce risk of recurrence of Cervical Dysplasia, although she is past the recommended age. We are recommending this in some high risk patients. Please review and let me know if any concerns /contraindications given her Transplant status.

## 2023-01-17 NOTE — ANESTHESIA PREPROCEDURE EVALUATION
01/17/2023  Valencia Dumont is a 52 y.o., female.      Pre-op Assessment    I have reviewed the Patient Summary Reports.     I have reviewed the Nursing Notes. I have reviewed the NPO Status.   I have reviewed the Medications.     Review of Systems  Anesthesia Hx:  No problems with previous Anesthesia  History of prior surgery of interest to airway management or planning: liver transplant. Previous anesthesia: General Liver transplant years ago with general anesthesia.  Denies Family Hx of Anesthesia complications.   Denies Personal Hx of Anesthesia complications.   Social:  Non-Smoker, Social Alcohol Use    Hematology/Oncology:         -- Anemia:   Cardiovascular:   Hypertension, well controlled    Pulmonary:   Asthma asymptomatic Sleep Apnea    Renal/:   Chronic Renal Disease, CKD renal calculi    Hepatic/GI:   GERD Liver Disease, Ascites from cirrhosis   Musculoskeletal:   Arthritis  SLE   Endocrine:   Diabetes    Dermatological:  Skin Normal    Psych:  Psychiatric Normal           Physical Exam  General: Cooperative, Well nourished, Oriented and Alert    Airway:  Mallampati: II   Mouth Opening: Normal  Neck ROM: Normal ROM    Dental:  Intact        Anesthesia Plan  Type of Anesthesia, risks & benefits discussed:    Anesthesia Type: Gen Supraglottic Airway, Gen ETT  Intra-op Monitoring Plan: Standard ASA Monitors  Post Op Pain Control Plan: multimodal analgesia  Induction:  IV  Informed Consent: Informed consent signed with the Patient and all parties understand the risks and agree with anesthesia plan.  All questions answered.   ASA Score: 3  Anesthesia Plan Notes: Labs in epic  Previous liver transplant  Lupus    Ready For Surgery From Anesthesia Perspective.     .

## 2023-01-17 NOTE — H&P (VIEW-ONLY)
C.C Pre-op Exam      HPI : Valencia Dumont is a 52 y.o. female  female who presents for preop exam for a LEEP Procedure scheduled on 2023 for Moderate Cervical Dysplasia on Colpo Directed Biopsy .     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    Hypertension     Kidney stone     Lupus     Osteoporosis 2013    Prophylactic immunotherapy (transplant immunosuppression) 2014    SBP (spontaneous bacterial peritonitis)     history of      Past Surgical History:   Procedure Laterality Date    BREAST BIOPSY Left 2020    CHOLECYSTECTOMY      COLONOSCOPY N/A 2017    Procedure: COLONOSCOPY;  Surgeon: Marky Sun MD;  Location: [facility] ([address] [place] [address]);  Service: Endoscopy;  Laterality: N/A;  PM prep.    ESOPHAGOGASTRODUODENOSCOPY      ESOPHAGOGASTRODUODENOSCOPY N/A 2019    Procedure: EGD (ESOPHAGOGASTRODUODENOSCOPY);  Surgeon: Deniz Guerra MD;  Location: [address]);  Service: Endoscopy;  Laterality: N/A;  labs prior, s/p liver transplant-MS    ESOPHAGOGASTRODUODENOSCOPY N/A 2020    Procedure: EGD (ESOPHAGOGASTRODUODENOSCOPY);  Surgeon: Davion Ríos MD;  Location: [address]);  Service: Endoscopy;  Laterality: N/A;  Please order the EGD at least 2 weeks after barium esophagram has been done EGD is for dysphagia  covid test [facility] [place] urgent care    FUNCTIONAL ENDOSCOPIC SINUS SURGERY (FESS) USING COMPUTER-ASSISTED NAVIGATION Bilateral 2021    Procedure: FESS, USING COMPUTER-ASSISTED NAVIGATION;  Surgeon: Delores Lewis MD;  Location: [facility];  Service: ENT;  Laterality: Bilateral;  MED91 Boyuan Wireles WALDEMAR [phone] TEXTED HIM @ 2:45PM ON 2021  DISC LOADED YONY TOMMY  "2021  RN PREOP 2021---COVID NEGATIVE ON 2/3--CONSENT INCOMPLETE  H/P INCOMPLETE  --CBC IN AM    LIVER BIOPSY      LIVER TRANSPLANT  2013    REFRACTIVE SURGERY Bilateral 2010    TUBAL LIGATION      UPPER GASTROINTESTINAL ENDOSCOPY       Family History   Problem Relation Age of Onset    Hypertension Mother     Cataracts Mother     Diabetes Father     Alzheimer's disease Father     Diabetes Paternal Grandfather     Diabetes Paternal Grandmother     No Known Problems Maternal Grandmother     Bone cancer Maternal Grandfather     Breast cancer Maternal Aunt 55    Hypertension Brother     Diabetes Brother     No Known Problems Sister     No Known Problems Maternal Uncle     No Known Problems Paternal Aunt     No Known Problems Paternal Uncle     Stroke Neg Hx     Cancer Neg Hx     Colon cancer Neg Hx     Esophageal cancer Neg Hx     Stomach cancer Neg Hx     Rectal cancer Neg Hx     Amblyopia Neg Hx     Blindness Neg Hx     Glaucoma Neg Hx     Macular degeneration Neg Hx     Retinal detachment Neg Hx     Strabismus Neg Hx     Thyroid disease Neg Hx      OB History          3    Para   3    Term   2       1    AB        Living   3         SAB        IAB        Ectopic        Multiple        Live Births   3               Review of patient's allergies indicates:   Allergen Reactions    Norvasc [amlodipine]      Severe headache    Bactrim [sulfamethoxazole-trimethoprim]      causes UTI    Doxycycline Rash    Pcn [penicillins] Rash       Current Outpatient Medications:     amitriptyline (ELAVIL) 10 MG tablet, Take 1 tablet (10 mg total) by mouth every evening., Disp: 90 tablet, Rfl: 2    azelastine (ASTELIN) 137 mcg (0.1 %) nasal spray, 1 spray (137 mcg total) by Nasal route 2 (two) times daily., Disp: 30 mL, Rfl: 11    BD ULTRA-FINE JANESSA PEN NEEDLE 32 gauge x 5/32" Ndle, For five times daily insulin injections, Disp: 450 each, Rfl: 3    blood-glucose meter,continuous (DEXCOM G6 ) Misc, " 1 each by Misc.(Non-Drug; Combo Route) route once. for 1 dose, Disp: 1 each, Rfl: 0    blood-glucose transmitter (DEXCOM G6 TRANSMITTER) Karen, 1 each by Misc.(Non-Drug; Combo Route) route once a week, Disp: 1 Device, Rfl: 3    budesonide-formoterol 160-4.5 mcg (SYMBICORT) 160-4.5 mcg/actuation HFAA, Inhale 2 puffs into the lungs., Disp: , Rfl:     ciclopirox (PENLAC) 8 % Soln, Apply topically nightly., Disp: 6.6 mL, Rfl: 3    clotrimazole-betamethasone 1-0.05% (LOTRISONE) cream, APPLY TOPICALLY TO THE AFFECTED AREA TWICE DAILY FOR 10 DAYS, Disp: , Rfl:     cyproheptadine (PERIACTIN) 4 mg tablet, TAKE ONE TABLET BY MOUTH ONCE DAILY FOR APPETITE, Disp: , Rfl:     diazepam (VALIUM) 5 MG tablet, Take 5 mg by mouth 3 (three) times daily as needed. , Disp: , Rfl: 0    diclofenac sodium (VOLTAREN) 1 % Gel, APPLY 2 GRAM to wrist and 4 g to left foot TOPICAL ROUTE 4 TIMES PER DAY, Disp: 450 each, Rfl: 2    dicyclomine (BENTYL) 10 MG capsule, TAKE 1 CAPSULE BY MOUTH EVERY 8 HOURS AS NEEDED for spasms, Disp: , Rfl:     DILTIAZEM HCL (DILTIAZEM 2% CREAM), Apply topically 3 (three) times daily. Apply topically to anal area., Disp: 30 g, Rfl: 0    diphenoxylate-atropine 2.5-0.025 mg (LOMOTIL) 2.5-0.025 mg per tablet, Take 1 tablet by mouth 4 (four) times daily as needed., Disp: , Rfl:     dulaglutide (TRULICITY) 0.75 mg/0.5 mL pen injector, Inject 0.75 mg into the skin every 7 days., Disp: 12 pen, Rfl: 3    ergocalciferol (ERGOCALCIFEROL) 50,000 unit Cap, Take 50,000 Units by mouth every 7 days., Disp: , Rfl:     erythromycin with ethanol (EMGEL) 2 % gel, ADD 30GM (1 TUBE) TO THE SOAKING DEVICE AND ALLOW WATER TO AGITATE. PLACE AFFECTED AREAS INTO WATER AND SOAK FOR 10 MINUTES 1-2 TIMES DAILY, Disp: , Rfl: 1    estradioL (ESTRACE) 0.01 % (0.1 mg/gram) vaginal cream, Place 1 g vaginally twice a week., Disp: 42 g, Rfl: 3    famotidine (PEPCID) 40 MG tablet, , Disp: , Rfl:     ferrous sulfate (FEOSOL) 325 mg (65 mg iron) Tab tablet,  Take 1 tablet (325 mg total) by mouth every 12 (twelve) hours., Disp: 60 tablet, Rfl: 4    flash glucose scanning reader (FREESTYLE CHUN 14 DAY READER) Misc, 1 each by Misc.(Non-Drug; Combo Route) route once daily., Disp: 1 each, Rfl: 0    flash glucose sensor (FREESTYLE CHUN 14 DAY SENSOR) Kit, 2 each by Misc.(Non-Drug; Combo Route) route every 14 (fourteen) days., Disp: 2 kit, Rfl: 11    fluconazole (DIFLUCAN) 150 MG Tab, TAKE ONE TABLET BY MOUTH once for ONE dose, Disp: 1 tablet, Rfl: 0    fluocinolone (SYNALAR) 0.01 % external solution, APPLY a couple of DROPS INTO BOTH EARS TWICE DAILY AS NEEDED FOR ITCHING, Disp: 60 mL, Rfl: 1    fluticasone propionate (FLONASE) 50 mcg/actuation nasal spray, 1 spray by Each Nostril route 2 (two) times daily., Disp: , Rfl:     fluticasone propionate (FLONASE) 50 mcg/actuation nasal spray, 2 sprays (100 mcg total) by Each Nostril route 2 (two) times daily., Disp: 18.2 mL, Rfl: 11    gabapentin (NEURONTIN) 300 MG capsule, TAKE 1 CAPSULE BY MOUTH THREE TIMES DAILY, Disp: , Rfl:     gentamicin (GARAMYCIN) 0.1 % cream, ADD 30GM (1 TUBE) TO THE SOAKING DEVICE AND ALLOW WATER TO AGITATE. PLACE AFFECTED AREAS INTO WATER AND SOAK FOR 10 MINUTES 1-2 TIMES DAILY, Disp: , Rfl: 1    hydrocortisone (ANUSOL-HC) 2.5 % rectal cream, Place rectally 2 (two) times daily. Apply per rectum bid, Disp: 30 g, Rfl: 3    hydrOXYchloroQUINE (PLAQUENIL) 200 mg tablet, TAKE ONE TABLET BY MOUTH TWICE DAILY, Disp: 180 tablet, Rfl: 1    hydrOXYzine HCl (ATARAX) 10 MG Tab, TAKE 1 OR 2 TABLETS BY MOUTH THREE TIMES DAILY AS NEEDED FOR ITCHING., Disp: , Rfl: 0    hyoscyamine (LEVBID) 0.375 mg Tb12, Take 0.375 mg by mouth., Disp: , Rfl:     insulin aspart U-100 (NOVOLOG) 100 unit/mL (3 mL) InPn pen, Inject 4 Units into the skin., Disp: , Rfl:     insulin detemir U-100 (LEVEMIR FLEXTOUCH U-100 INSULN) 100 unit/mL (3 mL) InPn pen, INJECT FOUR units UNDER THE SKIN TWICE DAILY, Disp: 45 mL, Rfl: 3    insulin lispro  (HUMALOG KWIKPEN INSULIN) 100 unit/mL pen, 3 units before BREAKFAST and lunch and FIVE units before dinner with sliding scale, up to 25 units daily, Disp: 45 mL, Rfl: 3    isosorbide mononitrate (IMDUR) 30 MG 24 hr tablet, Take 30 mg by mouth once daily., Disp: , Rfl:     ketoconazole (NIZORAL) 2 % cream, ADD 30GM (1/2 TUBE) TO THE SOAKING DEVICE AND ALLOW WATER TO AGITATE. PLACE AFFECTED AREAS INTO WATER AND SOAK FOR 10 MINUTES 1-2 TIMES BRENDON, Disp: , Rfl: 1    Lactobac. rhamnosus GG-inulin 10 billion cell -200 mg Cap, Take 1 capsule by mouth 2 (two) times daily., Disp: 30 capsule, Rfl: 0    levocetirizine (XYZAL) 5 MG tablet, Take 5 mg by mouth every evening., Disp: , Rfl: 3    levoFLOXacin (LEVAQUIN) 750 MG tablet, levofloxacin 750 mg tablet, Disp: , Rfl:     LIDOcaine-prilocaine (EMLA) cream, 2 (two) times daily. Apply to affected area, Disp: , Rfl:     losartan (COZAAR) 100 MG tablet, Take 1 tablet (100 mg total) by mouth once daily., Disp: 30 tablet, Rfl: 2    magnesium oxide 500 mg Tab, Take 500 mg by mouth 2 (two) times daily., Disp: 60 each, Rfl: 6    meclizine (ANTIVERT) 25 mg tablet, TAKE ONE TABLET BY MOUTH AT BEDTIME AS NEEDED for dizziness., Disp: , Rfl: 0    meloxicam (MOBIC) 15 MG tablet, , Disp: , Rfl:     mometasone 0.1% (ELOCON) 0.1 % cream, APPLY TOPICALLY TO THE FACE TWICE DAILY FOR ONE WEEK, Disp: , Rfl:     MULTIVIT,THER IRON,CA,FA & MIN (MULTIVITAMIN) Tab, Take 1 tablet by mouth once daily., Disp: 30 tablet, Rfl: 0    mycophenolate (MYFORTIC) 180 MG TbEC, Take 2 tablets (360 mg total) by mouth 2 (two) times daily., Disp: 120 tablet, Rfl: 11    neomycin-polymyxin-hydrocortisone (CORTISPORIN) otic solution, INSTILL TWO DROPS INTO BOTH EARS FOUR TIMES DAILY FOR 10 DAYS, Disp: , Rfl: 0    NURTEC 75 mg odt, PLACE ONE TABLET on tongue, alternatively UNDER THE TONGUE ONCE DAILY AS NEEDED FOR MIGRAINE, Disp: 8 tablet, Rfl: 2    nystatin (MYCOSTATIN) 100,000 unit/mL suspension, SWISH AND SPIT FIVE  MILLILITERS BY MOUTH FOUR TIMES DAILY FOR 7 DAYS., Disp: , Rfl: 1    ondansetron (ZOFRAN) 4 MG tablet, TAKE TWO TABLETS BY MOUTH EVERY 8 HOURS AS NEEDED FOR NAUSEA., Disp: , Rfl:     oxycodone-acetaminophen (PERCOCET) 7.5-325 mg per tablet, Take 1 tablet by mouth every 4 (four) hours as needed for Pain., Disp: , Rfl:     polyethylene glycol (GLYCOLAX) 17 gram/dose powder, Mix 1 capful (17 g) with liquid and by mouth 2 (two) times daily., Disp: 1530 g, Rfl: 11    PROAIR HFA 90 mcg/actuation inhaler, Inhale 2 puffs into the lungs 3 (three) times daily as needed. , Disp: , Rfl: 3    promethazine-dextromethorphan (PROMETHAZINE-DM) 6.25-15 mg/5 mL Syrp, Take by mouth 2 (two) times daily as needed. , Disp: , Rfl: 0    rosuvastatin (CRESTOR) 5 MG tablet, Take 5 mg by mouth once daily., Disp: , Rfl:     sertraline (ZOLOFT) 50 MG tablet, Take 50 mg by mouth once daily., Disp: , Rfl: 11    SSD 1 % cream, 1 application 2 (two) times daily. Apply to affected area, Disp: , Rfl: 0    tacrolimus (PROGRAF) 1 MG Cap, Take 2 capsules (2 mg total) by mouth every morning AND 2 capsules (2 mg total) every evening., Disp: 120 capsule, Rfl: 11    topiramate (TOPAMAX) 50 MG tablet, SMARTSI Tablet(s) By Mouth Every Evening, Disp: , Rfl:     triamcinolone acetonide 0.1% (KENALOG) 0.1 % cream, Apply topically 2 times daily, Disp: 30 g, Rfl: 0    valACYclovir (VALTREX) 1000 MG tablet, Take 1,000 mg by mouth once daily., Disp: , Rfl:     verapamil (CALAN-SR) 120 MG CR tablet, TAKE ONE TABLET ONCE DAILY FOR BLOOD PRESSURE, Disp: , Rfl: 3    zolpidem (AMBIEN) 10 mg Tab, Take 10 mg by mouth nightly as needed., Disp: , Rfl: 3    buPROPion (WELLBUTRIN XL) 150 MG TB24 tablet, Take 300 mg by mouth., Disp: , Rfl:     ciclopirox (PENLAC) 8 % Soln, Apply topically nightly., Disp: 1 Bottle, Rfl: 3    loratadine (CLARITIN) 10 mg tablet, Take 1 tablet (10 mg total) by mouth once daily., Disp: 30 tablet, Rfl: 0    nystatin-triamcinolone (MYCOLOG II)  "cream, Apply to affected area 2 times daily, Disp: 30 g, Rfl: 1    pantoprazole (PROTONIX) 40 MG tablet, Take 1 tablet (40 mg total) by mouth 2 (two) times daily. Take immediately in am when wake up and then 30 minutes prior to dinner, Disp: 60 tablet, Rfl: 11  Social History     Socioeconomic History    Marital status:     Number of children: 3   Occupational History     Employer: [employer]   Tobacco Use    Smoking status: Never    Smokeless tobacco: Never    Tobacco comments:     disability; ; 3 children   Substance and Sexual Activity    Alcohol use: Yes     Alcohol/week: 1.0 standard drink     Types: 1 Glasses of wine per week     Comment: occasionally     Drug use: No    Sexual activity: Yes     Partners: Male     Birth control/protection: See Surgical Hx, Post-menopausal     Comment: s/p tubal ligation,  since 1989       Last pap 8-: ASCUS, + HR HPV  Last pathology :10-:1. ENDOCERVICAL CURETTAGE SHOWS DETACHED FRAGMENTS OF SQUAMOUS EPITHELIUM   WITH MILD-TO-MODERATE SQUAMOUS DYSPLASIA AND KOILOCYTOTIC ATYPIA ( CIN1-2).   2. CERVICAL BIOPSY AT 7 O'CLOCK SHOWS MILD-TO-MODERATE SQUAMOUS DYSPLASIA AND   KOILOCYTOTIC ATYPIA (CIN1-2).  Last ultrasound NA    BP (!) 170/91   Ht 5' 9" (1.753 m)   Wt 69 kg (152 lb 3.2 oz)   LMP 12/20/2016 (Approximate)   BMI 22.48 kg/m²     ROS:    GENERAL: Denies weight gain or weight loss. Feeling well overall.   SKIN: Denies rash or lesions.   HEAD: Denies head injury or headache.   NODES: Denies enlarged lymph nodes.   CHEST: Denies chest pain or shortness of breath.   CARDIOVASCULAR: Denies palpitations or left sided chest pain.   ABDOMEN: No abdominal pain, constipation, diarrhea, nausea, vomiting or rectal bleeding.   URINARY: No frequency, dysuria, hematuria, or burning on urination.  REPRODUCTIVE: See HPI.   BREASTS: The patient performs breast self-examination and denies pain, lumps, or nipple discharge.   HEMATOLOGIC: No easy " bruisability or excessive bleeding with the exception of menstrual cycles.  MUSCULOSKELETAL: Denies joint pain or swelling.   NEUROLOGIC: Denies syncope or weakness.   PSYCHIATRIC: Denies depression, anxiety or mood swings.    PHYSICAL EXAM:    APPEARANCE: Well nourished, well developed, in no acute distress.  AFFECT: WNL, alert and oriented x 3  SKIN: No acne or hirsutism  NECK: Neck symmetric without masses or thyromegaly  NODES: No inguinal, cervical, axillary, or femoral lymph node enlargement  CHEST: Good respiratory effect  ABDOMEN: Soft.  No tenderness or masses.  No hepatosplenomegaly.  No hernias.  PELVIC: Normal external genitalia without lesions.  Normal hair distribution.  Adequate perineal body, normal urethral meatus.  Vagina moist and well rugated without lesions or discharge.  Cervix pink, without lesions, discharge or tenderness.  No significant cystocele or rectocele.  Bimanual exam shows uterus to be normal size, regular, mobile and nontender.  Adnexa without masses or tenderness.    EXTREMITIES: No edema.    ASSESSMENT & PLAN:    Valencia was seen today for pre-op exam and moderate cervical dysplasia.    Diagnoses and all orders for this visit:    Preoperative exam for gynecologic surgery  -     Full code; Standing  -     Vital signs; Standing  -     Insert peripheral IV; Standing  -     Chlorhexidine (CHG) 2% Wipes; Standing  -     Chlorohexidine Gluconate Bath; Standing  -     Place in Outpatient; Standing  -     Diet clear liquid; Standing  -     Place MELANIE hose; Standing  -     Place sequential compression device; Standing    Moderate cervical dysplasia  -     Full code; Standing  -     Vital signs; Standing  -     Insert peripheral IV; Standing  -     Chlorhexidine (CHG) 2% Wipes; Standing  -     Chlorohexidine Gluconate Bath; Standing  -     Place in Outpatient; Standing  -     Diet clear liquid; Standing  -     Place MELANIE hose; Standing  -     Place sequential compression device;  Standing    Other orders  -     0.9%  NaCl infusion  -     IP VTE LOW RISK PATIENT; Standing  -     mupirocin 2 % ointment  -     Ambulate; Standing         I have discussed the risks, benefits, indications, and alternatives of the procedure in detail.  The patient verbalizes her understanding.  All questions answered.  Consents signed.  The patient agrees to proceed to proceed as planned.

## 2023-01-17 NOTE — PROGRESS NOTES
C.C Pre-op Exam      HPI : Valencia Dumont is a 52 y.o. female  female who presents for preop exam for a LEEP Procedure scheduled on  for Moderate Cervical Dysplasia .     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    Hypertension     Kidney stone     Lupus     Osteoporosis 2013    Prophylactic immunotherapy (transplant immunosuppression) 2014    SBP (spontaneous bacterial peritonitis)     history of      Past Surgical History:   Procedure Laterality Date    BREAST BIOPSY Left 2020    CHOLECYSTECTOMY      COLONOSCOPY N/A 2017    Procedure: COLONOSCOPY;  Surgeon: Marky Sun MD;  Location: [address]);  Service: Endoscopy;  Laterality: N/A;  PM prep.    ESOPHAGOGASTRODUODENOSCOPY      ESOPHAGOGASTRODUODENOSCOPY N/A 2019    Procedure: EGD (ESOPHAGOGASTRODUODENOSCOPY);  Surgeon: Deniz Guerra MD;  Location: [address]);  Service: Endoscopy;  Laterality: N/A;  labs prior, s/p liver transplant-MS    ESOPHAGOGASTRODUODENOSCOPY N/A 2020    Procedure: EGD (ESOPHAGOGASTRODUODENOSCOPY);  Surgeon: Davion Ríos MD;  Location: [address]);  Service: Endoscopy;  Laterality: N/A;  Please order the EGD at least 2 weeks after barium esophagram has been done EGD is for dysphagia  covid test [facility]    FUNCTIONAL ENDOSCOPIC SINUS SURGERY (FESS) USING COMPUTER-ASSISTED NAVIGATION Bilateral 2021    Procedure: FESS, USING COMPUTER-ASSISTED NAVIGATION;  Surgeon: Delores Lewis MD;  Location: [facility];  Service: ENT;  Laterality: Bilateral;  [employer] WALDEMAR [phone] TEXTED HIM @ 2:45PM ON 2021  DISC LOADED YONY SANDERS 2021  RN PREOP  "2021---COVID NEGATIVE ON 2/3--CONSENT INCOMPLETE  H/P INCOMPLETE  --CBC IN AM    LIVER BIOPSY      LIVER TRANSPLANT  2013    REFRACTIVE SURGERY Bilateral 2010    TUBAL LIGATION      UPPER GASTROINTESTINAL ENDOSCOPY       Family History   Problem Relation Age of Onset    Hypertension Mother     Cataracts Mother     Diabetes Father     Alzheimer's disease Father     Diabetes Paternal Grandfather     Diabetes Paternal Grandmother     No Known Problems Maternal Grandmother     Bone cancer Maternal Grandfather     Breast cancer Maternal Aunt 55    Hypertension Brother     Diabetes Brother     No Known Problems Sister     No Known Problems Maternal Uncle     No Known Problems Paternal Aunt     No Known Problems Paternal Uncle     Stroke Neg Hx     Cancer Neg Hx     Colon cancer Neg Hx     Esophageal cancer Neg Hx     Stomach cancer Neg Hx     Rectal cancer Neg Hx     Amblyopia Neg Hx     Blindness Neg Hx     Glaucoma Neg Hx     Macular degeneration Neg Hx     Retinal detachment Neg Hx     Strabismus Neg Hx     Thyroid disease Neg Hx      OB History          3    Para   3    Term   2       1    AB        Living   3         SAB        IAB        Ectopic        Multiple        Live Births   3               Review of patient's allergies indicates:   Allergen Reactions    Norvasc [amlodipine]      Severe headache    Bactrim [sulfamethoxazole-trimethoprim]      causes UTI    Doxycycline Rash    Pcn [penicillins] Rash       Current Outpatient Medications:     amitriptyline (ELAVIL) 10 MG tablet, Take 1 tablet (10 mg total) by mouth every evening., Disp: 90 tablet, Rfl: 2    azelastine (ASTELIN) 137 mcg (0.1 %) nasal spray, 1 spray (137 mcg total) by Nasal route 2 (two) times daily., Disp: 30 mL, Rfl: 11    BD ULTRA-FINE JANESSA PEN NEEDLE 32 gauge x 5/32" Ndle, For five times daily insulin injections, Disp: 450 each, Rfl: 3    blood-glucose meter,continuous (DEXCOM G6 ) Misc, 1 each by " Misc.(Non-Drug; Combo Route) route once. for 1 dose, Disp: 1 each, Rfl: 0    blood-glucose transmitter (DEXCOM G6 TRANSMITTER) Karen, 1 each by Misc.(Non-Drug; Combo Route) route once a week, Disp: 1 Device, Rfl: 3    budesonide-formoterol 160-4.5 mcg (SYMBICORT) 160-4.5 mcg/actuation HFAA, Inhale 2 puffs into the lungs., Disp: , Rfl:     ciclopirox (PENLAC) 8 % Soln, Apply topically nightly., Disp: 6.6 mL, Rfl: 3    clotrimazole-betamethasone 1-0.05% (LOTRISONE) cream, APPLY TOPICALLY TO THE AFFECTED AREA TWICE DAILY FOR 10 DAYS, Disp: , Rfl:     cyproheptadine (PERIACTIN) 4 mg tablet, TAKE ONE TABLET BY MOUTH ONCE DAILY FOR APPETITE, Disp: , Rfl:     diazepam (VALIUM) 5 MG tablet, Take 5 mg by mouth 3 (three) times daily as needed. , Disp: , Rfl: 0    diclofenac sodium (VOLTAREN) 1 % Gel, APPLY 2 GRAM to wrist and 4 g to left foot TOPICAL ROUTE 4 TIMES PER DAY, Disp: 450 each, Rfl: 2    dicyclomine (BENTYL) 10 MG capsule, TAKE 1 CAPSULE BY MOUTH EVERY 8 HOURS AS NEEDED for spasms, Disp: , Rfl:     DILTIAZEM HCL (DILTIAZEM 2% CREAM), Apply topically 3 (three) times daily. Apply topically to anal area., Disp: 30 g, Rfl: 0    diphenoxylate-atropine 2.5-0.025 mg (LOMOTIL) 2.5-0.025 mg per tablet, Take 1 tablet by mouth 4 (four) times daily as needed., Disp: , Rfl:     dulaglutide (TRULICITY) 0.75 mg/0.5 mL pen injector, Inject 0.75 mg into the skin every 7 days., Disp: 12 pen, Rfl: 3    ergocalciferol (ERGOCALCIFEROL) 50,000 unit Cap, Take 50,000 Units by mouth every 7 days., Disp: , Rfl:     erythromycin with ethanol (EMGEL) 2 % gel, ADD 30GM (1 TUBE) TO THE SOAKING DEVICE AND ALLOW WATER TO AGITATE. PLACE AFFECTED AREAS INTO WATER AND SOAK FOR 10 MINUTES 1-2 TIMES DAILY, Disp: , Rfl: 1    estradioL (ESTRACE) 0.01 % (0.1 mg/gram) vaginal cream, Place 1 g vaginally twice a week., Disp: 42 g, Rfl: 3    famotidine (PEPCID) 40 MG tablet, , Disp: , Rfl:     ferrous sulfate (FEOSOL) 325 mg (65 mg iron) Tab tablet, Take 1  tablet (325 mg total) by mouth every 12 (twelve) hours., Disp: 60 tablet, Rfl: 4    flash glucose scanning reader (FREESTYLE CHUN 14 DAY READER) Misc, 1 each by Misc.(Non-Drug; Combo Route) route once daily., Disp: 1 each, Rfl: 0    flash glucose sensor (FREESTYLE CHUN 14 DAY SENSOR) Kit, 2 each by Misc.(Non-Drug; Combo Route) route every 14 (fourteen) days., Disp: 2 kit, Rfl: 11    fluconazole (DIFLUCAN) 150 MG Tab, TAKE ONE TABLET BY MOUTH once for ONE dose, Disp: 1 tablet, Rfl: 0    fluocinolone (SYNALAR) 0.01 % external solution, APPLY a couple of DROPS INTO BOTH EARS TWICE DAILY AS NEEDED FOR ITCHING, Disp: 60 mL, Rfl: 1    fluticasone propionate (FLONASE) 50 mcg/actuation nasal spray, 1 spray by Each Nostril route 2 (two) times daily., Disp: , Rfl:     fluticasone propionate (FLONASE) 50 mcg/actuation nasal spray, 2 sprays (100 mcg total) by Each Nostril route 2 (two) times daily., Disp: 18.2 mL, Rfl: 11    gabapentin (NEURONTIN) 300 MG capsule, TAKE 1 CAPSULE BY MOUTH THREE TIMES DAILY, Disp: , Rfl:     gentamicin (GARAMYCIN) 0.1 % cream, ADD 30GM (1 TUBE) TO THE SOAKING DEVICE AND ALLOW WATER TO AGITATE. PLACE AFFECTED AREAS INTO WATER AND SOAK FOR 10 MINUTES 1-2 TIMES DAILY, Disp: , Rfl: 1    hydrocortisone (ANUSOL-HC) 2.5 % rectal cream, Place rectally 2 (two) times daily. Apply per rectum bid, Disp: 30 g, Rfl: 3    hydrOXYchloroQUINE (PLAQUENIL) 200 mg tablet, TAKE ONE TABLET BY MOUTH TWICE DAILY, Disp: 180 tablet, Rfl: 1    hydrOXYzine HCl (ATARAX) 10 MG Tab, TAKE 1 OR 2 TABLETS BY MOUTH THREE TIMES DAILY AS NEEDED FOR ITCHING., Disp: , Rfl: 0    hyoscyamine (LEVBID) 0.375 mg Tb12, Take 0.375 mg by mouth., Disp: , Rfl:     insulin aspart U-100 (NOVOLOG) 100 unit/mL (3 mL) InPn pen, Inject 4 Units into the skin., Disp: , Rfl:     insulin detemir U-100 (LEVEMIR FLEXTOUCH U-100 INSULN) 100 unit/mL (3 mL) InPn pen, INJECT FOUR units UNDER THE SKIN TWICE DAILY, Disp: 45 mL, Rfl: 3    insulin lispro (HUMALOG  KWIKPEN INSULIN) 100 unit/mL pen, 3 units before BREAKFAST and lunch and FIVE units before dinner with sliding scale, up to 25 units daily, Disp: 45 mL, Rfl: 3    isosorbide mononitrate (IMDUR) 30 MG 24 hr tablet, Take 30 mg by mouth once daily., Disp: , Rfl:     ketoconazole (NIZORAL) 2 % cream, ADD 30GM (1/2 TUBE) TO THE SOAKING DEVICE AND ALLOW WATER TO AGITATE. PLACE AFFECTED AREAS INTO WATER AND SOAK FOR 10 MINUTES 1-2 TIMES BRENDON, Disp: , Rfl: 1    Lactobac. rhamnosus GG-inulin 10 billion cell -200 mg Cap, Take 1 capsule by mouth 2 (two) times daily., Disp: 30 capsule, Rfl: 0    levocetirizine (XYZAL) 5 MG tablet, Take 5 mg by mouth every evening., Disp: , Rfl: 3    levoFLOXacin (LEVAQUIN) 750 MG tablet, levofloxacin 750 mg tablet, Disp: , Rfl:     LIDOcaine-prilocaine (EMLA) cream, 2 (two) times daily. Apply to affected area, Disp: , Rfl:     losartan (COZAAR) 100 MG tablet, Take 1 tablet (100 mg total) by mouth once daily., Disp: 30 tablet, Rfl: 2    magnesium oxide 500 mg Tab, Take 500 mg by mouth 2 (two) times daily., Disp: 60 each, Rfl: 6    meclizine (ANTIVERT) 25 mg tablet, TAKE ONE TABLET BY MOUTH AT BEDTIME AS NEEDED for dizziness., Disp: , Rfl: 0    meloxicam (MOBIC) 15 MG tablet, , Disp: , Rfl:     mometasone 0.1% (ELOCON) 0.1 % cream, APPLY TOPICALLY TO THE FACE TWICE DAILY FOR ONE WEEK, Disp: , Rfl:     MULTIVIT,THER IRON,CA,FA & MIN (MULTIVITAMIN) Tab, Take 1 tablet by mouth once daily., Disp: 30 tablet, Rfl: 0    mycophenolate (MYFORTIC) 180 MG TbEC, Take 2 tablets (360 mg total) by mouth 2 (two) times daily., Disp: 120 tablet, Rfl: 11    neomycin-polymyxin-hydrocortisone (CORTISPORIN) otic solution, INSTILL TWO DROPS INTO BOTH EARS FOUR TIMES DAILY FOR 10 DAYS, Disp: , Rfl: 0    NURTEC 75 mg odt, PLACE ONE TABLET on tongue, alternatively UNDER THE TONGUE ONCE DAILY AS NEEDED FOR MIGRAINE, Disp: 8 tablet, Rfl: 2    nystatin (MYCOSTATIN) 100,000 unit/mL suspension, SWISH AND SPIT FIVE MILLILITERS  BY MOUTH FOUR TIMES DAILY FOR 7 DAYS., Disp: , Rfl: 1    ondansetron (ZOFRAN) 4 MG tablet, TAKE TWO TABLETS BY MOUTH EVERY 8 HOURS AS NEEDED FOR NAUSEA., Disp: , Rfl:     oxycodone-acetaminophen (PERCOCET) 7.5-325 mg per tablet, Take 1 tablet by mouth every 4 (four) hours as needed for Pain., Disp: , Rfl:     polyethylene glycol (GLYCOLAX) 17 gram/dose powder, Mix 1 capful (17 g) with liquid and by mouth 2 (two) times daily., Disp: 1530 g, Rfl: 11    PROAIR HFA 90 mcg/actuation inhaler, Inhale 2 puffs into the lungs 3 (three) times daily as needed. , Disp: , Rfl: 3    promethazine-dextromethorphan (PROMETHAZINE-DM) 6.25-15 mg/5 mL Syrp, Take by mouth 2 (two) times daily as needed. , Disp: , Rfl: 0    rosuvastatin (CRESTOR) 5 MG tablet, Take 5 mg by mouth once daily., Disp: , Rfl:     sertraline (ZOLOFT) 50 MG tablet, Take 50 mg by mouth once daily., Disp: , Rfl: 11    SSD 1 % cream, 1 application 2 (two) times daily. Apply to affected area, Disp: , Rfl: 0    tacrolimus (PROGRAF) 1 MG Cap, Take 2 capsules (2 mg total) by mouth every morning AND 2 capsules (2 mg total) every evening., Disp: 120 capsule, Rfl: 11    topiramate (TOPAMAX) 50 MG tablet, SMARTSI Tablet(s) By Mouth Every Evening, Disp: , Rfl:     triamcinolone acetonide 0.1% (KENALOG) 0.1 % cream, Apply topically 2 times daily, Disp: 30 g, Rfl: 0    valACYclovir (VALTREX) 1000 MG tablet, Take 1,000 mg by mouth once daily., Disp: , Rfl:     verapamil (CALAN-SR) 120 MG CR tablet, TAKE ONE TABLET ONCE DAILY FOR BLOOD PRESSURE, Disp: , Rfl: 3    zolpidem (AMBIEN) 10 mg Tab, Take 10 mg by mouth nightly as needed., Disp: , Rfl: 3    buPROPion (WELLBUTRIN XL) 150 MG TB24 tablet, Take 300 mg by mouth., Disp: , Rfl:     ciclopirox (PENLAC) 8 % Soln, Apply topically nightly., Disp: 1 Bottle, Rfl: 3    loratadine (CLARITIN) 10 mg tablet, Take 1 tablet (10 mg total) by mouth once daily., Disp: 30 tablet, Rfl: 0    nystatin-triamcinolone (MYCOLOG II) cream, Apply to  "affected area 2 times daily, Disp: 30 g, Rfl: 1    pantoprazole (PROTONIX) 40 MG tablet, Take 1 tablet (40 mg total) by mouth 2 (two) times daily. Take immediately in am when wake up and then 30 minutes prior to dinner, Disp: 60 tablet, Rfl: 11  Social History     Socioeconomic History    Marital status:     Number of children: 3   Occupational History     Employer: [employer]   Tobacco Use    Smoking status: Never    Smokeless tobacco: Never    Tobacco comments:     disability; ; 3 children   Substance and Sexual Activity    Alcohol use: Yes     Alcohol/week: 1.0 standard drink     Types: 1 Glasses of wine per week     Comment: occasionally     Drug use: No    Sexual activity: Yes     Partners: Male     Birth control/protection: See Surgical Hx, Post-menopausal     Comment: s/p tubal ligation,  since 1989       Last pap 8-:ASCUS, + HR HPV  Last pathology 10-:1. ENDOCERVICAL CURETTAGE SHOWS DETACHED FRAGMENTS OF SQUAMOUS EPITHELIUM   WITH MILD-TO-MODERATE SQUAMOUS DYSPLASIA AND KOILOCYTOTIC ATYPIA ( CIN1-2).   2. CERVICAL BIOPSY AT 7 O'CLOCK SHOWS MILD-TO-MODERATE SQUAMOUS DYSPLASIA AND   KOILOCYTOTIC ATYPIA (CIN1-2).  Last ultrasound NA    BP (!) 170/91   Ht 5' 9" (1.753 m)   Wt 69 kg (152 lb 3.2 oz)   LMP 12/20/2016 (Approximate)   BMI 22.48 kg/m²     ROS:    GENERAL: Denies weight gain or weight loss. Feeling well overall.   SKIN: Denies rash or lesions.   HEAD: Denies head injury or headache.   NODES: Denies enlarged lymph nodes.   CHEST: Denies chest pain or shortness of breath.   CARDIOVASCULAR: Denies palpitations or left sided chest pain.   ABDOMEN: No abdominal pain, constipation, diarrhea, nausea, vomiting or rectal bleeding.   URINARY: No frequency, dysuria, hematuria, or burning on urination.  REPRODUCTIVE: See HPI.   BREASTS: The patient performs breast self-examination and denies pain, lumps, or nipple discharge.   HEMATOLOGIC: No easy bruisability or " excessive bleeding with the exception of menstrual cycles.  MUSCULOSKELETAL: Denies joint pain or swelling.   NEUROLOGIC: Denies syncope or weakness.   PSYCHIATRIC: Denies depression, anxiety or mood swings.    PHYSICAL EXAM:    APPEARANCE: Well nourished, well developed, in no acute distress.  AFFECT: WNL, alert and oriented x 3  SKIN: No acne or hirsutism  NECK: Neck symmetric without masses or thyromegaly  NODES: No inguinal, cervical, axillary, or femoral lymph node enlargement  CHEST: Good respiratory effect  ABDOMEN: Soft.  No tenderness or masses.  No hepatosplenomegaly.  No hernias.  PELVIC: Normal external genitalia without lesions.  Normal hair distribution.  Adequate perineal body, normal urethral meatus.  Vagina moist and well rugated without lesions or discharge.  Cervix pink, without lesions, discharge or tenderness.  No significant cystocele or rectocele.  Bimanual exam shows uterus to be normal size, regular, mobile and nontender.  Adnexa without masses or tenderness.    EXTREMITIES: No edema.    ASSESSMENT & PLAN:    Valencia was seen today for pre-op exam and moderate cervical dysplasia.    Diagnoses and all orders for this visit:    Preoperative exam for gynecologic surgery  -     Full code; Standing  -     Vital signs; Standing  -     Insert peripheral IV; Standing  -     Chlorhexidine (CHG) 2% Wipes; Standing  -     Chlorohexidine Gluconate Bath; Standing  -     Place in Outpatient; Standing  -     Diet clear liquid; Standing  -     Place MELANIE hose; Standing  -     Place sequential compression device; Standing    Moderate cervical dysplasia  -     Full code; Standing  -     Vital signs; Standing  -     Insert peripheral IV; Standing  -     Chlorhexidine (CHG) 2% Wipes; Standing  -     Chlorohexidine Gluconate Bath; Standing  -     Place in Outpatient; Standing  -     Diet clear liquid; Standing  -     Place MELANIE hose; Standing  -     Place sequential compression device; Standing    Other orders  -      0.9%  NaCl infusion  -     IP VTE LOW RISK PATIENT; Standing  -     mupirocin 2 % ointment  -     Ambulate; Standing         I have discussed the risks, benefits, indications, and alternatives of the procedure in detail.  The patient verbalizes her understanding.  All questions answered.  Consents signed.  The patient agrees to proceed to proceed as planned.

## 2023-01-18 ENCOUNTER — INFUSION (OUTPATIENT)
Dept: INFUSION THERAPY | Facility: HOSPITAL | Age: 53
End: 2023-01-18
Attending: INTERNAL MEDICINE
Payer: MEDICARE

## 2023-01-18 VITALS
RESPIRATION RATE: 18 BRPM | OXYGEN SATURATION: 99 % | WEIGHT: 152 LBS | TEMPERATURE: 99 F | HEART RATE: 93 BPM | BODY MASS INDEX: 22.51 KG/M2 | SYSTOLIC BLOOD PRESSURE: 139 MMHG | DIASTOLIC BLOOD PRESSURE: 65 MMHG | HEIGHT: 69 IN

## 2023-01-18 DIAGNOSIS — R53.83 FATIGUE, UNSPECIFIED TYPE: ICD-10-CM

## 2023-01-18 DIAGNOSIS — M81.0 OSTEOPOROSIS, UNSPECIFIED OSTEOPOROSIS TYPE, UNSPECIFIED PATHOLOGICAL FRACTURE PRESENCE: ICD-10-CM

## 2023-01-18 DIAGNOSIS — D84.9 IMMUNOSUPPRESSION: ICD-10-CM

## 2023-01-18 DIAGNOSIS — T38.0X5S ADVERSE EFFECT OF GLUCOCORTICOID OR SYNTHETIC ANALOGUE, SEQUELA: ICD-10-CM

## 2023-01-18 DIAGNOSIS — N18.32 STAGE 3B CHRONIC KIDNEY DISEASE: ICD-10-CM

## 2023-01-18 DIAGNOSIS — M32.9 SYSTEMIC LUPUS ERYTHEMATOSUS, UNSPECIFIED SLE TYPE, UNSPECIFIED ORGAN INVOLVEMENT STATUS: Primary | ICD-10-CM

## 2023-01-18 LAB
ALBUMIN SERPL BCP-MCNC: 3.8 G/DL (ref 3.5–5.2)
ALP SERPL-CCNC: 119 U/L (ref 55–135)
ALT SERPL W/O P-5'-P-CCNC: 11 U/L (ref 10–44)
ANION GAP SERPL CALC-SCNC: 6 MMOL/L (ref 8–16)
AST SERPL-CCNC: 12 U/L (ref 10–40)
BILIRUB SERPL-MCNC: 0.4 MG/DL (ref 0.1–1)
BUN SERPL-MCNC: 22 MG/DL (ref 6–20)
CALCIUM SERPL-MCNC: 9 MG/DL (ref 8.7–10.5)
CHLORIDE SERPL-SCNC: 104 MMOL/L (ref 95–110)
CO2 SERPL-SCNC: 26 MMOL/L (ref 23–29)
CREAT SERPL-MCNC: 1.8 MG/DL (ref 0.5–1.4)
EST. GFR  (NO RACE VARIABLE): 33 ML/MIN/1.73 M^2
GLUCOSE SERPL-MCNC: 210 MG/DL (ref 70–110)
POTASSIUM SERPL-SCNC: 4.4 MMOL/L (ref 3.5–5.1)
PROT SERPL-MCNC: 6.7 G/DL (ref 6–8.4)
SODIUM SERPL-SCNC: 136 MMOL/L (ref 136–145)

## 2023-01-18 PROCEDURE — 96372 THER/PROPH/DIAG INJ SC/IM: CPT | Mod: 59

## 2023-01-18 PROCEDURE — 96365 THER/PROPH/DIAG IV INF INIT: CPT

## 2023-01-18 PROCEDURE — 25000003 PHARM REV CODE 250: Performed by: INTERNAL MEDICINE

## 2023-01-18 PROCEDURE — 63600175 PHARM REV CODE 636 W HCPCS: Mod: JA,JG | Performed by: INTERNAL MEDICINE

## 2023-01-18 PROCEDURE — 96375 TX/PRO/DX INJ NEW DRUG ADDON: CPT

## 2023-01-18 PROCEDURE — 80053 COMPREHEN METABOLIC PANEL: CPT | Performed by: INTERNAL MEDICINE

## 2023-01-18 PROCEDURE — 63600175 PHARM REV CODE 636 W HCPCS: Mod: TB | Performed by: INTERNAL MEDICINE

## 2023-01-18 RX ORDER — DIPHENHYDRAMINE HYDROCHLORIDE 50 MG/ML
25 INJECTION INTRAMUSCULAR; INTRAVENOUS
Status: COMPLETED | OUTPATIENT
Start: 2023-01-18 | End: 2023-01-18

## 2023-01-18 RX ORDER — ACETAMINOPHEN 325 MG/1
650 TABLET ORAL
Status: CANCELLED | OUTPATIENT
Start: 2023-02-15

## 2023-01-18 RX ORDER — SODIUM CHLORIDE 0.9 % (FLUSH) 0.9 %
10 SYRINGE (ML) INJECTION
Status: CANCELLED | OUTPATIENT
Start: 2023-02-15

## 2023-01-18 RX ORDER — HEPARIN 100 UNIT/ML
500 SYRINGE INTRAVENOUS
Status: CANCELLED | OUTPATIENT
Start: 2023-02-15

## 2023-01-18 RX ORDER — ACETAMINOPHEN 325 MG/1
650 TABLET ORAL
Status: COMPLETED | OUTPATIENT
Start: 2023-01-18 | End: 2023-01-18

## 2023-01-18 RX ORDER — DIPHENHYDRAMINE HYDROCHLORIDE 50 MG/ML
25 INJECTION INTRAMUSCULAR; INTRAVENOUS
Status: CANCELLED | OUTPATIENT
Start: 2023-02-15

## 2023-01-18 RX ADMIN — ROMOSOZUMAB-AQQG 210 MG: 105 INJECTION, SOLUTION SUBCUTANEOUS at 11:01

## 2023-01-18 RX ADMIN — ACETAMINOPHEN 650 MG: 325 TABLET ORAL at 11:01

## 2023-01-18 RX ADMIN — DIPHENHYDRAMINE HYDROCHLORIDE 25 MG: 50 INJECTION, SOLUTION INTRAMUSCULAR; INTRAVENOUS at 11:01

## 2023-01-18 RX ADMIN — BELIMUMAB 689 MG: 400 INJECTION, POWDER, LYOPHILIZED, FOR SOLUTION INTRAVENOUS at 11:01

## 2023-01-18 NOTE — PLAN OF CARE
Pt arrived to unit for her monthly maintenance Benlysta and Evenity. Plan of care reviewed. No new or worsening complaints voiced. VSS. Given pre-meds of Tylenol and Benadryl IVP. Benlysta infused over 1 hour. Evenity injection given. Pt tolerated well. Will return on 2/15 for her next infusion and injections. Discharged from unit in NAD.

## 2023-01-19 ENCOUNTER — PATIENT MESSAGE (OUTPATIENT)
Dept: TRANSPLANT | Facility: CLINIC | Age: 53
End: 2023-01-19
Payer: MEDICARE

## 2023-01-19 ENCOUNTER — PATIENT MESSAGE (OUTPATIENT)
Dept: RHEUMATOLOGY | Facility: CLINIC | Age: 53
End: 2023-01-19
Payer: MEDICARE

## 2023-01-19 NOTE — PRE ADMISSION SCREENING
Patient called requesting her arrival time for procedure scheduled 1/20/23 tomorrow.  Explained process and to contact MD office now. MD office phone number provided.

## 2023-01-20 ENCOUNTER — ANESTHESIA (OUTPATIENT)
Dept: SURGERY | Facility: OTHER | Age: 53
End: 2023-01-20
Payer: MEDICARE

## 2023-01-20 ENCOUNTER — HOSPITAL ENCOUNTER (OUTPATIENT)
Facility: OTHER | Age: 53
Discharge: HOME | End: 2023-01-20
Attending: OBSTETRICS & GYNECOLOGY | Admitting: OBSTETRICS & GYNECOLOGY
Payer: MEDICARE

## 2023-01-20 DIAGNOSIS — N87.1 MODERATE CERVICAL DYSPLASIA: ICD-10-CM

## 2023-01-20 DIAGNOSIS — Z98.890 STATUS POST LEEP (LOOP ELECTROSURGICAL EXCISION PROCEDURE) OF CERVIX: Primary | ICD-10-CM

## 2023-01-20 DIAGNOSIS — Z01.818 PREOPERATIVE EXAM FOR GYNECOLOGIC SURGERY: ICD-10-CM

## 2023-01-20 LAB
POCT GLUCOSE: 117 MG/DL (ref 70–110)
POCT GLUCOSE: 94 MG/DL (ref 70–110)

## 2023-01-20 PROCEDURE — 37000009 HC ANESTHESIA EA ADD 15 MINS: Performed by: OBSTETRICS & GYNECOLOGY

## 2023-01-20 PROCEDURE — 88342 IMHCHEM/IMCYTCHM 1ST ANTB: CPT | Performed by: PATHOLOGY

## 2023-01-20 PROCEDURE — 71000016 HC POSTOP RECOV ADDL HR: Performed by: OBSTETRICS & GYNECOLOGY

## 2023-01-20 PROCEDURE — 57522 CONIZATION OF CERVIX: CPT | Mod: ,,, | Performed by: OBSTETRICS & GYNECOLOGY

## 2023-01-20 PROCEDURE — 88342 IMHCHEM/IMCYTCHM 1ST ANTB: CPT | Mod: 26,,, | Performed by: PATHOLOGY

## 2023-01-20 PROCEDURE — 88342 CHG IMMUNOCYTOCHEMISTRY: ICD-10-PCS | Mod: 26,,, | Performed by: PATHOLOGY

## 2023-01-20 PROCEDURE — 57522 PR CONIZATION CERVIX,LOOP ELECTRD: ICD-10-PCS | Mod: ,,, | Performed by: OBSTETRICS & GYNECOLOGY

## 2023-01-20 PROCEDURE — 88307 TISSUE EXAM BY PATHOLOGIST: CPT | Performed by: PATHOLOGY

## 2023-01-20 PROCEDURE — 88305 TISSUE EXAM BY PATHOLOGIST: ICD-10-PCS | Mod: 26,,, | Performed by: PATHOLOGY

## 2023-01-20 PROCEDURE — 71000039 HC RECOVERY, EACH ADD'L HOUR: Performed by: OBSTETRICS & GYNECOLOGY

## 2023-01-20 PROCEDURE — 82962 GLUCOSE BLOOD TEST: CPT | Performed by: OBSTETRICS & GYNECOLOGY

## 2023-01-20 PROCEDURE — 36000707: Performed by: OBSTETRICS & GYNECOLOGY

## 2023-01-20 PROCEDURE — 63600175 PHARM REV CODE 636 W HCPCS: Performed by: ANESTHESIOLOGY

## 2023-01-20 PROCEDURE — 88305 TISSUE EXAM BY PATHOLOGIST: CPT | Mod: 26,,, | Performed by: PATHOLOGY

## 2023-01-20 PROCEDURE — 36000706: Performed by: OBSTETRICS & GYNECOLOGY

## 2023-01-20 PROCEDURE — 88307 TISSUE EXAM BY PATHOLOGIST: CPT | Mod: 26,,, | Performed by: PATHOLOGY

## 2023-01-20 PROCEDURE — 71000033 HC RECOVERY, INTIAL HOUR: Performed by: OBSTETRICS & GYNECOLOGY

## 2023-01-20 PROCEDURE — 88307 PR  SURG PATH,LEVEL V: ICD-10-PCS | Mod: 26,,, | Performed by: PATHOLOGY

## 2023-01-20 PROCEDURE — 37000008 HC ANESTHESIA 1ST 15 MINUTES: Performed by: OBSTETRICS & GYNECOLOGY

## 2023-01-20 PROCEDURE — 63600175 PHARM REV CODE 636 W HCPCS: Performed by: NURSE ANESTHETIST, CERTIFIED REGISTERED

## 2023-01-20 PROCEDURE — 88305 TISSUE EXAM BY PATHOLOGIST: CPT | Performed by: PATHOLOGY

## 2023-01-20 PROCEDURE — 25000003 PHARM REV CODE 250: Performed by: OBSTETRICS & GYNECOLOGY

## 2023-01-20 PROCEDURE — 25000003 PHARM REV CODE 250: Performed by: NURSE ANESTHETIST, CERTIFIED REGISTERED

## 2023-01-20 PROCEDURE — 71000015 HC POSTOP RECOV 1ST HR: Performed by: OBSTETRICS & GYNECOLOGY

## 2023-01-20 PROCEDURE — 25000003 PHARM REV CODE 250: Performed by: ANESTHESIOLOGY

## 2023-01-20 RX ORDER — DIPHENHYDRAMINE HYDROCHLORIDE 50 MG/ML
25 INJECTION INTRAMUSCULAR; INTRAVENOUS EVERY 6 HOURS PRN
Status: DISCONTINUED | OUTPATIENT
Start: 2023-01-20 | End: 2023-01-20 | Stop reason: HOSPADM

## 2023-01-20 RX ORDER — MUPIROCIN 20 MG/G
OINTMENT TOPICAL
Status: DISCONTINUED | OUTPATIENT
Start: 2023-01-20 | End: 2023-01-20 | Stop reason: HOSPADM

## 2023-01-20 RX ORDER — SODIUM CHLORIDE, SODIUM LACTATE, POTASSIUM CHLORIDE, CALCIUM CHLORIDE 600; 310; 30; 20 MG/100ML; MG/100ML; MG/100ML; MG/100ML
INJECTION, SOLUTION INTRAVENOUS CONTINUOUS
Status: DISCONTINUED | OUTPATIENT
Start: 2023-01-20 | End: 2023-01-20 | Stop reason: HOSPADM

## 2023-01-20 RX ORDER — PROPOFOL 10 MG/ML
VIAL (ML) INTRAVENOUS
Status: DISCONTINUED | OUTPATIENT
Start: 2023-01-20 | End: 2023-01-20

## 2023-01-20 RX ORDER — DEXAMETHASONE SODIUM PHOSPHATE 4 MG/ML
INJECTION, SOLUTION INTRA-ARTICULAR; INTRALESIONAL; INTRAMUSCULAR; INTRAVENOUS; SOFT TISSUE
Status: DISCONTINUED | OUTPATIENT
Start: 2023-01-20 | End: 2023-01-20

## 2023-01-20 RX ORDER — MIDAZOLAM HYDROCHLORIDE 1 MG/ML
INJECTION INTRAMUSCULAR; INTRAVENOUS
Status: DISCONTINUED | OUTPATIENT
Start: 2023-01-20 | End: 2023-01-20

## 2023-01-20 RX ORDER — LIDOCAINE HYDROCHLORIDE 20 MG/ML
INJECTION INTRAVENOUS
Status: DISCONTINUED | OUTPATIENT
Start: 2023-01-20 | End: 2023-01-20

## 2023-01-20 RX ORDER — OXYCODONE HYDROCHLORIDE 5 MG/1
5 TABLET ORAL
Status: DISCONTINUED | OUTPATIENT
Start: 2023-01-20 | End: 2023-01-20 | Stop reason: HOSPADM

## 2023-01-20 RX ORDER — OXYCODONE HYDROCHLORIDE 5 MG/1
5 TABLET ORAL EVERY 4 HOURS PRN
Qty: 5 TABLET | Refills: 0 | Status: SHIPPED | OUTPATIENT
Start: 2023-01-20

## 2023-01-20 RX ORDER — SODIUM CHLORIDE 0.9 % (FLUSH) 0.9 %
3 SYRINGE (ML) INJECTION
Status: DISCONTINUED | OUTPATIENT
Start: 2023-01-20 | End: 2023-01-20 | Stop reason: HOSPADM

## 2023-01-20 RX ORDER — SODIUM CHLORIDE 9 MG/ML
INJECTION, SOLUTION INTRAVENOUS CONTINUOUS
Status: DISCONTINUED | OUTPATIENT
Start: 2023-01-20 | End: 2023-01-20 | Stop reason: HOSPADM

## 2023-01-20 RX ORDER — DEXTROMETHORPHAN HYDROBROMIDE, GUAIFENESIN 5; 100 MG/5ML; MG/5ML
650 LIQUID ORAL EVERY 6 HOURS
Qty: 60 TABLET | Refills: 0 | Status: SHIPPED | OUTPATIENT
Start: 2023-01-20

## 2023-01-20 RX ORDER — MEPERIDINE HYDROCHLORIDE 25 MG/ML
12.5 INJECTION INTRAMUSCULAR; INTRAVENOUS; SUBCUTANEOUS ONCE AS NEEDED
Status: DISCONTINUED | OUTPATIENT
Start: 2023-01-20 | End: 2023-01-20 | Stop reason: HOSPADM

## 2023-01-20 RX ORDER — FENTANYL CITRATE 50 UG/ML
INJECTION, SOLUTION INTRAMUSCULAR; INTRAVENOUS
Status: DISCONTINUED | OUTPATIENT
Start: 2023-01-20 | End: 2023-01-20

## 2023-01-20 RX ORDER — SENNOSIDES 8.6 MG/1
1 TABLET ORAL 2 TIMES DAILY
Qty: 30 TABLET | Refills: 0 | Status: SHIPPED | OUTPATIENT
Start: 2023-01-20

## 2023-01-20 RX ORDER — PROCHLORPERAZINE EDISYLATE 5 MG/ML
5 INJECTION INTRAMUSCULAR; INTRAVENOUS EVERY 30 MIN PRN
Status: DISCONTINUED | OUTPATIENT
Start: 2023-01-20 | End: 2023-01-20 | Stop reason: HOSPADM

## 2023-01-20 RX ORDER — ONDANSETRON 2 MG/ML
INJECTION INTRAMUSCULAR; INTRAVENOUS
Status: DISCONTINUED | OUTPATIENT
Start: 2023-01-20 | End: 2023-01-20

## 2023-01-20 RX ORDER — HYDROMORPHONE HYDROCHLORIDE 2 MG/ML
0.4 INJECTION, SOLUTION INTRAMUSCULAR; INTRAVENOUS; SUBCUTANEOUS EVERY 5 MIN PRN
Status: DISCONTINUED | OUTPATIENT
Start: 2023-01-20 | End: 2023-01-20 | Stop reason: HOSPADM

## 2023-01-20 RX ORDER — LIDOCAINE HYDROCHLORIDE 10 MG/ML
0.5 INJECTION, SOLUTION EPIDURAL; INFILTRATION; INTRACAUDAL; PERINEURAL ONCE
Status: DISCONTINUED | OUTPATIENT
Start: 2023-01-20 | End: 2023-01-20 | Stop reason: HOSPADM

## 2023-01-20 RX ORDER — VASOPRESSIN 20 [USP'U]/ML
INJECTION, SOLUTION INTRAMUSCULAR; SUBCUTANEOUS
Status: DISCONTINUED | OUTPATIENT
Start: 2023-01-20 | End: 2023-01-20 | Stop reason: HOSPADM

## 2023-01-20 RX ADMIN — MIDAZOLAM HYDROCHLORIDE 2 MG: 1 INJECTION, SOLUTION INTRAMUSCULAR; INTRAVENOUS at 09:01

## 2023-01-20 RX ADMIN — OXYCODONE 5 MG: 5 TABLET ORAL at 11:01

## 2023-01-20 RX ADMIN — PROPOFOL 200 MG: 10 INJECTION, EMULSION INTRAVENOUS at 09:01

## 2023-01-20 RX ADMIN — MUPIROCIN: 20 OINTMENT TOPICAL at 08:01

## 2023-01-20 RX ADMIN — FENTANYL CITRATE 100 MCG: 50 INJECTION, SOLUTION INTRAMUSCULAR; INTRAVENOUS at 09:01

## 2023-01-20 RX ADMIN — ONDANSETRON HYDROCHLORIDE 4 MG: 2 INJECTION INTRAMUSCULAR; INTRAVENOUS at 09:01

## 2023-01-20 RX ADMIN — LIDOCAINE HYDROCHLORIDE 80 MG: 20 INJECTION, SOLUTION INTRAVENOUS at 09:01

## 2023-01-20 RX ADMIN — SODIUM CHLORIDE, SODIUM LACTATE, POTASSIUM CHLORIDE, AND CALCIUM CHLORIDE: 600; 310; 30; 20 INJECTION, SOLUTION INTRAVENOUS at 09:01

## 2023-01-20 RX ADMIN — DEXAMETHASONE SODIUM PHOSPHATE 8 MG: 4 INJECTION, SOLUTION INTRAMUSCULAR; INTRAVENOUS at 09:01

## 2023-01-20 NOTE — ANESTHESIA PROCEDURE NOTES
Intubation    Date/Time: 1/20/2023 9:55 AM  Performed by: Debi Sky  Authorized by: Nolan Funes MD     Intubation:     Induction:  Intravenous    Intubated:  Postinduction    Mask Ventilation:  Easy mask    Attempts:  1    Attempted By:  CRNA    Difficult Airway Encountered?: No      Complications:  None    Airway Device:  Supraglottic airway/LMA    Airway Device Size:  4.0 (igel)    Placement Verified By:  Capnometry    Complicating Factors:  None    Findings Post-Intubation:  BS equal bilateral and atraumatic/condition of teeth unchanged

## 2023-01-20 NOTE — ANESTHESIA POSTPROCEDURE EVALUATION
Anesthesia Post Evaluation    Patient: Valencia Dumont    Procedure(s) Performed: Procedure(s) (LRB):  CONIZATION-CERVICAL-LEEP (N/A)    Final Anesthesia Type: general      Patient location during evaluation: PACU  Patient participation: Yes- Able to Participate  Level of consciousness: awake and alert  Post-procedure vital signs: reviewed and stable  Pain management: adequate  Airway patency: patent    PONV status at discharge: No PONV  Anesthetic complications: no      Cardiovascular status: blood pressure returned to baseline  Respiratory status: unassisted  Hydration status: euvolemic  Follow-up not needed.          Vitals Value Taken Time   /79 01/20/23 1140   Temp 36.4 °C (97.5 °F) 01/20/23 1130   Pulse 76 01/20/23 1140   Resp 16 01/20/23 1140   SpO2 99 % 01/20/23 1140   Vitals shown include unvalidated device data.      Event Time   Out of Recovery 11:45:06         Pain/Parker Score: Pain Rating Prior to Med Admin: 0 (1/20/2023 11:04 AM)  Pain Rating Post Med Admin: 0 (1/20/2023 11:50 AM)  Parker Score: 10 (1/20/2023 11:50 AM)

## 2023-01-20 NOTE — TRANSFER OF CARE
"Anesthesia Transfer of Care Note    Patient: Valencia Dumont    Procedure(s) Performed: Procedure(s) (LRB):  CONIZATION-CERVICAL-LEEP (N/A)    Patient location: PACU    Anesthesia Type: general    Transport from OR: Transported from OR on 6-10 L/min O2 by face mask with adequate spontaneous ventilation    Post pain: adequate analgesia    Post assessment: no apparent anesthetic complications and tolerated procedure well    Post vital signs: stable    Level of consciousness: awake    Nausea/Vomiting: no nausea/vomiting    Complications: none    Transfer of care protocol was followed      Last vitals:   Visit Vitals  BP (!) 156/80 (BP Location: Right arm, Patient Position: Lying)   Pulse 78   Temp 36.3 °C (97.3 °F) (Temporal)   Resp 16   Ht 5' 9" (1.753 m)   Wt 68.9 kg (152 lb)   LMP 12/20/2016 (Approximate)   SpO2 100%   Breastfeeding No   BMI 22.45 kg/m²     "

## 2023-01-20 NOTE — OR NURSING
Valencia Dumont has met all discharge criteria from Phase II. Vital Signs are stable, ambulating  without difficulty. Discharge instructions given, patient verbalized understanding. Discharged from facility via wheelchair in stable condition.

## 2023-01-20 NOTE — DISCHARGE SUMMARY
[facility]  Brief Op Note/Discharge Note  Short Stay    Admit Date: 1/20/2023    Discharge Date: 01/20/2023    Attending Physician: Lu Ortega MD     Surgery Date: 1/20/2023     Surgeon(s) and Role:     * Lu Ortega MD - Primary     * Jennifer Pinzon MD - Resident - Assisting        Pre-op Diagnosis:  Moderate cervical dysplasia, histologically confirmed [N87.1]    Post-op Diagnosis:  Post-Op Diagnosis Codes:     * Moderate cervical dysplasia, histologically confirmed [N87.1]     * Status post LEEP (loop electrosurgical excision procedure) of cervix [Z98.890]    Procedure(s) (LRB):  CONIZATION-CERVICAL-LEEP (N/A)    Anesthesia: General    Findings/Key Components: Nonstaining area noted primarily from 7-10 oclock, Entire Tzone excised    Estimated Blood Loss: * No values recorded between 1/20/2023 10:09 AM and 1/20/2023 10:42 AM *         Specimens:   Specimen (24h ago, onward)       Start     Ordered    01/20/23 1043  Specimen to Pathology, Surgery Gynecology and Obstetrics  Once        Comments: Pre-op Diagnosis: Moderate cervical dysplasia, histologically confirmed [N87.1]Procedure(s):CONIZATION-CERVICAL-LEEP Number of specimens: 2Name of specimens: 1. Leep specimen, suture at 12 o'clock 2. Endocervical scrapings     References:    Click here for ordering Quick Tip   Question Answer Comment   Procedure Type: Gynecology and Obstetrics    Specimen Class: Routine/Screening    Which provider would you like to cc? LU ORTEGA    Release to patient Immediate        01/20/23 1042                    Discharge Provider: Jennifer Pinzon    Diagnoses:  Active Hospital Problems    Diagnosis  POA    Status post LEEP (loop electrosurgical excision procedure) of cervix [Z98.890]  Not Applicable      Resolved Hospital Problems   No resolved problems to display.       Discharged Condition: good    Hospital Course:   Patient was admitted for outpatient procedure as above, and tolerated the  "procedure well with no complications. Please see operative report for further details. Following the procedure, the patient was awakened from anesthesia and transferred to the recovery area in stable condition. She was discharged to home once ambulating, voiding, tolerating PO intake, and pain was well-controlled. Patient was given routine post-op instructions and prescriptions for pain medication to take as needed. Patient instructed to follow up with Dr. Schreiber in 6 weeks.    Final Diagnoses: Same as principal problem.    Disposition: Home or Self Care    Follow up/Patient Instructions:    Medications:  Reconciled Home Medications:      Medication List        ASK your doctor about these medications      amitriptyline 10 MG tablet  Commonly known as: ELAVIL  Take 1 tablet (10 mg total) by mouth every evening.     azelastine 137 mcg (0.1 %) nasal spray  Commonly known as: ASTELIN  1 spray (137 mcg total) by Nasal route 2 (two) times daily.     BD ULTRA-FINE JANESSA PEN NEEDLE 32 gauge x 5/32" Ndle  Generic drug: pen needle, diabetic  For five times daily insulin injections     blood-glucose meter,continuous Misc  Commonly known as: DEXCOM G6   1 each by Misc.(Non-Drug; Combo Route) route once. for 1 dose     blood-glucose transmitter Karen  Commonly known as: DEXCOM G6 TRANSMITTER  1 each by Misc.(Non-Drug; Combo Route) route once a week     budesonide-formoterol 160-4.5 mcg 160-4.5 mcg/actuation Hfaa  Commonly known as: SYMBICORT  Inhale 2 puffs into the lungs.     buPROPion 150 MG TB24 tablet  Commonly known as: WELLBUTRIN XL  Take 300 mg by mouth.     ciclopirox 8 % Soln  Commonly known as: PENLAC  Apply topically nightly.     clotrimazole-betamethasone 1-0.05% cream  Commonly known as: LOTRISONE  APPLY TOPICALLY TO THE AFFECTED AREA TWICE DAILY FOR 10 DAYS     diazePAM 5 MG tablet  Commonly known as: VALIUM  Take 5 mg by mouth 3 (three) times daily as needed.     diclofenac sodium 1 % Gel  Commonly known " as: VOLTAREN  APPLY 2 GRAM to wrist and 4 g to left foot TOPICAL ROUTE 4 TIMES PER DAY     dicyclomine 10 MG capsule  Commonly known as: BENTYL  TAKE 1 CAPSULE BY MOUTH EVERY 8 HOURS AS NEEDED for spasms     DILTIAZEM 2% CREAM  Apply topically 3 (three) times daily. Apply topically to anal area.     diphenoxylate-atropine 2.5-0.025 mg 2.5-0.025 mg per tablet  Commonly known as: LOMOTIL  Take 1 tablet by mouth 4 (four) times daily as needed.     ergocalciferol 50,000 unit Cap  Commonly known as: ERGOCALCIFEROL  Take 50,000 Units by mouth every 7 days.     erythromycin with ethanoL 2 % gel  Commonly known as: EMGEL  ADD 30GM (1 TUBE) TO THE SOAKING DEVICE AND ALLOW WATER TO AGITATE. PLACE AFFECTED AREAS INTO WATER AND SOAK FOR 10 MINUTES 1-2 TIMES DAILY     estradioL 0.01 % (0.1 mg/gram) vaginal cream  Commonly known as: ESTRACE  Place 1 g vaginally twice a week.     famotidine 40 MG tablet  Commonly known as: PEPCID     ferrous sulfate 325 mg (65 mg iron) Tab tablet  Commonly known as: FEOSOL  Take 1 tablet (325 mg total) by mouth every 12 (twelve) hours.     fluconazole 150 MG Tab  Commonly known as: DIFLUCAN  TAKE ONE TABLET BY MOUTH once for ONE dose     fluocinolone 0.01 % external solution  Commonly known as: SYNALAR  APPLY a couple of DROPS INTO BOTH EARS TWICE DAILY AS NEEDED FOR ITCHING     * fluticasone propionate 50 mcg/actuation nasal spray  Commonly known as: FLONASE  1 spray by Each Nostril route 2 (two) times daily.     * fluticasone propionate 50 mcg/actuation nasal spray  Commonly known as: FLONASE  2 sprays (100 mcg total) by Each Nostril route 2 (two) times daily.     FREESTYLE CHUN 14 DAY READER Misc  Generic drug: flash glucose scanning reader  1 each by Misc.(Non-Drug; Combo Route) route once daily.     FREESTYLE CHUN 14 DAY SENSOR Kit  Generic drug: flash glucose sensor  2 each by Misc.(Non-Drug; Combo Route) route every 14 (fourteen) days.     gabapentin 300 MG capsule  Commonly known as:  NEURONTIN  TAKE 1 CAPSULE BY MOUTH THREE TIMES DAILY     gentamicin 0.1 % cream  Commonly known as: GARAMYCIN  ADD 30GM (1 TUBE) TO THE SOAKING DEVICE AND ALLOW WATER TO AGITATE. PLACE AFFECTED AREAS INTO WATER AND SOAK FOR 10 MINUTES 1-2 TIMES DAILY     hydrocortisone 2.5 % rectal cream  Commonly known as: ANUSOL-HC  Place rectally 2 (two) times daily. Apply per rectum bid     hydrOXYchloroQUINE 200 mg tablet  Commonly known as: PLAQUENIL  TAKE ONE TABLET BY MOUTH TWICE DAILY     hydrOXYzine HCL 10 MG Tab  Commonly known as: ATARAX  TAKE 1 OR 2 TABLETS BY MOUTH THREE TIMES DAILY AS NEEDED FOR ITCHING.     insulin lispro 100 unit/mL pen  Commonly known as: HumaLOG KwikPen Insulin  3 units before BREAKFAST and lunch and FIVE units before dinner with sliding scale, up to 25 units daily     isosorbide mononitrate 30 MG 24 hr tablet  Commonly known as: IMDUR  Take 30 mg by mouth once daily.     ketoconazole 2 % cream  Commonly known as: NIZORAL  ADD 30GM (1/2 TUBE) TO THE SOAKING DEVICE AND ALLOW WATER TO AGITATE. PLACE AFFECTED AREAS INTO WATER AND SOAK FOR 10 MINUTES 1-2 TIMES BRENDON     Lactobac. rhamnosus GG-inulin 10 billion cell -200 mg Cap  Take 1 capsule by mouth 2 (two) times daily.     LEVEMIR FLEXTOUCH U-100 INSULN 100 unit/mL (3 mL) Inpn pen  Generic drug: insulin detemir U-100  INJECT FOUR units UNDER THE SKIN TWICE DAILY     levocetirizine 5 MG tablet  Commonly known as: XYZAL  Take 5 mg by mouth every evening.     LIDOcaine-prilocaine cream  Commonly known as: EMLA  2 (two) times daily. Apply to affected area     loratadine 10 mg tablet  Commonly known as: CLARITIN  Take 1 tablet (10 mg total) by mouth once daily.     losartan 100 MG tablet  Commonly known as: COZAAR  Take 1 tablet (100 mg total) by mouth once daily.     magnesium oxide 500 mg Tab  Take 500 mg by mouth 2 (two) times daily.     meclizine 25 mg tablet  Commonly known as: ANTIVERT  TAKE ONE TABLET BY MOUTH AT BEDTIME AS NEEDED for dizziness.      mometasone 0.1% 0.1 % cream  Commonly known as: ELOCON  APPLY TOPICALLY TO THE FACE TWICE DAILY FOR ONE WEEK     multivitamin Tab  Take 1 tablet by mouth once daily.     mycophenolate 180 MG Tbec  Commonly known as: MYFORTIC  Take 2 tablets (360 mg total) by mouth 2 (two) times daily.     neomycin-polymyxin-hydrocortisone otic solution  Commonly known as: CORTISPORIN  INSTILL TWO DROPS INTO BOTH EARS FOUR TIMES DAILY FOR 10 DAYS     NURTEC 75 mg odt  Generic drug: rimegepant  PLACE ONE TABLET on tongue, alternatively UNDER THE TONGUE ONCE DAILY AS NEEDED FOR MIGRAINE     nystatin 100,000 unit/mL suspension  Commonly known as: MYCOSTATIN  SWISH AND SPIT FIVE MILLILITERS BY MOUTH FOUR TIMES DAILY FOR 7 DAYS.     nystatin-triamcinolone cream  Commonly known as: MYCOLOG II  Apply to affected area 2 times daily     ondansetron 4 MG tablet  Commonly known as: ZOFRAN  TAKE TWO TABLETS BY MOUTH EVERY 8 HOURS AS NEEDED FOR NAUSEA.     oxyCODONE-acetaminophen 7.5-325 mg per tablet  Commonly known as: PERCOCET  Take 1 tablet by mouth every 4 (four) hours as needed for Pain.     pantoprazole 40 MG tablet  Commonly known as: PROTONIX  Take 1 tablet (40 mg total) by mouth 2 (two) times daily. Take immediately in am when wake up and then 30 minutes prior to dinner     polyethylene glycol 17 gram/dose powder  Commonly known as: GLYCOLAX  Mix 1 capful (17 g) with liquid and by mouth 2 (two) times daily.     PROAIR HFA 90 mcg/actuation inhaler  Generic drug: albuterol  Inhale 2 puffs into the lungs 3 (three) times daily as needed.     promethazine-dextromethorphan 6.25-15 mg/5 mL Syrp  Commonly known as: PROMETHAZINE-DM  Take by mouth 2 (two) times daily as needed.     rosuvastatin 5 MG tablet  Commonly known as: CRESTOR  Take 5 mg by mouth once daily.     sertraline 50 MG tablet  Commonly known as: ZOLOFT  Take 50 mg by mouth once daily.     SSD 1 % cream  Generic drug: silver sulfADIAZINE 1%  1 application 2 (two) times daily.  Apply to affected area     tacrolimus 1 MG Cap  Commonly known as: PROGRAF  Take 2 capsules (2 mg total) by mouth every morning AND 2 capsules (2 mg total) every evening.     topiramate 50 MG tablet  Commonly known as: TOPAMAX  SMARTSI Tablet(s) By Mouth Every Evening     triamcinolone acetonide 0.1% 0.1 % cream  Commonly known as: KENALOG  Apply topically 2 times daily     TRULICITY 0.75 mg/0.5 mL pen injector  Generic drug: dulaglutide  Inject 0.75 mg into the skin every 7 days.     valACYclovir 1000 MG tablet  Commonly known as: VALTREX  Take 1,000 mg by mouth once daily.     verapamiL 120 MG CR tablet  Commonly known as: CALAN-SR  TAKE ONE TABLET ONCE DAILY FOR BLOOD PRESSURE     zolpidem 10 mg Tab  Commonly known as: AMBIEN  Take 10 mg by mouth nightly as needed.           * This list has 2 medication(s) that are the same as other medications prescribed for you. Read the directions carefully, and ask your doctor or other care provider to review them with you.                No discharge procedures on file.   Follow-up Information       Lu Schreiber MD Follow up on 2023.    Specialties: Obstetrics, Obstetrics and Gynecology  Why: Postoperative Follow-Up  Contact information:  [address] [id number] [phone]                                 Jennifer Pinzon MD   OB/GYN PGY1  [facility]

## 2023-01-20 NOTE — OP NOTE
[facility]  Surgery Department  Operative Note    SUMMARY     Date of Procedure: 1/20/2023     Procedure: Procedure(s) (LRB):  CONIZATION-CERVICAL-LEEP (N/A)     Surgeon(s) and Role:     * Lu Schreiber MD - Primary     * Jennifer Pinzon MD - Resident - Assisting        Pre-Operative Diagnosis: Moderate cervical dysplasia, histologically confirmed [N87.1]    Post-Operative Diagnosis: Post-Op Diagnosis Codes:     * Moderate cervical dysplasia, histologically confirmed [N87.1]     * Status post LEEP (loop electrosurgical excision procedure) of cervix [Z98.890]    Anesthesia: General    Technical Procedures Used:  After appropriate consents had been obtained, the patient was taken the operating room placed on adequate general anesthesia.  She was placed in Yellofin stirrups and properly positioned for a vaginal procedure.  She was then prepped and draped in the appropriate sterile fashion for vaginal procedure.  Bladder was emptied of approximately 40 cc of clear yellow urine with an in and out catheter.  Weighted speculum was put in place and a tenaculum was placed on the anterior lip of the cervix.  Rubber coated sidewall retractor was put in place the cervix was painted with a Lugol solution, and nonstaining areas were noted primarily between 7 and 10:00 a.m. although smaller pinpoint areas at 12:00 p.m..  7 cc of a dilute vasopressin solution were injected into the cervical bed.  Using the white LEEP wire loop, the T zone was excised in its entirety with the entire specimen being sent to pathology with a suture placed at 12:00 p.m. no residual nonstaining areas were noted.  Endocervical curettage was then performed with minimal tissue being obtained.  The cone bed was then electrocoagulated and excellent hemostasis was noted.  At this point tenaculum speculum and sidewall retractors were removed and patient was  taken down from lithotomy position and sent to recovery in stable  condition    Description of the Findings of the Procedure: Nonstaining area noted, primarily from 7-10 oclock, Entire Tzone excised    Significant Surgical Tasks Conducted by the Assistant(s), if Applicable: NA    Complications: No    Estimated Blood Loss (EBL): * No values recorded between 1/20/2023 10:09 AM and 1/20/2023 10:40 AM *    Specimens:   Specimen (24h ago, onward)      None                    Condition: Stable    Disposition: PACU - hemodynamically stable.

## 2023-01-23 VITALS
BODY MASS INDEX: 22.51 KG/M2 | WEIGHT: 152 LBS | OXYGEN SATURATION: 99 % | TEMPERATURE: 98 F | HEART RATE: 82 BPM | DIASTOLIC BLOOD PRESSURE: 81 MMHG | SYSTOLIC BLOOD PRESSURE: 155 MMHG | RESPIRATION RATE: 16 BRPM | HEIGHT: 69 IN

## 2023-01-27 ENCOUNTER — PATIENT MESSAGE (OUTPATIENT)
Dept: OBSTETRICS AND GYNECOLOGY | Facility: CLINIC | Age: 53
End: 2023-01-27
Payer: MEDICARE

## 2023-01-27 LAB
FINAL PATHOLOGIC DIAGNOSIS: NORMAL
Lab: NORMAL

## 2023-01-29 ENCOUNTER — PATIENT MESSAGE (OUTPATIENT)
Dept: OBSTETRICS AND GYNECOLOGY | Facility: CLINIC | Age: 53
End: 2023-01-29
Payer: MEDICARE

## 2023-01-31 ENCOUNTER — OFFICE VISIT (OUTPATIENT)
Dept: OBSTETRICS AND GYNECOLOGY | Facility: CLINIC | Age: 53
End: 2023-01-31
Attending: OBSTETRICS & GYNECOLOGY
Payer: MEDICARE

## 2023-01-31 VITALS
WEIGHT: 150 LBS | HEIGHT: 69 IN | SYSTOLIC BLOOD PRESSURE: 159 MMHG | DIASTOLIC BLOOD PRESSURE: 81 MMHG | HEART RATE: 88 BPM | BODY MASS INDEX: 22.22 KG/M2

## 2023-01-31 DIAGNOSIS — Z98.890 S/P LEEP: Primary | ICD-10-CM

## 2023-01-31 PROCEDURE — 3079F DIAST BP 80-89 MM HG: CPT | Mod: CPTII,S$GLB,, | Performed by: OBSTETRICS & GYNECOLOGY

## 2023-01-31 PROCEDURE — 99024 POSTOP FOLLOW-UP VISIT: CPT | Mod: S$GLB,,, | Performed by: OBSTETRICS & GYNECOLOGY

## 2023-01-31 PROCEDURE — 3077F SYST BP >= 140 MM HG: CPT | Mod: CPTII,S$GLB,, | Performed by: OBSTETRICS & GYNECOLOGY

## 2023-01-31 PROCEDURE — 3008F PR BODY MASS INDEX (BMI) DOCUMENTED: ICD-10-PCS | Mod: CPTII,S$GLB,, | Performed by: OBSTETRICS & GYNECOLOGY

## 2023-01-31 PROCEDURE — 99999 PR PBB SHADOW E&M-EST. PATIENT-LVL IV: CPT | Mod: PBBFAC,,, | Performed by: OBSTETRICS & GYNECOLOGY

## 2023-01-31 PROCEDURE — 99999 PR PBB SHADOW E&M-EST. PATIENT-LVL IV: ICD-10-PCS | Mod: PBBFAC,,, | Performed by: OBSTETRICS & GYNECOLOGY

## 2023-01-31 PROCEDURE — 3072F PR LOW RISK FOR RETINOPATHY: ICD-10-PCS | Mod: CPTII,S$GLB,, | Performed by: OBSTETRICS & GYNECOLOGY

## 2023-01-31 PROCEDURE — 99024 PR POST-OP FOLLOW-UP VISIT: ICD-10-PCS | Mod: S$GLB,,, | Performed by: OBSTETRICS & GYNECOLOGY

## 2023-01-31 PROCEDURE — 1159F PR MEDICATION LIST DOCUMENTED IN MEDICAL RECORD: ICD-10-PCS | Mod: CPTII,S$GLB,, | Performed by: OBSTETRICS & GYNECOLOGY

## 2023-01-31 PROCEDURE — 3008F BODY MASS INDEX DOCD: CPT | Mod: CPTII,S$GLB,, | Performed by: OBSTETRICS & GYNECOLOGY

## 2023-01-31 PROCEDURE — 3072F LOW RISK FOR RETINOPATHY: CPT | Mod: CPTII,S$GLB,, | Performed by: OBSTETRICS & GYNECOLOGY

## 2023-01-31 PROCEDURE — 3077F PR MOST RECENT SYSTOLIC BLOOD PRESSURE >= 140 MM HG: ICD-10-PCS | Mod: CPTII,S$GLB,, | Performed by: OBSTETRICS & GYNECOLOGY

## 2023-01-31 PROCEDURE — 1159F MED LIST DOCD IN RCRD: CPT | Mod: CPTII,S$GLB,, | Performed by: OBSTETRICS & GYNECOLOGY

## 2023-01-31 PROCEDURE — 3079F PR MOST RECENT DIASTOLIC BLOOD PRESSURE 80-89 MM HG: ICD-10-PCS | Mod: CPTII,S$GLB,, | Performed by: OBSTETRICS & GYNECOLOGY

## 2023-01-31 NOTE — PROGRESS NOTES
Subjective:       Patient ID: Valencia Dumont is a 52 y.o. female.    Chief Complaint:  vaginal bleeding      History of Present Illness  HPI  This 51 y/o P3  s/p LEEP for moderate dysplasia on . She report that she began bleeding , and was using 2-3 pads /day. Still having bright red bleeding, about 3 ppd. . Denies inserting anything into the vagina or any excessive activity.    GYN & OB History  Patient's last menstrual period was 2016 (approximate).   Date of Last Pap: 2022    OB History    Para Term  AB Living   3 3 2 1   3   SAB IAB Ectopic Multiple Live Births           3      # Outcome Date GA Lbr Sachin/2nd Weight Sex Delivery Anes PTL Lv   3   36w0d  2.126 kg (4 lb 11 oz) F Vag-Spont   MONIQUE   2 Term     F Vag-Spont   MONIQUE   1 Term     M Vag-Spont   MONIQUE       Past Medical History:   Diagnosis Date    Abnormal Pap smear of cervix     Anemia     Arthritis     Ascites     Asthma     Chronic back pain     Cirrhosis of liver without mention of alcohol 10/18/2013    Diabetes mellitus     Esophageal varices     GERD (gastroesophageal reflux disease)     Herpes simplex virus (HSV) infection     HSV2.    Hypertension     Kidney stone     Lupus     Osteoporosis 2013    Prophylactic immunotherapy (transplant immunosuppression) 2014    SBP (spontaneous bacterial peritonitis)     history of        Past Surgical History:   Procedure Laterality Date    BREAST BIOPSY Left 2020    CHOLECYSTECTOMY      COLONOSCOPY N/A 2017    Procedure: COLONOSCOPY;  Surgeon: Marky Sun MD;  Location: [address]);  Service: Endoscopy;  Laterality: N/A;  PM prep.    CONIZATION OF CERVIX USING LOOP ELECTROSURGICAL EXCISION PROCEDURE (LEEP) N/A 2023    Procedure: CONIZATION-CERVICAL-LEEP;  Surgeon: Lu Schreiber MD;  Location: [facility];  Service: OB/GYN;  Laterality: N/A;    ESOPHAGOGASTRODUODENOSCOPY      ESOPHAGOGASTRODUODENOSCOPY N/A 2019     "Procedure: EGD (ESOPHAGOGASTRODUODENOSCOPY);  Surgeon: Deniz Guerra MD;  Location: [facility] (4TH FLR);  Service: Endoscopy;  Laterality: N/A;  labs prior, s/p liver transplant-MS    ESOPHAGOGASTRODUODENOSCOPY N/A 09/09/2020    Procedure: EGD (ESOPHAGOGASTRODUODENOSCOPY);  Surgeon: Davion Ríos MD;  Location: [facility] (4TH FLR);  Service: Endoscopy;  Laterality: N/A;  Please order the EGD at least 2 weeks after barium esophagram has been done EGD is for dysphagia  covid test 9/6-[facility] urgent care    EYE SURGERY      FUNCTIONAL ENDOSCOPIC SINUS SURGERY (FESS) USING COMPUTER-ASSISTED NAVIGATION Bilateral 02/04/2021    Procedure: FESS, USING COMPUTER-ASSISTED NAVIGATION;  Surgeon: Delores Lewis MD;  Location: [facility];  Service: ENT;  Laterality: Bilateral;  [employer] WALDEMAR [phone] TEXTED HIM @ 2:45PM ON 1-8-2021  DISC LOADED  TOMMY 1-  RN PREOP 1/28/2021---COVID NEGATIVE ON 2/3--CONSENT INCOMPLETE  H/P INCOMPLETE  --CBC IN AM    LIVER BIOPSY      LIVER TRANSPLANT  12/31/2013    REFRACTIVE SURGERY Bilateral 2010    TUBAL LIGATION  2003    UPPER GASTROINTESTINAL ENDOSCOPY         Review of Systems  Review of Systems   Genitourinary:  Positive for pelvic pain and vaginal bleeding. (Has cramping,)        Objective:      BP (!) 159/81   Pulse 88   Ht 5' 9" (1.753 m)   Wt 68 kg (150 lb)   LMP 12/20/2016 (Approximate)   BMI 22.15 kg/m²   Physical Exam:               Genitourinary:    Pelvic exam was performed with patient supine.            Genitourinary Comments: Cervix granulating from recent leep, weeping/oozing blood.     3 small points cauterized with AgNO3.  Cervical bed coated with Monsels solution achieving excellent hemostasis.                          Assessment:        1. S/P LEEP                Plan:      Problem List Items Addressed This Visit    None  Visit Diagnoses       S/P LEEP    -  Primary  Discussed limiting activity.   Patient to followup in 48 hrs, and if oozing continues " will pack with small gauze pack with Monsels.   Advised to continue pelvic rest and notify me if bleeding increases.             Follow up in about 1 week (around 2/7/2023).

## 2023-02-02 ENCOUNTER — PATIENT MESSAGE (OUTPATIENT)
Dept: OBSTETRICS AND GYNECOLOGY | Facility: CLINIC | Age: 53
End: 2023-02-02
Payer: MEDICARE

## 2023-02-03 ENCOUNTER — PATIENT MESSAGE (OUTPATIENT)
Dept: OBSTETRICS AND GYNECOLOGY | Facility: CLINIC | Age: 53
End: 2023-02-03
Payer: MEDICARE

## 2023-02-07 ENCOUNTER — PATIENT MESSAGE (OUTPATIENT)
Dept: OBSTETRICS AND GYNECOLOGY | Facility: CLINIC | Age: 53
End: 2023-02-07
Payer: MEDICARE

## 2023-02-14 ENCOUNTER — TELEPHONE (OUTPATIENT)
Dept: NEPHROLOGY | Facility: CLINIC | Age: 53
End: 2023-02-14
Payer: MEDICARE

## 2023-02-14 ENCOUNTER — OFFICE VISIT (OUTPATIENT)
Dept: OBSTETRICS AND GYNECOLOGY | Facility: CLINIC | Age: 53
End: 2023-02-14
Attending: OBSTETRICS & GYNECOLOGY
Payer: MEDICARE

## 2023-02-14 VITALS
BODY MASS INDEX: 21.57 KG/M2 | SYSTOLIC BLOOD PRESSURE: 163 MMHG | DIASTOLIC BLOOD PRESSURE: 82 MMHG | WEIGHT: 145.63 LBS | HEIGHT: 69 IN

## 2023-02-14 DIAGNOSIS — N18.31 CKD STAGE G3A/A1, GFR 45-59 AND ALBUMIN CREATININE RATIO <30 MG/G: Primary | ICD-10-CM

## 2023-02-14 DIAGNOSIS — Z98.890 STATUS POST LEEP (LOOP ELECTROSURGICAL EXCISION PROCEDURE) OF CERVIX: Primary | ICD-10-CM

## 2023-02-14 PROCEDURE — 3079F PR MOST RECENT DIASTOLIC BLOOD PRESSURE 80-89 MM HG: ICD-10-PCS | Mod: CPTII,S$GLB,, | Performed by: OBSTETRICS & GYNECOLOGY

## 2023-02-14 PROCEDURE — 3072F LOW RISK FOR RETINOPATHY: CPT | Mod: CPTII,S$GLB,, | Performed by: OBSTETRICS & GYNECOLOGY

## 2023-02-14 PROCEDURE — 1159F MED LIST DOCD IN RCRD: CPT | Mod: CPTII,S$GLB,, | Performed by: OBSTETRICS & GYNECOLOGY

## 2023-02-14 PROCEDURE — 3072F PR LOW RISK FOR RETINOPATHY: ICD-10-PCS | Mod: CPTII,S$GLB,, | Performed by: OBSTETRICS & GYNECOLOGY

## 2023-02-14 PROCEDURE — 99214 OFFICE O/P EST MOD 30 MIN: CPT | Mod: PBBFAC | Performed by: OBSTETRICS & GYNECOLOGY

## 2023-02-14 PROCEDURE — 99024 PR POST-OP FOLLOW-UP VISIT: ICD-10-PCS | Mod: S$GLB,,, | Performed by: OBSTETRICS & GYNECOLOGY

## 2023-02-14 PROCEDURE — 99024 POSTOP FOLLOW-UP VISIT: CPT | Mod: S$GLB,,, | Performed by: OBSTETRICS & GYNECOLOGY

## 2023-02-14 PROCEDURE — 1159F PR MEDICATION LIST DOCUMENTED IN MEDICAL RECORD: ICD-10-PCS | Mod: CPTII,S$GLB,, | Performed by: OBSTETRICS & GYNECOLOGY

## 2023-02-14 PROCEDURE — 3079F DIAST BP 80-89 MM HG: CPT | Mod: CPTII,S$GLB,, | Performed by: OBSTETRICS & GYNECOLOGY

## 2023-02-14 PROCEDURE — 3077F PR MOST RECENT SYSTOLIC BLOOD PRESSURE >= 140 MM HG: ICD-10-PCS | Mod: CPTII,S$GLB,, | Performed by: OBSTETRICS & GYNECOLOGY

## 2023-02-14 PROCEDURE — 3008F BODY MASS INDEX DOCD: CPT | Mod: CPTII,S$GLB,, | Performed by: OBSTETRICS & GYNECOLOGY

## 2023-02-14 PROCEDURE — 99999 PR PBB SHADOW E&M-EST. PATIENT-LVL IV: ICD-10-PCS | Mod: PBBFAC,,, | Performed by: OBSTETRICS & GYNECOLOGY

## 2023-02-14 PROCEDURE — 3008F PR BODY MASS INDEX (BMI) DOCUMENTED: ICD-10-PCS | Mod: CPTII,S$GLB,, | Performed by: OBSTETRICS & GYNECOLOGY

## 2023-02-14 PROCEDURE — 99999 PR PBB SHADOW E&M-EST. PATIENT-LVL IV: CPT | Mod: PBBFAC,,, | Performed by: OBSTETRICS & GYNECOLOGY

## 2023-02-14 PROCEDURE — 3077F SYST BP >= 140 MM HG: CPT | Mod: CPTII,S$GLB,, | Performed by: OBSTETRICS & GYNECOLOGY

## 2023-02-14 NOTE — PROGRESS NOTES
"  CC: Postoperative visit    Valencia Dumont is a 52 y.o. female  presents for a postoperative visit s/p LEEP on 2023.  Her postoperative course was complicated by bleeding at about 10 days, which resolved with placement of Monsels solution.  She is doing well postoperative, still has some brownish pink drainage, uses 1-2 pads/day. She denies any vaginal penetration since surgery.     Pathology showed:  RELIAPATH DIAGNOSIS:   A.   CERVIX, LEEP SPECIMEN:         - Ectocervical and endocervical mucosa showing mild squamous dysplasia           (LGSIL/RAJEEV 1), see comment.         - Low grade dysplasia involves endocervical margins from 3-6 and 6-9   o'clock.         - Low grade dysplasia involves ectocervical margins from 6-9 o'clock.         - No high grade dysplasia or invasive carcinoma identified.   B.   ENDOCERVIX, SCRAPINGS:         - Abundant blood admixed with superficial fragments of benign   endocervical mucosa and superficial squamous epithelial cells.         - Negative for dysplasia or malignancy.   COMMENT:   A). *P16 immunostains were performed and do not show block positivity,   supporting the above diagnosis.     BP (!) 163/82   Ht 5' 9" (1.753 m)   Wt 66 kg (145 lb 9.6 oz)   LMP 2016 (Approximate)   BMI 21.50 kg/m²     ROS:  GENERAL: No fever, chills, fatigability or weight loss.  VULVAR: No pain, no lesions and no itching.  VAGINAL: No relaxation, no itching, no discharge, no abnormal bleeding and no lesions.  ABDOMEN: No abdominal pain. Denies nausea. Denies vomiting. No diarrhea. No constipation  BREAST: Denies pain. No lumps. No discharge.  URINARY: No incontinence, no nocturia, no frequency and no dysuria.  CARDIOVASCULAR: No chest pain. No shortness of breath. No leg cramps.  NEUROLOGICAL: No headaches. No vision changes.    Physical Exam  APPEARANCE: Well nourished, well developed, in no acute distress.  AFFECT: WNL, alert and oriented x 3  SKIN: No acne or " hirsutism  ABDOMEN: Soft.  No tenderness or masses.  No hepatosplenomegaly.  No hernias.  INCISIONS: Clean, dry, intact. Well healed.   PELVIC: Normal external genitalia without lesions.  Normal hair distribution.  Adequate perineal body, normal urethral meatus.  Vagina moist and well rugated without lesions or discharge.  Cervix pink, with dark eschar shedding , and pink granulation surrounding with slight oozing of serosanguinous drainage.  No significant cystocele or rectocele.  Bimanual exam deferred.  EXTREMITIES: No edema.      1. Status post LEEP (loop electrosurgical excision procedure) of cervix            Patient should continue pelvic rest. REturn in 3-4 weeks.  Plan on repeat Pap in 6 months.

## 2023-02-17 ENCOUNTER — PATIENT MESSAGE (OUTPATIENT)
Dept: OBSTETRICS AND GYNECOLOGY | Facility: CLINIC | Age: 53
End: 2023-02-17
Payer: MEDICARE

## 2023-02-27 ENCOUNTER — LAB VISIT (OUTPATIENT)
Dept: LAB | Facility: HOSPITAL | Age: 53
End: 2023-02-27
Attending: STUDENT IN AN ORGANIZED HEALTH CARE EDUCATION/TRAINING PROGRAM
Payer: MEDICARE

## 2023-02-27 DIAGNOSIS — N18.31 CKD STAGE G3A/A1, GFR 45-59 AND ALBUMIN CREATININE RATIO <30 MG/G: ICD-10-CM

## 2023-02-27 LAB
ALBUMIN SERPL BCP-MCNC: 4 G/DL (ref 3.5–5.2)
ALP SERPL-CCNC: 120 U/L (ref 55–135)
ALT SERPL W/O P-5'-P-CCNC: 11 U/L (ref 10–44)
ANION GAP SERPL CALC-SCNC: 10 MMOL/L (ref 8–16)
AST SERPL-CCNC: 14 U/L (ref 10–40)
BASOPHILS # BLD AUTO: 0.02 K/UL (ref 0–0.2)
BASOPHILS NFR BLD: 0.4 % (ref 0–1.9)
BILIRUB SERPL-MCNC: 0.6 MG/DL (ref 0.1–1)
BUN SERPL-MCNC: 24 MG/DL (ref 6–20)
CALCIUM SERPL-MCNC: 9.4 MG/DL (ref 8.7–10.5)
CHLORIDE SERPL-SCNC: 104 MMOL/L (ref 95–110)
CO2 SERPL-SCNC: 28 MMOL/L (ref 23–29)
CREAT SERPL-MCNC: 1.5 MG/DL (ref 0.5–1.4)
DIFFERENTIAL METHOD: ABNORMAL
EOSINOPHIL # BLD AUTO: 0.1 K/UL (ref 0–0.5)
EOSINOPHIL NFR BLD: 2.4 % (ref 0–8)
ERYTHROCYTE [DISTWIDTH] IN BLOOD BY AUTOMATED COUNT: 13.6 % (ref 11.5–14.5)
EST. GFR  (NO RACE VARIABLE): 42 ML/MIN/1.73 M^2
GLUCOSE SERPL-MCNC: 154 MG/DL (ref 70–110)
HCT VFR BLD AUTO: 34.9 % (ref 37–48.5)
HGB BLD-MCNC: 11.9 G/DL (ref 12–16)
IMM GRANULOCYTES # BLD AUTO: 0.02 K/UL (ref 0–0.04)
IMM GRANULOCYTES NFR BLD AUTO: 0.4 % (ref 0–0.5)
LYMPHOCYTES # BLD AUTO: 1.6 K/UL (ref 1–4.8)
LYMPHOCYTES NFR BLD: 32.1 % (ref 18–48)
MCH RBC QN AUTO: 28.3 PG (ref 27–31)
MCHC RBC AUTO-ENTMCNC: 34.1 G/DL (ref 32–36)
MCV RBC AUTO: 83 FL (ref 82–98)
MONOCYTES # BLD AUTO: 0.4 K/UL (ref 0.3–1)
MONOCYTES NFR BLD: 8.2 % (ref 4–15)
NEUTROPHILS # BLD AUTO: 2.8 K/UL (ref 1.8–7.7)
NEUTROPHILS NFR BLD: 56.5 % (ref 38–73)
NRBC BLD-RTO: 0 /100 WBC
PLATELET # BLD AUTO: 194 K/UL (ref 150–450)
PMV BLD AUTO: 10.2 FL (ref 9.2–12.9)
POTASSIUM SERPL-SCNC: 4.3 MMOL/L (ref 3.5–5.1)
PROT SERPL-MCNC: 6.9 G/DL (ref 6–8.4)
PTH-INTACT SERPL-MCNC: 251 PG/ML (ref 9–77)
RBC # BLD AUTO: 4.2 M/UL (ref 4–5.4)
SODIUM SERPL-SCNC: 142 MMOL/L (ref 136–145)
WBC # BLD AUTO: 5.02 K/UL (ref 3.9–12.7)

## 2023-02-27 PROCEDURE — 36415 COLL VENOUS BLD VENIPUNCTURE: CPT | Performed by: STUDENT IN AN ORGANIZED HEALTH CARE EDUCATION/TRAINING PROGRAM

## 2023-02-27 PROCEDURE — 85025 COMPLETE CBC W/AUTO DIFF WBC: CPT | Performed by: STUDENT IN AN ORGANIZED HEALTH CARE EDUCATION/TRAINING PROGRAM

## 2023-02-27 PROCEDURE — 83970 ASSAY OF PARATHORMONE: CPT | Performed by: STUDENT IN AN ORGANIZED HEALTH CARE EDUCATION/TRAINING PROGRAM

## 2023-02-27 PROCEDURE — 80053 COMPREHEN METABOLIC PANEL: CPT | Performed by: STUDENT IN AN ORGANIZED HEALTH CARE EDUCATION/TRAINING PROGRAM

## 2023-02-28 ENCOUNTER — INFUSION (OUTPATIENT)
Dept: INFUSION THERAPY | Facility: HOSPITAL | Age: 53
End: 2023-02-28
Attending: INTERNAL MEDICINE
Payer: MEDICARE

## 2023-02-28 VITALS
TEMPERATURE: 98 F | BODY MASS INDEX: 21.6 KG/M2 | WEIGHT: 145.81 LBS | RESPIRATION RATE: 18 BRPM | OXYGEN SATURATION: 98 % | HEART RATE: 83 BPM | HEIGHT: 69 IN | SYSTOLIC BLOOD PRESSURE: 157 MMHG | DIASTOLIC BLOOD PRESSURE: 79 MMHG

## 2023-02-28 DIAGNOSIS — M81.0 OSTEOPOROSIS, UNSPECIFIED OSTEOPOROSIS TYPE, UNSPECIFIED PATHOLOGICAL FRACTURE PRESENCE: ICD-10-CM

## 2023-02-28 DIAGNOSIS — M32.9 SYSTEMIC LUPUS ERYTHEMATOSUS, UNSPECIFIED SLE TYPE, UNSPECIFIED ORGAN INVOLVEMENT STATUS: Primary | ICD-10-CM

## 2023-02-28 DIAGNOSIS — N18.32 STAGE 3B CHRONIC KIDNEY DISEASE: ICD-10-CM

## 2023-02-28 DIAGNOSIS — T38.0X5S ADVERSE EFFECT OF GLUCOCORTICOID OR SYNTHETIC ANALOGUE, SEQUELA: ICD-10-CM

## 2023-02-28 PROCEDURE — 96372 THER/PROPH/DIAG INJ SC/IM: CPT | Mod: 59

## 2023-02-28 PROCEDURE — 96365 THER/PROPH/DIAG IV INF INIT: CPT

## 2023-02-28 PROCEDURE — 96375 TX/PRO/DX INJ NEW DRUG ADDON: CPT

## 2023-02-28 PROCEDURE — 96374 THER/PROPH/DIAG INJ IV PUSH: CPT

## 2023-02-28 PROCEDURE — 63600175 PHARM REV CODE 636 W HCPCS: Mod: TB | Performed by: INTERNAL MEDICINE

## 2023-02-28 PROCEDURE — 63600175 PHARM REV CODE 636 W HCPCS: Performed by: INTERNAL MEDICINE

## 2023-02-28 PROCEDURE — 25000003 PHARM REV CODE 250: Performed by: INTERNAL MEDICINE

## 2023-02-28 RX ORDER — DIPHENHYDRAMINE HYDROCHLORIDE 50 MG/ML
25 INJECTION INTRAMUSCULAR; INTRAVENOUS
Status: COMPLETED | OUTPATIENT
Start: 2023-02-28 | End: 2023-02-28

## 2023-02-28 RX ORDER — SODIUM CHLORIDE 0.9 % (FLUSH) 0.9 %
10 SYRINGE (ML) INJECTION
Status: CANCELLED | OUTPATIENT
Start: 2023-03-14

## 2023-02-28 RX ORDER — HEPARIN 100 UNIT/ML
500 SYRINGE INTRAVENOUS
Status: CANCELLED | OUTPATIENT
Start: 2023-03-14

## 2023-02-28 RX ORDER — ACETAMINOPHEN 325 MG/1
650 TABLET ORAL
Status: COMPLETED | OUTPATIENT
Start: 2023-02-28 | End: 2023-02-28

## 2023-02-28 RX ORDER — DIPHENHYDRAMINE HYDROCHLORIDE 50 MG/ML
25 INJECTION INTRAMUSCULAR; INTRAVENOUS
Status: CANCELLED | OUTPATIENT
Start: 2023-03-14

## 2023-02-28 RX ORDER — ACETAMINOPHEN 325 MG/1
650 TABLET ORAL
Status: CANCELLED | OUTPATIENT
Start: 2023-03-14

## 2023-02-28 RX ADMIN — DIPHENHYDRAMINE HYDROCHLORIDE 25 MG: 50 INJECTION, SOLUTION INTRAMUSCULAR; INTRAVENOUS at 11:02

## 2023-02-28 RX ADMIN — ROMOSOZUMAB-AQQG 210 MG: 105 INJECTION, SOLUTION SUBCUTANEOUS at 11:02

## 2023-02-28 RX ADMIN — BELIMUMAB 661 MG: 400 INJECTION, POWDER, LYOPHILIZED, FOR SOLUTION INTRAVENOUS at 11:02

## 2023-02-28 RX ADMIN — ACETAMINOPHEN 650 MG: 325 TABLET ORAL at 11:02

## 2023-02-28 NOTE — PLAN OF CARE
Pt arrived to unit for her monthly maintenance Benlysta and Evenity. Plan of care reviewed. No new or worsening complaints voiced. VSS. Given pre-meds of Tylenol and Benadryl IVP. Benlysta infused over 1 hour. Evenity injection given. Pt tolerated well. Will return on 3/29 for her next infusion and injections. Discharged from unit in NAD.

## 2023-03-02 ENCOUNTER — OFFICE VISIT (OUTPATIENT)
Dept: NEPHROLOGY | Facility: CLINIC | Age: 53
End: 2023-03-02
Payer: MEDICARE

## 2023-03-02 VITALS
DIASTOLIC BLOOD PRESSURE: 79 MMHG | BODY MASS INDEX: 21.94 KG/M2 | OXYGEN SATURATION: 97 % | HEIGHT: 69 IN | SYSTOLIC BLOOD PRESSURE: 170 MMHG | HEART RATE: 84 BPM | WEIGHT: 148.13 LBS

## 2023-03-02 DIAGNOSIS — Z29.89 PROPHYLACTIC IMMUNOTHERAPY: Chronic | ICD-10-CM

## 2023-03-02 DIAGNOSIS — Z79.4 TYPE 2 DIABETES MELLITUS WITH STAGE 3B CHRONIC KIDNEY DISEASE, WITH LONG-TERM CURRENT USE OF INSULIN: ICD-10-CM

## 2023-03-02 DIAGNOSIS — N18.32 TYPE 2 DIABETES MELLITUS WITH STAGE 3B CHRONIC KIDNEY DISEASE, WITH LONG-TERM CURRENT USE OF INSULIN: ICD-10-CM

## 2023-03-02 DIAGNOSIS — N18.9 CHRONIC KIDNEY DISEASE-MINERAL AND BONE DISORDER: ICD-10-CM

## 2023-03-02 DIAGNOSIS — D64.9 NORMOCYTIC ANEMIA: ICD-10-CM

## 2023-03-02 DIAGNOSIS — E11.42 DIABETIC POLYNEUROPATHY ASSOCIATED WITH TYPE 2 DIABETES MELLITUS: ICD-10-CM

## 2023-03-02 DIAGNOSIS — R80.9 PROTEINURIA, UNSPECIFIED TYPE: ICD-10-CM

## 2023-03-02 DIAGNOSIS — M89.9 CHRONIC KIDNEY DISEASE-MINERAL AND BONE DISORDER: ICD-10-CM

## 2023-03-02 DIAGNOSIS — N18.32 STAGE 3B CHRONIC KIDNEY DISEASE: Primary | ICD-10-CM

## 2023-03-02 DIAGNOSIS — E83.9 CHRONIC KIDNEY DISEASE-MINERAL AND BONE DISORDER: ICD-10-CM

## 2023-03-02 DIAGNOSIS — M32.9 HISTORY OF SYSTEMIC LUPUS ERYTHEMATOSUS (SLE): ICD-10-CM

## 2023-03-02 DIAGNOSIS — E11.22 TYPE 2 DIABETES MELLITUS WITH STAGE 3B CHRONIC KIDNEY DISEASE, WITH LONG-TERM CURRENT USE OF INSULIN: ICD-10-CM

## 2023-03-02 DIAGNOSIS — Z94.4 LIVER REPLACED BY TRANSPLANT: Chronic | ICD-10-CM

## 2023-03-02 DIAGNOSIS — I10 ESSENTIAL HYPERTENSION: ICD-10-CM

## 2023-03-02 PROCEDURE — 3066F PR DOCUMENTATION OF TREATMENT FOR NEPHROPATHY: ICD-10-PCS | Mod: CPTII,GC,S$GLB, | Performed by: STUDENT IN AN ORGANIZED HEALTH CARE EDUCATION/TRAINING PROGRAM

## 2023-03-02 PROCEDURE — 99215 PR OFFICE/OUTPT VISIT, EST, LEVL V, 40-54 MIN: ICD-10-PCS | Mod: GC,S$GLB,, | Performed by: STUDENT IN AN ORGANIZED HEALTH CARE EDUCATION/TRAINING PROGRAM

## 2023-03-02 PROCEDURE — 3072F LOW RISK FOR RETINOPATHY: CPT | Mod: CPTII,GC,S$GLB, | Performed by: STUDENT IN AN ORGANIZED HEALTH CARE EDUCATION/TRAINING PROGRAM

## 2023-03-02 PROCEDURE — 3077F SYST BP >= 140 MM HG: CPT | Mod: CPTII,GC,S$GLB, | Performed by: STUDENT IN AN ORGANIZED HEALTH CARE EDUCATION/TRAINING PROGRAM

## 2023-03-02 PROCEDURE — 1159F MED LIST DOCD IN RCRD: CPT | Mod: CPTII,GC,S$GLB, | Performed by: STUDENT IN AN ORGANIZED HEALTH CARE EDUCATION/TRAINING PROGRAM

## 2023-03-02 PROCEDURE — 3077F PR MOST RECENT SYSTOLIC BLOOD PRESSURE >= 140 MM HG: ICD-10-PCS | Mod: CPTII,GC,S$GLB, | Performed by: STUDENT IN AN ORGANIZED HEALTH CARE EDUCATION/TRAINING PROGRAM

## 2023-03-02 PROCEDURE — 99215 OFFICE O/P EST HI 40 MIN: CPT | Mod: GC,S$GLB,, | Performed by: STUDENT IN AN ORGANIZED HEALTH CARE EDUCATION/TRAINING PROGRAM

## 2023-03-02 PROCEDURE — 3078F PR MOST RECENT DIASTOLIC BLOOD PRESSURE < 80 MM HG: ICD-10-PCS | Mod: CPTII,GC,S$GLB, | Performed by: STUDENT IN AN ORGANIZED HEALTH CARE EDUCATION/TRAINING PROGRAM

## 2023-03-02 PROCEDURE — 1159F PR MEDICATION LIST DOCUMENTED IN MEDICAL RECORD: ICD-10-PCS | Mod: CPTII,GC,S$GLB, | Performed by: STUDENT IN AN ORGANIZED HEALTH CARE EDUCATION/TRAINING PROGRAM

## 2023-03-02 PROCEDURE — 3072F PR LOW RISK FOR RETINOPATHY: ICD-10-PCS | Mod: CPTII,GC,S$GLB, | Performed by: STUDENT IN AN ORGANIZED HEALTH CARE EDUCATION/TRAINING PROGRAM

## 2023-03-02 PROCEDURE — 3008F PR BODY MASS INDEX (BMI) DOCUMENTED: ICD-10-PCS | Mod: CPTII,GC,S$GLB, | Performed by: STUDENT IN AN ORGANIZED HEALTH CARE EDUCATION/TRAINING PROGRAM

## 2023-03-02 PROCEDURE — 3066F NEPHROPATHY DOC TX: CPT | Mod: CPTII,GC,S$GLB, | Performed by: STUDENT IN AN ORGANIZED HEALTH CARE EDUCATION/TRAINING PROGRAM

## 2023-03-02 PROCEDURE — 99999 PR PBB SHADOW E&M-EST. PATIENT-LVL V: ICD-10-PCS | Mod: PBBFAC,GC,, | Performed by: STUDENT IN AN ORGANIZED HEALTH CARE EDUCATION/TRAINING PROGRAM

## 2023-03-02 PROCEDURE — 99999 PR PBB SHADOW E&M-EST. PATIENT-LVL V: CPT | Mod: PBBFAC,GC,, | Performed by: STUDENT IN AN ORGANIZED HEALTH CARE EDUCATION/TRAINING PROGRAM

## 2023-03-02 PROCEDURE — 3078F DIAST BP <80 MM HG: CPT | Mod: CPTII,GC,S$GLB, | Performed by: STUDENT IN AN ORGANIZED HEALTH CARE EDUCATION/TRAINING PROGRAM

## 2023-03-02 PROCEDURE — 3008F BODY MASS INDEX DOCD: CPT | Mod: CPTII,GC,S$GLB, | Performed by: STUDENT IN AN ORGANIZED HEALTH CARE EDUCATION/TRAINING PROGRAM

## 2023-03-02 NOTE — PROGRESS NOTES
Name: Valencia Dumont  : 1970  Date of Service: 2023     Reason for visit: CKD    HPI: Ms Dumont is a 52-year-old woman with hypertension dates back to , CKD IIIb (baseline creatinine ~1.5-1.7), type 2 diabetes mellitus diagnosed back  on insulin (most recent hemoglobin A1c 7.1%) with associated neuropathy, SLE diagnosed  currently on Plaquenil & Benlysta (belimumab) infusions, auto-immune hepatitis which failed Cytoxan s/p OLTx on 2013 on Prograf  (with fluconazole) & Myfortic, history of kidney stones, cervical dysplasia s/p recent LEEP complicated by bleeding on  this year, sciatica, back pain, OA, chronic pain, GERD, vitamin D deficiency, osteoporosis, HSV-2, asthma, EUFEMIA, migraines and insomnia who presents to clinic today to establish care with a new general nephrologist. She reports the chronic joint pains/arthralgias today in addition to nocturia x4 on average per night but denies any other urinary symptoms at this time. She was last seen in nephrology clinic back in 2019.    PMH:   Past Medical History:   Diagnosis Date    Abnormal Pap smear of cervix     Anemia     Arthritis     Ascites     Asthma     Chronic back pain     Cirrhosis of liver without mention of alcohol 10/18/2013    Diabetes mellitus     Esophageal varices     GERD (gastroesophageal reflux disease)     Herpes simplex virus (HSV) infection     HSV2.    Hypertension     Kidney stone     Lupus     Osteoporosis 2013    Prophylactic immunotherapy (transplant immunosuppression) 2014    SBP (spontaneous bacterial peritonitis)     history of        Surgical History:   Past Surgical History:   Procedure Laterality Date    BREAST BIOPSY Left 2020    CHOLECYSTECTOMY      COLONOSCOPY N/A 2017    Procedure: COLONOSCOPY;  Surgeon: Marky Sun MD;  Location: [facility] ([address] [place] [address]);  Service: Endoscopy;  Laterality: N/A;  PM prep.    CONIZATION OF CERVIX USING LOOP ELECTROSURGICAL EXCISION PROCEDURE  (LEEP) N/A 1/20/2023    Procedure: CONIZATION-CERVICAL-LEEP;  Surgeon: Lu Schreiber MD;  Location: [facility];  Service: OB/GYN;  Laterality: N/A;    ESOPHAGOGASTRODUODENOSCOPY      ESOPHAGOGASTRODUODENOSCOPY N/A 01/16/2019    Procedure: EGD (ESOPHAGOGASTRODUODENOSCOPY);  Surgeon: Deniz Guerra MD;  Location: [facility] ([facility]);  Service: Endoscopy;  Laterality: N/A;  labs prior, s/p liver transplant-MS    ESOPHAGOGASTRODUODENOSCOPY N/A 09/09/2020    Procedure: EGD (ESOPHAGOGASTRODUODENOSCOPY);  Surgeon: Davion Ríos MD;  Location: [facility] ENDO ([facility] F[facility]);  Service: Endoscopy;  Laterality: N/A;  Please order the EGD at least 2 weeks after barium esophagram has been done EGD is for dysphagia  covid test 9/6-[facility]    EYE SURGERY      FUNCTIONAL ENDOSCOPIC SINUS SURGERY (FESS) USING COMPUTER-ASSISTED NAVIGATION Bilateral 02/04/2021    Procedure: FESS, USING COMPUTER-ASSISTED NAVIGATION;  Surgeon: Delores Lewis MD;  Location: [facility];  Service: ENT;  Laterality: Bilateral;  [employer] WALDEMAR [phone] TEXTED HIM @ 2:45PM ON 1-8-2021  DISC LOADED BT TOMMY 1-  RN PREOP 1/28/2021---COVID NEGATIVE ON 2/3--CONSENT INCOMPLETE  H/P INCOMPLETE  --CBC IN AM    LIVER BIOPSY      LIVER TRANSPLANT  12/31/2013    REFRACTIVE SURGERY Bilateral 2010    TUBAL LIGATION  2003    UPPER GASTROINTESTINAL ENDOSCOPY         Allergies:   Review of patient's allergies indicates:   Allergen Reactions    Norvasc [amlodipine]      Severe headache    Bactrim [sulfamethoxazole-trimethoprim]      Gets yeast infection.    Doxycycline Rash    Pcn [penicillins] Rash       Medications:     Current Outpatient Medications:     acetaminophen (TYLENOL) 650 MG TbSR, Take 1 tablet (650 mg total) by mouth every 6 (six) hours., Disp: 60 tablet, Rfl: 0    amitriptyline (ELAVIL) 10 MG tablet, Take 1 tablet (10 mg total) by mouth every evening., Disp: 90 tablet, Rfl: 2    azelastine (ASTELIN) 137 mcg (0.1 %) nasal spray, 1 spray (137  "mcg total) by Nasal route 2 (two) times daily., Disp: 30 mL, Rfl: 11    BD ULTRA-FINE JANESSA PEN NEEDLE 32 gauge x 5/32" Ndle, For five times daily insulin injections, Disp: 450 each, Rfl: 3    blood-glucose meter,continuous (DEXCOM G6 ) Misc, 1 each by Misc.(Non-Drug; Combo Route) route once. for 1 dose, Disp: 1 each, Rfl: 0    blood-glucose transmitter (DEXCOM G6 TRANSMITTER) Karen, 1 each by Misc.(Non-Drug; Combo Route) route once a week, Disp: 1 Device, Rfl: 3    budesonide-formoterol 160-4.5 mcg (SYMBICORT) 160-4.5 mcg/actuation HFAA, Inhale 2 puffs into the lungs., Disp: , Rfl:     buPROPion (WELLBUTRIN XL) 150 MG TB24 tablet, Take 300 mg by mouth., Disp: , Rfl:     ciclopirox (PENLAC) 8 % Soln, Apply topically nightly., Disp: 6.6 mL, Rfl: 3    clotrimazole-betamethasone 1-0.05% (LOTRISONE) cream, APPLY TOPICALLY TO THE AFFECTED AREA TWICE DAILY FOR 10 DAYS, Disp: , Rfl:     diazepam (VALIUM) 5 MG tablet, Take 5 mg by mouth 3 (three) times daily as needed. , Disp: , Rfl: 0    diclofenac sodium (VOLTAREN) 1 % Gel, APPLY 2 GRAM to wrist and 4 g to left foot TOPICAL ROUTE 4 TIMES PER DAY, Disp: 450 each, Rfl: 2    dicyclomine (BENTYL) 10 MG capsule, TAKE 1 CAPSULE BY MOUTH EVERY 8 HOURS AS NEEDED for spasms, Disp: , Rfl:     DILTIAZEM HCL (DILTIAZEM 2% CREAM), Apply topically 3 (three) times daily. Apply topically to anal area., Disp: 30 g, Rfl: 0    diphenoxylate-atropine 2.5-0.025 mg (LOMOTIL) 2.5-0.025 mg per tablet, Take 1 tablet by mouth 4 (four) times daily as needed., Disp: , Rfl:     dulaglutide (TRULICITY) 0.75 mg/0.5 mL pen injector, Inject 0.75 mg into the skin every 7 days., Disp: 12 pen, Rfl: 3    ergocalciferol (ERGOCALCIFEROL) 50,000 unit Cap, Take 50,000 Units by mouth every 7 days., Disp: , Rfl:     erythromycin with ethanol (EMGEL) 2 % gel, ADD 30GM (1 TUBE) TO THE SOAKING DEVICE AND ALLOW WATER TO AGITATE. PLACE AFFECTED AREAS INTO WATER AND SOAK FOR 10 MINUTES 1-2 TIMES DAILY, Disp: , " Rfl: 1    estradioL (ESTRACE) 0.01 % (0.1 mg/gram) vaginal cream, Place 1 g vaginally twice a week., Disp: 42 g, Rfl: 3    famotidine (PEPCID) 40 MG tablet, , Disp: , Rfl:     ferrous sulfate (FEOSOL) 325 mg (65 mg iron) Tab tablet, Take 1 tablet (325 mg total) by mouth every 12 (twelve) hours., Disp: 60 tablet, Rfl: 4    flash glucose scanning reader (FREESTYLE CHUN 14 DAY READER) Misc, 1 each by Misc.(Non-Drug; Combo Route) route once daily., Disp: 1 each, Rfl: 0    flash glucose sensor (FREESTYLE CHUN 14 DAY SENSOR) Kit, 2 each by Misc.(Non-Drug; Combo Route) route every 14 (fourteen) days., Disp: 2 kit, Rfl: 11    fluconazole (DIFLUCAN) 150 MG Tab, TAKE ONE TABLET BY MOUTH once for ONE dose, Disp: 1 tablet, Rfl: 0    fluocinolone (SYNALAR) 0.01 % external solution, APPLY a couple of DROPS INTO BOTH EARS TWICE DAILY AS NEEDED FOR ITCHING, Disp: 60 mL, Rfl: 1    fluticasone propionate (FLONASE) 50 mcg/actuation nasal spray, 1 spray by Each Nostril route 2 (two) times daily., Disp: , Rfl:     fluticasone propionate (FLONASE) 50 mcg/actuation nasal spray, 2 sprays (100 mcg total) by Each Nostril route 2 (two) times daily., Disp: 18.2 mL, Rfl: 11    gabapentin (NEURONTIN) 300 MG capsule, TAKE 1 CAPSULE BY MOUTH THREE TIMES DAILY, Disp: , Rfl:     gentamicin (GARAMYCIN) 0.1 % cream, ADD 30GM (1 TUBE) TO THE SOAKING DEVICE AND ALLOW WATER TO AGITATE. PLACE AFFECTED AREAS INTO WATER AND SOAK FOR 10 MINUTES 1-2 TIMES DAILY, Disp: , Rfl: 1    hydrocortisone (ANUSOL-HC) 2.5 % rectal cream, Place rectally 2 (two) times daily. Apply per rectum bid, Disp: 30 g, Rfl: 3    hydrOXYchloroQUINE (PLAQUENIL) 200 mg tablet, TAKE ONE TABLET BY MOUTH TWICE DAILY, Disp: 180 tablet, Rfl: 1    hydrOXYzine HCl (ATARAX) 10 MG Tab, TAKE 1 OR 2 TABLETS BY MOUTH THREE TIMES DAILY AS NEEDED FOR ITCHING., Disp: , Rfl: 0    insulin detemir U-100 (LEVEMIR FLEXTOUCH U-100 INSULN) 100 unit/mL (3 mL) InPn pen, INJECT FOUR units UNDER THE SKIN TWICE  DAILY, Disp: 45 mL, Rfl: 3    insulin lispro (HUMALOG KWIKPEN INSULIN) 100 unit/mL pen, 3 units before BREAKFAST and lunch and FIVE units before dinner with sliding scale, up to 25 units daily, Disp: 45 mL, Rfl: 3    isosorbide mononitrate (IMDUR) 30 MG 24 hr tablet, Take 30 mg by mouth once daily., Disp: , Rfl:     ketoconazole (NIZORAL) 2 % cream, ADD 30GM (1/2 TUBE) TO THE SOAKING DEVICE AND ALLOW WATER TO AGITATE. PLACE AFFECTED AREAS INTO WATER AND SOAK FOR 10 MINUTES 1-2 TIMES BRENDON, Disp: , Rfl: 1    Lactobac. rhamnosus GG-inulin 10 billion cell -200 mg Cap, Take 1 capsule by mouth 2 (two) times daily., Disp: 30 capsule, Rfl: 0    levocetirizine (XYZAL) 5 MG tablet, Take 5 mg by mouth every evening., Disp: , Rfl: 3    LIDOcaine-prilocaine (EMLA) cream, 2 (two) times daily. Apply to affected area, Disp: , Rfl:     loratadine (CLARITIN) 10 mg tablet, Take 1 tablet (10 mg total) by mouth once daily., Disp: 30 tablet, Rfl: 0    losartan (COZAAR) 100 MG tablet, Take 1 tablet (100 mg total) by mouth once daily., Disp: 30 tablet, Rfl: 2    magnesium oxide 500 mg Tab, Take 500 mg by mouth 2 (two) times daily., Disp: 60 each, Rfl: 6    meclizine (ANTIVERT) 25 mg tablet, TAKE ONE TABLET BY MOUTH AT BEDTIME AS NEEDED for dizziness., Disp: , Rfl: 0    mometasone 0.1% (ELOCON) 0.1 % cream, APPLY TOPICALLY TO THE FACE TWICE DAILY FOR ONE WEEK, Disp: , Rfl:     MULTIVIT,THER IRON,CA,FA & MIN (MULTIVITAMIN) Tab, Take 1 tablet by mouth once daily., Disp: 30 tablet, Rfl: 0    mycophenolate (MYFORTIC) 180 MG TbEC, Take 2 tablets (360 mg total) by mouth 2 (two) times daily., Disp: 120 tablet, Rfl: 11    neomycin-polymyxin-hydrocortisone (CORTISPORIN) otic solution, INSTILL TWO DROPS INTO BOTH EARS FOUR TIMES DAILY FOR 10 DAYS, Disp: , Rfl: 0    NURTEC 75 mg odt, PLACE ONE TABLET on tongue, alternatively UNDER THE TONGUE ONCE DAILY AS NEEDED FOR MIGRAINE, Disp: 8 tablet, Rfl: 2    nystatin (MYCOSTATIN) 100,000 unit/mL suspension,  SWISH AND SPIT FIVE MILLILITERS BY MOUTH FOUR TIMES DAILY FOR 7 DAYS., Disp: , Rfl: 1    nystatin-triamcinolone (MYCOLOG II) cream, Apply to affected area 2 times daily, Disp: 30 g, Rfl: 1    ondansetron (ZOFRAN) 4 MG tablet, TAKE TWO TABLETS BY MOUTH EVERY 8 HOURS AS NEEDED FOR NAUSEA., Disp: , Rfl:     oxyCODONE (ROXICODONE) 5 MG immediate release tablet, Take 1 tablet (5 mg total) by mouth every 4 (four) hours as needed for Pain., Disp: 5 tablet, Rfl: 0    oxycodone-acetaminophen (PERCOCET) 7.5-325 mg per tablet, Take 1 tablet by mouth every 4 (four) hours as needed for Pain., Disp: , Rfl:     pantoprazole (PROTONIX) 40 MG tablet, Take 1 tablet (40 mg total) by mouth 2 (two) times daily. Take immediately in am when wake up and then 30 minutes prior to dinner, Disp: 60 tablet, Rfl: 11    polyethylene glycol (GLYCOLAX) 17 gram/dose powder, Mix 1 capful (17 g) with liquid and by mouth 2 (two) times daily., Disp: 1530 g, Rfl: 11    PROAIR HFA 90 mcg/actuation inhaler, Inhale 2 puffs into the lungs 3 (three) times daily as needed. , Disp: , Rfl: 3    promethazine-dextromethorphan (PROMETHAZINE-DM) 6.25-15 mg/5 mL Syrp, Take by mouth 2 (two) times daily as needed. , Disp: , Rfl: 0    rosuvastatin (CRESTOR) 5 MG tablet, Take 5 mg by mouth once daily., Disp: , Rfl:     SENNA 8.6 mg tablet, Take 1 tablet by mouth 2 (two) times daily. Take while taking narcotics, Disp: 30 tablet, Rfl: 0    sertraline (ZOLOFT) 50 MG tablet, Take 50 mg by mouth once daily., Disp: , Rfl: 11    SSD 1 % cream, 1 application 2 (two) times daily. Apply to affected area, Disp: , Rfl: 0    tacrolimus (PROGRAF) 1 MG Cap, take 2 CAPSULES BY MOUTH EVERY MORNING & 2 CAPSULES EVERY EVENING, Disp: 120 capsule, Rfl: 7    topiramate (TOPAMAX) 50 MG tablet, SMARTSI Tablet(s) By Mouth Every Evening, Disp: , Rfl:     triamcinolone acetonide 0.1% (KENALOG) 0.1 % cream, Apply topically 2 times daily, Disp: 30 g, Rfl: 0    valACYclovir (VALTREX) 1000 MG  tablet, Take 1,000 mg by mouth once daily., Disp: , Rfl:     verapamil (CALAN-SR) 120 MG CR tablet, TAKE ONE TABLET ONCE DAILY FOR BLOOD PRESSURE, Disp: , Rfl: 3    zolpidem (AMBIEN) 10 mg Tab, Take 10 mg by mouth nightly as needed., Disp: , Rfl: 3    Family History:   Family History   Problem Relation Age of Onset    Hypertension Mother     Cataracts Mother     Diabetes Father     Alzheimer's disease Father     Diabetes Paternal Grandfather     Diabetes Paternal Grandmother     No Known Problems Maternal Grandmother     Bone cancer Maternal Grandfather     Breast cancer Maternal Aunt 55    Hypertension Brother     Diabetes Brother     No Known Problems Sister     No Known Problems Maternal Uncle     No Known Problems Paternal Aunt     No Known Problems Paternal Uncle     Stroke Neg Hx     Cancer Neg Hx     Colon cancer Neg Hx     Esophageal cancer Neg Hx     Stomach cancer Neg Hx     Rectal cancer Neg Hx     Amblyopia Neg Hx     Blindness Neg Hx     Glaucoma Neg Hx     Macular degeneration Neg Hx     Retinal detachment Neg Hx     Strabismus Neg Hx     Thyroid disease Neg Hx        Social History:   Social History     Socioeconomic History    Marital status:     Number of children: 3   Occupational History     Employer: [employer]   Tobacco Use    Smoking status: Never    Smokeless tobacco: Never    Tobacco comments:     disability; ; 3 children   Substance and Sexual Activity    Alcohol use: Yes     Alcohol/week: 1.0 standard drink     Types: 1 Glasses of wine per week     Comment: occasionally     Drug use: No    Sexual activity: Yes     Partners: Male     Birth control/protection: See Surgical Hx, Post-menopausal     Comment: s/p tubal ligation,  since 1989     Review of Systems   Constitutional:  Positive for chills, diaphoresis (attributed to hot flashes), malaise/fatigue and weight loss. Negative for fever.   HENT:  Negative for congestion, ear pain, hearing loss, sore throat and  "tinnitus.         Popping sensation in ears.   Eyes:  Negative for blurred vision, pain and redness.   Respiratory:  Positive for cough (dry cough), shortness of breath (with exertion) and wheezing. Negative for sputum production.    Cardiovascular:  Positive for palpitations. Negative for chest pain, leg swelling and PND.   Gastrointestinal:  Positive for constipation and nausea (intermittent). Negative for abdominal pain, blood in stool, diarrhea, heartburn and vomiting.   Genitourinary:  Positive for frequency (nocturia x4). Negative for dysuria, flank pain, hematuria and urgency.   Musculoskeletal:  Positive for back pain (lumbar), joint pain (bilateral knees and hips) and neck pain. Negative for falls and myalgias.   Skin:  Positive for itching (intermittent). Negative for rash.   Neurological:  Positive for dizziness (intermittent with sitting up too fast), tremors, sensory change (right hallux following fracture) and headaches. Negative for tingling, speech change, focal weakness, seizures and loss of consciousness.   Psychiatric/Behavioral:  Positive for depression. The patient is nervous/anxious. The patient does not have insomnia (well controlled with Ambien).      Vitals:   Vitals:    03/02/23 1340   BP: (!) 170/79   BP Location: Left arm   Patient Position: Sitting   Pulse: 84   SpO2: 97%   Weight: 67.2 kg (148 lb 2.4 oz)   Height: 5' 9" (1.753 m)     Body mass index is 21.88 kg/m².    Physical Exam  Vitals and nursing note reviewed.   Constitutional:       General: She is awake. She is not in acute distress.     Appearance: She is normal weight. She is not ill-appearing or diaphoretic.   HENT:      Head: Normocephalic and atraumatic.      Right Ear: External ear normal.      Left Ear: External ear normal.      Nose: Nose normal.      Mouth/Throat:      Mouth: Mucous membranes are moist.      Pharynx: Oropharynx is clear. No oropharyngeal exudate or posterior oropharyngeal erythema.   Eyes:      General: " No scleral icterus.        Right eye: No discharge.         Left eye: No discharge.      Extraocular Movements: Extraocular movements intact.      Conjunctiva/sclera: Conjunctivae normal.   Cardiovascular:      Rate and Rhythm: Normal rate.      Heart sounds: Murmur heard.   Systolic murmur is present with a grade of 2/6.     No friction rub. No gallop.   Pulmonary:      Effort: Pulmonary effort is normal. No respiratory distress.      Breath sounds: Normal breath sounds. No wheezing, rhonchi or rales.   Abdominal:      General: A surgical scar is present. Bowel sounds are normal. There is no distension.      Palpations: Abdomen is soft.      Tenderness: There is no abdominal tenderness. There is no right CVA tenderness or left CVA tenderness.       Musculoskeletal:      Cervical back: Neck supple.      Right lower leg: No edema.      Left lower leg: No edema.   Skin:     General: Skin is warm and dry.      Coloration: Skin is not jaundiced.   Neurological:      General: No focal deficit present.      Mental Status: She is alert. Mental status is at baseline.      Cranial Nerves: No cranial nerve deficit.      Motor: No weakness.      Gait: Gait normal.   Psychiatric:         Mood and Affect: Mood normal.         Behavior: Behavior normal. Behavior is cooperative.       Assessment/Plan:   Diagnoses and all orders for this visit:    Stage 3b chronic kidney disease  Proteinuria, unspecified type  - Renal function largely stable with creatinine 1.5 (baseline ~1.7-1.5)  - Suspect CKD related to diabetic nephropathy, hypertensive nephrosclerosis and tacrolimus nephrotoxicity   - Most recent UA with +2 protein (UPCR 0.2), 1 RBC/hpf , 1 WBC/hpf and rare bacteria  - Urinary sediment assessed in clinic today with many squamous cells and few WBCs, no overt dysmorphic RBCs noted   - Denies history of renal stone although noted on retroperitoneal US to have noted to have non-obstructive 5 mm stone on left  - Agree with avoiding  NSAIDs moving forward as previously on Mobic 15 mg daily PRN although she reports not having taken this in some time, in addition she is no longer taking Topamax and only uses gabapentin 300 mg QHS which is okay   -     US Retroperitoneal Complete; Future  -     Renal Function Panel; Future  -     Urinalysis; Future  -     Protein / creatinine ratio, urine; Future  -     Microalbumin/creatinine urine ratio; Future  -     KIDNEY DISEASE EDUCATION; Future  -     Protein electrophoresis, serum; Future  -     Immunofixation electrophoresis; Future  -     Immunoglobulin free LT chains blood; Future  -     STAN Screen w/Reflex; Future    History of systemic lupus erythematosus (SLE)  -     Diagnosed back in 2006  - Follows with Dr. Crocker in Rheumatology here at [facility]  - She is currently on plaquenil 200 mg BID and Benlysta (belimumab) infusion (last received yesterday on 3/1)  - Encouraged to keep follow-up with Rheumatology     Chronic kidney disease-mineral and bone disorder  -     Most recent , calcium 9.4 and 25-hydroxyvitamin D 35  -     PTH, intact; Future  -     Vitamin D; Future    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  Prophylactic immunotherapy (transplant immunosuppression)  -     Autoimmune hepatitis s/p OLT on 12/31/2013 for which she has recurrence of AIH on most recent liver allograft biopsy in 2017  - She reports taking Prograf at 2 mg in AM and 3 mg in PM with fluconazole 150 mg every two days in addition to Myfortic 360 mg BID  - Most recent tacrolimus trough 5.9  - Follows with Hepatology here, encouraged to keep follow-up visits    Essential hypertension  -     Dates back to 2013 around time of liver transplant  - She reports currently taking Imdur 30 mg BID, verapamil 120 mg BID and losartan 100 mg daily   - She checks her home blood pressures every other day and estimates systolic readings in the 140s and diastolic readings ranging from 80-70s mmHg  - In clinic today blood pressure elevated with  value of 170/79 mmHg however patient reports anxiety and states she has white coat hypertension  - Advised to keep blood pressure log and to bring to next clinic visit    Type 2 diabetes mellitus with stage 3b chronic kidney disease, with long-term current use of insulin  Diabetic polyneuropathy associated with type 2 diabetes mellitus  -     Dates back to 2006 with associated neuropathy  - Follows with Endocrinology here, currently on Humalog 4 units TIDWM, Levemir 4 units BID and Trulicity SQ once weekly  - Hemoglobin A1c 7.1%, controlled   - Encouraged to keep follow-up with Endocrinology    Normocytic anemia  -     Normocytic anemia with hemoglobin 11.9 on most recent laboratory studies   - Most recent iron panel with iron 75, transferrin 264, TIBC 391, 19% saturation and ferritin 56  - Previously on ferrous sulfate 325 mg for iron deficiency   - CBC Auto Differential; Future  -     FERRITIN; Future  -     IRON AND TIBC; Future     Disposition: Follow-up in 6 months.    Discussed with Dr. Schuler.     Alfredo Lloyd MD  Nephrology

## 2023-03-03 ENCOUNTER — LAB VISIT (OUTPATIENT)
Dept: LAB | Facility: HOSPITAL | Age: 53
End: 2023-03-03
Attending: STUDENT IN AN ORGANIZED HEALTH CARE EDUCATION/TRAINING PROGRAM
Payer: MEDICARE

## 2023-03-03 DIAGNOSIS — R80.9 PROTEINURIA, UNSPECIFIED TYPE: ICD-10-CM

## 2023-03-03 PROCEDURE — 86334 PATHOLOGIST INTERPRETATION IFE: ICD-10-PCS | Mod: 26,,, | Performed by: PATHOLOGY

## 2023-03-03 PROCEDURE — 83521 IG LIGHT CHAINS FREE EACH: CPT | Mod: 59 | Performed by: STUDENT IN AN ORGANIZED HEALTH CARE EDUCATION/TRAINING PROGRAM

## 2023-03-03 PROCEDURE — 86038 ANTINUCLEAR ANTIBODIES: CPT | Performed by: STUDENT IN AN ORGANIZED HEALTH CARE EDUCATION/TRAINING PROGRAM

## 2023-03-03 PROCEDURE — 86334 IMMUNOFIX E-PHORESIS SERUM: CPT | Performed by: STUDENT IN AN ORGANIZED HEALTH CARE EDUCATION/TRAINING PROGRAM

## 2023-03-03 PROCEDURE — 86334 IMMUNOFIX E-PHORESIS SERUM: CPT | Mod: 26,,, | Performed by: PATHOLOGY

## 2023-03-03 PROCEDURE — 36415 COLL VENOUS BLD VENIPUNCTURE: CPT | Performed by: STUDENT IN AN ORGANIZED HEALTH CARE EDUCATION/TRAINING PROGRAM

## 2023-03-03 PROCEDURE — 84165 PATHOLOGIST INTERPRETATION SPE: ICD-10-PCS | Mod: 26,,, | Performed by: PATHOLOGY

## 2023-03-03 PROCEDURE — 84165 PROTEIN E-PHORESIS SERUM: CPT | Mod: 26,,, | Performed by: PATHOLOGY

## 2023-03-03 PROCEDURE — 84165 PROTEIN E-PHORESIS SERUM: CPT | Performed by: STUDENT IN AN ORGANIZED HEALTH CARE EDUCATION/TRAINING PROGRAM

## 2023-03-06 LAB
ALBUMIN SERPL ELPH-MCNC: 4.16 G/DL (ref 3.35–5.55)
ALPHA1 GLOB SERPL ELPH-MCNC: 0.31 G/DL (ref 0.17–0.41)
ALPHA2 GLOB SERPL ELPH-MCNC: 0.51 G/DL (ref 0.43–0.99)
ANA SER QL IF: NORMAL
B-GLOBULIN SERPL ELPH-MCNC: 0.78 G/DL (ref 0.5–1.1)
GAMMA GLOB SERPL ELPH-MCNC: 0.75 G/DL (ref 0.67–1.58)
INTERPRETATION SERPL IFE-IMP: NORMAL
KAPPA LC SER QL IA: 3.19 MG/DL (ref 0.33–1.94)
KAPPA LC/LAMBDA SER IA: 1.47 (ref 0.26–1.65)
LAMBDA LC SER QL IA: 2.17 MG/DL (ref 0.57–2.63)
PATHOLOGIST INTERPRETATION IFE: NORMAL
PATHOLOGIST INTERPRETATION SPE: NORMAL
PROT SERPL-MCNC: 6.5 G/DL (ref 6–8.4)

## 2023-03-07 ENCOUNTER — OFFICE VISIT (OUTPATIENT)
Dept: OBSTETRICS AND GYNECOLOGY | Facility: CLINIC | Age: 53
End: 2023-03-07
Attending: OBSTETRICS & GYNECOLOGY
Payer: MEDICARE

## 2023-03-07 VITALS
WEIGHT: 149 LBS | BODY MASS INDEX: 22.07 KG/M2 | HEIGHT: 69 IN | SYSTOLIC BLOOD PRESSURE: 149 MMHG | DIASTOLIC BLOOD PRESSURE: 79 MMHG

## 2023-03-07 DIAGNOSIS — Z98.890 S/P LEEP: ICD-10-CM

## 2023-03-07 DIAGNOSIS — N89.8 VAGINAL DISCHARGE: Primary | ICD-10-CM

## 2023-03-07 PROCEDURE — 1159F PR MEDICATION LIST DOCUMENTED IN MEDICAL RECORD: ICD-10-PCS | Mod: CPTII,S$GLB,, | Performed by: OBSTETRICS & GYNECOLOGY

## 2023-03-07 PROCEDURE — 81514 NFCT DS BV&VAGINITIS DNA ALG: CPT | Performed by: OBSTETRICS & GYNECOLOGY

## 2023-03-07 PROCEDURE — 3066F NEPHROPATHY DOC TX: CPT | Mod: CPTII,S$GLB,, | Performed by: OBSTETRICS & GYNECOLOGY

## 2023-03-07 PROCEDURE — 3072F LOW RISK FOR RETINOPATHY: CPT | Mod: CPTII,S$GLB,, | Performed by: OBSTETRICS & GYNECOLOGY

## 2023-03-07 PROCEDURE — 99213 PR OFFICE/OUTPT VISIT, EST, LEVL III, 20-29 MIN: ICD-10-PCS | Mod: 24,S$GLB,, | Performed by: OBSTETRICS & GYNECOLOGY

## 2023-03-07 PROCEDURE — 3077F PR MOST RECENT SYSTOLIC BLOOD PRESSURE >= 140 MM HG: ICD-10-PCS | Mod: CPTII,S$GLB,, | Performed by: OBSTETRICS & GYNECOLOGY

## 2023-03-07 PROCEDURE — 3066F PR DOCUMENTATION OF TREATMENT FOR NEPHROPATHY: ICD-10-PCS | Mod: CPTII,S$GLB,, | Performed by: OBSTETRICS & GYNECOLOGY

## 2023-03-07 PROCEDURE — 3078F PR MOST RECENT DIASTOLIC BLOOD PRESSURE < 80 MM HG: ICD-10-PCS | Mod: CPTII,S$GLB,, | Performed by: OBSTETRICS & GYNECOLOGY

## 2023-03-07 PROCEDURE — 1159F MED LIST DOCD IN RCRD: CPT | Mod: CPTII,S$GLB,, | Performed by: OBSTETRICS & GYNECOLOGY

## 2023-03-07 PROCEDURE — 99213 OFFICE O/P EST LOW 20 MIN: CPT | Mod: 24,S$GLB,, | Performed by: OBSTETRICS & GYNECOLOGY

## 2023-03-07 PROCEDURE — 99999 PR PBB SHADOW E&M-EST. PATIENT-LVL IV: CPT | Mod: PBBFAC,,, | Performed by: OBSTETRICS & GYNECOLOGY

## 2023-03-07 PROCEDURE — 3072F PR LOW RISK FOR RETINOPATHY: ICD-10-PCS | Mod: CPTII,S$GLB,, | Performed by: OBSTETRICS & GYNECOLOGY

## 2023-03-07 PROCEDURE — 3008F PR BODY MASS INDEX (BMI) DOCUMENTED: ICD-10-PCS | Mod: CPTII,S$GLB,, | Performed by: OBSTETRICS & GYNECOLOGY

## 2023-03-07 PROCEDURE — 99999 PR PBB SHADOW E&M-EST. PATIENT-LVL IV: ICD-10-PCS | Mod: PBBFAC,,, | Performed by: OBSTETRICS & GYNECOLOGY

## 2023-03-07 PROCEDURE — 3078F DIAST BP <80 MM HG: CPT | Mod: CPTII,S$GLB,, | Performed by: OBSTETRICS & GYNECOLOGY

## 2023-03-07 PROCEDURE — 3008F BODY MASS INDEX DOCD: CPT | Mod: CPTII,S$GLB,, | Performed by: OBSTETRICS & GYNECOLOGY

## 2023-03-07 PROCEDURE — 3077F SYST BP >= 140 MM HG: CPT | Mod: CPTII,S$GLB,, | Performed by: OBSTETRICS & GYNECOLOGY

## 2023-03-07 NOTE — PROGRESS NOTES
Subjective:       Patient ID: Valencia Dumont is a 52 y.o. female.    Chief Complaint:  Vaginitis (Vaginal discharge following LEEP)      History of Present Illness  HPI  This 51 y/o P3 presents today with complaints of continued yellowish vaginal discharge following LEEP procedure on . She has had prolonged /delayed healing due to her Liver Transplant status.    GYN & OB History  Patient's last menstrual period was 2016.   Date of Last Pap: 2022    OB History    Para Term  AB Living   3 3 2 1   3   SAB IAB Ectopic Multiple Live Births           3      # Outcome Date GA Lbr Sachin/2nd Weight Sex Delivery Anes PTL Lv   3   36w0d  2.126 kg (4 lb 11 oz) F Vag-Spont   MONIQUE   2 Term     F Vag-Spont   MONIQUE   1 Term     M Vag-Spont   MONIQUE       Past Medical History:   Diagnosis Date    Abnormal Pap smear of cervix     Anemia     Arthritis     Ascites     Asthma     Chronic back pain     Cirrhosis of liver without mention of alcohol 10/18/2013    Diabetes mellitus     Esophageal varices     GERD (gastroesophageal reflux disease)     Herpes simplex virus (HSV) infection     HSV2.    Hypertension     Kidney stone     Lupus     Osteoporosis 2013    Prophylactic immunotherapy (transplant immunosuppression) 2014    SBP (spontaneous bacterial peritonitis)     history of        Past Surgical History:   Procedure Laterality Date    BREAST BIOPSY Left 2020    CHOLECYSTECTOMY      COLONOSCOPY N/A 2017    Procedure: COLONOSCOPY;  Surgeon: Marky Sun MD;  Location: [address];  Service: Endoscopy;  Laterality: N/A;  PM prep.    CONIZATION OF CERVIX USING LOOP ELECTROSURGICAL EXCISION PROCEDURE (LEEP) N/A 2023    Procedure: CONIZATION-CERVICAL-LEEP;  Surgeon: Lu Schreiber MD;  Location: [facility];  Service: OB/GYN;  Laterality: N/A;    ESOPHAGOGASTRODUODENOSCOPY      ESOPHAGOGASTRODUODENOSCOPY N/A 2019    Procedure: EGD  "(ESOPHAGOGASTRODUODENOSCOPY);  Surgeon: Deniz Guerra MD;  [facility]  Service: Endoscopy;  Laterality: N/A;  labs prior, s/p liver transplant-MS    ESOPHAGOGASTRODUODENOSCOPY N/A 09/09/2020    Procedure: EGD (ESOPHAGOGASTRODUODENOSCOPY);  Surgeon: Davion Ríos MD;  [facility]  Service: Endoscopy;  Laterality: N/A;  Please order the EGD at least 2 weeks after barium esophagram has been done EGD is for dysphagia  covid test 9/6-[facility] care    EYE SURGERY      FUNCTIONAL ENDOSCOPIC SINUS SURGERY (FESS) USING COMPUTER-ASSISTED NAVIGATION Bilateral 02/04/2021    Procedure: FESS, USING COMPUTER-ASSISTED NAVIGATION;  Surgeon: Delores Lewis MD;  Location: [facility];  Service: ENT;  Laterality: Bilateral;  [employer] WALDEMAR [phone] TEXTED HIM @ 2:45PM ON 1-8-2021  DISC LOADED  TOMMY 1-  RN PREOP 1/28/2021---COVID NEGATIVE ON 2/3--CONSENT INCOMPLETE  H/P INCOMPLETE  --CBC IN AM    LIVER BIOPSY      LIVER TRANSPLANT  12/31/2013    REFRACTIVE SURGERY Bilateral 2010    TUBAL LIGATION  2003    UPPER GASTROINTESTINAL ENDOSCOPY         Review of Systems  Review of Systems   Genitourinary:  Positive for vaginal discharge and postmenopausal bleeding.         Objective:      BP (!) 149/79   Ht 5' 9" (1.753 m)   Wt 67.6 kg (149 lb)   LMP 05/02/2016   BMI 22.00 kg/m²   Physical Exam:               Genitourinary:    Pelvic exam was performed with patient supine.            Genitourinary Comments: No tenderness on bimanual.                           Assessment:        1. Vaginal discharge    2. S/P LEEP                Plan:      Problem List Items Addressed This Visit    None  Visit Diagnoses       Vaginal discharge    -  Primary    Relevant Orders    Vaginosis Screen by DNA Probe    S/P LEEP      Continue pelvic rest x 2 weeks.   Repeat Pap in 6 months.             Follow up in about 6 months (around 9/7/2023).       "

## 2023-03-10 ENCOUNTER — PATIENT MESSAGE (OUTPATIENT)
Dept: OBSTETRICS AND GYNECOLOGY | Facility: CLINIC | Age: 53
End: 2023-03-10
Payer: MEDICARE

## 2023-03-10 DIAGNOSIS — N76.0 VAGINOSIS: Primary | ICD-10-CM

## 2023-03-10 RX ORDER — TINIDAZOLE 500 MG/1
2 TABLET ORAL
Qty: 8 TABLET | Refills: 0 | Status: SHIPPED | OUTPATIENT
Start: 2023-03-10 | End: 2023-03-12

## 2023-03-13 ENCOUNTER — OFFICE VISIT (OUTPATIENT)
Dept: OTOLARYNGOLOGY | Facility: CLINIC | Age: 53
End: 2023-03-13
Payer: MEDICARE

## 2023-03-13 ENCOUNTER — PATIENT MESSAGE (OUTPATIENT)
Dept: OTOLARYNGOLOGY | Facility: CLINIC | Age: 53
End: 2023-03-13

## 2023-03-13 VITALS — HEIGHT: 69 IN | WEIGHT: 149 LBS | BODY MASS INDEX: 22.07 KG/M2

## 2023-03-13 DIAGNOSIS — J30.2 SEASONAL ALLERGIC RHINITIS, UNSPECIFIED TRIGGER: Primary | ICD-10-CM

## 2023-03-13 DIAGNOSIS — J33.9 NASAL POLYP: ICD-10-CM

## 2023-03-13 DIAGNOSIS — M26.621 ARTHRALGIA OF RIGHT TEMPOROMANDIBULAR JOINT: ICD-10-CM

## 2023-03-13 DIAGNOSIS — B96.89 BACTERIAL VAGINOSIS: Primary | ICD-10-CM

## 2023-03-13 DIAGNOSIS — N76.0 BACTERIAL VAGINOSIS: Primary | ICD-10-CM

## 2023-03-13 DIAGNOSIS — H61.21 IMPACTED CERUMEN OF RIGHT EAR: ICD-10-CM

## 2023-03-13 DIAGNOSIS — Z98.890 STATUS POST FUNCTIONAL ENDOSCOPIC SINUS SURGERY (FESS): ICD-10-CM

## 2023-03-13 PROCEDURE — 3008F BODY MASS INDEX DOCD: CPT | Mod: CPTII,S$GLB,, | Performed by: OTOLARYNGOLOGY

## 2023-03-13 PROCEDURE — 31231 PR NASAL ENDOSCOPY, DX: ICD-10-PCS | Mod: S$GLB,,, | Performed by: OTOLARYNGOLOGY

## 2023-03-13 PROCEDURE — 3072F LOW RISK FOR RETINOPATHY: CPT | Mod: CPTII,S$GLB,, | Performed by: OTOLARYNGOLOGY

## 2023-03-13 PROCEDURE — 3066F PR DOCUMENTATION OF TREATMENT FOR NEPHROPATHY: ICD-10-PCS | Mod: CPTII,S$GLB,, | Performed by: OTOLARYNGOLOGY

## 2023-03-13 PROCEDURE — 1159F MED LIST DOCD IN RCRD: CPT | Mod: CPTII,S$GLB,, | Performed by: OTOLARYNGOLOGY

## 2023-03-13 PROCEDURE — 69210 PR REMOVAL IMPACTED CERUMEN REQUIRING INSTRUMENTATION, UNILATERAL: ICD-10-PCS | Mod: 51,S$GLB,, | Performed by: OTOLARYNGOLOGY

## 2023-03-13 PROCEDURE — 99213 OFFICE O/P EST LOW 20 MIN: CPT | Mod: 25,S$GLB,, | Performed by: OTOLARYNGOLOGY

## 2023-03-13 PROCEDURE — 1159F PR MEDICATION LIST DOCUMENTED IN MEDICAL RECORD: ICD-10-PCS | Mod: CPTII,S$GLB,, | Performed by: OTOLARYNGOLOGY

## 2023-03-13 PROCEDURE — 3066F NEPHROPATHY DOC TX: CPT | Mod: CPTII,S$GLB,, | Performed by: OTOLARYNGOLOGY

## 2023-03-13 PROCEDURE — 31231 NASAL ENDOSCOPY DX: CPT | Mod: S$GLB,,, | Performed by: OTOLARYNGOLOGY

## 2023-03-13 PROCEDURE — 99213 PR OFFICE/OUTPT VISIT, EST, LEVL III, 20-29 MIN: ICD-10-PCS | Mod: 25,S$GLB,, | Performed by: OTOLARYNGOLOGY

## 2023-03-13 PROCEDURE — 3008F PR BODY MASS INDEX (BMI) DOCUMENTED: ICD-10-PCS | Mod: CPTII,S$GLB,, | Performed by: OTOLARYNGOLOGY

## 2023-03-13 PROCEDURE — 69210 REMOVE IMPACTED EAR WAX UNI: CPT | Mod: 51,S$GLB,, | Performed by: OTOLARYNGOLOGY

## 2023-03-13 PROCEDURE — 3072F PR LOW RISK FOR RETINOPATHY: ICD-10-PCS | Mod: CPTII,S$GLB,, | Performed by: OTOLARYNGOLOGY

## 2023-03-13 RX ORDER — TINIDAZOLE 500 MG/1
2 TABLET ORAL
Qty: 8 TABLET | Refills: 0 | Status: SHIPPED | OUTPATIENT
Start: 2023-03-13 | End: 2023-03-15

## 2023-03-13 NOTE — PROGRESS NOTES
OTOLARYNGOLOGY CLINIC NOTE  Date:  03/13/2023     Chief complaint:  Chief Complaint   Patient presents with    Follow-up     3 month follow up for polyps and ear cleaning.     Cerumen Impaction     Right ear > left ear       History of Present Illness  Valencia Dumont is a 52 y.o. female  presenting today for a followup.  Ears click when she talks- started up last week . In both ears  No fluid sensation or blockage  Dry Cough x 2 weeks and inhaler helps   Breathing through nose ok   Nose gets dry , doing saline   No drainage from the nose      Has history of left nasal polyposis s/p FESS (b/l ethmoid, maxillary) 2-4-21 for crs with polyp on left     Has problems breathing with laying flat, not during sleep just laying down and watching tv    Nose gets red sometimes but not sure if finger get red or white ( ie does not sound like has other findings that could be raynauds)          Past Medical History  Past Medical History:   Diagnosis Date    Abnormal Pap smear of cervix     Anemia     Arthritis     Ascites     Asthma     Chronic back pain     Cirrhosis of liver without mention of alcohol 10/18/2013    Diabetes mellitus     Esophageal varices     GERD (gastroesophageal reflux disease)     Herpes simplex virus (HSV) infection     HSV2.    Hypertension     Kidney stone     Lupus     Osteoporosis 12/2013    Prophylactic immunotherapy (transplant immunosuppression) 1/2/2014    SBP (spontaneous bacterial peritonitis)     history of         Past Surgical History  Past Surgical History:   Procedure Laterality Date    BREAST BIOPSY Left 08/2020    CHOLECYSTECTOMY      COLONOSCOPY N/A 05/31/2017    Procedure: COLONOSCOPY;  Surgeon: Marky Sun MD;  Location: [address]);  Service: Endoscopy;  Laterality: N/A;  PM prep.    CONIZATION OF CERVIX USING LOOP ELECTROSURGICAL EXCISION PROCEDURE (LEEP) N/A 1/20/2023    Procedure: CONIZATION-CERVICAL-LEEP;  Surgeon: Lu Schreiber MD;  Location: [facility];  Service:  "OB/GYN;  Laterality: N/A;    ESOPHAGOGASTRODUODENOSCOPY      ESOPHAGOGASTRODUODENOSCOPY N/A 01/16/2019    Procedure: EGD (ESOPHAGOGASTRODUODENOSCOPY);  Surgeon: Deniz Guerra MD;  Location: [facility] (4TH FLR);  Service: Endoscopy;  Laterality: N/A;  labs prior, s/p liver transplant-MS    ESOPHAGOGASTRODUODENOSCOPY N/A 09/09/2020    Procedure: EGD (ESOPHAGOGASTRODUODENOSCOPY);  Surgeon: Davion Ríos MD;  Location: [facility] (4TH FLR);  Service: Endoscopy;  Laterality: N/A;  Please order the EGD at least 2 weeks after barium esophagram has been done EGD is for dysphagia  covid test 9/6-[facility]    EYE SURGERY      FUNCTIONAL ENDOSCOPIC SINUS SURGERY (FESS) USING COMPUTER-ASSISTED NAVIGATION Bilateral 02/04/2021    Procedure: FESS, USING COMPUTER-ASSISTED NAVIGATION;  Surgeon: Delores Lewis MD;  Location: [facility];  Service: ENT;  Laterality: Bilateral;  [employer] WALDEMAR [phone] TEXTED HIM @ 2:45PM ON 1-8-2021  DISC LOADED BT TOMMY 1-  RN PREOP 1/28/2021---COVID NEGATIVE ON 2/3--CONSENT INCOMPLETE  H/P INCOMPLETE  --CBC IN AM    LIVER BIOPSY      LIVER TRANSPLANT  12/31/2013    REFRACTIVE SURGERY Bilateral 2010    TUBAL LIGATION  2003    UPPER GASTROINTESTINAL ENDOSCOPY          Medications  Current Outpatient Medications on File Prior to Visit   Medication Sig Dispense Refill    acetaminophen (TYLENOL) 650 MG TbSR Take 1 tablet (650 mg total) by mouth every 6 (six) hours. 60 tablet 0    amitriptyline (ELAVIL) 10 MG tablet Take 1 tablet (10 mg total) by mouth every evening. 90 tablet 2    azelastine (ASTELIN) 137 mcg (0.1 %) nasal spray 1 spray (137 mcg total) by Nasal route 2 (two) times daily. 30 mL 11    BD ULTRA-FINE JANESSA PEN NEEDLE 32 gauge x 5/32" Ndle For five times daily insulin injections 450 each 3    blood-glucose meter,continuous (DEXCOM G6 ) Misc 1 each by Misc.(Non-Drug; Combo Route) route once. for 1 dose 1 each 0    blood-glucose transmitter (DEXCOM G6 TRANSMITTER) " Karen 1 each by Misc.(Non-Drug; Combo Route) route once a week 1 Device 3    budesonide-formoterol 160-4.5 mcg (SYMBICORT) 160-4.5 mcg/actuation HFAA Inhale 2 puffs into the lungs.      ciclopirox (PENLAC) 8 % Soln Apply topically nightly. 6.6 mL 3    clotrimazole-betamethasone 1-0.05% (LOTRISONE) cream APPLY TOPICALLY TO THE AFFECTED AREA TWICE DAILY FOR 10 DAYS      diazepam (VALIUM) 5 MG tablet Take 5 mg by mouth 3 (three) times daily as needed.   0    diclofenac sodium (VOLTAREN) 1 % Gel APPLY 2 GRAM to wrist and 4 g to left foot TOPICAL ROUTE 4 TIMES PER  each 2    dicyclomine (BENTYL) 10 MG capsule TAKE 1 CAPSULE BY MOUTH EVERY 8 HOURS AS NEEDED for spasms      DILTIAZEM HCL (DILTIAZEM 2% CREAM) Apply topically 3 (three) times daily. Apply topically to anal area. 30 g 0    diphenoxylate-atropine 2.5-0.025 mg (LOMOTIL) 2.5-0.025 mg per tablet Take 1 tablet by mouth 4 (four) times daily as needed.      dulaglutide (TRULICITY) 0.75 mg/0.5 mL pen injector Inject 0.75 mg into the skin every 7 days. 12 pen 3    ergocalciferol (ERGOCALCIFEROL) 50,000 unit Cap Take 50,000 Units by mouth every 7 days.      erythromycin with ethanol (EMGEL) 2 % gel ADD 30GM (1 TUBE) TO THE SOAKING DEVICE AND ALLOW WATER TO AGITATE. PLACE AFFECTED AREAS INTO WATER AND SOAK FOR 10 MINUTES 1-2 TIMES DAILY  1    estradioL (ESTRACE) 0.01 % (0.1 mg/gram) vaginal cream Place 1 g vaginally twice a week. 42 g 3    famotidine (PEPCID) 40 MG tablet       ferrous sulfate (FEOSOL) 325 mg (65 mg iron) Tab tablet Take 1 tablet (325 mg total) by mouth every 12 (twelve) hours. 60 tablet 4    flash glucose scanning reader (FREESTYLE CHUN 14 DAY READER) Misc 1 each by Misc.(Non-Drug; Combo Route) route once daily. 1 each 0    flash glucose sensor (FREESTYLE CHUN 14 DAY SENSOR) Kit 2 each by Misc.(Non-Drug; Combo Route) route every 14 (fourteen) days. 2 kit 11    fluconazole (DIFLUCAN) 150 MG Tab TAKE ONE TABLET BY MOUTH once for ONE dose 1 tablet  0    fluocinolone (SYNALAR) 0.01 % external solution APPLY a couple of DROPS INTO BOTH EARS TWICE DAILY AS NEEDED FOR ITCHING 60 mL 1    fluticasone propionate (FLONASE) 50 mcg/actuation nasal spray 1 spray by Each Nostril route 2 (two) times daily.      fluticasone propionate (FLONASE) 50 mcg/actuation nasal spray 2 sprays (100 mcg total) by Each Nostril route 2 (two) times daily. 18.2 mL 11    gabapentin (NEURONTIN) 300 MG capsule TAKE 1 CAPSULE BY MOUTH THREE TIMES DAILY      gentamicin (GARAMYCIN) 0.1 % cream ADD 30GM (1 TUBE) TO THE SOAKING DEVICE AND ALLOW WATER TO AGITATE. PLACE AFFECTED AREAS INTO WATER AND SOAK FOR 10 MINUTES 1-2 TIMES DAILY  1    hydrocortisone (ANUSOL-HC) 2.5 % rectal cream Place rectally 2 (two) times daily. Apply per rectum bid 30 g 3    hydrOXYchloroQUINE (PLAQUENIL) 200 mg tablet TAKE ONE TABLET BY MOUTH TWICE DAILY 180 tablet 1    hydrOXYzine HCl (ATARAX) 10 MG Tab TAKE 1 OR 2 TABLETS BY MOUTH THREE TIMES DAILY AS NEEDED FOR ITCHING.  0    insulin detemir U-100 (LEVEMIR FLEXTOUCH U-100 INSULN) 100 unit/mL (3 mL) InPn pen INJECT FOUR units UNDER THE SKIN TWICE DAILY 45 mL 3    insulin lispro (HUMALOG KWIKPEN INSULIN) 100 unit/mL pen 3 units before BREAKFAST and lunch and FIVE units before dinner with sliding scale, up to 25 units daily 45 mL 3    isosorbide mononitrate (IMDUR) 30 MG 24 hr tablet Take 30 mg by mouth once daily.      ketoconazole (NIZORAL) 2 % cream ADD 30GM (1/2 TUBE) TO THE SOAKING DEVICE AND ALLOW WATER TO AGITATE. PLACE AFFECTED AREAS INTO WATER AND SOAK FOR 10 MINUTES 1-2 TIMES BRENDON  1    Lactobac. rhamnosus GG-inulin 10 billion cell -200 mg Cap Take 1 capsule by mouth 2 (two) times daily. 30 capsule 0    levocetirizine (XYZAL) 5 MG tablet Take 5 mg by mouth every evening.  3    LIDOcaine-prilocaine (EMLA) cream 2 (two) times daily. Apply to affected area      losartan (COZAAR) 100 MG tablet Take 1 tablet (100 mg total) by mouth once daily. 30 tablet 2    magnesium  oxide 500 mg Tab Take 500 mg by mouth 2 (two) times daily. 60 each 6    meclizine (ANTIVERT) 25 mg tablet TAKE ONE TABLET BY MOUTH AT BEDTIME AS NEEDED for dizziness.  0    mometasone 0.1% (ELOCON) 0.1 % cream APPLY TOPICALLY TO THE FACE TWICE DAILY FOR ONE WEEK      MULTIVIT,THER IRON,CA,FA & MIN (MULTIVITAMIN) Tab Take 1 tablet by mouth once daily. 30 tablet 0    mycophenolate (MYFORTIC) 180 MG TbEC Take 2 tablets (360 mg total) by mouth 2 (two) times daily. 120 tablet 11    neomycin-polymyxin-hydrocortisone (CORTISPORIN) otic solution INSTILL TWO DROPS INTO BOTH EARS FOUR TIMES DAILY FOR 10 DAYS  0    NURTEC 75 mg odt PLACE ONE TABLET on tongue, alternatively UNDER THE TONGUE ONCE DAILY AS NEEDED FOR MIGRAINE 8 tablet 2    nystatin (MYCOSTATIN) 100,000 unit/mL suspension SWISH AND SPIT FIVE MILLILITERS BY MOUTH FOUR TIMES DAILY FOR 7 DAYS.  1    ondansetron (ZOFRAN) 4 MG tablet TAKE TWO TABLETS BY MOUTH EVERY 8 HOURS AS NEEDED FOR NAUSEA.      oxyCODONE (ROXICODONE) 5 MG immediate release tablet Take 1 tablet (5 mg total) by mouth every 4 (four) hours as needed for Pain. 5 tablet 0    oxycodone-acetaminophen (PERCOCET) 7.5-325 mg per tablet Take 1 tablet by mouth every 4 (four) hours as needed for Pain.      polyethylene glycol (GLYCOLAX) 17 gram/dose powder Mix 1 capful (17 g) with liquid and by mouth 2 (two) times daily. 1530 g 11    PROAIR HFA 90 mcg/actuation inhaler Inhale 2 puffs into the lungs 3 (three) times daily as needed.   3    promethazine-dextromethorphan (PROMETHAZINE-DM) 6.25-15 mg/5 mL Syrp Take by mouth 2 (two) times daily as needed.   0    rosuvastatin (CRESTOR) 5 MG tablet Take 5 mg by mouth once daily.      SENNA 8.6 mg tablet Take 1 tablet by mouth 2 (two) times daily. Take while taking narcotics 30 tablet 0    sertraline (ZOLOFT) 50 MG tablet Take 50 mg by mouth once daily.  11    SSD 1 % cream 1 application 2 (two) times daily. Apply to affected area  0    tacrolimus (PROGRAF) 1 MG Cap  take 2 CAPSULES BY MOUTH EVERY MORNING & 2 CAPSULES EVERY EVENING 120 capsule 7    topiramate (TOPAMAX) 50 MG tablet SMARTSI Tablet(s) By Mouth Every Evening      triamcinolone acetonide 0.1% (KENALOG) 0.1 % cream Apply topically 2 times daily 30 g 0    valACYclovir (VALTREX) 1000 MG tablet Take 1,000 mg by mouth once daily.      verapamil (CALAN-SR) 120 MG CR tablet TAKE ONE TABLET ONCE DAILY FOR BLOOD PRESSURE  3    zolpidem (AMBIEN) 10 mg Tab Take 10 mg by mouth nightly as needed.  3    buPROPion (WELLBUTRIN XL) 150 MG TB24 tablet Take 300 mg by mouth.      loratadine (CLARITIN) 10 mg tablet Take 1 tablet (10 mg total) by mouth once daily. 30 tablet 0    nystatin-triamcinolone (MYCOLOG II) cream Apply to affected area 2 times daily 30 g 1    pantoprazole (PROTONIX) 40 MG tablet Take 1 tablet (40 mg total) by mouth 2 (two) times daily. Take immediately in am when wake up and then 30 minutes prior to dinner 60 tablet 11    [] tinidazole (TINDAMAX) 500 MG tablet Take 4 tablets (2 g total) by mouth daily with breakfast. for 2 doses 8 tablet 0     No current facility-administered medications on file prior to visit.       Review of Systems  Review of Systems   Constitutional:  Positive for malaise/fatigue.   HENT:  Positive for ear pain.    Eyes: Negative.    Respiratory: Negative.     Cardiovascular: Negative.    Gastrointestinal: Negative.    Genitourinary: Negative.    Musculoskeletal:  Positive for back pain and neck pain.   Skin: Negative.    Neurological: Negative.    Endo/Heme/Allergies:  Bruises/bleeds easily.   Psychiatric/Behavioral: Negative.        Answers submitted by the patient for this visit:  Review of Symptoms Questionnaire  (Submitted on 3/13/2023)  trouble swallowing: Yes  Muscle aches / pain?: Yes  Social History   reports that she has never smoked. She has never used smokeless tobacco. She reports current alcohol use of about 1.0 standard drink per week. She reports that she does not use  "drugs.     Family History  Family History   Problem Relation Age of Onset    Hypertension Mother     Cataracts Mother     Diabetes Father     Alzheimer's disease Father     Diabetes Paternal Grandfather     Diabetes Paternal Grandmother     No Known Problems Maternal Grandmother     Bone cancer Maternal Grandfather     Breast cancer Maternal Aunt 55    Hypertension Brother     Diabetes Brother     No Known Problems Sister     No Known Problems Maternal Uncle     No Known Problems Paternal Aunt     No Known Problems Paternal Uncle     Stroke Neg Hx     Cancer Neg Hx     Colon cancer Neg Hx     Esophageal cancer Neg Hx     Stomach cancer Neg Hx     Rectal cancer Neg Hx     Amblyopia Neg Hx     Blindness Neg Hx     Glaucoma Neg Hx     Macular degeneration Neg Hx     Retinal detachment Neg Hx     Strabismus Neg Hx     Thyroid disease Neg Hx         Physical Exam   There were no vitals filed for this visit. Body mass index is 22 kg/m².  Weight: 67.6 kg (149 lb)   Height: 5' 9" (175.3 cm)     GENERAL: no acute distress.  HEAD: normocephalic.   EYES: No scleral icterus  EARS: external ear without lesion, normal pinna shape and position. left External auditory canal with normal cerumen, tympanic membrane fully visible, no perforation , no retraction. No middle ear effusion. Ossicles intact. Excess cerumen in right ear  NOSE: external nose without significant bony abnormality  ORAL CAVITY/OROPHARYNX: tongue mobile.   NECK: trachea midline.   LYMPH NODES:No cervical lymphadenopathy.  RESPIRATORY: no stridor, no stertor. Voice normal. Respirations nonlabored.  NEURO: alert, responds to questions appropriately.    PSYCH:mood appropriate    Procedure Note   Procedure performed:examination of ear with cerumen disimpaction    Indication for procedure: right cerumen impaction     Description of procedure:  After verbal consent was obtained and with the patient in seated position and the operating head otoscope was inserted into the " right ear.  Otologic instruments including various size otologic suctions and curette were used to remove the cerumen from the right external auditory canals under visualization with the operating head otoscope. After cleaning, visualization was again performed  of the ear canal, tympanic membrane, ossicles and middle ear space. Findings as indicated below. All portions of the procedure and examination were tolerated well without complication.     Findings:  Right ear: near cerumen impaction removed entirely revealing normal external auditory canal; tympanic membrane without bulging, retraction, or perforation; no evidence of middle ear fluid or effusion.       PROCEDURE NOTE  Procedure: diagnostic rigid nasal endoscopy  Indications for procedure:h/o crs with polyps    Consent: procedure was explained in detail and verbal consent was obtained.   Anesthesia:4% lidocaine with neosynephrine  Procedure in detail: With the patient in the seated position, the zero degree endoscope was inserted atraumatically into the bilateral nasal cavities and advance to the nasopharynx with the following areas examined with findings as described below.     Nasal cavity:no polyps or mass, no purulent drainage, no bleeding  Septum: no perforation   Turbinates:  inferior turbinates with mild hypertrophy; middle turbinates without synechiae but not well medialized  Maxillary antrostomy and ethmoid bed with Some edema of left side , no recurrence of polyp, minimal edema on right  Nasopharynx: no mass or lesion in the nasopharynx. Base of sphenoid without edema    The scope was removed atraumatically without complication. The patient tolerated the procedure well. Photodocumentation obtained with representative images below, all images and/or videos uploaded in media section of epic.                                          Imaging:  The patient does not have any new imaging of the head and neck since last visit.   MRI    Labs:  CBC  Recent  Labs   Lab 11/28/22  1220 12/15/22  1007 02/27/23  1140   WBC 3.94 3.82 L 5.02   Hemoglobin 13.0 12.4 11.9 L   Hematocrit 36.6 L 36.7 L 34.9 L   MCV 84 85 83   Platelets 199 188 194     BMP  Recent Labs   Lab 05/13/20  1111 06/11/20  1121 08/03/22  1033 08/19/22  1126 08/26/22  1025 09/14/22  1045 11/28/22  1220 12/15/22  1007 01/18/23  1152 02/27/23  1140   Glucose 108   < > 139 H 138 H 107   < > 141 H 143 H 210 H 154 H   Sodium 140   < > 139 140 139   < > 138 138 136 142   Potassium 4.3   < > 4.1 4.0 4.4   < > 4.5 3.9 4.4 4.3   Chloride 104   < > 104 105 102   < > 105 108 104 104   CO2 29   < > 29 26 28   < > 28 23 26 28   BUN 21 H   < > 22 H 22 H 24 H   < > 19 22 H 22 H 24 H   Creatinine 1.4   < > 1.1 1.6 H 1.6 H   < > 1.3 1.5 H 1.8 H 1.5 H   Calcium 9.8   < > 9.1 9.6 10.0   < > 9.3 9.2 9.0 9.4   Phosphorus 2.7  --   --   --  3.9  --   --   --   --   --    Magnesium  --    < > 1.9 2.2  --   --  2.1  --   --   --     < > = values in this interval not displayed.     COAGS  Recent Labs   Lab 07/22/20  0826 08/18/21  1100   INR 0.9 1.0       Assessment  1. Seasonal allergic rhinitis, unspecified trigger    2. Status post functional endoscopic sinus surgery (FESS)    3. Arthralgia of right temporomandibular joint    4. Nasal polyp    5. Impacted cerumen of right ear       Plan:  Discussed plan of care with patient in detail and all questions answered. Patient reported understanding of plan of care.   Needs to do saline each time prior to medication nasal sprays    Ok for hearing aid   I think clicking is tmj , can try hearing aid if interested   F/u 6 months with rigid for polyp check, sooner If issue    I spent a total of 20 minutes on the day of the visit.  This includes face to face time and non-face to face time preparing to see the patient (eg, review of tests), obtaining and/or reviewing separately obtained history, documenting clinical information in the electronic or other health record, independently  interpreting results and communicating results to the patient/family/caregiver, or care coordinator.   Please be aware that this note has been generated with the assistance of [employer] voice-to-text.  Please excuse any spelling or grammatical errors.

## 2023-03-27 ENCOUNTER — PATIENT MESSAGE (OUTPATIENT)
Dept: OPTOMETRY | Facility: CLINIC | Age: 53
End: 2023-03-27
Payer: MEDICARE

## 2023-03-29 ENCOUNTER — INFUSION (OUTPATIENT)
Dept: INFUSION THERAPY | Facility: HOSPITAL | Age: 53
End: 2023-03-29
Attending: INTERNAL MEDICINE
Payer: MEDICARE

## 2023-03-29 VITALS
BODY MASS INDEX: 21.98 KG/M2 | SYSTOLIC BLOOD PRESSURE: 150 MMHG | DIASTOLIC BLOOD PRESSURE: 71 MMHG | TEMPERATURE: 98 F | WEIGHT: 148.38 LBS | OXYGEN SATURATION: 98 % | HEIGHT: 69 IN | HEART RATE: 83 BPM | RESPIRATION RATE: 18 BRPM

## 2023-03-29 DIAGNOSIS — M81.0 OSTEOPOROSIS, UNSPECIFIED OSTEOPOROSIS TYPE, UNSPECIFIED PATHOLOGICAL FRACTURE PRESENCE: Primary | ICD-10-CM

## 2023-03-29 DIAGNOSIS — N18.32 STAGE 3B CHRONIC KIDNEY DISEASE: ICD-10-CM

## 2023-03-29 DIAGNOSIS — T38.0X5S ADVERSE EFFECT OF GLUCOCORTICOID OR SYNTHETIC ANALOGUE, SEQUELA: ICD-10-CM

## 2023-03-29 DIAGNOSIS — M32.9 SYSTEMIC LUPUS ERYTHEMATOSUS, UNSPECIFIED SLE TYPE, UNSPECIFIED ORGAN INVOLVEMENT STATUS: ICD-10-CM

## 2023-03-29 LAB
ALBUMIN SERPL BCP-MCNC: 3.9 G/DL (ref 3.5–5.2)
ALP SERPL-CCNC: 107 U/L (ref 55–135)
ALT SERPL W/O P-5'-P-CCNC: 13 U/L (ref 10–44)
ANION GAP SERPL CALC-SCNC: 8 MMOL/L (ref 8–16)
AST SERPL-CCNC: 11 U/L (ref 10–40)
B-HCG UR QL: NEGATIVE
BILIRUB SERPL-MCNC: 0.5 MG/DL (ref 0.1–1)
BUN SERPL-MCNC: 29 MG/DL (ref 6–20)
CALCIUM SERPL-MCNC: 8.9 MG/DL (ref 8.7–10.5)
CHLORIDE SERPL-SCNC: 102 MMOL/L (ref 95–110)
CO2 SERPL-SCNC: 26 MMOL/L (ref 23–29)
CREAT SERPL-MCNC: 1.6 MG/DL (ref 0.5–1.4)
EST. GFR  (NO RACE VARIABLE): 39 ML/MIN/1.73 M^2
GLUCOSE SERPL-MCNC: 111 MG/DL (ref 70–110)
POTASSIUM SERPL-SCNC: 4.4 MMOL/L (ref 3.5–5.1)
PROT SERPL-MCNC: 6.7 G/DL (ref 6–8.4)
SODIUM SERPL-SCNC: 136 MMOL/L (ref 136–145)

## 2023-03-29 PROCEDURE — 63600175 PHARM REV CODE 636 W HCPCS: Mod: JZ,TB | Performed by: INTERNAL MEDICINE

## 2023-03-29 PROCEDURE — 81025 URINE PREGNANCY TEST: CPT | Performed by: INTERNAL MEDICINE

## 2023-03-29 PROCEDURE — 80053 COMPREHEN METABOLIC PANEL: CPT | Performed by: INTERNAL MEDICINE

## 2023-03-29 PROCEDURE — 63600175 PHARM REV CODE 636 W HCPCS: Mod: JZ,JA,JG | Performed by: INTERNAL MEDICINE

## 2023-03-29 PROCEDURE — 96375 TX/PRO/DX INJ NEW DRUG ADDON: CPT

## 2023-03-29 PROCEDURE — 25000003 PHARM REV CODE 250: Performed by: INTERNAL MEDICINE

## 2023-03-29 PROCEDURE — 96365 THER/PROPH/DIAG IV INF INIT: CPT

## 2023-03-29 PROCEDURE — 96372 THER/PROPH/DIAG INJ SC/IM: CPT | Mod: 59

## 2023-03-29 RX ORDER — DIPHENHYDRAMINE HYDROCHLORIDE 50 MG/ML
25 INJECTION INTRAMUSCULAR; INTRAVENOUS
Status: COMPLETED | OUTPATIENT
Start: 2023-03-29 | End: 2023-03-29

## 2023-03-29 RX ORDER — ACETAMINOPHEN 325 MG/1
650 TABLET ORAL
Status: COMPLETED | OUTPATIENT
Start: 2023-03-29 | End: 2023-03-29

## 2023-03-29 RX ORDER — ACETAMINOPHEN 325 MG/1
650 TABLET ORAL
Status: CANCELLED | OUTPATIENT
Start: 2023-04-26

## 2023-03-29 RX ORDER — HEPARIN 100 UNIT/ML
500 SYRINGE INTRAVENOUS
Status: CANCELLED | OUTPATIENT
Start: 2023-04-26

## 2023-03-29 RX ORDER — SODIUM CHLORIDE 0.9 % (FLUSH) 0.9 %
10 SYRINGE (ML) INJECTION
Status: CANCELLED | OUTPATIENT
Start: 2023-04-26

## 2023-03-29 RX ORDER — DIPHENHYDRAMINE HYDROCHLORIDE 50 MG/ML
25 INJECTION INTRAMUSCULAR; INTRAVENOUS
Status: CANCELLED | OUTPATIENT
Start: 2023-04-26

## 2023-03-29 RX ADMIN — BELIMUMAB 673 MG: 400 INJECTION, POWDER, LYOPHILIZED, FOR SOLUTION INTRAVENOUS at 11:03

## 2023-03-29 RX ADMIN — DIPHENHYDRAMINE HYDROCHLORIDE 25 MG: 50 INJECTION, SOLUTION INTRAMUSCULAR; INTRAVENOUS at 11:03

## 2023-03-29 RX ADMIN — ACETAMINOPHEN 650 MG: 325 TABLET ORAL at 11:03

## 2023-03-29 RX ADMIN — ROMOSOZUMAB-AQQG 210 MG: 105 INJECTION, SOLUTION SUBCUTANEOUS at 01:03

## 2023-03-29 NOTE — PLAN OF CARE
Pt arrived to unit for her scheduled q4wks maintenance Benlysta and Evenity. Plan of care reviewed. No new or worsening complaints voiced. VSS. Given pre-meds of Tylenol and Benadryl IVP. Benlysta infused over 1 hour. Evenity injection given. Pt tolerated well. Discharged from unit in NAD.

## 2023-04-04 ENCOUNTER — PATIENT MESSAGE (OUTPATIENT)
Dept: TRANSPLANT | Facility: CLINIC | Age: 53
End: 2023-04-04
Payer: MEDICARE

## 2023-04-06 ENCOUNTER — PATIENT MESSAGE (OUTPATIENT)
Dept: TRANSPLANT | Facility: CLINIC | Age: 53
End: 2023-04-06
Payer: MEDICARE

## 2023-04-06 ENCOUNTER — PATIENT MESSAGE (OUTPATIENT)
Dept: HEPATOLOGY | Facility: CLINIC | Age: 53
End: 2023-04-06
Payer: MEDICARE

## 2023-04-10 ENCOUNTER — PATIENT MESSAGE (OUTPATIENT)
Dept: TRANSPLANT | Facility: CLINIC | Age: 53
End: 2023-04-10
Payer: MEDICARE

## 2023-04-10 ENCOUNTER — PATIENT MESSAGE (OUTPATIENT)
Dept: HEPATOLOGY | Facility: CLINIC | Age: 53
End: 2023-04-10
Payer: MEDICARE

## 2023-04-23 ENCOUNTER — PATIENT MESSAGE (OUTPATIENT)
Dept: TRANSPLANT | Facility: CLINIC | Age: 53
End: 2023-04-23
Payer: MEDICARE

## 2023-04-26 ENCOUNTER — PATIENT MESSAGE (OUTPATIENT)
Dept: RHEUMATOLOGY | Facility: CLINIC | Age: 53
End: 2023-04-26
Payer: MEDICARE

## 2023-04-26 ENCOUNTER — INFUSION (OUTPATIENT)
Dept: INFUSION THERAPY | Facility: HOSPITAL | Age: 53
End: 2023-04-26
Attending: INTERNAL MEDICINE
Payer: MEDICARE

## 2023-04-26 VITALS
DIASTOLIC BLOOD PRESSURE: 66 MMHG | SYSTOLIC BLOOD PRESSURE: 146 MMHG | HEART RATE: 83 BPM | OXYGEN SATURATION: 97 % | RESPIRATION RATE: 18 BRPM

## 2023-04-26 DIAGNOSIS — M32.9 SYSTEMIC LUPUS ERYTHEMATOSUS, UNSPECIFIED SLE TYPE, UNSPECIFIED ORGAN INVOLVEMENT STATUS: ICD-10-CM

## 2023-04-26 DIAGNOSIS — Z94.4 LIVER TRANSPLANTED: ICD-10-CM

## 2023-04-26 DIAGNOSIS — T38.0X5S ADVERSE EFFECT OF GLUCOCORTICOID OR SYNTHETIC ANALOGUE, SEQUELA: ICD-10-CM

## 2023-04-26 DIAGNOSIS — N18.32 STAGE 3B CHRONIC KIDNEY DISEASE: ICD-10-CM

## 2023-04-26 DIAGNOSIS — M81.0 OSTEOPOROSIS, UNSPECIFIED OSTEOPOROSIS TYPE, UNSPECIFIED PATHOLOGICAL FRACTURE PRESENCE: ICD-10-CM

## 2023-04-26 LAB
ALBUMIN SERPL BCP-MCNC: 3.6 G/DL (ref 3.5–5.2)
ALBUMIN SERPL BCP-MCNC: 3.9 G/DL (ref 3.5–5.2)
ALP SERPL-CCNC: 94 U/L (ref 55–135)
ALP SERPL-CCNC: 96 U/L (ref 55–135)
ALT SERPL W/O P-5'-P-CCNC: 19 U/L (ref 10–44)
ALT SERPL W/O P-5'-P-CCNC: 8 U/L (ref 10–44)
ANION GAP SERPL CALC-SCNC: 1 MMOL/L (ref 8–16)
ANION GAP SERPL CALC-SCNC: 5 MMOL/L (ref 8–16)
AST SERPL-CCNC: 13 U/L (ref 10–40)
AST SERPL-CCNC: 70 U/L (ref 10–40)
BILIRUB SERPL-MCNC: 0.4 MG/DL (ref 0.1–1)
BILIRUB SERPL-MCNC: 0.5 MG/DL (ref 0.1–1)
BUN SERPL-MCNC: 25 MG/DL (ref 6–20)
BUN SERPL-MCNC: 26 MG/DL (ref 6–20)
CALCIUM SERPL-MCNC: 8.6 MG/DL (ref 8.7–10.5)
CALCIUM SERPL-MCNC: 8.6 MG/DL (ref 8.7–10.5)
CHLORIDE SERPL-SCNC: 103 MMOL/L (ref 95–110)
CHLORIDE SERPL-SCNC: 107 MMOL/L (ref 95–110)
CO2 SERPL-SCNC: 25 MMOL/L (ref 23–29)
CO2 SERPL-SCNC: 27 MMOL/L (ref 23–29)
CREAT SERPL-MCNC: 1.4 MG/DL (ref 0.5–1.4)
CREAT SERPL-MCNC: 1.5 MG/DL (ref 0.5–1.4)
EST. GFR  (NO RACE VARIABLE): 41 ML/MIN/1.73 M^2
EST. GFR  (NO RACE VARIABLE): 45 ML/MIN/1.73 M^2
GLUCOSE SERPL-MCNC: 112 MG/DL (ref 70–110)
GLUCOSE SERPL-MCNC: 156 MG/DL (ref 70–110)
POTASSIUM SERPL-SCNC: 4 MMOL/L (ref 3.5–5.1)
POTASSIUM SERPL-SCNC: >9 MMOL/L (ref 3.5–5.1)
PROT SERPL-MCNC: 10.1 G/DL (ref 6–8.4)
PROT SERPL-MCNC: 6.3 G/DL (ref 6–8.4)
SODIUM SERPL-SCNC: 129 MMOL/L (ref 136–145)
SODIUM SERPL-SCNC: 139 MMOL/L (ref 136–145)

## 2023-04-26 PROCEDURE — 25000003 PHARM REV CODE 250: Performed by: INTERNAL MEDICINE

## 2023-04-26 PROCEDURE — 80053 COMPREHEN METABOLIC PANEL: CPT | Mod: 91 | Performed by: INTERNAL MEDICINE

## 2023-04-26 PROCEDURE — 96365 THER/PROPH/DIAG IV INF INIT: CPT

## 2023-04-26 PROCEDURE — 96372 THER/PROPH/DIAG INJ SC/IM: CPT

## 2023-04-26 PROCEDURE — 63600175 PHARM REV CODE 636 W HCPCS: Performed by: INTERNAL MEDICINE

## 2023-04-26 PROCEDURE — 63600175 PHARM REV CODE 636 W HCPCS: Mod: JZ,JG | Performed by: INTERNAL MEDICINE

## 2023-04-26 PROCEDURE — 96375 TX/PRO/DX INJ NEW DRUG ADDON: CPT

## 2023-04-26 PROCEDURE — 80053 COMPREHEN METABOLIC PANEL: CPT | Performed by: INTERNAL MEDICINE

## 2023-04-26 RX ORDER — ACETAMINOPHEN 325 MG/1
650 TABLET ORAL
Status: COMPLETED | OUTPATIENT
Start: 2023-04-26 | End: 2023-04-26

## 2023-04-26 RX ORDER — DIPHENHYDRAMINE HYDROCHLORIDE 50 MG/ML
25 INJECTION INTRAMUSCULAR; INTRAVENOUS
Status: COMPLETED | OUTPATIENT
Start: 2023-04-26 | End: 2023-04-26

## 2023-04-26 RX ORDER — DIPHENHYDRAMINE HYDROCHLORIDE 50 MG/ML
25 INJECTION INTRAMUSCULAR; INTRAVENOUS
Status: CANCELLED | OUTPATIENT
Start: 2023-05-24

## 2023-04-26 RX ORDER — HEPARIN 100 UNIT/ML
500 SYRINGE INTRAVENOUS
Status: CANCELLED | OUTPATIENT
Start: 2023-05-24

## 2023-04-26 RX ORDER — SODIUM CHLORIDE 0.9 % (FLUSH) 0.9 %
10 SYRINGE (ML) INJECTION
Status: CANCELLED | OUTPATIENT
Start: 2023-05-24

## 2023-04-26 RX ORDER — ACETAMINOPHEN 325 MG/1
650 TABLET ORAL
Status: CANCELLED | OUTPATIENT
Start: 2023-05-24

## 2023-04-26 RX ADMIN — DIPHENHYDRAMINE HYDROCHLORIDE 25 MG: 50 INJECTION, SOLUTION INTRAMUSCULAR; INTRAVENOUS at 11:04

## 2023-04-26 RX ADMIN — BELIMUMAB 673 MG: 400 INJECTION, POWDER, LYOPHILIZED, FOR SOLUTION INTRAVENOUS at 11:04

## 2023-04-26 RX ADMIN — ACETAMINOPHEN 650 MG: 325 TABLET ORAL at 11:04

## 2023-04-26 RX ADMIN — ROMOSOZUMAB-AQQG 210 MG: 105 INJECTION, SOLUTION SUBCUTANEOUS at 12:04

## 2023-04-26 NOTE — TELEPHONE ENCOUNTER
Potassium elevated on lab drawn by someone else but sent to me.  The repeat is normal.  Patient says she is fine.

## 2023-04-26 NOTE — PLAN OF CARE
Patient tolerated monthly evenity injections and benlysta infusion well. VSS. Discharged from unit in NAD.

## 2023-04-28 ENCOUNTER — PATIENT MESSAGE (OUTPATIENT)
Dept: TRANSPLANT | Facility: CLINIC | Age: 53
End: 2023-04-28
Payer: MEDICARE

## 2023-04-28 DIAGNOSIS — D84.9 IMMUNOSUPPRESSION: ICD-10-CM

## 2023-04-28 RX ORDER — MYCOPHENOLIC ACID 180 MG/1
360 TABLET, DELAYED RELEASE ORAL 2 TIMES DAILY
Qty: 120 TABLET | Refills: 11 | Status: SHIPPED | OUTPATIENT
Start: 2023-04-28

## 2023-04-28 RX ORDER — MYCOPHENOLIC ACID 180 MG/1
TABLET, DELAYED RELEASE ORAL
Qty: 120 TABLET | Refills: 1 | Status: SHIPPED | OUTPATIENT
Start: 2023-04-28 | End: 2023-09-19 | Stop reason: SDUPTHER

## 2023-05-16 ENCOUNTER — PATIENT MESSAGE (OUTPATIENT)
Dept: RHEUMATOLOGY | Facility: CLINIC | Age: 53
End: 2023-05-16
Payer: MEDICARE

## 2023-05-16 ENCOUNTER — TELEPHONE (OUTPATIENT)
Dept: RHEUMATOLOGY | Facility: CLINIC | Age: 53
End: 2023-05-16
Payer: MEDICARE

## 2023-05-17 ENCOUNTER — LAB VISIT (OUTPATIENT)
Dept: LAB | Facility: HOSPITAL | Age: 53
End: 2023-05-17
Attending: INTERNAL MEDICINE
Payer: MEDICARE

## 2023-05-17 ENCOUNTER — PATIENT MESSAGE (OUTPATIENT)
Dept: RHEUMATOLOGY | Facility: CLINIC | Age: 53
End: 2023-05-17
Payer: MEDICARE

## 2023-05-17 DIAGNOSIS — M32.9 SYSTEMIC LUPUS ERYTHEMATOSUS, UNSPECIFIED SLE TYPE, UNSPECIFIED ORGAN INVOLVEMENT STATUS: ICD-10-CM

## 2023-05-17 DIAGNOSIS — N18.32 STAGE 3B CHRONIC KIDNEY DISEASE: Primary | ICD-10-CM

## 2023-05-17 DIAGNOSIS — R53.83 FATIGUE, UNSPECIFIED TYPE: ICD-10-CM

## 2023-05-17 DIAGNOSIS — D84.9 IMMUNOSUPPRESSION: ICD-10-CM

## 2023-05-17 LAB
ALBUMIN SERPL BCP-MCNC: 4 G/DL (ref 3.5–5.2)
ALP SERPL-CCNC: 109 U/L (ref 55–135)
ALT SERPL W/O P-5'-P-CCNC: 12 U/L (ref 10–44)
ANION GAP SERPL CALC-SCNC: 8 MMOL/L (ref 8–16)
AST SERPL-CCNC: 11 U/L (ref 10–40)
BASOPHILS # BLD AUTO: 0.02 K/UL (ref 0–0.2)
BASOPHILS NFR BLD: 0.3 % (ref 0–1.9)
BILIRUB SERPL-MCNC: 0.4 MG/DL (ref 0.1–1)
BUN SERPL-MCNC: 39 MG/DL (ref 6–20)
CALCIUM SERPL-MCNC: 9.2 MG/DL (ref 8.7–10.5)
CHLORIDE SERPL-SCNC: 104 MMOL/L (ref 95–110)
CK SERPL-CCNC: 57 U/L (ref 20–180)
CO2 SERPL-SCNC: 28 MMOL/L (ref 23–29)
CREAT SERPL-MCNC: 1.9 MG/DL (ref 0.5–1.4)
CRP SERPL-MCNC: 0.2 MG/L (ref 0–8.2)
DIFFERENTIAL METHOD: ABNORMAL
EOSINOPHIL # BLD AUTO: 0.1 K/UL (ref 0–0.5)
EOSINOPHIL NFR BLD: 1 % (ref 0–8)
ERYTHROCYTE [DISTWIDTH] IN BLOOD BY AUTOMATED COUNT: 13.4 % (ref 11.5–14.5)
ERYTHROCYTE [SEDIMENTATION RATE] IN BLOOD BY WESTERGREN METHOD: 3 MM/HR (ref 0–20)
EST. GFR  (NO RACE VARIABLE): 31 ML/MIN/1.73 M^2
GLUCOSE SERPL-MCNC: 42 MG/DL (ref 70–110)
HCT VFR BLD AUTO: 35.9 % (ref 37–48.5)
HGB BLD-MCNC: 12 G/DL (ref 12–16)
IMM GRANULOCYTES # BLD AUTO: 0.02 K/UL (ref 0–0.04)
IMM GRANULOCYTES NFR BLD AUTO: 0.3 % (ref 0–0.5)
LYMPHOCYTES # BLD AUTO: 3 K/UL (ref 1–4.8)
LYMPHOCYTES NFR BLD: 43.5 % (ref 18–48)
MCH RBC QN AUTO: 27.6 PG (ref 27–31)
MCHC RBC AUTO-ENTMCNC: 33.4 G/DL (ref 32–36)
MCV RBC AUTO: 83 FL (ref 82–98)
MONOCYTES # BLD AUTO: 0.6 K/UL (ref 0.3–1)
MONOCYTES NFR BLD: 8.1 % (ref 4–15)
NEUTROPHILS # BLD AUTO: 3.2 K/UL (ref 1.8–7.7)
NEUTROPHILS NFR BLD: 46.8 % (ref 38–73)
NRBC BLD-RTO: 0 /100 WBC
PLATELET # BLD AUTO: 211 K/UL (ref 150–450)
PMV BLD AUTO: 9.6 FL (ref 9.2–12.9)
POTASSIUM SERPL-SCNC: 3.9 MMOL/L (ref 3.5–5.1)
PROT SERPL-MCNC: 6.8 G/DL (ref 6–8.4)
RBC # BLD AUTO: 4.35 M/UL (ref 4–5.4)
SODIUM SERPL-SCNC: 140 MMOL/L (ref 136–145)
WBC # BLD AUTO: 6.88 K/UL (ref 3.9–12.7)

## 2023-05-17 PROCEDURE — 86160 COMPLEMENT ANTIGEN: CPT | Performed by: INTERNAL MEDICINE

## 2023-05-17 PROCEDURE — 86225 DNA ANTIBODY NATIVE: CPT | Performed by: INTERNAL MEDICINE

## 2023-05-17 PROCEDURE — 86225 DNA ANTIBODY NATIVE: CPT | Mod: 59 | Performed by: INTERNAL MEDICINE

## 2023-05-17 PROCEDURE — 36415 COLL VENOUS BLD VENIPUNCTURE: CPT | Performed by: INTERNAL MEDICINE

## 2023-05-17 PROCEDURE — 86140 C-REACTIVE PROTEIN: CPT | Performed by: INTERNAL MEDICINE

## 2023-05-17 PROCEDURE — 86160 COMPLEMENT ANTIGEN: CPT | Mod: 59 | Performed by: INTERNAL MEDICINE

## 2023-05-17 PROCEDURE — 85652 RBC SED RATE AUTOMATED: CPT | Performed by: INTERNAL MEDICINE

## 2023-05-17 PROCEDURE — 80053 COMPREHEN METABOLIC PANEL: CPT | Performed by: INTERNAL MEDICINE

## 2023-05-17 PROCEDURE — 82550 ASSAY OF CK (CPK): CPT | Performed by: INTERNAL MEDICINE

## 2023-05-17 PROCEDURE — 85025 COMPLETE CBC W/AUTO DIFF WBC: CPT | Performed by: INTERNAL MEDICINE

## 2023-05-17 NOTE — PROGRESS NOTES
Order placed for dietary education with registered dietician.      Abbe Flap (Lower To Upper Lip) Text: The defect of the upper lip was assessed and measured.  Given the location and size of the defect, an Abbe flap was deemed most appropriate.  Using a sterile surgical marker, an appropriate Abbe flap was measured and drawn on the lower lip. Local anesthesia was then infiltrated. A scalpel was then used to incise the upper lip through and through the skin, vermilion, muscle and mucosa, leaving the flap pedicled on the opposite side.  The flap was then rotated and transferred to the lower lip defect.  The flap was then sutured into place with a three layer technique, closing the orbicularis oris muscle layer with subcutaneous buried sutures, followed by a mucosal layer and an epidermal layer.

## 2023-05-17 NOTE — TELEPHONE ENCOUNTER
Call from [facility] lab with panic lab value-glucose 42. For patient.  Patient called and informed of glucose value of 42.  Patient states she is not surprised of glucose result, because she did not eat  this morning before she had labs done, and after she did eat.  Patient scanned and her glucose is now 277, she states she feels ok-not feeling hypoglycemic at this time.  Dr. Crocker notified-no orders given.

## 2023-05-18 ENCOUNTER — TELEPHONE (OUTPATIENT)
Dept: RHEUMATOLOGY | Facility: CLINIC | Age: 53
End: 2023-05-18
Payer: MEDICARE

## 2023-05-18 ENCOUNTER — OFFICE VISIT (OUTPATIENT)
Dept: RHEUMATOLOGY | Facility: CLINIC | Age: 53
End: 2023-05-18
Payer: MEDICARE

## 2023-05-18 DIAGNOSIS — M32.9 SYSTEMIC LUPUS ERYTHEMATOSUS, UNSPECIFIED SLE TYPE, UNSPECIFIED ORGAN INVOLVEMENT STATUS: Primary | Chronic | ICD-10-CM

## 2023-05-18 DIAGNOSIS — R53.83 FATIGUE, UNSPECIFIED TYPE: ICD-10-CM

## 2023-05-18 LAB
C3 SERPL-MCNC: 89 MG/DL (ref 50–180)
C4 SERPL-MCNC: 25 MG/DL (ref 11–44)

## 2023-05-18 PROCEDURE — 1160F RVW MEDS BY RX/DR IN RCRD: CPT | Mod: CPTII,95,, | Performed by: INTERNAL MEDICINE

## 2023-05-18 PROCEDURE — 3066F PR DOCUMENTATION OF TREATMENT FOR NEPHROPATHY: ICD-10-PCS | Mod: CPTII,95,, | Performed by: INTERNAL MEDICINE

## 2023-05-18 PROCEDURE — 1160F PR REVIEW ALL MEDS BY PRESCRIBER/CLIN PHARMACIST DOCUMENTED: ICD-10-PCS | Mod: CPTII,95,, | Performed by: INTERNAL MEDICINE

## 2023-05-18 PROCEDURE — 1159F PR MEDICATION LIST DOCUMENTED IN MEDICAL RECORD: ICD-10-PCS | Mod: CPTII,95,, | Performed by: INTERNAL MEDICINE

## 2023-05-18 PROCEDURE — 3072F LOW RISK FOR RETINOPATHY: CPT | Mod: CPTII,95,, | Performed by: INTERNAL MEDICINE

## 2023-05-18 PROCEDURE — 3072F PR LOW RISK FOR RETINOPATHY: ICD-10-PCS | Mod: CPTII,95,, | Performed by: INTERNAL MEDICINE

## 2023-05-18 PROCEDURE — 99214 OFFICE O/P EST MOD 30 MIN: CPT | Mod: 95,,, | Performed by: INTERNAL MEDICINE

## 2023-05-18 PROCEDURE — 1159F MED LIST DOCD IN RCRD: CPT | Mod: CPTII,95,, | Performed by: INTERNAL MEDICINE

## 2023-05-18 PROCEDURE — 99214 PR OFFICE/OUTPT VISIT, EST, LEVL IV, 30-39 MIN: ICD-10-PCS | Mod: 95,,, | Performed by: INTERNAL MEDICINE

## 2023-05-18 PROCEDURE — 3066F NEPHROPATHY DOC TX: CPT | Mod: CPTII,95,, | Performed by: INTERNAL MEDICINE
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
## 2023-05-18 NOTE — TELEPHONE ENCOUNTER
----- Message from Valencia Donaldson MA sent at 5/17/2023  4:57 PM CDT -----  Contact: @[phone] ask for Chemistry depart  Glucose is 42  ----- Message -----  From: Elvi Moore  Sent: 5/17/2023   3:24 PM CDT  To: Carlita HARRIS Staff    Critical lab results, please call to discuss further.

## 2023-05-18 NOTE — PROGRESS NOTES
Rapid3 Question Responses and Scores 5/18/2023   MDHAQ Score 1.5   Psychologic Score 5.5   Pain Score 6   When you awakened in the morning OVER THE LAST WEEK, did you feel stiff? Yes   If Yes, please indicate the number of hours until you are as limber as you will be for the day 24   Fatigue Score 10   Global Health Score 6   RAPID3 Score 5.67     Answers submitted by the patient for this visit:  Rheumatology Questionnaire (Submitted on 5/18/2023)  fever: No  eye redness: No  mouth sores: No  headaches: No  shortness of breath: Yes  chest pain: No  trouble swallowing: No  diarrhea: No  constipation: No  unexpected weight change: No  genital sore: No  dysuria: No  During the last 3 days, have you had a skin rash?: No  Bruises or bleeds easily: Yes  cough: Yes

## 2023-05-22 ENCOUNTER — PATIENT MESSAGE (OUTPATIENT)
Dept: RHEUMATOLOGY | Facility: CLINIC | Age: 53
End: 2023-05-22
Payer: MEDICARE

## 2023-05-22 PROBLEM — D84.821 IMMUNOSUPPRESSION DUE TO DRUG THERAPY: Status: ACTIVE | Noted: 2019-01-08

## 2023-05-22 PROBLEM — Z79.899 IMMUNOSUPPRESSION DUE TO DRUG THERAPY: Status: ACTIVE | Noted: 2019-01-08

## 2023-05-22 LAB
DNA TITER: NORMAL
DSDNA AB SER-ACNC: POSITIVE [IU]/ML

## 2023-05-24 ENCOUNTER — INFUSION (OUTPATIENT)
Dept: INFUSION THERAPY | Facility: HOSPITAL | Age: 53
End: 2023-05-24
Attending: INTERNAL MEDICINE
Payer: MEDICARE

## 2023-05-24 VITALS
HEART RATE: 88 BPM | WEIGHT: 145.19 LBS | BODY MASS INDEX: 21.51 KG/M2 | DIASTOLIC BLOOD PRESSURE: 78 MMHG | RESPIRATION RATE: 18 BRPM | SYSTOLIC BLOOD PRESSURE: 154 MMHG | HEIGHT: 69 IN | TEMPERATURE: 99 F | OXYGEN SATURATION: 98 %

## 2023-05-24 DIAGNOSIS — T38.0X5S ADVERSE EFFECT OF GLUCOCORTICOID OR SYNTHETIC ANALOGUE, SEQUELA: ICD-10-CM

## 2023-05-24 DIAGNOSIS — M32.9 SYSTEMIC LUPUS ERYTHEMATOSUS, UNSPECIFIED SLE TYPE, UNSPECIFIED ORGAN INVOLVEMENT STATUS: Primary | ICD-10-CM

## 2023-05-24 DIAGNOSIS — N18.32 STAGE 3B CHRONIC KIDNEY DISEASE: ICD-10-CM

## 2023-05-24 DIAGNOSIS — M81.0 OSTEOPOROSIS, UNSPECIFIED OSTEOPOROSIS TYPE, UNSPECIFIED PATHOLOGICAL FRACTURE PRESENCE: ICD-10-CM

## 2023-05-24 PROCEDURE — 96375 TX/PRO/DX INJ NEW DRUG ADDON: CPT

## 2023-05-24 PROCEDURE — 96365 THER/PROPH/DIAG IV INF INIT: CPT

## 2023-05-24 PROCEDURE — 96372 THER/PROPH/DIAG INJ SC/IM: CPT

## 2023-05-24 PROCEDURE — 25000003 PHARM REV CODE 250: Performed by: INTERNAL MEDICINE

## 2023-05-24 PROCEDURE — 63600175 PHARM REV CODE 636 W HCPCS: Performed by: INTERNAL MEDICINE

## 2023-05-24 PROCEDURE — 63600175 PHARM REV CODE 636 W HCPCS: Mod: JZ,JG | Performed by: INTERNAL MEDICINE

## 2023-05-24 RX ORDER — DIPHENHYDRAMINE HYDROCHLORIDE 50 MG/ML
25 INJECTION INTRAMUSCULAR; INTRAVENOUS
Status: CANCELLED | OUTPATIENT
Start: 2023-06-21

## 2023-05-24 RX ORDER — HEPARIN 100 UNIT/ML
500 SYRINGE INTRAVENOUS
Status: CANCELLED | OUTPATIENT
Start: 2023-06-21

## 2023-05-24 RX ORDER — SODIUM CHLORIDE 0.9 % (FLUSH) 0.9 %
10 SYRINGE (ML) INJECTION
Status: CANCELLED | OUTPATIENT
Start: 2023-06-21

## 2023-05-24 RX ORDER — DIPHENHYDRAMINE HYDROCHLORIDE 50 MG/ML
25 INJECTION INTRAMUSCULAR; INTRAVENOUS
Status: COMPLETED | OUTPATIENT
Start: 2023-05-24 | End: 2023-05-24

## 2023-05-24 RX ORDER — ACETAMINOPHEN 325 MG/1
650 TABLET ORAL
Status: CANCELLED | OUTPATIENT
Start: 2023-06-21

## 2023-05-24 RX ORDER — ACETAMINOPHEN 325 MG/1
650 TABLET ORAL
Status: COMPLETED | OUTPATIENT
Start: 2023-05-24 | End: 2023-05-24

## 2023-05-24 RX ADMIN — DIPHENHYDRAMINE HYDROCHLORIDE 25 MG: 50 INJECTION, SOLUTION INTRAMUSCULAR; INTRAVENOUS at 11:05

## 2023-05-24 RX ADMIN — BELIMUMAB 659 MG: 400 INJECTION, POWDER, LYOPHILIZED, FOR SOLUTION INTRAVENOUS at 12:05

## 2023-05-24 RX ADMIN — ACETAMINOPHEN 650 MG: 325 TABLET ORAL at 11:05

## 2023-05-24 RX ADMIN — ROMOSOZUMAB-AQQG 210 MG: 105 INJECTION, SOLUTION SUBCUTANEOUS at 01:05

## 2023-05-24 NOTE — PLAN OF CARE
Pt arrived to unit for her scheduled q4wks maintenance Benlysta and Evenity. Plan of care reviewed. Most recent calcium 9.2. No new or worsening complaints voiced. VSS. Given pre-meds of Tylenol and Benadryl IVP. Benlysta infused over 1 hour. Evenity injection given. Pt tolerated well. Discharged from unit in NAD.

## 2023-05-25 ENCOUNTER — PATIENT MESSAGE (OUTPATIENT)
Dept: TRANSPLANT | Facility: CLINIC | Age: 53
End: 2023-05-25
Payer: MEDICARE

## 2023-06-22 ENCOUNTER — PATIENT MESSAGE (OUTPATIENT)
Dept: TRANSPLANT | Facility: CLINIC | Age: 53
End: 2023-06-22
Payer: MEDICARE

## 2023-06-22 ENCOUNTER — LAB VISIT (OUTPATIENT)
Dept: LAB | Facility: HOSPITAL | Age: 53
End: 2023-06-22
Attending: INTERNAL MEDICINE
Payer: MEDICARE

## 2023-06-22 DIAGNOSIS — R53.83 FATIGUE, UNSPECIFIED TYPE: ICD-10-CM

## 2023-06-22 DIAGNOSIS — D84.9 IMMUNOSUPPRESSION: ICD-10-CM

## 2023-06-22 DIAGNOSIS — M32.9 SYSTEMIC LUPUS ERYTHEMATOSUS, UNSPECIFIED SLE TYPE, UNSPECIFIED ORGAN INVOLVEMENT STATUS: ICD-10-CM

## 2023-06-22 DIAGNOSIS — M81.0 OSTEOPOROSIS, UNSPECIFIED OSTEOPOROSIS TYPE, UNSPECIFIED PATHOLOGICAL FRACTURE PRESENCE: ICD-10-CM

## 2023-06-22 DIAGNOSIS — Z94.4 LIVER TRANSPLANTED: ICD-10-CM

## 2023-06-22 LAB
ALBUMIN SERPL BCP-MCNC: 3.8 G/DL (ref 3.5–5.2)
ALBUMIN SERPL BCP-MCNC: 3.8 G/DL (ref 3.5–5.2)
ALP SERPL-CCNC: 89 U/L (ref 55–135)
ALP SERPL-CCNC: 89 U/L (ref 55–135)
ALT SERPL W/O P-5'-P-CCNC: 12 U/L (ref 10–44)
ALT SERPL W/O P-5'-P-CCNC: 12 U/L (ref 10–44)
ANION GAP SERPL CALC-SCNC: 8 MMOL/L (ref 8–16)
ANION GAP SERPL CALC-SCNC: 8 MMOL/L (ref 8–16)
AST SERPL-CCNC: 15 U/L (ref 10–40)
AST SERPL-CCNC: 15 U/L (ref 10–40)
BASOPHILS # BLD AUTO: 0.02 K/UL (ref 0–0.2)
BASOPHILS NFR BLD: 0.5 % (ref 0–1.9)
BILIRUB SERPL-MCNC: 0.3 MG/DL (ref 0.1–1)
BILIRUB SERPL-MCNC: 0.3 MG/DL (ref 0.1–1)
BUN SERPL-MCNC: 24 MG/DL (ref 6–20)
BUN SERPL-MCNC: 24 MG/DL (ref 6–20)
CALCIUM SERPL-MCNC: 9.5 MG/DL (ref 8.7–10.5)
CALCIUM SERPL-MCNC: 9.5 MG/DL (ref 8.7–10.5)
CHLORIDE SERPL-SCNC: 103 MMOL/L (ref 95–110)
CHLORIDE SERPL-SCNC: 103 MMOL/L (ref 95–110)
CO2 SERPL-SCNC: 30 MMOL/L (ref 23–29)
CO2 SERPL-SCNC: 30 MMOL/L (ref 23–29)
CREAT SERPL-MCNC: 1.5 MG/DL (ref 0.5–1.4)
CREAT SERPL-MCNC: 1.5 MG/DL (ref 0.5–1.4)
DIFFERENTIAL METHOD: ABNORMAL
EOSINOPHIL # BLD AUTO: 0 K/UL (ref 0–0.5)
EOSINOPHIL NFR BLD: 0.3 % (ref 0–8)
ERYTHROCYTE [DISTWIDTH] IN BLOOD BY AUTOMATED COUNT: 14.1 % (ref 11.5–14.5)
EST. GFR  (NO RACE VARIABLE): 41 ML/MIN/1.73 M^2
EST. GFR  (NO RACE VARIABLE): 41 ML/MIN/1.73 M^2
GLUCOSE SERPL-MCNC: 130 MG/DL (ref 70–110)
GLUCOSE SERPL-MCNC: 130 MG/DL (ref 70–110)
HCT VFR BLD AUTO: 33.4 % (ref 37–48.5)
HGB BLD-MCNC: 11.5 G/DL (ref 12–16)
IMM GRANULOCYTES # BLD AUTO: 0.03 K/UL (ref 0–0.04)
IMM GRANULOCYTES NFR BLD AUTO: 0.8 % (ref 0–0.5)
LYMPHOCYTES # BLD AUTO: 1.7 K/UL (ref 1–4.8)
LYMPHOCYTES NFR BLD: 44 % (ref 18–48)
MAGNESIUM SERPL-MCNC: 1.7 MG/DL (ref 1.6–2.6)
MCH RBC QN AUTO: 28.5 PG (ref 27–31)
MCHC RBC AUTO-ENTMCNC: 34.4 G/DL (ref 32–36)
MCV RBC AUTO: 83 FL (ref 82–98)
MONOCYTES # BLD AUTO: 0.4 K/UL (ref 0.3–1)
MONOCYTES NFR BLD: 8.9 % (ref 4–15)
NEUTROPHILS # BLD AUTO: 1.8 K/UL (ref 1.8–7.7)
NEUTROPHILS NFR BLD: 45.5 % (ref 38–73)
NRBC BLD-RTO: 0 /100 WBC
PLATELET # BLD AUTO: 185 K/UL (ref 150–450)
PMV BLD AUTO: 10.1 FL (ref 9.2–12.9)
POTASSIUM SERPL-SCNC: 4.7 MMOL/L (ref 3.5–5.1)
POTASSIUM SERPL-SCNC: 4.7 MMOL/L (ref 3.5–5.1)
PROT SERPL-MCNC: 6.5 G/DL (ref 6–8.4)
PROT SERPL-MCNC: 6.5 G/DL (ref 6–8.4)
RBC # BLD AUTO: 4.04 M/UL (ref 4–5.4)
SODIUM SERPL-SCNC: 141 MMOL/L (ref 136–145)
SODIUM SERPL-SCNC: 141 MMOL/L (ref 136–145)
WBC # BLD AUTO: 3.93 K/UL (ref 3.9–12.7)

## 2023-06-22 PROCEDURE — 80197 ASSAY OF TACROLIMUS: CPT | Performed by: INTERNAL MEDICINE

## 2023-06-22 PROCEDURE — 36415 COLL VENOUS BLD VENIPUNCTURE: CPT | Performed by: INTERNAL MEDICINE

## 2023-06-22 PROCEDURE — 85025 COMPLETE CBC W/AUTO DIFF WBC: CPT | Performed by: INTERNAL MEDICINE

## 2023-06-22 PROCEDURE — 83735 ASSAY OF MAGNESIUM: CPT | Performed by: INTERNAL MEDICINE

## 2023-06-22 PROCEDURE — 80053 COMPREHEN METABOLIC PANEL: CPT | Performed by: INTERNAL MEDICINE

## 2023-06-23 ENCOUNTER — PATIENT MESSAGE (OUTPATIENT)
Dept: RHEUMATOLOGY | Facility: CLINIC | Age: 53
End: 2023-06-23
Payer: MEDICARE

## 2023-06-23 ENCOUNTER — PATIENT MESSAGE (OUTPATIENT)
Dept: TRANSPLANT | Facility: CLINIC | Age: 53
End: 2023-06-23
Payer: MEDICARE

## 2023-06-23 LAB — TACROLIMUS BLD-MCNC: 21.4 NG/ML (ref 5–15)

## 2023-06-26 ENCOUNTER — TELEPHONE (OUTPATIENT)
Dept: TRANSPLANT | Facility: CLINIC | Age: 53
End: 2023-06-26
Payer: MEDICARE

## 2023-06-26 NOTE — TELEPHONE ENCOUNTER
Spoke to pt who states she took medication prior to labs being drawn, will repeat level tomorrow morning.       ----- Message from Jacob Horvath MD sent at 6/23/2023  9:42 AM CDT -----  Confirm the trough. Check again in 2 days with holding Prograf if its trough

## 2023-06-27 ENCOUNTER — LAB VISIT (OUTPATIENT)
Dept: LAB | Facility: HOSPITAL | Age: 53
End: 2023-06-27
Attending: INTERNAL MEDICINE
Payer: MEDICARE

## 2023-06-27 DIAGNOSIS — Z94.4 LIVER TRANSPLANTED: ICD-10-CM

## 2023-06-27 LAB
ALBUMIN SERPL BCP-MCNC: 3.8 G/DL (ref 3.5–5.2)
ALP SERPL-CCNC: 89 U/L (ref 55–135)
ALT SERPL W/O P-5'-P-CCNC: 11 U/L (ref 10–44)
ANION GAP SERPL CALC-SCNC: 9 MMOL/L (ref 8–16)
AST SERPL-CCNC: 12 U/L (ref 10–40)
BILIRUB SERPL-MCNC: 0.4 MG/DL (ref 0.1–1)
BUN SERPL-MCNC: 28 MG/DL (ref 6–20)
CALCIUM SERPL-MCNC: 9 MG/DL (ref 8.7–10.5)
CHLORIDE SERPL-SCNC: 102 MMOL/L (ref 95–110)
CO2 SERPL-SCNC: 30 MMOL/L (ref 23–29)
CREAT SERPL-MCNC: 1.6 MG/DL (ref 0.5–1.4)
EST. GFR  (NO RACE VARIABLE): 38 ML/MIN/1.73 M^2
GLUCOSE SERPL-MCNC: 146 MG/DL (ref 70–110)
POTASSIUM SERPL-SCNC: 4.2 MMOL/L (ref 3.5–5.1)
PROT SERPL-MCNC: 6.5 G/DL (ref 6–8.4)
SODIUM SERPL-SCNC: 141 MMOL/L (ref 136–145)

## 2023-06-27 PROCEDURE — 80053 COMPREHEN METABOLIC PANEL: CPT | Performed by: INTERNAL MEDICINE

## 2023-06-27 PROCEDURE — 36415 COLL VENOUS BLD VENIPUNCTURE: CPT | Performed by: INTERNAL MEDICINE

## 2023-06-27 PROCEDURE — 80197 ASSAY OF TACROLIMUS: CPT | Performed by: INTERNAL MEDICINE

## 2023-06-28 ENCOUNTER — TELEPHONE (OUTPATIENT)
Dept: TRANSPLANT | Facility: CLINIC | Age: 53
End: 2023-06-28
Payer: MEDICARE

## 2023-06-28 LAB — TACROLIMUS BLD-MCNC: 5.1 NG/ML (ref 5–15)

## 2023-06-28 NOTE — TELEPHONE ENCOUNTER
Labs reviewed and are stable, continue q 6 months. Letter sent.         ----- Message from Jacob Horvath MD sent at 6/28/2023 10:47 AM CDT -----  Results reviewed. No change in immunosuppression

## 2023-06-28 NOTE — LETTER
June 28, 2023    Valencia Dumont  [address] [facility] [address]          Dear Valencia Dumont:  MRN: [id number]    This is a follow up to your recent labs, your lab results were stable.  There are no medicine changes.  Please have your labs drawn again on 12/11/23.      If you cannot have your labs drawn on the scheduled date, it is your responsibility to call the transplant department to reschedule.  Please call [phone] and ask to speak to Rosangela Hanna  for all scheduling requests.     Sincerely,    Linda Barakat, RN,BSN,CCTC    Your Liver Transplant Coordinator    [facility]  [address] [place] [id number] [phone]

## 2023-07-15 ENCOUNTER — PATIENT MESSAGE (OUTPATIENT)
Dept: RHEUMATOLOGY | Facility: CLINIC | Age: 53
End: 2023-07-15
Payer: MEDICARE

## 2023-07-15 DIAGNOSIS — M32.9 SYSTEMIC LUPUS ERYTHEMATOSUS, UNSPECIFIED SLE TYPE, UNSPECIFIED ORGAN INVOLVEMENT STATUS: ICD-10-CM

## 2023-07-17 RX ORDER — HYDROXYCHLOROQUINE SULFATE 200 MG/1
200 TABLET, FILM COATED ORAL 2 TIMES DAILY
Qty: 180 TABLET | Refills: 1 | Status: SHIPPED | OUTPATIENT
Start: 2023-07-17 | End: 2023-09-27

## 2023-07-17 RX ORDER — TINIDAZOLE 250 MG/1
TABLET ORAL
COMMUNITY
Start: 2023-03-10

## 2023-07-19 ENCOUNTER — PATIENT MESSAGE (OUTPATIENT)
Dept: ENDOCRINOLOGY | Facility: CLINIC | Age: 53
End: 2023-07-19
Payer: MEDICARE

## 2023-07-19 ENCOUNTER — INFUSION (OUTPATIENT)
Dept: INFUSION THERAPY | Facility: HOSPITAL | Age: 53
End: 2023-07-19
Attending: INTERNAL MEDICINE
Payer: MEDICARE

## 2023-07-19 VITALS
BODY MASS INDEX: 21.09 KG/M2 | HEART RATE: 89 BPM | WEIGHT: 142.38 LBS | OXYGEN SATURATION: 94 % | RESPIRATION RATE: 16 BRPM | TEMPERATURE: 98 F | SYSTOLIC BLOOD PRESSURE: 153 MMHG | HEIGHT: 69 IN | DIASTOLIC BLOOD PRESSURE: 71 MMHG

## 2023-07-19 DIAGNOSIS — N18.32 STAGE 3B CHRONIC KIDNEY DISEASE: ICD-10-CM

## 2023-07-19 DIAGNOSIS — T38.0X5S ADVERSE EFFECT OF GLUCOCORTICOID OR SYNTHETIC ANALOGUE, SEQUELA: ICD-10-CM

## 2023-07-19 DIAGNOSIS — M32.9 SYSTEMIC LUPUS ERYTHEMATOSUS, UNSPECIFIED SLE TYPE, UNSPECIFIED ORGAN INVOLVEMENT STATUS: Primary | ICD-10-CM

## 2023-07-19 DIAGNOSIS — M81.0 OSTEOPOROSIS, UNSPECIFIED OSTEOPOROSIS TYPE, UNSPECIFIED PATHOLOGICAL FRACTURE PRESENCE: ICD-10-CM

## 2023-07-19 PROCEDURE — 96372 THER/PROPH/DIAG INJ SC/IM: CPT | Mod: 59

## 2023-07-19 PROCEDURE — 63600175 PHARM REV CODE 636 W HCPCS: Mod: JZ,JG | Performed by: INTERNAL MEDICINE

## 2023-07-19 PROCEDURE — 96375 TX/PRO/DX INJ NEW DRUG ADDON: CPT

## 2023-07-19 PROCEDURE — 25000003 PHARM REV CODE 250: Performed by: INTERNAL MEDICINE

## 2023-07-19 PROCEDURE — 96365 THER/PROPH/DIAG IV INF INIT: CPT

## 2023-07-19 PROCEDURE — 63600175 PHARM REV CODE 636 W HCPCS: Mod: JZ,JA,JG | Performed by: INTERNAL MEDICINE

## 2023-07-19 RX ORDER — DIPHENHYDRAMINE HYDROCHLORIDE 50 MG/ML
25 INJECTION INTRAMUSCULAR; INTRAVENOUS
Status: CANCELLED | OUTPATIENT
Start: 2023-08-16

## 2023-07-19 RX ORDER — HEPARIN 100 UNIT/ML
500 SYRINGE INTRAVENOUS
Status: CANCELLED | OUTPATIENT
Start: 2023-08-16

## 2023-07-19 RX ORDER — ACETAMINOPHEN 325 MG/1
650 TABLET ORAL
Status: COMPLETED | OUTPATIENT
Start: 2023-07-19 | End: 2023-07-19

## 2023-07-19 RX ORDER — ACETAMINOPHEN 325 MG/1
650 TABLET ORAL
Status: CANCELLED | OUTPATIENT
Start: 2023-08-16

## 2023-07-19 RX ORDER — DIPHENHYDRAMINE HYDROCHLORIDE 50 MG/ML
25 INJECTION INTRAMUSCULAR; INTRAVENOUS
Status: COMPLETED | OUTPATIENT
Start: 2023-07-19 | End: 2023-07-19

## 2023-07-19 RX ORDER — SODIUM CHLORIDE 0.9 % (FLUSH) 0.9 %
10 SYRINGE (ML) INJECTION
Status: CANCELLED | OUTPATIENT
Start: 2023-08-16

## 2023-07-19 RX ADMIN — ACETAMINOPHEN 650 MG: 325 TABLET ORAL at 11:07

## 2023-07-19 RX ADMIN — DIPHENHYDRAMINE HYDROCHLORIDE 25 MG: 50 INJECTION, SOLUTION INTRAMUSCULAR; INTRAVENOUS at 11:07

## 2023-07-19 RX ADMIN — ROMOSOZUMAB-AQQG 210 MG: 105 INJECTION, SOLUTION SUBCUTANEOUS at 11:07

## 2023-07-19 RX ADMIN — BELIMUMAB 646 MG: 400 INJECTION, POWDER, LYOPHILIZED, FOR SOLUTION INTRAVENOUS at 11:07

## 2023-07-19 NOTE — PLAN OF CARE
Pt arrived to unit for her monthly maintenance Benlysta and Evenity. Plan of care reviewed. No new or worsening complaints voiced. VSS. Given pre-meds of Tylenol and Benadryl IVP. Benlysta infused over 1 hour. Evenity injection given. Pt tolerated well. Will return on 8/16 for her next infusion and injections. Discharged from unit in NAD.

## 2023-07-24 ENCOUNTER — PATIENT MESSAGE (OUTPATIENT)
Dept: TRANSPLANT | Facility: CLINIC | Age: 53
End: 2023-07-24
Payer: MEDICARE

## 2023-07-24 ENCOUNTER — PATIENT MESSAGE (OUTPATIENT)
Dept: HEPATOLOGY | Facility: CLINIC | Age: 53
End: 2023-07-24
Payer: MEDICARE

## 2023-07-25 ENCOUNTER — PATIENT MESSAGE (OUTPATIENT)
Dept: HEPATOLOGY | Facility: CLINIC | Age: 53
End: 2023-07-25
Payer: MEDICARE

## 2023-07-25 ENCOUNTER — PATIENT MESSAGE (OUTPATIENT)
Dept: TRANSPLANT | Facility: CLINIC | Age: 53
End: 2023-07-25
Payer: MEDICARE

## 2023-08-02 ENCOUNTER — PATIENT MESSAGE (OUTPATIENT)
Dept: GASTROENTEROLOGY | Facility: CLINIC | Age: 53
End: 2023-08-02
Payer: MEDICARE

## 2023-08-09 ENCOUNTER — PATIENT MESSAGE (OUTPATIENT)
Dept: GASTROENTEROLOGY | Facility: CLINIC | Age: 53
End: 2023-08-09
Payer: MEDICARE

## 2023-08-14 ENCOUNTER — PATIENT MESSAGE (OUTPATIENT)
Dept: GASTROENTEROLOGY | Facility: CLINIC | Age: 53
End: 2023-08-14
Payer: MEDICARE

## 2023-08-17 NOTE — TELEPHONE ENCOUNTER
Instructed pt liver biopsy reviewed at path conference and does show some signs of rejection. Will start prednisone 40 mg daily. Will also notify pt's endocrinologist due to the chance of elevated blood sugars. Confirmed pt did take prograf prior to labs done yesterday. Will repeat labs Monday 5/29.   
Liver Path Conference:    Biopsy: Atypical rejection; interface Hepatitis/recurrent AIH    Plan: 40 mg Prednisone daily      
none

## 2023-08-22 ENCOUNTER — PATIENT MESSAGE (OUTPATIENT)
Dept: PODIATRY | Facility: CLINIC | Age: 53
End: 2023-08-22

## 2023-08-30 ENCOUNTER — HOSPITAL ENCOUNTER (OUTPATIENT)
Dept: RADIOLOGY | Facility: HOSPITAL | Age: 53
Discharge: HOME | End: 2023-08-30
Attending: PODIATRIST
Payer: MEDICARE

## 2023-08-30 ENCOUNTER — PATIENT MESSAGE (OUTPATIENT)
Dept: ENDOCRINOLOGY | Facility: CLINIC | Age: 53
End: 2023-08-30
Payer: MEDICARE

## 2023-08-30 ENCOUNTER — OFFICE VISIT (OUTPATIENT)
Dept: PODIATRY | Facility: CLINIC | Age: 53
End: 2023-08-30
Payer: MEDICARE

## 2023-08-30 VITALS — HEIGHT: 69 IN | WEIGHT: 142 LBS | BODY MASS INDEX: 21.03 KG/M2

## 2023-08-30 DIAGNOSIS — E11.22 TYPE 2 DIABETES MELLITUS WITH STAGE 3B CHRONIC KIDNEY DISEASE, WITH LONG-TERM CURRENT USE OF INSULIN: Primary | ICD-10-CM

## 2023-08-30 DIAGNOSIS — M20.5X2 HALLUX LIMITUS, ACQUIRED, LEFT: ICD-10-CM

## 2023-08-30 DIAGNOSIS — N18.32 TYPE 2 DIABETES MELLITUS WITH STAGE 3B CHRONIC KIDNEY DISEASE, WITH LONG-TERM CURRENT USE OF INSULIN: Primary | ICD-10-CM

## 2023-08-30 DIAGNOSIS — R20.2 PARESTHESIA OF LEFT FOOT: ICD-10-CM

## 2023-08-30 DIAGNOSIS — E11.9 COMPREHENSIVE DIABETIC FOOT EXAMINATION, TYPE 2 DM, ENCOUNTER FOR: ICD-10-CM

## 2023-08-30 DIAGNOSIS — E11.49 TYPE II DIABETES MELLITUS WITH NEUROLOGICAL MANIFESTATIONS: Primary | ICD-10-CM

## 2023-08-30 DIAGNOSIS — Z79.4 TYPE 2 DIABETES MELLITUS WITH STAGE 3B CHRONIC KIDNEY DISEASE, WITH LONG-TERM CURRENT USE OF INSULIN: Primary | ICD-10-CM

## 2023-08-30 DIAGNOSIS — M20.41 HAMMER TOES OF BOTH FEET: ICD-10-CM

## 2023-08-30 DIAGNOSIS — M20.5X1 HALLUX LIMITUS, ACQUIRED, RIGHT: ICD-10-CM

## 2023-08-30 DIAGNOSIS — M79.672 LEFT FOOT PAIN: ICD-10-CM

## 2023-08-30 DIAGNOSIS — M20.42 HAMMER TOES OF BOTH FEET: ICD-10-CM

## 2023-08-30 PROCEDURE — 3066F NEPHROPATHY DOC TX: CPT | Mod: CPTII,S$GLB,, | Performed by: PODIATRIST

## 2023-08-30 PROCEDURE — 4010F PR ACE/ARB THEARPY RXD/TAKEN: ICD-10-PCS | Mod: CPTII,S$GLB,, | Performed by: PODIATRIST

## 2023-08-30 PROCEDURE — 3072F LOW RISK FOR RETINOPATHY: CPT | Mod: CPTII,S$GLB,, | Performed by: PODIATRIST

## 2023-08-30 PROCEDURE — 3072F PR LOW RISK FOR RETINOPATHY: ICD-10-PCS | Mod: CPTII,S$GLB,, | Performed by: PODIATRIST

## 2023-08-30 PROCEDURE — 99999 PR PBB SHADOW E&M-EST. PATIENT-LVL V: ICD-10-PCS | Mod: PBBFAC,,, | Performed by: PODIATRIST

## 2023-08-30 PROCEDURE — 99214 PR OFFICE/OUTPT VISIT, EST, LEVL IV, 30-39 MIN: ICD-10-PCS | Mod: S$GLB,,, | Performed by: PODIATRIST

## 2023-08-30 PROCEDURE — 1159F MED LIST DOCD IN RCRD: CPT | Mod: CPTII,S$GLB,, | Performed by: PODIATRIST

## 2023-08-30 PROCEDURE — 1159F PR MEDICATION LIST DOCUMENTED IN MEDICAL RECORD: ICD-10-PCS | Mod: CPTII,S$GLB,, | Performed by: PODIATRIST

## 2023-08-30 PROCEDURE — 1160F RVW MEDS BY RX/DR IN RCRD: CPT | Mod: CPTII,S$GLB,, | Performed by: PODIATRIST

## 2023-08-30 PROCEDURE — 3066F PR DOCUMENTATION OF TREATMENT FOR NEPHROPATHY: ICD-10-PCS | Mod: CPTII,S$GLB,, | Performed by: PODIATRIST

## 2023-08-30 PROCEDURE — 4010F ACE/ARB THERAPY RXD/TAKEN: CPT | Mod: CPTII,S$GLB,, | Performed by: PODIATRIST

## 2023-08-30 PROCEDURE — 99214 OFFICE O/P EST MOD 30 MIN: CPT | Mod: S$GLB,,, | Performed by: PODIATRIST

## 2023-08-30 PROCEDURE — 1160F PR REVIEW ALL MEDS BY PRESCRIBER/CLIN PHARMACIST DOCUMENTED: ICD-10-PCS | Mod: CPTII,S$GLB,, | Performed by: PODIATRIST

## 2023-08-30 PROCEDURE — 99999 PR PBB SHADOW E&M-EST. PATIENT-LVL V: CPT | Mod: PBBFAC,,, | Performed by: PODIATRIST

## 2023-08-30 PROCEDURE — 3008F PR BODY MASS INDEX (BMI) DOCUMENTED: ICD-10-PCS | Mod: CPTII,S$GLB,, | Performed by: PODIATRIST

## 2023-08-30 PROCEDURE — 3008F BODY MASS INDEX DOCD: CPT | Mod: CPTII,S$GLB,, | Performed by: PODIATRIST

## 2023-08-30 PROCEDURE — 73630 X-RAY EXAM OF FOOT: CPT | Mod: TC,FY,PO,LT

## 2023-08-30 PROCEDURE — 73630 X-RAY EXAM OF FOOT: CPT | Mod: 26,LT,, | Performed by: RADIOLOGY

## 2023-08-30 PROCEDURE — 73630 XR FOOT COMPLETE 3 VIEW LEFT: ICD-10-PCS | Mod: 26,LT,, | Performed by: RADIOLOGY

## 2023-08-30 NOTE — PROGRESS NOTES
Subjective:      Patient ID: Valencia Dumont is a 53 y.o. female.    Chief Complaint: No chief complaint on file.    Valencia is a 53 y.o. female who presents to the clinic upon referral from Dr. Manuela gates. provider found  for evaluation and treatment of diabetic feet. 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 Patient has some chronic pain to the left 1st MTPJ and abnormal sensation which has only been relieved in the past by injection temporarily.  Relates she would like to discuss more definitve treatment options.     PCP: Timur Lion MD    Date Last Seen by PCP:   No chief complaint on file.            Current shoe gear: Casual shoes    Hemoglobin A1C   Date Value Ref Range Status   08/26/2022 7.1 (H) 4.0 - 5.6 % Final     Comment:     ADA Screening Guidelines:  5.7-6.4%  Consistent with prediabetes  >or=6.5%  Consistent with diabetes    High levels of fetal hemoglobin interfere with the HbA1C  assay. Heterozygous hemoglobin variants (HbS, HgC, etc)do  not significantly interfere with this assay.   However, presence of multiple variants may affect accuracy.     01/19/2021 7.4 (H) 4.0 - 5.6 % Final     Comment:     ADA Screening Guidelines:  5.7-6.4%  Consistent with prediabetes  >or=6.5%  Consistent with diabetes  High levels of fetal hemoglobin interfere with the HbA1C  assay. Heterozygous hemoglobin variants (HbS, HgC, etc)do  not significantly interfere with this assay.   However, presence of multiple variants may affect accuracy.     05/13/2020 7.2 (H) 4.0 - 5.6 % Final      Comment:     ADA Screening Guidelines:  5.7-6.4%  Consistent with prediabetes  >or=6.5%  Consistent with diabetes  High levels of fetal hemoglobin interfere with the HbA1C  assay. Heterozygous hemoglobin variants (HbS, HgC, etc)do  not significantly interfere with this assay.   However, presence of multiple variants may affect accuracy.               Patient Active Problem List   Diagnosis    Microcytic normochromic anemia    SLE (systemic lupus erythematosus)    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    Prophylactic immunotherapy (transplant immunosuppression)    Adverse effect of glucocorticoid or synthetic analogue    Hypoglycemia associated with diabetes    Swelling of left knee joint    Osteoporosis    Abnormal CT scan, colon    Fatigue    Diabetic polyneuropathy associated with type 2 diabetes mellitus    Essential hypertension    Type 2 diabetes mellitus with stage 3 chronic kidney disease, with long-term current use of insulin    Vitamin D deficiency    CKD stage G3a/A1, GFR 45-59 and albumin creatinine ratio <30 mg/g    Chronic kidney disease-mineral and bone disorder    CKD (chronic kidney disease), stage III    Immunosuppression due to drug therapy    Refractive error    Vitreous floaters of both eyes    Decreased strength of lower extremity    Long-term use of Plaquenil    Hoarse    Encounter for aftercare following liver transplant    Obstructive sleep apnea syndrome    Snoring    Iron deficiency anemia    Decreased ROM of foot    Gait abnormality    Chronic sinusitis    Right ear pain    Status post LEEP (loop electrosurgical excision procedure) of cervix       Current Outpatient Medications on File Prior to Visit   Medication Sig Dispense Refill    acetaminophen (TYLENOL) 650 MG TbSR Take 1 tablet (650 mg total) by mouth every 6 (six) hours. 60 tablet 0    amitriptyline (ELAVIL) 10 MG tablet Take 1 tablet (10 mg total) by mouth every evening. 90 tablet 2    azelastine (ASTELIN) 137 mcg (0.1 %) nasal  "spray 1 spray (137 mcg total) by Nasal route 2 (two) times daily. 30 mL 11    BD ULTRA-FINE JANESSA PEN NEEDLE 32 gauge x 5/32" Ndle For five times daily insulin injections 450 each 3    blood-glucose meter,continuous (DEXCOM G6 ) Misc 1 each by Misc.(Non-Drug; Combo Route) route once. for 1 dose 1 each 0    blood-glucose transmitter (DEXCOM G6 TRANSMITTER) Karen 1 each by Misc.(Non-Drug; Combo Route) route once a week 1 Device 3    budesonide-formoterol 160-4.5 mcg (SYMBICORT) 160-4.5 mcg/actuation HFAA Inhale 2 puffs into the lungs.      buPROPion (WELLBUTRIN XL) 150 MG TB24 tablet Take 300 mg by mouth.      ciclopirox (PENLAC) 8 % Soln Apply topically nightly. 6.6 mL 3    clotrimazole-betamethasone 1-0.05% (LOTRISONE) cream APPLY TOPICALLY TO THE AFFECTED AREA TWICE DAILY FOR 10 DAYS      diazepam (VALIUM) 5 MG tablet Take 5 mg by mouth 3 (three) times daily as needed.   0    diclofenac sodium (VOLTAREN) 1 % Gel APPLY 2 GRAM to wrist and 4 g to left foot TOPICAL ROUTE 4 TIMES PER  each 2    dicyclomine (BENTYL) 10 MG capsule TAKE 1 CAPSULE BY MOUTH EVERY 8 HOURS AS NEEDED for spasms      DILTIAZEM HCL (DILTIAZEM 2% CREAM) Apply topically 3 (three) times daily. Apply topically to anal area. 30 g 0    diphenoxylate-atropine 2.5-0.025 mg (LOMOTIL) 2.5-0.025 mg per tablet Take 1 tablet by mouth 4 (four) times daily as needed.      dulaglutide (TRULICITY) 0.75 mg/0.5 mL pen injector Inject 0.75 mg into the skin every 7 days. 12 pen 3    ergocalciferol (ERGOCALCIFEROL) 50,000 unit Cap Take 50,000 Units by mouth every 7 days.      erythromycin with ethanol (EMGEL) 2 % gel ADD 30GM (1 TUBE) TO THE SOAKING DEVICE AND ALLOW WATER TO AGITATE. PLACE AFFECTED AREAS INTO WATER AND SOAK FOR 10 MINUTES 1-2 TIMES DAILY  1    estradioL (ESTRACE) 0.01 % (0.1 mg/gram) vaginal cream Place 1 g vaginally twice a week. 42 g 3    famotidine (PEPCID) 40 MG tablet       ferrous sulfate (FEOSOL) 325 mg (65 mg iron) Tab tablet " Take 1 tablet (325 mg total) by mouth every 12 (twelve) hours. 60 tablet 4    flash glucose scanning reader (FREESTYLE CHUN 14 DAY READER) Misc 1 each by Misc.(Non-Drug; Combo Route) route once daily. 1 each 0    flash glucose sensor (FREESTYLE CHUN 14 DAY SENSOR) Kit 2 each by Misc.(Non-Drug; Combo Route) route every 14 (fourteen) days. 2 kit 11    fluconazole (DIFLUCAN) 150 MG Tab TAKE ONE TABLET BY MOUTH once for ONE dose 1 tablet 0    fluocinolone (SYNALAR) 0.01 % external solution APPLY a couple of DROPS INTO BOTH EARS TWICE DAILY AS NEEDED FOR ITCHING 60 mL 1    fluticasone propionate (FLONASE) 50 mcg/actuation nasal spray 1 spray by Each Nostril route 2 (two) times daily.      fluticasone propionate (FLONASE) 50 mcg/actuation nasal spray 2 sprays (100 mcg total) by Each Nostril route 2 (two) times daily. 18.2 mL 11    gabapentin (NEURONTIN) 300 MG capsule TAKE 1 CAPSULE BY MOUTH THREE TIMES DAILY      gentamicin (GARAMYCIN) 0.1 % cream ADD 30GM (1 TUBE) TO THE SOAKING DEVICE AND ALLOW WATER TO AGITATE. PLACE AFFECTED AREAS INTO WATER AND SOAK FOR 10 MINUTES 1-2 TIMES DAILY  1    hydrocortisone (ANUSOL-HC) 2.5 % rectal cream Place rectally 2 (two) times daily. Apply per rectum bid 30 g 3    hydrOXYchloroQUINE (PLAQUENIL) 200 mg tablet Take 1 tablet (200 mg total) by mouth 2 (two) times daily. 180 tablet 1    hydrOXYzine HCl (ATARAX) 10 MG Tab TAKE 1 OR 2 TABLETS BY MOUTH THREE TIMES DAILY AS NEEDED FOR ITCHING.  0    insulin detemir U-100 (LEVEMIR FLEXTOUCH U-100 INSULN) 100 unit/mL (3 mL) InPn pen INJECT FOUR units UNDER THE SKIN TWICE DAILY 45 mL 3    insulin lispro (HUMALOG KWIKPEN INSULIN) 100 unit/mL pen 3 units before BREAKFAST and lunch and FIVE units before dinner with sliding scale, up to 25 units daily 45 mL 3    isosorbide mononitrate (IMDUR) 30 MG 24 hr tablet Take 30 mg by mouth once daily.      ketoconazole (NIZORAL) 2 % cream ADD 30GM (1/2 TUBE) TO THE SOAKING DEVICE AND ALLOW WATER TO  AGITATE. PLACE AFFECTED AREAS INTO WATER AND SOAK FOR 10 MINUTES 1-2 TIMES BRENDON  1    Lactobac. rhamnosus GG-inulin 10 billion cell -200 mg Cap Take 1 capsule by mouth 2 (two) times daily. 30 capsule 0    levocetirizine (XYZAL) 5 MG tablet Take 5 mg by mouth every evening.  3    LIDOcaine-prilocaine (EMLA) cream 2 (two) times daily. Apply to affected area      loratadine (CLARITIN) 10 mg tablet Take 1 tablet (10 mg total) by mouth once daily. 30 tablet 0    losartan (COZAAR) 100 MG tablet Take 1 tablet (100 mg total) by mouth once daily. 30 tablet 2    magnesium oxide 500 mg Tab Take 500 mg by mouth 2 (two) times daily. 60 each 6    meclizine (ANTIVERT) 25 mg tablet TAKE ONE TABLET BY MOUTH AT BEDTIME AS NEEDED for dizziness.  0    mometasone 0.1% (ELOCON) 0.1 % cream APPLY TOPICALLY TO THE FACE TWICE DAILY FOR ONE WEEK      MULTIVIT,THER IRON,CA,FA & MIN (MULTIVITAMIN) Tab Take 1 tablet by mouth once daily. 30 tablet 0    mycophenolate (MYFORTIC) 180 MG TbEC Take 2 tablets (360 mg total) by mouth 2 (two) times daily. 120 tablet 11    mycophenolate (MYFORTIC) 180 MG TbEC take 2 TABLETS BY MOUTH TWICE DAILY 120 tablet 1    neomycin-polymyxin-hydrocortisone (CORTISPORIN) otic solution INSTILL TWO DROPS INTO BOTH EARS FOUR TIMES DAILY FOR 10 DAYS  0    NURTEC 75 mg odt PLACE ONE TABLET on tongue, alternatively UNDER THE TONGUE ONCE DAILY AS NEEDED FOR MIGRAINE 8 tablet 2    nystatin (MYCOSTATIN) 100,000 unit/mL suspension SWISH AND SPIT FIVE MILLILITERS BY MOUTH FOUR TIMES DAILY FOR 7 DAYS.  1    nystatin-triamcinolone (MYCOLOG II) cream Apply to affected area 2 times daily 30 g 1    ondansetron (ZOFRAN) 4 MG tablet TAKE TWO TABLETS BY MOUTH EVERY 8 HOURS AS NEEDED FOR NAUSEA.      oxyCODONE (ROXICODONE) 5 MG immediate release tablet Take 1 tablet (5 mg total) by mouth every 4 (four) hours as needed for Pain. 5 tablet 0    oxycodone-acetaminophen (PERCOCET) 7.5-325 mg per tablet Take 1 tablet by mouth every 4 (four)  hours as needed for Pain.      pantoprazole (PROTONIX) 40 MG tablet Take 1 tablet (40 mg total) by mouth 2 (two) times daily. Take immediately in am when wake up and then 30 minutes prior to dinner 60 tablet 11    polyethylene glycol (GLYCOLAX) 17 gram/dose powder Mix 1 capful (17 g) with liquid and by mouth 2 (two) times daily. 1530 g 11    PROAIR HFA 90 mcg/actuation inhaler Inhale 2 puffs into the lungs 3 (three) times daily as needed.   3    promethazine-dextromethorphan (PROMETHAZINE-DM) 6.25-15 mg/5 mL Syrp Take by mouth 2 (two) times daily as needed.   0    rosuvastatin (CRESTOR) 5 MG tablet Take 5 mg by mouth once daily.      SENNA 8.6 mg tablet Take 1 tablet by mouth 2 (two) times daily. Take while taking narcotics 30 tablet 0    sertraline (ZOLOFT) 50 MG tablet Take 50 mg by mouth once daily.  11    SSD 1 % cream 1 application 2 (two) times daily. Apply to affected area  0    tacrolimus (PROGRAF) 1 MG Cap take 2 CAPSULES BY MOUTH EVERY MORNING & 2 CAPSULES EVERY EVENING 120 capsule 7    tinidazole (TINDAMAX) 250 MG tablet SMARTSI Tablet(s) By Mouth Every Morning      topiramate (TOPAMAX) 50 MG tablet SMARTSI Tablet(s) By Mouth Every Evening      triamcinolone acetonide 0.1% (KENALOG) 0.1 % cream Apply topically 2 times daily 30 g 0    valACYclovir (VALTREX) 1000 MG tablet Take 1,000 mg by mouth once daily.      verapamil (CALAN-SR) 120 MG CR tablet TAKE ONE TABLET ONCE DAILY FOR BLOOD PRESSURE  3    zolpidem (AMBIEN) 10 mg Tab Take 10 mg by mouth nightly as needed.  3     No current facility-administered medications on file prior to visit.       Review of patient's allergies indicates:   Allergen Reactions    Norvasc [amlodipine]      Severe headache    Bactrim [sulfamethoxazole-trimethoprim]      Gets yeast infection.    Doxycycline Rash    Pcn [penicillins] Rash       Past Surgical History:   Procedure Laterality Date    BREAST BIOPSY Left 2020    CHOLECYSTECTOMY      COLONOSCOPY N/A 2017     Procedure: COLONOSCOPY;  Surgeon: Marky Sun MD;  [facility]  Service: Endoscopy;  Laterality: N/A;  PM prep.    CONIZATION OF CERVIX USING LOOP ELECTROSURGICAL EXCISION PROCEDURE (LEEP) N/A 1/20/2023    Procedure: CONIZATION-CERVICAL-LEEP;  Surgeon: Lu Schreiber MD;  Location: [facility];  Service: OB/GYN;  Laterality: N/A;    ESOPHAGOGASTRODUODENOSCOPY      ESOPHAGOGASTRODUODENOSCOPY N/A 01/16/2019    Procedure: EGD (ESOPHAGOGASTRODUODENOSCOPY);  Surgeon: Deniz Guerra MD;  [facility]  Service: Endoscopy;  Laterality: N/A;  labs prior, s/p liver transplant-MS    ESOPHAGOGASTRODUODENOSCOPY N/A 09/09/2020    Procedure: EGD (ESOPHAGOGASTRODUODENOSCOPY);  Surgeon: Davion Ríos MD;  [facility]  Service: Endoscopy;  Laterality: N/A;  Please order the EGD at least 2 weeks after barium esophagram has been done EGD is for dysphagia  covid test 9/6-[facility]    EYE SURGERY      FUNCTIONAL ENDOSCOPIC SINUS SURGERY (FESS) USING COMPUTER-ASSISTED NAVIGATION Bilateral 02/04/2021    Procedure: FESS, USING COMPUTER-ASSISTED NAVIGATION;  Surgeon: Delores Lewis MD;  Location: [facility];  Service: ENT;  Laterality: Bilateral;  [employer] WALDEMAR [phone] TEXTED HIM @ 2:45PM ON 1-8-2021  DISC LOADED  TOMMY 1-  RN PREOP 1/28/2021---COVID NEGATIVE ON 2/3--CONSENT INCOMPLETE  H/P INCOMPLETE  --CBC IN AM    LIVER BIOPSY      LIVER TRANSPLANT  12/31/2013    REFRACTIVE SURGERY Bilateral 2010    TUBAL LIGATION  2003    UPPER GASTROINTESTINAL ENDOSCOPY         Family History   Problem Relation Age of Onset    Hypertension Mother     Cataracts Mother     Diabetes Father     Alzheimer's disease Father     Diabetes Paternal Grandfather     Diabetes Paternal Grandmother     No Known Problems Maternal Grandmother     Bone cancer Maternal Grandfather     Breast cancer Maternal Aunt 55    Hypertension Brother     Diabetes Brother     No Known  "Problems Sister     No Known Problems Maternal Uncle     No Known Problems Paternal Aunt     No Known Problems Paternal Uncle     Stroke Neg Hx     Cancer Neg Hx     Colon cancer Neg Hx     Esophageal cancer Neg Hx     Stomach cancer Neg Hx     Rectal cancer Neg Hx     Amblyopia Neg Hx     Blindness Neg Hx     Glaucoma Neg Hx     Macular degeneration Neg Hx     Retinal detachment Neg Hx     Strabismus Neg Hx     Thyroid disease Neg Hx        Social History     Socioeconomic History    Marital status:     Number of children: 3   Occupational History     Employer: [employer]   Tobacco Use    Smoking status: Never    Smokeless tobacco: Never    Tobacco comments:     disability; ; 3 children   Substance and Sexual Activity    Alcohol use: Yes     Alcohol/week: 1.0 standard drink of alcohol     Types: 1 Glasses of wine per week     Comment: occasionally     Drug use: No    Sexual activity: Yes     Partners: Male     Birth control/protection: See Surgical Hx, Post-menopausal     Comment: s/p tubal ligation,  since 1989       Review of Systems   Constitutional: Negative for chills, fever, malaise/fatigue and night sweats.   Cardiovascular:  Negative for chest pain, leg swelling, orthopnea and palpitations.   Respiratory:  Negative for cough, shortness of breath and wheezing.    Skin:  Positive for color change and nail changes. Negative for itching, poor wound healing and rash.   Musculoskeletal:  Positive for arthritis, joint pain, myalgias and stiffness. Negative for gout, joint swelling and muscle weakness.   Gastrointestinal:  Negative for abdominal pain, constipation and nausea.   Neurological:  Positive for numbness. Negative for disturbances in coordination, dizziness, focal weakness, tremors and weakness.           Objective:      Vitals:    08/30/23 0931   Weight: 64.4 kg (142 lb)   Height: 5' 9" (1.753 m)   PainSc:   7   PainLoc: Toe       Physical Exam  Vitals and nursing note reviewed. "   Constitutional:       General: She is not in acute distress.     Appearance: She is well-developed. She is not toxic-appearing or diaphoretic.      Comments: alert and oriented x 3.    Cardiovascular:      Pulses:           Dorsalis pedis pulses are 2+ on the right side and 2+ on the left side.        Posterior tibial pulses are 2+ on the right side and 2+ on the left side.      Comments: No edema noted b/l LEs. No varicosities present b/l LEs.   Pulmonary:      Effort: No respiratory distress.   Musculoskeletal:         General: No deformity.      Right ankle: Normal. No tenderness. No lateral malleolus, medial malleolus, AITF ligament, CF ligament or posterior TF ligament tenderness.      Right Achilles Tendon: No defects. Castle's test negative.      Left ankle: Normal. No tenderness. No lateral malleolus, medial malleolus, AITF ligament, CF ligament or posterior TF ligament tenderness.      Left Achilles Tendon: No defects. Castle's test negative.      Right foot: Normal capillary refill. No bony tenderness or crepitus.      Left foot: Normal capillary refill. Tenderness and crepitus (1st MTPJ) present. No bony tenderness.      Comments: Biomechanical exam: Pain on palpation left 1st MTPJ and 1st metatarsal.  Pain at end range of motion of 1st metatarsophalangeal joint of the left foot.  There is equinus deformity bilateral with decreased dorsiflexion at the ankle joint bilateral. No tenderness with compression of heel. Negative tinels sign. Gait analysis reveals excessive pronation through midstance and propulsion with early heel off. Shoes reveals lateral heel counter wear bilateral     Decreased first MPJ range of motion both weightbearing and nonweightbearing, + crepitus observed the first MP joint, + dorsal flag sign. Mild  bunion deformity is observed .    Patient has hammertoes of digits 2-5 bilateral partially reducible     Negative anterior drawer at the ankle bilat.  Negative Lachman test at the  2nd MTPJ.  Able to perform sign and double heel rise without pain.     Feet:      Right foot:      Protective Sensation: 5 sites tested.  5 sites sensed.      Left foot:      Protective Sensation: 5 sites tested.  5 sites sensed.   Lymphadenopathy:      Comments: No lymphatic streaking     Skin:     General: Skin is warm and dry.      Coloration: Skin is not pale.      Findings: No ecchymosis, erythema or rash.      Nails: There is no clubbing.      Comments: B/l hallux nail beds with signs of hyperkeratotic changes to the nail bed. No open wound. No drainage. Stable and dry. medial hallux nail margin of left foot with ingrown nail plate. No erythema or edema is noted. No granuloma formation noted. No malodor     Toenails are thickened by 2-3 mm, discolored/yellowed, dystrophic, brittle with subungual debris.    Neurological:      Mental Status: She is alert and oriented to person, place, and time.      Sensory: Sensory deficit present.      Motor: No atrophy or abnormal muscle tone.      Gait: Gait normal.      Deep Tendon Reflexes:      Reflex Scores:       Achilles reflexes are 2+ on the right side and 2+ on the left side.     Comments: Negative Tinels sign and Charles's click, bilat. Protective sensation intact b/l foot. Vibratory sensation intact b/l. Subjective numbness to toes 4-5 both feet (occasionally).   Psychiatric:         Attention and Perception: She is attentive.         Mood and Affect: Mood is not anxious. Affect is not inappropriate.         Speech: She is communicative. Speech is not slurred.         Behavior: Behavior is not combative. Behavior is cooperative.               Assessment:       Encounter Diagnoses   Name Primary?    Type II diabetes mellitus with neurological manifestations Yes    Comprehensive diabetic foot examination, type 2 DM, encounter for     Hallux limitus, acquired, left     Hallux limitus, acquired, right     Hammer toes of both feet     Left foot pain     Paresthesia of  left foot          Plan:     Problem List Items Addressed This Visit    None  Visit Diagnoses       Type II diabetes mellitus with neurological manifestations    -  Primary    Relevant Orders    DIABETIC SHOES FOR HOME USE    Comprehensive diabetic foot examination, type 2 DM, encounter for        Relevant Orders    DIABETIC SHOES FOR HOME USE    Hallux limitus, acquired, left        Relevant Orders    DIABETIC SHOES FOR HOME USE    X-Ray Foot Complete Left (Completed)    Hallux limitus, acquired, right        Relevant Orders    DIABETIC SHOES FOR HOME USE    Hammer toes of both feet        Relevant Orders    DIABETIC SHOES FOR HOME USE    Left foot pain        Paresthesia of left foot        Relevant Orders    X-Ray Foot Complete Left (Completed)           I counseled the patient on her conditions, their implications and medical management.    Orders Placed This Encounter    DIABETIC SHOES FOR HOME USE    X-Ray Foot Complete Left     I did  the patient in detail regarding surgical and conservative treatment measures for hallux limitus. I informed the  patient that the majority of pain is secondary to an arthritic joint with decreased joint spaces. Informed patient that outside of surgical intervention the main goal of therapy is to decreased the  range of motion at the first MPJ joint. This can be done so by utilizing either and extremely hard soled nonflexible shoe or forefoot rocker    I gave written and verbal instructions on stretching exercises. Patient expressed understanding. Discussed icing the affected area as needed and also wearing appropriate shoe gear and avoiding flats, slippers, sandals, and going barefoot. My recommendation for OTC supports is [employer]. Patient instructed on adequate icing techniques. Patient should ice the affected area at least once per day x 10 minutes for 10 days I advised the patient that extra icing would also be beneficial to ensure adequate anti  inflammatory effect. We also discussed cortisone injections and NSAID therapy.     Presurgical consult images ordered    Rx diabetic shoes for protection and support     Education about the diabetic foot, neuropathy, and prevention of limb loss.    Shoe inspection. Diabetic Foot Education. Patient reminded of the importance of good nutrition/healthy diet/weight management and blood sugar control to help prevent podiatric complications of diabetes. Patient instructed on proper foot hygeine. Wear comfortable, proper fitting shoes. Wash feet daily. Dry well. After drying, apply moisturizer to feet (no lotion to webspaces). Inspect feet daily for skin breaks, blisters, swelling, or redness. Wear cotton socks (preferably white)  Change socks every day. Do NOT walk barefoot. Do NOT use heating pads or hot water soaks. We discussed wearing proper shoe gear, daily foot inspections, never walking without protective shoe gear.     Discussed edema control and the importance of daily moisturizer to the feet    Recommend applying vicks vaporub to thick abnormal toenails daily x 6 months to treat fungal nail infection.    Based on chart review this patient does not qualify for nail care (Patients who qualify for nail care are usually as follows: diabetic with neurological manifestations, PVD, pernicious anemia, or CKD with appropriate modifiers that indicate high amputation risk).     She will continue to monitor the areas daily, inspect her feet, wear protective shoe gear when ambulatory, moisturizer to maintain skin integrity and follow in this office in approximately 12 months, sooner p.r.n.

## 2023-08-30 NOTE — PATIENT INSTRUCTIONS
Over the counter pain creams: Voltaren Gel, Biofreeze, Bengay, tiger balm, two old goat, lidocaine gel,  Absorbine Veterinary Liniment Gel Topical Analgesic Sore Muscle and Joint Pain Relief  Recommend lotions: eucerin, eucerin for diabetics, aquaphor, A&D ointment, gold bond for diabetics, sween, Pass Christian's Bees all purpose baby ointment,  urea 40 with aloe or SkinIntegra rapid crack repair (found on amazon.com)  Shoe recommendations: (try 6pm.com, zappos.com , nordstromrack.[employer], or shoes.com for discounted prices) you can visit varsity shoes in [place], DSW shoes in [place]  or kelvin rack in the [facility] (there are also several shoe brand outlets in the [facility])  ONLY purchase stability style tennis shoes NO flex, foam, free, yoga mat style shoes  Asics (GT 4000 or gel foundations), new balance stability type shoes (such as the 940 series), saucony (stabil c3),  Chandler (GTS or Beast or transcend), propet, HokaOne (tennis shoe) Mio (tennis shoes and boots)  Sofft Brand (women) Michelle&Omar (men), clarks, crocs, aerosoles, naturalizers, SAS, ecco, born, chris villalobos, rockports (dress shoes)  Vionic, burkenstocks, fitflops, propet, taos, baretraps (sandals)  HokaOne sandals, crocs, propet (house shoes)    Nail Home remedy:  Vicks Vapor rub or Emuaid to nails for easier manageability    Diabetes: Inspecting Your Feet  Diabetes increases your chances of developing foot problems. So inspect your feet every day. This helps you find small skin irritations before they become serious infections. If you have trouble seeing the bottoms of your feet, use a mirror or ask a family member or friend to help.     Pressure spots on the bottom of the foot are common areas where problems develop.   How to check your feet  Below are tips to help you look for foot problems. Try to check your feet at the same time each day, such as when you get out of bed in the morning:  Check the top of each foot. The tops of toes, back of  the heel, and outer edge of the foot can get a lot of rubbing from poor-fitting shoes.  Check the bottom of each foot. Daily wear and tear often leads to problems at pressure spots.  Check the toes and nails. Fungal infections often occur between toes. Toenail problems can also be a sign of fungal infections or lead to breaks in the skin.  Check your shoes, too. Loose objects inside a shoe can injure the foot. Use your hand to feel inside your shoes for things like ray, loose stitching, or rough areas that could irritate your skin.  Warning signs  Look for any color changes in the foot. Redness with streaks can signal a severe infection, which needs immediate medical attention. Tell your doctor right away if you have any of these problems:  Swelling, sometimes with color changes, may be a sign of poor blood flow or infection. Symptoms include tenderness and an increase in the size of your foot.  Warm or hot areas on your feet may be signs of infection. A foot that is cold may not be getting enough blood.  Sensations such as burning, tingling, or pins and needles can be signs of a problem. Also check for areas that may be numb.  Hot spots are caused by friction or pressure. Look for hot spots in areas that get a lot of rubbing. Hot spots can turn into blisters, calluses, or sores.  Cracks and sores are caused by dry or irritated skin. They are a sign that the skin is breaking down, which can lead to infection.  Toenail problems to watch for include nails growing into the skin (ingrown toenail) and causing redness or pain. Thick, yellow, or discolored nails can signal a fungal infection.  Drainage and odor can develop from untreated sores and ulcers. Call your doctor right away if you notice white or yellow drainage, bleeding, or unpleasant odor.   © 1123-2605 [employer]. [address] [place] [address], [place] [address]. All rights reserved. This information is not intended as a substitute for  professional medical care. Always follow your healthcare professional's instructions.        Step-by-Step:  Inspecting Your Feet (Diabetes)    Date Last Reviewed: 10/1/2016  © 4440-2029 [employer]. [address] [place] [address], [place] [address]. All rights reserved. This information is not intended as a substitute for professional medical care. Always follow your healthcare professional's instructions.

## 2023-09-05 ENCOUNTER — HOSPITAL ENCOUNTER (OUTPATIENT)
Dept: RADIOLOGY | Facility: HOSPITAL | Age: 53
Discharge: HOME | End: 2023-09-05
Attending: OBSTETRICS & GYNECOLOGY
Payer: MEDICARE

## 2023-09-05 DIAGNOSIS — Z12.31 SCREENING MAMMOGRAM, ENCOUNTER FOR: ICD-10-CM

## 2023-09-05 PROCEDURE — 77063 BREAST TOMOSYNTHESIS BI: CPT | Mod: 26,,, | Performed by: RADIOLOGY

## 2023-09-05 PROCEDURE — 77067 SCR MAMMO BI INCL CAD: CPT | Mod: TC

## 2023-09-05 PROCEDURE — 77067 SCR MAMMO BI INCL CAD: CPT | Mod: 26,,, | Performed by: RADIOLOGY

## 2023-09-05 PROCEDURE — 77067 MAMMO DIGITAL SCREENING BILAT WITH TOMO: ICD-10-PCS | Mod: 26,,, | Performed by: RADIOLOGY

## 2023-09-05 PROCEDURE — 77063 MAMMO DIGITAL SCREENING BILAT WITH TOMO: ICD-10-PCS | Mod: 26,,, | Performed by: RADIOLOGY

## 2023-09-10 ENCOUNTER — PATIENT MESSAGE (OUTPATIENT)
Dept: GASTROENTEROLOGY | Facility: CLINIC | Age: 53
End: 2023-09-10
Payer: MEDICARE

## 2023-09-11 ENCOUNTER — LAB VISIT (OUTPATIENT)
Dept: LAB | Facility: HOSPITAL | Age: 53
End: 2023-09-11
Attending: INTERNAL MEDICINE
Payer: MEDICARE

## 2023-09-11 DIAGNOSIS — E11.22 TYPE 2 DIABETES MELLITUS WITH STAGE 3B CHRONIC KIDNEY DISEASE, WITH LONG-TERM CURRENT USE OF INSULIN: ICD-10-CM

## 2023-09-11 DIAGNOSIS — N18.32 TYPE 2 DIABETES MELLITUS WITH STAGE 3B CHRONIC KIDNEY DISEASE, WITH LONG-TERM CURRENT USE OF INSULIN: ICD-10-CM

## 2023-09-11 DIAGNOSIS — Z79.4 TYPE 2 DIABETES MELLITUS WITH STAGE 3B CHRONIC KIDNEY DISEASE, WITH LONG-TERM CURRENT USE OF INSULIN: ICD-10-CM

## 2023-09-11 DIAGNOSIS — R53.83 FATIGUE, UNSPECIFIED TYPE: ICD-10-CM

## 2023-09-11 DIAGNOSIS — M32.9 SYSTEMIC LUPUS ERYTHEMATOSUS, UNSPECIFIED SLE TYPE, UNSPECIFIED ORGAN INVOLVEMENT STATUS: ICD-10-CM

## 2023-09-11 DIAGNOSIS — D84.9 IMMUNOSUPPRESSION: ICD-10-CM

## 2023-09-11 LAB
ALBUMIN SERPL BCP-MCNC: 4 G/DL (ref 3.5–5.2)
ALP SERPL-CCNC: 238 U/L (ref 55–135)
ALT SERPL W/O P-5'-P-CCNC: 279 U/L (ref 10–44)
ANION GAP SERPL CALC-SCNC: 6 MMOL/L (ref 8–16)
AST SERPL-CCNC: 226 U/L (ref 10–40)
BASOPHILS # BLD AUTO: 0.01 K/UL (ref 0–0.2)
BASOPHILS NFR BLD: 0.4 % (ref 0–1.9)
BILIRUB SERPL-MCNC: 0.6 MG/DL (ref 0.1–1)
BUN SERPL-MCNC: 19 MG/DL (ref 6–20)
C3 SERPL-MCNC: 90 MG/DL (ref 50–180)
C4 SERPL-MCNC: 32 MG/DL (ref 11–44)
CALCIUM SERPL-MCNC: 9.5 MG/DL (ref 8.7–10.5)
CHLORIDE SERPL-SCNC: 101 MMOL/L (ref 95–110)
CK SERPL-CCNC: 92 U/L (ref 20–180)
CO2 SERPL-SCNC: 30 MMOL/L (ref 23–29)
CREAT SERPL-MCNC: 1.7 MG/DL (ref 0.5–1.4)
CRP SERPL-MCNC: 3.7 MG/L (ref 0–8.2)
DIFFERENTIAL METHOD: ABNORMAL
EOSINOPHIL # BLD AUTO: 0 K/UL (ref 0–0.5)
EOSINOPHIL NFR BLD: 0.7 % (ref 0–8)
ERYTHROCYTE [DISTWIDTH] IN BLOOD BY AUTOMATED COUNT: 14.2 % (ref 11.5–14.5)
ERYTHROCYTE [SEDIMENTATION RATE] IN BLOOD BY WESTERGREN METHOD: 11 MM/HR (ref 0–20)
EST. GFR  (NO RACE VARIABLE): 36 ML/MIN/1.73 M^2
ESTIMATED AVG GLUCOSE: 134 MG/DL (ref 68–131)
GLUCOSE SERPL-MCNC: 104 MG/DL (ref 70–110)
HBA1C MFR BLD: 6.3 % (ref 4–5.6)
HCT VFR BLD AUTO: 35.3 % (ref 37–48.5)
HGB BLD-MCNC: 11.8 G/DL (ref 12–16)
IMM GRANULOCYTES # BLD AUTO: 0.01 K/UL (ref 0–0.04)
IMM GRANULOCYTES NFR BLD AUTO: 0.4 % (ref 0–0.5)
LYMPHOCYTES # BLD AUTO: 1.3 K/UL (ref 1–4.8)
LYMPHOCYTES NFR BLD: 46.8 % (ref 18–48)
MCH RBC QN AUTO: 28.5 PG (ref 27–31)
MCHC RBC AUTO-ENTMCNC: 33.4 G/DL (ref 32–36)
MCV RBC AUTO: 85 FL (ref 82–98)
MONOCYTES # BLD AUTO: 0.2 K/UL (ref 0.3–1)
MONOCYTES NFR BLD: 7 % (ref 4–15)
NEUTROPHILS # BLD AUTO: 1.3 K/UL (ref 1.8–7.7)
NEUTROPHILS NFR BLD: 44.7 % (ref 38–73)
NRBC BLD-RTO: 0 /100 WBC
PLATELET # BLD AUTO: 185 K/UL (ref 150–450)
PMV BLD AUTO: 9.4 FL (ref 9.2–12.9)
POTASSIUM SERPL-SCNC: 3.9 MMOL/L (ref 3.5–5.1)
PROT SERPL-MCNC: 7 G/DL (ref 6–8.4)
RBC # BLD AUTO: 4.14 M/UL (ref 4–5.4)
SODIUM SERPL-SCNC: 137 MMOL/L (ref 136–145)
WBC # BLD AUTO: 2.84 K/UL (ref 3.9–12.7)

## 2023-09-11 PROCEDURE — 36415 COLL VENOUS BLD VENIPUNCTURE: CPT | Performed by: INTERNAL MEDICINE

## 2023-09-11 PROCEDURE — 83036 HEMOGLOBIN GLYCOSYLATED A1C: CPT | Performed by: INTERNAL MEDICINE

## 2023-09-11 PROCEDURE — 86225 DNA ANTIBODY NATIVE: CPT | Performed by: INTERNAL MEDICINE

## 2023-09-11 PROCEDURE — 80053 COMPREHEN METABOLIC PANEL: CPT | Performed by: INTERNAL MEDICINE

## 2023-09-11 PROCEDURE — 85025 COMPLETE CBC W/AUTO DIFF WBC: CPT | Performed by: INTERNAL MEDICINE

## 2023-09-11 PROCEDURE — 82550 ASSAY OF CK (CPK): CPT | Performed by: INTERNAL MEDICINE

## 2023-09-11 PROCEDURE — 85652 RBC SED RATE AUTOMATED: CPT | Performed by: INTERNAL MEDICINE

## 2023-09-11 PROCEDURE — 86160 COMPLEMENT ANTIGEN: CPT | Performed by: INTERNAL MEDICINE

## 2023-09-11 PROCEDURE — 86140 C-REACTIVE PROTEIN: CPT | Performed by: INTERNAL MEDICINE

## 2023-09-11 PROCEDURE — 86160 COMPLEMENT ANTIGEN: CPT | Mod: 59 | Performed by: INTERNAL MEDICINE

## 2023-09-12 ENCOUNTER — PATIENT MESSAGE (OUTPATIENT)
Dept: PODIATRY | Facility: CLINIC | Age: 53
End: 2023-09-12
Payer: MEDICARE

## 2023-09-12 ENCOUNTER — TELEPHONE (OUTPATIENT)
Dept: GASTROENTEROLOGY | Facility: CLINIC | Age: 53
End: 2023-09-12
Payer: MEDICARE

## 2023-09-12 NOTE — TELEPHONE ENCOUNTER
Spoke w/pt and scheduled appt on 10/24 at 11:40 am to be seen by ANA Garcia. Pt verbalize understand, confirmed appt and thank me.   ----- Message from Valeria Kirby sent at 9/12/2023 12:34 PM CDT -----  Type: Patient Call Back    Who called:self     What is the request in detail: asking for a call to be scheduled for a check up says last time was seen was in 2021 and states everytime she call she's told provider is booked far out     Can the clinic reply by [facility]? No     Would the patient rather a call back or a response via [facility]? Call     Best call back number: [phone] (home)

## 2023-09-13 ENCOUNTER — PATIENT MESSAGE (OUTPATIENT)
Dept: RHEUMATOLOGY | Facility: CLINIC | Age: 53
End: 2023-09-13
Payer: MEDICARE

## 2023-09-13 LAB — DSDNA AB SER-ACNC: NORMAL [IU]/ML

## 2023-09-13 NOTE — TELEPHONE ENCOUNTER
Very elevated LFTs.  Patient reports no new medications or changes in diet. No alcohol intake.  Message to patient's hepatologist that she is scheduled to see in 6 days Dr. Jacob Horvath.

## 2023-09-16 NOTE — PROGRESS NOTES
Valencia Dumont   is a 53 y.o.. I performed this consultation using real-time Telehealth tools, including a live video connection between my location and the patient's location. Prior to initiating the consultation, I obtained informed verbal consent to perform this consultation using Telehealth tools and answered all the questions about the Telehealth interaction.      Face to Face time with patient: 20 min  45 minutes of total time spent on the encounter, which includes face to face time and non-face to face time preparing to see the patient (eg, review of tests), Obtaining and/or reviewing separately obtained history, Documenting clinical information in the electronic or other health record, Independently interpreting results (not separately reported) and communicating results to the patient/family/caregiver, or Care coordination (not separately reported).         Each patient to whom he or she provides medical services by telemedicine is:  (1) informed of the relationship between the physician and patient and the respective role of any other health care provider with respect to management of the patient; and (2) notified that he or she may decline to receive medical services by telemedicine and may withdraw from such care at any time.      Transplant Hepatology  Liver Transplant Recipient Follow Up    PATIENT: Valencia Dumont  MRN: [id number]  DATE: 2023    Provider: Hepatologist - Dr Horvath    Transplant History  Transplant Date: 2013  UNOS Native Liver Dx: Cirrhosis: Autoimmune     Valencia is here for follow up of her liver transplant.     ORGAN: LIVER  Whole or Partial: whole liver  Donor Type:  - brain death  CDC High Risk: no  Donor CMV Status: Positive  Donor HCV Status: Negative  Donor HBcAb: Negative  Biliary Anastomosis: end to end  Arterial Anatomy: standard  IVC reconstruction: end to end ivc  Portal vein status: patent  .  Chief complaint: follow up, history of OLT    Subjective:    Valencia Dumont is a 53 y.o. 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 10 years post-OLT      7/18/23  Liver enzymes are elevated  Taking Tylenol but no hepatotoxic medications  Recurrent sinusitis but worsened after sinus surgery  She has not had recent hospitalizations or ED visits. She reports compliance with Immunosuppression.   She denies recent fever, chills, nausea, vomiting, abdominal pain, diarrhea, headache, tremors.      9/22  No complains    7/13/21  Swallowing difficulty which has improved somewhat with BID PPI use, but reports not taking on empty stomach first thing in the AM. She states her symptoms are primarily related to post-nasal drip which has not significantly improved with medicine. She also reports having recurrent episodes of vaginal candidiasis, not currently.       DX lupus --She is on infusion medication for her lupus as well as plaquenil 200mg daily BID.    Allograft function:  Allograft function :   ALT   Date Value Ref Range Status   09/11/2023 279 (H) 10 - 44 U/L Final     AST   Date Value Ref Range Status   09/11/2023 226 (H) 10 - 40 U/L Final     Alkaline Phosphatase   Date Value Ref Range Status   09/11/2023 238 (H) 55 - 135 U/L Final     Tacrolimus Lvl   Date Value Ref Range Status   06/27/2023 5.1 5.0 - 15.0 ng/mL Final     Comment:     Testing performed by a chemiluminescent microparticle   immunoassay on the [employer] i System.    CAUTION: No firm therapeutic range exists for tacrolimus in whole   blood. The   complexity of the clinical state, individual differences in   sensitivity to   immunosuppressive and nephrotoxic effects of tacrolimus,   co-administration   of other immunosuppressants, type of transplant, time post-transplant   and a   number of other factors contribute to different requirements for   optimal   blood levels of tacrolimus. Therefore, individual tacrolimus values   cannot    be used as the sole indicator for making changes in treatment regimen   and   each patient should be thoroughly evaluated clinically before changes   in   treatment regimens are made. Each user must establish his or her own   ranges   based on clinical experience.  Therapeutic ranges vary according to the commercial test used, and   therefore   should be established for each commercial test. Values obtained with   different assay methods cannot be used interchangeably due to   differences in   assay methods and cross-reactivity with metabolites, nor should   correction   factors be applied. Therefore, consistent use of one assay for   individual   patients is recommended.          Immunosuppression:   Tacrolimus:                                      Yes 2/2 mg BID  Everolimus:                                         no,   MMF:                                                   yes, 720 mg BID for renal protection  Prednisone:                                        No  Cyclosporine:                                       no  Sirolimus:                                             no    Post-LT Complications  Acute cellular rejection:                       no  Antibody-mediated rejection:               no  Biliary anastomotic stricture:               no  HAT:                                                     no  HA stenosis:                                         no  PV stenosis:                                         no  CMV reactivation:                                 no  PTLD:                                                   no  Other malignancies:                             no      Health Maintenance:  Dermatology check up:                           no  Colonoscopy:                                          yes  Bone Mineral Density (BMD):                  yes,   Prolia injections  HbA1C:                                                    Yes      Prior Relevant History:  Biopsy in 2017 suggested recurrence  of AIH received Prednisolone bolus    2018 In the setting of the elevated immunosuppression she also developed some acute kidney injury.     Review of systems:  A review of 12+ systems was conducted with pertinent positive and negative findings documented in HPI with all other systems reviewed and negative.    Past Medical History:   Past Medical History:   Diagnosis Date    Abnormal Pap smear of cervix     Anemia     Arthritis     Ascites     Asthma     Chronic back pain     Cirrhosis of liver without mention of alcohol 10/18/2013    Diabetes mellitus     Esophageal varices     GERD (gastroesophageal reflux disease)     Herpes simplex virus (HSV) infection     HSV2.    Hypertension     Kidney stone     Lupus     Osteoporosis 12/2013    Prophylactic immunotherapy (transplant immunosuppression) 1/2/2014    SBP (spontaneous bacterial peritonitis)     history of        Past Surgical HIstory:   Past Surgical History:   Procedure Laterality Date    BREAST BIOPSY Left 08/2020    CHOLECYSTECTOMY      COLONOSCOPY N/A 05/31/2017    Procedure: COLONOSCOPY;  Surgeon: Marky Sun MD;  [facility]  Service: Endoscopy;  Laterality: N/A;  PM prep.    CONIZATION OF CERVIX USING LOOP ELECTROSURGICAL EXCISION PROCEDURE (LEEP) N/A 1/20/2023    Procedure: CONIZATION-CERVICAL-LEEP;  Surgeon: Lu Schreiber MD;  Location: [facility];  Service: OB/GYN;  Laterality: N/A;    ESOPHAGOGASTRODUODENOSCOPY      ESOPHAGOGASTRODUODENOSCOPY N/A 01/16/2019    Procedure: EGD (ESOPHAGOGASTRODUODENOSCOPY);  Surgeon: Deniz Guerra MD;  [address]  Service: Endoscopy;  Laterality: N/A;  labs prior, s/p liver transplant-MS    ESOPHAGOGASTRODUODENOSCOPY N/A 09/09/2020    Procedure: EGD (ESOPHAGOGASTRODUODENOSCOPY);  Surgeon: Davion Ríos MD;  [facility]  Service: Endoscopy;  Laterality: N/A;  Please order the EGD at least 2 weeks after barium esophagram has been done EGD is for  dysphagia  covid test 9/6-[facility]    EYE SURGERY      FUNCTIONAL ENDOSCOPIC SINUS SURGERY (FESS) USING COMPUTER-ASSISTED NAVIGATION Bilateral 02/04/2021    Procedure: FESS, USING COMPUTER-ASSISTED NAVIGATION;  Surgeon: Delores Lewis MD;  Location: [facility];  Service: ENT;  Laterality: Bilateral;  [employer] WALDEMAR [phone] TEXTED HIM @ 2:45PM ON 1-8-2021  DISC LOADED BT TOMMY 1-  RN PREOP 1/28/2021---COVID NEGATIVE ON 2/3--CONSENT INCOMPLETE  H/P INCOMPLETE  --CBC IN AM    LIVER BIOPSY      LIVER TRANSPLANT  12/31/2013    REFRACTIVE SURGERY Bilateral 2010    TUBAL LIGATION  2003    UPPER GASTROINTESTINAL ENDOSCOPY         Family History:   Family History   Problem Relation Age of Onset    Hypertension Mother     Cataracts Mother     Diabetes Father     Alzheimer's disease Father     Diabetes Paternal Grandfather     Diabetes Paternal Grandmother     No Known Problems Maternal Grandmother     Bone cancer Maternal Grandfather     Breast cancer Maternal Aunt 55    Hypertension Brother     Diabetes Brother     No Known Problems Sister     No Known Problems Maternal Uncle     No Known Problems Paternal Aunt     No Known Problems Paternal Uncle     Stroke Neg Hx     Cancer Neg Hx     Colon cancer Neg Hx     Esophageal cancer Neg Hx     Stomach cancer Neg Hx     Rectal cancer Neg Hx     Amblyopia Neg Hx     Blindness Neg Hx     Glaucoma Neg Hx     Macular degeneration Neg Hx     Retinal detachment Neg Hx     Strabismus Neg Hx     Thyroid disease Neg Hx        Social History:  reports that she has never smoked. She has never used smokeless tobacco. She reports current alcohol use of about 1.0 standard drink of alcohol per week. She reports that she does not use drugs.    PFSH:  Past medical, family, and social history reviewed as documented in chart with pertinent positive medical, family, and social history detailed in HPI.    Allergies:  Review of patient's allergies indicates:   Allergen  "Reactions    Norvasc [amlodipine]      Severe headache    Bactrim [sulfamethoxazole-trimethoprim]      Gets yeast infection.    Doxycycline Rash    Pcn [penicillins] Rash       Medications:  Current Outpatient Medications   Medication Sig Dispense Refill    acetaminophen (TYLENOL) 650 MG TbSR Take 1 tablet (650 mg total) by mouth every 6 (six) hours. 60 tablet 0    amitriptyline (ELAVIL) 10 MG tablet Take 1 tablet (10 mg total) by mouth every evening. 90 tablet 2    azelastine (ASTELIN) 137 mcg (0.1 %) nasal spray 1 spray (137 mcg total) by Nasal route 2 (two) times daily. 30 mL 11    BD ULTRA-FINE JANESSA PEN NEEDLE 32 gauge x 5/32" Ndle For five times daily insulin injections 450 each 3    blood-glucose meter,continuous (DEXCOM G6 ) Misc 1 each by Misc.(Non-Drug; Combo Route) route once. for 1 dose 1 each 0    blood-glucose transmitter (DEXCOM G6 TRANSMITTER) Karen 1 each by Misc.(Non-Drug; Combo Route) route once a week 1 Device 3    budesonide-formoterol 160-4.5 mcg (SYMBICORT) 160-4.5 mcg/actuation HFAA Inhale 2 puffs into the lungs.      buPROPion (WELLBUTRIN XL) 150 MG TB24 tablet Take 300 mg by mouth.      ciclopirox (PENLAC) 8 % Soln Apply topically nightly. 6.6 mL 3    clotrimazole-betamethasone 1-0.05% (LOTRISONE) cream APPLY TOPICALLY TO THE AFFECTED AREA TWICE DAILY FOR 10 DAYS      diazepam (VALIUM) 5 MG tablet Take 5 mg by mouth 3 (three) times daily as needed.   0    diclofenac sodium (VOLTAREN) 1 % Gel APPLY 2 GRAM to wrist and 4 g to left foot TOPICAL ROUTE 4 TIMES PER  each 2    dicyclomine (BENTYL) 10 MG capsule TAKE 1 CAPSULE BY MOUTH EVERY 8 HOURS AS NEEDED for spasms      DILTIAZEM HCL (DILTIAZEM 2% CREAM) Apply topically 3 (three) times daily. Apply topically to anal area. 30 g 0    diphenoxylate-atropine 2.5-0.025 mg (LOMOTIL) 2.5-0.025 mg per tablet Take 1 tablet by mouth 4 (four) times daily as needed.      dulaglutide (TRULICITY) 0.75 mg/0.5 mL pen injector Inject 0.75 mg into " the skin every 7 days. 12 pen 3    ergocalciferol (ERGOCALCIFEROL) 50,000 unit Cap Take 50,000 Units by mouth every 7 days.      erythromycin with ethanol (EMGEL) 2 % gel ADD 30GM (1 TUBE) TO THE SOAKING DEVICE AND ALLOW WATER TO AGITATE. PLACE AFFECTED AREAS INTO WATER AND SOAK FOR 10 MINUTES 1-2 TIMES DAILY  1    estradioL (ESTRACE) 0.01 % (0.1 mg/gram) vaginal cream Place 1 g vaginally twice a week. 42 g 3    famotidine (PEPCID) 40 MG tablet       ferrous sulfate (FEOSOL) 325 mg (65 mg iron) Tab tablet Take 1 tablet (325 mg total) by mouth every 12 (twelve) hours. 60 tablet 4    flash glucose scanning reader (Xylitol [place]STYLE CHUN 14 DAY READER) Misc 1 each by Misc.(Non-Drug; Combo Route) route once daily. 1 each 0    flash glucose sensor (FREESTYLE CHUN 14 DAY SENSOR) Kit 2 each by Misc.(Non-Drug; Combo Route) route every 14 (fourteen) days. 2 kit 11    fluconazole (DIFLUCAN) 150 MG Tab TAKE ONE TABLET BY MOUTH once for ONE dose 1 tablet 0    fluocinolone (SYNALAR) 0.01 % external solution APPLY a couple of DROPS INTO BOTH EARS TWICE DAILY AS NEEDED FOR ITCHING 60 mL 1    fluticasone propionate (FLONASE) 50 mcg/actuation nasal spray 1 spray by Each Nostril route 2 (two) times daily.      fluticasone propionate (FLONASE) 50 mcg/actuation nasal spray 2 sprays (100 mcg total) by Each Nostril route 2 (two) times daily. 18.2 mL 11    gabapentin (NEURONTIN) 300 MG capsule TAKE 1 CAPSULE BY MOUTH THREE TIMES DAILY      gentamicin (GARAMYCIN) 0.1 % cream ADD 30GM (1 TUBE) TO THE SOAKING DEVICE AND ALLOW WATER TO AGITATE. PLACE AFFECTED AREAS INTO WATER AND SOAK FOR 10 MINUTES 1-2 TIMES DAILY  1    hydrocortisone (ANUSOL-HC) 2.5 % rectal cream Place rectally 2 (two) times daily. Apply per rectum bid 30 g 3    hydrOXYchloroQUINE (PLAQUENIL) 200 mg tablet Take 1 tablet (200 mg total) by mouth 2 (two) times daily. 180 tablet 1    hydrOXYzine HCl (ATARAX) 10 MG Tab TAKE 1 OR 2 TABLETS BY MOUTH THREE TIMES DAILY AS NEEDED FOR  ITCHING.  0    insulin detemir U-100 (LEVEMIR FLEXTOUCH U-100 INSULN) 100 unit/mL (3 mL) InPn pen INJECT FOUR units UNDER THE SKIN TWICE DAILY 45 mL 3    insulin lispro (HUMALOG KWIKPEN INSULIN) 100 unit/mL pen 3 units before BREAKFAST and lunch and FIVE units before dinner with sliding scale, up to 25 units daily 45 mL 3    isosorbide mononitrate (IMDUR) 30 MG 24 hr tablet Take 30 mg by mouth once daily.      ketoconazole (NIZORAL) 2 % cream ADD 30GM (1/2 TUBE) TO THE SOAKING DEVICE AND ALLOW WATER TO AGITATE. PLACE AFFECTED AREAS INTO WATER AND SOAK FOR 10 MINUTES 1-2 TIMES BRENDON  1    Lactobac. rhamnosus GG-inulin 10 billion cell -200 mg Cap Take 1 capsule by mouth 2 (two) times daily. 30 capsule 0    levocetirizine (XYZAL) 5 MG tablet Take 5 mg by mouth every evening.  3    LIDOcaine-prilocaine (EMLA) cream 2 (two) times daily. Apply to affected area      loratadine (CLARITIN) 10 mg tablet Take 1 tablet (10 mg total) by mouth once daily. 30 tablet 0    losartan (COZAAR) 100 MG tablet Take 1 tablet (100 mg total) by mouth once daily. 30 tablet 2    magnesium oxide 500 mg Tab Take 500 mg by mouth 2 (two) times daily. 60 each 6    meclizine (ANTIVERT) 25 mg tablet TAKE ONE TABLET BY MOUTH AT BEDTIME AS NEEDED for dizziness.  0    mometasone 0.1% (ELOCON) 0.1 % cream APPLY TOPICALLY TO THE FACE TWICE DAILY FOR ONE WEEK      MULTIVIT,THER IRON,CA,FA & MIN (MULTIVITAMIN) Tab Take 1 tablet by mouth once daily. 30 tablet 0    mycophenolate (MYFORTIC) 180 MG TbEC Take 2 tablets (360 mg total) by mouth 2 (two) times daily. 120 tablet 11    mycophenolate (MYFORTIC) 180 MG TbEC take 2 TABLETS BY MOUTH TWICE DAILY 120 tablet 1    neomycin-polymyxin-hydrocortisone (CORTISPORIN) otic solution INSTILL TWO DROPS INTO BOTH EARS FOUR TIMES DAILY FOR 10 DAYS  0    NURTEC 75 mg odt PLACE ONE TABLET on tongue, alternatively UNDER THE TONGUE ONCE DAILY AS NEEDED FOR MIGRAINE 8 tablet 2    nystatin (MYCOSTATIN) 100,000 unit/mL  suspension SWISH AND SPIT FIVE MILLILITERS BY MOUTH FOUR TIMES DAILY FOR 7 DAYS.  1    nystatin-triamcinolone (MYCOLOG II) cream Apply to affected area 2 times daily 30 g 1    ondansetron (ZOFRAN) 4 MG tablet TAKE TWO TABLETS BY MOUTH EVERY 8 HOURS AS NEEDED FOR NAUSEA.      oxyCODONE (ROXICODONE) 5 MG immediate release tablet Take 1 tablet (5 mg total) by mouth every 4 (four) hours as needed for Pain. 5 tablet 0    oxycodone-acetaminophen (PERCOCET) 7.5-325 mg per tablet Take 1 tablet by mouth every 4 (four) hours as needed for Pain.      pantoprazole (PROTONIX) 40 MG tablet Take 1 tablet (40 mg total) by mouth 2 (two) times daily. Take immediately in am when wake up and then 30 minutes prior to dinner 60 tablet 11    polyethylene glycol (GLYCOLAX) 17 gram/dose powder Mix 1 capful (17 g) with liquid and by mouth 2 (two) times daily. 1530 g 11    PROAIR HFA 90 mcg/actuation inhaler Inhale 2 puffs into the lungs 3 (three) times daily as needed.   3    promethazine-dextromethorphan (PROMETHAZINE-DM) 6.25-15 mg/5 mL Syrp Take by mouth 2 (two) times daily as needed.   0    rosuvastatin (CRESTOR) 5 MG tablet Take 5 mg by mouth once daily.      SENNA 8.6 mg tablet Take 1 tablet by mouth 2 (two) times daily. Take while taking narcotics 30 tablet 0    sertraline (ZOLOFT) 50 MG tablet Take 50 mg by mouth once daily.  11    SSD 1 % cream 1 application 2 (two) times daily. Apply to affected area  0    tacrolimus (PROGRAF) 1 MG Cap take 2 CAPSULES BY MOUTH EVERY MORNING & 2 CAPSULES EVERY EVENING 120 capsule 7    tinidazole (TINDAMAX) 250 MG tablet SMARTSI Tablet(s) By Mouth Every Morning      topiramate (TOPAMAX) 50 MG tablet SMARTSI Tablet(s) By Mouth Every Evening      triamcinolone acetonide 0.1% (KENALOG) 0.1 % cream Apply topically 2 times daily 30 g 0    valACYclovir (VALTREX) 1000 MG tablet Take 1,000 mg by mouth once daily.      verapamil (CALAN-SR) 120 MG CR tablet TAKE ONE TABLET ONCE DAILY FOR BLOOD PRESSURE   3    zolpidem (AMBIEN) 10 mg Tab Take 10 mg by mouth nightly as needed.  3     No current facility-administered medications for this visit.       Review of Systems   Constitutional:  Negative for fatigue, fever and unexpected weight change.   HENT:  Negative for ear pain, nosebleeds and trouble swallowing.    Eyes:  Negative for discharge and redness.   Respiratory:  Negative for cough and shortness of breath.    Cardiovascular:  Negative for palpitations and leg swelling.   Gastrointestinal:  Negative for abdominal distention, abdominal pain, diarrhea and vomiting.   Endocrine: Negative for cold intolerance and polyuria.   Genitourinary:  Negative for flank pain and hematuria.   Musculoskeletal:  Negative for back pain.   Skin:  Negative for pallor.   Neurological:  Negative for seizures and headaches.   Hematological:  Does not bruise/bleed easily.   Psychiatric/Behavioral:  Negative for confusion and hallucinations.      See HPI otherwise neg 10 pt ROS    UNOS Patient Status  Functional Status: 80% - Normal activity with effort: some symptoms of disease  Physical Capacity: No Limitations        Objective:      Vitals: There were no vitals filed for this visit.    Physical Exam  Constitutional:       Appearance: Normal appearance.   HENT:      Head: Normocephalic and atraumatic.      Right Ear: External ear normal.      Left Ear: External ear normal.      Mouth/Throat:      Mouth: Mucous membranes are moist.   Eyes:      Extraocular Movements: Extraocular movements intact.      Pupils: Pupils are equal, round, and reactive to light.   Cardiovascular:      Rate and Rhythm: Normal rate.   Pulmonary:      Effort: Pulmonary effort is normal.   Abdominal:      General: There is no distension.      Palpations: There is no mass.      Tenderness: There is no abdominal tenderness.   Musculoskeletal:         General: Normal range of motion.      Cervical back: Normal range of motion.   Skin:     General: Skin is warm.    Neurological:      General: No focal deficit present.      Mental Status: She is alert and oriented to person, place, and time.       Gen: NAD answers questions appropriately  HEENT: NCAT PERRL EOMI MMM no scleral icterus  Neck: supple no LAD  Cardiac: RRR no m/r/g  Lungs: CTA b/l no w/r/r  Abd: soft NTND BS+  Ext: no c/c/e  Skin: warm dry no rash    Laboratory Data:  No visits with results within 1 Week(s) from this visit.   Latest known visit with results is:   Lab Visit on 09/11/2023   Component Date Value Ref Range Status    Specimen UA 09/11/2023 Urine, Unspecified   Final    Color, UA 09/11/2023 Yellow  Yellow, Straw, Meredith Final    Appearance, UA 09/11/2023 Clear  Clear Final    pH, UA 09/11/2023 7.0  5.0 - 8.0 Final    Specific Gravity, UA 09/11/2023 1.020  1.005 - 1.030 Final    Protein, UA 09/11/2023 2+ (A)  Negative Final    Comment: Recommend a 24 hour urine protein or a urine   protein/creatinine ratio if globulin induced proteinuria is  clinically suspected.      Glucose, UA 09/11/2023 Negative  Negative Final    Ketones, UA 09/11/2023 Negative  Negative Final    Bilirubin (UA) 09/11/2023 Negative  Negative Final    Occult Blood UA 09/11/2023 Negative  Negative Final    Nitrite, UA 09/11/2023 Negative  Negative Final    Urobilinogen, UA 09/11/2023 Negative  <2.0 EU/dL Final    Leukocytes, UA 09/11/2023 Negative  Negative Final    Protein, Urine Random 09/11/2023 38  mg/dL Final    Creatinine, Urine 09/11/2023 196.4  15.0 - 325.0 mg/dL Final    Prot/Creat Ratio, Urine 09/11/2023 0.19  0.00 - 0.20 Final    RBC, UA 09/11/2023 1  0 - 4 /hpf Final    WBC, UA 09/11/2023 2  0 - 5 /hpf Final    WBC Clumps, UA 09/11/2023 Rare  None-Rare Final    Bacteria 09/11/2023 None  None-Occ /hpf Final    Squam Epithel, UA 09/11/2023 0  /hpf Final    Hyaline Casts, UA 09/11/2023 28 (A)  0-1/lpf /lpf Final    WBC Casts, UA 09/11/2023 1 (A)  None /lpf Final    Granular Casts, UA 09/11/2023 18 (A)  None /lpf Final     Microscopic Comment 09/11/2023 SEE COMMENT   Final    Comment: Other formed elements not mentioned in the report are not   present in the microscopic examination.          Lab Results   Component Value Date    WBC 2.84 (L) 09/11/2023    HGB 11.8 (L) 09/11/2023    HCT 35.3 (L) 09/11/2023    MCV 85 09/11/2023     09/11/2023       Lab Results   Component Value Date     09/11/2023    K 3.9 09/11/2023     09/11/2023    CO2 30 (H) 09/11/2023    BUN 19 09/11/2023    CREATININE 1.7 (H) 09/11/2023    CALCIUM 9.5 09/11/2023    ANIONGAP 6 (L) 09/11/2023    ESTGFRAFRICA 46 (A) 06/24/2022    EGFRNONAA 40 (A) 06/24/2022       Lab Results   Component Value Date     (H) 09/11/2023     (H) 09/11/2023     (H) 05/18/2017    ALKPHOS 238 (H) 09/11/2023    BILITOT 0.6 09/11/2023       Lab Results   Component Value Date    TACROLIMUS 5.1 06/27/2023       Imaging:  I personally reviewed imaging studies and outside records..      Assessment:       1. 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    2. Status post liver transplant    3. Immunosuppression    4. Therapeutic drug monitoring    5. Dyslipidemia               Plan:     Recommendations:  Allograft Function  - Liver enzymes elevated 9/23 ? Viral ? Sinusitis. Check in 1 week. Prograf awaited    Immunosuppression   - will increase prograf to 3/2 and reduce myfortic 360 BID due to recurrent sinusitis and neutropenia  --Additional testing to be completed according to Written Order Guidelines for Post-Liver and Combined Liver/Kidney Transplant Follow-up (LI-09)    CKD stage 3  -crerat 1.4  - Avoid NSAIDs as able and maintain adequate hydration    Health Maintenance/Screening:  - CRC: Colonoscopy as per GI  -EGD with path c/w reflux esophagitis, counseled on appropriate PPI use (continue BID protonix)  - Recommend age appropriate cancer screening  - Skin cancer: Recommend use of sunscreen SPF 30 or higher, hat and sunglasses while outside, dermatologist visit  annually or sooner if any concerning lesions, refer to dermatology for skin CA evaluation  - Osteoporosis: Recommend bone density testing every 2-3 years if previously normal or annually if previously abnormal, continuing prolia   -Dental: Cleaning andfollow up every 6 month    5. Recurrent Sinusitis/candidiasis   plan to reduce myfortic once creatinine stabilize       Return to clinic in  6-12 months.    I have sent communication to the referring physician and/or primary care provider.    Time spent on the day of this encounter preparing for, treating and managing this patient and over half of that time was spent on counseling and coordination of care.  30 minutes were spend reviewing the previous labs and outside records. I also educated the patient about lifestyle modifications. I have provided the patient with an opportunity to ask questions and have all questions answered to his satisfaction.         Patient was seen in the liver transplant department at [facility] Edgar Horvath MD  Transplant Hepatology & Gastroenterology  Hepatology and Liver Transplant  [facility]  [facility]

## 2023-09-18 ENCOUNTER — PATIENT MESSAGE (OUTPATIENT)
Dept: HEPATOLOGY | Facility: CLINIC | Age: 53
End: 2023-09-18
Payer: MEDICARE

## 2023-09-19 ENCOUNTER — PATIENT MESSAGE (OUTPATIENT)
Dept: TRANSPLANT | Facility: CLINIC | Age: 53
End: 2023-09-19
Payer: MEDICARE

## 2023-09-19 ENCOUNTER — OFFICE VISIT (OUTPATIENT)
Dept: HEPATOLOGY | Facility: CLINIC | Age: 53
End: 2023-09-19
Payer: MEDICAID

## 2023-09-19 DIAGNOSIS — Z94.4 STATUS POST LIVER TRANSPLANT: ICD-10-CM

## 2023-09-19 DIAGNOSIS — D84.9 IMMUNOSUPPRESSION: ICD-10-CM

## 2023-09-19 DIAGNOSIS — Z51.81 THERAPEUTIC DRUG MONITORING: ICD-10-CM

## 2023-09-19 DIAGNOSIS — Z94.4 LIVER REPLACED BY TRANSPLANT: Primary | ICD-10-CM

## 2023-09-19 DIAGNOSIS — Z94.4 LIVER TRANSPLANTED: ICD-10-CM

## 2023-09-19 DIAGNOSIS — E78.5 DYSLIPIDEMIA: ICD-10-CM

## 2023-09-19 PROCEDURE — 3072F PR LOW RISK FOR RETINOPATHY: ICD-10-PCS | Mod: CPTII,95,, | Performed by: INTERNAL MEDICINE

## 2023-09-19 PROCEDURE — 4010F PR ACE/ARB THEARPY RXD/TAKEN: ICD-10-PCS | Mod: CPTII,95,, | Performed by: INTERNAL MEDICINE

## 2023-09-19 PROCEDURE — 1159F MED LIST DOCD IN RCRD: CPT | Mod: CPTII,95,, | Performed by: INTERNAL MEDICINE

## 2023-09-19 PROCEDURE — 99214 OFFICE O/P EST MOD 30 MIN: CPT | Mod: 95,,, | Performed by: INTERNAL MEDICINE

## 2023-09-19 PROCEDURE — 3066F NEPHROPATHY DOC TX: CPT | Mod: CPTII,95,, | Performed by: INTERNAL MEDICINE

## 2023-09-19 PROCEDURE — 1160F PR REVIEW ALL MEDS BY PRESCRIBER/CLIN PHARMACIST DOCUMENTED: ICD-10-PCS | Mod: CPTII,95,, | Performed by: INTERNAL MEDICINE

## 2023-09-19 PROCEDURE — 3072F LOW RISK FOR RETINOPATHY: CPT | Mod: CPTII,95,, | Performed by: INTERNAL MEDICINE

## 2023-09-19 PROCEDURE — 1159F PR MEDICATION LIST DOCUMENTED IN MEDICAL RECORD: ICD-10-PCS | Mod: CPTII,95,, | Performed by: INTERNAL MEDICINE

## 2023-09-19 PROCEDURE — 4010F ACE/ARB THERAPY RXD/TAKEN: CPT | Mod: CPTII,95,, | Performed by: INTERNAL MEDICINE

## 2023-09-19 PROCEDURE — 3044F PR MOST RECENT HEMOGLOBIN A1C LEVEL <7.0%: ICD-10-PCS | Mod: CPTII,95,, | Performed by: INTERNAL MEDICINE

## 2023-09-19 PROCEDURE — 99214 PR OFFICE/OUTPT VISIT, EST, LEVL IV, 30-39 MIN: ICD-10-PCS | Mod: 95,,, | Performed by: INTERNAL MEDICINE

## 2023-09-19 PROCEDURE — 1160F RVW MEDS BY RX/DR IN RCRD: CPT | Mod: CPTII,95,, | Performed by: INTERNAL MEDICINE

## 2023-09-19 PROCEDURE — 3066F PR DOCUMENTATION OF TREATMENT FOR NEPHROPATHY: ICD-10-PCS | Mod: CPTII,95,, | Performed by: INTERNAL MEDICINE

## 2023-09-19 PROCEDURE — 3044F HG A1C LEVEL LT 7.0%: CPT | Mod: CPTII,95,, | Performed by: INTERNAL MEDICINE

## 2023-09-20 ENCOUNTER — PATIENT MESSAGE (OUTPATIENT)
Dept: TRANSPLANT | Facility: CLINIC | Age: 53
End: 2023-09-20
Payer: MEDICARE

## 2023-09-20 ENCOUNTER — INFUSION (OUTPATIENT)
Dept: INFUSION THERAPY | Facility: HOSPITAL | Age: 53
End: 2023-09-20
Attending: INTERNAL MEDICINE
Payer: MEDICARE

## 2023-09-20 VITALS
RESPIRATION RATE: 18 BRPM | BODY MASS INDEX: 19.58 KG/M2 | TEMPERATURE: 98 F | HEIGHT: 69 IN | HEART RATE: 89 BPM | WEIGHT: 132.19 LBS | SYSTOLIC BLOOD PRESSURE: 158 MMHG | OXYGEN SATURATION: 97 % | DIASTOLIC BLOOD PRESSURE: 76 MMHG

## 2023-09-20 DIAGNOSIS — T38.0X5S ADVERSE EFFECT OF GLUCOCORTICOID OR SYNTHETIC ANALOGUE, SEQUELA: ICD-10-CM

## 2023-09-20 DIAGNOSIS — N18.32 STAGE 3B CHRONIC KIDNEY DISEASE: ICD-10-CM

## 2023-09-20 DIAGNOSIS — D84.9 IMMUNOSUPPRESSION: ICD-10-CM

## 2023-09-20 DIAGNOSIS — Z94.4 LIVER TRANSPLANTED: ICD-10-CM

## 2023-09-20 DIAGNOSIS — M81.0 OSTEOPOROSIS, UNSPECIFIED OSTEOPOROSIS TYPE, UNSPECIFIED PATHOLOGICAL FRACTURE PRESENCE: ICD-10-CM

## 2023-09-20 DIAGNOSIS — M32.9 SYSTEMIC LUPUS ERYTHEMATOSUS, UNSPECIFIED SLE TYPE, UNSPECIFIED ORGAN INVOLVEMENT STATUS: Primary | ICD-10-CM

## 2023-09-20 PROCEDURE — 25000003 PHARM REV CODE 250: Performed by: INTERNAL MEDICINE

## 2023-09-20 PROCEDURE — 96365 THER/PROPH/DIAG IV INF INIT: CPT

## 2023-09-20 PROCEDURE — 96375 TX/PRO/DX INJ NEW DRUG ADDON: CPT

## 2023-09-20 PROCEDURE — 63600175 PHARM REV CODE 636 W HCPCS: Performed by: INTERNAL MEDICINE

## 2023-09-20 PROCEDURE — 96372 THER/PROPH/DIAG INJ SC/IM: CPT | Mod: 59

## 2023-09-20 RX ORDER — ACETAMINOPHEN 325 MG/1
650 TABLET ORAL
Status: COMPLETED | OUTPATIENT
Start: 2023-09-20 | End: 2023-09-20

## 2023-09-20 RX ORDER — DIPHENHYDRAMINE HYDROCHLORIDE 50 MG/ML
25 INJECTION INTRAMUSCULAR; INTRAVENOUS
Status: COMPLETED | OUTPATIENT
Start: 2023-09-20 | End: 2023-09-20

## 2023-09-20 RX ORDER — TACROLIMUS 1 MG/1
CAPSULE ORAL
Qty: 150 CAPSULE | Refills: 11 | Status: CANCELLED | OUTPATIENT
Start: 2023-09-20

## 2023-09-20 RX ADMIN — BELIMUMAB 600 MG: 400 INJECTION, POWDER, LYOPHILIZED, FOR SOLUTION INTRAVENOUS at 12:09

## 2023-09-20 RX ADMIN — ROMOSOZUMAB-AQQG 210 MG: 105 INJECTION, SOLUTION SUBCUTANEOUS at 12:09

## 2023-09-20 RX ADMIN — DIPHENHYDRAMINE HYDROCHLORIDE 25 MG: 50 INJECTION INTRAMUSCULAR; INTRAVENOUS at 12:09

## 2023-09-20 RX ADMIN — ACETAMINOPHEN 650 MG: 325 TABLET ORAL at 11:09

## 2023-09-20 NOTE — PLAN OF CARE
Pt arrived to unit for her monthly maintenance Benlysta and Evenity. Plan of care reviewed. No new or worsening complaints voiced. VSS. Given pre-meds of Tylenol and Benadryl IVP. Benlysta infused over 1 hour. Evenity injection given. Pt tolerated well. Will return on 10/18 for her next infusion and injections. Discharged from unit in NAD.

## 2023-09-25 ENCOUNTER — PATIENT MESSAGE (OUTPATIENT)
Dept: TRANSPLANT | Facility: CLINIC | Age: 53
End: 2023-09-25
Payer: MEDICARE

## 2023-09-25 DIAGNOSIS — M32.9 SYSTEMIC LUPUS ERYTHEMATOSUS, UNSPECIFIED SLE TYPE, UNSPECIFIED ORGAN INVOLVEMENT STATUS: ICD-10-CM

## 2023-09-25 RX ORDER — TACROLIMUS 1 MG/1
CAPSULE ORAL
Qty: 150 CAPSULE | Refills: 11 | Status: SHIPPED | OUTPATIENT
Start: 2023-09-25

## 2023-09-26 ENCOUNTER — PATIENT MESSAGE (OUTPATIENT)
Dept: TRANSPLANT | Facility: CLINIC | Age: 53
End: 2023-09-26
Payer: MEDICARE

## 2023-09-27 ENCOUNTER — LAB VISIT (OUTPATIENT)
Dept: LAB | Facility: HOSPITAL | Age: 53
End: 2023-09-27
Attending: INTERNAL MEDICINE
Payer: MEDICARE

## 2023-09-27 DIAGNOSIS — Z94.4 LIVER TRANSPLANTED: ICD-10-CM

## 2023-09-27 LAB
ALBUMIN SERPL BCP-MCNC: 4.1 G/DL (ref 3.5–5.2)
ALP SERPL-CCNC: 111 U/L (ref 55–135)
ALT SERPL W/O P-5'-P-CCNC: 18 U/L (ref 10–44)
ANION GAP SERPL CALC-SCNC: 7 MMOL/L (ref 8–16)
AST SERPL-CCNC: 16 U/L (ref 10–40)
BASOPHILS # BLD AUTO: 0.02 K/UL (ref 0–0.2)
BASOPHILS NFR BLD: 0.4 % (ref 0–1.9)
BILIRUB SERPL-MCNC: 0.4 MG/DL (ref 0.1–1)
BUN SERPL-MCNC: 28 MG/DL (ref 6–20)
CALCIUM SERPL-MCNC: 9.1 MG/DL (ref 8.7–10.5)
CHLORIDE SERPL-SCNC: 102 MMOL/L (ref 95–110)
CO2 SERPL-SCNC: 29 MMOL/L (ref 23–29)
CREAT SERPL-MCNC: 1.8 MG/DL (ref 0.5–1.4)
DIFFERENTIAL METHOD: ABNORMAL
EOSINOPHIL # BLD AUTO: 0.1 K/UL (ref 0–0.5)
EOSINOPHIL NFR BLD: 1.5 % (ref 0–8)
ERYTHROCYTE [DISTWIDTH] IN BLOOD BY AUTOMATED COUNT: 14.2 % (ref 11.5–14.5)
EST. GFR  (NO RACE VARIABLE): 33 ML/MIN/1.73 M^2
GLUCOSE SERPL-MCNC: 183 MG/DL (ref 70–110)
HCT VFR BLD AUTO: 34.1 % (ref 37–48.5)
HGB BLD-MCNC: 11.6 G/DL (ref 12–16)
IMM GRANULOCYTES # BLD AUTO: 0.21 K/UL (ref 0–0.04)
IMM GRANULOCYTES NFR BLD AUTO: 4.4 % (ref 0–0.5)
LYMPHOCYTES # BLD AUTO: 1.7 K/UL (ref 1–4.8)
LYMPHOCYTES NFR BLD: 36 % (ref 18–48)
MAGNESIUM SERPL-MCNC: 2.2 MG/DL (ref 1.6–2.6)
MCH RBC QN AUTO: 29.1 PG (ref 27–31)
MCHC RBC AUTO-ENTMCNC: 34 G/DL (ref 32–36)
MCV RBC AUTO: 86 FL (ref 82–98)
MONOCYTES # BLD AUTO: 0.4 K/UL (ref 0.3–1)
MONOCYTES NFR BLD: 9.2 % (ref 4–15)
NEUTROPHILS # BLD AUTO: 2.3 K/UL (ref 1.8–7.7)
NEUTROPHILS NFR BLD: 48.5 % (ref 38–73)
NRBC BLD-RTO: 0 /100 WBC
PLATELET # BLD AUTO: 208 K/UL (ref 150–450)
PMV BLD AUTO: 10 FL (ref 9.2–12.9)
POTASSIUM SERPL-SCNC: 4.4 MMOL/L (ref 3.5–5.1)
PROT SERPL-MCNC: 7.1 G/DL (ref 6–8.4)
RBC # BLD AUTO: 3.99 M/UL (ref 4–5.4)
SODIUM SERPL-SCNC: 138 MMOL/L (ref 136–145)
WBC # BLD AUTO: 4.8 K/UL (ref 3.9–12.7)

## 2023-09-27 PROCEDURE — 85025 COMPLETE CBC W/AUTO DIFF WBC: CPT | Performed by: INTERNAL MEDICINE

## 2023-09-27 PROCEDURE — 36415 COLL VENOUS BLD VENIPUNCTURE: CPT | Performed by: INTERNAL MEDICINE

## 2023-09-27 PROCEDURE — 80197 ASSAY OF TACROLIMUS: CPT | Performed by: INTERNAL MEDICINE

## 2023-09-27 PROCEDURE — 80053 COMPREHEN METABOLIC PANEL: CPT | Performed by: INTERNAL MEDICINE

## 2023-09-27 PROCEDURE — 83735 ASSAY OF MAGNESIUM: CPT | Performed by: INTERNAL MEDICINE

## 2023-09-27 RX ORDER — HYDROXYCHLOROQUINE SULFATE 200 MG/1
200 TABLET, FILM COATED ORAL 2 TIMES DAILY
Qty: 180 TABLET | Refills: 1 | Status: SHIPPED | OUTPATIENT
Start: 2023-09-27

## 2023-09-28 ENCOUNTER — TELEPHONE (OUTPATIENT)
Dept: TRANSPLANT | Facility: CLINIC | Age: 53
End: 2023-09-28
Payer: MEDICARE

## 2023-09-28 ENCOUNTER — PATIENT MESSAGE (OUTPATIENT)
Dept: TRANSPLANT | Facility: CLINIC | Age: 53
End: 2023-09-28
Payer: MEDICARE

## 2023-09-28 ENCOUNTER — TELEPHONE (OUTPATIENT)
Dept: RHEUMATOLOGY | Facility: CLINIC | Age: 53
End: 2023-09-28
Payer: MEDICARE

## 2023-09-28 LAB — TACROLIMUS BLD-MCNC: 2.3 NG/ML (ref 5–15)

## 2023-09-28 NOTE — TELEPHONE ENCOUNTER
Informed pt labs reviewed, no med changes needed. Will repeat labs as scheduled on 12/11.         ----- Message from Jacob Horvath MD sent at 9/28/2023  9:43 AM CDT -----  Results reviewed. Continue Prograf and Myfortic previous dose

## 2023-09-28 NOTE — TELEPHONE ENCOUNTER
Called patient to inform patient of follow up appt made. Appt reminder mailed to patient left voicemail     Thank you,   Astrid

## 2023-10-04 NOTE — PROGRESS NOTES
Name: Valencia Dumont  : 1970  Date of Service: 10/04/2023     Reason for visit: CKD    HPI: Ms Dumont is a 53-year-old woman with hypertension dates back to , CKD IIIb (baseline creatinine ~1.5-1.8), type 2 diabetes mellitus diagnosed back  on insulin (most recent hemoglobin A1c 6.3%) with associated neuropathy, SLE diagnosed  currently on Plaquenil & Benlysta (belimumab) infusions, auto-immune hepatitis which failed Cytoxan s/p OLTx on 2013 on Prograf  (with fluconazole) & Myfortic, history of kidney stones, cervical dysplasia s/p recent LEEP complicated by bleeding on  this year, sciatica, back pain, OA, chronic pain, GERD, vitamin D deficiency, osteoporosis, HSV-2, asthma, EUFEMIA, migraines and insomnia who presents to clinic today for follow-up of her CKD. Patient reports right ear pain, sinus congestion/infection for the past two weeks for which she is on Z-pack for currently. She reports generalized fatigue, easy bruising, back and bilateral leg pain today. She denies using NSAIDs currently but rather uses Tylenol and Percocet.    PMH:   Past Medical History:   Diagnosis Date    Abnormal Pap smear of cervix     Anemia     Arthritis     Ascites     Asthma     Chronic back pain     Cirrhosis of liver without mention of alcohol 10/18/2013    Diabetes mellitus     Esophageal varices     GERD (gastroesophageal reflux disease)     Herpes simplex virus (HSV) infection     HSV2.    Hypertension     Kidney stone     Lupus     Osteoporosis 2013    Prophylactic immunotherapy (transplant immunosuppression) 2014    SBP (spontaneous bacterial peritonitis)     history of        Surgical History:   Past Surgical History:   Procedure Laterality Date    BREAST BIOPSY Left 2020    CHOLECYSTECTOMY      COLONOSCOPY N/A 2017    Procedure: COLONOSCOPY;  Surgeon: Marky Sun MD;  Location: [facility] ([address] [place] [address]);  Service: Endoscopy;  Laterality: N/A;  PM prep.    CONIZATION OF CERVIX  USING LOOP ELECTROSURGICAL EXCISION PROCEDURE (LEEP) N/A 1/20/2023    Procedure: CONIZATION-CERVICAL-LEEP;  Surgeon: Lu Schreiber MD;  Location: [facility];  Service: OB/GYN;  Laterality: N/A;    ESOPHAGOGASTRODUODENOSCOPY      ESOPHAGOGASTRODUODENOSCOPY N/A 01/16/2019    Procedure: EGD (ESOPHAGOGASTRODUODENOSCOPY);  Surgeon: Deniz Guerra MD;  Location: [facility] (4TH FLR);  Service: Endoscopy;  Laterality: N/A;  labs prior, s/p liver transplant-MS    ESOPHAGOGASTRODUODENOSCOPY N/A 09/09/2020    Procedure: EGD (ESOPHAGOGASTRODUODENOSCOPY);  Surgeon: Davion Ríos MD;  Location: [facility] (4TH FLR);  Service: Endoscopy;  Laterality: N/A;  Please order the EGD at least 2 weeks after barium esophagram has been done EGD is for dysphagia  covid test 9/6-[facility]    EYE SURGERY      FUNCTIONAL ENDOSCOPIC SINUS SURGERY (FESS) USING COMPUTER-ASSISTED NAVIGATION Bilateral 02/04/2021    Procedure: FESS, USING COMPUTER-ASSISTED NAVIGATION;  Surgeon: Delores Lewis MD;  Location: [facility];  Service: ENT;  Laterality: Bilateral;  [employer] WALDEMAR [phone] TEXTED HIM @ 2:45PM ON 1-8-2021  DISC LOADED BT TOMMY 1-  RN PREOP 1/28/2021---COVID NEGATIVE ON 2/3--CONSENT INCOMPLETE  H/P INCOMPLETE  --CBC IN AM    LIVER BIOPSY      LIVER TRANSPLANT  12/31/2013    REFRACTIVE SURGERY Bilateral 2010    TUBAL LIGATION  2003    UPPER GASTROINTESTINAL ENDOSCOPY         Allergies:   Review of patient's allergies indicates:   Allergen Reactions    Norvasc [amlodipine]      Severe headache    Bactrim [sulfamethoxazole-trimethoprim]      Gets yeast infection.    Doxycycline Rash    Pcn [penicillins] Rash       Medications:     Current Outpatient Medications:     acetaminophen (TYLENOL) 650 MG TbSR, Take 1 tablet (650 mg total) by mouth every 6 (six) hours., Disp: 60 tablet, Rfl: 0    amitriptyline (ELAVIL) 10 MG tablet, Take 1 tablet (10 mg total) by mouth every evening., Disp: 90 tablet, Rfl: 2    azelastine  "(ASTELIN) 137 mcg (0.1 %) nasal spray, 1 spray (137 mcg total) by Nasal route 2 (two) times daily., Disp: 30 mL, Rfl: 11    BD ULTRA-FINE JANESSA PEN NEEDLE 32 gauge x 5/32" Ndle, For five times daily insulin injections, Disp: 450 each, Rfl: 3    blood-glucose meter,continuous (DEXCOM G6 ) Misc, 1 each by Misc.(Non-Drug; Combo Route) route once. for 1 dose, Disp: 1 each, Rfl: 0    blood-glucose transmitter (DEXCOM G6 TRANSMITTER) Karen, 1 each by Misc.(Non-Drug; Combo Route) route once a week, Disp: 1 Device, Rfl: 3    budesonide-formoterol 160-4.5 mcg (SYMBICORT) 160-4.5 mcg/actuation HFAA, Inhale 2 puffs into the lungs., Disp: , Rfl:     buPROPion (WELLBUTRIN XL) 150 MG TB24 tablet, Take 300 mg by mouth., Disp: , Rfl:     ciclopirox (PENLAC) 8 % Soln, Apply topically nightly., Disp: 6.6 mL, Rfl: 3    clotrimazole-betamethasone 1-0.05% (LOTRISONE) cream, APPLY TOPICALLY TO THE AFFECTED AREA TWICE DAILY FOR 10 DAYS, Disp: , Rfl:     diazepam (VALIUM) 5 MG tablet, Take 5 mg by mouth 3 (three) times daily as needed. , Disp: , Rfl: 0    diclofenac sodium (VOLTAREN) 1 % Gel, APPLY 2 GRAM to wrist and 4 g to left foot TOPICAL ROUTE 4 TIMES PER DAY, Disp: 450 each, Rfl: 2    dicyclomine (BENTYL) 10 MG capsule, TAKE 1 CAPSULE BY MOUTH EVERY 8 HOURS AS NEEDED for spasms, Disp: , Rfl:     DILTIAZEM HCL (DILTIAZEM 2% CREAM), Apply topically 3 (three) times daily. Apply topically to anal area., Disp: 30 g, Rfl: 0    diphenoxylate-atropine 2.5-0.025 mg (LOMOTIL) 2.5-0.025 mg per tablet, Take 1 tablet by mouth 4 (four) times daily as needed., Disp: , Rfl:     dulaglutide (TRULICITY) 0.75 mg/0.5 mL pen injector, Inject 0.75 mg into the skin every 7 days., Disp: 12 pen, Rfl: 3    ergocalciferol (ERGOCALCIFEROL) 50,000 unit Cap, Take 50,000 Units by mouth every 7 days., Disp: , Rfl:     erythromycin with ethanol (EMGEL) 2 % gel, ADD 30GM (1 TUBE) TO THE SOAKING DEVICE AND ALLOW WATER TO AGITATE. PLACE AFFECTED AREAS INTO WATER " AND SOAK FOR 10 MINUTES 1-2 TIMES DAILY, Disp: , Rfl: 1    estradioL (ESTRACE) 0.01 % (0.1 mg/gram) vaginal cream, Place 1 g vaginally twice a week., Disp: 42 g, Rfl: 3    famotidine (PEPCID) 40 MG tablet, , Disp: , Rfl:     ferrous sulfate (FEOSOL) 325 mg (65 mg iron) Tab tablet, Take 1 tablet (325 mg total) by mouth every 12 (twelve) hours., Disp: 60 tablet, Rfl: 4    flash glucose scanning reader (FREESTYLE CHUN 14 DAY READER) Misc, 1 each by Misc.(Non-Drug; Combo Route) route once daily., Disp: 1 each, Rfl: 0    flash glucose sensor (FREESTYLE CHUN 14 DAY SENSOR) Kit, 2 each by Misc.(Non-Drug; Combo Route) route every 14 (fourteen) days., Disp: 2 kit, Rfl: 11    fluconazole (DIFLUCAN) 150 MG Tab, TAKE ONE TABLET BY MOUTH once for ONE dose, Disp: 1 tablet, Rfl: 0    fluocinolone (SYNALAR) 0.01 % external solution, APPLY a couple of DROPS INTO BOTH EARS TWICE DAILY AS NEEDED FOR ITCHING, Disp: 60 mL, Rfl: 1    fluticasone propionate (FLONASE) 50 mcg/actuation nasal spray, 1 spray by Each Nostril route 2 (two) times daily., Disp: , Rfl:     fluticasone propionate (FLONASE) 50 mcg/actuation nasal spray, 2 sprays (100 mcg total) by Each Nostril route 2 (two) times daily., Disp: 18.2 mL, Rfl: 11    gabapentin (NEURONTIN) 300 MG capsule, TAKE 1 CAPSULE BY MOUTH THREE TIMES DAILY, Disp: , Rfl:     gentamicin (GARAMYCIN) 0.1 % cream, ADD 30GM (1 TUBE) TO THE SOAKING DEVICE AND ALLOW WATER TO AGITATE. PLACE AFFECTED AREAS INTO WATER AND SOAK FOR 10 MINUTES 1-2 TIMES DAILY, Disp: , Rfl: 1    hydrocortisone (ANUSOL-HC) 2.5 % rectal cream, Place rectally 2 (two) times daily. Apply per rectum bid, Disp: 30 g, Rfl: 3    hydroxychloroquine (PLAQUENIL) 200 mg tablet, Take 1 tablet (200 mg total) by mouth 2 (two) times daily., Disp: 180 tablet, Rfl: 1    hydrOXYzine HCl (ATARAX) 10 MG Tab, TAKE 1 OR 2 TABLETS BY MOUTH THREE TIMES DAILY AS NEEDED FOR ITCHING., Disp: , Rfl: 0    insulin detemir U-100 (LEVEMIR FLEXTOUCH U-100  INSULN) 100 unit/mL (3 mL) InPn pen, INJECT FOUR units UNDER THE SKIN TWICE DAILY, Disp: 45 mL, Rfl: 3    insulin lispro (HUMALOG KWIKPEN INSULIN) 100 unit/mL pen, 3 units before BREAKFAST and lunch and FIVE units before dinner with sliding scale, up to 25 units daily, Disp: 45 mL, Rfl: 3    isosorbide mononitrate (IMDUR) 30 MG 24 hr tablet, Take 30 mg by mouth once daily., Disp: , Rfl:     ketoconazole (NIZORAL) 2 % cream, ADD 30GM (1/2 TUBE) TO THE SOAKING DEVICE AND ALLOW WATER TO AGITATE. PLACE AFFECTED AREAS INTO WATER AND SOAK FOR 10 MINUTES 1-2 TIMES BRENDON, Disp: , Rfl: 1    Lactobac. rhamnosus GG-inulin 10 billion cell -200 mg Cap, Take 1 capsule by mouth 2 (two) times daily., Disp: 30 capsule, Rfl: 0    levocetirizine (XYZAL) 5 MG tablet, Take 5 mg by mouth every evening., Disp: , Rfl: 3    LIDOcaine-prilocaine (EMLA) cream, 2 (two) times daily. Apply to affected area, Disp: , Rfl:     loratadine (CLARITIN) 10 mg tablet, Take 1 tablet (10 mg total) by mouth once daily., Disp: 30 tablet, Rfl: 0    losartan (COZAAR) 100 MG tablet, Take 1 tablet (100 mg total) by mouth once daily., Disp: 30 tablet, Rfl: 2    magnesium oxide 500 mg Tab, Take 500 mg by mouth 2 (two) times daily., Disp: 60 each, Rfl: 6    meclizine (ANTIVERT) 25 mg tablet, TAKE ONE TABLET BY MOUTH AT BEDTIME AS NEEDED for dizziness., Disp: , Rfl: 0    mometasone 0.1% (ELOCON) 0.1 % cream, APPLY TOPICALLY TO THE FACE TWICE DAILY FOR ONE WEEK, Disp: , Rfl:     MULTIVIT,THER IRON,CA,FA & MIN (MULTIVITAMIN) Tab, Take 1 tablet by mouth once daily., Disp: 30 tablet, Rfl: 0    mycophenolate (MYFORTIC) 180 MG TbEC, Take 2 tablets (360 mg total) by mouth 2 (two) times daily., Disp: 120 tablet, Rfl: 11    neomycin-polymyxin-hydrocortisone (CORTISPORIN) otic solution, INSTILL TWO DROPS INTO BOTH EARS FOUR TIMES DAILY FOR 10 DAYS, Disp: , Rfl: 0    NURTEC 75 mg odt, PLACE ONE TABLET on tongue, alternatively UNDER THE TONGUE ONCE DAILY AS NEEDED FOR MIGRAINE,  Disp: 8 tablet, Rfl: 2    nystatin (MYCOSTATIN) 100,000 unit/mL suspension, SWISH AND SPIT FIVE MILLILITERS BY MOUTH FOUR TIMES DAILY FOR 7 DAYS., Disp: , Rfl: 1    nystatin-triamcinolone (MYCOLOG II) cream, Apply to affected area 2 times daily, Disp: 30 g, Rfl: 1    ondansetron (ZOFRAN) 4 MG tablet, TAKE TWO TABLETS BY MOUTH EVERY 8 HOURS AS NEEDED FOR NAUSEA., Disp: , Rfl:     oxyCODONE (ROXICODONE) 5 MG immediate release tablet, Take 1 tablet (5 mg total) by mouth every 4 (four) hours as needed for Pain., Disp: 5 tablet, Rfl: 0    oxycodone-acetaminophen (PERCOCET) 7.5-325 mg per tablet, Take 1 tablet by mouth every 4 (four) hours as needed for Pain., Disp: , Rfl:     pantoprazole (PROTONIX) 40 MG tablet, Take 1 tablet (40 mg total) by mouth 2 (two) times daily. Take immediately in am when wake up and then 30 minutes prior to dinner, Disp: 60 tablet, Rfl: 11    polyethylene glycol (GLYCOLAX) 17 gram/dose powder, Mix 1 capful (17 g) with liquid and by mouth 2 (two) times daily., Disp: 1530 g, Rfl: 11    PROAIR HFA 90 mcg/actuation inhaler, Inhale 2 puffs into the lungs 3 (three) times daily as needed. , Disp: , Rfl: 3    promethazine-dextromethorphan (PROMETHAZINE-DM) 6.25-15 mg/5 mL Syrp, Take by mouth 2 (two) times daily as needed. , Disp: , Rfl: 0    rosuvastatin (CRESTOR) 5 MG tablet, Take 5 mg by mouth once daily., Disp: , Rfl:     SENNA 8.6 mg tablet, Take 1 tablet by mouth 2 (two) times daily. Take while taking narcotics, Disp: 30 tablet, Rfl: 0    sertraline (ZOLOFT) 50 MG tablet, Take 50 mg by mouth once daily., Disp: , Rfl: 11    SSD 1 % cream, 1 application 2 (two) times daily. Apply to affected area, Disp: , Rfl: 0    tacrolimus (PROGRAF) 1 MG Cap, Take 3 capsules (3 mg total) by mouth every morning AND 2 capsules (2 mg total) every evening., Disp: 150 capsule, Rfl: 11    tinidazole (TINDAMAX) 250 MG tablet, SMARTSI Tablet(s) By Mouth Every Morning, Disp: , Rfl:     topiramate (TOPAMAX) 50 MG  tablet, SMARTSI Tablet(s) By Mouth Every Evening, Disp: , Rfl:     triamcinolone acetonide 0.1% (KENALOG) 0.1 % cream, Apply topically 2 times daily, Disp: 30 g, Rfl: 0    valACYclovir (VALTREX) 1000 MG tablet, Take 1,000 mg by mouth once daily., Disp: , Rfl:     verapamil (CALAN-SR) 120 MG CR tablet, TAKE ONE TABLET ONCE DAILY FOR BLOOD PRESSURE, Disp: , Rfl: 3    zolpidem (AMBIEN) 10 mg Tab, Take 10 mg by mouth nightly as needed., Disp: , Rfl: 3    Family History:   Family History   Problem Relation Age of Onset    Hypertension Mother     Cataracts Mother     Diabetes Father     Alzheimer's disease Father     Diabetes Paternal Grandfather     Diabetes Paternal Grandmother     No Known Problems Maternal Grandmother     Bone cancer Maternal Grandfather     Breast cancer Maternal Aunt 55    Hypertension Brother     Diabetes Brother     No Known Problems Sister     No Known Problems Maternal Uncle     No Known Problems Paternal Aunt     No Known Problems Paternal Uncle     Stroke Neg Hx     Cancer Neg Hx     Colon cancer Neg Hx     Esophageal cancer Neg Hx     Stomach cancer Neg Hx     Rectal cancer Neg Hx     Amblyopia Neg Hx     Blindness Neg Hx     Glaucoma Neg Hx     Macular degeneration Neg Hx     Retinal detachment Neg Hx     Strabismus Neg Hx     Thyroid disease Neg Hx        Social History:   Social History     Socioeconomic History    Marital status:     Number of children: 3   Occupational History     Employer: [employer]   Tobacco Use    Smoking status: Never    Smokeless tobacco: Never    Tobacco comments:     disability; ; 3 children   Substance and Sexual Activity    Alcohol use: Yes     Alcohol/week: 1.0 standard drink of alcohol     Types: 1 Glasses of wine per week     Comment: occasionally     Drug use: No    Sexual activity: Yes     Partners: Male     Birth control/protection: See Surgical Hx, Post-menopausal     Comment: s/p tubal ligation,  since      Review of  "Systems   Constitutional:  Positive for chills, diaphoresis (attributed to hot flashes), malaise/fatigue and weight loss. Negative for fever.   HENT:  Positive for ear pain (right ear) and sore throat. Negative for congestion, hearing loss and tinnitus.         Reports sinus issues currently with post nasal drip.   Eyes:  Negative for blurred vision, pain and redness.   Respiratory:  Positive for cough (dry cough) and shortness of breath (with exertion, uses PRN inhaler). Negative for sputum production and wheezing.    Cardiovascular:  Negative for chest pain, palpitations, leg swelling and PND.   Gastrointestinal:  Positive for constipation. Negative for abdominal pain, blood in stool, diarrhea, heartburn, nausea and vomiting.   Genitourinary:  Positive for frequency (nocturia x4). Negative for dysuria, flank pain, hematuria and urgency.   Musculoskeletal:  Positive for back pain (lumbar), joint pain (bilateral knees and hips) and neck pain. Negative for falls and myalgias.   Skin:  Positive for itching (intermittent). Negative for rash.   Neurological:  Positive for dizziness (intermittent with sitting up too fast), sensory change (right hallux following fracture) and headaches. Negative for speech change, seizures and loss of consciousness.   Psychiatric/Behavioral:  Positive for depression. The patient is nervous/anxious. The patient does not have insomnia (well controlled with Ambien).        Vitals:   Vitals:    10/05/23 1412   BP: (!) 146/79   Pulse: 88   SpO2: 98%   Weight: 66.1 kg (145 lb 11.6 oz)   Height: 5' 9" (1.753 m)       Body mass index is 21.52 kg/m².    Physical Exam  Vitals and nursing note reviewed.   Constitutional:       General: She is awake. She is not in acute distress.     Appearance: She is normal weight. She is not ill-appearing or diaphoretic.   HENT:      Head: Normocephalic and atraumatic.      Right Ear: External ear normal.      Left Ear: External ear normal.      Nose: Nose normal. "      Mouth/Throat:      Mouth: Mucous membranes are moist.      Pharynx: Oropharynx is clear. No oropharyngeal exudate or posterior oropharyngeal erythema.   Eyes:      General: No scleral icterus.        Right eye: No discharge.         Left eye: No discharge.      Extraocular Movements: Extraocular movements intact.      Conjunctiva/sclera: Conjunctivae normal.   Cardiovascular:      Rate and Rhythm: Normal rate.      Heart sounds: Murmur heard.      Systolic murmur is present with a grade of 2/6.      No friction rub. No gallop.   Pulmonary:      Effort: Pulmonary effort is normal. No respiratory distress.      Breath sounds: Normal breath sounds. No wheezing, rhonchi or rales.   Abdominal:      General: A surgical scar is present. Bowel sounds are normal. There is no distension.      Palpations: Abdomen is soft.      Tenderness: There is no abdominal tenderness. There is no right CVA tenderness or left CVA tenderness.       Musculoskeletal:      Cervical back: Neck supple.      Right lower leg: No edema.      Left lower leg: No edema.   Skin:     General: Skin is warm and dry.      Coloration: Skin is not jaundiced.   Neurological:      General: No focal deficit present.      Mental Status: She is alert. Mental status is at baseline.      Cranial Nerves: No cranial nerve deficit.      Motor: No weakness.      Gait: Gait normal.   Psychiatric:         Mood and Affect: Mood normal.         Behavior: Behavior normal. Behavior is cooperative.         Assessment/Plan:   Diagnoses and all orders for this visit:    Stage 3b chronic kidney disease  Proteinuria, unspecified type  - Renal function with some decline since our last visit with creatinine 1.8 (baseline ~1.8-1.5)  - Suspect CKD related to diabetic nephropathy, hypertensive nephrosclerosis and tacrolimus nephrotoxicity   - Most recent UA with +2 protein (UPCR only 0.19), 1 WBC/hpf , 28 hyaline casts/lpf and 18 granular casts/lpf  - Most recent metabolic panel  notable for glucose 183, BUN 28 and creatinine 1.8  - Denies history of renal stone although noted on retroperitoneal US to have noted to have non-obstructive 5 mm stone on left  - Agree with avoiding NSAIDs moving forward as previously on Mobic 15 mg daily PRN although she reports not having taken this in some time, in addition she is no longer taking Topamax and only uses gabapentin 300 mg QHS which is okay   -     US Retroperitoneal Complete; Future (ordered at last visit however not yet completed)  -     Has completed CKD and diet education  - FLC ratio only 1.47, SPEP with normal total protein & no discrete paraprotein bands identified and RENATA also without discrete monoclonal peaks  -     Most recent STAN negative <1:80, normal C3 & C4, CPK within normal limits, ESR & CRP within normal limits, although double stranded DNA antibody positive  -     Renal Function Panel; Future  -     Urinalysis; Future  -     Protein / creatinine ratio, urine; Future  -     Microalbumin/creatinine urine ratio; Future  -     PTH, intact; Future  -     Vitamin D; Future  -     US Retroperitoneal Complete; Future    History of systemic lupus erythematosus (SLE)  -     Diagnosed back in 2006  - [place] [facility]  - She is currently on plaquenil 200 mg BID and Benlysta (belimumab) infusion (last received yesterday on 3/1)  - Encouraged to keep follow-up with Rheumatology, appears next appointment is in December of this year    Chronic kidney disease-mineral and bone disorder  -     Most recent , calcium 9.1 and 25-hydroxyvitamin D 35  -     Has not completed RFP, PTH, 25-hydroxy vitamin D ordered from prior visit    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  Prophylactic immunotherapy (transplant immunosuppression)  -     Autoimmune hepatitis s/p OLT on 12/31/2013 for which she has recurrence of AIH on most recent liver allograft biopsy in 2017  - She reports taking Prograf at 3 mg in AM and 2 mg in PM  with fluconazole 150 mg every two days in addition to Myfortic 180 mg BID  - Most recent tacrolimus trough 2.3  - Follows with Dr. Horvath in Hepatology here at [facility], encouraged to keep follow-up visits    Essential hypertension  -     Dates back to 2013 around time of liver transplant  - She reports currently taking Imdur 30 mg daily (previously BID), verapamil 120 mg BID and losartan 100 mg daily   - She checks her home blood pressures twice daily and estimates systolic readings in the 150-140s and diastolic readings ranging from 80-70s mmHg  - In clinic today blood pressure elevated with value of 146/79 mmHg   - Advised to keep blood pressure log and to bring to next clinic visit  -     isosorbide mononitrate (IMDUR) 30 MG 24 hr tablet; Take 1 tablet (30 mg total) by mouth 2 (two) times a day.    Type 2 diabetes mellitus with stage 3b chronic kidney disease, with long-term current use of insulin  Diabetic polyneuropathy associated with type 2 diabetes mellitus  -     Dates back to 2006 with associated neuropathy  - Follows with Endocrinology here, currently on Humalog 4 units TIDWM, Levemir 4 units BID and Trulicity SQ once weekly  - Hemoglobin A1c 6.3%, controlled   - Encouraged to keep follow-up with Endocrinology    Normocytic anemia  -     Normocytic anemia with hemoglobin 11.6 on most recent laboratory studies   - Most recent iron panel with iron 75, transferrin 264, TIBC 391, 19% saturation and ferritin 56  - Previously on ferrous sulfate 325 mg for iron deficiency   -  Has not completed iron store studies from prior clinic visit  -     CBC Auto Differential; Future  -     IRON AND TIBC; Future  -     Ferritin; Future    Disposition: Follow-up in 3 months.    Discussed with Dr. Denton.     Alfredo Lloyd MD  Nephrology

## 2023-10-05 ENCOUNTER — PATIENT MESSAGE (OUTPATIENT)
Dept: NEPHROLOGY | Facility: CLINIC | Age: 53
End: 2023-10-05

## 2023-10-05 ENCOUNTER — OFFICE VISIT (OUTPATIENT)
Dept: NEPHROLOGY | Facility: CLINIC | Age: 53
End: 2023-10-05
Payer: MEDICARE

## 2023-10-05 VITALS
SYSTOLIC BLOOD PRESSURE: 146 MMHG | WEIGHT: 145.75 LBS | DIASTOLIC BLOOD PRESSURE: 79 MMHG | OXYGEN SATURATION: 98 % | HEART RATE: 88 BPM | BODY MASS INDEX: 21.59 KG/M2 | HEIGHT: 69 IN

## 2023-10-05 DIAGNOSIS — M32.9 HISTORY OF SYSTEMIC LUPUS ERYTHEMATOSUS (SLE): ICD-10-CM

## 2023-10-05 DIAGNOSIS — M89.9 CHRONIC KIDNEY DISEASE-MINERAL AND BONE DISORDER: ICD-10-CM

## 2023-10-05 DIAGNOSIS — Z94.4 LIVER REPLACED BY TRANSPLANT: ICD-10-CM

## 2023-10-05 DIAGNOSIS — Z79.4 TYPE 2 DIABETES MELLITUS WITH STAGE 3B CHRONIC KIDNEY DISEASE, WITH LONG-TERM CURRENT USE OF INSULIN: ICD-10-CM

## 2023-10-05 DIAGNOSIS — R80.9 PROTEINURIA, UNSPECIFIED TYPE: ICD-10-CM

## 2023-10-05 DIAGNOSIS — E11.22 TYPE 2 DIABETES MELLITUS WITH STAGE 3B CHRONIC KIDNEY DISEASE, WITH LONG-TERM CURRENT USE OF INSULIN: ICD-10-CM

## 2023-10-05 DIAGNOSIS — N18.32 STAGE 3B CHRONIC KIDNEY DISEASE: Primary | ICD-10-CM

## 2023-10-05 DIAGNOSIS — E11.42 DIABETIC POLYNEUROPATHY ASSOCIATED WITH TYPE 2 DIABETES MELLITUS: ICD-10-CM

## 2023-10-05 DIAGNOSIS — D64.9 NORMOCYTIC ANEMIA: ICD-10-CM

## 2023-10-05 DIAGNOSIS — Z29.89 PROPHYLACTIC IMMUNOTHERAPY: ICD-10-CM

## 2023-10-05 DIAGNOSIS — I10 ESSENTIAL HYPERTENSION: ICD-10-CM

## 2023-10-05 DIAGNOSIS — N18.32 TYPE 2 DIABETES MELLITUS WITH STAGE 3B CHRONIC KIDNEY DISEASE, WITH LONG-TERM CURRENT USE OF INSULIN: ICD-10-CM

## 2023-10-05 DIAGNOSIS — E83.9 CHRONIC KIDNEY DISEASE-MINERAL AND BONE DISORDER: ICD-10-CM

## 2023-10-05 DIAGNOSIS — N18.9 CHRONIC KIDNEY DISEASE-MINERAL AND BONE DISORDER: ICD-10-CM

## 2023-10-05 PROCEDURE — 3078F DIAST BP <80 MM HG: CPT | Mod: CPTII,GC,S$GLB, | Performed by: STUDENT IN AN ORGANIZED HEALTH CARE EDUCATION/TRAINING PROGRAM

## 2023-10-05 PROCEDURE — 3072F LOW RISK FOR RETINOPATHY: CPT | Mod: CPTII,GC,S$GLB, | Performed by: STUDENT IN AN ORGANIZED HEALTH CARE EDUCATION/TRAINING PROGRAM

## 2023-10-05 PROCEDURE — 1159F MED LIST DOCD IN RCRD: CPT | Mod: CPTII,GC,S$GLB, | Performed by: STUDENT IN AN ORGANIZED HEALTH CARE EDUCATION/TRAINING PROGRAM

## 2023-10-05 PROCEDURE — 99214 OFFICE O/P EST MOD 30 MIN: CPT | Mod: GC,S$GLB,, | Performed by: STUDENT IN AN ORGANIZED HEALTH CARE EDUCATION/TRAINING PROGRAM

## 2023-10-05 PROCEDURE — 3008F BODY MASS INDEX DOCD: CPT | Mod: CPTII,GC,S$GLB, | Performed by: STUDENT IN AN ORGANIZED HEALTH CARE EDUCATION/TRAINING PROGRAM

## 2023-10-05 PROCEDURE — 3066F PR DOCUMENTATION OF TREATMENT FOR NEPHROPATHY: ICD-10-PCS | Mod: CPTII,GC,S$GLB, | Performed by: STUDENT IN AN ORGANIZED HEALTH CARE EDUCATION/TRAINING PROGRAM

## 2023-10-05 PROCEDURE — 3044F PR MOST RECENT HEMOGLOBIN A1C LEVEL <7.0%: ICD-10-PCS | Mod: CPTII,GC,S$GLB, | Performed by: STUDENT IN AN ORGANIZED HEALTH CARE EDUCATION/TRAINING PROGRAM

## 2023-10-05 PROCEDURE — 99214 PR OFFICE/OUTPT VISIT, EST, LEVL IV, 30-39 MIN: ICD-10-PCS | Mod: GC,S$GLB,, | Performed by: STUDENT IN AN ORGANIZED HEALTH CARE EDUCATION/TRAINING PROGRAM

## 2023-10-05 PROCEDURE — 3008F PR BODY MASS INDEX (BMI) DOCUMENTED: ICD-10-PCS | Mod: CPTII,GC,S$GLB, | Performed by: STUDENT IN AN ORGANIZED HEALTH CARE EDUCATION/TRAINING PROGRAM

## 2023-10-05 PROCEDURE — 4010F ACE/ARB THERAPY RXD/TAKEN: CPT | Mod: CPTII,GC,S$GLB, | Performed by: STUDENT IN AN ORGANIZED HEALTH CARE EDUCATION/TRAINING PROGRAM

## 2023-10-05 PROCEDURE — 1159F PR MEDICATION LIST DOCUMENTED IN MEDICAL RECORD: ICD-10-PCS | Mod: CPTII,GC,S$GLB, | Performed by: STUDENT IN AN ORGANIZED HEALTH CARE EDUCATION/TRAINING PROGRAM

## 2023-10-05 PROCEDURE — 99999 PR PBB SHADOW E&M-EST. PATIENT-LVL IV: ICD-10-PCS | Mod: PBBFAC,GC,, | Performed by: STUDENT IN AN ORGANIZED HEALTH CARE EDUCATION/TRAINING PROGRAM

## 2023-10-05 PROCEDURE — 4010F PR ACE/ARB THEARPY RXD/TAKEN: ICD-10-PCS | Mod: CPTII,GC,S$GLB, | Performed by: STUDENT IN AN ORGANIZED HEALTH CARE EDUCATION/TRAINING PROGRAM

## 2023-10-05 PROCEDURE — 3077F PR MOST RECENT SYSTOLIC BLOOD PRESSURE >= 140 MM HG: ICD-10-PCS | Mod: CPTII,GC,S$GLB, | Performed by: STUDENT IN AN ORGANIZED HEALTH CARE EDUCATION/TRAINING PROGRAM

## 2023-10-05 PROCEDURE — 3072F PR LOW RISK FOR RETINOPATHY: ICD-10-PCS | Mod: CPTII,GC,S$GLB, | Performed by: STUDENT IN AN ORGANIZED HEALTH CARE EDUCATION/TRAINING PROGRAM

## 2023-10-05 PROCEDURE — 3066F NEPHROPATHY DOC TX: CPT | Mod: CPTII,GC,S$GLB, | Performed by: STUDENT IN AN ORGANIZED HEALTH CARE EDUCATION/TRAINING PROGRAM

## 2023-10-05 PROCEDURE — 3078F PR MOST RECENT DIASTOLIC BLOOD PRESSURE < 80 MM HG: ICD-10-PCS | Mod: CPTII,GC,S$GLB, | Performed by: STUDENT IN AN ORGANIZED HEALTH CARE EDUCATION/TRAINING PROGRAM

## 2023-10-05 PROCEDURE — 3077F SYST BP >= 140 MM HG: CPT | Mod: CPTII,GC,S$GLB, | Performed by: STUDENT IN AN ORGANIZED HEALTH CARE EDUCATION/TRAINING PROGRAM

## 2023-10-05 PROCEDURE — 99999 PR PBB SHADOW E&M-EST. PATIENT-LVL IV: CPT | Mod: PBBFAC,GC,, | Performed by: STUDENT IN AN ORGANIZED HEALTH CARE EDUCATION/TRAINING PROGRAM

## 2023-10-05 PROCEDURE — 3044F HG A1C LEVEL LT 7.0%: CPT | Mod: CPTII,GC,S$GLB, | Performed by: STUDENT IN AN ORGANIZED HEALTH CARE EDUCATION/TRAINING PROGRAM

## 2023-10-05 RX ORDER — ISOSORBIDE MONONITRATE 30 MG/1
30 TABLET, EXTENDED RELEASE ORAL 2 TIMES DAILY
Qty: 180 TABLET | Refills: 3 | Status: SHIPPED | OUTPATIENT
Start: 2023-10-05 | End: 2024-10-04

## 2023-10-10 NOTE — PROGRESS NOTES
I have reviewed the notes, assessments, and/or procedures performed by Dr Lloyd and  I concur with her/his documentation of Valencia Dumont.      CKD stage 3b secondary to long standing DM/ HTN/ CNI   Also with H/o SLE and is on benlysta and plaquenil, followed by Rheumatology at [facility]   Serum creatinine, baseline at 1.5-1.8 mg/dL and current creatinine at 1.8 mg/dL   C3, C4 , dsDNA with in limits with no clinical evidence of lupus flares currently   UA negative for blood and UPCR 0.19 g/g   However patient never had a kidney biopsy in the past with the h/o SLE. Discussed with patient that  the need for kidney biopsy (especially with appearance of microscopic hematuria, UPCR > 500 mg, or EVERT)  H/o OLT for autoimmune hepatitis and is on prograf and Myfortic and prograf levels with in target

## 2023-10-11 ENCOUNTER — PATIENT MESSAGE (OUTPATIENT)
Dept: TRANSPLANT | Facility: CLINIC | Age: 53
End: 2023-10-11
Payer: MEDICARE

## 2023-10-11 ENCOUNTER — LAB VISIT (OUTPATIENT)
Dept: LAB | Facility: HOSPITAL | Age: 53
End: 2023-10-11
Attending: INTERNAL MEDICINE
Payer: MEDICARE

## 2023-10-11 ENCOUNTER — PATIENT MESSAGE (OUTPATIENT)
Dept: RHEUMATOLOGY | Facility: CLINIC | Age: 53
End: 2023-10-11
Payer: MEDICARE

## 2023-10-11 DIAGNOSIS — M32.9 SYSTEMIC LUPUS ERYTHEMATOSUS, UNSPECIFIED SLE TYPE, UNSPECIFIED ORGAN INVOLVEMENT STATUS: ICD-10-CM

## 2023-10-11 DIAGNOSIS — D84.9 IMMUNOSUPPRESSION: ICD-10-CM

## 2023-10-11 DIAGNOSIS — R53.83 FATIGUE, UNSPECIFIED TYPE: ICD-10-CM

## 2023-10-11 LAB
ALBUMIN SERPL BCP-MCNC: 3.8 G/DL (ref 3.5–5.2)
ALP SERPL-CCNC: 97 U/L (ref 55–135)
ALT SERPL W/O P-5'-P-CCNC: 15 U/L (ref 10–44)
ANION GAP SERPL CALC-SCNC: 5 MMOL/L (ref 8–16)
AST SERPL-CCNC: 16 U/L (ref 10–40)
BASOPHILS # BLD AUTO: 0.01 K/UL (ref 0–0.2)
BASOPHILS NFR BLD: 0.4 % (ref 0–1.9)
BILIRUB SERPL-MCNC: 0.3 MG/DL (ref 0.1–1)
BUN SERPL-MCNC: 19 MG/DL (ref 6–20)
C3 SERPL-MCNC: 85 MG/DL (ref 50–180)
C4 SERPL-MCNC: 25 MG/DL (ref 11–44)
CALCIUM SERPL-MCNC: 8.9 MG/DL (ref 8.7–10.5)
CHLORIDE SERPL-SCNC: 106 MMOL/L (ref 95–110)
CK SERPL-CCNC: 90 U/L (ref 20–180)
CO2 SERPL-SCNC: 29 MMOL/L (ref 23–29)
CREAT SERPL-MCNC: 1.3 MG/DL (ref 0.5–1.4)
CRP SERPL-MCNC: 0.2 MG/L (ref 0–8.2)
DIFFERENTIAL METHOD: ABNORMAL
EOSINOPHIL # BLD AUTO: 0.2 K/UL (ref 0–0.5)
EOSINOPHIL NFR BLD: 5.4 % (ref 0–8)
ERYTHROCYTE [DISTWIDTH] IN BLOOD BY AUTOMATED COUNT: 15 % (ref 11.5–14.5)
ERYTHROCYTE [SEDIMENTATION RATE] IN BLOOD BY WESTERGREN METHOD: 4 MM/HR (ref 0–20)
EST. GFR  (NO RACE VARIABLE): 49 ML/MIN/1.73 M^2
GLUCOSE SERPL-MCNC: 147 MG/DL (ref 70–110)
HCT VFR BLD AUTO: 32.5 % (ref 37–48.5)
HGB BLD-MCNC: 10.9 G/DL (ref 12–16)
IMM GRANULOCYTES # BLD AUTO: 0.01 K/UL (ref 0–0.04)
IMM GRANULOCYTES NFR BLD AUTO: 0.4 % (ref 0–0.5)
LYMPHOCYTES # BLD AUTO: 1.5 K/UL (ref 1–4.8)
LYMPHOCYTES NFR BLD: 53.4 % (ref 18–48)
MCH RBC QN AUTO: 29.2 PG (ref 27–31)
MCHC RBC AUTO-ENTMCNC: 33.5 G/DL (ref 32–36)
MCV RBC AUTO: 87 FL (ref 82–98)
MONOCYTES # BLD AUTO: 0.3 K/UL (ref 0.3–1)
MONOCYTES NFR BLD: 9.4 % (ref 4–15)
NEUTROPHILS # BLD AUTO: 0.9 K/UL (ref 1.8–7.7)
NEUTROPHILS NFR BLD: 31 % (ref 38–73)
NRBC BLD-RTO: 0 /100 WBC
PLATELET # BLD AUTO: 171 K/UL (ref 150–450)
PMV BLD AUTO: 9.4 FL (ref 9.2–12.9)
POTASSIUM SERPL-SCNC: 4.3 MMOL/L (ref 3.5–5.1)
PROT SERPL-MCNC: 6.7 G/DL (ref 6–8.4)
RBC # BLD AUTO: 3.73 M/UL (ref 4–5.4)
SODIUM SERPL-SCNC: 140 MMOL/L (ref 136–145)
WBC # BLD AUTO: 2.77 K/UL (ref 3.9–12.7)

## 2023-10-11 PROCEDURE — 86140 C-REACTIVE PROTEIN: CPT | Performed by: INTERNAL MEDICINE

## 2023-10-11 PROCEDURE — 85652 RBC SED RATE AUTOMATED: CPT | Performed by: INTERNAL MEDICINE

## 2023-10-11 PROCEDURE — 86160 COMPLEMENT ANTIGEN: CPT | Performed by: INTERNAL MEDICINE

## 2023-10-11 PROCEDURE — 86225 DNA ANTIBODY NATIVE: CPT | Performed by: INTERNAL MEDICINE

## 2023-10-11 PROCEDURE — 82550 ASSAY OF CK (CPK): CPT | Performed by: INTERNAL MEDICINE

## 2023-10-11 PROCEDURE — 86160 COMPLEMENT ANTIGEN: CPT | Mod: 59 | Performed by: INTERNAL MEDICINE

## 2023-10-11 PROCEDURE — 80053 COMPREHEN METABOLIC PANEL: CPT | Performed by: INTERNAL MEDICINE

## 2023-10-11 PROCEDURE — 36415 COLL VENOUS BLD VENIPUNCTURE: CPT | Performed by: INTERNAL MEDICINE

## 2023-10-11 PROCEDURE — 85025 COMPLETE CBC W/AUTO DIFF WBC: CPT | Performed by: INTERNAL MEDICINE

## 2023-10-12 LAB — DSDNA AB SER-ACNC: NORMAL [IU]/ML

## 2023-10-18 ENCOUNTER — INFUSION (OUTPATIENT)
Dept: INFUSION THERAPY | Facility: HOSPITAL | Age: 53
End: 2023-10-18
Attending: INTERNAL MEDICINE
Payer: MEDICARE

## 2023-10-18 VITALS
RESPIRATION RATE: 18 BRPM | TEMPERATURE: 98 F | DIASTOLIC BLOOD PRESSURE: 77 MMHG | HEART RATE: 93 BPM | SYSTOLIC BLOOD PRESSURE: 154 MMHG | OXYGEN SATURATION: 98 %

## 2023-10-18 DIAGNOSIS — M32.9 SYSTEMIC LUPUS ERYTHEMATOSUS, UNSPECIFIED SLE TYPE, UNSPECIFIED ORGAN INVOLVEMENT STATUS: Primary | ICD-10-CM

## 2023-10-18 PROCEDURE — 96365 THER/PROPH/DIAG IV INF INIT: CPT

## 2023-10-18 PROCEDURE — 63600175 PHARM REV CODE 636 W HCPCS: Mod: JZ,JA,JG | Performed by: INTERNAL MEDICINE

## 2023-10-18 PROCEDURE — 96375 TX/PRO/DX INJ NEW DRUG ADDON: CPT

## 2023-10-18 PROCEDURE — 25000003 PHARM REV CODE 250: Performed by: INTERNAL MEDICINE

## 2023-10-18 RX ORDER — ACETAMINOPHEN 325 MG/1
650 TABLET ORAL
Status: COMPLETED | OUTPATIENT
Start: 2023-10-18 | End: 2023-10-18

## 2023-10-18 RX ORDER — DIPHENHYDRAMINE HYDROCHLORIDE 50 MG/ML
25 INJECTION INTRAMUSCULAR; INTRAVENOUS
Status: COMPLETED | OUTPATIENT
Start: 2023-10-18 | End: 2023-10-18

## 2023-10-18 RX ADMIN — BELIMUMAB 600 MG: 400 INJECTION, POWDER, LYOPHILIZED, FOR SOLUTION INTRAVENOUS at 01:10

## 2023-10-18 RX ADMIN — ACETAMINOPHEN 650 MG: 325 TABLET ORAL at 01:10

## 2023-10-18 RX ADMIN — DIPHENHYDRAMINE HYDROCHLORIDE 25 MG: 50 INJECTION INTRAMUSCULAR; INTRAVENOUS at 01:10

## 2023-10-20 NOTE — PLAN OF CARE
Patient presented to unit for Benlysta monthly infusion. VSS. Evenity injections completed. Pre-meds of Tylenol PO and Benadryl IVP administered. Benlysta infused over 60 min. Infusion tolerated well. Discharged from unit in NAD.

## 2023-10-23 ENCOUNTER — PATIENT MESSAGE (OUTPATIENT)
Dept: OBSTETRICS AND GYNECOLOGY | Facility: CLINIC | Age: 53
End: 2023-10-23
Payer: MEDICARE

## 2023-10-23 ENCOUNTER — PATIENT MESSAGE (OUTPATIENT)
Dept: OPTOMETRY | Facility: CLINIC | Age: 53
End: 2023-10-23
Payer: MEDICARE

## 2023-10-24 ENCOUNTER — PATIENT MESSAGE (OUTPATIENT)
Dept: GASTROENTEROLOGY | Facility: CLINIC | Age: 53
End: 2023-10-24
Payer: MEDICARE

## 2023-10-24 ENCOUNTER — PATIENT MESSAGE (OUTPATIENT)
Dept: OBSTETRICS AND GYNECOLOGY | Facility: CLINIC | Age: 53
End: 2023-10-24
Payer: MEDICARE

## 2023-10-25 ENCOUNTER — PATIENT MESSAGE (OUTPATIENT)
Dept: PODIATRY | Facility: CLINIC | Age: 53
End: 2023-10-25
Payer: MEDICARE

## 2023-10-25 DIAGNOSIS — M32.9 SYSTEMIC LUPUS ERYTHEMATOSUS, UNSPECIFIED SLE TYPE, UNSPECIFIED ORGAN INVOLVEMENT STATUS: Primary | ICD-10-CM

## 2023-10-25 DIAGNOSIS — Z79.899 LONG-TERM USE OF PLAQUENIL: ICD-10-CM

## 2023-10-27 ENCOUNTER — CLINICAL SUPPORT (OUTPATIENT)
Dept: OPHTHALMOLOGY | Facility: CLINIC | Age: 53
End: 2023-10-27
Payer: MEDICARE

## 2023-10-27 DIAGNOSIS — M32.9 SYSTEMIC LUPUS ERYTHEMATOSUS, UNSPECIFIED SLE TYPE, UNSPECIFIED ORGAN INVOLVEMENT STATUS: ICD-10-CM

## 2023-10-27 DIAGNOSIS — Z79.899 LONG-TERM USE OF PLAQUENIL: ICD-10-CM

## 2023-10-27 NOTE — PROGRESS NOTES
Visual field test done.  Patient stated no latex allergies used coverlet      Glasses Prescription     Sphere Cylinder Axis Add   Right -2.50 +0.50 180 +2.00   Left -1.25 +0.75 005 +2.00   Expiration Date: 7/7/2023

## 2023-10-31 NOTE — TELEPHONE ENCOUNTER
----- Message from Paulette Haywood sent at 7/25/2018  8:22 AM CDT -----  Contact: Self   Pt is confused about today's appointment. She is asking if she has to come in today. She is scheduled with  but said  is her doctor and the last person she saw. She doesn't recall discussing having to come back and would like to know if she had to come in today. Please call at [phone].  
Left message to voicemail [phone] to return call to clinic re: does not need to be seen today. Patient scheduled appointment in June but was seen for sooner appointment on 7-3-18    
54

## 2023-11-06 ENCOUNTER — PATIENT MESSAGE (OUTPATIENT)
Dept: TRANSPLANT | Facility: CLINIC | Age: 53
End: 2023-11-06
Payer: MEDICARE

## 2023-11-10 ENCOUNTER — PATIENT MESSAGE (OUTPATIENT)
Dept: PODIATRY | Facility: CLINIC | Age: 53
End: 2023-11-10
Payer: MEDICARE

## 2023-11-13 ENCOUNTER — PATIENT MESSAGE (OUTPATIENT)
Dept: OPTOMETRY | Facility: CLINIC | Age: 53
End: 2023-11-13
Payer: MEDICARE

## 2023-11-15 ENCOUNTER — INFUSION (OUTPATIENT)
Dept: INFUSION THERAPY | Facility: HOSPITAL | Age: 53
End: 2023-11-15
Attending: INTERNAL MEDICINE
Payer: MEDICARE

## 2023-11-15 VITALS
TEMPERATURE: 99 F | RESPIRATION RATE: 18 BRPM | WEIGHT: 131.19 LBS | OXYGEN SATURATION: 97 % | SYSTOLIC BLOOD PRESSURE: 156 MMHG | DIASTOLIC BLOOD PRESSURE: 71 MMHG | BODY MASS INDEX: 19.37 KG/M2 | HEART RATE: 88 BPM

## 2023-11-15 DIAGNOSIS — M32.9 SYSTEMIC LUPUS ERYTHEMATOSUS, UNSPECIFIED SLE TYPE, UNSPECIFIED ORGAN INVOLVEMENT STATUS: Primary | ICD-10-CM

## 2023-11-15 PROCEDURE — 96365 THER/PROPH/DIAG IV INF INIT: CPT

## 2023-11-15 PROCEDURE — 63600175 PHARM REV CODE 636 W HCPCS: Mod: JA,JG | Performed by: INTERNAL MEDICINE

## 2023-11-15 PROCEDURE — 25000003 PHARM REV CODE 250: Performed by: INTERNAL MEDICINE

## 2023-11-15 PROCEDURE — 96375 TX/PRO/DX INJ NEW DRUG ADDON: CPT

## 2023-11-15 RX ORDER — DIPHENHYDRAMINE HYDROCHLORIDE 50 MG/ML
25 INJECTION INTRAMUSCULAR; INTRAVENOUS
Status: COMPLETED | OUTPATIENT
Start: 2023-11-15 | End: 2023-11-15

## 2023-11-15 RX ORDER — ACETAMINOPHEN 325 MG/1
650 TABLET ORAL
Status: COMPLETED | OUTPATIENT
Start: 2023-11-15 | End: 2023-11-15

## 2023-11-15 RX ADMIN — ACETAMINOPHEN 650 MG: 325 TABLET ORAL at 12:11

## 2023-11-15 RX ADMIN — BELIMUMAB 600 MG: 400 INJECTION, POWDER, LYOPHILIZED, FOR SOLUTION INTRAVENOUS at 12:11

## 2023-11-15 RX ADMIN — DIPHENHYDRAMINE HYDROCHLORIDE 25 MG: 50 INJECTION, SOLUTION INTRAMUSCULAR; INTRAVENOUS at 12:11

## 2023-11-15 NOTE — NURSING
Pt seen in [facility] today for Benlysta IVPB and Nacl flush bag. Tolerated well with no adverse reactions. NAD on discharge.

## 2023-11-22 ENCOUNTER — PATIENT MESSAGE (OUTPATIENT)
Dept: TRANSPLANT | Facility: CLINIC | Age: 53
End: 2023-11-22
Payer: MEDICARE

## 2023-12-10 ENCOUNTER — PATIENT MESSAGE (OUTPATIENT)
Dept: RHEUMATOLOGY | Facility: CLINIC | Age: 53
End: 2023-12-10
Payer: MEDICARE

## 2023-12-13 ENCOUNTER — INFUSION (OUTPATIENT)
Dept: INFUSION THERAPY | Facility: HOSPITAL | Age: 53
End: 2023-12-13
Attending: INTERNAL MEDICINE
Payer: MEDICARE

## 2023-12-13 VITALS
SYSTOLIC BLOOD PRESSURE: 149 MMHG | HEIGHT: 69 IN | OXYGEN SATURATION: 99 % | DIASTOLIC BLOOD PRESSURE: 83 MMHG | RESPIRATION RATE: 18 BRPM | BODY MASS INDEX: 19.26 KG/M2 | HEART RATE: 88 BPM | WEIGHT: 130 LBS | TEMPERATURE: 99 F

## 2023-12-13 DIAGNOSIS — M32.9 SYSTEMIC LUPUS ERYTHEMATOSUS, UNSPECIFIED SLE TYPE, UNSPECIFIED ORGAN INVOLVEMENT STATUS: Primary | ICD-10-CM

## 2023-12-13 PROCEDURE — 96375 TX/PRO/DX INJ NEW DRUG ADDON: CPT

## 2023-12-13 PROCEDURE — 63600175 PHARM REV CODE 636 W HCPCS: Mod: JZ,JA,JG | Performed by: INTERNAL MEDICINE

## 2023-12-13 PROCEDURE — 96365 THER/PROPH/DIAG IV INF INIT: CPT

## 2023-12-13 PROCEDURE — 25000003 PHARM REV CODE 250: Performed by: INTERNAL MEDICINE

## 2023-12-13 RX ORDER — DIPHENHYDRAMINE HYDROCHLORIDE 50 MG/ML
25 INJECTION INTRAMUSCULAR; INTRAVENOUS
Status: COMPLETED | OUTPATIENT
Start: 2023-12-13 | End: 2023-12-13

## 2023-12-13 RX ORDER — ACETAMINOPHEN 325 MG/1
650 TABLET ORAL
Status: COMPLETED | OUTPATIENT
Start: 2023-12-13 | End: 2023-12-13

## 2023-12-13 RX ADMIN — BELIMUMAB 590 MG: 400 INJECTION, POWDER, LYOPHILIZED, FOR SOLUTION INTRAVENOUS at 02:12

## 2023-12-13 RX ADMIN — DIPHENHYDRAMINE HYDROCHLORIDE 25 MG: 50 INJECTION, SOLUTION INTRAMUSCULAR; INTRAVENOUS at 02:12

## 2023-12-13 RX ADMIN — ACETAMINOPHEN 650 MG: 325 TABLET ORAL at 02:12

## 2023-12-13 NOTE — PLAN OF CARE
Patient arrived to unit for Benlysta infusion wearing walking boot to left leg. Pt reports increased fatigue.Plan of care reviewed, patient agreeable to plan. Tylenol and Benadryl premeds administered prior to Benlysta. Benlysta infused over 60 minutes.Patient tolerated infusion well.VSS. Discharge instructions reviewed, patient instructed to return 1/10/24. Patient ambulated off unit unassisted by self. Patient in NAD at time of discharge.

## 2023-12-14 ENCOUNTER — OFFICE VISIT (OUTPATIENT)
Dept: OPTOMETRY | Facility: CLINIC | Age: 53
End: 2023-12-14
Payer: MEDICARE

## 2023-12-14 DIAGNOSIS — E11.9 TYPE 2 DIABETES MELLITUS WITHOUT RETINOPATHY: ICD-10-CM

## 2023-12-14 DIAGNOSIS — M32.9 SYSTEMIC LUPUS ERYTHEMATOSUS, UNSPECIFIED SLE TYPE, UNSPECIFIED ORGAN INVOLVEMENT STATUS: Primary | ICD-10-CM

## 2023-12-14 DIAGNOSIS — H52.7 REFRACTIVE ERROR: ICD-10-CM

## 2023-12-14 DIAGNOSIS — Z79.899 LONG-TERM USE OF PLAQUENIL: ICD-10-CM

## 2023-12-14 PROCEDURE — 4010F ACE/ARB THERAPY RXD/TAKEN: CPT | Mod: CPTII,S$GLB,, | Performed by: OPTOMETRIST

## 2023-12-14 PROCEDURE — 1160F PR REVIEW ALL MEDS BY PRESCRIBER/CLIN PHARMACIST DOCUMENTED: ICD-10-PCS | Mod: CPTII,S$GLB,, | Performed by: OPTOMETRIST

## 2023-12-14 PROCEDURE — 92015 PR REFRACTION: ICD-10-PCS | Mod: S$GLB,,, | Performed by: OPTOMETRIST

## 2023-12-14 PROCEDURE — 1160F RVW MEDS BY RX/DR IN RCRD: CPT | Mod: CPTII,S$GLB,, | Performed by: OPTOMETRIST

## 2023-12-14 PROCEDURE — 1159F MED LIST DOCD IN RCRD: CPT | Mod: CPTII,S$GLB,, | Performed by: OPTOMETRIST

## 2023-12-14 PROCEDURE — 3066F NEPHROPATHY DOC TX: CPT | Mod: CPTII,S$GLB,, | Performed by: OPTOMETRIST

## 2023-12-14 PROCEDURE — 3044F PR MOST RECENT HEMOGLOBIN A1C LEVEL <7.0%: ICD-10-PCS | Mod: CPTII,S$GLB,, | Performed by: OPTOMETRIST

## 2023-12-14 PROCEDURE — 99214 OFFICE O/P EST MOD 30 MIN: CPT | Mod: S$GLB,,, | Performed by: OPTOMETRIST

## 2023-12-14 PROCEDURE — 4010F PR ACE/ARB THEARPY RXD/TAKEN: ICD-10-PCS | Mod: CPTII,S$GLB,, | Performed by: OPTOMETRIST

## 2023-12-14 PROCEDURE — 3066F PR DOCUMENTATION OF TREATMENT FOR NEPHROPATHY: ICD-10-PCS | Mod: CPTII,S$GLB,, | Performed by: OPTOMETRIST

## 2023-12-14 PROCEDURE — 99999 PR PBB SHADOW E&M-EST. PATIENT-LVL IV: CPT | Mod: PBBFAC,,, | Performed by: OPTOMETRIST

## 2023-12-14 PROCEDURE — 92015 DETERMINE REFRACTIVE STATE: CPT | Mod: S$GLB,,, | Performed by: OPTOMETRIST

## 2023-12-14 PROCEDURE — 2023F DILAT RTA XM W/O RTNOPTHY: CPT | Mod: CPTII,S$GLB,, | Performed by: OPTOMETRIST

## 2023-12-14 PROCEDURE — 1159F PR MEDICATION LIST DOCUMENTED IN MEDICAL RECORD: ICD-10-PCS | Mod: CPTII,S$GLB,, | Performed by: OPTOMETRIST

## 2023-12-14 PROCEDURE — 2023F PR DILATED RETINAL EXAM W/O EVID OF RETINOPATHY: ICD-10-PCS | Mod: CPTII,S$GLB,, | Performed by: OPTOMETRIST

## 2023-12-14 PROCEDURE — 3044F HG A1C LEVEL LT 7.0%: CPT | Mod: CPTII,S$GLB,, | Performed by: OPTOMETRIST

## 2023-12-14 PROCEDURE — 99214 PR OFFICE/OUTPT VISIT, EST, LEVL IV, 30-39 MIN: ICD-10-PCS | Mod: S$GLB,,, | Performed by: OPTOMETRIST

## 2023-12-14 PROCEDURE — 99999 PR PBB SHADOW E&M-EST. PATIENT-LVL IV: ICD-10-PCS | Mod: PBBFAC,,, | Performed by: OPTOMETRIST

## 2023-12-14 NOTE — PROGRESS NOTES
Subjective:       Patient ID: Valencia Dumont is a 53 y.o. female      Chief Complaint   Patient presents with    Concerns About Ocular Health    Plaquenil Eye Exam    Diabetic Eye Exam     History of Present Illness  Dls: 7/7/22 Dr. Murillo     54 y/o female presents today for plaquenil ck and diabetic eye exam.   Pt c/o blurry vision at distance and near ou. P tc/o problems seeing at night.   Pt wears bifocal glasses.      x 2 days     No tearing  + ou itching  No burning  No pain  + ha's  No floaters  No flashes    Eye meds  None    Pohx:   Lasik Bilateral 10+ yrs     Fohx:   None    Hemoglobin A1C       Date                     Value               Ref Range             Status                09/11/2023               6.3 (H)             4.0 - 5.6 %           Final                   08/26/2022               7.1 (H)             4.0 - 5.6 %           Final                   01/19/2021               7.4 (H)             4.0 - 5.6 %           Final                 Assessment/Plan:     1. Systemic lupus erythematosus, unspecified SLE type, unspecified organ involvement status  2. Long-term use of Plaquenil   BID PO x 10+ years. Suspected maculopathy on OCT. Discussed with pt. Referral to retina clinic for evaluation.        - Ambulatory referral/consult to Ophthalmology    3. Type 2 diabetes mellitus without retinopathy  No diabetic retinopathy. Discussed with pt the effects of diabetes on vision, importance of good blood sugar control, compliance with meds, and follow up care with PCP. Return in 1 year for dilated eye exam, sooner PRN.    4. Refractive error  Educated patient on refractive error and discussed lens options. Dispensed updated spectacle Rx. Educated about adaptation period to new specs.    Eyeglass Final Rx       Eyeglass Final Rx         Sphere Cylinder Axis Add    Right -2.50 +0.50 175 +2.00    Left -1.00 +0.75 010 +2.00      Expiration Date: 12/14/2024                      Follow up for retina  consult.

## 2023-12-15 ENCOUNTER — TELEPHONE (OUTPATIENT)
Dept: TRANSPLANT | Facility: CLINIC | Age: 53
End: 2023-12-15
Payer: MEDICARE

## 2023-12-15 ENCOUNTER — PATIENT MESSAGE (OUTPATIENT)
Dept: RHEUMATOLOGY | Facility: CLINIC | Age: 53
End: 2023-12-15
Payer: MEDICARE

## 2023-12-15 NOTE — TELEPHONE ENCOUNTER
Labs reviewed and are stable, continue q 6 months. Letter sent.     ----- Message from Jacob Horvath MD sent at 12/14/2023  9:47 AM CST -----  Liver transplant blood tests reviewed. Labs stable, no change in immunosuppression.   Please continue to monitor liver tests per transplant protocol.

## 2023-12-15 NOTE — LETTER
December 15, 2023    Valencia Dumont  [address] [facility] [address]          Dear Valencia Dumont:  MRN: [id number]    This is a follow up to your recent labs, your lab results were stable.  There are no medicine changes.  Please have your labs drawn again on 6/10/24.      If you cannot have your labs drawn on the scheduled date, it is your responsibility to call the transplant department to reschedule.  Please call [phone] and ask to speak to Rosangela Hanna  for all scheduling requests.     Sincerely,    Linda Barakat, RN,BSN,CCTC    Your Liver Transplant Coordinator    [facility]  [address] [place] [id number] [phone]

## 2023-12-19 ENCOUNTER — PATIENT MESSAGE (OUTPATIENT)
Dept: RHEUMATOLOGY | Facility: CLINIC | Age: 53
End: 2023-12-19
Payer: MEDICARE

## 2023-12-27 ENCOUNTER — PATIENT MESSAGE (OUTPATIENT)
Dept: TRANSPLANT | Facility: CLINIC | Age: 53
End: 2023-12-27
Payer: MEDICARE

## 2024-01-03 ENCOUNTER — PATIENT MESSAGE (OUTPATIENT)
Dept: NEPHROLOGY | Facility: CLINIC | Age: 54
End: 2024-01-03
Payer: MEDICARE

## 2024-01-09 ENCOUNTER — PATIENT MESSAGE (OUTPATIENT)
Dept: OBSTETRICS AND GYNECOLOGY | Facility: CLINIC | Age: 54
End: 2024-01-09
Payer: MEDICARE

## 2024-01-10 ENCOUNTER — INFUSION (OUTPATIENT)
Dept: INFUSION THERAPY | Facility: HOSPITAL | Age: 54
End: 2024-01-10
Attending: INTERNAL MEDICINE
Payer: MEDICARE

## 2024-01-10 VITALS
BODY MASS INDEX: 19.11 KG/M2 | TEMPERATURE: 98 F | DIASTOLIC BLOOD PRESSURE: 79 MMHG | HEART RATE: 79 BPM | OXYGEN SATURATION: 100 % | RESPIRATION RATE: 18 BRPM | SYSTOLIC BLOOD PRESSURE: 158 MMHG | WEIGHT: 129.38 LBS

## 2024-01-10 DIAGNOSIS — M32.9 SYSTEMIC LUPUS ERYTHEMATOSUS, UNSPECIFIED SLE TYPE, UNSPECIFIED ORGAN INVOLVEMENT STATUS: Primary | ICD-10-CM

## 2024-01-10 PROCEDURE — 96365 THER/PROPH/DIAG IV INF INIT: CPT

## 2024-01-10 PROCEDURE — 25000003 PHARM REV CODE 250: Performed by: INTERNAL MEDICINE

## 2024-01-10 PROCEDURE — 63600175 PHARM REV CODE 636 W HCPCS: Mod: JZ,JA,JG | Performed by: INTERNAL MEDICINE

## 2024-01-10 RX ORDER — ACETAMINOPHEN 325 MG/1
650 TABLET ORAL
Status: COMPLETED | OUTPATIENT
Start: 2024-01-10 | End: 2024-01-10

## 2024-01-10 RX ORDER — DIPHENHYDRAMINE HYDROCHLORIDE 50 MG/ML
25 INJECTION, SOLUTION INTRAMUSCULAR; INTRAVENOUS
Status: COMPLETED | OUTPATIENT
Start: 2024-01-10 | End: 2024-01-10

## 2024-01-10 RX ADMIN — BELIMUMAB 590 MG: 400 INJECTION, POWDER, LYOPHILIZED, FOR SOLUTION INTRAVENOUS at 01:01

## 2024-01-10 RX ADMIN — DIPHENHYDRAMINE HYDROCHLORIDE 25 MG: 50 INJECTION, SOLUTION INTRAMUSCULAR; INTRAVENOUS at 01:01

## 2024-01-10 RX ADMIN — ACETAMINOPHEN 650 MG: 325 TABLET ORAL at 01:01

## 2024-01-10 NOTE — PLAN OF CARE
Pt arrived to clinic for scheduled Benlysta infusion. VSS. Infusion tolerated well. Voices no concerns at this time. Ambulated off unit in [facility].

## 2024-01-24 ENCOUNTER — OFFICE VISIT (OUTPATIENT)
Dept: OBSTETRICS AND GYNECOLOGY | Facility: CLINIC | Age: 54
End: 2024-01-24
Attending: OBSTETRICS & GYNECOLOGY
Payer: MEDICARE

## 2024-01-24 VITALS
BODY MASS INDEX: 18.64 KG/M2 | HEART RATE: 99 BPM | HEIGHT: 69 IN | SYSTOLIC BLOOD PRESSURE: 146 MMHG | DIASTOLIC BLOOD PRESSURE: 84 MMHG | WEIGHT: 125.81 LBS

## 2024-01-24 DIAGNOSIS — Z01.419 ENCOUNTER FOR GYNECOLOGICAL EXAMINATION WITHOUT ABNORMAL FINDING: Primary | ICD-10-CM

## 2024-01-24 DIAGNOSIS — Z12.4 SCREENING FOR MALIGNANT NEOPLASM OF THE CERVIX: ICD-10-CM

## 2024-01-24 DIAGNOSIS — Z12.31 ENCOUNTER FOR SCREENING MAMMOGRAM FOR MALIGNANT NEOPLASM OF BREAST: ICD-10-CM

## 2024-01-24 DIAGNOSIS — N95.1 MENOPAUSAL SYNDROME: ICD-10-CM

## 2024-01-24 DIAGNOSIS — N95.2 POSTMENOPAUSAL ATROPHIC VAGINITIS: ICD-10-CM

## 2024-01-24 PROCEDURE — 99396 PREV VISIT EST AGE 40-64: CPT | Mod: S$GLB,,, | Performed by: OBSTETRICS & GYNECOLOGY

## 2024-01-24 PROCEDURE — 99999 PR PBB SHADOW E&M-EST. PATIENT-LVL V: CPT | Mod: PBBFAC,,, | Performed by: OBSTETRICS & GYNECOLOGY

## 2024-01-24 PROCEDURE — 87624 HPV HI-RISK TYP POOLED RSLT: CPT | Performed by: OBSTETRICS & GYNECOLOGY

## 2024-01-24 PROCEDURE — 88175 CYTOPATH C/V AUTO FLUID REDO: CPT | Performed by: OBSTETRICS & GYNECOLOGY

## 2024-01-24 RX ORDER — ESTRADIOL 0.1 MG/G
1 CREAM VAGINAL
Qty: 42 G | Refills: 3 | Status: SHIPPED | OUTPATIENT
Start: 2024-01-24

## 2024-01-24 NOTE — PROGRESS NOTES
Subjective:       Patient ID: Valencia Dumont is a 53 y.o. female.    Chief Complaint:  Gynecologic Exam      History of Present Illness  Gynecologic Exam  Pertinent negatives include no abdominal pain, back pain or headaches.       Valencia Dumont is a 53 y.o. female  here for her annual GYN exam.    She is menopausal since age 47 and is not on HRT. Still has vasomotor symptoms, unable to take HRT , hx of liver transplant..  denies break through bleeding.   denies vaginal itching or irritation.  Denies vaginal discharge.  She is sexually active. She has had1 partner for 34 years .     History of abnormal pap: Yes  Last Pap: approximate date 2022 and was abnormal: ASCUS, + HR HPV, not 16/18  Last Colpo: 10-: Mild to Moderate Dysplasia  Last LEEP: 2023: A.   CERVIX, LEEP SPECIMEN:         - Ectocervical and endocervical mucosa showing mild squamous dysplasia           (LGSIL/RAJEEV 1), see comment.         - Low grade dysplasia involves endocervical margins from 3-6 and 6-9   o'clock.        - Low grade dysplasia involves ectocervical margins from 6-9 o'clock.         - No high grade dysplasia or invasive carcinoma identified.   B.   ENDOCERVIX, SCRAPINGS:         - Abundant blood admixed with superficial fragments of benign   endocervical mucosa and superficial squamous epithelial cells.         - Negative for dysplasia or malignancy.   COMMENT:   A). *P16 immunostains were performed and do not show block positivity,   supporting the above diagnosis.   :  Last MMG: normal- 2023: BI-RADS Category 1: Negative -routine follow-up in 12 months  Last Colonoscopy:  colonoscopy 7 years ago without abnormalities.  denies domestic violence. She does feel safe at home.     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    Hypertension     Kidney stone     Lupus     Osteoporosis 12/2013    Prophylactic immunotherapy (transplant immunosuppression) 1/2/2014    SBP (spontaneous bacterial peritonitis)     history of      Past Surgical History:   Procedure Laterality Date    BREAST BIOPSY Left 08/2020    CHOLECYSTECTOMY      COLONOSCOPY N/A 05/31/2017    Procedure: COLONOSCOPY;  Surgeon: Marky Sun MD;  [address]  Service: Endoscopy;  Laterality: N/A;  PM prep.    CONIZATION OF CERVIX USING LOOP ELECTROSURGICAL EXCISION PROCEDURE (LEEP) N/A 01/20/2023    Procedure: CONIZATION-CERVICAL-LEEP;  Surgeon: Lu Schreiber MD;  Location: [facility] OR;  Service: OB/GYN;  Laterality: N/A;    ESOPHAGOGASTRODUODENOSCOPY      ESOPHAGOGASTRODUODENOSCOPY N/A 01/16/2019    Procedure: EGD (ESOPHAGOGASTRODUODENOSCOPY);  Surgeon: Deniz Guerra MD;  [facility]  Service: Endoscopy;  Laterality: N/A;  labs prior, s/p liver transplant-MS    ESOPHAGOGASTRODUODENOSCOPY N/A 09/09/2020    Procedure: EGD (ESOPHAGOGASTRODUODENOSCOPY);  Surgeon: Davion Ríos MD;  Location: [facility] ([address] [place] [address]);  Service: Endoscopy;  Laterality: N/A;  Please order the EGD at least 2 weeks after barium esophagram has been done EGD is for dysphagia  covid test 9/6-[facility]    EYE SURGERY      FUNCTIONAL ENDOSCOPIC SINUS SURGERY (FESS) USING COMPUTER-ASSISTED NAVIGATION Bilateral 02/04/2021    Procedure: FESS, USING COMPUTER-ASSISTED NAVIGATION;  Surgeon: Delores Lewis MD;  Location: [facility] OR;  Service: ENT;  Laterality: Bilateral;  [employer] WALDEMAR [phone] TEXTED HIM @ 2:45PM ON 1-8-2021  DISC LOADED YONY SANDERS 1-  RN PREOP 1/28/2021---COVID NEGATIVE ON 2/3--CONSENT INCOMPLETE  H/P INCOMPLETE  --CBC IN AM    LIVER BIOPSY      LIVER TRANSPLANT  12/31/2013    REFRACTIVE SURGERY Bilateral 2010    Revision  "carpal tunnel and removal of scar formation from left wrist  2023    TUBAL LIGATION  2003    UPPER GASTROINTESTINAL ENDOSCOPY       Social History     Socioeconomic History    Marital status:     Number of children: 3   Occupational History     Employer: [employer]   Tobacco Use    Smoking status: Never    Smokeless tobacco: Never    Tobacco comments:     disability; ; 3 children   Substance and Sexual Activity    Alcohol use: Yes     Alcohol/week: 1.0 standard drink of alcohol     Types: 1 Glasses of wine per week     Comment: occasionally     Drug use: No    Sexual activity: Yes     Partners: Male     Birth control/protection: See Surgical Hx, Post-menopausal     Comment: s/p tubal ligation,  since      Family History   Problem Relation Age of Onset    Hypertension Mother     Cataracts Mother     Diabetes Father     Alzheimer's disease Father     Diabetes Paternal Grandfather     Diabetes Paternal Grandmother     No Known Problems Maternal Grandmother     Bone cancer Maternal Grandfather     Breast cancer Maternal Aunt 55    Hypertension Brother     Diabetes Brother     No Known Problems Sister     No Known Problems Maternal Uncle     No Known Problems Paternal Aunt     No Known Problems Paternal Uncle     Stroke Neg Hx     Cancer Neg Hx     Colon cancer Neg Hx     Esophageal cancer Neg Hx     Stomach cancer Neg Hx     Rectal cancer Neg Hx     Amblyopia Neg Hx     Blindness Neg Hx     Glaucoma Neg Hx     Macular degeneration Neg Hx     Retinal detachment Neg Hx     Strabismus Neg Hx     Thyroid disease Neg Hx      OB History          3    Para   3    Term   2       1    AB        Living   3         SAB        IAB        Ectopic        Multiple        Live Births   3                 BP (!) 146/84   Pulse 99   Ht 5' 9" (1.753 m)   Wt 57.1 kg (125 lb 12.8 oz)   LMP 2016   BMI 18.58 kg/m²         GYN & OB History  Patient's last menstrual period was " 2016.   Date of Last Pap: 2022    OB History    Para Term  AB Living   3 3 2 1   3   SAB IAB Ectopic Multiple Live Births           3      # Outcome Date GA Lbr Sachin/2nd Weight Sex Delivery Anes PTL Lv   3   36w0d  2.126 kg (4 lb 11 oz) F Vag-Spont   MONIQUE   2 Term     F Vag-Spont   MONIQUE   1 Term     M Vag-Spont   MONIQUE       Review of Systems  Review of Systems   Constitutional:  Negative for activity change, appetite change, fatigue and unexpected weight change.   HENT: Negative.     Eyes:  Negative for visual disturbance.   Respiratory:  Negative for shortness of breath and wheezing.    Cardiovascular:  Negative for chest pain, palpitations and leg swelling.   Gastrointestinal:  Negative for abdominal pain, bloating and blood in stool.   Endocrine: Negative for diabetes and hair loss.   Genitourinary:  Positive for dyspareunia, hot flashes and vaginal dryness. Negative for decreased libido.   Musculoskeletal:  Negative for back pain and joint swelling.   Integumentary:  Negative for acne, hair changes and nipple discharge.   Neurological:  Negative for headaches.   Hematological:  Does not bruise/bleed easily.   Psychiatric/Behavioral:  Positive for sleep disturbance. Negative for depression. The patient is not nervous/anxious.    Breast: Negative for mastodynia and nipple discharge          Objective:      Physical Exam:   Constitutional: She is oriented to person, place, and time. She appears well-developed and well-nourished.    HENT:   Head: Normocephalic and atraumatic.    Eyes: Pupils are equal, round, and reactive to light. EOM are normal.     Cardiovascular:  Normal rate and regular rhythm.             Pulmonary/Chest: Effort normal and breath sounds normal.   BREASTS:  no mass, no tenderness, no deformity and no retraction. Right breast exhibits no inverted nipple, no mass, no nipple discharge, no skin change, no tenderness, no bleeding and no swelling. Left breast exhibits no  inverted nipple, no mass, no nipple discharge, no skin change, no tenderness, no bleeding and no swelling. Breasts are symmetrical.              Abdominal: Soft. Bowel sounds are normal.     Genitourinary:    Pelvic exam was performed with patient supine.      Genitourinary Comments: PELVIC: Normal external genitalia without lesions.  Normal hair distribution.  Adequate perineal body, normal urethral meatus.  Vagina  Dry and poorly rugated, atrophic, without lesions or discharge.  Cervix pink,Scarred almost flat with vault, stenotic os, without lesions, discharge or tenderness.  No significant cystocele or rectocele.  Bimanual exam shows uterus to be normal size, regular, mobile and nontender.  Adnexa without masses or tenderness.    RECTAL:Deferred                 Musculoskeletal: Normal range of motion and moves all extremeties.       Neurological: She is alert and oriented to person, place, and time.    Skin: Skin is warm and dry.    Psychiatric: She has a normal mood and affect.              Assessment:        1. Encounter for gynecological examination without abnormal finding    2. Encounter for screening mammogram for malignant neoplasm of breast    3. Screening for malignant neoplasm of the cervix    4. Postmenopausal atrophic vaginitis    5. Menopausal syndrome                Plan:        Problem List Items Addressed This Visit    None  Visit Diagnoses       Encounter for gynecological examination without abnormal finding    -  Primary    Encounter for screening mammogram for malignant neoplasm of breast        Relevant Orders    Mammo Digital Screening Bilat w/ Andres    Screening for malignant neoplasm of the cervix        Relevant Orders    Liquid-Based Pap Smear, Screening    HPV High Risk Genotypes, PCR    Postmenopausal atrophic vaginitis        Relevant Medications    estradioL (ESTRACE) 0.01 % (0.1 mg/gram) vaginal cream    Menopausal syndrome                 Follow up in about 1 year (around  1/24/2025).

## 2024-01-25 DIAGNOSIS — M32.9 SYSTEMIC LUPUS ERYTHEMATOSUS, UNSPECIFIED SLE TYPE, UNSPECIFIED ORGAN INVOLVEMENT STATUS: Primary | ICD-10-CM

## 2024-01-25 DIAGNOSIS — Z79.899 LONG-TERM USE OF PLAQUENIL: ICD-10-CM

## 2024-01-26 ENCOUNTER — CLINICAL SUPPORT (OUTPATIENT)
Dept: OPHTHALMOLOGY | Facility: CLINIC | Age: 54
End: 2024-01-26
Payer: MEDICARE

## 2024-01-26 ENCOUNTER — PATIENT MESSAGE (OUTPATIENT)
Dept: ENDOCRINOLOGY | Facility: CLINIC | Age: 54
End: 2024-01-26
Payer: MEDICARE

## 2024-01-26 DIAGNOSIS — M32.9 SYSTEMIC LUPUS ERYTHEMATOSUS, UNSPECIFIED SLE TYPE, UNSPECIFIED ORGAN INVOLVEMENT STATUS: ICD-10-CM

## 2024-01-26 DIAGNOSIS — Z79.899 LONG-TERM USE OF PLAQUENIL: ICD-10-CM

## 2024-01-26 NOTE — PROGRESS NOTES
Visual field test done.  Patient stated no latex allergies used coverlet         Glasses Prescription     Sphere Cylinder Axis Add   Right -2.50 +0.50 175 +2.00   Left -1.00 +0.75 010 +2.00     Pt had a lot of eye move ment in od good os

## 2024-01-29 LAB
FINAL PATHOLOGIC DIAGNOSIS: NORMAL
HPV HR 12 DNA SPEC QL NAA+PROBE: POSITIVE
HPV16 AG SPEC QL: NEGATIVE
HPV18 DNA SPEC QL NAA+PROBE: NEGATIVE
Lab: NORMAL

## 2024-01-30 ENCOUNTER — PATIENT MESSAGE (OUTPATIENT)
Dept: OBSTETRICS AND GYNECOLOGY | Facility: CLINIC | Age: 54
End: 2024-01-30
Payer: MEDICARE

## 2024-02-07 ENCOUNTER — INFUSION (OUTPATIENT)
Dept: INFUSION THERAPY | Facility: HOSPITAL | Age: 54
End: 2024-02-07
Attending: INTERNAL MEDICINE
Payer: MEDICARE

## 2024-02-07 ENCOUNTER — PATIENT MESSAGE (OUTPATIENT)
Dept: RHEUMATOLOGY | Facility: CLINIC | Age: 54
End: 2024-02-07
Payer: MEDICARE

## 2024-02-07 ENCOUNTER — OFFICE VISIT (OUTPATIENT)
Dept: OPHTHALMOLOGY | Facility: CLINIC | Age: 54
End: 2024-02-07
Attending: OPHTHALMOLOGY
Payer: MEDICARE

## 2024-02-07 VITALS
HEIGHT: 69 IN | WEIGHT: 127.81 LBS | BODY MASS INDEX: 18.93 KG/M2 | TEMPERATURE: 98 F | SYSTOLIC BLOOD PRESSURE: 138 MMHG | RESPIRATION RATE: 16 BRPM | DIASTOLIC BLOOD PRESSURE: 70 MMHG | OXYGEN SATURATION: 98 % | HEART RATE: 95 BPM

## 2024-02-07 DIAGNOSIS — M32.9 SYSTEMIC LUPUS ERYTHEMATOSUS, UNSPECIFIED SLE TYPE, UNSPECIFIED ORGAN INVOLVEMENT STATUS: Primary | ICD-10-CM

## 2024-02-07 DIAGNOSIS — T37.2X5A TOXIC MACULOPATHY FROM PLAQUENIL IN THERAPEUTIC USE: ICD-10-CM

## 2024-02-07 DIAGNOSIS — H25.813 MIXED TYPE AGE-RELATED CATARACT, BOTH EYES: ICD-10-CM

## 2024-02-07 DIAGNOSIS — H35.389 TOXIC MACULOPATHY FROM PLAQUENIL IN THERAPEUTIC USE: ICD-10-CM

## 2024-02-07 PROCEDURE — 25000003 PHARM REV CODE 250: Performed by: INTERNAL MEDICINE

## 2024-02-07 PROCEDURE — 92250 FUNDUS PHOTOGRAPHY W/I&R: CPT | Mod: S$GLB,,, | Performed by: OPHTHALMOLOGY

## 2024-02-07 PROCEDURE — 96375 TX/PRO/DX INJ NEW DRUG ADDON: CPT

## 2024-02-07 PROCEDURE — 96365 THER/PROPH/DIAG IV INF INIT: CPT

## 2024-02-07 PROCEDURE — 63600175 PHARM REV CODE 636 W HCPCS: Mod: JZ,JA,JG | Performed by: INTERNAL MEDICINE

## 2024-02-07 PROCEDURE — 92014 COMPRE OPH EXAM EST PT 1/>: CPT | Mod: S$GLB,,, | Performed by: OPHTHALMOLOGY

## 2024-02-07 PROCEDURE — 99999 PR PBB SHADOW E&M-EST. PATIENT-LVL III: CPT | Mod: PBBFAC,,, | Performed by: OPHTHALMOLOGY

## 2024-02-07 RX ORDER — DIPHENHYDRAMINE HYDROCHLORIDE 50 MG/ML
25 INJECTION INTRAMUSCULAR; INTRAVENOUS
Status: COMPLETED | OUTPATIENT
Start: 2024-02-07 | End: 2024-02-07

## 2024-02-07 RX ORDER — ACETAMINOPHEN 325 MG/1
650 TABLET ORAL
Status: COMPLETED | OUTPATIENT
Start: 2024-02-07 | End: 2024-02-07

## 2024-02-07 RX ADMIN — ACETAMINOPHEN 650 MG: 325 TABLET ORAL at 12:02

## 2024-02-07 RX ADMIN — DIPHENHYDRAMINE HYDROCHLORIDE 25 MG: 50 INJECTION, SOLUTION INTRAMUSCULAR; INTRAVENOUS at 12:02

## 2024-02-07 RX ADMIN — BELIMUMAB 580 MG: 400 INJECTION, POWDER, LYOPHILIZED, FOR SOLUTION INTRAVENOUS at 12:02

## 2024-02-07 NOTE — PROGRESS NOTES
Subjective:       Patient ID: Valencia Dumont is a 53 y.o. female      Chief Complaint   Patient presents with    Referral     Referred by Dr Murillo for poss HCQ toxicity     History of Present Illness  HPI     Referral     Additional comments: Referred by Dr Murillo for poss HCQ toxicity           Comments    NP-Oct/Dfe   Plaquenil Exam   Dls: 12/14/2023 Dr. MURILLO     Pt states she has lupus and has to be on plaquenil. She was told by Dr. Murillo   she needed to see a retina specialist.   BS controlled as stated  by  patient. Pt states she would like to see a   cataract specialist.     Has diff seeing at night with glare with driving and diff focusing.  Va in   daytime is good.  -Flashes   +Floaters   -pain     Lasik Ou  +10 years     Eye meds: No gtts   LBS :     Hemoglobin A1C       Date                     Value               Ref Range             Status                09/11/2023               6.3 (H)             4.0 - 5.6 %           Final                 01/19/2021               7.4 (H)             4.0 - 5.6 %           Final             Taking HCQ for about 10 years, 400 mg/day               Last edited by Kelvin Mansfield MD on 2/7/2024  5:18 PM.        Imaging:    See report    Assessment/Plan:     1. Systemic lupus erythematosus, unspecified SLE type, unspecified organ involvement status  On HCQ for about 10 yrs  - Color Fundus Photography - OU - Both Eyes    2. 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 Mixed type age-related cataract, both eyes  Excellent Va but having glare issues   Pt requests eval  Will refer for cataract eval when feel that macula is stable    Follow up in about 6 weeks (around 3/20/2024), or if symptoms worsen or fail to improve, for Comprehensive Examination, OCT Mac, Fundus Autofluoresence.

## 2024-02-07 NOTE — PLAN OF CARE
Patient presented to unit for Benlysta. VSS. Pre-medications of Tylenol PO and Benadryl IVP administered. Benlysta administered over 60 mins. Infusion tolerated well. Discharged from unit NAD.

## 2024-02-07 NOTE — Clinical Note
Hi.  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  She should stop the HCQ as soon as possible.    I'll be following her until it stabilizes.  Please contact me if any questions.  Thank you.

## 2024-02-08 ENCOUNTER — PATIENT MESSAGE (OUTPATIENT)
Dept: RHEUMATOLOGY | Facility: CLINIC | Age: 54
End: 2024-02-08
Payer: MEDICARE

## 2024-02-08 ENCOUNTER — TELEPHONE (OUTPATIENT)
Dept: RHEUMATOLOGY | Facility: CLINIC | Age: 54
End: 2024-02-08
Payer: MEDICARE

## 2024-02-08 NOTE — TELEPHONE ENCOUNTER
----- Message from Kelvin Mansfield MD sent at 2/7/2024  5:29 PM CST -----  Hi.    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  She should stop the HCQ as soon as possible.      I'll be following her until it stabilizes.  Please contact me if any questions.    Thank you.

## 2024-02-14 DIAGNOSIS — M32.9 SYSTEMIC LUPUS ERYTHEMATOSUS, UNSPECIFIED SLE TYPE, UNSPECIFIED ORGAN INVOLVEMENT STATUS: Primary | ICD-10-CM

## 2024-02-20 ENCOUNTER — LAB VISIT (OUTPATIENT)
Dept: LAB | Facility: HOSPITAL | Age: 54
End: 2024-02-20
Attending: INTERNAL MEDICINE
Payer: MEDICARE

## 2024-02-20 ENCOUNTER — PATIENT MESSAGE (OUTPATIENT)
Dept: RHEUMATOLOGY | Facility: CLINIC | Age: 54
End: 2024-02-20
Payer: MEDICARE

## 2024-02-20 DIAGNOSIS — M32.9 SYSTEMIC LUPUS ERYTHEMATOSUS, UNSPECIFIED SLE TYPE, UNSPECIFIED ORGAN INVOLVEMENT STATUS: ICD-10-CM

## 2024-02-20 DIAGNOSIS — N18.32 STAGE 3B CHRONIC KIDNEY DISEASE: ICD-10-CM

## 2024-02-20 DIAGNOSIS — D64.9 NORMOCYTIC ANEMIA: ICD-10-CM

## 2024-02-20 LAB
ALBUMIN SERPL BCP-MCNC: 3.9 G/DL (ref 3.5–5.2)
ALBUMIN SERPL BCP-MCNC: 3.9 G/DL (ref 3.5–5.2)
ALP SERPL-CCNC: 66 U/L (ref 55–135)
ALT SERPL W/O P-5'-P-CCNC: 10 U/L (ref 10–44)
ANION GAP SERPL CALC-SCNC: 9 MMOL/L (ref 8–16)
ANION GAP SERPL CALC-SCNC: 9 MMOL/L (ref 8–16)
AST SERPL-CCNC: 12 U/L (ref 10–40)
BASOPHILS # BLD AUTO: 0.02 K/UL (ref 0–0.2)
BASOPHILS NFR BLD: 0.4 % (ref 0–1.9)
BILIRUB SERPL-MCNC: 0.4 MG/DL (ref 0.1–1)
BUN SERPL-MCNC: 27 MG/DL (ref 6–20)
BUN SERPL-MCNC: 27 MG/DL (ref 6–20)
CALCIUM SERPL-MCNC: 8.9 MG/DL (ref 8.7–10.5)
CALCIUM SERPL-MCNC: 8.9 MG/DL (ref 8.7–10.5)
CHLORIDE SERPL-SCNC: 105 MMOL/L (ref 95–110)
CHLORIDE SERPL-SCNC: 105 MMOL/L (ref 95–110)
CO2 SERPL-SCNC: 27 MMOL/L (ref 23–29)
CO2 SERPL-SCNC: 27 MMOL/L (ref 23–29)
CREAT SERPL-MCNC: 1.7 MG/DL (ref 0.5–1.4)
CREAT SERPL-MCNC: 1.7 MG/DL (ref 0.5–1.4)
CRP SERPL-MCNC: 0.2 MG/L (ref 0–8.2)
DIFFERENTIAL METHOD BLD: ABNORMAL
EOSINOPHIL # BLD AUTO: 0.3 K/UL (ref 0–0.5)
EOSINOPHIL NFR BLD: 6.7 % (ref 0–8)
ERYTHROCYTE [DISTWIDTH] IN BLOOD BY AUTOMATED COUNT: 13.7 % (ref 11.5–14.5)
ERYTHROCYTE [SEDIMENTATION RATE] IN BLOOD BY WESTERGREN METHOD: 5 MM/HR (ref 0–20)
EST. GFR  (NO RACE VARIABLE): 36 ML/MIN/1.73 M^2
EST. GFR  (NO RACE VARIABLE): 36 ML/MIN/1.73 M^2
FERRITIN SERPL-MCNC: 50 NG/ML (ref 20–300)
GLUCOSE SERPL-MCNC: 161 MG/DL (ref 70–110)
GLUCOSE SERPL-MCNC: 161 MG/DL (ref 70–110)
HCT VFR BLD AUTO: 32 % (ref 37–48.5)
HGB BLD-MCNC: 11.2 G/DL (ref 12–16)
IMM GRANULOCYTES # BLD AUTO: 0.01 K/UL (ref 0–0.04)
IMM GRANULOCYTES NFR BLD AUTO: 0.2 % (ref 0–0.5)
IRON SERPL-MCNC: 83 UG/DL (ref 30–160)
LYMPHOCYTES # BLD AUTO: 1.5 K/UL (ref 1–4.8)
LYMPHOCYTES NFR BLD: 30.5 % (ref 18–48)
MCH RBC QN AUTO: 29.9 PG (ref 27–31)
MCHC RBC AUTO-ENTMCNC: 35 G/DL (ref 32–36)
MCV RBC AUTO: 85 FL (ref 82–98)
MONOCYTES # BLD AUTO: 0.3 K/UL (ref 0.3–1)
MONOCYTES NFR BLD: 6.1 % (ref 4–15)
NEUTROPHILS # BLD AUTO: 2.7 K/UL (ref 1.8–7.7)
NEUTROPHILS NFR BLD: 56.1 % (ref 38–73)
NRBC BLD-RTO: 0 /100 WBC
PHOSPHATE SERPL-MCNC: 3.4 MG/DL (ref 2.7–4.5)
PLATELET # BLD AUTO: 210 K/UL (ref 150–450)
PMV BLD AUTO: 10.1 FL (ref 9.2–12.9)
POTASSIUM SERPL-SCNC: 3.5 MMOL/L (ref 3.5–5.1)
POTASSIUM SERPL-SCNC: 3.5 MMOL/L (ref 3.5–5.1)
PROT SERPL-MCNC: 6.9 G/DL (ref 6–8.4)
PTH-INTACT SERPL-MCNC: 125.6 PG/ML (ref 9–77)
RBC # BLD AUTO: 3.75 M/UL (ref 4–5.4)
SATURATED IRON: 22 % (ref 20–50)
SODIUM SERPL-SCNC: 141 MMOL/L (ref 136–145)
SODIUM SERPL-SCNC: 141 MMOL/L (ref 136–145)
TOTAL IRON BINDING CAPACITY: 376 UG/DL (ref 250–450)
TRANSFERRIN SERPL-MCNC: 254 MG/DL (ref 200–375)
WBC # BLD AUTO: 4.75 K/UL (ref 3.9–12.7)

## 2024-02-20 PROCEDURE — 83970 ASSAY OF PARATHORMONE: CPT | Performed by: STUDENT IN AN ORGANIZED HEALTH CARE EDUCATION/TRAINING PROGRAM

## 2024-02-20 PROCEDURE — 86140 C-REACTIVE PROTEIN: CPT | Performed by: INTERNAL MEDICINE

## 2024-02-20 PROCEDURE — 86160 COMPLEMENT ANTIGEN: CPT | Mod: 59 | Performed by: INTERNAL MEDICINE

## 2024-02-20 PROCEDURE — 84100 ASSAY OF PHOSPHORUS: CPT | Performed by: STUDENT IN AN ORGANIZED HEALTH CARE EDUCATION/TRAINING PROGRAM

## 2024-02-20 PROCEDURE — 80053 COMPREHEN METABOLIC PANEL: CPT | Performed by: INTERNAL MEDICINE

## 2024-02-20 PROCEDURE — 82728 ASSAY OF FERRITIN: CPT | Performed by: STUDENT IN AN ORGANIZED HEALTH CARE EDUCATION/TRAINING PROGRAM

## 2024-02-20 PROCEDURE — 85025 COMPLETE CBC W/AUTO DIFF WBC: CPT | Performed by: INTERNAL MEDICINE

## 2024-02-20 PROCEDURE — 82306 VITAMIN D 25 HYDROXY: CPT | Performed by: STUDENT IN AN ORGANIZED HEALTH CARE EDUCATION/TRAINING PROGRAM

## 2024-02-20 PROCEDURE — 85652 RBC SED RATE AUTOMATED: CPT | Performed by: INTERNAL MEDICINE

## 2024-02-20 PROCEDURE — 83540 ASSAY OF IRON: CPT | Performed by: STUDENT IN AN ORGANIZED HEALTH CARE EDUCATION/TRAINING PROGRAM

## 2024-02-20 PROCEDURE — 86160 COMPLEMENT ANTIGEN: CPT | Performed by: INTERNAL MEDICINE

## 2024-02-20 PROCEDURE — 36415 COLL VENOUS BLD VENIPUNCTURE: CPT | Performed by: INTERNAL MEDICINE

## 2024-02-20 PROCEDURE — 86225 DNA ANTIBODY NATIVE: CPT | Performed by: INTERNAL MEDICINE

## 2024-02-21 LAB
25(OH)D3+25(OH)D2 SERPL-MCNC: 20 NG/ML (ref 30–96)
C3 SERPL-MCNC: 86 MG/DL (ref 50–180)
C4 SERPL-MCNC: 28 MG/DL (ref 11–44)

## 2024-02-22 ENCOUNTER — PATIENT MESSAGE (OUTPATIENT)
Dept: NEPHROLOGY | Facility: CLINIC | Age: 54
End: 2024-02-22
Payer: MEDICARE

## 2024-02-23 LAB — DSDNA AB SER-ACNC: NORMAL [IU]/ML

## 2024-03-04 ENCOUNTER — PATIENT MESSAGE (OUTPATIENT)
Dept: OTOLARYNGOLOGY | Facility: CLINIC | Age: 54
End: 2024-03-04
Payer: MEDICARE

## 2024-03-05 ENCOUNTER — PATIENT MESSAGE (OUTPATIENT)
Dept: RHEUMATOLOGY | Facility: CLINIC | Age: 54
End: 2024-03-05
Payer: MEDICARE

## 2024-03-06 ENCOUNTER — CLINICAL SUPPORT (OUTPATIENT)
Dept: AUDIOLOGY | Facility: CLINIC | Age: 54
End: 2024-03-06
Payer: MEDICARE

## 2024-03-06 ENCOUNTER — PATIENT MESSAGE (OUTPATIENT)
Dept: OTOLARYNGOLOGY | Facility: CLINIC | Age: 54
End: 2024-03-06

## 2024-03-06 ENCOUNTER — INFUSION (OUTPATIENT)
Dept: INFUSION THERAPY | Facility: HOSPITAL | Age: 54
End: 2024-03-06
Attending: INTERNAL MEDICINE
Payer: MEDICARE

## 2024-03-06 ENCOUNTER — OFFICE VISIT (OUTPATIENT)
Dept: OTOLARYNGOLOGY | Facility: CLINIC | Age: 54
End: 2024-03-06
Payer: MEDICARE

## 2024-03-06 VITALS
TEMPERATURE: 100 F | RESPIRATION RATE: 16 BRPM | SYSTOLIC BLOOD PRESSURE: 153 MMHG | OXYGEN SATURATION: 99 % | DIASTOLIC BLOOD PRESSURE: 76 MMHG | HEART RATE: 95 BPM

## 2024-03-06 VITALS
WEIGHT: 127 LBS | SYSTOLIC BLOOD PRESSURE: 152 MMHG | BODY MASS INDEX: 18.81 KG/M2 | DIASTOLIC BLOOD PRESSURE: 84 MMHG | HEIGHT: 69 IN

## 2024-03-06 DIAGNOSIS — H92.02 OTALGIA, LEFT: ICD-10-CM

## 2024-03-06 DIAGNOSIS — H92.03 OTALGIA OF BOTH EARS: ICD-10-CM

## 2024-03-06 DIAGNOSIS — H90.41 SENSORINEURAL HEARING LOSS (SNHL) OF RIGHT EAR WITH UNRESTRICTED HEARING OF LEFT EAR: ICD-10-CM

## 2024-03-06 DIAGNOSIS — M32.9 SYSTEMIC LUPUS ERYTHEMATOSUS, UNSPECIFIED SLE TYPE, UNSPECIFIED ORGAN INVOLVEMENT STATUS: Primary | ICD-10-CM

## 2024-03-06 DIAGNOSIS — H93.13 TINNITUS OF BOTH EARS: ICD-10-CM

## 2024-03-06 DIAGNOSIS — M26.622 ARTHRALGIA OF LEFT TEMPOROMANDIBULAR JOINT: Primary | ICD-10-CM

## 2024-03-06 DIAGNOSIS — J32.0 CHRONIC MAXILLARY SINUSITIS: ICD-10-CM

## 2024-03-06 DIAGNOSIS — J33.9 NASAL POLYP: ICD-10-CM

## 2024-03-06 DIAGNOSIS — H90.41 SENSORINEURAL HEARING LOSS (SNHL) OF RIGHT EAR WITH UNRESTRICTED HEARING OF LEFT EAR: Primary | ICD-10-CM

## 2024-03-06 DIAGNOSIS — H61.21 IMPACTED CERUMEN OF RIGHT EAR: ICD-10-CM

## 2024-03-06 DIAGNOSIS — Z98.890 STATUS POST FUNCTIONAL ENDOSCOPIC SINUS SURGERY (FESS): ICD-10-CM

## 2024-03-06 PROCEDURE — 63600175 PHARM REV CODE 636 W HCPCS: Mod: JZ,JA,JG | Performed by: INTERNAL MEDICINE

## 2024-03-06 PROCEDURE — 96375 TX/PRO/DX INJ NEW DRUG ADDON: CPT

## 2024-03-06 PROCEDURE — 69210 REMOVE IMPACTED EAR WAX UNI: CPT | Mod: 51,S$GLB,, | Performed by: OTOLARYNGOLOGY

## 2024-03-06 PROCEDURE — 92557 COMPREHENSIVE HEARING TEST: CPT | Mod: S$GLB,,,

## 2024-03-06 PROCEDURE — 99214 OFFICE O/P EST MOD 30 MIN: CPT | Mod: 25,S$GLB,, | Performed by: OTOLARYNGOLOGY

## 2024-03-06 PROCEDURE — 31231 NASAL ENDOSCOPY DX: CPT | Mod: S$GLB,,, | Performed by: OTOLARYNGOLOGY

## 2024-03-06 PROCEDURE — 96365 THER/PROPH/DIAG IV INF INIT: CPT

## 2024-03-06 PROCEDURE — 25000003 PHARM REV CODE 250: Performed by: INTERNAL MEDICINE

## 2024-03-06 PROCEDURE — 92550 TYMPANOMETRY & REFLEX THRESH: CPT | Mod: S$GLB,,,

## 2024-03-06 RX ORDER — ACETAMINOPHEN 325 MG/1
650 TABLET ORAL
Status: COMPLETED | OUTPATIENT
Start: 2024-03-06 | End: 2024-03-06

## 2024-03-06 RX ORDER — DIPHENHYDRAMINE HYDROCHLORIDE 50 MG/ML
25 INJECTION INTRAMUSCULAR; INTRAVENOUS
Status: COMPLETED | OUTPATIENT
Start: 2024-03-06 | End: 2024-03-06

## 2024-03-06 RX ADMIN — DIPHENHYDRAMINE HYDROCHLORIDE 25 MG: 50 INJECTION, SOLUTION INTRAMUSCULAR; INTRAVENOUS at 12:03

## 2024-03-06 RX ADMIN — ACETAMINOPHEN 650 MG: 325 TABLET ORAL at 12:03

## 2024-03-06 RX ADMIN — BELIMUMAB 580 MG: 400 INJECTION, POWDER, LYOPHILIZED, FOR SOLUTION INTRAVENOUS at 01:03

## 2024-03-06 NOTE — PLAN OF CARE
Called patient and scheduled appointment for a possible cortisone injection in [facility] Thursday 7/9 at 1:40 pm.   Patient presented to unit for q4w Benlysta infusion. VSS. Reports some fatigue, no other complaints voiced. Pre-medications of PO Tylenol and Benadryl IVP given. Benlysta infused over 60 minutes as ordered. Tolerated well. Patient discharged ambulatory and in NAD.     Problem: Fatigue  Goal: Improved Activity Tolerance  Outcome: Ongoing, Progressing

## 2024-03-06 NOTE — PROGRESS NOTES
Please click on link to view Audiogram:  Document on 3/6/2024 10:40 AM by Laurence Robbins AU.D: Audiogram    Ms. Valencia Dumont, a 53 y.o. female, was seen in the clinic today for an audiological evaluation. Ms. Dumont's main complaint was bilateral otalgia and bilateral tinnitus. Patient also reported intermittent jaw pain. Previous audiogram from 9/14/22 indicated a mild to moderate mid-high frequency sensorineural hearing loss (SNHL) for the right ear and essentially normal hearing sensitivity for the left ear.     Non-occlusive cerumen noted in the right ear upon otoscopic inspection. Otoscopy clear for the left ear. Tympanometry testing revealed a Type Ad tympanogram for the right ear and a Type A tympanogram for the left ear. Ipsilateral acoustic reflexes were present at all tested frequencies bilaterally.    Audiological testing revealed a mild to moderate mid-high frequency SNHL for the right ear and essentially normal hearing for the left ear. A speech reception threshold was obtained at 15 dBHL for the right ear and at 15 dBHL for the left ear. Speech discrimination was 92% for the right ear and 96% for the left ear.      Recommendations:  1. Otologic evaluation  2. Annual audiological evaluation or sooner if hearing changes  3. Hearing protection when in noise   4. Hearing aid consultation following medical clearance if Ms. Dumont feels hearing loss negatively impacts quality of life   5. Utilize ReSound Relief marco a and other tinnitus management strategies discussed during appointment (lower salt/caffeine intake, monitor stress/anxiety levels, soft background noise in quiet)

## 2024-03-12 ENCOUNTER — PROCEDURE VISIT (OUTPATIENT)
Dept: OBSTETRICS AND GYNECOLOGY | Facility: CLINIC | Age: 54
End: 2024-03-12
Attending: OBSTETRICS & GYNECOLOGY
Payer: MEDICARE

## 2024-03-12 ENCOUNTER — PATIENT MESSAGE (OUTPATIENT)
Dept: OBSTETRICS AND GYNECOLOGY | Facility: CLINIC | Age: 54
End: 2024-03-12

## 2024-03-12 ENCOUNTER — PATIENT MESSAGE (OUTPATIENT)
Dept: HEPATOLOGY | Facility: CLINIC | Age: 54
End: 2024-03-12
Payer: MEDICARE

## 2024-03-12 VITALS
DIASTOLIC BLOOD PRESSURE: 83 MMHG | WEIGHT: 124.63 LBS | BODY MASS INDEX: 18.46 KG/M2 | HEIGHT: 69 IN | SYSTOLIC BLOOD PRESSURE: 145 MMHG

## 2024-03-12 DIAGNOSIS — B97.7 HIGH RISK HPV INFECTION: Primary | ICD-10-CM

## 2024-03-12 PROCEDURE — 88305 TISSUE EXAM BY PATHOLOGIST: CPT | Mod: 26,,, | Performed by: PATHOLOGY

## 2024-03-12 PROCEDURE — 88305 TISSUE EXAM BY PATHOLOGIST: CPT | Mod: 59 | Performed by: PATHOLOGY

## 2024-03-12 PROCEDURE — 57454 BX/CURETT OF CERVIX W/SCOPE: CPT | Mod: S$GLB,,, | Performed by: OBSTETRICS & GYNECOLOGY

## 2024-03-12 NOTE — PROCEDURES
Colposcopy W/BIOPSY AND ECC    Date/Time: 3/12/2024 2:15 PM    Performed by: Lu Schreiber MD  Authorized by: Lu Schreiber MD    Consent obatined:  Prior to procedure the appropriate consent was completed and verified  Timeout:Immediately prior to procedure a time out was called to verify the correct patient, procedure, equipment, support staff and site/side marked as required  Assistants?: No      Colposcopy Site:  Cervix  Position:  Supine  Acrowhite Lesion: No    Atypical Vessels: No    Transformation Zone Adequate?: No    Biopsy?: Yes         Location:  Cervix ((7 00))  ECC Performed?: Yes    LEEP Performed?: No    Estimated blood loss (cc):  1   Patient tolerated the procedure well with no immediate complications.   Post-operative instructions were provided for the patient.   Patient was discharged and will follow up if any complications occur     Cervix scarred, almost flat, and cervical os stenotic. No visible lesions.

## 2024-03-13 ENCOUNTER — PATIENT MESSAGE (OUTPATIENT)
Dept: OPHTHALMOLOGY | Facility: CLINIC | Age: 54
End: 2024-03-13
Payer: MEDICARE

## 2024-03-15 LAB
FINAL PATHOLOGIC DIAGNOSIS: NORMAL
GROSS: NORMAL
Lab: NORMAL

## 2024-03-25 ENCOUNTER — PATIENT MESSAGE (OUTPATIENT)
Dept: OBSTETRICS AND GYNECOLOGY | Facility: CLINIC | Age: 54
End: 2024-03-25
Payer: MEDICARE

## 2024-04-01 ENCOUNTER — LAB VISIT (OUTPATIENT)
Dept: LAB | Facility: HOSPITAL | Age: 54
End: 2024-04-01
Attending: STUDENT IN AN ORGANIZED HEALTH CARE EDUCATION/TRAINING PROGRAM
Payer: MEDICARE

## 2024-04-01 DIAGNOSIS — D64.9 NORMOCYTIC ANEMIA: ICD-10-CM

## 2024-04-01 DIAGNOSIS — N18.32 STAGE 3B CHRONIC KIDNEY DISEASE: ICD-10-CM

## 2024-04-01 LAB
ALBUMIN SERPL BCP-MCNC: 3.7 G/DL (ref 3.5–5.2)
ANION GAP SERPL CALC-SCNC: 7 MMOL/L (ref 8–16)
BASOPHILS # BLD AUTO: 0.01 K/UL (ref 0–0.2)
BASOPHILS NFR BLD: 0.2 % (ref 0–1.9)
BUN SERPL-MCNC: 26 MG/DL (ref 6–20)
CALCIUM SERPL-MCNC: 9.3 MG/DL (ref 8.7–10.5)
CHLORIDE SERPL-SCNC: 107 MMOL/L (ref 95–110)
CO2 SERPL-SCNC: 25 MMOL/L (ref 23–29)
CREAT SERPL-MCNC: 1.6 MG/DL (ref 0.5–1.4)
DIFFERENTIAL METHOD BLD: ABNORMAL
EOSINOPHIL # BLD AUTO: 0.1 K/UL (ref 0–0.5)
EOSINOPHIL NFR BLD: 2.8 % (ref 0–8)
ERYTHROCYTE [DISTWIDTH] IN BLOOD BY AUTOMATED COUNT: 14.8 % (ref 11.5–14.5)
EST. GFR  (NO RACE VARIABLE): 38 ML/MIN/1.73 M^2
FERRITIN SERPL-MCNC: 28 NG/ML (ref 20–300)
GLUCOSE SERPL-MCNC: 158 MG/DL (ref 70–110)
HCT VFR BLD AUTO: 29.8 % (ref 37–48.5)
HGB BLD-MCNC: 10.3 G/DL (ref 12–16)
IMM GRANULOCYTES # BLD AUTO: 0.01 K/UL (ref 0–0.04)
IMM GRANULOCYTES NFR BLD AUTO: 0.2 % (ref 0–0.5)
LYMPHOCYTES # BLD AUTO: 1.6 K/UL (ref 1–4.8)
LYMPHOCYTES NFR BLD: 30.8 % (ref 18–48)
MCH RBC QN AUTO: 29.2 PG (ref 27–31)
MCHC RBC AUTO-ENTMCNC: 34.6 G/DL (ref 32–36)
MCV RBC AUTO: 84 FL (ref 82–98)
MONOCYTES # BLD AUTO: 0.4 K/UL (ref 0.3–1)
MONOCYTES NFR BLD: 8.6 % (ref 4–15)
NEUTROPHILS # BLD AUTO: 2.9 K/UL (ref 1.8–7.7)
NEUTROPHILS NFR BLD: 57.4 % (ref 38–73)
NRBC BLD-RTO: 0 /100 WBC
PHOSPHATE SERPL-MCNC: 3.1 MG/DL (ref 2.7–4.5)
PLATELET # BLD AUTO: 170 K/UL (ref 150–450)
PMV BLD AUTO: 10 FL (ref 9.2–12.9)
POTASSIUM SERPL-SCNC: 3.9 MMOL/L (ref 3.5–5.1)
PTH-INTACT SERPL-MCNC: 180.2 PG/ML (ref 9–77)
RBC # BLD AUTO: 3.53 M/UL (ref 4–5.4)
SODIUM SERPL-SCNC: 139 MMOL/L (ref 136–145)
WBC # BLD AUTO: 5.09 K/UL (ref 3.9–12.7)

## 2024-04-01 PROCEDURE — 83970 ASSAY OF PARATHORMONE: CPT | Performed by: STUDENT IN AN ORGANIZED HEALTH CARE EDUCATION/TRAINING PROGRAM

## 2024-04-01 PROCEDURE — 36415 COLL VENOUS BLD VENIPUNCTURE: CPT | Performed by: STUDENT IN AN ORGANIZED HEALTH CARE EDUCATION/TRAINING PROGRAM

## 2024-04-01 PROCEDURE — 80069 RENAL FUNCTION PANEL: CPT | Performed by: STUDENT IN AN ORGANIZED HEALTH CARE EDUCATION/TRAINING PROGRAM

## 2024-04-01 PROCEDURE — 85025 COMPLETE CBC W/AUTO DIFF WBC: CPT | Performed by: STUDENT IN AN ORGANIZED HEALTH CARE EDUCATION/TRAINING PROGRAM

## 2024-04-01 PROCEDURE — 82306 VITAMIN D 25 HYDROXY: CPT | Performed by: STUDENT IN AN ORGANIZED HEALTH CARE EDUCATION/TRAINING PROGRAM

## 2024-04-01 PROCEDURE — 82728 ASSAY OF FERRITIN: CPT | Performed by: STUDENT IN AN ORGANIZED HEALTH CARE EDUCATION/TRAINING PROGRAM

## 2024-04-01 PROCEDURE — 83540 ASSAY OF IRON: CPT | Performed by: STUDENT IN AN ORGANIZED HEALTH CARE EDUCATION/TRAINING PROGRAM

## 2024-04-02 LAB
25(OH)D3+25(OH)D2 SERPL-MCNC: 18 NG/ML (ref 30–96)
IRON SERPL-MCNC: 82 UG/DL (ref 30–160)
SATURATED IRON: 22 % (ref 20–50)
TOTAL IRON BINDING CAPACITY: 374 UG/DL (ref 250–450)
TRANSFERRIN SERPL-MCNC: 253 MG/DL (ref 200–375)

## 2024-04-03 ENCOUNTER — PATIENT MESSAGE (OUTPATIENT)
Dept: OBSTETRICS AND GYNECOLOGY | Facility: CLINIC | Age: 54
End: 2024-04-03
Payer: MEDICARE

## 2024-04-03 ENCOUNTER — INFUSION (OUTPATIENT)
Dept: INFUSION THERAPY | Facility: HOSPITAL | Age: 54
End: 2024-04-03
Attending: INTERNAL MEDICINE
Payer: MEDICARE

## 2024-04-03 VITALS
RESPIRATION RATE: 16 BRPM | HEART RATE: 86 BPM | HEIGHT: 69 IN | DIASTOLIC BLOOD PRESSURE: 83 MMHG | SYSTOLIC BLOOD PRESSURE: 175 MMHG | WEIGHT: 169 LBS | TEMPERATURE: 99 F | OXYGEN SATURATION: 97 % | BODY MASS INDEX: 25.03 KG/M2

## 2024-04-03 DIAGNOSIS — M32.9 SYSTEMIC LUPUS ERYTHEMATOSUS, UNSPECIFIED SLE TYPE, UNSPECIFIED ORGAN INVOLVEMENT STATUS: Primary | ICD-10-CM

## 2024-04-03 PROCEDURE — 96365 THER/PROPH/DIAG IV INF INIT: CPT

## 2024-04-03 PROCEDURE — 96375 TX/PRO/DX INJ NEW DRUG ADDON: CPT

## 2024-04-03 PROCEDURE — 25000003 PHARM REV CODE 250: Performed by: INTERNAL MEDICINE

## 2024-04-03 PROCEDURE — 63600175 PHARM REV CODE 636 W HCPCS: Performed by: INTERNAL MEDICINE

## 2024-04-03 RX ORDER — ACETAMINOPHEN 325 MG/1
650 TABLET ORAL
Status: COMPLETED | OUTPATIENT
Start: 2024-04-03 | End: 2024-04-03

## 2024-04-03 RX ORDER — DIPHENHYDRAMINE HYDROCHLORIDE 50 MG/ML
25 INJECTION INTRAMUSCULAR; INTRAVENOUS
Status: COMPLETED | OUTPATIENT
Start: 2024-04-03 | End: 2024-04-03

## 2024-04-03 RX ADMIN — ACETAMINOPHEN 650 MG: 325 TABLET ORAL at 02:04

## 2024-04-03 RX ADMIN — BELIMUMAB 767 MG: 400 INJECTION, POWDER, LYOPHILIZED, FOR SOLUTION INTRAVENOUS at 02:04

## 2024-04-03 RX ADMIN — DIPHENHYDRAMINE HYDROCHLORIDE 25 MG: 50 INJECTION, SOLUTION INTRAMUSCULAR; INTRAVENOUS at 02:04

## 2024-04-04 ENCOUNTER — PATIENT MESSAGE (OUTPATIENT)
Dept: NEPHROLOGY | Facility: CLINIC | Age: 54
End: 2024-04-04

## 2024-04-04 ENCOUNTER — OFFICE VISIT (OUTPATIENT)
Dept: NEPHROLOGY | Facility: CLINIC | Age: 54
End: 2024-04-04
Payer: MEDICARE

## 2024-04-04 DIAGNOSIS — N18.32 STAGE 3B CHRONIC KIDNEY DISEASE: Primary | ICD-10-CM

## 2024-04-05 DIAGNOSIS — N18.32 STAGE 3B CHRONIC KIDNEY DISEASE: Primary | ICD-10-CM

## 2024-04-05 DIAGNOSIS — E55.9 VITAMIN D INSUFFICIENCY: Primary | ICD-10-CM

## 2024-04-05 RX ORDER — ERGOCALCIFEROL 1.25 MG/1
50000 CAPSULE ORAL
Qty: 8 CAPSULE | Refills: 0 | Status: SHIPPED | OUTPATIENT
Start: 2024-04-05 | End: 2024-05-25

## 2024-04-16 PROBLEM — Z51.81 THERAPEUTIC DRUG MONITORING: Status: ACTIVE | Noted: 2024-04-16

## 2024-04-16 PROBLEM — Z94.4 STATUS POST LIVER TRANSPLANT: Status: ACTIVE | Noted: 2020-07-17

## 2024-04-16 PROBLEM — E78.5 DYSLIPIDEMIA: Status: ACTIVE | Noted: 2024-04-16

## 2024-04-16 NOTE — PROGRESS NOTES
Valencia Dumont   is a 54 y.o.. I performed this consultation using real-time Telehealth tools, including a live video connection between my location and the patient's location. Prior to initiating the consultation, I obtained informed verbal consent to perform this consultation using Telehealth tools and answered all the questions about the Telehealth interaction.      Each patient to whom he or she provides medical services by telemedicine is:  (1) informed of the relationship between the physician and patient and the respective role of any other health care provider with respect to management of the patient; and (2) notified that he or she may decline to receive medical services by telemedicine and may withdraw from such care at any time.      Transplant Hepatology  Liver Transplant Recipient Follow Up    PATIENT: Valencia Dumont  MRN: [id number]  DATE: 2024    Provider: Hepatologist - Dr Horvath    Transplant History  Transplant Date: 2013  UNOS Native Liver Dx: Cirrhosis: Autoimmune     Valencia is here for follow up of her liver transplant.     ORGAN: LIVER  Whole or Partial: whole liver  Donor Type:  - brain death  CDC High Risk: no  Donor CMV Status: Positive  Donor HCV Status: Negative  Donor HBcAb: Negative  Biliary Anastomosis: end to end  Arterial Anatomy: standard  IVC reconstruction: end to end ivc  Portal vein status: patent  .  Chief complaint: follow up, history of OLT    Subjective:   Valencia Dumont is a 54 y.o. 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 11 years post-OLT    2024  She has not had recent hospitalizations or ED visits. She reports compliance with Immunosuppression.   C/o fatigue and skin bruises after off plaquenil.   On Biologics every 3 week--for lupus   She denies recent fever, chills, nausea, vomiting, abdominal pain, diarrhea, headache, tremors.  Back pain --for pain  clinic      7/18/23  Liver enzymes are elevated  Taking Tylenol but no hepatotoxic medications  Recurrent sinusitis but worsened after sinus surgery      9/22  No complains    7/13/21  Swallowing difficulty which has improved somewhat with BID PPI use, but reports not taking on empty stomach first thing in the AM. She states her symptoms are primarily related to post-nasal drip which has not significantly improved with medicine. She also reports having recurrent episodes of vaginal candidiasis, not currently.       DX lupus --She is on infusion medication for her lupus as well as plaquenil 200mg daily BID.    Allograft function:  Allograft function :   ALT   Date Value Ref Range Status   02/20/2024 10 10 - 44 U/L Final     AST   Date Value Ref Range Status   02/20/2024 12 10 - 40 U/L Final     Alkaline Phosphatase   Date Value Ref Range Status   02/20/2024 66 55 - 135 U/L Final     Tacrolimus Lvl   Date Value Ref Range Status   12/13/2023 8.0 5.0 - 15.0 ng/mL Final     Comment:     Testing performed by a chemiluminescent microparticle   immunoassay on the [employer]KT i System.    CAUTION: No firm therapeutic range exists for tacrolimus in whole   blood. The   complexity of the clinical state, individual differences in   sensitivity to   immunosuppressive and nephrotoxic effects of tacrolimus,   co-administration   of other immunosuppressants, type of transplant, time post-transplant   and a   number of other factors contribute to different requirements for   optimal   blood levels of tacrolimus. Therefore, individual tacrolimus values   cannot   be used as the sole indicator for making changes in treatment regimen   and   each patient should be thoroughly evaluated clinically before changes   in   treatment regimens are made. Each user must establish his or her own   ranges   based on clinical experience.  Therapeutic ranges vary according to the commercial test used, and   therefore   should be established for  each commercial test. Values obtained with   different assay methods cannot be used interchangeably due to   differences in   assay methods and cross-reactivity with metabolites, nor should   correction   factors be applied. Therefore, consistent use of one assay for   individual   patients is recommended.          Immunosuppression:   Tacrolimus:                                      Yes 3/2 mg BID  Everolimus:                                         no,   MMF:                                                   yes, 160 mg BID for renal protection  Prednisone:                                        No  Cyclosporine:                                       no  Sirolimus:                                             no    Post-LT Complications  Acute cellular rejection:                       no  Antibody-mediated rejection:               no  Biliary anastomotic stricture:               no  HAT:                                                     no  HA stenosis:                                         no  PV stenosis:                                         no  CMV reactivation:                                 no  PTLD:                                                   no  Other malignancies:                             no      Health Maintenance:  Dermatology check up:                           no  Colonoscopy:                                          yes  Bone Mineral Density (BMD):                  yes,   Prolia injections  HbA1C:                                                    Yes      Prior Relevant History:  Biopsy in 2017 suggested recurrence of AIH received Prednisolone bolus    2018 In the setting of the elevated immunosuppression she also developed some acute kidney injury.     Review of systems:  A review of 12+ systems was conducted with pertinent positive and negative findings documented in HPI with all other systems reviewed and negative.    Past Medical History:   Past Medical History:   Diagnosis Date     Abnormal Pap smear of cervix     Anemia     Arthritis     Ascites     Asthma     Chronic back pain     Cirrhosis of liver without mention of alcohol 10/18/2013    Diabetes mellitus     Esophageal varices     GERD (gastroesophageal reflux disease)     Herpes simplex virus (HSV) infection     HSV2.    Hypertension     Kidney stone     Lupus     Osteoporosis 12/2013    Prophylactic immunotherapy (transplant immunosuppression) 1/2/2014    SBP (spontaneous bacterial peritonitis)     history of        Past Surgical HIstory:   Past Surgical History:   Procedure Laterality Date    BREAST BIOPSY Left 08/2020    CHOLECYSTECTOMY      COLONOSCOPY N/A 05/31/2017    Procedure: COLONOSCOPY;  Surgeon: Marky Sun MD;  [facility]  Service: Endoscopy;  Laterality: N/A;  PM prep.    CONIZATION OF CERVIX USING LOOP ELECTROSURGICAL EXCISION PROCEDURE (LEEP) N/A 01/20/2023    Procedure: CONIZATION-CERVICAL-LEEP;  Surgeon: Lu Schreiber MD;  Location: [facility];  Service: OB/GYN;  Laterality: N/A;    ESOPHAGOGASTRODUODENOSCOPY      ESOPHAGOGASTRODUODENOSCOPY N/A 01/16/2019    Procedure: EGD (ESOPHAGOGASTRODUODENOSCOPY);  Surgeon: Deniz Guerra MD;  Location[facility] ([facility]);  Service: Endoscopy;  Laterality: N/A;  labs prior, s/p liver transplant-MS    ESOPHAGOGASTRODUODENOSCOPY N/A 09/09/2020    Procedure: EGD (ESOPHAGOGASTRODUODENOSCOPY);  Surgeon: Davion Ríos MD;  Location: [facility] ([facility]);  Service: Endoscopy;  Laterality: N/A;  Please order the EGD at least 2 weeks after barium esophagram has been done EGD is for dysphagia  covid test 9/6[facility]    EYE SURGERY      FUNCTIONAL ENDOSCOPIC SINUS SURGERY (FESS) USING COMPUTER-ASSISTED NAVIGATION Bilateral 02/04/2021    Procedure: FESS, USING COMPUTER-ASSISTED NAVIGATION;  Surgeon: Delores Lewis MD;  Location: [facility] OR;  Service: ENT;  Laterality: Bilateral;  [employer] [facility] [phone] TEXTED HIM @ 2:45PM ON 1-8-2021  DISC  LOADED BT TOMMY 1-  RN PREOP 1/28/2021---COVID NEGATIVE ON 2/3--CONSENT INCOMPLETE  H/P INCOMPLETE  --CBC IN AM    LIVER BIOPSY      LIVER TRANSPLANT  12/31/2013    REFRACTIVE SURGERY Bilateral 2010    Revision carpal tunnel and removal of scar formation from left wrist  11/17/2023    TUBAL LIGATION  2003    UPPER GASTROINTESTINAL ENDOSCOPY         Family History:   Family History   Problem Relation Name Age of Onset    Hypertension Mother      Cataracts Mother      Diabetes Father      Alzheimer's disease Father      Diabetes Paternal Grandfather      Diabetes Paternal Grandmother      No Known Problems Maternal Grandmother      Bone cancer Maternal Grandfather      Breast cancer Maternal Aunt  55    Hypertension Brother      Diabetes Brother      No Known Problems Sister      No Known Problems Maternal Uncle      No Known Problems Paternal Aunt      No Known Problems Paternal Uncle      Stroke Neg Hx      Cancer Neg Hx      Colon cancer Neg Hx      Esophageal cancer Neg Hx      Stomach cancer Neg Hx      Rectal cancer Neg Hx      Amblyopia Neg Hx      Blindness Neg Hx      Glaucoma Neg Hx      Macular degeneration Neg Hx      Retinal detachment Neg Hx      Strabismus Neg Hx      Thyroid disease Neg Hx         Social History:  reports that she has never smoked. She has never used smokeless tobacco. She reports current alcohol use of about 1.0 standard drink of alcohol per week. She reports that she does not use drugs.    PFSH:  Past medical, family, and social history reviewed as documented in chart with pertinent positive medical, family, and social history detailed in HPI.    Allergies:  Review of patient's allergies indicates:   Allergen Reactions    Norvasc [amlodipine]      Severe headache    Bactrim [sulfamethoxazole-trimethoprim]      Gets yeast infection.    Doxycycline Rash    Pcn [penicillins] Rash       Medications:  Current Outpatient Medications   Medication Sig Dispense Refill    acetaminophen  "(TYLENOL) 650 MG TbSR Take 1 tablet (650 mg total) by mouth every 6 (six) hours. 60 tablet 0    amitriptyline (ELAVIL) 10 MG tablet Take 1 tablet (10 mg total) by mouth every evening. 90 tablet 2    azelastine (ASTELIN) 137 mcg (0.1 %) nasal spray 1 spray (137 mcg total) by Nasal route 2 (two) times daily. 30 mL 11    BD ULTRA-FINE JANESSA PEN NEEDLE 32 gauge x 5/32" Ndle For five times daily insulin injections 450 each 3    blood-glucose meter,continuous (DEXCOM G6 ) Misc 1 each by Misc.(Non-Drug; Combo Route) route once. for 1 dose 1 each 0    blood-glucose transmitter (DEXCOM G6 TRANSMITTER) Karen 1 each by Misc.(Non-Drug; Combo Route) route once a week 1 Device 3    budesonide-formoterol 160-4.5 mcg (SYMBICORT) 160-4.5 mcg/actuation HFAA Inhale 2 puffs into the lungs.      buPROPion (WELLBUTRIN XL) 150 MG TB24 tablet Take 300 mg by mouth.      ciclopirox (PENLAC) 8 % Soln Apply topically nightly. 6.6 mL 3    clotrimazole-betamethasone 1-0.05% (LOTRISONE) cream APPLY TOPICALLY TO THE AFFECTED AREA TWICE DAILY FOR 10 DAYS      diazepam (VALIUM) 5 MG tablet Take 5 mg by mouth 3 (three) times daily as needed.   0    diclofenac sodium (VOLTAREN) 1 % Gel APPLY 2 GRAM to wrist and 4 g to left foot TOPICAL ROUTE 4 TIMES PER  each 2    dicyclomine (BENTYL) 10 MG capsule TAKE 1 CAPSULE BY MOUTH EVERY 8 HOURS AS NEEDED for spasms      DILTIAZEM HCL (DILTIAZEM 2% CREAM) Apply topically 3 (three) times daily. Apply topically to anal area. 30 g 0    diphenoxylate-atropine 2.5-0.025 mg (LOMOTIL) 2.5-0.025 mg per tablet Take 1 tablet by mouth 4 (four) times daily as needed.      dulaglutide (TRULICITY) 0.75 mg/0.5 mL pen injector Inject 0.75 mg into the skin every 7 days. 12 pen 3    ergocalciferol (ERGOCALCIFEROL) 50,000 unit Cap Take 1 capsule (50,000 Units total) by mouth every 7 days. for 8 doses 8 capsule 0    erythromycin with ethanol (EMGEL) 2 % gel ADD 30GM (1 TUBE) TO THE SOAKING DEVICE AND ALLOW WATER TO " AGITATE. PLACE AFFECTED AREAS INTO WATER AND SOAK FOR 10 MINUTES 1-2 TIMES DAILY  1    estradioL (ESTRACE) 0.01 % (0.1 mg/gram) vaginal cream Place 1 g vaginally 3 (three) times a week. 42 g 3    famotidine (PEPCID) 40 MG tablet       ferrous sulfate (FEOSOL) 325 mg (65 mg iron) Tab tablet Take 1 tablet (325 mg total) by mouth every 12 (twelve) hours. 60 tablet 4    flash glucose scanning reader (FREESTYLE CHUN 14 DAY READER) Misc 1 each by Misc.(Non-Drug; Combo Route) route once daily. 1 each 0    flash glucose sensor (FREESTYLE CHUN 14 DAY SENSOR) Kit 2 each by Misc.(Non-Drug; Combo Route) route every 14 (fourteen) days. 2 kit 11    fluconazole (DIFLUCAN) 150 MG Tab TAKE ONE TABLET BY MOUTH once for ONE dose 1 tablet 0    fluocinolone (SYNALAR) 0.01 % external solution APPLY a couple of DROPS INTO BOTH EARS TWICE DAILY AS NEEDED FOR ITCHING 60 mL 1    fluticasone propionate (FLONASE) 50 mcg/actuation nasal spray 1 spray by Each Nostril route 2 (two) times daily.      fluticasone propionate (FLONASE) 50 mcg/actuation nasal spray 2 sprays (100 mcg total) by Each Nostril route 2 (two) times daily. 18.2 mL 11    gabapentin (NEURONTIN) 300 MG capsule TAKE 1 CAPSULE BY MOUTH THREE TIMES DAILY      gentamicin (GARAMYCIN) 0.1 % cream ADD 30GM (1 TUBE) TO THE SOAKING DEVICE AND ALLOW WATER TO AGITATE. PLACE AFFECTED AREAS INTO WATER AND SOAK FOR 10 MINUTES 1-2 TIMES DAILY  1    hydrocortisone (ANUSOL-HC) 2.5 % rectal cream Place rectally 2 (two) times daily. Apply per rectum bid 30 g 3    hydroxychloroquine (PLAQUENIL) 200 mg tablet Take 1 tablet (200 mg total) by mouth 2 (two) times daily. 180 tablet 1    hydrOXYzine HCl (ATARAX) 10 MG Tab TAKE 1 OR 2 TABLETS BY MOUTH THREE TIMES DAILY AS NEEDED FOR ITCHING.  0    insulin detemir U-100 (LEVEMIR FLEXTOUCH U-100 INSULN) 100 unit/mL (3 mL) InPn pen INJECT FOUR units UNDER THE SKIN TWICE DAILY 45 mL 3    insulin lispro (HUMALOG KWIKPEN INSULIN) 100 unit/mL pen 3 units  before BREAKFAST and lunch and FIVE units before dinner with sliding scale, up to 25 units daily 45 mL 3    isosorbide mononitrate (IMDUR) 30 MG 24 hr tablet Take 1 tablet (30 mg total) by mouth 2 (two) times a day. 180 tablet 3    ketoconazole (NIZORAL) 2 % cream ADD 30GM (1/2 TUBE) TO THE SOAKING DEVICE AND ALLOW WATER TO AGITATE. PLACE AFFECTED AREAS INTO WATER AND SOAK FOR 10 MINUTES 1-2 TIMES BRENDON  1    Lactobac. rhamnosus GG-inulin 10 billion cell -200 mg Cap Take 1 capsule by mouth 2 (two) times daily. 30 capsule 0    levocetirizine (XYZAL) 5 MG tablet Take 5 mg by mouth every evening.  3    LIDOcaine-prilocaine (EMLA) cream 2 (two) times daily. Apply to affected area      loratadine (CLARITIN) 10 mg tablet Take 1 tablet (10 mg total) by mouth once daily. 30 tablet 0    losartan (COZAAR) 100 MG tablet Take 1 tablet (100 mg total) by mouth once daily. 30 tablet 2    magnesium oxide 500 mg Tab Take 500 mg by mouth 2 (two) times daily. 60 each 6    meclizine (ANTIVERT) 25 mg tablet TAKE ONE TABLET BY MOUTH AT BEDTIME AS NEEDED for dizziness.  0    mometasone 0.1% (ELOCON) 0.1 % cream APPLY TOPICALLY TO THE FACE TWICE DAILY FOR ONE WEEK      MULTIVIT,THER IRON,CA,FA & MIN (MULTIVITAMIN) Tab Take 1 tablet by mouth once daily. 30 tablet 0    mycophenolate (MYFORTIC) 180 MG TbEC Take 2 tablets (360 mg total) by mouth 2 (two) times daily. 120 tablet 11    neomycin-polymyxin-hydrocortisone (CORTISPORIN) otic solution INSTILL TWO DROPS INTO BOTH EARS FOUR TIMES DAILY FOR 10 DAYS  0    NURTEC 75 mg odt PLACE ONE TABLET on tongue, alternatively UNDER THE TONGUE ONCE DAILY AS NEEDED FOR MIGRAINE 8 tablet 2    nystatin (MYCOSTATIN) 100,000 unit/mL suspension SWISH AND SPIT FIVE MILLILITERS BY MOUTH FOUR TIMES DAILY FOR 7 DAYS.  1    nystatin-triamcinolone (MYCOLOG II) cream Apply to affected area 2 times daily 30 g 1    ondansetron (ZOFRAN) 4 MG tablet TAKE TWO TABLETS BY MOUTH EVERY 8 HOURS AS NEEDED FOR NAUSEA.       oxyCODONE (ROXICODONE) 5 MG immediate release tablet Take 1 tablet (5 mg total) by mouth every 4 (four) hours as needed for Pain. 5 tablet 0    oxycodone-acetaminophen (PERCOCET) 7.5-325 mg per tablet Take 1 tablet by mouth every 4 (four) hours as needed for Pain.      pantoprazole (PROTONIX) 40 MG tablet Take 1 tablet (40 mg total) by mouth 2 (two) times daily. Take immediately in am when wake up and then 30 minutes prior to dinner 60 tablet 11    polyethylene glycol (GLYCOLAX) 17 gram/dose powder Mix 1 capful (17 g) with liquid and by mouth 2 (two) times daily. 1530 g 11    PROAIR HFA 90 mcg/actuation inhaler Inhale 2 puffs into the lungs 3 (three) times daily as needed.   3    promethazine-dextromethorphan (PROMETHAZINE-DM) 6.25-15 mg/5 mL Syrp Take by mouth 2 (two) times daily as needed.   0    rosuvastatin (CRESTOR) 5 MG tablet Take 5 mg by mouth once daily.      SENNA 8.6 mg tablet Take 1 tablet by mouth 2 (two) times daily. Take while taking narcotics 30 tablet 0    sertraline (ZOLOFT) 50 MG tablet Take 50 mg by mouth once daily.  11    SSD 1 % cream 1 application 2 (two) times daily. Apply to affected area  0    tacrolimus (PROGRAF) 1 MG Cap Take 3 capsules (3 mg total) by mouth every morning AND 2 capsules (2 mg total) every evening. 150 capsule 11    tinidazole (TINDAMAX) 250 MG tablet SMARTSI Tablet(s) By Mouth Every Morning      topiramate (TOPAMAX) 50 MG tablet SMARTSI Tablet(s) By Mouth Every Evening      triamcinolone acetonide 0.1% (KENALOG) 0.1 % cream Apply topically 2 times daily 30 g 0    valACYclovir (VALTREX) 1000 MG tablet Take 1,000 mg by mouth once daily.      verapamil (CALAN-SR) 120 MG CR tablet TAKE ONE TABLET ONCE DAILY FOR BLOOD PRESSURE  3    zolpidem (AMBIEN) 10 mg Tab Take 10 mg by mouth nightly as needed.  3     No current facility-administered medications for this visit.       Review of Systems   Constitutional:  Negative for fatigue, fever and unexpected weight change.    HENT:  Negative for ear pain, nosebleeds and trouble swallowing.    Eyes:  Negative for discharge and redness.   Respiratory:  Negative for cough and shortness of breath.    Cardiovascular:  Negative for palpitations and leg swelling.   Gastrointestinal:  Negative for abdominal distention, abdominal pain, diarrhea and vomiting.   Endocrine: Negative for cold intolerance and polyuria.   Genitourinary:  Negative for flank pain and hematuria.   Musculoskeletal:  Negative for back pain.   Skin:  Negative for pallor.   Neurological:  Negative for seizures and headaches.   Hematological:  Does not bruise/bleed easily.   Psychiatric/Behavioral:  Negative for confusion and hallucinations.      See HPI otherwise neg 10 pt ROS    UNOS Patient Status  Functional Status: 80% - Normal activity with effort: some symptoms of disease  Physical Capacity: No Limitations        Objective:      Vitals: There were no vitals filed for this visit.    Physical Exam  Constitutional:       Appearance: Normal appearance.   HENT:      Head: Normocephalic and atraumatic.      Right Ear: External ear normal.      Left Ear: External ear normal.      Mouth/Throat:      Mouth: Mucous membranes are moist.   Eyes:      Extraocular Movements: Extraocular movements intact.      Pupils: Pupils are equal, round, and reactive to light.   Cardiovascular:      Rate and Rhythm: Normal rate.   Pulmonary:      Effort: Pulmonary effort is normal.   Abdominal:      General: There is no distension.      Palpations: There is no mass.      Tenderness: There is no abdominal tenderness.   Musculoskeletal:         General: Normal range of motion.      Cervical back: Normal range of motion.   Skin:     General: Skin is warm.   Neurological:      General: No focal deficit present.      Mental Status: She is alert and oriented to person, place, and time.       Gen: NAD answers questions appropriately  HEENT: NCAT PERRL EOMI MMM no scleral icterus  Neck: supple no  LAD  Cardiac: RRR no m/r/g  Lungs: CTA b/l no w/r/r  Abd: soft NTND BS+  Ext: no c/c/e  Skin: warm dry no rash    Laboratory Data:  No visits with results within 1 Week(s) from this visit.   Latest known visit with results is:   Lab Visit on 04/01/2024   Component Date Value Ref Range Status    Glucose 04/01/2024 158 (H)  70 - 110 mg/dL Final    Sodium 04/01/2024 139  136 - 145 mmol/L Final    Potassium 04/01/2024 3.9  3.5 - 5.1 mmol/L Final    Chloride 04/01/2024 107  95 - 110 mmol/L Final    CO2 04/01/2024 25  23 - 29 mmol/L Final    BUN 04/01/2024 26 (H)  6 - 20 mg/dL Final    Calcium 04/01/2024 9.3  8.7 - 10.5 mg/dL Final    Creatinine 04/01/2024 1.6 (H)  0.5 - 1.4 mg/dL Final    Albumin 04/01/2024 3.7  3.5 - 5.2 g/dL Final    Phosphorus 04/01/2024 3.1  2.7 - 4.5 mg/dL Final    eGFR 04/01/2024 38 (A)  >60 mL/min/1.73 m^2 Final    Anion Gap 04/01/2024 7 (L)  8 - 16 mmol/L Final    WBC 04/01/2024 5.09  3.90 - 12.70 K/uL Final    RBC 04/01/2024 3.53 (L)  4.00 - 5.40 M/uL Final    Hemoglobin 04/01/2024 10.3 (L)  12.0 - 16.0 g/dL Final    Hematocrit 04/01/2024 29.8 (L)  37.0 - 48.5 % Final    MCV 04/01/2024 84  82 - 98 fL Final    MCH 04/01/2024 29.2  27.0 - 31.0 pg Final    MCHC 04/01/2024 34.6  32.0 - 36.0 g/dL Final    RDW 04/01/2024 14.8 (H)  11.5 - 14.5 % Final    Platelets 04/01/2024 170  150 - 450 K/uL Final    MPV 04/01/2024 10.0  9.2 - 12.9 fL Final    Immature Granulocytes 04/01/2024 0.2  0.0 - 0.5 % Final    Gran # (ANC) 04/01/2024 2.9  1.8 - 7.7 K/uL Final    Immature Grans (Abs) 04/01/2024 0.01  0.00 - 0.04 K/uL Final    Comment: Mild elevation in immature granulocytes is non specific and   can be seen in a variety of conditions including stress response,   acute inflammation, trauma and pregnancy. Correlation with other   laboratory and clinical findings is essential.      Lymph # 04/01/2024 1.6  1.0 - 4.8 K/uL Final    Mono # 04/01/2024 0.4  0.3 - 1.0 K/uL Final    Eos # 04/01/2024 0.1  0.0 - 0.5 K/uL  Final    Baso # 04/01/2024 0.01  0.00 - 0.20 K/uL Final    nRBC 04/01/2024 0  0 /100 WBC Final    Gran % 04/01/2024 57.4  38.0 - 73.0 % Final    Lymph % 04/01/2024 30.8  18.0 - 48.0 % Final    Mono % 04/01/2024 8.6  4.0 - 15.0 % Final    Eosinophil % 04/01/2024 2.8  0.0 - 8.0 % Final    Basophil % 04/01/2024 0.2  0.0 - 1.9 % Final    Differential Method 04/01/2024 Automated   Final    PTH, Intact 04/01/2024 180.2 (H)  9.0 - 77.0 pg/mL Final    Vit D, 25-Hydroxy 04/01/2024 18 (L)  30 - 96 ng/mL Final    Comment: Vitamin D deficiency.........<10 ng/mL                              Vitamin D insufficiency......10-29 ng/mL       Vitamin D sufficiency........> or equal to 30 ng/mL  Vitamin D toxicity............>100 ng/mL      Iron 04/01/2024 82  30 - 160 ug/dL Final    Transferrin 04/01/2024 253  200 - 375 mg/dL Final    TIBC 04/01/2024 374  250 - 450 ug/dL Final    Saturated Iron 04/01/2024 22  20 - 50 % Final    Ferritin 04/01/2024 28  20.0 - 300.0 ng/mL Final       Lab Results   Component Value Date    WBC 5.09 04/01/2024    HGB 10.3 (L) 04/01/2024    HCT 29.8 (L) 04/01/2024    MCV 84 04/01/2024     04/01/2024       Lab Results   Component Value Date     04/01/2024    K 3.9 04/01/2024     04/01/2024    CO2 25 04/01/2024    BUN 26 (H) 04/01/2024    CREATININE 1.6 (H) 04/01/2024    CALCIUM 9.3 04/01/2024    ANIONGAP 7 (L) 04/01/2024    ESTGFRAFRICA 46 (A) 06/24/2022    EGFRNONAA 40 (A) 06/24/2022       Lab Results   Component Value Date    ALT 10 02/20/2024    AST 12 02/20/2024     (H) 05/18/2017    ALKPHOS 66 02/20/2024    BILITOT 0.4 02/20/2024       Lab Results   Component Value Date    TACROLIMUS 8.0 12/13/2023       Imaging:  I personally reviewed imaging studies and outside records..      Assessment:       1. Therapeutic drug monitoring    2. Status post liver transplant    3. Immunosuppression    4. Dyslipidemia               Plan:     Recommendations:  S/p OLT Allograft Function  -  Liver enzymes stable 4/24    Immunosuppression   - reduce prograf to 2/2 and stop myfortic and check labs in 2-3 weeks with TSH  --Additional testing to be completed according to Written Order Guidelines for Post-Liver and Combined Liver/Kidney Transplant Follow-up (LI-09)      Therapeutic drug Monitoring      CKD stage 3  -crerat 1.4-1.6  - Avoid NSAIDs as able and maintain adequate hydration    Health Maintenance/Screening:  - CRC: Colonoscopy as per GI  -EGD with path c/w reflux esophagitis, counseled on appropriate PPI use (continue BID protonix)  - Recommend age appropriate cancer screening  - Skin cancer: Recommend use of sunscreen SPF 30 or higher, hat and sunglasses while outside, dermatologist visit annually or sooner if any concerning lesions, refer to dermatology for skin CA evaluation  - Osteoporosis: Recommend bone density testing every 2-3 years if previously normal or annually if previously abnormal, continuing prolia   -Dental: Cleaning andfollow up every 6 month    5. Recurrent Sinusitis/candidiasis  Hold myfortic    Visit today is associated with current and anticipated ongoing medical care related this patients complex condition  Post Liver transplant status with therapeutic drug monitoring.   Patient will follow up with the office for continued care for his complex medical condition    Return to clinic in  6-12 months.    I have sent communication to the referring physician and/or primary care provider.    Time spent on the day of this encounter preparing for, treating and managing this patient and over half of that time was spent on counseling and coordination of care.  30 minutes were spend reviewing the previous labs and outside records. I also educated the patient about lifestyle modifications. I have provided the patient with an opportunity to ask questions and have all questions answered to his satisfaction.         Patient was seen in the liver transplant department at [facility]  Edgar Horvath MD  Transplant Hepatology & Gastroenterology  Hepatology and Liver Transplant  [facility]  [facility]

## 2024-04-17 ENCOUNTER — OFFICE VISIT (OUTPATIENT)
Dept: HEPATOLOGY | Facility: CLINIC | Age: 54
End: 2024-04-17
Payer: MEDICARE

## 2024-04-17 DIAGNOSIS — Z51.81 THERAPEUTIC DRUG MONITORING: Primary | ICD-10-CM

## 2024-04-17 DIAGNOSIS — E78.5 DYSLIPIDEMIA: ICD-10-CM

## 2024-04-17 DIAGNOSIS — Z94.4 STATUS POST LIVER TRANSPLANT: ICD-10-CM

## 2024-04-17 DIAGNOSIS — D84.9 IMMUNOSUPPRESSION: ICD-10-CM

## 2024-04-17 PROBLEM — I85.00 ESOPHAGEAL VARICES WITHOUT BLEEDING, UNSPECIFIED ESOPHAGEAL VARICES TYPE: Status: RESOLVED | Noted: 2019-01-16 | Resolved: 2024-04-17

## 2024-04-17 PROCEDURE — 3060F POS MICROALBUMINURIA REV: CPT | Mod: CPTII,95,, | Performed by: INTERNAL MEDICINE

## 2024-04-17 PROCEDURE — G2211 COMPLEX E/M VISIT ADD ON: HCPCS | Mod: 95,,, | Performed by: INTERNAL MEDICINE

## 2024-04-17 PROCEDURE — 1159F MED LIST DOCD IN RCRD: CPT | Mod: CPTII,95,, | Performed by: INTERNAL MEDICINE

## 2024-04-17 PROCEDURE — 3066F NEPHROPATHY DOC TX: CPT | Mod: CPTII,95,, | Performed by: INTERNAL MEDICINE

## 2024-04-17 PROCEDURE — 4010F ACE/ARB THERAPY RXD/TAKEN: CPT | Mod: CPTII,95,, | Performed by: INTERNAL MEDICINE

## 2024-04-17 PROCEDURE — 1160F RVW MEDS BY RX/DR IN RCRD: CPT | Mod: CPTII,95,, | Performed by: INTERNAL MEDICINE

## 2024-04-17 PROCEDURE — 99214 OFFICE O/P EST MOD 30 MIN: CPT | Mod: 95,,, | Performed by: INTERNAL MEDICINE

## 2024-04-17 NOTE — LETTER
October 22, 2020    Valencia Dumont  [address] [address]          Dear Valencia Dumont:  MRN: [id number]    This is a follow up to your recent labs, your lab results were stable.  There are no medicine changes.  Please have your labs drawn again on 1/19/21.      If you cannot have your labs drawn on the scheduled date, it is your responsibility to call the transplant department to reschedule.  Please call [phone] and ask to speak to Rosangela Hanna  for all scheduling requests.     Sincerely,    Linda Barakat, RN,BSN,CCTC    Your Liver Transplant Coordinator    [facility]  [address] [place] [id number] [phone]        Patients daughter just calling to check on status of below issue, did  have an alternative

## 2024-04-18 ENCOUNTER — PATIENT MESSAGE (OUTPATIENT)
Dept: TRANSPLANT | Facility: CLINIC | Age: 54
End: 2024-04-18
Payer: MEDICARE

## 2024-04-18 DIAGNOSIS — Z94.4 LIVER TRANSPLANTED: ICD-10-CM

## 2024-04-18 DIAGNOSIS — D84.9 IMMUNOSUPPRESSION: ICD-10-CM

## 2024-04-18 RX ORDER — TACROLIMUS 1 MG/1
CAPSULE ORAL
Qty: 120 CAPSULE | Refills: 11 | Status: SHIPPED | OUTPATIENT
Start: 2024-04-18

## 2024-04-24 ENCOUNTER — OFFICE VISIT (OUTPATIENT)
Dept: OPHTHALMOLOGY | Facility: CLINIC | Age: 54
End: 2024-04-24
Attending: OPHTHALMOLOGY
Payer: MEDICARE

## 2024-04-24 DIAGNOSIS — H35.389 TOXIC MACULOPATHY FROM PLAQUENIL IN THERAPEUTIC USE: Primary | ICD-10-CM

## 2024-04-24 DIAGNOSIS — T37.2X5A TOXIC MACULOPATHY FROM PLAQUENIL IN THERAPEUTIC USE: Primary | ICD-10-CM

## 2024-04-24 DIAGNOSIS — M32.9 SYSTEMIC LUPUS ERYTHEMATOSUS, UNSPECIFIED SLE TYPE, UNSPECIFIED ORGAN INVOLVEMENT STATUS: ICD-10-CM

## 2024-04-24 DIAGNOSIS — H25.813 MIXED TYPE AGE-RELATED CATARACT, BOTH EYES: ICD-10-CM

## 2024-04-24 PROCEDURE — 99213 OFFICE O/P EST LOW 20 MIN: CPT | Mod: S$GLB,,, | Performed by: OPHTHALMOLOGY

## 2024-04-24 PROCEDURE — 4010F ACE/ARB THERAPY RXD/TAKEN: CPT | Mod: CPTII,S$GLB,, | Performed by: OPHTHALMOLOGY

## 2024-04-24 PROCEDURE — 1159F MED LIST DOCD IN RCRD: CPT | Mod: CPTII,S$GLB,, | Performed by: OPHTHALMOLOGY

## 2024-04-24 PROCEDURE — 3060F POS MICROALBUMINURIA REV: CPT | Mod: CPTII,S$GLB,, | Performed by: OPHTHALMOLOGY

## 2024-04-24 PROCEDURE — 3066F NEPHROPATHY DOC TX: CPT | Mod: CPTII,S$GLB,, | Performed by: OPHTHALMOLOGY

## 2024-04-24 PROCEDURE — 1160F RVW MEDS BY RX/DR IN RCRD: CPT | Mod: CPTII,S$GLB,, | Performed by: OPHTHALMOLOGY

## 2024-04-24 PROCEDURE — 99999 PR PBB SHADOW E&M-EST. PATIENT-LVL IV: CPT | Mod: PBBFAC,,, | Performed by: OPHTHALMOLOGY

## 2024-04-24 PROCEDURE — 92250 FUNDUS PHOTOGRAPHY W/I&R: CPT | Mod: S$GLB,,, | Performed by: OPHTHALMOLOGY

## 2024-04-24 NOTE — Clinical Note
Hi.  This patient's toxicity has progressed a little even after stopping the HCQ, which can happen.  Thankfully she's still got good visual acuity.  I'm going to continue to observe until it stops progressing.  Thanks

## 2024-04-24 NOTE — PROGRESS NOTES
Subjective:       Patient ID: Valencia Dumont is a 54 y.o. female      Chief Complaint   Patient presents with    Follow-up     F/u HCQ toxicity     History of Present Illness  HPI     Follow-up     Additional comments: F/u HCQ toxicity           Comments    6 wk Comp Ex/ OCTM  OU/Fundus Autofluorescence    DLS 02/07/2024 by Dr Kelvin Mansfield MD    CC: pt states vision is the same OU.    Feels the vision is blurry macy at night.              Last edited by Kelvin Mansfield MD on 4/24/2024  5:55 PM.        Imaging:    See report    Assessment/Plan:     1. Systemic lupus erythematosus, unspecified SLE type, unspecified organ involvement status  On HCQ for about 10 yrs    - Color Fundus Photography - OU - Both Eyes    2. 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  Review of chart shows that pt to receive only Benlysta    - Color Fundus Photography - OU - Both Eyes    3. Mixed type age-related cataract, both eyes  Excellent Va but having glare issues   Pt requests eval  Will refer for cataract eval when feel that macula is stable    Follow up in about 3 months (around 7/24/2024), or if symptoms worsen or fail to improve, for Dilated examination, OCT Mac, Fundus Autofluoresence.

## 2024-04-30 NOTE — TELEPHONE ENCOUNTER
----- Message from Miriam Abernathy MD sent at 1/23/2020  7:35 PM CST -----  Liver tests okay.  Need to make sure patient is taking magnesium supplementation.   Adult

## 2024-05-01 ENCOUNTER — INFUSION (OUTPATIENT)
Dept: INFUSION THERAPY | Facility: HOSPITAL | Age: 54
End: 2024-05-01
Attending: INTERNAL MEDICINE
Payer: MEDICARE

## 2024-05-01 VITALS
DIASTOLIC BLOOD PRESSURE: 81 MMHG | RESPIRATION RATE: 20 BRPM | TEMPERATURE: 98 F | HEART RATE: 87 BPM | SYSTOLIC BLOOD PRESSURE: 144 MMHG | WEIGHT: 129.63 LBS | OXYGEN SATURATION: 97 % | BODY MASS INDEX: 19.14 KG/M2

## 2024-05-01 DIAGNOSIS — M32.9 SYSTEMIC LUPUS ERYTHEMATOSUS, UNSPECIFIED SLE TYPE, UNSPECIFIED ORGAN INVOLVEMENT STATUS: Primary | ICD-10-CM

## 2024-05-01 PROCEDURE — 96375 TX/PRO/DX INJ NEW DRUG ADDON: CPT

## 2024-05-01 PROCEDURE — 63600175 PHARM REV CODE 636 W HCPCS: Performed by: INTERNAL MEDICINE

## 2024-05-01 PROCEDURE — 25000003 PHARM REV CODE 250: Performed by: INTERNAL MEDICINE

## 2024-05-01 PROCEDURE — 96365 THER/PROPH/DIAG IV INF INIT: CPT

## 2024-05-01 RX ORDER — DIPHENHYDRAMINE HYDROCHLORIDE 50 MG/ML
25 INJECTION INTRAMUSCULAR; INTRAVENOUS
Status: COMPLETED | OUTPATIENT
Start: 2024-05-01 | End: 2024-05-01

## 2024-05-01 RX ORDER — ACETAMINOPHEN 325 MG/1
650 TABLET ORAL
Status: COMPLETED | OUTPATIENT
Start: 2024-05-01 | End: 2024-05-01

## 2024-05-01 RX ADMIN — ACETAMINOPHEN 650 MG: 325 TABLET ORAL at 10:05

## 2024-05-01 RX ADMIN — BELIMUMAB 767 MG: 400 INJECTION, POWDER, LYOPHILIZED, FOR SOLUTION INTRAVENOUS at 11:05

## 2024-05-01 RX ADMIN — DIPHENHYDRAMINE HYDROCHLORIDE 25 MG: 50 INJECTION, SOLUTION INTRAMUSCULAR; INTRAVENOUS at 10:05

## 2024-05-01 NOTE — PLAN OF CARE
Patient presented to unit for q4w Benlysta infusion. VSS. Reports some fatigue, no other new or worsening complaints voiced. Pre-medications of PO Tylenol and Benadryl IVP given. Benlysta infused over 60 minutes as ordered. Tolerated well. Next appointment confirmed. Patient discharged ambulatory and in NAD.     Problem: Fatigue  Goal: Improved Activity Tolerance  Outcome: Progressing

## 2024-05-03 DIAGNOSIS — N18.32 STAGE 3B CHRONIC KIDNEY DISEASE: Primary | ICD-10-CM

## 2024-05-06 ENCOUNTER — PATIENT MESSAGE (OUTPATIENT)
Dept: TRANSPLANT | Facility: CLINIC | Age: 54
End: 2024-05-06
Payer: MEDICARE

## 2024-05-07 ENCOUNTER — LAB VISIT (OUTPATIENT)
Dept: LAB | Facility: HOSPITAL | Age: 54
End: 2024-05-07
Attending: STUDENT IN AN ORGANIZED HEALTH CARE EDUCATION/TRAINING PROGRAM
Payer: MEDICARE

## 2024-05-07 DIAGNOSIS — N18.32 STAGE 3B CHRONIC KIDNEY DISEASE: ICD-10-CM

## 2024-05-07 LAB
ANION GAP SERPL CALC-SCNC: 10 MMOL/L (ref 8–16)
BACTERIA #/AREA URNS HPF: ABNORMAL /HPF
BASOPHILS # BLD AUTO: 0.02 K/UL (ref 0–0.2)
BASOPHILS NFR BLD: 0.2 % (ref 0–1.9)
BILIRUB UR QL STRIP: NEGATIVE
BUN SERPL-MCNC: 27 MG/DL (ref 6–20)
CALCIUM SERPL-MCNC: 10 MG/DL (ref 8.7–10.5)
CHLORIDE SERPL-SCNC: 100 MMOL/L (ref 95–110)
CLARITY UR: CLEAR
CO2 SERPL-SCNC: 30 MMOL/L (ref 23–29)
COLOR UR: YELLOW
CREAT SERPL-MCNC: 1.6 MG/DL (ref 0.5–1.4)
CREAT UR-MCNC: 205.6 MG/DL (ref 15–325)
DIFFERENTIAL METHOD BLD: ABNORMAL
EOSINOPHIL # BLD AUTO: 0 K/UL (ref 0–0.5)
EOSINOPHIL NFR BLD: 0.1 % (ref 0–8)
ERYTHROCYTE [DISTWIDTH] IN BLOOD BY AUTOMATED COUNT: 13.8 % (ref 11.5–14.5)
EST. GFR  (NO RACE VARIABLE): 38 ML/MIN/1.73 M^2
FERRITIN SERPL-MCNC: 46 NG/ML (ref 20–300)
GLUCOSE SERPL-MCNC: 181 MG/DL (ref 70–110)
GLUCOSE UR QL STRIP: ABNORMAL
HCT VFR BLD AUTO: 35.6 % (ref 37–48.5)
HGB BLD-MCNC: 12.2 G/DL (ref 12–16)
HGB UR QL STRIP: NEGATIVE
HYALINE CASTS #/AREA URNS LPF: 22 /LPF
IMM GRANULOCYTES # BLD AUTO: 0.03 K/UL (ref 0–0.04)
IMM GRANULOCYTES NFR BLD AUTO: 0.3 % (ref 0–0.5)
IRON SERPL-MCNC: 98 UG/DL (ref 30–160)
KETONES UR QL STRIP: NEGATIVE
LEUKOCYTE ESTERASE UR QL STRIP: NEGATIVE
LYMPHOCYTES # BLD AUTO: 2.6 K/UL (ref 1–4.8)
LYMPHOCYTES NFR BLD: 29.4 % (ref 18–48)
MCH RBC QN AUTO: 28.6 PG (ref 27–31)
MCHC RBC AUTO-ENTMCNC: 34.3 G/DL (ref 32–36)
MCV RBC AUTO: 84 FL (ref 82–98)
MICROSCOPIC COMMENT: ABNORMAL
MONOCYTES # BLD AUTO: 0.5 K/UL (ref 0.3–1)
MONOCYTES NFR BLD: 5.4 % (ref 4–15)
NEUTROPHILS # BLD AUTO: 5.7 K/UL (ref 1.8–7.7)
NEUTROPHILS NFR BLD: 64.6 % (ref 38–73)
NITRITE UR QL STRIP: NEGATIVE
NRBC BLD-RTO: 0 /100 WBC
PH UR STRIP: 7 [PH] (ref 5–8)
PLATELET # BLD AUTO: 257 K/UL (ref 150–450)
PMV BLD AUTO: 9.8 FL (ref 9.2–12.9)
POTASSIUM SERPL-SCNC: 3.2 MMOL/L (ref 3.5–5.1)
PROT UR QL STRIP: ABNORMAL
PROT UR-MCNC: 130 MG/DL
PROT/CREAT UR: 0.63 MG/G{CREAT} (ref 0–0.2)
PTH-INTACT SERPL-MCNC: 122.2 PG/ML (ref 9–77)
RBC # BLD AUTO: 4.26 M/UL (ref 4–5.4)
RBC #/AREA URNS HPF: 1 /HPF (ref 0–4)
SATURATED IRON: 22 % (ref 20–50)
SODIUM SERPL-SCNC: 140 MMOL/L (ref 136–145)
SP GR UR STRIP: 1.02 (ref 1–1.03)
SQUAMOUS #/AREA URNS HPF: 0 /HPF
TOTAL IRON BINDING CAPACITY: 443 UG/DL (ref 250–450)
TRANSFERRIN SERPL-MCNC: 299 MG/DL (ref 200–375)
URN SPEC COLLECT METH UR: ABNORMAL
UROBILINOGEN UR STRIP-ACNC: NEGATIVE EU/DL
WBC # BLD AUTO: 8.84 K/UL (ref 3.9–12.7)
WBC #/AREA URNS HPF: 1 /HPF (ref 0–5)
YEAST URNS QL MICRO: ABNORMAL

## 2024-05-07 PROCEDURE — 83540 ASSAY OF IRON: CPT | Performed by: STUDENT IN AN ORGANIZED HEALTH CARE EDUCATION/TRAINING PROGRAM

## 2024-05-07 PROCEDURE — 82728 ASSAY OF FERRITIN: CPT | Performed by: STUDENT IN AN ORGANIZED HEALTH CARE EDUCATION/TRAINING PROGRAM

## 2024-05-07 PROCEDURE — 81000 URINALYSIS NONAUTO W/SCOPE: CPT | Performed by: STUDENT IN AN ORGANIZED HEALTH CARE EDUCATION/TRAINING PROGRAM

## 2024-05-07 PROCEDURE — 83970 ASSAY OF PARATHORMONE: CPT | Performed by: STUDENT IN AN ORGANIZED HEALTH CARE EDUCATION/TRAINING PROGRAM

## 2024-05-07 PROCEDURE — 85025 COMPLETE CBC W/AUTO DIFF WBC: CPT | Performed by: STUDENT IN AN ORGANIZED HEALTH CARE EDUCATION/TRAINING PROGRAM

## 2024-05-07 PROCEDURE — 36415 COLL VENOUS BLD VENIPUNCTURE: CPT | Performed by: STUDENT IN AN ORGANIZED HEALTH CARE EDUCATION/TRAINING PROGRAM

## 2024-05-07 PROCEDURE — 80048 BASIC METABOLIC PNL TOTAL CA: CPT | Performed by: STUDENT IN AN ORGANIZED HEALTH CARE EDUCATION/TRAINING PROGRAM

## 2024-05-07 PROCEDURE — 82570 ASSAY OF URINE CREATININE: CPT | Performed by: STUDENT IN AN ORGANIZED HEALTH CARE EDUCATION/TRAINING PROGRAM

## 2024-05-09 ENCOUNTER — LAB VISIT (OUTPATIENT)
Dept: LAB | Facility: HOSPITAL | Age: 54
End: 2024-05-09
Payer: MEDICARE

## 2024-05-09 ENCOUNTER — OFFICE VISIT (OUTPATIENT)
Dept: NEPHROLOGY | Facility: CLINIC | Age: 54
End: 2024-05-09
Payer: MEDICARE

## 2024-05-09 VITALS
SYSTOLIC BLOOD PRESSURE: 161 MMHG | HEIGHT: 69 IN | HEART RATE: 91 BPM | OXYGEN SATURATION: 99 % | BODY MASS INDEX: 18.71 KG/M2 | WEIGHT: 126.31 LBS | DIASTOLIC BLOOD PRESSURE: 90 MMHG

## 2024-05-09 DIAGNOSIS — I10 ESSENTIAL HYPERTENSION: ICD-10-CM

## 2024-05-09 DIAGNOSIS — E83.9 CHRONIC KIDNEY DISEASE-MINERAL AND BONE DISORDER: ICD-10-CM

## 2024-05-09 DIAGNOSIS — Z79.4 TYPE 2 DIABETES MELLITUS WITH STAGE 3B CHRONIC KIDNEY DISEASE, WITH LONG-TERM CURRENT USE OF INSULIN: ICD-10-CM

## 2024-05-09 DIAGNOSIS — E11.42 DIABETIC POLYNEUROPATHY ASSOCIATED WITH TYPE 2 DIABETES MELLITUS: ICD-10-CM

## 2024-05-09 DIAGNOSIS — N18.32 STAGE 3B CHRONIC KIDNEY DISEASE: ICD-10-CM

## 2024-05-09 DIAGNOSIS — R80.9 PROTEINURIA, UNSPECIFIED TYPE: ICD-10-CM

## 2024-05-09 DIAGNOSIS — D64.9 NORMOCYTIC ANEMIA: ICD-10-CM

## 2024-05-09 DIAGNOSIS — Z29.89 PROPHYLACTIC IMMUNOTHERAPY: ICD-10-CM

## 2024-05-09 DIAGNOSIS — Z94.4 LIVER REPLACED BY TRANSPLANT: ICD-10-CM

## 2024-05-09 DIAGNOSIS — N18.9 CHRONIC KIDNEY DISEASE-MINERAL AND BONE DISORDER: ICD-10-CM

## 2024-05-09 DIAGNOSIS — N18.32 TYPE 2 DIABETES MELLITUS WITH STAGE 3B CHRONIC KIDNEY DISEASE, WITH LONG-TERM CURRENT USE OF INSULIN: ICD-10-CM

## 2024-05-09 DIAGNOSIS — E11.22 TYPE 2 DIABETES MELLITUS WITH STAGE 3B CHRONIC KIDNEY DISEASE, WITH LONG-TERM CURRENT USE OF INSULIN: ICD-10-CM

## 2024-05-09 DIAGNOSIS — M32.9 HISTORY OF SYSTEMIC LUPUS ERYTHEMATOSUS (SLE): ICD-10-CM

## 2024-05-09 DIAGNOSIS — M89.9 CHRONIC KIDNEY DISEASE-MINERAL AND BONE DISORDER: ICD-10-CM

## 2024-05-09 DIAGNOSIS — N18.32 STAGE 3B CHRONIC KIDNEY DISEASE: Primary | ICD-10-CM

## 2024-05-09 LAB
CREAT UR-MCNC: 252 MG/DL (ref 15–325)
PROT UR-MCNC: 53 MG/DL (ref 0–15)
PROT/CREAT UR: 0.21 MG/G{CREAT} (ref 0–0.2)

## 2024-05-09 PROCEDURE — 3008F BODY MASS INDEX DOCD: CPT | Mod: CPTII,GC,S$GLB, | Performed by: STUDENT IN AN ORGANIZED HEALTH CARE EDUCATION/TRAINING PROGRAM

## 2024-05-09 PROCEDURE — 82570 ASSAY OF URINE CREATININE: CPT | Performed by: STUDENT IN AN ORGANIZED HEALTH CARE EDUCATION/TRAINING PROGRAM

## 2024-05-09 PROCEDURE — 3066F NEPHROPATHY DOC TX: CPT | Mod: CPTII,GC,S$GLB, | Performed by: STUDENT IN AN ORGANIZED HEALTH CARE EDUCATION/TRAINING PROGRAM

## 2024-05-09 PROCEDURE — 99214 OFFICE O/P EST MOD 30 MIN: CPT | Mod: GC,S$GLB,, | Performed by: STUDENT IN AN ORGANIZED HEALTH CARE EDUCATION/TRAINING PROGRAM

## 2024-05-09 PROCEDURE — 4010F ACE/ARB THERAPY RXD/TAKEN: CPT | Mod: CPTII,GC,S$GLB, | Performed by: STUDENT IN AN ORGANIZED HEALTH CARE EDUCATION/TRAINING PROGRAM

## 2024-05-09 PROCEDURE — 1159F MED LIST DOCD IN RCRD: CPT | Mod: CPTII,GC,S$GLB, | Performed by: STUDENT IN AN ORGANIZED HEALTH CARE EDUCATION/TRAINING PROGRAM

## 2024-05-09 PROCEDURE — 3077F SYST BP >= 140 MM HG: CPT | Mod: CPTII,GC,S$GLB, | Performed by: STUDENT IN AN ORGANIZED HEALTH CARE EDUCATION/TRAINING PROGRAM

## 2024-05-09 PROCEDURE — 3080F DIAST BP >= 90 MM HG: CPT | Mod: CPTII,GC,S$GLB, | Performed by: STUDENT IN AN ORGANIZED HEALTH CARE EDUCATION/TRAINING PROGRAM

## 2024-05-09 PROCEDURE — 99999 PR PBB SHADOW E&M-EST. PATIENT-LVL IV: CPT | Mod: PBBFAC,GC,, | Performed by: STUDENT IN AN ORGANIZED HEALTH CARE EDUCATION/TRAINING PROGRAM

## 2024-05-09 PROCEDURE — 3060F POS MICROALBUMINURIA REV: CPT | Mod: CPTII,GC,S$GLB, | Performed by: STUDENT IN AN ORGANIZED HEALTH CARE EDUCATION/TRAINING PROGRAM

## 2024-05-09 RX ORDER — SPIRONOLACTONE 25 MG/1
25 TABLET ORAL DAILY
Qty: 90 TABLET | Refills: 3 | Status: SHIPPED | OUTPATIENT
Start: 2024-05-09 | End: 2025-05-09

## 2024-05-09 NOTE — PROGRESS NOTES
Name: Valencia Dumont  : 1970  Date of Service: 2024     Reason for visit: CKD    HPI: Ms Dumont is a 54-year-old woman with hypertension dates back to , CKD IIIb (baseline creatinine ~1.5-1.8), type 2 diabetes mellitus diagnosed back  on insulin (most recent hemoglobin A1c 6.3%) with associated neuropathy, SLE diagnosed  currently on Benlysta (belimumab) infusions roughly once a month, auto-immune hepatitis which failed Cytoxan s/p OLTx on 2013 on Prograf  (with fluconazole) & Myfortic, history of kidney stones, cervical dysplasia s/p recent LEEP complicated by bleeding on  this year, sciatica, back pain, OA, chronic pain, GERD, vitamin D deficiency, osteoporosis, HSV-2, asthma, EUFEMIA, migraines and insomnia who presents to clinic today for follow-up of her CKD. Today she reports productive cough, seasonal allergies, asthma symptoms bothersome and some chest pain associated with cough. She is doing corticosteroid and anti-histamine nasal spray with some occasional saline flushes in addition to her prescribed inhalers. She denies any gross hematuria, foamy urine, dysuria, difficulty urinating or supra-pubic pain.    PMH:   Past Medical History:   Diagnosis Date    Abnormal Pap smear of cervix     Anemia     Arthritis     Ascites     Asthma     Chronic back pain     Cirrhosis of liver without mention of alcohol 10/18/2013    Diabetes mellitus     Esophageal varices     GERD (gastroesophageal reflux disease)     Herpes simplex virus (HSV) infection     HSV2.    Hypertension     Kidney stone     Lupus     Osteoporosis 2013    Prophylactic immunotherapy (transplant immunosuppression) 2014    SBP (spontaneous bacterial peritonitis)     history of        Surgical History:   Past Surgical History:   Procedure Laterality Date    BREAST BIOPSY Left 2020    CHOLECYSTECTOMY      COLONOSCOPY N/A 2017    Procedure: COLONOSCOPY;  Surgeon: Marky Sun MD;  Location: [facility]  (4TH FLR);  Service: Endoscopy;  Laterality: N/A;  PM prep.    CONIZATION OF CERVIX USING LOOP ELECTROSURGICAL EXCISION PROCEDURE (LEEP) N/A 01/20/2023    Procedure: CONIZATION-CERVICAL-LEEP;  Surgeon: Lu Schreiber MD;  Location: [facility];  Service: OB/GYN;  Laterality: N/A;    ESOPHAGOGASTRODUODENOSCOPY      ESOPHAGOGASTRODUODENOSCOPY N/A 01/16/2019    Procedure: EGD (ESOPHAGOGASTRODUODENOSCOPY);  Surgeon: Deniz Guerra MD;  Location: [facility] ([facility] FLR);  Service: Endoscopy;  Laterality: N/A;  labs prior, s/p liver transplant-MS    ESOPHAGOGASTRODUODENOSCOPY N/A 09/09/2020    Procedure: EGD (ESOPHAGOGASTRODUODENOSCOPY);  Surgeon: Davion Ríos MD;  Location: [facility] ([facility]  Service: Endoscopy;  Laterality: N/A;  Please order the EGD at least 2 weeks after barium esophagram has been done EGD is for dysphagia  covid test 9/6-[facility]    EYE SURGERY      FUNCTIONAL ENDOSCOPIC SINUS SURGERY (FESS) USING COMPUTER-ASSISTED NAVIGATION Bilateral 02/04/2021    Procedure: FESS, USING COMPUTER-ASSISTED NAVIGATION;  Surgeon: Delores Lewis MD;  Location: [facility];  Service: ENT;  Laterality: Bilateral;  [employer] WALDEMAR [phone] TEXTED HIM @ 2:45PM ON 1-8-2021  DISC LOADED  TOMMY 1-  RN PREOP 1/28/2021---COVID NEGATIVE ON 2/3--CONSENT INCOMPLETE  H/P INCOMPLETE  --CBC IN AM    LIVER BIOPSY      LIVER TRANSPLANT  12/31/2013    REFRACTIVE SURGERY Bilateral 2010    Revision carpal tunnel and removal of scar formation from left wrist  11/17/2023    TUBAL LIGATION  2003    UPPER GASTROINTESTINAL ENDOSCOPY         Allergies:   Review of patient's allergies indicates:   Allergen Reactions    Norvasc [amlodipine]      Severe headache    Bactrim [sulfamethoxazole-trimethoprim]      Gets yeast infection.    Doxycycline Rash    Pcn [penicillins] Rash       Medications:     Current Outpatient Medications:     acetaminophen (TYLENOL) 650 MG TbSR, Take 1 tablet (650 mg total) by mouth every 6 (six)  "hours., Disp: 60 tablet, Rfl: 0    amitriptyline (ELAVIL) 10 MG tablet, Take 1 tablet (10 mg total) by mouth every evening., Disp: 90 tablet, Rfl: 2    azelastine (ASTELIN) 137 mcg (0.1 %) nasal spray, 1 spray (137 mcg total) by Nasal route 2 (two) times daily., Disp: 30 mL, Rfl: 11    BD ULTRA-FINE JANESSA PEN NEEDLE 32 gauge x 5/32" Ndle, For five times daily insulin injections, Disp: 450 each, Rfl: 3    blood-glucose meter,continuous (DEXCOM G6 ) Misc, 1 each by Misc.(Non-Drug; Combo Route) route once. for 1 dose, Disp: 1 each, Rfl: 0    blood-glucose transmitter (DEXCOM G6 TRANSMITTER) Karen, 1 each by Misc.(Non-Drug; Combo Route) route once a week, Disp: 1 Device, Rfl: 3    ciclopirox (PENLAC) 8 % Soln, Apply topically nightly., Disp: 6.6 mL, Rfl: 3    clotrimazole-betamethasone 1-0.05% (LOTRISONE) cream, APPLY TOPICALLY TO THE AFFECTED AREA TWICE DAILY FOR 10 DAYS, Disp: , Rfl:     diazepam (VALIUM) 5 MG tablet, Take 5 mg by mouth 3 (three) times daily as needed. , Disp: , Rfl: 0    diclofenac sodium (VOLTAREN) 1 % Gel, APPLY 2 GRAM to wrist and 4 g to left foot TOPICAL ROUTE 4 TIMES PER DAY, Disp: 450 each, Rfl: 2    dicyclomine (BENTYL) 10 MG capsule, TAKE 1 CAPSULE BY MOUTH EVERY 8 HOURS AS NEEDED for spasms, Disp: , Rfl:     DILTIAZEM HCL (DILTIAZEM 2% CREAM), Apply topically 3 (three) times daily. Apply topically to anal area., Disp: 30 g, Rfl: 0    diphenoxylate-atropine 2.5-0.025 mg (LOMOTIL) 2.5-0.025 mg per tablet, Take 1 tablet by mouth 4 (four) times daily as needed., Disp: , Rfl:     dulaglutide (TRULICITY) 0.75 mg/0.5 mL pen injector, Inject 0.75 mg into the skin every 7 days., Disp: 12 pen, Rfl: 3    ergocalciferol (ERGOCALCIFEROL) 50,000 unit Cap, Take 1 capsule (50,000 Units total) by mouth every 7 days. for 8 doses, Disp: 8 capsule, Rfl: 0    erythromycin with ethanol (EMGEL) 2 % gel, ADD 30GM (1 TUBE) TO THE SOAKING DEVICE AND ALLOW WATER TO AGITATE. PLACE AFFECTED AREAS INTO WATER AND " SOAK FOR 10 MINUTES 1-2 TIMES DAILY, Disp: , Rfl: 1    estradioL (ESTRACE) 0.01 % (0.1 mg/gram) vaginal cream, Place 1 g vaginally 3 (three) times a week., Disp: 42 g, Rfl: 3    famotidine (PEPCID) 40 MG tablet, , Disp: , Rfl:     ferrous sulfate (FEOSOL) 325 mg (65 mg iron) Tab tablet, Take 1 tablet (325 mg total) by mouth every 12 (twelve) hours., Disp: 60 tablet, Rfl: 4    flash glucose scanning reader (FREESTYLE CHUN 14 DAY READER) Misc, 1 each by Misc.(Non-Drug; Combo Route) route once daily., Disp: 1 each, Rfl: 0    flash glucose sensor (FREESTYLE CHUN 14 DAY SENSOR) Kit, 2 each by Misc.(Non-Drug; Combo Route) route every 14 (fourteen) days., Disp: 2 kit, Rfl: 11    fluconazole (DIFLUCAN) 150 MG Tab, TAKE ONE TABLET BY MOUTH once for ONE dose, Disp: 1 tablet, Rfl: 0    fluocinolone (SYNALAR) 0.01 % external solution, APPLY a couple of DROPS INTO BOTH EARS TWICE DAILY AS NEEDED FOR ITCHING, Disp: 60 mL, Rfl: 1    fluticasone propionate (FLONASE) 50 mcg/actuation nasal spray, 1 spray by Each Nostril route 2 (two) times daily., Disp: , Rfl:     fluticasone propionate (FLONASE) 50 mcg/actuation nasal spray, 2 sprays (100 mcg total) by Each Nostril route 2 (two) times daily., Disp: 18.2 mL, Rfl: 11    gabapentin (NEURONTIN) 300 MG capsule, TAKE 1 CAPSULE BY MOUTH THREE TIMES DAILY, Disp: , Rfl:     gentamicin (GARAMYCIN) 0.1 % cream, ADD 30GM (1 TUBE) TO THE SOAKING DEVICE AND ALLOW WATER TO AGITATE. PLACE AFFECTED AREAS INTO WATER AND SOAK FOR 10 MINUTES 1-2 TIMES DAILY, Disp: , Rfl: 1    hydrocortisone (ANUSOL-HC) 2.5 % rectal cream, Place rectally 2 (two) times daily. Apply per rectum bid, Disp: 30 g, Rfl: 3    hydroxychloroquine (PLAQUENIL) 200 mg tablet, Take 1 tablet (200 mg total) by mouth 2 (two) times daily., Disp: 180 tablet, Rfl: 1    hydrOXYzine HCl (ATARAX) 10 MG Tab, TAKE 1 OR 2 TABLETS BY MOUTH THREE TIMES DAILY AS NEEDED FOR ITCHING., Disp: , Rfl: 0    insulin detemir U-100 (LEVEMIR FLEXTOUCH  U-100 INSULN) 100 unit/mL (3 mL) InPn pen, INJECT FOUR units UNDER THE SKIN TWICE DAILY, Disp: 45 mL, Rfl: 3    insulin lispro (HUMALOG KWIKPEN INSULIN) 100 unit/mL pen, 3 units before BREAKFAST and lunch and FIVE units before dinner with sliding scale, up to 25 units daily, Disp: 45 mL, Rfl: 3    isosorbide mononitrate (IMDUR) 30 MG 24 hr tablet, Take 1 tablet (30 mg total) by mouth 2 (two) times a day., Disp: 180 tablet, Rfl: 3    ketoconazole (NIZORAL) 2 % cream, ADD 30GM (1/2 TUBE) TO THE SOAKING DEVICE AND ALLOW WATER TO AGITATE. PLACE AFFECTED AREAS INTO WATER AND SOAK FOR 10 MINUTES 1-2 TIMES BRENDON, Disp: , Rfl: 1    Lactobac. rhamnosus GG-inulin 10 billion cell -200 mg Cap, Take 1 capsule by mouth 2 (two) times daily., Disp: 30 capsule, Rfl: 0    levocetirizine (XYZAL) 5 MG tablet, Take 5 mg by mouth every evening., Disp: , Rfl: 3    LIDOcaine-prilocaine (EMLA) cream, 2 (two) times daily. Apply to affected area, Disp: , Rfl:     losartan (COZAAR) 100 MG tablet, Take 1 tablet (100 mg total) by mouth once daily., Disp: 30 tablet, Rfl: 2    magnesium oxide 500 mg Tab, Take 500 mg by mouth 2 (two) times daily., Disp: 60 each, Rfl: 6    meclizine (ANTIVERT) 25 mg tablet, TAKE ONE TABLET BY MOUTH AT BEDTIME AS NEEDED for dizziness., Disp: , Rfl: 0    mometasone 0.1% (ELOCON) 0.1 % cream, APPLY TOPICALLY TO THE FACE TWICE DAILY FOR ONE WEEK, Disp: , Rfl:     MULTIVIT,THER IRON,CA,FA & MIN (MULTIVITAMIN) Tab, Take 1 tablet by mouth once daily., Disp: 30 tablet, Rfl: 0    mycophenolate (MYFORTIC) 180 MG TbEC, Take 2 tablets (360 mg total) by mouth 2 (two) times daily., Disp: 120 tablet, Rfl: 11    neomycin-polymyxin-hydrocortisone (CORTISPORIN) otic solution, INSTILL TWO DROPS INTO BOTH EARS FOUR TIMES DAILY FOR 10 DAYS, Disp: , Rfl: 0    NURTEC 75 mg odt, PLACE ONE TABLET on tongue, alternatively UNDER THE TONGUE ONCE DAILY AS NEEDED FOR MIGRAINE, Disp: 8 tablet, Rfl: 2    nystatin (MYCOSTATIN) 100,000 unit/mL  suspension, SWISH AND SPIT FIVE MILLILITERS BY MOUTH FOUR TIMES DAILY FOR 7 DAYS., Disp: , Rfl: 1    ondansetron (ZOFRAN) 4 MG tablet, TAKE TWO TABLETS BY MOUTH EVERY 8 HOURS AS NEEDED FOR NAUSEA., Disp: , Rfl:     oxyCODONE (ROXICODONE) 5 MG immediate release tablet, Take 1 tablet (5 mg total) by mouth every 4 (four) hours as needed for Pain., Disp: 5 tablet, Rfl: 0    oxycodone-acetaminophen (PERCOCET) 7.5-325 mg per tablet, Take 1 tablet by mouth every 4 (four) hours as needed for Pain., Disp: , Rfl:     polyethylene glycol (GLYCOLAX) 17 gram/dose powder, Mix 1 capful (17 g) with liquid and by mouth 2 (two) times daily., Disp: 1530 g, Rfl: 11    PROAIR HFA 90 mcg/actuation inhaler, Inhale 2 puffs into the lungs 3 (three) times daily as needed. , Disp: , Rfl: 3    promethazine-dextromethorphan (PROMETHAZINE-DM) 6.25-15 mg/5 mL Syrp, Take by mouth 2 (two) times daily as needed. , Disp: , Rfl: 0    rosuvastatin (CRESTOR) 5 MG tablet, Take 5 mg by mouth once daily., Disp: , Rfl:     SENNA 8.6 mg tablet, Take 1 tablet by mouth 2 (two) times daily. Take while taking narcotics, Disp: 30 tablet, Rfl: 0    sertraline (ZOLOFT) 50 MG tablet, Take 50 mg by mouth once daily., Disp: , Rfl: 11    SSD 1 % cream, 1 application 2 (two) times daily. Apply to affected area, Disp: , Rfl: 0    tacrolimus (PROGRAF) 1 MG Cap, Take 2 capsules (2 mg total) by mouth every morning AND 2 capsules (2 mg total) every evening., Disp: 120 capsule, Rfl: 11    tinidazole (TINDAMAX) 250 MG tablet, SMARTSI Tablet(s) By Mouth Every Morning, Disp: , Rfl:     topiramate (TOPAMAX) 50 MG tablet, SMARTSI Tablet(s) By Mouth Every Evening, Disp: , Rfl:     triamcinolone acetonide 0.1% (KENALOG) 0.1 % cream, Apply topically 2 times daily, Disp: 30 g, Rfl: 0    valACYclovir (VALTREX) 1000 MG tablet, Take 1,000 mg by mouth once daily., Disp: , Rfl:     verapamil (CALAN-SR) 120 MG CR tablet, TAKE ONE TABLET ONCE DAILY FOR BLOOD PRESSURE, Disp: , Rfl: 3     zolpidem (AMBIEN) 10 mg Tab, Take 10 mg by mouth nightly as needed., Disp: , Rfl: 3    budesonide-formoterol 160-4.5 mcg (SYMBICORT) 160-4.5 mcg/actuation HFAA, Inhale 2 puffs into the lungs., Disp: , Rfl:     buPROPion (WELLBUTRIN XL) 150 MG TB24 tablet, Take 300 mg by mouth., Disp: , Rfl:     loratadine (CLARITIN) 10 mg tablet, Take 1 tablet (10 mg total) by mouth once daily., Disp: 30 tablet, Rfl: 0    nystatin-triamcinolone (MYCOLOG II) cream, Apply to affected area 2 times daily, Disp: 30 g, Rfl: 1    pantoprazole (PROTONIX) 40 MG tablet, Take 1 tablet (40 mg total) by mouth 2 (two) times daily. Take immediately in am when wake up and then 30 minutes prior to dinner, Disp: 60 tablet, Rfl: 11    Family History:   Family History   Problem Relation Name Age of Onset    Hypertension Mother      Cataracts Mother      Diabetes Father      Alzheimer's disease Father      Diabetes Paternal Grandfather      Diabetes Paternal Grandmother      No Known Problems Maternal Grandmother      Bone cancer Maternal Grandfather      Breast cancer Maternal Aunt  55    Hypertension Brother      Diabetes Brother      No Known Problems Sister      No Known Problems Maternal Uncle      No Known Problems Paternal Aunt      No Known Problems Paternal Uncle      Stroke Neg Hx      Cancer Neg Hx      Colon cancer Neg Hx      Esophageal cancer Neg Hx      Stomach cancer Neg Hx      Rectal cancer Neg Hx      Amblyopia Neg Hx      Blindness Neg Hx      Glaucoma Neg Hx      Macular degeneration Neg Hx      Retinal detachment Neg Hx      Strabismus Neg Hx      Thyroid disease Neg Hx         Social History:   Social History     Socioeconomic History    Marital status:     Number of children: 3   Occupational History     Employer: [employer]   Tobacco Use    Smoking status: Never    Smokeless tobacco: Never    Tobacco comments:     disability; ; 3 children   Substance and Sexual Activity    Alcohol use: Yes     Alcohol/week: 1.0  standard drink of alcohol     Types: 1 Glasses of wine per week     Comment: occasionally     Drug use: No    Sexual activity: Yes     Partners: Male     Birth control/protection: See Surgical Hx, Post-menopausal     Comment: s/p tubal ligation,  since 1989     Social Determinants of Health     Financial Resource Strain: Low Risk  (1/26/2024)    Overall Financial Resource Strain (CARDIA)     Difficulty of Paying Living Expenses: Not hard at all   Food Insecurity: Food Insecurity Present (1/26/2024)    Hunger Vital Sign     Worried About Running Out of Food in the Last Year: Sometimes true     Ran Out of Food in the Last Year: Never true   Transportation Needs: No Transportation Needs (1/26/2024)    PRAPARE - Transportation     Lack of Transportation (Medical): No     Lack of Transportation (Non-Medical): No   Physical Activity: Insufficiently Active (1/26/2024)    Exercise Vital Sign     Days of Exercise per Week: 3 days     Minutes of Exercise per Session: 30 min   Stress: Stress Concern Present (1/26/2024)    [employer] - Occupational Stress Questionnaire     Feeling of Stress : To some extent   Housing Stability: High Risk (1/26/2024)    Housing Stability Vital Sign     Unable to Pay for Housing in the Last Year: Yes     Number of Places Lived in the Last Year: 0     Unstable Housing in the Last Year: Yes     Review of Systems   Constitutional:  Positive for diaphoresis (attributed to hot flashes), malaise/fatigue and weight loss. Negative for chills and fever.   HENT:  Positive for hearing loss. Negative for congestion, ear pain, sore throat and tinnitus.    Eyes:  Positive for blurred vision. Negative for pain and redness.   Respiratory:  Positive for cough (dry cough), sputum production, shortness of breath (with exertion, uses PRN inhaler) and wheezing (intermittent).    Cardiovascular:  Positive for chest pain. Negative for palpitations, leg swelling and PND.  "  Gastrointestinal:  Positive for abdominal pain, constipation, heartburn and nausea. Negative for blood in stool, diarrhea and vomiting.   Genitourinary:  Positive for frequency (nocturia x4). Negative for dysuria, flank pain, hematuria and urgency.   Musculoskeletal:  Positive for back pain, joint pain, myalgias and neck pain.   Skin:  Negative for rash.   Neurological:  Positive for dizziness (intermittent with sitting up too fast), sensory change (right hallux following fracture) and headaches. Negative for speech change, seizures and loss of consciousness.   Endo/Heme/Allergies:  Bruises/bleeds easily.   Psychiatric/Behavioral:  Positive for depression. The patient has insomnia (well controlled with Ambien). The patient is not nervous/anxious.        Vitals:   Vitals:    05/09/24 1322   BP: (!) 161/90   Pulse: 91   SpO2: 99%   Weight: 57.3 kg (126 lb 5.2 oz)   Height: 5' 9" (1.753 m)         Body mass index is 18.65 kg/m².    Physical Exam  Vitals and nursing note reviewed.   Constitutional:       General: She is awake. She is not in acute distress.     Appearance: She is normal weight. She is not ill-appearing or diaphoretic.   HENT:      Head: Normocephalic and atraumatic.      Right Ear: External ear normal.      Left Ear: External ear normal.      Nose: Nose normal.      Mouth/Throat:      Mouth: Mucous membranes are moist.      Pharynx: Oropharynx is clear. No oropharyngeal exudate or posterior oropharyngeal erythema.   Eyes:      General: No scleral icterus.        Right eye: No discharge.         Left eye: No discharge.      Extraocular Movements: Extraocular movements intact.      Conjunctiva/sclera: Conjunctivae normal.   Cardiovascular:      Rate and Rhythm: Normal rate.      Heart sounds: Murmur heard.      Systolic murmur is present with a grade of 2/6.      No friction rub. No gallop.   Pulmonary:      Effort: Pulmonary effort is normal. No respiratory distress.      Breath sounds: Normal breath " sounds. No wheezing, rhonchi or rales.   Abdominal:      General: A surgical scar is present. Bowel sounds are normal. There is no distension.      Palpations: Abdomen is soft.      Tenderness: There is no abdominal tenderness. There is no right CVA tenderness or left CVA tenderness.       Musculoskeletal:      Cervical back: Neck supple.      Right lower leg: No edema.      Left lower leg: No edema.   Skin:     General: Skin is warm and dry.      Coloration: Skin is not jaundiced.   Neurological:      General: No focal deficit present.      Mental Status: She is alert. Mental status is at baseline.      Cranial Nerves: No cranial nerve deficit.      Motor: No weakness.      Gait: Gait normal.   Psychiatric:         Mood and Affect: Mood normal.         Behavior: Behavior normal. Behavior is cooperative.         Assessment/Plan:   Diagnoses and all orders for this visit:    Stage 3b chronic kidney disease  Proteinuria, unspecified type  - Renal function stable most recent creatinine 1.6 (baseline ~1.8-1.5)  - Suspect CKD related to diabetic nephropathy, hypertensive nephrosclerosis and tacrolimus nephrotoxicity   - Most recent UA with +4 glucose and +2 protein trace with UPCR of 0.63  - Most recent metabolic panel notable for glucose 181, BUN 27 and creatinine 1.6  - Denies history of renal stone although noted on retroperitoneal US to have noted to have non-obstructive 5 mm stone on left  - Agree with avoiding NSAIDs moving forward as previously on Mobic 15 mg daily PRN although she reports not having taken this in some time, in addition she is no longer taking Topamax and only uses gabapentin 300 mg QHS which is okay   -     US Retroperitoneal Complete; Future (ordered back in October not yet completed)  -     Has completed CKD and diet education  - FLC ratio only 1.47, SPEP with normal total protein & no discrete paraprotein bands identified and RENATA also without discrete monoclonal peaks  -     Most recent STAN  negative <1:80, normal C3 & C4, CPK within normal limits, ESR & CRP within normal limits, although double stranded DNA antibody positive  - UPCR today and repeat in two weeks and again at follow-up to monitor proteinuria  -     Renal Function Panel; Future  -     Protein / creatinine ratio, urine; Future  -     Protein / creatinine ratio, urine; Future  -     STAN Screen w/Reflex; Future  -     C3 Complement; Future  -     C4 Complement; Future  -     Renal Function Panel; Future  -     Urinalysis; Future    History of systemic lupus erythematosus (SLE)  -     Diagnosed back in 2006  - Follows with Dr. Crocker in Rheumatology here at [facility]  - She is currently on Benlysta (belimumab) infusion (last received on 5/1)  -     Plaquenil 200 mg BID has been stopped secondary to ocular concerns/maculopathy  - Encouraged to keep follow-up with Rheumatology    Chronic kidney disease-mineral and bone disorder  -     Most recent .2, calcium 9.3, phosphorous 3.1 and 25-hydroxyvitamin D 18  -     She reports compliance with ergocalciferol 50,000 IU once weekly (currently on week 3) for total duration of 8 weeks  - Once she completes the ergocalciferol 2,000 IUs daily   -     PTH, intact; Future  -     Vitamin D; Future    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  Prophylactic immunotherapy (transplant immunosuppression)  -     Autoimmune hepatitis s/p OLT on 12/31/2013 for which she has recurrence of AIH on most recent liver allograft biopsy in 2017  - She reports taking Prograf at 2 mg in AM and 2 mg in PM with fluconazole 150 mg every two days in addition to Myfortic 180 mg BID  - Most recent tacrolimus trough 8  - Follows with Dr. Horvath in Hepatology here at [facility], encouraged to keep follow-up visits    Essential hypertension  -     Dates back to 2013 around time of liver transplant  - She reports currently taking Imdur 30 mg BID, verapamil 120 mg BID and losartan 100 mg daily   - She checks her home blood pressures once or twice a  week and estimates systolic readings in the 150-130s and diastolic readings ranging from 80-70s mmHg  - In clinic today blood pressure elevated with value of 161/90 mmHg   - Advised to keep blood pressure log and to bring to next clinic visit  -     Aldosterone/Renin Activity Ratio; Future  -     spironolactone (ALDACTONE) 25 MG tablet; Take 1 tablet (25 mg total) by mouth once daily.    Type 2 diabetes mellitus with stage 3b chronic kidney disease, with long-term current use of insulin  Diabetic polyneuropathy associated with type 2 diabetes mellitus  -     Dates back to 2006 with associated neuropathy  - Follows with Endocrinology here, currently on Humalog 3 units TID SSI, Levemir 4 units QHS and Trulicity 3 mg SQ every other week  - She has Free  - Hemoglobin A1c 7.3%, controlled   - Encouraged to keep follow-up with Endocrinology (Dr. Gamble)    Normocytic anemia  -     Normocytic anemia with hemoglobin 12.2 on most recent laboratory studies   - Most recent iron panel with iron 98, transferrin 299, TIBC 443, 22% saturation and ferritin 46  - Previously on ferrous sulfate 325 mg for iron deficiency   -     IRON AND TIBC; Future  -     Ferritin; Future  -     CBC Auto Differential; Future    Disposition: Follow-up in 2 months.    Discussed with Dr. Teague.     Alfredo Lloyd MD  Nephrology

## 2024-05-13 ENCOUNTER — PATIENT MESSAGE (OUTPATIENT)
Dept: HEPATOLOGY | Facility: CLINIC | Age: 54
End: 2024-05-13
Payer: MEDICARE

## 2024-05-23 ENCOUNTER — LAB VISIT (OUTPATIENT)
Dept: LAB | Facility: HOSPITAL | Age: 54
End: 2024-05-23
Attending: STUDENT IN AN ORGANIZED HEALTH CARE EDUCATION/TRAINING PROGRAM
Payer: MEDICARE

## 2024-05-23 DIAGNOSIS — I10 ESSENTIAL HYPERTENSION: ICD-10-CM

## 2024-05-23 DIAGNOSIS — N18.32 STAGE 3B CHRONIC KIDNEY DISEASE: ICD-10-CM

## 2024-05-23 LAB
ALBUMIN SERPL BCP-MCNC: 4 G/DL (ref 3.5–5.2)
ANION GAP SERPL CALC-SCNC: 8 MMOL/L (ref 8–16)
BUN SERPL-MCNC: 19 MG/DL (ref 6–20)
CALCIUM SERPL-MCNC: 9.7 MG/DL (ref 8.7–10.5)
CHLORIDE SERPL-SCNC: 105 MMOL/L (ref 95–110)
CO2 SERPL-SCNC: 25 MMOL/L (ref 23–29)
CREAT SERPL-MCNC: 1.6 MG/DL (ref 0.5–1.4)
EST. GFR  (NO RACE VARIABLE): 38 ML/MIN/1.73 M^2
GLUCOSE SERPL-MCNC: 143 MG/DL (ref 70–110)
PHOSPHATE SERPL-MCNC: 3.2 MG/DL (ref 2.7–4.5)
POTASSIUM SERPL-SCNC: 4.3 MMOL/L (ref 3.5–5.1)
SODIUM SERPL-SCNC: 138 MMOL/L (ref 136–145)

## 2024-05-23 PROCEDURE — 82088 ASSAY OF ALDOSTERONE: CPT | Performed by: STUDENT IN AN ORGANIZED HEALTH CARE EDUCATION/TRAINING PROGRAM

## 2024-05-23 PROCEDURE — 80069 RENAL FUNCTION PANEL: CPT | Performed by: STUDENT IN AN ORGANIZED HEALTH CARE EDUCATION/TRAINING PROGRAM

## 2024-05-23 PROCEDURE — 36415 COLL VENOUS BLD VENIPUNCTURE: CPT | Performed by: STUDENT IN AN ORGANIZED HEALTH CARE EDUCATION/TRAINING PROGRAM

## 2024-05-23 PROCEDURE — 86038 ANTINUCLEAR ANTIBODIES: CPT | Performed by: STUDENT IN AN ORGANIZED HEALTH CARE EDUCATION/TRAINING PROGRAM

## 2024-05-23 PROCEDURE — 86160 COMPLEMENT ANTIGEN: CPT | Mod: 59 | Performed by: STUDENT IN AN ORGANIZED HEALTH CARE EDUCATION/TRAINING PROGRAM

## 2024-05-23 PROCEDURE — 86160 COMPLEMENT ANTIGEN: CPT | Performed by: STUDENT IN AN ORGANIZED HEALTH CARE EDUCATION/TRAINING PROGRAM

## 2024-05-24 LAB
C3 SERPL-MCNC: 93 MG/DL (ref 50–180)
C4 SERPL-MCNC: 25 MG/DL (ref 11–44)

## 2024-05-26 LAB
ALDOST SERPL-MCNC: 48.6 NG/DL
ALDOST/RENIN PLAS-RTO: 97.2 RATIO
RENIN PLAS-CCNC: 0.5 NG/ML/HR

## 2024-05-27 LAB — ANA SER QL IF: NORMAL

## 2024-05-29 ENCOUNTER — TELEPHONE (OUTPATIENT)
Dept: RHEUMATOLOGY | Facility: CLINIC | Age: 54
End: 2024-05-29
Payer: MEDICARE

## 2024-05-29 ENCOUNTER — INFUSION (OUTPATIENT)
Dept: INFUSION THERAPY | Facility: HOSPITAL | Age: 54
End: 2024-05-29
Attending: INTERNAL MEDICINE
Payer: MEDICARE

## 2024-05-29 VITALS
WEIGHT: 128.31 LBS | TEMPERATURE: 98 F | BODY MASS INDEX: 18.95 KG/M2 | HEART RATE: 100 BPM | OXYGEN SATURATION: 95 % | SYSTOLIC BLOOD PRESSURE: 161 MMHG | DIASTOLIC BLOOD PRESSURE: 78 MMHG | RESPIRATION RATE: 16 BRPM

## 2024-05-29 DIAGNOSIS — M32.9 SYSTEMIC LUPUS ERYTHEMATOSUS, UNSPECIFIED SLE TYPE, UNSPECIFIED ORGAN INVOLVEMENT STATUS: Primary | ICD-10-CM

## 2024-05-29 PROCEDURE — 25000003 PHARM REV CODE 250: Performed by: INTERNAL MEDICINE

## 2024-05-29 PROCEDURE — 63600175 PHARM REV CODE 636 W HCPCS: Mod: JZ,JA,JG | Performed by: INTERNAL MEDICINE

## 2024-05-29 PROCEDURE — 96365 THER/PROPH/DIAG IV INF INIT: CPT

## 2024-05-29 RX ORDER — ACETAMINOPHEN 325 MG/1
650 TABLET ORAL
Status: COMPLETED | OUTPATIENT
Start: 2024-05-29 | End: 2024-05-29

## 2024-05-29 RX ORDER — DIPHENHYDRAMINE HYDROCHLORIDE 50 MG/ML
25 INJECTION INTRAMUSCULAR; INTRAVENOUS
Status: COMPLETED | OUTPATIENT
Start: 2024-05-29 | End: 2024-05-29

## 2024-05-29 RX ADMIN — DIPHENHYDRAMINE HYDROCHLORIDE 25 MG: 50 INJECTION, SOLUTION INTRAMUSCULAR; INTRAVENOUS at 11:05

## 2024-05-29 RX ADMIN — BELIMUMAB 582 MG: 400 INJECTION, POWDER, LYOPHILIZED, FOR SOLUTION INTRAVENOUS at 11:05

## 2024-05-29 RX ADMIN — ACETAMINOPHEN 650 MG: 325 TABLET ORAL at 10:05

## 2024-05-29 NOTE — PLAN OF CARE
Pt arrived to clinic for scheduled Benlysta. VSS. Infusion tolerated well. Voices no concerns at this time. Discharged from clinic in NAD.

## 2024-05-29 NOTE — TELEPHONE ENCOUNTER
Benlysta therapy plan signed.  The patient has not been seen in some time.  Staff to schedule a follow-up with her as soon as possible.

## 2024-06-10 ENCOUNTER — PATIENT MESSAGE (OUTPATIENT)
Dept: RHEUMATOLOGY | Facility: CLINIC | Age: 54
End: 2024-06-10
Payer: MEDICARE

## 2024-06-10 ENCOUNTER — LAB VISIT (OUTPATIENT)
Dept: LAB | Facility: HOSPITAL | Age: 54
End: 2024-06-10
Attending: INTERNAL MEDICINE
Payer: MEDICARE

## 2024-06-10 DIAGNOSIS — Z94.4 LIVER TRANSPLANTED: ICD-10-CM

## 2024-06-10 DIAGNOSIS — M32.9 SYSTEMIC LUPUS ERYTHEMATOSUS, UNSPECIFIED SLE TYPE, UNSPECIFIED ORGAN INVOLVEMENT STATUS: ICD-10-CM

## 2024-06-10 LAB
ALBUMIN SERPL BCP-MCNC: 3.8 G/DL (ref 3.5–5.2)
ALP SERPL-CCNC: 77 U/L (ref 55–135)
ALT SERPL W/O P-5'-P-CCNC: 11 U/L (ref 10–44)
ANION GAP SERPL CALC-SCNC: 7 MMOL/L (ref 8–16)
AST SERPL-CCNC: 12 U/L (ref 10–40)
BASOPHILS # BLD AUTO: 0.02 K/UL (ref 0–0.2)
BASOPHILS NFR BLD: 0.3 % (ref 0–1.9)
BILIRUB SERPL-MCNC: 0.3 MG/DL (ref 0.1–1)
BUN SERPL-MCNC: 27 MG/DL (ref 6–20)
CALCIUM SERPL-MCNC: 9.6 MG/DL (ref 8.7–10.5)
CHLORIDE SERPL-SCNC: 103 MMOL/L (ref 95–110)
CO2 SERPL-SCNC: 27 MMOL/L (ref 23–29)
CREAT SERPL-MCNC: 2 MG/DL (ref 0.5–1.4)
DIFFERENTIAL METHOD BLD: ABNORMAL
EOSINOPHIL # BLD AUTO: 0.3 K/UL (ref 0–0.5)
EOSINOPHIL NFR BLD: 5.5 % (ref 0–8)
ERYTHROCYTE [DISTWIDTH] IN BLOOD BY AUTOMATED COUNT: 13.9 % (ref 11.5–14.5)
EST. GFR  (NO RACE VARIABLE): 29 ML/MIN/1.73 M^2
GLUCOSE SERPL-MCNC: 198 MG/DL (ref 70–110)
HCT VFR BLD AUTO: 32 % (ref 37–48.5)
HGB BLD-MCNC: 11.1 G/DL (ref 12–16)
IMM GRANULOCYTES # BLD AUTO: 0.01 K/UL (ref 0–0.04)
IMM GRANULOCYTES NFR BLD AUTO: 0.2 % (ref 0–0.5)
LYMPHOCYTES # BLD AUTO: 1.9 K/UL (ref 1–4.8)
LYMPHOCYTES NFR BLD: 32.1 % (ref 18–48)
MAGNESIUM SERPL-MCNC: 2 MG/DL (ref 1.6–2.6)
MCH RBC QN AUTO: 29.1 PG (ref 27–31)
MCHC RBC AUTO-ENTMCNC: 34.7 G/DL (ref 32–36)
MCV RBC AUTO: 84 FL (ref 82–98)
MONOCYTES # BLD AUTO: 0.4 K/UL (ref 0.3–1)
MONOCYTES NFR BLD: 6.2 % (ref 4–15)
NEUTROPHILS # BLD AUTO: 3.3 K/UL (ref 1.8–7.7)
NEUTROPHILS NFR BLD: 55.7 % (ref 38–73)
NRBC BLD-RTO: 0 /100 WBC
PLATELET # BLD AUTO: 194 K/UL (ref 150–450)
PMV BLD AUTO: 9.7 FL (ref 9.2–12.9)
POTASSIUM SERPL-SCNC: 5.5 MMOL/L (ref 3.5–5.1)
PROT SERPL-MCNC: 6.8 G/DL (ref 6–8.4)
RBC # BLD AUTO: 3.82 M/UL (ref 4–5.4)
SODIUM SERPL-SCNC: 137 MMOL/L (ref 136–145)
WBC # BLD AUTO: 5.95 K/UL (ref 3.9–12.7)

## 2024-06-10 PROCEDURE — 80197 ASSAY OF TACROLIMUS: CPT | Performed by: INTERNAL MEDICINE

## 2024-06-10 PROCEDURE — 85025 COMPLETE CBC W/AUTO DIFF WBC: CPT | Performed by: INTERNAL MEDICINE

## 2024-06-10 PROCEDURE — 80053 COMPREHEN METABOLIC PANEL: CPT | Performed by: INTERNAL MEDICINE

## 2024-06-10 PROCEDURE — 83735 ASSAY OF MAGNESIUM: CPT | Performed by: INTERNAL MEDICINE

## 2024-06-10 PROCEDURE — 36415 COLL VENOUS BLD VENIPUNCTURE: CPT | Performed by: INTERNAL MEDICINE

## 2024-06-11 ENCOUNTER — TELEPHONE (OUTPATIENT)
Dept: TRANSPLANT | Facility: CLINIC | Age: 54
End: 2024-06-11
Payer: MEDICARE

## 2024-06-11 ENCOUNTER — PATIENT MESSAGE (OUTPATIENT)
Dept: HEPATOLOGY | Facility: CLINIC | Age: 54
End: 2024-06-11
Payer: MEDICARE

## 2024-06-11 ENCOUNTER — PATIENT MESSAGE (OUTPATIENT)
Dept: RHEUMATOLOGY | Facility: CLINIC | Age: 54
End: 2024-06-11
Payer: MEDICARE

## 2024-06-11 LAB — TACROLIMUS BLD-MCNC: 4.8 NG/ML (ref 5–15)

## 2024-06-11 RX ORDER — HYDROXYCHLOROQUINE SULFATE 200 MG/1
200 TABLET, FILM COATED ORAL 2 TIMES DAILY
Qty: 180 TABLET | Refills: 1 | OUTPATIENT
Start: 2024-06-11

## 2024-06-11 NOTE — TELEPHONE ENCOUNTER
Labs reviewed and are stable, no med changes. Will repeat labs in 6 months. Letter sent.       ----- Message from Jacob Hrovath MD sent at 6/11/2024  1:32 PM CDT -----  Results reviewed. No change in immunosuppression

## 2024-06-11 NOTE — LETTER
June 11, 2024    Valencia Dumont  [address] [facility] [address]          Dear Valencia Dumont:  MRN: [id number]    This is a follow up to your recent labs, your lab results were stable.  There are no medicine changes.  Please have your labs drawn again on 12/9/24.      If you cannot have your labs drawn on the scheduled date, it is your responsibility to call the transplant department to reschedule.  Please call [phone] and ask to speak to Rosangela Hanna  for all scheduling requests.     Sincerely,    Linda Barakat, RN,BSN,CCTC    Your Liver Transplant Coordinator    [facility]  [address] [place] [id number] [phone]

## 2024-06-18 ENCOUNTER — OFFICE VISIT (OUTPATIENT)
Dept: RHEUMATOLOGY | Facility: CLINIC | Age: 54
End: 2024-06-18
Payer: MEDICARE

## 2024-06-18 VITALS
BODY MASS INDEX: 19.57 KG/M2 | WEIGHT: 132.5 LBS | HEART RATE: 87 BPM | SYSTOLIC BLOOD PRESSURE: 136 MMHG | DIASTOLIC BLOOD PRESSURE: 73 MMHG

## 2024-06-18 DIAGNOSIS — N18.32 STAGE 3B CHRONIC KIDNEY DISEASE: ICD-10-CM

## 2024-06-18 DIAGNOSIS — N18.9 CHRONIC KIDNEY DISEASE-MINERAL AND BONE DISORDER: ICD-10-CM

## 2024-06-18 DIAGNOSIS — E87.5 HYPERKALEMIA: ICD-10-CM

## 2024-06-18 DIAGNOSIS — R53.83 FATIGUE, UNSPECIFIED TYPE: ICD-10-CM

## 2024-06-18 DIAGNOSIS — R06.02 SHORTNESS OF BREATH: ICD-10-CM

## 2024-06-18 DIAGNOSIS — E83.9 CHRONIC KIDNEY DISEASE-MINERAL AND BONE DISORDER: ICD-10-CM

## 2024-06-18 DIAGNOSIS — M32.9 SYSTEMIC LUPUS ERYTHEMATOSUS, UNSPECIFIED SLE TYPE, UNSPECIFIED ORGAN INVOLVEMENT STATUS: Primary | ICD-10-CM

## 2024-06-18 DIAGNOSIS — N17.9 AKI (ACUTE KIDNEY INJURY): Primary | ICD-10-CM

## 2024-06-18 DIAGNOSIS — M89.9 CHRONIC KIDNEY DISEASE-MINERAL AND BONE DISORDER: ICD-10-CM

## 2024-06-18 PROCEDURE — 3075F SYST BP GE 130 - 139MM HG: CPT | Mod: CPTII,S$GLB,, | Performed by: INTERNAL MEDICINE

## 2024-06-18 PROCEDURE — 99999 PR PBB SHADOW E&M-EST. PATIENT-LVL IV: CPT | Mod: PBBFAC,,, | Performed by: INTERNAL MEDICINE

## 2024-06-18 PROCEDURE — 3066F NEPHROPATHY DOC TX: CPT | Mod: CPTII,S$GLB,, | Performed by: INTERNAL MEDICINE

## 2024-06-18 PROCEDURE — 3078F DIAST BP <80 MM HG: CPT | Mod: CPTII,S$GLB,, | Performed by: INTERNAL MEDICINE

## 2024-06-18 PROCEDURE — 3008F BODY MASS INDEX DOCD: CPT | Mod: CPTII,S$GLB,, | Performed by: INTERNAL MEDICINE

## 2024-06-18 PROCEDURE — 4010F ACE/ARB THERAPY RXD/TAKEN: CPT | Mod: CPTII,S$GLB,, | Performed by: INTERNAL MEDICINE

## 2024-06-18 PROCEDURE — 1159F MED LIST DOCD IN RCRD: CPT | Mod: CPTII,S$GLB,, | Performed by: INTERNAL MEDICINE

## 2024-06-18 PROCEDURE — 99215 OFFICE O/P EST HI 40 MIN: CPT | Mod: S$GLB,,, | Performed by: INTERNAL MEDICINE

## 2024-06-18 PROCEDURE — 1160F RVW MEDS BY RX/DR IN RCRD: CPT | Mod: CPTII,S$GLB,, | Performed by: INTERNAL MEDICINE

## 2024-06-18 PROCEDURE — 3060F POS MICROALBUMINURIA REV: CPT | Mod: CPTII,S$GLB,, | Performed by: INTERNAL MEDICINE

## 2024-06-18 NOTE — Clinical Note
Patient creatinine has increased and potassium is slight elevated 5.5.  I told her I would let you know in case her medications need to be adjusted.  She says she was started on spironolactone last visit.
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
## 2024-06-18 NOTE — PROGRESS NOTES
Renal function suggestive of EVERT with hyperkalemia now from laboratory studies obtained on 6/10 per Hepatology. Called patient and plan to repeat laboratory studies tomorrow. If EVERT and hyperkalemia persists will have to stop Aldactone.

## 2024-06-19 ENCOUNTER — HOSPITAL ENCOUNTER (OUTPATIENT)
Dept: RADIOLOGY | Facility: HOSPITAL | Age: 54
Discharge: HOME | End: 2024-06-19
Attending: INTERNAL MEDICINE
Payer: MEDICARE

## 2024-06-19 ENCOUNTER — HOSPITAL ENCOUNTER (OUTPATIENT)
Dept: RADIOLOGY | Facility: HOSPITAL | Age: 54
Discharge: HOME | End: 2024-06-19
Attending: STUDENT IN AN ORGANIZED HEALTH CARE EDUCATION/TRAINING PROGRAM
Payer: MEDICARE

## 2024-06-19 DIAGNOSIS — E83.9 CHRONIC KIDNEY DISEASE-MINERAL AND BONE DISORDER: ICD-10-CM

## 2024-06-19 DIAGNOSIS — N18.32 STAGE 3B CHRONIC KIDNEY DISEASE: ICD-10-CM

## 2024-06-19 DIAGNOSIS — R06.02 SHORTNESS OF BREATH: ICD-10-CM

## 2024-06-19 DIAGNOSIS — N18.9 CHRONIC KIDNEY DISEASE-MINERAL AND BONE DISORDER: ICD-10-CM

## 2024-06-19 DIAGNOSIS — M89.9 CHRONIC KIDNEY DISEASE-MINERAL AND BONE DISORDER: ICD-10-CM

## 2024-06-19 DIAGNOSIS — M32.9 SYSTEMIC LUPUS ERYTHEMATOSUS, UNSPECIFIED SLE TYPE, UNSPECIFIED ORGAN INVOLVEMENT STATUS: ICD-10-CM

## 2024-06-19 DIAGNOSIS — R53.83 FATIGUE, UNSPECIFIED TYPE: ICD-10-CM

## 2024-06-19 PROCEDURE — 71046 X-RAY EXAM CHEST 2 VIEWS: CPT | Mod: 26,,, | Performed by: RADIOLOGY

## 2024-06-19 PROCEDURE — 76770 US EXAM ABDO BACK WALL COMP: CPT | Mod: 26,,, | Performed by: RADIOLOGY

## 2024-06-19 PROCEDURE — 76770 US EXAM ABDO BACK WALL COMP: CPT | Mod: TC

## 2024-06-19 PROCEDURE — 71046 X-RAY EXAM CHEST 2 VIEWS: CPT | Mod: TC,FY

## 2024-06-20 ENCOUNTER — TELEPHONE (OUTPATIENT)
Dept: NEPHROLOGY | Facility: HOSPITAL | Age: 54
End: 2024-06-20
Payer: MEDICARE

## 2024-06-20 ENCOUNTER — PATIENT MESSAGE (OUTPATIENT)
Dept: NEPHROLOGY | Facility: CLINIC | Age: 54
End: 2024-06-20
Payer: MEDICARE

## 2024-06-20 DIAGNOSIS — E87.5 HYPERKALEMIA: Primary | ICD-10-CM

## 2024-06-20 DIAGNOSIS — N17.9 AKI (ACUTE KIDNEY INJURY): ICD-10-CM

## 2024-06-20 DIAGNOSIS — D84.9 IMMUNOSUPPRESSION: ICD-10-CM

## 2024-06-20 DIAGNOSIS — I10 ESSENTIAL HYPERTENSION: ICD-10-CM

## 2024-06-20 DIAGNOSIS — N18.32 STAGE 3B CHRONIC KIDNEY DISEASE: ICD-10-CM

## 2024-06-20 DIAGNOSIS — Z94.4 LIVER TRANSPLANTED: ICD-10-CM

## 2024-06-20 RX ORDER — TACROLIMUS 1 MG/1
CAPSULE ORAL
Qty: 150 CAPSULE | Refills: 11 | Status: SHIPPED | OUTPATIENT
Start: 2024-06-20

## 2024-06-20 NOTE — TELEPHONE ENCOUNTER
Contacted Ms Dumont at phone number ([phone]) provided in chart regarding most recent studies. Renal US without evidence of obstruction although small right simple renal systs noted. She checks her blood pressures once daily. She estimates systolic blood pressure readings ranging from 150-130s mmHg and diastolic readings ranging from 80-70s mmHg. She reports not taking any NSAIDs including previously prescribed Mobic. UA showed trace protein (UPCR ~0.09), +1 leukocytes (2 WBCs/hpf) and occasional bacteria. Her renal function remains stable with creatinine of 2 which is elevated from baseline however potassium improved to upper limit normal with value of 5.1. She is currently taking verapamil 120 mg BID, Imdur 30 mg BID, losartan 100 mg daily and Aldactone 25 mg daily. At this time will stop Aldactone given EVERT and hyperkalemia although renin-aldosterone ratio 97.2. She [facility] [employer], she will check her blood pressures daily and keep log to bring to next clinic visit, she will hold spironolactone 25 mg daily until clinic follow-up and will obtain repeat BMP in roughly 10 days to assess electrolytes and renal function.

## 2024-06-20 NOTE — TELEPHONE ENCOUNTER
Attempted to reach patient regarding most recent laboratory studies and US results. No answer so voicemail ([phone]) left prompting return call. Will attempt later this afternoon.

## 2024-06-21 ENCOUNTER — PATIENT MESSAGE (OUTPATIENT)
Dept: RHEUMATOLOGY | Facility: CLINIC | Age: 54
End: 2024-06-21
Payer: MEDICARE

## 2024-06-23 ENCOUNTER — PATIENT MESSAGE (OUTPATIENT)
Dept: RHEUMATOLOGY | Facility: CLINIC | Age: 54
End: 2024-06-23
Payer: MEDICARE

## 2024-06-23 ENCOUNTER — PATIENT MESSAGE (OUTPATIENT)
Dept: NEPHROLOGY | Facility: CLINIC | Age: 54
End: 2024-06-23
Payer: MEDICARE

## 2024-06-25 ENCOUNTER — OFFICE VISIT (OUTPATIENT)
Dept: ENDOCRINOLOGY | Facility: CLINIC | Age: 54
End: 2024-06-25
Payer: MEDICARE

## 2024-06-25 ENCOUNTER — PATIENT MESSAGE (OUTPATIENT)
Dept: RHEUMATOLOGY | Facility: CLINIC | Age: 54
End: 2024-06-25
Payer: MEDICARE

## 2024-06-25 ENCOUNTER — TELEPHONE (OUTPATIENT)
Dept: NEPHROLOGY | Facility: CLINIC | Age: 54
End: 2024-06-25
Payer: MEDICARE

## 2024-06-25 ENCOUNTER — PATIENT MESSAGE (OUTPATIENT)
Dept: ENDOCRINOLOGY | Facility: CLINIC | Age: 54
End: 2024-06-25

## 2024-06-25 DIAGNOSIS — Z79.4 TYPE 2 DIABETES MELLITUS WITH STAGE 3B CHRONIC KIDNEY DISEASE, WITH LONG-TERM CURRENT USE OF INSULIN: ICD-10-CM

## 2024-06-25 DIAGNOSIS — E11.22 TYPE 2 DIABETES MELLITUS WITH STAGE 3B CHRONIC KIDNEY DISEASE, WITH LONG-TERM CURRENT USE OF INSULIN: ICD-10-CM

## 2024-06-25 DIAGNOSIS — E21.3 HYPERPARATHYROIDISM: ICD-10-CM

## 2024-06-25 DIAGNOSIS — N18.32 TYPE 2 DIABETES MELLITUS WITH STAGE 3B CHRONIC KIDNEY DISEASE, WITH LONG-TERM CURRENT USE OF INSULIN: ICD-10-CM

## 2024-06-25 DIAGNOSIS — M81.0 OSTEOPOROSIS, UNSPECIFIED OSTEOPOROSIS TYPE, UNSPECIFIED PATHOLOGICAL FRACTURE PRESENCE: Primary | ICD-10-CM

## 2024-06-25 PROCEDURE — 3060F POS MICROALBUMINURIA REV: CPT | Mod: CPTII,95,, | Performed by: INTERNAL MEDICINE

## 2024-06-25 PROCEDURE — 1159F MED LIST DOCD IN RCRD: CPT | Mod: CPTII,95,, | Performed by: INTERNAL MEDICINE

## 2024-06-25 PROCEDURE — G2211 COMPLEX E/M VISIT ADD ON: HCPCS | Mod: 95,,, | Performed by: INTERNAL MEDICINE

## 2024-06-25 PROCEDURE — 3066F NEPHROPATHY DOC TX: CPT | Mod: CPTII,95,, | Performed by: INTERNAL MEDICINE

## 2024-06-25 PROCEDURE — 99214 OFFICE O/P EST MOD 30 MIN: CPT | Mod: 95,,, | Performed by: INTERNAL MEDICINE

## 2024-06-25 PROCEDURE — 4010F ACE/ARB THERAPY RXD/TAKEN: CPT | Mod: CPTII,95,, | Performed by: INTERNAL MEDICINE

## 2024-06-25 RX ORDER — LANOLIN ALCOHOL/MO/W.PET/CERES
400 CREAM (GRAM) TOPICAL 2 TIMES DAILY
COMMUNITY

## 2024-06-25 RX ORDER — INSULIN GLARGINE 100 [IU]/ML
4 INJECTION, SOLUTION SUBCUTANEOUS NIGHTLY
Qty: 15 ML | Refills: 3 | Status: SHIPPED | OUTPATIENT
Start: 2024-06-25 | End: 2025-06-25

## 2024-06-25 RX ORDER — BLOOD-GLUCOSE SENSOR
1 EACH MISCELLANEOUS
Qty: 2 EACH | Refills: 11 | Status: SHIPPED | OUTPATIENT
Start: 2024-06-25 | End: 2025-06-25

## 2024-06-25 NOTE — PROGRESS NOTES
Subjective:      Patient ID: Valencia Dumont is a 54 y.o. female.    Chief Complaint:  Diabetes (Low energy. )      History of Present Illness    Ms. Dumont is a 54 year old woman with lupus, type 2 diabetes mellitus and osteoporosis with wrist fracture 9/2021, liver transplant in 2013 for autoimmune hepatitis    Interval history:   Started spironolactone but due to electrolyte abnormalities she stopped after one week.     Type 2 DM complicated by CKD stage III   Medications:   Novolog pen 4 units before two meals daily   Levemir pen 4 units at night   Trulicity 3 mg weekly - over one year ago   (Prescribed by Dr Lino her PCP)  Denies difficulties with injections/sites of administration   Hypoglycemia occurs during the night 10 PM. Low blood sugars 50 - 60s.   Last meal between 7 - 8 PM.     Last A1C at [facility] 5/2024: 7.3%    Asthma exacerbations recently   Not able to Exercise regularly due to heat and asthma     Osteoporosis risk factors: intermittent prednisone use, postmenopausal status, recent wrist fracture (slipped on wet tile floor)  High FRAX score and low t scores -3.1  Has CKD stage III  Reports using evenity for twelve months   Only therapy for consolidation is prolia given eGFR of 29 cc ml.        Denies other falls or fractures.   Has back pain, wrist pain.     Supplements:  50K once weekly   2000 IUs daily     Normal calcium levels     Review of Systems  No recent illness     Objective:   Physical Exam  There were no vitals filed for this visit.      BP Readings from Last 3 Encounters:   06/26/24 (!) 144/76   06/18/24 136/73   05/29/24 (!) 161/78     Wt Readings from Last 1 Encounters:   06/26/24 1031 58.9 kg (129 lb 13.6 oz)         There is no height or weight on file to calculate BMI.    Lab Review:   Lab Results   Component Value Date    HGBA1C 6.3 (H) 09/11/2023     Lab Results   Component Value Date    CHOL 188 01/19/2021     (H) 01/19/2021    LDLCALC 69.6 01/19/2021    TRIG 57  01/19/2021    CHOLHDL 56.9 (H) 01/19/2021     Lab Results   Component Value Date     06/19/2024    K 5.1 06/19/2024     06/19/2024    CO2 23 06/19/2024     (H) 06/19/2024    BUN 31 (H) 06/19/2024    CREATININE 2.0 (H) 06/19/2024    CALCIUM 9.7 06/19/2024    PROT 6.8 06/10/2024    ALBUMIN 3.9 06/19/2024    BILITOT 0.3 06/10/2024    ALKPHOS 77 06/10/2024    AST 12 06/10/2024    ALT 11 06/10/2024    ANIONGAP 9 06/19/2024    ESTGFRAFRICA 46 (A) 06/24/2022    EGFRNONAA 40 (A) 06/24/2022    TSH 0.980 08/18/2021      Latest Reference Range & Units 08/18/21 11:00   Vit D, 25-Hydroxy 30 - 96 ng/mL 22 (L)   (L): Data is abnormally low  4/16/2022  FINDINGS:  Lumbar spine (L1-L4):              BMD is 0.742 g/cm2, T-score is -2.8, and Z-score is -2.7.   Total hip:                                BMD is 0.564 g/cm2, T-score is -3.1, and Z-score is -2.6.   Femoral neck:                          BMD is 0.527 g/cm2, T-score is -2.9, and Z-score is -2.4.     Distal 1/3 radius:                      Not applicable   FRAX:   16% risk of a major osteoporotic fracture in the next 10 years.   5.6% risk of hip fracture in the next 10 years.    Assessment and Plan     Type 2 diabetes mellitus with stage 3 chronic kidney disease, with long-term current use of insulin  Change current regimen:   Lantus 4 units at bedtime   novolog X/4/3  Continue trulicity 3 mg weekly - denies GI side effects other than mild nausea in the morning.     Advised to restart personal sensor, have sent freestyle jacky to pharmacy   Last A1C was 7.3% in may, had more recent labs done by outside provider which she will send to me.      Osteoporosis  Risk factors: intermittent prednisone use, postmenopausal status, recent wrist fracture, High FRAX score and low t scores 3.1 and CKD stage III  Started on evenity in 8/2022, completed in 2023  No treatment since   Given eGFR would consider prolia.     PLAN:   Vitamin D, PTH and renal function panel   Cant  check CTX due to not fasting state today         The patient location is: home  The chief complaint leading to consultation is:     Visit type: audiovisual    Face to Face time with patient: 27 minutes of total time spent on the encounter, which includes face to face time and non-face to face time preparing to see the patient (eg, review of tests), Obtaining and/or reviewing separately obtained history, Documenting clinical information in the electronic or other health record, Independently interpreting results (not separately reported) and communicating results to the patient/family/caregiver, or Care coordination (not separately reported).         Each patient to whom he or she provides medical services by telemedicine is:  (1) informed of the relationship between the physician and patient and the respective role of any other health care provider with respect to management of the patient; and (2) notified that he or she may decline to receive medical services by telemedicine and may withdraw from such care at any time.    Notes:

## 2024-06-25 NOTE — ASSESSMENT & PLAN NOTE
Risk factors: intermittent prednisone use, postmenopausal status, recent wrist fracture, High FRAX score and low t scores 3.1 and CKD stage III  Started on evenity in 8/2022, completed in 2023  No treatment since   Given eGFR would consider prolia.     PLAN:   Vitamin D, PTH and renal function panel   Cant check CTX due to not fasting state today

## 2024-06-25 NOTE — PATIENT INSTRUCTIONS
Adjusting your dose:   Trulicity 3 mg weekly     Levemir 4 units at bedtime --> no longer being manufactured. Will switch this to lantus 4 units at bedtime. Have sent this to your pharmacy.     Novolog 4 units before lunch and 3 units before dinner     Keep your dinner meal around 7 PM.     Repeat DXA scan in 7/2024    Call the office or go on line 7/1 for an appointment in November.

## 2024-06-25 NOTE — ASSESSMENT & PLAN NOTE
Change current regimen:   Lantus 4 units at bedtime   novolog X/4/3  Continue trulicity 3 mg weekly - denies GI side effects other than mild nausea in the morning.     Advised to restart personal sensor, have sent freestyle jacky to pharmacy   Last A1C was 7.3% in may, had more recent labs done by outside provider which she will send to me.

## 2024-06-26 ENCOUNTER — INFUSION (OUTPATIENT)
Dept: INFUSION THERAPY | Facility: HOSPITAL | Age: 54
End: 2024-06-26
Attending: INTERNAL MEDICINE
Payer: MEDICARE

## 2024-06-26 VITALS
OXYGEN SATURATION: 96 % | WEIGHT: 129.88 LBS | BODY MASS INDEX: 19.18 KG/M2 | RESPIRATION RATE: 18 BRPM | DIASTOLIC BLOOD PRESSURE: 76 MMHG | SYSTOLIC BLOOD PRESSURE: 144 MMHG | TEMPERATURE: 98 F | HEART RATE: 89 BPM

## 2024-06-26 DIAGNOSIS — M32.9 SYSTEMIC LUPUS ERYTHEMATOSUS, UNSPECIFIED SLE TYPE, UNSPECIFIED ORGAN INVOLVEMENT STATUS: Primary | ICD-10-CM

## 2024-06-26 PROCEDURE — 96375 TX/PRO/DX INJ NEW DRUG ADDON: CPT

## 2024-06-26 PROCEDURE — 63600175 PHARM REV CODE 636 W HCPCS: Mod: JZ,JA,JG | Performed by: INTERNAL MEDICINE

## 2024-06-26 PROCEDURE — 25000003 PHARM REV CODE 250: Performed by: INTERNAL MEDICINE

## 2024-06-26 PROCEDURE — 96365 THER/PROPH/DIAG IV INF INIT: CPT

## 2024-06-26 RX ORDER — ACETAMINOPHEN 325 MG/1
650 TABLET ORAL
Status: COMPLETED | OUTPATIENT
Start: 2024-06-26 | End: 2024-06-26

## 2024-06-26 RX ORDER — DIPHENHYDRAMINE HYDROCHLORIDE 50 MG/ML
25 INJECTION INTRAMUSCULAR; INTRAVENOUS
Status: COMPLETED | OUTPATIENT
Start: 2024-06-26 | End: 2024-06-26

## 2024-06-26 RX ADMIN — DIPHENHYDRAMINE HYDROCHLORIDE 25 MG: 50 INJECTION, SOLUTION INTRAMUSCULAR; INTRAVENOUS at 10:06

## 2024-06-26 RX ADMIN — ACETAMINOPHEN 650 MG: 325 TABLET ORAL at 10:06

## 2024-06-26 RX ADMIN — BELIMUMAB 582 MG: 400 INJECTION, POWDER, LYOPHILIZED, FOR SOLUTION INTRAVENOUS at 11:06

## 2024-06-28 ENCOUNTER — PATIENT MESSAGE (OUTPATIENT)
Dept: ENDOCRINOLOGY | Facility: CLINIC | Age: 54
End: 2024-06-28
Payer: MEDICARE

## 2024-06-28 DIAGNOSIS — N18.32 TYPE 2 DIABETES MELLITUS WITH STAGE 3B CHRONIC KIDNEY DISEASE, WITH LONG-TERM CURRENT USE OF INSULIN: ICD-10-CM

## 2024-06-28 DIAGNOSIS — E11.22 TYPE 2 DIABETES MELLITUS WITH STAGE 3B CHRONIC KIDNEY DISEASE, WITH LONG-TERM CURRENT USE OF INSULIN: ICD-10-CM

## 2024-06-28 DIAGNOSIS — Z79.4 TYPE 2 DIABETES MELLITUS WITH STAGE 3B CHRONIC KIDNEY DISEASE, WITH LONG-TERM CURRENT USE OF INSULIN: ICD-10-CM

## 2024-07-01 RX ORDER — INSULIN LISPRO 100 [IU]/ML
INJECTION, SOLUTION INTRAVENOUS; SUBCUTANEOUS
Qty: 45 ML | Refills: 3 | Status: SHIPPED | OUTPATIENT
Start: 2024-07-01

## 2024-07-01 RX ORDER — PEN NEEDLE, DIABETIC 31 GX5/16"
NEEDLE, DISPOSABLE MISCELLANEOUS
Qty: 450 EACH | Refills: 3 | Status: SHIPPED | OUTPATIENT
Start: 2024-07-01

## 2024-07-08 ENCOUNTER — PATIENT MESSAGE (OUTPATIENT)
Dept: RHEUMATOLOGY | Facility: CLINIC | Age: 54
End: 2024-07-08
Payer: MEDICARE

## 2024-07-08 PROBLEM — E21.3 HYPERPARATHYROIDISM: Status: ACTIVE | Noted: 2024-07-08

## 2024-07-17 ENCOUNTER — PATIENT MESSAGE (OUTPATIENT)
Dept: RHEUMATOLOGY | Facility: CLINIC | Age: 54
End: 2024-07-17
Payer: MEDICARE

## 2024-07-17 ENCOUNTER — LAB VISIT (OUTPATIENT)
Dept: LAB | Facility: HOSPITAL | Age: 54
End: 2024-07-17
Attending: STUDENT IN AN ORGANIZED HEALTH CARE EDUCATION/TRAINING PROGRAM
Payer: MEDICARE

## 2024-07-17 DIAGNOSIS — D64.9 NORMOCYTIC ANEMIA: ICD-10-CM

## 2024-07-17 DIAGNOSIS — N18.9 CHRONIC KIDNEY DISEASE-MINERAL AND BONE DISORDER: ICD-10-CM

## 2024-07-17 DIAGNOSIS — M89.9 CHRONIC KIDNEY DISEASE-MINERAL AND BONE DISORDER: ICD-10-CM

## 2024-07-17 DIAGNOSIS — E83.9 CHRONIC KIDNEY DISEASE-MINERAL AND BONE DISORDER: ICD-10-CM

## 2024-07-17 DIAGNOSIS — N18.32 STAGE 3B CHRONIC KIDNEY DISEASE: ICD-10-CM

## 2024-07-17 LAB
25(OH)D3+25(OH)D2 SERPL-MCNC: 24 NG/ML (ref 30–96)
ALBUMIN SERPL BCP-MCNC: 3.7 G/DL (ref 3.5–5.2)
ANION GAP SERPL CALC-SCNC: 11 MMOL/L (ref 8–16)
BASOPHILS # BLD AUTO: 0.01 K/UL (ref 0–0.2)
BASOPHILS NFR BLD: 0.2 % (ref 0–1.9)
BILIRUB UR QL STRIP: NEGATIVE
BUN SERPL-MCNC: 34 MG/DL (ref 6–20)
CALCIUM SERPL-MCNC: 9.4 MG/DL (ref 8.7–10.5)
CHLORIDE SERPL-SCNC: 104 MMOL/L (ref 95–110)
CLARITY UR: CLEAR
CO2 SERPL-SCNC: 25 MMOL/L (ref 23–29)
COLOR UR: YELLOW
CREAT SERPL-MCNC: 1.8 MG/DL (ref 0.5–1.4)
DIFFERENTIAL METHOD BLD: ABNORMAL
EOSINOPHIL # BLD AUTO: 0.3 K/UL (ref 0–0.5)
EOSINOPHIL NFR BLD: 6.7 % (ref 0–8)
ERYTHROCYTE [DISTWIDTH] IN BLOOD BY AUTOMATED COUNT: 13.7 % (ref 11.5–14.5)
EST. GFR  (NO RACE VARIABLE): 33 ML/MIN/1.73 M^2
FERRITIN SERPL-MCNC: 28 NG/ML (ref 20–300)
GLUCOSE SERPL-MCNC: 137 MG/DL (ref 70–110)
GLUCOSE UR QL STRIP: NEGATIVE
HCT VFR BLD AUTO: 31.8 % (ref 37–48.5)
HGB BLD-MCNC: 11 G/DL (ref 12–16)
HGB UR QL STRIP: NEGATIVE
IMM GRANULOCYTES # BLD AUTO: 0.01 K/UL (ref 0–0.04)
IMM GRANULOCYTES NFR BLD AUTO: 0.2 % (ref 0–0.5)
IRON SERPL-MCNC: 116 UG/DL (ref 30–160)
KETONES UR QL STRIP: NEGATIVE
LEUKOCYTE ESTERASE UR QL STRIP: NEGATIVE
LYMPHOCYTES # BLD AUTO: 1.6 K/UL (ref 1–4.8)
LYMPHOCYTES NFR BLD: 31.7 % (ref 18–48)
MCH RBC QN AUTO: 28.6 PG (ref 27–31)
MCHC RBC AUTO-ENTMCNC: 34.6 G/DL (ref 32–36)
MCV RBC AUTO: 83 FL (ref 82–98)
MONOCYTES # BLD AUTO: 0.4 K/UL (ref 0.3–1)
MONOCYTES NFR BLD: 8.7 % (ref 4–15)
NEUTROPHILS # BLD AUTO: 2.6 K/UL (ref 1.8–7.7)
NEUTROPHILS NFR BLD: 52.5 % (ref 38–73)
NITRITE UR QL STRIP: NEGATIVE
NRBC BLD-RTO: 0 /100 WBC
PH UR STRIP: 6 [PH] (ref 5–8)
PHOSPHATE SERPL-MCNC: 3.7 MG/DL (ref 2.7–4.5)
PLATELET # BLD AUTO: 183 K/UL (ref 150–450)
PMV BLD AUTO: 10 FL (ref 9.2–12.9)
POTASSIUM SERPL-SCNC: 4.2 MMOL/L (ref 3.5–5.1)
PROT UR QL STRIP: ABNORMAL
PTH-INTACT SERPL-MCNC: 131.3 PG/ML (ref 9–77)
RBC # BLD AUTO: 3.84 M/UL (ref 4–5.4)
SATURATED IRON: 26 % (ref 20–50)
SODIUM SERPL-SCNC: 140 MMOL/L (ref 136–145)
SP GR UR STRIP: 1.02 (ref 1–1.03)
TOTAL IRON BINDING CAPACITY: 438 UG/DL (ref 250–450)
TRANSFERRIN SERPL-MCNC: 296 MG/DL (ref 200–375)
URN SPEC COLLECT METH UR: ABNORMAL
UROBILINOGEN UR STRIP-ACNC: NEGATIVE EU/DL
WBC # BLD AUTO: 4.95 K/UL (ref 3.9–12.7)

## 2024-07-17 PROCEDURE — 81003 URINALYSIS AUTO W/O SCOPE: CPT | Performed by: STUDENT IN AN ORGANIZED HEALTH CARE EDUCATION/TRAINING PROGRAM

## 2024-07-17 PROCEDURE — 83540 ASSAY OF IRON: CPT | Performed by: STUDENT IN AN ORGANIZED HEALTH CARE EDUCATION/TRAINING PROGRAM

## 2024-07-17 PROCEDURE — 84466 ASSAY OF TRANSFERRIN: CPT | Performed by: STUDENT IN AN ORGANIZED HEALTH CARE EDUCATION/TRAINING PROGRAM

## 2024-07-17 PROCEDURE — 80069 RENAL FUNCTION PANEL: CPT | Performed by: STUDENT IN AN ORGANIZED HEALTH CARE EDUCATION/TRAINING PROGRAM

## 2024-07-17 PROCEDURE — 83970 ASSAY OF PARATHORMONE: CPT | Performed by: STUDENT IN AN ORGANIZED HEALTH CARE EDUCATION/TRAINING PROGRAM

## 2024-07-17 PROCEDURE — 82728 ASSAY OF FERRITIN: CPT | Performed by: STUDENT IN AN ORGANIZED HEALTH CARE EDUCATION/TRAINING PROGRAM

## 2024-07-17 PROCEDURE — 36415 COLL VENOUS BLD VENIPUNCTURE: CPT | Performed by: STUDENT IN AN ORGANIZED HEALTH CARE EDUCATION/TRAINING PROGRAM

## 2024-07-17 PROCEDURE — 82306 VITAMIN D 25 HYDROXY: CPT | Performed by: STUDENT IN AN ORGANIZED HEALTH CARE EDUCATION/TRAINING PROGRAM

## 2024-07-17 PROCEDURE — 85025 COMPLETE CBC W/AUTO DIFF WBC: CPT | Performed by: STUDENT IN AN ORGANIZED HEALTH CARE EDUCATION/TRAINING PROGRAM

## 2024-07-18 ENCOUNTER — PATIENT MESSAGE (OUTPATIENT)
Dept: OTOLARYNGOLOGY | Facility: CLINIC | Age: 54
End: 2024-07-18
Payer: MEDICARE

## 2024-07-18 ENCOUNTER — PATIENT MESSAGE (OUTPATIENT)
Dept: GASTROENTEROLOGY | Facility: CLINIC | Age: 54
End: 2024-07-18
Payer: MEDICARE

## 2024-07-18 ENCOUNTER — OFFICE VISIT (OUTPATIENT)
Dept: NEPHROLOGY | Facility: CLINIC | Age: 54
End: 2024-07-18
Payer: MEDICARE

## 2024-07-18 VITALS
HEIGHT: 69 IN | WEIGHT: 135.81 LBS | BODY MASS INDEX: 20.11 KG/M2 | DIASTOLIC BLOOD PRESSURE: 74 MMHG | HEART RATE: 85 BPM | SYSTOLIC BLOOD PRESSURE: 139 MMHG | OXYGEN SATURATION: 97 %

## 2024-07-18 DIAGNOSIS — E26.9 HYPERALDOSTERONISM: ICD-10-CM

## 2024-07-18 DIAGNOSIS — D64.9 NORMOCYTIC ANEMIA: ICD-10-CM

## 2024-07-18 DIAGNOSIS — M32.9 HISTORY OF SYSTEMIC LUPUS ERYTHEMATOSUS (SLE): ICD-10-CM

## 2024-07-18 DIAGNOSIS — I15.9 SECONDARY HYPERTENSION, UNSPECIFIED: ICD-10-CM

## 2024-07-18 DIAGNOSIS — Z29.89 PROPHYLACTIC IMMUNOTHERAPY: ICD-10-CM

## 2024-07-18 DIAGNOSIS — Z94.4 LIVER REPLACED BY TRANSPLANT: ICD-10-CM

## 2024-07-18 DIAGNOSIS — M89.9 CHRONIC KIDNEY DISEASE-MINERAL AND BONE DISORDER: ICD-10-CM

## 2024-07-18 DIAGNOSIS — N18.32 STAGE 3B CHRONIC KIDNEY DISEASE: Primary | ICD-10-CM

## 2024-07-18 DIAGNOSIS — N18.9 CHRONIC KIDNEY DISEASE-MINERAL AND BONE DISORDER: ICD-10-CM

## 2024-07-18 DIAGNOSIS — I10 ESSENTIAL HYPERTENSION: ICD-10-CM

## 2024-07-18 DIAGNOSIS — E83.9 CHRONIC KIDNEY DISEASE-MINERAL AND BONE DISORDER: ICD-10-CM

## 2024-07-18 DIAGNOSIS — E55.9 VITAMIN D INSUFFICIENCY: ICD-10-CM

## 2024-07-18 PROCEDURE — 3060F POS MICROALBUMINURIA REV: CPT | Mod: CPTII,GC,S$GLB, | Performed by: STUDENT IN AN ORGANIZED HEALTH CARE EDUCATION/TRAINING PROGRAM

## 2024-07-18 PROCEDURE — 1159F MED LIST DOCD IN RCRD: CPT | Mod: CPTII,GC,S$GLB, | Performed by: STUDENT IN AN ORGANIZED HEALTH CARE EDUCATION/TRAINING PROGRAM

## 2024-07-18 PROCEDURE — 3008F BODY MASS INDEX DOCD: CPT | Mod: CPTII,GC,S$GLB, | Performed by: STUDENT IN AN ORGANIZED HEALTH CARE EDUCATION/TRAINING PROGRAM

## 2024-07-18 PROCEDURE — 4010F ACE/ARB THERAPY RXD/TAKEN: CPT | Mod: CPTII,GC,S$GLB, | Performed by: STUDENT IN AN ORGANIZED HEALTH CARE EDUCATION/TRAINING PROGRAM

## 2024-07-18 PROCEDURE — 3078F DIAST BP <80 MM HG: CPT | Mod: CPTII,GC,S$GLB, | Performed by: STUDENT IN AN ORGANIZED HEALTH CARE EDUCATION/TRAINING PROGRAM

## 2024-07-18 PROCEDURE — 99214 OFFICE O/P EST MOD 30 MIN: CPT | Mod: GC,S$GLB,, | Performed by: STUDENT IN AN ORGANIZED HEALTH CARE EDUCATION/TRAINING PROGRAM

## 2024-07-18 PROCEDURE — 99999 PR PBB SHADOW E&M-EST. PATIENT-LVL V: CPT | Mod: PBBFAC,GC,, | Performed by: STUDENT IN AN ORGANIZED HEALTH CARE EDUCATION/TRAINING PROGRAM

## 2024-07-18 PROCEDURE — 3075F SYST BP GE 130 - 139MM HG: CPT | Mod: CPTII,GC,S$GLB, | Performed by: STUDENT IN AN ORGANIZED HEALTH CARE EDUCATION/TRAINING PROGRAM

## 2024-07-18 PROCEDURE — 3066F NEPHROPATHY DOC TX: CPT | Mod: CPTII,GC,S$GLB, | Performed by: STUDENT IN AN ORGANIZED HEALTH CARE EDUCATION/TRAINING PROGRAM

## 2024-07-18 RX ORDER — ERGOCALCIFEROL 1.25 MG/1
50000 CAPSULE ORAL
Qty: 4 CAPSULE | Refills: 1 | Status: SHIPPED | OUTPATIENT
Start: 2024-07-18 | End: 2024-09-12

## 2024-07-18 RX ORDER — CHLORTHALIDONE 25 MG/1
12.5 TABLET ORAL DAILY
Qty: 15 TABLET | Refills: 11 | Status: SHIPPED | OUTPATIENT
Start: 2024-07-18 | End: 2025-07-18

## 2024-07-18 NOTE — PROGRESS NOTES
Name: Valencia Dumont  : 1970  Date of Service: 2024     Reason for visit: CKD    HPI: Ms Dumont is a 54-year-old woman with hypertension dates back to , CKD IIIb (baseline creatinine ~1.5-1.8), type 2 diabetes mellitus diagnosed back  on insulin (most recent hemoglobin A1c 6.3%) with associated neuropathy, SLE diagnosed  currently on Benlysta (belimumab) infusions roughly once a month, auto-immune hepatitis which failed Cytoxan s/p OLTx on 2013 on Prograf  (with fluconazole) & Myfortic, history of kidney stones, cervical dysplasia s/p recent LEEP complicated by bleeding on  this year, sciatica, back pain, OA, chronic pain, GERD, vitamin D deficiency, osteoporosis, HSV-2, asthma, EUFEMIA, migraines and insomnia who presents to clinic today for follow-up of her CKD. Today she reports productive cough, seasonal allergies, asthma symptoms bothersome and some chest pain associated with cough. She is doing corticosteroid and anti-histamine nasal spray with some occasional saline flushes in addition to her prescribed inhalers. She denies any gross hematuria, foamy urine, dysuria, difficulty urinating or supra-pubic pain.      #HTN; Her home blood pressures range from 140s-180s systolic. She is unable to tolerate Norvasc in the past due to headaches. Aldosterone:Renin ratio of 97.2  Meds; Losartan 100 mg daily, Verapmil 120 mg, Imdur 30 mg.  Aldactone stopped in 2024 due to EVERT and Hyperkalemia.       #CKD 3b; CKD 2/2 to diabetes, CNI use, and uncontrolled hypertension. Baseline line renal function 1.5-1.8, denies any increased fatigue, shortness or breath, edema, hematuria, foam in the urine, urinary retention or frequency.    CKD education class:   [x] Referred  [x] Attended    CKD nutrition education  [x] Referred  [x] Attended     KRT planning  [] General surgery referral for PD catheter evaluation/placement  [] Vascular surgery referral for vein mapping  [] Vascular surgery  referral for access creation      PMH:   Past Medical History:   Diagnosis Date    Abnormal Pap smear of cervix     Anemia     Arthritis     Ascites     Asthma     Chronic back pain     Cirrhosis of liver without mention of alcohol 10/18/2013    Diabetes mellitus     Esophageal varices     GERD (gastroesophageal reflux disease)     Herpes simplex virus (HSV) infection     HSV2.    Hypertension     Kidney stone     Lupus     Osteoporosis 12/2013    Prophylactic immunotherapy (transplant immunosuppression) 1/2/2014    SBP (spontaneous bacterial peritonitis)     history of        Surgical History:   Past Surgical History:   Procedure Laterality Date    BREAST BIOPSY Left 08/2020    CHOLECYSTECTOMY      COLONOSCOPY N/A 05/31/2017    Procedure: COLONOSCOPY;  Surgeon: Marky Sun MD;  Location: [address]);  Service: Endoscopy;  Laterality: N/A;  PM prep.    CONIZATION OF CERVIX USING LOOP ELECTROSURGICAL EXCISION PROCEDURE (LEEP) N/A 01/20/2023    Procedure: CONIZATION-CERVICAL-LEEP;  Surgeon: Lu Schreiber MD;  Location: [facility];  Service: OB/GYN;  Laterality: N/A;    ESOPHAGOGASTRODUODENOSCOPY      ESOPHAGOGASTRODUODENOSCOPY N/A 01/16/2019    Procedure: EGD (ESOPHAGOGASTRODUODENOSCOPY);  Surgeon: Deniz Guerra MD;  Location: [facility] ([address] [place] [address]);  Service: Endoscopy;  Laterality: N/A;  labs prior, s/p liver transplant-MS    ESOPHAGOGASTRODUODENOSCOPY N/A 09/09/2020    Procedure: EGD (ESOPHAGOGASTRODUODENOSCOPY);  Surgeon: Davion Ríos MD;  Location: [address]);  Service: Endoscopy;  Laterality: N/A;  Please order the EGD at least 2 weeks after barium esophagram has been done EGD is for dysphagia  covid test 9/6-[facility] [place] [facility]    EYE SURGERY      FUNCTIONAL ENDOSCOPIC SINUS SURGERY (FESS) USING COMPUTER-ASSISTED NAVIGATION Bilateral 02/04/2021    Procedure: FESS, USING COMPUTER-ASSISTED NAVIGATION;  Surgeon: Delores Lewis MD;  Location: [facility] OR;  Service: ENT;  Laterality:  "Bilateral;  [employer] WALDEMAR [phone] TEXTED HIM @ 2:45PM ON 1-8-2021  DISC LOADED BT TOMMY 1-  RN PREOP 1/28/2021---COVID NEGATIVE ON 2/3--CONSENT INCOMPLETE  H/P INCOMPLETE  --CBC IN AM    LIVER BIOPSY      LIVER TRANSPLANT  12/31/2013    REFRACTIVE SURGERY Bilateral 2010    Revision carpal tunnel and removal of scar formation from left wrist  11/17/2023    TUBAL LIGATION  2003    UPPER GASTROINTESTINAL ENDOSCOPY         Allergies:   Review of patient's allergies indicates:   Allergen Reactions    Norvasc [amlodipine]      Severe headache    Bactrim [sulfamethoxazole-trimethoprim]      Gets yeast infection.    Doxycycline Rash    Pcn [penicillins] Rash       Medications:     Current Outpatient Medications:     acetaminophen (TYLENOL) 650 MG TbSR, Take 1 tablet (650 mg total) by mouth every 6 (six) hours., Disp: 60 tablet, Rfl: 0    amitriptyline (ELAVIL) 10 MG tablet, Take 1 tablet (10 mg total) by mouth every evening., Disp: 90 tablet, Rfl: 2    azelastine (ASTELIN) 137 mcg (0.1 %) nasal spray, 1 spray (137 mcg total) by Nasal route 2 (two) times daily., Disp: 30 mL, Rfl: 11    BD ULTRA-FINE JANESSA PEN NEEDLE 32 gauge x 5/32" Ndle, For five times daily insulin injections, Disp: 450 each, Rfl: 3    blood-glucose meter,continuous (DEXCOM G6 ) Misc, 1 each by Misc.(Non-Drug; Combo Route) route once. for 1 dose, Disp: 1 each, Rfl: 0    blood-glucose sensor (FREESTYLE CHUN 3 SENSOR) Karen, 1 Device by Misc.(Non-Drug; Combo Route) route every 14 (fourteen) days., Disp: 2 each, Rfl: 11    blood-glucose transmitter (DEXCOM G6 TRANSMITTER) Karen, 1 each by Misc.(Non-Drug; Combo Route) route once a week, Disp: 1 Device, Rfl: 3    ciclopirox (PENLAC) 8 % Soln, Apply topically nightly., Disp: 6.6 mL, Rfl: 3    clotrimazole-betamethasone 1-0.05% (LOTRISONE) cream, APPLY TOPICALLY TO THE AFFECTED AREA TWICE DAILY FOR 10 DAYS, Disp: , Rfl:     diazepam (VALIUM) 5 MG tablet, Take 5 mg by mouth 3 (three) times " daily as needed. , Disp: , Rfl: 0    diclofenac sodium (VOLTAREN) 1 % Gel, APPLY 2 GRAM to wrist and 4 g to left foot TOPICAL ROUTE 4 TIMES PER DAY, Disp: 450 each, Rfl: 2    dicyclomine (BENTYL) 10 MG capsule, TAKE 1 CAPSULE BY MOUTH EVERY 8 HOURS AS NEEDED for spasms, Disp: , Rfl:     DILTIAZEM HCL (DILTIAZEM 2% CREAM), Apply topically 3 (three) times daily. Apply topically to anal area., Disp: 30 g, Rfl: 0    diphenoxylate-atropine 2.5-0.025 mg (LOMOTIL) 2.5-0.025 mg per tablet, Take 1 tablet by mouth 4 (four) times daily as needed., Disp: , Rfl:     erythromycin with ethanol (EMGEL) 2 % gel, ADD 30GM (1 TUBE) TO THE SOAKING DEVICE AND ALLOW WATER TO AGITATE. PLACE AFFECTED AREAS INTO WATER AND SOAK FOR 10 MINUTES 1-2 TIMES DAILY, Disp: , Rfl: 1    estradioL (ESTRACE) 0.01 % (0.1 mg/gram) vaginal cream, Place 1 g vaginally 3 (three) times a week., Disp: 42 g, Rfl: 3    famotidine (PEPCID) 40 MG tablet, , Disp: , Rfl:     ferrous sulfate (FEOSOL) 325 mg (65 mg iron) Tab tablet, Take 1 tablet (325 mg total) by mouth every 12 (twelve) hours., Disp: 60 tablet, Rfl: 4    fluconazole (DIFLUCAN) 150 MG Tab, TAKE ONE TABLET BY MOUTH once for ONE dose, Disp: 1 tablet, Rfl: 0    fluocinolone (SYNALAR) 0.01 % external solution, APPLY a couple of DROPS INTO BOTH EARS TWICE DAILY AS NEEDED FOR ITCHING, Disp: 60 mL, Rfl: 1    fluticasone propionate (FLONASE) 50 mcg/actuation nasal spray, 1 spray by Each Nostril route 2 (two) times daily., Disp: , Rfl:     fluticasone propionate (FLONASE) 50 mcg/actuation nasal spray, 2 sprays (100 mcg total) by Each Nostril route 2 (two) times daily., Disp: 18.2 mL, Rfl: 11    gabapentin (NEURONTIN) 300 MG capsule, TAKE 1 CAPSULE BY MOUTH THREE TIMES DAILY, Disp: , Rfl:     gentamicin (GARAMYCIN) 0.1 % cream, ADD 30GM (1 TUBE) TO THE SOAKING DEVICE AND ALLOW WATER TO AGITATE. PLACE AFFECTED AREAS INTO WATER AND SOAK FOR 10 MINUTES 1-2 TIMES DAILY, Disp: , Rfl: 1    hydrocortisone (ANUSOL-HC)  2.5 % rectal cream, Place rectally 2 (two) times daily. Apply per rectum bid, Disp: 30 g, Rfl: 3    hydrOXYzine HCl (ATARAX) 10 MG Tab, TAKE 1 OR 2 TABLETS BY MOUTH THREE TIMES DAILY AS NEEDED FOR ITCHING., Disp: , Rfl: 0    insulin glargine U-100, Lantus, (LANTUS SOLOSTAR U-100 INSULIN) 100 unit/mL (3 mL) InPn pen, Inject 4 Units into the skin every evening., Disp: 15 mL, Rfl: 3    insulin lispro (HUMALOG KWIKPEN INSULIN) 100 unit/mL pen, 3 units before BREAKFAST and lunch and FIVE units before dinner with sliding scale, up to 25 units daily, Disp: 45 mL, Rfl: 3    isosorbide mononitrate (IMDUR) 30 MG 24 hr tablet, Take 1 tablet (30 mg total) by mouth 2 (two) times a day., Disp: 180 tablet, Rfl: 3    ketoconazole (NIZORAL) 2 % cream, ADD 30GM (1/2 TUBE) TO THE SOAKING DEVICE AND ALLOW WATER TO AGITATE. PLACE AFFECTED AREAS INTO WATER AND SOAK FOR 10 MINUTES 1-2 TIMES BERNDON, Disp: , Rfl: 1    Lactobac. rhamnosus GG-inulin 10 billion cell -200 mg Cap, Take 1 capsule by mouth 2 (two) times daily., Disp: 30 capsule, Rfl: 0    levocetirizine (XYZAL) 5 MG tablet, Take 5 mg by mouth every evening., Disp: , Rfl: 3    LIDOcaine-prilocaine (EMLA) cream, 2 (two) times daily. Apply to affected area, Disp: , Rfl:     losartan (COZAAR) 100 MG tablet, Take 1 tablet (100 mg total) by mouth once daily., Disp: 30 tablet, Rfl: 2    magnesium oxide (MAG-OX) 400 mg (241.3 mg magnesium) tablet, Take 400 mg by mouth 2 (two) times daily., Disp: , Rfl:     meclizine (ANTIVERT) 25 mg tablet, TAKE ONE TABLET BY MOUTH AT BEDTIME AS NEEDED for dizziness., Disp: , Rfl: 0    mometasone 0.1% (ELOCON) 0.1 % cream, APPLY TOPICALLY TO THE FACE TWICE DAILY FOR ONE WEEK, Disp: , Rfl:     MULTIVIT,THER IRON,CA,FA & MIN (MULTIVITAMIN) Tab, Take 1 tablet by mouth once daily., Disp: 30 tablet, Rfl: 0    mycophenolate (MYFORTIC) 180 MG TbEC, Take 2 tablets (360 mg total) by mouth 2 (two) times daily., Disp: 120 tablet, Rfl: 11     neomycin-polymyxin-hydrocortisone (CORTISPORIN) otic solution, INSTILL TWO DROPS INTO BOTH EARS FOUR TIMES DAILY FOR 10 DAYS, Disp: , Rfl: 0    NURTEC 75 mg odt, PLACE ONE TABLET on tongue, alternatively UNDER THE TONGUE ONCE DAILY AS NEEDED FOR MIGRAINE, Disp: 8 tablet, Rfl: 2    nystatin (MYCOSTATIN) 100,000 unit/mL suspension, SWISH AND SPIT FIVE MILLILITERS BY MOUTH FOUR TIMES DAILY FOR 7 DAYS., Disp: , Rfl: 1    ondansetron (ZOFRAN) 4 MG tablet, TAKE TWO TABLETS BY MOUTH EVERY 8 HOURS AS NEEDED FOR NAUSEA., Disp: , Rfl:     oxyCODONE (ROXICODONE) 5 MG immediate release tablet, Take 1 tablet (5 mg total) by mouth every 4 (four) hours as needed for Pain., Disp: 5 tablet, Rfl: 0    oxycodone-acetaminophen (PERCOCET) 7.5-325 mg per tablet, Take 1 tablet by mouth every 4 (four) hours as needed for Pain., Disp: , Rfl:     polyethylene glycol (GLYCOLAX) 17 gram/dose powder, Mix 1 capful (17 g) with liquid and by mouth 2 (two) times daily., Disp: 1530 g, Rfl: 11    PROAIR HFA 90 mcg/actuation inhaler, Inhale 2 puffs into the lungs 3 (three) times daily as needed. , Disp: , Rfl: 3    promethazine-dextromethorphan (PROMETHAZINE-DM) 6.25-15 mg/5 mL Syrp, Take by mouth 2 (two) times daily as needed. , Disp: , Rfl: 0    rosuvastatin (CRESTOR) 5 MG tablet, Take 5 mg by mouth once daily., Disp: , Rfl:     SENNA 8.6 mg tablet, Take 1 tablet by mouth 2 (two) times daily. Take while taking narcotics, Disp: 30 tablet, Rfl: 0    sertraline (ZOLOFT) 50 MG tablet, Take 50 mg by mouth once daily., Disp: , Rfl: 11    SSD 1 % cream, 1 application 2 (two) times daily. Apply to affected area, Disp: , Rfl: 0    tacrolimus (PROGRAF) 1 MG Cap, Take 3 capsules (3 mg total) by mouth every morning AND 2 capsules (2 mg total) every evening., Disp: 150 capsule, Rfl: 11    tinidazole (TINDAMAX) 250 MG tablet, SMARTSI Tablet(s) By Mouth Every Morning, Disp: , Rfl:     topiramate (TOPAMAX) 50 MG tablet, SMARTSI Tablet(s) By Mouth Every  Evening, Disp: , Rfl:     triamcinolone acetonide 0.1% (KENALOG) 0.1 % cream, Apply topically 2 times daily, Disp: 30 g, Rfl: 0    valACYclovir (VALTREX) 1000 MG tablet, Take 1,000 mg by mouth once daily., Disp: , Rfl:     verapamil (CALAN-SR) 120 MG CR tablet, TAKE ONE TABLET ONCE DAILY FOR BLOOD PRESSURE, Disp: , Rfl: 3    zolpidem (AMBIEN) 10 mg Tab, Take 10 mg by mouth nightly as needed., Disp: , Rfl: 3    budesonide-formoterol 160-4.5 mcg (SYMBICORT) 160-4.5 mcg/actuation HFAA, Inhale 2 puffs into the lungs., Disp: , Rfl:     buPROPion (WELLBUTRIN XL) 150 MG TB24 tablet, Take 300 mg by mouth., Disp: , Rfl:     chlorthalidone (HYGROTEN) 25 MG Tab, Take 0.5 tablets (12.5 mg total) by mouth once daily., Disp: 15 tablet, Rfl: 11    ergocalciferol (ERGOCALCIFEROL) 50,000 unit Cap, Take 1 capsule (50,000 Units total) by mouth every 7 days., Disp: 4 capsule, Rfl: 1    loratadine (CLARITIN) 10 mg tablet, Take 1 tablet (10 mg total) by mouth once daily., Disp: 30 tablet, Rfl: 0    nystatin-triamcinolone (MYCOLOG II) cream, Apply to affected area 2 times daily, Disp: 30 g, Rfl: 1    pantoprazole (PROTONIX) 40 MG tablet, Take 1 tablet (40 mg total) by mouth 2 (two) times daily. Take immediately in am when wake up and then 30 minutes prior to dinner, Disp: 60 tablet, Rfl: 11    Family History:   Family History   Problem Relation Name Age of Onset    Hypertension Mother      Cataracts Mother      Diabetes Father      Alzheimer's disease Father      Diabetes Paternal Grandfather      Diabetes Paternal Grandmother      No Known Problems Maternal Grandmother      Bone cancer Maternal Grandfather      Breast cancer Maternal Aunt  55    Hypertension Brother      Diabetes Brother      No Known Problems Sister      No Known Problems Maternal Uncle      No Known Problems Paternal Aunt      No Known Problems Paternal Uncle      Stroke Neg Hx      Cancer Neg Hx      Colon cancer Neg Hx      Esophageal cancer Neg Hx      Stomach  cancer Neg Hx      Rectal cancer Neg Hx      Amblyopia Neg Hx      Blindness Neg Hx      Glaucoma Neg Hx      Macular degeneration Neg Hx      Retinal detachment Neg Hx      Strabismus Neg Hx      Thyroid disease Neg Hx         Social History:   Social History     Socioeconomic History    Marital status:     Number of children: 3   Occupational History     Employer: [employer]   Tobacco Use    Smoking status: Never    Smokeless tobacco: Never    Tobacco comments:     disability; ; 3 children   Substance and Sexual Activity    Alcohol use: Yes     Alcohol/week: 1.0 standard drink of alcohol     Types: 1 Glasses of wine per week     Comment: occasionally     Drug use: No    Sexual activity: Yes     Partners: Male     Birth control/protection: See Surgical Hx, Post-menopausal     Comment: s/p tubal ligation,  since 1989     Social Determinants of Health     Financial Resource Strain: Low Risk  (1/26/2024)    Overall Financial Resource Strain (CARDIA)     Difficulty of Paying Living Expenses: Not hard at all   Food Insecurity: Food Insecurity Present (1/26/2024)    Hunger Vital Sign     Worried About Running Out of Food in the Last Year: Sometimes true     Ran Out of Food in the Last Year: Never true   Transportation Needs: No Transportation Needs (1/26/2024)    PRAPARE - Transportation     Lack of Transportation (Medical): No     Lack of Transportation (Non-Medical): No   Physical Activity: Insufficiently Active (1/26/2024)    Exercise Vital Sign     Days of Exercise per Week: 3 days     Minutes of Exercise per Session: 30 min   Stress: Stress Concern Present (1/26/2024)    [employer] [place] [employer] - Occupational Stress Questionnaire     Feeling of Stress : To some extent   Housing Stability: High Risk (1/26/2024)    Housing Stability Vital Sign     Unable to Pay for Housing in the Last Year: Yes     Number of Places Lived in the Last Year: 0     Unstable Housing in the Last  "Year: Yes     Review of Systems   Constitutional:  Positive for malaise/fatigue. Negative for chills, diaphoresis, fever and weight loss.   HENT:  Positive for hearing loss. Negative for congestion, ear pain, sore throat and tinnitus.    Eyes:  Negative for blurred vision, pain and redness.   Respiratory:  Negative for cough, sputum production, shortness of breath and wheezing.    Cardiovascular:  Negative for chest pain, palpitations, leg swelling and PND.   Gastrointestinal:  Positive for constipation. Negative for abdominal pain, blood in stool, diarrhea, heartburn, nausea and vomiting.   Genitourinary:  Positive for frequency (nocturia x4). Negative for dysuria, flank pain, hematuria and urgency.   Musculoskeletal:  Negative for back pain, joint pain, myalgias and neck pain.   Skin:  Negative for rash.   Neurological:  Negative for dizziness, sensory change, speech change, seizures, loss of consciousness and headaches.   Endo/Heme/Allergies:  Does not bruise/bleed easily.   Psychiatric/Behavioral:  Negative for depression. The patient is not nervous/anxious and does not have insomnia.        Vitals:   Vitals:    07/18/24 1315   BP: 139/74   Pulse: 85   SpO2: 97%   Weight: 61.6 kg (135 lb 12.9 oz)   Height: 5' 9" (1.753 m)           Body mass index is 20.05 kg/m².  Physical Exam  Vitals and nursing note reviewed.   Constitutional:       General: She is not in acute distress.     Appearance: Normal appearance. She is not ill-appearing or toxic-appearing.   HENT:      Head: Normocephalic and atraumatic.      Right Ear: External ear normal.      Left Ear: External ear normal.      Nose: Nose normal.      Mouth/Throat:      Mouth: Mucous membranes are moist.   Eyes:      Extraocular Movements: Extraocular movements intact.   Cardiovascular:      Rate and Rhythm: Normal rate and regular rhythm.      Pulses: Normal pulses.      Heart sounds: Normal heart sounds.   Pulmonary:      Effort: Pulmonary effort is normal.     "  Breath sounds: Normal breath sounds.   Abdominal:      General: Abdomen is flat.      Palpations: Abdomen is soft.   Musculoskeletal:         General: No swelling. Normal range of motion.      Cervical back: Normal range of motion and neck supple.      Right lower leg: No edema.      Left lower leg: No edema.   Skin:     General: Skin is warm.      Capillary Refill: Capillary refill takes less than 2 seconds.      Coloration: Skin is not jaundiced.      Findings: No bruising, lesion or rash.   Neurological:      General: No focal deficit present.      Mental Status: She is alert and oriented to person, place, and time.   Psychiatric:         Mood and Affect: Mood normal.         Behavior: Behavior normal.         Thought Content: Thought content normal.         Judgment: Judgment normal.            Assessment/Plan:   ASSESSMENT & PLAN:   Ms. Valencia Dumont is a 54 y.o. female here for follow up of CKD 3b secondary to diabetes, HTN, and CNI nephrotoxicity. Baseline creatinine is roughly 1.5-1.8.       Renal ultrasound 6/19/24;   Right kidney measures 9.4 cm with a 1.1 cm simple cyst in the upper pole (similar to ultrasound in 2022).   Left kidney measures 10.1 cm     Plan  -Continue to avoid NSAIDs  -Discussed with patient the possibility of a aldosterone secreting adrenal nodule. Patient is agreeable to adrenal CT scan.   -Refilled Ergocalciferol 50,000 since vitamin D is low.   -Starting Chlorthalidone 12.5mg for her hypertension given her hyperaldosteronism. Repeat renal function panel in 1 week.     Stage 3b chronic kidney disease  -     Renal Function Panel; Future; Expected date: 07/25/2024  -     CBC Auto Differential; Future; Expected date: 07/25/2024  -     Urinalysis; Future; Expected date: 10/18/2024    Essential hypertension  -     chlorthalidone (HYGROTEN) 25 MG Tab; Take 0.5 tablets (12.5 mg total) by mouth once daily.  Dispense: 15 tablet; Refill: 11    History of systemic lupus erythematosus  (SLE)    Chronic kidney disease-mineral and bone disorder  -     ergocalciferol (ERGOCALCIFEROL) 50,000 unit Cap; Take 1 capsule (50,000 Units total) by mouth every 7 days.  Dispense: 4 capsule; Refill: 1    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    Prophylactic immunotherapy (transplant immunosuppression)    Vitamin D insufficiency    Normocytic anemia    Secondary hypertension, unspecified    Hyperaldosteronism  -     CT Abdomen Pelvis  Without Contrast; Future; Expected date: 07/18/2024          RTC in 1 month    Discussed with Dr. Cross  - staff attestation to follow      Boubacar Charles MD  Nephrology PGY-4    [address] [place] [address]  [phone]

## 2024-07-19 ENCOUNTER — PATIENT MESSAGE (OUTPATIENT)
Dept: NEPHROLOGY | Facility: CLINIC | Age: 54
End: 2024-07-19
Payer: MEDICARE

## 2024-07-19 NOTE — PROGRESS NOTES
I have reviewed the notes, assessments, and/or procedures performed by Melvin, I concur with her/his documentation of Valencia Dumont.  Date of Service: 7/18/2024    CKD stage 3B, Cr stable. Uncontrolled HTN with Losartan 100 mg daily, Verapmil 120 mg, Imdur 30 mg, will add chlorthalidone 12.5 mg (will not give BB, aldactone given hyper K hx). Aldosterone 48, renin 0.5, ratio 97 in May, will check abd CT to look for adrenal nodule.

## 2024-07-21 ENCOUNTER — PATIENT MESSAGE (OUTPATIENT)
Dept: NEPHROLOGY | Facility: CLINIC | Age: 54
End: 2024-07-21
Payer: MEDICARE

## 2024-07-21 ENCOUNTER — PATIENT MESSAGE (OUTPATIENT)
Dept: GASTROENTEROLOGY | Facility: CLINIC | Age: 54
End: 2024-07-21
Payer: MEDICARE

## 2024-07-22 ENCOUNTER — HOSPITAL ENCOUNTER (OUTPATIENT)
Dept: RADIOLOGY | Facility: HOSPITAL | Age: 54
Discharge: HOME | End: 2024-07-22
Attending: STUDENT IN AN ORGANIZED HEALTH CARE EDUCATION/TRAINING PROGRAM
Payer: MEDICARE

## 2024-07-22 ENCOUNTER — TELEPHONE (OUTPATIENT)
Dept: GASTROENTEROLOGY | Facility: CLINIC | Age: 54
End: 2024-07-22
Payer: MEDICARE

## 2024-07-22 DIAGNOSIS — E26.9 HYPERALDOSTERONISM: ICD-10-CM

## 2024-07-22 PROCEDURE — 74176 CT ABD & PELVIS W/O CONTRAST: CPT | Mod: 26,,, | Performed by: RADIOLOGY

## 2024-07-22 PROCEDURE — 74176 CT ABD & PELVIS W/O CONTRAST: CPT | Mod: TC

## 2024-07-22 PROCEDURE — 25500020 PHARM REV CODE 255: Performed by: STUDENT IN AN ORGANIZED HEALTH CARE EDUCATION/TRAINING PROGRAM

## 2024-07-22 RX ADMIN — IOHEXOL 15 ML: 300 INJECTION, SOLUTION INTRAVENOUS at 01:07

## 2024-07-22 NOTE — TELEPHONE ENCOUNTER
Attempted to contact patient regarding appt request. Patient didn't answer and I left a detailed message to return call to our office. Portal message forward to Lynnette

## 2024-07-22 NOTE — TELEPHONE ENCOUNTER
Spoke w/pt. Pt stated someone told her Dr. Guerra were seeing patients at the [facility] on certain day's. Informed the pt that's incorrect and apologize on the behave of who gave her that information.    Pt will like for Mariajose to schedule her an appt on Dr. Guerra's fellow clinic day.   Pt added to Dr. Guerra's waiting list as well.   Informed pt I will send this message to Mariajose and she will contact her th arrange the appt   Pt verbalize understand and thank me.     ----- Message from Maricarmen Hdz sent at 7/22/2024 10:21 AM CDT -----  Regarding: missed call  Contact: [phone]  Valencia Dumont calling regarding Patient Advice (message) for # pt is returning a call from [facility] call to advise

## 2024-07-24 ENCOUNTER — INFUSION (OUTPATIENT)
Dept: INFUSION THERAPY | Facility: HOSPITAL | Age: 54
End: 2024-07-24
Attending: INTERNAL MEDICINE
Payer: MEDICARE

## 2024-07-24 ENCOUNTER — OFFICE VISIT (OUTPATIENT)
Dept: OPHTHALMOLOGY | Facility: CLINIC | Age: 54
End: 2024-07-24
Attending: OPHTHALMOLOGY
Payer: MEDICARE

## 2024-07-24 ENCOUNTER — PATIENT MESSAGE (OUTPATIENT)
Dept: HEPATOLOGY | Facility: CLINIC | Age: 54
End: 2024-07-24
Payer: MEDICARE

## 2024-07-24 VITALS
DIASTOLIC BLOOD PRESSURE: 72 MMHG | OXYGEN SATURATION: 98 % | SYSTOLIC BLOOD PRESSURE: 144 MMHG | RESPIRATION RATE: 18 BRPM | HEART RATE: 87 BPM | TEMPERATURE: 98 F

## 2024-07-24 DIAGNOSIS — M32.9 SYSTEMIC LUPUS ERYTHEMATOSUS, UNSPECIFIED SLE TYPE, UNSPECIFIED ORGAN INVOLVEMENT STATUS: ICD-10-CM

## 2024-07-24 DIAGNOSIS — M32.9 SYSTEMIC LUPUS ERYTHEMATOSUS, UNSPECIFIED SLE TYPE, UNSPECIFIED ORGAN INVOLVEMENT STATUS: Primary | ICD-10-CM

## 2024-07-24 DIAGNOSIS — H25.813 MIXED TYPE AGE-RELATED CATARACT, BOTH EYES: Primary | ICD-10-CM

## 2024-07-24 DIAGNOSIS — H35.389 TOXIC MACULOPATHY FROM PLAQUENIL IN THERAPEUTIC USE: ICD-10-CM

## 2024-07-24 DIAGNOSIS — T37.2X5A TOXIC MACULOPATHY FROM PLAQUENIL IN THERAPEUTIC USE: ICD-10-CM

## 2024-07-24 PROCEDURE — 1160F RVW MEDS BY RX/DR IN RCRD: CPT | Mod: CPTII,S$GLB,, | Performed by: OPHTHALMOLOGY

## 2024-07-24 PROCEDURE — 96375 TX/PRO/DX INJ NEW DRUG ADDON: CPT

## 2024-07-24 PROCEDURE — 1159F MED LIST DOCD IN RCRD: CPT | Mod: CPTII,S$GLB,, | Performed by: OPHTHALMOLOGY

## 2024-07-24 PROCEDURE — 99999 PR PBB SHADOW E&M-EST. PATIENT-LVL III: CPT | Mod: PBBFAC,,, | Performed by: OPHTHALMOLOGY

## 2024-07-24 PROCEDURE — 96365 THER/PROPH/DIAG IV INF INIT: CPT

## 2024-07-24 PROCEDURE — 2023F DILAT RTA XM W/O RTNOPTHY: CPT | Mod: CPTII,S$GLB,, | Performed by: OPHTHALMOLOGY

## 2024-07-24 PROCEDURE — 3060F POS MICROALBUMINURIA REV: CPT | Mod: CPTII,S$GLB,, | Performed by: OPHTHALMOLOGY

## 2024-07-24 PROCEDURE — 92250 FUNDUS PHOTOGRAPHY W/I&R: CPT | Mod: S$GLB,,, | Performed by: OPHTHALMOLOGY

## 2024-07-24 PROCEDURE — 92014 COMPRE OPH EXAM EST PT 1/>: CPT | Mod: S$GLB,,, | Performed by: OPHTHALMOLOGY

## 2024-07-24 PROCEDURE — 25000003 PHARM REV CODE 250: Performed by: INTERNAL MEDICINE

## 2024-07-24 PROCEDURE — 63600175 PHARM REV CODE 636 W HCPCS: Mod: JZ,JA,JG | Performed by: INTERNAL MEDICINE

## 2024-07-24 PROCEDURE — 4010F ACE/ARB THERAPY RXD/TAKEN: CPT | Mod: CPTII,S$GLB,, | Performed by: OPHTHALMOLOGY

## 2024-07-24 PROCEDURE — 3066F NEPHROPATHY DOC TX: CPT | Mod: CPTII,S$GLB,, | Performed by: OPHTHALMOLOGY

## 2024-07-24 RX ORDER — DULAGLUTIDE 3 MG/.5ML
3 INJECTION, SOLUTION SUBCUTANEOUS
COMMUNITY
Start: 2024-07-16

## 2024-07-24 RX ORDER — ACETAMINOPHEN 325 MG/1
650 TABLET ORAL
Status: COMPLETED | OUTPATIENT
Start: 2024-07-24 | End: 2024-07-24

## 2024-07-24 RX ORDER — DIPHENHYDRAMINE HYDROCHLORIDE 50 MG/ML
25 INJECTION INTRAMUSCULAR; INTRAVENOUS
Status: COMPLETED | OUTPATIENT
Start: 2024-07-24 | End: 2024-07-24

## 2024-07-24 RX ORDER — AZITHROMYCIN 250 MG/1
250 TABLET, FILM COATED ORAL
COMMUNITY
Start: 2024-05-23

## 2024-07-24 RX ADMIN — DIPHENHYDRAMINE HYDROCHLORIDE 25 MG: 50 INJECTION INTRAMUSCULAR; INTRAVENOUS at 11:07

## 2024-07-24 RX ADMIN — BELIMUMAB 589 MG: 400 INJECTION, POWDER, LYOPHILIZED, FOR SOLUTION INTRAVENOUS at 11:07

## 2024-07-24 RX ADMIN — ACETAMINOPHEN 650 MG: 325 TABLET ORAL at 11:07

## 2024-07-24 NOTE — PROGRESS NOTES
Subjective:       Patient ID: Valencia Dumont is a 54 y.o. 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 Systemic Lupus erythematosus, unspecified SLE type , Unspecified  organ   involvement status  2. 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 Mixed Type AR Cataract OU          Last edited by Kelvin Mansfield MD on 7/24/2024  2:56 PM.        Imaging:    See report    Assessment/Plan:     1. Systemic lupus erythematosus, unspecified SLE type, unspecified organ involvement status  On HCQ for about 10 yrs  No longer taking    - Color Fundus Photography - OU - Both Eyes    2. 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 Mixed type age-related cataract, both eyes  Excellent Va  Rec obs for now    Follow up in about 6 months (around 1/24/2025), or if symptoms worsen or fail to improve, for Dilated examination, OCT Mac, Fundus Autofluoresence.

## 2024-07-24 NOTE — PLAN OF CARE
Patient presented to unit for q4w Benlysta infusion. VSS. No new or worsening complaints voiced. Pre-medications of PO Tylenol and Benadryl IVP given. Benlysta infused over 60 minutes as ordered. Tolerated well. Next appointment confirmed. Patient discharged ambulatory and in NAD.       Problem: Fatigue  Goal: Improved Activity Tolerance  Outcome: Progressing

## 2024-07-25 ENCOUNTER — HOSPITAL ENCOUNTER (OUTPATIENT)
Dept: RADIOLOGY | Facility: CLINIC | Age: 54
Discharge: HOME | End: 2024-07-25
Attending: INTERNAL MEDICINE
Payer: MEDICARE

## 2024-07-25 DIAGNOSIS — M81.0 OSTEOPOROSIS, UNSPECIFIED OSTEOPOROSIS TYPE, UNSPECIFIED PATHOLOGICAL FRACTURE PRESENCE: ICD-10-CM

## 2024-07-25 PROCEDURE — 77080 DXA BONE DENSITY AXIAL: CPT | Mod: TC,PO

## 2024-07-29 NOTE — PROGRESS NOTES
Received a message from the patient stating that she has not received her Chlorthalidone and Ergocalciferol from the pharmacy yet. I called her Pharmacy [facility] [phone] and discussed with the pharmacist on site who confirms that she picked up both of these prescriptions last Monday on 7/22/24. Since both of these medications were filled one week ago, I did not send in a second prescription.

## 2024-08-09 ENCOUNTER — HOSPITAL ENCOUNTER (EMERGENCY)
Facility: HOSPITAL | Age: 54
Discharge: HOME | End: 2024-08-09
Attending: EMERGENCY MEDICINE
Payer: MEDICARE

## 2024-08-09 VITALS
HEART RATE: 78 BPM | BODY MASS INDEX: 20.29 KG/M2 | DIASTOLIC BLOOD PRESSURE: 78 MMHG | SYSTOLIC BLOOD PRESSURE: 152 MMHG | OXYGEN SATURATION: 99 % | WEIGHT: 137 LBS | HEIGHT: 69 IN | RESPIRATION RATE: 11 BRPM | TEMPERATURE: 98 F

## 2024-08-09 DIAGNOSIS — M54.2 NECK PAIN: Primary | ICD-10-CM

## 2024-08-09 DIAGNOSIS — R07.9 CHEST PAIN: ICD-10-CM

## 2024-08-09 LAB
ALBUMIN SERPL BCP-MCNC: 3.9 G/DL (ref 3.5–5.2)
ALP SERPL-CCNC: 118 U/L (ref 55–135)
ALT SERPL W/O P-5'-P-CCNC: 6 U/L (ref 10–44)
ANION GAP SERPL CALC-SCNC: 10 MMOL/L (ref 8–16)
AST SERPL-CCNC: 28 U/L (ref 10–40)
BASOPHILS # BLD AUTO: 0.01 K/UL (ref 0–0.2)
BASOPHILS NFR BLD: 0.1 % (ref 0–1.9)
BILIRUB SERPL-MCNC: 0.7 MG/DL (ref 0.1–1)
BILIRUB UR QL STRIP: NEGATIVE
BUN SERPL-MCNC: 20 MG/DL (ref 6–20)
CALCIUM SERPL-MCNC: 10.1 MG/DL (ref 8.7–10.5)
CHLORIDE SERPL-SCNC: 100 MMOL/L (ref 95–110)
CLARITY UR: CLEAR
CO2 SERPL-SCNC: 27 MMOL/L (ref 23–29)
COLOR UR: YELLOW
CREAT SERPL-MCNC: 1.5 MG/DL (ref 0.5–1.4)
CTP QC/QA: YES
CTP QC/QA: YES
DIFFERENTIAL METHOD BLD: ABNORMAL
EOSINOPHIL # BLD AUTO: 0.1 K/UL (ref 0–0.5)
EOSINOPHIL NFR BLD: 0.6 % (ref 0–8)
ERYTHROCYTE [DISTWIDTH] IN BLOOD BY AUTOMATED COUNT: 13.6 % (ref 11.5–14.5)
EST. GFR  (NO RACE VARIABLE): 41 ML/MIN/1.73 M^2
GLUCOSE SERPL-MCNC: 215 MG/DL (ref 70–110)
GLUCOSE UR QL STRIP: ABNORMAL
HCT VFR BLD AUTO: 31.6 % (ref 37–48.5)
HGB BLD-MCNC: 11.2 G/DL (ref 12–16)
HGB UR QL STRIP: NEGATIVE
IMM GRANULOCYTES # BLD AUTO: 0.05 K/UL (ref 0–0.04)
IMM GRANULOCYTES NFR BLD AUTO: 0.5 % (ref 0–0.5)
KETONES UR QL STRIP: NEGATIVE
LEUKOCYTE ESTERASE UR QL STRIP: NEGATIVE
LYMPHOCYTES # BLD AUTO: 1.4 K/UL (ref 1–4.8)
LYMPHOCYTES NFR BLD: 14.3 % (ref 18–48)
MCH RBC QN AUTO: 29.3 PG (ref 27–31)
MCHC RBC AUTO-ENTMCNC: 35.4 G/DL (ref 32–36)
MCV RBC AUTO: 83 FL (ref 82–98)
MONOCYTES # BLD AUTO: 0.6 K/UL (ref 0.3–1)
MONOCYTES NFR BLD: 6.5 % (ref 4–15)
NEUTROPHILS # BLD AUTO: 7.7 K/UL (ref 1.8–7.7)
NEUTROPHILS NFR BLD: 78 % (ref 38–73)
NITRITE UR QL STRIP: NEGATIVE
NRBC BLD-RTO: 0 /100 WBC
OHS QRS DURATION: 74 MS
OHS QTC CALCULATION: 444 MS
PH UR STRIP: 7 [PH] (ref 5–8)
PLATELET # BLD AUTO: 190 K/UL (ref 150–450)
PMV BLD AUTO: 9.9 FL (ref 9.2–12.9)
POC MOLECULAR INFLUENZA A AGN: NEGATIVE
POC MOLECULAR INFLUENZA B AGN: NEGATIVE
POCT GLUCOSE: 231 MG/DL (ref 70–110)
POTASSIUM SERPL-SCNC: 3.8 MMOL/L (ref 3.5–5.1)
PROT SERPL-MCNC: 7.2 G/DL (ref 6–8.4)
PROT UR QL STRIP: ABNORMAL
RBC # BLD AUTO: 3.82 M/UL (ref 4–5.4)
SARS-COV-2 RDRP RESP QL NAA+PROBE: NEGATIVE
SODIUM SERPL-SCNC: 137 MMOL/L (ref 136–145)
SP GR UR STRIP: 1.02 (ref 1–1.03)
TROPONIN I SERPL DL<=0.01 NG/ML-MCNC: <0.006 NG/ML (ref 0–0.03)
URN SPEC COLLECT METH UR: ABNORMAL
UROBILINOGEN UR STRIP-ACNC: NEGATIVE EU/DL
WBC # BLD AUTO: 9.89 K/UL (ref 3.9–12.7)

## 2024-08-09 PROCEDURE — 25500020 PHARM REV CODE 255: Performed by: EMERGENCY MEDICINE

## 2024-08-09 PROCEDURE — 96374 THER/PROPH/DIAG INJ IV PUSH: CPT | Mod: 59

## 2024-08-09 PROCEDURE — 87635 SARS-COV-2 COVID-19 AMP PRB: CPT | Performed by: EMERGENCY MEDICINE

## 2024-08-09 PROCEDURE — 96361 HYDRATE IV INFUSION ADD-ON: CPT

## 2024-08-09 PROCEDURE — 85025 COMPLETE CBC W/AUTO DIFF WBC: CPT | Performed by: NURSE PRACTITIONER

## 2024-08-09 PROCEDURE — 82962 GLUCOSE BLOOD TEST: CPT

## 2024-08-09 PROCEDURE — 99285 EMERGENCY DEPT VISIT HI MDM: CPT | Mod: 25

## 2024-08-09 PROCEDURE — 80053 COMPREHEN METABOLIC PANEL: CPT | Performed by: NURSE PRACTITIONER

## 2024-08-09 PROCEDURE — 63600175 PHARM REV CODE 636 W HCPCS: Performed by: EMERGENCY MEDICINE

## 2024-08-09 PROCEDURE — 87502 INFLUENZA DNA AMP PROBE: CPT

## 2024-08-09 PROCEDURE — 84484 ASSAY OF TROPONIN QUANT: CPT | Performed by: NURSE PRACTITIONER

## 2024-08-09 PROCEDURE — 93005 ELECTROCARDIOGRAM TRACING: CPT

## 2024-08-09 PROCEDURE — 81003 URINALYSIS AUTO W/O SCOPE: CPT | Performed by: NURSE PRACTITIONER

## 2024-08-09 PROCEDURE — 96375 TX/PRO/DX INJ NEW DRUG ADDON: CPT

## 2024-08-09 PROCEDURE — 25000003 PHARM REV CODE 250: Performed by: EMERGENCY MEDICINE

## 2024-08-09 PROCEDURE — 93010 ELECTROCARDIOGRAM REPORT: CPT | Mod: ,,, | Performed by: INTERNAL MEDICINE

## 2024-08-09 RX ORDER — MORPHINE SULFATE 4 MG/ML
6 INJECTION, SOLUTION INTRAMUSCULAR; INTRAVENOUS ONCE
Status: COMPLETED | OUTPATIENT
Start: 2024-08-09 | End: 2024-08-09

## 2024-08-09 RX ORDER — ONDANSETRON HYDROCHLORIDE 2 MG/ML
4 INJECTION, SOLUTION INTRAVENOUS
Status: COMPLETED | OUTPATIENT
Start: 2024-08-09 | End: 2024-08-09

## 2024-08-09 RX ADMIN — SODIUM CHLORIDE 1000 ML: 9 INJECTION, SOLUTION INTRAVENOUS at 02:08

## 2024-08-09 RX ADMIN — ONDANSETRON 4 MG: 2 INJECTION INTRAMUSCULAR; INTRAVENOUS at 01:08

## 2024-08-09 RX ADMIN — MORPHINE SULFATE 6 MG: 4 INJECTION, SOLUTION INTRAMUSCULAR; INTRAVENOUS at 01:08

## 2024-08-09 RX ADMIN — IOHEXOL 75 ML: 350 INJECTION, SOLUTION INTRAVENOUS at 02:08

## 2024-08-09 NOTE — ED PROVIDER NOTES
EM PHYSICIAN NOTE       This patient presents with a complaint of   Chief Complaint   Patient presents with    Chest Pain    Hypertension     Pt c/o non radiating mid sternal chest pain accompanied by hypertension post neck surgery on 08/07/24. Pt states she is experiencing pain upon swallowing. Pt denies vomiting/diarrhea       Source of HPI & ROS: patient    HPI:  This is a 54-year-old woman with a history of lupus, hypertension and diabetes and CKD who was 2 days postop status post fusion of cervical spine vertebra.  Patient was asymptomatic prior to the surgery but developed intermittent sharp, stabbing mid-sternal chest pain lasting a few seconds and not related to exertion.  In addition she reports pain with swallowing and a sore throat and poor appetite that started yesterday.  Symptoms have not improved with her home medication of oxycodone 7.5.  She also noted that her blood pressure has been elevated.  Last stool was 2 days ago has been passing gas.  No fevers or chills.  No dysuria.  She is postmenopausal.  She is status post a remote liver transplant.      Review of patient's allergies indicates:   Allergen Reactions    Norvasc [amlodipine]      Severe headache    Bactrim [sulfamethoxazole-trimethoprim]      Gets yeast infection.    Doxycycline Rash    Pcn [penicillins] Rash           Pertinent REVIEW of SYSTEMS    GENERAL/CONSTITUTIONAL: There is not a report of fever   CARDIOVASCULAR: There is a report of chest pain   RESPIRATORY: There is a report of shortness of breath which occurs when she is feeling the muscle spasms in her chest  GASTROINTESTINAL: There is not a report of  vomiting, diarrhea  HEMATOLOGIC/LYMPHATIC: There is not a report of anticoagulant/antithrombotic use.       The nurse's notes and triage vital signs were reviewed.    PHYSICAL EXAMINATION    ED Triage Vitals [08/09/24 1008]   Enc Vitals Group      BP (!) 174/75      Pulse 96      Resp 19      Temp 98.3 °F (36.8 °C)       "Temp Source Oral      SpO2 96 %      Weight 137 lb      Height 5' 9"      Head Circumference       Peak Flow       Pain Score       Pain Loc       Pain Education       Exclude from Growth Chart      Vital signs and Pulse Ox reviewed in clinical context. Abnormalities noted:  Blood pressure is elevated  Body mass index is 20.23 kg/m².  Pt's level of consciousness is Awake and Alert, and the patient is in moderate distress.  Skin: warm, pink and dry.  Capillary refill is less than 2 seconds.  Mucosa: normal  Head and Neck: no JVD, patient has a neck brace which she has been wearing since the surgery.  The anterior surgical wound does not appear inflamed.  There is no erythema or drainage  Cardiac exam: RRR I did not appreciate a murmur.  Pulmonary exam: unlabored and clear  Abd Exam: soft nontender   Musculoskeletal: no joint tenderness, deformity or swelling.  Patient has +2 distal pulses.  There is no calf tenderness or swelling.  No Homans sign   Neurologic: GCS 15; moving all extremities equally, no facial droop.       Medical decision making:   Nurses notes and Vital Signs reviewed.     Problems: Today's visit reveals chest pain which is a/an Acute problem that is concerning for deterioration due to a differential diagnosis that includes pulmonary embolism, ACS, atelectasis, pneumonia.     Other problems today include elevated blood pressure     MDM Components integrated into this visit: Social determinants of health impacting care today: Access to PCP impaired because patient was unable to obtain appointment with PCP in a timely manner, Prescription drug management, Clinical decision rules considered in the care this patient: HEAR SCORE  AGE: 45-65 (+1), RISK FACTORS: HTN, chol, DM, obesity, smoking, fam hist, past MI, PCI, CABG, CVA, TIA or PVD. 1-2 (+1), and Score 0-3 is LOW risk and PERC  Age over 49 and Surgery or trauma within the past 4 weeks    Considerations: My decision to discharge home this patient is " based on workup which did not reveal a PE atypical chest pain which is not consistent with coronary artery disease.  CT scan revealing subcu emphysema which I discussed with the patient's surgeon.          See ER course below for lab test ordered, results reviewed, independent interpretation of images or EKG, discussion with consultants, data obtained from sources other than patient:  ED Course as of 08/09/24 1655   Fri Aug 09, 2024   1400 Point care glucose is 231 [MH]   1401 CMP reveals a creatinine of 1.5 which is chronic on chart review [MH]   1401 CBC reveals mild anemia of 11 and 31.  Otherwise normal; on chart review these findings are chronic [MH]   1401 My independent interpretation of the EKG:  Sinus rhythm at a rate of 74.  QT corrected is normal.  There is LVH.  There is no ST segment elevation or depression concerning for acute occlusive infarct [MH]   1403 Troponin is normal [MH]   1403 COVID and influenza are negative [MH]   1505 CTA:  Subcutaneous emphysema along the anterior and right lateral cervical region, extending into the right lateral superior mediastinum.  Findings are nonspecific and can be seen in the posttraumatic and/or postsurgical settings as well as in the setting of infection. [MH]   1553 Contacted the [facility] to attempt to contact the patient's surgeon at [facility] [MH]   1622 Patient placed a call in and the physician on-call for the surgery group has been paged [MH]   1638 I discussed the findings of my workup with Dr. Smith who is the patient's orthopedist and he is [place].  He is comfortable that the findings of the CT expected postoperative changes..  He requests that the patient continue to monitor her symptoms and follow-up as scheduled or return to [facility] Emergency Department for a recheck if she has any concerns [MH]      ED Course User Index  [] Nereida Tobin MD          CRITICAL CARE TIME:   Critical care services included the following: chart data  review, reviewing nursing notes and researching old charts from internal and external sources, documentation time, consultant collaboration regarding findings and treatment options, medication orders and management, direct patient care, vital sign assessments, physical exam reassessments, and ordering, interpreting and reviewing diagnostic studies/lab tests.    Aggregate critical care time was approximately 10 minutes, which includes only time during which I was engaged in work directly related to the patient's care, as described above, whether at the bedside or elsewhere in the Emergency Department.  It did not include time spent performing other reported procedures or the services of residents, students, nurses or physician assistants.           Orders Placed This Encounter   Procedures    CTA Chest Non-Coronary (PE Studies)    CBC auto differential    Comprehensive metabolic panel    Urinalysis, Reflex to Urine Culture Urine, Clean Catch    Troponin I    Pulse Oximetry Continuous    POCT Influenza A/B Molecular    POCT COVID-19 Rapid Screening    EKG 12-lead    EKG 12-lead    Insert Saline lock IV    PFC Facilitated Request from Yehuda Nielsen, Flavio, and [facility]     Medications   morphine injection 6 mg (6 mg Intravenous Given 8/9/24 1350)   ondansetron injection 4 mg (4 mg Intravenous Given 8/9/24 1349)   iohexoL (OMNIPAQUE 350) injection 75 mL (75 mLs Intravenous Given 8/9/24 1404)   sodium chloride 0.9% bolus 1,000 mL 1,000 mL (0 mLs Intravenous Stopped 8/9/24 1602)           Diagnoses that have been ruled out:   None   Diagnoses that are still under consideration:   None   Final diagnoses:   Chest pain   Neck pain          Disposition:  Discharge      Referral for follow-up  Follow-up Information       Follow up With Specialties Details Why Contact Info    Timur Lion MD Family Medicine   [address] [place] [id number] [phone]      Keep appointment with Dr. Smith as scheduled                 Prescription management:  ED Prescriptions    None         Nereida Tobin      This note was created using Dictation Software.  This program may occasionally misinterpret certain words and phrases.      SCRIBE ATTESTATION NOTE:   I attest that I personally performed the services documented by the scribe and acknowledged and confirm the content of the note.   Nurses notes were reviewed.  Nereida Mehta MD  08/09/24 9185

## 2024-08-09 NOTE — ED TRIAGE NOTES
"Pt reporting 10/10 right sided anterior chest pain described as "stabbing" since Wednesday s/p having neck surgery. Pt also reporting neck swelling and soreness with swallowing complaints as a possible result of intubation during surgery. Pt denies any n/v/d, SOB, or GI/ symptoms.   "

## 2024-08-09 NOTE — DISCHARGE INSTRUCTIONS
As we discussed, it is important that you return to the ER for any new concerns or symptoms, worsening of your existing symptoms, if you do not completely improve, or if you are unable to be seen by your primary care provider.  An ER visit cannot replace the evaluation by your primary care provider!!    In the emergency department our goal is to rule-out life threatening conditions and make sure that you are safe to be discharged home. Many medical conditions are difficult to diagnose and may be impossible to be diagnosed during a single ER visit. These conditions often start with non-specific symptoms and can only be diagnosed on follow up visits with your primary care physician or specialist when the symptoms continue or change. Please remember that all medical conditions can change, and we cannot predict how you will be feeling tomorrow or the next day, so if you have any worsening or new symptoms, you should not hesitate to return to the emergency department for re-evaluation.        Be sure to follow up with your primary care doctor for a recheck and to review any labs/imaging that were performed today.  It is very common for us to identify non-emergent incidental findings on labs and imaging which must be followed up with your primary care physician. If you do not have a primary care doctor, you may contact the one listed on your discharge paperwork or you may also call the [facility] Appointment Desk at [phone] to schedule an appointment with one.       All medications have side effects.  We have done our best to select a medication for you that will treat your health problem and have the least amount of side effects.  If at any time while or after you take this medication you develops new symptoms or have any concerns, it is important you stop taking the medication and call your primary care provider or return to the emergency department for evaluation.    Do not drive or operate heavy machinery  for 24 hours if you have received any pain medications, sedatives or mood altering drugs during your ER visit.

## 2024-08-10 ENCOUNTER — PATIENT MESSAGE (OUTPATIENT)
Dept: ENDOCRINOLOGY | Facility: CLINIC | Age: 54
End: 2024-08-10
Payer: MEDICARE

## 2024-08-10 RX ORDER — SODIUM CHLORIDE 0.9 % (FLUSH) 0.9 %
10 SYRINGE (ML) INJECTION
OUTPATIENT
Start: 2024-08-10

## 2024-08-10 RX ORDER — ZOLEDRONIC ACID 5 MG/100ML
5 INJECTION, SOLUTION INTRAVENOUS
OUTPATIENT
Start: 2024-08-10

## 2024-08-10 RX ORDER — HEPARIN 100 UNIT/ML
500 SYRINGE INTRAVENOUS
OUTPATIENT
Start: 2024-08-10

## 2024-08-10 NOTE — TELEPHONE ENCOUNTER
Consider prolia versus reclast.   Given young age and eGFR would consider reclast X 1 to allow for a period of drug holiday.     Cockcroft and Gault calculator eGFR 42 cc/min.   Consider one time reclast order     Latest Reference Range & Units 08/09/24 12:57   BUN 6 - 20 mg/dL 20   Creatinine 0.5 - 1.4 mg/dL 1.5 (H)   eGFR >60 mL/min/1.73 m^2 41 !

## 2024-08-12 ENCOUNTER — TELEPHONE (OUTPATIENT)
Dept: INFECTIOUS DISEASES | Facility: HOSPITAL | Age: 54
End: 2024-08-12
Payer: MEDICARE

## 2024-08-12 NOTE — TELEPHONE ENCOUNTER
Called Pt to schedule Reclast infusion. Pt wants to go to the [facility] location. Pt will send a message to Dr. Gamble

## 2024-08-19 ENCOUNTER — PATIENT MESSAGE (OUTPATIENT)
Dept: ENDOCRINOLOGY | Facility: CLINIC | Age: 54
End: 2024-08-19
Payer: MEDICARE

## 2024-08-21 ENCOUNTER — PATIENT MESSAGE (OUTPATIENT)
Dept: OBSTETRICS AND GYNECOLOGY | Facility: CLINIC | Age: 54
End: 2024-08-21
Payer: MEDICARE

## 2024-08-21 ENCOUNTER — INFUSION (OUTPATIENT)
Dept: INFUSION THERAPY | Facility: HOSPITAL | Age: 54
End: 2024-08-21
Attending: INTERNAL MEDICINE
Payer: MEDICARE

## 2024-08-21 ENCOUNTER — PATIENT MESSAGE (OUTPATIENT)
Dept: ENDOCRINOLOGY | Facility: CLINIC | Age: 54
End: 2024-08-21
Payer: MEDICARE

## 2024-08-21 VITALS
OXYGEN SATURATION: 95 % | BODY MASS INDEX: 19.76 KG/M2 | DIASTOLIC BLOOD PRESSURE: 63 MMHG | HEART RATE: 97 BPM | TEMPERATURE: 98 F | RESPIRATION RATE: 16 BRPM | WEIGHT: 133.81 LBS | SYSTOLIC BLOOD PRESSURE: 129 MMHG

## 2024-08-21 DIAGNOSIS — E11.22 TYPE 2 DIABETES MELLITUS WITH STAGE 3A CHRONIC KIDNEY DISEASE, WITH LONG-TERM CURRENT USE OF INSULIN: Primary | ICD-10-CM

## 2024-08-21 DIAGNOSIS — N18.31 TYPE 2 DIABETES MELLITUS WITH STAGE 3A CHRONIC KIDNEY DISEASE, WITH LONG-TERM CURRENT USE OF INSULIN: Primary | ICD-10-CM

## 2024-08-21 DIAGNOSIS — M32.9 SYSTEMIC LUPUS ERYTHEMATOSUS, UNSPECIFIED SLE TYPE, UNSPECIFIED ORGAN INVOLVEMENT STATUS: Primary | ICD-10-CM

## 2024-08-21 DIAGNOSIS — Z79.4 TYPE 2 DIABETES MELLITUS WITH STAGE 3A CHRONIC KIDNEY DISEASE, WITH LONG-TERM CURRENT USE OF INSULIN: Primary | ICD-10-CM

## 2024-08-21 PROCEDURE — 96365 THER/PROPH/DIAG IV INF INIT: CPT

## 2024-08-21 PROCEDURE — 96375 TX/PRO/DX INJ NEW DRUG ADDON: CPT

## 2024-08-21 PROCEDURE — 63600175 PHARM REV CODE 636 W HCPCS: Mod: JZ,JA,JG | Performed by: INTERNAL MEDICINE

## 2024-08-21 PROCEDURE — 25000003 PHARM REV CODE 250: Performed by: INTERNAL MEDICINE

## 2024-08-21 RX ORDER — HEPARIN 100 UNIT/ML
500 SYRINGE INTRAVENOUS
OUTPATIENT
Start: 2024-09-18

## 2024-08-21 RX ORDER — DIPHENHYDRAMINE HYDROCHLORIDE 50 MG/ML
25 INJECTION INTRAMUSCULAR; INTRAVENOUS
Status: COMPLETED | OUTPATIENT
Start: 2024-08-21 | End: 2024-08-21

## 2024-08-21 RX ORDER — ACETAMINOPHEN 325 MG/1
650 TABLET ORAL
Status: COMPLETED | OUTPATIENT
Start: 2024-08-21 | End: 2024-08-21

## 2024-08-21 RX ORDER — HEPARIN 100 UNIT/ML
500 SYRINGE INTRAVENOUS
Status: CANCELLED | OUTPATIENT
Start: 2024-08-21

## 2024-08-21 RX ORDER — DIPHENHYDRAMINE HYDROCHLORIDE 50 MG/ML
25 INJECTION INTRAMUSCULAR; INTRAVENOUS
OUTPATIENT
Start: 2024-09-18

## 2024-08-21 RX ORDER — ACETAMINOPHEN 325 MG/1
650 TABLET ORAL
OUTPATIENT
Start: 2024-09-18

## 2024-08-21 RX ORDER — SODIUM CHLORIDE 0.9 % (FLUSH) 0.9 %
10 SYRINGE (ML) INJECTION
OUTPATIENT
Start: 2024-09-18

## 2024-08-21 RX ORDER — SODIUM CHLORIDE 0.9 % (FLUSH) 0.9 %
10 SYRINGE (ML) INJECTION
Status: CANCELLED | OUTPATIENT
Start: 2024-08-21

## 2024-08-21 RX ADMIN — ACETAMINOPHEN 650 MG: 325 TABLET ORAL at 09:08

## 2024-08-21 RX ADMIN — DIPHENHYDRAMINE HYDROCHLORIDE 25 MG: 50 INJECTION INTRAMUSCULAR; INTRAVENOUS at 09:08

## 2024-08-21 RX ADMIN — BELIMUMAB 607 MG: 400 INJECTION, POWDER, LYOPHILIZED, FOR SOLUTION INTRAVENOUS at 10:08

## 2024-08-21 NOTE — PLAN OF CARE
Pt arrived for scheduled appointment.  VSS.  Pt received Benlysta infusion over 60 minutes as ordered, tolerated well with NAD.  Pt next appointments given.  Pt ambulated off unit with no complaints or concerns at this time.

## 2024-09-04 ENCOUNTER — PATIENT MESSAGE (OUTPATIENT)
Dept: HEPATOLOGY | Facility: CLINIC | Age: 54
End: 2024-09-04
Payer: MEDICARE

## 2024-09-04 RX ORDER — HEPARIN 100 UNIT/ML
500 SYRINGE INTRAVENOUS
OUTPATIENT
Start: 2024-09-04

## 2024-09-04 RX ORDER — ZOLEDRONIC ACID 5 MG/100ML
5 INJECTION, SOLUTION INTRAVENOUS
OUTPATIENT
Start: 2024-09-04

## 2024-09-04 RX ORDER — SODIUM CHLORIDE 0.9 % (FLUSH) 0.9 %
10 SYRINGE (ML) INJECTION
OUTPATIENT
Start: 2024-09-04

## 2024-09-05 ENCOUNTER — TELEPHONE (OUTPATIENT)
Dept: INFUSION THERAPY | Facility: HOSPITAL | Age: 54
End: 2024-09-05
Payer: MEDICARE

## 2024-09-09 ENCOUNTER — OFFICE VISIT (OUTPATIENT)
Dept: GASTROENTEROLOGY | Facility: CLINIC | Age: 54
End: 2024-09-09
Payer: MEDICARE

## 2024-09-09 VITALS
SYSTOLIC BLOOD PRESSURE: 145 MMHG | BODY MASS INDEX: 18.97 KG/M2 | HEART RATE: 87 BPM | HEIGHT: 69 IN | DIASTOLIC BLOOD PRESSURE: 81 MMHG | WEIGHT: 128.06 LBS

## 2024-09-09 DIAGNOSIS — R19.8 CHANGE IN BOWEL FUNCTION: Primary | ICD-10-CM

## 2024-09-09 PROCEDURE — 3008F BODY MASS INDEX DOCD: CPT | Mod: CPTII,S$GLB,, | Performed by: STUDENT IN AN ORGANIZED HEALTH CARE EDUCATION/TRAINING PROGRAM

## 2024-09-09 PROCEDURE — 4010F ACE/ARB THERAPY RXD/TAKEN: CPT | Mod: CPTII,S$GLB,, | Performed by: STUDENT IN AN ORGANIZED HEALTH CARE EDUCATION/TRAINING PROGRAM

## 2024-09-09 PROCEDURE — 3077F SYST BP >= 140 MM HG: CPT | Mod: CPTII,S$GLB,, | Performed by: STUDENT IN AN ORGANIZED HEALTH CARE EDUCATION/TRAINING PROGRAM

## 2024-09-09 PROCEDURE — 3060F POS MICROALBUMINURIA REV: CPT | Mod: CPTII,S$GLB,, | Performed by: STUDENT IN AN ORGANIZED HEALTH CARE EDUCATION/TRAINING PROGRAM

## 2024-09-09 PROCEDURE — 1159F MED LIST DOCD IN RCRD: CPT | Mod: CPTII,S$GLB,, | Performed by: STUDENT IN AN ORGANIZED HEALTH CARE EDUCATION/TRAINING PROGRAM

## 2024-09-09 PROCEDURE — 99214 OFFICE O/P EST MOD 30 MIN: CPT | Mod: S$GLB,,, | Performed by: STUDENT IN AN ORGANIZED HEALTH CARE EDUCATION/TRAINING PROGRAM

## 2024-09-09 PROCEDURE — 99999 PR PBB SHADOW E&M-EST. PATIENT-LVL V: CPT | Mod: PBBFAC,,, | Performed by: STUDENT IN AN ORGANIZED HEALTH CARE EDUCATION/TRAINING PROGRAM

## 2024-09-09 PROCEDURE — 3066F NEPHROPATHY DOC TX: CPT | Mod: CPTII,S$GLB,, | Performed by: STUDENT IN AN ORGANIZED HEALTH CARE EDUCATION/TRAINING PROGRAM

## 2024-09-09 PROCEDURE — 3079F DIAST BP 80-89 MM HG: CPT | Mod: CPTII,S$GLB,, | Performed by: STUDENT IN AN ORGANIZED HEALTH CARE EDUCATION/TRAINING PROGRAM

## 2024-09-09 NOTE — PROGRESS NOTES
[facility]    Name: Valencia Dumont  : 1970  Date of Service: 2024   PCP: Timur Lion MD    Reason for visit:   Chief Complaint   Patient presents with    Follow-up       HPI:     The patient is a 53 y/o F with SLE, AIH s/p OLT on IS and history of constipation that presents to GI clinic for evaluation of acute changes in bowel function in the last 4 days. Notes that since her transplant in , she has suffered with constipation necessitating periodic OTC laxative use. She states that she will use them to help her have bowel movements when she feels backed up or full/distended. She already titrate's this as needed with last use on Tuesday of last week. She noted that she started to have diarrhea/loose stools on Friday every day since then. She denies any sick contacts, fevers or chills. States that after she eats, she will have to have a bowel movement. She does have some abdominal pain and cramping and this typically resolves with defecation. She denies any bloody stools or nausea or vomiting. She is on tacro and myfortic for her liver transplant. She is taking narcotics for her recent cervical fusion surgery. She is healing well from that. Has stopped taking her daily fiber supplement for a few weeks now since her cervical fusion surgery.     Review of Systems:   Review of Systems   Constitutional:  Positive for weight loss. Negative for chills, fever and malaise/fatigue.   Respiratory:  Negative for cough and shortness of breath.    Cardiovascular:  Negative for chest pain and palpitations.   Gastrointestinal:  Positive for diarrhea. Negative for abdominal pain, constipation, nausea and vomiting.     Medications:   Current Outpatient Medications:     acetaminophen (TYLENOL) 650 MG TbSR, Take 1 tablet (650 mg total) by mouth every 6 (six) hours., Disp: 60 tablet, Rfl: 0    amitriptyline (ELAVIL) 10 MG tablet, Take 1 tablet (10 mg total) by mouth every  "evening., Disp: 90 tablet, Rfl: 2    azelastine (ASTELIN) 137 mcg (0.1 %) nasal spray, 1 spray (137 mcg total) by Nasal route 2 (two) times daily., Disp: 30 mL, Rfl: 11    BD ULTRA-FINE JANESSA PEN NEEDLE 32 gauge x 5/32" Ndle, For five times daily insulin injections, Disp: 450 each, Rfl: 3    blood-glucose meter,continuous (DEXCOM G6 ) Misc, 1 each by Misc.(Non-Drug; Combo Route) route once. for 1 dose, Disp: 1 each, Rfl: 0    blood-glucose sensor (FREESTYLE CHUN 3 SENSOR) Karen, 1 Device by Misc.(Non-Drug; Combo Route) route every 14 (fourteen) days., Disp: 2 each, Rfl: 11    blood-glucose transmitter (DEXCOM G6 TRANSMITTER) Karen, 1 each by Misc.(Non-Drug; Combo Route) route once a week, Disp: 1 Device, Rfl: 3    chlorthalidone (HYGROTEN) 25 MG Tab, Take 0.5 tablets (12.5 mg total) by mouth once daily., Disp: 15 tablet, Rfl: 11    ciclopirox (PENLAC) 8 % Soln, Apply topically nightly., Disp: 6.6 mL, Rfl: 3    clotrimazole-betamethasone 1-0.05% (LOTRISONE) cream, APPLY TOPICALLY TO THE AFFECTED AREA TWICE DAILY FOR 10 DAYS, Disp: , Rfl:     diazepam (VALIUM) 5 MG tablet, Take 5 mg by mouth 3 (three) times daily as needed. , Disp: , Rfl: 0    diclofenac sodium (VOLTAREN) 1 % Gel, APPLY 2 GRAM to wrist and 4 g to left foot TOPICAL ROUTE 4 TIMES PER DAY, Disp: 450 each, Rfl: 2    dicyclomine (BENTYL) 10 MG capsule, TAKE 1 CAPSULE BY MOUTH EVERY 8 HOURS AS NEEDED for spasms, Disp: , Rfl:     DILTIAZEM HCL (DILTIAZEM 2% CREAM), Apply topically 3 (three) times daily. Apply topically to anal area., Disp: 30 g, Rfl: 0    ergocalciferol (ERGOCALCIFEROL) 50,000 unit Cap, Take 1 capsule (50,000 Units total) by mouth every 7 days., Disp: 4 capsule, Rfl: 1    erythromycin with ethanol (EMGEL) 2 % gel, ADD 30GM (1 TUBE) TO THE SOAKING DEVICE AND ALLOW WATER TO AGITATE. PLACE AFFECTED AREAS INTO WATER AND SOAK FOR 10 MINUTES 1-2 TIMES DAILY, Disp: , Rfl: 1    estradioL (ESTRACE) 0.01 % (0.1 mg/gram) vaginal cream, Place 1 g " vaginally 3 (three) times a week., Disp: 42 g, Rfl: 3    famotidine (PEPCID) 40 MG tablet, , Disp: , Rfl:     fluconazole (DIFLUCAN) 150 MG Tab, TAKE ONE TABLET BY MOUTH once for ONE dose, Disp: 1 tablet, Rfl: 0    fluocinolone (SYNALAR) 0.01 % external solution, APPLY a couple of DROPS INTO BOTH EARS TWICE DAILY AS NEEDED FOR ITCHING, Disp: 60 mL, Rfl: 1    fluticasone propionate (FLONASE) 50 mcg/actuation nasal spray, 1 spray by Each Nostril route 2 (two) times daily., Disp: , Rfl:     fluticasone propionate (FLONASE) 50 mcg/actuation nasal spray, 2 sprays (100 mcg total) by Each Nostril route 2 (two) times daily., Disp: 18.2 mL, Rfl: 11    gabapentin (NEURONTIN) 300 MG capsule, TAKE 1 CAPSULE BY MOUTH THREE TIMES DAILY, Disp: , Rfl:     gentamicin (GARAMYCIN) 0.1 % cream, ADD 30GM (1 TUBE) TO THE SOAKING DEVICE AND ALLOW WATER TO AGITATE. PLACE AFFECTED AREAS INTO WATER AND SOAK FOR 10 MINUTES 1-2 TIMES DAILY, Disp: , Rfl: 1    hydrocortisone (ANUSOL-HC) 2.5 % rectal cream, Place rectally 2 (two) times daily. Apply per rectum bid, Disp: 30 g, Rfl: 3    hydrOXYzine HCl (ATARAX) 10 MG Tab, TAKE 1 OR 2 TABLETS BY MOUTH THREE TIMES DAILY AS NEEDED FOR ITCHING., Disp: , Rfl: 0    insulin glargine U-100, Lantus, (LANTUS SOLOSTAR U-100 INSULIN) 100 unit/mL (3 mL) InPn pen, Inject 4 Units into the skin every evening., Disp: 15 mL, Rfl: 3    insulin lispro (HUMALOG KWIKPEN INSULIN) 100 unit/mL pen, 3 units before BREAKFAST and lunch and FIVE units before dinner with sliding scale, up to 25 units daily, Disp: 45 mL, Rfl: 3    isosorbide mononitrate (IMDUR) 30 MG 24 hr tablet, Take 1 tablet (30 mg total) by mouth 2 (two) times a day., Disp: 180 tablet, Rfl: 3    ketoconazole (NIZORAL) 2 % cream, ADD 30GM (1/2 TUBE) TO THE SOAKING DEVICE AND ALLOW WATER TO AGITATE. PLACE AFFECTED AREAS INTO WATER AND SOAK FOR 10 MINUTES 1-2 TIMES BRENDON, Disp: , Rfl: 1    Lactobac. rhamnosus GG-inulin 10 billion cell -200 mg Cap, Take 1  capsule by mouth 2 (two) times daily., Disp: 30 capsule, Rfl: 0    levocetirizine (XYZAL) 5 MG tablet, Take 5 mg by mouth every evening., Disp: , Rfl: 3    LIDOcaine-prilocaine (EMLA) cream, 2 (two) times daily. Apply to affected area, Disp: , Rfl:     losartan (COZAAR) 100 MG tablet, Take 1 tablet (100 mg total) by mouth once daily., Disp: 30 tablet, Rfl: 2    magnesium oxide (MAG-OX) 400 mg (241.3 mg magnesium) tablet, Take 400 mg by mouth 2 (two) times daily., Disp: , Rfl:     meclizine (ANTIVERT) 25 mg tablet, TAKE ONE TABLET BY MOUTH AT BEDTIME AS NEEDED for dizziness., Disp: , Rfl: 0    mometasone 0.1% (ELOCON) 0.1 % cream, APPLY TOPICALLY TO THE FACE TWICE DAILY FOR ONE WEEK, Disp: , Rfl:     MULTIVIT,THER IRON,CA,FA & MIN (MULTIVITAMIN) Tab, Take 1 tablet by mouth once daily., Disp: 30 tablet, Rfl: 0    mycophenolate (MYFORTIC) 180 MG TbEC, Take 2 tablets (360 mg total) by mouth 2 (two) times daily., Disp: 120 tablet, Rfl: 11    neomycin-polymyxin-hydrocortisone (CORTISPORIN) otic solution, INSTILL TWO DROPS INTO BOTH EARS FOUR TIMES DAILY FOR 10 DAYS, Disp: , Rfl: 0    NURTEC 75 mg odt, PLACE ONE TABLET on tongue, alternatively UNDER THE TONGUE ONCE DAILY AS NEEDED FOR MIGRAINE, Disp: 8 tablet, Rfl: 2    nystatin (MYCOSTATIN) 100,000 unit/mL suspension, SWISH AND SPIT FIVE MILLILITERS BY MOUTH FOUR TIMES DAILY FOR 7 DAYS., Disp: , Rfl: 1    ondansetron (ZOFRAN) 4 MG tablet, TAKE TWO TABLETS BY MOUTH EVERY 8 HOURS AS NEEDED FOR NAUSEA., Disp: , Rfl:     oxyCODONE (ROXICODONE) 5 MG immediate release tablet, Take 1 tablet (5 mg total) by mouth every 4 (four) hours as needed for Pain., Disp: 5 tablet, Rfl: 0    oxycodone-acetaminophen (PERCOCET) 7.5-325 mg per tablet, Take 1 tablet by mouth every 4 (four) hours as needed for Pain., Disp: , Rfl:     pantoprazole (PROTONIX) 40 MG tablet, Take 1 tablet (40 mg total) by mouth 2 (two) times daily. Take immediately in am when wake up and then 30 minutes prior to  dinner, Disp: 60 tablet, Rfl: 11    polyethylene glycol (GLYCOLAX) 17 gram/dose powder, Mix 1 capful (17 g) with liquid and by mouth 2 (two) times daily., Disp: 1530 g, Rfl: 11    PROAIR HFA 90 mcg/actuation inhaler, Inhale 2 puffs into the lungs 3 (three) times daily as needed. , Disp: , Rfl: 3    promethazine-dextromethorphan (PROMETHAZINE-DM) 6.25-15 mg/5 mL Syrp, Take by mouth 2 (two) times daily as needed. , Disp: , Rfl: 0    rosuvastatin (CRESTOR) 5 MG tablet, Take 5 mg by mouth once daily., Disp: , Rfl:     SENNA 8.6 mg tablet, Take 1 tablet by mouth 2 (two) times daily. Take while taking narcotics, Disp: 30 tablet, Rfl: 0    sertraline (ZOLOFT) 50 MG tablet, Take 50 mg by mouth once daily., Disp: , Rfl: 11    SSD 1 % cream, 1 application 2 (two) times daily. Apply to affected area, Disp: , Rfl: 0    tacrolimus (PROGRAF) 1 MG Cap, Take 3 capsules (3 mg total) by mouth every morning AND 2 capsules (2 mg total) every evening., Disp: 150 capsule, Rfl: 11    tinidazole (TINDAMAX) 250 MG tablet, SMARTSI Tablet(s) By Mouth Every Morning, Disp: , Rfl:     topiramate (TOPAMAX) 50 MG tablet, SMARTSI Tablet(s) By Mouth Every Evening, Disp: , Rfl:     triamcinolone acetonide 0.1% (KENALOG) 0.1 % cream, Apply topically 2 times daily, Disp: 30 g, Rfl: 0    TRULICITY 3 mg/0.5 mL pen injector, Inject 3 mg into the skin every 7 days., Disp: , Rfl:     valACYclovir (VALTREX) 1000 MG tablet, Take 1,000 mg by mouth once daily., Disp: , Rfl:     verapamil (CALAN-SR) 120 MG CR tablet, TAKE ONE TABLET ONCE DAILY FOR BLOOD PRESSURE, Disp: , Rfl: 3    zolpidem (AMBIEN) 10 mg Tab, Take 10 mg by mouth nightly as needed., Disp: , Rfl: 3    azithromycin (Z-GLORIA) 250 MG tablet, Take 250 mg by mouth as needed., Disp: , Rfl:     budesonide-formoterol 160-4.5 mcg (SYMBICORT) 160-4.5 mcg/actuation HFAA, Inhale 2 puffs into the lungs., Disp: , Rfl:     buPROPion (WELLBUTRIN XL) 150 MG TB24 tablet, Take 300 mg by mouth., Disp: , Rfl:      "ferrous sulfate (FEOSOL) 325 mg (65 mg iron) Tab tablet, Take 1 tablet (325 mg total) by mouth every 12 (twelve) hours. (Patient not taking: Reported on 9/9/2024), Disp: 60 tablet, Rfl: 4    loratadine (CLARITIN) 10 mg tablet, Take 1 tablet (10 mg total) by mouth once daily., Disp: 30 tablet, Rfl: 0    nystatin-triamcinolone (MYCOLOG II) cream, Apply to affected area 2 times daily, Disp: 30 g, Rfl: 1     Past medical history, past surgical history, family history, allergies, and social history reviewed and updated in EMR.     Vitals:   Vitals:    09/09/24 1550   BP: (!) 145/81   Pulse: 87   Weight: 58.1 kg (128 lb 1.4 oz)   Height: 5' 9" (1.753 m)     Body mass index is 18.92 kg/m².    Physical Exam:   Physical Exam  Vitals reviewed.   HENT:      Head: Normocephalic and atraumatic.      Nose: Nose normal.   Eyes:      General: No scleral icterus.  Cardiovascular:      Rate and Rhythm: Normal rate.      Pulses: Normal pulses.   Pulmonary:      Effort: Pulmonary effort is normal. No respiratory distress.   Abdominal:      General: Abdomen is flat. There is no distension.      Palpations: Abdomen is soft.      Tenderness: There is no abdominal tenderness. There is no guarding.   Skin:     General: Skin is warm and dry.   Neurological:      Mental Status: She is alert and oriented to person, place, and time. Mental status is at baseline.   Psychiatric:         Mood and Affect: Mood normal.         Behavior: Behavior normal.       Assessment:   1. Change in bowel function  X-Ray Abdomen Flat And Erect    Gastrointestinal Pathogens Panel, PCR        Plan:   - Check KUB to assess for constipation/impacted stool with possible overflow diarrhea. With newly reported loose stool and acute change in bowel function in the last 4 days, rule out overflow diarrhea first  - Titrate OTC laxatives to desired stool frequency and consistency; of note, the patient is on narcotics and recently underwent spinal surgery so this could also " be contributing to reported changes and raises risk for constipation with overflow  - If imaging does not show constipation/overflow, complete GI Pathogen's panel given immunosuppressed status. Given acute changes and nature, would want to rule out infectious etiology. She is not having fever or chills and keeping up with her hydration which are all reassuring that this will be self-limited   - Resume daily fiber supplement as this will help with both constipation or diarrhea   - If diarrhea becomes the new issue of consistent predominance, can consider testing for EPI vs empirically starting cholestyramine as next step   - Encourage food logging/journal to determine what foods trigger symptoms    Discussed with staff attending. Attestation to follow.     Alfredo Cunningham, DO PGY- V  Gastroenterology Fellow  [facility]

## 2024-09-12 ENCOUNTER — PATIENT MESSAGE (OUTPATIENT)
Dept: GASTROENTEROLOGY | Facility: CLINIC | Age: 54
End: 2024-09-12
Payer: MEDICARE

## 2024-09-13 ENCOUNTER — LAB VISIT (OUTPATIENT)
Dept: LAB | Facility: HOSPITAL | Age: 54
End: 2024-09-13
Attending: STUDENT IN AN ORGANIZED HEALTH CARE EDUCATION/TRAINING PROGRAM
Payer: MEDICARE

## 2024-09-13 ENCOUNTER — PATIENT MESSAGE (OUTPATIENT)
Dept: DIABETES | Facility: CLINIC | Age: 54
End: 2024-09-13
Payer: MEDICARE

## 2024-09-13 ENCOUNTER — PATIENT MESSAGE (OUTPATIENT)
Dept: OBSTETRICS AND GYNECOLOGY | Facility: CLINIC | Age: 54
End: 2024-09-13
Payer: MEDICARE

## 2024-09-13 DIAGNOSIS — R19.8 CHANGE IN BOWEL FUNCTION: ICD-10-CM

## 2024-09-13 PROCEDURE — 87507 IADNA-DNA/RNA PROBE TQ 12-25: CPT | Performed by: STUDENT IN AN ORGANIZED HEALTH CARE EDUCATION/TRAINING PROGRAM

## 2024-09-14 LAB
ASTROVIRUS: DETECTED
C COLI+JEJ+UPSA DNA STL QL NAA+NON-PROBE: NOT DETECTED
CYCLOSPORA CAYETANENSIS: NOT DETECTED
ENTEROAGGREGATIVE E COLI: NOT DETECTED
ENTEROPATHOGENIC E COLI: NOT DETECTED
GPP - ADENOVIRUS 40/41: NOT DETECTED
GPP - CRYPTOSPORIDIUM: NOT DETECTED
GPP - ENTAMOEBA HISTOLYTICA: NOT DETECTED
GPP - ENTEROTOXIGENIC E COLI (ETEC): NOT DETECTED
GPP - GIARDIA LAMBLIA: NOT DETECTED
GPP - NOROVIRUS GI/GII: NOT DETECTED
GPP - ROTAVIRUS A: NOT DETECTED
GPP - SALMONELLA: NOT DETECTED
GPP - VIBRIO CHOLERA: NOT DETECTED
GPP - YERSINIA ENTEROCOLITICA: NOT DETECTED
LACTATE PLASV-SCNC: NOT DETECTED MMOL/L
PLESIOMONAS SHIGELLOIDES: NOT DETECTED
SAPOVIRUS: NOT DETECTED
SHIGELLA SP+EIEC IPAH STL QL NAA+PROBE: NOT DETECTED
VIBRIO: NOT DETECTED

## 2024-09-16 ENCOUNTER — PATIENT MESSAGE (OUTPATIENT)
Dept: GASTROENTEROLOGY | Facility: CLINIC | Age: 54
End: 2024-09-16
Payer: MEDICARE

## 2024-09-18 ENCOUNTER — PATIENT MESSAGE (OUTPATIENT)
Dept: RHEUMATOLOGY | Facility: CLINIC | Age: 54
End: 2024-09-18
Payer: MEDICARE

## 2024-09-18 ENCOUNTER — INFUSION (OUTPATIENT)
Dept: INFUSION THERAPY | Facility: HOSPITAL | Age: 54
End: 2024-09-18
Attending: INTERNAL MEDICINE
Payer: MEDICARE

## 2024-09-18 ENCOUNTER — PATIENT MESSAGE (OUTPATIENT)
Dept: GASTROENTEROLOGY | Facility: CLINIC | Age: 54
End: 2024-09-18
Payer: MEDICARE

## 2024-09-18 ENCOUNTER — PATIENT MESSAGE (OUTPATIENT)
Dept: NEPHROLOGY | Facility: CLINIC | Age: 54
End: 2024-09-18
Payer: MEDICARE

## 2024-09-18 ENCOUNTER — HOSPITAL ENCOUNTER (OUTPATIENT)
Dept: RADIOLOGY | Facility: HOSPITAL | Age: 54
Discharge: HOME | End: 2024-09-18
Attending: STUDENT IN AN ORGANIZED HEALTH CARE EDUCATION/TRAINING PROGRAM
Payer: MEDICARE

## 2024-09-18 VITALS
HEART RATE: 108 BPM | TEMPERATURE: 98 F | SYSTOLIC BLOOD PRESSURE: 145 MMHG | RESPIRATION RATE: 16 BRPM | DIASTOLIC BLOOD PRESSURE: 72 MMHG | OXYGEN SATURATION: 95 %

## 2024-09-18 DIAGNOSIS — M89.9 CHRONIC KIDNEY DISEASE-MINERAL AND BONE DISORDER: ICD-10-CM

## 2024-09-18 DIAGNOSIS — M32.9 SYSTEMIC LUPUS ERYTHEMATOSUS, UNSPECIFIED SLE TYPE, UNSPECIFIED ORGAN INVOLVEMENT STATUS: Chronic | ICD-10-CM

## 2024-09-18 DIAGNOSIS — R19.8 CHANGE IN BOWEL FUNCTION: ICD-10-CM

## 2024-09-18 DIAGNOSIS — N18.31 CKD STAGE G3A/A1, GFR 45-59 AND ALBUMIN CREATININE RATIO <30 MG/G: Primary | ICD-10-CM

## 2024-09-18 DIAGNOSIS — N18.9 CHRONIC KIDNEY DISEASE-MINERAL AND BONE DISORDER: ICD-10-CM

## 2024-09-18 DIAGNOSIS — N18.32 STAGE 3B CHRONIC KIDNEY DISEASE: ICD-10-CM

## 2024-09-18 DIAGNOSIS — T38.0X5S ADVERSE EFFECT OF GLUCOCORTICOID OR SYNTHETIC ANALOGUE, SEQUELA: ICD-10-CM

## 2024-09-18 DIAGNOSIS — E83.9 CHRONIC KIDNEY DISEASE-MINERAL AND BONE DISORDER: ICD-10-CM

## 2024-09-18 DIAGNOSIS — M81.0 OSTEOPOROSIS, UNSPECIFIED OSTEOPOROSIS TYPE, UNSPECIFIED PATHOLOGICAL FRACTURE PRESENCE: ICD-10-CM

## 2024-09-18 DIAGNOSIS — R53.83 FATIGUE, UNSPECIFIED TYPE: ICD-10-CM

## 2024-09-18 DIAGNOSIS — M32.9 SYSTEMIC LUPUS ERYTHEMATOSUS, UNSPECIFIED SLE TYPE, UNSPECIFIED ORGAN INVOLVEMENT STATUS: Primary | ICD-10-CM

## 2024-09-18 LAB
ALBUMIN SERPL BCP-MCNC: 3.8 G/DL (ref 3.5–5.2)
ALP SERPL-CCNC: 85 U/L (ref 55–135)
ALT SERPL W/O P-5'-P-CCNC: 8 U/L (ref 10–44)
ANION GAP SERPL CALC-SCNC: 9 MMOL/L (ref 8–16)
AST SERPL-CCNC: 19 U/L (ref 10–40)
BACTERIA #/AREA URNS HPF: ABNORMAL /HPF
BASOPHILS # BLD AUTO: 0.01 K/UL (ref 0–0.2)
BASOPHILS NFR BLD: 0.2 % (ref 0–1.9)
BILIRUB SERPL-MCNC: 0.4 MG/DL (ref 0.1–1)
BILIRUB UR QL STRIP: NEGATIVE
BUN SERPL-MCNC: 20 MG/DL (ref 6–20)
C3 SERPL-MCNC: 84 MG/DL (ref 50–180)
C4 SERPL-MCNC: 27 MG/DL (ref 11–44)
CALCIUM SERPL-MCNC: 9.3 MG/DL (ref 8.7–10.5)
CHLORIDE SERPL-SCNC: 99 MMOL/L (ref 95–110)
CK SERPL-CCNC: 55 U/L (ref 20–180)
CLARITY UR: CLEAR
CO2 SERPL-SCNC: 28 MMOL/L (ref 23–29)
COLOR UR: YELLOW
CREAT SERPL-MCNC: 2 MG/DL (ref 0.5–1.4)
CREAT UR-MCNC: 64.9 MG/DL (ref 15–325)
CRP SERPL-MCNC: 0.5 MG/L (ref 0–8.2)
DIFFERENTIAL METHOD BLD: ABNORMAL
EOSINOPHIL # BLD AUTO: 0.3 K/UL (ref 0–0.5)
EOSINOPHIL NFR BLD: 5.6 % (ref 0–8)
ERYTHROCYTE [DISTWIDTH] IN BLOOD BY AUTOMATED COUNT: 13.1 % (ref 11.5–14.5)
ERYTHROCYTE [SEDIMENTATION RATE] IN BLOOD BY PHOTOMETRIC METHOD: 11 MM/HR (ref 0–36)
EST. GFR  (NO RACE VARIABLE): 29 ML/MIN/1.73 M^2
GLUCOSE SERPL-MCNC: 177 MG/DL (ref 70–110)
GLUCOSE UR QL STRIP: NEGATIVE
HCT VFR BLD AUTO: 32.7 % (ref 37–48.5)
HGB BLD-MCNC: 11.5 G/DL (ref 12–16)
HGB UR QL STRIP: NEGATIVE
HYALINE CASTS #/AREA URNS LPF: 3 /LPF
IMM GRANULOCYTES # BLD AUTO: 0.01 K/UL (ref 0–0.04)
IMM GRANULOCYTES NFR BLD AUTO: 0.2 % (ref 0–0.5)
KETONES UR QL STRIP: NEGATIVE
LEUKOCYTE ESTERASE UR QL STRIP: ABNORMAL
LYMPHOCYTES # BLD AUTO: 1.9 K/UL (ref 1–4.8)
LYMPHOCYTES NFR BLD: 37.6 % (ref 18–48)
MCH RBC QN AUTO: 29.1 PG (ref 27–31)
MCHC RBC AUTO-ENTMCNC: 35.2 G/DL (ref 32–36)
MCV RBC AUTO: 83 FL (ref 82–98)
MICROSCOPIC COMMENT: ABNORMAL
MONOCYTES # BLD AUTO: 0.3 K/UL (ref 0.3–1)
MONOCYTES NFR BLD: 5.8 % (ref 4–15)
NEUTROPHILS # BLD AUTO: 2.5 K/UL (ref 1.8–7.7)
NEUTROPHILS NFR BLD: 50.6 % (ref 38–73)
NITRITE UR QL STRIP: NEGATIVE
NRBC BLD-RTO: 0 /100 WBC
PH UR STRIP: 7 [PH] (ref 5–8)
PLATELET # BLD AUTO: 184 K/UL (ref 150–450)
PMV BLD AUTO: 10.2 FL (ref 9.2–12.9)
POTASSIUM SERPL-SCNC: 3.7 MMOL/L (ref 3.5–5.1)
PROT SERPL-MCNC: 7.1 G/DL (ref 6–8.4)
PROT UR QL STRIP: NEGATIVE
PROT UR-MCNC: <7 MG/DL
PROT/CREAT UR: NORMAL MG/G{CREAT} (ref 0–0.2)
RBC # BLD AUTO: 3.95 M/UL (ref 4–5.4)
SODIUM SERPL-SCNC: 136 MMOL/L (ref 136–145)
SP GR UR STRIP: 1.01 (ref 1–1.03)
SQUAMOUS #/AREA URNS HPF: 3 /HPF
URN SPEC COLLECT METH UR: ABNORMAL
UROBILINOGEN UR STRIP-ACNC: NEGATIVE EU/DL
WBC # BLD AUTO: 4.98 K/UL (ref 3.9–12.7)
WBC #/AREA URNS HPF: 1 /HPF (ref 0–5)

## 2024-09-18 PROCEDURE — 85025 COMPLETE CBC W/AUTO DIFF WBC: CPT | Performed by: INTERNAL MEDICINE

## 2024-09-18 PROCEDURE — 74019 RADEX ABDOMEN 2 VIEWS: CPT | Mod: 26,,, | Performed by: RADIOLOGY

## 2024-09-18 PROCEDURE — 74019 RADEX ABDOMEN 2 VIEWS: CPT | Mod: TC,FY

## 2024-09-18 PROCEDURE — 63600175 PHARM REV CODE 636 W HCPCS: Mod: JZ,JA,JG | Performed by: INTERNAL MEDICINE

## 2024-09-18 PROCEDURE — 25000003 PHARM REV CODE 250: Performed by: INTERNAL MEDICINE

## 2024-09-18 PROCEDURE — 82550 ASSAY OF CK (CPK): CPT | Performed by: INTERNAL MEDICINE

## 2024-09-18 PROCEDURE — 85652 RBC SED RATE AUTOMATED: CPT | Performed by: INTERNAL MEDICINE

## 2024-09-18 PROCEDURE — 86160 COMPLEMENT ANTIGEN: CPT | Mod: 59 | Performed by: INTERNAL MEDICINE

## 2024-09-18 PROCEDURE — 96365 THER/PROPH/DIAG IV INF INIT: CPT

## 2024-09-18 PROCEDURE — 86225 DNA ANTIBODY NATIVE: CPT | Performed by: INTERNAL MEDICINE

## 2024-09-18 PROCEDURE — 81000 URINALYSIS NONAUTO W/SCOPE: CPT | Performed by: INTERNAL MEDICINE

## 2024-09-18 PROCEDURE — 80053 COMPREHEN METABOLIC PANEL: CPT | Performed by: INTERNAL MEDICINE

## 2024-09-18 PROCEDURE — 63600175 PHARM REV CODE 636 W HCPCS: Performed by: INTERNAL MEDICINE

## 2024-09-18 PROCEDURE — 96375 TX/PRO/DX INJ NEW DRUG ADDON: CPT

## 2024-09-18 PROCEDURE — 96367 TX/PROPH/DG ADDL SEQ IV INF: CPT

## 2024-09-18 PROCEDURE — 84156 ASSAY OF PROTEIN URINE: CPT | Performed by: INTERNAL MEDICINE

## 2024-09-18 PROCEDURE — 86140 C-REACTIVE PROTEIN: CPT | Performed by: INTERNAL MEDICINE

## 2024-09-18 PROCEDURE — 82570 ASSAY OF URINE CREATININE: CPT | Performed by: INTERNAL MEDICINE

## 2024-09-18 PROCEDURE — 86160 COMPLEMENT ANTIGEN: CPT | Performed by: INTERNAL MEDICINE

## 2024-09-18 RX ORDER — ZOLEDRONIC ACID 5 MG/100ML
5 INJECTION, SOLUTION INTRAVENOUS
Status: COMPLETED | OUTPATIENT
Start: 2024-09-18 | End: 2024-09-18

## 2024-09-18 RX ORDER — ZOLEDRONIC ACID 5 MG/100ML
5 INJECTION, SOLUTION INTRAVENOUS
OUTPATIENT
Start: 2024-09-18

## 2024-09-18 RX ORDER — ACETAMINOPHEN 325 MG/1
650 TABLET ORAL
OUTPATIENT
Start: 2024-10-16

## 2024-09-18 RX ORDER — HEPARIN 100 UNIT/ML
500 SYRINGE INTRAVENOUS
OUTPATIENT
Start: 2024-10-16

## 2024-09-18 RX ORDER — SODIUM CHLORIDE 0.9 % (FLUSH) 0.9 %
10 SYRINGE (ML) INJECTION
OUTPATIENT
Start: 2024-09-18

## 2024-09-18 RX ORDER — DIPHENHYDRAMINE HYDROCHLORIDE 50 MG/ML
25 INJECTION INTRAMUSCULAR; INTRAVENOUS
OUTPATIENT
Start: 2024-10-16

## 2024-09-18 RX ORDER — HEPARIN 100 UNIT/ML
500 SYRINGE INTRAVENOUS
OUTPATIENT
Start: 2024-09-18

## 2024-09-18 RX ORDER — DIPHENHYDRAMINE HYDROCHLORIDE 50 MG/ML
25 INJECTION INTRAMUSCULAR; INTRAVENOUS
Status: COMPLETED | OUTPATIENT
Start: 2024-09-18 | End: 2024-09-18

## 2024-09-18 RX ORDER — ACETAMINOPHEN 325 MG/1
650 TABLET ORAL
Status: COMPLETED | OUTPATIENT
Start: 2024-09-18 | End: 2024-09-18

## 2024-09-18 RX ORDER — SODIUM CHLORIDE 0.9 % (FLUSH) 0.9 %
10 SYRINGE (ML) INJECTION
OUTPATIENT
Start: 2024-10-16

## 2024-09-18 RX ADMIN — ZOLEDRONIC ACID 5 MG: 0.05 INJECTION, SOLUTION INTRAVENOUS at 09:09

## 2024-09-18 RX ADMIN — DIPHENHYDRAMINE HYDROCHLORIDE 25 MG: 50 INJECTION INTRAMUSCULAR; INTRAVENOUS at 09:09

## 2024-09-18 RX ADMIN — ACETAMINOPHEN 650 MG: 325 TABLET ORAL at 09:09

## 2024-09-18 RX ADMIN — BELIMUMAB 607 MG: 400 INJECTION, POWDER, LYOPHILIZED, FOR SOLUTION INTRAVENOUS at 10:09

## 2024-09-18 NOTE — PROGRESS NOTES
Creatinine increased on labs from today, UA is without blood or protein. I have advised that she hold her Chlorthalidone and increase fluid intake. Repeat labs in two weeks, including STAN and Lupus labs.     CKD stage G3a/A1, GFR 45-59 and albumin creatinine ratio <30 mg/g  -     Renal Function Panel; Future; Expected date: 10/02/2024  -     CBC Without Differential; Future; Expected date: 10/02/2024  -     Tacrolimus Level; Future; Expected date: 10/02/2024    Systemic lupus erythematosus, unspecified SLE type, unspecified organ involvement status  -     STAN Screen w/Reflex; Future; Expected date: 10/02/2024

## 2024-09-18 NOTE — PLAN OF CARE
Pt arrived to clinic for scheduled Benlysta (q4w) and Reclast (initial). VSS. Pre medicated with tylenol and benadryl IVP. Reclast infused and tolerated well followed by Benlysta, also infused and tolerated well. Voiced no concerns or reactions noted. Per pt request labs drawn and sent for lab appt that was scheduled for today. Ambulated from clinic in [facility] for discharge.

## 2024-09-19 ENCOUNTER — PATIENT MESSAGE (OUTPATIENT)
Dept: ENDOCRINOLOGY | Facility: CLINIC | Age: 54
End: 2024-09-19
Payer: MEDICARE

## 2024-09-19 ENCOUNTER — PATIENT MESSAGE (OUTPATIENT)
Dept: GASTROENTEROLOGY | Facility: CLINIC | Age: 54
End: 2024-09-19
Payer: MEDICARE

## 2024-09-19 LAB — DSDNA AB SER-ACNC: NORMAL [IU]/ML

## 2024-09-26 ENCOUNTER — PATIENT MESSAGE (OUTPATIENT)
Dept: DIABETES | Facility: CLINIC | Age: 54
End: 2024-09-26
Payer: MEDICARE

## 2024-10-02 ENCOUNTER — LAB VISIT (OUTPATIENT)
Dept: LAB | Facility: HOSPITAL | Age: 54
End: 2024-10-02
Attending: STUDENT IN AN ORGANIZED HEALTH CARE EDUCATION/TRAINING PROGRAM
Payer: MEDICARE

## 2024-10-02 DIAGNOSIS — M32.9 SYSTEMIC LUPUS ERYTHEMATOSUS, UNSPECIFIED SLE TYPE, UNSPECIFIED ORGAN INVOLVEMENT STATUS: Chronic | ICD-10-CM

## 2024-10-02 DIAGNOSIS — N18.31 CKD STAGE G3A/A1, GFR 45-59 AND ALBUMIN CREATININE RATIO <30 MG/G: ICD-10-CM

## 2024-10-02 LAB
ALBUMIN SERPL BCP-MCNC: 4 G/DL (ref 3.5–5.2)
ANION GAP SERPL CALC-SCNC: 10 MMOL/L (ref 8–16)
BUN SERPL-MCNC: 28 MG/DL (ref 6–20)
CALCIUM SERPL-MCNC: 8.9 MG/DL (ref 8.7–10.5)
CHLORIDE SERPL-SCNC: 106 MMOL/L (ref 95–110)
CO2 SERPL-SCNC: 25 MMOL/L (ref 23–29)
CREAT SERPL-MCNC: 2.4 MG/DL (ref 0.5–1.4)
ERYTHROCYTE [DISTWIDTH] IN BLOOD BY AUTOMATED COUNT: 13.5 % (ref 11.5–14.5)
EST. GFR  (NO RACE VARIABLE): 23 ML/MIN/1.73 M^2
GLUCOSE SERPL-MCNC: 161 MG/DL (ref 70–110)
HCT VFR BLD AUTO: 32.7 % (ref 37–48.5)
HGB BLD-MCNC: 11 G/DL (ref 12–16)
MCH RBC QN AUTO: 28.3 PG (ref 27–31)
MCHC RBC AUTO-ENTMCNC: 33.6 G/DL (ref 32–36)
MCV RBC AUTO: 84 FL (ref 82–98)
PHOSPHATE SERPL-MCNC: 3.1 MG/DL (ref 2.7–4.5)
PLATELET # BLD AUTO: 265 K/UL (ref 150–450)
PMV BLD AUTO: 8.8 FL (ref 9.2–12.9)
POTASSIUM SERPL-SCNC: 3.9 MMOL/L (ref 3.5–5.1)
RBC # BLD AUTO: 3.89 M/UL (ref 4–5.4)
SODIUM SERPL-SCNC: 141 MMOL/L (ref 136–145)
WBC # BLD AUTO: 4.83 K/UL (ref 3.9–12.7)

## 2024-10-02 PROCEDURE — 86038 ANTINUCLEAR ANTIBODIES: CPT | Performed by: STUDENT IN AN ORGANIZED HEALTH CARE EDUCATION/TRAINING PROGRAM

## 2024-10-02 PROCEDURE — 80069 RENAL FUNCTION PANEL: CPT | Performed by: STUDENT IN AN ORGANIZED HEALTH CARE EDUCATION/TRAINING PROGRAM

## 2024-10-02 PROCEDURE — 85027 COMPLETE CBC AUTOMATED: CPT | Performed by: STUDENT IN AN ORGANIZED HEALTH CARE EDUCATION/TRAINING PROGRAM

## 2024-10-02 PROCEDURE — 80197 ASSAY OF TACROLIMUS: CPT | Performed by: STUDENT IN AN ORGANIZED HEALTH CARE EDUCATION/TRAINING PROGRAM

## 2024-10-02 PROCEDURE — 36415 COLL VENOUS BLD VENIPUNCTURE: CPT | Performed by: STUDENT IN AN ORGANIZED HEALTH CARE EDUCATION/TRAINING PROGRAM

## 2024-10-03 LAB
ANA SER QL IF: NORMAL
TACROLIMUS BLD-MCNC: 7 NG/ML (ref 5–15)

## 2024-10-08 ENCOUNTER — PATIENT MESSAGE (OUTPATIENT)
Dept: OBSTETRICS AND GYNECOLOGY | Facility: CLINIC | Age: 54
End: 2024-10-08
Payer: MEDICARE

## 2024-10-09 ENCOUNTER — TELEPHONE (OUTPATIENT)
Dept: OBSTETRICS AND GYNECOLOGY | Facility: CLINIC | Age: 54
End: 2024-10-09
Payer: MEDICARE

## 2024-10-09 DIAGNOSIS — Z12.31 ENCOUNTER FOR SCREENING MAMMOGRAM FOR MALIGNANT NEOPLASM OF BREAST: Primary | ICD-10-CM

## 2024-10-10 ENCOUNTER — OFFICE VISIT (OUTPATIENT)
Dept: NEPHROLOGY | Facility: CLINIC | Age: 54
End: 2024-10-10
Payer: MEDICARE

## 2024-10-10 ENCOUNTER — HOSPITAL ENCOUNTER (OUTPATIENT)
Dept: RADIOLOGY | Facility: HOSPITAL | Age: 54
Discharge: HOME | End: 2024-10-10
Attending: OBSTETRICS & GYNECOLOGY
Payer: MEDICARE

## 2024-10-10 VITALS
HEART RATE: 98 BPM | BODY MASS INDEX: 18.56 KG/M2 | DIASTOLIC BLOOD PRESSURE: 77 MMHG | OXYGEN SATURATION: 98 % | WEIGHT: 125.69 LBS | SYSTOLIC BLOOD PRESSURE: 147 MMHG

## 2024-10-10 DIAGNOSIS — E26.9 HYPERALDOSTERONISM: ICD-10-CM

## 2024-10-10 DIAGNOSIS — Z12.31 ENCOUNTER FOR SCREENING MAMMOGRAM FOR MALIGNANT NEOPLASM OF BREAST: ICD-10-CM

## 2024-10-10 DIAGNOSIS — M32.9 HISTORY OF SYSTEMIC LUPUS ERYTHEMATOSUS (SLE): ICD-10-CM

## 2024-10-10 DIAGNOSIS — I10 ESSENTIAL HYPERTENSION: ICD-10-CM

## 2024-10-10 DIAGNOSIS — E55.9 VITAMIN D INSUFFICIENCY: ICD-10-CM

## 2024-10-10 DIAGNOSIS — N18.4 CHRONIC KIDNEY DISEASE (CKD), STAGE IV (SEVERE): Primary | ICD-10-CM

## 2024-10-10 PROCEDURE — 3066F NEPHROPATHY DOC TX: CPT | Mod: CPTII,GC,S$GLB, | Performed by: STUDENT IN AN ORGANIZED HEALTH CARE EDUCATION/TRAINING PROGRAM

## 2024-10-10 PROCEDURE — 99214 OFFICE O/P EST MOD 30 MIN: CPT | Mod: GC,S$GLB,, | Performed by: STUDENT IN AN ORGANIZED HEALTH CARE EDUCATION/TRAINING PROGRAM

## 2024-10-10 PROCEDURE — 4010F ACE/ARB THERAPY RXD/TAKEN: CPT | Mod: CPTII,GC,S$GLB, | Performed by: STUDENT IN AN ORGANIZED HEALTH CARE EDUCATION/TRAINING PROGRAM

## 2024-10-10 PROCEDURE — 3078F DIAST BP <80 MM HG: CPT | Mod: CPTII,GC,S$GLB, | Performed by: STUDENT IN AN ORGANIZED HEALTH CARE EDUCATION/TRAINING PROGRAM

## 2024-10-10 PROCEDURE — 77067 SCR MAMMO BI INCL CAD: CPT | Mod: TC

## 2024-10-10 PROCEDURE — 3077F SYST BP >= 140 MM HG: CPT | Mod: CPTII,GC,S$GLB, | Performed by: STUDENT IN AN ORGANIZED HEALTH CARE EDUCATION/TRAINING PROGRAM

## 2024-10-10 PROCEDURE — 1159F MED LIST DOCD IN RCRD: CPT | Mod: CPTII,GC,S$GLB, | Performed by: STUDENT IN AN ORGANIZED HEALTH CARE EDUCATION/TRAINING PROGRAM

## 2024-10-10 PROCEDURE — 99999 PR PBB SHADOW E&M-EST. PATIENT-LVL III: CPT | Mod: PBBFAC,GC,, | Performed by: STUDENT IN AN ORGANIZED HEALTH CARE EDUCATION/TRAINING PROGRAM

## 2024-10-10 PROCEDURE — 3060F POS MICROALBUMINURIA REV: CPT | Mod: CPTII,GC,S$GLB, | Performed by: STUDENT IN AN ORGANIZED HEALTH CARE EDUCATION/TRAINING PROGRAM

## 2024-10-10 PROCEDURE — 3008F BODY MASS INDEX DOCD: CPT | Mod: CPTII,GC,S$GLB, | Performed by: STUDENT IN AN ORGANIZED HEALTH CARE EDUCATION/TRAINING PROGRAM

## 2024-10-10 NOTE — PROGRESS NOTES
Name: Valencia Dumont  : 1970  Date of Service: 10/10/2024     Reason for visit: CKD    HPI: Ms Dumont is a 54-year-old woman with hypertension dates back to , CKD IIIb (baseline creatinine ~1.5-1.8), type 2 diabetes mellitus diagnosed back  on insulin (most recent hemoglobin A1c 6.3%) with associated neuropathy, SLE diagnosed  currently on Benlysta (belimumab) infusions roughly once a month, auto-immune hepatitis which failed Cytoxan s/p OLTx on 2013 on Prograf  (with fluconazole) & Myfortic, history of kidney stones, cervical dysplasia s/p recent LEEP complicated by bleeding on  this year, sciatica, back pain, OA, chronic pain, GERD, vitamin D deficiency, osteoporosis, HSV-2, asthma, EUFEMIA, migraines and insomnia who presents to clinic today for follow-up of her CKD. Today she reports productive cough, seasonal allergies, asthma symptoms bothersome and some chest pain associated with cough. She is doing corticosteroid and anti-histamine nasal spray with some occasional saline flushes in addition to her prescribed inhalers. She denies any gross hematuria, foamy urine, dysuria, difficulty urinating or supra-pubic pain.    Clinic Visit 10/10/24-- patient presents today with no acute distress and no complaints at this time. She was started on Chlorthalidone 12.5 mg during office visit in 2024, later held due to worsening renal function and concern that the decline in renal function was related to this medication. She has held this medication since 24 with no improvement in her renal function, suggesting against Chlorthalidone being the main cause. STAN labs negative, and UA on  was unremarkable for blood or protein making lupus less likely. She does have a tacrolimus level of 7.0 on blood work that was drawn at 10 am, which she confirms is a 13 hour trough. She has no difficulty emptying her bladder, no dysuria, no hematuria.       #HTN; Her home blood pressures range from  140s-160s systolic. She is unable to tolerate Norvasc in the past due to headaches. Aldosterone:Renin ratio of 97.2.   Meds; Losartan 100 mg daily, Verapmil 120 mg, Imdur 30 mg, Chlorthalidone 12.5 mg dialy.  Aldactone stopped in June 2024 due to EVERT and Hyperkalemia.       #CKD 3b; CKD 2/2 to diabetes, CNI use, and uncontrolled hypertension. Baseline line renal function 1.5-1.8, denies any increased fatigue, shortness or breath, edema, hematuria, foam in the urine, urinary retention or frequency.    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    Hypertension     Kidney stone     Lupus     Osteoporosis 12/2013    Prophylactic immunotherapy (transplant immunosuppression) 1/2/2014    SBP (spontaneous bacterial peritonitis)     history of        Surgical History:   Past Surgical History:   Procedure Laterality Date    BREAST BIOPSY Left 08/2020    CHOLECYSTECTOMY      COLONOSCOPY N/A 05/31/2017    Procedure: COLONOSCOPY;  Surgeon: Marky Sun MD;  Location: [address]);  Service: Endoscopy;  Laterality: N/A;  PM prep.    CONIZATION OF CERVIX USING LOOP ELECTROSURGICAL EXCISION PROCEDURE (LEEP) N/A 01/20/2023    Procedure: CONIZATION-CERVICAL-LEEP;  Surgeon: Lu Schreiber MD;  Location: [facility];  Service: OB/GYN;  Laterality: N/A;    ESOPHAGOGASTRODUODENOSCOPY      ESOPHAGOGASTRODUODENOSCOPY N/A 01/16/2019     "Procedure: EGD (ESOPHAGOGASTRODUODENOSCOPY);  Surgeon: Deniz Guerra MD;  Location: [facility] (4TH FLR);  Service: Endoscopy;  Laterality: N/A;  labs prior, s/p liver transplant-MS    ESOPHAGOGASTRODUODENOSCOPY N/A 09/09/2020    Procedure: EGD (ESOPHAGOGASTRODUODENOSCOPY);  Surgeon: Davion Ríos MD;  Location: [facility] (4TH FLR);  Service: Endoscopy;  Laterality: N/A;  Please order the EGD at least 2 weeks after barium esophagram has been done EGD is for dysphagia  covid test 9/6-[facility]    EYE SURGERY      FUNCTIONAL ENDOSCOPIC SINUS SURGERY (FESS) USING COMPUTER-ASSISTED NAVIGATION Bilateral 02/04/2021    Procedure: FESS, USING COMPUTER-ASSISTED NAVIGATION;  Surgeon: Delores Lewis MD;  Location: [facility];  Service: ENT;  Laterality: Bilateral;  [employer] WALDEMAR [phone] TEXTED HIM @ 2:45PM ON 1-8-2021  DISC LOADED  TOMMY 1-  RN PREOP 1/28/2021---COVID NEGATIVE ON 2/3--CONSENT INCOMPLETE  H/P INCOMPLETE  --CBC IN AM    LIVER BIOPSY      LIVER TRANSPLANT  12/31/2013    REFRACTIVE SURGERY Bilateral 2010    Revision carpal tunnel and removal of scar formation from left wrist  11/17/2023    TUBAL LIGATION  2003    UPPER GASTROINTESTINAL ENDOSCOPY         Allergies:   Review of patient's allergies indicates:   Allergen Reactions    Norvasc [amlodipine]      Severe headache    Bactrim [sulfamethoxazole-trimethoprim]      Gets yeast infection.    Doxycycline Rash    Pcn [penicillins] Rash       Medications:     Current Outpatient Medications:     acetaminophen (TYLENOL) 650 MG TbSR, Take 1 tablet (650 mg total) by mouth every 6 (six) hours., Disp: 60 tablet, Rfl: 0    amitriptyline (ELAVIL) 10 MG tablet, Take 1 tablet (10 mg total) by mouth every evening., Disp: 90 tablet, Rfl: 2    azelastine (ASTELIN) 137 mcg (0.1 %) nasal spray, 1 spray (137 mcg total) by Nasal route 2 (two) times daily., Disp: 30 mL, Rfl: 11    BD ULTRA-FINE JANESSA PEN NEEDLE 32 gauge x 5/32" Ndle, For five times daily " insulin injections, Disp: 450 each, Rfl: 3    blood-glucose meter,continuous (DEXCOM G6 ) Misc, 1 each by Misc.(Non-Drug; Combo Route) route once. for 1 dose, Disp: 1 each, Rfl: 0    blood-glucose sensor (FREESTYLE CHUN 3 SENSOR) Karen, 1 Device by Misc.(Non-Drug; Combo Route) route every 14 (fourteen) days., Disp: 2 each, Rfl: 11    blood-glucose transmitter (DEXCOM G6 TRANSMITTER) Karen, 1 each by Misc.(Non-Drug; Combo Route) route once a week, Disp: 1 Device, Rfl: 3    diazepam (VALIUM) 5 MG tablet, Take 5 mg by mouth 3 (three) times daily as needed. , Disp: , Rfl: 0    diclofenac sodium (VOLTAREN) 1 % Gel, APPLY 2 GRAM to wrist and 4 g to left foot TOPICAL ROUTE 4 TIMES PER DAY, Disp: 450 each, Rfl: 2    estradioL (ESTRACE) 0.01 % (0.1 mg/gram) vaginal cream, Place 1 g vaginally 3 (three) times a week., Disp: 42 g, Rfl: 3    famotidine (PEPCID) 40 MG tablet, , Disp: , Rfl:     fluconazole (DIFLUCAN) 150 MG Tab, TAKE ONE TABLET BY MOUTH once for ONE dose, Disp: 1 tablet, Rfl: 0    fluocinolone (SYNALAR) 0.01 % external solution, APPLY a couple of DROPS INTO BOTH EARS TWICE DAILY AS NEEDED FOR ITCHING, Disp: 60 mL, Rfl: 1    fluticasone propionate (FLONASE) 50 mcg/actuation nasal spray, 1 spray by Each Nostril route 2 (two) times daily., Disp: , Rfl:     fluticasone propionate (FLONASE) 50 mcg/actuation nasal spray, 2 sprays (100 mcg total) by Each Nostril route 2 (two) times daily., Disp: 18.2 mL, Rfl: 11    gabapentin (NEURONTIN) 300 MG capsule, TAKE 1 CAPSULE BY MOUTH THREE TIMES DAILY, Disp: , Rfl:     hydrOXYzine HCl (ATARAX) 10 MG Tab, TAKE 1 OR 2 TABLETS BY MOUTH THREE TIMES DAILY AS NEEDED FOR ITCHING., Disp: , Rfl: 0    insulin glargine U-100, Lantus, (LANTUS SOLOSTAR U-100 INSULIN) 100 unit/mL (3 mL) InPn pen, Inject 4 Units into the skin every evening., Disp: 15 mL, Rfl: 3    insulin lispro (HUMALOG KWIKPEN INSULIN) 100 unit/mL pen, 3 units before BREAKFAST and lunch and FIVE units before dinner  with sliding scale, up to 25 units daily, Disp: 45 mL, Rfl: 3    Lactobac. rhamnosus GG-inulin 10 billion cell -200 mg Cap, Take 1 capsule by mouth 2 (two) times daily., Disp: 30 capsule, Rfl: 0    levocetirizine (XYZAL) 5 MG tablet, Take 5 mg by mouth every evening., Disp: , Rfl: 3    LIDOcaine-prilocaine (EMLA) cream, 2 (two) times daily. Apply to affected area, Disp: , Rfl:     losartan (COZAAR) 100 MG tablet, Take 1 tablet (100 mg total) by mouth once daily., Disp: 30 tablet, Rfl: 2    magnesium oxide (MAG-OX) 400 mg (241.3 mg magnesium) tablet, Take 400 mg by mouth 2 (two) times daily., Disp: , Rfl:     meclizine (ANTIVERT) 25 mg tablet, TAKE ONE TABLET BY MOUTH AT BEDTIME AS NEEDED for dizziness., Disp: , Rfl: 0    mometasone 0.1% (ELOCON) 0.1 % cream, APPLY TOPICALLY TO THE FACE TWICE DAILY FOR ONE WEEK, Disp: , Rfl:     MULTIVIT,THER IRON,CA,FA & MIN (MULTIVITAMIN) Tab, Take 1 tablet by mouth once daily., Disp: 30 tablet, Rfl: 0    mycophenolate (MYFORTIC) 180 MG TbEC, Take 2 tablets (360 mg total) by mouth 2 (two) times daily., Disp: 120 tablet, Rfl: 11    neomycin-polymyxin-hydrocortisone (CORTISPORIN) otic solution, INSTILL TWO DROPS INTO BOTH EARS FOUR TIMES DAILY FOR 10 DAYS, Disp: , Rfl: 0    NURTEC 75 mg odt, PLACE ONE TABLET on tongue, alternatively UNDER THE TONGUE ONCE DAILY AS NEEDED FOR MIGRAINE, Disp: 8 tablet, Rfl: 2    nystatin (MYCOSTATIN) 100,000 unit/mL suspension, SWISH AND SPIT FIVE MILLILITERS BY MOUTH FOUR TIMES DAILY FOR 7 DAYS., Disp: , Rfl: 1    ondansetron (ZOFRAN) 4 MG tablet, TAKE TWO TABLETS BY MOUTH EVERY 8 HOURS AS NEEDED FOR NAUSEA., Disp: , Rfl:     oxyCODONE (ROXICODONE) 5 MG immediate release tablet, Take 1 tablet (5 mg total) by mouth every 4 (four) hours as needed for Pain., Disp: 5 tablet, Rfl: 0    oxycodone-acetaminophen (PERCOCET) 7.5-325 mg per tablet, Take 1 tablet by mouth every 4 (four) hours as needed for Pain., Disp: , Rfl:     polyethylene glycol (GLYCOLAX) 17  gram/dose powder, Mix 1 capful (17 g) with liquid and by mouth 2 (two) times daily., Disp: 1530 g, Rfl: 11    PROAIR HFA 90 mcg/actuation inhaler, Inhale 2 puffs into the lungs 3 (three) times daily as needed. , Disp: , Rfl: 3    promethazine-dextromethorphan (PROMETHAZINE-DM) 6.25-15 mg/5 mL Syrp, Take by mouth 2 (two) times daily as needed. , Disp: , Rfl: 0    rosuvastatin (CRESTOR) 5 MG tablet, Take 5 mg by mouth once daily., Disp: , Rfl:     SENNA 8.6 mg tablet, Take 1 tablet by mouth 2 (two) times daily. Take while taking narcotics, Disp: 30 tablet, Rfl: 0    sertraline (ZOLOFT) 50 MG tablet, Take 50 mg by mouth once daily., Disp: , Rfl: 11    SSD 1 % cream, 1 application 2 (two) times daily. Apply to affected area, Disp: , Rfl: 0    tacrolimus (PROGRAF) 1 MG Cap, Take 3 capsules (3 mg total) by mouth every morning AND 2 capsules (2 mg total) every evening., Disp: 150 capsule, Rfl: 11    tinidazole (TINDAMAX) 250 MG tablet, SMARTSI Tablet(s) By Mouth Every Morning, Disp: , Rfl:     triamcinolone acetonide 0.1% (KENALOG) 0.1 % cream, Apply topically 2 times daily, Disp: 30 g, Rfl: 0    TRULICITY 3 mg/0.5 mL pen injector, Inject 3 mg into the skin every 7 days., Disp: , Rfl:     valACYclovir (VALTREX) 1000 MG tablet, Take 1,000 mg by mouth once daily., Disp: , Rfl:     verapamil (CALAN-SR) 120 MG CR tablet, TAKE ONE TABLET ONCE DAILY FOR BLOOD PRESSURE, Disp: , Rfl: 3    azithromycin (Z-GLORIA) 250 MG tablet, Take 250 mg by mouth as needed. (Patient not taking: Reported on 10/10/2024), Disp: , Rfl:     budesonide-formoterol 160-4.5 mcg (SYMBICORT) 160-4.5 mcg/actuation HFAA, Inhale 2 puffs into the lungs., Disp: , Rfl:     buPROPion (WELLBUTRIN XL) 150 MG TB24 tablet, Take 300 mg by mouth., Disp: , Rfl:     chlorthalidone (HYGROTEN) 25 MG Tab, Take 0.5 tablets (12.5 mg total) by mouth once daily. (Patient not taking: Reported on 10/10/2024), Disp: 15 tablet, Rfl: 11    ciclopirox (PENLAC) 8 % Soln, Apply  topically nightly. (Patient not taking: Reported on 10/10/2024), Disp: 6.6 mL, Rfl: 3    clotrimazole-betamethasone 1-0.05% (LOTRISONE) cream, APPLY TOPICALLY TO THE AFFECTED AREA TWICE DAILY FOR 10 DAYS (Patient not taking: Reported on 10/10/2024), Disp: , Rfl:     dicyclomine (BENTYL) 10 MG capsule, TAKE 1 CAPSULE BY MOUTH EVERY 8 HOURS AS NEEDED for spasms (Patient not taking: Reported on 10/10/2024), Disp: , Rfl:     DILTIAZEM HCL (DILTIAZEM 2% CREAM), Apply topically 3 (three) times daily. Apply topically to anal area. (Patient not taking: Reported on 10/10/2024), Disp: 30 g, Rfl: 0    erythromycin with ethanol (EMGEL) 2 % gel, ADD 30GM (1 TUBE) TO THE SOAKING DEVICE AND ALLOW WATER TO AGITATE. PLACE AFFECTED AREAS INTO WATER AND SOAK FOR 10 MINUTES 1-2 TIMES DAILY (Patient not taking: Reported on 10/10/2024), Disp: , Rfl: 1    ferrous sulfate (FEOSOL) 325 mg (65 mg iron) Tab tablet, Take 1 tablet (325 mg total) by mouth every 12 (twelve) hours. (Patient not taking: Reported on 10/10/2024), Disp: 60 tablet, Rfl: 4    gentamicin (GARAMYCIN) 0.1 % cream, ADD 30GM (1 TUBE) TO THE SOAKING DEVICE AND ALLOW WATER TO AGITATE. PLACE AFFECTED AREAS INTO WATER AND SOAK FOR 10 MINUTES 1-2 TIMES DAILY (Patient not taking: Reported on 10/10/2024), Disp: , Rfl: 1    hydrocortisone (ANUSOL-HC) 2.5 % rectal cream, Place rectally 2 (two) times daily. Apply per rectum bid (Patient not taking: Reported on 10/10/2024), Disp: 30 g, Rfl: 3    isosorbide mononitrate (IMDUR) 30 MG 24 hr tablet, Take 1 tablet (30 mg total) by mouth 2 (two) times a day., Disp: 180 tablet, Rfl: 3    ketoconazole (NIZORAL) 2 % cream, ADD 30GM (1/2 TUBE) TO THE SOAKING DEVICE AND ALLOW WATER TO AGITATE. PLACE AFFECTED AREAS INTO WATER AND SOAK FOR 10 MINUTES 1-2 TIMES BRENDON (Patient not taking: Reported on 10/10/2024), Disp: , Rfl: 1    loratadine (CLARITIN) 10 mg tablet, Take 1 tablet (10 mg total) by mouth once daily., Disp: 30 tablet, Rfl: 0     nystatin-triamcinolone (MYCOLOG II) cream, Apply to affected area 2 times daily, Disp: 30 g, Rfl: 1    pantoprazole (PROTONIX) 40 MG tablet, Take 1 tablet (40 mg total) by mouth 2 (two) times daily. Take immediately in am when wake up and then 30 minutes prior to dinner, Disp: 60 tablet, Rfl: 11    topiramate (TOPAMAX) 50 MG tablet, SMARTSI Tablet(s) By Mouth Every Evening (Patient not taking: Reported on 10/10/2024), Disp: , Rfl:     Family History:   Family History   Problem Relation Name Age of Onset    Hypertension Mother      Cataracts Mother      Diabetes Father      Alzheimer's disease Father      Diabetes Paternal Grandfather      Diabetes Paternal Grandmother      No Known Problems Maternal Grandmother      Bone cancer Maternal Grandfather      Breast cancer Maternal Aunt  55    Hypertension Brother      Diabetes Brother      No Known Problems Sister      No Known Problems Maternal Uncle      No Known Problems Paternal Aunt      No Known Problems Paternal Uncle      Stroke Neg Hx      Cancer Neg Hx      Colon cancer Neg Hx      Esophageal cancer Neg Hx      Stomach cancer Neg Hx      Rectal cancer Neg Hx      Amblyopia Neg Hx      Blindness Neg Hx      Glaucoma Neg Hx      Macular degeneration Neg Hx      Retinal detachment Neg Hx      Strabismus Neg Hx      Thyroid disease Neg Hx         Social History:   Social History     Socioeconomic History    Marital status:     Number of children: 3   Occupational History     Employer: [employer]   Tobacco Use    Smoking status: Never    Smokeless tobacco: Never    Tobacco comments:     disability; ; 3 children   Substance and Sexual Activity    Alcohol use: Yes     Alcohol/week: 1.0 standard drink of alcohol     Types: 1 Glasses of wine per week     Comment: occasionally     Drug use: No    Sexual activity: Yes     Partners: Male     Birth control/protection: See Surgical Hx, Post-menopausal     Comment: s/p tubal ligation,  since       Social Drivers of Health     Financial Resource Strain: Low Risk  (7/29/2024)    Received from [facility]    Overall Financial Resource Strain (CARDIA)     Difficulty of Paying Living Expenses: Not hard at all   Food Insecurity: No Food Insecurity (8/8/2024)    Received from [facility]    Hunger Vital Sign     Worried About Running Out of Food in the Last Year: Never true     Ran Out of Food in the Last Year: Never true   Transportation Needs: No Transportation Needs (8/8/2024)    Received from [facility]    PRAPARE - Transportation     Lack of Transportation (Medical): No     Lack of Transportation (Non-Medical): No   Physical Activity: Insufficiently Active (7/29/2024)    Received from [facility]    Exercise Vital Sign     Days of Exercise per Week: 2 days     Minutes of Exercise per Session: 30 min   Stress: No Stress Concern Present (7/29/2024)    Received from [facility]    [employer] [place] [employer] - Occupational Stress Questionnaire     Feeling of Stress : Only a little   Housing Stability: Low Risk  (8/8/2024)    Received from [facility]    Housing Stability Vital Sign     Unable to Pay for Housing in the Last Year: No     Number of Places Lived in the Last Year: 1     Unstable Housing in the Last Year: No     Review of Systems   Constitutional:  Positive for malaise/fatigue. Negative for chills, diaphoresis, fever and weight loss.   HENT:  Positive for hearing loss. Negative for congestion, ear pain, sore throat and tinnitus.    Eyes:  Negative for blurred vision, pain and redness.   Respiratory:  Negative for cough, sputum production, shortness of breath and wheezing.    Cardiovascular:  Negative for chest pain, palpitations, leg swelling and PND.   Gastrointestinal:  Positive for constipation. Negative for abdominal pain, blood in stool, diarrhea, heartburn, nausea and vomiting.   Genitourinary:  Positive for frequency (nocturia x4). Negative for dysuria, flank pain, hematuria and  urgency.   Musculoskeletal:  Negative for back pain, joint pain, myalgias and neck pain.   Skin:  Negative for rash.   Neurological:  Negative for dizziness, sensory change, speech change, seizures, loss of consciousness and headaches.   Endo/Heme/Allergies:  Does not bruise/bleed easily.   Psychiatric/Behavioral:  Negative for depression. The patient is not nervous/anxious and does not have insomnia.        Vitals:   Vitals:    10/10/24 1345   BP: (!) 147/77   Pulse: 98   SpO2: 98%   Weight: 57 kg (125 lb 10.6 oz)           Body mass index is 18.56 kg/m².  Physical Exam  Vitals and nursing note reviewed.   Constitutional:       General: She is not in acute distress.     Appearance: Normal appearance. She is not ill-appearing or toxic-appearing.   HENT:      Head: Normocephalic and atraumatic.      Right Ear: External ear normal.      Left Ear: External ear normal.      Nose: Nose normal.      Mouth/Throat:      Mouth: Mucous membranes are moist.   Eyes:      Extraocular Movements: Extraocular movements intact.   Cardiovascular:      Rate and Rhythm: Normal rate and regular rhythm.      Pulses: Normal pulses.      Heart sounds: Normal heart sounds.   Pulmonary:      Effort: Pulmonary effort is normal.      Breath sounds: Normal breath sounds.   Abdominal:      General: Abdomen is flat.      Palpations: Abdomen is soft.   Musculoskeletal:         General: No swelling. Normal range of motion.      Cervical back: Normal range of motion and neck supple.      Right lower leg: No edema.      Left lower leg: No edema.   Skin:     General: Skin is warm.      Capillary Refill: Capillary refill takes less than 2 seconds.      Coloration: Skin is not jaundiced.      Findings: No bruising, lesion or rash.   Neurological:      General: No focal deficit present.      Mental Status: She is alert and oriented to person, place, and time.   Psychiatric:         Mood and Affect: Mood normal.         Behavior: Behavior normal.          Thought Content: Thought content normal.         Judgment: Judgment normal.            Assessment/Plan:   ASSESSMENT & PLAN:   Ms. Valencia Dumont is a 54 y.o. female here for follow up of CKD 3b secondary to diabetes, HTN, and CNI nephrotoxicity. Baseline creatinine is roughly 1.5-1.8. Suspect that her worsening renal function is a result of long term use of CNI use for her liver transplant, as well as her hypertension.       Renal ultrasound 6/19/24;   Right kidney measures 9.4 cm with a 1.1 cm simple cyst in the upper pole (similar to ultrasound in 2022).   Left kidney measures 10.1 cm     CT Adrenals; Adrenal glands are symmetric and normal in size and morphology without appreciable nodularity.     Plan  -Recommend targeting a goal tacrolimus trough closer to 4.0, will reach out to Dr. Jacob Horvath who is managing her home immunosuppressive medications.   -She should resume her Chlorthalidone 12.5 mg daily and let me know if her blood pressures are above 130/80.  -Continue to avoid NSAIDs  -Continue Ergocalciferol 50,000 since vitamin D is low.   -Stop Protonix    Chronic kidney disease (CKD), stage IV (severe)  -     Renal Function Panel; Future; Expected date: 01/10/2025  -     CBC Without Differential; Future; Expected date: 01/10/2025  -     Urinalysis; Future; Expected date: 01/10/2025  -     Protein / creatinine ratio, urine; Future; Expected date: 01/10/2025    Essential hypertension    History of systemic lupus erythematosus (SLE)    Hyperaldosteronism    Vitamin D insufficiency            RTC in 1 month    Discussed with Dr. Schuler  - staff attestation to follow      Boubacar Charles MD  Nephrology PGY-5    [address] [place] [address]  [phone]

## 2024-10-14 ENCOUNTER — OFFICE VISIT (OUTPATIENT)
Dept: PODIATRY | Facility: CLINIC | Age: 54
End: 2024-10-14
Payer: MEDICARE

## 2024-10-14 DIAGNOSIS — M20.41 HAMMER TOES OF BOTH FEET: ICD-10-CM

## 2024-10-14 DIAGNOSIS — M20.5X2 HALLUX LIMITUS, ACQUIRED, LEFT: ICD-10-CM

## 2024-10-14 DIAGNOSIS — E11.49 TYPE II DIABETES MELLITUS WITH NEUROLOGICAL MANIFESTATIONS: Primary | ICD-10-CM

## 2024-10-14 DIAGNOSIS — M20.5X1 HALLUX LIMITUS, ACQUIRED, RIGHT: ICD-10-CM

## 2024-10-14 DIAGNOSIS — M79.672 LEFT FOOT PAIN: ICD-10-CM

## 2024-10-14 DIAGNOSIS — M20.42 HAMMER TOES OF BOTH FEET: ICD-10-CM

## 2024-10-14 DIAGNOSIS — M72.2 PLANTAR FASCIITIS: ICD-10-CM

## 2024-10-14 PROCEDURE — 4010F ACE/ARB THERAPY RXD/TAKEN: CPT | Mod: CPTII,S$GLB,, | Performed by: PODIATRIST

## 2024-10-14 PROCEDURE — 99214 OFFICE O/P EST MOD 30 MIN: CPT | Mod: 25,S$GLB,, | Performed by: PODIATRIST

## 2024-10-14 PROCEDURE — 3066F NEPHROPATHY DOC TX: CPT | Mod: CPTII,S$GLB,, | Performed by: PODIATRIST

## 2024-10-14 PROCEDURE — 99999 PR PBB SHADOW E&M-EST. PATIENT-LVL II: CPT | Mod: PBBFAC,,, | Performed by: PODIATRIST

## 2024-10-14 PROCEDURE — 20550 NJX 1 TENDON SHEATH/LIGAMENT: CPT | Mod: LT,S$GLB,, | Performed by: PODIATRIST

## 2024-10-14 PROCEDURE — 3060F POS MICROALBUMINURIA REV: CPT | Mod: CPTII,S$GLB,, | Performed by: PODIATRIST

## 2024-10-14 RX ORDER — TRIAMCINOLONE ACETONIDE 40 MG/ML
20 INJECTION, SUSPENSION INTRA-ARTICULAR; INTRAMUSCULAR ONCE
Status: COMPLETED | OUTPATIENT
Start: 2024-10-14 | End: 2024-10-14

## 2024-10-14 RX ORDER — DEXAMETHASONE SODIUM PHOSPHATE 4 MG/ML
2 INJECTION, SOLUTION INTRA-ARTICULAR; INTRALESIONAL; INTRAMUSCULAR; INTRAVENOUS; SOFT TISSUE ONCE
Status: COMPLETED | OUTPATIENT
Start: 2024-10-14 | End: 2024-10-14

## 2024-10-14 RX ORDER — CICLOPIROX 80 MG/ML
SOLUTION TOPICAL NIGHTLY
Qty: 6.6 ML | Refills: 3 | Status: SHIPPED | OUTPATIENT
Start: 2024-10-14

## 2024-10-14 RX ORDER — AMMONIUM LACTATE 12 G/100G
1 CREAM TOPICAL DAILY
Qty: 140 G | Refills: 5 | Status: SHIPPED | OUTPATIENT
Start: 2024-10-14

## 2024-10-14 RX ADMIN — DEXAMETHASONE SODIUM PHOSPHATE 2 MG: 4 INJECTION, SOLUTION INTRA-ARTICULAR; INTRALESIONAL; INTRAMUSCULAR; INTRAVENOUS; SOFT TISSUE at 03:10

## 2024-10-14 RX ADMIN — TRIAMCINOLONE ACETONIDE 20 MG: 40 INJECTION, SUSPENSION INTRA-ARTICULAR; INTRAMUSCULAR at 03:10

## 2024-10-14 NOTE — PROGRESS NOTES
Subjective:      Patient ID: Valencia Dumont is a 54 y.o. female.    Chief Complaint: No chief complaint on file.    Valencia is a 54 y.o. female who presents to the clinic upon referral from Dr. Manuela gates. provider found  for evaluation and treatment of diabetic feet. 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   CC#1- Left heel pain worse after long periods of walking    CC#2- Ingrown nail left 3rd toe. Denies noticing drainage to area.     PCP: Timur Lion MD    Date Last Seen by PCP:   No chief complaint on file.            Current shoe gear: Casual shoes    Hemoglobin A1C   Date Value Ref Range Status   09/11/2023 6.3 (H) 4.0 - 5.6 % Final     Comment:     ADA Screening Guidelines:  5.7-6.4%  Consistent with prediabetes  >or=6.5%  Consistent with diabetes    High levels of fetal hemoglobin interfere with the HbA1C  assay. Heterozygous hemoglobin variants (HbS, HgC, etc)do  not significantly interfere with this assay.   However, presence of multiple variants may affect accuracy.     08/26/2022 7.1 (H) 4.0 - 5.6 % Final     Comment:     ADA Screening Guidelines:  5.7-6.4%  Consistent with prediabetes  >or=6.5%  Consistent with diabetes    High levels of fetal hemoglobin interfere with the HbA1C  assay. Heterozygous hemoglobin variants (HbS, HgC, etc)do  not significantly interfere with this assay.   However, presence of multiple variants may affect accuracy.     01/19/2021 7.4 (H) 4.0 - 5.6 % Final     Comment:     ADA Screening Guidelines:  5.7-6.4%  Consistent with  prediabetes  >or=6.5%  Consistent with diabetes  High levels of fetal hemoglobin interfere with the HbA1C  assay. Heterozygous hemoglobin variants (HbS, HgC, etc)do  not significantly interfere with this assay.   However, presence of multiple variants may affect accuracy.               Patient Active Problem List   Diagnosis    Microcytic normochromic anemia    SLE (systemic lupus erythematosus)    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    Prophylactic immunotherapy (transplant immunosuppression)    Adverse effect of glucocorticoid or synthetic analogue    Hypoglycemia associated with diabetes    Swelling of left knee joint    Osteoporosis    Abnormal CT scan, colon    Fatigue    Diabetic polyneuropathy associated with type 2 diabetes mellitus    Essential hypertension    Type 2 diabetes mellitus with stage 3 chronic kidney disease, with long-term current use of insulin    Vitamin D deficiency    CKD stage G3a/A1, GFR 45-59 and albumin creatinine ratio <30 mg/g    Chronic kidney disease-mineral and bone disorder    CKD (chronic kidney disease), stage III    Immunosuppression    Refractive error    Vitreous floaters of both eyes    Decreased strength of lower extremity    Long-term use of Plaquenil    Hoarse    Status post liver transplant    Obstructive sleep apnea syndrome    Snoring    Iron deficiency anemia    Decreased ROM of foot    Gait abnormality    Chronic sinusitis    Right ear pain    Status post LEEP (loop electrosurgical excision procedure) of cervix    Therapeutic drug monitoring    Dyslipidemia    Hyperparathyroidism    Chronic kidney disease (CKD), stage IV (severe)       Current Outpatient Medications on File Prior to Visit   Medication Sig Dispense Refill    acetaminophen (TYLENOL) 650 MG TbSR Take 1 tablet (650 mg total) by mouth every 6 (six) hours. 60 tablet 0    amitriptyline (ELAVIL) 10 MG tablet Take 1 tablet (10 mg total) by mouth every evening. 90 tablet 2    azelastine (ASTELIN) 137 mcg (0.1  "%) nasal spray 1 spray (137 mcg total) by Nasal route 2 (two) times daily. 30 mL 11    azithromycin (Z-GLORIA) 250 MG tablet Take 250 mg by mouth as needed. (Patient not taking: Reported on 10/10/2024)      BD ULTRA-FINE JANESSA PEN NEEDLE 32 gauge x 5/32" Ndle For five times daily insulin injections 450 each 3    blood-glucose meter,continuous (DEXCOM G6 ) Misc 1 each by Misc.(Non-Drug; Combo Route) route once. for 1 dose 1 each 0    blood-glucose sensor (FREESTYLE CHUN 3 SENSOR) Karen 1 Device by Misc.(Non-Drug; Combo Route) route every 14 (fourteen) days. 2 each 11    blood-glucose transmitter (DEXCOM G6 TRANSMITTER) Karen 1 each by Misc.(Non-Drug; Combo Route) route once a week 1 Device 3    budesonide-formoterol 160-4.5 mcg (SYMBICORT) 160-4.5 mcg/actuation HFAA Inhale 2 puffs into the lungs.      buPROPion (WELLBUTRIN XL) 150 MG TB24 tablet Take 300 mg by mouth.      chlorthalidone (HYGROTEN) 25 MG Tab Take 0.5 tablets (12.5 mg total) by mouth once daily. (Patient not taking: Reported on 10/10/2024) 15 tablet 11    ciclopirox (PENLAC) 8 % Soln Apply topically nightly. (Patient not taking: Reported on 10/10/2024) 6.6 mL 3    clotrimazole-betamethasone 1-0.05% (LOTRISONE) cream APPLY TOPICALLY TO THE AFFECTED AREA TWICE DAILY FOR 10 DAYS (Patient not taking: Reported on 10/10/2024)      diazepam (VALIUM) 5 MG tablet Take 5 mg by mouth 3 (three) times daily as needed.   0    diclofenac sodium (VOLTAREN) 1 % Gel APPLY 2 GRAM to wrist and 4 g to left foot TOPICAL ROUTE 4 TIMES PER  each 2    dicyclomine (BENTYL) 10 MG capsule TAKE 1 CAPSULE BY MOUTH EVERY 8 HOURS AS NEEDED for spasms (Patient not taking: Reported on 10/10/2024)      DILTIAZEM HCL (DILTIAZEM 2% CREAM) Apply topically 3 (three) times daily. Apply topically to anal area. (Patient not taking: Reported on 10/10/2024) 30 g 0    erythromycin with ethanol (EMGEL) 2 % gel ADD 30GM (1 TUBE) TO THE SOAKING DEVICE AND ALLOW WATER TO AGITATE. PLACE " AFFECTED AREAS INTO WATER AND SOAK FOR 10 MINUTES 1-2 TIMES DAILY (Patient not taking: Reported on 10/10/2024)  1    estradioL (ESTRACE) 0.01 % (0.1 mg/gram) vaginal cream Place 1 g vaginally 3 (three) times a week. 42 g 3    famotidine (PEPCID) 40 MG tablet       ferrous sulfate (FEOSOL) 325 mg (65 mg iron) Tab tablet Take 1 tablet (325 mg total) by mouth every 12 (twelve) hours. (Patient not taking: Reported on 10/10/2024) 60 tablet 4    fluconazole (DIFLUCAN) 150 MG Tab TAKE ONE TABLET BY MOUTH once for ONE dose 1 tablet 0    fluocinolone (SYNALAR) 0.01 % external solution APPLY a couple of DROPS INTO BOTH EARS TWICE DAILY AS NEEDED FOR ITCHING 60 mL 1    fluticasone propionate (FLONASE) 50 mcg/actuation nasal spray 1 spray by Each Nostril route 2 (two) times daily.      fluticasone propionate (FLONASE) 50 mcg/actuation nasal spray 2 sprays (100 mcg total) by Each Nostril route 2 (two) times daily. 18.2 mL 11    gabapentin (NEURONTIN) 300 MG capsule TAKE 1 CAPSULE BY MOUTH THREE TIMES DAILY      gentamicin (GARAMYCIN) 0.1 % cream ADD 30GM (1 TUBE) TO THE SOAKING DEVICE AND ALLOW WATER TO AGITATE. PLACE AFFECTED AREAS INTO WATER AND SOAK FOR 10 MINUTES 1-2 TIMES DAILY (Patient not taking: Reported on 10/10/2024)  1    hydrocortisone (ANUSOL-HC) 2.5 % rectal cream Place rectally 2 (two) times daily. Apply per rectum bid (Patient not taking: Reported on 10/10/2024) 30 g 3    hydrOXYzine HCl (ATARAX) 10 MG Tab TAKE 1 OR 2 TABLETS BY MOUTH THREE TIMES DAILY AS NEEDED FOR ITCHING.  0    insulin glargine U-100, Lantus, (LANTUS SOLOSTAR U-100 INSULIN) 100 unit/mL (3 mL) InPn pen Inject 4 Units into the skin every evening. 15 mL 3    insulin lispro (HUMALOG KWIKPEN INSULIN) 100 unit/mL pen 3 units before BREAKFAST and lunch and FIVE units before dinner with sliding scale, up to 25 units daily 45 mL 3    isosorbide mononitrate (IMDUR) 30 MG 24 hr tablet Take 1 tablet (30 mg total) by mouth 2 (two) times a day. 180 tablet 3     ketoconazole (NIZORAL) 2 % cream ADD 30GM (1/2 TUBE) TO THE SOAKING DEVICE AND ALLOW WATER TO AGITATE. PLACE AFFECTED AREAS INTO WATER AND SOAK FOR 10 MINUTES 1-2 TIMES BRENDON (Patient not taking: Reported on 10/10/2024)  1    Lactobac. rhamnosus GG-inulin 10 billion cell -200 mg Cap Take 1 capsule by mouth 2 (two) times daily. 30 capsule 0    levocetirizine (XYZAL) 5 MG tablet Take 5 mg by mouth every evening.  3    LIDOcaine-prilocaine (EMLA) cream 2 (two) times daily. Apply to affected area      loratadine (CLARITIN) 10 mg tablet Take 1 tablet (10 mg total) by mouth once daily. 30 tablet 0    losartan (COZAAR) 100 MG tablet Take 1 tablet (100 mg total) by mouth once daily. 30 tablet 2    magnesium oxide (MAG-OX) 400 mg (241.3 mg magnesium) tablet Take 400 mg by mouth 2 (two) times daily.      meclizine (ANTIVERT) 25 mg tablet TAKE ONE TABLET BY MOUTH AT BEDTIME AS NEEDED for dizziness.  0    mometasone 0.1% (ELOCON) 0.1 % cream APPLY TOPICALLY TO THE FACE TWICE DAILY FOR ONE WEEK      MULTIVIT,THER IRON,CA,FA & MIN (MULTIVITAMIN) Tab Take 1 tablet by mouth once daily. 30 tablet 0    mycophenolate (MYFORTIC) 180 MG TbEC Take 2 tablets (360 mg total) by mouth 2 (two) times daily. 120 tablet 11    neomycin-polymyxin-hydrocortisone (CORTISPORIN) otic solution INSTILL TWO DROPS INTO BOTH EARS FOUR TIMES DAILY FOR 10 DAYS  0    NURTEC 75 mg odt PLACE ONE TABLET on tongue, alternatively UNDER THE TONGUE ONCE DAILY AS NEEDED FOR MIGRAINE 8 tablet 2    nystatin (MYCOSTATIN) 100,000 unit/mL suspension SWISH AND SPIT FIVE MILLILITERS BY MOUTH FOUR TIMES DAILY FOR 7 DAYS.  1    nystatin-triamcinolone (MYCOLOG II) cream Apply to affected area 2 times daily 30 g 1    ondansetron (ZOFRAN) 4 MG tablet TAKE TWO TABLETS BY MOUTH EVERY 8 HOURS AS NEEDED FOR NAUSEA.      oxyCODONE (ROXICODONE) 5 MG immediate release tablet Take 1 tablet (5 mg total) by mouth every 4 (four) hours as needed for Pain. 5 tablet 0     oxycodone-acetaminophen (PERCOCET) 7.5-325 mg per tablet Take 1 tablet by mouth every 4 (four) hours as needed for Pain.      pantoprazole (PROTONIX) 40 MG tablet Take 1 tablet (40 mg total) by mouth 2 (two) times daily. Take immediately in am when wake up and then 30 minutes prior to dinner 60 tablet 11    polyethylene glycol (GLYCOLAX) 17 gram/dose powder Mix 1 capful (17 g) with liquid and by mouth 2 (two) times daily. 1530 g 11    PROAIR HFA 90 mcg/actuation inhaler Inhale 2 puffs into the lungs 3 (three) times daily as needed.   3    promethazine-dextromethorphan (PROMETHAZINE-DM) 6.25-15 mg/5 mL Syrp Take by mouth 2 (two) times daily as needed.   0    rosuvastatin (CRESTOR) 5 MG tablet Take 5 mg by mouth once daily.      SENNA 8.6 mg tablet Take 1 tablet by mouth 2 (two) times daily. Take while taking narcotics 30 tablet 0    sertraline (ZOLOFT) 50 MG tablet Take 50 mg by mouth once daily.  11    SSD 1 % cream 1 application 2 (two) times daily. Apply to affected area  0    tacrolimus (PROGRAF) 1 MG Cap Take 3 capsules (3 mg total) by mouth every morning AND 2 capsules (2 mg total) every evening. 150 capsule 11    tinidazole (TINDAMAX) 250 MG tablet SMARTSI Tablet(s) By Mouth Every Morning      topiramate (TOPAMAX) 50 MG tablet SMARTSI Tablet(s) By Mouth Every Evening (Patient not taking: Reported on 10/10/2024)      triamcinolone acetonide 0.1% (KENALOG) 0.1 % cream Apply topically 2 times daily 30 g 0    TRULICITY 3 mg/0.5 mL pen injector Inject 3 mg into the skin every 7 days.      valACYclovir (VALTREX) 1000 MG tablet Take 1,000 mg by mouth once daily.      verapamil (CALAN-SR) 120 MG CR tablet TAKE ONE TABLET ONCE DAILY FOR BLOOD PRESSURE  3     No current facility-administered medications on file prior to visit.       Review of patient's allergies indicates:   Allergen Reactions    Norvasc [amlodipine]      Severe headache    Bactrim [sulfamethoxazole-trimethoprim]      Gets yeast infection.     Doxycycline Rash    Pcn [penicillins] Rash       Past Surgical History:   Procedure Laterality Date    BREAST BIOPSY Left 08/2020    CHOLECYSTECTOMY      COLONOSCOPY N/A 05/31/2017    Procedure: COLONOSCOPY;  Surgeon: Marky Sun MD;  [facility]  Service: Endoscopy;  Laterality: N/A;  PM prep.    CONIZATION OF CERVIX USING LOOP ELECTROSURGICAL EXCISION PROCEDURE (LEEP) N/A 01/20/2023    Procedure: CONIZATION-CERVICAL-LEEP;  Surgeon: Lu Schreiber MD;  Location: [facility];  Service: OB/GYN;  Laterality: N/A;    ESOPHAGOGASTRODUODENOSCOPY      ESOPHAGOGASTRODUODENOSCOPY N/A 01/16/2019    Procedure: EGD (ESOPHAGOGASTRODUODENOSCOPY);  Surgeon: Deniz Guerra MD;  [facility]  Service: Endoscopy;  Laterality: N/A;  labs prior, s/p liver transplant-MS    ESOPHAGOGASTRODUODENOSCOPY N/A 09/09/2020    Procedure: EGD (ESOPHAGOGASTRODUODENOSCOPY);  Surgeon: Davion Ríos MD;  [facility]  Service: Endoscopy;  Laterality: N/A;  Please order the EGD at least 2 weeks after barium esophagram has been done EGD is for dysphagia  covid test 9/6-[facility]    EYE SURGERY      FUNCTIONAL ENDOSCOPIC SINUS SURGERY (FESS) USING COMPUTER-ASSISTED NAVIGATION Bilateral 02/04/2021    Procedure: FESS, USING COMPUTER-ASSISTED NAVIGATION;  Surgeon: Delores Lewis MD;  Location: [facility];  Service: ENT;  Laterality: Bilateral;  [employer] [facility] [phone] TEXTED HIM @ 2:45PM ON 1-8-2021  DISC LOADED BT TOMMY 1-  RN PREOP 1/28/2021---COVID NEGATIVE ON 2/3--CONSENT INCOMPLETE  H/P INCOMPLETE  --CBC IN AM    LIVER BIOPSY      LIVER TRANSPLANT  12/31/2013    REFRACTIVE SURGERY Bilateral 2010    Revision carpal tunnel and removal of scar formation from left wrist  11/17/2023    TUBAL LIGATION  2003    UPPER GASTROINTESTINAL ENDOSCOPY         Family History   Problem Relation Name Age of Onset    Hypertension Mother      Cataracts Mother      Diabetes Father       Alzheimer's disease Father      Diabetes Paternal Grandfather      Diabetes Paternal Grandmother      No Known Problems Maternal Grandmother      Bone cancer Maternal Grandfather      Breast cancer Maternal Aunt  55    Hypertension Brother      Diabetes Brother      No Known Problems Sister      No Known Problems Maternal Uncle      No Known Problems Paternal Aunt      No Known Problems Paternal Uncle      Stroke Neg Hx      Cancer Neg Hx      Colon cancer Neg Hx      Esophageal cancer Neg Hx      Stomach cancer Neg Hx      Rectal cancer Neg Hx      Amblyopia Neg Hx      Blindness Neg Hx      Glaucoma Neg Hx      Macular degeneration Neg Hx      Retinal detachment Neg Hx      Strabismus Neg Hx      Thyroid disease Neg Hx         Social History     Socioeconomic History    Marital status:     Number of children: 3   Occupational History     Employer: [employer]   Tobacco Use    Smoking status: Never    Smokeless tobacco: Never    Tobacco comments:     disability; ; 3 children   Substance and Sexual Activity    Alcohol use: Yes     Alcohol/week: 1.0 standard drink of alcohol     Types: 1 Glasses of wine per week     Comment: occasionally     Drug use: No    Sexual activity: Yes     Partners: Male     Birth control/protection: See Surgical Hx, Post-menopausal     Comment: s/p tubal ligation,  since 1989     Social Drivers of Health     Financial Resource Strain: Low Risk  (7/29/2024)    Received from [facility]    Overall Financial Resource Strain (CARDIA)     Difficulty of Paying Living Expenses: Not hard at all   Food Insecurity: No Food Insecurity (8/8/2024)    Received from [facility]    Hunger Vital Sign     Worried About Running Out of Food in the Last Year: Never true     Ran Out of Food in the Last Year: Never true   Transportation Needs: No Transportation Needs (8/8/2024)    Received from [facility]    PRAPARE - Transportation     Lack of Transportation (Medical): No     Lack of  Transportation (Non-Medical): No   Physical Activity: Insufficiently Active (7/29/2024)    Received from [facility]    Exercise Vital Sign     Days of Exercise per Week: 2 days     Minutes of Exercise per Session: 30 min   Stress: No Stress Concern Present (7/29/2024)    Received from [facility]    [employer] [place] [employer] - Occupational Stress Questionnaire     Feeling of Stress : Only a little   Housing Stability: Low Risk  (8/8/2024)    Received from [facility]    Housing Stability Vital Sign     Unable to Pay for Housing in the Last Year: No     Number of Places Lived in the Last Year: 1     Unstable Housing in the Last Year: No       Review of Systems   Constitutional: Negative for chills, fever, malaise/fatigue and night sweats.   Cardiovascular:  Negative for chest pain, leg swelling, orthopnea and palpitations.   Respiratory:  Negative for cough, shortness of breath and wheezing.    Skin:  Positive for color change and nail changes. Negative for itching, poor wound healing and rash.   Musculoskeletal:  Positive for arthritis, joint pain, myalgias and stiffness. Negative for gout, joint swelling and muscle weakness.   Gastrointestinal:  Negative for abdominal pain, constipation and nausea.   Neurological:  Positive for numbness. Negative for disturbances in coordination, dizziness, focal weakness, tremors and weakness.           Objective:      There were no vitals filed for this visit.      Physical Exam  Vitals and nursing note reviewed.   Constitutional:       General: She is not in acute distress.     Appearance: She is well-developed. She is not toxic-appearing or diaphoretic.      Comments: alert and oriented x 3.    Cardiovascular:      Pulses:           Dorsalis pedis pulses are 2+ on the right side and 2+ on the left side.        Posterior tibial pulses are 2+ on the right side and 2+ on the left side.      Comments: No edema noted b/l LEs. No varicosities present b/l LEs.    Pulmonary:      Effort: No respiratory distress.   Musculoskeletal:         General: No deformity.      Right ankle: Normal. No tenderness. No lateral malleolus, medial malleolus, AITF ligament, CF ligament or posterior TF ligament tenderness.      Right Achilles Tendon: No defects. Castle's test negative.      Left ankle: Normal. No tenderness. No lateral malleolus, medial malleolus, AITF ligament, CF ligament or posterior TF ligament tenderness.      Left Achilles Tendon: No defects. Castle's test negative.      Right foot: Normal capillary refill. No bony tenderness or crepitus.      Left foot: Normal capillary refill. Tenderness and crepitus (1st MTPJ) present. No bony tenderness.      Comments: Biomechanical exam: Pain on palpation left 1st MTPJ and 1st metatarsal.  Pain at end range of motion of 1st metatarsophalangeal joint of the left foot.  There is equinus deformity bilateral with decreased dorsiflexion at the ankle joint bilateral. No tenderness with compression of heel. Negative tinels sign. Gait analysis reveals excessive pronation through midstance and propulsion with early heel off. Shoes reveals lateral heel counter wear bilateral     Pain on palpation plantar medial left heel, no pain with ROM or MMT or medial and lateral compression of heel, - tinel's sign      Decreased first MPJ range of motion both weightbearing and nonweightbearing, + crepitus observed the first MP joint, + dorsal flag sign. Mild  bunion deformity is observed .    Patient has hammertoes of digits 2-5 bilateral partially reducible     Negative anterior drawer at the ankle bilat.  Negative Lachman test at the 2nd MTPJ.  Able to perform sign and double heel rise without pain.     Feet:      Right foot:      Protective Sensation: 5 sites tested.  5 sites sensed.      Left foot:      Protective Sensation: 5 sites tested.  5 sites sensed.   Lymphadenopathy:      Comments: No lymphatic streaking     Skin:     General: Skin is  warm and dry.      Coloration: Skin is not pale.      Findings: No ecchymosis, erythema or rash.      Nails: There is no clubbing.      Comments: B/l hallux nail beds with signs of hyperkeratotic changes to the nail bed. No open wound. No drainage. Stable and dry. medial hallux nail margin of left foot with ingrown nail plate. No erythema or edema is noted. No granuloma formation noted. No malodor     B/L   nail margin of left 3rd toe with ingrown nail plate.       Toenails are thickened by 2-3 mm, discolored/yellowed, dystrophic, brittle with subungual debris.    Neurological:      Mental Status: She is alert and oriented to person, place, and time.      Sensory: Sensory deficit present.      Motor: No atrophy or abnormal muscle tone.      Gait: Gait normal.      Deep Tendon Reflexes:      Reflex Scores:       Achilles reflexes are 2+ on the right side and 2+ on the left side.     Comments: Negative Tinels sign and Charles's click, bilat. Protective sensation intact b/l foot. Vibratory sensation intact b/l. Subjective numbness to toes 4-5 both feet (occasionally).   Psychiatric:         Attention and Perception: She is attentive.         Mood and Affect: Mood is not anxious. Affect is not inappropriate.         Speech: She is communicative. Speech is not slurred.         Behavior: Behavior is not combative. Behavior is cooperative.               Assessment:       Encounter Diagnoses   Name Primary?    Type II diabetes mellitus with neurological manifestations Yes    Hallux limitus, acquired, left     Hallux limitus, acquired, right     Hammer toes of both feet     Plantar fasciitis     Left foot pain          Plan:     Problem List Items Addressed This Visit    None  Visit Diagnoses       Type II diabetes mellitus with neurological manifestations    -  Primary    Relevant Orders    DIABETIC SHOES FOR HOME USE    Hallux limitus, acquired, left        Relevant Orders    DIABETIC SHOES FOR HOME USE    Hallux limitus,  acquired, right        Relevant Orders    DIABETIC SHOES FOR HOME USE    Hammer toes of both feet        Relevant Orders    DIABETIC SHOES FOR HOME USE    Plantar fasciitis        Left foot pain               I counseled the patient on her conditions, their implications and medical management.    Orders Placed This Encounter    DIABETIC SHOES FOR HOME USE    dexAMETHasone injection 2 mg    triamcinolone acetonide injection 20 mg    ammonium lactate 12 % Crea    ciclopirox (PENLAC) 8 % Soln     CC#1- Plantar fasciitis    Discussed different treatment options for heel pain. including conservative and interventional.  I gave written and verbal instructions on heel cord stretching and this was demonstrated for the patient. Patient expressed understanding. Discussed wearing appropriate shoe gear and avoiding flats, slippers, sandals, barefoot, and sockfeet. Recommended arch supports. My recommendation for OTC supports is [employer], [employer] tennis shoes.       Patient instructed on adequate icing techniques. Patient should ice the affected area at least once per day x 10 minutes for 10 days . I advised the  patient that extra icing would also be beneficial to ensure adequate anti inflammatory effect     Stretching handout dispensed to patient. Instructions on adequate stretching reviewed in clinic        Patient would like injection today. Counseled the patient on the potential complications of local injection of corticosteroid including, but not limited to:  Discoloration of skin, erosion of soft tissue, and increased likelihood of rupture of a soft tissue structure (ie. Plantar fascia, muscle, tendon, ligament, or capsule in the area of injection).  Patient indicates understanding of my explanation, that any questions have been answered to patient satisfaction, and patient gives verbal consent for injection of affected area. Skin was prepped with alcohol and anesthetized with ethyl chloride.  The following mixture  was injected into left heel  approach: 3ccs of mixture of (1cc 1% plain Lidocaine : 1cc 0.5% Marcaine plain:  0.5cc kenalog-40 : 0.5cc dexamethasone) directly into problematic areas.  Patient  tolerated the injection well. Related nearly 100% relief following injection.       I gave written and verbal instructions on stretching exercises. Patient expressed understanding. Discussed icing the affected area as needed and also wearing appropriate shoe gear and avoiding flats, slippers, sandals, and going barefoot. My recommendation for OTC supports is [employer]. Patient instructed on adequate icing techniques. Patient should ice the affected area at least once per day x 10 minutes for 10 days I advised the patient that extra icing would also be beneficial to ensure adequate anti inflammatory effect. We also discussed cortisone injections and NSAID therapy.       Rx diabetic shoes for protection and support     Education about the diabetic foot, neuropathy, and prevention of limb loss.    Shoe inspection. Diabetic Foot Education. Patient reminded of the importance of good nutrition/healthy diet/weight management and blood sugar control to help prevent podiatric complications of diabetes. Patient instructed on proper foot hygeine. Wear comfortable, proper fitting shoes. Wash feet daily. Dry well. After drying, apply moisturizer to feet (no lotion to webspaces). Inspect feet daily for skin breaks, blisters, swelling, or redness. Wear cotton socks (preferably white)  Change socks every day. Do NOT walk barefoot. Do NOT use heating pads or hot water soaks. We discussed wearing proper shoe gear, daily foot inspections, never walking without protective shoe gear.     CC#1- Ingrown nail    Discussed treatment options with patient. Options included soaking, avulsion and matrixectomy. Risks and benefits discussed and all questions were answered. The patient wishes to proceed with  Nail avulsion at a later date      In  depth conversation on the treatment of ingrown nail; partial nail avulsion vs chemical matrixectomy vs conservative treatment of soaking and nail trimming    Nails 1-5 trimmed B/L    At patient's request, I discussed different treatments for toenail fungus. We discussed oral antifungals but I did not recommend them as a first line treatment since the medication is taken internally and can have side effects such as rash, taste disturbances, and liver enzyme elevation. We discussed topical Penlac to be applied daily and removed weekly. Pt. Expresses understanding and would like to try the Penlac. Rx sent to the pharmacy.       Discussed edema control and the importance of daily moisturizer to the feet    Recommend applying vicks vaporub to thick abnormal toenails daily x 6 months to treat fungal nail infection.    Based on chart review this patient does not qualify for nail care (Patients who qualify for nail care are usually as follows: diabetic with neurological manifestations, PVD, pernicious anemia, or CKD with appropriate modifiers that indicate high amputation risk).     She will continue to monitor the areas daily, inspect her feet, wear protective shoe gear when ambulatory, moisturizer to maintain skin integrity and follow in this office in approximately 12 months, sooner p.r.n.

## 2024-10-14 NOTE — PATIENT INSTRUCTIONS
Kerasal for fungal nails apply daily to all toenails.  Can be found at [facility]      24

## 2024-10-16 ENCOUNTER — INFUSION (OUTPATIENT)
Dept: INFUSION THERAPY | Facility: HOSPITAL | Age: 54
End: 2024-10-16
Attending: INTERNAL MEDICINE
Payer: MEDICARE

## 2024-10-16 VITALS
DIASTOLIC BLOOD PRESSURE: 73 MMHG | TEMPERATURE: 98 F | HEART RATE: 77 BPM | WEIGHT: 131.38 LBS | OXYGEN SATURATION: 98 % | RESPIRATION RATE: 16 BRPM | BODY MASS INDEX: 19.4 KG/M2 | SYSTOLIC BLOOD PRESSURE: 148 MMHG

## 2024-10-16 DIAGNOSIS — M32.9 SYSTEMIC LUPUS ERYTHEMATOSUS, UNSPECIFIED SLE TYPE, UNSPECIFIED ORGAN INVOLVEMENT STATUS: Primary | ICD-10-CM

## 2024-10-16 PROCEDURE — 96375 TX/PRO/DX INJ NEW DRUG ADDON: CPT

## 2024-10-16 PROCEDURE — 25000003 PHARM REV CODE 250: Performed by: INTERNAL MEDICINE

## 2024-10-16 PROCEDURE — 96365 THER/PROPH/DIAG IV INF INIT: CPT

## 2024-10-16 PROCEDURE — 63600175 PHARM REV CODE 636 W HCPCS: Performed by: INTERNAL MEDICINE

## 2024-10-16 RX ORDER — SODIUM CHLORIDE 0.9 % (FLUSH) 0.9 %
10 SYRINGE (ML) INJECTION
OUTPATIENT
Start: 2024-11-13

## 2024-10-16 RX ORDER — ACETAMINOPHEN 325 MG/1
650 TABLET ORAL
Status: COMPLETED | OUTPATIENT
Start: 2024-10-16 | End: 2024-10-16

## 2024-10-16 RX ORDER — HEPARIN 100 UNIT/ML
500 SYRINGE INTRAVENOUS
OUTPATIENT
Start: 2024-11-13

## 2024-10-16 RX ORDER — DIPHENHYDRAMINE HYDROCHLORIDE 50 MG/ML
25 INJECTION INTRAMUSCULAR; INTRAVENOUS
Status: COMPLETED | OUTPATIENT
Start: 2024-10-16 | End: 2024-10-16

## 2024-10-16 RX ORDER — DIPHENHYDRAMINE HYDROCHLORIDE 50 MG/ML
25 INJECTION INTRAMUSCULAR; INTRAVENOUS
OUTPATIENT
Start: 2024-11-13

## 2024-10-16 RX ORDER — ACETAMINOPHEN 325 MG/1
650 TABLET ORAL
OUTPATIENT
Start: 2024-11-13

## 2024-10-16 RX ADMIN — DIPHENHYDRAMINE HYDROCHLORIDE 25 MG: 50 INJECTION INTRAMUSCULAR; INTRAVENOUS at 10:10

## 2024-10-16 RX ADMIN — ACETAMINOPHEN 650 MG: 325 TABLET ORAL at 10:10

## 2024-10-16 RX ADMIN — BELIMUMAB 596 MG: 400 INJECTION, POWDER, LYOPHILIZED, FOR SOLUTION INTRAVENOUS at 10:10

## 2024-10-16 NOTE — PLAN OF CARE
Pt arrived to the Infusion clinic for IV medication. Pt reports no symptoms or concerns at this time. Pt denies any adverse reactions to previous infusions. Pt given benadryl and acetaminophen pre-medications. Pt administered Benlysta 596 mg @ 285 mL/hr infusion and tolerated well. Pt verbalizes no other needs at this time. Pt ambulatory with steady gait. No distress noted. Pt aware of next scheduled appointment.

## 2024-10-17 ENCOUNTER — PATIENT MESSAGE (OUTPATIENT)
Dept: RHEUMATOLOGY | Facility: CLINIC | Age: 54
End: 2024-10-17
Payer: MEDICARE

## 2024-10-22 ENCOUNTER — PATIENT MESSAGE (OUTPATIENT)
Dept: OBSTETRICS AND GYNECOLOGY | Facility: CLINIC | Age: 54
End: 2024-10-22
Payer: MEDICARE

## 2024-10-22 DIAGNOSIS — R92.8 ABNORMAL MAMMOGRAM: Primary | ICD-10-CM

## 2024-10-24 ENCOUNTER — PATIENT MESSAGE (OUTPATIENT)
Dept: RHEUMATOLOGY | Facility: CLINIC | Age: 54
End: 2024-10-24
Payer: MEDICARE

## 2024-10-30 ENCOUNTER — HOSPITAL ENCOUNTER (OUTPATIENT)
Dept: RADIOLOGY | Facility: HOSPITAL | Age: 54
Discharge: HOME | End: 2024-10-30
Attending: OBSTETRICS & GYNECOLOGY
Payer: MEDICARE

## 2024-10-30 DIAGNOSIS — R92.8 ABNORMAL MAMMOGRAM: ICD-10-CM

## 2024-10-30 PROCEDURE — 77065 DX MAMMO INCL CAD UNI: CPT | Mod: TC,LT

## 2024-10-30 PROCEDURE — 77061 BREAST TOMOSYNTHESIS UNI: CPT | Mod: TC,LT

## 2024-10-30 PROCEDURE — 77065 DX MAMMO INCL CAD UNI: CPT | Mod: 26,LT,, | Performed by: RADIOLOGY

## 2024-10-30 PROCEDURE — 77061 BREAST TOMOSYNTHESIS UNI: CPT | Mod: 26,LT,, | Performed by: RADIOLOGY

## 2024-10-31 ENCOUNTER — PATIENT MESSAGE (OUTPATIENT)
Dept: RHEUMATOLOGY | Facility: CLINIC | Age: 54
End: 2024-10-31

## 2024-11-13 ENCOUNTER — INFUSION (OUTPATIENT)
Dept: INFUSION THERAPY | Facility: HOSPITAL | Age: 54
End: 2024-11-13
Attending: INTERNAL MEDICINE
Payer: MEDICARE

## 2024-11-13 VITALS
RESPIRATION RATE: 16 BRPM | HEART RATE: 82 BPM | OXYGEN SATURATION: 98 % | WEIGHT: 135.56 LBS | TEMPERATURE: 98 F | SYSTOLIC BLOOD PRESSURE: 151 MMHG | BODY MASS INDEX: 20.02 KG/M2 | DIASTOLIC BLOOD PRESSURE: 69 MMHG

## 2024-11-13 DIAGNOSIS — M32.9 SYSTEMIC LUPUS ERYTHEMATOSUS, UNSPECIFIED SLE TYPE, UNSPECIFIED ORGAN INVOLVEMENT STATUS: Primary | ICD-10-CM

## 2024-11-13 PROCEDURE — 25000003 PHARM REV CODE 250: Performed by: INTERNAL MEDICINE

## 2024-11-13 PROCEDURE — 63600175 PHARM REV CODE 636 W HCPCS: Performed by: INTERNAL MEDICINE

## 2024-11-13 RX ORDER — DIPHENHYDRAMINE HYDROCHLORIDE 50 MG/ML
25 INJECTION INTRAMUSCULAR; INTRAVENOUS
Status: COMPLETED | OUTPATIENT
Start: 2024-11-13 | End: 2024-11-13

## 2024-11-13 RX ORDER — ACETAMINOPHEN 325 MG/1
650 TABLET ORAL
OUTPATIENT
Start: 2024-12-11

## 2024-11-13 RX ORDER — ACETAMINOPHEN 325 MG/1
650 TABLET ORAL
Status: COMPLETED | OUTPATIENT
Start: 2024-11-13 | End: 2024-11-13

## 2024-11-13 RX ORDER — SODIUM CHLORIDE 0.9 % (FLUSH) 0.9 %
10 SYRINGE (ML) INJECTION
Status: DISCONTINUED | OUTPATIENT
Start: 2024-11-13 | End: 2024-11-13 | Stop reason: HOSPADM

## 2024-11-13 RX ORDER — DIPHENHYDRAMINE HYDROCHLORIDE 50 MG/ML
25 INJECTION INTRAMUSCULAR; INTRAVENOUS
OUTPATIENT
Start: 2024-12-11

## 2024-11-13 RX ORDER — SODIUM CHLORIDE 0.9 % (FLUSH) 0.9 %
10 SYRINGE (ML) INJECTION
OUTPATIENT
Start: 2024-12-11

## 2024-11-13 RX ORDER — HEPARIN 100 UNIT/ML
500 SYRINGE INTRAVENOUS
OUTPATIENT
Start: 2024-12-11

## 2024-11-13 RX ADMIN — BELIMUMAB 615 MG: 400 INJECTION, POWDER, LYOPHILIZED, FOR SOLUTION INTRAVENOUS at 11:11

## 2024-11-13 RX ADMIN — DIPHENHYDRAMINE HYDROCHLORIDE 25 MG: 50 INJECTION INTRAMUSCULAR; INTRAVENOUS at 10:11

## 2024-11-13 RX ADMIN — SODIUM CHLORIDE: 9 INJECTION, SOLUTION INTRAVENOUS at 11:11

## 2024-11-13 RX ADMIN — ACETAMINOPHEN 650 MG: 325 TABLET ORAL at 10:11

## 2024-11-13 NOTE — NURSING
Patient arrived to unit ambulatory, for IV therapy. Patient alert and oriented with no new or worsening complaints. Patient received pre-meds during this encounter, medication administered per plan. Patient tolerated infusion with no complaints or signs of reaction, vital signs stable, no apparent distress at this time. Patient ambulatory upon discharge.

## 2024-11-15 ENCOUNTER — PATIENT MESSAGE (OUTPATIENT)
Dept: PODIATRY | Facility: CLINIC | Age: 54
End: 2024-11-15
Payer: MEDICARE

## 2024-11-15 ENCOUNTER — PATIENT MESSAGE (OUTPATIENT)
Dept: HEPATOLOGY | Facility: CLINIC | Age: 54
End: 2024-11-15
Payer: MEDICARE

## 2024-11-15 ENCOUNTER — OFFICE VISIT (OUTPATIENT)
Dept: OTOLARYNGOLOGY | Facility: CLINIC | Age: 54
End: 2024-11-15
Payer: MEDICARE

## 2024-11-15 ENCOUNTER — TELEPHONE (OUTPATIENT)
Dept: PODIATRY | Facility: CLINIC | Age: 54
End: 2024-11-15
Payer: MEDICARE

## 2024-11-15 VITALS
WEIGHT: 135.56 LBS | HEIGHT: 69 IN | BODY MASS INDEX: 20.08 KG/M2 | SYSTOLIC BLOOD PRESSURE: 118 MMHG | DIASTOLIC BLOOD PRESSURE: 64 MMHG

## 2024-11-15 DIAGNOSIS — H90.41 SENSORINEURAL HEARING LOSS (SNHL) OF RIGHT EAR WITH UNRESTRICTED HEARING OF LEFT EAR: ICD-10-CM

## 2024-11-15 DIAGNOSIS — J33.9 NASAL POLYP: ICD-10-CM

## 2024-11-15 DIAGNOSIS — M26.623 ARTHRALGIA OF BILATERAL TEMPOROMANDIBULAR JOINT: Primary | ICD-10-CM

## 2024-11-15 DIAGNOSIS — Z98.890 STATUS POST FUNCTIONAL ENDOSCOPIC SINUS SURGERY (FESS): ICD-10-CM

## 2024-11-15 DIAGNOSIS — J32.0 CHRONIC MAXILLARY SINUSITIS: ICD-10-CM

## 2024-11-15 RX ORDER — FLUTICASONE PROPIONATE 50 MCG
2 SPRAY, SUSPENSION (ML) NASAL 2 TIMES DAILY
Qty: 18.2 ML | Refills: 11 | Status: SHIPPED | OUTPATIENT
Start: 2024-11-15

## 2024-11-15 RX ORDER — AZELASTINE 1 MG/ML
1 SPRAY, METERED NASAL 2 TIMES DAILY
Qty: 30 ML | Refills: 11 | Status: SHIPPED | OUTPATIENT
Start: 2024-11-15

## 2024-11-15 NOTE — PROGRESS NOTES
OTOLARYNGOLOGY CLINIC NOTE  Date:  11/15/2024     Chief complaint:  Chief Complaint   Patient presents with    Sinus Problem     Follow up       History of Present Illness  Valencia Dumont is a 54 y.o. female  presenting today for a followup.  Out of nasal sprays  Throat feels better when does  sprays  Recently had acdf and having some restriction with mvmt    Ears pop bilaterally; hearing not change when that   Had audio in march 2024  Pops when she opens her jawa and when eating. Chewing gum during visit     I last saw the patient on 3-6 - 24. Below text is copied from  note on that date describing history of present illness at that time :    Left popping in ear with chewing and when aywning   Not an issue when her sinuses acting up ; she denies nasal congestion currently   Sometimes feels full in the ear with popping but mostly just the popping      Doing rinse and then two sprays (flonase and astelin) once a day      I last saw the patient on 3-13 - 23. Was recommended for 6 month f/u with rigid scope for monitoring for polyp recurrence but was lost to follow up until now.  Below text is copied from  note on that date describing history of present illness at that time :  Ears click when she talks- started up last week . In both ears  No fluid sensation or blockage  Dry Cough x 2 weeks and inhaler helps   Breathing through nose ok   Nose gets dry , doing saline   No drainage from the nose        Has history of left nasal polyposis s/p FESS (b/l ethmoid, maxillary) 2-4-21 for crs with polyp on left      Has problems breathing with laying flat, not during sleep just laying down and watching tv     Nose gets red sometimes but not sure if finger get red or white ( ie does not sound like has other findings that could be raynauds)  Past Medical History  Past Medical History:   Diagnosis Date    Abnormal Pap smear of cervix     Anemia     Arthritis     Ascites     Asthma     Chronic back pain     Cirrhosis of liver  without mention of alcohol 10/18/2013    Diabetes mellitus     Esophageal varices     GERD (gastroesophageal reflux disease)     Herpes simplex virus (HSV) infection     HSV2.    Hypertension     Kidney stone     Lupus     Osteoporosis 12/2013    Prophylactic immunotherapy (transplant immunosuppression) 1/2/2014    SBP (spontaneous bacterial peritonitis)     history of         Past Surgical History  Past Surgical History:   Procedure Laterality Date    BREAST BIOPSY Left 08/2020    CHOLECYSTECTOMY      COLONOSCOPY N/A 05/31/2017    Procedure: COLONOSCOPY;  Surgeon: Marky Sun MD;  [facility]  Service: Endoscopy;  Laterality: N/A;  PM prep.    CONIZATION OF CERVIX USING LOOP ELECTROSURGICAL EXCISION PROCEDURE (LEEP) N/A 01/20/2023    Procedure: CONIZATION-CERVICAL-LEEP;  Surgeon: Lu Schreiber MD;  Location: [facility];  Service: OB/GYN;  Laterality: N/A;    ESOPHAGOGASTRODUODENOSCOPY      ESOPHAGOGASTRODUODENOSCOPY N/A 01/16/2019    Procedure: EGD (ESOPHAGOGASTRODUODENOSCOPY);  Surgeon: Deniz Guerra MD;  [facility]  Service: Endoscopy;  Laterality: N/A;  labs prior, s/p liver transplant-MS    ESOPHAGOGASTRODUODENOSCOPY N/A 09/09/2020    Procedure: EGD (ESOPHAGOGASTRODUODENOSCOPY);  Surgeon: Davion Ríos MD;  Location: [facility] ([address] [place] [address]);  Service: Endoscopy;  Laterality: N/A;  Please order the EGD at least 2 weeks after barium esophagram has been done EGD is for dysphagia  covid test 9/6-[facility]    EYE SURGERY      FUNCTIONAL ENDOSCOPIC SINUS SURGERY (FESS) USING COMPUTER-ASSISTED NAVIGATION Bilateral 02/04/2021    Procedure: FESS, USING COMPUTER-ASSISTED NAVIGATION;  Surgeon: Delores Lewis MD;  Location: [facility] OR;  Service: ENT;  Laterality: Bilateral;  interspireSubmit WALDEMAR [phone] TEXTED HIM @ 2:45PM ON 1-8-2021  DISC LOADED BT TOMMY 1-  RN PREOP 1/28/2021---COVID NEGATIVE ON 2/3--CONSENT INCOMPLETE  H/P INCOMPLETE  --CBC IN AM    LIVER  "BIOPSY      LIVER TRANSPLANT  12/31/2013    REFRACTIVE SURGERY Bilateral 2010    Revision carpal tunnel and removal of scar formation from left wrist  11/17/2023    TUBAL LIGATION  2003    UPPER GASTROINTESTINAL ENDOSCOPY          Medications  Current Outpatient Medications on File Prior to Visit   Medication Sig Dispense Refill    acetaminophen (TYLENOL) 650 MG TbSR Take 1 tablet (650 mg total) by mouth every 6 (six) hours. 60 tablet 0    amitriptyline (ELAVIL) 10 MG tablet Take 1 tablet (10 mg total) by mouth every evening. 90 tablet 2    ammonium lactate 12 % Crea Apply 1 application  topically once daily. 140 g 5    azelastine (ASTELIN) 137 mcg (0.1 %) nasal spray 1 spray (137 mcg total) by Nasal route 2 (two) times daily. 30 mL 11    azithromycin (Z-GLORIA) 250 MG tablet Take 250 mg by mouth as needed.      BD ULTRA-FINE JANESSA PEN NEEDLE 32 gauge x 5/32" Ndle For five times daily insulin injections 450 each 3    blood-glucose meter,continuous (DEXCOM G6 ) Misc 1 each by Misc.(Non-Drug; Combo Route) route once. for 1 dose 1 each 0    blood-glucose sensor (FREESTYLE CHUN 3 SENSOR) Karen 1 Device by Misc.(Non-Drug; Combo Route) route every 14 (fourteen) days. 2 each 11    blood-glucose transmitter (DEXCOM G6 TRANSMITTER) Karen 1 each by Misc.(Non-Drug; Combo Route) route once a week 1 Device 3    chlorthalidone (HYGROTEN) 25 MG Tab Take 0.5 tablets (12.5 mg total) by mouth once daily. 15 tablet 11    ciclopirox (PENLAC) 8 % Soln Apply topically nightly. 6.6 mL 3    ciclopirox (PENLAC) 8 % Soln Apply topically nightly. 6.6 mL 3    clotrimazole-betamethasone 1-0.05% (LOTRISONE) cream       diazepam (VALIUM) 5 MG tablet Take 5 mg by mouth 3 (three) times daily as needed.   0    diclofenac sodium (VOLTAREN) 1 % Gel APPLY 2 GRAM to wrist and 4 g to left foot TOPICAL ROUTE 4 TIMES PER  each 2    dicyclomine (BENTYL) 10 MG capsule       DILTIAZEM HCL (DILTIAZEM 2% CREAM) Apply topically 3 (three) times daily. " Apply topically to anal area. 30 g 0    erythromycin with ethanol (EMGEL) 2 % gel   1    estradioL (ESTRACE) 0.01 % (0.1 mg/gram) vaginal cream Place 1 g vaginally 3 (three) times a week. 42 g 3    famotidine (PEPCID) 40 MG tablet       ferrous sulfate (FEOSOL) 325 mg (65 mg iron) Tab tablet Take 1 tablet (325 mg total) by mouth every 12 (twelve) hours. 60 tablet 4    fluconazole (DIFLUCAN) 150 MG Tab TAKE ONE TABLET BY MOUTH once for ONE dose 1 tablet 0    fluocinolone (SYNALAR) 0.01 % external solution APPLY a couple of DROPS INTO BOTH EARS TWICE DAILY AS NEEDED FOR ITCHING 60 mL 1    fluticasone propionate (FLONASE) 50 mcg/actuation nasal spray 1 spray by Each Nostril route 2 (two) times daily.      fluticasone propionate (FLONASE) 50 mcg/actuation nasal spray 2 sprays (100 mcg total) by Each Nostril route 2 (two) times daily. 18.2 mL 11    gabapentin (NEURONTIN) 300 MG capsule TAKE 1 CAPSULE BY MOUTH THREE TIMES DAILY      gentamicin (GARAMYCIN) 0.1 % cream   1    hydrocortisone (ANUSOL-HC) 2.5 % rectal cream Place rectally 2 (two) times daily. Apply per rectum bid 30 g 3    hydrOXYzine HCl (ATARAX) 10 MG Tab TAKE 1 OR 2 TABLETS BY MOUTH THREE TIMES DAILY AS NEEDED FOR ITCHING.  0    insulin glargine U-100, Lantus, (LANTUS SOLOSTAR U-100 INSULIN) 100 unit/mL (3 mL) InPn pen Inject 4 Units into the skin every evening. 15 mL 3    insulin lispro (HUMALOG KWIKPEN INSULIN) 100 unit/mL pen 3 units before BREAKFAST and lunch and FIVE units before dinner with sliding scale, up to 25 units daily 45 mL 3    ketoconazole (NIZORAL) 2 % cream   1    Lactobac. rhamnosus GG-inulin 10 billion cell -200 mg Cap Take 1 capsule by mouth 2 (two) times daily. 30 capsule 0    levocetirizine (XYZAL) 5 MG tablet Take 5 mg by mouth every evening.  3    LIDOcaine-prilocaine (EMLA) cream 2 (two) times daily. Apply to affected area      losartan (COZAAR) 100 MG tablet Take 1 tablet (100 mg total) by mouth once daily. 30 tablet 2     magnesium oxide (MAG-OX) 400 mg (241.3 mg magnesium) tablet Take 400 mg by mouth 2 (two) times daily.      meclizine (ANTIVERT) 25 mg tablet TAKE ONE TABLET BY MOUTH AT BEDTIME AS NEEDED for dizziness.  0    mometasone 0.1% (ELOCON) 0.1 % cream APPLY TOPICALLY TO THE FACE TWICE DAILY FOR ONE WEEK      MULTIVIT,THER IRON,CA,FA & MIN (MULTIVITAMIN) Tab Take 1 tablet by mouth once daily. 30 tablet 0    mycophenolate (MYFORTIC) 180 MG TbEC Take 2 tablets (360 mg total) by mouth 2 (two) times daily. 120 tablet 11    neomycin-polymyxin-hydrocortisone (CORTISPORIN) otic solution INSTILL TWO DROPS INTO BOTH EARS FOUR TIMES DAILY FOR 10 DAYS  0    NURTEC 75 mg odt PLACE ONE TABLET on tongue, alternatively UNDER THE TONGUE ONCE DAILY AS NEEDED FOR MIGRAINE 8 tablet 2    nystatin (MYCOSTATIN) 100,000 unit/mL suspension SWISH AND SPIT FIVE MILLILITERS BY MOUTH FOUR TIMES DAILY FOR 7 DAYS.  1    ondansetron (ZOFRAN) 4 MG tablet TAKE TWO TABLETS BY MOUTH EVERY 8 HOURS AS NEEDED FOR NAUSEA.      oxyCODONE (ROXICODONE) 5 MG immediate release tablet Take 1 tablet (5 mg total) by mouth every 4 (four) hours as needed for Pain. 5 tablet 0    oxycodone-acetaminophen (PERCOCET) 7.5-325 mg per tablet Take 1 tablet by mouth every 4 (four) hours as needed for Pain.      polyethylene glycol (GLYCOLAX) 17 gram/dose powder Mix 1 capful (17 g) with liquid and by mouth 2 (two) times daily. 1530 g 11    PROAIR HFA 90 mcg/actuation inhaler Inhale 2 puffs into the lungs 3 (three) times daily as needed.   3    promethazine-dextromethorphan (PROMETHAZINE-DM) 6.25-15 mg/5 mL Syrp Take by mouth 2 (two) times daily as needed.   0    rosuvastatin (CRESTOR) 5 MG tablet Take 5 mg by mouth once daily.      SENNA 8.6 mg tablet Take 1 tablet by mouth 2 (two) times daily. Take while taking narcotics 30 tablet 0    sertraline (ZOLOFT) 50 MG tablet Take 50 mg by mouth once daily.  11    SSD 1 % cream 1 application 2 (two) times daily. Apply to affected area  0     tacrolimus (PROGRAF) 1 MG Cap Take 3 capsules (3 mg total) by mouth every morning AND 2 capsules (2 mg total) every evening. 150 capsule 11    tinidazole (TINDAMAX) 250 MG tablet SMARTSI Tablet(s) By Mouth Every Morning      topiramate (TOPAMAX) 50 MG tablet       triamcinolone acetonide 0.1% (KENALOG) 0.1 % cream Apply topically 2 times daily 30 g 0    TRULICITY 3 mg/0.5 mL pen injector Inject 3 mg into the skin every 7 days.      valACYclovir (VALTREX) 1000 MG tablet Take 1,000 mg by mouth once daily.      verapamil (CALAN-SR) 120 MG CR tablet TAKE ONE TABLET ONCE DAILY FOR BLOOD PRESSURE  3    budesonide-formoterol 160-4.5 mcg (SYMBICORT) 160-4.5 mcg/actuation HFAA Inhale 2 puffs into the lungs.      buPROPion (WELLBUTRIN XL) 150 MG TB24 tablet Take 300 mg by mouth.      isosorbide mononitrate (IMDUR) 30 MG 24 hr tablet Take 1 tablet (30 mg total) by mouth 2 (two) times a day. 180 tablet 3    loratadine (CLARITIN) 10 mg tablet Take 1 tablet (10 mg total) by mouth once daily. 30 tablet 0    nystatin-triamcinolone (MYCOLOG II) cream Apply to affected area 2 times daily 30 g 1    pantoprazole (PROTONIX) 40 MG tablet Take 1 tablet (40 mg total) by mouth 2 (two) times daily. Take immediately in am when wake up and then 30 minutes prior to dinner 60 tablet 11     No current facility-administered medications on file prior to visit.       Review of Systems  Review of Systems   Constitutional:  Positive for malaise/fatigue.   HENT:  Positive for ear pain and sore throat.    Eyes: Negative.    Respiratory:  Positive for shortness of breath.    Cardiovascular: Negative.    Gastrointestinal:  Positive for constipation.   Genitourinary: Negative.    Musculoskeletal:  Positive for back pain and neck pain.   Skin: Negative.    Neurological:  Positive for dizziness and tremors.   Endo/Heme/Allergies:  Bruises/bleeds easily.   Psychiatric/Behavioral:  The patient is nervous/anxious.       Answers submitted by the patient  "for this visit:  Review of Symptoms Questionnaire  (Submitted on 11/13/2024)  appetite change : Yes  Unexpected weight loss?: Yes  trouble swallowing: Yes  Snoring?: Yes  Acid Reflux?: Yes  Muscle aches / pain?: Yes  None of these: Yes  Cold all of the time? : Yes  Light-headedness: Yes    Social History   reports that she has never smoked. She has never used smokeless tobacco. She reports current alcohol use of about 1.0 standard drink of alcohol per week. She reports that she does not use drugs.     Family History  Family History   Problem Relation Name Age of Onset    Hypertension Mother      Cataracts Mother      Diabetes Father      Alzheimer's disease Father      Diabetes Paternal Grandfather      Diabetes Paternal Grandmother      No Known Problems Maternal Grandmother      Bone cancer Maternal Grandfather      Breast cancer Maternal Aunt  55    Hypertension Brother      Diabetes Brother      No Known Problems Sister      No Known Problems Maternal Uncle      No Known Problems Paternal Aunt      No Known Problems Paternal Uncle      Stroke Neg Hx      Cancer Neg Hx      Colon cancer Neg Hx      Esophageal cancer Neg Hx      Stomach cancer Neg Hx      Rectal cancer Neg Hx      Amblyopia Neg Hx      Blindness Neg Hx      Glaucoma Neg Hx      Macular degeneration Neg Hx      Retinal detachment Neg Hx      Strabismus Neg Hx      Thyroid disease Neg Hx          Physical Exam   Vitals:    11/15/24 1057   BP: 118/64    Body mass index is 20.02 kg/m².  Weight: 61.5 kg (135 lb 9.3 oz)   Height: 5' 9" (175.3 cm)     GENERAL: no acute distress.  HEAD: normocephalic.   EYES: No scleral icterus  EARS: external ear without lesion, normal pinna shape and position.  External auditory canal with normal cerumen, tympanic membrane fully visible, no perforation , no retraction. No middle ear effusion. Ossicles intact.   NOSE: external nose without significant bony abnormality  ORAL CAVITY/OROPHARYNX: tongue mobile.   NECK: " trachea midline.   LYMPH NODES:No cervical lymphadenopathy.  RESPIRATORY: no stridor, no stertor. Voice normal. Respirations nonlabored.  NEURO: alert, responds to questions appropriately.    PSYCH:mood appropriate    PROCEDURE NOTE  Procedure: diagnostic rigid nasal endoscopy  Indications for procedure: chronic nasal congestion, unable to view sinus area on anterior rhinoscopy       Consent: procedure was explained in detail and verbal consent was obtained.   Anesthesia:4% lidocaine with neosynephrine  Procedure in detail: With the patient in the seated position, the zero degree endoscope was inserted atraumatically into the bilateral nasal cavities and advance to the nasopharynx with the following areas examined with findings as described below.     Nasal cavity:no polyps or mass, no purulent drainage, no bleeding  Septum: no perforation   Turbinates:  inferior turbinates hypertrophied; middle turbinates medialized partially bilaterally, maxillary os patent bilaterally; No brooklynn poylps polypoid edema in left  ethmoid bed slightly worse than before   Nasopharynx: no mass or lesion in the nasopharynx.     The scope was removed atraumatically without complication. The patient tolerated the procedure well. Photodocumentation obtained , all images and/or videos uploaded in media section of epic.                                                      Imaging:  The patient does not have any new imaging of the head and neck since last visit.     Labs:  CBC  Recent Labs   Lab 08/09/24  1257 09/18/24  0948 10/02/24  1022   WBC 9.89 4.98 4.83   Hemoglobin 11.2 L 11.5 L 11.0 L   Hematocrit 31.6 L 32.7 L 32.7 L   MCV 83 83 84   Platelets 190 184 265     BMP  Recent Labs   Lab 09/27/23  1210 10/11/23  1205 12/13/23  1328 01/29/24  1154 06/10/24  1206 06/19/24  1321 07/17/24  1227 07/22/24  1323 08/09/24  1257 09/18/24  0948 10/02/24  1012   Glucose 183 H   < > 143 H  143 H   < > 198 H   < > 137 H 100 215 H 177 H 161 H   Sodium  138   < > 139  139   < > 137   < > 140 138 137 136 141   Potassium 4.4   < > 3.8  3.8   < > 5.5 H   < > 4.2 4.3 3.8 3.7 3.9   Chloride 102   < > 107  107   < > 103   < > 104 104 100 99 106   CO2 29   < > 29  29   < > 27   < > 25 27 27 28 25   Carbon Dioxide  --    < >  --    < >  --   --   --   --   --   --   --    BUN 28 H   < > 17  17   < > 27 H   < > 34 H 26 H 20 20 28 H   Blood Urea Nitrogen  --    < >  --    < >  --   --   --   --   --   --   --    Creatinine 1.8 H   < > 1.4  1.4   < > 2.0 H   < > 1.8 H 1.4 1.5 H 2.0 H 2.4 H   Calcium 9.1   < > 9.3  9.3   < > 9.6   < > 9.4 9.8 10.1 9.3 8.9   Phosphorus  --   --   --    < >  --    < > 3.7 3.1  --   --  3.1   Magnesium 2.2  --  1.8  --  2.0  --   --   --   --   --   --     < > = values in this interval not displayed.     COAGS        Assessment  1. Arthralgia of bilateral temporomandibular joint    2. Status post functional endoscopic sinus surgery (FESS)    3. Nasal polyp    4. Sensorineural hearing loss (SNHL) of right ear with unrestricted hearing of left ear    5. Chronic maxillary sinusitis       Plan:  Discussed plan of care with patient in detail and all questions answered. Patient reported understanding of plan of care. I gave the patient the opportunity to ask questions and patient confirmed all questions answered to satisfaction.     No recurrent polyp ; only had polyp on left side during fess, daughter also has history of polyps; polypoid edema in left  ethmoid bed slightly worse than before but suspect because not using sprays    Hearing test yearly due around march 2025; discussed hearing aid- has [facility]    If not beter or worseing once gets back on medication sprays can switch  to budesonide in saline rinse- advised her to message me next week if not better and or getting worse to send. No infection today     F/u 6 months for rigid and hearing test.  sooner if issue      Please be aware that this note has been generated with the  assistance of MModal voice-to-text.  Please excuse any spelling or grammatical errors.

## 2024-11-15 NOTE — PATIENT INSTRUCTIONS
Information and instructions from your visit with me today:  Always use saline every time before a medication spray. You can also use saline on its own. If you are using saline and/or the medication sprays on an as needed basis and you have symptoms use the regimen daily for at least 2 weeks. You can use the flonase and astelin together, or if you prefer to start with just one medication spray, the flonase works better by itself compared to astelin by itself. You can try doing the saline and flonase and if still congested, add on the astelin again doing this regimen daily for up to two weeks when congestion. There may be times of the year that you only need saline and there may be times of the year that you need saline, flonase and astelin to control symptoms.     Start using the following medication nasal sprays:   Fluticasone spray:    This medication is a steroid spray. It stays within the nose and does not have absorption into the body that leads to side effects that one has with oral steroid medication. Fluticasone nasal spray is the same as the Flonase brand nasal spray. Discuss with your pharmacist if the price is lower over the counter or with a prescription ( this varies depending on insurance). The medication that is over the counter is the same as the prescription medication. Use this medication as instructed on the prescription, 1-2 sprays on each side of your nose twice daily.     Azelastine  spray:  This medication is an antihistamine used to treat nasal symptoms of allergy, which works specifically in the nose unlike antihistamine pills which have more of an effect on the whole body. Use this medication as instructed on the prescription, 1 spray on each side of your nose twice daily.     Additional instructions for medication sprays  Place the tip of the medication bottle in your nose and aim slightly up and out on each side to get medication high and deep into your nose and sinuses, and not have it  "all deposit in the very front of your nose. Aim the tip of the nozzle towards the outer corner of your eye . You can imagine aiming towards the back of your eyeball on each side for this, as opposed to straight back to the center of your nose and head.     You need to use this medication every day regardless of symptoms, as it takes time ( a few weeks) to work and get the benefits. It does not work on an "as needed" basis like taking a decongestant. If your symptoms only occur in a particular season, then the medication can be used seasonally instead of year long. For seasonal symptoms, you should start using the spray twice daily a month before when you normally have symptoms ( for example, if symptoms start in August, should start at the end of June).     Start nasal irrigations with saline solution- you can either use a rinse or a mist spray:    NASAL SALINE SPRAY ( simply saline and arm and hammer are examples) There are several different brands found in the cold and flu aisle of the pharmacy. You can use any brand of saline spray - this will deliver the saline by a gentle mist ( if you have difficulty or discomfort with nasal rinse/ a lot of fluid in the nose, this will be more comfortable).       Always rinse your nose with saline prior to using medication sprays and wait a couple of hours before using again. You can use the saline throughout the day to help with stuffy nose or dry nose.    Do not use nasal decongestant sprays such as Afrin or similar products long term ( over 3 days) .  This can cause long term physical nasal addiction. Afrin should only be used if having nose bleeds, severe nasal congestion , or severe ear pain/fullness and should not be used for more than 2-3 days in a row . It is a not a medication that should be used for a long period of time.     It was nice meeting you today, and I look forward to helping you feel better soon. Please don't hesitate to call if you have any other " questions or concerns, or if I can be of any assistance in the meantime.      Delores Lewis MD    [facility]     Phone  [phone]    Fax      [phone]        Delores Lewis MD  Otorhinolaryngology

## 2024-12-02 ENCOUNTER — PATIENT MESSAGE (OUTPATIENT)
Dept: OPTOMETRY | Facility: CLINIC | Age: 54
End: 2024-12-02
Payer: MEDICARE

## 2024-12-09 ENCOUNTER — PATIENT MESSAGE (OUTPATIENT)
Dept: OPTOMETRY | Facility: CLINIC | Age: 54
End: 2024-12-09
Payer: MEDICARE

## 2024-12-10 ENCOUNTER — LAB VISIT (OUTPATIENT)
Dept: LAB | Facility: HOSPITAL | Age: 54
End: 2024-12-10
Attending: INTERNAL MEDICINE
Payer: MEDICARE

## 2024-12-10 DIAGNOSIS — Z94.4 LIVER TRANSPLANTED: ICD-10-CM

## 2024-12-10 LAB
ALBUMIN SERPL BCP-MCNC: 3.8 G/DL (ref 3.5–5.2)
ALP SERPL-CCNC: 87 U/L (ref 40–150)
ALT SERPL W/O P-5'-P-CCNC: 19 U/L (ref 10–44)
ANION GAP SERPL CALC-SCNC: 4 MMOL/L (ref 8–16)
AST SERPL-CCNC: 16 U/L (ref 10–40)
BASOPHILS # BLD AUTO: 0.01 K/UL (ref 0–0.2)
BASOPHILS NFR BLD: 0.2 % (ref 0–1.9)
BILIRUB SERPL-MCNC: 0.3 MG/DL (ref 0.1–1)
BUN SERPL-MCNC: 32 MG/DL (ref 6–20)
CALCIUM SERPL-MCNC: 8.5 MG/DL (ref 8.7–10.5)
CHLORIDE SERPL-SCNC: 106 MMOL/L (ref 95–110)
CO2 SERPL-SCNC: 29 MMOL/L (ref 23–29)
CREAT SERPL-MCNC: 2.3 MG/DL (ref 0.5–1.4)
DIFFERENTIAL METHOD BLD: ABNORMAL
EOSINOPHIL # BLD AUTO: 0.1 K/UL (ref 0–0.5)
EOSINOPHIL NFR BLD: 1.9 % (ref 0–8)
ERYTHROCYTE [DISTWIDTH] IN BLOOD BY AUTOMATED COUNT: 14.4 % (ref 11.5–14.5)
EST. GFR  (NO RACE VARIABLE): 25 ML/MIN/1.73 M^2
GLUCOSE SERPL-MCNC: 145 MG/DL (ref 70–110)
HCT VFR BLD AUTO: 32 % (ref 37–48.5)
HGB BLD-MCNC: 11.1 G/DL (ref 12–16)
IMM GRANULOCYTES # BLD AUTO: 0.01 K/UL (ref 0–0.04)
IMM GRANULOCYTES NFR BLD AUTO: 0.2 % (ref 0–0.5)
LYMPHOCYTES # BLD AUTO: 1.8 K/UL (ref 1–4.8)
LYMPHOCYTES NFR BLD: 34.2 % (ref 18–48)
MAGNESIUM SERPL-MCNC: 3.2 MG/DL (ref 1.6–2.6)
MCH RBC QN AUTO: 29.5 PG (ref 27–31)
MCHC RBC AUTO-ENTMCNC: 34.7 G/DL (ref 32–36)
MCV RBC AUTO: 85 FL (ref 82–98)
MONOCYTES # BLD AUTO: 0.4 K/UL (ref 0.3–1)
MONOCYTES NFR BLD: 7.7 % (ref 4–15)
NEUTROPHILS # BLD AUTO: 2.9 K/UL (ref 1.8–7.7)
NEUTROPHILS NFR BLD: 55.8 % (ref 38–73)
NRBC BLD-RTO: 0 /100 WBC
PLATELET # BLD AUTO: 203 K/UL (ref 150–450)
PMV BLD AUTO: 9.6 FL (ref 9.2–12.9)
POTASSIUM SERPL-SCNC: 4.6 MMOL/L (ref 3.5–5.1)
PROT SERPL-MCNC: 6.9 G/DL (ref 6–8.4)
RBC # BLD AUTO: 3.76 M/UL (ref 4–5.4)
SODIUM SERPL-SCNC: 139 MMOL/L (ref 136–145)
WBC # BLD AUTO: 5.2 K/UL (ref 3.9–12.7)

## 2024-12-10 PROCEDURE — 80197 ASSAY OF TACROLIMUS: CPT | Performed by: INTERNAL MEDICINE

## 2024-12-10 PROCEDURE — 85025 COMPLETE CBC W/AUTO DIFF WBC: CPT | Performed by: INTERNAL MEDICINE

## 2024-12-10 PROCEDURE — 80053 COMPREHEN METABOLIC PANEL: CPT | Performed by: INTERNAL MEDICINE

## 2024-12-10 PROCEDURE — 83735 ASSAY OF MAGNESIUM: CPT | Performed by: INTERNAL MEDICINE

## 2024-12-10 PROCEDURE — 36415 COLL VENOUS BLD VENIPUNCTURE: CPT | Performed by: INTERNAL MEDICINE

## 2024-12-11 ENCOUNTER — INFUSION (OUTPATIENT)
Dept: INFUSION THERAPY | Facility: HOSPITAL | Age: 54
End: 2024-12-11
Attending: INTERNAL MEDICINE
Payer: MEDICARE

## 2024-12-11 VITALS
OXYGEN SATURATION: 97 % | DIASTOLIC BLOOD PRESSURE: 71 MMHG | TEMPERATURE: 98 F | HEART RATE: 81 BPM | WEIGHT: 136.44 LBS | RESPIRATION RATE: 16 BRPM | HEIGHT: 69 IN | SYSTOLIC BLOOD PRESSURE: 158 MMHG | BODY MASS INDEX: 20.21 KG/M2

## 2024-12-11 DIAGNOSIS — M32.9 SYSTEMIC LUPUS ERYTHEMATOSUS, UNSPECIFIED SLE TYPE, UNSPECIFIED ORGAN INVOLVEMENT STATUS: Primary | ICD-10-CM

## 2024-12-11 LAB — TACROLIMUS BLD-MCNC: 4.4 NG/ML (ref 5–15)

## 2024-12-11 PROCEDURE — 63600175 PHARM REV CODE 636 W HCPCS: Mod: JZ,JA,JG | Performed by: INTERNAL MEDICINE

## 2024-12-11 PROCEDURE — 25000003 PHARM REV CODE 250: Performed by: INTERNAL MEDICINE

## 2024-12-11 PROCEDURE — 96365 THER/PROPH/DIAG IV INF INIT: CPT

## 2024-12-11 PROCEDURE — 96375 TX/PRO/DX INJ NEW DRUG ADDON: CPT

## 2024-12-11 RX ORDER — ACETAMINOPHEN 325 MG/1
650 TABLET ORAL
Status: COMPLETED | OUTPATIENT
Start: 2024-12-11 | End: 2024-12-11

## 2024-12-11 RX ORDER — DIPHENHYDRAMINE HYDROCHLORIDE 50 MG/ML
25 INJECTION INTRAMUSCULAR; INTRAVENOUS
Status: COMPLETED | OUTPATIENT
Start: 2024-12-11 | End: 2024-12-11

## 2024-12-11 RX ORDER — SODIUM CHLORIDE 0.9 % (FLUSH) 0.9 %
10 SYRINGE (ML) INJECTION
OUTPATIENT
Start: 2025-01-08

## 2024-12-11 RX ORDER — ACETAMINOPHEN 325 MG/1
650 TABLET ORAL
OUTPATIENT
Start: 2025-01-08

## 2024-12-11 RX ORDER — HEPARIN 100 UNIT/ML
500 SYRINGE INTRAVENOUS
OUTPATIENT
Start: 2025-01-08

## 2024-12-11 RX ORDER — DIPHENHYDRAMINE HYDROCHLORIDE 50 MG/ML
25 INJECTION INTRAMUSCULAR; INTRAVENOUS
OUTPATIENT
Start: 2025-01-08

## 2024-12-11 RX ADMIN — BELIMUMAB 619 MG: 400 INJECTION, POWDER, LYOPHILIZED, FOR SOLUTION INTRAVENOUS at 11:12

## 2024-12-11 RX ADMIN — ACETAMINOPHEN 650 MG: 325 TABLET ORAL at 11:12

## 2024-12-11 RX ADMIN — DIPHENHYDRAMINE HYDROCHLORIDE 25 MG: 50 INJECTION INTRAMUSCULAR; INTRAVENOUS at 11:12

## 2024-12-11 NOTE — PLAN OF CARE
Patient arrived on unit for Benlysta infusion. Patient is awake, alert, and oriented x4, denies any new complaints or worsening signs and symptoms since last visit. Placed 24g PIV in right AC, administered pre-medications: Tylenol PO and Benadryl IVP, administered Benlysta IVPB over 1 hr, tolerated well with no signs or symptoms of adverse reaction, removed IV. Patient denies any questions or concerns at this time. Discharged home upon completion of treatments in NAD.    Please see MAR and flowsheets for any additional information.      Problem: Adult Inpatient Plan of Care  Goal: Optimal Comfort and Wellbeing  Outcome: Met

## 2024-12-12 ENCOUNTER — PATIENT MESSAGE (OUTPATIENT)
Dept: OPHTHALMOLOGY | Facility: CLINIC | Age: 54
End: 2024-12-12
Payer: MEDICARE

## 2024-12-12 ENCOUNTER — OFFICE VISIT (OUTPATIENT)
Dept: OPTOMETRY | Facility: CLINIC | Age: 54
End: 2024-12-12
Payer: MEDICARE

## 2024-12-12 ENCOUNTER — TELEPHONE (OUTPATIENT)
Dept: OPHTHALMOLOGY | Facility: CLINIC | Age: 54
End: 2024-12-12
Payer: MEDICARE

## 2024-12-12 DIAGNOSIS — N95.2 POSTMENOPAUSAL ATROPHIC VAGINITIS: ICD-10-CM

## 2024-12-12 DIAGNOSIS — H52.7 REFRACTIVE ERROR: Primary | ICD-10-CM

## 2024-12-12 PROCEDURE — 3051F HG A1C>EQUAL 7.0%<8.0%: CPT | Mod: CPTII,S$GLB,, | Performed by: OPTOMETRIST

## 2024-12-12 PROCEDURE — 3066F NEPHROPATHY DOC TX: CPT | Mod: CPTII,S$GLB,, | Performed by: OPTOMETRIST

## 2024-12-12 PROCEDURE — 1160F RVW MEDS BY RX/DR IN RCRD: CPT | Mod: CPTII,S$GLB,, | Performed by: OPTOMETRIST

## 2024-12-12 PROCEDURE — 4010F ACE/ARB THERAPY RXD/TAKEN: CPT | Mod: CPTII,S$GLB,, | Performed by: OPTOMETRIST

## 2024-12-12 PROCEDURE — 92015 DETERMINE REFRACTIVE STATE: CPT | Mod: S$GLB,,, | Performed by: OPTOMETRIST

## 2024-12-12 PROCEDURE — 1159F MED LIST DOCD IN RCRD: CPT | Mod: CPTII,S$GLB,, | Performed by: OPTOMETRIST

## 2024-12-12 PROCEDURE — 3060F POS MICROALBUMINURIA REV: CPT | Mod: CPTII,S$GLB,, | Performed by: OPTOMETRIST

## 2024-12-12 NOTE — TELEPHONE ENCOUNTER
Clinic spoke to patient to schedule 6 month follow up visit with Dr. Mansfield at [facility] location. Patient requested a reminder phone call this afternoon to schedule appointment.

## 2024-12-12 NOTE — TELEPHONE ENCOUNTER
Appointment scheduled with Dr. Mansfield (6 month follow up) at [facility] location 01/22/2025 at 10:45 am.

## 2024-12-12 NOTE — TELEPHONE ENCOUNTER
----- Message from Med Assistant Love sent at 12/12/2024 11:31 AM CST -----  Please schedule an appt for pt due in Jan 2025 with Dr. Mansfield

## 2024-12-12 NOTE — PROGRESS NOTES
Subjective:       Patient ID: Valencia Dumont is a 54 y.o. female      Chief Complaint   Patient presents with    Concerns About Ocular Health     History of Present Illness  Dls: 7/24/24 Dr. Mansfield     53 y/o female presents today for refraction  Pt states just  new glasses about 5 days ago used rx from December 2023 had bifocal glasses made at [facility].   Pt c/o problems seeing at distance ou with new and older glasses.      x 4 days ago          Assessment/Plan:     1. Refractive error (Primary)  Pt had glasses made outside of [facility]. Rx does not match to what was written for pt last year. Pt will reach out to optical. Updated MRx today, similar to 2023 MRx and pt reports good VA     Eyeglass Final Rx       Eyeglass Final Rx         Sphere Cylinder Axis Add    Right -2.25 +0.50 175 +2.00    Left -1.00 +0.50 010 +2.00      Expiration Date: 12/12/2025                      Follow up for retina as scheduled.

## 2024-12-12 NOTE — TELEPHONE ENCOUNTER
----- Message from Tech Vale sent at 12/12/2024  1:57 PM CST -----  Regarding: Reminder phone call    ----- Message -----  From: Kate Slaughter MA  Sent: 12/12/2024  11:32 AM CST  To: Shyla Warren Staff    Please schedule an appt for pt due in Jan 2025 with Dr. Mansfield

## 2024-12-13 ENCOUNTER — TELEPHONE (OUTPATIENT)
Dept: TRANSPLANT | Facility: CLINIC | Age: 54
End: 2024-12-13
Payer: MEDICARE

## 2024-12-13 NOTE — LETTER
December 13, 2024    Valencia Dumont  [address] [address]          Dear Valencia Dumont:  MRN: [id number]    This is a follow up to your recent labs, your lab results were stable.  There are no medicine changes.  Please have your labs drawn again on 6/9/25.      If you cannot have your labs drawn on the scheduled date, it is your responsibility to call the transplant department to reschedule.  Please call [phone] and ask to speak to Gayatri Peoples Medical Assistant for all scheduling requests.     Sincerely,    Linda Barakat, RN,BSN,CCTC    Your Liver Transplant Coordinator    [facility]  [address] [place] [id number] [phone]

## 2024-12-13 NOTE — TELEPHONE ENCOUNTER
Labs reviewed and are stable, continue labs q 24 weeks. Letter sent.       ----- Message from Jacob Horvath MD sent at 12/12/2024 11:02 PM CST -----  Results reviewed. No change in immunosuppression

## 2024-12-16 RX ORDER — ESTRADIOL 0.1 MG/G
CREAM VAGINAL
Qty: 42.5 G | Refills: 3 | Status: SHIPPED | OUTPATIENT
Start: 2024-12-16

## 2025-01-09 ENCOUNTER — PATIENT MESSAGE (OUTPATIENT)
Dept: OTOLARYNGOLOGY | Facility: CLINIC | Age: 55
End: 2025-01-09
Payer: MEDICARE

## 2025-01-09 ENCOUNTER — INFUSION (OUTPATIENT)
Dept: INFUSION THERAPY | Facility: HOSPITAL | Age: 55
End: 2025-01-09
Attending: INTERNAL MEDICINE
Payer: MEDICARE

## 2025-01-09 VITALS
BODY MASS INDEX: 21.1 KG/M2 | WEIGHT: 142.88 LBS | RESPIRATION RATE: 16 BRPM | SYSTOLIC BLOOD PRESSURE: 168 MMHG | HEART RATE: 89 BPM | OXYGEN SATURATION: 99 % | TEMPERATURE: 98 F | DIASTOLIC BLOOD PRESSURE: 71 MMHG

## 2025-01-09 DIAGNOSIS — M32.9 SYSTEMIC LUPUS ERYTHEMATOSUS, UNSPECIFIED SLE TYPE, UNSPECIFIED ORGAN INVOLVEMENT STATUS: Primary | ICD-10-CM

## 2025-01-09 PROCEDURE — 96365 THER/PROPH/DIAG IV INF INIT: CPT

## 2025-01-09 PROCEDURE — 25000003 PHARM REV CODE 250: Performed by: INTERNAL MEDICINE

## 2025-01-09 PROCEDURE — 63600175 PHARM REV CODE 636 W HCPCS: Mod: JZ,JA,TB | Performed by: INTERNAL MEDICINE

## 2025-01-09 PROCEDURE — 96375 TX/PRO/DX INJ NEW DRUG ADDON: CPT

## 2025-01-09 RX ORDER — DIPHENHYDRAMINE HYDROCHLORIDE 50 MG/ML
25 INJECTION INTRAMUSCULAR; INTRAVENOUS
Status: COMPLETED | OUTPATIENT
Start: 2025-01-09 | End: 2025-01-09

## 2025-01-09 RX ORDER — DIPHENHYDRAMINE HYDROCHLORIDE 50 MG/ML
25 INJECTION INTRAMUSCULAR; INTRAVENOUS
OUTPATIENT
Start: 2025-02-06

## 2025-01-09 RX ORDER — ACETAMINOPHEN 325 MG/1
650 TABLET ORAL
Status: COMPLETED | OUTPATIENT
Start: 2025-01-09 | End: 2025-01-09

## 2025-01-09 RX ORDER — SODIUM CHLORIDE 0.9 % (FLUSH) 0.9 %
10 SYRINGE (ML) INJECTION
OUTPATIENT
Start: 2025-02-06

## 2025-01-09 RX ORDER — ACETAMINOPHEN 325 MG/1
650 TABLET ORAL
OUTPATIENT
Start: 2025-02-06

## 2025-01-09 RX ORDER — HEPARIN 100 UNIT/ML
500 SYRINGE INTRAVENOUS
OUTPATIENT
Start: 2025-02-06

## 2025-01-09 RX ADMIN — DIPHENHYDRAMINE HYDROCHLORIDE 25 MG: 50 INJECTION INTRAMUSCULAR; INTRAVENOUS at 04:01

## 2025-01-09 RX ADMIN — ACETAMINOPHEN 650 MG: 325 TABLET ORAL at 04:01

## 2025-01-09 RX ADMIN — BELIMUMAB 619 MG: 400 INJECTION, POWDER, LYOPHILIZED, FOR SOLUTION INTRAVENOUS at 04:01

## 2025-01-09 NOTE — PLAN OF CARE
Pt arrived to clinic by foot without difficulty, AAOx4. Denies any new or worsening of symptoms since last visit. Pt premedicated with tylenol and benadryl. Pt received Benlysta infusion via 24g L FA over 60 minutes. Dsg c/d/i; no s/s of infection or infiltration noted. PIV flushed and patent with blood return. Pt tolerated medication well. No S&S of an adverse reaction, VSS. Denies pain or discomfort. Care plan discussed with pt. Pt verbalized understanding. Pt aware of upcoming appointment via MyChart. Pt ambulatory upon discharge in [facility].          Problem: Fatigue  Goal: Improved Activity Tolerance  Outcome: Progressing     Problem: Fall Injury Risk  Goal: Absence of Fall and Fall-Related Injury  Outcome: Met

## 2025-01-15 ENCOUNTER — PATIENT MESSAGE (OUTPATIENT)
Dept: RHEUMATOLOGY | Facility: CLINIC | Age: 55
End: 2025-01-15
Payer: MEDICARE

## 2025-01-15 DIAGNOSIS — Z94.4 LIVER REPLACED BY TRANSPLANT: Chronic | ICD-10-CM

## 2025-01-15 DIAGNOSIS — R53.83 FATIGUE, UNSPECIFIED TYPE: Primary | ICD-10-CM

## 2025-01-15 DIAGNOSIS — D84.821 IMMUNOSUPPRESSION DUE TO DRUG THERAPY: ICD-10-CM

## 2025-01-15 DIAGNOSIS — M32.9 SYSTEMIC LUPUS ERYTHEMATOSUS, UNSPECIFIED SLE TYPE, UNSPECIFIED ORGAN INVOLVEMENT STATUS: ICD-10-CM

## 2025-01-15 DIAGNOSIS — Z79.899 IMMUNOSUPPRESSION DUE TO DRUG THERAPY: ICD-10-CM

## 2025-01-15 NOTE — TELEPHONE ENCOUNTER
Will order standing labs, urine culture and blood culture as well as chest x-ray to assess the severe fatigue this patient with a history of transplant and immunosuppression is experiencing.  Message to patient to discuss whether she should switch from monthly infusions to weekly injections of the Benlysta since she is having difficulty with the infusion.

## 2025-01-16 ENCOUNTER — PATIENT MESSAGE (OUTPATIENT)
Dept: GASTROENTEROLOGY | Facility: CLINIC | Age: 55
End: 2025-01-16
Payer: MEDICARE

## 2025-01-19 ENCOUNTER — PATIENT MESSAGE (OUTPATIENT)
Dept: RADIOLOGY | Facility: HOSPITAL | Age: 55
End: 2025-01-19
Payer: MEDICARE

## 2025-01-20 ENCOUNTER — PATIENT MESSAGE (OUTPATIENT)
Dept: RHEUMATOLOGY | Facility: CLINIC | Age: 55
End: 2025-01-20
Payer: MEDICARE

## 2025-01-22 ENCOUNTER — PATIENT MESSAGE (OUTPATIENT)
Dept: NEPHROLOGY | Facility: CLINIC | Age: 55
End: 2025-01-22
Payer: MEDICARE

## 2025-01-22 ENCOUNTER — PATIENT MESSAGE (OUTPATIENT)
Dept: OPHTHALMOLOGY | Facility: CLINIC | Age: 55
End: 2025-01-22
Payer: MEDICARE

## 2025-01-22 ENCOUNTER — TELEPHONE (OUTPATIENT)
Dept: NEPHROLOGY | Facility: CLINIC | Age: 55
End: 2025-01-22
Payer: MEDICARE

## 2025-01-24 ENCOUNTER — PATIENT MESSAGE (OUTPATIENT)
Dept: OBSTETRICS AND GYNECOLOGY | Facility: CLINIC | Age: 55
End: 2025-01-24
Payer: MEDICARE

## 2025-01-25 ENCOUNTER — OFFICE VISIT (OUTPATIENT)
Dept: OPHTHALMOLOGY | Facility: CLINIC | Age: 55
End: 2025-01-25
Attending: OPHTHALMOLOGY
Payer: MEDICARE

## 2025-01-25 DIAGNOSIS — M32.9 SYSTEMIC LUPUS ERYTHEMATOSUS, UNSPECIFIED SLE TYPE, UNSPECIFIED ORGAN INVOLVEMENT STATUS: ICD-10-CM

## 2025-01-25 DIAGNOSIS — H33.322 RETINA HOLE, LEFT: ICD-10-CM

## 2025-01-25 DIAGNOSIS — H25.813 MIXED TYPE AGE-RELATED CATARACT, BOTH EYES: ICD-10-CM

## 2025-01-25 DIAGNOSIS — H35.389 TOXIC MACULOPATHY FROM PLAQUENIL IN THERAPEUTIC USE: Primary | ICD-10-CM

## 2025-01-25 DIAGNOSIS — T37.2X5A TOXIC MACULOPATHY FROM PLAQUENIL IN THERAPEUTIC USE: Primary | ICD-10-CM

## 2025-01-25 DIAGNOSIS — H33.312 RETINAL TEAR, LEFT: ICD-10-CM

## 2025-01-25 PROCEDURE — 92250 FUNDUS PHOTOGRAPHY W/I&R: CPT | Mod: S$GLB,,, | Performed by: OPHTHALMOLOGY

## 2025-01-25 PROCEDURE — 99999 PR PBB SHADOW E&M-EST. PATIENT-LVL III: CPT | Mod: PBBFAC,,, | Performed by: OPHTHALMOLOGY

## 2025-01-25 PROCEDURE — 67145 PROPH RTA DTCHMNT PC: CPT | Mod: LT,S$GLB,, | Performed by: OPHTHALMOLOGY

## 2025-01-25 PROCEDURE — 2023F DILAT RTA XM W/O RTNOPTHY: CPT | Mod: CPTII,S$GLB,, | Performed by: OPHTHALMOLOGY

## 2025-01-25 PROCEDURE — 1159F MED LIST DOCD IN RCRD: CPT | Mod: CPTII,S$GLB,, | Performed by: OPHTHALMOLOGY

## 2025-01-25 PROCEDURE — 99214 OFFICE O/P EST MOD 30 MIN: CPT | Mod: 25,S$GLB,, | Performed by: OPHTHALMOLOGY

## 2025-01-25 NOTE — PROGRESS NOTES
"Subjective:       Patient ID: Valencia Dumont is a 54 y.o. female      Chief Complaint   Patient presents with    Follow-up     History of Present Illness  HPI    Oct/Dfe.      Pt states her vision has been " horrible" She has been wearing her old   glasses until she can get her new glasses in.     Pt now longer on plaquenil   Last edited by Kelvin Mansfield MD on 1/25/2025 12:54 PM.        Imaging:    See report    Assessment/Plan:     1. Systemic lupus erythematosus, unspecified SLE type, unspecified organ involvement status  On HCQ for about 10 yrs  No longer taking    - Color Fundus Photography - OU - Both Eyes    2. 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 Mixed type age-related cataract, both eyes  Excellent Va  Rec obs for now    4. Retinal tear, left  5. Retina hole, left  Discussed pathology in detail  Discussed RBA retinopexy vs obs including RD and ERM, etc.  Pt wishes to have laser    Risks, benefits, and alternatives to treatment were discussed in detail with the patient.  The patient voiced understanding and wished to proceed with the procedure.  See separate consent form.    - Retinopexy - OS - Left Eye    Follow up in about 5 weeks (around 3/1/2025), or if symptoms worsen or fail to improve, for OCT Mac, Dilated examination (DILATE OU).                "

## 2025-01-27 ENCOUNTER — PATIENT MESSAGE (OUTPATIENT)
Dept: RHEUMATOLOGY | Facility: CLINIC | Age: 55
End: 2025-01-27
Payer: MEDICARE

## 2025-01-27 ENCOUNTER — PATIENT MESSAGE (OUTPATIENT)
Dept: GASTROENTEROLOGY | Facility: CLINIC | Age: 55
End: 2025-01-27
Payer: MEDICARE

## 2025-01-27 ENCOUNTER — PATIENT MESSAGE (OUTPATIENT)
Dept: OPTOMETRY | Facility: CLINIC | Age: 55
End: 2025-01-27
Payer: MEDICARE

## 2025-01-27 ENCOUNTER — PATIENT MESSAGE (OUTPATIENT)
Dept: OPHTHALMOLOGY | Facility: CLINIC | Age: 55
End: 2025-01-27
Payer: MEDICARE

## 2025-01-27 ENCOUNTER — PATIENT MESSAGE (OUTPATIENT)
Dept: OBSTETRICS AND GYNECOLOGY | Facility: CLINIC | Age: 55
End: 2025-01-27
Payer: MEDICARE

## 2025-01-28 ENCOUNTER — HOSPITAL ENCOUNTER (OUTPATIENT)
Dept: RADIOLOGY | Facility: HOSPITAL | Age: 55
Discharge: HOME | End: 2025-01-28
Attending: INTERNAL MEDICINE
Payer: MEDICARE

## 2025-01-28 DIAGNOSIS — M32.9 SYSTEMIC LUPUS ERYTHEMATOSUS, UNSPECIFIED SLE TYPE, UNSPECIFIED ORGAN INVOLVEMENT STATUS: ICD-10-CM

## 2025-01-28 DIAGNOSIS — Z94.4 LIVER REPLACED BY TRANSPLANT: Chronic | ICD-10-CM

## 2025-01-28 DIAGNOSIS — R53.83 FATIGUE, UNSPECIFIED TYPE: ICD-10-CM

## 2025-01-28 DIAGNOSIS — Z79.899 IMMUNOSUPPRESSION DUE TO DRUG THERAPY: ICD-10-CM

## 2025-01-28 DIAGNOSIS — D84.821 IMMUNOSUPPRESSION DUE TO DRUG THERAPY: ICD-10-CM

## 2025-01-28 PROCEDURE — 71046 X-RAY EXAM CHEST 2 VIEWS: CPT | Mod: 26,,, | Performed by: RADIOLOGY

## 2025-01-28 PROCEDURE — 71046 X-RAY EXAM CHEST 2 VIEWS: CPT | Mod: TC,FY

## 2025-01-29 ENCOUNTER — PATIENT MESSAGE (OUTPATIENT)
Dept: RHEUMATOLOGY | Facility: CLINIC | Age: 55
End: 2025-01-29
Payer: MEDICARE

## 2025-01-29 DIAGNOSIS — R79.89 HIGH SERUM PHOSPHORUS FOR AGE: ICD-10-CM

## 2025-01-29 DIAGNOSIS — Z79.899 IMMUNOSUPPRESSION DUE TO DRUG THERAPY: ICD-10-CM

## 2025-01-29 DIAGNOSIS — D84.821 IMMUNOSUPPRESSION DUE TO DRUG THERAPY: ICD-10-CM

## 2025-01-29 DIAGNOSIS — M32.9 SYSTEMIC LUPUS ERYTHEMATOSUS, UNSPECIFIED SLE TYPE, UNSPECIFIED ORGAN INVOLVEMENT STATUS: Primary | ICD-10-CM

## 2025-01-29 DIAGNOSIS — Z94.4 LIVER REPLACED BY TRANSPLANT: ICD-10-CM

## 2025-01-29 DIAGNOSIS — R53.83 FATIGUE, UNSPECIFIED TYPE: ICD-10-CM

## 2025-01-30 ENCOUNTER — PATIENT MESSAGE (OUTPATIENT)
Dept: RHEUMATOLOGY | Facility: CLINIC | Age: 55
End: 2025-01-30
Payer: MEDICARE

## 2025-02-04 ENCOUNTER — PATIENT MESSAGE (OUTPATIENT)
Dept: RHEUMATOLOGY | Facility: CLINIC | Age: 55
End: 2025-02-04
Payer: MEDICARE

## 2025-02-05 ENCOUNTER — INFUSION (OUTPATIENT)
Dept: INFUSION THERAPY | Facility: HOSPITAL | Age: 55
End: 2025-02-05
Payer: MEDICARE

## 2025-02-05 VITALS
RESPIRATION RATE: 18 BRPM | DIASTOLIC BLOOD PRESSURE: 79 MMHG | BODY MASS INDEX: 20.45 KG/M2 | OXYGEN SATURATION: 99 % | TEMPERATURE: 98 F | HEART RATE: 80 BPM | WEIGHT: 138.44 LBS | SYSTOLIC BLOOD PRESSURE: 138 MMHG

## 2025-02-05 DIAGNOSIS — M32.9 SYSTEMIC LUPUS ERYTHEMATOSUS, UNSPECIFIED SLE TYPE, UNSPECIFIED ORGAN INVOLVEMENT STATUS: Primary | ICD-10-CM

## 2025-02-05 PROCEDURE — 96375 TX/PRO/DX INJ NEW DRUG ADDON: CPT

## 2025-02-05 PROCEDURE — 63600175 PHARM REV CODE 636 W HCPCS: Mod: JZ,JA,TB | Performed by: INTERNAL MEDICINE

## 2025-02-05 PROCEDURE — 25000003 PHARM REV CODE 250: Performed by: INTERNAL MEDICINE

## 2025-02-05 PROCEDURE — 96365 THER/PROPH/DIAG IV INF INIT: CPT

## 2025-02-05 RX ORDER — ACETAMINOPHEN 325 MG/1
650 TABLET ORAL
OUTPATIENT
Start: 2025-03-05

## 2025-02-05 RX ORDER — ACETAMINOPHEN 325 MG/1
650 TABLET ORAL
Status: COMPLETED | OUTPATIENT
Start: 2025-02-05 | End: 2025-02-05

## 2025-02-05 RX ORDER — DIPHENHYDRAMINE HYDROCHLORIDE 50 MG/ML
25 INJECTION INTRAMUSCULAR; INTRAVENOUS
Status: COMPLETED | OUTPATIENT
Start: 2025-02-05 | End: 2025-02-05

## 2025-02-05 RX ORDER — DIPHENHYDRAMINE HYDROCHLORIDE 50 MG/ML
25 INJECTION INTRAMUSCULAR; INTRAVENOUS
OUTPATIENT
Start: 2025-03-05

## 2025-02-05 RX ORDER — SODIUM CHLORIDE 0.9 % (FLUSH) 0.9 %
10 SYRINGE (ML) INJECTION
OUTPATIENT
Start: 2025-03-05

## 2025-02-05 RX ORDER — HEPARIN 100 UNIT/ML
500 SYRINGE INTRAVENOUS
OUTPATIENT
Start: 2025-03-05

## 2025-02-05 RX ADMIN — DIPHENHYDRAMINE HYDROCHLORIDE 25 MG: 50 INJECTION INTRAMUSCULAR; INTRAVENOUS at 11:02

## 2025-02-05 RX ADMIN — ACETAMINOPHEN 650 MG: 325 TABLET ORAL at 11:02

## 2025-02-05 RX ADMIN — BELIMUMAB 628 MG: 400 INJECTION, POWDER, LYOPHILIZED, FOR SOLUTION INTRAVENOUS at 11:02

## 2025-02-05 NOTE — PLAN OF CARE
Pt arrived to clinic for scheduled Benlysta (q4w). VSS. Premedicated with tylenol and benadryl per order. Infusion tolerated well. Voices no concerns at this time. Ambulated from clinic in [facility] for discharge.

## 2025-02-10 ENCOUNTER — CLINICAL SUPPORT (OUTPATIENT)
Dept: DIABETES | Facility: CLINIC | Age: 55
End: 2025-02-10
Payer: MEDICARE

## 2025-02-10 ENCOUNTER — PATIENT MESSAGE (OUTPATIENT)
Dept: ENDOCRINOLOGY | Facility: CLINIC | Age: 55
End: 2025-02-10
Payer: MEDICARE

## 2025-02-10 VITALS — BODY MASS INDEX: 20.71 KG/M2 | WEIGHT: 139.81 LBS | HEIGHT: 69 IN

## 2025-02-10 DIAGNOSIS — E11.22 TYPE 2 DIABETES MELLITUS WITH STAGE 3A CHRONIC KIDNEY DISEASE, WITH LONG-TERM CURRENT USE OF INSULIN: ICD-10-CM

## 2025-02-10 DIAGNOSIS — Z79.4 TYPE 2 DIABETES MELLITUS WITH STAGE 3A CHRONIC KIDNEY DISEASE, WITH LONG-TERM CURRENT USE OF INSULIN: ICD-10-CM

## 2025-02-10 DIAGNOSIS — N18.31 TYPE 2 DIABETES MELLITUS WITH STAGE 3A CHRONIC KIDNEY DISEASE, WITH LONG-TERM CURRENT USE OF INSULIN: ICD-10-CM

## 2025-02-10 PROCEDURE — 99999 PR PBB SHADOW E&M-EST. PATIENT-LVL II: CPT | Mod: PBBFAC,,,

## 2025-02-10 PROCEDURE — G0108 DIAB MANAGE TRN  PER INDIV: HCPCS | Mod: S$GLB,,, | Performed by: FAMILY MEDICINE

## 2025-02-10 NOTE — PROGRESS NOTES
"Diabetes Care Specialist Progress Note  Author: Sujey Sharpe RN  Date: 2/10/2025    Intake  Program Intake  Reason for Diabetes Program Visit:: Initial Diabetes Assessment  Current diabetes risk level:: moderate  In the last month, have you used the ER or been admitted to the hospital: No  Permission to speak with others about care:: no (Daughter)    Current Diabetes Treatment: DM Injectables, Insulin  DM Injectables Type/Dose: Trulicity 3mg weekly  Method of insulin delivery?: Injections  Injection Type: Pens  Pen Type/Dose: Lantus 4U in AM and Humalog 4U TID w/meals    Continuous Glucose Monitoring  Patient has CGM: Yes  Personal CGM type:: FreeStyle Libre3 (Pt has not had CGMS connected for over 2 weeks)  GMI Value:  (Not enough data available)    Lab Results   Component Value Date    HGBA1C 7.1 (H) 12/10/2024       Weight: 63.4 kg (139 lb 12.8 oz)   Height: 5' 9" (175.3 cm)   Body mass index is 20.64 kg/m².    Lifestyle Coping Support & Clinical  Lifestyle/Coping/Support  Compared to other people your age, how would you rate your health?: Fair  Does anyone in your family have diabetes or does anyone in your family support you in your diabetes care?: Father, P.Grandfather, P.Siblings, M.Grandmother, and M.Uncles......  List anything about Diabetes that causes you stress?: Nothing particular  Learning Barriers:: None  Culture or Islam beliefs that may impact ability to access healthcare: No  Psychosocial/Coping Skills Assessment Completed: : Yes  Assessment indicates:: Instruction Needed  Area of need?: No    Diabetes Self-Management Skills Assessment  Medication Skills Assessment  Patient is able to identify current diabetes medications, dosages, and appropriate timing of medications.: yes  Patient reports problems or concerns with current medication regimen.: no  Patient is  aware that some diabetes medications can cause low blood sugar?: No  Medication Skills Assessment Completed:: Yes  Area of need?: " Yes    Diabetes Disease Process/Treatment Options  Diabetes Type?: Type II (Pt said she might have Steriod Induced Diabetes)  When were you diagnosed?: Dx: 2010  If previous diabetes education, when/where:: 2022 @ [facility]   What are your goals for this education session?: Learn how to eat right with diabetes and get information on OmniPod 5 Insulin Pump  Is patient aware of what causes diabetes?: Yes  Does patient understand the pathophysiology of diabetes?: No  Diabetes Disease Process/Treatment Options: Skills Assessment Completed: Yes  Assessment indicates:: Instruction Needed, Knowledge deficit  Area of need?: Yes    Nutrition/Healthy Eating  Meal Plan 24 Hour Recall - Breakfast: fruit  Meal Plan 24 Hour Recall - Lunch: fruit or air fried pizza or airfried eggrolls  Meal Plan 24 Hour Recall - Dinner: shrimp or chicken with mixed veggies  Meal Plan 24 Hour Recall - Snack: pecans, or apples/pears/grapes or yogurt or corn chips  Meal Plan 24 Hour Recall - Beverage: water or stas grape  Who shops/cooks?: Patient  Patient can identify foods that impact blood sugar.: yes  Challenges to healthy eating:: portion control  Nutrition/Healthy Eating Skills Assessment Completed:: Yes  Assessment indicates:: Instruction Needed, Knowledge deficit  Area of need?: Yes    Physical Activity/Exercise  Physical Activity/Exercise Skills Assessment Completed: : No  Deffered due to:: Time  Area of need?: No    Home Blood Glucose Monitoring  Patient states that blood sugar is checked at home daily.: yes  Monitoring Method:: personal continuous glucose monitor  Personal CGM type:: FreeStyle Libre3 (Pt has not had CGMS connected for over 2 weeks)   What is your current Time in Range?: no value  What is your A1c Target?: < 7.0%  Home Blood Glucose Monitoring Skills Assessment Completed: : Yes  Assessment indicates:: Instruction Needed, Knowledge deficit  Area of need?: Yes    Acute Complications  Acute Complications Skills Assessment  Completed: : No  Deffered due to:: Time  Area of need?: Deferred    Chronic Complications  Chronic Complications Skills Assessment Completed: : No  Deferred due to:: Time  Area of need?: Deferred      Assessment Summary and Plan    Based on today's diabetes care assessment, the following areas of need were identified:      Identified Areas of Need      Medication/Current Diabetes Treatment: Yes-Reviewed Patient's current diabetes medication regimen.     Lifestyle Coping/Support: No   Diabetes Disease Process/Treatment Options: Yes- Provided DM management guide and discussed what is diabetes and the significance of current A1c and blood glucose goals.   Nutrition/Healthy Eating: Yes - see care planning   Physical Activity/Exercise: No    Home Blood Glucose Monitoring: Yes - see care planning   Acute Complications: Deferred    Chronic Complications: Deferred       Today's interventions were provided through individual discussion, instruction, and written materials were provided.      Patient verbalized understanding of instruction and written materials.  Pt was able to return back demonstration of instructions today. Patient understood key points, needs reinforcement and further instruction.     Diabetes Self-Management Care Plan:    Today's Diabetes Self-Management Care Plan was developed with Valencia's input. Valencia has agreed to work toward the following goal(s) to improve his/her overall diabetes control.      Care Plan: Diabetes Management   Updates made since 2/11/2024 12:00 AM        Problem: Healthy Eating         Goal: Eat 2-3 meals daily with 30-45g/2-3 servings of Carbohydrate per meal. Limit snacking in between meal to 1 serving/15 grams or up to 20 grams.    Start Date: 2/10/2025   Expected End Date: 3/5/2025   Priority: High   Barriers: No Barriers Identified   Note:    2/10/25 - -  We reviewed eating right with diabetes and the importance of not skipping meals. Encouraged Pt to aim for 2-3 evenly spaced  meals or a small carb snack in place of a missed meal. We discussed the importance of eating balanced meals with vegetables, fruits, lean meats, and whole grains. We discussed portion sizes, carb sources of foods, appropriate amount of carbs to have at meals/snacks and acceptable serving sizes of individual carb items. Obtained a 24-hr food recall from patient. We discussed various meal plan options to promote healthy eating.      - - Practiced reading food labels on various food items with patient focusing on serving size and total carbohydrate intake (not sugar intake). Instructed on appropriate serving sizes of specific carb containing foods. Stressed importance of eating a protein at each meal and include non-starchy vegetables at lunch/dinner. Written education information provided to patient for use at home. Pt verbalized understanding of all the above.       Task: Reviewed the sources and role of Carbohydrate, Protein, and Fat and how each nutrient impacts blood sugar. Completed 2/10/2025        Task: Provided visual examples using dry measuring cups, food models, and other familiar objects such as computer mouse, deck or cards, tennis ball etc. to help with visualization of portions. Completed 2/10/2025        Task: Explained how to count carbohydrates using the food label and the use of dry measuring cups for accurate carb counting. Completed 2/10/2025     Task: Recommended replacing beverages containing high sugar content with noncaloric/sugar free options and/or water. Completed 2/10/2025        Task: Review the importance of balancing carbohydrates with each meal using portion control techniques to count servings of carbohydrate and label reading to identify serving size and amount of total carbs per serving. Completed 2/10/2025        Task: Provided Sample plate method and reviewed the use of the plate to estimate amounts of carbohydrate per meal. Completed 2/10/2025            Problem: Medications          Goal: Patient will have a better understanding of how the OmniPod5 Auntomated Insulin Delivery pump works.    Start Date: 2/10/2025   Expected End Date: 3/5/2025   Priority: Low   Barriers: No Barriers Identified   Note:    2/10/25 - -  Discussed Basic Information About the OmniPod5 Automated insulin pump.    Patient is interested in the OmniPod5 Automated insulin pump.     Current Diabetic Medications: Lantus 4U in AM, Trulicity 3mg weekly, and Humalog 4U TID w/meals.      Basic Introduction to Insulin Pump Therapy:  Reviewed basics of insulin pump therapy for Omnipod5 and its unique features.  Explained the importance of learning to calculate prandial insulin dose/Bolus using advanced carbohydrate counting (Insulin to Carbohydrate Ratio) and Correction Dose (Sensitivity/Correction Factor) prior to starting pump therapy   Patient was able to view and watch a virtual demonstration via the simulator pump   Discussed the integration of CGM into the pump to provide automatic adjustments based on the CGM data  Reviewed patient responsibilities and expectations for insulin pump approval by provider such as: SMBG 4 or more times per day, completing BS and food logs and bringing them to all provider and educator visits, and additional labs needed.   Encouraged patient to check with their insurance provider regarding information such as possible costs associated with initial and monthly pump costs.      Carbohydrate Counting Skills Evaluation:   -  Reviewed what is a carbohydrate and basic carb foods. Reviewed insulin to carb ratio and how to count carbs.  - Reviewed basic Carbohydrate Counting principles--Food groups, label reading, use of dry measuring cups for accurate portion sizes.   - Reviewed carb counting using various examples of different meal plan.   -  Provided handouts to help with counting carbohydrates         Follow Up Plan   Follow up in about 23 days (around 3/5/2025) for F/U review carb counting,  Libre3 report, and finish providing OmniPod information.    Today's care plan and follow up schedule was discussed with patient.  Valencia verbalized understanding of the care plan, goals, and agrees to follow up plan.        The patient was encouraged to communicate with his/her health care provider/physician and care team regarding his/her condition(s) and treatment.  I provided the patient with my contact information today and encouraged to contact me via phone or [facility] Patient Portal as needed.     Length of Visit   Total Time: 110 Minutes

## 2025-02-13 ENCOUNTER — PATIENT MESSAGE (OUTPATIENT)
Dept: OPHTHALMOLOGY | Facility: CLINIC | Age: 55
End: 2025-02-13
Payer: MEDICARE

## 2025-02-14 ENCOUNTER — PATIENT MESSAGE (OUTPATIENT)
Dept: PODIATRY | Facility: CLINIC | Age: 55
End: 2025-02-14
Payer: MEDICARE

## 2025-02-18 NOTE — TELEPHONE ENCOUNTER
Placed call to get more info regarding message pt sent to staff about referral for diabetic shoes. No answer. Left vm for pt to return call.

## 2025-02-19 NOTE — PROGRESS NOTES
Subjective     Chief Complaint: CKD    History of Present Illness:  Ms. Valencia Dumont is a 54 y.o. female with hypertension dates back to 2006, CKD IIIb (baseline creatinine ~1.5-1.8), type 2 diabetes mellitus diagnosed back 2013 on insulin (most recent hemoglobin A1c 6.3%) with associated neuropathy, SLE diagnosed 2006, auto-immune hepatitis which failed Cytoxan s/p OLTx on 12/31/2013 on Prograf, history of kidney stones, cervical dysplasia s/p recent LEEP complicated by bleeding on 1/20 this year, sciatica, back pain, OA, chronic pain, GERD, vitamin D deficiency, osteoporosis, HSV-2, asthma, EUFEMIA, migraines and insomnia who presents to clinic today for follow-up of her CKD.      Clinic Visit 02/20/2025-- Patient presents today with no acute distress and no complaints at this time. She was last seen in our office in October. She did not have labs prior to this office visit, however labs from last month appear to be stable. Most recent Tacrolimus level in December is 4.4. She is drinking roughly 2.5L daily.         #HTN; Her home blood pressures range from 120s-130s systolic. She is unable to tolerate Norvasc in the past due to headaches. Aldosterone:Renin ratio of 97.2.   Meds; Losartan 100 mg daily, Verapmil 120 mg, Imdur 30 mg, Chlorthalidone 12.5 mg dialy.  Aldactone stopped in June 2024 due to EVERT and Hyperkalemia.         #CKD 3b; CKD 2/2 to diabetes, CNI use, and uncontrolled hypertension. Baseline line renal function around 2.0-2.3 range. She denies any increased fatigue, shortness or breath, edema, hematuria, foam in the urine, urinary retention or frequency.      CKD education class:   [x] Referred  [x] Attended    CKD nutrition education  [x] Referred  [x] Attended     KRT planning  [] General surgery referral for PD catheter evaluation/placement  [] Vascular surgery referral for vein mapping  [] Vascular surgery referral for access creation       Review of Systems   All other systems reviewed and are  negative.      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    Hypertension     Kidney stone     Lupus     Osteoporosis 12/2013    Prophylactic immunotherapy (transplant immunosuppression) 1/2/2014    SBP (spontaneous bacterial peritonitis)     history of        Past Surgical History:   Procedure Laterality Date    BREAST BIOPSY Left 08/2020    CHOLECYSTECTOMY      COLONOSCOPY N/A 05/31/2017    Procedure: COLONOSCOPY;  Surgeon: Marky Sun MD;  Location: [facility] ([address] [place] [address]);  Service: Endoscopy;  Laterality: N/A;  PM prep.    CONIZATION OF CERVIX USING LOOP ELECTROSURGICAL EXCISION PROCEDURE (LEEP) N/A 01/20/2023    Procedure: CONIZATION-CERVICAL-LEEP;  Surgeon: Lu Schreiber MD;  Location: [facility];  Service: OB/GYN;  Laterality: N/A;    ESOPHAGOGASTRODUODENOSCOPY      ESOPHAGOGASTRODUODENOSCOPY N/A 01/16/2019    Procedure: EGD (ESOPHAGOGASTRODUODENOSCOPY);  Surgeon: Deniz Guerra MD;  Location: [facility] ([facility]);  Service: Endoscopy;  Laterality: N/A;  labs prior, s/p liver transplant-MS    ESOPHAGOGASTRODUODENOSCOPY N/A 09/09/2020    Procedure: EGD (ESOPHAGOGASTRODUODENOSCOPY);  Surgeon: Davion Ríos MD;  Location: [facility] ([address] [place] [address]);  Service: Endoscopy;  Laterality: N/A;  Please order the EGD at least 2 weeks after barium esophagram has been done EGD is for dysphagia  covid test 9/6-[facility]    EYE SURGERY      FUNCTIONAL ENDOSCOPIC SINUS SURGERY (FESS) USING COMPUTER-ASSISTED NAVIGATION Bilateral 02/04/2021    Procedure: FESS, USING COMPUTER-ASSISTED NAVIGATION;  Surgeon: Delores Lewis MD;  Location: [facility] OR;  Service: ENT;  Laterality: Bilateral;  MEDEUN MEDEIROS  [phone] TEXTED HIM @ 2:45PM ON 1-8-2021  DISC LOADED BT TOMMY 1-  RN PREOP 1/28/2021---COVID NEGATIVE ON 2/3--CONSENT INCOMPLETE  H/P INCOMPLETE  --CBC IN AM    LIVER BIOPSY      LIVER TRANSPLANT  12/31/2013    REFRACTIVE SURGERY Bilateral 2010    Revision carpal tunnel and removal of scar formation from left wrist  11/17/2023    TUBAL LIGATION  2003    UPPER GASTROINTESTINAL ENDOSCOPY         Family History   Problem Relation Name Age of Onset    Hypertension Mother      Cataracts Mother      Diabetes Father      Alzheimer's disease Father      Diabetes Paternal Grandfather      Diabetes Paternal Grandmother      No Known Problems Maternal Grandmother      Bone cancer Maternal Grandfather      Breast cancer Maternal Aunt  55    Hypertension Brother      Diabetes Brother      No Known Problems Sister      No Known Problems Maternal Uncle      No Known Problems Paternal Aunt      No Known Problems Paternal Uncle      Stroke Neg Hx      Cancer Neg Hx      Colon cancer Neg Hx      Esophageal cancer Neg Hx      Stomach cancer Neg Hx      Rectal cancer Neg Hx      Amblyopia Neg Hx      Blindness Neg Hx      Glaucoma Neg Hx      Macular degeneration Neg Hx      Retinal detachment Neg Hx      Strabismus Neg Hx      Thyroid disease Neg Hx         Social History     Socioeconomic History    Marital status:     Number of children: 3   Occupational History     Employer: [employer]   Tobacco Use    Smoking status: Never    Smokeless tobacco: Never    Tobacco comments:     disability; ; 3 children   Substance and Sexual Activity    Alcohol use: Yes     Alcohol/week: 1.0 standard drink of alcohol     Types: 1 Glasses of wine per week     Comment: occasionally     Drug use: No    Sexual activity: Yes     Partners: Male     Birth control/protection: See Surgical Hx, Post-menopausal     Comment: s/p tubal ligation,  since 1989     Social Drivers of Health     Financial Resource Strain: Low Risk   "(7/29/2024)    Received from [facility]    Overall Financial Resource Strain (CARDIA)     Difficulty of Paying Living Expenses: Not hard at all   Food Insecurity: No Food Insecurity (8/8/2024)    Received from [facility]    Hunger Vital Sign     Worried About Running Out of Food in the Last Year: Never true     Ran Out of Food in the Last Year: Never true   Transportation Needs: No Transportation Needs (8/8/2024)    Received from [facility]    PRAPARE - Transportation     Lack of Transportation (Medical): No     Lack of Transportation (Non-Medical): No   Physical Activity: Insufficiently Active (7/29/2024)    Received from [facility]    Exercise Vital Sign     Days of Exercise per Week: 2 days     Minutes of Exercise per Session: 30 min   Stress: No Stress Concern Present (7/29/2024)    Received from [facility]    [employer] [place] [employer] - Occupational Stress Questionnaire     Feeling of Stress : Only a little   Housing Stability: Low Risk  (8/8/2024)    Received from [facility]    Housing Stability Vital Sign     Unable to Pay for Housing in the Last Year: No     Number of Places Lived in the Last Year: 1     Unstable Housing in the Last Year: No       MEDICATIONS & ALLERGIES:     Current Outpatient Medications on File Prior to Visit   Medication Sig    acetaminophen (TYLENOL) 650 MG TbSR Take 1 tablet (650 mg total) by mouth every 6 (six) hours.    amitriptyline (ELAVIL) 10 MG tablet Take 1 tablet (10 mg total) by mouth every evening.    ammonium lactate 12 % Crea Apply 1 application  topically once daily.    azelastine (ASTELIN) 137 mcg (0.1 %) nasal spray 1 spray (137 mcg total) by Nasal route 2 (two) times daily.    azelastine (ASTELIN) 137 mcg (0.1 %) nasal spray 1 spray (137 mcg total) by Nasal route 2 (two) times daily.    azithromycin (Z-GLORIA) 250 MG tablet Take 250 mg by mouth as needed.    BD ULTRA-FINE JANESSA PEN NEEDLE 32 gauge x 5/32" Ndle For five times daily insulin injections    " blood-glucose meter,continuous (DEXCOM G6 ) Misc 1 each by Misc.(Non-Drug; Combo Route) route once. for 1 dose    blood-glucose sensor (FREESTYLE CHUN 3 SENSOR) Karen 1 Device by Misc.(Non-Drug; Combo Route) route every 14 (fourteen) days.    blood-glucose transmitter (DEXCOM G6 TRANSMITTER) Karen 1 each by Misc.(Non-Drug; Combo Route) route once a week    chlorthalidone (HYGROTEN) 25 MG Tab Take 0.5 tablets (12.5 mg total) by mouth once daily.    ciclopirox (PENLAC) 8 % Soln Apply topically nightly.    ciclopirox (PENLAC) 8 % Soln Apply topically nightly.    clotrimazole-betamethasone 1-0.05% (LOTRISONE) cream     diazepam (VALIUM) 5 MG tablet Take 5 mg by mouth 3 (three) times daily as needed.     diclofenac sodium (VOLTAREN) 1 % Gel APPLY 2 GRAM to wrist and 4 g to left foot TOPICAL ROUTE 4 TIMES PER DAY    dicyclomine (BENTYL) 10 MG capsule     DILTIAZEM HCL (DILTIAZEM 2% CREAM) Apply topically 3 (three) times daily. Apply topically to anal area.    erythromycin with ethanol (EMGEL) 2 % gel     estradioL (ESTRACE) 0.01 % (0.1 mg/gram) vaginal cream Place 1 gram vaginally 3 (three) times a week.    famotidine (PEPCID) 40 MG tablet     ferrous sulfate (FEOSOL) 325 mg (65 mg iron) Tab tablet Take 1 tablet (325 mg total) by mouth every 12 (twelve) hours.    fluconazole (DIFLUCAN) 150 MG Tab TAKE ONE TABLET BY MOUTH once for ONE dose    fluocinolone (SYNALAR) 0.01 % external solution APPLY a couple of DROPS INTO BOTH EARS TWICE DAILY AS NEEDED FOR ITCHING    fluticasone propionate (FLONASE) 50 mcg/actuation nasal spray 1 spray by Each Nostril route 2 (two) times daily.    fluticasone propionate (FLONASE) 50 mcg/actuation nasal spray 2 sprays (100 mcg total) by Each Nostril route 2 (two) times daily.    gabapentin (NEURONTIN) 300 MG capsule TAKE 1 CAPSULE BY MOUTH THREE TIMES DAILY    gentamicin (GARAMYCIN) 0.1 % cream     hydrocortisone (ANUSOL-HC) 2.5 % rectal cream Place rectally 2 (two) times daily. Apply  per rectum bid    hydrOXYzine HCl (ATARAX) 10 MG Tab TAKE 1 OR 2 TABLETS BY MOUTH THREE TIMES DAILY AS NEEDED FOR ITCHING.    insulin glargine U-100, Lantus, (LANTUS SOLOSTAR U-100 INSULIN) 100 unit/mL (3 mL) InPn pen Inject 4 Units into the skin every evening.    insulin lispro (HUMALOG KWIKPEN INSULIN) 100 unit/mL pen 3 units before BREAKFAST and lunch and FIVE units before dinner with sliding scale, up to 25 units daily    ketoconazole (NIZORAL) 2 % cream     Lactobac. rhamnosus GG-inulin 10 billion cell -200 mg Cap Take 1 capsule by mouth 2 (two) times daily.    levocetirizine (XYZAL) 5 MG tablet Take 5 mg by mouth every evening.    LIDOcaine-prilocaine (EMLA) cream 2 (two) times daily. Apply to affected area    losartan (COZAAR) 100 MG tablet Take 1 tablet (100 mg total) by mouth once daily.    meclizine (ANTIVERT) 25 mg tablet TAKE ONE TABLET BY MOUTH AT BEDTIME AS NEEDED for dizziness.    mometasone 0.1% (ELOCON) 0.1 % cream APPLY TOPICALLY TO THE FACE TWICE DAILY FOR ONE WEEK    MULTIVIT,THER IRON,CA,FA & MIN (MULTIVITAMIN) Tab Take 1 tablet by mouth once daily.    mycophenolate (MYFORTIC) 180 MG TbEC Take 2 tablets (360 mg total) by mouth 2 (two) times daily.    neomycin-polymyxin-hydrocortisone (CORTISPORIN) otic solution INSTILL TWO DROPS INTO BOTH EARS FOUR TIMES DAILY FOR 10 DAYS    NURTEC 75 mg odt PLACE ONE TABLET on tongue, alternatively UNDER THE TONGUE ONCE DAILY AS NEEDED FOR MIGRAINE    nystatin (MYCOSTATIN) 100,000 unit/mL suspension SWISH AND SPIT FIVE MILLILITERS BY MOUTH FOUR TIMES DAILY FOR 7 DAYS.    ondansetron (ZOFRAN) 4 MG tablet TAKE TWO TABLETS BY MOUTH EVERY 8 HOURS AS NEEDED FOR NAUSEA.    oxyCODONE (ROXICODONE) 5 MG immediate release tablet Take 1 tablet (5 mg total) by mouth every 4 (four) hours as needed for Pain.    oxycodone-acetaminophen (PERCOCET) 7.5-325 mg per tablet Take 1 tablet by mouth every 4 (four) hours as needed for Pain.    polyethylene glycol (GLYCOLAX) 17  gram/dose powder Mix 1 capful (17 g) with liquid and by mouth 2 (two) times daily.    PROAIR HFA 90 mcg/actuation inhaler Inhale 2 puffs into the lungs 3 (three) times daily as needed.     promethazine-dextromethorphan (PROMETHAZINE-DM) 6.25-15 mg/5 mL Syrp Take by mouth 2 (two) times daily as needed.     rosuvastatin (CRESTOR) 5 MG tablet Take 5 mg by mouth once daily.    SENNA 8.6 mg tablet Take 1 tablet by mouth 2 (two) times daily. Take while taking narcotics    sertraline (ZOLOFT) 50 MG tablet Take 50 mg by mouth once daily.    SSD 1 % cream 1 application 2 (two) times daily. Apply to affected area    tacrolimus (PROGRAF) 1 MG Cap Take 3 capsules (3 mg total) by mouth every morning AND 2 capsules (2 mg total) every evening.    tinidazole (TINDAMAX) 250 MG tablet SMARTSI Tablet(s) By Mouth Every Morning    topiramate (TOPAMAX) 50 MG tablet     triamcinolone acetonide 0.1% (KENALOG) 0.1 % cream Apply topically 2 times daily    TRULICITY 3 mg/0.5 mL pen injector Inject 3 mg into the skin every 7 days.    valACYclovir (VALTREX) 1000 MG tablet Take 1,000 mg by mouth once daily.    verapamil (CALAN-SR) 120 MG CR tablet TAKE ONE TABLET ONCE DAILY FOR BLOOD PRESSURE    budesonide-formoterol 160-4.5 mcg (SYMBICORT) 160-4.5 mcg/actuation HFAA Inhale 2 puffs into the lungs.    buPROPion (WELLBUTRIN XL) 150 MG TB24 tablet Take 300 mg by mouth.    fluticasone propionate (FLONASE) 50 mcg/actuation nasal spray 2 sprays (100 mcg total) by Each Nostril route 2 (two) times a day.    isosorbide mononitrate (IMDUR) 30 MG 24 hr tablet Take 1 tablet (30 mg total) by mouth 2 (two) times a day.    loratadine (CLARITIN) 10 mg tablet Take 1 tablet (10 mg total) by mouth once daily.    magnesium oxide (MAG-OX) 400 mg (241.3 mg magnesium) tablet Take 400 mg by mouth 2 (two) times daily.    nystatin-triamcinolone (MYCOLOG II) cream Apply to affected area 2 times daily    pantoprazole (PROTONIX) 40 MG tablet Take 1 tablet (40 mg total)  by mouth 2 (two) times daily. Take immediately in am when wake up and then 30 minutes prior to dinner     No current facility-administered medications on file prior to visit.       Review of patient's allergies indicates:   Allergen Reactions    Norvasc [amlodipine]      Severe headache    Bactrim [sulfamethoxazole-trimethoprim]      Gets yeast infection.    Doxycycline Rash    Pcn [penicillins] Rash       OBJECTIVE:     Vital Signs:  Vitals:    02/20/25 1341   BP: 135/83   Pulse: 101   SpO2: 98%   Weight: 63 kg (138 lb 14.2 oz)       Body mass index is 20.51 kg/m².     Physical Exam  Vitals reviewed.   Constitutional:       General: She is not in acute distress.     Appearance: Normal appearance. She is not ill-appearing or toxic-appearing.   HENT:      Head: Normocephalic and atraumatic.      Right Ear: External ear normal.      Left Ear: External ear normal.      Nose: Nose normal.      Mouth/Throat:      Mouth: Mucous membranes are moist.   Eyes:      Extraocular Movements: Extraocular movements intact.      Pupils: Pupils are equal, round, and reactive to light.   Cardiovascular:      Rate and Rhythm: Normal rate and regular rhythm.      Pulses: Normal pulses.      Heart sounds: Normal heart sounds.   Pulmonary:      Effort: Pulmonary effort is normal.      Breath sounds: Normal breath sounds.   Abdominal:      General: Abdomen is flat.      Palpations: Abdomen is soft.   Musculoskeletal:         General: Normal range of motion.      Cervical back: Normal range of motion and neck supple.      Right lower leg: No edema.      Left lower leg: No edema.   Skin:     General: Skin is warm.      Capillary Refill: Capillary refill takes less than 2 seconds.   Neurological:      General: No focal deficit present.      Mental Status: She is alert and oriented to person, place, and time.   Psychiatric:         Mood and Affect: Mood normal.         Behavior: Behavior normal.         Thought Content: Thought content normal.          Judgment: Judgment normal.         Laboratory  No results found for this or any previous visit (from the past 24 hours).    Diagnostic Results:      Health Maintenance Due   Topic Date Due    Foot Exam  08/30/2024    COVID-19 Vaccine (10 - 2024-25 season) 11/15/2024    Diabetes Urine Screening  02/20/2025         ASSESSMENT & PLAN:   Ms. Valencia Dumont is a 54 y.o. female here for follow up of CKD 3b secondary to diabetes, HTN, and CNI nephrotoxicity. Baseline creatinine is roughly 1.5-1.8. Suspect that her worsening renal function is a result of long term use of CNI use for her liver transplant, as well as her hypertension.       Assessment:    1. Chronic kidney disease (CKD), stage IV (severe)    2. Hyperaldosteronism    3. Essential hypertension    4. Vitamin D insufficiency        Plan:    Chronic kidney disease (CKD), stage IV (severe)  Renal function stable, and urine sample is bland. Phosphorus is increasing, she admits to not following a low phosphorus diet.    Plan  -Educated the patient on adhering to low phosphorus diet and provided a list of high and low phosphorus containing foods to the patient  -Advised that she discontinue her PPIs  -Continue to aim for 2.5L of fluid intake per day.   -Repeat renal ultrasound in at her next office visit for monitoring of her renal cyst.     Essential hypertension  Continue her current medications, she reports that her home blood pressures tend to run in the high 120s-low 130s.     No follow-ups on file.     Orders Placed This Encounter   Procedures    Renal Function Panel    PHOSPHORUS    Magnesium    CBC Auto Differential    Urinalysis    Protein / creatinine ratio, urine    STNA Screen w/Reflex    C3 Complement    C4 Complement    Vitamin D     There are no discontinued medications.   Future Appointments   Date Time Provider Department Center   2/26/2025 11:00 AM Kelvin Mansfield MD [facility]   2/26/2025  3:40 PM Alfredo Cunningham DO [facility]  [address]   3/5/2025 11:00 AM CHAIR 01 [facility]   3/5/2025  2:00 PM Sujey Sharpe RN [facility] DIAMGT [facility]   3/26/2025 11:15 AM Lu Schreiber MD [facility]   4/2/2025 11:00 AM CHAIR 01 [facility]   4/30/2025 11:30 AM CHAIR 01 [facility]   5/19/2025 10:45 AM Delores Lewis MD [facility] ENT [facility]   5/28/2025 11:00 AM CHAIR 09 [facility]   6/9/2025  8:00 AM LAB, [facility] LAB [facility]   6/25/2025 11:00 AM CHAIR 11 [facility]   7/23/2025 11:00 AM CHAIR 11 [facility]   8/20/2025 11:00 AM CHAIR 10 [facility]   9/24/2025 11:30 AM CHAIR 10 [facility]           RTC in 6 months     Discussed with Dr. Cross  - staff attestation to follow        Boubacar Charles MD  Nephrology PGY-5    [address] [place] [address]  [phone]

## 2025-02-20 ENCOUNTER — OFFICE VISIT (OUTPATIENT)
Dept: NEPHROLOGY | Facility: CLINIC | Age: 55
End: 2025-02-20
Payer: MEDICARE

## 2025-02-20 VITALS
WEIGHT: 138.88 LBS | BODY MASS INDEX: 20.51 KG/M2 | SYSTOLIC BLOOD PRESSURE: 135 MMHG | DIASTOLIC BLOOD PRESSURE: 83 MMHG | HEART RATE: 101 BPM | OXYGEN SATURATION: 98 %

## 2025-02-20 DIAGNOSIS — E55.9 VITAMIN D INSUFFICIENCY: ICD-10-CM

## 2025-02-20 DIAGNOSIS — N18.4 CHRONIC KIDNEY DISEASE (CKD), STAGE IV (SEVERE): Primary | ICD-10-CM

## 2025-02-20 DIAGNOSIS — E26.9 HYPERALDOSTERONISM: ICD-10-CM

## 2025-02-20 DIAGNOSIS — I10 ESSENTIAL HYPERTENSION: ICD-10-CM

## 2025-02-20 NOTE — PROGRESS NOTES
I have reviewed the notes, assessments, and/or procedures performed by Dr. Charles, I concur with her/his documentation of Valencia Dumont.  Date of Service: 2/20/2025    CKD stage 4 likely 2/2 HTN, DM, chronic CNI use (liver txp), stable Cr and BP. Will continue to monitor.

## 2025-02-20 NOTE — ASSESSMENT & PLAN NOTE
Continue her current medications, she reports that her home blood pressures tend to run in the high 120s-low 130s.    Improved

## 2025-02-20 NOTE — ASSESSMENT & PLAN NOTE
Renal function stable, and urine sample is bland. Phosphorus is increasing, she admits to not following a low phosphorus diet.    Plan  -Educated the patient on adhering to low phosphorus diet and provided a list of high and low phosphorus containing foods to the patient  -Advised that she discontinue her PPIs  -Continue to aim for 2.5L of fluid intake per day.   -Repeat renal ultrasound in at her next office visit for monitoring of her renal cyst.

## 2025-02-26 ENCOUNTER — CLINICAL SUPPORT (OUTPATIENT)
Dept: OPHTHALMOLOGY | Facility: CLINIC | Age: 55
End: 2025-02-26
Payer: MEDICARE

## 2025-02-26 ENCOUNTER — OFFICE VISIT (OUTPATIENT)
Dept: OPHTHALMOLOGY | Facility: CLINIC | Age: 55
End: 2025-02-26
Attending: OPHTHALMOLOGY
Payer: MEDICARE

## 2025-02-26 DIAGNOSIS — H33.312 RETINAL TEAR, LEFT: ICD-10-CM

## 2025-02-26 DIAGNOSIS — H35.389 TOXIC MACULOPATHY FROM PLAQUENIL IN THERAPEUTIC USE: Primary | ICD-10-CM

## 2025-02-26 DIAGNOSIS — T37.2X5A TOXIC MACULOPATHY FROM PLAQUENIL IN THERAPEUTIC USE: Primary | ICD-10-CM

## 2025-02-26 DIAGNOSIS — M32.9 SYSTEMIC LUPUS ERYTHEMATOSUS, UNSPECIFIED SLE TYPE, UNSPECIFIED ORGAN INVOLVEMENT STATUS: ICD-10-CM

## 2025-02-26 DIAGNOSIS — H33.322 RETINA HOLE, LEFT: ICD-10-CM

## 2025-02-26 PROCEDURE — 92134 CPTRZ OPH DX IMG PST SGM RTA: CPT | Mod: S$GLB,,, | Performed by: OPHTHALMOLOGY

## 2025-02-26 PROCEDURE — 99024 POSTOP FOLLOW-UP VISIT: CPT | Mod: S$GLB,,, | Performed by: OPHTHALMOLOGY

## 2025-02-26 PROCEDURE — 3066F NEPHROPATHY DOC TX: CPT | Mod: CPTII,S$GLB,, | Performed by: OPHTHALMOLOGY

## 2025-02-26 PROCEDURE — 99999 PR PBB SHADOW E&M-EST. PATIENT-LVL II: CPT | Mod: PBBFAC,,, | Performed by: OPHTHALMOLOGY

## 2025-02-26 RX ORDER — CYCLOBENZAPRINE HCL 10 MG
10 TABLET ORAL EVERY 12 HOURS PRN
COMMUNITY
Start: 2025-02-01

## 2025-02-26 RX ORDER — ZOLPIDEM TARTRATE 10 MG/1
10 TABLET ORAL NIGHTLY PRN
COMMUNITY

## 2025-03-04 NOTE — PROGRESS NOTES
Subjective:       Patient ID: Valencia Dumont is a 54 y.o. female      Chief Complaint   Patient presents with    Post-op Evaluation     History of Present Illness  HPI    5 week po/DFE/OCT    Pt here for 5 week po for retinopexy OS   Pt feels vision OS is a little clearer than before but vision OD is blurry  (-) pain (+) floaters OD (-) flashes    1. 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 NS OU    4. Retinal Tear OS    5. Retinal Hole OS   -S/p Retinopexy OS (1/25/25)  Last edited by Darcy Damon MA on 2/26/2025 11:26 AM.        Imaging:    See report    Assessment/Plan:     1. Systemic lupus erythematosus, unspecified SLE type, unspecified organ involvement status  On HCQ for about 10 yrs  No longer taking    - Color Fundus Photography - OU - Both Eyes    2. 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 Mixed type age-related cataract, both eyes  Excellent [facility]  Rec obs for now    4. Retinal tear, left  5. Retina hole, left  Stable s/p retinopexy     If stable at next f/u can be seen in comprehensive clinic    Follow up in about 6 months (around 8/26/2025), or if symptoms worsen or fail to improve, for Comprehensive Examination (DILATE OU), OCT Mac, Fundus Autofluoresence.

## 2025-03-05 ENCOUNTER — INFUSION (OUTPATIENT)
Dept: INFUSION THERAPY | Facility: HOSPITAL | Age: 55
End: 2025-03-05
Attending: INTERNAL MEDICINE
Payer: MEDICARE

## 2025-03-05 VITALS
WEIGHT: 139.75 LBS | HEART RATE: 81 BPM | TEMPERATURE: 99 F | RESPIRATION RATE: 17 BRPM | OXYGEN SATURATION: 99 % | DIASTOLIC BLOOD PRESSURE: 60 MMHG | SYSTOLIC BLOOD PRESSURE: 121 MMHG | BODY MASS INDEX: 20.64 KG/M2

## 2025-03-05 DIAGNOSIS — M32.9 SYSTEMIC LUPUS ERYTHEMATOSUS, UNSPECIFIED SLE TYPE, UNSPECIFIED ORGAN INVOLVEMENT STATUS: Primary | ICD-10-CM

## 2025-03-05 PROCEDURE — 63600175 PHARM REV CODE 636 W HCPCS: Mod: JZ,JA,TB | Performed by: INTERNAL MEDICINE

## 2025-03-05 PROCEDURE — 25000003 PHARM REV CODE 250: Performed by: INTERNAL MEDICINE

## 2025-03-05 PROCEDURE — 96375 TX/PRO/DX INJ NEW DRUG ADDON: CPT

## 2025-03-05 PROCEDURE — 96365 THER/PROPH/DIAG IV INF INIT: CPT

## 2025-03-05 RX ORDER — DIPHENHYDRAMINE HYDROCHLORIDE 50 MG/ML
25 INJECTION INTRAMUSCULAR; INTRAVENOUS
Status: COMPLETED | OUTPATIENT
Start: 2025-03-05 | End: 2025-03-05

## 2025-03-05 RX ORDER — ACETAMINOPHEN 325 MG/1
650 TABLET ORAL
Status: COMPLETED | OUTPATIENT
Start: 2025-03-05 | End: 2025-03-05

## 2025-03-05 RX ORDER — DIPHENHYDRAMINE HYDROCHLORIDE 50 MG/ML
25 INJECTION INTRAMUSCULAR; INTRAVENOUS
OUTPATIENT
Start: 2025-04-02

## 2025-03-05 RX ORDER — ACETAMINOPHEN 325 MG/1
650 TABLET ORAL
OUTPATIENT
Start: 2025-04-02

## 2025-03-05 RX ORDER — HEPARIN 100 UNIT/ML
500 SYRINGE INTRAVENOUS
OUTPATIENT
Start: 2025-04-02

## 2025-03-05 RX ORDER — SODIUM CHLORIDE 0.9 % (FLUSH) 0.9 %
10 SYRINGE (ML) INJECTION
OUTPATIENT
Start: 2025-04-02

## 2025-03-05 RX ADMIN — DIPHENHYDRAMINE HYDROCHLORIDE 25 MG: 50 INJECTION INTRAMUSCULAR; INTRAVENOUS at 01:03

## 2025-03-05 RX ADMIN — ACETAMINOPHEN 650 MG: 325 TABLET ORAL at 01:03

## 2025-03-05 RX ADMIN — BELIMUMAB 634 MG: 400 INJECTION, POWDER, LYOPHILIZED, FOR SOLUTION INTRAVENOUS at 01:03

## 2025-03-05 NOTE — PLAN OF CARE
Pt arrived to the Infusion clinic for IV medication (q4w). Pt alert and oriented x4, ambulates independently with steady gait. Pt reports no new allergies, symptoms, or concerns at this time. Pt consents to plan of care for today and denies any adverse reactions to previous infusions. Hx of Lupus. Pt given pre-medications of Tylenol 650mg PO and Benadryl 25mg IVP. Pt administered Benlysta 634 mg in 0.9% NaCl 250 mL @ 250 mL/hr infusion over 60 minutes. Pt tolerated medication well with no adverse reactions. Pt vital signs reassessed and stable. Pt verbalizes no other needs at this time and aware of next scheduled appointment. Pt discharged in no acute distress.      Problem: Adult Inpatient Plan of Care  Goal: Optimal Comfort and Wellbeing  Outcome: Met

## 2025-03-07 ENCOUNTER — PATIENT MESSAGE (OUTPATIENT)
Dept: OPHTHALMOLOGY | Facility: CLINIC | Age: 55
End: 2025-03-07
Payer: MEDICARE

## 2025-03-17 DIAGNOSIS — N18.32 TYPE 2 DIABETES MELLITUS WITH STAGE 3B CHRONIC KIDNEY DISEASE, WITH LONG-TERM CURRENT USE OF INSULIN: ICD-10-CM

## 2025-03-17 DIAGNOSIS — Z79.4 TYPE 2 DIABETES MELLITUS WITH STAGE 3B CHRONIC KIDNEY DISEASE, WITH LONG-TERM CURRENT USE OF INSULIN: ICD-10-CM

## 2025-03-17 DIAGNOSIS — E11.22 TYPE 2 DIABETES MELLITUS WITH STAGE 3B CHRONIC KIDNEY DISEASE, WITH LONG-TERM CURRENT USE OF INSULIN: ICD-10-CM

## 2025-03-18 RX ORDER — BLOOD-GLUCOSE SENSOR
EACH MISCELLANEOUS
Qty: 2 EACH | Refills: 11 | Status: SHIPPED | OUTPATIENT
Start: 2025-03-18

## 2025-03-24 ENCOUNTER — PATIENT MESSAGE (OUTPATIENT)
Dept: OBSTETRICS AND GYNECOLOGY | Facility: CLINIC | Age: 55
End: 2025-03-24
Payer: MEDICARE

## 2025-03-26 ENCOUNTER — OFFICE VISIT (OUTPATIENT)
Facility: CLINIC | Age: 55
End: 2025-03-26
Attending: OBSTETRICS & GYNECOLOGY
Payer: MEDICARE

## 2025-03-26 VITALS
BODY MASS INDEX: 20.7 KG/M2 | DIASTOLIC BLOOD PRESSURE: 80 MMHG | HEIGHT: 69 IN | HEART RATE: 78 BPM | WEIGHT: 139.75 LBS | SYSTOLIC BLOOD PRESSURE: 154 MMHG

## 2025-03-26 DIAGNOSIS — Z12.4 ENCOUNTER FOR PAPANICOLAOU SMEAR FOR CERVICAL CANCER SCREENING: ICD-10-CM

## 2025-03-26 DIAGNOSIS — N95.2 POSTMENOPAUSAL ATROPHIC VAGINITIS: ICD-10-CM

## 2025-03-26 DIAGNOSIS — Z01.419 ENCOUNTER FOR GYNECOLOGICAL EXAMINATION WITHOUT ABNORMAL FINDING: Primary | ICD-10-CM

## 2025-03-26 DIAGNOSIS — Z12.31 SCREENING MAMMOGRAM, ENCOUNTER FOR: ICD-10-CM

## 2025-03-26 PROCEDURE — 99999 PR PBB SHADOW E&M-EST. PATIENT-LVL IV: CPT | Mod: PBBFAC,,, | Performed by: OBSTETRICS & GYNECOLOGY

## 2025-03-26 PROCEDURE — 88175 CYTOPATH C/V AUTO FLUID REDO: CPT | Mod: TC | Performed by: OBSTETRICS & GYNECOLOGY

## 2025-03-26 RX ORDER — ESTRADIOL 0.1 MG/G
1 CREAM VAGINAL
Qty: 42.5 G | Refills: 5 | Status: SHIPPED | OUTPATIENT
Start: 2025-03-26

## 2025-03-26 RX ORDER — DIAZEPAM 5 MG/1
5 TABLET ORAL EVERY 12 HOURS PRN
COMMUNITY
Start: 2025-03-07

## 2025-03-26 RX ORDER — BUDESONIDE AND FORMOTEROL FUMARATE DIHYDRATE 160; 4.5 UG/1; UG/1
2 AEROSOL RESPIRATORY (INHALATION) EVERY 12 HOURS
COMMUNITY
Start: 2025-03-17

## 2025-03-26 RX ORDER — VALACYCLOVIR HYDROCHLORIDE 1 G/1
1000 TABLET, FILM COATED ORAL DAILY
COMMUNITY
Start: 2025-03-17

## 2025-03-26 RX ORDER — OXYCODONE AND ACETAMINOPHEN 7.5; 325 MG/1; MG/1
1 TABLET ORAL EVERY 8 HOURS PRN
COMMUNITY
Start: 2025-03-07

## 2025-03-26 RX ORDER — ALBUTEROL SULFATE 90 UG/1
2 INHALANT RESPIRATORY (INHALATION)
COMMUNITY
Start: 2025-03-18

## 2025-03-26 NOTE — PROGRESS NOTES
Subjective:       Patient ID: Valencia Dumont is a 54 y.o. female.    Chief Complaint:  Well Woman      History of Present Illness  HPI    Valencia Dumont is a 54 y.o. female  here for her annual GYN exam.    She is menopausal since age 45 and is not on HRT.   denies PMB or break through bleeding.   denies vaginal itching or irritation.  Denies vaginal discharge.  She is not currently sexually active. She has had 1 partner for 35 years .   History of abnormal pap: Yes - LEEP for moderate dysplasia   Last Pap: 2024 normal, + HR HPV, not 16/18(Mild Dysplasia on colpo)  Last MMG: normal-10-: BI-RADS Category: (had additional studies-Overall: 2 - Benign-routine follow-up in 12 months  Last Dexa: 2024:*Osteoporosis based on T-score below -2.5  *Fracture risk is very high due to calculated 10 year risk of hip fracture >4.5% (FRAX).  Last Colonoscopy:  , benign polyp  denies domestic violence. She does feel safe at home.     Past Medical History:   Diagnosis Date    Abnormal Pap smear of cervix     Anemia     Arthritis     Ascites     Asthma     Cataract     Chronic back pain     Cirrhosis of liver without mention of alcohol 10/18/2013    Diabetes mellitus     Esophageal varices     GERD (gastroesophageal reflux disease)     Herpes simplex virus (HSV) infection     HSV2.    Hypertension     Kidney stone     Lupus     Osteoporosis 2013    Prophylactic immunotherapy (transplant immunosuppression) 2014    Retinal detachment     SBP (spontaneous bacterial peritonitis)     history of      Past Surgical History:   Procedure Laterality Date    BREAST BIOPSY Left 2020    CHOLECYSTECTOMY      COLONOSCOPY N/A 2017    Procedure: COLONOSCOPY;  Surgeon: Marky Sun MD;  Location: [address];  Service: Endoscopy;  Laterality: N/A;  PM prep.    CONIZATION OF CERVIX USING LOOP ELECTROSURGICAL EXCISION PROCEDURE (LEEP) N/A 2023    Procedure:  CONIZATION-CERVICAL-LEEP;  Surgeon: Lu Schreiber MD;  Location: [facility];  Service: OB/GYN;  Laterality: N/A;    ESOPHAGOGASTRODUODENOSCOPY      ESOPHAGOGASTRODUODENOSCOPY N/A 01/16/2019    Procedure: EGD (ESOPHAGOGASTRODUODENOSCOPY);  Surgeon: Deniz Guerra MD;  [address]  Service: Endoscopy;  Laterality: N/A;  labs prior, s/p liver transplant-MS    ESOPHAGOGASTRODUODENOSCOPY N/A 09/09/2020    Procedure: EGD (ESOPHAGOGASTRODUODENOSCOPY);  Surgeon: Davion Ríos MD;  [facility]  Service: Endoscopy;  Laterality: N/A;  Please order the EGD at least 2 weeks after barium esophagram has been done EGD is for dysphagia  covid test 9/6-[facility] [place] [facility]    EYE SURGERY      FUNCTIONAL ENDOSCOPIC SINUS SURGERY (FESS) USING COMPUTER-ASSISTED NAVIGATION Bilateral 02/04/2021    Procedure: FESS, USING COMPUTER-ASSISTED NAVIGATION;  Surgeon: Delores Lewis MD;  Location: [facility];  Service: ENT;  Laterality: Bilateral;  [employer] WALDEMAR [phone] TEXTED HIM @ 2:45PM ON 1-8-2021  DISC LOADED BT TOMMY 1-  RN PREOP 1/28/2021---COVID NEGATIVE ON 2/3--CONSENT INCOMPLETE  H/P INCOMPLETE  --CBC IN AM    LIVER BIOPSY      LIVER TRANSPLANT  12/31/2013    REFRACTIVE SURGERY Bilateral 2010    RETINAL DETACHMENT SURGERY Left 01/25/2025    RETINOPEXY FOR RETINA HOLE ()    Revision carpal tunnel and removal of scar formation from left wrist  11/17/2023    TUBAL LIGATION  2003    UPPER GASTROINTESTINAL ENDOSCOPY       Social History[1]  Family History   Problem Relation Name Age of Onset    Hypertension Mother      Cataracts Mother      Diabetes Father      Alzheimer's disease Father      Diabetes Paternal Grandfather      Diabetes Paternal Grandmother      No Known Problems Maternal Grandmother      Bone cancer Maternal Grandfather      Breast cancer Maternal Aunt  55    Hypertension Brother      Diabetes Brother      No Known Problems Sister      No Known Problems  "Maternal Uncle      No Known Problems Paternal Aunt      No Known Problems Paternal Uncle      Stroke Neg Hx      Cancer Neg Hx      Colon cancer Neg Hx      Esophageal cancer Neg Hx      Stomach cancer Neg Hx      Rectal cancer Neg Hx      Amblyopia Neg Hx      Blindness Neg Hx      Glaucoma Neg Hx      Macular degeneration Neg Hx      Retinal detachment Neg Hx      Strabismus Neg Hx      Thyroid disease Neg Hx       OB History          3    Para   3    Term   2       1    AB        Living   3         SAB        IAB        Ectopic        Multiple        Live Births   3                 BP (!) 154/80 (Patient Position: Sitting)   Pulse 78   Ht 5' 9" (1.753 m)   Wt 63.4 kg (139 lb 12.4 oz)   LMP 2016   BMI 20.64 kg/m²         GYN & OB History  Patient's last menstrual period was 2016.       OB History    Para Term  AB Living   3 3 2 1  3   SAB IAB Ectopic Multiple Live Births       3      # Outcome Date GA Lbr [place]/2nd Weight Sex Type Anes PTL Lv   3   36w0d  2.126 kg (4 lb 11 oz) F Vag-Spont   MONIQUE   2 Term     F Vag-Spont   MONIQUE   1 Term     M Vag-Spont   MONIQUE       Review of Systems  Review of Systems   Constitutional:  Negative for activity change, appetite change, fatigue and unexpected weight change.   HENT: Negative.     Eyes:  Negative for visual disturbance.   Respiratory:  Negative for shortness of breath and wheezing.    Cardiovascular:  Negative for chest pain, palpitations and leg swelling.   Gastrointestinal:  Negative for abdominal pain, bloating and blood in stool.   Endocrine: Negative for diabetes and hair loss.   Genitourinary:  Positive for vaginal dryness. Negative for decreased libido, dyspareunia and hot flashes.   Musculoskeletal:  Negative for back pain and joint swelling.   Integumentary:  Negative for acne, hair changes and nipple discharge.   Neurological:  Negative for headaches.   Hematological:  Does not bruise/bleed easily. "   Psychiatric/Behavioral:  Negative for depression and sleep disturbance. The patient is not nervous/anxious.    Breast: Negative for mastodynia and nipple discharge          Objective:      Physical Exam:   Constitutional: She is oriented to person, place, and time. She appears well-developed and well-nourished.    HENT:   Head: Normocephalic and atraumatic.    Eyes: Pupils are equal, round, and reactive to light. EOM are normal.     Cardiovascular:  Normal rate and regular rhythm.             Pulmonary/Chest: Effort normal and breath sounds normal.   BREASTS:  no mass, no tenderness, no deformity and no retraction. Right breast exhibits no inverted nipple, no mass, no nipple discharge, no skin change, no tenderness, no bleeding and no swelling. Left breast exhibits no inverted nipple, no mass, no nipple discharge, no skin change, no tenderness, no bleeding and no swelling. Breasts are symmetrical.              Abdominal: Soft. Bowel sounds are normal.     Genitourinary:    Pelvic exam was performed with patient supine.      Genitourinary Comments: PELVIC: Normal external genitalia without lesions.  Normal hair distribution.  Adequate perineal body, normal urethral meatus.  Vagina  Dry and poorly rugated, atrophic, without lesions or discharge.  Cervix pink, Scarred almost flat with vault, without lesions, discharge or tenderness.  No significant cystocele or rectocele.  Bimanual exam shows uterus to be normal size, regular, mobile and nontender.  Adnexa without masses or tenderness.    RECTAL:Deferred                 Musculoskeletal: Normal range of motion and moves all extremeties.       Neurological: She is alert and oriented to person, place, and time.    Skin: Skin is warm and dry.    Psychiatric: She has a normal mood and affect.              Assessment:        1. Encounter for gynecological examination without abnormal finding    2. Encounter for Papanicolaou smear for cervical cancer screening    3.  Postmenopausal atrophic vaginitis    4. Screening mammogram, encounter for                Plan:        Problem List Items Addressed This Visit    None  Visit Diagnoses         Encounter for gynecological examination without abnormal finding    -  Primary      Encounter for Papanicolaou smear for cervical cancer screening        Relevant Orders    Liquid-Based Pap Smear, Screening    HPV High Risk Genotypes, PCR      Postmenopausal atrophic vaginitis        Relevant Medications    estradioL (ESTRACE) 0.01 % (0.1 mg/gram) vaginal cream      Screening mammogram, encounter for        Relevant Orders    Mammo Digital Screening Bilat w/ Andres (XPD)          Follow up in about 1 year (around 3/26/2026).            [1]   Social History  Socioeconomic History    Marital status:     Number of children: 3   Occupational History     Employer: [employer]   Tobacco Use    Smoking status: Never    Smokeless tobacco: Never    Tobacco comments:     disability; ; 3 children   Substance and Sexual Activity    Alcohol use: Yes     Alcohol/week: 1.0 standard drink of alcohol     Types: 1 Glasses of wine per week     Comment: occasionally     Drug use: No    Sexual activity: Yes     Partners: Male     Birth control/protection: See Surgical Hx, Post-menopausal     Comment: s/p tubal ligation,  since 1989     Social Drivers of Health     Financial Resource Strain: High Risk (10/11/2024)    Received from [facility] SDOH Screening     In the past year, have you been unable to get any of the following when you really needed them? choose all that apply.: Internet   Food Insecurity: No Food Insecurity (8/8/2024)    Received from [facility]    Hunger Vital Sign     Worried About Running Out of Food in the Last Year: Never true     Ran Out of Food in the Last Year: Never true   Transportation Needs: No Transportation Needs (8/8/2024)    Received from [facility]    PRAPARE - Transportation     Lack of  Transportation (Medical): No     Lack of Transportation (Non-Medical): No   Physical Activity: Insufficiently Active (7/29/2024)    Received from [facility]    Exercise Vital Sign     Days of Exercise per Week: 2 days     Minutes of Exercise per Session: 30 min   Stress: No Stress Concern Present (7/29/2024)    Received from [facility] - Occupational Stress Questionnaire     Feeling of Stress : Only a little   Housing Stability: Low Risk  (8/8/2024)    Received from [facility]    Housing Stability Vital Sign     Unable to Pay for Housing in the Last Year: No     Number of Places Lived in the Last Year: 1     Unstable Housing in the Last Year: No

## 2025-03-27 ENCOUNTER — OFFICE VISIT (OUTPATIENT)
Dept: HEPATOLOGY | Facility: CLINIC | Age: 55
End: 2025-03-27
Payer: MEDICARE

## 2025-03-27 DIAGNOSIS — Z51.81 THERAPEUTIC DRUG MONITORING: Primary | ICD-10-CM

## 2025-03-27 DIAGNOSIS — D84.9 IMMUNOSUPPRESSION: ICD-10-CM

## 2025-03-27 DIAGNOSIS — Z94.4 STATUS POST LIVER TRANSPLANT: ICD-10-CM

## 2025-03-27 PROCEDURE — 3052F HG A1C>EQUAL 8.0%<EQUAL 9.0%: CPT | Mod: CPTII,95,, | Performed by: INTERNAL MEDICINE

## 2025-03-27 PROCEDURE — 4010F ACE/ARB THERAPY RXD/TAKEN: CPT | Mod: CPTII,95,, | Performed by: INTERNAL MEDICINE

## 2025-03-27 PROCEDURE — 1159F MED LIST DOCD IN RCRD: CPT | Mod: CPTII,95,, | Performed by: INTERNAL MEDICINE

## 2025-03-27 PROCEDURE — 3066F NEPHROPATHY DOC TX: CPT | Mod: CPTII,95,, | Performed by: INTERNAL MEDICINE

## 2025-03-27 PROCEDURE — 98007 SYNCH AUDIO-VIDEO EST HI 40: CPT | Mod: 95,,, | Performed by: INTERNAL MEDICINE

## 2025-03-27 PROCEDURE — 1160F RVW MEDS BY RX/DR IN RCRD: CPT | Mod: CPTII,95,, | Performed by: INTERNAL MEDICINE

## 2025-03-27 NOTE — PROGRESS NOTES
Transplant Hepatology  Liver Transplant Recipient Follow Up    PATIENT: Valencia Dumont  MRN: [id number]  DATE: 3/27/2025    Provider: Hepatologist - Dr Horvath    Transplant History  Transplant Date: 2013  UNOS Native Liver Dx: Cirrhosis: Autoimmune     Valencia is here for follow up of her liver transplant.     ORGAN: LIVER  Whole or Partial: whole liver  Donor Type:  - brain death  CDC High Risk: no  Donor CMV Status: Positive  Donor HCV Status: Negative  Donor HBcAb: Negative  Biliary Anastomosis: end to end  Arterial Anatomy: standard  IVC reconstruction: end to end ivc  Portal vein status: patent  .  Chief complaint: follow up, history of OLT    Subjective:   Valencia Dumont is a 54 y.o. 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 12 years post-OLT    2025  She has not had recent hospitalizations or ED visits. She reports compliance with Immunosuppression. She denies recent fever, chills, nausea, vomiting, abdominal pain, diarrhea, headache, tremors.  Had some URTI        C/o fatigue and skin bruises after off plaquenil.   On Biologics every 3 week--for lupus   Back pain --for pain clinic      23  Liver enzymes are elevated  Taking Tylenol but no hepatotoxic medications  Recurrent sinusitis but worsened after sinus surgery        No complains    21  Swallowing difficulty which has improved somewhat with BID PPI use, but reports not taking on empty stomach first thing in the AM. She states her symptoms are primarily related to post-nasal drip which has not significantly improved with medicine. She also reports having recurrent episodes of vaginal candidiasis, not currently.       DX lupus --She is on infusion medication for her lupus as well as plaquenil 200mg daily BID.    Allograft function:  Allograft function :   ALT   Date Value Ref Range Status   12/10/2024 16 <46 U/L Final   12/10/2024 19 10 - 44  U/L Final     AST   Date Value Ref Range Status   12/10/2024 16 <45 U/L Final   12/10/2024 16 10 - 40 U/L Final     Alkaline Phosphatase   Date Value Ref Range Status   12/10/2024 87 40 - 150 U/L Final     Tacrolimus Lvl   Date Value Ref Range Status   12/10/2024 4.4 (L) 5.0 - 15.0 ng/mL Final     Comment:     Testing performed by a chemiluminescent microparticle   immunoassay on the [employer] i System.    CAUTION: No firm therapeutic range exists for tacrolimus in whole   blood. The   complexity of the clinical state, individual differences in   sensitivity to   immunosuppressive and nephrotoxic effects of tacrolimus,   co-administration   of other immunosuppressants, type of transplant, time post-transplant   and a   number of other factors contribute to different requirements for   optimal   blood levels of tacrolimus. Therefore, individual tacrolimus values   cannot   be used as the sole indicator for making changes in treatment regimen   and   each patient should be thoroughly evaluated clinically before changes   in   treatment regimens are made. Each user must establish his or her own   ranges   based on clinical experience.  Therapeutic ranges vary according to the commercial test used, and   therefore   should be established for each commercial test. Values obtained with   different assay methods cannot be used interchangeably due to   differences in   assay methods and cross-reactivity with metabolites, nor should   correction   factors be applied. Therefore, consistent use of one assay for   individual   patients is recommended.          Immunosuppression:   Tacrolimus:                                      Yes 3/2 mg BID  Everolimus:                                         no,   MMF:                                                   yes, 160 mg BID for renal protection  Prednisone:                                        No  Cyclosporine:                                       no  Sirolimus:                                              no    Post-LT Complications  Acute cellular rejection:                       no  Antibody-mediated rejection:               no  Biliary anastomotic stricture:               no  HAT:                                                     no  HA stenosis:                                         no  PV stenosis:                                         no  CMV reactivation:                                 no  PTLD:                                                   no  Other malignancies:                             no      Health Maintenance:  Dermatology check up:                           no  Colonoscopy:                                          yes  Bone Mineral Density (BMD):                  yes,   Prolia injections  HbA1C:                                                    Yes      Prior Relevant History:  Biopsy in 2017 suggested recurrence of AIH received Prednisolone bolus    2018 In the setting of the elevated immunosuppression she also developed some acute kidney injury.     Review of systems:  A review of 12+ systems was conducted with pertinent positive and negative findings documented in HPI with all other systems reviewed and negative.    Past Medical History:   Past Medical History:   Diagnosis Date    Abnormal Pap smear of cervix     Anemia     Arthritis     Ascites     Asthma     Cataract     Chronic back pain     Cirrhosis of liver without mention of alcohol 10/18/2013    Diabetes mellitus     Esophageal varices     GERD (gastroesophageal reflux disease)     Herpes simplex virus (HSV) infection     HSV2.    Hypertension     Kidney stone     Lupus     Osteoporosis 12/2013    Prophylactic immunotherapy (transplant immunosuppression) 01/02/2014    Retinal detachment     SBP (spontaneous bacterial peritonitis)     history of        Past Surgical HIstory:   Past Surgical History:   Procedure Laterality Date    BREAST BIOPSY Left 08/2020    CHOLECYSTECTOMY      COLONOSCOPY N/A  05/31/2017    Procedure: COLONOSCOPY;  Surgeon: Marky Sun MD;  [facility]  Service: Endoscopy;  Laterality: N/A;  PM prep.    CONIZATION OF CERVIX USING LOOP ELECTROSURGICAL EXCISION PROCEDURE (LEEP) N/A 01/20/2023    Procedure: CONIZATION-CERVICAL-LEEP;  Surgeon: Lu Schreiber MD;  Location: [facility] OR;  Service: OB/GYN;  Laterality: N/A;    ESOPHAGOGASTRODUODENOSCOPY      ESOPHAGOGASTRODUODENOSCOPY N/A 01/16/2019    Procedure: EGD (ESOPHAGOGASTRODUODENOSCOPY);  Surgeon: Deniz Guerra MD;  Location: [facility] ([facility]);  Service: Endoscopy;  Laterality: N/A;  labs prior, s/p liver transplant-MS    ESOPHAGOGASTRODUODENOSCOPY N/A 09/09/2020    Procedure: EGD (ESOPHAGOGASTRODUODENOSCOPY);  Surgeon: Davion Ríos MD;  [facility]  Service: Endoscopy;  Laterality: N/A;  Please order the EGD at least 2 weeks after barium esophagram has been done EGD is for dysphagia  covid test 9/6-[facility]    EYE SURGERY      FUNCTIONAL ENDOSCOPIC SINUS SURGERY (FESS) USING COMPUTER-ASSISTED NAVIGATION Bilateral 02/04/2021    Procedure: FESS, USING COMPUTER-ASSISTED NAVIGATION;  Surgeon: Delores Lewis MD;  Location: [facility];  Service: ENT;  Laterality: Bilateral;  [employer] WALDEMAR [phone] TEXTED HIM @ 2:45PM ON 1-8-2021  DISC LOADED YONY SANDERS 1-  RN PREOP 1/28/2021---COVID NEGATIVE ON 2/3--CONSENT INCOMPLETE  H/P INCOMPLETE  --CBC IN AM    LIVER BIOPSY      LIVER TRANSPLANT  12/31/2013    REFRACTIVE SURGERY Bilateral 2010    RETINAL DETACHMENT SURGERY Left 01/25/2025    RETINOPEXY FOR RETINA HOLE ()    Revision carpal tunnel and removal of scar formation from left wrist  11/17/2023    TUBAL LIGATION  2003    UPPER GASTROINTESTINAL ENDOSCOPY         Family History:   Family History   Problem Relation Name Age of Onset    Hypertension Mother      Cataracts Mother      Diabetes Father      Alzheimer's disease Father      Diabetes Paternal Grandfather       Diabetes Paternal Grandmother      No Known Problems Maternal Grandmother      Bone cancer Maternal Grandfather      Breast cancer Maternal Aunt  55    Hypertension Brother      Diabetes Brother      No Known Problems Sister      No Known Problems Maternal Uncle      No Known Problems Paternal Aunt      No Known Problems Paternal Uncle      Stroke Neg Hx      Cancer Neg Hx      Colon cancer Neg Hx      Esophageal cancer Neg Hx      Stomach cancer Neg Hx      Rectal cancer Neg Hx      Amblyopia Neg Hx      Blindness Neg Hx      Glaucoma Neg Hx      Macular degeneration Neg Hx      Retinal detachment Neg Hx      Strabismus Neg Hx      Thyroid disease Neg Hx         Social History:  reports that she has never smoked. She has never used smokeless tobacco. She reports current alcohol use of about 1.0 standard drink of alcohol per week. She reports that she does not use drugs.    PFSH:  Past medical, family, and social history reviewed as documented in chart with pertinent positive medical, family, and social history detailed in HPI.    Allergies:  Review of patient's allergies indicates:   Allergen Reactions    Norvasc [amlodipine]      Severe headache    Bactrim [sulfamethoxazole-trimethoprim]      Gets yeast infection.    Dicyclomine Rash    Doxycycline Rash    Pcn [penicillins] Rash       Medications:  Current Outpatient Medications   Medication Sig Dispense Refill    acetaminophen (TYLENOL) 650 MG TbSR Take 1 tablet (650 mg total) by mouth every 6 (six) hours. 60 tablet 0    albuterol (PROVENTIL/VENTOLIN HFA) 90 mcg/actuation inhaler Inhale 2 puffs into the lungs.      amitriptyline (ELAVIL) 10 MG tablet Take 1 tablet (10 mg total) by mouth every evening. 90 tablet 2    ammonium lactate 12 % Crea Apply 1 application  topically once daily. 140 g 5    azelastine (ASTELIN) 137 mcg (0.1 %) nasal spray 1 spray (137 mcg total) by Nasal route 2 (two) times daily. 30 mL 11    BD ULTRA-FINE JANESSA PEN NEEDLE 32 gauge x  "5/32" Ndle For five times daily insulin injections 450 each 3    blood-glucose meter,continuous (DEXCOM G6 ) Misc 1 each by Misc.(Non-Drug; Combo Route) route once. for 1 dose 1 each 0    blood-glucose sensor (FREESTYLE CHUN 3 SENSOR) Karen CHANGE device every 14 DAYS 2 each 11    blood-glucose transmitter (DEXCOM G6 TRANSMITTER) Karen 1 each by Misc.(Non-Drug; Combo Route) route once a week 1 Device 3    buPROPion (WELLBUTRIN XL) 150 MG TB24 tablet Take 300 mg by mouth once daily.      chlorthalidone (HYGROTEN) 25 MG Tab Take 0.5 tablets (12.5 mg total) by mouth once daily. 15 tablet 11    ciclopirox (PENLAC) 8 % Soln Apply topically nightly. 6.6 mL 3    cyclobenzaprine (FLEXERIL) 10 MG tablet Take 10 mg by mouth every 12 (twelve) hours as needed.      diazePAM (VALIUM) 5 MG tablet Take 5 mg by mouth every 12 (twelve) hours as needed.      diclofenac sodium (VOLTAREN) 1 % Gel APPLY 2 GRAM to wrist and 4 g to left foot TOPICAL ROUTE 4 TIMES PER  each 2    dicyclomine (BENTYL) 10 MG capsule Take 10 mg by mouth 2 (two) times daily.      erythromycin with ethanol (EMGEL) 2 % gel   1    estradioL (ESTRACE) 0.01 % (0.1 mg/gram) vaginal cream Place 1 g vaginally 3 (three) times a week. 42.5 g 5    famotidine (PEPCID) 40 MG tablet Take 40 mg by mouth 2 (two) times daily.      ferrous sulfate (FEOSOL) 325 mg (65 mg iron) Tab tablet Take 1 tablet (325 mg total) by mouth every 12 (twelve) hours. 60 tablet 4    fluconazole (DIFLUCAN) 150 MG Tab TAKE ONE TABLET BY MOUTH once for ONE dose 1 tablet 0    fluocinolone (SYNALAR) 0.01 % external solution APPLY a couple of DROPS INTO BOTH EARS TWICE DAILY AS NEEDED FOR ITCHING 60 mL 1    fluticasone propionate (FLONASE) 50 mcg/actuation nasal spray 2 sprays (100 mcg total) by Each Nostril route 2 (two) times a day. 18.2 mL 11    gabapentin (NEURONTIN) 300 MG capsule TAKE 1 CAPSULE BY MOUTH THREE TIMES DAILY      hydrocortisone (ANUSOL-HC) 2.5 % rectal cream Place " rectally 2 (two) times daily. Apply per rectum bid 30 g 3    insulin glargine U-100, Lantus, (LANTUS SOLOSTAR U-100 INSULIN) 100 unit/mL (3 mL) InPn pen Inject 4 Units into the skin every evening. 15 mL 3    insulin lispro (HUMALOG KWIKPEN INSULIN) 100 unit/mL pen 3 units before BREAKFAST and lunch and FIVE units before dinner with sliding scale, up to 25 units daily 45 mL 3    isosorbide mononitrate (IMDUR) 30 MG 24 hr tablet Take 1 tablet (30 mg total) by mouth 2 (two) times a day. 180 tablet 3    Lactobac. rhamnosus GG-inulin 10 billion cell -200 mg Cap Take 1 capsule by mouth 2 (two) times daily. 30 capsule 0    levocetirizine (XYZAL) 5 MG tablet Take 5 mg by mouth every evening.  3    LIDOcaine-prilocaine (EMLA) cream 2 (two) times daily. Apply to affected area      losartan (COZAAR) 100 MG tablet Take 1 tablet (100 mg total) by mouth once daily. 30 tablet 2    meclizine (ANTIVERT) 25 mg tablet TAKE ONE TABLET BY MOUTH AT BEDTIME AS NEEDED for dizziness.  0    mometasone 0.1% (ELOCON) 0.1 % cream APPLY TOPICALLY TO THE FACE TWICE DAILY FOR ONE WEEK      MULTIVIT,THER IRON,CA,FA & MIN (MULTIVITAMIN) Tab Take 1 tablet by mouth once daily. 30 tablet 0    mycophenolate (MYFORTIC) 180 MG TbEC Take 2 tablets (360 mg total) by mouth 2 (two) times daily. 120 tablet 11    NURTEC 75 mg odt PLACE ONE TABLET on tongue, alternatively UNDER THE TONGUE ONCE DAILY AS NEEDED FOR MIGRAINE 8 tablet 2    ondansetron (ZOFRAN) 4 MG tablet TAKE TWO TABLETS BY MOUTH EVERY 8 HOURS AS NEEDED FOR NAUSEA.      oxyCODONE (ROXICODONE) 5 MG immediate release tablet Take 1 tablet (5 mg total) by mouth every 4 (four) hours as needed for Pain. 5 tablet 0    oxyCODONE-acetaminophen (PERCOCET) 7.5-325 mg per tablet Take 1 tablet by mouth every 8 (eight) hours as needed.      pantoprazole (PROTONIX) 40 MG tablet Take 1 tablet (40 mg total) by mouth 2 (two) times daily. Take immediately in am when wake up and then 30 minutes prior to dinner 60  tablet 11    polyethylene glycol (GLYCOLAX) 17 gram/dose powder Mix 1 capful (17 g) with liquid and by mouth 2 (two) times daily. 1530 g 11    rosuvastatin (CRESTOR) 5 MG tablet Take 5 mg by mouth once daily.      SENNA 8.6 mg tablet Take 1 tablet by mouth 2 (two) times daily. Take while taking narcotics 30 tablet 0    sertraline (ZOLOFT) 50 MG tablet Take 50 mg by mouth once daily.  11    SYMBICORT 160-4.5 mcg/actuation HFAA Inhale 2 puffs into the lungs every 12 (twelve) hours.      tacrolimus (PROGRAF) 1 MG Cap Take 3 capsules (3 mg total) by mouth every morning AND 2 capsules (2 mg total) every evening. 150 capsule 11    tinidazole (TINDAMAX) 250 MG tablet SMARTSI Tablet(s) By Mouth Every Morning      TRULICITY 3 mg/0.5 mL pen injector Inject 3 mg into the skin every 7 days.      valACYclovir (VALTREX) 1000 MG tablet Take 1,000 mg by mouth once daily.      verapamil (CALAN-SR) 120 MG CR tablet TAKE ONE TABLET ONCE DAILY FOR BLOOD PRESSURE  3    zolpidem (AMBIEN) 10 mg Tab Take 10 mg by mouth nightly as needed.       No current facility-administered medications for this visit.       Review of Systems   Constitutional:  Negative for fatigue, fever and unexpected weight change.   HENT:  Negative for ear pain, nosebleeds and trouble swallowing.    Eyes:  Negative for discharge and redness.   Respiratory:  Negative for cough and shortness of breath.    Cardiovascular:  Negative for palpitations and leg swelling.   Gastrointestinal:  Negative for abdominal distention, abdominal pain, diarrhea and vomiting.   Endocrine: Negative for cold intolerance and polyuria.   Genitourinary:  Negative for flank pain and hematuria.   Musculoskeletal:  Negative for back pain.   Skin:  Negative for pallor.   Neurological:  Negative for seizures and headaches.   Hematological:  Does not bruise/bleed easily.   Psychiatric/Behavioral:  Negative for confusion and hallucinations.      See HPI otherwise neg 10 pt ROS    UNOS Patient  Status  Functional Status: 80% - Normal activity with effort: some symptoms of disease  Physical Capacity: No Limitations        Objective:      Vitals: There were no vitals filed for this visit.    Physical Exam  Constitutional:       Appearance: Normal appearance.   HENT:      Head: Normocephalic and atraumatic.      Right Ear: Tympanic membrane and external ear normal.      Left Ear: Tympanic membrane and external ear normal.      Mouth/Throat:      Mouth: Mucous membranes are moist.   Eyes:      Extraocular Movements: Extraocular movements intact.      Pupils: Pupils are equal, round, and reactive to light.   Cardiovascular:      Rate and Rhythm: Normal rate and regular rhythm.      Pulses: Normal pulses.   Pulmonary:      Effort: Pulmonary effort is normal.   Abdominal:      General: Bowel sounds are normal. There is no distension.      Palpations: Abdomen is soft. There is no mass.      Tenderness: There is no abdominal tenderness.   Musculoskeletal:         General: No swelling or deformity. Normal range of motion.      Cervical back: Normal range of motion and neck supple.   Skin:     General: Skin is warm.      Coloration: Skin is not jaundiced.   Neurological:      General: No focal deficit present.      Mental Status: She is alert and oriented to person, place, and time.      Cranial Nerves: No cranial nerve deficit.   Psychiatric:         Mood and Affect: Mood normal.         Behavior: Behavior normal.       Gen: NAD answers questions appropriately  HEENT: NCAT PERRL EOMI MMM no scleral icterus  Neck: supple no LAD  Cardiac: RRR no m/r/g  Lungs: CTA b/l no w/r/r  Abd: soft NTND BS+  Ext: no c/c/e  Skin: warm dry no rash    Laboratory Data:  No visits with results within 1 Week(s) from this visit.   Latest known visit with results is:   Lab Visit on 01/28/2025   Component Date Value Ref Range Status    Specimen UA 01/28/2025 Urine, Clean Catch   Final    Color, UA 01/28/2025 Yellow  Yellow, Straw, Meredith  Final    Appearance, UA 01/28/2025 Clear  Clear Final    pH, UA 01/28/2025 6.0  5.0 - 8.0 Final    Specific Gravity, UA 01/28/2025 1.020  1.005 - 1.030 Final    Protein, UA 01/28/2025 Negative  Negative Final    Comment: Recommend a 24 hour urine protein or a urine   protein/creatinine ratio if globulin induced proteinuria is  clinically suspected.      Glucose, UA 01/28/2025 Negative  Negative Final    Ketones, UA 01/28/2025 Negative  Negative Final    Bilirubin (UA) 01/28/2025 Negative  Negative Final    Occult Blood UA 01/28/2025 Negative  Negative Final    Nitrite, UA 01/28/2025 Negative  Negative Final    Urobilinogen, UA 01/28/2025 Negative  <2.0 EU/dL Final    Leukocytes, UA 01/28/2025 1+ (A)  Negative Final    Protein, Urine Random 01/28/2025 12  mg/dL Final    Creatinine, Urine 01/28/2025 180.9  15.0 - 325.0 mg/dL Final    Prot/Creat Ratio, Urine 01/28/2025 0.07  0.00 - 0.20 Final    RBC, UA 01/28/2025 0  0 - 4 /hpf Final    WBC, UA 01/28/2025 4  0 - 5 /hpf Final    Bacteria 01/28/2025 Rare  None-Occ /hpf Final    Squam Epithel, UA 01/28/2025 0  /hpf Final    Hyaline Casts, UA 01/28/2025 24 (A)  0-1/lpf /lpf Final    Microscopic Comment 01/28/2025 SEE COMMENT   Final    Comment: Other formed elements not mentioned in the report are not   present in the microscopic examination.          Lab Results   Component Value Date    WBC 6.54 01/28/2025    HGB 10.6 (L) 01/28/2025    HCT 31.0 (L) 01/28/2025    MCV 86 01/28/2025     01/28/2025       Lab Results   Component Value Date     01/28/2025    K 3.8 01/28/2025     01/28/2025    CO2 26 01/28/2025    BUN 23 (H) 01/28/2025    CREATININE 2.1 (H) 01/28/2025    CALCIUM 9.4 01/28/2025    ANIONGAP 12 01/28/2025    ESTGFRAFRICA 46 (A) 06/24/2022    EGFRNONAA 40 (A) 06/24/2022       Lab Results   Component Value Date    ALT 16 12/10/2024    AST 16 12/10/2024     (H) 05/18/2017    ALKPHOS 87 12/10/2024    BILITOT 0.4 12/10/2024       Lab Results    Component Value Date    TACROLIMUS 4.4 (L) 12/10/2024       Imaging:  I personally reviewed imaging studies and outside records..      Assessment:       1. Therapeutic drug monitoring    2. Status post liver transplant    3. Immunosuppression               Plan:     Recommendations:  S/p OLT Allograft Function  - Liver enzymes stable 4/24    Immunosuppression   - Plan reduce prograf to 2/2 and increase myfortic 360 BID and check labs in 2-3 weeks . Plan to improve renal function  --Additional testing to be completed according to Written Order Guidelines for Post-Liver and Combined Liver/Kidney Transplant Follow-up (LI-09)      Therapeutic drug Monitoring  Target prograf around 3 ng/ml      CKD stage 3  -crerat 1.4-1.6  - Avoid NSAIDs as able and maintain adequate hydration    Health Maintenance/Screening:  - CRC: Colonoscopy as per GI in 2027  -EGD with path c/w reflux esophagitis, counseled on appropriate PPI use (continue BID protonix)  - Recommend age appropriate cancer screening  - Skin cancer: Recommend use of sunscreen SPF 30 or higher, hat and sunglasses while outside, dermatologist visit annually or sooner if any concerning lesions, refer to dermatology for skin CA evaluation  - Osteoporosis: Recommend bone density testing every 2-3 years if previously normal or annually if previously abnormal, continuing prolia   -Dental: Cleaning andfollow up every 6 month    5. Recurrent Sinusitis/candidiasis  Hold myfortic    Visit today is associated with current and anticipated ongoing medical care related this patients complex condition  Post Liver transplant status with therapeutic drug monitoring.   Patient will follow up with the office for continued care for his complex medical condition    Return to clinic in 12 months.    I have sent communication to the referring physician and/or primary care provider.      I performed this consultation using real-time Telehealth tools, including a live video connection  between my location and the patient's location. Prior to initiating the consultation, I obtained informed verbal consent to perform this consultation using Telehealth tools and answered all the questions about the Telehealth interaction.    The patient location is: ([place])   The chief complaint leading to consultation is: Immunosuppression      Visit type: audiovisual     Face to Face time with patient:20  60  minutes of total time spent on the encounter, which includes face to face time and non-face to face time preparing to see the patient (eg, review of tests), Obtaining and/or reviewing separately obtained history, Documenting clinical information in the electronic or other health record, Independently interpreting results (not separately reported) and communicating results to the patient/family/caregiver, or Care coordination (not separately reported).     Each patient to whom he or she provides medical services by telemedicine is:  (1) informed of the relationship between the physician and patient and the respective role of any other health care provider with respect to management of the patient; and (2) notified that he or she may decline to receive medical services by telemedicine and may withdraw from such care at any time.      Patient was seen in the liver transplant department at [facility] Edgar Horvath MD  Transplant Hepatology & Gastroenterology  Hepatology and Liver Transplant  [facility]  [facility] [place]

## 2025-04-02 ENCOUNTER — INFUSION (OUTPATIENT)
Dept: INFUSION THERAPY | Facility: HOSPITAL | Age: 55
End: 2025-04-02
Attending: INTERNAL MEDICINE
Payer: MEDICARE

## 2025-04-02 ENCOUNTER — PATIENT MESSAGE (OUTPATIENT)
Facility: CLINIC | Age: 55
End: 2025-04-02
Payer: MEDICARE

## 2025-04-02 VITALS
HEART RATE: 76 BPM | DIASTOLIC BLOOD PRESSURE: 74 MMHG | WEIGHT: 137.56 LBS | OXYGEN SATURATION: 97 % | SYSTOLIC BLOOD PRESSURE: 159 MMHG | RESPIRATION RATE: 16 BRPM | TEMPERATURE: 98 F | BODY MASS INDEX: 20.32 KG/M2

## 2025-04-02 DIAGNOSIS — B96.89 BACTERIAL VAGINOSIS: Primary | ICD-10-CM

## 2025-04-02 DIAGNOSIS — N76.0 BACTERIAL VAGINOSIS: Primary | ICD-10-CM

## 2025-04-02 DIAGNOSIS — M32.9 SYSTEMIC LUPUS ERYTHEMATOSUS, UNSPECIFIED SLE TYPE, UNSPECIFIED ORGAN INVOLVEMENT STATUS: Primary | ICD-10-CM

## 2025-04-02 PROCEDURE — 63600175 PHARM REV CODE 636 W HCPCS: Performed by: INTERNAL MEDICINE

## 2025-04-02 PROCEDURE — 96375 TX/PRO/DX INJ NEW DRUG ADDON: CPT

## 2025-04-02 PROCEDURE — 96365 THER/PROPH/DIAG IV INF INIT: CPT

## 2025-04-02 PROCEDURE — 25000003 PHARM REV CODE 250: Performed by: INTERNAL MEDICINE

## 2025-04-02 RX ORDER — SODIUM CHLORIDE 0.9 % (FLUSH) 0.9 %
10 SYRINGE (ML) INJECTION
OUTPATIENT
Start: 2025-04-30

## 2025-04-02 RX ORDER — DIPHENHYDRAMINE HYDROCHLORIDE 50 MG/ML
25 INJECTION, SOLUTION INTRAMUSCULAR; INTRAVENOUS
Status: COMPLETED | OUTPATIENT
Start: 2025-04-02 | End: 2025-04-02

## 2025-04-02 RX ORDER — ACETAMINOPHEN 325 MG/1
650 TABLET ORAL
Status: COMPLETED | OUTPATIENT
Start: 2025-04-02 | End: 2025-04-02

## 2025-04-02 RX ORDER — ACETAMINOPHEN 325 MG/1
650 TABLET ORAL
OUTPATIENT
Start: 2025-04-30

## 2025-04-02 RX ORDER — METRONIDAZOLE 500 MG/1
500 TABLET ORAL 2 TIMES DAILY WITH MEALS
Qty: 14 TABLET | Refills: 0 | Status: SHIPPED | OUTPATIENT
Start: 2025-04-02 | End: 2025-04-04 | Stop reason: SDUPTHER

## 2025-04-02 RX ORDER — HEPARIN 100 UNIT/ML
500 SYRINGE INTRAVENOUS
OUTPATIENT
Start: 2025-04-30

## 2025-04-02 RX ORDER — DIPHENHYDRAMINE HYDROCHLORIDE 50 MG/ML
25 INJECTION, SOLUTION INTRAMUSCULAR; INTRAVENOUS
OUTPATIENT
Start: 2025-04-30

## 2025-04-02 RX ADMIN — DIPHENHYDRAMINE HYDROCHLORIDE 25 MG: 50 INJECTION INTRAMUSCULAR; INTRAVENOUS at 12:04

## 2025-04-02 RX ADMIN — ACETAMINOPHEN 650 MG: 325 TABLET ORAL at 12:04

## 2025-04-02 RX ADMIN — SODIUM CHLORIDE: 9 INJECTION, SOLUTION INTRAVENOUS at 12:04

## 2025-04-02 RX ADMIN — BELIMUMAB 634 MG: 400 INJECTION, POWDER, LYOPHILIZED, FOR SOLUTION INTRAVENOUS at 12:04

## 2025-04-02 NOTE — PLAN OF CARE
Pt arrived to clinic for scheduled Benlysta (q4w). VSS. Pre-med of tylenol PO and benadryl IVP given. Infusion over 1 hr per order. Tolerated well. Voices no concerns at this time. Ambulated from clinic for discharge in NAD.

## 2025-04-03 ENCOUNTER — PATIENT MESSAGE (OUTPATIENT)
Dept: RHEUMATOLOGY | Facility: CLINIC | Age: 55
End: 2025-04-03
Payer: MEDICARE

## 2025-04-03 ENCOUNTER — PATIENT MESSAGE (OUTPATIENT)
Facility: CLINIC | Age: 55
End: 2025-04-03
Payer: MEDICARE

## 2025-04-04 DIAGNOSIS — B96.89 BACTERIAL VAGINOSIS: ICD-10-CM

## 2025-04-04 DIAGNOSIS — N76.0 BACTERIAL VAGINOSIS: ICD-10-CM

## 2025-04-04 RX ORDER — METRONIDAZOLE 500 MG/1
500 TABLET ORAL 2 TIMES DAILY WITH MEALS
Qty: 14 TABLET | Refills: 0 | Status: SHIPPED | OUTPATIENT
Start: 2025-04-04 | End: 2025-04-11

## 2025-04-07 ENCOUNTER — PATIENT MESSAGE (OUTPATIENT)
Dept: HEPATOLOGY | Facility: CLINIC | Age: 55
End: 2025-04-07
Payer: MEDICARE

## 2025-04-07 ENCOUNTER — PATIENT MESSAGE (OUTPATIENT)
Facility: CLINIC | Age: 55
End: 2025-04-07
Payer: MEDICARE

## 2025-04-08 ENCOUNTER — TELEPHONE (OUTPATIENT)
Dept: OBSTETRICS AND GYNECOLOGY | Facility: CLINIC | Age: 55
End: 2025-04-08
Payer: MEDICARE

## 2025-04-08 ENCOUNTER — PATIENT MESSAGE (OUTPATIENT)
Facility: CLINIC | Age: 55
End: 2025-04-08
Payer: MEDICARE

## 2025-04-08 DIAGNOSIS — Z23 NEED FOR HPV VACCINATION: Primary | ICD-10-CM

## 2025-04-08 DIAGNOSIS — R39.9 UTI SYMPTOMS: Primary | ICD-10-CM

## 2025-04-08 NOTE — TELEPHONE ENCOUNTER
Called patient and discussed HPV vaccine , as well as the HPV vaccine .   Will place orders for HPV vaccine .     Will need Colpo

## 2025-04-09 ENCOUNTER — RESULTS FOLLOW-UP (OUTPATIENT)
Facility: CLINIC | Age: 55
End: 2025-04-09

## 2025-04-21 ENCOUNTER — PATIENT MESSAGE (OUTPATIENT)
Dept: ENDOCRINOLOGY | Facility: CLINIC | Age: 55
End: 2025-04-21
Payer: MEDICARE

## 2025-04-21 DIAGNOSIS — Z79.4 TYPE 2 DIABETES MELLITUS WITH STAGE 3B CHRONIC KIDNEY DISEASE, WITH LONG-TERM CURRENT USE OF INSULIN: Primary | ICD-10-CM

## 2025-04-21 DIAGNOSIS — N18.32 TYPE 2 DIABETES MELLITUS WITH STAGE 3B CHRONIC KIDNEY DISEASE, WITH LONG-TERM CURRENT USE OF INSULIN: Primary | ICD-10-CM

## 2025-04-21 DIAGNOSIS — E11.22 TYPE 2 DIABETES MELLITUS WITH STAGE 3B CHRONIC KIDNEY DISEASE, WITH LONG-TERM CURRENT USE OF INSULIN: Primary | ICD-10-CM

## 2025-04-21 RX ORDER — BLOOD-GLUCOSE SENSOR
1 EACH MISCELLANEOUS
Qty: 2 EACH | Refills: 11 | Status: SHIPPED | OUTPATIENT
Start: 2025-04-21 | End: 2026-04-21

## 2025-04-24 ENCOUNTER — OFFICE VISIT (OUTPATIENT)
Dept: RHEUMATOLOGY | Facility: CLINIC | Age: 55
End: 2025-04-24
Payer: MEDICARE

## 2025-04-24 DIAGNOSIS — E21.3 HYPERPARATHYROIDISM: ICD-10-CM

## 2025-04-24 DIAGNOSIS — Z79.899 IMMUNOSUPPRESSION DUE TO DRUG THERAPY: ICD-10-CM

## 2025-04-24 DIAGNOSIS — D84.821 IMMUNOSUPPRESSION DUE TO DRUG THERAPY: ICD-10-CM

## 2025-04-24 DIAGNOSIS — E55.9 VITAMIN D DEFICIENCY: ICD-10-CM

## 2025-04-24 DIAGNOSIS — M32.9 SYSTEMIC LUPUS ERYTHEMATOSUS, UNSPECIFIED SLE TYPE, UNSPECIFIED ORGAN INVOLVEMENT STATUS: Primary | Chronic | ICD-10-CM

## 2025-04-24 DIAGNOSIS — M89.9 CHRONIC KIDNEY DISEASE-MINERAL AND BONE DISORDER: ICD-10-CM

## 2025-04-24 DIAGNOSIS — R79.89 HIGH SERUM PARATHYROID HORMONE (PTH): ICD-10-CM

## 2025-04-24 DIAGNOSIS — R53.83 FATIGUE, UNSPECIFIED TYPE: ICD-10-CM

## 2025-04-24 DIAGNOSIS — N18.9 CHRONIC KIDNEY DISEASE-MINERAL AND BONE DISORDER: ICD-10-CM

## 2025-04-24 DIAGNOSIS — E83.9 CHRONIC KIDNEY DISEASE-MINERAL AND BONE DISORDER: ICD-10-CM

## 2025-04-24 NOTE — PROGRESS NOTES
4/24/2025    11:05 AM   Rapid3 Question Responses and Scores   MDHAQ Score 1.5   Psychologic Score 5.5   Pain Score 5   When you awakened in the morning OVER THE LAST WEEK, did you feel stiff? No   Fatigue Score 10   Global Health Score 6   RAPID3 Score 5.33     Answers submitted by the patient for this visit:  Rheumatology Questionnaire (Submitted on 4/24/2025)  fever: No  eye redness: No  mouth sores: No  headaches: Yes  shortness of breath: Yes  chest pain: No  trouble swallowing: Yes  diarrhea: No  constipation: Yes  unexpected weight change: Yes  genital sore: Yes  dysuria: No  During the last 3 days, have you had a skin rash?: Yes  Bruises or bleeds easily: Yes  cough: Yes

## 2025-04-24 NOTE — PROGRESS NOTES
Subjective:     The patient location is: [place]  The chief complaint leading to consultation is: lupus    Visit type: audiovisual    Face to Face time with patient: 21 minutes  30 minutes of total time spent on the encounter, which includes face to face time and non-face to face time preparing to see the patient (eg, review of tests), Obtaining and/or reviewing separately obtained history, Documenting clinical information in the electronic or other health record, Independently interpreting results (not separately reported) and communicating results to the patient/family/caregiver, or Care coordination (not separately reported).         Each patient to whom he or she provides medical services by telemedicine is:  (1) informed of the relationship between the physician and patient and the respective role of any other health care provider with respect to management of the patient; and (2) notified that he or she may decline to receive medical services by telemedicine and may withdraw from such care at any time.    Notes:        Patient ID: Valencia Dumont is a 55 y.o. female.    Chief Complaint: lupus    HPI female with diabetes and history lupus diagnosed in 2006 due to rash, weight loss, eyes yellow and fatigue.  She different rheumatologists Dr. Vega and Dr. Solomon.  She was diagnosed autoimmune hepatitis s/p liver transplant in 2013.   She has been on immunosuppressive medications after her liver transplant with prograf 8 mg daily and myfortic 360 mg bid which she is taking currently.  When she was diagnosed with SLE she was on plaquenil but stopped since it did not do anything.   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 Mansfield.      She had low prograf level.  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  She was given 20 mg prednisone daily on 5/25/17 or 5/26/17.  She tapered off after 2 months.       INTERVAL HISTORY:    January 2025 had tear in retina that was repaired.   Feels  Benlysta helps and she can tell when she needs it because she gets fatigued.   Continues with Wellbutrin which helps some as does Zoloft and Valium.       Last Benlysta April 2, 2025.    Off Plaquenil due to ocular toxicity.  No alopecia, oral or nasal ulcers or rashes.  Pain today is 6/10 ache in thighs with squatting.  Improves with rest.      Sinus issues for 3-4 weeks.   Feels chronically fatigued.   She was on 3 meds for depression.  Stopped Wellbutrin 1 month ago.   Appetite is low.  She lost 20 pounds.   Doctor to eat whatever she wanted.   She ordered [employer] vitamins to help.      Review of Systems   Constitutional:  Positive for fatigue and unexpected weight change. Negative for fever.   HENT:  Positive for trouble swallowing. Negative for mouth sores.    Eyes:  Negative for redness.   Respiratory:  Positive for cough and shortness of breath.    Cardiovascular:  Negative for chest pain.   Gastrointestinal:  Positive for constipation. Negative for diarrhea.   Genitourinary:  Positive for genital sores. Negative for dysuria.   Skin:  Positive for rash.   Neurological:  Positive for headaches.   Hematological:  Bruises/bleeds easily.   Psychiatric/Behavioral: Negative.          Objective:   LMP 05/02/2016   Physical Exam   Constitutional: She is oriented to person, place, and time. normal appearance.   HENT:   Head: Normocephalic and atraumatic.   Eyes: Conjunctivae are normal.   Pulmonary/Chest: Effort normal.   Neurological: She is alert and oriented to person, place, and time.   Skin: No erythema.   Psychiatric: Mood normal.            Assessment:     1. Systemic lupus erythematosus, unspecified SLE type, unspecified organ involvement status.  On Benlysta monthly and feels it helps.  Off plaquenil since 2/2024 due to ocular toxiticity.  Biggest issue is fatigue.     2. Fatigue, unspecified type    3. Immunosuppression due to drug therapy    4. Chronic kidney disease-mineral and bone disorder    5.  Hyperparathyroidism    6. High serum parathyroid hormone (PTH)    7. Vitamin D deficiency          Plan:     Problem List Items Addressed This Visit          Renal/    Chronic kidney disease-mineral and bone disorder       Immunology/Multi System    SLE (systemic lupus erythematosus) - Primary (Chronic)    Immunosuppression due to drug therapy       Endocrine    Vitamin D deficiency    Hyperparathyroidism       Other    Fatigue     Other Visit Diagnoses         High serum parathyroid hormone (PTH)              Continue Benlysta.  Check labs to assess fatigue (ddx for fatigue include lupus, depression related, uncontrolled diabetes, vitamin deficiency, parathyroid related).  Discussed need to manage diabetes better since Hemoglobin A1c 8.1    RTO 4 months/prn

## 2025-04-27 ENCOUNTER — PATIENT MESSAGE (OUTPATIENT)
Dept: RHEUMATOLOGY | Facility: CLINIC | Age: 55
End: 2025-04-27
Payer: MEDICARE

## 2025-04-30 ENCOUNTER — PATIENT MESSAGE (OUTPATIENT)
Dept: RHEUMATOLOGY | Facility: CLINIC | Age: 55
End: 2025-04-30
Payer: MEDICARE

## 2025-04-30 ENCOUNTER — INFUSION (OUTPATIENT)
Dept: INFUSION THERAPY | Facility: HOSPITAL | Age: 55
End: 2025-04-30
Attending: INTERNAL MEDICINE
Payer: MEDICARE

## 2025-04-30 VITALS
TEMPERATURE: 98 F | HEART RATE: 78 BPM | RESPIRATION RATE: 18 BRPM | WEIGHT: 136.88 LBS | SYSTOLIC BLOOD PRESSURE: 158 MMHG | BODY MASS INDEX: 20.22 KG/M2 | OXYGEN SATURATION: 99 % | DIASTOLIC BLOOD PRESSURE: 82 MMHG

## 2025-04-30 DIAGNOSIS — M32.9 SYSTEMIC LUPUS ERYTHEMATOSUS, UNSPECIFIED SLE TYPE, UNSPECIFIED ORGAN INVOLVEMENT STATUS: Primary | ICD-10-CM

## 2025-04-30 PROCEDURE — 63600175 PHARM REV CODE 636 W HCPCS: Performed by: INTERNAL MEDICINE

## 2025-04-30 PROCEDURE — 96375 TX/PRO/DX INJ NEW DRUG ADDON: CPT

## 2025-04-30 PROCEDURE — 96365 THER/PROPH/DIAG IV INF INIT: CPT

## 2025-04-30 PROCEDURE — 25000003 PHARM REV CODE 250: Performed by: INTERNAL MEDICINE

## 2025-04-30 RX ORDER — DIPHENHYDRAMINE HYDROCHLORIDE 50 MG/ML
25 INJECTION, SOLUTION INTRAMUSCULAR; INTRAVENOUS
Status: COMPLETED | OUTPATIENT
Start: 2025-04-30 | End: 2025-04-30

## 2025-04-30 RX ORDER — SODIUM CHLORIDE 0.9 % (FLUSH) 0.9 %
10 SYRINGE (ML) INJECTION
OUTPATIENT
Start: 2025-05-28

## 2025-04-30 RX ORDER — HEPARIN 100 UNIT/ML
500 SYRINGE INTRAVENOUS
OUTPATIENT
Start: 2025-05-28

## 2025-04-30 RX ORDER — ACETAMINOPHEN 325 MG/1
650 TABLET ORAL
OUTPATIENT
Start: 2025-05-28

## 2025-04-30 RX ORDER — ACETAMINOPHEN 325 MG/1
650 TABLET ORAL
Status: COMPLETED | OUTPATIENT
Start: 2025-04-30 | End: 2025-04-30

## 2025-04-30 RX ORDER — DIPHENHYDRAMINE HYDROCHLORIDE 50 MG/ML
25 INJECTION, SOLUTION INTRAMUSCULAR; INTRAVENOUS
OUTPATIENT
Start: 2025-05-28

## 2025-04-30 RX ADMIN — BELIMUMAB 621 MG: 400 INJECTION, POWDER, LYOPHILIZED, FOR SOLUTION INTRAVENOUS at 01:04

## 2025-04-30 RX ADMIN — DIPHENHYDRAMINE HYDROCHLORIDE 25 MG: 50 INJECTION INTRAMUSCULAR; INTRAVENOUS at 12:04

## 2025-04-30 RX ADMIN — ACETAMINOPHEN 650 MG: 325 TABLET ORAL at 12:04

## 2025-04-30 NOTE — PLAN OF CARE
Patient arrived to unit, ambulatory, for IV therapy Benlysta. Pt alert and oriented with no new or worsening complaints upon arrival.     Pre-meds benadryl IVP and tylenol PO administered.    Benlysta administered without incident. Pt tolerated with no complaints or S&S of reaction.     Next appointments provided and patient discharged home upon completion in [facility].

## 2025-05-01 ENCOUNTER — PATIENT MESSAGE (OUTPATIENT)
Dept: NEPHROLOGY | Facility: CLINIC | Age: 55
End: 2025-05-01
Payer: MEDICARE

## 2025-05-01 ENCOUNTER — PATIENT MESSAGE (OUTPATIENT)
Dept: RHEUMATOLOGY | Facility: CLINIC | Age: 55
End: 2025-05-01
Payer: MEDICARE

## 2025-05-09 NOTE — TELEPHONE ENCOUNTER
Spoke with patient who reports she has gotten permission to move into her home but needs supplies.  She is working with organization.  She got a fei that paid half of supplies for her home.  She needs more assistance and is asking can we help.  Recommended she forward form from organization.

## 2025-05-15 ENCOUNTER — PATIENT MESSAGE (OUTPATIENT)
Facility: CLINIC | Age: 55
End: 2025-05-15
Payer: MEDICARE

## 2025-05-19 ENCOUNTER — PATIENT MESSAGE (OUTPATIENT)
Dept: OTOLARYNGOLOGY | Facility: CLINIC | Age: 55
End: 2025-05-19

## 2025-05-20 ENCOUNTER — HOSPITAL ENCOUNTER (OUTPATIENT)
Dept: RADIOLOGY | Facility: HOSPITAL | Age: 55
Discharge: HOME | End: 2025-05-20
Attending: PODIATRIST
Payer: MEDICARE

## 2025-05-20 DIAGNOSIS — M79.672 LEFT FOOT PAIN: ICD-10-CM

## 2025-05-20 DIAGNOSIS — M77.42 METATARSALGIA OF LEFT FOOT: ICD-10-CM

## 2025-05-20 DIAGNOSIS — M72.2 BILATERAL PLANTAR FASCIITIS: ICD-10-CM

## 2025-05-20 DIAGNOSIS — M77.42 METATARSALGIA OF LEFT FOOT: Primary | ICD-10-CM

## 2025-05-20 PROCEDURE — 73630 X-RAY EXAM OF FOOT: CPT | Mod: 26,LT,, | Performed by: RADIOLOGY

## 2025-05-20 PROCEDURE — 73630 X-RAY EXAM OF FOOT: CPT | Mod: TC,FY,LT

## 2025-05-21 ENCOUNTER — PATIENT MESSAGE (OUTPATIENT)
Dept: DIABETES | Facility: CLINIC | Age: 55
End: 2025-05-21
Payer: MEDICARE

## 2025-05-21 ENCOUNTER — PATIENT MESSAGE (OUTPATIENT)
Dept: PODIATRY | Facility: CLINIC | Age: 55
End: 2025-05-21
Payer: MEDICARE

## 2025-05-28 ENCOUNTER — PATIENT MESSAGE (OUTPATIENT)
Dept: HEPATOLOGY | Facility: CLINIC | Age: 55
End: 2025-05-28
Payer: MEDICARE

## 2025-05-28 ENCOUNTER — INFUSION (OUTPATIENT)
Dept: INFUSION THERAPY | Facility: HOSPITAL | Age: 55
End: 2025-05-28
Attending: INTERNAL MEDICINE
Payer: MEDICARE

## 2025-05-28 VITALS
BODY MASS INDEX: 20.38 KG/M2 | WEIGHT: 138 LBS | TEMPERATURE: 98 F | SYSTOLIC BLOOD PRESSURE: 171 MMHG | RESPIRATION RATE: 16 BRPM | OXYGEN SATURATION: 97 % | HEART RATE: 82 BPM | DIASTOLIC BLOOD PRESSURE: 77 MMHG

## 2025-05-28 DIAGNOSIS — D84.9 IMMUNOSUPPRESSION: ICD-10-CM

## 2025-05-28 DIAGNOSIS — Z94.4 LIVER TRANSPLANTED: Primary | ICD-10-CM

## 2025-05-28 DIAGNOSIS — M32.9 SYSTEMIC LUPUS ERYTHEMATOSUS, UNSPECIFIED SLE TYPE, UNSPECIFIED ORGAN INVOLVEMENT STATUS: Primary | ICD-10-CM

## 2025-05-28 PROCEDURE — 96375 TX/PRO/DX INJ NEW DRUG ADDON: CPT

## 2025-05-28 PROCEDURE — 96365 THER/PROPH/DIAG IV INF INIT: CPT

## 2025-05-28 PROCEDURE — 25000003 PHARM REV CODE 250: Performed by: INTERNAL MEDICINE

## 2025-05-28 PROCEDURE — 63600175 PHARM REV CODE 636 W HCPCS: Performed by: INTERNAL MEDICINE

## 2025-05-28 RX ORDER — SODIUM CHLORIDE 0.9 % (FLUSH) 0.9 %
10 SYRINGE (ML) INJECTION
OUTPATIENT
Start: 2025-06-25

## 2025-05-28 RX ORDER — DIPHENHYDRAMINE HYDROCHLORIDE 50 MG/ML
25 INJECTION, SOLUTION INTRAMUSCULAR; INTRAVENOUS
OUTPATIENT
Start: 2025-06-25

## 2025-05-28 RX ORDER — ACETAMINOPHEN 325 MG/1
650 TABLET ORAL
OUTPATIENT
Start: 2025-06-25

## 2025-05-28 RX ORDER — MYCOPHENOLIC ACID 180 MG/1
360 TABLET, DELAYED RELEASE ORAL 2 TIMES DAILY
Qty: 120 TABLET | Refills: 11 | Status: SHIPPED | OUTPATIENT
Start: 2025-05-28

## 2025-05-28 RX ORDER — ACETAMINOPHEN 325 MG/1
650 TABLET ORAL
Status: COMPLETED | OUTPATIENT
Start: 2025-05-28 | End: 2025-05-28

## 2025-05-28 RX ORDER — HEPARIN 100 UNIT/ML
500 SYRINGE INTRAVENOUS
OUTPATIENT
Start: 2025-06-25

## 2025-05-28 RX ORDER — DIPHENHYDRAMINE HYDROCHLORIDE 50 MG/ML
25 INJECTION, SOLUTION INTRAMUSCULAR; INTRAVENOUS
Status: COMPLETED | OUTPATIENT
Start: 2025-05-28 | End: 2025-05-28

## 2025-05-28 RX ADMIN — ACETAMINOPHEN 650 MG: 325 TABLET ORAL at 11:05

## 2025-05-28 RX ADMIN — BELIMUMAB 626 MG: 400 INJECTION, POWDER, LYOPHILIZED, FOR SOLUTION INTRAVENOUS at 11:05

## 2025-05-28 RX ADMIN — DIPHENHYDRAMINE HYDROCHLORIDE 25 MG: 50 INJECTION INTRAMUSCULAR; INTRAVENOUS at 11:05

## 2025-05-28 NOTE — PLAN OF CARE
Patient presented to unit for q4w Benlysta infusion. VSS. No new or worsening complaints voiced. Pre-medications PO Tylenol and Benadryl IVP given. Benlysta infused over 60 minutes as ordered. Tolerated well. Next appointment confirmed. Patient discharged ambulatory and in NAD.     Problem: Fatigue  Goal: Improved Activity Tolerance  Outcome: Progressing

## 2025-06-06 ENCOUNTER — PATIENT MESSAGE (OUTPATIENT)
Facility: CLINIC | Age: 55
End: 2025-06-06
Payer: MEDICARE

## 2025-06-09 ENCOUNTER — LAB VISIT (OUTPATIENT)
Dept: LAB | Facility: HOSPITAL | Age: 55
End: 2025-06-09
Attending: INTERNAL MEDICINE
Payer: MEDICARE

## 2025-06-09 DIAGNOSIS — Z94.4 LIVER TRANSPLANTED: ICD-10-CM

## 2025-06-09 LAB
ABSOLUTE EOSINOPHIL (OHS): 0.35 K/UL
ABSOLUTE MONOCYTE (OHS): 0.37 K/UL (ref 0.3–1)
ABSOLUTE NEUTROPHIL COUNT (OHS): 2.34 K/UL (ref 1.8–7.7)
ALBUMIN SERPL BCP-MCNC: 4.1 G/DL (ref 3.5–5.2)
ALP SERPL-CCNC: 78 UNIT/L (ref 40–150)
ALT SERPL W/O P-5'-P-CCNC: 11 UNIT/L (ref 10–44)
ANION GAP (OHS): 11 MMOL/L (ref 8–16)
AST SERPL-CCNC: 13 UNIT/L (ref 11–45)
BASOPHILS # BLD AUTO: 0.01 K/UL
BASOPHILS NFR BLD AUTO: 0.2 %
BILIRUB SERPL-MCNC: 0.5 MG/DL (ref 0.1–1)
BUN SERPL-MCNC: 26 MG/DL (ref 6–20)
CALCIUM SERPL-MCNC: 9.5 MG/DL (ref 8.7–10.5)
CHLORIDE SERPL-SCNC: 104 MMOL/L (ref 95–110)
CO2 SERPL-SCNC: 26 MMOL/L (ref 23–29)
CREAT SERPL-MCNC: 1.6 MG/DL (ref 0.5–1.4)
ERYTHROCYTE [DISTWIDTH] IN BLOOD BY AUTOMATED COUNT: 13.9 % (ref 11.5–14.5)
GFR SERPLBLD CREATININE-BSD FMLA CKD-EPI: 38 ML/MIN/1.73/M2
GLUCOSE SERPL-MCNC: 121 MG/DL (ref 70–110)
HCT VFR BLD AUTO: 32.3 % (ref 37–48.5)
HGB BLD-MCNC: 11.2 GM/DL (ref 12–16)
IMM GRANULOCYTES # BLD AUTO: 0 K/UL (ref 0–0.04)
IMM GRANULOCYTES NFR BLD AUTO: 0 % (ref 0–0.5)
LYMPHOCYTES # BLD AUTO: 1.48 K/UL (ref 1–4.8)
MAGNESIUM SERPL-MCNC: 2.4 MG/DL (ref 1.6–2.6)
MCH RBC QN AUTO: 29.4 PG (ref 27–31)
MCHC RBC AUTO-ENTMCNC: 34.7 G/DL (ref 32–36)
MCV RBC AUTO: 85 FL (ref 82–98)
NUCLEATED RBC (/100WBC) (OHS): 0 /100 WBC
PLATELET # BLD AUTO: 221 K/UL (ref 150–450)
PMV BLD AUTO: 9.6 FL (ref 9.2–12.9)
POTASSIUM SERPL-SCNC: 3.9 MMOL/L (ref 3.5–5.1)
PROT SERPL-MCNC: 7.6 GM/DL (ref 6–8.4)
RBC # BLD AUTO: 3.81 M/UL (ref 4–5.4)
RELATIVE EOSINOPHIL (OHS): 7.7 %
RELATIVE LYMPHOCYTE (OHS): 32.5 % (ref 18–48)
RELATIVE MONOCYTE (OHS): 8.1 % (ref 4–15)
RELATIVE NEUTROPHIL (OHS): 51.5 % (ref 38–73)
SODIUM SERPL-SCNC: 141 MMOL/L (ref 136–145)
WBC # BLD AUTO: 4.55 K/UL (ref 3.9–12.7)

## 2025-06-09 PROCEDURE — 82947 ASSAY GLUCOSE BLOOD QUANT: CPT

## 2025-06-09 PROCEDURE — 36415 COLL VENOUS BLD VENIPUNCTURE: CPT

## 2025-06-09 PROCEDURE — 83735 ASSAY OF MAGNESIUM: CPT

## 2025-06-09 PROCEDURE — 80197 ASSAY OF TACROLIMUS: CPT

## 2025-06-09 PROCEDURE — 85025 COMPLETE CBC W/AUTO DIFF WBC: CPT

## 2025-06-10 ENCOUNTER — RESULTS FOLLOW-UP (OUTPATIENT)
Dept: HEPATOLOGY | Facility: CLINIC | Age: 55
End: 2025-06-10
Payer: MEDICARE

## 2025-06-10 LAB — TACROLIMUS BLD-MCNC: 3.8 NG/ML (ref 5–15)

## 2025-06-10 NOTE — LETTER
Lorena 10, 2025    Valencia Dumont  [address] [address]          Dear Valencia Dumont:  MRN: [id number]    This is a follow up to your recent labs, your lab results were stable.  There are no medicine changes.  Please have your labs drawn again on 12/8/25.      If you cannot have your labs drawn on the scheduled date, it is your responsibility to call the transplant department to reschedule.  Please call [phone] and ask to speak to Gayatri Peoples Medical Assistant for all scheduling requests.     Sincerely,    Linda Barakat, RN,BSN,CCTC    Your Liver Transplant Coordinator    [facility]  [address] [place] [id number] [phone]

## 2025-06-10 NOTE — TELEPHONE ENCOUNTER
Labs reviewed and are stable, continue labs q 6 months. Letter sent.         ----- Message from Jacob Horvath MD sent at 6/10/2025 11:48 AM CDT -----  Results reviewed. No change in immunosuppression  ----- Message -----  From: Lab, Background User  Sent: 6/9/2025  11:58 AM CDT  To: Jacob Horvath MD

## 2025-06-18 ENCOUNTER — PATIENT MESSAGE (OUTPATIENT)
Dept: PODIATRY | Facility: CLINIC | Age: 55
End: 2025-06-18
Payer: MEDICARE

## 2025-06-20 ENCOUNTER — OFFICE VISIT (OUTPATIENT)
Dept: OTOLARYNGOLOGY | Facility: CLINIC | Age: 55
End: 2025-06-20
Payer: MEDICARE

## 2025-06-20 VITALS
SYSTOLIC BLOOD PRESSURE: 149 MMHG | WEIGHT: 132.06 LBS | BODY MASS INDEX: 19.56 KG/M2 | DIASTOLIC BLOOD PRESSURE: 80 MMHG | HEIGHT: 69 IN

## 2025-06-20 DIAGNOSIS — H90.41 SENSORINEURAL HEARING LOSS (SNHL) OF RIGHT EAR WITH UNRESTRICTED HEARING OF LEFT EAR: ICD-10-CM

## 2025-06-20 DIAGNOSIS — R09.81 NASAL CONGESTION: ICD-10-CM

## 2025-06-20 DIAGNOSIS — Z98.890 STATUS POST FUNCTIONAL ENDOSCOPIC SINUS SURGERY (FESS): ICD-10-CM

## 2025-06-20 DIAGNOSIS — H65.491 CHRONIC MEE (MIDDLE EAR EFFUSION), RIGHT: ICD-10-CM

## 2025-06-20 DIAGNOSIS — J33.9 NASAL POLYP: ICD-10-CM

## 2025-06-20 DIAGNOSIS — M26.623 ARTHRALGIA OF BILATERAL TEMPOROMANDIBULAR JOINT: Primary | ICD-10-CM

## 2025-06-20 DIAGNOSIS — J32.0 CHRONIC MAXILLARY SINUSITIS: ICD-10-CM

## 2025-06-20 DIAGNOSIS — J34.3 NASAL TURBINATE HYPERTROPHY: ICD-10-CM

## 2025-06-20 PROCEDURE — 4010F ACE/ARB THERAPY RXD/TAKEN: CPT | Mod: CPTII,S$GLB,, | Performed by: OTOLARYNGOLOGY

## 2025-06-20 PROCEDURE — 31231 NASAL ENDOSCOPY DX: CPT | Mod: S$GLB,,, | Performed by: OTOLARYNGOLOGY

## 2025-06-20 PROCEDURE — 3052F HG A1C>EQUAL 8.0%<EQUAL 9.0%: CPT | Mod: CPTII,S$GLB,, | Performed by: OTOLARYNGOLOGY

## 2025-06-20 PROCEDURE — 3008F BODY MASS INDEX DOCD: CPT | Mod: CPTII,S$GLB,, | Performed by: OTOLARYNGOLOGY

## 2025-06-20 PROCEDURE — 3077F SYST BP >= 140 MM HG: CPT | Mod: CPTII,S$GLB,, | Performed by: OTOLARYNGOLOGY

## 2025-06-20 PROCEDURE — 3066F NEPHROPATHY DOC TX: CPT | Mod: CPTII,S$GLB,, | Performed by: OTOLARYNGOLOGY

## 2025-06-20 PROCEDURE — 99214 OFFICE O/P EST MOD 30 MIN: CPT | Mod: 25,S$GLB,, | Performed by: OTOLARYNGOLOGY

## 2025-06-20 PROCEDURE — 3079F DIAST BP 80-89 MM HG: CPT | Mod: CPTII,S$GLB,, | Performed by: OTOLARYNGOLOGY

## 2025-06-20 RX ORDER — FLUTICASONE PROPIONATE 50 MCG
2 SPRAY, SUSPENSION (ML) NASAL 2 TIMES DAILY
Qty: 18.2 ML | Refills: 3 | Status: SHIPPED | OUTPATIENT
Start: 2025-06-20

## 2025-06-20 RX ORDER — BUDESONIDE 0.5 MG/2ML
INHALANT ORAL
Qty: 60 ML | Refills: 1 | Status: SHIPPED | OUTPATIENT
Start: 2025-06-20

## 2025-06-20 RX ORDER — AZELASTINE 1 MG/ML
1 SPRAY, METERED NASAL 2 TIMES DAILY
Qty: 30 ML | Refills: 3 | Status: SHIPPED | OUTPATIENT
Start: 2025-06-20

## 2025-06-20 NOTE — PATIENT INSTRUCTIONS
If develop sinus symptoms when you are doing the saline mist spray,flonase and astelin regimen twice daily, you can switch the saline mist spray to a saline rinse and instead of flonase, put budesonide in the saline rinse ( neilmed sinus rinse). Use half of mixture in one side of nose and half in the other side. You can do the astelin after the rinse. After using the rinse, wash mouth out with water and spit out.

## 2025-06-20 NOTE — PROGRESS NOTES
OTOLARYNGOLOGY CLINIC NOTE  Date:  06/20/2025     Chief complaint:  Chief Complaint   Patient presents with    Nasal Congestion    Sinus Problem     Itchy eyes       History of Present Illness  Valencia Dumont is a 55 y.o. female  presenting today for a followup.  Has been having diarrhea and stoamch ache thinks might have eaten too fast  Pollen in April was really bothersome for her but it was coming   Had some right ear infection at that time as well ; saw pcp at that time. Had some sore throat and drainage at that time.   Had been better for a few weeks and now nose running again  Had some itching of the right eye- takes zyal and that does help with the eye itching    Right side felt worse at that time and had been hoarse too   Was given a zpack and levamir? And that helped   Doing better     Hearing down on right she not sure if recentyl kids   I last saw the patient on 11-15 - 24. Below text is copied from  note on that date describing history of present illness at that time :Out of nasal sprays  Throat feels better when does  sprays  Recently had acdf and having some restriction with mvmt     Ears pop bilaterally; hearing not change when that   Had audio in march 2024  Pops when she opens her jawa and when eating. Chewing gum during visit      I last saw the patient on 3-6 - 24. Below text is copied from  note on that date describing history of present illness at that time :     Left popping in ear with chewing and when aywning   Not an issue when her sinuses acting up ; she denies nasal congestion currently   Sometimes feels full in the ear with popping but mostly just the popping      Doing rinse and then two sprays (flonase and astelin) once a day      I last saw the patient on 3-13 - 23. Was recommended for 6 month f/u with rigid scope for monitoring for polyp recurrence but was lost to follow up until now.  Below text is copied from  note on that date describing history of present illness at that time  :  Ears click when she talks- started up last week . In both ears  No fluid sensation or blockage  Dry Cough x 2 weeks and inhaler helps   Breathing through nose ok   Nose gets dry , doing saline   No drainage from the nose        Has history of left nasal polyposis s/p FESS (b/l ethmoid, maxillary) 2-4-21 for crs with polyp on left      Has problems breathing with laying flat, not during sleep just laying down and watching tv     Nose gets red sometimes but not sure if finger get red or white ( ie does not sound like has other findings that could be raynauds)      Past Medical History  Past Medical History:   Diagnosis Date    Abnormal Pap smear of cervix     Anemia     Arthritis     Ascites     Asthma     Cataract     Chronic back pain     Cirrhosis of liver without mention of alcohol 10/18/2013    Diabetes mellitus     Esophageal varices     GERD (gastroesophageal reflux disease)     Herpes simplex virus (HSV) infection     HSV2.    Hypertension     Kidney stone     Lupus     Osteoporosis 12/2013    Prophylactic immunotherapy (transplant immunosuppression) 01/02/2014    Retinal detachment     SBP (spontaneous bacterial peritonitis)     history of         Past Surgical History  Past Surgical History:   Procedure Laterality Date    BREAST BIOPSY Left 08/2020    CHOLECYSTECTOMY      COLONOSCOPY N/A 05/31/2017    Procedure: COLONOSCOPY;  Surgeon: Marky Sun MD;  Location: [address]);  Service: Endoscopy;  Laterality: N/A;  PM prep.    CONIZATION OF CERVIX USING LOOP ELECTROSURGICAL EXCISION PROCEDURE (LEEP) N/A 01/20/2023    Procedure: CONIZATION-CERVICAL-LEEP;  Surgeon: Lu Schreiber MD;  Location: [facility];  Service: OB/GYN;  Laterality: N/A;    ESOPHAGOGASTRODUODENOSCOPY      ESOPHAGOGASTRODUODENOSCOPY N/A 01/16/2019    Procedure: EGD (ESOPHAGOGASTRODUODENOSCOPY);  Surgeon: Deniz Guerra MD;  Location: [address]);  Service: Endoscopy;  Laterality: N/A;  labs prior, s/p liver  transplant-MS    ESOPHAGOGASTRODUODENOSCOPY N/A 09/09/2020    Procedure: EGD (ESOPHAGOGASTRODUODENOSCOPY);  Surgeon: Davion Ríos MD;  Location: [address]);  Service: Endoscopy;  Laterality: N/A;  Please order the EGD at least 2 weeks after barium esophagram has been done EGD is for dysphagia  covid test 9/6-[facility] [place] [facility]    EYE SURGERY      FUNCTIONAL ENDOSCOPIC SINUS SURGERY (FESS) USING COMPUTER-ASSISTED NAVIGATION Bilateral 02/04/2021    Procedure: FESS, USING COMPUTER-ASSISTED NAVIGATION;  Surgeon: Delores Lewis MD;  Location: [facility];  Service: ENT;  Laterality: Bilateral;  [employer] WALDEMAR [phone] TEXTED HIM @ 2:45PM ON 1-8-2021  DISC LOADED BT TOMMY 1-  RN PREOP 1/28/2021---COVID NEGATIVE ON 2/3--CONSENT INCOMPLETE  H/P INCOMPLETE  --CBC IN AM    LIVER BIOPSY      LIVER TRANSPLANT  12/31/2013    REFRACTIVE SURGERY Bilateral 2010    RETINAL DETACHMENT SURGERY Left 01/25/2025    RETINOPEXY FOR RETINA HOLE ()    Revision carpal tunnel and removal of scar formation from left wrist  11/17/2023    TUBAL LIGATION  2003    UPPER GASTROINTESTINAL ENDOSCOPY          Medications  Medications Ordered Prior to Encounter[1]    Review of Systems  Review of Systems   HENT:  Positive for ear pain and sore throat.    Respiratory:  Positive for cough.    Cardiovascular: Negative.    Gastrointestinal:  Positive for constipation and diarrhea.   Genitourinary: Negative.    Musculoskeletal:  Positive for back pain and neck pain.   Skin: Negative.    Neurological: Negative.    Psychiatric/Behavioral: Negative.          Social History   reports that she has never smoked. She has never used smokeless tobacco. She reports current alcohol use of about 1.0 standard drink of alcohol per week. She reports that she does not use drugs.     Family History  Family History   Problem Relation Name Age of Onset    Hypertension Mother      Cataracts Mother      Diabetes Father      Alzheimer's  disease Father      Diabetes Paternal Grandfather      Diabetes Paternal Grandmother      No Known Problems Maternal Grandmother      Bone cancer Maternal Grandfather      Breast cancer Maternal Aunt  55    Hypertension Brother      Diabetes Brother      No Known Problems Sister      No Known Problems Maternal Uncle      No Known Problems Paternal Aunt      No Known Problems Paternal Uncle      Stroke Neg Hx      Cancer Neg Hx      Colon cancer Neg Hx      Esophageal cancer Neg Hx      Stomach cancer Neg Hx      Rectal cancer Neg Hx      Amblyopia Neg Hx      Blindness Neg Hx      Glaucoma Neg Hx      Macular degeneration Neg Hx      Retinal detachment Neg Hx      Strabismus Neg Hx      Thyroid disease Neg Hx          Physical Exam   Vitals:    06/20/25 1420   BP: (!) 149/80    Body mass index is 19.5 kg/m².            GENERAL: no acute distress.  HEAD: normocephalic.   EYES: No scleral icterus  EARS: external ear without lesion, normal pinna shape and position.  External auditory canal with normal cerumen, tympanic membrane fully visible, no perforation , no retraction. No middle ear effusion. Ossicles intact. Slight fluid still in the right middle ear  NOSE: external nose without significant bony abnormality, turbinate hypertrophy on anterior rhinoscopy  ORAL CAVITY/OROPHARYNX: tongue mobile.   NECK: trachea midline.   LYMPH NODES:No cervical lymphadenopathy.  RESPIRATORY: no stridor, no stertor. Voice normal. Respirations nonlabored.  NEURO: alert, responds to questions appropriately.    PSYCH:mood appropriate    PROCEDURE NOTE  Procedure: diagnostic rigid nasal endoscopy  Indications for procedure: chronic nasal congestion,history of chronic sinusitis and nasal polyps evaluate for recurrent polyps unable to view sinus area and/or pathology noted on anterior rhinoscopy requiring more thorough nasal evaluation       Consent: procedure was explained in detail and verbal consent was obtained.   Anesthesia:4%  lidocaine with neosynephrine  Procedure in detail: With the patient in the seated position, the zero degree endoscope was inserted atraumatically into the bilateral nasal cavities and advance to the nasopharynx with the following areas examined with findings as described below.     Nasal cavity:no polyps or mass, no purulent drainage, no bleeding  Septum: no perforation   Turbinates:  inferior turbinates severe hypertrophy ; middle turbinates slightly medialized  Maxillary antrostomy with edema /polypoid edema but os patent, no purulent drainage ( left) difficult to see right antrostomy   Spheno-ethmoid recess:moderate edema bilaterally  Superior meatus:mild edema  Nasopharynx: no mass or lesion in the nasopharynx.     The scope was removed atraumatically without complication. The patient tolerated the procedure well. Photodocumentation obtained , all images and/or videos uploaded in media section of epic.                                                                  Imaging:  The patient does not have any new imaging of the head and neck since last visit.     Labs:  CBC  Recent Labs   Lab 01/28/25  1155 04/30/25  1209 06/09/25  1108   WBC 6.54 4.87 4.55   Hemoglobin 10.6 L  --   --    HGB  --  11.6 L 11.2 L   Hematocrit 31.0 L  --   --    HCT  --  34.0 L 32.3 L   MCV 86 85 85   Platelet Count  --  202 221   Platelets 228  --   --      BMP  Recent Labs   Lab 06/10/24  1206 06/19/24  1321 07/22/24  1323 08/07/24  2222 10/02/24  1012 12/10/24  1027 12/10/24  1208 01/28/25  1155 04/30/25  1209 06/09/25  1108   Glucose 198 H   < > 100   < > 161 H 145 H  --  200 H 136 H 121 H   Sodium 137   < > 138   < > 141 139   < > 142 139 141   Potassium 5.5 H   < > 4.3   < > 3.9 4.6   < > 3.8 3.9 3.9   Chloride 103   < > 104   < > 106 106  --  104 103 104   CO2 27   < > 27   < > 25 29  --  26 28 26   Carbon Dioxide  --   --   --    < >  --   --    < >  --   --   --    BUN 27 H   < > 26 H   < > 28 H 32 H   < > 23 H 20 26 H    Creatinine 2.0 H   < > 1.4   < > 2.4 H 2.3 H   < > 2.1 H 1.6 H 1.6 H   Calcium 9.6   < > 9.8   < > 8.9 8.5 L   < > 9.4 9.2 9.5   Phosphorus  --    < > 3.1  --  3.1  --   --  4.7 H  --   --    Magnesium   --   --   --   --   --   --   --   --   --  2.4   Magnesium 2.0  --   --   --   --  3.2 H  --   --   --   --     < > = values in this interval not displayed.     COAGS        Assessment  1. Arthralgia of bilateral temporomandibular joint    2. Status post functional endoscopic sinus surgery (FESS)    3. Nasal polyp  - budesonide (PULMICORT) 0.5 mg/2 mL nebulizer solution; put budesonide in the saline rinse ( neilmed sinus rinse). Use half of mixture in one side of nose and half in the other side. You can do the astelin after the rinse. After using the rinse, wash mouth out with water and spit out.  Dispense: 60 mL; Refill: 1    4. Sensorineural hearing loss (SNHL) of right ear with unrestricted hearing of left ear    5. Chronic JELLY (middle ear effusion), right    6. Nasal congestion    7. Chronic maxillary sinusitis    8. Nasal turbinate hypertrophy       Plan:  Discussed plan of care with patient in detail and all questions answered. Patient reported understanding of plan of care. I gave the patient the opportunity to ask questions and patient confirmed all questions answered to satisfaction.     Due for annual hearing test-   Go up to bid on sprays with saline before ; if ear still bothersome in 2-3 weeks message me and will recheck and get hearing test at that time  If improves will get hearing test at follow up for polyps  Not doing saline -counseled on importance of this   Can do budesonide in neilmed sinus rinse when feeling like getting sinus infection-counseled on how to do this and that it replaces Flonase. Counseled to rinse mouth out and spit out to prevent thrush   F/u 6 months with rigid        [1]   Current Outpatient Medications on File Prior to Visit   Medication Sig Dispense Refill     "acetaminophen (TYLENOL) 650 MG TbSR Take 1 tablet (650 mg total) by mouth every 6 (six) hours. 60 tablet 0    albuterol (PROVENTIL/VENTOLIN HFA) 90 mcg/actuation inhaler Inhale 2 puffs into the lungs.      amitriptyline (ELAVIL) 10 MG tablet Take 1 tablet (10 mg total) by mouth every evening. 90 tablet 2    ammonium lactate 12 % Crea Apply 1 application  topically once daily. 140 g 5    azelastine (ASTELIN) 137 mcg (0.1 %) nasal spray 1 spray (137 mcg total) by Nasal route 2 (two) times daily. 30 mL 11    BD ULTRA-FINE JANESSA PEN NEEDLE 32 gauge x 5/32" Ndle For five times daily insulin injections 450 each 3    blood-glucose meter,continuous (DEXCOM G6 ) Misc 1 each by Misc.(Non-Drug; Combo Route) route once. for 1 dose 1 each 0    blood-glucose sensor (FREESTYLE CHUN 3 PLUS SENSOR) Karen 1 each by Misc.(Non-Drug; Combo Route) route every 14 (fourteen) days. 2 each 11    blood-glucose transmitter (DEXCOM G6 TRANSMITTER) Karen 1 each by Misc.(Non-Drug; Combo Route) route once a week 1 Device 3    buPROPion (WELLBUTRIN XL) 150 MG TB24 tablet Take 300 mg by mouth once daily.      chlorthalidone (HYGROTEN) 25 MG Tab Take 0.5 tablets (12.5 mg total) by mouth once daily. 15 tablet 11    ciclopirox (PENLAC) 8 % Soln Apply topically nightly. 6.6 mL 3    cyclobenzaprine (FLEXERIL) 10 MG tablet Take 10 mg by mouth every 12 (twelve) hours as needed.      diazePAM (VALIUM) 5 MG tablet Take 5 mg by mouth every 12 (twelve) hours as needed.      diclofenac sodium (VOLTAREN) 1 % Gel APPLY 2 GRAM to wrist and 4 g to left foot TOPICAL ROUTE 4 TIMES PER  each 2    dicyclomine (BENTYL) 10 MG capsule Take 10 mg by mouth 2 (two) times daily.      erythromycin with ethanol (EMGEL) 2 % gel   1    estradioL (ESTRACE) 0.01 % (0.1 mg/gram) vaginal cream Place 1 g vaginally 3 (three) times a week. 42.5 g 5    famotidine (PEPCID) 40 MG tablet Take 40 mg by mouth 2 (two) times daily.      ferrous sulfate (FEOSOL) 325 mg (65 mg iron) " Tab tablet Take 1 tablet (325 mg total) by mouth every 12 (twelve) hours. 60 tablet 4    fluconazole (DIFLUCAN) 150 MG Tab TAKE ONE TABLET BY MOUTH once for ONE dose 1 tablet 0    fluocinolone (SYNALAR) 0.01 % external solution APPLY a couple of DROPS INTO BOTH EARS TWICE DAILY AS NEEDED FOR ITCHING 60 mL 1    fluticasone propionate (FLONASE) 50 mcg/actuation nasal spray 2 sprays (100 mcg total) by Each Nostril route 2 (two) times a day. 18.2 mL 11    gabapentin (NEURONTIN) 300 MG capsule TAKE 1 CAPSULE BY MOUTH THREE TIMES DAILY      hydrocortisone (ANUSOL-HC) 2.5 % rectal cream Place rectally 2 (two) times daily. Apply per rectum bid 30 g 3    insulin glargine U-100, Lantus, (LANTUS SOLOSTAR U-100 INSULIN) 100 unit/mL (3 mL) InPn pen Inject 4 Units into the skin every evening. 15 mL 3    insulin lispro (HUMALOG KWIKPEN INSULIN) 100 unit/mL pen 3 units before BREAKFAST and lunch and FIVE units before dinner with sliding scale, up to 25 units daily 45 mL 3    isosorbide mononitrate (IMDUR) 30 MG 24 hr tablet Take 1 tablet (30 mg total) by mouth 2 (two) times a day. 180 tablet 3    Lactobac. rhamnosus GG-inulin 10 billion cell -200 mg Cap Take 1 capsule by mouth 2 (two) times daily. 30 capsule 0    levocetirizine (XYZAL) 5 MG tablet Take 5 mg by mouth every evening.  3    LIDOcaine-prilocaine (EMLA) cream 2 (two) times daily. Apply to affected area      losartan (COZAAR) 100 MG tablet Take 1 tablet (100 mg total) by mouth once daily. 30 tablet 2    meclizine (ANTIVERT) 25 mg tablet TAKE ONE TABLET BY MOUTH AT BEDTIME AS NEEDED for dizziness.  0    mometasone 0.1% (ELOCON) 0.1 % cream APPLY TOPICALLY TO THE FACE TWICE DAILY FOR ONE WEEK      MULTIVIT,THER IRON,CA,FA & MIN (MULTIVITAMIN) Tab Take 1 tablet by mouth once daily. 30 tablet 0    mycophenolate sodium 180 MG TbEC Take 2 tablets (360 mg total) by mouth 2 (two) times daily. 120 tablet 11    NURTEC 75 mg odt PLACE ONE TABLET on tongue, alternatively UNDER THE  TONGUE ONCE DAILY AS NEEDED FOR MIGRAINE 8 tablet 2    ondansetron (ZOFRAN) 4 MG tablet TAKE TWO TABLETS BY MOUTH EVERY 8 HOURS AS NEEDED FOR NAUSEA.      oxyCODONE (ROXICODONE) 5 MG immediate release tablet Take 1 tablet (5 mg total) by mouth every 4 (four) hours as needed for Pain. 5 tablet 0    oxyCODONE-acetaminophen (PERCOCET) 7.5-325 mg per tablet Take 1 tablet by mouth every 8 (eight) hours as needed.      pantoprazole (PROTONIX) 40 MG tablet Take 1 tablet (40 mg total) by mouth 2 (two) times daily. Take immediately in am when wake up and then 30 minutes prior to dinner 60 tablet 11    polyethylene glycol (GLYCOLAX) 17 gram/dose powder Mix 1 capful (17 g) with liquid and by mouth 2 (two) times daily. 1530 g 11    rosuvastatin (CRESTOR) 5 MG tablet Take 5 mg by mouth once daily.      SENNA 8.6 mg tablet Take 1 tablet by mouth 2 (two) times daily. Take while taking narcotics 30 tablet 0    sertraline (ZOLOFT) 50 MG tablet Take 50 mg by mouth once daily.  11    SYMBICORT 160-4.5 mcg/actuation HFAA Inhale 2 puffs into the lungs every 12 (twelve) hours.      tacrolimus (PROGRAF) 1 MG Cap Take 3 capsules (3 mg total) by mouth every morning AND 2 capsules (2 mg total) every evening. 150 capsule 11    tinidazole (TINDAMAX) 250 MG tablet SMARTSI Tablet(s) By Mouth Every Morning      TRULICITY 3 mg/0.5 mL pen injector Inject 3 mg into the skin every 7 days.      valACYclovir (VALTREX) 1000 MG tablet Take 1,000 mg by mouth once daily.      verapamil (CALAN-SR) 120 MG CR tablet TAKE ONE TABLET ONCE DAILY FOR BLOOD PRESSURE  3    zolpidem (AMBIEN) 10 mg Tab Take 10 mg by mouth nightly as needed.       No current facility-administered medications on file prior to visit.

## 2025-06-25 ENCOUNTER — INFUSION (OUTPATIENT)
Dept: INFUSION THERAPY | Facility: HOSPITAL | Age: 55
End: 2025-06-25
Attending: INTERNAL MEDICINE
Payer: MEDICARE

## 2025-06-25 VITALS
RESPIRATION RATE: 16 BRPM | BODY MASS INDEX: 19.46 KG/M2 | TEMPERATURE: 98 F | OXYGEN SATURATION: 98 % | HEIGHT: 69 IN | SYSTOLIC BLOOD PRESSURE: 151 MMHG | WEIGHT: 131.38 LBS | HEART RATE: 94 BPM | DIASTOLIC BLOOD PRESSURE: 74 MMHG

## 2025-06-25 DIAGNOSIS — M32.9 SYSTEMIC LUPUS ERYTHEMATOSUS, UNSPECIFIED SLE TYPE, UNSPECIFIED ORGAN INVOLVEMENT STATUS: Primary | ICD-10-CM

## 2025-06-25 PROCEDURE — 63600175 PHARM REV CODE 636 W HCPCS: Mod: JZ,JA,TB | Performed by: INTERNAL MEDICINE

## 2025-06-25 PROCEDURE — 25000003 PHARM REV CODE 250: Performed by: INTERNAL MEDICINE

## 2025-06-25 PROCEDURE — 96365 THER/PROPH/DIAG IV INF INIT: CPT

## 2025-06-25 PROCEDURE — 96375 TX/PRO/DX INJ NEW DRUG ADDON: CPT

## 2025-06-25 RX ORDER — ACETAMINOPHEN 325 MG/1
650 TABLET ORAL
Status: COMPLETED | OUTPATIENT
Start: 2025-06-25 | End: 2025-06-25

## 2025-06-25 RX ORDER — SODIUM CHLORIDE 0.9 % (FLUSH) 0.9 %
10 SYRINGE (ML) INJECTION
OUTPATIENT
Start: 2025-07-23

## 2025-06-25 RX ORDER — HEPARIN 100 UNIT/ML
500 SYRINGE INTRAVENOUS
OUTPATIENT
Start: 2025-07-23

## 2025-06-25 RX ORDER — DIPHENHYDRAMINE HYDROCHLORIDE 50 MG/ML
25 INJECTION, SOLUTION INTRAMUSCULAR; INTRAVENOUS
OUTPATIENT
Start: 2025-07-23

## 2025-06-25 RX ORDER — ACETAMINOPHEN 325 MG/1
650 TABLET ORAL
OUTPATIENT
Start: 2025-07-23

## 2025-06-25 RX ORDER — DIPHENHYDRAMINE HYDROCHLORIDE 50 MG/ML
25 INJECTION, SOLUTION INTRAMUSCULAR; INTRAVENOUS
Status: COMPLETED | OUTPATIENT
Start: 2025-06-25 | End: 2025-06-25

## 2025-06-25 RX ADMIN — ACETAMINOPHEN 650 MG: 325 TABLET ORAL at 11:06

## 2025-06-25 RX ADMIN — BELIMUMAB 596 MG: 400 INJECTION, POWDER, LYOPHILIZED, FOR SOLUTION INTRAVENOUS at 11:06

## 2025-06-25 RX ADMIN — DIPHENHYDRAMINE HYDROCHLORIDE 25 MG: 50 INJECTION INTRAMUSCULAR; INTRAVENOUS at 11:06

## 2025-06-25 NOTE — PLAN OF CARE
Patient arrived on unit for Benlysta infusion. Patient is awake, alert, and oriented x4, denies any new complaints or worsening signs and symptoms since last visit. Placed 24g PIV in left forearm, administered pre-medications: Tylenol PO and Benadryl IVP, administered Benlysta IVPB over 1 hr, tolerated well with no signs or symptoms of adverse reaction, removed IV. Patient denies any questions or concerns at this time. Discharged home upon completion of treatments in NAD.     Please see MAR and flowsheets for any additional information.      Problem: Adult Inpatient Plan of Care  Goal: Optimal Comfort and Wellbeing  Outcome: Met

## 2025-07-09 ENCOUNTER — PATIENT MESSAGE (OUTPATIENT)
Dept: HEPATOLOGY | Facility: CLINIC | Age: 55
End: 2025-07-09
Payer: MEDICARE

## 2025-07-10 ENCOUNTER — PATIENT MESSAGE (OUTPATIENT)
Dept: HEPATOLOGY | Facility: CLINIC | Age: 55
End: 2025-07-10
Payer: MEDICARE

## 2025-07-10 NOTE — LETTER
July 15, 2025      [facility]  [id number] [place] [address] [address] KYLEIGH HERNÁNDEZ [address]  Phone: [phone]  Fax: [phone]       Patient: Valencia Dumont   YOB: 1970  Date of Visit: 07/15/2025    Dr. Jordan,     Shanita Dumont  received a liver transplant at [facility] on 12/31/13 and is currently doing well. She is cleared from a transplant perspective to proceed with left total hip arthroplasty. No medications changes are needed to transplant medications. While in the hospital please monitor daily CMPs. If you have any questions or concerns, or if I can be of further assistance, please do not hesitate to contact me.    Sincerely,       Dr. Jacob Horvath MD

## 2025-07-11 ENCOUNTER — PATIENT MESSAGE (OUTPATIENT)
Dept: ENDOCRINOLOGY | Facility: CLINIC | Age: 55
End: 2025-07-11
Payer: MEDICARE

## 2025-07-14 ENCOUNTER — OFFICE VISIT (OUTPATIENT)
Dept: ENDOCRINOLOGY | Facility: CLINIC | Age: 55
End: 2025-07-14
Payer: MEDICARE

## 2025-07-14 DIAGNOSIS — E11.22 TYPE 2 DIABETES MELLITUS WITH STAGE 3B CHRONIC KIDNEY DISEASE, WITH LONG-TERM CURRENT USE OF INSULIN: Primary | ICD-10-CM

## 2025-07-14 DIAGNOSIS — M81.0 OSTEOPOROSIS, UNSPECIFIED OSTEOPOROSIS TYPE, UNSPECIFIED PATHOLOGICAL FRACTURE PRESENCE: ICD-10-CM

## 2025-07-14 DIAGNOSIS — N18.32 TYPE 2 DIABETES MELLITUS WITH STAGE 3B CHRONIC KIDNEY DISEASE, WITH LONG-TERM CURRENT USE OF INSULIN: Primary | ICD-10-CM

## 2025-07-14 DIAGNOSIS — Z79.4 TYPE 2 DIABETES MELLITUS WITH STAGE 3B CHRONIC KIDNEY DISEASE, WITH LONG-TERM CURRENT USE OF INSULIN: Primary | ICD-10-CM

## 2025-07-14 DIAGNOSIS — N18.32 STAGE 3B CHRONIC KIDNEY DISEASE: ICD-10-CM

## 2025-07-14 PROCEDURE — 4010F ACE/ARB THERAPY RXD/TAKEN: CPT | Mod: CPTII,95,, | Performed by: INTERNAL MEDICINE

## 2025-07-14 PROCEDURE — 3052F HG A1C>EQUAL 8.0%<EQUAL 9.0%: CPT | Mod: CPTII,95,, | Performed by: INTERNAL MEDICINE

## 2025-07-14 PROCEDURE — 98006 SYNCH AUDIO-VIDEO EST MOD 30: CPT | Mod: 95,,, | Performed by: INTERNAL MEDICINE

## 2025-07-14 PROCEDURE — 95251 CONT GLUC MNTR ANALYSIS I&R: CPT | Mod: NDTC,,, | Performed by: INTERNAL MEDICINE

## 2025-07-14 PROCEDURE — 3066F NEPHROPATHY DOC TX: CPT | Mod: CPTII,95,, | Performed by: INTERNAL MEDICINE

## 2025-07-14 RX ORDER — BLOOD-GLUCOSE SENSOR
1 EACH MISCELLANEOUS
Qty: 2 EACH | Refills: 11 | Status: SHIPPED | OUTPATIENT
Start: 2025-07-14 | End: 2026-07-14

## 2025-07-14 RX ORDER — ERGOCALCIFEROL 1.25 MG/1
50000 CAPSULE ORAL
Qty: 12 CAPSULE | Refills: 3 | Status: SHIPPED | OUTPATIENT
Start: 2025-07-14

## 2025-07-14 RX ORDER — INSULIN LISPRO 100 [IU]/ML
5 INJECTION, SOLUTION INTRAVENOUS; SUBCUTANEOUS 3 TIMES DAILY
Qty: 15 ML | Refills: 11 | Status: SHIPPED | OUTPATIENT
Start: 2025-07-14

## 2025-07-14 RX ORDER — INSULIN GLARGINE 100 [IU]/ML
5 INJECTION, SOLUTION SUBCUTANEOUS 2 TIMES DAILY
Qty: 3 ML | Refills: 11 | Status: SHIPPED | OUTPATIENT
Start: 2025-07-14 | End: 2026-07-14

## 2025-07-14 NOTE — ASSESSMENT & PLAN NOTE
Risk factors: intermittent prednisone use, postmenopausal status, recent wrist fracture, High FRAX score and low t scores 3.1 and CKD stage III  Started on evenity in 8/2022, completed in 2023,   Reclast X 1 in 9/2024  Does not treatment at this time.      PLAN:   Vitamin D is low - restarted ergo 50K once weekly   DXA in 7/2026

## 2025-07-14 NOTE — PROGRESS NOTES
Audiovisual Virtual Visit Established Patient    Subjective:      Chief Complaint: No chief complaint on file.      HPI: Valencia Dumont is a 55 y.o. 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    Reviewed past medical, family, social history and updated as appropriate.  Last visit 6/2024    Interval history:   Multiple joint pains, has upcoming right hip replacement due to arthritis    Type 2 DM complicated by CKD stage III   Current weight: 130 lbs     Medications:   Novolog pen 5 units before meals (if she skips a meal she does not take the five units, she does know if BG is elevated >180 - 200, knows to give 1 - 2 units extra)  Lantus pen 5 units at night and in the morning  Trulicity 3 mg weekly - over one year ago     Currently using Freestyle jacky 3   Prandial excursions between 2 PM - 8 PM   Reports giving herself during or after the meals   GMI 7%  TIR 75%  TAR 25%  TBR 0%          Denies difficulties with injections/sites of administration   Denies any hypoglycemia     Lab Results   Component Value Date    HGBA1C 8.1 (H) 03/25/2025     Asthma exacerbations recently   Not able to Exercise regularly due to heat and asthma      Osteoporosis risk factors: intermittent prednisone use, postmenopausal status, recent wrist fracture (slipped on wet tile floor)  High FRAX score and low t scores -3.1  Has CKD stage III  Reports using evenity for twelve months   Received reclast X 1 9/2024, tolerated well        Latest Reference Range & Units 06/09/25 11:08   Creatinine 0.5 - 1.4 mg/dL 1.6 (H)   eGFR >60 mL/min/1.73/m2 38 (L)     Denies other falls or fractures.   Active in physical therapy.     Supplements:  50K once weekly   2000 IUs daily      Normal calcium levels      Review of Systems  Objective:     BP Readings from Last 5 Encounters:   06/25/25 (!) 151/74   06/20/25 (!)  149/80   05/28/25 (!) 171/77   04/30/25 (!) 158/82   04/02/25 (!) 159/74       Physical exam was not performed and vitals were not obtained due to this being a telemedicine (virtual) visit.    Wt Readings from Last 10 Encounters:   06/25/25 1108 59.6 kg (131 lb 6.3 oz)   06/20/25 1420 59.9 kg (132 lb 0.9 oz)   05/28/25 1113 62.6 kg (138 lb 0.1 oz)   04/30/25 1217 62.1 kg (136 lb 14.5 oz)   04/02/25 1150 62.4 kg (137 lb 9.1 oz)   03/26/25 1149 63.4 kg (139 lb 12.4 oz)   03/05/25 1256 63.4 kg (139 lb 12.4 oz)   02/20/25 1341 63 kg (138 lb 14.2 oz)   02/10/25 1449 63.4 kg (139 lb 12.8 oz)   02/05/25 1119 62.8 kg (138 lb 7.2 oz)       Lab Results   Component Value Date    HGBA1C 8.1 (H) 03/25/2025    HGBA1C 7.1 (H) 12/10/2024    HGBA1C 7.3 (H) 05/07/2024    HGBA1C 6.8 (H) 01/29/2024     Lab Results   Component Value Date    CHOL 188 12/10/2024    CHOL 188 01/19/2021     (H) 12/10/2024     (H) 01/19/2021    LDLCALC 69 12/10/2024    LDLCALC 69.6 01/19/2021    TRIG 55 12/10/2024    TRIG 57 01/19/2021    CHOLHDL 1.74 12/10/2024    CHOLHDL 56.9 (H) 01/19/2021     Lab Results   Component Value Date     06/09/2025    K 3.9 06/09/2025     06/09/2025    CO2 26 06/09/2025     (H) 06/09/2025    BUN 26 (H) 06/09/2025    CREATININE 1.6 (H) 06/09/2025    CALCIUM 9.5 06/09/2025    PROT 7.6 06/09/2025    ALBUMIN 4.1 06/09/2025    BILITOT 0.5 06/09/2025    ALKPHOS 78 06/09/2025    AST 13 06/09/2025    ALT 11 06/09/2025    ANIONGAP 11 06/09/2025    ESTGFRAFRICA 46 (A) 06/24/2022    EGFRNONAA 40 (A) 06/24/2022    TSH 1.68 03/25/2025      Lab Results   Component Value Date    LABMICR 130.0 02/20/2024    CREATRANDUR 251.8 04/30/2025    MICALBCREAT 37.7 (H) 02/20/2024       Assessment/Plan:     Type 2 diabetes mellitus with stage 3 chronic kidney disease, with long-term current use of insulin  Change current regimen:   Lantus 5 units twice a day bedtime - reviewed several times as she has had multiple  medication changes  Novolog 5 units before meals except early evening meals increase to 6 units   Continue trulicity 3 mg weekly - denies GI side effects other than mild nausea in the morning.     Reviewed sensor tracings in detail with patient. We have identified the following:  Hyperglycemia following evening dinner meal increase novolog to 6 units  Sensor data uploaded into patient chart.   GMI 7%, TIR at goal    Osteoporosis  Risk factors: intermittent prednisone use, postmenopausal status, recent wrist fracture, High FRAX score and low t scores 3.1 and CKD stage III  Started on evenity in 8/2022, completed in 2023,   Reclast X 1 in 9/2024  Does not treatment at this time.      PLAN:   Vitamin D is low - restarted ergo 50K once weekly   DXA in 7/2026      CKD (chronic kidney disease), stage III  At risk for hypoglycemia  To start jacky      The patient location is: home/[place]  The chief complaint leading to consultation is: see above   Visit type: Virtual visit with synchronous audio and video  Total time spent with patient: 36 mmin    RTC in 6 months      Krystyna Gamble MD

## 2025-07-14 NOTE — PATIENT INSTRUCTIONS
Lantus (glargine) 5 units twice a day bedtime   Novolog 5 units before meals except early evening meals increase to 6 units   Continue trulicity 3 mg weekly

## 2025-07-15 NOTE — TELEPHONE ENCOUNTER
Per Dr. Yuliet mike to proceed with L total hip arthroplasty.   Pt is currently on Tacrolimus and Myfortic. No med changes needed. Clearance letter faxed to provider's office per request.

## 2025-07-16 ENCOUNTER — PROCEDURE VISIT (OUTPATIENT)
Facility: CLINIC | Age: 55
End: 2025-07-16
Attending: OBSTETRICS & GYNECOLOGY
Payer: MEDICARE

## 2025-07-16 VITALS
WEIGHT: 137.38 LBS | BODY MASS INDEX: 20.35 KG/M2 | HEART RATE: 72 BPM | SYSTOLIC BLOOD PRESSURE: 147 MMHG | DIASTOLIC BLOOD PRESSURE: 81 MMHG | HEIGHT: 69 IN

## 2025-07-16 DIAGNOSIS — J33.9 NASAL POLYP: ICD-10-CM

## 2025-07-16 DIAGNOSIS — B97.7 HIGH RISK HPV INFECTION: Primary | ICD-10-CM

## 2025-07-16 PROCEDURE — 57454 BX/CURETT OF CERVIX W/SCOPE: CPT | Mod: S$GLB,,, | Performed by: OBSTETRICS & GYNECOLOGY

## 2025-07-16 PROCEDURE — 88305 TISSUE EXAM BY PATHOLOGIST: CPT | Mod: TC,91 | Performed by: OBSTETRICS & GYNECOLOGY

## 2025-07-16 RX ORDER — PROPRANOLOL HYDROCHLORIDE 10 MG/1
10 TABLET ORAL DAILY PRN
COMMUNITY
Start: 2025-06-26 | End: 2026-06-26

## 2025-07-16 RX ORDER — BUDESONIDE 0.5 MG/2ML
INHALANT ORAL
Qty: 60 ML | Refills: 3 | Status: SHIPPED | OUTPATIENT
Start: 2025-07-16

## 2025-07-16 NOTE — PROCEDURES
Colposcopy W/BIOPSY AND ECC    Date/Time: 7/16/2025 2:45 PM    Performed by: Lu Schreiber MD  Authorized by: Lu Schreiber MD    Consent obatined:  Prior to procedure the appropriate consent was completed and verified  Timeout:Immediately prior to procedure a time out was called to verify the correct patient, procedure, equipment, support staff and site/side marked as required  Prep:Patient was prepped and draped in the usual sterile fashion  Assistants?: No      Colposcopy Site:  Cervix  Position:  Supine   Patient was prepped and draped in the normal sterile fashion.  Acrowhite Lesion? Yes    Atypical Vessels: No    Transformation Zone Adequate?: No    Biopsy?: Yes         Location:  Cervix ((3 00))  ECC Performed?: Yes    LEEP Performed?: No    Estimated blood loss (cc):  1   Patient tolerated the procedure well with no immediate complications.   Post-operative instructions were provided for the patient.   Patient was discharged and will follow up if any complications occur

## 2025-07-17 LAB
ESTROGEN SERPL-MCNC: NORMAL PG/ML
INSULIN SERPL-ACNC: NORMAL U[IU]/ML
LAB AP CLINICAL INFORMATION: NORMAL
LAB AP DIAGNOSIS CATEGORY: NORMAL
LAB AP GROSS DESCRIPTION: NORMAL
LAB AP PERFORMING LOCATION(S): NORMAL
LAB AP REPORT FOOTNOTES: NORMAL

## 2025-07-18 DIAGNOSIS — Z87.410 HISTORY OF CERVICAL DYSPLASIA: Primary | ICD-10-CM

## 2025-07-21 ENCOUNTER — E-CONSULT (OUTPATIENT)
Dept: GYNECOLOGIC ONCOLOGY | Facility: CLINIC | Age: 55
End: 2025-07-21
Payer: MEDICARE

## 2025-07-21 DIAGNOSIS — B97.7 HPV IN FEMALE: Primary | ICD-10-CM

## 2025-07-21 NOTE — CONSULTS
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 Dysplasia history reviewed. I concur with current treatment plan to include continued observation of mild dysplasia with follow up cytology and HPV testing in 12 months. Guidelines do not support hysterectomy for mild dysplasia in the absence of other indications. If mild dysplasia continues to be persistent over the years can consider another excisional procedure but agree with active observation at the present time.       Additional future steps to consider: none at the present time    Total time of Consultation: 10 minute    I did not speak to the requesting provider verbally about this.     *This eConsult is based on the clinical data available to me and is furnished without benefit of a physical examination. The eConsult will need to be interpreted in light of any clinical issues or changes in patient status not available to me at the time of filing this eConsults. Significant changes in patient condition or level of acuity should result in immediate formal consultation and reevaluation. Please alert me if you have further questions.    Thank you for this eConsult referral.     Thuy Chambers MD  [facility] - Gyn Onc 2nd Fl

## 2025-07-23 ENCOUNTER — INFUSION (OUTPATIENT)
Dept: INFUSION THERAPY | Facility: HOSPITAL | Age: 55
End: 2025-07-23
Attending: INTERNAL MEDICINE
Payer: MEDICARE

## 2025-07-23 ENCOUNTER — PATIENT MESSAGE (OUTPATIENT)
Dept: HEPATOLOGY | Facility: CLINIC | Age: 55
End: 2025-07-23
Payer: MEDICARE

## 2025-07-23 VITALS
HEART RATE: 81 BPM | OXYGEN SATURATION: 99 % | SYSTOLIC BLOOD PRESSURE: 136 MMHG | BODY MASS INDEX: 20.58 KG/M2 | TEMPERATURE: 98 F | DIASTOLIC BLOOD PRESSURE: 63 MMHG | WEIGHT: 139.31 LBS | RESPIRATION RATE: 16 BRPM

## 2025-07-23 DIAGNOSIS — M32.9 SYSTEMIC LUPUS ERYTHEMATOSUS, UNSPECIFIED SLE TYPE, UNSPECIFIED ORGAN INVOLVEMENT STATUS: Primary | ICD-10-CM

## 2025-07-23 PROCEDURE — 25000003 PHARM REV CODE 250: Performed by: INTERNAL MEDICINE

## 2025-07-23 PROCEDURE — 96375 TX/PRO/DX INJ NEW DRUG ADDON: CPT

## 2025-07-23 PROCEDURE — 63600175 PHARM REV CODE 636 W HCPCS: Performed by: INTERNAL MEDICINE

## 2025-07-23 PROCEDURE — 96365 THER/PROPH/DIAG IV INF INIT: CPT

## 2025-07-23 RX ORDER — HEPARIN 100 UNIT/ML
500 SYRINGE INTRAVENOUS
OUTPATIENT
Start: 2025-08-20

## 2025-07-23 RX ORDER — ACETAMINOPHEN 325 MG/1
650 TABLET ORAL
OUTPATIENT
Start: 2025-08-20

## 2025-07-23 RX ORDER — DIPHENHYDRAMINE HYDROCHLORIDE 50 MG/ML
25 INJECTION, SOLUTION INTRAMUSCULAR; INTRAVENOUS
OUTPATIENT
Start: 2025-08-20

## 2025-07-23 RX ORDER — SODIUM CHLORIDE 0.9 % (FLUSH) 0.9 %
10 SYRINGE (ML) INJECTION
OUTPATIENT
Start: 2025-08-20

## 2025-07-23 RX ORDER — DIPHENHYDRAMINE HYDROCHLORIDE 50 MG/ML
25 INJECTION, SOLUTION INTRAMUSCULAR; INTRAVENOUS
Status: COMPLETED | OUTPATIENT
Start: 2025-07-23 | End: 2025-07-23

## 2025-07-23 RX ORDER — ACETAMINOPHEN 325 MG/1
650 TABLET ORAL
Status: COMPLETED | OUTPATIENT
Start: 2025-07-23 | End: 2025-07-23

## 2025-07-23 RX ADMIN — ACETAMINOPHEN 650 MG: 325 TABLET ORAL at 12:07

## 2025-07-23 RX ADMIN — BELIMUMAB 632 MG: 400 INJECTION, POWDER, LYOPHILIZED, FOR SOLUTION INTRAVENOUS at 12:07

## 2025-07-23 RX ADMIN — DIPHENHYDRAMINE HYDROCHLORIDE 25 MG: 50 INJECTION INTRAMUSCULAR; INTRAVENOUS at 12:07

## 2025-07-29 ENCOUNTER — TELEPHONE (OUTPATIENT)
Dept: TRANSPLANT | Facility: CLINIC | Age: 55
End: 2025-07-29
Payer: MEDICARE

## 2025-08-05 NOTE — PLAN OF CARE
Patient arrived to unit for q4w Benlysta infusion. No new or worsening symptoms to report. Plan of care reviewed, patient agreeable to plan. Tylenol and Benadryl administered prior to Benlysta. Patient tolerated infusion well. VSS. Discharge instructions reviewed, patient instructed to return 8/20/25. Patient ambulated off unit unassisted by self. Patient in NAD at time of discharge.

## 2025-08-22 ENCOUNTER — INFUSION (OUTPATIENT)
Dept: INFUSION THERAPY | Facility: HOSPITAL | Age: 55
End: 2025-08-22
Attending: INTERNAL MEDICINE
Payer: MEDICARE

## 2025-08-22 VITALS
SYSTOLIC BLOOD PRESSURE: 142 MMHG | OXYGEN SATURATION: 99 % | TEMPERATURE: 98 F | BODY MASS INDEX: 21.03 KG/M2 | DIASTOLIC BLOOD PRESSURE: 65 MMHG | WEIGHT: 142.44 LBS | RESPIRATION RATE: 19 BRPM | HEART RATE: 82 BPM

## 2025-08-22 DIAGNOSIS — M32.9 SYSTEMIC LUPUS ERYTHEMATOSUS, UNSPECIFIED SLE TYPE, UNSPECIFIED ORGAN INVOLVEMENT STATUS: Primary | ICD-10-CM

## 2025-08-22 PROCEDURE — 96375 TX/PRO/DX INJ NEW DRUG ADDON: CPT

## 2025-08-22 PROCEDURE — 63600175 PHARM REV CODE 636 W HCPCS: Performed by: INTERNAL MEDICINE

## 2025-08-22 PROCEDURE — 96365 THER/PROPH/DIAG IV INF INIT: CPT

## 2025-08-22 PROCEDURE — 25000003 PHARM REV CODE 250: Performed by: INTERNAL MEDICINE

## 2025-08-22 RX ORDER — DIPHENHYDRAMINE HYDROCHLORIDE 50 MG/ML
25 INJECTION, SOLUTION INTRAMUSCULAR; INTRAVENOUS
Status: COMPLETED | OUTPATIENT
Start: 2025-08-22 | End: 2025-08-22

## 2025-08-22 RX ORDER — ACETAMINOPHEN 325 MG/1
650 TABLET ORAL
Status: COMPLETED | OUTPATIENT
Start: 2025-08-22 | End: 2025-08-22

## 2025-08-22 RX ADMIN — ACETAMINOPHEN 650 MG: 325 TABLET ORAL at 12:08

## 2025-08-22 RX ADMIN — BELIMUMAB 646 MG: 400 INJECTION, POWDER, LYOPHILIZED, FOR SOLUTION INTRAVENOUS at 12:08

## 2025-08-22 RX ADMIN — DIPHENHYDRAMINE HYDROCHLORIDE 25 MG: 50 INJECTION INTRAMUSCULAR; INTRAVENOUS at 12:08

## 2025-08-25 DIAGNOSIS — R09.81 NASAL CONGESTION: Primary | ICD-10-CM

## 2025-08-25 RX ORDER — FLUTICASONE PROPIONATE 50 MCG
SPRAY, SUSPENSION (ML) NASAL
Qty: 16 G | Refills: 11 | Status: SHIPPED | OUTPATIENT
Start: 2025-08-25

## (undated) DEVICE — SOL PVP-I SCRUB 7.5% 4OZ

## (undated) DEVICE — SEE MEDLINE ITEM 146313

## (undated) DEVICE — SPLINT INTRANASAL POSISEP .6X2

## (undated) DEVICE — ELECTRODE REM PLYHSV RETURN 9

## (undated) DEVICE — GLOVE BIOGEL SKINSENSE PI 6.5

## (undated) DEVICE — SYR 12CC CNTRL L-L NO NDL

## (undated) DEVICE — SEE MEDLINE ITEM 152622

## (undated) DEVICE — SWAB PROCTO RAYON TIP NS 16

## (undated) DEVICE — SOL NS 1000CC

## (undated) DEVICE — NDL 27G X 1 1/4

## (undated) DEVICE — SOL IRR SOD CHL .9% POUR

## (undated) DEVICE — DRAPE STERI INSTRUMENT 1018

## (undated) DEVICE — SPONGE PATTY SURGICAL .5X3IN

## (undated) DEVICE — SEE MEDLINE ITEM 157110

## (undated) DEVICE — SOL 9P NACL IRR PIC IL

## (undated) DEVICE — TUBING SMOKE EVACUATOR LEEP

## (undated) DEVICE — PATIENT TRACKER ENT

## (undated) DEVICE — SEE MEDLINE ITEM 157194

## (undated) DEVICE — SYR 10CC LUER LOCK

## (undated) DEVICE — PENCIL ELECTROSURG HOLST W/BLD

## (undated) DEVICE — PADS ADHESIVE

## (undated) DEVICE — BLANKET LOWER BODY 55.9X40.2IN

## (undated) DEVICE — NDL SPINAL SPINOCAN 22GX3.5

## (undated) DEVICE — GLOVE SURGICAL LATEX SZ 6.5

## (undated) DEVICE — SUPPORT ULNA NERVE PROTECTOR

## (undated) DEVICE — COVER OVERHEAD SURG LT BLUE

## (undated) DEVICE — CONTAINER SPECIMEN STRL 4OZ

## (undated) DEVICE — TUBING XPS IRRIG TO STRAIGHTSH

## (undated) DEVICE — ELECTRODE LOOP 20MMX12MM

## (undated) DEVICE — TRACKER ENT INSTRUMENT

## (undated) DEVICE — CONNECTOR TUBING STR 5 IN 1

## (undated) DEVICE — SYS LABLNG CORECT MED 4 FLG

## (undated) DEVICE — SET EXT W/2 VLVE PORTS 40

## (undated) DEVICE — SHEATH LENS CLN 4MM 0D A70940A

## (undated) DEVICE — SUT 2/0 30IN SILK BLK BRAI

## (undated) DEVICE — SOL POVIDONE PREP IODINE 4 OZ

## (undated) DEVICE — BLADE QUADCUT STRAIGHT 4.3MM

## (undated) DEVICE — Device